# Patient Record
Sex: FEMALE | Race: WHITE | NOT HISPANIC OR LATINO | Employment: OTHER | URBAN - METROPOLITAN AREA
[De-identification: names, ages, dates, MRNs, and addresses within clinical notes are randomized per-mention and may not be internally consistent; named-entity substitution may affect disease eponyms.]

---

## 2018-03-08 ENCOUNTER — OFFICE VISIT CONVERTED (OUTPATIENT)
Dept: FAMILY MEDICINE CLINIC | Facility: CLINIC | Age: 53
End: 2018-03-08
Attending: FAMILY MEDICINE

## 2018-06-29 ENCOUNTER — OFFICE VISIT CONVERTED (OUTPATIENT)
Dept: FAMILY MEDICINE CLINIC | Facility: CLINIC | Age: 53
End: 2018-06-29
Attending: FAMILY MEDICINE

## 2018-08-16 ENCOUNTER — CONVERSION ENCOUNTER (OUTPATIENT)
Dept: GENERAL RADIOLOGY | Facility: HOSPITAL | Age: 53
End: 2018-08-16

## 2018-08-30 ENCOUNTER — OFFICE VISIT CONVERTED (OUTPATIENT)
Dept: FAMILY MEDICINE CLINIC | Facility: CLINIC | Age: 53
End: 2018-08-30
Attending: FAMILY MEDICINE

## 2019-01-15 ENCOUNTER — OFFICE VISIT CONVERTED (OUTPATIENT)
Dept: FAMILY MEDICINE CLINIC | Facility: CLINIC | Age: 54
End: 2019-01-15
Attending: FAMILY MEDICINE

## 2019-01-15 ENCOUNTER — HOSPITAL ENCOUNTER (OUTPATIENT)
Dept: FAMILY MEDICINE CLINIC | Facility: CLINIC | Age: 54
Discharge: HOME | End: 2019-01-15
Attending: FAMILY MEDICINE

## 2019-01-15 LAB
ALBUMIN SERPL-MCNC: 4.2 G/DL (ref 3.5–5)
ALBUMIN/GLOB SERPL: 1.2 {RATIO} (ref 1.4–2.6)
ALP SERPL-CCNC: 90 U/L (ref 53–141)
ALT SERPL-CCNC: 9 U/L (ref 10–40)
ANION GAP SERPL CALC-SCNC: 17 MMOL/L (ref 8–19)
AST SERPL-CCNC: 13 U/L (ref 15–50)
BASOPHILS # BLD AUTO: 0.05 10*3/UL (ref 0–0.2)
BASOPHILS NFR BLD AUTO: 0.43 % (ref 0–3)
BILIRUB SERPL-MCNC: 0.24 MG/DL (ref 0.2–1.3)
BUN SERPL-MCNC: 15 MG/DL (ref 5–25)
BUN/CREAT SERPL: 15 {RATIO} (ref 6–20)
CALCIUM SERPL-MCNC: 9.6 MG/DL (ref 8.7–10.4)
CHLORIDE SERPL-SCNC: 102 MMOL/L (ref 99–111)
CONV CO2: 27 MMOL/L (ref 22–32)
CONV TOTAL PROTEIN: 7.6 G/DL (ref 6.3–8.2)
CREAT UR-MCNC: 0.98 MG/DL (ref 0.5–0.9)
EOSINOPHIL # BLD AUTO: 0.21 10*3/UL (ref 0–0.7)
EOSINOPHIL # BLD AUTO: 1.72 % (ref 0–7)
ERYTHROCYTE [DISTWIDTH] IN BLOOD BY AUTOMATED COUNT: 13.7 % (ref 11.5–14.5)
GFR SERPLBLD BASED ON 1.73 SQ M-ARVRAT: >60 ML/MIN/{1.73_M2}
GLOBULIN UR ELPH-MCNC: 3.4 G/DL (ref 2–3.5)
GLUCOSE SERPL-MCNC: 94 MG/DL (ref 65–99)
HBA1C MFR BLD: 13.4 G/DL (ref 12–16)
HCT VFR BLD AUTO: 40.7 % (ref 37–47)
LYMPHOCYTES # BLD AUTO: 2.9 10*3/UL (ref 1–5)
MCH RBC QN AUTO: 29.3 PG (ref 27–31)
MCHC RBC AUTO-ENTMCNC: 32.9 G/DL (ref 33–37)
MCV RBC AUTO: 88.9 FL (ref 81–99)
MONOCYTES # BLD AUTO: 0.77 10*3/UL (ref 0.2–1.2)
MONOCYTES NFR BLD AUTO: 6.43 % (ref 3–10)
NEUTROPHILS # BLD AUTO: 8.08 10*3/UL (ref 2–8)
NEUTROPHILS NFR BLD AUTO: 67.3 % (ref 30–85)
NRBC BLD AUTO-RTO: 0 % (ref 0–0.01)
OSMOLALITY SERPL CALC.SUM OF ELEC: 295 MOSM/KG (ref 273–304)
PLATELET # BLD AUTO: 279 10*3/UL (ref 130–400)
PMV BLD AUTO: 8.1 FL (ref 7.4–10.4)
POTASSIUM SERPL-SCNC: 4.3 MMOL/L (ref 3.5–5.3)
RBC # BLD AUTO: 4.58 10*6/UL (ref 4.2–5.4)
SODIUM SERPL-SCNC: 142 MMOL/L (ref 135–147)
VARIANT LYMPHS NFR BLD MANUAL: 24.1 % (ref 20–45)
WBC # BLD AUTO: 12 10*3/UL (ref 4.8–10.8)

## 2019-02-28 ENCOUNTER — OFFICE VISIT CONVERTED (OUTPATIENT)
Dept: FAMILY MEDICINE CLINIC | Facility: CLINIC | Age: 54
End: 2019-02-28
Attending: FAMILY MEDICINE

## 2019-04-29 ENCOUNTER — CONVERSION ENCOUNTER (OUTPATIENT)
Dept: FAMILY MEDICINE CLINIC | Facility: CLINIC | Age: 54
End: 2019-04-29

## 2019-04-29 ENCOUNTER — OFFICE VISIT CONVERTED (OUTPATIENT)
Dept: FAMILY MEDICINE CLINIC | Facility: CLINIC | Age: 54
End: 2019-04-29
Attending: FAMILY MEDICINE

## 2019-05-01 ENCOUNTER — HOSPITAL ENCOUNTER (OUTPATIENT)
Dept: LAB | Facility: HOSPITAL | Age: 54
Discharge: HOME | End: 2019-05-01
Attending: FAMILY MEDICINE

## 2019-05-01 ENCOUNTER — HOSPITAL ENCOUNTER (OUTPATIENT)
Dept: GENERAL RADIOLOGY | Facility: HOSPITAL | Age: 54
Discharge: HOME | End: 2019-05-01
Attending: FAMILY MEDICINE

## 2019-05-01 LAB
ALBUMIN SERPL-MCNC: 4.2 G/DL (ref 3.5–5)
ALBUMIN/GLOB SERPL: 1.4 {RATIO} (ref 1.4–2.6)
ALP SERPL-CCNC: 96 U/L (ref 53–141)
ALT SERPL-CCNC: 11 U/L (ref 10–40)
ANION GAP SERPL CALC-SCNC: 18 MMOL/L (ref 8–19)
AST SERPL-CCNC: 14 U/L (ref 15–50)
BILIRUB SERPL-MCNC: 0.33 MG/DL (ref 0.2–1.3)
BUN SERPL-MCNC: 16 MG/DL (ref 5–25)
BUN/CREAT SERPL: 15 {RATIO} (ref 6–20)
CALCIUM SERPL-MCNC: 9.4 MG/DL (ref 8.7–10.4)
CHLORIDE SERPL-SCNC: 99 MMOL/L (ref 99–111)
CHOLEST SERPL-MCNC: 271 MG/DL (ref 107–200)
CHOLEST/HDLC SERPL: 7.1 {RATIO} (ref 3–6)
CONV CO2: 27 MMOL/L (ref 22–32)
CONV TOTAL PROTEIN: 7.3 G/DL (ref 6.3–8.2)
CREAT UR-MCNC: 1.08 MG/DL (ref 0.5–0.9)
EST. AVERAGE GLUCOSE BLD GHB EST-MCNC: 108 MG/DL
GFR SERPLBLD BASED ON 1.73 SQ M-ARVRAT: 59 ML/MIN/{1.73_M2}
GLOBULIN UR ELPH-MCNC: 3.1 G/DL (ref 2–3.5)
GLUCOSE SERPL-MCNC: 111 MG/DL (ref 65–99)
HBA1C MFR BLD: 5.4 % (ref 3.5–5.7)
HDLC SERPL-MCNC: 38 MG/DL (ref 40–60)
LDLC SERPL CALC-MCNC: 183 MG/DL (ref 70–100)
OSMOLALITY SERPL CALC.SUM OF ELEC: 292 MOSM/KG (ref 273–304)
POTASSIUM SERPL-SCNC: 4.3 MMOL/L (ref 3.5–5.3)
SODIUM SERPL-SCNC: 140 MMOL/L (ref 135–147)
TRIGL SERPL-MCNC: 439 MG/DL (ref 40–150)

## 2019-05-28 ENCOUNTER — OFFICE VISIT CONVERTED (OUTPATIENT)
Dept: FAMILY MEDICINE CLINIC | Facility: CLINIC | Age: 54
End: 2019-05-28
Attending: FAMILY MEDICINE

## 2019-06-14 ENCOUNTER — HOSPITAL ENCOUNTER (OUTPATIENT)
Dept: MRI IMAGING | Facility: HOSPITAL | Age: 54
Discharge: HOME | End: 2019-06-14
Attending: FAMILY MEDICINE

## 2019-10-22 ENCOUNTER — CONVERSION ENCOUNTER (OUTPATIENT)
Dept: FAMILY MEDICINE CLINIC | Facility: CLINIC | Age: 54
End: 2019-10-22

## 2019-10-22 ENCOUNTER — OFFICE VISIT CONVERTED (OUTPATIENT)
Dept: FAMILY MEDICINE CLINIC | Facility: CLINIC | Age: 54
End: 2019-10-22
Attending: FAMILY MEDICINE

## 2019-11-21 ENCOUNTER — OFFICE VISIT CONVERTED (OUTPATIENT)
Dept: FAMILY MEDICINE CLINIC | Facility: CLINIC | Age: 54
End: 2019-11-21
Attending: FAMILY MEDICINE

## 2019-12-16 ENCOUNTER — OFFICE VISIT CONVERTED (OUTPATIENT)
Dept: FAMILY MEDICINE CLINIC | Facility: CLINIC | Age: 54
End: 2019-12-16
Attending: FAMILY MEDICINE

## 2020-01-08 ENCOUNTER — HOSPITAL ENCOUNTER (OUTPATIENT)
Dept: GENERAL RADIOLOGY | Facility: HOSPITAL | Age: 55
Discharge: HOME | End: 2020-01-08
Attending: FAMILY MEDICINE

## 2020-01-15 ENCOUNTER — OFFICE VISIT CONVERTED (OUTPATIENT)
Dept: FAMILY MEDICINE CLINIC | Facility: CLINIC | Age: 55
End: 2020-01-15
Attending: FAMILY MEDICINE

## 2020-01-30 ENCOUNTER — OFFICE VISIT CONVERTED (OUTPATIENT)
Dept: PULMONOLOGY | Facility: CLINIC | Age: 55
End: 2020-01-30
Attending: INTERNAL MEDICINE

## 2021-02-02 ENCOUNTER — TELEMEDICINE CONVERTED (OUTPATIENT)
Dept: FAMILY MEDICINE CLINIC | Facility: CLINIC | Age: 56
End: 2021-02-02
Attending: FAMILY MEDICINE

## 2021-03-01 ENCOUNTER — HOSPITAL ENCOUNTER (OUTPATIENT)
Dept: FAMILY MEDICINE CLINIC | Facility: CLINIC | Age: 56
Discharge: HOME | End: 2021-03-01
Attending: FAMILY MEDICINE

## 2021-03-01 LAB
ALBUMIN SERPL-MCNC: 4.4 G/DL (ref 3.5–5)
ALBUMIN/GLOB SERPL: 1.5 {RATIO} (ref 1.4–2.6)
ALP SERPL-CCNC: 93 U/L (ref 53–141)
ALT SERPL-CCNC: 9 U/L (ref 10–40)
ANION GAP SERPL CALC-SCNC: 15 MMOL/L (ref 8–19)
AST SERPL-CCNC: 15 U/L (ref 15–50)
BASOPHILS # BLD AUTO: 0.04 10*3/UL (ref 0–0.2)
BASOPHILS NFR BLD AUTO: 0.4 % (ref 0–3)
BILIRUB SERPL-MCNC: 0.26 MG/DL (ref 0.2–1.3)
BUN SERPL-MCNC: 20 MG/DL (ref 5–25)
BUN/CREAT SERPL: 20 {RATIO} (ref 6–20)
CALCIUM SERPL-MCNC: 9.5 MG/DL (ref 8.7–10.4)
CHLORIDE SERPL-SCNC: 101 MMOL/L (ref 99–111)
CHOLEST SERPL-MCNC: 247 MG/DL (ref 107–200)
CHOLEST/HDLC SERPL: 6 {RATIO} (ref 3–6)
CONV ABS IMM GRAN: 0.05 10*3/UL (ref 0–0.2)
CONV CO2: 26 MMOL/L (ref 22–32)
CONV IMMATURE GRAN: 0.5 % (ref 0–1.8)
CONV TOTAL PROTEIN: 7.4 G/DL (ref 6.3–8.2)
CREAT UR-MCNC: 1 MG/DL (ref 0.5–0.9)
DEPRECATED RDW RBC AUTO: 49.3 FL (ref 36.4–46.3)
EOSINOPHIL # BLD AUTO: 0.26 10*3/UL (ref 0–0.7)
EOSINOPHIL # BLD AUTO: 2.4 % (ref 0–7)
ERYTHROCYTE [DISTWIDTH] IN BLOOD BY AUTOMATED COUNT: 14.3 % (ref 11.7–14.4)
EST. AVERAGE GLUCOSE BLD GHB EST-MCNC: 114 MG/DL
GFR SERPLBLD BASED ON 1.73 SQ M-ARVRAT: >60 ML/MIN/{1.73_M2}
GLOBULIN UR ELPH-MCNC: 3 G/DL (ref 2–3.5)
GLUCOSE SERPL-MCNC: 85 MG/DL (ref 65–99)
HBA1C MFR BLD: 5.6 % (ref 3.5–5.7)
HCT VFR BLD AUTO: 45.1 % (ref 37–47)
HDLC SERPL-MCNC: 41 MG/DL (ref 40–60)
HGB BLD-MCNC: 14.2 G/DL (ref 12–16)
LDLC SERPL CALC-MCNC: 162 MG/DL (ref 70–100)
LYMPHOCYTES # BLD AUTO: 2.54 10*3/UL (ref 1–5)
LYMPHOCYTES NFR BLD AUTO: 23.6 % (ref 20–45)
MCH RBC QN AUTO: 29.3 PG (ref 27–31)
MCHC RBC AUTO-ENTMCNC: 31.5 G/DL (ref 33–37)
MCV RBC AUTO: 93 FL (ref 81–99)
MONOCYTES # BLD AUTO: 0.92 10*3/UL (ref 0.2–1.2)
MONOCYTES NFR BLD AUTO: 8.5 % (ref 3–10)
NEUTROPHILS # BLD AUTO: 6.96 10*3/UL (ref 2–8)
NEUTROPHILS NFR BLD AUTO: 64.6 % (ref 30–85)
NRBC CBCN: 0 % (ref 0–0.7)
OSMOLALITY SERPL CALC.SUM OF ELEC: 286 MOSM/KG (ref 273–304)
PLATELET # BLD AUTO: 256 10*3/UL (ref 130–400)
PMV BLD AUTO: 10.8 FL (ref 9.4–12.3)
POTASSIUM SERPL-SCNC: 4.5 MMOL/L (ref 3.5–5.3)
RBC # BLD AUTO: 4.85 10*6/UL (ref 4.2–5.4)
SODIUM SERPL-SCNC: 137 MMOL/L (ref 135–147)
TRIGL SERPL-MCNC: 218 MG/DL (ref 40–150)
VLDLC SERPL-MCNC: 44 MG/DL (ref 5–37)
WBC # BLD AUTO: 10.77 10*3/UL (ref 4.8–10.8)

## 2021-04-13 ENCOUNTER — OFFICE VISIT CONVERTED (OUTPATIENT)
Dept: FAMILY MEDICINE CLINIC | Facility: CLINIC | Age: 56
End: 2021-04-13
Attending: FAMILY MEDICINE

## 2021-04-13 ENCOUNTER — CONVERSION ENCOUNTER (OUTPATIENT)
Dept: FAMILY MEDICINE CLINIC | Facility: CLINIC | Age: 56
End: 2021-04-13

## 2021-04-30 ENCOUNTER — HOSPITAL ENCOUNTER (OUTPATIENT)
Dept: MRI IMAGING | Facility: HOSPITAL | Age: 56
Discharge: HOME | End: 2021-04-30
Attending: FAMILY MEDICINE

## 2021-05-14 VITALS
SYSTOLIC BLOOD PRESSURE: 120 MMHG | WEIGHT: 227.37 LBS | TEMPERATURE: 97.3 F | HEIGHT: 64 IN | DIASTOLIC BLOOD PRESSURE: 68 MMHG | BODY MASS INDEX: 38.82 KG/M2 | HEART RATE: 97 BPM | OXYGEN SATURATION: 96 %

## 2021-05-14 NOTE — PROGRESS NOTES
Progress Note      Patient Name: Lluvia Archer   Patient ID: [id number]   Sex: Female   YOB: 1965    Primary Care Provider: Aleksandar Edge DO   Referring Provider: Aleksandar Edge DO    Visit Date: February 2, 2021    Provider: Aleksandar Edge DO   Location: [facility]   Location Address: [address] [place] [address]  [id number]   Location Phone: [phone]          Chief Complaint  · Follow up - COPD, HTN, Anxiety, Depression, HLD  · Medicaiton refills      History Of Present Illness  Video Conferencing Visit  Lluvia Archer is a 55 year old /White female who is presenting for evaluation via video conferencing via ZOOM. Verbal consent obtained before beginning visit.   The following staff were present during this visit: Breana Baez CMA   Lluvia Archer is a 55 year old /White female who presents for evaluation and treatment of: HTN, HLD. She states that she does NOT check her BP. She has been out of her meds for the past 4 months, except for the Lisinopril.      Depression/anxiety- She has rarely left her house in 10 months. She has been more anxious. She has been taking her Fluoxetine daily.      COPD- She states that her breathing has been good, except for a mild URI. She is using her Breo and Albuterol as needed. She has remained a non-smoker.      DM- She had lost weight and her her A1C was <6. Unfortunately with COVID she has gained 30 lbs.       Past Medical History  Disease Name Date Onset Notes   Abnormal mammogram --  --    Anxiety --  --    Arthritis --  R shoulder, R hip, and R leg   Chronic nausea 01/15/2019 --    Chronic obstructive pulmonary disease (COPD) 03/14/2017 --    Depression --  --    Diabetes Mellitus, Type II --  --    GERD --  --    Hernia, hiatal --  --    Hyperlipidemia --  --    Hypertension --  --    Knee pain, right 08/30/2018 --    Memory loss/Forgetfulness --  --    Migraine Headaches --   --    Moderate episode of recurrent major depressive disorder 05/22/2017 --    Night sweats --  --    Numbness 08/18/2017 Will chheck an x-miguel a of her c-spine   Sciatica of right side 08/18/2017 --    Severe episode of recurrent major depressive disorder, without psychotic features 10/22/2019 --    Shortness of breath --  --    Shoulder pain, left 08/30/2018 --    Sinus Trouble; unspecified --  --    Sleep apnea, unspecified --  --    Tobacco abuse 03/14/2017 --    Tobacco abuse counseling 03/14/2017 She desires to quit. She has tried Chantix in the past w/o success. She was successful in the past w/o medication.          Past Surgical History  Procedure Name Date Notes   Cesarian Section --  --    Cholecystectomy --  --    Colonoscopy --  --    Gallbladder --  --          Medication List  Name Date Started Instructions   Accu-Chek Marina Plus Meter miscellaneous misc 04/10/2014 use as directed   Accu-Chek Marina Plus test strp miscellaneous strip 03/14/2017 check blood sugar fasting daily and record   ALBUTEROL HFA INH(200 PUFFS)18GM 01/03/2021 INHALE 1 TO 2 PUFFS BY MOUTH EVERY 6 HOURS AS NEEDED FOR 30 DAYS   albuterol sulfate 2.5 mg /3 mL (0.083 %) inhalation solution for nebulization 06/29/2020 USE 1 VIAL VIA NEBULIZER EVERY 6 HOURS AS NEEDED FOR 30 DAYS   albuterol sulfate 90 mcg/actuation inhalation HFA aerosol inhaler 07/27/2020 INHALE 1 TO 2 PUFFS BY MOUTH EVERY 6 HOURS AS NEEDED FOR 30 DAYS   Breo Ellipta 100-25 mcg/dose inhalation blister with device 12/03/2020 INHALE 1 PUFF BY MOUTH EVERY DAY SAME TIME EACH DAY   bupropion HCl 150 mg oral tablet sustained-release 12 hr 01/15/2020 take 1 tablet (150 mg) by oral route 2 times per day for 30 days   clonazepam 0.5 mg oral tablet 06/05/2020 TAKE 1 TABLET(0.5 MG) BY MOUTH TWICE DAILY   famotidine 40 mg oral tablet 04/15/2020 take 1 tablet (40 mg) by oral route 2 times per day for 30 days   fluoxetine 40 mg oral capsule 08/27/2020 TAKE 1 CAPSULE(40 MG) BY MOUTH  EVERY DAY   InnoSpire Deluxe miscellaneous device 01/15/2019 use as directed every 6 hrs as needed   lisinopril 5 mg oral tablet 2020 TAKE 1 TABLET BY MOUTH EVERY DAY   Ultra Thin Lancets 28 gauge miscellaneous misc 2017 check blood daily and record   Ventolin HFA 90 mcg/actuation inhalation HFA aerosol inhaler 2019 INHALE 1 - 2 PUFFS (90 - 180 MCG) BY INHALATION ROUTE EVERY 6 HOURS AS NEEDED FOR 30 DAYS         Allergy List  Allergen Name Date Reaction Notes   NO KNOWN DRUG ALLERGIES --  --  --          Family Medical History  Disease Name Relative/Age Notes   Heart Disease Father/   Father   Hypertension  --    Cancer, Unspecified  --    Cardiovascular disease  --    Blood Diseases Mother/   Mother   Family history of certain chronic disabling diseases; arthritis Mother/   Mother         Reproductive History  Menstrual   Pregnancy Summary   Total Pregnancies: 0 Full Term: 0 Premature: 0   Ab Induced: 0 Ab Spontaneous: 0 Ectopics: 0   Multiples: 0 Livin         Social History  Finding Status Start/Stop Quantity Notes   Alcohol Use --  --/-- --  01/15/2019 - does not drink   Homemaker. --  --/-- --  --    lives with other --  --/-- --  --    Recreational Drug Use Never --/-- --  no   Tobacco Former --/53 1 ppd down to 10 per day         Immunizations  NameDate Admin Mfg Trade Name Lot Number Route Inj VIS Given VIS Publication   [id number]/ [facility] Fluzone Quadrivalent [id number] IM RD 10/22/2019    Comments: pt tolerated well with no complaints   Prevnar [id number] [facility] PREVNAR 13 [id number] IM  2017   Comments: Pt tollerated injetion well with no adverse reaction.         Review of Systems  · Constitutional  o Denies  o : fatigue  · Eyes  o Admits  o : blurred vision  o Denies  o : changes in vision  · HENT  o Denies  o : headaches  · Cardiovascular  o Denies  o : chest pain, irregular heart beats, rapid heart rate  · Respiratory  o Admits  o : wheezing, cough, dyspnea on  "exertion  o Denies  o : shortness of breath  · Gastrointestinal  o Admits  o : nausea  o Denies  o : vomiting, diarrhea, constipation  · Endocrine  o Admits  o : polyuria, central obesity  o Denies  o : polydipsia  · Psychiatric  o Admits  o : anxiety, depression      Vitals  Date Time BP Position Site L\R Cuff Size HR RR TEMP (F) WT  HT  BMI kg/m2 BSA m2 O2 Sat FR L/min FiO2 HC       11/21/2019 10:42 /59 Sitting    109 - R  98.5 223lbs 6oz 5'  4\" 38.34 2.14 92 %  21%    12/16/2019 10:27 AM 98/70 Sitting    93 - R   222lbs 4oz 5'  4\" 38.15 2.13 98 % 2 28%    01/15/2020 09:47 /58 Sitting    98 - R   221lbs 4oz 5'  4\" 37.98 2.13 96 % 2 28%          Physical Examination  · Constitutional  o Appearance  o : well-nourished, well developed, no obvious deformities present  · Head and Face  o Head  o :   § Inspection  § : atraumatic, normocephalic  o Face  o :   § Inspection  § : no facial lesions  · Respiratory  o Respiratory Effort  o : breathing unlabored, no accessory muscle use  · Neurologic  o Mental Status Examination  o :   § Orientation  § : grossly oriented to person, place and time  · Psychiatric  o Judgement and Insight  o : judgement and insight intact  o Thought Processes  o : rate of thoughts normal, thought content logical  o Mood and Affect  o : mood normal, affect appropriate          Assessment  · GERD (gastroesophageal reflux disease)     530.81/K21.9  will continue her Pepcid  · Hyperlipidemia     272.4/E78.5  will update   · Chronic nausea     787.02/R11.0  · Chronic obstructive pulmonary disease (COPD)     496/J44.9  Overall she is doing well.   · Moderate episode of recurrent major depressive disorder     296.32/F33.1  will add back the Buproprion  · Diabetes Mellitus, Type II     250.00/E11.9  She has recurrent symptom with her weight gain.   · Hypertension     401.9/I10  stable      Plan  · Orders  o CBC with Auto Diff [facility] ([id number]) - 296.32/F33.1, 401.9/I10, 530.81/K21.9, 787.02/R11.0 - " 02/02/2021  o CMP [facility] ([id number]) - 250.00/E11.9, 401.9/I10, 272.4/E78.5 - 02/02/2021  o Hgb A1c [facility] ([id number]) - 250.00/E11.9 - 02/02/2021  o Lipid Panel [facility] ([id number]) - 250.00/E11.9, 401.9/I10, 272.4/E78.5 - 02/02/2021  o ACO-39: Current medications updated and reviewed (1159F, ) - - 02/02/2021  · Medications  o Trelegy Ellipta 200-62.5-25 mcg inhalation blister with device   SIG: inhale 1 puff by inhalation route once daily at the same time each day for 30 days   DISP: (1) Inhaler with 11 refills  Prescribed on 02/02/2021     o albuterol sulfate 90 mcg/actuation inhalation HFA aerosol inhaler   SIG: inhale 1 - 2 puffs (90 - 180 mcg) by inhalation route every 6 hours as needed for 30 days   DISP: (1) Inhaler with 2 refills  Adjusted on 02/02/2021     o bupropion HCl 150 mg oral tablet extended release 24 hr   SIG: take 1 tablet (150 mg) by oral route once daily for 30 days   DISP: (30) Tablet with 5 refills  Adjusted on 02/02/2021     o famotidine 40 mg oral tablet   SIG: take 1 tablet (40 mg) by oral route 2 times per day for 30 days   DISP: (60) Tablet with 11 refills  Adjusted on 02/02/2021     o fluoxetine 40 mg oral capsule   SIG: take 1 capsule (40 mg) by oral route once daily for 30 days   DISP: (30) Capsule with 11 refills  Adjusted on 02/02/2021     o lisinopril 5 mg oral tablet   SIG: take 1 tablet (5 mg) by oral route once daily for 30 days   DISP: (30) Tablet with 11 refills  Adjusted on 02/02/2021     o Accu-Chek Marina Plus Meter miscellaneous misc   SIG: use as directed   DISP: (1) Box with 0 refills  Discontinued on 02/02/2021     o Accu-Chek Marina Plus test strp miscellaneous strip   SIG: check blood sugar fasting daily and record   DISP: (1) 50 ct box with 11 refills  Discontinued on 02/02/2021     o ALBUTEROL HFA INH(200 PUFFS)18GM   SIG: INHALE 1 TO 2 PUFFS BY MOUTH EVERY 6 HOURS AS NEEDED FOR 30 DAYS   DISP: (18) Gram with 0 refills  Discontinued on 02/02/2021     o Breo Ellipta 100-25 mcg/dose  inhalation blister with device   SIG: INHALE 1 PUFF BY MOUTH EVERY DAY SAME TIME EACH DAY   DISP: (1) Inhaler with 1 refills  Discontinued on 02/02/2021     o Ventolin HFA 90 mcg/actuation inhalation HFA aerosol inhaler   SIG: INHALE 1 - 2 PUFFS (90 - 180 MCG) BY INHALATION ROUTE EVERY 6 HOURS AS NEEDED FOR 30 DAYS   DISP: (18) Gram with 1 refills  Discontinued on 02/02/2021     o Medications have been Reconciled  o Transition of Care or Provider Policy  · Instructions  o Maintain a healthy weight. Avoid tight fitting clothes. Avoid fried, fatty foods, tomato sauce, chocolate, mint, garlic, onion, alcohol. caffeine. Eat smaller meals, dont lie down after a meal, dont smoke. Elevate the head of your bed 6-9 inches.  o Patient was educated and given low cholesterol diet information.  o Recommended exercise program to assist with cholesterol, weight loss and overall health improvement.  o Advised that cheeses and other sources of dairy fats, animal fats, fast food, and the extras (candy, pastries, pies, doughnuts and cookies) all contain LDL raising nutrients. Advised to increase fruits, vegetables, whole grains, and to monitor portion sizes.   o Patient is taking medications as prescribed and doing well.   o Take all medications as prescribed/directed.  o Patient instructed/educated on their diet and exercise program.  o Patient was educated/instructed on their diagnosis, treatment and medications prior to discharge from the clinic today.  o Call the office with any concerns or questions.  · Disposition  o Call or Return if symptoms worsen or persist.  o Return Visit Request in/on 2 months +/- 2 days ([id number]).            Electronically Signed by: Aleksandar Edge, DO -Author on February 2, 2021 02:38:00 PM

## 2021-05-14 NOTE — PROGRESS NOTES
Progress Note      Patient Name: Lluvia Archer   Patient ID: [id number]   Sex: Female   YOB: 1965    Primary Care Provider: Aleksandar Edge DO   Referring Provider: Aleksandar Edge DO    Visit Date: April 13, 2021    Provider: Aleksandar Edge DO   Location: [facility]   Location Address: [address] [place] [address]  [id number]   Location Phone: [phone]          Chief Complaint  · medication refills  · f/u ER      History Of Present Illness  Lluvia Archer is a 55 year old /White female who presents for evaluation and treatment of: [facility] ER f/u. She was to the ER recently for lower back pain. She denies any recent injury or falls. THe pain is in her right lower back and radiates down into her right leg. THe pain disrupts her sleep. It is worse with walking. In the ER she was given Prednisone and Norco. It did help her pain while she was taking them. She had a similar problem in the past with resolution with PT.       Past Medical History  Disease Name Date Onset Notes   Abnormal mammogram --  --    Anxiety --  --    Arthritis --  R shoulder, R hip, and R leg   Chronic nausea 01/15/2019 --    Chronic obstructive pulmonary disease (COPD) 03/14/2017 --    Depression --  --    Diabetes Mellitus, Type II --  --    GERD --  --    Hernia, hiatal --  --    Hyperlipidemia --  --    Hypertension --  --    Knee pain, right 08/30/2018 --    Memory loss/Forgetfulness --  --    Migraine Headaches --  --    Moderate episode of recurrent major depressive disorder 05/22/2017 --    Night sweats --  --    Numbness 08/18/2017 Will chheck an x-miguel a of her c-spine   Sciatica of right side 08/18/2017 --    Severe episode of recurrent major depressive disorder, without psychotic features 10/22/2019 --    Shortness of breath --  --    Shoulder pain, left 08/30/2018 --    Sinus Trouble; unspecified --  --    Sleep apnea, unspecified --  --    Tobacco abuse  03/14/2017 --    Tobacco abuse counseling 03/14/2017 She desires to quit. She has tried Chantix in the past w/o success. She was successful in the past w/o medication.          Past Surgical History  Procedure Name Date Notes   Cesarian Section --  --    Cholecystectomy --  --    Colonoscopy --  --    Gallbladder --  --          Medication List  Name Date Started Instructions   albuterol sulfate 2.5 mg /3 mL (0.083 %) inhalation solution for nebulization 02/12/2021 inhale 3 milliliters (2.5 mg) by nebulization route every 6 hours as needed for 30 days   albuterol sulfate 90 mcg/actuation inhalation HFA aerosol inhaler 02/02/2021 inhale 1 - 2 puffs (90 - 180 mcg) by inhalation route every 6 hours as needed for 30 days   atorvastatin 10 mg oral tablet 03/02/2021 take 1 tablet (10 mg) by oral route once daily at bedtime for 30 days   bupropion HCl 150 mg oral tablet extended release 24 hr 02/02/2021 take 1 tablet (150 mg) by oral route once daily for 30 days   clonazepam 0.5 mg oral tablet 06/05/2020 TAKE 1 TABLET(0.5 MG) BY MOUTH TWICE DAILY   famotidine 40 mg oral tablet 02/02/2021 take 1 tablet (40 mg) by oral route 2 times per day for 30 days   fluoxetine 40 mg oral capsule 02/02/2021 take 1 capsule (40 mg) by oral route once daily for 30 days   InnoSpire Deluxe miscellaneous device 01/15/2019 use as directed every 6 hrs as needed   lisinopril 5 mg oral tablet 02/02/2021 take 1 tablet (5 mg) by oral route once daily for 30 days   melatonin 10 mg oral tablet  take 1 tablet by oral route daily   Trelegy 100 mcg/62.5mcg/25mcg Inhalation  inhale one puff daily   Trelegy Ellipta 100-62.5-25 mcg inhalation blister with device 02/12/2021 inhale 1 puff by inhalation route once daily at the same time each day for 30 days   Ultra Thin Lancets 28 gauge miscellaneous misc 03/14/2017 check blood daily and record         Allergy List  Allergen Name Date Reaction Notes   NO KNOWN DRUG ALLERGIES --  --  --          Family Medical  "History  Disease Name Relative/Age Notes   Heart Disease Father/   Father   Hypertension  --    Cancer, Unspecified  --    Cardiovascular disease  --    Blood Diseases Mother/   Mother   Family history of certain chronic disabling diseases; arthritis Mother/   Mother         Reproductive History  Menstrual   Pregnancy Summary   Total Pregnancies: 0 Full Term: 0 Premature: 0   Ab Induced: 0 Ab Spontaneous: 0 Ectopics: 0   Multiples: 0 Livin         Social History  Finding Status Start/Stop Quantity Notes   Alcohol Use --  --/-- --  01/15/2019 - does not drink   Homemaker. --  --/-- --  --    lives with other --  --/-- --  --    Recreational Drug Use Never --/-- --  no   Tobacco Former --/53 1 ppd down to 10 per day         Immunizations  NameDate Admin Mfg Trade Name Lot Number Route Inj VIS Given VIS Publication   [id number]/ [facility] Fluzone Quadrivalent [id number] IM RD 10/22/2019    Comments: pt tolerated well with no complaints   Prevnar [id number] [facility] PREVNAR 13 [id number] IM  2017   Comments: Pt tollerated injetion well with no adverse reaction.         Review of Systems  · Constitutional  o Denies  o : fatigue  · Neurologic  o Admits  o : tingling or numbness, additional neurologic symptoms except as noted in the HPI  · Musculoskeletal  o Admits  o : back pain      Vitals  Date Time BP Position Site L\R Cuff Size HR RR TEMP (F) WT  HT  BMI kg/m2 BSA m2 O2 Sat FR L/min FiO2 HC       2019 10:42 /59 Sitting    109 - R  98.5 223lbs 6oz 5'  4\" 38.34 2.14 92 %  21%    2019 10:27 AM 98/70 Sitting    93 - R   222lbs 4oz 5'  4\" 38.15 2.13 98 % 2 28%    01/15/2020 09:47 /58 Sitting    98 - R   221lbs 4oz 5'  4\" 37.98 2.13 96 % 2 28%    2021 09:03 /68 Sitting    97 - R  97.3 227lbs 6oz 5'  4\" 39.03 2.16 96 %  21%          Physical Examination  · Constitutional  o Appearance  o : well-nourished, well developed, no obvious deformities present  · Head and " Face  o Head  o :   § Inspection  § : atraumatic, normocephalic  o Face  o :   § Inspection  § : no facial lesions  · Neurologic  o Motor Examination  o :   § RLE Strength  § : strength normal  § LLE Strength  § : strength normal  o Reflexes  o :   § RLE  § : patellar reflex 2, ankle reflex 2  § LLE  § : patellar reflex 2, ankle reflex 2  o Sensation  o :   § Light Touch  § : sensation intact to light touch in extremities  · Back  o Inspection  o : no deformities  o Palpation  o : tednerness present right lumbar region  o Special Tests  o : right straight leg raise positive, left straight leg raise negative          Assessment  · Screening for depression     V79.0/Z13.89  · Sciatica of right side     724.3/M54.31  She is having recurrent sciatica. Will attempt to get an MRI      Plan  · Orders  o ACO-18: Positive screen for clinical depression using a standardized tool and a follow-up plan documented () - V79.0/Z13.89 - 04/13/2021  o ACO-39: Current medications updated and reviewed (1159F, ) - - 04/13/2021  o MRI lumbar spine w/ contrast (25341) - 724.3/M54.31 - 04/13/2021  o PHYSICAL THERAPY CONSULTATION ([facility]) - 724.3/M54.31 - 04/13/2021  · Medications  o tramadol 50 mg oral tablet   SIG: take 1 tablet (50 mg) by oral route every 6 hours as needed for 30 days   DISP: (60) Tablet with 0 refills  Prescribed on 04/13/2021     o alprazolam 0.5 mg oral tablet   SIG: take 1 tablet (0.5 mg) by mouth 30 minutes prior to MRI   DISP: (1) Tablet with 0 refills  Prescribed on 04/13/2021     o clonazepam 0.5 mg oral tablet   SIG: TAKE 1 TABLET(0.5 MG) BY MOUTH TWICE DAILY   DISP: (60) Tablet with 0 refills  Discontinued on 04/13/2021     o Medications have been Reconciled  o Transition of Care or Provider Policy  · Instructions  o Depression Screen completed and scanned into the EMR under the designated folder within the patient's documents.  o Today's PHQ-9 result is 22  o Patient is taking medications as  prescribed and doing well.   o Take all medications as prescribed/directed.  o Patient instructed/educated on their diet and exercise program.  o Patient was educated/instructed on their diagnosis, treatment and medications prior to discharge from the clinic today.  o Patient instructed to seek medical attention urgently for new or worsening symptoms.  o Call the office with any concerns or questions.  o Bring all medicines with their bottles to each office visit.  · Disposition  o Call or Return if symptoms worsen or persist.  o Return Visit Request in/on 4 weeks +/- 2 days ([id number]).            Electronically Signed by: Aleksandar Edge DO -Author on April 13, 2021 09:44:48 AM

## 2021-05-15 VITALS
BODY MASS INDEX: 38.94 KG/M2 | HEART RATE: 102 BPM | TEMPERATURE: 98.1 F | SYSTOLIC BLOOD PRESSURE: 144 MMHG | HEIGHT: 64 IN | OXYGEN SATURATION: 97 % | DIASTOLIC BLOOD PRESSURE: 69 MMHG | WEIGHT: 228.12 LBS

## 2021-05-15 VITALS
WEIGHT: 234 LBS | OXYGEN SATURATION: 96 % | SYSTOLIC BLOOD PRESSURE: 150 MMHG | BODY MASS INDEX: 39.95 KG/M2 | DIASTOLIC BLOOD PRESSURE: 78 MMHG | HEIGHT: 64 IN | HEART RATE: 107 BPM

## 2021-05-15 VITALS
SYSTOLIC BLOOD PRESSURE: 98 MMHG | OXYGEN SATURATION: 98 % | DIASTOLIC BLOOD PRESSURE: 70 MMHG | HEART RATE: 93 BPM | BODY MASS INDEX: 37.94 KG/M2 | HEIGHT: 64 IN | WEIGHT: 222.25 LBS

## 2021-05-15 VITALS
OXYGEN SATURATION: 96 % | HEART RATE: 98 BPM | DIASTOLIC BLOOD PRESSURE: 58 MMHG | BODY MASS INDEX: 37.77 KG/M2 | WEIGHT: 221.25 LBS | SYSTOLIC BLOOD PRESSURE: 102 MMHG | HEIGHT: 64 IN

## 2021-05-15 VITALS
SYSTOLIC BLOOD PRESSURE: 125 MMHG | OXYGEN SATURATION: 92 % | BODY MASS INDEX: 38.13 KG/M2 | TEMPERATURE: 98.5 F | HEART RATE: 109 BPM | WEIGHT: 223.37 LBS | HEIGHT: 64 IN | DIASTOLIC BLOOD PRESSURE: 59 MMHG

## 2021-05-15 VITALS
HEART RATE: 91 BPM | HEIGHT: 64 IN | WEIGHT: 236 LBS | BODY MASS INDEX: 40.29 KG/M2 | SYSTOLIC BLOOD PRESSURE: 134 MMHG | DIASTOLIC BLOOD PRESSURE: 71 MMHG | OXYGEN SATURATION: 97 %

## 2021-05-16 VITALS
BODY MASS INDEX: 40.12 KG/M2 | WEIGHT: 235 LBS | HEIGHT: 64 IN | HEART RATE: 92 BPM | SYSTOLIC BLOOD PRESSURE: 121 MMHG | DIASTOLIC BLOOD PRESSURE: 69 MMHG | OXYGEN SATURATION: 96 %

## 2021-05-16 VITALS
DIASTOLIC BLOOD PRESSURE: 78 MMHG | BODY MASS INDEX: 42 KG/M2 | HEIGHT: 64 IN | HEART RATE: 103 BPM | SYSTOLIC BLOOD PRESSURE: 112 MMHG | OXYGEN SATURATION: 96 % | WEIGHT: 246 LBS

## 2021-05-16 VITALS
SYSTOLIC BLOOD PRESSURE: 149 MMHG | DIASTOLIC BLOOD PRESSURE: 82 MMHG | TEMPERATURE: 98.6 F | HEIGHT: 64 IN | OXYGEN SATURATION: 97 % | WEIGHT: 241.5 LBS | BODY MASS INDEX: 41.23 KG/M2 | HEART RATE: 110 BPM

## 2021-05-16 VITALS
WEIGHT: 249 LBS | DIASTOLIC BLOOD PRESSURE: 92 MMHG | OXYGEN SATURATION: 97 % | SYSTOLIC BLOOD PRESSURE: 107 MMHG | HEIGHT: 64 IN | BODY MASS INDEX: 42.51 KG/M2 | HEART RATE: 113 BPM

## 2021-05-16 VITALS
HEIGHT: 64 IN | SYSTOLIC BLOOD PRESSURE: 108 MMHG | DIASTOLIC BLOOD PRESSURE: 63 MMHG | WEIGHT: 246 LBS | BODY MASS INDEX: 42 KG/M2 | HEART RATE: 100 BPM | TEMPERATURE: 97.6 F

## 2021-05-18 ENCOUNTER — OFFICE VISIT CONVERTED (OUTPATIENT)
Dept: FAMILY MEDICINE CLINIC | Facility: CLINIC | Age: 56
End: 2021-05-18
Attending: FAMILY MEDICINE

## 2021-05-28 VITALS
SYSTOLIC BLOOD PRESSURE: 110 MMHG | TEMPERATURE: 98.4 F | WEIGHT: 225 LBS | DIASTOLIC BLOOD PRESSURE: 67 MMHG | HEIGHT: 64 IN | HEART RATE: 78 BPM | BODY MASS INDEX: 38.41 KG/M2 | OXYGEN SATURATION: 96 % | RESPIRATION RATE: 12 BRPM

## 2021-05-28 NOTE — PROGRESS NOTES
"Patient: AMBROSIO BUTTS     Acct: [id number]     Report: #[id number]  UNIT #: [id number]     : 1965    Encounter Date:2020  PRIMARY CARE: LIZETH EDGE  ***Signed***  --------------------------------------------------------------------------------------------------------------------  Chief Complaint      Encounter Date      2020            Primary Care Provider      LIZETH EDGE            Referring Provider      LIZETH EDGE            Patient Complaint      Patient is complaining of      New pt here for COPD            VITALS      Height 5 ft 4.00 in / 162.56 cm      Weight 225 lbs 0 oz / 102.06708 kg      BSA 2.06 m2      BMI 38.6 kg/m2      Temperature 98.4 F / 36.89 C - Oral      Pulse 78      Respirations 12      Blood Pressure 110/67 Sitting, Right Arm      Pulse Oximetry 96%, nasal cannula, 2 lpm      Initial Exhaled Nitrous Oxide      Date:  2020      Exhaled Nitrous Oxide Results:  8            HPI      The patient was referred by Dr. Edge for evaluation of COPD.  The patient     smoked since age 12. At her heaviest she was smoking two packs per day.  She     successfully quit about one month ago after going into the hospital for acute     hypoxic respiratory failure.  She was discharged from the hospital in December     on oxygen 2 liters per minute.  She has had pulmonary function tests many, many     years ago and does not recall where they were performed.  She has also undergone    a \"lung washing\" from a different pulmonologist, but she does not recall who     that was either. Currently, she complains of shortness of breath with exertion,     is unable to walk more than 500 feet on flat ground without having to stop,     catch her breath or use or albuterol rescue inhaler. Her maintenance inhaler     includes Breo 100.  She states that she has been sick with pneumonia six times     in the past one year. She has successfully lost quite a bit of weight and no  "    longer needs a CPAP. She states that her sleep apnea was cured with her weight l    oss.  She has had her immunizations as far as she is aware.              Review of systems:  A full 14 point review of systems was explored with this     patient as documented in the chart.            PAST MEDICAL HISTORY:      1. Recurrent pneumonia.      2. Former heavy tobacco abuse, now in remission x1 month, has a 90 pack year     smoking history.      3.  COPD.      4. Diabetes.      5. Anxiety with depression.      6. Hypertension.      7. Hyperlipidemia.            PAST SURGICAL HISTORY:      1.  Cholecystectomy.      2.  Teeth extractions.      3.  section.      4. Tubal ligation.            FAMILY HISTORY: Mother  of a massive heart attack, one grandmother with     breast cancer and one uncle with lung cancer.            SOCIAL HISTORY: Two packs per day for 45 years, quit smoking 2019, denies     alcohol use or recreational drug use. She is not employed.            ROS      Constitutional:  Denies: Fatigue, Fever, Weight gain, Weight loss, Chills,     Insomnia, Other      Respiratory/Breathing:  Complains of: Shortness of air, Cough; Denies: Wheezing,    Hemoptysis, Pleuritic pain, Other      Endocrine:  Denies: Polydipsia, Polyuria, Heat/cold intolerance, Abnorml     menstrual pattern, Diabetes, Other      Eyes:  Denies: Blurred vision, Vision Changes, Other      Ears, nose, mouth, throat:  Complains of: Other (ear infection); Denies:     Congestion, Dysphagia, Hearing Changes, Nose Bleeding, Nasal Discharge, Throat     pain, Tinnitus      Cardiovascular:  Complains of: Exertional dyspnea; Denies: Chest Pain,     Peripheral Edema, Palpitations, Syncope, Wake up Gasping for air, Orthopnea,     Tachycardia, Other      Gastrointestinal:  Complains of: Constipation; Denies: Abdominal pain/cramping,     Bloody stools, Diarrhea, Melena, Nausea, Vomiting, Other      Genitourinary:  Denies: Dysuria, Urinary  "frequency, Incontinence, Hematuria,     Urgency, Other      Musculoskeletal:  Complains of: Joint Pain (Arthritis ); Denies: Joint     Stiffness, Joint Swelling, Myalgias, Other      Hematologic/lymphatic:  DENIES: Lymphadenopathy, Bruising, Bleeding tendencies,     Other      Neurologic:  Denies: Headache, Numbness, Weakness, Seizures, Other      Psychiatric:  Complains of: Anxiety, Depression; Denies: Appropriate Effect,     Other      Sleep:  No: Excessive daytime sleep, Morning Headache?, Snoring, Insomnia?, Stop    breathing at sleep?, Other      Integumentary:  Denies: Rash, Dry skin, Skin Warm to Touch, Other            FAMILY/SOCIAL/MEDICAL HX      Surgical History:  Yes: Bowel Surgery (COLONOSCOPY), Cholecystectomy     (LAPAROSCOPIC), Oral Surgery (\"TEETH EXTRACTION\"); No: Abdominal Surgery,     Appendectomy, Bladder Surgery, CABG, Head Surgery, Orthopedic Surgery, Vascular     Surgery      Heart - Family Hx:  Mother (Massive heart attack)      Diabetes - Family Hx:  Cousin      Cancer/Type - Family Hx:  Grandparent (Grandmother breast cancer), Uncle (Lung     cancer)      Is Father Still Living?:  No      Is Mother Still Living?:  No      Social History:  Tobacco Use; No Alcohol Use, No Recreational Drug use      Smoking status:  Former smoker (2 ppd x 45 years/ quit smoking Dec 2, 2019)       Section:  Yes      Tubal Ligation:  Yes      Anticoagulation Therapy:  No      Medical History:  Yes: Arthritis (RIGHT HAND, RIGHT SIDE OF BACK), Chronic Bro    nchitis/COPD, Depression, Anxiety, Diabetes (ON ORAL DIABETIC MEDICATIONS), High    Blood Pressure, High Cholesterol, Shortness Of Breath, Miscellaneous Medical/oth    (30 LB WT LOSS SINCE 2014); No: Asthma, Blood Disease, Chemotherapy/Cancer,     Congestive Heart Failu, Deafness or Ringing Ears, Seizures, Heart Attack,     Hemorrhoids/Rectal Prob      Psychiatric History      Depression/ Anxiety            PREVENTION      Hx Influenza " Vaccination:  Yes      Date Influenza Vaccine Given:  Nov 6, 2019      Influenza Vaccine Declined:  No      2 or More Falls Past Year?:  No      Fall Past Year with Injury?:  No      Hx Pneumococcal Vaccination:  Yes      Encouraged to follow-up with:  PCP regarding preventative exams.      Chart initiated by      Deborah Angeles MA            ALLERGIES/MEDICATIONS      Allergies:        Coded Allergies:             NO KNOWN ALLERGIES (Unverified , 1/30/20)      Medications    Last Reconciled on 1/30/20 14:47 by MANUEL PERRY,       Cefdinir (CEFDINIR) 300 Mg Cap      300 MG PO BID, #12 CAP         Prov: George Pastor         12/6/19       Albuterol/Ipratropium (Duoneb) 3 Ml Ampul.neb      3 ML INH QID, #2 BOX 0 Refills         Prov: George Pastor         12/6/19       NEB-Albuterol Sulf (Albuterol) 2.5 Mg/3 Ml Vial.neb      1 VIAL INH Q6H PRN for SHORTNESS OF BREATH, #1 BOX 0 Refills         Prov: George Pastor         12/6/19       raNITIdine HCL (raNITIdine HCL) 150 Mg Tablet      150 MG PO BID, #60 TAB 0 Refills         Reported         12/3/19       buPROPion SR (buPROPion SR) 150 Mg Tab.sr.12h      150 MG PO BID for 30 Days, #60 TAB.ER.12H         Reported         12/3/19       Lisinopril* (Lisinopril*) 5 Mg Tablet      5 MG PO QDAY, #30 TAB 0 Refills         Reported         12/2/19       FLUoxetine HCl (FLUoxetine HCl) 40 Mg Capsule      40 MG PO QDAY         Reported         12/2/19       Fluticasone/Vilanterol 100-25 Mcg Inh (Breo Ellipta 100-25 Mcg Inh) 1 Each     Blst.w.dev      1 PUFF PO QDAY PRN for SHORTNESS OF BREATH         Reported         12/2/19       MDI-Albuterol (Ventolin HFA) 18 Gm Hfa.aer.ad      1 PUFF PO Q6H PRN for SHORTNESS OF BREATH         Reported         12/2/19       clonazePAM (clonazePAM) 0.5 Mg Tablet      0.5 MG PO BID PRN for ANXIETY         Reported         12/2/19      Current Medications      Current Medications Reviewed 1/30/20            EXAM       VITAL SIGNS:  Reviewed.        GENERAL: Appears older than stated age on supplemental oxygen, in no acute     distress.        NECK:  Supple without tracheal deviation or lymphadenopathy.  No thyromegaly     appreciated.      LYMPHATICS:  No cervical or supraclavicular lymphadenopathy.      HEENT: She has poor dentition with multiple missing teeth.  Pupils are equal,     round and reactive to light. There is no scleral icterus.  Nares patent without     hypertrophy of the turbinates. No erythema of the passages.  TMs are clear     bilaterally with good cone of light. The posterior pharynx is without  lesions     or erythema.      RESPIRATORY:  Rhonchi scattered at the bases, decreased aeration, no wheezes or     crackles, tympani to percussion.        CARDIOVASCULAR:  Regular rate and rhythm.  No murmurs, gallops or rubs.  No     lower extremity edema.  Equal radial pulses.        GI: Soft, nontender, nondistended, no organomegaly.  Bowel sounds present in all    four quadrants.      MUSCULOSKELETAL:  No joint effusions, erythema or warmth over the major joint     systems.      SKIN:  No rashes or lesions.      NEUROLOGIC: Cranial nerves II-XII are intact bilaterally.  Moves all     extremities. Ambulates with ease.      PSYCH:  Appropriate mood and affect.      Vitals      Vitals:             Height 5 ft 4.00 in / 162.56 cm           Weight 225 lbs 0 oz / 102.35585 kg           BSA 2.06 m2           BMI 38.6 kg/m2           Temperature 98.4 F / 36.89 C - Oral           Pulse 78           Respirations 12           Blood Pressure 110/67 Sitting, Right Arm           Pulse Oximetry 96%, nasal cannula, 2 lpm            REVIEW      Results Reviewed      PCCS Results Reviewed?:  Yes Prev Lab Results, Yes Prev Radiology Results, Yes     [facility]      Lab Results      I reviewed her most recent chest x-ray from 01/08/2020. There were prominent     interstitial markings at the bases bilaterally. She did not  have a recent chest     CT for my review. The most recent one I could find in the [facility] system    was 2014 and at that time there was some vague interstitial changes in the upper    lobes, some nodular areas of density in the lower lobes and atelectasis.  I was     also able to find a pulmonary function test from 2014, FEV1/FVC ratio was 54     with an FEV1 of 37% of predicted.            Assessment      Recurrent infections - B99.9            COPD with acute lower respiratory infection - J44.0            Lung nodule - R91.1            Acute respiratory failure with hypoxia - J96.01            Notes      New Medications      * Fluticasone/Umeclidin/Vilanter (Trelegy Ellipta 100-62.5-25) 1 EACH       BLST.W.DEV: 1 PUFF INH RTQDAY #1      * Fluticasone/Umeclidin/Vilanter (Trelegy Ellipta 100-62.5-25) 1 EACH BLST.W.DEV               Sample - Qty 2      Discontinued Medications      * predniSONE 20 MG TABLET: 20 MG PO ASDIR #10         Instructions: 2 tab daily x 2days, then 1 tab daily x4days, then 1/2 tab        daily x4days. take w/food      * (Nicotine 14 Mg/24 Hr) 14 MG TDSY: 14 MG TRANSDERM QDAY for nicotine cessation      #30      New Diagnostics      * Immunoglobulin  E (I, Routine         Dx: Recurrent infections - B99.9      * Immuno A (Iga), Routine         Dx: Recurrent infections - B99.9      * Immuno M (Igm), Routine         Dx: Recurrent infections - B99.9      * Immuno G (Igg), Routine         Dx: Recurrent infections - B99.9      * Inhaler Education, Routine      * PFT-Comp, PrePost,DLCO,BodyBox, Routine         Dx: COPD with acute lower respiratory infection - J44.0      * 6 Min Walk w O2 Titration Test, Routine         Dx: COPD with acute lower respiratory infection - J44.0      * Chest W/O Cont CT, SCHEDULED PROCEDURE         Dx: Lung nodule - R91.1      ASSESSMENT:       1. Severe COPD with acute lower respiratory infection requiring hospitalization.      2. Acute hypoxemic respiratory  failure, now on supplemental oxygen at 2 liters     per minute.      3.  Recurrent lung infections.      4.  Former heavy tobacco use, now in remission x1 month.      5.  History of sleep apnea, improved after weight loss.            PLAN:      1. I have ordered a chest CT without contrast to be done as soon as possible     given the question of increased interstitial thickening for better evaluation of    the underlying pulmonary parenchyma.       2. We will do a six minute walk test with oxygen titration to see how much     oxygen she requires at this time after her hospitalization one month ago.      3.  I have reordered baseline pulmonary function tests with lung volumes.      4. I have ordered immunoglobulins given her recurrent infections.      5. We have done an alpha 1 antitrypsin test today.      6. Her FENO was low, not indicative of increased eosinophilic airways     inflammation.      7. Stop Breo 100, start Trelegy once daily. Continue with albuterol as needed.      8. I will follow up with her in 1-2 months to go over the above workup.            Patient Education      ACO BMI High above 25:  Counseling Given      Education resources provided:  Yes      Patient Education Provided:  Acute Bronchitis, Acute Respiratory Syndrom, COPD,     How to use an Inhaler, Lung Cancer, Sleep Apnea, Smoking Cessation      Time Spent:  > 50% /Coord Care            Electronically signed by MANUEL PERRY  01/31/2020 10:50       Disclaimer: Converted document may not contain table formatting or lab diagrams. Please see [employer] for the authenticated document.

## 2021-06-03 ENCOUNTER — HOSPITAL ENCOUNTER (OUTPATIENT)
Dept: GENERAL RADIOLOGY | Facility: HOSPITAL | Age: 56
Discharge: HOME | End: 2021-06-03
Attending: FAMILY MEDICINE

## 2021-06-05 NOTE — PROGRESS NOTES
Progress Note      Patient Name: Lluvia Archer   Patient ID: [id number]   Sex: Female   YOB: 1965    Primary Care Provider: Aleksandar Edge DO   Referring Provider: Aleksandar Edge DO    Visit Date: May 18, 2021    Provider: Aleksandar Edge DO   Location: [facility]   Location Address: [address] [place] [address]  [id number]   Location Phone: [phone]          Chief Complaint  · 4 week follow up - Depression, right sided sciatica      History Of Present Illness  Lluvia Archer is a 55 year old /White female who presents for evaluation and treatment of: leg pain- Her recent MRI showed moderate to severe facet arthropathy, but no neuro-foraminal narrowing or spinal stenosis. The pain is worse with walking. Nothing seems to make it feel any better.       Past Medical History  Disease Name Date Onset Notes   Abnormal mammogram --  --    Anxiety --  --    Arthritis --  R shoulder, R hip, and R leg   Chronic nausea 01/15/2019 --    Chronic obstructive pulmonary disease (COPD) 03/14/2017 --    Depression --  --    Diabetes Mellitus, Type II --  --    GERD --  --    Hernia, hiatal --  --    Hyperlipidemia --  --    Hypertension --  --    Knee pain, right 08/30/2018 --    Memory loss/Forgetfulness --  --    Migraine Headaches --  --    Moderate episode of recurrent major depressive disorder 05/22/2017 --    Night sweats --  --    Numbness 08/18/2017 Will chheck an x-miguel a of her c-spine   Sciatica of right side 08/18/2017 --    Severe episode of recurrent major depressive disorder, without psychotic features 10/22/2019 --    Shortness of breath --  --    Shoulder pain, left 08/30/2018 --    Sinus Trouble; unspecified --  --    Sleep apnea, unspecified --  --    Tobacco abuse 03/14/2017 --    Tobacco abuse counseling 03/14/2017 She desires to quit. She has tried Chantix in the past w/o success. She was successful in the past w/o medication.           Past Surgical History  Procedure Name Date Notes   Cesarian Section --  --    Cholecystectomy --  --    Colonoscopy --  --    Gallbladder --  --          Medication List  Name Date Started Instructions   albuterol sulfate 2.5 mg /3 mL (0.083 %) inhalation solution for nebulization 02/12/2021 inhale 3 milliliters (2.5 mg) by nebulization route every 6 hours as needed for 30 days   albuterol sulfate 90 mcg/actuation inhalation HFA aerosol inhaler 02/02/2021 inhale 1 - 2 puffs (90 - 180 mcg) by inhalation route every 6 hours as needed for 30 days   alprazolam 0.5 mg oral tablet 04/13/2021 take 1 tablet (0.5 mg) by mouth 30 minutes prior to MRI   atorvastatin 10 mg oral tablet 03/02/2021 take 1 tablet (10 mg) by oral route once daily at bedtime for 30 days   bisacodyl 5 mg oral tablet,delayed release (DR/EC)  take 1 tablet by oral route daily as needed   bupropion HCl 150 mg oral tablet extended release 24 hr 02/02/2021 take 1 tablet (150 mg) by oral route once daily for 30 days   famotidine 40 mg oral tablet 02/02/2021 take 1 tablet (40 mg) by oral route 2 times per day for 30 days   fluoxetine 40 mg oral capsule 02/02/2021 take 1 capsule (40 mg) by oral route once daily for 30 days   InnoSpire Deluxe miscellaneous device 01/15/2019 use as directed every 6 hrs as needed   lisinopril 5 mg oral tablet 02/02/2021 take 1 tablet (5 mg) by oral route once daily for 30 days   melatonin 10 mg oral tablet  take 1 tablet by oral route daily   tramadol 50 mg oral tablet 04/13/2021 take 1 tablet (50 mg) by oral route every 6 hours as needed for 30 days   Trelegy Ellipta 100-62.5-25 mcg inhalation blister with device 02/12/2021 inhale 1 puff by inhalation route once daily at the same time each day for 30 days   Ultra Thin Lancets 28 gauge miscellaneous misc 03/14/2017 check blood daily and record         Allergy List  Allergen Name Date Reaction Notes   NO KNOWN DRUG ALLERGIES --  --  --        Allergies Reconciled  Family  "Medical History  Disease Name Relative/Age Notes   Heart Disease Father/   Father   Hypertension  --    Cancer, Unspecified  --    Cardiovascular disease  --    Blood Diseases Mother/   Mother   Family history of certain chronic disabling diseases; arthritis Mother/   Mother         Reproductive History  Menstrual   Pregnancy Summary   Total Pregnancies: 0 Full Term: 0 Premature: 0   Ab Induced: 0 Ab Spontaneous: 0 Ectopics: 0   Multiples: 0 Livin         Social History  Finding Status Start/Stop Quantity Notes   Alcohol Use --  --/-- --  01/15/2019 - does not drink   Homemaker. --  --/-- --  --    lives with other --  --/-- --  --    Recreational Drug Use Never --/-- --  no   Tobacco Former --/53 1 ppd down to 10 per day         Immunizations  NameDate Admin Mfg Trade Name Lot Number Route Inj VIS Given VIS Publication   [id number]/ [facility] Fluzone Quadrivalent [id number] IM RD 10/22/2019    Comments: pt tolerated well with no complaints   Prevnar [id number] [facility] PREVNAR 13 [id number] IM  2017   Comments: Pt tollerated injetion well with no adverse reaction.         Review of Systems  · Constitutional  o Denies  o : fatigue  · Neurologic  o Admits  o : additional neurologic symptoms except as noted in the HPI  o Denies  o : tingling or numbness, reviewed and non-contributory  · Musculoskeletal  o Admits  o : muscle pain      Vitals  Date Time BP Position Site L\R Cuff Size HR RR TEMP (F) WT  HT  BMI kg/m2 BSA m2 O2 Sat FR L/min FiO2 HC       01/15/2020 09:47 /58 Sitting    98 - R   221lbs 4oz 5'  4\" 37.98 2.13 96 % 2 28%    2021 09:03 /68 Sitting    97 - R  97.3 227lbs 6oz 5'  4\" 39.03 2.16 96 %  21%    2021 09:34 /36 Sitting    99 - R  97 224lbs 2oz 5'  4\" 38.47 2.14 97 %  21%          Physical Examination  · Constitutional  o Appearance  o : well-nourished, well developed, no obvious deformities present  · Head and Face  o Head  o :   § Inspection  § : atraumatic, " normocephalic  o Face  o :   § Inspection  § : no facial lesions  · Musculoskeletal  o Right Lower Extremity  o :   § Inspection/Palpation  § : tenderness to palpation present   § Range of Motion  § : range of motion restriction, hip pain with motion present   o Left Lower Extremity  o :   § Inspection/Palpation  § : tenderness to palpation present   § Range of Motion  § : range of motion restriction, hip pain with motion present   · Back  o Inspection  o : no deformities  o Palpation  o : no tenderness          Assessment  · Groin pain     789.09/R10.30  She is started PT with add this to her PT order  · Hip pain     719.45/M25.559  will x-ray      Plan  · Orders  o ACO-39: Current medications updated and reviewed (, 1159F) - - 05/18/2021  o X-ray of pelvis [facility] ([address]) - 789.09/R10.30, 719.45/M25.559 - 05/18/2021  o PHYSICAL THERAPY CONSULTATION ([facility]) - 789.09/R10.30, 719.45/M25.559 - 05/18/2021  · Medications  o cyclobenzaprine 10 mg oral tablet   SIG: take 1 tablet (10 mg) by oral route once daily @ bedtime   DISP: (30) Tablet with 0 refills  Prescribed on 05/18/2021     o tramadol 50 mg oral tablet   SIG: take 1 tablet (50 mg) by oral route every 6 hours as needed for 30 days   DISP: (60) Tablet with 0 refills  Discontinued on 05/18/2021     o Medications have been Reconciled  o Transition of Care or Provider Policy  · Instructions  o Patient is taking medications as prescribed and doing well.   o Take all medications as prescribed/directed.  o Patient instructed/educated on their diet and exercise program.  o Patient was educated/instructed on their diagnosis, treatment and medications prior to discharge from the clinic today.  o Patient instructed to seek medical attention urgently for new or worsening symptoms.  o Call the office with any concerns or questions.  o Bring all medicines with their bottles to each office visit.  · Disposition  o Call or Return if symptoms worsen or  persist.  o Return Visit Request in/on 6 weeks +/- 2 days ([id number]).            Electronically Signed by: Aleksandar Edge DO -Author on May 18, 2021 10:16:03 AM

## 2021-06-08 ENCOUNTER — TELEPHONE (OUTPATIENT)
Dept: FAMILY MEDICINE CLINIC | Facility: CLINIC | Age: 56
End: 2021-06-08

## 2021-06-08 NOTE — TELEPHONE ENCOUNTER
Caller: Ambrosio Archer    Relationship: Self    Best call back number: [phone]     What is the best time to reach you: ANYTIME     Who are you requesting to speak with (clinical staff, provider,  specific staff member): CLINICAL STAFF    Do you know the name of the person who called: AMBROSIO ARCHER     What was the call regarding: PATIENT IS NEEDING A NURSE TO CALL HER. PLEASE CALL AND ADVISE.     Do you require a callback: YES

## 2021-06-09 ENCOUNTER — TELEPHONE (OUTPATIENT)
Dept: FAMILY MEDICINE CLINIC | Facility: CLINIC | Age: 56
End: 2021-06-09

## 2021-06-09 DIAGNOSIS — M16.0 OSTEOARTHRITIS OF BOTH HIPS, UNSPECIFIED OSTEOARTHRITIS TYPE: Primary | ICD-10-CM

## 2021-06-09 NOTE — TELEPHONE ENCOUNTER
Patient called requesting pain medication for her hip arthritis until she can get into see ortho. Please advise
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
## 2021-06-16 ENCOUNTER — OFFICE VISIT (OUTPATIENT)
Dept: ORTHOPEDIC SURGERY | Facility: CLINIC | Age: 56
End: 2021-06-16

## 2021-06-16 VITALS — HEIGHT: 64 IN | WEIGHT: 223.8 LBS | BODY MASS INDEX: 38.21 KG/M2 | OXYGEN SATURATION: 95 % | HEART RATE: 102 BPM

## 2021-06-16 DIAGNOSIS — M47.812 FACET ARTHROPATHY, CERVICAL: Primary | ICD-10-CM

## 2021-06-16 DIAGNOSIS — M70.62 TROCHANTERIC BURSITIS OF LEFT HIP: ICD-10-CM

## 2021-06-16 DIAGNOSIS — M16.11 PRIMARY OSTEOARTHRITIS OF RIGHT HIP: ICD-10-CM

## 2021-06-16 PROCEDURE — 20610 DRAIN/INJ JOINT/BURSA W/O US: CPT | Performed by: ORTHOPAEDIC SURGERY

## 2021-06-16 PROCEDURE — 99213 OFFICE O/P EST LOW 20 MIN: CPT | Performed by: ORTHOPAEDIC SURGERY

## 2021-06-16 RX ORDER — ATORVASTATIN CALCIUM 10 MG/1
TABLET, FILM COATED ORAL
COMMUNITY
Start: 2021-06-03 | End: 2022-02-21 | Stop reason: SDUPTHER

## 2021-06-16 RX ORDER — LISINOPRIL 5 MG/1
TABLET ORAL
COMMUNITY
Start: 2021-06-01 | End: 2022-01-19

## 2021-06-16 RX ORDER — ALBUTEROL SULFATE 90 UG/1
AEROSOL, METERED RESPIRATORY (INHALATION)
COMMUNITY
Start: 2021-05-04 | End: 2021-07-09

## 2021-06-16 RX ORDER — PHENOL 1.4 %
10 AEROSOL, SPRAY (ML) MUCOUS MEMBRANE NIGHTLY PRN
COMMUNITY
End: 2022-12-09

## 2021-06-16 RX ORDER — FLUOXETINE HYDROCHLORIDE 40 MG/1
CAPSULE ORAL
COMMUNITY
Start: 2021-06-04 | End: 2021-11-01

## 2021-06-16 RX ORDER — FAMOTIDINE 40 MG/1
TABLET, FILM COATED ORAL
COMMUNITY
Start: 2021-05-10 | End: 2022-02-07

## 2021-06-16 RX ORDER — BUSPIRONE HYDROCHLORIDE 10 MG/1
10 TABLET ORAL 3 TIMES DAILY
COMMUNITY
End: 2022-05-27

## 2021-06-16 RX ADMIN — LIDOCAINE HYDROCHLORIDE 9 ML: 10 INJECTION, SOLUTION INFILTRATION; PERINEURAL at 14:26

## 2021-06-16 RX ADMIN — METHYLPREDNISOLONE ACETATE 80 MG: 80 INJECTION, SUSPENSION INTRA-ARTICULAR; INTRALESIONAL; INTRAMUSCULAR; SOFT TISSUE at 14:26

## 2021-06-16 NOTE — PROGRESS NOTES
"Chief Complaint  Initial Evaluation and Pain of the Right Hip and Initial Evaluation and Pain of the Left Hip     Subjective      Lluvia Archer presents to [facility] for an evaluation of bilateral hips. Dr. Edge has done got a couple of tests on her and told her she has arthritis in her hip. She does have some groin pain in her left hip. She use to have right hip and knee pain but the pain is tolerable at this time. She states she is unable to sleep on her left hip. She has trouble with ambulation due to hip pain. She localizes left hip pain around the posterior and lateral aspect of her hip. In her right hip she has pain in her groin. She is setup for physical therapy but hasn't started until she saw us. When she takes a deep breath or cough she feels a deep pain down her back and into her leg.     No Known Allergies     Social History     Socioeconomic History   • Marital status: Legally      Spouse name: Not on file   • Number of children: Not on file   • Years of education: Not on file   • Highest education level: Not on file   Tobacco Use   • Smoking status: Former Smoker     Packs/day: 1.00   • Smokeless tobacco: Never Used   Substance and Sexual Activity   • Alcohol use: Never   • Drug use: Never        Review of Systems     Objective   Vital Signs:   Pulse 102   Ht 162.6 cm (64\")   Wt 102 kg (223 lb 12.8 oz)   SpO2 95%   BMI 38.42 kg/m²       Physical Exam  Constitutional:       Appearance: Normal appearance. He is well-developed and normal weight.   HENT:      Head: Normocephalic.      Right Ear: Hearing and external ear normal.      Left Ear: Hearing and external ear normal.      Nose: Nose normal.   Eyes:      Conjunctiva/sclera: Conjunctivae normal.   Cardiovascular:      Rate and Rhythm: Normal rate.   Pulmonary:      Effort: Pulmonary effort is normal.      Breath sounds: No wheezing or rales.   Abdominal:      Palpations: Abdomen is soft.      " Tenderness: There is no abdominal tenderness.   Musculoskeletal:      Cervical back: Normal range of motion.   Skin:     Findings: No rash.   Neurological:      Mental Status: He is alert and oriented to person, place, and time.   Psychiatric:         Mood and Affect: Mood and affect normal.         Judgment: Judgment normal.       Ortho Exam        RIGHT HIP: Tender greater trochanter. Sensation grossly intact. Neurovascular intact. Antalgic gait. Dorsal Pedal Pulse 2+, posteriror tibialis pulse 2+. Painful hip range of motion on the posterior and lateral aspect of her hip. Full leg raise with pain. No groin pain with hip range of motion. Full passive hip range of motion. Calf supple, non-tender.     LEFT HIP: Sensation grossly intact. Neurovascular intact. Tender hip and pelvic muscles. Antalgic gait. Dorsal Pedal Pulse 2+, posteriror tibialis pulse 2+. Skin intact. No swelling, skin discoloration or atrophy. Full leg raise. Full passive hip range of motion. Tenderness about the lower back.     Large Joint Arthrocentesis: L greater trochanteric bursa  Date/Time: 6/16/2021 2:26 PM  Consent given by: patient  Site marked: site marked  Timeout: Immediately prior to procedure a time out was called to verify the correct patient, procedure, equipment, support staff and site/side marked as required   Supporting Documentation  Indications: pain   Procedure Details  Location: hip - L greater trochanteric bursa  Preparation: Patient was prepped and draped in the usual sterile fashion  Needle size: 22 G  Medications administered: 9 mL lidocaine 1 %; 80 mg methylPREDNISolone acetate 80 MG/ML  Patient tolerance: patient tolerated the procedure well with no immediate complications              Imaging Results (Most Recent)     None           Result Review :       XR Pelvis 1 or 2 View    Result Date: 6/3/2021  Narrative:           [facility]           [facility]           PACS RADIOLOGY REPORT  Patient: AMBROSIO BUTTS     Acct: [id number]     Report: #[id number] UNIT #: [id number]     DOS: 2021 1149     Order #: [id number] Location: [facility]     : 1965 ORDERING: LIZETH ISIDRO DICTATING: Kolton Eugene #: [id number]     EXAM: PEL - PELVIS 1 OR 2 VIEWS REASON FOR EXAM: REASON FOR VISIT: GROIN PAIN/HIP PAIN Signed -------------------------------------------------------------------------------------------------------------------- PROCEDURE: PELVIS 1 OR 2 VIEWS     COMPARISON: [facility], , RIGHT HIP/PELVIS, 2016, 15:15.     INDICATIONS: PAIN IN LOWER BACK THAT RADIATES AROUND THE BILATERAL GROINS INTO THE LEGS. NO KNOWN   INJURY.     FINDINGS:   BONES: Advanced osteoarthritic degenerative changes have developed in the right hip with joint   space narrowing and osteophytosis.  No fractures, dislocations or acute osseous abnormalities are   identified.  SOFT TISSUES: Negative.  No visible soft tissue swelling.    EFFUSION: None visible.    OTHER: There is a metallic foreign body projecting over the L5 vertebral body.  This has the   appearance of a paper staple.     CONCLUSION:      1. Advanced degenerative changes in the right hip.     2. Metallic foreign body projecting over the L5 vertebral body.  This may represent artifact on the   patient or ingested foreign body.        KOLTON EUGENE MD         Electronically Signed and Approved By: KOLTON EUGENE MD on 2021 at 12:23                         Assessment and Plan     DX: Right hip osteoarthritis  Left hip trochanteric bursitis   Severe facet arthropathy l4-l5    Discussed treatment plans and diagnosis with the patient. Patient was given a left hip injection and tolerated this procedure well. She will begin physical therapy. She was provided with some at home exercises today.     Call or return if worsening symptoms.    Follow Up     Patient will follow-up in 4-6 weeks.       Patient was given instructions  and counseling regarding her condition or for health maintenance advice. Please see specific information pulled into the AVS if appropriate.     Scribed for Cecil Gomes MD by Adilene Claudio.  06/16/21   13:58 EDT    I have personally performed the services described in this document as scribed by the above individual and it is both accurate and complete.  Cecil Gomes MD 06/16/21  13:58 EDT

## 2021-06-18 RX ORDER — METHYLPREDNISOLONE ACETATE 80 MG/ML
80 INJECTION, SUSPENSION INTRA-ARTICULAR; INTRALESIONAL; INTRAMUSCULAR; SOFT TISSUE
Status: COMPLETED | OUTPATIENT
Start: 2021-06-16 | End: 2021-06-16

## 2021-06-18 RX ORDER — LIDOCAINE HYDROCHLORIDE 10 MG/ML
9 INJECTION, SOLUTION INFILTRATION; PERINEURAL
Status: COMPLETED | OUTPATIENT
Start: 2021-06-16 | End: 2021-06-16

## 2021-07-09 DIAGNOSIS — J44.9 CHRONIC OBSTRUCTIVE PULMONARY DISEASE, UNSPECIFIED COPD TYPE (HCC): Primary | ICD-10-CM

## 2021-07-09 PROBLEM — K44.9 HERNIA, HIATAL: Status: ACTIVE | Noted: 2021-07-09

## 2021-07-09 PROBLEM — F33.1 MODERATE EPISODE OF RECURRENT MAJOR DEPRESSIVE DISORDER (HCC): Status: ACTIVE | Noted: 2017-05-22

## 2021-07-09 PROBLEM — R20.0 NUMBNESS: Status: ACTIVE | Noted: 2017-08-18

## 2021-07-09 PROBLEM — I10 HYPERTENSION: Status: ACTIVE | Noted: 2021-07-09

## 2021-07-09 PROBLEM — R11.0 CHRONIC NAUSEA: Status: ACTIVE | Noted: 2019-01-15

## 2021-07-09 PROBLEM — F33.2 SEVERE EPISODE OF RECURRENT MAJOR DEPRESSIVE DISORDER, WITHOUT PSYCHOTIC FEATURES: Status: ACTIVE | Noted: 2019-10-22

## 2021-07-09 PROBLEM — M25.561 KNEE PAIN, RIGHT: Status: ACTIVE | Noted: 2018-08-30

## 2021-07-09 PROBLEM — M19.90 ARTHRITIS: Status: ACTIVE | Noted: 2021-07-09

## 2021-07-09 PROBLEM — M25.512 SHOULDER PAIN, LEFT: Status: ACTIVE | Noted: 2018-08-30

## 2021-07-09 PROBLEM — R06.02 SHORTNESS OF BREATH: Status: ACTIVE | Noted: 2021-07-09

## 2021-07-09 PROBLEM — R92.8 ABNORMAL MAMMOGRAM: Status: ACTIVE | Noted: 2021-07-09

## 2021-07-09 PROBLEM — F32.A DEPRESSION: Status: ACTIVE | Noted: 2021-07-09

## 2021-07-09 PROBLEM — Z72.0 TOBACCO ABUSE: Status: ACTIVE | Noted: 2017-03-14

## 2021-07-09 PROBLEM — Z71.89 COUNSELING ON SUBSTANCE USE AND ABUSE: Status: ACTIVE | Noted: 2017-03-14

## 2021-07-09 PROBLEM — G43.909 MIGRAINE: Status: ACTIVE | Noted: 2021-07-09

## 2021-07-09 PROBLEM — E78.5 HYPERLIPIDEMIA: Status: ACTIVE | Noted: 2021-07-09

## 2021-07-09 PROBLEM — J34.89 OTHER DISEASES OF NASAL CAVITY AND SINUSES: Status: ACTIVE | Noted: 2021-07-09

## 2021-07-09 PROBLEM — M54.31 SCIATICA OF RIGHT SIDE: Status: ACTIVE | Noted: 2017-08-18

## 2021-07-09 PROBLEM — R41.3 MEMORY LOSS: Status: ACTIVE | Noted: 2021-07-09

## 2021-07-09 PROBLEM — F41.9 ANXIETY: Status: ACTIVE | Noted: 2021-07-09

## 2021-07-09 PROBLEM — G47.30 SLEEP APNEA, UNSPECIFIED: Status: ACTIVE | Noted: 2021-07-09

## 2021-07-09 PROBLEM — E11.9 DIABETES MELLITUS, TYPE II: Status: ACTIVE | Noted: 2021-07-09

## 2021-07-09 PROBLEM — K21.9 ESOPHAGEAL REFLUX: Status: ACTIVE | Noted: 2021-07-09

## 2021-07-09 PROBLEM — R61 NIGHT SWEATS: Status: ACTIVE | Noted: 2021-07-09

## 2021-07-09 RX ORDER — ALBUTEROL SULFATE 90 UG/1
AEROSOL, METERED RESPIRATORY (INHALATION)
Qty: 8.5 G | Refills: 2 | Status: SHIPPED | OUTPATIENT
Start: 2021-07-09 | End: 2021-11-01

## 2021-07-15 VITALS
HEART RATE: 99 BPM | TEMPERATURE: 97 F | HEIGHT: 64 IN | BODY MASS INDEX: 38.26 KG/M2 | SYSTOLIC BLOOD PRESSURE: 108 MMHG | DIASTOLIC BLOOD PRESSURE: 36 MMHG | WEIGHT: 224.12 LBS | OXYGEN SATURATION: 97 %

## 2021-07-19 ENCOUNTER — OFFICE VISIT (OUTPATIENT)
Dept: FAMILY MEDICINE CLINIC | Facility: CLINIC | Age: 56
End: 2021-07-19

## 2021-07-19 VITALS
HEART RATE: 99 BPM | BODY MASS INDEX: 37.39 KG/M2 | HEIGHT: 64 IN | OXYGEN SATURATION: 96 % | DIASTOLIC BLOOD PRESSURE: 70 MMHG | SYSTOLIC BLOOD PRESSURE: 104 MMHG | WEIGHT: 219 LBS | TEMPERATURE: 96.9 F

## 2021-07-19 DIAGNOSIS — S76.211D STRAIN OF GROIN, RIGHT, SUBSEQUENT ENCOUNTER: ICD-10-CM

## 2021-07-19 DIAGNOSIS — M47.26 OSTEOARTHRITIS OF SPINE WITH RADICULOPATHY, LUMBAR REGION: Primary | ICD-10-CM

## 2021-07-19 PROCEDURE — 99214 OFFICE O/P EST MOD 30 MIN: CPT | Performed by: FAMILY MEDICINE

## 2021-07-19 RX ORDER — BUPROPION HYDROCHLORIDE 150 MG/1
1 TABLET, EXTENDED RELEASE ORAL DAILY
COMMUNITY
Start: 2021-06-29 | End: 2022-01-12

## 2021-07-19 NOTE — ASSESSMENT & PLAN NOTE
She had an MRI and April of this year.  It she showed some mild degenerative changes without any significant neuroforaminal narrowing or spinal canal stenosis.  Dr. Koenig has set her up to see Dr. Saldana but I do not think there is anything from a neurosurgical  perspective to be done there.  Thus I think she needs to follow through on physical therapy.

## 2021-07-19 NOTE — ASSESSMENT & PLAN NOTE
Her x-rays just shows some osteoarthritis.  Her physical exam is more suggestive of a groin strain much greater on the right than the left.  Dr. Koenig did order physical therapy.  I think she would benefit from physical therapy and/or just home stretching and heat to this area.

## 2021-07-19 NOTE — PROGRESS NOTES
Chief Complaint   Patient presents with   • Follow-up     6 week - Groin, Hip pain         Subjective     Lluvia Archer  has a past medical history of Abnormal mammogram, Anxiety, Arthritis, Chronic nausea (01/15/2019), Chronic obstructive pulmonary disease (CMS/HCC) (03/14/2017), Depression, Diabetes mellitus, type 2 (CMS/[facility]), GERD (gastroesophageal reflux disease), Hernia, hiatal, Hyperlipidemia, Hypertension, Knee pain, right (08/30/2018), Memory loss, Migraine headache, Moderate episode of recurrent major depressive disorder (CMS/[facility]) (05/22/2017), Night sweats, Numbness (08/18/2017), Sciatica of right side (08/18/2017), Severe episode of recurrent major depressive disorder, without psychotic features (CMS/[facility]) (10/22/2019), Shortness of breath, Shoulder pain, left (08/30/2018), Sinus trouble, Sleep apnea, unspecified, Tobacco abuse (03/14/2017), and Tobacco abuse counseling (03/14/2017).    Hip pain-she continues complain of pain in both hips.  She was recently seen Dr. Gomes.  He gave her an injection into her left greater trochanteric bursa.  She states this helped for only a couple of days but then its effects wore off.  States her symptoms are worse when she goes to bed.  She states that both hips hurt if she lays on her back and if she lays on her side of the dependent hip will hurt.  She states she has pain that radiates from her hips down into her groin and inner thighs.  When asked where it hurts the most she points to her lower lumbar spine.  She is currently taken Tylenol with some benefits.  She also sleeps with a pillow between her knees with minimal benefit.  Dr. Gomes has actually referred her to the neurosurgeon, Dr. Saldana.      PHQ-2 Depression Screening  Little interest or pleasure in doing things? 3   Feeling down, depressed, or hopeless? 3   PHQ-2 Total Score 22   PHQ-9 Depression Screening  Little interest or pleasure in doing things? 3   Feeling down, depressed, or hopeless? 3   Trouble  falling or staying asleep, or sleeping too much? 3   Feeling tired or having little energy? 2   Poor appetite or overeating? 3   Feeling bad about yourself - or that you are a failure or have let yourself or your family down? 3   Trouble concentrating on things, such as reading the newspaper or watching television? 2   Moving or speaking so slowly that other people could have noticed? Or the opposite - being so fidgety or restless that you have been moving around a lot more than usual? 2   Thoughts that you would be better off dead, or of hurting yourself in some way? 1   PHQ-9 Total Score 22   If you checked off any problems, how difficult have these problems made it for you to do your work, take care of things at home, or get along with other people? Somewhat difficult     No Known Allergies    Prior to Admission medications    Medication Sig Start Date End Date Taking? Authorizing Provider   albuterol sulfate  (90 Base) MCG/ACT inhaler USE 1 TO 2 PUFFS BY MOUTH EVERY 6 HOURS AS NEEDED FOR 30 DAYS 7/9/21  Yes Aleksandar Edge, DO   atorvastatin (LIPITOR) 10 MG tablet  6/3/21  Yes Provider, MD Joe Melendrez 100-25 MCG/INH inhaler INHALE 1 PUFF BY MOUTH EVERY DAY AT THE SAME TIME EVERY DAY 5/4/21  Yes Provider, MD Parvin   buPROPion SR (WELLBUTRIN SR) 150 MG 12 hr tablet Take 1 tablet by mouth Daily. 6/29/21  Yes Provider, MD Parvin   busPIRone (BUSPAR) 10 MG tablet buspirone 10 mg oral tablet take 1 tablet (10 mg) by oral route 3 times per day for 30 days   Suspended   Yes Provider, MD Parvin   famotidine (PEPCID) 40 MG tablet  5/10/21  Yes Provider, MD Parvin   FLUoxetine (PROzac) 40 MG capsule  6/4/21  Yes ProviderParvin MD   lisinopril (PRINIVIL,ZESTRIL) 5 MG tablet  6/1/21  Yes Provider, MD Parvin   Melatonin 10 MG tablet melatonin 10 mg oral tablet take 1 tablet by oral route daily   Active   Yes Provider, MD Dallas Melendrez 100-62.5-25  MCG/INH inhaler INHALE 1 PUFF BY MOUTH AT THE SAME TIME EVERY DAY 21  Yes Provider, Parvin, MD        Patient Active Problem List   Diagnosis   • Abnormal mammogram   • Anxiety   • Arthritis   • Chronic nausea   • Chronic obstructive pulmonary disease (COPD) (CMS/[facility])   • Counseling on substance use and abuse   • Depression   • Diabetes mellitus, type II (CMS/[facility])   • Esophageal reflux   • Hernia, hiatal   • Hyperlipidemia   • Hypertension   • Knee pain, right   • Memory loss   • Migraine   • Moderate episode of recurrent major depressive disorder (CMS/[facility])   • Night sweats   • Numbness   • Sciatica of right side   • Severe episode of recurrent major depressive disorder, without psychotic features (CMS/[facility])   • Shortness of breath   • Shoulder pain, left   • Other diseases of nasal cavity and sinuses   • Sleep apnea, unspecified   • Tobacco abuse   • Strain of groin, right, subsequent encounter   • Osteoarthritis of spine with radiculopathy, lumbar region        Past Surgical History:   Procedure Laterality Date   •  SECTION     • CHOLECYSTECTOMY     • COLONOSCOPY     • GALLBLADDER SURGERY         Social History     Socioeconomic History   • Marital status: Legally      Spouse name: Not on file   • Number of children: Not on file   • Years of education: Not on file   • Highest education level: Not on file   Tobacco Use   • Smoking status: Former Smoker     Packs/day: 1.00   • Smokeless tobacco: Never Used   Vaping Use   • Vaping Use: Never used   Substance and Sexual Activity   • Alcohol use: Never   • Drug use: Never   • Sexual activity: Defer       Family History   Problem Relation Age of Onset   • Other Mother         BLOOD DISEASE   • Arthritis Mother    • Heart disease Father    • Hypertension Other    • Cancer Other    • Heart disease Other        Family history, surgical history, past medical history, Allergies and med's reviewed with patient today and updated in [facility] EMR.  "    ROS:  Review of Systems   Constitutional: Positive for fatigue.   Musculoskeletal: Positive for arthralgias, back pain and joint swelling.   Neurological: Positive for weakness and numbness.       OBJECTIVE:  Vitals:    07/19/21 1630   BP: 104/70   BP Location: Left arm   Patient Position: Sitting   Cuff Size: Large Adult   Pulse: 99   Temp: 96.9 °F (36.1 °C)   TempSrc: Temporal   SpO2: 96%   Weight: 99.3 kg (219 lb)   Height: 162.6 cm (64\")     No exam data present   Body mass index is 37.59 kg/m².  No LMP recorded (lmp unknown). Patient is postmenopausal.    Physical Exam  Vitals and nursing note reviewed.   Constitutional:       General: She is not in acute distress.     Appearance: Normal appearance. She is obese.   Musculoskeletal:      Lumbar back: Tenderness present.      Right lower leg: Tenderness present.      Comments: L5S1 spinous process  tender medial right thigh, increased with abduction right leg   Neurological:      Mental Status: She is alert.         Procedures    No visits with results within 30 Day(s) from this visit.   Latest known visit with results is:   No results found for any previous visit.       ASSESSMENT/ PLAN:    Diagnoses and all orders for this visit:    1. Osteoarthritis of spine with radiculopathy, lumbar region (Primary)  Assessment & Plan:  She had an MRI and April of this year.  It she showed some mild degenerative changes without any significant neuroforaminal narrowing or spinal canal stenosis.  Dr. Koenig has set her up to see Dr. Saldana but I do not think there is anything from a neurosurgical  perspective to be done there.  Thus I think she needs to follow through on physical therapy.    Orders:  -     Ambulatory Referral to Physical Therapy Evaluate and treat    2. Strain of groin, right, subsequent encounter  Assessment & Plan:  Her x-rays just shows some osteoarthritis.  Her physical exam is more suggestive of a groin strain much greater on the right than the left.  " Dr. Koenig did order physical therapy.  I think she would benefit from physical therapy and/or just home stretching and heat to this area.    Orders:  -     Ambulatory Referral to Physical Therapy Evaluate and treat      Orders Placed Today:     No orders of the defined types were placed in this encounter.       Management Plan:     An After Visit Summary was printed and given to the patient at discharge.    Follow-up: Return in about 6 weeks (around 8/30/2021).    Aleksandar Edge,  7/19/2021 17:10 EDT  This note was electronically signed.

## 2021-09-07 ENCOUNTER — OFFICE VISIT (OUTPATIENT)
Dept: FAMILY MEDICINE CLINIC | Facility: CLINIC | Age: 56
End: 2021-09-07

## 2021-09-07 VITALS
HEIGHT: 64 IN | BODY MASS INDEX: 36.41 KG/M2 | OXYGEN SATURATION: 94 % | TEMPERATURE: 97.8 F | DIASTOLIC BLOOD PRESSURE: 73 MMHG | WEIGHT: 213.3 LBS | SYSTOLIC BLOOD PRESSURE: 122 MMHG | HEART RATE: 121 BPM

## 2021-09-07 DIAGNOSIS — J44.1 CHRONIC OBSTRUCTIVE PULMONARY DISEASE WITH ACUTE EXACERBATION (HCC): Primary | ICD-10-CM

## 2021-09-07 DIAGNOSIS — S76.211D STRAIN OF GROIN, RIGHT, SUBSEQUENT ENCOUNTER: ICD-10-CM

## 2021-09-07 PROBLEM — J40 BRONCHITIS: Status: ACTIVE | Noted: 2021-09-07

## 2021-09-07 PROCEDURE — 99213 OFFICE O/P EST LOW 20 MIN: CPT | Performed by: FAMILY MEDICINE

## 2021-09-07 RX ORDER — PREDNISONE 10 MG/1
TABLET ORAL
Qty: 30 TABLET | Refills: 0 | Status: ON HOLD | OUTPATIENT
Start: 2021-09-07 | End: 2022-04-05

## 2021-09-07 NOTE — ASSESSMENT & PLAN NOTE
She seems to have an acute bronchitis on top of her COPD.  We will continue her maintenance inhaler her albuterol as needed and give her a burst and taper prednisone.

## 2021-09-07 NOTE — PROGRESS NOTES
Chief Complaint   Patient presents with   • Follow-up     6 week f/u        Subjective     Lluvia Archer  has a past medical history of Abnormal mammogram, Anxiety, Arthritis, Chronic nausea (01/15/2019), Hernia, hiatal, Hyperlipidemia, Hypertension, Knee pain, right (08/30/2018), Memory loss, Migraine headache, Moderate episode of recurrent major depressive disorder (CMS/[facility]) (05/22/2017), Night sweats, Numbness (08/18/2017), Sciatica of right side (08/18/2017), Severe episode of recurrent major depressive disorder, without psychotic features (CMS/[facility]) (10/22/2019), Shortness of breath, Shoulder pain, left (08/30/2018), Sinus trouble, Sleep apnea, unspecified, Tobacco abuse (03/14/2017), and Tobacco abuse counseling (03/14/2017).    COPD-she states she has had increased head congestion drainage cough and shortness of breath for the past week.  As a result she states she has been using her rescue inhaler more often.  She is also wheezing.  She has not had any fevers or chills.    Groin strain-she states it has begun more tolerable but it is persistent.  At this time it is primarily in her right groin and medial thigh.      PHQ-2 Depression Screening  Little interest or pleasure in doing things?     Feeling down, depressed, or hopeless?     PHQ-2 Total Score     PHQ-9 Depression Screening  Little interest or pleasure in doing things?     Feeling down, depressed, or hopeless?     Trouble falling or staying asleep, or sleeping too much?     Feeling tired or having little energy?     Poor appetite or overeating?     Feeling bad about yourself - or that you are a failure or have let yourself or your family down?     Trouble concentrating on things, such as reading the newspaper or watching television?     Moving or speaking so slowly that other people could have noticed? Or the opposite - being so fidgety or restless that you have been moving around a lot more than usual?     Thoughts that you would be better off  dead, or of hurting yourself in some way?     PHQ-9 Total Score     If you checked off any problems, how difficult have these problems made it for you to do your work, take care of things at home, or get along with other people?       No Known Allergies    Prior to Admission medications    Medication Sig Start Date End Date Taking? Authorizing Provider   albuterol sulfate  (90 Base) MCG/ACT inhaler USE 1 TO 2 PUFFS BY MOUTH EVERY 6 HOURS AS NEEDED FOR 30 DAYS 7/9/21  Yes Aleksandar Edge, DO   atorvastatin (LIPITOR) 10 MG tablet  6/3/21  Yes Provider, Historical, MD   buPROPion SR (WELLBUTRIN SR) 150 MG 12 hr tablet Take 1 tablet by mouth Daily. 6/29/21  Yes Provider, Historical, MD   busPIRone (BUSPAR) 10 MG tablet buspirone 10 mg oral tablet take 1 tablet (10 mg) by oral route 3 times per day for 30 days   Suspended   Yes Provider, Historical, MD   famotidine (PEPCID) 40 MG tablet  5/10/21  Yes Provider, Historical, MD   FLUoxetine (PROzac) 40 MG capsule  6/4/21  Yes Provider, Historical, MD   lisinopril (PRINIVIL,ZESTRIL) 5 MG tablet  6/1/21  Yes Provider, Historical, MD   Melatonin 10 MG tablet melatonin 10 mg oral tablet take 1 tablet by oral route daily   Active   Yes Provider, HistoricalMD Dallasta 100-62.5-25 MCG/INH inhaler INHALE 1 PUFF BY MOUTH AT THE SAME TIME EVERY DAY 6/4/21  Yes Provider, Historical, MD        Patient Active Problem List   Diagnosis   • Abnormal mammogram   • Anxiety   • Arthritis   • Chronic nausea   • Chronic obstructive pulmonary disease (COPD) (CMS/[facility])   • Counseling on substance use and abuse   • Diabetes mellitus, type II (CMS/[facility])   • Esophageal reflux   • Hernia, hiatal   • Hyperlipidemia   • Hypertension   • Knee pain, right   • Memory loss   • Migraine   • Moderate episode of recurrent major depressive disorder (CMS/[facility])   • Night sweats   • Numbness   • Sciatica of right side   • Severe episode of recurrent major depressive disorder, without  "psychotic features (CMS/HCC)   • Shoulder pain, left   • Other diseases of nasal cavity and sinuses   • Sleep apnea, unspecified   • Tobacco abuse   • Strain of groin, right, subsequent encounter   • Osteoarthritis of spine with radiculopathy, lumbar region   • Bronchitis        Past Surgical History:   Procedure Laterality Date   •  SECTION     • CHOLECYSTECTOMY     • COLONOSCOPY     • GALLBLADDER SURGERY         Social History     Socioeconomic History   • Marital status: Legally      Spouse name: Not on file   • Number of children: Not on file   • Years of education: Not on file   • Highest education level: Not on file   Tobacco Use   • Smoking status: Former Smoker     Packs/day: 1.00   • Smokeless tobacco: Never Used   Vaping Use   • Vaping Use: Never used   Substance and Sexual Activity   • Alcohol use: Never   • Drug use: Never   • Sexual activity: Defer       Family History   Problem Relation Age of Onset   • Other Mother         BLOOD DISEASE   • Arthritis Mother    • Heart disease Father    • Hypertension Other    • Cancer Other    • Heart disease Other        Family history, surgical history, past medical history, Allergies and med's reviewed with patient today and updated in [facility] EMR.     ROS:  Review of Systems   Constitutional: Negative for fatigue.   HENT: Positive for congestion, postnasal drip and rhinorrhea.    Respiratory: Positive for cough, chest tightness, shortness of breath and wheezing.    Musculoskeletal:        Groin pain       OBJECTIVE:  Vitals:    21 1354   BP: 122/73   BP Location: Right arm   Patient Position: Sitting   Cuff Size: Adult   Pulse: (!) 121   Temp: 97.8 °F (36.6 °C)   TempSrc: Temporal   SpO2: 94%   Weight: 96.8 kg (213 lb 4.8 oz)   Height: 162.6 cm (64\")     No exam data present   Body mass index is 36.61 kg/m².  No LMP recorded (lmp unknown). Patient is postmenopausal.    Physical Exam  Vitals and nursing note reviewed.   Constitutional:       " General: She is not in acute distress.     Appearance: Normal appearance. She is obese.   HENT:      Head: Normocephalic.      Right Ear: Tympanic membrane, ear canal and external ear normal.      Left Ear: Tympanic membrane, ear canal and external ear normal.      Nose: Nose normal.      Mouth/Throat:      Mouth: Mucous membranes are moist.      Pharynx: Oropharynx is clear.      Comments: Dentition absent  Eyes:      General: No scleral icterus.     Conjunctiva/sclera: Conjunctivae normal.      Pupils: Pupils are equal, round, and reactive to light.   Cardiovascular:      Rate and Rhythm: Normal rate and regular rhythm.      Pulses: Normal pulses.      Heart sounds: Normal heart sounds. No murmur heard.     Pulmonary:      Effort: Pulmonary effort is normal.      Breath sounds: Wheezing present. No rhonchi or rales.   Musculoskeletal:      Cervical back: No rigidity or tenderness.   Lymphadenopathy:      Cervical: No cervical adenopathy.   Skin:     General: Skin is warm and dry.      Coloration: Skin is not jaundiced.      Findings: No rash.   Neurological:      General: No focal deficit present.      Mental Status: She is alert and oriented to person, place, and time.      Gait: Gait normal.   Psychiatric:         Mood and Affect: Mood normal.         Thought Content: Thought content normal.         Judgment: Judgment normal.         Procedures    No visits with results within 30 Day(s) from this visit.   Latest known visit with results is:   No results found for any previous visit.       ASSESSMENT/ PLAN:    Diagnoses and all orders for this visit:    1. Chronic obstructive pulmonary disease with acute exacerbation (CMS/[facility]) (Primary)  Assessment & Plan:  She seems to have an acute bronchitis on top of her COPD.  We will continue her maintenance inhaler her albuterol as needed and give her a burst and taper prednisone.        2. Strain of groin, right, subsequent encounter  Assessment & Plan:  We will have her  continue to work on stretching on a regular basis.      Other orders  -     predniSONE (DELTASONE) 10 MG tablet; 4 tabs daily for 3 days, 3 tabs daily for 3 days, 2 tabs daily for 3 days, 1 tab daily for 3 days, then stop.  Dispense: 30 tablet; Refill: 0      Orders Placed Today:     New Medications Ordered This Visit   Medications   • predniSONE (DELTASONE) 10 MG tablet     Si tabs daily for 3 days, 3 tabs daily for 3 days, 2 tabs daily for 3 days, 1 tab daily for 3 days, then stop.     Dispense:  30 tablet     Refill:  0        Management Plan:     An After Visit Summary was printed and given to the patient at discharge.    Follow-up: Return in about 4 weeks (around 10/5/2021).    Aleksandar Edge DO 2021 14:18 EDT  This note was electronically signed.

## 2021-11-01 DIAGNOSIS — J44.9 CHRONIC OBSTRUCTIVE PULMONARY DISEASE, UNSPECIFIED COPD TYPE (HCC): ICD-10-CM

## 2021-11-01 RX ORDER — ALBUTEROL SULFATE 90 UG/1
AEROSOL, METERED RESPIRATORY (INHALATION)
Qty: 8.5 G | Refills: 2 | Status: SHIPPED | OUTPATIENT
Start: 2021-11-01 | End: 2022-02-21 | Stop reason: SDUPTHER

## 2021-11-01 RX ORDER — FLUOXETINE HYDROCHLORIDE 40 MG/1
CAPSULE ORAL
Qty: 30 CAPSULE | Refills: 5 | Status: SHIPPED | OUTPATIENT
Start: 2021-11-01 | End: 2022-12-09

## 2021-11-25 ENCOUNTER — HOSPITAL ENCOUNTER (EMERGENCY)
Facility: HOSPITAL | Age: 56
Discharge: HOME | End: 2021-11-26
Attending: EMERGENCY MEDICINE | Admitting: EMERGENCY MEDICINE

## 2021-11-25 DIAGNOSIS — M54.42 CHRONIC BILATERAL LOW BACK PAIN WITH BILATERAL SCIATICA: Primary | ICD-10-CM

## 2021-11-25 DIAGNOSIS — M54.41 CHRONIC BILATERAL LOW BACK PAIN WITH BILATERAL SCIATICA: Primary | ICD-10-CM

## 2021-11-25 DIAGNOSIS — G89.29 CHRONIC BILATERAL LOW BACK PAIN WITH BILATERAL SCIATICA: Primary | ICD-10-CM

## 2021-11-25 PROCEDURE — 99283 EMERGENCY DEPT VISIT LOW MDM: CPT

## 2021-11-26 VITALS
SYSTOLIC BLOOD PRESSURE: 98 MMHG | HEART RATE: 80 BPM | WEIGHT: 215.83 LBS | OXYGEN SATURATION: 95 % | HEIGHT: 64 IN | RESPIRATION RATE: 20 BRPM | TEMPERATURE: 98.9 F | DIASTOLIC BLOOD PRESSURE: 68 MMHG | BODY MASS INDEX: 36.85 KG/M2

## 2021-11-26 PROCEDURE — 96372 THER/PROPH/DIAG INJ SC/IM: CPT

## 2021-11-26 PROCEDURE — 25010000002 METHYLPREDNISOLONE PER 125 MG: Performed by: EMERGENCY MEDICINE

## 2021-11-26 RX ORDER — PREDNISONE 50 MG/1
50 TABLET ORAL DAILY
Qty: 5 TABLET | Refills: 0 | Status: ON HOLD | OUTPATIENT
Start: 2021-11-26 | End: 2022-04-05

## 2021-11-26 RX ORDER — METHYLPREDNISOLONE SODIUM SUCCINATE 125 MG/2ML
125 INJECTION, POWDER, LYOPHILIZED, FOR SOLUTION INTRAMUSCULAR; INTRAVENOUS ONCE
Status: DISCONTINUED | OUTPATIENT
Start: 2021-11-26 | End: 2021-11-26

## 2021-11-26 RX ORDER — OXYCODONE HYDROCHLORIDE AND ACETAMINOPHEN 5; 325 MG/1; MG/1
1 TABLET ORAL EVERY 6 HOURS PRN
Qty: 12 TABLET | Refills: 0 | Status: ON HOLD | OUTPATIENT
Start: 2021-11-26 | End: 2022-04-05

## 2021-11-26 RX ORDER — METHYLPREDNISOLONE SODIUM SUCCINATE 125 MG/2ML
125 INJECTION, POWDER, LYOPHILIZED, FOR SOLUTION INTRAMUSCULAR; INTRAVENOUS ONCE
Status: COMPLETED | OUTPATIENT
Start: 2021-11-26 | End: 2021-11-26

## 2021-11-26 RX ORDER — OXYCODONE HYDROCHLORIDE AND ACETAMINOPHEN 5; 325 MG/1; MG/1
1 TABLET ORAL ONCE
Status: COMPLETED | OUTPATIENT
Start: 2021-11-26 | End: 2021-11-26

## 2021-11-26 RX ADMIN — OXYCODONE HYDROCHLORIDE AND ACETAMINOPHEN 1 TABLET: 5; 325 TABLET ORAL at 01:03

## 2021-11-26 RX ADMIN — METHYLPREDNISOLONE SODIUM SUCCINATE 125 MG: 125 INJECTION, POWDER, FOR SOLUTION INTRAMUSCULAR; INTRAVENOUS at 01:03

## 2022-01-12 RX ORDER — BUPROPION HYDROCHLORIDE 150 MG/1
TABLET, EXTENDED RELEASE ORAL
Qty: 60 TABLET | Refills: 0 | Status: SHIPPED | OUTPATIENT
Start: 2022-01-12 | End: 2022-06-03 | Stop reason: SDUPTHER

## 2022-01-19 RX ORDER — LISINOPRIL 5 MG/1
TABLET ORAL
Status: CANCELLED | OUTPATIENT
Start: 2022-01-19

## 2022-01-19 RX ORDER — LISINOPRIL 5 MG/1
5 TABLET ORAL DAILY
Qty: 30 TABLET | Refills: 0 | Status: SHIPPED | OUTPATIENT
Start: 2022-01-19 | End: 2022-02-21 | Stop reason: SDUPTHER

## 2022-01-19 NOTE — TELEPHONE ENCOUNTER
Caller: Lluvia Archer    Relationship: Self    Best call back number: [phone]    Requested Prescriptions:   Requested Prescriptions     Pending Prescriptions Disp Refills   • lisinopril (PRINIVIL,ZESTRIL) 10 MG tablet [Pharmacy Med Name: LISINOPRIL 10MG TABLETS] 15 tablet      Sig: TAKE 1/2 TABLET BY MOUTH EVERY DAY   • lisinopril (PRINIVIL,ZESTRIL) 5 MG tablet          Pharm[facility] where request [facility] sent[id number] [place] [facility] [address] AT [facility] [phone] [facility] [phone]      Additional details provided by patient:    Does the patient have less than a 3 day supply:  [x] Yes  [] No

## 2022-02-07 RX ORDER — FAMOTIDINE 40 MG/1
TABLET, FILM COATED ORAL
Qty: 60 TABLET | Refills: 0 | Status: SHIPPED | OUTPATIENT
Start: 2022-02-07 | End: 2022-03-18

## 2022-02-08 RX ORDER — FAMOTIDINE 40 MG/1
40 TABLET, FILM COATED ORAL AS NEEDED
Qty: 60 TABLET | Refills: 0 | OUTPATIENT
Start: 2022-02-08

## 2022-02-21 ENCOUNTER — TELEPHONE (OUTPATIENT)
Dept: FAMILY MEDICINE CLINIC | Facility: CLINIC | Age: 57
End: 2022-02-21

## 2022-02-21 DIAGNOSIS — J44.9 CHRONIC OBSTRUCTIVE PULMONARY DISEASE, UNSPECIFIED COPD TYPE: ICD-10-CM

## 2022-02-21 RX ORDER — ALBUTEROL SULFATE 90 UG/1
2 AEROSOL, METERED RESPIRATORY (INHALATION) EVERY 6 HOURS PRN
Qty: 8.5 G | Refills: 2 | Status: SHIPPED | OUTPATIENT
Start: 2022-02-21 | End: 2022-06-03 | Stop reason: SDUPTHER

## 2022-02-21 RX ORDER — LISINOPRIL 5 MG/1
5 TABLET ORAL DAILY
Qty: 30 TABLET | Refills: 5 | Status: SHIPPED | OUTPATIENT
Start: 2022-02-21 | End: 2022-08-29

## 2022-02-21 RX ORDER — ATORVASTATIN CALCIUM 10 MG/1
10 TABLET, FILM COATED ORAL NIGHTLY
Qty: 30 TABLET | Refills: 5 | Status: SHIPPED | OUTPATIENT
Start: 2022-02-21 | End: 2022-03-23

## 2022-02-21 NOTE — TELEPHONE ENCOUNTER
Caller: Lluvia Archer    Relationship: Self    Best call back number: [phone]     Requested Prescriptions:   Requested Prescriptions     Pending Prescriptions Disp Refills   • albuterol sulfate  (90 Base) MCG/ACT inhaler 8.5 g 2   • atorvastatin (LIPITOR) 10 MG tablet     • lisinopril (PRINIVIL,ZESTRIL) 5 MG tablet 30 tablet 0     Sig: Take 1 tablet by mouth Daily.        Pharmacy where request should be sent: [id number] - [place], [place] - [address] AT [facility] [phone] [facility] [phone] FX     Additional details provided by patient: PATIENT IS NEEDING A REFILL. PLEASE CALL AND ADVISE.     Does the patient have less than a 3 day supply:  [x] Yes  [] No    Que Watson Rep   02/21/22 13:49 EST

## 2022-03-03 ENCOUNTER — OFFICE VISIT (OUTPATIENT)
Dept: FAMILY MEDICINE CLINIC | Facility: CLINIC | Age: 57
End: 2022-03-03

## 2022-03-03 VITALS
DIASTOLIC BLOOD PRESSURE: 70 MMHG | OXYGEN SATURATION: 97 % | TEMPERATURE: 97.6 F | BODY MASS INDEX: 35.17 KG/M2 | SYSTOLIC BLOOD PRESSURE: 129 MMHG | HEART RATE: 60 BPM | WEIGHT: 206 LBS | HEIGHT: 64 IN

## 2022-03-03 DIAGNOSIS — M47.26 OSTEOARTHRITIS OF SPINE WITH RADICULOPATHY, LUMBAR REGION: ICD-10-CM

## 2022-03-03 DIAGNOSIS — G89.29 CHRONIC RIGHT-SIDED LOW BACK PAIN WITH RIGHT-SIDED SCIATICA: Primary | ICD-10-CM

## 2022-03-03 DIAGNOSIS — M25.551 RIGHT HIP PAIN: ICD-10-CM

## 2022-03-03 DIAGNOSIS — M16.11 PRIMARY OSTEOARTHRITIS OF RIGHT HIP: ICD-10-CM

## 2022-03-03 DIAGNOSIS — M54.41 CHRONIC RIGHT-SIDED LOW BACK PAIN WITH RIGHT-SIDED SCIATICA: Primary | ICD-10-CM

## 2022-03-03 DIAGNOSIS — Z79.899 MEDICATION MANAGEMENT: ICD-10-CM

## 2022-03-03 LAB
AMPHET+METHAMPHET UR QL: NEGATIVE
BARBITURATES UR QL SCN: NEGATIVE
BENZODIAZ UR QL SCN: NEGATIVE
CANNABINOIDS SERPL QL: NEGATIVE
COCAINE UR QL: NEGATIVE
METHADONE UR QL SCN: NEGATIVE
OPIATES UR QL: NEGATIVE
OXYCODONE UR QL SCN: NEGATIVE

## 2022-03-03 PROCEDURE — 80307 DRUG TEST PRSMV CHEM ANLYZR: CPT | Performed by: NURSE PRACTITIONER

## 2022-03-03 PROCEDURE — 99213 OFFICE O/P EST LOW 20 MIN: CPT | Performed by: NURSE PRACTITIONER

## 2022-03-03 RX ORDER — GABAPENTIN 300 MG/1
300 CAPSULE ORAL 3 TIMES DAILY
Qty: 270 CAPSULE | Refills: 1 | Status: ON HOLD | OUTPATIENT
Start: 2022-03-03 | End: 2022-05-13

## 2022-03-03 NOTE — PROGRESS NOTES
Patient Name: Lluvia Archer  : 1965   MRN: [id number]     Chief Complaint:    Chief Complaint   Patient presents with   • Leg Pain   • Back Pain       History of Present Illness: Lluvia Archer is a 56 y.o. female who is here today for low back pain radiation to right leg   C/O-right leg pain for last year, from right side of back to foot   Also c/o right hip pain   Visit with dr mcneil 21  MRI [facility]  showed some mild degenerative changes without any significant neuroforaminal narrowing or spinal canal stenosis.  Dr. Gomes has set her up to see Dr. Saldana patient cancelled these appointments, she was also referred to physical therapy pt declines states she is not able to complete therapy due to pain, pt did not follow up with dr gomes as scheduled for her right hi pain     Subjective      Review of Systems:   Review of Systems   Constitutional: Negative for fever.   Respiratory: Negative for shortness of breath.    Cardiovascular: Negative for chest pain.   Gastrointestinal: Negative for abdominal pain, diarrhea, nausea and vomiting.   Genitourinary: Negative for dysuria.   Musculoskeletal: Positive for arthralgias, back pain and myalgias.   Neurological: Positive for numbness.        Past Medical History:   Past Medical History:   Diagnosis Date   • Abnormal mammogram    • Anxiety    • Arthritis     R SHOULDER, R HIP, AND R LEG   • Chronic nausea 01/15/2019   • Hernia, hiatal    • Hyperlipidemia    • Hypertension    • Knee pain, right 2018   • Memory loss     FORGETFULNESS   • Migraine headache    • Moderate episode of recurrent major depressive disorder ([facility]) 2017   • Night sweats    • Numbness 2017    WILL CHECK AN X- RAY OF HER C-SPINE    • Sciatica of right side 2017   • Severe episode of recurrent major depressive disorder, without psychotic features ([facility]) 10/22/2019   • Shortness of breath    • Shoulder pain, left 2018   • Sinus trouble    •  Sleep apnea, unspecified    • Tobacco abuse 2017   • Tobacco abuse counseling 2017    SHE DESIRES TO QUIT. SHE HAS TRIED TO CHANTIX IN THE PAST W/O SUCCESS. SHE WAS SUCCESSFUL IN THE PAST W/O MEDICATION.       Past Surgical History:   Past Surgical History:   Procedure Laterality Date   •  SECTION     • CHOLECYSTECTOMY     • COLONOSCOPY     • GALLBLADDER SURGERY         Family History:   Family History   Problem Relation Age of Onset   • Other Mother         BLOOD DISEASE   • Arthritis Mother    • Heart disease Father    • Hypertension Other    • Cancer Other    • Heart disease Other        Social History:   Social History     Socioeconomic History   • Marital status: Legally    Tobacco Use   • Smoking status: Former Smoker     Packs/day: 1.00   • Smokeless tobacco: Never Used   Vaping Use   • Vaping Use: Never used   Substance and Sexual Activity   • Alcohol use: Never   • Drug use: Never   • Sexual activity: Defer       Medications:     Current Outpatient Medications:   •  albuterol sulfate  (90 Base) MCG/ACT inhaler, Inhale 2 puffs Every 6 (Six) Hours As Needed for Wheezing., Disp: 8.5 g, Rfl: 2  •  atorvastatin (LIPITOR) 10 MG tablet, Take 1 tablet by mouth Every Night for 30 days., Disp: 30 tablet, Rfl: 5  •  buPROPion SR (WELLBUTRIN SR) 150 MG 12 hr tablet, TAKE 1 TABLET(150 MG) BY MOUTH TWICE DAILY, Disp: 60 tablet, Rfl: 0  •  busPIRone (BUSPAR) 10 MG tablet, buspirone 10 mg oral tablet take 1 tablet (10 mg) by oral route 3 times per day for 30 days   Suspended, Disp: , Rfl:   •  famotidine (PEPCID) 40 MG tablet, TAKE 1 TABLET(40 MG) BY MOUTH TWICE DAILY, Disp: 60 tablet, Rfl: 0  •  FLUoxetine (PROzac) 40 MG capsule, TAKE 1 CAPSULE(40 MG) BY MOUTH EVERY DAY, Disp: 30 capsule, Rfl: 5  •  gabapentin (NEURONTIN) 300 MG capsule, Take 1 capsule by mouth 3 (Three) Times a Day., Disp: 270 capsule, Rfl: 1  •  lisinopril (PRINIVIL,ZESTRIL) 5 MG tablet, Take 1 tablet by mouth Daily.,  "Disp: 30 tablet, Rfl: 5  •  Melatonin 10 MG tablet, melatonin 10 mg oral tablet take 1 tablet by oral route daily   Active, Disp: , Rfl:   •  oxyCODONE-acetaminophen (PERCOCET) 5-325 MG per tablet, Take 1 tablet by mouth Every 6 (Six) Hours As Needed for Severe Pain ., Disp: 12 tablet, Rfl: 0  •  predniSONE (DELTASONE) 10 MG tablet, 4 tabs daily for 3 days, 3 tabs daily for 3 days, 2 tabs daily for 3 days, 1 tab daily for 3 days, then stop., Disp: 30 tablet, Rfl: 0  •  predniSONE (DELTASONE) 50 MG tablet, Take 1 tablet by mouth Daily., Disp: 5 tablet, Rfl: 0  •  Trelegy Ellipta 200-62.5-25 MCG/INH inhaler, INHALE 1 PUFF BY MOUTH AT THE SAME TIME EVERY DAY, Disp: 60 each, Rfl: 0    Allergies:   No Known Allergies      Objective     Physical Exam:  Vital Signs:   Vitals:    03/03/22 0835   BP: 129/70   Pulse: 60   Temp: 97.6 °F (36.4 °C)   SpO2: 97%   Weight: 93.4 kg (206 lb)   Height: 162.6 cm (64\")     Body mass index is 35.36 kg/m².     Physical Exam  Cardiovascular:      Rate and Rhythm: Normal rate.      Pulses: Normal pulses.      Heart sounds: Normal heart sounds.   Pulmonary:      Effort: Pulmonary effort is normal.      Breath sounds: Normal breath sounds.   Abdominal:      General: Bowel sounds are normal.      Palpations: Abdomen is soft.   Musculoskeletal:      Lumbar back: Tenderness present. Negative right straight leg raise test and negative left straight leg raise test.        Back:       Comments: Pain with internal and external rotation hip right   Skin:     General: Skin is warm and dry.   Neurological:      Mental Status: She is alert.      Gait: Gait normal.      Deep Tendon Reflexes: Reflexes normal.             Assessment / Plan      Assessment/Plan:   Diagnoses and all orders for this visit:    1. Chronic right-sided low back pain with right-sided sciatica (Primary)  -     Ambulatory Referral to Pain Management  -     gabapentin (NEURONTIN) 300 MG capsule; Take 1 capsule by mouth 3 (Three) " "Times a Day.  Dispense: 270 capsule; Refill: 1  -     Ambulatory Referral to Neurosurgery    2. Osteoarthritis of spine with radiculopathy, lumbar region  -     Ambulatory Referral to Pain Management  -     gabapentin (NEURONTIN) 300 MG capsule; Take 1 capsule by mouth 3 (Three) Times a Day.  Dispense: 270 capsule; Refill: 1  -     Ambulatory Referral to Neurosurgery    3. Medication management  -     Urine Drug Screen - Urine, Clean Catch; Future    4. Right hip pain  -     Ambulatory Referral to Orthopedic Surgery    5. Primary osteoarthritis of right hip  -     Ambulatory Referral to Orthopedic Surgery       Chronic right-sided low back pain with right-sided sciatica and osteoarthritis of spine will refer to pain management neurosurgery as recommended last year and start gabapentin  Right hip pain osteoarthritis right hip will refer to orthopedic surgery Dr. Black per patient request patient not keep follow-up appointment as scheduled last year        Follow Up:   Return for dr mcneil .    Blayne Rey, APRN    \"Please note that portions of this note were completed with a voice recognition program.\"    "

## 2022-03-11 ENCOUNTER — PREP FOR SURGERY (OUTPATIENT)
Dept: OTHER | Facility: HOSPITAL | Age: 57
End: 2022-03-11

## 2022-03-11 ENCOUNTER — OFFICE VISIT (OUTPATIENT)
Dept: ORTHOPEDIC SURGERY | Facility: CLINIC | Age: 57
End: 2022-03-11

## 2022-03-11 VITALS — HEART RATE: 76 BPM | BODY MASS INDEX: 35.27 KG/M2 | OXYGEN SATURATION: 96 % | WEIGHT: 206.6 LBS | HEIGHT: 64 IN

## 2022-03-11 DIAGNOSIS — M25.551 RIGHT HIP PAIN: Primary | ICD-10-CM

## 2022-03-11 DIAGNOSIS — M16.11 PRIMARY OSTEOARTHRITIS OF RIGHT HIP: Primary | ICD-10-CM

## 2022-03-11 DIAGNOSIS — M16.11 PRIMARY OSTEOARTHRITIS OF RIGHT HIP: ICD-10-CM

## 2022-03-11 PROCEDURE — 99214 OFFICE O/P EST MOD 30 MIN: CPT | Performed by: ORTHOPAEDIC SURGERY

## 2022-03-11 RX ORDER — TRAMADOL HYDROCHLORIDE 50 MG/1
50 TABLET ORAL EVERY 8 HOURS PRN
COMMUNITY
Start: 2022-03-09 | End: 2022-05-13 | Stop reason: HOSPADM

## 2022-03-11 RX ORDER — CEFAZOLIN SODIUM 2 G/100ML
2 INJECTION, SOLUTION INTRAVENOUS ONCE
Status: CANCELLED | OUTPATIENT
Start: 2022-03-11 | End: 2022-03-11

## 2022-03-11 RX ORDER — CEFAZOLIN SODIUM IN 0.9 % NACL 3 G/100 ML
3 INTRAVENOUS SOLUTION, PIGGYBACK (ML) INTRAVENOUS ONCE
Status: CANCELLED | OUTPATIENT
Start: 2022-03-11 | End: 2022-03-11

## 2022-03-11 RX ORDER — TRANEXAMIC ACID 10 MG/ML
1000 INJECTION, SOLUTION INTRAVENOUS ONCE
Status: CANCELLED | OUTPATIENT
Start: 2022-03-11 | End: 2022-03-11

## 2022-03-11 NOTE — PROGRESS NOTES
"Chief Complaint  Follow-up of the Right Hip     Subjective      Lluvia Archer presents to [facility] for follow up evaluation of the right hip. She locates her pain to her low back, her groin and radiating down her leg. She had a bursa injection in the past with some relief but not a lot. She has no injury or trauma.     No Known Allergies     Social History     Socioeconomic History   • Marital status: Legally    Tobacco Use   • Smoking status: Former Smoker     Packs/day: 1.00   • Smokeless tobacco: Never Used   Vaping Use   • Vaping Use: Never used   Substance and Sexual Activity   • Alcohol use: Never   • Drug use: Never   • Sexual activity: Defer        Review of Systems     Objective   Vital Signs:   Pulse 76   Ht 162.6 cm (64\")   Wt 93.7 kg (206 lb 9.6 oz)   SpO2 96%   BMI 35.46 kg/m²       Physical Exam  Constitutional:       Appearance: Normal appearance. The patient is well-developed and normal weight.   HENT:      Head: Normocephalic.      Right Ear: Hearing and external ear normal.      Left Ear: Hearing and external ear normal.      Nose: Nose normal.   Eyes:      Conjunctiva/sclera: Conjunctivae normal.   Cardiovascular:      Rate and Rhythm: Normal rate.   Pulmonary:      Effort: Pulmonary effort is normal.      Breath sounds: No wheezing or rales.   Abdominal:      Palpations: Abdomen is soft.      Tenderness: There is no abdominal tenderness.   Musculoskeletal:      Cervical back: Normal range of motion.   Skin:     Findings: No rash.   Neurological:      Mental Status: The patient is alert and oriented to person, place, and time.   Psychiatric:         Mood and Affect: Mood and affect normal.         Judgment: Judgment normal.       Ortho Exam      Right hip- Flexion 120. External Rotation 40. Internal rotation 0 Positive EHL, FHL, GS and TA. Sensation intact to all 5 nerves of the foot. Positive pulses. Neurovascularly intact. Antalgic gait. Pain with " hip ROM    Procedures    X-Ray Report:  Right hip(s) X-Ray  Indication: Evaluation of right hip pain  AP and Lateral view(s)  Findings: advanced degenerative changes of the right hip.   Prior studies available for comparison: yes         Imaging Results (Most Recent)     Procedure Component Value Units Date/Time    XR Hip With or Without Pelvis 2 - 3 View Right [[id number]] Resulted: 03/11/22 1217     Updated: 03/11/22 1217           Result Review :       No results found.           Assessment and Plan     DX: Right hip osteoarthritis     Discussed the treatment options with the patient, operative vs non-operative. Discussed the risks and benefits of a right Total Hip Arthroplasty. The patient expressed understanding and wished to proceed.     Call or return if worsening symptoms.    Follow Up     2 weeks postoperatively.        Patient was given instructions and counseling regarding her condition or for health maintenance advice. Please see specific information pulled into the AVS if appropriate.     Scribed for Cecil Gomes MD by Eloise Valdez.  03/11/22   12:20 EST    I have personally performed the services described in this document as scribed by the above individual and it is both accurate and complete. Cecil Gomes MD 03/14/22

## 2022-03-17 DIAGNOSIS — M16.11 PRIMARY OSTEOARTHRITIS OF RIGHT HIP: Primary | ICD-10-CM

## 2022-03-18 RX ORDER — FAMOTIDINE 40 MG/1
TABLET, FILM COATED ORAL
Qty: 60 TABLET | Refills: 0 | Status: ON HOLD | OUTPATIENT
Start: 2022-03-18 | End: 2022-04-11

## 2022-04-04 ENCOUNTER — APPOINTMENT (OUTPATIENT)
Dept: GENERAL RADIOLOGY | Facility: HOSPITAL | Age: 57
End: 2022-04-04

## 2022-04-04 ENCOUNTER — HOSPITAL ENCOUNTER (INPATIENT)
Facility: HOSPITAL | Age: 57
LOS: 8 days | Discharge: HOME | End: 2022-04-12
Attending: EMERGENCY MEDICINE | Admitting: INTERNAL MEDICINE

## 2022-04-04 DIAGNOSIS — R77.8 ELEVATED TROPONIN: ICD-10-CM

## 2022-04-04 DIAGNOSIS — J18.9 PNEUMONIA OF RIGHT LOWER LOBE DUE TO INFECTIOUS ORGANISM: ICD-10-CM

## 2022-04-04 DIAGNOSIS — A41.9 SEPSIS, DUE TO UNSPECIFIED ORGANISM, UNSPECIFIED WHETHER ACUTE ORGAN DYSFUNCTION PRESENT: Primary | ICD-10-CM

## 2022-04-04 DIAGNOSIS — J96.01 ACUTE RESPIRATORY FAILURE WITH HYPOXIA: ICD-10-CM

## 2022-04-04 DIAGNOSIS — Z78.9 DECREASED ACTIVITIES OF DAILY LIVING (ADL): ICD-10-CM

## 2022-04-04 DIAGNOSIS — R26.2 DIFFICULTY WALKING: ICD-10-CM

## 2022-04-04 DIAGNOSIS — D72.829 LEUKOCYTOSIS, UNSPECIFIED TYPE: ICD-10-CM

## 2022-04-04 DIAGNOSIS — R13.12 DYSPHAGIA, OROPHARYNGEAL: ICD-10-CM

## 2022-04-04 LAB
ALBUMIN SERPL-MCNC: 2.9 G/DL (ref 3.5–5.2)
ALBUMIN/GLOB SERPL: 0.6 G/DL
ALP SERPL-CCNC: 212 U/L (ref 39–117)
ALT SERPL W P-5'-P-CCNC: 89 U/L (ref 1–33)
ANION GAP SERPL CALCULATED.3IONS-SCNC: 15.7 MMOL/L (ref 5–15)
ARTERIAL PATENCY WRIST A: POSITIVE
AST SERPL-CCNC: 274 U/L (ref 1–32)
BACTERIA UR QL AUTO: ABNORMAL /HPF
BASE EXCESS BLDA CALC-SCNC: 1.7 MMOL/L (ref -2–2)
BASOPHILS # BLD AUTO: 0.06 10*3/MM3 (ref 0–0.2)
BASOPHILS NFR BLD AUTO: 0.3 % (ref 0–1.5)
BDY SITE: ABNORMAL
BILIRUB SERPL-MCNC: 0.5 MG/DL (ref 0–1.2)
BILIRUB UR QL STRIP: NEGATIVE
BUN SERPL-MCNC: 17 MG/DL (ref 6–20)
BUN/CREAT SERPL: 17.3 (ref 7–25)
CA-I BLDA-SCNC: 1.09 MMOL/L (ref 1.13–1.32)
CALCIUM SPEC-SCNC: 9.2 MG/DL (ref 8.6–10.5)
CHLORIDE BLDA-SCNC: 99 MMOL/L (ref 98–106)
CHLORIDE SERPL-SCNC: 95 MMOL/L (ref 98–107)
CLARITY UR: CLEAR
CO2 SERPL-SCNC: 25.3 MMOL/L (ref 22–29)
COHGB MFR BLD: 0.6 % (ref 0–1.5)
COLOR UR: ABNORMAL
CREAT SERPL-MCNC: 0.98 MG/DL (ref 0.57–1)
D-LACTATE SERPL-SCNC: 1.5 MMOL/L (ref 0.5–2)
DEPRECATED RDW RBC AUTO: 50.4 FL (ref 37–54)
EGFRCR SERPLBLD CKD-EPI 2021: 68.3 ML/MIN/1.73
EOSINOPHIL # BLD AUTO: 0 10*3/MM3 (ref 0–0.4)
EOSINOPHIL NFR BLD AUTO: 0 % (ref 0.3–6.2)
ERYTHROCYTE [DISTWIDTH] IN BLOOD BY AUTOMATED COUNT: 15.7 % (ref 12.3–15.4)
FHHB: 5.2 % (ref 0–5)
GAS FLOW AIRWAY: 3 LPM
GLOBULIN UR ELPH-MCNC: 4.9 GM/DL
GLUCOSE BLDA-MCNC: 116 MMOL/L (ref 65–99)
GLUCOSE SERPL-MCNC: 122 MG/DL (ref 65–99)
GLUCOSE UR STRIP-MCNC: NEGATIVE MG/DL
GRAN CASTS URNS QL MICRO: ABNORMAL /LPF
HCO3 BLDA-SCNC: 24.8 MMOL/L (ref 22–26)
HCT VFR BLD AUTO: 36.5 % (ref 34–46.6)
HGB BLD-MCNC: 12.1 G/DL (ref 12–15.9)
HGB BLDA-MCNC: 11 G/DL (ref 11.7–14.6)
HGB UR QL STRIP.AUTO: ABNORMAL
HOLD SPECIMEN: NORMAL
HOLD SPECIMEN: NORMAL
HYALINE CASTS UR QL AUTO: ABNORMAL /LPF
IMM GRANULOCYTES # BLD AUTO: 0.53 10*3/MM3 (ref 0–0.05)
IMM GRANULOCYTES NFR BLD AUTO: 2.6 % (ref 0–0.5)
INHALED O2 CONCENTRATION: 32 %
KETONES UR QL STRIP: NEGATIVE
LACTATE BLDA-SCNC: 1.84 MMOL/L (ref 0.5–2)
LEUKOCYTE ESTERASE UR QL STRIP.AUTO: NEGATIVE
LYMPHOCYTES # BLD AUTO: 1.48 10*3/MM3 (ref 0.7–3.1)
LYMPHOCYTES NFR BLD AUTO: 7.2 % (ref 19.6–45.3)
MCH RBC QN AUTO: 29.2 PG (ref 26.6–33)
MCHC RBC AUTO-ENTMCNC: 33.2 G/DL (ref 31.5–35.7)
MCV RBC AUTO: 88 FL (ref 79–97)
METHGB BLD QL: 0.1 % (ref 0–1.5)
MODALITY: ABNORMAL
MONOCYTES # BLD AUTO: 1.45 10*3/MM3 (ref 0.1–0.9)
MONOCYTES NFR BLD AUTO: 7 % (ref 5–12)
NEUTROPHILS NFR BLD AUTO: 17.11 10*3/MM3 (ref 1.7–7)
NEUTROPHILS NFR BLD AUTO: 82.9 % (ref 42.7–76)
NITRITE UR QL STRIP: NEGATIVE
NRBC BLD AUTO-RTO: 0 /100 WBC (ref 0–0.2)
NT-PROBNP SERPL-MCNC: 2554 PG/ML (ref 0–900)
OXYHGB MFR BLDV: 94.1 % (ref 94–99)
PCO2 BLDA: 33.6 MM HG (ref 35–45)
PH BLDA: 7.49 PH UNITS (ref 7.35–7.45)
PH UR STRIP.AUTO: 6.5 [PH] (ref 5–8)
PLATELET # BLD AUTO: 197 10*3/MM3 (ref 140–450)
PMV BLD AUTO: 12.2 FL (ref 6–12)
PO2 BLD: 228 MM[HG] (ref 0–500)
PO2 BLDA: 72.9 MM HG (ref 80–100)
POTASSIUM BLDA-SCNC: 3.09 MMOL/L (ref 3.5–5)
POTASSIUM SERPL-SCNC: 3.7 MMOL/L (ref 3.5–5.2)
PROT SERPL-MCNC: 7.8 G/DL (ref 6–8.5)
PROT UR QL STRIP: ABNORMAL
RBC # BLD AUTO: 4.15 10*6/MM3 (ref 3.77–5.28)
RBC # UR STRIP: ABNORMAL /HPF
REF LAB TEST METHOD: ABNORMAL
SAO2 % BLDCOA: 94.8 % (ref 95–99)
SODIUM BLDA-SCNC: 133 MMOL/L (ref 136–146)
SODIUM SERPL-SCNC: 136 MMOL/L (ref 136–145)
SP GR UR STRIP: 1.02 (ref 1–1.03)
SQUAMOUS #/AREA URNS HPF: ABNORMAL /HPF
TROPONIN T SERPL-MCNC: 0.06 NG/ML (ref 0–0.03)
UROBILINOGEN UR QL STRIP: ABNORMAL
WBC # UR STRIP: ABNORMAL /HPF
WBC NRBC COR # BLD: 20.63 10*3/MM3 (ref 3.4–10.8)
WHOLE BLOOD HOLD SPECIMEN: NORMAL
WHOLE BLOOD HOLD SPECIMEN: NORMAL

## 2022-04-04 PROCEDURE — 87185 SC STD ENZYME DETCJ PER NZM: CPT | Performed by: EMERGENCY MEDICINE

## 2022-04-04 PROCEDURE — 94799 UNLISTED PULMONARY SVC/PX: CPT

## 2022-04-04 PROCEDURE — P9612 CATHETERIZE FOR URINE SPEC: HCPCS

## 2022-04-04 PROCEDURE — 80053 COMPREHEN METABOLIC PANEL: CPT | Performed by: EMERGENCY MEDICINE

## 2022-04-04 PROCEDURE — 82375 ASSAY CARBOXYHB QUANT: CPT | Performed by: EMERGENCY MEDICINE

## 2022-04-04 PROCEDURE — 25010000002 METHYLPREDNISOLONE PER 125 MG: Performed by: EMERGENCY MEDICINE

## 2022-04-04 PROCEDURE — 83880 ASSAY OF NATRIURETIC PEPTIDE: CPT

## 2022-04-04 PROCEDURE — 87040 BLOOD CULTURE FOR BACTERIA: CPT

## 2022-04-04 PROCEDURE — 25010000002 VANCOMYCIN 5 G RECONSTITUTED SOLUTION: Performed by: EMERGENCY MEDICINE

## 2022-04-04 PROCEDURE — 93005 ELECTROCARDIOGRAM TRACING: CPT

## 2022-04-04 PROCEDURE — 81001 URINALYSIS AUTO W/SCOPE: CPT | Performed by: EMERGENCY MEDICINE

## 2022-04-04 PROCEDURE — 36600 WITHDRAWAL OF ARTERIAL BLOOD: CPT | Performed by: EMERGENCY MEDICINE

## 2022-04-04 PROCEDURE — 94640 AIRWAY INHALATION TREATMENT: CPT

## 2022-04-04 PROCEDURE — 87076 CULTURE ANAEROBE IDENT EACH: CPT | Performed by: EMERGENCY MEDICINE

## 2022-04-04 PROCEDURE — 83605 ASSAY OF LACTIC ACID: CPT

## 2022-04-04 PROCEDURE — 71045 X-RAY EXAM CHEST 1 VIEW: CPT

## 2022-04-04 PROCEDURE — 25010000002 CEFEPIME PER 500 MG: Performed by: EMERGENCY MEDICINE

## 2022-04-04 PROCEDURE — 83050 HGB METHEMOGLOBIN QUAN: CPT | Performed by: EMERGENCY MEDICINE

## 2022-04-04 PROCEDURE — 99285 EMERGENCY DEPT VISIT HI MDM: CPT

## 2022-04-04 PROCEDURE — 93010 ELECTROCARDIOGRAM REPORT: CPT | Performed by: INTERNAL MEDICINE

## 2022-04-04 PROCEDURE — 82805 BLOOD GASES W/O2 SATURATION: CPT | Performed by: EMERGENCY MEDICINE

## 2022-04-04 PROCEDURE — 99223 1ST HOSP IP/OBS HIGH 75: CPT | Performed by: INTERNAL MEDICINE

## 2022-04-04 PROCEDURE — 85025 COMPLETE CBC W/AUTO DIFF WBC: CPT

## 2022-04-04 PROCEDURE — 93005 ELECTROCARDIOGRAM TRACING: CPT | Performed by: EMERGENCY MEDICINE

## 2022-04-04 PROCEDURE — 84484 ASSAY OF TROPONIN QUANT: CPT | Performed by: EMERGENCY MEDICINE

## 2022-04-04 RX ORDER — ONDANSETRON 4 MG/1
4 TABLET, FILM COATED ORAL EVERY 6 HOURS PRN
Status: DISCONTINUED | OUTPATIENT
Start: 2022-04-04 | End: 2022-04-12 | Stop reason: HOSPADM

## 2022-04-04 RX ORDER — ACETAMINOPHEN 325 MG/1
650 TABLET ORAL EVERY 4 HOURS PRN
Status: DISCONTINUED | OUTPATIENT
Start: 2022-04-04 | End: 2022-04-12 | Stop reason: HOSPADM

## 2022-04-04 RX ORDER — CEFEPIME 1 G/50ML
2 INJECTION, SOLUTION INTRAVENOUS ONCE
Status: COMPLETED | OUTPATIENT
Start: 2022-04-04 | End: 2022-04-05

## 2022-04-04 RX ORDER — ONDANSETRON 2 MG/ML
4 INJECTION INTRAMUSCULAR; INTRAVENOUS EVERY 6 HOURS PRN
Status: DISCONTINUED | OUTPATIENT
Start: 2022-04-04 | End: 2022-04-12 | Stop reason: HOSPADM

## 2022-04-04 RX ORDER — SODIUM CHLORIDE 0.9 % (FLUSH) 0.9 %
10 SYRINGE (ML) INJECTION EVERY 12 HOURS SCHEDULED
Status: DISCONTINUED | OUTPATIENT
Start: 2022-04-04 | End: 2022-04-12 | Stop reason: HOSPADM

## 2022-04-04 RX ORDER — SODIUM CHLORIDE AND POTASSIUM CHLORIDE 150; 900 MG/100ML; MG/100ML
125 INJECTION, SOLUTION INTRAVENOUS CONTINUOUS
Status: DISPENSED | OUTPATIENT
Start: 2022-04-04 | End: 2022-04-05

## 2022-04-04 RX ORDER — SODIUM CHLORIDE 0.9 % (FLUSH) 0.9 %
10 SYRINGE (ML) INJECTION AS NEEDED
Status: DISCONTINUED | OUTPATIENT
Start: 2022-04-04 | End: 2022-04-05

## 2022-04-04 RX ORDER — SODIUM CHLORIDE 0.9 % (FLUSH) 0.9 %
10 SYRINGE (ML) INJECTION AS NEEDED
Status: DISCONTINUED | OUTPATIENT
Start: 2022-04-04 | End: 2022-04-12 | Stop reason: HOSPADM

## 2022-04-04 RX ORDER — ACETAMINOPHEN 325 MG/1
975 TABLET ORAL ONCE
Status: COMPLETED | OUTPATIENT
Start: 2022-04-04 | End: 2022-04-04

## 2022-04-04 RX ORDER — IPRATROPIUM BROMIDE AND ALBUTEROL SULFATE 2.5; .5 MG/3ML; MG/3ML
3 SOLUTION RESPIRATORY (INHALATION)
Status: COMPLETED | OUTPATIENT
Start: 2022-04-04 | End: 2022-04-04

## 2022-04-04 RX ORDER — METHYLPREDNISOLONE SODIUM SUCCINATE 125 MG/2ML
125 INJECTION, POWDER, LYOPHILIZED, FOR SOLUTION INTRAMUSCULAR; INTRAVENOUS ONCE
Status: COMPLETED | OUTPATIENT
Start: 2022-04-04 | End: 2022-04-04

## 2022-04-04 RX ADMIN — IPRATROPIUM BROMIDE AND ALBUTEROL SULFATE 3 ML: 2.5; .5 SOLUTION RESPIRATORY (INHALATION) at 21:50

## 2022-04-04 RX ADMIN — IPRATROPIUM BROMIDE AND ALBUTEROL SULFATE 3 ML: 2.5; .5 SOLUTION RESPIRATORY (INHALATION) at 21:51

## 2022-04-04 RX ADMIN — METHYLPREDNISOLONE SODIUM SUCCINATE 125 MG: 125 INJECTION, POWDER, FOR SOLUTION INTRAMUSCULAR; INTRAVENOUS at 21:44

## 2022-04-04 RX ADMIN — CEFEPIME 2 G: 1 INJECTION, SOLUTION INTRAVENOUS at 22:11

## 2022-04-04 RX ADMIN — IPRATROPIUM BROMIDE AND ALBUTEROL SULFATE 3 ML: 2.5; .5 SOLUTION RESPIRATORY (INHALATION) at 21:49

## 2022-04-04 RX ADMIN — VANCOMYCIN HYDROCHLORIDE 1750 MG: 5 INJECTION, POWDER, LYOPHILIZED, FOR SOLUTION INTRAVENOUS at 22:11

## 2022-04-04 RX ADMIN — ACETAMINOPHEN 975 MG: 325 TABLET ORAL at 21:44

## 2022-04-04 RX ADMIN — SODIUM CHLORIDE, POTASSIUM CHLORIDE, SODIUM LACTATE AND CALCIUM CHLORIDE 1641 ML: 600; 310; 30; 20 INJECTION, SOLUTION INTRAVENOUS at 21:44

## 2022-04-05 ENCOUNTER — APPOINTMENT (OUTPATIENT)
Dept: CT IMAGING | Facility: HOSPITAL | Age: 57
End: 2022-04-05

## 2022-04-05 PROBLEM — E43 SEVERE MALNUTRITION: Status: ACTIVE | Noted: 2022-04-05

## 2022-04-05 LAB
ALBUMIN SERPL-MCNC: 2.2 G/DL (ref 3.5–5.2)
ALBUMIN/GLOB SERPL: 0.5 G/DL
ALP SERPL-CCNC: 178 U/L (ref 39–117)
ALT SERPL W P-5'-P-CCNC: 84 U/L (ref 1–33)
ANION GAP SERPL CALCULATED.3IONS-SCNC: 13.6 MMOL/L (ref 5–15)
AST SERPL-CCNC: 250 U/L (ref 1–32)
BASOPHILS # BLD AUTO: 0.04 10*3/MM3 (ref 0–0.2)
BASOPHILS NFR BLD AUTO: 0.2 % (ref 0–1.5)
BILIRUB SERPL-MCNC: 0.5 MG/DL (ref 0–1.2)
BUN SERPL-MCNC: 22 MG/DL (ref 6–20)
BUN/CREAT SERPL: 26.5 (ref 7–25)
CALCIUM SPEC-SCNC: 8.5 MG/DL (ref 8.6–10.5)
CHLORIDE SERPL-SCNC: 101 MMOL/L (ref 98–107)
CO2 SERPL-SCNC: 21.4 MMOL/L (ref 22–29)
CREAT SERPL-MCNC: 0.83 MG/DL (ref 0.57–1)
D-LACTATE SERPL-SCNC: 1 MMOL/L (ref 0.5–2)
DEPRECATED RDW RBC AUTO: 50.2 FL (ref 37–54)
EGFRCR SERPLBLD CKD-EPI 2021: 83.4 ML/MIN/1.73
EOSINOPHIL # BLD AUTO: 0 10*3/MM3 (ref 0–0.4)
EOSINOPHIL NFR BLD AUTO: 0 % (ref 0.3–6.2)
ERYTHROCYTE [DISTWIDTH] IN BLOOD BY AUTOMATED COUNT: 15.5 % (ref 12.3–15.4)
GLOBULIN UR ELPH-MCNC: 4.3 GM/DL
GLUCOSE SERPL-MCNC: 187 MG/DL (ref 65–99)
HCT VFR BLD AUTO: 30.3 % (ref 34–46.6)
HGB BLD-MCNC: 10.2 G/DL (ref 12–15.9)
IMM GRANULOCYTES # BLD AUTO: 0.29 10*3/MM3 (ref 0–0.05)
IMM GRANULOCYTES NFR BLD AUTO: 1.7 % (ref 0–0.5)
LYMPHOCYTES # BLD AUTO: 0.8 10*3/MM3 (ref 0.7–3.1)
LYMPHOCYTES NFR BLD AUTO: 4.7 % (ref 19.6–45.3)
MAGNESIUM SERPL-MCNC: 2.2 MG/DL (ref 1.6–2.6)
MCH RBC QN AUTO: 29.9 PG (ref 26.6–33)
MCHC RBC AUTO-ENTMCNC: 33.7 G/DL (ref 31.5–35.7)
MCV RBC AUTO: 88.9 FL (ref 79–97)
MONOCYTES # BLD AUTO: 0.4 10*3/MM3 (ref 0.1–0.9)
MONOCYTES NFR BLD AUTO: 2.3 % (ref 5–12)
MRSA DNA SPEC QL NAA+PROBE: NORMAL
NEUTROPHILS NFR BLD AUTO: 15.58 10*3/MM3 (ref 1.7–7)
NEUTROPHILS NFR BLD AUTO: 91.1 % (ref 42.7–76)
NRBC BLD AUTO-RTO: 0 /100 WBC (ref 0–0.2)
PLATELET # BLD AUTO: 148 10*3/MM3 (ref 140–450)
PMV BLD AUTO: 12.6 FL (ref 6–12)
POTASSIUM SERPL-SCNC: 3.8 MMOL/L (ref 3.5–5.2)
PROCALCITONIN SERPL-MCNC: 0.55 NG/ML (ref 0–0.25)
PROT SERPL-MCNC: 6.5 G/DL (ref 6–8.5)
QT INTERVAL: 324 MS
RBC # BLD AUTO: 3.41 10*6/MM3 (ref 3.77–5.28)
SARS-COV-2 RNA PNL SPEC NAA+PROBE: NOT DETECTED
SODIUM SERPL-SCNC: 136 MMOL/L (ref 136–145)
TROPONIN T SERPL-MCNC: 0.02 NG/ML (ref 0–0.03)
WBC NRBC COR # BLD: 17.11 10*3/MM3 (ref 3.4–10.8)

## 2022-04-05 PROCEDURE — 92610 EVALUATE SWALLOWING FUNCTION: CPT

## 2022-04-05 PROCEDURE — 25010000002 SODIUM CHLORIDE 0.9 % WITH KCL 20 MEQ 20-0.9 MEQ/L-% SOLUTION: Performed by: INTERNAL MEDICINE

## 2022-04-05 PROCEDURE — 85025 COMPLETE CBC W/AUTO DIFF WBC: CPT | Performed by: INTERNAL MEDICINE

## 2022-04-05 PROCEDURE — 94799 UNLISTED PULMONARY SVC/PX: CPT

## 2022-04-05 PROCEDURE — 25010000002 CEFEPIME PER 500 MG: Performed by: INTERNAL MEDICINE

## 2022-04-05 PROCEDURE — 83735 ASSAY OF MAGNESIUM: CPT | Performed by: INTERNAL MEDICINE

## 2022-04-05 PROCEDURE — 80053 COMPREHEN METABOLIC PANEL: CPT | Performed by: INTERNAL MEDICINE

## 2022-04-05 PROCEDURE — 94760 N-INVAS EAR/PLS OXIMETRY 1: CPT

## 2022-04-05 PROCEDURE — 83605 ASSAY OF LACTIC ACID: CPT | Performed by: INTERNAL MEDICINE

## 2022-04-05 PROCEDURE — 36415 COLL VENOUS BLD VENIPUNCTURE: CPT | Performed by: INTERNAL MEDICINE

## 2022-04-05 PROCEDURE — 25010000002 ENOXAPARIN PER 10 MG: Performed by: INTERNAL MEDICINE

## 2022-04-05 PROCEDURE — 97165 OT EVAL LOW COMPLEX 30 MIN: CPT

## 2022-04-05 PROCEDURE — 25010000002 VANCOMYCIN 5 G RECONSTITUTED SOLUTION: Performed by: INTERNAL MEDICINE

## 2022-04-05 PROCEDURE — 99233 SBSQ HOSP IP/OBS HIGH 50: CPT | Performed by: INTERNAL MEDICINE

## 2022-04-05 PROCEDURE — 87641 MR-STAPH DNA AMP PROBE: CPT | Performed by: INTERNAL MEDICINE

## 2022-04-05 PROCEDURE — 71250 CT THORAX DX C-: CPT

## 2022-04-05 PROCEDURE — 84145 PROCALCITONIN (PCT): CPT | Performed by: INTERNAL MEDICINE

## 2022-04-05 PROCEDURE — U0004 COV-19 TEST NON-CDC HGH THRU: HCPCS | Performed by: INTERNAL MEDICINE

## 2022-04-05 PROCEDURE — 84484 ASSAY OF TROPONIN QUANT: CPT | Performed by: INTERNAL MEDICINE

## 2022-04-05 RX ORDER — BUSPIRONE HYDROCHLORIDE 10 MG/1
10 TABLET ORAL EVERY 8 HOURS SCHEDULED
Status: DISCONTINUED | OUTPATIENT
Start: 2022-04-05 | End: 2022-04-12 | Stop reason: HOSPADM

## 2022-04-05 RX ORDER — ALBUTEROL SULFATE 2.5 MG/3ML
2.5 SOLUTION RESPIRATORY (INHALATION) EVERY 6 HOURS PRN
Refills: 2 | Status: DISCONTINUED | OUTPATIENT
Start: 2022-04-05 | End: 2022-04-12 | Stop reason: HOSPADM

## 2022-04-05 RX ORDER — DIAPER,BRIEF,INFANT-TODD,DISP
1 EACH MISCELLANEOUS
Status: DISCONTINUED | OUTPATIENT
Start: 2022-04-05 | End: 2022-04-12 | Stop reason: HOSPADM

## 2022-04-05 RX ORDER — GABAPENTIN 300 MG/1
300 CAPSULE ORAL 3 TIMES DAILY
Status: DISCONTINUED | OUTPATIENT
Start: 2022-04-05 | End: 2022-04-12 | Stop reason: HOSPADM

## 2022-04-05 RX ORDER — FAMOTIDINE 20 MG/1
40 TABLET, FILM COATED ORAL
Status: DISCONTINUED | OUTPATIENT
Start: 2022-04-05 | End: 2022-04-12 | Stop reason: HOSPADM

## 2022-04-05 RX ORDER — IPRATROPIUM BROMIDE AND ALBUTEROL SULFATE 2.5; .5 MG/3ML; MG/3ML
3 SOLUTION RESPIRATORY (INHALATION)
Status: DISCONTINUED | OUTPATIENT
Start: 2022-04-05 | End: 2022-04-12 | Stop reason: HOSPADM

## 2022-04-05 RX ORDER — CEFEPIME 1 G/50ML
2 INJECTION, SOLUTION INTRAVENOUS EVERY 8 HOURS
Status: DISCONTINUED | OUTPATIENT
Start: 2022-04-05 | End: 2022-04-07

## 2022-04-05 RX ORDER — LORAZEPAM 0.5 MG/1
0.5 TABLET ORAL ONCE
Status: COMPLETED | OUTPATIENT
Start: 2022-04-05 | End: 2022-04-05

## 2022-04-05 RX ORDER — FLUOXETINE HYDROCHLORIDE 20 MG/1
40 CAPSULE ORAL DAILY
Status: DISCONTINUED | OUTPATIENT
Start: 2022-04-05 | End: 2022-04-12 | Stop reason: HOSPADM

## 2022-04-05 RX ORDER — OXYCODONE HYDROCHLORIDE AND ACETAMINOPHEN 5; 325 MG/1; MG/1
1 TABLET ORAL EVERY 6 HOURS PRN
Status: DISCONTINUED | OUTPATIENT
Start: 2022-04-05 | End: 2022-04-12 | Stop reason: HOSPADM

## 2022-04-05 RX ORDER — LISINOPRIL 5 MG/1
5 TABLET ORAL DAILY
Status: DISCONTINUED | OUTPATIENT
Start: 2022-04-05 | End: 2022-04-12 | Stop reason: HOSPADM

## 2022-04-05 RX ORDER — ARFORMOTEROL TARTRATE 15 UG/2ML
15 SOLUTION RESPIRATORY (INHALATION)
Status: DISCONTINUED | OUTPATIENT
Start: 2022-04-05 | End: 2022-04-12 | Stop reason: HOSPADM

## 2022-04-05 RX ORDER — BUDESONIDE 0.5 MG/2ML
0.5 INHALANT ORAL
Status: DISCONTINUED | OUTPATIENT
Start: 2022-04-05 | End: 2022-04-09

## 2022-04-05 RX ORDER — NYSTATIN 100000 [USP'U]/G
POWDER TOPICAL EVERY 8 HOURS
Status: DISCONTINUED | OUTPATIENT
Start: 2022-04-05 | End: 2022-04-12 | Stop reason: HOSPADM

## 2022-04-05 RX ORDER — BUPROPION HYDROCHLORIDE 150 MG/1
150 TABLET, EXTENDED RELEASE ORAL EVERY 12 HOURS SCHEDULED
Status: DISCONTINUED | OUTPATIENT
Start: 2022-04-05 | End: 2022-04-12 | Stop reason: HOSPADM

## 2022-04-05 RX ADMIN — Medication 10 ML: at 20:54

## 2022-04-05 RX ADMIN — NYSTATIN: 100000 POWDER TOPICAL at 11:02

## 2022-04-05 RX ADMIN — GABAPENTIN 300 MG: 300 CAPSULE ORAL at 16:35

## 2022-04-05 RX ADMIN — BUDESONIDE 0.5 MG: 0.5 SUSPENSION RESPIRATORY (INHALATION) at 19:04

## 2022-04-05 RX ADMIN — IPRATROPIUM BROMIDE AND ALBUTEROL SULFATE 3 ML: 2.5; .5 SOLUTION RESPIRATORY (INHALATION) at 19:04

## 2022-04-05 RX ADMIN — POTASSIUM CHLORIDE AND SODIUM CHLORIDE 125 ML/HR: 900; 150 INJECTION, SOLUTION INTRAVENOUS at 00:17

## 2022-04-05 RX ADMIN — ARFORMOTEROL TARTRATE 15 MCG: 15 SOLUTION RESPIRATORY (INHALATION) at 19:04

## 2022-04-05 RX ADMIN — MICONAZOLE NITRATE: 2 POWDER TOPICAL at 20:53

## 2022-04-05 RX ADMIN — FAMOTIDINE 40 MG: 20 TABLET ORAL at 06:33

## 2022-04-05 RX ADMIN — BUSPIRONE HYDROCHLORIDE 10 MG: 10 TABLET ORAL at 06:09

## 2022-04-05 RX ADMIN — LISINOPRIL 5 MG: 5 TABLET ORAL at 09:01

## 2022-04-05 RX ADMIN — Medication 10 ML: at 00:17

## 2022-04-05 RX ADMIN — FLUOXETINE 40 MG: 20 CAPSULE ORAL at 09:01

## 2022-04-05 RX ADMIN — BUDESONIDE 0.5 MG: 0.5 SUSPENSION RESPIRATORY (INHALATION) at 06:47

## 2022-04-05 RX ADMIN — NYSTATIN: 100000 POWDER TOPICAL at 20:53

## 2022-04-05 RX ADMIN — BUSPIRONE HYDROCHLORIDE 10 MG: 10 TABLET ORAL at 22:57

## 2022-04-05 RX ADMIN — CEFEPIME 2 G: 1 INJECTION, SOLUTION INTRAVENOUS at 06:07

## 2022-04-05 RX ADMIN — FAMOTIDINE 40 MG: 20 TABLET ORAL at 16:35

## 2022-04-05 RX ADMIN — BUPROPION HYDROCHLORIDE 150 MG: 150 TABLET, EXTENDED RELEASE ORAL at 22:57

## 2022-04-05 RX ADMIN — ENOXAPARIN SODIUM 40 MG: 100 INJECTION SUBCUTANEOUS at 09:00

## 2022-04-05 RX ADMIN — VANCOMYCIN HYDROCHLORIDE 1000 MG: 5 INJECTION, POWDER, LYOPHILIZED, FOR SOLUTION INTRAVENOUS at 11:00

## 2022-04-05 RX ADMIN — GABAPENTIN 300 MG: 300 CAPSULE ORAL at 09:01

## 2022-04-05 RX ADMIN — MICONAZOLE NITRATE: 2 POWDER TOPICAL at 16:35

## 2022-04-05 RX ADMIN — LORAZEPAM 0.5 MG: 0.5 TABLET ORAL at 17:20

## 2022-04-05 RX ADMIN — BACITRACIN 1 APPLICATION: 500 OINTMENT TOPICAL at 16:35

## 2022-04-05 RX ADMIN — VANCOMYCIN HYDROCHLORIDE 1000 MG: 5 INJECTION, POWDER, LYOPHILIZED, FOR SOLUTION INTRAVENOUS at 20:51

## 2022-04-05 RX ADMIN — IPRATROPIUM BROMIDE AND ALBUTEROL SULFATE 3 ML: 2.5; .5 SOLUTION RESPIRATORY (INHALATION) at 06:47

## 2022-04-05 RX ADMIN — BUPROPION HYDROCHLORIDE 150 MG: 150 TABLET, EXTENDED RELEASE ORAL at 09:00

## 2022-04-05 RX ADMIN — Medication 10 ML: at 09:02

## 2022-04-05 RX ADMIN — ARFORMOTEROL TARTRATE 15 MCG: 15 SOLUTION RESPIRATORY (INHALATION) at 06:47

## 2022-04-05 RX ADMIN — CEFEPIME 2 G: 1 INJECTION, SOLUTION INTRAVENOUS at 22:56

## 2022-04-05 RX ADMIN — NYSTATIN 1 APPLICATION: 100000 POWDER TOPICAL at 03:34

## 2022-04-05 RX ADMIN — BUSPIRONE HYDROCHLORIDE 10 MG: 10 TABLET ORAL at 13:28

## 2022-04-05 RX ADMIN — GABAPENTIN 300 MG: 300 CAPSULE ORAL at 20:55

## 2022-04-05 RX ADMIN — CEFEPIME 2 G: 1 INJECTION, SOLUTION INTRAVENOUS at 13:30

## 2022-04-05 RX ADMIN — IPRATROPIUM BROMIDE AND ALBUTEROL SULFATE 3 ML: 2.5; .5 SOLUTION RESPIRATORY (INHALATION) at 13:07

## 2022-04-05 NOTE — PLAN OF CARE
Goal Outcome Evaluation:  5  FUNCTIONAL ASSESSMENT INSTRUMENT: Patient currently scored a level 4 of 7 on Functional Communication Measures for swallowing indicating a 20-39% limitation in function.    ASSESSMENT/ PLAN OF CARE:  Pt presents with limitations, noted below, that impede her ability to swallow safely. The skills of a therapist will be required to safely and effectively implement the following treatment plan to restore maximal level of function.    PROBLEMS:  1.  Dysphagia   TREATMENT: Dysphagia therapy to ensure diet tolerance, advance to least restrictive diet and analyze for aspiration risk.    FREQUENCY/DURATION: Twice daily 5 times a week    REHAB POTENTIAL:  Pt has good rehab potential.  The following limitations may influence improvement/ length of tx medical status.    RECOMMENDATIONS:   1.   DIET: Mechanical soft diet-thin liquids    2.  POSITION: 90 degrees upright for all intake    3.  COMPENSATORY STRATEGIES: Slow rate, small bites/drinks, oral meds in applesauce crushed if needed, caution with mixed consistencies.    Pt/responsible party agrees with plan of care and has been informed of all alternatives, risks and benefits.    EDUCATION  The patient has been educated in the following areas:   Dysphagia (Swallowing Impairment) Modified Diet Instruction.     Concha Warren, SLP

## 2022-04-05 NOTE — THERAPY EVALUATION
Acute Care - Speech Language Pathology   Swallow Initial Evaluation  [facility]     Patient Name: Lluvia Archer  : 1965  MRN: [id number]  Today's Date: 2022               Admit Date: 2022    Visit Dx:     ICD-10-CM ICD-9-CM   1. Sepsis, due to unspecified organism, unspecified whether acute organ dysfunction present ([facility])  A41.9 038.9     995.91   2. Pneumonia of right lower lobe due to infectious organism  J18.9 486   3. Acute respiratory failure with hypoxia ([facility])  J96.01 518.81   4. Elevated troponin  R77.8 790.6   5. Leukocytosis, unspecified type  D72.829 288.60   6. Dysphagia, oropharyngeal  R13.12 787.22     Patient Active Problem List   Diagnosis   • Abnormal mammogram   • Anxiety   • Arthritis   • Chronic nausea   • Chronic obstructive pulmonary disease (COPD) ([facility])   • Counseling on substance use and abuse   • Diabetes mellitus, type II ([facility])   • Esophageal reflux   • Hernia, hiatal   • Hyperlipidemia   • Hypertension   • Knee pain, right   • Memory loss   • Migraine   • Moderate episode of recurrent major depressive disorder ([facility])   • Night sweats   • Numbness   • Sciatica of right side   • Severe episode of recurrent major depressive disorder, without psychotic features ([facility])   • Shoulder pain, left   • Other diseases of nasal cavity and sinuses   • Sleep apnea, unspecified   • Tobacco abuse   • Strain of groin, right, subsequent encounter   • Osteoarthritis of spine with radiculopathy, lumbar region   • Bronchitis   • Chronic right-sided low back pain with right-sided sciatica   • Medication management   • Primary osteoarthritis of right hip   • Right hip pain   • Sepsis, due to unspecified organism, unspecified whether acute organ dysfunction present ([facility])     Past Medical History:   Diagnosis Date   • Abnormal mammogram    • Anxiety    • Arthritis     R SHOULDER, R HIP, AND R LEG   • Chronic nausea 01/15/2019   • COPD (chronic obstructive pulmonary disease) ([facility])    • Hernia, hiatal     • Hyperlipidemia    • Hypertension    • Knee pain, right 2018   • Memory loss     FORGETFULNESS   • Migraine headache    • Moderate episode of recurrent major depressive disorder ([facility]) 2017   • Night sweats    • Numbness 2017    WILL CHECK AN X- RAY OF HER C-SPINE    • Sciatica of right side 2017   • Severe episode of recurrent major depressive disorder, without psychotic features ([facility]) 10/22/2019   • Shortness of breath    • Shoulder pain, left 2018   • Sinus trouble    • Sleep apnea, unspecified    • Tobacco abuse 2017   • Tobacco abuse counseling 2017    SHE DESIRES TO QUIT. SHE HAS TRIED TO CHANTIX IN THE PAST W/O SUCCESS. SHE WAS SUCCESSFUL IN THE PAST W/O MEDICATION.     Past Surgical History:   Procedure Laterality Date   •  SECTION     • CHOLECYSTECTOMY     • COLONOSCOPY     • GALLBLADDER SURGERY       PAIN SCALE: None noted    PRECAUTIONS/CONTRAINDICATIONS: None noted    SUSPECTED ABUSE/NEGLECT/EXPLOITATION: None noted    SOCIAL/PSYCHOLOGICAL NEEDS/BARRIERS: History of depression and anxiety    PAST SOCIAL HISTORY: Lives at home    PRIOR FUNCTION: Independent    PATIENT GOALS/EXPECTATIONS: Did not report    HISTORY: This patient is a 56-year-old white female admitted with sepsis due to pneumonia.  Patient's chest x-ray was significant for right base airspace disease.  Patient denies any prior history of dysphagia.  Denies coughing/choking episodes when eating or drinking.    CURRENT DIET LEVEL: Regular diet-thin liquids    OBJECTIVE:    TEST ADMINISTERED: Clinical dysphagia evaluation    COGNITION/SAFETY AWARENESS: Alert    BEHAVIORAL OBSERVATIONS: Cooperative    ORAL MOTOR EXAM: Generalized oral motor weakness is noted.  Patient with limited dentition    VOICE QUALITY: Within functional limits    REFLEX EXAM: Deferred    POSTURE: When he degrees upright    FEEDING/SWALLOWING FUNCTION: Assessed with full range of consistencies with thin liquids from spoon  cup and straw, purée consistencies soft and hard solids.    CLINICAL OBSERVATIONS: Oral stage is characterized by good bolus preparation and control.  Prolonged mastication with soft and hard solids.  No significant oral residue noted after the swallow.  Swallow completed with mixed consistencies with clinical signs of aspiration noted with cough present.  Swallow completed with thin liquids and isolation without clinical sign or symptom of aspiration.  Swallow completed with soft solids without clinical sign or symptom of aspiration.  Appears to have adequate laryngeal elevation per palpation.  Denies globus sensation after completion of swallow.    DYSPHAGIA CRITERIA: Risk of aspiration    FUNCTIONAL ASSESSMENT INSTRUMENT: Patient currently scored a level 5 of 7 on Functional Communication Measures for swallowing indicating a 20-39% limitation in function.      FUNCTIONAL ASSESSMENT INSTRUMENT: Patient currently scored a level 4 of 7 on Functional Communication Measures for swallowing indicating a 40-59% limitation in function.    ASSESSMENT/ PLAN OF CARE:  Pt presents with limitations, noted below, that impede her ability to swallow safely. The skills of a therapist will be required to safely and effectively implement the following treatment plan to restore maximal level of function.    PROBLEMS:  1.  Dysphagia   LTG 1: (30 days) patient will increase ability to tolerate least restrictive diet and improve functional communication measure for swallowing to a 6 of 7   STG 1a: (14 days) patient will tolerate mechanical soft diet and thin liquids without clinical sign or symptom of aspiration with 8 of 10 trials.   STG 1b: (14 days) patient will utilize compensatory swallow strategies independently.   STG 1c: (14 days) patient/family teaching regarding diet recommendations, safe swallow strategies and signs and symptoms of aspiration.   STG 1d: (14 days) patient tolerate therapeutic trials of regular solids without  clinical sign or symptom of aspiration with 8 of 10 trials.   TREATMENT: Dysphagia therapy to ensure diet tolerance, advance to least restrictive diet and analyze for aspiration risk.    FREQUENCY/DURATION: Twice daily 5 times a week    REHAB POTENTIAL:  Pt has good rehab potential.  The following limitations may influence improvement/ length of tx medical status.    RECOMMENDATIONS:   1.   DIET: Mechanical soft/whole foods diet-thin liquids    2.  POSITION: 90 degrees upright for all intake    3.  COMPENSATORY STRATEGIES: Slow rate, small bites/drinks, oral meds in applesauce crushed if needed, caution with mixed consistencies.    Pt/responsible party agrees with plan of care and has been informed of all alternatives, risks and benefits.    EDUCATION  The patient has been educated in the following areas:   Dysphagia (Swallowing Impairment) Modified Diet Instruction.     Concha Warren, SLP

## 2022-04-05 NOTE — CONSULTS
Nutrition Services    Patient Name: Lluvia Archer  YOB: 1965  MRN: [id number]  Admission date: 4/4/2022      CLINICAL NUTRITION ASSESSMENT      Reason for Assessment  Identified at risk by screening criteria, MST score 2+, Difficulty chewing/swallowing, Reduced oral intake   H&P:    Past Medical History:   Diagnosis Date   • Abnormal mammogram    • Anxiety    • Arthritis     R SHOULDER, R HIP, AND R LEG   • Chronic nausea 01/15/2019   • COPD (chronic obstructive pulmonary disease) ([facility])    • Hernia, hiatal    • Hyperlipidemia    • Hypertension    • Knee pain, right 08/30/2018   • Memory loss     FORGETFULNESS   • Migraine headache    • Moderate episode of recurrent major depressive disorder ([facility]) 05/22/2017   • Night sweats    • Numbness 08/18/2017    WILL CHECK AN X- RAY OF HER C-SPINE    • Sciatica of right side 08/18/2017   • Severe episode of recurrent major depressive disorder, without psychotic features ([facility]) 10/22/2019   • Shortness of breath    • Shoulder pain, left 08/30/2018   • Sinus trouble    • Sleep apnea, unspecified    • Tobacco abuse 03/14/2017   • Tobacco abuse counseling 03/14/2017    SHE DESIRES TO QUIT. SHE HAS TRIED TO CHANTIX IN THE PAST W/O SUCCESS. SHE WAS SUCCESSFUL IN THE PAST W/O MEDICATION.        Current Problems:   Active Hospital Problems    Diagnosis    • Sepsis, due to unspecified organism, unspecified whether acute organ dysfunction present ([facility])         Nutrition/Diet History         Narrative     RD consult for MST score of 3, unsure of wt loss with decreased oral intake.  In talking with pt hasn't eaten anything over the past 7 days, just no appetite.  Denies any N/V or food security issues.  Per pt had been weighing around 206#, current wt  196#, 10# wt loss over just 1 week, a 4.9% clinically significant loss.  Per review of chart pt has lost 19# since Nov, a 8.8% change.  Pt meets ASPEN criteria for malnutrition based on intake & wt loss, but per [facility] still  "has good muscle & fast stores.  Pt has poor long-term but denies any problems chewing or swallowing, states can tolerate regular foods fine.  Hx of COPD with current dx of pneumonia.  Pt agreeable to oral nutrition supplement of Boost Plus BID to increase calories & protein until appetite improves.     Anthropometrics        Current Height, Weight Height: 162.6 cm (64\")  Weight: 89 kg (196 lb 3.4 oz)   Current BMI Body mass index is 33.68 kg/m².  Class I Obesity       Weight Hx  Wt Readings from Last 30 Encounters:   04/05/22 0000 89 kg (196 lb 3.4 oz)   04/04/22 2031 89 kg (196 lb 3.4 oz)   03/11/22 1214 93.7 kg (206 lb 9.6 oz)   03/03/22 0835 93.4 kg (206 lb)   11/25/21 2110 97.9 kg (215 lb 13.3 oz)   09/07/21 1354 96.8 kg (213 lb 4.8 oz)   07/19/21 1630 99.3 kg (219 lb)   06/16/21 1353 102 kg (223 lb 12.8 oz)   05/18/21 0000 102 kg (224 lb 2 oz)   04/13/21 0000 103 kg (227 lb 6 oz)   01/30/20 1412 102 kg (225 lb)   01/15/20 0000 100 kg (221 lb 4 oz)   12/16/19 0000 101 kg (222 lb 4 oz)   11/21/19 0000 101 kg (223 lb 6 oz)   10/22/19 0000 103 kg (228 lb 2 oz)   05/28/19 0000 107 kg (236 lb)   04/29/19 0000 106 kg (234 lb)   02/28/19 0000 107 kg (235 lb)   01/15/19 0000 110 kg (241 lb 8 oz)   08/30/18 0000 112 kg (246 lb)   06/29/18 0000 113 kg (249 lb)   03/08/18 0000 112 kg (246 lb)            Wt Change Observation 10# wt loss over past week, ~4.9% change     Estimated/Assessed Needs       Energy Requirements    EST Needs (kcal/day) 8000-0193 (25-35 kcal)       Protein Requirements    EST Daily Needs (g/day)  (1-1.5)       Fluid Requirements     Estimated Needs (mL/day) 2225     Labs/Medications         Pertinent Labs Reviewed.   Results from last 7 days   Lab Units 04/05/22  0528 04/04/22 2210 04/04/22 2036   SODIUM mmol/L 136  --  136   SODIUM, ARTERIAL mmol/L  --  133.0*  --    POTASSIUM mmol/L 3.8  --  3.7   CHLORIDE mmol/L 101  --  95*   CO2 mmol/L 21.4*  --  25.3   BUN mg/dL 22*  --  17   CREATININE " mg/dL 0.83  --  0.98   CALCIUM mg/dL 8.5*  --  9.2   BILIRUBIN mg/dL 0.5  --  0.5   ALK PHOS U/L 178*  --  212*   ALT (SGPT) U/L 84*  --  89*   AST (SGOT) U/L 250*  --  274*   GLUCOSE mg/dL 187*  --  122*   GLUCOSE, ARTERIAL mmol/L  --  116*  --      Results from last 7 days   Lab Units 04/05/22  0528   MAGNESIUM mg/dL 2.2   HEMOGLOBIN g/dL 10.2*   HEMATOCRIT % 30.3*       Pertinent Medications Reviewed.     Current Nutrition Orders & Evaluation of Intake       Oral Nutrition     Current PO Diet Diet Regular   Supplement No active supplement orders       Nutrition Diagnosis         Nutrition Dx Problem 1 Inadequate oral Intake related to pneumonia as evidenced by decreased appetite., unintended wt change. and patient report.     Nutrition Intervention         Continue regular diet  Add Boost Plus BID     Medical Nutrition Therapy/Nutrition Education          Learner     Readiness Patient  Acceptance     Method     Response Explanation  Verbalizes understanding     Monitor/Evaluation        Monitor PO intake, Supplement intake, Pertinent labs, Weight     Nutrition Discharge Plan         To be determined     Electronically signed by:  Garima Flores RD  04/05/22 09:00 EDT

## 2022-04-05 NOTE — THERAPY EVALUATION
Patient Name: Lluvia Archer  : 1965    MRN: [id number]                              Today's Date: 2022       Admit Date: 2022    Visit Dx:     ICD-10-CM ICD-9-CM   1. Sepsis, due to unspecified organism, unspecified whether acute organ dysfunction present (Formerly [facility])  A41.9 038.9     995.91   2. Pneumonia of right lower lobe due to infectious organism  J18.9 486   3. Acute respiratory failure with hypoxia (Formerly [facility])  J96.01 518.81   4. Elevated troponin  R77.8 790.6   5. Leukocytosis, unspecified type  D72.829 288.60   6. Dysphagia, oropharyngeal  R13.12 787.22   7. Decreased activities of daily living (ADL)  Z78.9 V49.89     Patient Active Problem List   Diagnosis   • Abnormal mammogram   • Anxiety   • Arthritis   • Chronic nausea   • Chronic obstructive pulmonary disease (COPD) (Formerly [facility])   • Counseling on substance use and abuse   • Diabetes mellitus, type II ([facility]ly [facility])   • Esophageal reflux   • Hernia, hiatal   • Hyperlipidemia   • Hypertension   • Knee pain, right   • Memory loss   • Migraine   • Moderate episode of recurrent major depressive disorder ([facility])   • Night sweats   • Numbness   • Sciatica of right side   • Severe episode of recurrent major depressive disorder, without psychotic features ([facility]ly [facility])   • Shoulder pain, left   • Other diseases of nasal cavity and sinuses   • Sleep apnea, unspecified   • Tobacco abuse   • Strain of groin, right, subsequent encounter   • Osteoarthritis of spine with radiculopathy, lumbar region   • Chronic right-sided low back pain with right-sided sciatica   • Medication management   • Primary osteoarthritis of right hip   • Right hip pain   • Sepsis, due to unspecified organism, unspecified whether acute organ dysfunction present ([facility])   • Severe malnutrition (CMS/[facility]ly [facility])     Past Medical History:   Diagnosis Date   • Abnormal mammogram    • Anxiety    • Arthritis     R SHOULDER, R HIP, AND R LEG   • Chronic nausea 01/15/2019   • COPD (chronic obstructive pulmonary disease) (Formerly [facility])     • Hernia, hiatal    • Hyperlipidemia    • Hypertension    • Knee pain, right 2018   • Memory loss     FORGETFULNESS   • Migraine headache    • Moderate episode of recurrent major depressive disorder (HCC) 2017   • Night sweats    • Numbness 2017    WILL CHECK AN X- RAY OF HER C-SPINE    • Sciatica of right side 2017   • Severe episode of recurrent major depressive disorder, without psychotic features (HCC) 10/22/2019   • Shortness of breath    • Shoulder pain, left 2018   • Sinus trouble    • Sleep apnea, unspecified    • Tobacco abuse 2017   • Tobacco abuse counseling 2017    SHE DESIRES TO QUIT. SHE HAS TRIED TO CHANTIX IN THE PAST W/O SUCCESS. SHE WAS SUCCESSFUL IN THE PAST W/O MEDICATION.     Past Surgical History:   Procedure Laterality Date   •  SECTION     • CHOLECYSTECTOMY     • COLONOSCOPY     • GALLBLADDER SURGERY        General Information     [facility] Name 22 1549          OT Time and Intention    Document Type evaluation  -     Mode of Treatment individual therapy;occupational therapy  -[facility] Name 22 1549          General Information    Patient Profile Reviewed yes  -     Prior Level of Function --  Independent with ADLs, ambulated without a device, has a step over tub where she stands to shower, standard commode, stands to groom, drives, and previously wore O2 but no longer does.  -     Existing Precautions/Restrictions fall  -     Barriers to Rehab none identified  -     Row Name 22 1549          Occupational Profile    Reason for Services/Referral (Occupational Profile) Occupational therapy consulted due to recent decline in ADLs/functional transfers. No previous occupational therapy services for current condition.  -     Row Name 22 1549          Living Environment    People in Home spouse  -     Row Name 22 1549          Home Main Entrance    Number of Stairs, Main Entrance none  -[facility] Name  04/05/22 1549          Stairs Within Home, Primary    Number of Stairs, Within Home, Primary none  -[facility] Name 04/05/22 1549          Cognition    Orientation Status (Cognition) oriented x 3  -[facility] Name 04/05/22 1549          Safety Issues, Functional Mobility    Impairments Affecting Function (Mobility) balance;endurance/activity tolerance  -     Comment, Safety Issues/Impairments (Mobility) Therapeutic exercises to address endurance.  -           User Key  (r) = Recorded By, (t) = Taken By, (c) = Cosigned By    Initials Name Provider Type     Anca Bergeron OT Occupational Therapist                 Mobility/ADL's     Row Name 04/05/22 1551          Bed Mobility    Bed Mobility supine-sit  -     Supine-Sit [place] (Bed Mobility) supervision  -[facility] Name 04/05/22 1551          Transfers    Transfers sit-stand transfer  -     Sit-Stand [place] (Transfers) standby assist;supervision  -[facility] Name 04/05/22 1551          Sit-Stand Transfer    Assistive Device (Sit-Stand Transfers) other (see comments)  handheld assist  -[facility] Name 04/05/22 1551          Activities of Daily Living    BADL Assessment/Intervention bathing;upper body dressing;lower body dressing;grooming;feeding;toileting  -[facility] Name 04/05/22 1551          Bathing Assessment/Intervention    [place] Level (Bathing) bathing skills;upper body;set up;lower body;standby assist;supervision  -[facility] Name 04/05/22 1551          Upper Body Dressing Assessment/Training    [place] Level (Upper Body Dressing) upper body dressing skills;set up  -[facility] Name 04/05/22 1551          Lower Body Dressing Assessment/Training    [place] Level (Lower Body Dressing) lower body dressing skills;standby assist;supervision  -[facility] Name 04/05/22 1551          Grooming Assessment/Training    [place] Level (Grooming) grooming skills;set up  -[facility] Name 04/05/22 1551          Self-Feeding  Assessment/Training    [place] Level (Feeding) feeding skills;set up  -LF     Row Name 04/05/22 1551          Toileting Assessment/Training    [place] Level (Toileting) toileting skills;standby assist;supervision  -LF           User Key  (r) = Recorded By, (t) = Taken By, (c) = Cosigned By    Initials Name Provider Type     Anca Bergeron OT Occupational Therapist               Obj/Interventions     Row Name 04/05/22 1553          Sensory Assessment (Somatosensory)    Sensory Assessment (Somatosensory) UE sensation intact  -LF     Row Name 04/05/22 1553          Vision Assessment/Intervention    Visual Impairment/Limitations WFL  -LF     Row Name 04/05/22 1553          Range of Motion Comprehensive    General Range of Motion bilateral upper extremity ROM WFL  -LF     Row Name 04/05/22 1553          Strength Comprehensive (MMT)    Comment, General Manual Muscle Testing (MMT) Assessment 4/5 bilateral upper extremity  -     Row Name 04/05/22 1553          Motor Skills    Motor Skills coordination;functional endurance  -LF     Coordination WFL  -LF     Functional Endurance Fair-  -LF     Row Name 04/05/22 1553          Balance    Balance Assessment sitting dynamic balance;standing dynamic balance  -LF     Dynamic Sitting Balance independent  -LF     Position, Sitting Balance unsupported;sitting edge of bed  -LF     Dynamic Standing Balance standby assist;supervision  -LF     Position/Device Used, Standing Balance supported;other (see comments)  handheld  -LF           User Key  (r) = Recorded By, (t) = Taken By, (c) = Cosigned By    Initials Name Provider Type    LF Anca Bergeron OT Occupational Therapist               Goals/Plan     Row Name 04/05/22 1554          Bed Mobility Goal 1 (OT)    Activity/Assistive Device (Bed Mobility Goal 1, OT) bed mobility activities, all  -LF     [place] Level/Cues Needed (Bed Mobility Goal 1, OT) modified independence  -LF     Time Frame (Bed Mobility Goal 1,  OT) long term goal (LTG);10 days  -LF     Row Name 04/05/22 1554          Transfer Goal 1 (OT)    Activity/Assistive Device (Transfer Goal 1, OT) transfers, all;walker, rolling  -LF     [place] Level/Cues Needed (Transfer Goal 1, OT) modified independence  -LF     Time Frame (Transfer Goal 1, OT) long term goal (LTG);10 days  -LF     Row Name 04/05/22 1554          Bathing Goal 1 (OT)    Activity/Device (Bathing Goal 1, OT) bathing skills, all  -LF     [place] Level/Cues Needed (Bathing Goal 1, OT) modified independence  -LF     Time Frame (Bathing Goal 1, OT) long term goal (LTG);10 days  -LF     Row Name 04/05/22 1554          Dressing Goal 1 (OT)    Activity/Device (Dressing Goal 1, OT) dressing skills, all  -LF     [place]/Cues Needed (Dressing Goal 1, OT) modified independence  -LF     Time Frame (Dressing Goal 1, OT) long term goal (LTG);10 days  -LF     Row Name 04/05/22 1554          Toileting Goal 1 (OT)    Activity/Device (Toileting Goal 1, OT) toileting skills, all  -LF     [place] Level/Cues Needed (Toileting Goal 1, OT) modified independence  -LF     Time Frame (Toileting Goal 1, OT) long term goal (LTG);10 days  -LF     [facility] Name 04/05/22 1554          Therapy Assessment/Plan (OT)    Planned Therapy Interventions (OT) activity tolerance training;patient/caregiver education/training;BADL retraining;functional balance retraining;occupation/activity based interventions;transfer/mobility retraining  -           User Key  (r) = Recorded By, (t) = Taken By, (c) = Cosigned By    Initials Name Provider Type    LF Anca Bergeron OT Occupational Therapist               Clinical Impression     Row Name 04/05/22 1553          Pain Assessment    Additional Documentation Pain Scale: FACES Pre/Post-Treatment (Group)  -     Row Name 04/05/22 1553          Pain Scale: FACES Pre/Post-Treatment    Pain: FACES Scale, Pretreatment 0-->no hurt  -LF     Posttreatment Pain Rating 0-->no hurt  -LF      Row Name 04/05/22 5841          Plan of Care Review    Plan of Care Reviewed With patient  -     Progress no change  -     Outcome Evaluation Patient presents with limitations in self-care, functional transfers, balance, and endurance. She would benefit from continued skilled occupational therapy services to maximize ADL performance and return home safely and independently.  -     Row Name 04/05/22 1648          Therapy Assessment/Plan (OT)    Patient/Family Therapy Goal Statement (OT) To maximize independence.  -     Rehab Potential (OT) good, to achieve stated therapy goals  -     Criteria for Skilled Therapeutic Interventions Met (OT) yes;meets criteria;skilled treatment is necessary  -     Therapy Frequency (OT) 5 times/wk  -     Row Name 04/05/22 1184          Therapy Plan Review/Discharge Plan (OT)    Anticipated Discharge Disposition (OT) home with home health;home with assist  -     Row Name 04/05/22 3708          Vital Signs    O2 Delivery Pre Treatment nasal cannula  -LF     O2 Delivery Intra Treatment nasal cannula  -LF     O2 Delivery Post Treatment nasal cannula  -LF           User Key  (r) = Recorded By, (t) = Taken By, (c) = Cosigned By    Initials Name Provider Type    LF Anca Bergeron, OT Occupational Therapist               Outcome Measures     Row Name 04/05/22 2111          How much help from another is currently needed...    Putting on and taking off regular lower body clothing? 3  -LF     Bathing (including washing, rinsing, and drying) 3  -LF     Toileting (which includes using toilet bed pan or urinal) 3  -LF     Putting on and taking off regular upper body clothing 4  -LF     Taking care of personal grooming (such as brushing teeth) 4  -LF     Eating meals 4  -LF     AM-PAC 6 Clicks Score (OT) 21  -LF     Row Name 04/05/22 1354          How much help from another person do you currently need...    Turning from your back to your side while in flat bed without using  bedrails? 4  -EB     Moving from lying on back to sitting on the side of a flat bed without bedrails? 4  -EB     Moving to and from a bed to a chair (including a wheelchair)? 3  -EB     Standing up from a chair using your arms (e.g., wheelchair, bedside chair)? 3  -EB     Climbing 3-5 steps with a railing? 2  -EB     To walk in hospital room? 3  -EB     AM-PAC 6 Clicks Score (PT) 19  -EB     Row Name 04/05/22 1555          Functional Assessment    Outcome Measure Options AM-PAC 6 Clicks Daily Activity (OT);Optimal Instrument  -LF     Row Name 04/05/22 1555          Optimal Instrument    Optimal Instrument Optimal - 3  -LF     Bending/Stooping 2  -LF     Standing 2  -LF     Reaching 1  -LF     From the list, choose the 3 activities you would most like to be able to do without any difficulty Bending/stooping;Standing;Reaching  -LF     Total Score Optimal - 3 5  -LF           User Key  (r) = Recorded By, (t) = Taken By, (c) = Cosigned By    Initials Name Provider Type     Anca Bergeron, OT Occupational Therapist    Audra Curry, RN Registered Nurse                Occupational Therapy Education                 Title: PT OT SLP Therapies (In Progress)     Topic: Occupational Therapy (In Progress)     Point: ADL training (Done)     Description:   Instruct learner(s) on proper safety adaptation and remediation techniques during self care or transfers.   Instruct in proper use of assistive devices.              Learning Progress Summary           Patient Acceptance, E,TB, VU by  at 4/5/2022 1555                   Point: Home exercise program (Not Started)     Description:   Instruct learner(s) on appropriate technique for monitoring, assisting and/or progressing therapeutic exercises/activities.              Learner Progress:  Not documented in this visit.          Point: Precautions (Done)     Description:   Instruct learner(s) on prescribed precautions during self-care and functional transfers.               Learning Progress Summary           Patient Acceptance, E,TB, VU by  at 4/5/2022 1555                   Point: Body mechanics (Done)     Description:   Instruct learner(s) on proper positioning and spine alignment during self-care, functional mobility activities and/or exercises.              Learning Progress Summary           Patient Acceptance, E,TB, VU by  at 4/5/2022 1555                               User Key     Initials Effective Dates Name Provider Type Discipline     06/16/21 -  Anca Bergeron OT Occupational Therapist OT              OT Recommendation and Plan  Planned Therapy Interventions (OT): activity tolerance training, patient/caregiver education/training, BADL retraining, functional balance retraining, occupation/activity based interventions, transfer/mobility retraining  Therapy Frequency (OT): 5 times/wk  Plan of Care Review  Plan of Care Reviewed With: patient  Progress: no change  Outcome Evaluation: Patient presents with limitations in self-care, functional transfers, balance, and endurance. She would benefit from continued skilled occupational therapy services to maximize ADL performance and return home safely and independently.     Time Calculation:    Time Calculation- OT     Row Name 04/05/22 1556             Time Calculation- OT    OT Received On 04/05/22  -LF      OT Goal Re-Cert Due Date 04/14/22  -              Untimed Charges    OT Eval/Re-eval Minutes 34  -LF              Total Minutes    Untimed Charges Total Minutes 34  -LF       Total Minutes 34  -LF            User Key  (r) = Recorded By, (t) = Taken By, (c) = Cosigned By    Initials Name Provider Type     Anca Bergeron OT Occupational Therapist              Therapy Charges for Today     Code Description Service Date Service Provider Modifiers Qty    98281294306 HC OT EVAL LOW COMPLEXITY 3 4/5/2022 Anca Bergeron OT GO 1               Anca Bergeron OT  4/5/2022

## 2022-04-05 NOTE — ED PROVIDER NOTES
"Time: 9:32 PM EDT  Arrived by: EMS  Chief Complaint: Altered mental status    History of Present Illness:    Lluvia Archer is a 56 y.o. female who presents to the emergency department today with complaints of moderate and constant altered mental status. The patient reports that she has been feeling ill for the past week. She states that she has been short of breath and febrile with this illness. She notes that she has also had a cough; however, this is not unusual for her secondary to her COPD.    Per nursing, the patient was initially brought to the ED today due to the daughter's concerns for altered mental status. They informed EMS today that the patient \"wasn't acting right\" in that she was not taking her medication and not wearing her home oxygen. EMS was also informed that the patient had developed new bowel/bladder incontinence. Nursing adds that the patient had recently traveled to [place] approximately a week and a half ago.    The patient denies any vomiting. She has a medical history of anxiety, hypertension, and depressive disorder. She is a former smoker but denies alcohol or drug use. There are no other acute complaints at this time.       History provided by:  Patient, EMS personnel and relative  History limited by:  Mental status change   used: No    Altered Mental Status  Presenting symptoms: confusion    Severity:  Moderate  Most recent episode:  Today  Episode history:  Continuous  Duration:  2 weeks  Timing:  Constant  Progression:  Unchanged  Chronicity:  New  Context: recent illness    Associated symptoms: fever    Associated symptoms: no rash, no seizures and no vomiting        Similar Symptoms Previously: No.  Recently seen: Patient was seen for surgery prep with Dr. Gomes, orthopedics, on 3/11/2022.      Patient Care Team  Primary Care Provider: Aleksandar Edge DO    Past Medical History:     No Known Allergies  Past Medical History:   Diagnosis Date   • " Abnormal mammogram    • Anxiety    • Arthritis     R SHOULDER, R HIP, AND R LEG   • Chronic nausea 01/15/2019   • COPD (chronic obstructive pulmonary disease) ([facility])    • Hernia, hiatal    • Hyperlipidemia    • Hypertension    • Knee pain, right 2018   • Memory loss     FORGETFULNESS   • Migraine headache    • Moderate episode of recurrent major depressive disorder ([facility]) 2017   • Night sweats    • Numbness 2017    WILL CHECK AN X- RAY OF HER C-SPINE    • Sciatica of right side 2017   • Severe episode of recurrent major depressive disorder, without psychotic features ([facility]) 10/22/2019   • Shortness of breath    • Shoulder pain, left 2018   • Sinus trouble    • Sleep apnea, unspecified    • Tobacco abuse 2017   • Tobacco abuse counseling 2017    SHE DESIRES TO QUIT. SHE HAS TRIED TO CHANTIX IN THE PAST W/O SUCCESS. SHE WAS SUCCESSFUL IN THE PAST W/O MEDICATION.     Past Surgical History:   Procedure Laterality Date   •  SECTION     • CHOLECYSTECTOMY     • COLONOSCOPY     • GALLBLADDER SURGERY       Family History   Problem Relation Age of Onset   • Other Mother         BLOOD DISEASE   • Arthritis Mother    • Heart disease Father    • Hypertension Other    • Cancer Other    • Heart disease Other        Home Medications:  Prior to Admission medications    Medication Sig Start Date End Date Taking? Authorizing Provider   albuterol sulfate  (90 Base) MCG/ACT inhaler Inhale 2 puffs Every 6 (Six) Hours As Needed for Wheezing. 22   Aleksandar Edge DO   buPROPion SR (WELLBUTRIN SR) 150 MG 12 hr tablet TAKE 1 TABLET(150 MG) BY MOUTH TWICE DAILY 22   Aleksandar Edge DO   busPIRone (BUSPAR) 10 MG tablet buspirone 10 mg oral tablet take 1 tablet (10 mg) by oral route 3 times per day for 30 days   Suspended    Provider, MD Parvin   famotidine (PEPCID) 40 MG tablet TAKE 1 TABLET(40 MG) BY MOUTH TWICE DAILY 3/18/22   Aleksandar Edge DO    FLUoxetine (PROzac) 40 MG capsule TAKE 1 CAPSULE(40 MG) BY MOUTH EVERY DAY 11/1/21   Aleksandar Edge DO   gabapentin (NEURONTIN) 300 MG capsule Take 1 capsule by mouth 3 (Three) Times a Day. 3/3/22   Alok Rey APRN   lisinopril (PRINIVIL,ZESTRIL) 5 MG tablet Take 1 tablet by mouth Daily. 2/21/22   Aleksandar Edge DO   Melatonin 10 MG tablet melatonin 10 mg oral tablet take 1 tablet by oral route daily   Active    Provider, MD Parvin   oxyCODONE-acetaminophen (PERCOCET) 5-325 MG per tablet Take 1 tablet by mouth Every 6 (Six) Hours As Needed for Severe Pain . 11/26/21   Ke Reed DO   predniSONE (DELTASONE) 10 MG tablet 4 tabs daily for 3 days, 3 tabs daily for 3 days, 2 tabs daily for 3 days, 1 tab daily for 3 days, then stop. 9/7/21   Aleksandar Edge DO   predniSONE (DELTASONE) 50 MG tablet Take 1 tablet by mouth Daily. 11/26/21   Ke Reed DO   traMADol (ULTRAM) 50 MG tablet  3/9/22   Provider, MD Ariel Melendrezlegy Ellipta 200-62.5-25 MCG/INH inhaler INHALE 1 PUFF BY MOUTH AT THE SAME TIME EVERY DAY 2/28/22   Aleksandar Edge DO        Social History:   Social History     Tobacco Use   • Smoking status: Former Smoker     Packs/day: 1.00   • Smokeless tobacco: Never Used   Vaping Use   • Vaping Use: Never used   Substance Use Topics   • Alcohol use: Never   • Drug use: Never       Record Review:  I have reviewed the patient's records in [facility].     Review of Systems:  Review of Systems   Constitutional: Positive for fever. Negative for chills.   HENT: Negative for nosebleeds.    Eyes: Negative for redness.   Respiratory: Positive for cough and shortness of breath.    Cardiovascular: Negative for chest pain.   Gastrointestinal: Negative for diarrhea and vomiting.        Bowel incontinence.   Genitourinary: Negative for frequency.        Urinary incontinence.   Musculoskeletal: Negative for back pain and neck pain.   Skin: Negative for rash.  "  Neurological: Negative for seizures and syncope.   Psychiatric/Behavioral: Positive for confusion.   All other systems reviewed and are negative.       Physical Exam:  /74 (BP Location: Left arm, Patient Position: Lying)   Pulse 86   Temp 97.3 °F (36.3 °C) (Oral)   Resp 18   Ht 162.6 cm (64\")   Wt 89 kg (196 lb 3.4 oz)   LMP  (LMP Unknown)   SpO2 97%   BMI 33.68 kg/m²     Physical Exam  Vitals and nursing note reviewed.   Constitutional:       General: She is not in acute distress.  HENT:      Head: Normocephalic and atraumatic.      Nose: Nose normal.      Mouth/Throat:      Mouth: Mucous membranes are moist.   Eyes:      General: No scleral icterus.  Cardiovascular:      Rate and Rhythm: Regular rhythm. Tachycardia present.      Heart sounds: Normal heart sounds. No murmur heard.  Pulmonary:      Effort: No respiratory distress.      Breath sounds: Wheezing (bilateral) present.      Comments: Decreased air entry on the right.  Abdominal:      Palpations: Abdomen is soft.      Tenderness: There is no abdominal tenderness.   Musculoskeletal:         General: No tenderness. Normal range of motion.      Cervical back: Normal range of motion and neck supple.      Right lower leg: No edema.      Left lower leg: No edema.   Skin:     General: Skin is warm and dry.   Neurological:      Mental Status: She is alert. She is confused.                Medications in the Emergency Department:  Medications   sodium chloride 0.9 % flush 10 mL (has no administration in time range)   sodium chloride 0.9 % flush 10 mL (has no administration in time range)   sodium chloride 0.9 % flush 10 mL (10 mL Intravenous Given 4/5/22 0017)   enoxaparin (LOVENOX) syringe 40 mg (has no administration in time range)   sodium chloride 0.9 % with KCl 20 mEq/L infusion (125 mL/hr Intravenous New Bag 4/5/22 0017)   acetaminophen (TYLENOL) tablet 650 mg (has no administration in time range)   ondansetron (ZOFRAN) tablet 4 mg (has no " administration in time range)     Or   ondansetron (ZOFRAN) injection 4 mg (has no administration in time range)   nystatin (MYCOSTATIN) powder (1 application Topical Given 4/5/22 0334)   Pharmacy to Dose Cefepime (has no administration in time range)   Pharmacy to dose vancomycin (has no administration in time range)   albuterol (PROVENTIL) nebulizer solution 0.083% 2.5 mg/3mL (has no administration in time range)   buPROPion SR (WELLBUTRIN SR) 12 hr tablet 150 mg (has no administration in time range)   busPIRone (BUSPAR) tablet 10 mg (10 mg Oral Given 4/5/22 0609)   famotidine (PEPCID) tablet 40 mg (has no administration in time range)   FLUoxetine (PROzac) capsule 40 mg (has no administration in time range)   gabapentin (NEURONTIN) capsule 300 mg (has no administration in time range)   lisinopril (PRINIVIL,ZESTRIL) tablet 5 mg (has no administration in time range)   oxyCODONE-acetaminophen (PERCOCET) 5-325 MG per tablet 1 tablet (has no administration in time range)   ipratropium-albuterol (DUO-NEB) nebulizer solution 3 mL (has no administration in time range)   cefepime (MAXIPIME) IVPB 2 g (premix) in D5 (2 g Intravenous New Bag 4/5/22 0607)   arformoterol (BROVANA) nebulizer solution 15 mcg (has no administration in time range)   budesonide (PULMICORT) nebulizer solution 0.5 mg (has no administration in time range)   vancomycin 1000 mg/250 mL 0.9% NS IVPB ([facility]) (has no administration in time range)   lactated ringers - IBW for BMI > 30 bolus 1,641 mL (1,641 mL Intravenous New Bag 4/4/22 2144)   cefepime (MAXIPIME) IVPB 2 g (premix) in D5 (0 g Intravenous Stopped 4/5/22 0015)   vancomycin 1750 mg/500 mL 0.9% NS IVPB ([facility]) (0 mg/kg × 89 kg Intravenous Stopped 4/5/22 0015)   acetaminophen (TYLENOL) tablet 975 mg (975 mg Oral Given 4/4/22 2144)   ipratropium-albuterol (DUO-NEB) nebulizer solution 3 mL (3 mL Nebulization Given 4/4/22 2151)   methylPREDNISolone sodium succinate (SOLU-Medrol) injection 125 mg (125 mg  Intravenous Given 4/4/22 2144)        Labs  Lab Results (last 24 hours)     Procedure Component Value Units Date/Time    CBC & Differential [[id number]]  (Abnormal) Collected: 04/04/22 2036    Specimen: Blood Updated: 04/04/22 2109    Narrative:      The following orders were created for panel order CBC & Differential.  Procedure                               Abnormality         Status                     ---------                               -----------         ------                     CBC Auto Differential[[id number]]        Abnormal            Final result               Scan Slide[[id number]]                                                                    Please view results for these tests on the individual orders.    Comprehensive Metabolic Panel [[id number]]  (Abnormal) Collected: 04/04/22 2036    Specimen: Blood Updated: 04/04/22 2113     Glucose 122 mg/dL      BUN 17 mg/dL      Creatinine 0.98 mg/dL      Sodium 136 mmol/L      Potassium 3.7 mmol/L      Comment: Slight hemolysis detected by analyzer. Results may be affected.        Chloride 95 mmol/L      CO2 25.3 mmol/L      Calcium 9.2 mg/dL      Total Protein 7.8 g/dL      Albumin 2.90 g/dL      ALT (SGPT) 89 U/L      AST (SGOT) 274 U/L      Comment: Slight hemolysis detected by analyzer. Results may be affected.        Alkaline Phosphatase 212 U/L      Total Bilirubin 0.5 mg/dL      Globulin 4.9 gm/dL      A/G Ratio 0.6 g/dL      BUN/Creatinine Ratio 17.3     Anion Gap 15.7 mmol/L      eGFR 68.3 mL/min/1.73      Comment: National Kidney Foundation and American Society of Nephrology (ASN) Task Force recommended calculation based on the Chronic Kidney Disease Epidemiology Collaboration (CKD-EPI) equation refit without adjustment for race.       Narrative:      GFR Normal >60  Chronic Kidney Disease <60  Kidney Failure <15      Lactic Acid, Plasma [[id number]]  (Normal) Collected: 04/04/22 2036    Specimen: Blood Updated: 04/04/22 2106     Lactate 1.5  mmol/L     Blood Culture - Blood, Arm, Left [[id number]] Collected: 04/04/22 2036    Specimen: Blood from Arm, Left Updated: 04/04/22 2042    CBC Auto Differential [[id number]]  (Abnormal) Collected: 04/04/22 2036    Specimen: Blood Updated: 04/04/22 2109     WBC 20.63 10*3/mm3      RBC 4.15 10*6/mm3      Hemoglobin 12.1 g/dL      Hematocrit 36.5 %      MCV 88.0 fL      MCH 29.2 pg      MCHC 33.2 g/dL      RDW 15.7 %      RDW-SD 50.4 fl      MPV 12.2 fL      Platelets 197 10*3/mm3      Neutrophil % 82.9 %      Lymphocyte % 7.2 %      Monocyte % 7.0 %      Eosinophil % 0.0 %      Basophil % 0.3 %      Immature Grans % 2.6 %      Neutrophils, Absolute 17.11 10*3/mm3      Lymphocytes, Absolute 1.48 10*3/mm3      Monocytes, Absolute 1.45 10*3/mm3      Eosinophils, Absolute 0.00 10*3/mm3      Basophils, Absolute 0.06 10*3/mm3      Immature Grans, Absolute 0.53 10*3/mm3      nRBC 0.0 /100 WBC     BNP [[id number]]  (Abnormal) Collected: 04/04/22 2036    Specimen: Blood Updated: 04/04/22 2107     proBNP 2,554.0 pg/mL     Narrative:      Among patients with dyspnea, NT-proBNP is highly sensitive for the detection of acute congestive heart failure. In addition NT-proBNP of <300 pg/ml effectively rules out acute congestive heart failure with 99% negative predictive value.    Results may be falsely decreased if patient taking Biotin.      Troponin [[id number]]  (Abnormal) Collected: 04/04/22 2036    Specimen: Blood Updated: 04/04/22 2125     Troponin T 0.057 ng/mL     Narrative:      Troponin T Reference Range:  <= 0.03 ng/mL-   Negative for AMI  >0.03 ng/mL-     Abnormal for myocardial necrosis.  Clinicians would have to utilize clinical acumen, EKG, Troponin and serial changes to determine if it is an Acute Myocardial Infarction or myocardial injury due to an underlying chronic condition.       Results may be falsely decreased if patient taking Biotin.      Blood Culture - Blood, Arm, Right [[id number]] Collected: 04/04/22 2042     Specimen: Blood from Arm, Right Updated: 04/04/22 2045    ABG with Co-Ox and Electrolytes [[id number]]  (Abnormal) Collected: 04/04/22 2210    Specimen: Arterial Blood from Arm, Right Updated: 04/04/22 2215     pH, Arterial 7.486 pH units      pCO2, Arterial 33.6 mm Hg      pO2, Arterial 72.9 mm Hg      HCO3, Arterial 24.8 mmol/L      Base Excess, Arterial 1.7 mmol/L      O2 Saturation, Arterial 94.8 %      Hemoglobin, Blood Gas 11.0 g/dL      Carboxyhemoglobin 0.6 %      Methemoglobin 0.10 %      Oxyhemoglobin 94.1 %      FHHB 5.2 %      Romaine's Test Positive     Site Arterial: right brachial     Modality Nasal Cannula     FIO2 32 %      Flow Rate 3 lpm      Sodium, Arterial 133.0 mmol/L      Potassium, Arterial 3.09 mmol/L      Ionized Calcium, Arterial 1.09 mmol/L      Chloride, Arterial 99 mmol/L      Glucose, Arterial 116 mmol/L      Lactate, Arterial 1.84 mmol/L      PO2/FIO2 228    Urinalysis With Culture If Indicated - Urine, Catheter In/Out [[id number]]  (Abnormal) Collected: 04/04/22 2217    Specimen: Urine, Catheter In/Out Updated: 04/04/22 2229     Color, UA Dark Yellow     Appearance, UA Clear     pH, UA 6.5     Specific Gravity, UA 1.024     Glucose, UA Negative     Ketones, UA Negative     Bilirubin, UA Negative     Blood, UA Moderate (2+)     Protein, UA >=300 mg/dL (3+)     Leuk Esterase, UA Negative     Nitrite, UA Negative     Urobilinogen, UA 1.0 E.U./dL    Urinalysis, Microscopic Only - Urine, Catheter In/Out [[id number]]  (Abnormal) Collected: 04/04/22 2217    Specimen: Urine, Catheter In/Out Updated: 04/04/22 2246     RBC, UA 0-2 /HPF      WBC, UA 3-5 /HPF      Bacteria, UA None Seen /HPF      Squamous Epithelial Cells, UA 0-2 /HPF      Hyaline Casts, UA 0-2 /LPF      Granular Casts, UA 0-2 /LPF      Methodology Manual Light Microscopy    CBC Auto Differential [[id number]]  (Abnormal) Collected: 04/05/22 0528    Specimen: Blood Updated: 04/05/22 0549     WBC 17.11 10*3/mm3      RBC 3.41 10*6/mm3       Hemoglobin 10.2 g/dL      Hematocrit 30.3 %      MCV 88.9 fL      MCH 29.9 pg      MCHC 33.7 g/dL      RDW 15.5 %      RDW-SD 50.2 fl      MPV 12.6 fL      Platelets 148 10*3/mm3      Neutrophil % 91.1 %      Lymphocyte % 4.7 %      Monocyte % 2.3 %      Eosinophil % 0.0 %      Basophil % 0.2 %      Immature Grans % 1.7 %      Neutrophils, Absolute 15.58 10*3/mm3      Lymphocytes, Absolute 0.80 10*3/mm3      Monocytes, Absolute 0.40 10*3/mm3      Eosinophils, Absolute 0.00 10*3/mm3      Basophils, Absolute 0.04 10*3/mm3      Immature Grans, Absolute 0.29 10*3/mm3      nRBC 0.0 /100 WBC     Comprehensive Metabolic Panel [[id number]] Collected: 04/05/22 0528    Specimen: Blood Updated: 04/05/22 0540    Lactic Acid, Plasma [[id number]]  (Normal) Collected: 04/05/22 0528    Specimen: Blood Updated: 04/05/22 0603     Lactate 1.0 mmol/L     Magnesium [[id number]] Collected: 04/05/22 0528    Specimen: Blood Updated: 04/05/22 0540    Procalcitonin [[id number]] Collected: 04/05/22 0528    Specimen: Blood Updated: 04/05/22 0540    Troponin [[id number]] Collected: 04/05/22 0528    Specimen: Blood Updated: 04/05/22 0540           Imaging:  XR Chest 1 View    Result Date: 4/4/2022  PROCEDURE: XR CHEST 1 VW  COMPARISON: [facility], CR, CHEST PA/AP & LAT 2V, 1/08/2020, 15:37.  INDICATIONS: SHORTNESS OF BREATH  FINDINGS:  Heart size within normal limits.  There is patchy airspace disease at the right lung base concerning for pneumonia.  There is scarring at the left lung base which appears similar to the prior study.  No pneumothorax.  No significant pleural effusion.  Osseous structures grossly intact.        Right basilar airspace disease most concerning for pneumonia.       MAGALIS VILLEGAS MD       Electronically Signed and Approved By: MAGALIS VILLEGAS MD on 4/04/2022 at 21:13               Procedures:  Procedures     EKG:    Rhythm: Sinus tachycardia.  Rate: 120.  Normal P waves.  Normal QRS.  Non-specific ST  changes.  Normal QT.    EKG Comparison: N/A.    Interpreted by me.    Progress                            Medical Decision Making:  MDM     Sepsis criteria was met in the emergency department and the Sepsis protocol (including antibiotic administration) was initiated.      SIRS criteria considered:   1.  Temperature > 100.4 or <98.6    2.  Heart Rate > 90    3.  Respiratory Rate > 22    4.  WBC > 12K or <4K.             Severe Sepsis:     Respiratory: Mechanical Ventilation or Bipap  Hypotension: SBP > 90 or MAP < 65  Renal: Creatinine > 2  Metabolic: Lactic Acid > 2  Hematologic: Platelets < 100K or INR > 1.5  Hepatic: BILI  >  2  CNS: Sudden AMS     Septic Shock:     Severe Sepsis + Persistent hypotension or Lactic Acid > 4     Normal saline bolus, antibiotics, and final disposition was based on these definitions.        Sepsis was recognized at 2133.    Antibiotics were ordered.     30 cc/kg bolus was positively indicated.     Total Critical Care time of 33 minutes. Total critical care time documented does not include time spent on separately billed procedures for services of nurses or physician assistants. I personally saw and examined the patient. I have reviewed all diagnostic interpretations and treatment plans as written. I was present for the key portions of any procedures performed and the inclusive time noted in any critical care statement. Critical care time includes patient management by me, time spent at the patients bedside,  time to review lab and imaging results, discussing patient care, documentation in the medical record, and time spent with family or caregiver.     Final diagnoses:   Sepsis, due to unspecified organism, unspecified whether acute organ dysfunction present (HCC)   Pneumonia of right lower lobe due to infectious organism   Acute respiratory failure with hypoxia (HCC)   Elevated troponin   Leukocytosis, unspecified type        Disposition:  ED Disposition     ED Disposition    Decision to Admit    Condition   --    Comment   Level of Care: Med/Surg [1]   Diagnosis: Sepsis, due to unspecified organism, unspecified whether acute organ dysfunction present ([facility]) [[id number]]   Admitting Physician: RONNIE PAREKH [[id number]]   Attending Physician: RONNIE PAREKH [[id number]]   Certification: I Certify That Inpatient Hospital Services Are Medically Necessary For Greater Than 2 Midnights               Dictated Utilizing Dragon Dictation    Documentation assistance provided by Mariola Reinoso acting as scribe for Abram Contreras MD. Information recorded by the scribe was done at my direction and has been verified and validated by me.      Mariola Reinoso  04/04/22 2137       Mariola Reinoso  04/04/22 2141       Mariola Reinoso  04/04/22 2144       Abram Contreras MD  04/05/22 0612

## 2022-04-05 NOTE — PLAN OF CARE
Goal Outcome Evaluation:  Plan of Care Reviewed With: patient        Progress: no change  Outcome Evaluation: Patient presents with limitations in self-care, functional transfers, balance, and endurance. She would benefit from continued skilled occupational therapy services to maximize ADL performance and return home safely and independently.

## 2022-04-05 NOTE — PROGRESS NOTES
" [facility]   Hospitalist Progress Note  Date: 2022  Patient Name: Lluvia Archer  : 1965  MRN: [id number]  Date of admission: 2022      Subjective   Subjective     Chief Complaint: short of breath     Summary:   56 y.o. female former smoker with COPD on home O2, HLD, HTN, depression who presents with shortness of breath and altered mental status.  The patient reports that she has been feeling ill for the past week. She states that she has been short of breath and febrile with this illness. She notes that she has also had a cough; however, this is not unusual for her secondary to her COPD.  Patient's daughter is at bedside and states that she has been sleeping for the past week.  Patient also states that she has extreme anxiety.  Patient states that she has history of tobacco abuse but has not smoked in quite some time. Per nursing, the patient was initially brought to the ED today due to the daughter's concerns for altered mental status. They informed EMS today that the patient \"wasn't acting right\" in that she was not taking her medication and not wearing her home oxygen. EMS was also informed that the patient had developed new bowel/bladder incontinence however patient states she is depressed and just does not want to get up to go.      In the ED:  Febrile to 101, tachycardic to 122, tachypneic to 26, blood pressure okay, desat to 86 on room air then up into the 90s with 3 L nasal cannula.     Troponin: 0.057  BNP 2554     CMP: Largely unremarkable except for:  -Elevated LFTs  -Albumin 2.90     CBC:  -WBC: 20.6     U tox: Unremarkable     CXR:  Right basilar airspace disease most concerning for pneumonia.       Interval Followup:   Patient's daughter is at bedside.  Patient continues to have confusion.  Patient denies any respiratory distress or cough.  Patient does endorse anxiety and states that she will need something to help her relax to go in the scanner     ROS:   Limited due to " confusion however patient denies any shortness of breath cough.  She does endorse anxiety and depression.  She does endorse feeling more lethargic and febrile and sleeping.      Review of SystemsObjective   Objective     Vitals:   Temp:  [97.3 °F (36.3 °C)-101 °F (38.3 °C)] 97.3 °F (36.3 °C)  Heart Rate:  [] 88  Resp:  [18-26] 18  BP: (103-147)/() 107/74  Flow (L/min):  [2-3] 2  Physical Exam    Constitutional: Awake, alert, no acute distress resting in bed    Eyes: Pupils equal, sclerae anicteric, no conjunctival injection   HENT: NCAT, mucous membranes moist   Neck: Supple,   Respiratory: Clear to auscultation bilaterally, nonlabored respirations  No w/r/r    Cardiovascular: RRR, no murmurs,    Gastrointestinal: Positive bowel sounds, soft, nontender, nondistended   Musculoskeletal: No bilateral ankle edema, no clubbing or cyanosis to extremities   Psychiatric: Appropriate affect, cooperative   Neurologic: Oriented to person and place  strength symmetric in all extremities, Cranial Nerves grossly intact to confrontation, speech clear   Skin: No rashes     Result Review    Result Review:  I have personally reviewed the results from the time of this admission to 4/5/2022 08:24 EDT and agree with these findings:  [x]  Laboratory   CBC    CBC 4/4/22 4/5/22   WBC 20.63 (A) 17.11 (A)   RBC 4.15 3.41 (A)   Hemoglobin 12.1 10.2 (A)   Hematocrit 36.5 30.3 (A)   MCV 88.0 88.9   MCH 29.2 29.9   MCHC 33.2 33.7   RDW 15.7 (A) 15.5 (A)   Platelets 197 148   (A) Abnormal value            BMP    BMP 4/4/22 4/4/22 4/5/22    2036 2210    BUN 17  22 (A)   Creatinine 0.98  0.83   Sodium 136 133.0 (A) 136   Potassium 3.7  3.8   Chloride 95 (A)  101   CO2 25.3  21.4 (A)   Calcium 9.2  8.5 (A)   (A) Abnormal value       Comments are available for some flowsheets but are not being displayed.             [x]  Microbiology   No results found for: ACANTHNAEG, AFBCX, BPERTUSSISCX, BLOODCX  No results found for: BCIDPCR, CXREFLEX,  CSFCX, CULTURETIS  No results found for: CULTURES, HSVCX, URCX  No results found for: EYECULTURE, GCCX, HSVCULTURE, LABHSV  No results found for: LEGIONELLA, MRSACX, MUMPSCX, MYCOPLASCX  No results found for: NOCARDIACX, STOOLCX  No results found for: THROATCX, UNSTIMCULT, URINECX, CULTURE, VZVCULTUR  No results found for: VIRALCULTU, WOUNDCX    [x]  Radiology   XR Chest 1 View    Result Date: 4/4/2022  PROCEDURE: XR CHEST 1 VW  COMPARISON: [facility], CR, CHEST PA/AP & LAT 2V, 1/08/2020, 15:37.  INDICATIONS: SHORTNESS OF BREATH  FINDINGS:  Heart size within normal limits.  There is patchy airspace disease at the right lung base concerning for pneumonia.  There is scarring at the left lung base which appears similar to the prior study.  No pneumothorax.  No significant pleural effusion.  Osseous structures grossly intact.      Impression:   Right basilar airspace disease most concerning for pneumonia.       MAGALIS VILLEGAS MD       Electronically Signed and Approved By: MAGALIS VILLEGAS MD on 4/04/2022 at 21:13               []  EKG/Telemetry   []  Cardiology/Vascular   []  Pathology  []  Old records  []  Other:    Assessment/Plan   Assessment / Plan     Assessment/Plan:  Sepsis secondary to community-acquired pneumonia  Chronic hypoxemic respiratory failure on home oxygen  Anxiety and depression  Hypertension    PLAN   continue patient on broad-spectrum antibiotics.  Cultures pending  Would like to obtain a CT of the chest to further evaluate her lung disease  Continue patient's home lisinopril  Continue patient's Wellbutrin, BuSpar Neurontin and fluoxetine for her anxiety and depression  Patient has a diagnosis of diabetes however her A1c is 5.5     Discussed plan with RN.    DVT prophylaxis:  Lovenox   Medical DVT prophylaxis orders are present.    CODE STATUS:   Code Status (Patient has no pulse and is not breathing): CPR (Attempt to Resuscitate)  Medical Interventions (Patient has pulse or is  breathing): Full Support        Electronically signed by Flako Santos DO, 04/05/22, 8:24 AM EDT.

## 2022-04-05 NOTE — PROGRESS NOTES
"PHARMACY TO DOSE VANCOMYCIN DAY: 2  DURATION OF THERAPY: 4-10-22    INDICATION: PNEUMONIA, SEPSIS  GOAL AUC: 400-600 MG/L.HR    56 y.o. female who presented to the emergency department with complaints of moderate and constant altered mental status. The patient reported that she had been feeling ill for the past week. She stated that she had been short of breath and febrile with this illness. She noted that she had also had a cough; however, this is not unusual for her secondary to her COPD. The patient was initially brought to the ED due to the daughter's concerns for altered mental status. They informed EMS today that the patient \"wasn't acting right\" in that she was not taking her medication and not wearing her home oxygen. EMS was also informed that the patient had developed new bowel/bladder incontinence. The patient had recently traveled to [place] approximately a week and a half PTA. The patient denied any vomiting. She has a medical history of anxiety, hypertension, and depressive disorder. She is a former smoker but denies alcohol or drug use.    HT: 162.6 cm (64\")      04/05/22  0000      Weight: 89 kg (196 lb 3.4 oz)        Estimated Creatinine Clearance: 82.7 mL/min (by C-G formula based on SCr of 0.83 mg/dL).  HD/CRRT/PD? NO  CONTRAST ADMINISTERED? NONE  I/O last 3 completed shifts:  In: -   Out: 300 [Urine:300]    WBC: 17.11  TMAX: 101    Microbiology Results (last 10 days)       Procedure Component Value - Date/Time    MRSA Screen, PCR (Inpatient) - Swab, Nares [[id number]]  (Normal) Collected: 04/05/22 0614    Lab Status: Final result Specimen: Swab from Nares Updated: 04/05/22 0800     MRSA PCR No MRSA Detected          04/04/22 2116  XR Chest    Right basilar airspace disease most concerning for pneumonia.     OTHER ANTIMICROBIAL THERAPY: Cefepime    ASSESSMENT / PLAN:  Vancomycin 1750 mg followed by 1000 mg q12hrs ordered. Will check a trough level at 0800 tomorrow.   Labs ordered: BMP ordered with " this level.    Recommend stopping vancomycin since the MRSA PCR is not detected.

## 2022-04-05 NOTE — PLAN OF CARE
Goal Outcome Evaluation:  Plan of Care Reviewed With: patient           Outcome Evaluation: vss no distress patient alert but states she does have problems with remembering nystatin powder obtained for excoriated area patient has been up to [facility]

## 2022-04-05 NOTE — SIGNIFICANT NOTE
Wound Eval / Progress Noted    KETTY Mcclellan     Patient Name: Lluvia Archer  : 1965  MRN: [id number]  Today's Date: 2022                 Admit Date: 2022    Visit Dx:    ICD-10-CM ICD-9-CM   1. Sepsis, due to unspecified organism, unspecified whether acute organ dysfunction present ([facility])  A41.9 038.9     995.91   2. Pneumonia of right lower lobe due to infectious organism  J18.9 486   3. Acute respiratory failure with hypoxia ([facility])  J96.01 518.81   4. Elevated troponin  R77.8 790.6   5. Leukocytosis, unspecified type  D72.829 288.60   6. Dysphagia, oropharyngeal  R13.12 787.22       Patient Active Problem List   Diagnosis    Abnormal mammogram    Anxiety    Arthritis    Chronic nausea    Chronic obstructive pulmonary disease (COPD) ([facility])    Counseling on substance use and abuse    Diabetes mellitus, type II ([facility])    Esophageal reflux    Hernia, hiatal    Hyperlipidemia    Hypertension    Knee pain, right    Memory loss    Migraine    Moderate episode of recurrent major depressive disorder ([facility])    Night sweats    Numbness    Sciatica of right side    Severe episode of recurrent major depressive disorder, without psychotic features ([facility])    Shoulder pain, left    Other diseases of nasal cavity and sinuses    Sleep apnea, unspecified    Tobacco abuse    Strain of groin, right, subsequent encounter    Osteoarthritis of spine with radiculopathy, lumbar region    Bronchitis    Chronic right-sided low back pain with right-sided sciatica    Medication management    Primary osteoarthritis of right hip    Right hip pain    Sepsis, due to unspecified organism, unspecified whether acute organ dysfunction present ([facility])    Severe malnutrition (CMS/[facility])        Past Medical History:   Diagnosis Date    Abnormal mammogram     Anxiety     Arthritis     R SHOULDER, R HIP, AND R LEG    Chronic nausea 01/15/2019    COPD (chronic obstructive pulmonary disease) ([facility])     Hernia, hiatal     Hyperlipidemia      Hypertension     Knee pain, right 2018    Memory loss     FORGETFULNESS    Migraine headache     Moderate episode of recurrent major depressive disorder (HCC) 2017    Night sweats     Numbness 2017    WILL CHECK AN X- RAY OF HER C-SPINE     Sciatica of right side 2017    Severe episode of recurrent major depressive disorder, without psychotic features (HCC) 10/22/2019    Shortness of breath     Shoulder pain, left 2018    Sinus trouble     Sleep apnea, unspecified     Tobacco abuse 2017    Tobacco abuse counseling 2017    SHE DESIRES TO QUIT. SHE HAS TRIED TO CHANTIX IN THE PAST W/O SUCCESS. SHE WAS SUCCESSFUL IN THE PAST W/O MEDICATION.        Past Surgical History:   Procedure Laterality Date     SECTION      CHOLECYSTECTOMY      COLONOSCOPY      GALLBLADDER SURGERY           Physical Assessment:  Wound 22 0140 Bilateral anterior groin MASD (Moisture associated skin damage) (Active)   Wound Image   22 0300   Dressing Appearance open to air 22 1354   Closure Open to air 22 0300   Base pink;red;moist 22 1354   Periwound pink;dry 22 1354   Periwound Temperature warm 22 1354   Periwound Skin Turgor soft 22 1354   Drainage Characteristics/Odor serosanguineous 22 1035   Drainage Amount none 22 1354   Care, Wound cleansed with;soap and water 22 1354   Dressing Care open to air 22 1354   Periwound Care topical treatment applied 22 1354       Wound 22 0142 Right lower breast MASD (Moisture associated skin damage) (Active)   Wound Image   22 0259   Dressing Appearance open to air 22 1354   Base red;pink;moist 22 1354   Periwound dry;pink 22 1354   Periwound Temperature warm 22 1354   Periwound Skin Turgor soft 22 1354   Drainage Characteristics/Odor serosanguineous 22 1035   Drainage Amount none 22 1354   Care, Wound cleansed with;soap and water  04/05/22 1354   Dressing Care open to air 04/05/22 1354   Periwound Care topical treatment applied 04/05/22 1354       Wound 04/05/22 0143 Left lower breast MASD (Moisture associated skin damage) (Active)   Wound Image   04/05/22 0300   Dressing Appearance open to air 04/05/22 1354   Base red;pink;moist 04/05/22 1354   Periwound pink;dry 04/05/22 1354   Periwound Temperature warm 04/05/22 1354   Periwound Skin Turgor soft 04/05/22 1354   Drainage Characteristics/Odor serosanguineous 04/05/22 1035   Drainage Amount none 04/05/22 1354   Care, Wound cleansed with;soap and water 04/05/22 1354   Dressing Care open to air 04/05/22 1354   Periwound Care topical treatment applied 04/05/22 1354        Wound Check / Follow-up:  Wound consult placed for patient with multiple areas of concern for MASD to bilateral groin folds and under breasts. Cleansed all areas with soap and water using a pillow case. Patted dry. Areas are excoriated and warm, yeast present in groin folds. Applied dry pillow case to folds to maintain a dry periwound.    Right 3rd finger with traumatic wound, patient unable to report how she obtained the wound. Cleansed the wound and applied gentle friction to remove hair that was dried to wound bed. Wound is red and moist. Small amount of serosanguineous drainage during cleansing. Secured with a band aid.    Discussed with patient how to avoid excoriation at home. Discussed skin care and once getting out of the shower to dry skin thoroughly. If sitting place pillow cases to folds or under breasts to prevent moisture and skin breakdown.      Recommend quality skin care. Cleanse groin and under breasts BID and PRN with soap and water. Dry thoroughly. Apply light dusting of miconazole powder to excoriated tissue and cover with pillow cases.     Right 3rd finger cleanse daily and apply bacitracin to wound bed and secure with a Band-Aid      Impression: Redness to groin and under breasts due to moisture    Short  term goals:  improve skin integrity and wound healing    Stacie Daniel RN    4/5/2022    14:30 EDT

## 2022-04-05 NOTE — H&P
"[facility]   HISTORY AND PHYSICAL    Patient Name: Lluvia Archer  : 1965  MRN: [id number]  Primary Care Physician:  Aleksandar Edge DO  Date of admission: 2022    Subjective   Subjective     Chief Complaint: AMS/SOB    History of Present Illness  Note: Patient has limited ability to offer history and daughter was gone at the time my exam.  As such, much of the HPI comes from emergency room reports.    56 y.o. female former smoker with COPD on home O2, HLD, HTN, depression who presents with shortness of breath and altered mental status.  The patient reports that she has been feeling ill for the past week. She states that she has been short of breath and febrile with this illness. She notes that she has also had a cough; however, this is not unusual for her secondary to her COPD.     Per nursing, the patient was initially brought to the ED today due to the daughter's concerns for altered mental status. They informed EMS today that the patient \"wasn't acting right\" in that she was not taking her medication and not wearing her home oxygen. EMS was also informed that the patient had developed new bowel/bladder incontinence.      For me, patient reports fever, chills, nausea, vomiting, shortness of breath, chest pain, diarrhea.  However, it did seem like she was just saying yes to things.    In the ED:  Febrile to 101, tachycardic to 122, tachypneic to 26, blood pressure okay, desat to 86 on room air then up into the 90s with 3 L nasal cannula.    Troponin: 0.057  BNP 2554    CMP: Largely unremarkable except for:  -Elevated LFTs  -Albumin 2.90    CBC:  -WBC: 20.6    U tox: Unremarkable    CXR:  Right basilar airspace disease most concerning for pneumonia.     Review of Systems   Limited due to mental status; per HPI  Personal History     Past Medical History:   Diagnosis Date   • Abnormal mammogram    • Anxiety    • Arthritis     R SHOULDER, R HIP, AND R LEG   • Chronic nausea 01/15/2019   • " COPD (chronic obstructive pulmonary disease) ([facility])    • Hernia, hiatal    • Hyperlipidemia    • Hypertension    • Knee pain, right 2018   • Memory loss     FORGETFULNESS   • Migraine headache    • Moderate episode of recurrent major depressive disorder ([facility]) 2017   • Night sweats    • Numbness 2017    WILL CHECK AN X- RAY OF HER C-SPINE    • Sciatica of right side 2017   • Severe episode of recurrent major depressive disorder, without psychotic features ([facility]) 10/22/2019   • Shortness of breath    • Shoulder pain, left 2018   • Sinus trouble    • Sleep apnea, unspecified    • Tobacco abuse 2017   • Tobacco abuse counseling 2017    SHE DESIRES TO QUIT. SHE HAS TRIED TO CHANTIX IN THE PAST W/O SUCCESS. SHE WAS SUCCESSFUL IN THE PAST W/O MEDICATION.       Past Surgical History:   Procedure Laterality Date   •  SECTION     • CHOLECYSTECTOMY     • COLONOSCOPY     • GALLBLADDER SURGERY         Family History: family history includes Arthritis in her mother; Cancer in an other family member; Heart disease in her father and another family member; Hypertension in an other family member; Other in her mother. Otherwise pertinent FHx was reviewed and not pertinent to current issue.    Social History:  reports that she has quit smoking. She smoked 1.00 pack per day. She has never used smokeless tobacco. She reports that she does not drink alcohol and does not use drugs.    Home Medications:  FLUoxetine, Fluticasone-Umeclidin-Vilant, Melatonin, albuterol sulfate HFA, buPROPion SR, busPIRone, famotidine, gabapentin, lisinopril, oxyCODONE-acetaminophen, predniSONE, and traMADol    Allergies:  No Known Allergies    Objective    Objective     Vitals:   Temp:  [101 °F (38.3 °C)] 101 °F (38.3 °C)  Heart Rate:  [107-122] 109  Resp:  [21-26] 22  BP: (108-147)/() 108/63  Flow (L/min):  [3] 3    Physical Exam  Constitutional:       General: She is not in acute distress.  Actually  appears quite comfortable.  HENT:      Head: Normocephalic and atraumatic.      Nose: Nose normal.      Mouth/Throat:      Mouth: Mucous membranes are moist.   Eyes:      General: No scleral icterus.  Cardiovascular:      Rate and Rhythm: Regular rhythm. Tachycardia present.      Heart sounds: Normal heart sounds. No murmur heard.  Pulmonary:      Effort: No respiratory distress.      Breath sounds: Very mild wheezing (bilateral)  at the time of my exam.      Comments: Decreased air entry on the right.  Abdominal:      Palpations: Abdomen is soft.      Tenderness: There is no abdominal tenderness.   Musculoskeletal:         General: No tenderness. Normal range of motion.      Cervical back: Normal range of motion and neck supple.      Right lower leg: No edema.      Left lower leg: No edema.   Skin:     General: Skin is warm and dry.   Neurological:      Mental Status: She is alert. She is  mild confused; she answers questions she is just inconsistent and an exact/inaccurate.         Result Review    Result Review:  I have personally reviewed the results from the time of this admission to 4/4/2022 22:35 EDT and agree with these findings:  [x]  Laboratory  []  Microbiology  [x]  Radiology  [x]  EKG/Telemetry   []  Cardiology/Vascular   []  Pathology  []  Old records  []  Other:  Most notable findings include: Sepsis secondary to pneumonia    Assessment/Plan   Assessment / Plan     Brief Patient Summary:  56 y.o. female former smoker with COPD on home O2, HLD, HTN, depression who presents with shortness of breath and altered mental status.  Found to be septic and hypoxic secondary to pneumonia.    Active Hospital Problems:  Active Hospital Problems    Diagnosis    • Sepsis, due to unspecified organism, unspecified whether acute organ dysfunction present (HCC)      Plan:   Acute on chronic hypoxic respiratory failure secondary to pneumonia  Sepsis secondary to pneumonia  COPD exacerbation secondary to  pneumonia  -Vancomycin and cefepime initiated in the ED   -Can de-escalate as appropriate  -DuoNebs, Pulmicort, Brovana  -Steroids    Chronic issues:  -Hypertension: Home lisinopril  -Mood/pain: Home Wellbutrin, BuSpar, fluoxetine, gabapentin, as needed Percocet      DVT prophylaxis: Lovenox 40 mg  Medical DVT prophylaxis orders are present.    CODE STATUS:    Code Status (Patient has no pulse and is not breathing): CPR (Attempt to Resuscitate)  Medical Interventions (Patient has pulse or is breathing): Full Support    Admission Status:  I believe this patient meets inpatient status.    Ashu Mcallister MD

## 2022-04-05 NOTE — PLAN OF CARE
Goal Outcome Evaluation:           Progress: no change  Outcome Evaluation: vital signs stable. alert and oriented x 4. pt verbalized anxiousness toward end of shift before going down to CT. ativan given once per order for increased anxiety. pt up in the chair toward the end of shift. pt wound and skin care performed today per order. pt remains on 2L nasal cannula. will continue to monitor.

## 2022-04-06 LAB
ANION GAP SERPL CALCULATED.3IONS-SCNC: 11.7 MMOL/L (ref 5–15)
BUN SERPL-MCNC: 27 MG/DL (ref 6–20)
BUN/CREAT SERPL: 26.2 (ref 7–25)
CALCIUM SPEC-SCNC: 9 MG/DL (ref 8.6–10.5)
CHLORIDE SERPL-SCNC: 106 MMOL/L (ref 98–107)
CO2 SERPL-SCNC: 24.3 MMOL/L (ref 22–29)
CREAT SERPL-MCNC: 1.03 MG/DL (ref 0.57–1)
DEPRECATED RDW RBC AUTO: 52.4 FL (ref 37–54)
EGFRCR SERPLBLD CKD-EPI 2021: 64.3 ML/MIN/1.73
ERYTHROCYTE [DISTWIDTH] IN BLOOD BY AUTOMATED COUNT: 15.9 % (ref 12.3–15.4)
GLUCOSE SERPL-MCNC: 173 MG/DL (ref 65–99)
HCT VFR BLD AUTO: 30.5 % (ref 34–46.6)
HGB BLD-MCNC: 10 G/DL (ref 12–15.9)
MAGNESIUM SERPL-MCNC: 2.6 MG/DL (ref 1.6–2.6)
MCH RBC QN AUTO: 29.5 PG (ref 26.6–33)
MCHC RBC AUTO-ENTMCNC: 32.8 G/DL (ref 31.5–35.7)
MCV RBC AUTO: 90 FL (ref 79–97)
PLATELET # BLD AUTO: 214 10*3/MM3 (ref 140–450)
PMV BLD AUTO: 12.1 FL (ref 6–12)
POTASSIUM SERPL-SCNC: 3.1 MMOL/L (ref 3.5–5.2)
RBC # BLD AUTO: 3.39 10*6/MM3 (ref 3.77–5.28)
SODIUM SERPL-SCNC: 142 MMOL/L (ref 136–145)
VANCOMYCIN TROUGH SERPL-MCNC: 22.51 MCG/ML (ref 5–20)
WBC NRBC COR # BLD: 21.78 10*3/MM3 (ref 3.4–10.8)

## 2022-04-06 PROCEDURE — 25010000002 CEFEPIME PER 500 MG: Performed by: INTERNAL MEDICINE

## 2022-04-06 PROCEDURE — 94799 UNLISTED PULMONARY SVC/PX: CPT

## 2022-04-06 PROCEDURE — 92526 ORAL FUNCTION THERAPY: CPT

## 2022-04-06 PROCEDURE — 80202 ASSAY OF VANCOMYCIN: CPT | Performed by: INTERNAL MEDICINE

## 2022-04-06 PROCEDURE — 97161 PT EVAL LOW COMPLEX 20 MIN: CPT

## 2022-04-06 PROCEDURE — 83735 ASSAY OF MAGNESIUM: CPT | Performed by: INTERNAL MEDICINE

## 2022-04-06 PROCEDURE — 25010000002 ENOXAPARIN PER 10 MG: Performed by: INTERNAL MEDICINE

## 2022-04-06 PROCEDURE — 85027 COMPLETE CBC AUTOMATED: CPT | Performed by: INTERNAL MEDICINE

## 2022-04-06 PROCEDURE — 99233 SBSQ HOSP IP/OBS HIGH 50: CPT | Performed by: INTERNAL MEDICINE

## 2022-04-06 PROCEDURE — 80048 BASIC METABOLIC PNL TOTAL CA: CPT | Performed by: INTERNAL MEDICINE

## 2022-04-06 RX ADMIN — BUDESONIDE 0.5 MG: 0.5 SUSPENSION RESPIRATORY (INHALATION) at 18:50

## 2022-04-06 RX ADMIN — BUPROPION HYDROCHLORIDE 150 MG: 150 TABLET, EXTENDED RELEASE ORAL at 20:53

## 2022-04-06 RX ADMIN — CEFEPIME 2 G: 1 INJECTION, SOLUTION INTRAVENOUS at 21:40

## 2022-04-06 RX ADMIN — BACITRACIN 1 APPLICATION: 500 OINTMENT TOPICAL at 08:59

## 2022-04-06 RX ADMIN — Medication 10 ML: at 20:54

## 2022-04-06 RX ADMIN — GABAPENTIN 300 MG: 300 CAPSULE ORAL at 17:39

## 2022-04-06 RX ADMIN — NYSTATIN: 100000 POWDER TOPICAL at 04:00

## 2022-04-06 RX ADMIN — BUPROPION HYDROCHLORIDE 150 MG: 150 TABLET, EXTENDED RELEASE ORAL at 08:58

## 2022-04-06 RX ADMIN — CEFEPIME 2 G: 1 INJECTION, SOLUTION INTRAVENOUS at 14:31

## 2022-04-06 RX ADMIN — NYSTATIN: 100000 POWDER TOPICAL at 13:11

## 2022-04-06 RX ADMIN — IPRATROPIUM BROMIDE AND ALBUTEROL SULFATE 3 ML: 2.5; .5 SOLUTION RESPIRATORY (INHALATION) at 12:36

## 2022-04-06 RX ADMIN — ENOXAPARIN SODIUM 40 MG: 100 INJECTION SUBCUTANEOUS at 08:58

## 2022-04-06 RX ADMIN — CEFEPIME 2 G: 1 INJECTION, SOLUTION INTRAVENOUS at 06:33

## 2022-04-06 RX ADMIN — BUDESONIDE 0.5 MG: 0.5 SUSPENSION RESPIRATORY (INHALATION) at 08:07

## 2022-04-06 RX ADMIN — MICONAZOLE NITRATE: 2 POWDER TOPICAL at 20:54

## 2022-04-06 RX ADMIN — ARFORMOTEROL TARTRATE 15 MCG: 15 SOLUTION RESPIRATORY (INHALATION) at 08:06

## 2022-04-06 RX ADMIN — BUSPIRONE HYDROCHLORIDE 10 MG: 10 TABLET ORAL at 13:11

## 2022-04-06 RX ADMIN — FLUOXETINE 40 MG: 20 CAPSULE ORAL at 08:59

## 2022-04-06 RX ADMIN — GABAPENTIN 300 MG: 300 CAPSULE ORAL at 20:53

## 2022-04-06 RX ADMIN — IPRATROPIUM BROMIDE AND ALBUTEROL SULFATE 3 ML: 2.5; .5 SOLUTION RESPIRATORY (INHALATION) at 08:06

## 2022-04-06 RX ADMIN — BUSPIRONE HYDROCHLORIDE 10 MG: 10 TABLET ORAL at 21:40

## 2022-04-06 RX ADMIN — FAMOTIDINE 40 MG: 20 TABLET ORAL at 17:39

## 2022-04-06 RX ADMIN — LISINOPRIL 5 MG: 5 TABLET ORAL at 08:58

## 2022-04-06 RX ADMIN — IPRATROPIUM BROMIDE AND ALBUTEROL SULFATE 3 ML: 2.5; .5 SOLUTION RESPIRATORY (INHALATION) at 18:50

## 2022-04-06 RX ADMIN — ARFORMOTEROL TARTRATE 15 MCG: 15 SOLUTION RESPIRATORY (INHALATION) at 18:50

## 2022-04-06 RX ADMIN — GABAPENTIN 300 MG: 300 CAPSULE ORAL at 08:58

## 2022-04-06 RX ADMIN — MICONAZOLE NITRATE: 2 POWDER TOPICAL at 08:59

## 2022-04-06 RX ADMIN — FAMOTIDINE 40 MG: 20 TABLET ORAL at 06:33

## 2022-04-06 RX ADMIN — NYSTATIN: 100000 POWDER TOPICAL at 20:54

## 2022-04-06 RX ADMIN — Medication 10 ML: at 08:59

## 2022-04-06 RX ADMIN — BUSPIRONE HYDROCHLORIDE 10 MG: 10 TABLET ORAL at 06:33

## 2022-04-06 NOTE — PAYOR COMM NOTE
"Ambrosio Butts (56 y.o. Female)             Date of Birth   1965    Social Security Number       Address   [address] [place] [address]    Home Phone   [phone]    MRN   [id number]       Temple   None    Marital Status                               Admission Date   4/4/22    Admission Type   Emergency    Admitting Provider   Flako Santos DO    Attending Provider   Flako Santos DO    Department, Room/Bed   [address], 4013/1       Discharge Date       Discharge Disposition       Discharge Destination                               Attending Provider: Flako Santos DO    Allergies: No Known Allergies    Isolation: None   Infection: None   Code Status: CPR   Advance Care Planning Activity    Ht: 162.6 cm (64\")   Wt: 89 kg (196 lb 3.4 oz)    Admission Cmt: None   Principal Problem: None                Active Insurance as of 4/4/2022     Primary Coverage     Payor Plan Insurance Group Employer/Plan Group    PASSPORT MEDICARE ADVANTAGE PASSPORT ADVANTAGE [id number]     Payor Plan Address Payor Plan Phone Number Payor Plan Fax Number Effective Dates    [address]   5/1/2017 - None Entered    [place] [address]       Subscriber Name Subscriber Birth Date Member ID       AMBROSIO BUTTS 1965 [id number]           Secondary Coverage     Payor Plan Insurance Group Employer/Plan Group    [facility] [place] [id number]     Payor Plan Address Payor Plan Phone Number Payor Plan Fax Number Effective Dates    [address]   1/1/2021 - None Entered    [facility] [id number]       Subscriber Name Subscriber Birth Date Member ID       AMBROSIO BUTTS REGINA 1965 [id number]                 Emergency Contacts      (Rel.) Home Phone Work Phone Mobile Phone    SAKINA BUTTS (Spouse) -- -- [phone]        # auth/days pending    CONTACT   EPIFANIO S UTILIZATION REVIEW    [facility]  [address] " [address]  TAX ID [id number]  [facility]  [id number]       [phone]   [phone]    Physician Progress Notes (last 24 hours)  Notes from 22 1115 through 22 111   No notes of this type exist for this encounter.         Consult Notes (last 24 hours)  Notes from 22 1115 through 22 111   No notes of this type exist for this encounter.         Stacie Daniel, RN    Registered Nurse   Wound Care   Significant Note      Signed   Date of Service:  22 1035   Creation Time:  22 143              Signed        Expand All Collapse All        Show:Clear all  [x]Manual[x]Template[]Copied    Added by:  [x]Stacie Daniel RN      []Daria for details     Wound Eval / Progress Noted     KETTY Mcclellan     Patient Name: Lluvia Archer             : 1965                    MRN: [id number]  Today's Date: 2022                                                Admit Date: 2022     Visit Dx:  Visit Diagnosis       ICD-10-CM ICD-9-CM   1. Sepsis, due to unspecified organism, unspecified whether acute organ dysfunction present ([facility])  A41.9 038.9       995.91   2. Pneumonia of right lower lobe due to infectious organism  J18.9 486   3. Acute respiratory failure with hypoxia ([facility])  J96.01 518.81   4. Elevated troponin  R77.8 790.6   5. Leukocytosis, unspecified type  D72.829 288.60   6. Dysphagia, oropharyngeal  R13.12 787.22                Patient Active Problem List   Diagnosis   • Abnormal mammogram   • Anxiety   • Arthritis   • Chronic nausea   • Chronic obstructive pulmonary disease (COPD) ([facility])   • Counseling on substance use and abuse   • Diabetes mellitus, type II ([facility])   • Esophageal reflux   • Hernia, hiatal   • Hyperlipidemia   • Hypertension   • Knee pain, right   • Memory loss   • Migraine   • Moderate episode of recurrent major depressive disorder ([facility])   • Night sweats   • Numbness   • Sciatica of right side   • Severe episode of recurrent major depressive disorder,  without psychotic features ([facility])   • Shoulder pain, left   • Other diseases of nasal cavity and sinuses   • Sleep apnea, unspecified   • Tobacco abuse   • Strain of groin, right, subsequent encounter   • Osteoarthritis of spine with radiculopathy, lumbar region   • Bronchitis   • Chronic right-sided low back pain with right-sided sciatica   • Medication management   • Primary osteoarthritis of right hip   • Right hip pain   • Sepsis, due to unspecified organism, unspecified whether acute organ dysfunction present ([facility])   • Severe malnutrition (CMS/[facility])         Medical History        Past Medical History:   Diagnosis Date   • Abnormal mammogram     • Anxiety     • Arthritis       R SHOULDER, R HIP, AND R LEG   • Chronic nausea 01/15/2019   • COPD (chronic obstructive pulmonary disease) ([facility])     • Hernia, hiatal     • Hyperlipidemia     • Hypertension     • Knee pain, right 2018   • Memory loss       FORGETFULNESS   • Migraine headache     • Moderate episode of recurrent major depressive disorder ([facility]) 2017   • Night sweats     • Numbness 2017     WILL CHECK AN X- RAY OF HER C-SPINE    • Sciatica of right side 2017   • Severe episode of recurrent major depressive disorder, without psychotic features ([facility]) 10/22/2019   • Shortness of breath     • Shoulder pain, left 2018   • Sinus trouble     • Sleep apnea, unspecified     • Tobacco abuse 2017   • Tobacco abuse counseling 2017     SHE DESIRES TO QUIT. SHE HAS TRIED TO CHANTIX IN THE PAST W/O SUCCESS. SHE WAS SUCCESSFUL IN THE PAST W/O MEDICATION.            Surgical History         Past Surgical History:   Procedure Laterality Date   •  SECTION       • CHOLECYSTECTOMY       • COLONOSCOPY       • GALLBLADDER SURGERY                   Physical Assessment:       Wound 22 0140 Bilateral anterior groin MASD (Moisture associated skin damage) (Active)   Wound Image   22 0300   Dressing Appearance open to air  04/05/22 1354   Closure Open to air 04/05/22 0300   Base pink;red;moist 04/05/22 1354   Periwound pink;dry 04/05/22 1354   Periwound Temperature warm 04/05/22 1354   Periwound Skin Turgor soft 04/05/22 1354   Drainage Characteristics/Odor serosanguineous 04/05/22 1035   Drainage Amount none 04/05/22 1354   Care, Wound cleansed with;soap and water 04/05/22 1354   Dressing Care open to air 04/05/22 1354   Periwound Care topical treatment applied 04/05/22 1354       Wound 04/05/22 0142 Right lower breast MASD (Moisture associated skin damage) (Active)   Wound Image   04/05/22 0259   Dressing Appearance open to air 04/05/22 1354   Base red;pink;moist 04/05/22 1354   Periwound dry;pink 04/05/22 1354   Periwound Temperature warm 04/05/22 1354   Periwound Skin Turgor soft 04/05/22 1354   Drainage Characteristics/Odor serosanguineous 04/05/22 1035   Drainage Amount none 04/05/22 1354   Care, Wound cleansed with;soap and water 04/05/22 1354   Dressing Care open to air 04/05/22 1354   Periwound Care topical treatment applied 04/05/22 1354       Wound 04/05/22 0143 Left lower breast MASD (Moisture associated skin damage) (Active)   Wound Image   04/05/22 0300   Dressing Appearance open to air 04/05/22 1354   Base red;pink;moist 04/05/22 1354   Periwound pink;dry 04/05/22 1354   Periwound Temperature warm 04/05/22 1354   Periwound Skin Turgor soft 04/05/22 1354   Drainage Characteristics/Odor serosanguineous 04/05/22 1035   Drainage Amount none 04/05/22 1354   Care, Wound cleansed with;soap and water 04/05/22 1354   Dressing Care open to air 04/05/22 1354   Periwound Care topical treatment applied 04/05/22 1354         Wound Check / Follow-up:  Wound consult placed for patient with multiple areas of concern for MASD to bilateral groin folds and under breasts. Cleansed all areas with soap and water using a pillow case. Patted dry. Areas are excoriated and warm, yeast present in groin folds. Applied dry pillow case to folds to  maintain a dry periwound.     Right 3rd finger with traumatic wound, patient unable to report how she obtained the wound. Cleansed the wound and applied gentle friction to remove hair that was dried to wound bed. Wound is red and moist. Small amount of serosanguineous drainage during cleansing. Secured with a band aid.     Discussed with patient how to avoid excoriation at home. Discussed skin care and once getting out of the shower to dry skin thoroughly. If sitting place pillow cases to folds or under breasts to prevent moisture and skin breakdown.       Recommend quality skin care. Cleanse groin and under breasts BID and PRN with soap and water. Dry thoroughly. Apply light dusting of miconazole powder to excoriated tissue and cover with pillow cases.      Right 3rd finger cleanse daily and apply bacitracin to wound bed and secure with a Band-Aid        Impression: Redness to groin and under breasts due to moisture     Short term goals:  improve skin integrity and wound healing     Stacie Daniel RN    4/5/2022    14:30 EDT                    Audra Nolan, RN    Registered Nurse      Plan of Care      Signed   Date of Service:  04/05/22 1824   Creation Time:  04/05/22 1824              Signed              Show:Clear all  []Manual[x]Template[]Copied    Added by:  [x]Audra Nolan, RN      []Daria for details    Goal Outcome Evaluation:  Progress: no change  Outcome Evaluation: vital signs stable. alert and oriented x 4. pt verbalized anxiousness toward end of shift before going down to CT. ativan given once per order for increased anxiety. pt up in the chair toward the end of shift. pt wound and skin care performed today per order. pt remains on 2L nasal cannula. will continue to monitor.                  Rohini Shirley    Pharmacist   Pharmacy   Progress Notes      Signed   Date of Service:  04/06/22 1024   Creation Time:  04/06/22 1024              Signed        Expand All Collapse All        Show:Clear  "all  []Manual[x]Template[]Copied    Added by:  [x]Rohini Shirley      []Daria for details    PHARMACY TO DOSE VANCOMYCIN DAY: 3  DURATION OF THERAPY: 4-10-22     INDICATION: PNEUMONIA, SEPSIS  GOAL AUC: 400-600 MG/L.HR     HT: 162.6 cm (64\")  Vitals             04/05/22  0000            Weight: 89 kg (196 lb 3.4 oz)                Estimated Creatinine Clearance: 66.6 mL/min (A) (by C-G formula based on SCr of 1.03 mg/dL (H)).  HD/CRRT/PD? NO  CONTRAST ADMINISTERED? NONE  I/O last 3 completed shifts:  In: 720 [P.O.:720]  Out: 300 [Urine:300]     WBC: 21.78  TMAX: AF     Microbiology Results (last 10 days)         Procedure Component Value - Date/Time     COVID PRE-OP / PRE-PROCEDURE SCREENING ORDER (NO ISOLATION) - Swab, Nasopharynx [[id number]]  (Normal) Collected: 04/05/22 1432     Lab Status: Final result Specimen: Swab from Nasopharynx Updated: 04/05/22 1849     Narrative:       The following orders were created for panel order COVID PRE-OP / PRE-PROCEDURE SCREENING ORDER (NO ISOLATION) - Swab, Nasopharynx.  Procedure                               Abnormality         Status                     ---------                               -----------         ------                     COVID-19,APTIMA PANTHER(...[[id number]]  Normal              Final result                  Please view results for these tests on the individual orders.     COVID-19,APTIMA PANTHER(ELVIN),[facility], NP/OP SWAB IN UTM/VTM/[facility]ALINE TRANSPORT MEDIA,24 HR TAT - Swab, Nasopharynx [[id number]]  (Normal) Collected: 04/05/22 1432     Lab Status: Final result Specimen: Swab from Nasopharynx Updated: 04/05/22 1849       COVID19 Not Detected     Narrative:       Fact sheet for providers: https://www.fda.gov/media/394418/download      Fact sheet for patients: https://www.fda.gov/media/015438/download     Test performed by RT PCR.     MRSA Screen, PCR (Inpatient) - Swab, Nares [[id number]]  (Normal) Collected: 04/05/22 0614     Lab Status: Final result " Specimen: Swab from Nares Updated: 04/05/22 0800       MRSA PCR No MRSA Detected     Blood Culture - Blood, Arm, Right [[id number]]  (Normal) Collected: 04/04/22 2042     Lab Status: Preliminary result Specimen: Blood from Arm, Right Updated: 04/05/22 2047       Blood Culture No growth at 24 hours     Blood Culture - Blood, Arm, Left [[id number]]  (Normal) Collected: 04/04/22 2036     Lab Status: Preliminary result Specimen: Blood from Arm, Left Updated: 04/05/22 2047       Blood Culture No growth at 24 hours             04/05/22 2046   CT Chest    Multifocal consolidation within the bilateral lower lobes, right worse than left likely representing multifocal pneumonia.  Small amount of interstitial opacity within the left upper lobe likely representing additional milder bronchopneumonia. Upper lobe predominant centrilobular emphysema.       04/04/22 2116   XR Chest    Right basilar airspace disease most concerning for pneumonia.     OTHER ANTIMICROBIAL THERAPY: Cefepime     ASSESSMENT / PLAN:        Lab Results   Component Value Date     VANCOTROUGH 22.51 (H) 04/06/2022      Vancomycin trough level at 0923 this a.m. is 22.51 mcg/ml. Current regimen is 1000 mg q12hrs. Per [facility], this regimen should provide:      AUC24,ss: 783 mg/L.hr  PAUC*: 100 %  Ctrough,ss: 26.9 mg/L  Pconc*: 93 %  Tox.: 33 %     This regimen is providing an elevated AUC & toxicity risk. Will hold vancomycin today and check a random level in a.m. for restarting at a lower dose.  Labs ordered: BMP ordered for a.m.     Recommend stopping vancomycin since the MRSA PCR was not detected                           Flako Santos DO    Physician   Hospitalist   Progress Notes      Signed   Date of Service:  04/05/22 0824   Creation Time:  04/05/22 0824              Signed        Expand All Collapse All        Show:Clear all  [x]Manual[x]Template[x]Copied    Added by:  [x]Flako Santos DO      []Daria for details     [facility] Hospitalist  "Progress Note  Date: 2022  Patient Name: Lluvia Archer  : 1965  MRN: [id number]  Date of admission: 2022           Subjective []Expand by Default     Subjective      Chief Complaint: short of breath      Summary:   56 y.o. female former smoker with COPD on home O2, HLD, HTN, depression who presents with shortness of breath and altered mental status.  The patient reports that she has been feeling ill for the past week. She states that she has been short of breath and febrile with this illness. She notes that she has also had a cough; however, this is not unusual for her secondary to her COPD.  Patient's daughter is at bedside and states that she has been sleeping for the past week.  Patient also states that she has extreme anxiety.  Patient states that she has history of tobacco abuse but has not smoked in quite some time. Per nursing, the patient was initially brought to the ED today due to the daughter's concerns for altered mental status. They informed EMS today that the patient \"wasn't acting right\" in that she was not taking her medication and not wearing her home oxygen. EMS was also informed that the patient had developed new bowel/bladder incontinence however patient states she is depressed and just does not want to get up to go.      In the ED:  Febrile to 101, tachycardic to 122, tachypneic to 26, blood pressure okay, desat to 86 on room air then up into the 90s with 3 L nasal cannula.     Troponin: 0.057  BNP 2554     CMP: Largely unremarkable except for:  -Elevated LFTs  -Albumin 2.90     CBC:  -WBC: 20.6     U tox: Unremarkable     CXR:  Right basilar airspace disease most concerning for pneumonia.        Interval Followup:   Patient's daughter is at bedside.  Patient continues to have confusion.  Patient denies any respiratory distress or cough.  Patient does endorse anxiety and states that she will need something to help her relax to go in the scanner      ROS:   Limited due to " confusion however patient denies any shortness of breath cough.  She does endorse anxiety and depression.  She does endorse feeling more lethargic and febrile and sleeping.        Review of Systems     Objective      Objective      Vitals:   Temp:  [97.3 °F (36.3 °C)-101 °F (38.3 °C)] 97.3 °F (36.3 °C)  Heart Rate:  [] 88  Resp:  [18-26] 18  BP: (103-147)/() 107/74  Flow (L/min):  [2-3] 2  Physical Exam                         Constitutional: Awake, alert, no acute distress resting in bed               Eyes: Pupils equal, sclerae anicteric, no conjunctival injection              HENT: NCAT, mucous membranes moist              Neck: Supple,              Respiratory: Clear to auscultation bilaterally, nonlabored respirations  No w/r/r               Cardiovascular: RRR, no murmurs,               Gastrointestinal: Positive bowel sounds, soft, nontender, nondistended              Musculoskeletal: No bilateral ankle edema, no clubbing or cyanosis to extremities              Psychiatric: Appropriate affect, cooperative              Neurologic: Oriented to person and place  strength symmetric in all extremities, Cranial Nerves grossly intact to confrontation, speech clear              Skin: No rashes            Result Review       Result Review:  I have personally reviewed the results from the time of this admission to 4/5/2022 08:24 EDT and agree with these findings:  [x]?  Laboratory   CBC Results        CBC    CBC 4/4/22 4/5/22   WBC 20.63 (A) 17.11 (A)   RBC 4.15 3.41 (A)   Hemoglobin 12.1 10.2 (A)   Hematocrit 36.5 30.3 (A)   MCV 88.0 88.9   MCH 29.2 29.9   MCHC 33.2 33.7   RDW 15.7 (A) 15.5 (A)   Platelets 197 148   (A) Abnormal value                  Basic Metabolic Profile Reults:         BMP    BMP 4/4/22 4/4/22 4/5/22 2036 2210     BUN 17   22 (A)   Creatinine 0.98   0.83   Sodium 136 133.0 (A) 136   Potassium 3.7   3.8   Chloride 95 (A)   101   CO2 25.3   21.4 (A)   Calcium 9.2   8.5 (A)   (A)  Abnormal value        Comments are available for some flowsheets but are not being displayed.                    [x]?  Microbiology   No results found for: ACANTHNAEG, AFBCX, BPERTUSSISCX, BLOODCX  No results found for: BCIDPCR, CXREFLEX, CSFCX, CULTURETIS  No results found for: CULTURES, HSVCX, URCX  No results found for: EYECULTURE, GCCX, HSVCULTURE, LABHSV  No results found for: LEGIONELLA, MRSACX, MUMPSCX, MYCOPLASCX  No results found for: NOCARDIACX, STOOLCX  No results found for: THROATCX, UNSTIMCULT, URINECX, CULTURE, VZVCULTUR  No results found for: VIRALCULTU, WOUNDCX     [x]?  Radiology     Chest x-ray results from the last 3 days:   XR Chest 1 View     Result Date: 4/4/2022  PROCEDURE:            XR CHEST 1 VW  COMPARISON:    [facility], CR, CHEST PA/AP & LAT 2V, 1/08/2020, 15:37.  INDICATIONS:           SHORTNESS OF BREATH  FINDINGS:       Heart size within normal limits.  There is patchy airspace disease at the right lung base concerning for pneumonia.  There is scarring at the left lung base which appears similar to the prior study.  No pneumothorax.  No significant pleural effusion.  Osseous structures grossly intact.       Impression:      Right basilar airspace disease most concerning for pneumonia.       MAGALIS VILLEGAS MD       Electronically Signed and Approved By: MAGALIS VILLEGAS MD on 4/04/2022 at 21:13                  []?  EKG/Telemetry   []?  Cardiology/Vascular   []?  Pathology  []?  Old records  []?  Other:           Assessment/Plan      Assessment / Plan      Assessment/Plan:  Sepsis secondary to community-acquired pneumonia  Chronic hypoxemic respiratory failure on home oxygen  Anxiety and depression  Hypertension     PLAN   continue patient on broad-spectrum antibiotics.  Cultures pending  Would like to obtain a CT of the chest to further evaluate her lung disease  Continue patient's home lisinopril  Continue patient's Wellbutrin, BuSpar Neurontin and fluoxetine for her  anxiety and depression  Patient has a diagnosis of diabetes however her A1c is 5.5     Discussed plan with RN.     DVT prophylaxis:  Lovenox   Medical DVT prophylaxis orders are present.     CODE STATUS:   Code Status (Patient has no pulse and is not breathing): CPR (Attempt to Resuscitate)  Medical Interventions (Patient has pulse or is breathing): Full Support           Electronically signed by Flako Santos DO, 04/05/22, 8:24 AM EDT.

## 2022-04-06 NOTE — PLAN OF CARE
Goal Outcome Evaluation:           Progress: no change  Outcome Evaluation: vital signs stable. alert and oriented x 4. pt got in the shower today. pt remains on 2L and tolerating well. diet advanced to regular with thin liquids per order. pt said appetite was improving. wound and skin care performed today per order.

## 2022-04-06 NOTE — PROGRESS NOTES
" [facility]   Hospitalist Progress Note  Date: 2022  Patient Name: Lluvia Archer  : 1965  MRN: [id number]  Date of admission: 2022      Subjective   Subjective     Chief Complaint: short of breath     Summary:   56 y.o. female former smoker with COPD on home O2, HLD, HTN, depression who presents with shortness of breath and altered mental status.  The patient reports that she has been feeling ill for the past week. She states that she has been short of breath and febrile with this illness. She notes that she has also had a cough; however, this is not unusual for her secondary to her COPD.  Patient's daughter is at bedside and states that she has been sleeping for the past week.  Patient also states that she has extreme anxiety.  Patient states that she has history of tobacco abuse but has not smoked in quite some time. Per nursing, the patient was initially brought to the ED today due to the daughter's concerns for altered mental status. They informed EMS today that the patient \"wasn't acting right\" in that she was not taking her medication and not wearing her home oxygen. EMS was also informed that the patient had developed new bowel/bladder incontinence however patient states she is depressed and just does not want to get up to go.      In the ED:  Febrile to 101, tachycardic to 122, tachypneic to 26, blood pressure okay, desat to 86 on room air then up into the 90s with 3 L nasal cannula.     Troponin: 0.057  BNP 2554     CMP: Largely unremarkable except for:  -Elevated LFTs  -Albumin 2.90     CBC:  -WBC: 20.6     U tox: Unremarkable     CXR:  Right basilar airspace disease most concerning for pneumonia.       Interval Followup:   Patient's daughter is at bedside.    Mental status is back to normal  Patient continues to have cough  CT shows pneumonia       ROS:   History obtained from the patient  General ROS: Positive for Fatigue, Negative for - chills, fever, malaise, or night " sweats  Psychological ROS: positive for anxiety and depression  Ophthalmic ROS: negative for - blurry vision, double vision, or itchy eyes  ENT ROS: negative for - headaches, nasal congestion, sneezing, or sore throat  Endocrine ROS: negative for - malaise/lethargy, polydipsia/polyuria, or temperature intolerance  Respiratory ROS: positive for cough and shortness of air   Cardiovascular ROS: negative for - chest pain, dyspnea on exertion, edema, palpitations, or paroxysmal nocturnal dyspnea  Gastrointestinal ROS: negative for - abdominal pain, constipation, diarrhea, heartburn, hematemesis, or nausea/vomiting  Genito-Urinary ROS: negative for - change in urinary stream, hematuria, incontinence, or urinary frequency/urgency  Musculoskeletal ROS: positive for generalized weakness  Neurological ROS: negative for - confusion, dizziness, gait disturbance, headaches, impaired coordination/balance, memory loss, numbness/tingling, seizures, or visual changes  Dermatological ROS: negative for dry skin and rash         Review of SystemsObjective   Objective     Vitals:   Temp:  [97.2 °F (36.2 °C)-98.1 °F (36.7 °C)] 97.3 °F (36.3 °C)  Heart Rate:  [71-82] 82  Resp:  [18-20] 20  BP: ()/(50-72) 124/68  Flow (L/min):  [2] 2  Physical Exam    Constitutional: Awake, alert, no acute distress resting in bed    Eyes: Pupils equal, sclerae anicteric, no conjunctival injection   HENT: NCAT, mucous membranes moist   Neck: Supple,   Respiratory: Clear to auscultation bilaterally, nonlabored respirations  No w/r/r    Cardiovascular: RRR, no murmurs,    Gastrointestinal: Positive bowel sounds, soft, nontender, nondistended   Musculoskeletal: No bilateral ankle edema, no clubbing or cyanosis to extremities   Psychiatric: Appropriate affect, cooperative   Neurologic: Oriented to person and place  strength symmetric in all extremities, Cranial Nerves grossly intact to confrontation, speech clear   Skin: No rashes     Result Review     Result Review:  I have personally reviewed the results from the time of this admission to 4/6/2022 08:25 EDT and agree with these findings:  [x]  Laboratory   CBC    CBC 4/4/22 4/5/22   WBC 20.63 (A) 17.11 (A)   RBC 4.15 3.41 (A)   Hemoglobin 12.1 10.2 (A)   Hematocrit 36.5 30.3 (A)   MCV 88.0 88.9   MCH 29.2 29.9   MCHC 33.2 33.7   RDW 15.7 (A) 15.5 (A)   Platelets 197 148   (A) Abnormal value            BMP    BMP 4/4/22 4/4/22 4/5/22 2036 2210    BUN 17  22 (A)   Creatinine 0.98  0.83   Sodium 136 133.0 (A) 136   Potassium 3.7  3.8   Chloride 95 (A)  101   CO2 25.3  21.4 (A)   Calcium 9.2  8.5 (A)   (A) Abnormal value       Comments are available for some flowsheets but are not being displayed.             [x]  Microbiology   No results found for: ACANTHNAEG, AFBCX, BPERTUSSISCX, BLOODCX  No results found for: BCIDPCR, CXREFLEX, CSFCX, CULTURETIS  No results found for: CULTURES, HSVCX, URCX  No results found for: EYECULTURE, GCCX, HSVCULTURE, LABHSV  No results found for: LEGIONELLA, MRSACX, MUMPSCX, MYCOPLASCX  No results found for: NOCARDIACX, STOOLCX  No results found for: THROATCX, UNSTIMCULT, URINECX, CULTURE, VZVCULTUR  No results found for: VIRALCULTU, WOUNDCX    [x]  Radiology   XR Chest 1 View    Result Date: 4/4/2022  PROCEDURE: XR CHEST 1 VW  COMPARISON: [facility], CR, CHEST PA/AP & LAT 2V, 1/08/2020, 15:37.  INDICATIONS: SHORTNESS OF BREATH  FINDINGS:  Heart size within normal limits.  There is patchy airspace disease at the right lung base concerning for pneumonia.  There is scarring at the left lung base which appears similar to the prior study.  No pneumothorax.  No significant pleural effusion.  Osseous structures grossly intact.      Impression:   Right basilar airspace disease most concerning for pneumonia.       MAGALIS VILLEGAS MD       Electronically Signed and Approved By: MAGALIS VILLEGAS MD on 4/04/2022 at 21:13               []  EKG/Telemetry   []  Cardiology/Vascular    []  Pathology  []  Old records  []  Other:    Assessment/Plan   Assessment / Plan     Assessment/Plan:  Sepsis secondary to community-acquired pneumonia   Chronic hypoxemic respiratory failure on home oxygen  Anxiety and depression  Hypertension    PLAN   continue patient on broad-spectrum antibiotics.  Cultures pending  CT of chest reviewed with patient and daughter   Continue patient's home lisinopril  Continue patient's Wellbutrin, BuSpar Neurontin and fluoxetine for her anxiety and depression  Patient has a diagnosis of diabetes however her A1c is 5.5  Patient is interested in rehab placement      Discussed plan with RN.    DVT prophylaxis:  Lovenox   Medical DVT prophylaxis orders are present.    CODE STATUS:   Code Status (Patient has no pulse and is not breathing): CPR (Attempt to Resuscitate)  Medical Interventions (Patient has pulse or is breathing): Full Support

## 2022-04-06 NOTE — PROGRESS NOTES
"PHARMACY TO DOSE VANCOMYCIN DAY: 3  DURATION OF THERAPY: 4-10-22    INDICATION: PNEUMONIA, SEPSIS  GOAL AUC: 400-600 MG/L.HR    HT: 162.6 cm (64\")      04/05/22  0000      Weight: 89 kg (196 lb 3.4 oz)        Estimated Creatinine Clearance: 66.6 mL/min (A) (by C-G formula based on SCr of 1.03 mg/dL (H)).  HD/CRRT/PD? NO  CONTRAST ADMINISTERED? NONE  I/O last 3 completed shifts:  In: 720 [P.O.:720]  Out: 300 [Urine:300]    WBC: 21.78  TMAX: AF    Microbiology Results (last 10 days)       Procedure Component Value - Date/Time    COVID PRE-OP / PRE-PROCEDURE SCREENING ORDER (NO ISOLATION) - Swab, Nasopharynx [[id number]]  (Normal) Collected: 04/05/22 1432    Lab Status: Final result Specimen: Swab from Nasopharynx Updated: 04/05/22 1849    Narrative:      The following orders were created for panel order COVID PRE-OP / PRE-PROCEDURE SCREENING ORDER (NO ISOLATION) - Swab, Nasopharynx.  Procedure                               Abnormality         Status                     ---------                               -----------         ------                     COVID-19,APTIMA PANTHER(...[[id number]]  Normal              Final result                 Please view results for these tests on the individual orders.    COVID-19,APTIMA PANTHER(ELVIN),[facility], NP/OP SWAB IN UTM/VTM/[facility]ALINE TRANSPORT MEDIA,24 HR TAT - Swab, Nasopharynx [[id number]]  (Normal) Collected: 04/05/22 1432    Lab Status: Final result Specimen: Swab from Nasopharynx Updated: 04/05/22 1849     COVID19 Not Detected    Narrative:      Fact sheet for providers: https://www.fda.gov/media/225454/download     Fact sheet for patients: https://www.fda.gov/media/261206/download    Test performed by RT PCR.    MRSA Screen, PCR (Inpatient) - Swab, Nares [[id number]]  (Normal) Collected: 04/05/22 0614    Lab Status: Final result Specimen: Swab from Nares Updated: 04/05/22 0800     MRSA PCR No MRSA Detected    Blood Culture - Blood, Arm, Right [[id number]]  (Normal) " Collected: 04/04/22 2042    Lab Status: Preliminary result Specimen: Blood from Arm, Right Updated: 04/05/22 2047     Blood Culture No growth at 24 hours    Blood Culture - Blood, Arm, Left [[id number]]  (Normal) Collected: 04/04/22 2036    Lab Status: Preliminary result Specimen: Blood from Arm, Left Updated: 04/05/22 2047     Blood Culture No growth at 24 hours          04/05/22 2046  CT Chest    Multifocal consolidation within the bilateral lower lobes, right worse than left likely representing multifocal pneumonia.  Small amount of interstitial opacity within the left upper lobe likely representing additional milder bronchopneumonia. Upper lobe predominant centrilobular emphysema.      04/04/22 2116  XR Chest    Right basilar airspace disease most concerning for pneumonia.     OTHER ANTIMICROBIAL THERAPY: Cefepime    ASSESSMENT / PLAN:  Lab Results   Component Value Date    VANCOTROUGH 22.51 (H) 04/06/2022     Vancomycin trough level at 0923 this a.m. is 22.51 mcg/ml. Current regimen is 1000 mg q12hrs. Per [facility], this regimen should provide:     AUC24,ss: 783 mg/L.hr  PAUC*: 100 %  Ctrough,ss: 26.9 mg/L  Pconc*: 93 %  Tox.: 33 %    This regimen is providing an elevated AUC & toxicity risk. Will hold vancomycin today and check a random level in a.m. for restarting at a lower dose.  Labs ordered: BMP ordered for a.m.    Recommend stopping vancomycin since the MRSA PCR was not detected

## 2022-04-06 NOTE — THERAPY TREATMENT NOTE
Acute Care - Speech Language Pathology   Swallow Treatment Note KETTY Mcclellan     Patient Name: Lluvia Archer  : 1965  MRN: [id number]  Today's Date: 2022               Admit Date: 2022    Visit Dx:     ICD-10-CM ICD-9-CM   1. Sepsis, due to unspecified organism, unspecified whether acute organ dysfunction present ([facility])  A41.9 038.9     995.91   2. Pneumonia of right lower lobe due to infectious organism  J18.9 486   3. Acute respiratory failure with hypoxia ([facility])  J96.01 518.81   4. Elevated troponin  R77.8 790.6   5. Leukocytosis, unspecified type  D72.829 288.60   6. Dysphagia, oropharyngeal  R13.12 787.22   7. Decreased activities of daily living (ADL)  Z78.9 V49.89     Patient Active Problem List   Diagnosis   • Abnormal mammogram   • Anxiety   • Arthritis   • Chronic nausea   • Chronic obstructive pulmonary disease (COPD) ([facility])   • Counseling on substance use and abuse   • Diabetes mellitus, type II ([facility])   • Esophageal reflux   • Hernia, hiatal   • Hyperlipidemia   • Hypertension   • Knee pain, right   • Memory loss   • Migraine   • Moderate episode of recurrent major depressive disorder ([facility])   • Night sweats   • Numbness   • Sciatica of right side   • Severe episode of recurrent major depressive disorder, without psychotic features ([facility])   • Shoulder pain, left   • Other diseases of nasal cavity and sinuses   • Sleep apnea, unspecified   • Tobacco abuse   • Strain of groin, right, subsequent encounter   • Osteoarthritis of spine with radiculopathy, lumbar region   • Chronic right-sided low back pain with right-sided sciatica   • Medication management   • Primary osteoarthritis of right hip   • Right hip pain   • Sepsis, due to unspecified organism, unspecified whether acute organ dysfunction present ([facility])   • Severe malnutrition ([facility])     Past Medical History:   Diagnosis Date   • Abnormal mammogram    • Anxiety    • Arthritis     R SHOULDER, R HIP, AND R LEG   • Chronic nausea  01/15/2019   • COPD (chronic obstructive pulmonary disease) ([facility])    • Hernia, hiatal    • Hyperlipidemia    • Hypertension    • Knee pain, right 2018   • Memory loss     FORGETFULNESS   • Migraine headache    • Moderate episode of recurrent major depressive disorder ([facility]) 2017   • Night sweats    • Numbness 2017    WILL CHECK AN X- RAY OF HER C-SPINE    • Sciatica of right side 2017   • Severe episode of recurrent major depressive disorder, without psychotic features ([facility]) 10/22/2019   • Shortness of breath    • Shoulder pain, left 2018   • Sinus trouble    • Sleep apnea, unspecified    • Tobacco abuse 2017   • Tobacco abuse counseling 2017    SHE DESIRES TO QUIT. SHE HAS TRIED TO CHANTIX IN THE PAST W/O SUCCESS. SHE WAS SUCCESSFUL IN THE PAST W/O MEDICATION.     Past Surgical History:   Procedure Laterality Date   •  SECTION     • CHOLECYSTECTOMY     • COLONOSCOPY     • GALLBLADDER SURGERY       Subjective/Behavioral Observations: Patient seen for dysphagia therapy    Current Diet: Mechanical soft/whole foods thin liquids    Treatment received: Patient seen at bedside.  Tolerating soft solid foods well without clinical sign or symptom of aspiration.  Patient requesting regular solids.  Therapeutic trials of regular solids, small bite-size pieces tolerated well without clinical sign or symptom of aspiration, mildly prolonged mastication noted.  Tolerating thin liquids well without clinical sign or symptom of aspiration.  Denies globus sensation after completion of swallow    Results of treatment: As stated    Progress toward goals: Progress noted    Barriers to Achieving goals: Medical status    Plan of care:/changes in plan: Regular diet-thin liquids  Slow rate, small bites/drinks          EDUCATION  The patient has been educated in the following areas:   Dysphagia (Swallowing Impairment).            Concha Warren, SLP  2022

## 2022-04-06 NOTE — PLAN OF CARE
Goal Outcome Evaluation:  Plan of Care Reviewed With: patient        Progress: no change  Outcome Evaluation: Patient presents with deficits in balance, endurance, transfers, and ambulation. Patient will benefit from skilled PT services to address these mobility deficits and decrease risk of falls.

## 2022-04-06 NOTE — THERAPY EVALUATION
Acute Care - Physical Therapy Initial Evaluation   [place]     Patient Name: Lluvia Archer  : 1965  MRN: [id number]  Today's Date: 2022      Visit Dx:     ICD-10-CM ICD-9-CM   1. Sepsis, due to unspecified organism, unspecified whether acute organ dysfunction present (Formerly [facility])  A41.9 038.9     995.91   2. Pneumonia of right lower lobe due to infectious organism  J18.9 486   3. Acute respiratory failure with hypoxia ([facility]ly [facility])  J96.01 518.81   4. Elevated troponin  R77.8 790.6   5. Leukocytosis, unspecified type  D72.829 288.60   6. Dysphagia, oropharyngeal  R13.12 787.22   7. Decreased activities of daily living (ADL)  Z78.9 V49.89   8. Difficulty walking  R26.2 719.7     Patient Active Problem List   Diagnosis   • Abnormal mammogram   • Anxiety   • Arthritis   • Chronic nausea   • Chronic obstructive pulmonary disease (COPD) (Formerly [facility])   • Counseling on substance use and abuse   • Diabetes mellitus, type II ([facility])   • Esophageal reflux   • Hernia, hiatal   • Hyperlipidemia   • Hypertension   • Knee pain, right   • Memory loss   • Migraine   • Moderate episode of recurrent major depressive disorder ([facility])   • Night sweats   • Numbness   • Sciatica of right side   • Severe episode of recurrent major depressive disorder, without psychotic features ([facility])   • Shoulder pain, left   • Other diseases of nasal cavity and sinuses   • Sleep apnea, unspecified   • Tobacco abuse   • Strain of groin, right, subsequent encounter   • Osteoarthritis of spine with radiculopathy, lumbar region   • Chronic right-sided low back pain with right-sided sciatica   • Medication management   • Primary osteoarthritis of right hip   • Right hip pain   • Sepsis, due to unspecified organism, unspecified whether acute organ dysfunction present ([facility])   • Severe malnutrition (CMS/[facility]ly [facility])     Past Medical History:   Diagnosis Date   • Abnormal mammogram    • Anxiety    • Arthritis     R SHOULDER, R HIP, AND R LEG   • Chronic nausea 01/15/2019   • COPD  (chronic obstructive pulmonary disease) ([facility])    • Hernia, hiatal    • Hyperlipidemia    • Hypertension    • Knee pain, right 2018   • Memory loss     FORGETFULNESS   • Migraine headache    • Moderate episode of recurrent major depressive disorder ([facility]) 2017   • Night sweats    • Numbness 2017    WILL CHECK AN X- RAY OF HER C-SPINE    • Sciatica of right side 2017   • Severe episode of recurrent major depressive disorder, without psychotic features ([facility]) 10/22/2019   • Shortness of breath    • Shoulder pain, left 2018   • Sinus trouble    • Sleep apnea, unspecified    • Tobacco abuse 2017   • Tobacco abuse counseling 2017    SHE DESIRES TO QUIT. SHE HAS TRIED TO CHANTIX IN THE PAST W/O SUCCESS. SHE WAS SUCCESSFUL IN THE PAST W/O MEDICATION.     Past Surgical History:   Procedure Laterality Date   •  SECTION     • CHOLECYSTECTOMY     • COLONOSCOPY     • GALLBLADDER SURGERY       PT Assessment (last 12 hours)     PT Evaluation and Treatment     Row Name 22 1500          Physical Therapy Time and Intention    Subjective Information complains of;fatigue;weakness  -AV     Document Type evaluation  -AV     Mode of Treatment individual therapy;physical therapy  -AV     Row Name 22 1500          General Information    Patient Profile Reviewed yes  -AV     Prior Level of Function independent:;all household mobility;gait;transfer;ADL's  Ambulated without an assistive device but reports furniture walking throughout the home. Has had home O2 in the past but her machine has broken.  -AV     Equipment Currently Used at Home none  -AV     Existing Precautions/Restrictions fall  -AV     Row Name 22 1500          Living Environment    Current Living Arrangements home  -AV     Home Accessibility stairs to enter home  -AV     People in Home spouse  -AV     Row Name 22 1500          Home Main Entrance    Number of Stairs, Main Entrance one  -AV     Row Name  04/06/22 1500          Range of Motion (ROM)    Range of Motion bilateral lower extremities;ROM is WFL  -AV     Row Name 04/06/22 1500          Strength (Manual Muscle Testing)    Strength (Manual Muscle Testing) bilateral lower extremities  4-/5  -AV     Row Name 04/06/22 1500          Bed Mobility    Bed Mobility supine-sit-supine  -AV     Supine-Sit-Supine [place] (Bed Mobility) supervision  -AV     Row Name 04/06/22 1500          Transfers    Transfers sit-stand transfer  -AV     Sit-Stand [place] (Transfers) standby assist  -AV     Row Name 04/06/22 1500          Sit-Stand Transfer    Assistive Device (Sit-Stand Transfers) --  HHA  -AV     Row Name 04/06/22 1500          Gait/Stairs (Locomotion)    Gait/Stairs Locomotion gait/ambulation independence;gait/ambulation assistive device;distance ambulated  -AV     [place] Level (Gait) contact guard  -AV     Assistive Device (Gait) --  No device but reaches out to steady self  -AV     Distance in Feet (Gait) 12  -AV     Pattern (Gait) step-to  -AV     Deviations/Abnormal Patterns (Gait) festinating/shuffling  -AV     Row Name 04/06/22 1500          Safety Issues, Functional Mobility    Safety Issues Affecting Function (Mobility) awareness of need for assistance;insight into deficits/self-awareness;safety precaution awareness  -AV     Impairments Affecting Function (Mobility) balance;endurance/activity tolerance;strength;shortness of breath  -AV     Row Name 04/06/22 1500          Balance    Balance Assessment standing dynamic balance  -AV     Dynamic Standing Balance contact guard  -AV     Position/Device Used, Standing Balance unsupported  -AV     Row Name             Wound 04/05/22 0140 Bilateral anterior groin MASD (Moisture associated skin damage)    Wound - Properties Group Placement Date: 04/05/22  -HR Placement Time: 0140  -HR Present on Hospital Admission: Y  -HR Side: Bilateral  -HR Orientation: anterior  -HR Location: groin  -HR Primary Wound  Type: MASD  -HR     Retired Wound - Properties Group Placement Date: 04/05/22  -HR Placement Time: 0140  -HR Present on Hospital Admission: Y  -HR Side: Bilateral  -HR Orientation: anterior  -HR Location: groin  -HR Primary Wound Type: MASD  -HR     Retired Wound - Properties Group Date first assessed: 04/05/22  -HR Time first assessed: 0140  -HR Present on Hospital Admission: Y  -HR Side: Bilateral  -HR Location: groin  -HR Primary Wound Type: MASD  -HR     Row Name             Wound 04/05/22 0142 Right lower breast MASD (Moisture associated skin damage)    Wound - Properties Group Placement Date: 04/05/22  -HR Placement Time: 0142  -HR Side: Right  -HR Orientation: lower  -HR Location: breast  -HR Primary Wound Type: MASD  -HR     Retired Wound - Properties Group Placement Date: 04/05/22  -HR Placement Time: 0142  -HR Side: Right  -HR Orientation: lower  -HR Location: breast  -HR Primary Wound Type: MASD  -HR     Retired Wound - Properties Group Date first assessed: 04/05/22  -HR Time first assessed: 0142  -HR Side: Right  -HR Location: breast  -HR Primary Wound Type: MASD  -HR     Row Name             Wound 04/05/22 0143 Left lower breast MASD (Moisture associated skin damage)    Wound - Properties Group Placement Date: 04/05/22  -HR Placement Time: 0143  -HR Side: Left  -HR Orientation: lower  -HR Location: breast  -HR Primary Wound Type: MASD  -HR     Retired Wound - Properties Group Placement Date: 04/05/22  -HR Placement Time: 0143  -HR Side: Left  -HR Orientation: lower  -HR Location: breast  -HR Primary Wound Type: MASD  -HR     Retired Wound - Properties Group Date first assessed: 04/05/22  -HR Time first assessed: 0143  -HR Side: Left  -HR Location: breast  -HR Primary Wound Type: MASD  -HR     Row Name             Wound 04/05/22 1442 Right posterior ring finger Traumatic    Wound - Properties Group Placement Date: 04/05/22  -GREG Placement Time: 1442  -GREG Present on Hospital Admission: Y  -GREG Side: Right   -GREG Orientation: posterior  -GREG Location: ring finger  -GREG Primary Wound Type: Traumatic  -GREG     Retired Wound - Properties Group Placement Date: 04/05/22  -GREG Placement Time: 1442 -GREG Present on Hospital Admission: Y  -GREG Side: Right  -GREG Orientation: posterior  -GREG Location: ring finger  -GREG Primary Wound Type: Traumatic  -GREG     Retired Wound - Properties Group Date first assessed: 04/05/22  -GREG Time first assessed: 1442  -GREG Present on Hospital Admission: Y  -GREG Side: Right  -GREG Location: ring finger  -GREG Primary Wound Type: Traumatic  -GREG     Row Name 04/06/22 1500          Plan of Care Review    Plan of Care Reviewed With patient  -AV     Progress no change  -AV     Outcome Evaluation Patient presents with deficits in balance, endurance, transfers, and ambulation. Patient will benefit from skilled PT services to address these mobility deficits and decrease risk of falls.  -AV     Row Name 04/06/22 1500          Therapy Assessment/Plan (PT)    Rehab Potential (PT) good, to achieve stated therapy goals  -AV     Criteria for Skilled Interventions Met (PT) yes;meets criteria  -AV     Problem List (PT) problems related to;balance;mobility;strength  -AV     Activity Limitations Related to Problem List (PT) unable to ambulate safely;unable to transfer safely  -AV     Row Name 04/06/22 1500          PT Evaluation Complexity    History, PT Evaluation Complexity 1-2 personal factors and/or comorbidities  -AV     Examination of Body Systems (PT Eval Complexity) total of 4 or more elements  -AV     Clinical Presentation (PT Evaluation Complexity) stable  -AV     Clinical Decision Making (PT Evaluation Complexity) low complexity  -AV     Overall Complexity (PT Evaluation Complexity) low complexity  -AV     Row Name 04/06/22 1500          Therapy Plan Review/Discharge Plan (PT)    Therapy Plan Review (PT) evaluation/treatment results reviewed;patient  -AV     Row Name 04/06/22 1500          Physical Therapy Goals     Transfer Goal Selection (PT) transfer, PT goal 1  -AV     Gait Training Goal Selection (PT) gait training, PT goal 1  -AV     Row Name 04/06/22 1500          Transfer Goal 1 (PT)    Activity/Assistive Device (Transfer Goal 1, PT) transfers, all;walker, rolling  -AV     [place] Level/Cues Needed (Transfer Goal 1, PT) modified independence  -AV     Time Frame (Transfer Goal 1, PT) 10 days  -AV     Row Name 04/06/22 1500          Gait Training Goal 1 (PT)    Activity/Assistive Device (Gait Training Goal 1, PT) gait (walking locomotion);assistive device use;walker, rolling  -AV     [place] Level (Gait Training Goal 1, PT) modified independence  -AV     Distance (Gait Training Goal 1, PT) 200  -AV     Time Frame (Gait Training Goal 1, PT) 10 days  -AV           User Key  (r) = Recorded By, (t) = Taken By, (c) = Cosigned By    Initials Name Provider Type    HR Amelia Wagner, RN Registered Nurse    Stacie Valderrama RN Registered Nurse    Geovanni Ortiz, LADONNA Physical Therapist                Physical Therapy Education                 Title: PT OT SLP Therapies (In Progress)     Topic: Physical Therapy (In Progress)     Point: Mobility training (Done)     Learning Progress Summary           Patient Acceptance, E,TB, VU by AV at 4/6/2022 1526                   Point: Home exercise program (Not Started)     Learner Progress:  Not documented in this visit.          Point: Body mechanics (Done)     Learning Progress Summary           Patient Acceptance, E,TB, VU by AV at 4/6/2022 1526                   Point: Precautions (Done)     Learning Progress Summary           Patient Acceptance, E,TB, VU by AV at 4/6/2022 1526                               User Key     Initials Effective Dates Name Provider Type Discipline    AV 06/11/21 -  Geovanni Mendez, LADONNA Physical Therapist PT              PT Recommendation and Plan  Anticipated Discharge Disposition (PT): home with home health, sub acute care  setting  Planned Therapy Interventions (PT): bed mobility training, balance training, gait training, neuromuscular re-education, strengthening, transfer training  Therapy Frequency (PT): daily  Plan of Care Reviewed With: patient  Progress: no change  Outcome Evaluation: Patient presents with deficits in balance, endurance, transfers, and ambulation. Patient will benefit from skilled PT services to address these mobility deficits and decrease risk of falls.   Outcome Measures     Row Name 04/06/22 1500             How much help from another person do you currently need...    Turning from your back to your side while in flat bed without using bedrails? 4  -AV      Moving from lying on back to sitting on the side of a flat bed without bedrails? 4  -AV      Moving to and from a bed to a chair (including a wheelchair)? 3  -AV      Standing up from a chair using your arms (e.g., wheelchair, bedside chair)? 3  -AV      Climbing 3-5 steps with a railing? 2  -AV      To walk in hospital room? 3  -AV      AM-PAC 6 Clicks Score (PT) 19  -AV              Functional Assessment    Outcome Measure Options AM-PAC 6 Clicks Basic Mobility (PT)  -AV            User Key  (r) = Recorded By, (t) = Taken By, (c) = Cosigned By    Initials Name Provider Type    AV Geovanni Mendez, LADONNA Physical Therapist                 Time Calculation:    PT Charges     Row Name 04/06/22 1525             Time Calculation    PT Received On 04/06/22  -AV      PT Goal Re-Cert Due Date 04/15/22  -AV              Untimed Charges    PT Eval/Re-eval Minutes 35  -AV              Total Minutes    Untimed Charges Total Minutes 35  -AV       Total Minutes 35  -AV            User Key  (r) = Recorded By, (t) = Taken By, (c) = Cosigned By    Initials Name Provider Type    Geovanni Ortiz, LADONNA Physical Therapist              Therapy Charges for Today     Code Description Service Date Service Provider Modifiers Qty    96481763629 HC PT EVAL LOW COMPLEXITY 3 4/6/2022  Angelito He, Geovanni, PT GP 1          PT G-Codes  Outcome Measure Options: AM-PAC 6 Clicks Basic Mobility (PT)  AM-PAC 6 Clicks Score (PT): 19  AM-PAC 6 Clicks Score (OT): 21    Geovanni Mendez, PT  4/6/2022

## 2022-04-07 LAB
ANION GAP SERPL CALCULATED.3IONS-SCNC: 11.2 MMOL/L (ref 5–15)
BUN SERPL-MCNC: 29 MG/DL (ref 6–20)
BUN/CREAT SERPL: 25.2 (ref 7–25)
CALCIUM SPEC-SCNC: 8.6 MG/DL (ref 8.6–10.5)
CHLORIDE SERPL-SCNC: 105 MMOL/L (ref 98–107)
CO2 SERPL-SCNC: 25.8 MMOL/L (ref 22–29)
CREAT SERPL-MCNC: 1.15 MG/DL (ref 0.57–1)
DEPRECATED RDW RBC AUTO: 53.5 FL (ref 37–54)
EGFRCR SERPLBLD CKD-EPI 2021: 56.4 ML/MIN/1.73
ERYTHROCYTE [DISTWIDTH] IN BLOOD BY AUTOMATED COUNT: 16 % (ref 12.3–15.4)
GLUCOSE SERPL-MCNC: 91 MG/DL (ref 65–99)
HCT VFR BLD AUTO: 29.2 % (ref 34–46.6)
HGB BLD-MCNC: 9.2 G/DL (ref 12–15.9)
MAGNESIUM SERPL-MCNC: 2.3 MG/DL (ref 1.6–2.6)
MCH RBC QN AUTO: 28.8 PG (ref 26.6–33)
MCHC RBC AUTO-ENTMCNC: 31.5 G/DL (ref 31.5–35.7)
MCV RBC AUTO: 91.3 FL (ref 79–97)
PLATELET # BLD AUTO: 228 10*3/MM3 (ref 140–450)
PMV BLD AUTO: 11.7 FL (ref 6–12)
POTASSIUM SERPL-SCNC: 3.4 MMOL/L (ref 3.5–5.2)
RBC # BLD AUTO: 3.2 10*6/MM3 (ref 3.77–5.28)
SODIUM SERPL-SCNC: 142 MMOL/L (ref 136–145)
VANCOMYCIN SERPL-MCNC: 11.95 MCG/ML (ref 5–40)
WBC NRBC COR # BLD: 17.35 10*3/MM3 (ref 3.4–10.8)

## 2022-04-07 PROCEDURE — 83735 ASSAY OF MAGNESIUM: CPT | Performed by: INTERNAL MEDICINE

## 2022-04-07 PROCEDURE — 85027 COMPLETE CBC AUTOMATED: CPT | Performed by: INTERNAL MEDICINE

## 2022-04-07 PROCEDURE — 94799 UNLISTED PULMONARY SVC/PX: CPT

## 2022-04-07 PROCEDURE — 86713 LEGIONELLA ANTIBODY: CPT | Performed by: INTERNAL MEDICINE

## 2022-04-07 PROCEDURE — 25010000002 ENOXAPARIN PER 10 MG: Performed by: INTERNAL MEDICINE

## 2022-04-07 PROCEDURE — 25010000002 FUROSEMIDE PER 20 MG: Performed by: INTERNAL MEDICINE

## 2022-04-07 PROCEDURE — 25010000002 CEFEPIME PER 500 MG: Performed by: INTERNAL MEDICINE

## 2022-04-07 PROCEDURE — 80048 BASIC METABOLIC PNL TOTAL CA: CPT | Performed by: INTERNAL MEDICINE

## 2022-04-07 PROCEDURE — 86631 CHLAMYDIA ANTIBODY: CPT | Performed by: INTERNAL MEDICINE

## 2022-04-07 PROCEDURE — 86603 ADENOVIRUS ANTIBODY: CPT | Performed by: INTERNAL MEDICINE

## 2022-04-07 PROCEDURE — 80202 ASSAY OF VANCOMYCIN: CPT | Performed by: INTERNAL MEDICINE

## 2022-04-07 PROCEDURE — 86738 MYCOPLASMA ANTIBODY: CPT | Performed by: INTERNAL MEDICINE

## 2022-04-07 PROCEDURE — 25010000002 CEFTRIAXONE PER 250 MG: Performed by: INTERNAL MEDICINE

## 2022-04-07 PROCEDURE — 25010000002 AZITHROMYCIN PER 500 MG: Performed by: INTERNAL MEDICINE

## 2022-04-07 PROCEDURE — 99233 SBSQ HOSP IP/OBS HIGH 50: CPT | Performed by: INTERNAL MEDICINE

## 2022-04-07 RX ORDER — FUROSEMIDE 10 MG/ML
20 INJECTION INTRAMUSCULAR; INTRAVENOUS ONCE
Status: COMPLETED | OUTPATIENT
Start: 2022-04-07 | End: 2022-04-07

## 2022-04-07 RX ORDER — CEFTRIAXONE SODIUM 1 G/50ML
1 INJECTION, SOLUTION INTRAVENOUS EVERY 24 HOURS
Status: DISCONTINUED | OUTPATIENT
Start: 2022-04-07 | End: 2022-04-12 | Stop reason: HOSPADM

## 2022-04-07 RX ORDER — POTASSIUM CHLORIDE 750 MG/1
40 CAPSULE, EXTENDED RELEASE ORAL ONCE
Status: COMPLETED | OUTPATIENT
Start: 2022-04-07 | End: 2022-04-07

## 2022-04-07 RX ADMIN — BUSPIRONE HYDROCHLORIDE 10 MG: 10 TABLET ORAL at 21:24

## 2022-04-07 RX ADMIN — AZITHROMYCIN 500 MG: 500 INJECTION, POWDER, LYOPHILIZED, FOR SOLUTION INTRAVENOUS at 12:35

## 2022-04-07 RX ADMIN — NYSTATIN: 100000 POWDER TOPICAL at 19:28

## 2022-04-07 RX ADMIN — GABAPENTIN 300 MG: 300 CAPSULE ORAL at 08:34

## 2022-04-07 RX ADMIN — GABAPENTIN 300 MG: 300 CAPSULE ORAL at 21:24

## 2022-04-07 RX ADMIN — OXYCODONE HYDROCHLORIDE AND ACETAMINOPHEN 1 TABLET: 5; 325 TABLET ORAL at 00:49

## 2022-04-07 RX ADMIN — FAMOTIDINE 40 MG: 20 TABLET ORAL at 16:56

## 2022-04-07 RX ADMIN — ENOXAPARIN SODIUM 40 MG: 100 INJECTION SUBCUTANEOUS at 08:34

## 2022-04-07 RX ADMIN — FAMOTIDINE 40 MG: 20 TABLET ORAL at 08:55

## 2022-04-07 RX ADMIN — IPRATROPIUM BROMIDE AND ALBUTEROL SULFATE 3 ML: 2.5; .5 SOLUTION RESPIRATORY (INHALATION) at 18:33

## 2022-04-07 RX ADMIN — CEFTRIAXONE SODIUM 1 G: 1 INJECTION, SOLUTION INTRAVENOUS at 11:05

## 2022-04-07 RX ADMIN — Medication 10 ML: at 21:23

## 2022-04-07 RX ADMIN — BUSPIRONE HYDROCHLORIDE 10 MG: 10 TABLET ORAL at 13:19

## 2022-04-07 RX ADMIN — BUDESONIDE 0.5 MG: 0.5 SUSPENSION RESPIRATORY (INHALATION) at 18:35

## 2022-04-07 RX ADMIN — CEFEPIME 2 G: 1 INJECTION, SOLUTION INTRAVENOUS at 06:51

## 2022-04-07 RX ADMIN — ACETAMINOPHEN 650 MG: 325 TABLET ORAL at 03:48

## 2022-04-07 RX ADMIN — Medication 10 ML: at 08:37

## 2022-04-07 RX ADMIN — GABAPENTIN 300 MG: 300 CAPSULE ORAL at 16:57

## 2022-04-07 RX ADMIN — MICONAZOLE NITRATE: 2 POWDER TOPICAL at 08:37

## 2022-04-07 RX ADMIN — BUDESONIDE 0.5 MG: 0.5 SUSPENSION RESPIRATORY (INHALATION) at 07:31

## 2022-04-07 RX ADMIN — BUPROPION HYDROCHLORIDE 150 MG: 150 TABLET, EXTENDED RELEASE ORAL at 21:24

## 2022-04-07 RX ADMIN — BUSPIRONE HYDROCHLORIDE 10 MG: 10 TABLET ORAL at 06:54

## 2022-04-07 RX ADMIN — FUROSEMIDE 20 MG: 10 INJECTION, SOLUTION INTRAMUSCULAR; INTRAVENOUS at 13:17

## 2022-04-07 RX ADMIN — IPRATROPIUM BROMIDE AND ALBUTEROL SULFATE 3 ML: 2.5; .5 SOLUTION RESPIRATORY (INHALATION) at 07:30

## 2022-04-07 RX ADMIN — ARFORMOTEROL TARTRATE 15 MCG: 15 SOLUTION RESPIRATORY (INHALATION) at 07:30

## 2022-04-07 RX ADMIN — LISINOPRIL 5 MG: 5 TABLET ORAL at 08:35

## 2022-04-07 RX ADMIN — POTASSIUM CHLORIDE 40 MEQ: 750 CAPSULE, EXTENDED RELEASE ORAL at 13:17

## 2022-04-07 RX ADMIN — BUPROPION HYDROCHLORIDE 150 MG: 150 TABLET, EXTENDED RELEASE ORAL at 08:39

## 2022-04-07 RX ADMIN — NYSTATIN: 100000 POWDER TOPICAL at 11:25

## 2022-04-07 RX ADMIN — NYSTATIN: 100000 POWDER TOPICAL at 03:47

## 2022-04-07 RX ADMIN — BACITRACIN 1 APPLICATION: 500 OINTMENT TOPICAL at 08:37

## 2022-04-07 RX ADMIN — IPRATROPIUM BROMIDE AND ALBUTEROL SULFATE 3 ML: 2.5; .5 SOLUTION RESPIRATORY (INHALATION) at 13:06

## 2022-04-07 RX ADMIN — HYDROCODONE POLISTIREX AND CHLORPHENIRAMINE POLISTIREX 2.5 ML: 10; 8 SUSPENSION, EXTENDED RELEASE ORAL at 13:18

## 2022-04-07 RX ADMIN — FLUOXETINE 40 MG: 20 CAPSULE ORAL at 08:34

## 2022-04-07 RX ADMIN — ARFORMOTEROL TARTRATE 15 MCG: 15 SOLUTION RESPIRATORY (INHALATION) at 18:33

## 2022-04-07 RX ADMIN — MICONAZOLE NITRATE: 2 POWDER TOPICAL at 21:25

## 2022-04-07 NOTE — PAYOR COMM NOTE
"Ambrosio Butts (56 y.o. Female)             Date of Birth   1965    Social Security Number       Address   [address] [place] [address]    Home Phone   [phone]    MRN   [id number]       Sabianism   None    Marital Status                               Admission Date   4/4/22    Admission Type   Emergency    Admitting Provider   Flako Santos DO    Attending Provider   Flako Santos DO    Department, Room/Bed   [address], 4013/1       Discharge Date       Discharge Disposition       Discharge Destination                               Attending Provider: Flako Santos DO    Allergies: No Known Allergies    Isolation: None   Infection: None   Code Status: CPR   Advance Care Planning Activity    Ht: 162.6 cm (64\")   Wt: 89 kg (196 lb 3.4 oz)    Admission Cmt: None   Principal Problem: None                Active Insurance as of 4/4/2022     Primary Coverage     Payor Plan Insurance Group Employer/Plan Group    PASSPORT MEDICARE ADVANTAGE PASSPORT ADVANTAGE [id number]     Payor Plan Address Payor Plan Phone Number Payor Plan Fax Number Effective Dates    [address]   5/1/2017 - None Entered    [place] [address]       Subscriber Name Subscriber Birth Date Member ID       AMBROSIO BUTTS 1965 [id number]           Secondary Coverage     Payor Plan Insurance Group Employer/Plan Group    [employer] [facility] BY AFRICA TAO BY AFRICA [id number]     Payor Plan Address Payor Plan Phone Number Payor Plan Fax Number Effective Dates    [address]   1/1/2021 - None Entered    [facility] [id number]       Subscriber Name Subscriber Birth Date Member ID       AMBROSIO BUTTS 1965 [id number]                 Emergency Contacts      (Rel.) Home Phone Work Phone Mobile Phone    SAKINA BUTTS (Spouse) -- -- [phone]        #days /auth pending+++++++++++++++++    CONTACT   EPIFANIO S UTILIZATION REVIEW    [facility]  [address] [place] [address]  TAX ID [id number]  [facility]  [id number]       [phone]   [phone]        Pneumonia RRG - Care Day 1 (4/6/2022) by Fatuma Montana RN         Not Met: Reviewed on 4/7/2022 by Fatuma Montana RN       Created Using Review Status Review Entered   Indicia® Completed 4/7/2022 08:18       Criteria Set Name - Subset   Pneumonia RRG - Care Day 1      Criteria Review      Care Day: 1 Care Date: 4/6/2022 Level of Care:    Guideline Day 1    Discharge Readiness    (X) * Hemodynamic stability    ( ) * Tachypnea absent    4/7/2022 08:18:59 EDST by Fatuma Montana      RR-20    (X) * Hypoxemia absent    (X) * Afebrile or temperature acceptable for next level of care    (X) * Oxygen absent or at baseline need    (X) * Mental status at baseline    ( ) * Antibiotic regimen acceptable for next level of care    4/7/2022 08:18:59 EDST by Fatuma Montana      cefepime iv q 8 hr,    ( ) * Ambulatory or acceptable for next level of care    4/7/2022 08:18:59 EDST by Fatuma Montana      requires assist with ambulation    (X) * Oral hydration    (X) * Oral medications or regimen acceptable for next level of care    ( ) * Oral diet or acceptable for next level of care    4/7/2022 08:18:59 EDST by Fatuma Montana      poor appetite    ( ) * Discharge plans and education understood    Variance(s):    (Primary)    Category: Complication, comorbidity, physiologic delay    Reason: Slower than guideline; poor physiology    Context: A one-day delay compared to the Optimal Recovery Course attributed to a patient with general poor conditioning or worse than expected physiologic response to illness or injury.    Resolution:    Reviewed By: Fatuma Montana on 4/7/2022 08:18:59 EDST       * Josefaone             Audra Nolan RN    Registered Nurse      Plan of Care      Signed   Date of Service:  04/06/22 1809   Creation Time:  04/06/22 1809              Signed              Show:Clear  "all  []Manual[x]Template[]Copied    Added by:  [x]Audra Nolan RN      []Daria for details    Goal Outcome Evaluation:  Progress: no change  Outcome Evaluation: vital signs stable. alert and oriented x 4. pt got in the shower today. pt remains on 2L and tolerating well. diet advanced to regular with thin liquids per order. pt said appetite was improving. wound and skin care performed today per order.                  Flako Santos DO    Physician   Hospitalist   Progress Notes      Signed   Date of Service:  22   Creation Time:  22              Signed        Expand All Collapse All        Show:Clear all  [x]Manual[x]Template[x]Copied    Added by:  [x]Flako Santos DO      []Daria for details     [facility]   Hospitalist Progress Note  Date: 2022  Patient Name: Lluvia Archer  : 1965  MRN: [id number]  Date of admission: 2022           Subjective []Expand by Default     Subjective      Chief Complaint: short of breath      Summary:   56 y.o. female former smoker with COPD on home O2, HLD, HTN, depression who presents with shortness of breath and altered mental status.  The patient reports that she has been feeling ill for the past week. She states that she has been short of breath and febrile with this illness. She notes that she has also had a cough; however, this is not unusual for her secondary to her COPD.  Patient's daughter is at bedside and states that she has been sleeping for the past week.  Patient also states that she has extreme anxiety.  Patient states that she has history of tobacco abuse but has not smoked in quite some time. Per nursing, the patient was initially brought to the ED today due to the daughter's concerns for altered mental status. They informed EMS today that the patient \"wasn't acting right\" in that she was not taking her medication and not wearing her home oxygen. EMS was also informed that the patient had developed new bowel/bladder " incontinence however patient states she is depressed and just does not want to get up to go.      In the ED:  Febrile to 101, tachycardic to 122, tachypneic to 26, blood pressure okay, desat to 86 on room air then up into the 90s with 3 L nasal cannula.     Troponin: 0.057  BNP 2554     CMP: Largely unremarkable except for:  -Elevated LFTs  -Albumin 2.90     CBC:  -WBC: 20.6     U tox: Unremarkable     CXR:  Right basilar airspace disease most concerning for pneumonia.        Interval Followup:   Patient's daughter is at bedside.    Mental status is back to normal  Patient continues to have cough  CT shows pneumonia         ROS:   History obtained from the patient  General ROS: Positive for Fatigue, Negative for - chills, fever, malaise, or night sweats  Psychological ROS: positive for anxiety and depression  Ophthalmic ROS: negative for - blurry vision, double vision, or itchy eyes  ENT ROS: negative for - headaches, nasal congestion, sneezing, or sore throat  Endocrine ROS: negative for - malaise/lethargy, polydipsia/polyuria, or temperature intolerance  Respiratory ROS: positive for cough and shortness of air   Cardiovascular ROS: negative for - chest pain, dyspnea on exertion, edema, palpitations, or paroxysmal nocturnal dyspnea  Gastrointestinal ROS: negative for - abdominal pain, constipation, diarrhea, heartburn, hematemesis, or nausea/vomiting  Genito-Urinary ROS: negative for - change in urinary stream, hematuria, incontinence, or urinary frequency/urgency  Musculoskeletal ROS: positive for generalized weakness  Neurological ROS: negative for - confusion, dizziness, gait disturbance, headaches, impaired coordination/balance, memory loss, numbness/tingling, seizures, or visual changes  Dermatological ROS: negative for dry skin and rash            Review of Systems     Objective      Objective      Vitals:   Temp:  [97.2 °F (36.2 °C)-98.1 °F (36.7 °C)] 97.3 °F (36.3 °C)  Heart Rate:  [71-82] 82  Resp:   [18-20] 20  BP: ()/(50-72) 124/68  Flow (L/min):  [2] 2  Physical Exam                         Constitutional: Awake, alert, no acute distress resting in bed               Eyes: Pupils equal, sclerae anicteric, no conjunctival injection              HENT: NCAT, mucous membranes moist              Neck: Supple,              Respiratory: Clear to auscultation bilaterally, nonlabored respirations  No w/r/r               Cardiovascular: RRR, no murmurs,               Gastrointestinal: Positive bowel sounds, soft, nontender, nondistended              Musculoskeletal: No bilateral ankle edema, no clubbing or cyanosis to extremities              Psychiatric: Appropriate affect, cooperative              Neurologic: Oriented to person and place  strength symmetric in all extremities, Cranial Nerves grossly intact to confrontation, speech clear              Skin: No rashes            Result Review       Result Review:  I have personally reviewed the results from the time of this admission to 4/6/2022 08:25 EDT and agree with these findings:  [x]?  Laboratory   CBC Results        CBC    CBC 4/4/22 4/5/22   WBC 20.63 (A) 17.11 (A)   RBC 4.15 3.41 (A)   Hemoglobin 12.1 10.2 (A)   Hematocrit 36.5 30.3 (A)   MCV 88.0 88.9   MCH 29.2 29.9   MCHC 33.2 33.7   RDW 15.7 (A) 15.5 (A)   Platelets 197 148   (A) Abnormal value                  Basic Metabolic Profile Reults:         BMP    BMP 4/4/22 4/4/22 4/5/22     2036 2210     BUN 17   22 (A)   Creatinine 0.98   0.83   Sodium 136 133.0 (A) 136   Potassium 3.7   3.8   Chloride 95 (A)   101   CO2 25.3   21.4 (A)   Calcium 9.2   8.5 (A)   (A) Abnormal value        Comments are available for some flowsheets but are not being displayed.                    [x]?  Microbiology   No results found for: ACANTHNAEG, AFBCX, BPERTUSSISCX, BLOODCX  No results found for: BCIDPCR, CXREFLEX, CSFCX, CULTURETIS  No results found for: CULTURES, HSVCX, URCX  No results found for: EYECULTURE, GCCX,  HSVCULTURE, LABHSV  No results found for: LEGIONELLA, MRSACX, MUMPSCX, MYCOPLASCX  No results found for: NOCARDIACX, STOOLCX  No results found for: THROATCX, UNSTIMCULT, URINECX, CULTURE, VZVCULTUR  No results found for: VIRALCULTU, WOUNDCX     [x]?  Radiology     Chest x-ray results from the last 3 days:   XR Chest 1 View     Result Date: 4/4/2022  PROCEDURE:            XR CHEST 1 VW  COMPARISON:    [facility], CR, CHEST PA/AP & LAT 2V, 1/08/2020, 15:37.  INDICATIONS:           SHORTNESS OF BREATH  FINDINGS:       Heart size within normal limits.  There is patchy airspace disease at the right lung base concerning for pneumonia.  There is scarring at the left lung base which appears similar to the prior study.  No pneumothorax.  No significant pleural effusion.  Osseous structures grossly intact.       Impression:      Right basilar airspace disease most concerning for pneumonia.       MAGALIS VILLEGAS MD       Electronically Signed and Approved By: MAGALIS VILLEGAS MD on 4/04/2022 at 21:13                  []?  EKG/Telemetry   []?  Cardiology/Vascular   []?  Pathology  []?  Old records  []?  Other:           Assessment/Plan      Assessment / Plan      Assessment/Plan:  Sepsis secondary to community-acquired pneumonia   Chronic hypoxemic respiratory failure on home oxygen  Anxiety and depression  Hypertension     PLAN   continue patient on broad-spectrum antibiotics.  Cultures pending  CT of chest reviewed with patient and daughter   Continue patient's home lisinopril  Continue patient's Wellbutrin, BuSpar Neurontin and fluoxetine for her anxiety and depression  Patient has a diagnosis of diabetes however her A1c is 5.5  Patient is interested in rehab placement      Discussed plan with RN.     DVT prophylaxis:  Lovenox   Medical DVT prophylaxis orders are present.     CODE STATUS:   Code Status (Patient has no pulse and is not breathing): CPR (Attempt to Resuscitate)  Medical Interventions (Patient has  pulse or is breathing): Full Support                                                  Audra Nolan, RN    Registered Nurse      Plan of Care      Signed   Date of Service:  22   Creation Time:  22              Signed              Show:Clear all  []Manual[x]Template[]Copied    Added by:  [x]Audra Nolan RN      []Daria for details    Goal Outcome Evaluation:  Progress: no change  Outcome Evaluation: vital signs stable. alert and oriented x 4. pt got in the shower today. pt remains on 2L and tolerating well. diet advanced to regular with thin liquids per order. pt said appetite was improving. wound and skin care performed today per order.              Garima Flores RD    Registered Dietitian   Nutrition   Plan of Care      Signed   Date of Service:  22   Creation Time:  22              Signed              Show:Clear all  [x]Manual[x]Template[]Copied    Added by:  [x]Garima Flores RD      []Daria for details    Goal Outcome Evaluation:   RD follow-up:  Pt doesn't like the Boost supplement therefore will discontinue.  RD offered snacks or other supplements but pt refuses, states feels she is beginning to eat better.  RD will continue to monitor prn.                                                               Physician Progress Notes (last 24 hours)      Flako Santos DO at 22 0829           [facility]  Date: 2022  Patient Name: Lluvia Archer  : 1965  MRN: [id number]  Date of admission: 2022      Subjective   Subjective     Chief Complaint: short of breath     Summary:   56 y.o. female former smoker with COPD on home O2, HLD, HTN, depression who presents with shortness of breath and altered mental status.  The patient reports that she has been feeling ill for the past week. She states that she has been short of breath and febrile with this illness. She notes that she has also had a cough; however, this is not unusual  "for her secondary to her COPD.  Patient's daughter is at bedside and states that she has been sleeping for the past week.  Patient also states that she has extreme anxiety.  Patient states that she has history of tobacco abuse but has not smoked in quite some time. Per nursing, the patient was initially brought to the ED today due to the daughter's concerns for altered mental status. They informed EMS today that the patient \"wasn't acting right\" in that she was not taking her medication and not wearing her home oxygen. EMS was also informed that the patient had developed new bowel/bladder incontinence however patient states she is depressed and just does not want to get up to go.      In the ED:  Febrile to 101, tachycardic to 122, tachypneic to 26, blood pressure okay, desat to 86 on room air then up into the 90s with 3 L nasal cannula.     Troponin: 0.057  BNP 2554     CMP: Largely unremarkable except for:  -Elevated LFTs  -Albumin 2.90     CBC:  -WBC: 20.6     U tox: Unremarkable     CXR:  Right basilar airspace disease most concerning for pneumonia.       Interval Followup:   Patient states coughing more today but not able to bring up anything   Mental status is back to normal  Patient continues to have cough  CT shows pneumonia        ROS:   History obtained from the patient  General ROS: Positive for Fatigue, Negative for - chills, fever, malaise, or night sweats  Psychological ROS: positive for anxiety and depression  Ophthalmic ROS: negative for - blurry vision, double vision, or itchy eyes  ENT ROS: negative for - headaches, nasal congestion, sneezing, or sore throat  Endocrine ROS: negative for - malaise/lethargy, polydipsia/polyuria, or temperature intolerance  Respiratory ROS: positive for cough and shortness of air   Cardiovascular ROS: negative for - chest pain, dyspnea on exertion, edema, palpitations, or paroxysmal nocturnal dyspnea  Gastrointestinal ROS: negative for - abdominal pain, constipation, " diarrhea, heartburn, hematemesis, or nausea/vomiting  Genito-Urinary ROS: negative for - change in urinary stream, hematuria, incontinence, or urinary frequency/urgency  Musculoskeletal ROS: positive for generalized weakness  Neurological ROS: negative for - confusion, dizziness, gait disturbance, headaches, impaired coordination/balance, memory loss, numbness/tingling, seizures, or visual changes  Dermatological ROS: negative for dry skin and rash         Review of SystemsObjective   Objective     Vitals:   Temp:  [97.2 °F (36.2 °C)-98.2 °F (36.8 °C)] 97.7 °F (36.5 °C)  Heart Rate:  [] 79  Resp:  [16-20] 20  BP: (103-140)/(56-86) 115/76  Flow (L/min):  [2] 2  Physical Exam    Constitutional: Awake, alert, no acute distress resting in bed    Eyes: Pupils equal, sclerae anicteric, no conjunctival injection   HENT: NCAT, mucous membranes moist   Neck: Supple,   Respiratory: Clear to auscultation bilaterally, nonlabored respirations  No w/r/r    Cardiovascular: RRR, no murmurs,    Gastrointestinal: Positive bowel sounds, soft, nontender, nondistended   Musculoskeletal: No bilateral ankle edema, no clubbing or cyanosis to extremities   Psychiatric: Appropriate affect, cooperative   Neurologic: Oriented to person and place  strength symmetric in all extremities, Cranial Nerves grossly intact to confrontation, speech clear   Skin: No rashes     Result Review    Result Review:  I have personally reviewed the results from the time of this admission to 4/7/2022 08:29 EDT and agree with these findings:  [x]  Laboratory   CBC    CBC 4/5/22 4/6/22 4/7/22   WBC 17.11 (A) 21.78 (A) 17.35 (A)   RBC 3.41 (A) 3.39 (A) 3.20 (A)   Hemoglobin 10.2 (A) 10.0 (A) 9.2 (A)   Hematocrit 30.3 (A) 30.5 (A) 29.2 (A)   MCV 88.9 90.0 91.3   MCH 29.9 29.5 28.8   MCHC 33.7 32.8 31.5   RDW 15.5 (A) 15.9 (A) 16.0 (A)   Platelets 148 214 228   (A) Abnormal value            BMP    BMP 4/5/22 4/6/22 4/7/22   BUN 22 (A) 27 (A) 29 (A)   Creatinine  0.83 1.03 (A) 1.15 (A)   Sodium 136 142 142   Potassium 3.8 3.1 (A) 3.4 (A)   Chloride 101 106 105   CO2 21.4 (A) 24.3 25.8   Calcium 8.5 (A) 9.0 8.6   (A) Abnormal value       Comments are available for some flowsheets but are not being displayed.             [x]  Microbiology   No results found for: ACANTHNAEG, AFBCX, BPERTUSSISCX, BLOODCX  No results found for: BCIDPCR, CXREFLEX, CSFCX, CULTURETIS  No results found for: CULTURES, HSVCX, URCX  No results found for: EYECULTURE, GCCX, HSVCULTURE, LABHSV  No results found for: LEGIONELLA, MRSACX, MUMPSCX, MYCOPLASCX  No results found for: NOCARDIACX, STOOLCX  No results found for: THROATCX, UNSTIMCULT, URINECX, CULTURE, VZVCULTUR  No results found for: VIRALCULTU, WOUNDCX    [x]  Radiology   XR Chest 1 View    Result Date: 4/4/2022  PROCEDURE: XR CHEST 1 VW  COMPARISON: [facility], CR, CHEST PA/AP & LAT 2V, 1/08/2020, 15:37.  INDICATIONS: SHORTNESS OF BREATH  FINDINGS:  Heart size within normal limits.  There is patchy airspace disease at the right lung base concerning for pneumonia.  There is scarring at the left lung base which appears similar to the prior study.  No pneumothorax.  No significant pleural effusion.  Osseous structures grossly intact.      Impression:   Right basilar airspace disease most concerning for pneumonia.       MAGALIS VILLEGAS MD       Electronically Signed and Approved By: MAGALIS VILLEGAS MD on 4/04/2022 at 21:13               []  EKG/Telemetry   []  Cardiology/Vascular   []  Pathology  []  Old records  []  Other:    Assessment/Plan   Assessment / Plan     Assessment/Plan:  Sepsis secondary to community-acquired pneumonia   Chronic hypoxemic respiratory failure on home oxygen  Anxiety and depression  Hypertension  Concern for CHF- no echo on file  Troponin elevated x1     PLAN   de-escalate antibiotics to azithromycin and Rocephin  Will order sputum culture if patient is able to cough up anything  We will also obtain  respiratory viral panel  Blood cultures are negative  MRSA screen is also negative   CT of chest reviewed with patient and daughter   Continue patient's home lisinopril  Continue patient's Wellbutrin, BuSpar Neurontin and fluoxetine for her anxiety and depression  Patient has a diagnosis of diabetes however her A1c is 5.5  Continue patient on bronchodilators   Will obtain echocardiogram  We will give patient a one-time dose of Lasix  Patient is interested in rehab placement      Discussed plan with RN.    DVT prophylaxis:  Lovenox   Medical DVT prophylaxis orders are present.    CODE STATUS:   Code Status (Patient has no pulse and is not breathing): CPR (Attempt to Resuscitate)  Medical Interventions (Patient has pulse or is breathing): Full Support                     Electronically signed by Flako Santos DO at 04/07/22 0606

## 2022-04-07 NOTE — PROGRESS NOTES
" [facility]   Hospitalist Progress Note  Date: 2022  Patient Name: Lluvia Archer  : 1965  MRN: [id number]  Date of admission: 2022      Subjective   Subjective     Chief Complaint: short of breath     Summary:   56 y.o. female former smoker with COPD on home O2, HLD, HTN, depression who presents with shortness of breath and altered mental status.  The patient reports that she has been feeling ill for the past week. She states that she has been short of breath and febrile with this illness. She notes that she has also had a cough; however, this is not unusual for her secondary to her COPD.  Patient's daughter is at bedside and states that she has been sleeping for the past week.  Patient also states that she has extreme anxiety.  Patient states that she has history of tobacco abuse but has not smoked in quite some time. Per nursing, the patient was initially brought to the ED today due to the daughter's concerns for altered mental status. They informed EMS today that the patient \"wasn't acting right\" in that she was not taking her medication and not wearing her home oxygen. EMS was also informed that the patient had developed new bowel/bladder incontinence however patient states she is depressed and just does not want to get up to go.      In the ED:  Febrile to 101, tachycardic to 122, tachypneic to 26, blood pressure okay, desat to 86 on room air then up into the 90s with 3 L nasal cannula.     Troponin: 0.057  BNP 2554     CMP: Largely unremarkable except for:  -Elevated LFTs  -Albumin 2.90     CBC:  -WBC: 20.6     U tox: Unremarkable     CXR:  Right basilar airspace disease most concerning for pneumonia.       Interval Followup:   Patient states coughing more today but not able to bring up anything   Mental status is back to normal  Patient continues to have cough  CT shows pneumonia        ROS:   History obtained from the patient  General ROS: Positive for Fatigue, Negative for - chills, " fever, malaise, or night sweats  Psychological ROS: positive for anxiety and depression  Ophthalmic ROS: negative for - blurry vision, double vision, or itchy eyes  ENT ROS: negative for - headaches, nasal congestion, sneezing, or sore throat  Endocrine ROS: negative for - malaise/lethargy, polydipsia/polyuria, or temperature intolerance  Respiratory ROS: positive for cough and shortness of air   Cardiovascular ROS: negative for - chest pain, dyspnea on exertion, edema, palpitations, or paroxysmal nocturnal dyspnea  Gastrointestinal ROS: negative for - abdominal pain, constipation, diarrhea, heartburn, hematemesis, or nausea/vomiting  Genito-Urinary ROS: negative for - change in urinary stream, hematuria, incontinence, or urinary frequency/urgency  Musculoskeletal ROS: positive for generalized weakness  Neurological ROS: negative for - confusion, dizziness, gait disturbance, headaches, impaired coordination/balance, memory loss, numbness/tingling, seizures, or visual changes  Dermatological ROS: negative for dry skin and rash         Review of SystemsObjective   Objective     Vitals:   Temp:  [97.2 °F (36.2 °C)-98.2 °F (36.8 °C)] 97.7 °F (36.5 °C)  Heart Rate:  [] 79  Resp:  [16-20] 20  BP: (103-140)/(56-86) 115/76  Flow (L/min):  [2] 2  Physical Exam    Constitutional: Awake, alert, no acute distress resting in bed    Eyes: Pupils equal, sclerae anicteric, no conjunctival injection   HENT: NCAT, mucous membranes moist   Neck: Supple,   Respiratory: Clear to auscultation bilaterally, nonlabored respirations  No w/r/r    Cardiovascular: RRR, no murmurs,    Gastrointestinal: Positive bowel sounds, soft, nontender, nondistended   Musculoskeletal: No bilateral ankle edema, no clubbing or cyanosis to extremities   Psychiatric: Appropriate affect, cooperative   Neurologic: Oriented to person and place  strength symmetric in all extremities, Cranial Nerves grossly intact to confrontation, speech clear   Skin: No  nishant     Result Review    Result Review:  I have personally reviewed the results from the time of this admission to 4/7/2022 08:29 EDT and agree with these findings:  [x]  Laboratory   CBC    CBC 4/5/22 4/6/22 4/7/22   WBC 17.11 (A) 21.78 (A) 17.35 (A)   RBC 3.41 (A) 3.39 (A) 3.20 (A)   Hemoglobin 10.2 (A) 10.0 (A) 9.2 (A)   Hematocrit 30.3 (A) 30.5 (A) 29.2 (A)   MCV 88.9 90.0 91.3   MCH 29.9 29.5 28.8   MCHC 33.7 32.8 31.5   RDW 15.5 (A) 15.9 (A) 16.0 (A)   Platelets 148 214 228   (A) Abnormal value            BMP    BMP 4/5/22 4/6/22 4/7/22   BUN 22 (A) 27 (A) 29 (A)   Creatinine 0.83 1.03 (A) 1.15 (A)   Sodium 136 142 142   Potassium 3.8 3.1 (A) 3.4 (A)   Chloride 101 106 105   CO2 21.4 (A) 24.3 25.8   Calcium 8.5 (A) 9.0 8.6   (A) Abnormal value       Comments are available for some flowsheets but are not being displayed.             [x]  Microbiology   No results found for: ACANTHNAEG, AFBCX, BPERTUSSISCX, BLOODCX  No results found for: BCIDPCR, CXREFLEX, CSFCX, CULTURETIS  No results found for: CULTURES, HSVCX, URCX  No results found for: EYECULTURE, GCCX, HSVCULTURE, LABHSV  No results found for: LEGIONELLA, MRSACX, MUMPSCX, MYCOPLASCX  No results found for: NOCARDIACX, STOOLCX  No results found for: THROATCX, UNSTIMCULT, URINECX, CULTURE, VZVCULTUR  No results found for: VIRALCULTU, WOUNDCX    [x]  Radiology   XR Chest 1 View    Result Date: 4/4/2022  PROCEDURE: XR CHEST 1 VW  COMPARISON: [facility], CR, CHEST PA/AP & LAT 2V, 1/08/2020, 15:37.  INDICATIONS: SHORTNESS OF BREATH  FINDINGS:  Heart size within normal limits.  There is patchy airspace disease at the right lung base concerning for pneumonia.  There is scarring at the left lung base which appears similar to the prior study.  No pneumothorax.  No significant pleural effusion.  Osseous structures grossly intact.      Impression:   Right basilar airspace disease most concerning for pneumonia.       MAGALIS VILLEGAS MD        Electronically Signed and Approved By: MAGALIS VILLEGAS MD on 4/04/2022 at 21:13               []  EKG/Telemetry   []  Cardiology/Vascular   []  Pathology  []  Old records  []  Other:    Assessment/Plan   Assessment / Plan     Assessment/Plan:  Sepsis secondary to community-acquired pneumonia   Chronic hypoxemic respiratory failure on home oxygen  Anxiety and depression  Hypertension  Concern for CHF- no echo on file  Troponin elevated x1     PLAN   de-escalate antibiotics to azithromycin and Rocephin  Will order sputum culture if patient is able to cough up anything  We will also obtain respiratory viral panel  Blood cultures are negative  MRSA screen is also negative   CT of chest reviewed with patient and daughter   Continue patient's home lisinopril  Continue patient's Wellbutrin, BuSpar Neurontin and fluoxetine for her anxiety and depression  Patient has a diagnosis of diabetes however her A1c is 5.5  Continue patient on bronchodilators   Will obtain echocardiogram  We will give patient a one-time dose of Lasix  Patient is interested in rehab placement      Discussed plan with RN.    DVT prophylaxis:  Lovenox   Medical DVT prophylaxis orders are present.    CODE STATUS:   Code Status (Patient has no pulse and is not breathing): CPR (Attempt to Resuscitate)  Medical Interventions (Patient has pulse or is breathing): Full Support

## 2022-04-07 NOTE — PLAN OF CARE
Goal Outcome Evaluation:      RD follow-up:  Pt doesn't like the Boost supplement therefore will discontinue.  RD offered snacks or other supplements but pt refuses, states feels she is beginning to eat better.  RD will continue to monitor prn.

## 2022-04-07 NOTE — PLAN OF CARE
Goal Outcome Evaluation:           Progress: no change  Outcome Evaluation: vital signs stable. alert and oriented x 4. pt up several times to the bedside commode today. pt remains on 2L and tolerating well. pt had complaints of congested, productive cough, prn tussionex given per order. will continue to monitor.

## 2022-04-08 ENCOUNTER — APPOINTMENT (OUTPATIENT)
Dept: CARDIOLOGY | Facility: HOSPITAL | Age: 57
End: 2022-04-08

## 2022-04-08 LAB
ANION GAP SERPL CALCULATED.3IONS-SCNC: 10.5 MMOL/L (ref 5–15)
BH CV ECHO MEAS - AO MAX PG: 22 MMHG
BH CV ECHO MEAS - AO MEAN PG: 11 MMHG
BH CV ECHO MEAS - AO ROOT DIAM: 3.6 CM
BH CV ECHO MEAS - AO V2 MAX: 233 CM/SEC
BH CV ECHO MEAS - AO V2 VTI: 43.8 CM
BH CV ECHO MEAS - AVA PLANIMETRY TRACED: 1.8 CM2
BH CV ECHO MEAS - EDV(MOD-SP2): 68 ML
BH CV ECHO MEAS - EDV(MOD-SP4): 68 ML
BH CV ECHO MEAS - EF(MOD-BP): 58 %
BH CV ECHO MEAS - ESV(MOD-SP2): 27 ML
BH CV ECHO MEAS - ESV(MOD-SP4): 28 ML
BH CV ECHO MEAS - IVSD: 0.7 CM
BH CV ECHO MEAS - LA DIMENSION(2D): 3.1 CM
BH CV ECHO MEAS - LAT PEAK E' VEL: 9.7 CM/SEC
BH CV ECHO MEAS - LV MAX PG: 8 MMHG
BH CV ECHO MEAS - LV MEAN PG: 5 MMHG
BH CV ECHO MEAS - LV V1 MAX: 144 CM/SEC
BH CV ECHO MEAS - LV V1 VTI: 25.4 CM
BH CV ECHO MEAS - LVIDD: 5.5 CM
BH CV ECHO MEAS - LVIDS: 3.3 CM
BH CV ECHO MEAS - LVOT DIAM: 2 CM
BH CV ECHO MEAS - LVPWD: 0.8 CM
BH CV ECHO MEAS - MED PEAK E' VEL: 6.53 CM/SEC
BH CV ECHO MEAS - MV A MAX VEL: 113 CM/SEC
BH CV ECHO MEAS - MV DEC TIME: 169 MSEC
BH CV ECHO MEAS - MV E MAX VEL: 91 CM/SEC
BH CV ECHO MEAS - MV E/A: 0.8
BH CV ECHO MEAS - RVDD: 2.5 CM
BH CV ECHO MEAS - TAPSE (>1.6): 2.17 CM
BH CV ECHO MEASUREMENTS AVERAGE E/E' RATIO: 11.21
BUN SERPL-MCNC: 20 MG/DL (ref 6–20)
BUN/CREAT SERPL: 19.6 (ref 7–25)
CALCIUM SPEC-SCNC: 8.5 MG/DL (ref 8.6–10.5)
CHLORIDE SERPL-SCNC: 103 MMOL/L (ref 98–107)
CO2 SERPL-SCNC: 24.5 MMOL/L (ref 22–29)
CREAT SERPL-MCNC: 1.02 MG/DL (ref 0.57–1)
DEPRECATED RDW RBC AUTO: 51.9 FL (ref 37–54)
EGFRCR SERPLBLD CKD-EPI 2021: 65.1 ML/MIN/1.73
ERYTHROCYTE [DISTWIDTH] IN BLOOD BY AUTOMATED COUNT: 15.9 % (ref 12.3–15.4)
GLUCOSE SERPL-MCNC: 92 MG/DL (ref 65–99)
HCT VFR BLD AUTO: 26.6 % (ref 34–46.6)
HGB BLD-MCNC: 8.8 G/DL (ref 12–15.9)
IVRT: 66 MSEC
LEFT ATRIUM VOLUME INDEX: 16.7 ML/M2
MAGNESIUM SERPL-MCNC: 2.1 MG/DL (ref 1.6–2.6)
MAXIMAL PREDICTED HEART RATE: 165 BPM
MCH RBC QN AUTO: 29.7 PG (ref 26.6–33)
MCHC RBC AUTO-ENTMCNC: 33.1 G/DL (ref 31.5–35.7)
MCV RBC AUTO: 89.9 FL (ref 79–97)
PLATELET # BLD AUTO: 244 10*3/MM3 (ref 140–450)
PMV BLD AUTO: 11.4 FL (ref 6–12)
POTASSIUM SERPL-SCNC: 3.3 MMOL/L (ref 3.5–5.2)
RBC # BLD AUTO: 2.96 10*6/MM3 (ref 3.77–5.28)
SODIUM SERPL-SCNC: 138 MMOL/L (ref 136–145)
STRESS TARGET HR: 140 BPM
WBC NRBC COR # BLD: 12.29 10*3/MM3 (ref 3.4–10.8)

## 2022-04-08 PROCEDURE — 99233 SBSQ HOSP IP/OBS HIGH 50: CPT | Performed by: INTERNAL MEDICINE

## 2022-04-08 PROCEDURE — 93306 TTE W/DOPPLER COMPLETE: CPT | Performed by: INTERNAL MEDICINE

## 2022-04-08 PROCEDURE — 93306 TTE W/DOPPLER COMPLETE: CPT

## 2022-04-08 PROCEDURE — 94799 UNLISTED PULMONARY SVC/PX: CPT

## 2022-04-08 PROCEDURE — 97530 THERAPEUTIC ACTIVITIES: CPT

## 2022-04-08 PROCEDURE — 25010000002 ENOXAPARIN PER 10 MG: Performed by: INTERNAL MEDICINE

## 2022-04-08 PROCEDURE — 83735 ASSAY OF MAGNESIUM: CPT | Performed by: INTERNAL MEDICINE

## 2022-04-08 PROCEDURE — 85027 COMPLETE CBC AUTOMATED: CPT | Performed by: INTERNAL MEDICINE

## 2022-04-08 PROCEDURE — 80048 BASIC METABOLIC PNL TOTAL CA: CPT | Performed by: INTERNAL MEDICINE

## 2022-04-08 PROCEDURE — 25010000002 AZITHROMYCIN PER 500 MG: Performed by: INTERNAL MEDICINE

## 2022-04-08 PROCEDURE — 25010000002 CEFTRIAXONE PER 250 MG: Performed by: INTERNAL MEDICINE

## 2022-04-08 RX ADMIN — GABAPENTIN 300 MG: 300 CAPSULE ORAL at 10:01

## 2022-04-08 RX ADMIN — BUPROPION HYDROCHLORIDE 150 MG: 150 TABLET, EXTENDED RELEASE ORAL at 20:36

## 2022-04-08 RX ADMIN — BUSPIRONE HYDROCHLORIDE 10 MG: 10 TABLET ORAL at 13:12

## 2022-04-08 RX ADMIN — ARFORMOTEROL TARTRATE 15 MCG: 15 SOLUTION RESPIRATORY (INHALATION) at 08:32

## 2022-04-08 RX ADMIN — IPRATROPIUM BROMIDE AND ALBUTEROL SULFATE 3 ML: 2.5; .5 SOLUTION RESPIRATORY (INHALATION) at 13:37

## 2022-04-08 RX ADMIN — BACITRACIN 1 APPLICATION: 500 OINTMENT TOPICAL at 10:02

## 2022-04-08 RX ADMIN — FAMOTIDINE 40 MG: 20 TABLET ORAL at 06:31

## 2022-04-08 RX ADMIN — FLUOXETINE 40 MG: 20 CAPSULE ORAL at 10:01

## 2022-04-08 RX ADMIN — ARFORMOTEROL TARTRATE 15 MCG: 15 SOLUTION RESPIRATORY (INHALATION) at 18:39

## 2022-04-08 RX ADMIN — AZITHROMYCIN 500 MG: 500 INJECTION, POWDER, LYOPHILIZED, FOR SOLUTION INTRAVENOUS at 11:11

## 2022-04-08 RX ADMIN — MICONAZOLE NITRATE: 2 POWDER TOPICAL at 10:02

## 2022-04-08 RX ADMIN — OXYCODONE HYDROCHLORIDE AND ACETAMINOPHEN 1 TABLET: 5; 325 TABLET ORAL at 20:33

## 2022-04-08 RX ADMIN — FAMOTIDINE 40 MG: 20 TABLET ORAL at 17:14

## 2022-04-08 RX ADMIN — BUSPIRONE HYDROCHLORIDE 10 MG: 10 TABLET ORAL at 21:41

## 2022-04-08 RX ADMIN — IPRATROPIUM BROMIDE AND ALBUTEROL SULFATE 3 ML: 2.5; .5 SOLUTION RESPIRATORY (INHALATION) at 08:32

## 2022-04-08 RX ADMIN — Medication 10 ML: at 20:37

## 2022-04-08 RX ADMIN — NYSTATIN: 100000 POWDER TOPICAL at 20:38

## 2022-04-08 RX ADMIN — LISINOPRIL 5 MG: 5 TABLET ORAL at 10:05

## 2022-04-08 RX ADMIN — BUPROPION HYDROCHLORIDE 150 MG: 150 TABLET, EXTENDED RELEASE ORAL at 10:01

## 2022-04-08 RX ADMIN — GABAPENTIN 300 MG: 300 CAPSULE ORAL at 20:36

## 2022-04-08 RX ADMIN — ENOXAPARIN SODIUM 40 MG: 100 INJECTION SUBCUTANEOUS at 10:00

## 2022-04-08 RX ADMIN — IPRATROPIUM BROMIDE AND ALBUTEROL SULFATE 3 ML: 2.5; .5 SOLUTION RESPIRATORY (INHALATION) at 18:39

## 2022-04-08 RX ADMIN — Medication 10 ML: at 10:01

## 2022-04-08 RX ADMIN — BUSPIRONE HYDROCHLORIDE 10 MG: 10 TABLET ORAL at 06:31

## 2022-04-08 RX ADMIN — GABAPENTIN 300 MG: 300 CAPSULE ORAL at 17:14

## 2022-04-08 RX ADMIN — CEFTRIAXONE SODIUM 1 G: 1 INJECTION, SOLUTION INTRAVENOUS at 10:00

## 2022-04-08 RX ADMIN — BUDESONIDE 0.5 MG: 0.5 SUSPENSION RESPIRATORY (INHALATION) at 08:32

## 2022-04-08 RX ADMIN — BUDESONIDE 0.5 MG: 0.5 SUSPENSION RESPIRATORY (INHALATION) at 18:39

## 2022-04-08 NOTE — PLAN OF CARE
Goal Outcome Evaluation:           Progress: improving  Outcome Evaluation: denies pain/discomfort this shift. has slept some. up in chair for lunch. frequently up to bsc with urinary frequency. continues with 2 liters o2 via nc. c/o of congested, productive cough at times but denies need for prn cough med. no new issues/needs noted at this time.

## 2022-04-08 NOTE — THERAPY TREATMENT NOTE
Acute Care - Physical Therapy Progress Note   Ele[place]     Patient Name: Lluvia Archer  : 1965  MRN: [id number]  Today's Date: 2022      Visit Dx:     ICD-10-CM ICD-9-CM   1. Sepsis, due to unspecified organism, unspecified whether acute organ dysfunction present ([facility])  A41.9 038.9     995.91   2. Pneumonia of right lower lobe due to infectious organism  J18.9 486   3. Acute respiratory failure with hypoxia ([facility])  J96.01 518.81   4. Elevated troponin  R77.8 790.6   5. Leukocytosis, unspecified type  D72.829 288.60   6. Dysphagia, oropharyngeal  R13.12 787.22   7. Decreased activities of daily living (ADL)  Z78.9 V49.89   8. Difficulty walking  R26.2 719.7     Patient Active Problem List   Diagnosis   • Abnormal mammogram   • Anxiety   • Arthritis   • Chronic nausea   • Chronic obstructive pulmonary disease (COPD) ([facility])   • Counseling on substance use and abuse   • Diabetes mellitus, type II ([facility])   • Esophageal reflux   • Hernia, hiatal   • Hyperlipidemia   • Hypertension   • Knee pain, right   • Memory loss   • Migraine   • Moderate episode of recurrent major depressive disorder ([facility])   • Night sweats   • Numbness   • Sciatica of right side   • Severe episode of recurrent major depressive disorder, without psychotic features ([facility])   • Shoulder pain, left   • Other diseases of nasal cavity and sinuses   • Sleep apnea, unspecified   • Tobacco abuse   • Strain of groin, right, subsequent encounter   • Osteoarthritis of spine with radiculopathy, lumbar region   • Chronic right-sided low back pain with right-sided sciatica   • Medication management   • Primary osteoarthritis of right hip   • Right hip pain   • Sepsis, due to unspecified organism, unspecified whether acute organ dysfunction present ([facility])   • Severe malnutrition (CMS/[facility])     Past Medical History:   Diagnosis Date   • Abnormal mammogram    • Anxiety    • Arthritis     R SHOULDER, R HIP, AND R LEG   • Chronic nausea 01/15/2019   • COPD  (chronic obstructive pulmonary disease) (Formerly [facility])    • Hernia, hiatal    • Hyperlipidemia    • Hypertension    • Knee pain, right 2018   • Memory loss     FORGETFULNESS   • Migraine headache    • Moderate episode of recurrent major depressive disorder (Formerly [facility]) 2017   • Night sweats    • Numbness 2017    WILL CHECK AN X- RAY OF HER C-SPINE    • Sciatica of right side 2017   • Severe episode of recurrent major depressive disorder, without psychotic features ([facility]ly [facility]) 10/22/2019   • Shortness of breath    • Shoulder pain, left 2018   • Sinus trouble    • Sleep apnea, unspecified    • Tobacco abuse 2017   • Tobacco abuse counseling 2017    SHE DESIRES TO QUIT. SHE HAS TRIED TO CHANTIX IN THE PAST W/O SUCCESS. SHE WAS SUCCESSFUL IN THE PAST W/O MEDICATION.     Past Surgical History:   Procedure Laterality Date   •  SECTION     • CHOLECYSTECTOMY     • COLONOSCOPY     • GALLBLADDER SURGERY       PT Assessment (last 12 hours)     PT Evaluation and Treatment     Row Name 22 1400          Physical Therapy Time and Intention    Subjective Information no complaints  -CS     Document Type therapy note (daily note)  -CS     Mode of Treatment individual therapy;physical therapy  -CS     Patient Effort good  -CS     Symptoms Noted During/After Treatment none  -CS     Row Name 22 1400          Bed Mobility    Supine-Sit Grimes (Bed Mobility) standby assist;1 person assist  -CS     Assistive Device (Bed Mobility) bed rails  -CS     Row Name 22 1400          Transfers    Transfers sit-stand transfer;stand-sit transfer  -CS     Sit-Stand Grimes (Transfers) verbal cues;contact guard;1 person assist  -CS     Stand-Sit Grimes (Transfers) verbal cues;contact guard;1 person assist  -CS     Row Name 22 1400          Sit-Stand Transfer    Assistive Device (Sit-Stand Transfers) walker, front-wheeled  -CS     Row Name 22 1400          Stand-Sit Transfer     Assistive Device (Stand-Sit Transfers) walker, front-wheeled  -CS     Row Name 04/08/22 1400          Gait/Stairs (Locomotion)    Gait/Stairs Locomotion gait/ambulation independence;gait/ambulation assistive device;distance ambulated;gait pattern  -CS     Barnes Level (Gait) contact guard;1 person to manage equipment  -CS     Assistive Device (Gait) walker, front-wheeled  -CS     Distance in Feet (Gait) 120  -CS     Pattern (Gait) 4-point;step-through  -CS     Deviations/Abnormal Patterns (Gait) ashwin decreased;gait speed decreased;stride length decreased  -CS     Bilateral Gait Deviations forward flexed posture  -CS     Row Name             Wound 04/05/22 0140 Bilateral anterior groin MASD (Moisture associated skin damage)    Wound - Properties Group Placement Date: 04/05/22  -HR Placement Time: 0140  -HR Present on Hospital Admission: Y  -HR Side: Bilateral  -HR Orientation: anterior  -HR Location: groin  -HR Primary Wound Type: MASD  -HR     Retired Wound - Properties Group Placement Date: 04/05/22  -HR Placement Time: 0140  -HR Present on Hospital Admission: Y  -HR Side: Bilateral  -HR Orientation: anterior  -HR Location: groin  -HR Primary Wound Type: MASD  -HR     Retired Wound - Properties Group Date first assessed: 04/05/22  -HR Time first assessed: 0140  -HR Present on Hospital Admission: Y  -HR Side: Bilateral  -HR Location: groin  -HR Primary Wound Type: MASD  -HR     Row Name             Wound 04/05/22 0142 Right lower breast MASD (Moisture associated skin damage)    Wound - Properties Group Placement Date: 04/05/22  -HR Placement Time: 0142  -HR Side: Right  -HR Orientation: lower  -HR Location: breast  -HR Primary Wound Type: MASD  -HR     Retired Wound - Properties Group Placement Date: 04/05/22  -HR Placement Time: 0142  -HR Side: Right  -HR Orientation: lower  -HR Location: breast  -HR Primary Wound Type: MASD  -HR     Retired Wound - Properties Group Date first assessed: 04/05/22  -HR Time  first assessed: 0142  -HR Side: Right  -HR Location: breast  -HR Primary Wound Type: MASD  -HR     Row Name             Wound 04/05/22 0143 Left lower breast MASD (Moisture associated skin damage)    Wound - Properties Group Placement Date: 04/05/22  -HR Placement Time: 0143  -HR Side: Left  -HR Orientation: lower  -HR Location: breast  -HR Primary Wound Type: MASD  -HR     Retired Wound - Properties Group Placement Date: 04/05/22  -HR Placement Time: 0143  -HR Side: Left  -HR Orientation: lower  -HR Location: breast  -HR Primary Wound Type: MASD  -HR     Retired Wound - Properties Group Date first assessed: 04/05/22  -HR Time first assessed: 0143  -HR Side: Left  -HR Location: breast  -HR Primary Wound Type: MASD  -HR     Row Name             Wound 04/05/22 1442 Right posterior ring finger Traumatic    Wound - Properties Group Placement Date: 04/05/22  -GREG Placement Time: 1442  -GREG Present on Hospital Admission: Y  -GREG Side: Right  -GREG Orientation: posterior  -GREG Location: ring finger  -GREG Primary Wound Type: Traumatic  -GREG     Retired Wound - Properties Group Placement Date: 04/05/22  -GREG Placement Time: 1442  -GREG Present on Hospital Admission: Y  -GREG Side: Right  -GREG Orientation: posterior  -GREG Location: ring finger  -RGEG Primary Wound Type: Traumatic  -GREG     Retired Wound - Properties Group Date first assessed: 04/05/22  -GREG Time first assessed: 1442  -GREG Present on Hospital Admission: Y  -GREG Side: Right  -GREG Location: ring finger  -GREG Primary Wound Type: Traumatic  -GREG     Row Name 04/08/22 1400          Progress Summary (PT)    Daily Progress Summary (PT) Pt. demonstrated improvement with mobility overall today with decreased assistance.  Verbal cues provided for hand placement during STS's and pt. educated on safety.  Pt. ambulated in hallway today for increased distance with PTA managing O2 tank/tubing.  No loss of balance observed and pt. was educated on proper use of Rw and sequencing of bilateral lower  extremeties while using Rw.  Pt. safely returned to room and T/F'd to the chair. Pt. was provided the call button within reach and table placed in front of her.  -EVETTE           User Key  (r) = Recorded By, (t) = Taken By, (c) = Cosigned By    Initials Name Provider Type    HR Amelia Wagner, RN Registered Nurse    Stacie Valderrama RN Registered Nurse    Ethan Brown PTA Physical Therapist Assistant                Physical Therapy Education                 Title: PT OT SLP Therapies (In Progress)     Topic: Physical Therapy (In Progress)     Point: Mobility training (Done)     Learning Progress Summary           Patient Acceptance, E,TB, VU by AV at 4/6/2022 1526                   Point: Home exercise program (Not Started)     Learner Progress:  Not documented in this visit.          Point: Body mechanics (Done)     Learning Progress Summary           Patient Acceptance, E,TB, VU by AV at 4/6/2022 1526                   Point: Precautions (Done)     Learning Progress Summary           Patient Acceptance, E,TB, VU by AV at 4/6/2022 1526                               User Key     Initials Effective Dates Name Provider Type Discipline    AV 06/11/21 -  Geovanni Mendez, PT Physical Therapist PT              PT Recommendation and Plan     Progress Summary (PT)  Daily Progress Summary (PT): Pt. demonstrated improvement with mobility overall today with decreased assistance.  Verbal cues provided for hand placement during STS's and pt. educated on safety.  Pt. ambulated in hallway today for increased distance with PTA managing O2 tank/tubing.  No loss of balance observed and pt. was educated on proper use of Rw and sequencing of bilateral lower extremeties while using Rw.  Pt. safely returned to room and T/F'd to the chair. Pt. was provided the call button within reach and table placed in front of her.   Outcome Measures     Row Name 04/08/22 1400 04/06/22 1500          How much help from another person do you  currently need...    Turning from your back to your side while in flat bed without using bedrails? 4  -CS 4  -AV     Moving from lying on back to sitting on the side of a flat bed without bedrails? 4  -CS 4  -AV     Moving to and from a bed to a chair (including a wheelchair)? 3  -CS 3  -AV     Standing up from a chair using your arms (e.g., wheelchair, bedside chair)? 4  -CS 3  -AV     Climbing 3-5 steps with a railing? 3  -CS 2  -AV     To walk in hospital room? 3  -CS 3  -AV     AM-PAC 6 Clicks Score (PT) 21  -CS 19  -AV            Functional Assessment    Outcome Measure Options AM-PAC 6 Clicks Basic Mobility (PT)  -CS AM-PAC 6 Clicks Basic Mobility (PT)  -AV           User Key  (r) = Recorded By, (t) = Taken By, (c) = Cosigned By    Initials Name Provider Type    AV Geovanni Mendez, PT Physical Therapist    Ethan Brown PTA Physical Therapist Assistant                 Time Calculation:    PT Charges     Row Name 04/08/22 1406             Time Calculation    Start Time 1149  -CS      PT Received On 04/08/22  -CS              Timed Charges    17850 - PT Therapeutic Activity Minutes 23  -CS              Total Minutes    Timed Charges Total Minutes 23  -CS       Total Minutes 23  -CS            User Key  (r) = Recorded By, (t) = Taken By, (c) = Cosigned By    Initials Name Provider Type    Ethan Brown PTA Physical Therapist Assistant              Therapy Charges for Today     Code Description Service Date Service Provider Modifiers Qty    [id number] HC PT THERAPEUTIC ACT EA 15 MIN 4/8/2022 Ethan Mcguire PTA GP 2          PT G-Codes  Outcome Measure Options: AM-PAC 6 Clicks Basic Mobility (PT)  AM-PAC 6 Clicks Score (PT): 21  AM-PAC 6 Clicks Score (OT): 21    Ethan Mcguire PTA  4/8/2022

## 2022-04-08 NOTE — PROGRESS NOTES
" [facility]   Hospitalist Progress Note  Date: 2022  Patient Name: Lluvia Archer  : 1965  MRN: [id number]  Date of admission: 2022      Subjective   Subjective     Chief Complaint: short of breath     Summary:   56 y.o. female former smoker with COPD on home O2, HLD, HTN, depression who presents with shortness of breath and altered mental status.  The patient reports that she has been feeling ill for the past week. She states that she has been short of breath and febrile with this illness. She notes that she has also had a cough; however, this is not unusual for her secondary to her COPD.  Patient's daughter is at bedside and states that she has been sleeping for the past week.  Patient also states that she has extreme anxiety.  Patient states that she has history of tobacco abuse but has not smoked in quite some time. Per nursing, the patient was initially brought to the ED today due to the daughter's concerns for altered mental status. They informed EMS today that the patient \"wasn't acting right\" in that she was not taking her medication and not wearing her home oxygen. EMS was also informed that the patient had developed new bowel/bladder incontinence however patient states she is depressed and just does not want to get up to go.      In the ED:  Febrile to 101, tachycardic to 122, tachypneic to 26, blood pressure okay, desat to 86 on room air then up into the 90s with 3 L nasal cannula.Troponin: 0.057 BNP 2554WBC: 20.6     U tox: Unremarkable    CXR:  Right basilar airspace disease most concerning for pneumonia.       Interval Followup:   Patient states that she is having cough and having some pain in her back more so on the right side when she coughs  Patient was able to work well with physical therapy  Mental status is back to normal  CT shows pneumonia        ROS:   History obtained from the patient  General ROS: Positive for Fatigue, Negative for - chills, fever, malaise, or night " sweats  Psychological ROS: positive for anxiety and depression  Ophthalmic ROS: negative for - blurry vision, double vision, or itchy eyes  ENT ROS: negative for - headaches, nasal congestion, sneezing, or sore throat  Endocrine ROS: negative for - malaise/lethargy, polydipsia/polyuria, or temperature intolerance  Respiratory ROS: positive for cough and shortness of air, positive for back pain with cough   Cardiovascular ROS: negative for - chest pain, dyspnea on exertion, edema, palpitations, or paroxysmal nocturnal dyspnea  Gastrointestinal ROS: negative for - abdominal pain, constipation, diarrhea, heartburn, hematemesis, or nausea/vomiting  Genito-Urinary ROS: negative for - change in urinary stream, hematuria, incontinence, or urinary frequency/urgency  Musculoskeletal ROS: positive for generalized weakness  Neurological ROS: negative for - confusion, dizziness, gait disturbance, headaches, impaired coordination/balance, memory loss, numbness/tingling, seizures, or visual changes  Dermatological ROS: negative for dry skin and rash         Review of SystemsObjective   Objective     Vitals:   Temp:  [97.3 °F (36.3 °C)-98.6 °F (37 °C)] 98.2 °F (36.8 °C)  Heart Rate:  [66-98] 68  Resp:  [18-22] 22  BP: ()/(37-70) 140/70  Flow (L/min):  [2] 2  Physical Exam    Constitutional: Awake, alert, no acute distress resting in bed    Eyes: Pupils equal, sclerae anicteric, no conjunctival injection   HENT: NCAT, mucous membranes moist   Neck: Supple,   Respiratory: Clear to auscultation bilaterally, nonlabored respirations  No w/r/r    Cardiovascular: RRR, no murmurs,    Gastrointestinal: Positive bowel sounds, soft, nontender, nondistended   Musculoskeletal: No bilateral ankle edema, no clubbing or cyanosis to extremities   Psychiatric: Appropriate affect, cooperative   Neurologic: Oriented to person and place  strength symmetric in all extremities, Cranial Nerves grossly intact to confrontation, speech clear   Skin:  Brittany dillard     Result Review    Result Review:  I have personally reviewed the results from the time of this admission to 4/8/2022 08:45 EDT and agree with these findings:  [x]  Laboratory   CBC    CBC 4/6/22 4/7/22 4/8/22   WBC 21.78 (A) 17.35 (A) 12.29 (A)   RBC 3.39 (A) 3.20 (A) 2.96 (A)   Hemoglobin 10.0 (A) 9.2 (A) 8.8 (A)   Hematocrit 30.5 (A) 29.2 (A) 26.6 (A)   MCV 90.0 91.3 89.9   MCH 29.5 28.8 29.7   MCHC 32.8 31.5 33.1   RDW 15.9 (A) 16.0 (A) 15.9 (A)   Platelets 214 228 244   (A) Abnormal value            BMP    BMP 4/6/22 4/7/22 4/8/22   BUN 27 (A) 29 (A) 20   Creatinine 1.03 (A) 1.15 (A) 1.02 (A)   Sodium 142 142 138   Potassium 3.1 (A) 3.4 (A) 3.3 (A)   Chloride 106 105 103   CO2 24.3 25.8 24.5   Calcium 9.0 8.6 8.5 (A)   (A) Abnormal value              [x]  Microbiology   No results found for: ACANTHNAEG, AFBCX, BPERTUSSISCX, BLOODCX  No results found for: BCIDPCR, CXREFLEX, CSFCX, CULTURETIS  No results found for: CULTURES, HSVCX, URCX  No results found for: EYECULTURE, GCCX, HSVCULTURE, LABHSV  No results found for: LEGIONELLA, MRSACX, MUMPSCX, MYCOPLASCX  No results found for: NOCARDIACX, STOOLCX  No results found for: THROATCX, UNSTIMCULT, URINECX, CULTURE, VZVCULTUR  No results found for: VIRALCULTU, WOUNDCX    [x]  Radiology        []  EKG/Telemetry   []  Cardiology/Vascular   []  Pathology  []  Old records  []  Other:    Assessment/Plan   Assessment / Plan     Assessment/Plan:  Sepsis secondary to community-acquired pneumonia   Chronic hypoxemic respiratory failure on home oxygen  Anxiety and depression  Hypertension  Concern for CHF- no echo on file  Troponin elevated x1     PLAN  Continue azithromycin and Rocephin  Sputum culture ordered if patient is able to cough anything up  Respiratory viral panel pending  Blood cultures are negative  MRSA screen is also negative   CT of chest reviewed with patient and daughter   Continue patient's home lisinopril  Continue patient's Wellbutrin, BuSpar  Neurontin and fluoxetine for her anxiety and depression  Patient has a diagnosis of diabetes however her A1c is 5.5  Continue patient on bronchodilators   Echocardiogram ordered but not yet done  Patient is status post Lasix dose x1 on April 7  Wbc improving        Discussed plan with RN.    DVT prophylaxis:  Lovenox   Medical DVT prophylaxis orders are present.    CODE STATUS:   Code Status (Patient has no pulse and is not breathing): CPR (Attempt to Resuscitate)  Medical Interventions (Patient has pulse or is breathing): Full Support

## 2022-04-09 ENCOUNTER — APPOINTMENT (OUTPATIENT)
Dept: GENERAL RADIOLOGY | Facility: HOSPITAL | Age: 57
End: 2022-04-09

## 2022-04-09 LAB
ANION GAP SERPL CALCULATED.3IONS-SCNC: 9.1 MMOL/L (ref 5–15)
BACTERIA SPEC AEROBE CULT: NORMAL
BUN SERPL-MCNC: 18 MG/DL (ref 6–20)
BUN/CREAT SERPL: 22 (ref 7–25)
CALCIUM SPEC-SCNC: 8.4 MG/DL (ref 8.6–10.5)
CHLORIDE SERPL-SCNC: 102 MMOL/L (ref 98–107)
CO2 SERPL-SCNC: 27.9 MMOL/L (ref 22–29)
CREAT SERPL-MCNC: 0.82 MG/DL (ref 0.57–1)
DEPRECATED RDW RBC AUTO: 53.4 FL (ref 37–54)
EGFRCR SERPLBLD CKD-EPI 2021: 84.6 ML/MIN/1.73
ERYTHROCYTE [DISTWIDTH] IN BLOOD BY AUTOMATED COUNT: 15.9 % (ref 12.3–15.4)
GLUCOSE SERPL-MCNC: 102 MG/DL (ref 65–99)
HCT VFR BLD AUTO: 27.9 % (ref 34–46.6)
HGB BLD-MCNC: 8.9 G/DL (ref 12–15.9)
MAGNESIUM SERPL-MCNC: 2 MG/DL (ref 1.6–2.6)
MCH RBC QN AUTO: 29.4 PG (ref 26.6–33)
MCHC RBC AUTO-ENTMCNC: 31.9 G/DL (ref 31.5–35.7)
MCV RBC AUTO: 92.1 FL (ref 79–97)
PLATELET # BLD AUTO: 264 10*3/MM3 (ref 140–450)
PMV BLD AUTO: 10.8 FL (ref 6–12)
POTASSIUM SERPL-SCNC: 3.4 MMOL/L (ref 3.5–5.2)
RBC # BLD AUTO: 3.03 10*6/MM3 (ref 3.77–5.28)
SODIUM SERPL-SCNC: 139 MMOL/L (ref 136–145)
WBC NRBC COR # BLD: 10.87 10*3/MM3 (ref 3.4–10.8)

## 2022-04-09 PROCEDURE — 25010000002 AZITHROMYCIN PER 500 MG: Performed by: INTERNAL MEDICINE

## 2022-04-09 PROCEDURE — 83735 ASSAY OF MAGNESIUM: CPT | Performed by: INTERNAL MEDICINE

## 2022-04-09 PROCEDURE — 80048 BASIC METABOLIC PNL TOTAL CA: CPT | Performed by: INTERNAL MEDICINE

## 2022-04-09 PROCEDURE — 25010000002 ENOXAPARIN PER 10 MG: Performed by: INTERNAL MEDICINE

## 2022-04-09 PROCEDURE — 87040 BLOOD CULTURE FOR BACTERIA: CPT | Performed by: INTERNAL MEDICINE

## 2022-04-09 PROCEDURE — 71046 X-RAY EXAM CHEST 2 VIEWS: CPT

## 2022-04-09 PROCEDURE — 94799 UNLISTED PULMONARY SVC/PX: CPT

## 2022-04-09 PROCEDURE — 25010000002 CEFTRIAXONE PER 250 MG: Performed by: INTERNAL MEDICINE

## 2022-04-09 PROCEDURE — 85027 COMPLETE CBC AUTOMATED: CPT | Performed by: INTERNAL MEDICINE

## 2022-04-09 PROCEDURE — 99233 SBSQ HOSP IP/OBS HIGH 50: CPT | Performed by: INTERNAL MEDICINE

## 2022-04-09 PROCEDURE — 99222 1ST HOSP IP/OBS MODERATE 55: CPT | Performed by: INTERNAL MEDICINE

## 2022-04-09 PROCEDURE — 25010000002 ONDANSETRON PER 1 MG: Performed by: INTERNAL MEDICINE

## 2022-04-09 RX ADMIN — Medication 10 ML: at 20:09

## 2022-04-09 RX ADMIN — FLUOXETINE 40 MG: 20 CAPSULE ORAL at 10:26

## 2022-04-09 RX ADMIN — BUDESONIDE 0.5 MG: 0.5 SUSPENSION RESPIRATORY (INHALATION) at 07:51

## 2022-04-09 RX ADMIN — GABAPENTIN 300 MG: 300 CAPSULE ORAL at 20:07

## 2022-04-09 RX ADMIN — ARFORMOTEROL TARTRATE 15 MCG: 15 SOLUTION RESPIRATORY (INHALATION) at 07:51

## 2022-04-09 RX ADMIN — MICONAZOLE NITRATE: 2 POWDER TOPICAL at 10:28

## 2022-04-09 RX ADMIN — FAMOTIDINE 40 MG: 20 TABLET ORAL at 06:38

## 2022-04-09 RX ADMIN — LISINOPRIL 5 MG: 5 TABLET ORAL at 10:26

## 2022-04-09 RX ADMIN — BUSPIRONE HYDROCHLORIDE 10 MG: 10 TABLET ORAL at 06:38

## 2022-04-09 RX ADMIN — MICONAZOLE NITRATE: 2 POWDER TOPICAL at 20:10

## 2022-04-09 RX ADMIN — BUPROPION HYDROCHLORIDE 150 MG: 150 TABLET, EXTENDED RELEASE ORAL at 13:05

## 2022-04-09 RX ADMIN — ENOXAPARIN SODIUM 40 MG: 100 INJECTION SUBCUTANEOUS at 10:26

## 2022-04-09 RX ADMIN — AZITHROMYCIN 500 MG: 500 INJECTION, POWDER, LYOPHILIZED, FOR SOLUTION INTRAVENOUS at 11:11

## 2022-04-09 RX ADMIN — NYSTATIN: 100000 POWDER TOPICAL at 11:15

## 2022-04-09 RX ADMIN — FAMOTIDINE 40 MG: 20 TABLET ORAL at 17:16

## 2022-04-09 RX ADMIN — BUSPIRONE HYDROCHLORIDE 10 MG: 10 TABLET ORAL at 21:23

## 2022-04-09 RX ADMIN — NYSTATIN: 100000 POWDER TOPICAL at 20:10

## 2022-04-09 RX ADMIN — ONDANSETRON 4 MG: 2 INJECTION INTRAMUSCULAR; INTRAVENOUS at 06:06

## 2022-04-09 RX ADMIN — ARFORMOTEROL TARTRATE 15 MCG: 15 SOLUTION RESPIRATORY (INHALATION) at 19:03

## 2022-04-09 RX ADMIN — CEFTRIAXONE SODIUM 1 G: 1 INJECTION, SOLUTION INTRAVENOUS at 10:26

## 2022-04-09 RX ADMIN — OXYCODONE HYDROCHLORIDE AND ACETAMINOPHEN 1 TABLET: 5; 325 TABLET ORAL at 10:26

## 2022-04-09 RX ADMIN — Medication 10 ML: at 10:26

## 2022-04-09 RX ADMIN — BACITRACIN 1 APPLICATION: 500 OINTMENT TOPICAL at 11:12

## 2022-04-09 RX ADMIN — OXYCODONE HYDROCHLORIDE AND ACETAMINOPHEN 1 TABLET: 5; 325 TABLET ORAL at 17:16

## 2022-04-09 RX ADMIN — GABAPENTIN 300 MG: 300 CAPSULE ORAL at 17:16

## 2022-04-09 RX ADMIN — BUPROPION HYDROCHLORIDE 150 MG: 150 TABLET, EXTENDED RELEASE ORAL at 20:08

## 2022-04-09 RX ADMIN — GABAPENTIN 300 MG: 300 CAPSULE ORAL at 10:26

## 2022-04-09 RX ADMIN — IPRATROPIUM BROMIDE AND ALBUTEROL SULFATE 3 ML: 2.5; .5 SOLUTION RESPIRATORY (INHALATION) at 19:03

## 2022-04-09 RX ADMIN — BUSPIRONE HYDROCHLORIDE 10 MG: 10 TABLET ORAL at 13:05

## 2022-04-09 RX ADMIN — IPRATROPIUM BROMIDE AND ALBUTEROL SULFATE 3 ML: 2.5; .5 SOLUTION RESPIRATORY (INHALATION) at 12:41

## 2022-04-09 RX ADMIN — IPRATROPIUM BROMIDE AND ALBUTEROL SULFATE 3 ML: 2.5; .5 SOLUTION RESPIRATORY (INHALATION) at 07:51

## 2022-04-09 NOTE — CONSULTS
"Pulmonary / Critical Care Consult Note      Patient Name: Lluvia Archer  : 1965  MRN: [id number]  Primary Care Physician:  Aleksandar Edge DO  Referring Physician: No ref. provider found  Date of admission: 2022    Subjective   Subjective     Reason for Consult/ Chief Complaint: multifocal pneumonia    HPI:  Lluvia Archer is a 56 y.o. female  with a PMHx significant for former smoker with a 90 pack year history, COPD, MEHDI not tolerating home CPAP, home O2 user but without functioning machinery, anxiety, depression, gallbladder removal, right hip pain (replacement soon) - presents to the ED with SOB and has had recurrent pneumonia episodes.   ED work up revealed fever, tachycardia, O2 sats in med-80's% on room air, BNP 2554, WBC 20, and CXR with right sided pneumonia. She was given Vanco and cefepime in the ED, nebulizer treatments, steroids, and admitted to the medical floor.   During this hospitalization, she had an ECHO that showed EF 58%, mitral and aortic valve regurg, Grad 1 LV diastolic dysfunction. CT chest shows multifocal pneumonia rt>lt and some emphysema changes. CXR this morning felt to be worse and pulmonary consulted for possible bronchoscopy.      2012: COPD initial dx with bronchoscopy with \"washings\" to exclude fungal infection per patient but no details    2019-2019: Admitted to hospital with bibasilar pneumonia.  Discharged on home oxygen 2 L/minute    2020: Pulmonary consultation Dr. Rust with recommendation to stop Breo and begin Trelegy; CT chest ordered but never done; IgG levels and alpha-1 antitrypsin ordered but never done; Amanuel done in office\" low\"    2022: [facility] ER with altered mental status increased dyspnea and cough 1 week prior to presentation.  Troponin 0 0.057.  Tachycardia 122/minute with fever 101.0 degrees treated with fluids.  Vancomycin and cefepime started BNP 2554  2022: Speech consult with recommendation regular " diet with thin liquids  4/7/2022: Antibiotics switched azithromycin and Rocephin  4/8/2022: Echo with EF 58%  4/9/2022: CXR reported worse and pulmonary consultation requested    Now, patient reports cough with productive white phlegm.  Baseline dyspnea with simple activities of daily living walking around the house, unable to shop.  Etc. no hemoptysis.  No pleuritic chest pain.  Presenting symptoms also included fever sweats chills and rigors with chronic and increased wheezing.  No sore throat.  Mild chronic headache.  Other symptoms include 3 pillow orthopnea  Sinuses clear  Aspiration with meals absent  No history of asthma    PPD approximately 2012 reported nonreactive; no known TB exposure    Outpatient regimen Trelegy and albuterol both provide subjective clinical improvement  Nebs helpful as outpatient    Granddaughter recently ill with acute otitis        Review of Systems  Constitutional symptoms:  60 pound weight loss over the last 1 year with generalized anorexia  Ear, nose, throat: Denied complaints  Cardiovascular:  Denied complaints  Respiratory: Exertional dyspnea, cough  Gastrointestinal: Denied complaints  Musculoskeletal: right hip pain  Genitourinary: Incontinent of urine  Allergy / Immunology: Denied complaints  Hematologic: Denied complaints  Neurologic: Headaches  Skin: Denied complaints  Endocrine: Denied complaints  Psychiatric: Anxiety, depression  Sleep: Snoring reported with daytime sleepiness but no witnessed apneas; MEHDI diagnosed 2015 approximately reported mild and treated with CPAP at home for 2 weeks but not tolerated and discontinued by patient      Personal History     Past Medical History:   Diagnosis Date   • Abnormal mammogram    • Anxiety    • Arthritis     R SHOULDER, R HIP, AND R LEG   • Chronic nausea 01/15/2019   • COPD (chronic obstructive pulmonary disease) (HCC)    • Hernia, hiatal    • Hyperlipidemia    • Hypertension    • Knee pain, right 08/30/2018   • Memory loss      FORGETFULNESS   • Migraine headache    • Moderate episode of recurrent major depressive disorder ([facility]) 2017   • Night sweats    • Numbness 2017    WILL CHECK AN X- RAY OF HER C-SPINE    • Sciatica of right side 2017   • Severe episode of recurrent major depressive disorder, without psychotic features ([facility]) 10/22/2019   • Shortness of breath    • Shoulder pain, left 2018   • Sinus trouble    • Sleep apnea, unspecified    • Tobacco abuse 2017   • Tobacco abuse counseling 2017    SHE DESIRES TO QUIT. SHE HAS TRIED TO CHANTIX IN THE PAST W/O SUCCESS. SHE WAS SUCCESSFUL IN THE PAST W/O MEDICATION.       Past Surgical History:   Procedure Laterality Date   •  SECTION     • CHOLECYSTECTOMY     • COLONOSCOPY     • GALLBLADDER SURGERY         Family History: family history includes Arthritis in her mother; Cancer in an other family member; Heart disease in her father and another family member; Hypertension in an other family member; Other in her mother.   Father  cause unknown; mother  62 years old MI; uncle  lung cancer    Social History:  reports that she has quit smoking. She smoked 1.00 pack per day. She has never used smokeless tobacco. She reports that she does not drink alcohol and does not use drugs.   2 ppd x 45 years from 12 years old until 54 years old= 90 pack years; no EtOH;  Pets include dog and 1 cat;   with  heavy smoker still    Home Medications:  FLUoxetine, Fluticasone-Umeclidin-Vilant, Melatonin, albuterol sulfate HFA, buPROPion SR, busPIRone, famotidine, gabapentin, lisinopril, and traMADol    Allergies:  No Known Allergies    Objective    Objective     Vitals:   Temp:  [98.1 °F (36.7 °C)-98.4 °F (36.9 °C)] 98.1 °F (36.7 °C)  Heart Rate:  [73-92] 81  Resp:  [16-22] 20  BP: ()/(61-72) 99/72  Flow (L/min):  [2-3] 2    Physical Exam:  Vital Signs Reviewed   General: Obese with BMI 33; alert, NAD.    HEENT:   Very small  "crowded oropharynx with Mallampati index 4/4; dentition very poor repair with only 2 remaining teeth; PERRL, EOMI.  OP, nares clear, no sinus tenderness  Neck:  Supple, no JVD, no thyromegaly  Lymph: no axillary, cervical, supraclavicular lymphadenopathy noted bilaterally  Chest:   Mild thoracic kyphosis; good aeration, bilateral wheezing with FVC maneuver and prolonged expiration; rales both bases right greater than left; no work of breathing noted  CV: RRR, no MGR, pulses 2+, equal.  Abd:   Obese with striae; soft, NT, ND, + BS, no HSM  EXT:  no clubbing, no cyanosis, no edema, no joint tenderness  Neuro:  A&Ox3, CN grossly intact, no focal deficits.  Skin: No rashes or lesions noted      Result Review    Result Review:  I have personally reviewed the results from the time of this admission to 4/9/2022 15:42 EDT and agree with these findings:  [x]  Laboratory  [x]  Microbiology  []  Radiology  []  EKG/Telemetry   [x]  Cardiology/Vascular   []  Pathology  [x]  Old records  []  Other:  Most notable findings include:     Lab 4/5/2022: Procalcitonin 0.55  Lactic acid 1.0  Troponin T 0.057  proBNP 2554  Covid negative    Lab 4/9/2022: Creatinine 0.82; bicarbonate 27; hematocrit 27%; WBC 21,180 decreasing to currently 10,870; eosinophils 260 on 3/1/2021    Sputum culture 12/2/2019: Normal sabina with yeast    ABG 4/4/2022: 72/7.48/33 on 32% oxygen    CXR 12/2/2019: Bibasilar opacities but independent review n/a  CXR 4/4/2022: RLL patchy airspace disease  CXR 4/9/2022: \"Increased\" multifocal airspace disease primarily right lower lobe    CT chest 4/5/2022: multi-focal infiltrates with consolidation RLL>LLL; left upper lobe interstitial lung changes; centrilobular emphysema;    Echo 4/8/2022: EF 50% with diastolic dysfunction grade 1; mild aortic insufficiency and mild mitral vegetation; no tricuspid regurgitation;    PSG 2015: Reported mild MEHDI      Assessment/Plan   Assessment / Plan     Active Hospital Problems:  Active " "Hospital Problems    Diagnosis    • Severe malnutrition (CMS/HCC)    • Sepsis, due to unspecified organism, unspecified whether acute organ dysfunction present ([facility])          Impression:    1.  COPD: 90-pack-year smoking and clinically severe with  Acute exacerbation (AECOPD) due to outpatient community-acquired pneumonia  recurrent pneumonia last episode 12/2/2019  probably due to severe underlying COPD with impaired ventilatory reserve  nothing to suggest atypical infection, mycobacteria, MAC, etc.  underlying malignancy such as bronchoalveolar carcinoma possible and in differential  nocturnal hypoxemia and congestive heart failure also risk factors  history and speech evaluation do not suggest aspiration per se  moderate centrilobular emphysema on chest CT    2.  MEHDI: dx 2015 PSG data n/a; CPAP not tolerated; \"overlap syndrome\"    3. HfpEF: EF 58% with diastolic dysfunction grade 1/mild aortic insufficiency and mild mitral vegetation    4.  Hypertension    5.  GERD        Plan:    Rocephin # 3  Azithromycin # 3  Abx # 6   Follow-up sputum culture    On brovana/Pulmicort/DuoNebs  Suggest stopping inhaled steroids which predisposed to pneumonia  Switch from Trelegy to Anoro as outpatient  No need for systemic steroids at this time    IgG level  IgG subclasses  IgA  IgM  HIV  Alpha-1 antitrypsin level and phenotype    Needs home O2  Needs a home nebulizer  Does not qualify (yet) for noninvasive ventilation  Review prior polysomnogram and resume CPAP/BiPAP as indicated    Bronchoscopy early this week to assess airway, rule out bronchoalveolar cell carcinoma, probably not endobronchial lesion given \"Mendes double lesion sign\"    Suggest HRCT to better assess for interstitial lung disease/CPFE and bronchiectasis when she returns to baseline    Flu vaccine done  Covid vaccine x2 done  PCV 13 done 2017    Consider pulmonary rehabilitation as outpatient        DVT prophylaxis: On Lovenox 40 mg/day  Medical DVT prophylaxis " orders are present.     Code Status and Medical Interventions:   Ordered at: 04/04/22 2233     Code Status (Patient has no pulse and is not breathing):    CPR (Attempt to Resuscitate)     Medical Interventions (Patient has pulse or is breathing):    Full Support          Labs, imaging, notes and medications personally reviewed.  Discussed with care team  Total time 70 minutes    Thank you for involving me in the care of this patient.    Electronically signed by Daniel Holloway MD, 04/09/22, 3:42 PM EDT.

## 2022-04-09 NOTE — PROGRESS NOTES
" [facility]   Hospitalist Progress Note  Date: 2022  Patient Name: Lluvia Archer  : 1965  MRN: [id number]  Date of admission: 2022      Subjective   Subjective     Chief Complaint: short of breath     Summary:   56 y.o. female former smoker with COPD on home O2, HLD, HTN, depression who presents with shortness of breath and altered mental status.  The patient reports that she has been feeling ill for the past week. She states that she has been short of breath and febrile with this illness. She notes that she has also had a cough; however, this is not unusual for her secondary to her COPD.  Patient's daughter is at bedside and states that she has been sleeping for the past week.  Patient also states that she has extreme anxiety.  Patient states that she has history of tobacco abuse but has not smoked in quite some time. Per nursing, the patient was initially brought to the ED today due to the daughter's concerns for altered mental status. They informed EMS today that the patient \"wasn't acting right\" in that she was not taking her medication and not wearing her home oxygen. EMS was also informed that the patient had developed new bowel/bladder incontinence however patient states she is depressed and just does not want to get up to go.      In the ED:  Febrile to 101, tachycardic to 122, tachypneic to 26, blood pressure okay, desat to 86 on room air then up into the 90s with 3 L nasal cannula.Troponin: 0.057 BNP 2554WBC: 20.6     U tox: Unremarkable    CXR:  Right basilar airspace disease most concerning for pneumonia.       Interval Followup:   Patient states that she is having cough and having some pain in her back more so on the right side when she coughs  Patient was able to work well with physical therapy  Mental status is back to normal  CT shows pneumonia    Repeat chest x-ray today actually shows worsening pneumonia.  At this time will consult pulmonary patient may need " bronchoscopy  Blood culture from admission is now having gram-positive cocci which is likely contaminant.  Will order repeat blood cultures today  Patient states that she is having a headache today but states that she usually drinks Mountain Dew at home        ROS:   History obtained from the patient  General ROS: Positive for Fatigue, Negative for - chills, fever, malaise, or night sweats  Psychological ROS: positive for anxiety and depression  Ophthalmic ROS: negative for - blurry vision, double vision, or itchy eyes  ENT ROS: negative for - headaches, nasal congestion, sneezing, or sore throat  Endocrine ROS: negative for - malaise/lethargy, polydipsia/polyuria, or temperature intolerance  Respiratory ROS: positive for cough and shortness of air, positive for back pain with cough   Cardiovascular ROS: negative for - chest pain, dyspnea on exertion, edema, palpitations, or paroxysmal nocturnal dyspnea  Gastrointestinal ROS: negative for - abdominal pain, constipation, diarrhea, heartburn, hematemesis, or nausea/vomiting  Genito-Urinary ROS: negative for - change in urinary stream, hematuria, incontinence, or urinary frequency/urgency  Musculoskeletal ROS: positive for generalized weakness  Neurological ROS: negative for - confusion, dizziness, gait disturbance, headaches, impaired coordination/balance, memory loss, numbness/tingling, seizures, or visual changes  Dermatological ROS: negative for dry skin and rash         Review of SystemsObjective   Objective     Vitals:   Temp:  [97.7 °F (36.5 °C)-98.4 °F (36.9 °C)] 98.2 °F (36.8 °C)  Heart Rate:  [73-92] 74  Resp:  [18-22] 22  BP: ()/(57-68) 120/64  Flow (L/min):  [2-3] 2  Physical Exam    Constitutional: Awake, alert, no acute distress resting in bed    Eyes: Pupils equal, sclerae anicteric, no conjunctival injection   HENT: NCAT, mucous membranes moist   Neck: Supple,   Respiratory: Clear to auscultation bilaterally, nonlabored respirations  No w/r/r     Cardiovascular: RRR, no murmurs,    Gastrointestinal: Positive bowel sounds, soft, nontender, nondistended   Musculoskeletal: No bilateral ankle edema, no clubbing or cyanosis to extremities   Psychiatric: Appropriate affect, cooperative   Neurologic: Oriented to person and place  strength symmetric in all extremities, Cranial Nerves grossly intact to confrontation, speech clear   Skin: No rashes     Result Review    Result Review:  I have personally reviewed the results from the time of this admission to 4/9/2022 08:54 EDT and agree with these findings:  [x]  Laboratory   CBC    CBC 4/7/22 4/8/22 4/9/22   WBC 17.35 (A) 12.29 (A) 10.87 (A)   RBC 3.20 (A) 2.96 (A) 3.03 (A)   Hemoglobin 9.2 (A) 8.8 (A) 8.9 (A)   Hematocrit 29.2 (A) 26.6 (A) 27.9 (A)   MCV 91.3 89.9 92.1   MCH 28.8 29.7 29.4   MCHC 31.5 33.1 31.9   RDW 16.0 (A) 15.9 (A) 15.9 (A)   Platelets 228 244 264   (A) Abnormal value            BMP    BMP 4/7/22 4/8/22 4/9/22   BUN 29 (A) 20 18   Creatinine 1.15 (A) 1.02 (A) 0.82   Sodium 142 138 139   Potassium 3.4 (A) 3.3 (A) 3.4 (A)   Chloride 105 103 102   CO2 25.8 24.5 27.9   Calcium 8.6 8.5 (A) 8.4 (A)   (A) Abnormal value              [x]  Microbiology   No results found for: ACANTHNAEG, AFBCX, BPERTUSSISCX, BLOODCX  No results found for: BCIDPCR, CXREFLEX, CSFCX, CULTURETIS  No results found for: CULTURES, HSVCX, URCX  No results found for: EYECULTURE, GCCX, HSVCULTURE, LABHSV  No results found for: LEGIONELLA, MRSACX, MUMPSCX, MYCOPLASCX  No results found for: NOCARDIACX, STOOLCX  No results found for: THROATCX, UNSTIMCULT, URINECX, CULTURE, VZVCULTUR  No results found for: VIRALCULTU, WOUNDCX    [x]  Radiology        []  EKG/Telemetry   []  Cardiology/Vascular   []  Pathology  []  Old records  []  Other:    Assessment/Plan   Assessment / Plan     Assessment/Plan:  Sepsis secondary to community-acquired pneumonia   Chronic hypoxemic respiratory failure on home oxygen  Anxiety and  depression  Hypertension  Concern for CHF- no echo on file  Troponin elevated x1   Abnormal blood culture 1 out of 2 bottles from admission   Diastolic congestive heart failure with mild exacerbation    PLAN  Continue azithromycin and Rocephin  Sputum culture ordered if patient is able to cough anything up  Respiratory viral panel pending  Blood cultures are negative  MRSA screen is also negative    Repeat chest x-ray showing worsening pneumonia therefore will consult pulmonary critical care patient may need a bronchoscopy  CT of chest reviewed with patient and daughter   Continue patient's home lisinopril  Continue patient's Wellbutrin, BuSpar Neurontin and fluoxetine for her anxiety and depression  Patient has a diagnosis of diabetes however her A1c is 5.5  Continue patient on bronchodilators   Echocardiogram showing diastolic dysfunction  Patient is status post Lasix dose x1 on April 7  Wbc improving    We will repeat blood cultures today likely contaminant       Discussed plan with RN.    DVT prophylaxis:  Lovenox   Medical DVT prophylaxis orders are present.    CODE STATUS:   Code Status (Patient has no pulse and is not breathing): CPR (Attempt to Resuscitate)  Medical Interventions (Patient has pulse or is breathing): Full Support

## 2022-04-09 NOTE — PLAN OF CARE
Goal Outcome Evaluation:           Progress: improving  Outcome Evaluation: patient very pleasant. complaints of headache pain. medicated as ordered. sitting up in bed throughout the day and transfers self to [facility]. on 2 liters nasal cannula today but unable to titrate because of sats. seen by pulmonology and hospitalist. pulmonary rehab inpatient ordered. wound care completed as ordered. bilateral wheezes heard expiratory in upper lobes. more blood cultures ordered. given antibiotics as ordered.

## 2022-04-10 LAB
ANION GAP SERPL CALCULATED.3IONS-SCNC: 6.1 MMOL/L (ref 5–15)
BUN SERPL-MCNC: 16 MG/DL (ref 6–20)
BUN/CREAT SERPL: 19.8 (ref 7–25)
CALCIUM SPEC-SCNC: 8.6 MG/DL (ref 8.6–10.5)
CHLORIDE SERPL-SCNC: 101 MMOL/L (ref 98–107)
CO2 SERPL-SCNC: 30.9 MMOL/L (ref 22–29)
CREAT SERPL-MCNC: 0.81 MG/DL (ref 0.57–1)
DEPRECATED RDW RBC AUTO: 54.9 FL (ref 37–54)
EGFRCR SERPLBLD CKD-EPI 2021: 85.9 ML/MIN/1.73
ERYTHROCYTE [DISTWIDTH] IN BLOOD BY AUTOMATED COUNT: 16 % (ref 12.3–15.4)
GLUCOSE SERPL-MCNC: 108 MG/DL (ref 65–99)
HCT VFR BLD AUTO: 27.7 % (ref 34–46.6)
HGB BLD-MCNC: 8.8 G/DL (ref 12–15.9)
HIV1+2 AB SER QL: NORMAL
IGA1 MFR SER: 424 MG/DL (ref 70–400)
IGG1 SER-MCNC: 451 MG/DL (ref 700–1600)
IGM SERPL-MCNC: 118 MG/DL (ref 40–230)
MAGNESIUM SERPL-MCNC: 1.8 MG/DL (ref 1.6–2.6)
MCH RBC QN AUTO: 30 PG (ref 26.6–33)
MCHC RBC AUTO-ENTMCNC: 31.8 G/DL (ref 31.5–35.7)
MCV RBC AUTO: 94.5 FL (ref 79–97)
PLATELET # BLD AUTO: 257 10*3/MM3 (ref 140–450)
PMV BLD AUTO: 10.4 FL (ref 6–12)
POTASSIUM SERPL-SCNC: 4 MMOL/L (ref 3.5–5.2)
RBC # BLD AUTO: 2.93 10*6/MM3 (ref 3.77–5.28)
SODIUM SERPL-SCNC: 138 MMOL/L (ref 136–145)
WBC NRBC COR # BLD: 10.08 10*3/MM3 (ref 3.4–10.8)

## 2022-04-10 PROCEDURE — 25010000002 CEFTRIAXONE PER 250 MG: Performed by: INTERNAL MEDICINE

## 2022-04-10 PROCEDURE — 85027 COMPLETE CBC AUTOMATED: CPT | Performed by: INTERNAL MEDICINE

## 2022-04-10 PROCEDURE — 94799 UNLISTED PULMONARY SVC/PX: CPT

## 2022-04-10 PROCEDURE — 82784 ASSAY IGA/IGD/IGG/IGM EACH: CPT | Performed by: INTERNAL MEDICINE

## 2022-04-10 PROCEDURE — 82103 ALPHA-1-ANTITRYPSIN TOTAL: CPT | Performed by: INTERNAL MEDICINE

## 2022-04-10 PROCEDURE — 97530 THERAPEUTIC ACTIVITIES: CPT

## 2022-04-10 PROCEDURE — 99233 SBSQ HOSP IP/OBS HIGH 50: CPT | Performed by: INTERNAL MEDICINE

## 2022-04-10 PROCEDURE — 25010000002 AZITHROMYCIN PER 500 MG: Performed by: INTERNAL MEDICINE

## 2022-04-10 PROCEDURE — 25010000002 ENOXAPARIN PER 10 MG: Performed by: INTERNAL MEDICINE

## 2022-04-10 PROCEDURE — 80048 BASIC METABOLIC PNL TOTAL CA: CPT | Performed by: INTERNAL MEDICINE

## 2022-04-10 PROCEDURE — 83735 ASSAY OF MAGNESIUM: CPT | Performed by: INTERNAL MEDICINE

## 2022-04-10 PROCEDURE — 99232 SBSQ HOSP IP/OBS MODERATE 35: CPT | Performed by: INTERNAL MEDICINE

## 2022-04-10 PROCEDURE — 82104 ALPHA-1-ANTITRYPSIN PHENO: CPT | Performed by: INTERNAL MEDICINE

## 2022-04-10 PROCEDURE — 97110 THERAPEUTIC EXERCISES: CPT

## 2022-04-10 PROCEDURE — G0432 EIA HIV-1/HIV-2 SCREEN: HCPCS | Performed by: INTERNAL MEDICINE

## 2022-04-10 PROCEDURE — 94760 N-INVAS EAR/PLS OXIMETRY 1: CPT

## 2022-04-10 PROCEDURE — 82787 IGG 1 2 3 OR 4 EACH: CPT | Performed by: INTERNAL MEDICINE

## 2022-04-10 RX ORDER — GUAIFENESIN 600 MG/1
600 TABLET, EXTENDED RELEASE ORAL EVERY 12 HOURS SCHEDULED
Status: DISCONTINUED | OUTPATIENT
Start: 2022-04-10 | End: 2022-04-12 | Stop reason: HOSPADM

## 2022-04-10 RX ADMIN — GABAPENTIN 300 MG: 300 CAPSULE ORAL at 08:17

## 2022-04-10 RX ADMIN — Medication 10 ML: at 08:18

## 2022-04-10 RX ADMIN — NYSTATIN: 100000 POWDER TOPICAL at 21:21

## 2022-04-10 RX ADMIN — AZITHROMYCIN 500 MG: 500 INJECTION, POWDER, LYOPHILIZED, FOR SOLUTION INTRAVENOUS at 10:46

## 2022-04-10 RX ADMIN — GABAPENTIN 300 MG: 300 CAPSULE ORAL at 21:20

## 2022-04-10 RX ADMIN — OXYCODONE HYDROCHLORIDE AND ACETAMINOPHEN 1 TABLET: 5; 325 TABLET ORAL at 22:26

## 2022-04-10 RX ADMIN — BUPROPION HYDROCHLORIDE 150 MG: 150 TABLET, EXTENDED RELEASE ORAL at 10:46

## 2022-04-10 RX ADMIN — ENOXAPARIN SODIUM 40 MG: 100 INJECTION SUBCUTANEOUS at 08:18

## 2022-04-10 RX ADMIN — GABAPENTIN 300 MG: 300 CAPSULE ORAL at 16:52

## 2022-04-10 RX ADMIN — GUAIFENESIN 600 MG: 600 TABLET ORAL at 13:16

## 2022-04-10 RX ADMIN — MICONAZOLE NITRATE: 2 POWDER TOPICAL at 08:19

## 2022-04-10 RX ADMIN — FLUOXETINE 40 MG: 20 CAPSULE ORAL at 08:17

## 2022-04-10 RX ADMIN — BUSPIRONE HYDROCHLORIDE 10 MG: 10 TABLET ORAL at 06:35

## 2022-04-10 RX ADMIN — OXYCODONE HYDROCHLORIDE AND ACETAMINOPHEN 1 TABLET: 5; 325 TABLET ORAL at 10:55

## 2022-04-10 RX ADMIN — CEFTRIAXONE SODIUM 1 G: 1 INJECTION, SOLUTION INTRAVENOUS at 08:19

## 2022-04-10 RX ADMIN — BUSPIRONE HYDROCHLORIDE 10 MG: 10 TABLET ORAL at 13:16

## 2022-04-10 RX ADMIN — LISINOPRIL 5 MG: 5 TABLET ORAL at 08:17

## 2022-04-10 RX ADMIN — IPRATROPIUM BROMIDE AND ALBUTEROL SULFATE 3 ML: 2.5; .5 SOLUTION RESPIRATORY (INHALATION) at 19:21

## 2022-04-10 RX ADMIN — Medication 10 ML: at 21:21

## 2022-04-10 RX ADMIN — FAMOTIDINE 40 MG: 20 TABLET ORAL at 06:35

## 2022-04-10 RX ADMIN — NYSTATIN: 100000 POWDER TOPICAL at 13:16

## 2022-04-10 RX ADMIN — BUSPIRONE HYDROCHLORIDE 10 MG: 10 TABLET ORAL at 21:20

## 2022-04-10 RX ADMIN — IPRATROPIUM BROMIDE AND ALBUTEROL SULFATE 3 ML: 2.5; .5 SOLUTION RESPIRATORY (INHALATION) at 07:16

## 2022-04-10 RX ADMIN — BACITRACIN 1 APPLICATION: 500 OINTMENT TOPICAL at 08:17

## 2022-04-10 RX ADMIN — FAMOTIDINE 40 MG: 20 TABLET ORAL at 16:52

## 2022-04-10 RX ADMIN — BUPROPION HYDROCHLORIDE 150 MG: 150 TABLET, EXTENDED RELEASE ORAL at 22:26

## 2022-04-10 RX ADMIN — ARFORMOTEROL TARTRATE 15 MCG: 15 SOLUTION RESPIRATORY (INHALATION) at 19:21

## 2022-04-10 RX ADMIN — MICONAZOLE NITRATE: 2 POWDER TOPICAL at 21:21

## 2022-04-10 RX ADMIN — ARFORMOTEROL TARTRATE 15 MCG: 15 SOLUTION RESPIRATORY (INHALATION) at 07:16

## 2022-04-10 RX ADMIN — GUAIFENESIN 600 MG: 600 TABLET ORAL at 21:21

## 2022-04-10 RX ADMIN — IPRATROPIUM BROMIDE AND ALBUTEROL SULFATE 3 ML: 2.5; .5 SOLUTION RESPIRATORY (INHALATION) at 13:18

## 2022-04-10 NOTE — PROGRESS NOTES
" [facility]   Hospitalist Progress Note  Date: 4/10/2022  Patient Name: Lluvia Archer  : 1965  MRN: [id number]  Date of admission: 2022      Subjective   Subjective     Chief Complaint: short of breath     Summary:   56 y.o. female former smoker with COPD on home O2, HLD, HTN, depression who presents with shortness of breath and altered mental status.  The patient reports that she has been feeling ill for the past week. She states that she has been short of breath and febrile with this illness. She notes that she has also had a cough; however, this is not unusual for her secondary to her COPD.  Patient's daughter is at bedside and states that she has been sleeping for the past week.  Patient also states that she has extreme anxiety.  Patient states that she has history of tobacco abuse but has not smoked in quite some time. Per nursing, the patient was initially brought to the ED today due to the daughter's concerns for altered mental status. They informed EMS today that the patient \"wasn't acting right\" in that she was not taking her medication and not wearing her home oxygen. EMS was also informed that the patient had developed new bowel/bladder incontinence however patient states she is depressed and just does not want to get up to go.      In the ED:  Febrile to 101, tachycardic to 122, tachypneic to 26, blood pressure okay, desat to 86 on room air then up into the 90s with 3 L nasal cannula.Troponin: 0.057 BNP 2554WBC: 20.6    CXR:  Right basilar airspace disease most concerning for pneumonia.       Interval Followup:   Having a better day today  On 2L of oxygen  Cough better. No nausea, vomiting or headache   Doing well with PT and wants to go home   CT shows pneumonia    Repeat chest x-ray today shows worsening pneumonia. Pulmonary following  Blood culture from admission is now having gram-positive cocci which is likely contaminant.  Repeat ones are pending           ROS:   History " obtained from the patient  General ROS: Positive for Fatigue, Negative for - chills, fever, malaise, or night sweats  Psychological ROS: positive for anxiety and depression  Ophthalmic ROS: negative for - blurry vision, double vision, or itchy eyes  ENT ROS: negative for - headaches, nasal congestion, sneezing, or sore throat  Endocrine ROS: negative for - malaise/lethargy, polydipsia/polyuria, or temperature intolerance  Respiratory ROS: positive for cough and shortness of air, positive for back pain with cough   Cardiovascular ROS: negative for - chest pain, dyspnea on exertion, edema, palpitations, or paroxysmal nocturnal dyspnea  Gastrointestinal ROS: negative for - abdominal pain, constipation, diarrhea, heartburn, hematemesis, or nausea/vomiting  Genito-Urinary ROS: negative for - change in urinary stream, hematuria, incontinence, or urinary frequency/urgency  Musculoskeletal ROS: positive for generalized weakness  Neurological ROS: negative for - confusion, dizziness, gait disturbance, headaches, impaired coordination/balance, memory loss, numbness/tingling, seizures, or visual changes  Dermatological ROS: negative for dry skin and rash         Review of SystemsObjective   Objective     Vitals:   Temp:  [97.3 °F (36.3 °C)-98.6 °F (37 °C)] 98.2 °F (36.8 °C)  Heart Rate:  [78-97] 87  Resp:  [16-20] 16  BP: ()/() 137/121  Flow (L/min):  [2] 2  Physical Exam    Constitutional: Awake, alert, no acute distress resting in bed    Eyes: Pupils equal, sclerae anicteric, no conjunctival injection   HENT: NCAT, mucous membranes moist   Neck: Supple,   Respiratory: Clear to auscultation bilaterally, nonlabored respirations  No w/r/r    Cardiovascular: RRR, no murmurs,    Gastrointestinal: Positive bowel sounds, soft, nontender, nondistended   Musculoskeletal: No bilateral ankle edema, no clubbing or cyanosis to extremities   Psychiatric: Appropriate affect, cooperative   Neurologic: Oriented to person and  place  strength symmetric in all extremities, Cranial Nerves grossly intact to confrontation, speech clear   Skin: No rashes     Result Review    Result Review:  I have personally reviewed the results from the time of this admission to 4/10/2022 11:42 EDT and agree with these findings:  [x]  Laboratory   CBC    CBC 4/8/22 4/9/22 4/10/22   WBC 12.29 (A) 10.87 (A) 10.08   RBC 2.96 (A) 3.03 (A) 2.93 (A)   Hemoglobin 8.8 (A) 8.9 (A) 8.8 (A)   Hematocrit 26.6 (A) 27.9 (A) 27.7 (A)   MCV 89.9 92.1 94.5   MCH 29.7 29.4 30.0   MCHC 33.1 31.9 31.8   RDW 15.9 (A) 15.9 (A) 16.0 (A)   Platelets 244 264 257   (A) Abnormal value            BMP    BMP 4/8/22 4/9/22 4/10/22   BUN 20 18 16   Creatinine 1.02 (A) 0.82 0.81   Sodium 138 139 138   Potassium 3.3 (A) 3.4 (A) 4.0   Chloride 103 102 101   CO2 24.5 27.9 30.9 (A)   Calcium 8.5 (A) 8.4 (A) 8.6   (A) Abnormal value              [x]  Microbiology   No results found for: ACANTHNAEG, AFBCX, BPERTUSSISCX, BLOODCX  No results found for: BCIDPCR, CXREFLEX, CSFCX, CULTURETIS  No results found for: CULTURES, HSVCX, URCX  No results found for: EYECULTURE, GCCX, HSVCULTURE, LABHSV  No results found for: LEGIONELLA, MRSACX, MUMPSCX, MYCOPLASCX  No results found for: NOCARDIACX, STOOLCX  No results found for: THROATCX, UNSTIMCULT, URINECX, CULTURE, VZVCULTUR  No results found for: VIRALCULTU, WOUNDCX    [x]  Radiology   XR Chest PA & Lateral    Result Date: 4/9/2022  PROCEDURE: XR CHEST PA AND LATERAL  COMPARISON: [facility], CT, CT CHEST WO CONTRAST DIAGNOSTIC, 4/05/2022, 18:37.  [facility], CR, XR CHEST 1 VW, 4/04/2022, 20:54.  INDICATIONS: PNEUMONIA  FINDINGS:   There is increasing multifocal airspace opacity in lung fields greatest in the right lower lobe.  There is blunting of the costophrenic angles.  The heart and pulmonary vasculature are within normal limits.  IMPRESSION: 1. Increasing multifocal airspace opacity in the lung fields greatest in the right  lower lobe likely worsening multifocal pneumonia.  2. Blunting of the costophrenic angles may be secondary to small pleural effusions.   ORLY DALLAS MD       Electronically Signed and Approved By: ORLY DALLAS MD on 4/09/2022 at 8:52               []  EKG/Telemetry   []  Cardiology/Vascular   []  Pathology  []  Old records  []  Other:    Assessment/Plan   Assessment / Plan     Assessment/Plan:  Sepsis secondary to community-acquired pneumonia   Chronic hypoxemic respiratory failure on home oxygen  Anxiety and depression  Hypertension  Concern for CHF- no echo on file  Troponin elevated x1   Abnormal blood culture 1 out of 2 bottles from admission   Diastolic congestive heart failure with mild exacerbation    PLAN  Continue azithromycin and Rocephin day 4  Sputum culture ordered if patient is able to cough anything up  Respiratory viral panel pending  Blood cultures are negative  MRSA screen is also negative    Repeat chest x-ray showing worsening pneumonia therefore will consulted pulmonary critical for bronch. WILL MAKE NPO SO THEY CAN PERFORM IN AM   CT of chest reviewed with patient and daughter   Continue patient's home lisinopril  Continue patient's Wellbutrin, BuSpar Neurontin and fluoxetine for her anxiety and depression  Patient has a diagnosis of diabetes however her A1c is 5.5  Continue patient on bronchodilators   Echocardiogram showing diastolic dysfunction  Patient is status post Lasix dose x1 on April 7  Wbc improving    Repeat bc are pending        Discussed plan with RN.    DVT prophylaxis:  Lovenox   Medical DVT prophylaxis orders are present.    CODE STATUS:   Code Status (Patient has no pulse and is not breathing): CPR (Attempt to Resuscitate)  Medical Interventions (Patient has pulse or is breathing): Full Support

## 2022-04-10 NOTE — PROGRESS NOTES
"Pulmonary / Critical Care Progress Note      Patient Name: Lluvia Archer  : 1965  MRN: [id number]  Attending:  Flako Santos DO  Date of admission: 2022    Subjective   Subjective   Follow-up for pneumonia, hypoxia    Over past 24 hours:  Evaluated by pulmonary team  Oxygen requirements have remained low at 2 L nasal cannula, satting 97%  Pneumonia work-up initiated, additional labs sent, including IgG, IgA, IgM, HIV, alpha 1 antitrypsin phenotype   Defer bronchoscopy at this time  Adjusted nebulizer medications  Pulmonary rehab referral sent      Review of Systems  General: Denied complaints  Cardiovascular:  Denied complaints  Respiratory: cough, secretions \"won't come up\"  Gastrointestinal: Denied complaints        Objective   Objective     Vitals:   Temp:  [97.8 °F (36.6 °C)-98.6 °F (37 °C)] 97.8 °F (36.6 °C)  Heart Rate:  [73-97] 97  Resp:  [16-22] 20  BP: ()/(54-77) 106/77  Flow (L/min):  [2-3] 2    Physical Exam   Vital Signs Reviewed   General: Obese with BMI 33; alert, NAD.    HEENT:   Very small crowded oropharynx with Mallampati index 4/4; dentition very poor repair with only 2 remaining teeth; PERRL, EOMI.  OP, nares clear, no sinus tenderness  Neck:  Supple, no JVD, no thyromegaly  Lymph: no axillary, cervical, supraclavicular lymphadenopathy noted bilaterally  Chest:   Mild thoracic kyphosis; good aeration, bilateral wheezing with FVC maneuver and prolonged expiration; fine rales right LL; no work of breathing noted  CV: RRR, no MGR, pulses 2+, equal.  Abd:   Obese with striae; soft, NT, ND, + BS, no HSM  EXT:  no clubbing, no cyanosis, no edema, no joint tenderness  Neuro:  A&Ox3, CN grossly intact, no focal deficits.  Skin: No rashes or lesions noted    Result Review    Result Review:  I have personally reviewed the results from the time of this admission to 4/10/2022 06:45 EDT and agree with these findings:  [x]  Laboratory  [x]  Microbiology  [x]  Radiology  []  EKG/Telemetry " "  []  Cardiology/Vascular   []  Pathology  []  Old records  []  Other:  Most notable findings include: WBC normalized 10.08, HIV negative    Lab 4/5/2022: Procalcitonin 0.55  Lactic acid 1.0  Troponin T 0.057  proBNP 2554  Covid negative     Lab 4/9/2022: Creatinine 0.82; bicarbonate 27; hematocrit 27%; WBC 21,180 decreasing to currently 10,870; eosinophils 260 on 3/1/2021     Sputum culture 12/2/2019: Normal sabina with yeast     ABG 4/4/2022: 72/7.48/33 on 32% oxygen     CXR 12/2/2019: Bibasilar opacities but independent review n/a  CXR 4/4/2022: RLL patchy airspace disease  CXR 4/9/2022: \"Increased\" multifocal airspace disease primarily right lower lobe     CT chest 4/5/2022: multi-focal infiltrates with consolidation RLL>LLL; left upper lobe interstitial lung changes; centrilobular emphysema;     Echo 4/8/2022: EF 50% with diastolic dysfunction grade 1; mild aortic insufficiency and mild mitral vegetation; no tricuspid regurgitation;     PSG 2015: Reported mild MEHDI       Assessment/Plan   Assessment / Plan     Active Hospital Problems:  Active Hospital Problems    Diagnosis    • Severe malnutrition (CMS/HCC)    • Sepsis, due to unspecified organism, unspecified whether acute organ dysfunction present ([facility])          Impression:  1.  COPD: 90-pack-year smoking and clinically severe with  Acute exacerbation (AECOPD) due to outpatient community-acquired pneumonia  recurrent pneumonia last episode 12/2/2019  probably due to severe underlying COPD with impaired ventilatory reserve  nothing to suggest atypical infection, mycobacteria, MAC, etc.  underlying malignancy such as bronchoalveolar carcinoma possible and in differential  nocturnal hypoxemia and congestive heart failure also risk factors  history and speech evaluation do not suggest aspiration per se  moderate centrilobular emphysema on chest CT     2.  MEHDI: dx 2015 PSG data n/a; CPAP not tolerated; \"overlap syndrome\"     3. HfpEF: EF 58% with diastolic " "dysfunction grade 1/mild aortic insufficiency and mild mitral vegetation     4.  Hypertension     5.  GERD    Plan:  Rocephin # 4  Azithromycin # 4  Abx # 7   Follow-up sputum culture     On brovana/Pulmicort/DuoNebs  Suggest stopping inhaled steroids which predisposed to pneumonia  Switch from Trelegy to Anoro as outpatient  No need for systemic steroids at this time  Mucinex scheduled     IgG level  IgG subclasses  IgA  IgM  HIV  Alpha-1 antitrypsin level and phenotype     Needs home O2  Needs a home nebulizer  Does not qualify (yet) for noninvasive ventilation  Review prior polysomnogram and resume CPAP/BiPAP as indicated     Recommend bronchoscopy early this week to assess airway, rule out bronchoalveolar cell carcinoma, probably not endobronchial lesion given \"Mendes double lesion sign\"     Suggest HRCT to better assess for interstitial lung disease/CPFE and bronchiectasis when she returns to baseline     Flu vaccine done  Covid vaccine x2 done  PCV 13 done 2017     Consider pulmonary rehabilitation as outpatient           DVT prophylaxis: On Lovenox 40 mg/day    DVT prophylaxis:  Medical DVT prophylaxis orders are present.    CODE STATUS:   Code Status (Patient has no pulse and is not breathing): CPR (Attempt to Resuscitate)  Medical Interventions (Patient has pulse or is breathing): Full Support           I, Valeria Doyle Arizona State HospitalGISSEL-NP, am scribing for & in the presence of Dr. Holloway on 04/10/2022, who was present during rounds with me.    Part of this note may be an electronic transcription/translation of spoken language to printed text using the Dragon Dictation System.               "

## 2022-04-11 PROBLEM — J18.9 PNEUMONIA OF RIGHT LOWER LOBE DUE TO INFECTIOUS ORGANISM: Status: ACTIVE | Noted: 2022-04-04

## 2022-04-11 LAB
ANION GAP SERPL CALCULATED.3IONS-SCNC: 7.5 MMOL/L (ref 5–15)
BACTERIA SPEC AEROBE CULT: ABNORMAL
BUN SERPL-MCNC: 12 MG/DL (ref 6–20)
BUN/CREAT SERPL: 16 (ref 7–25)
CALCIUM SPEC-SCNC: 8.6 MG/DL (ref 8.6–10.5)
CHLAMYDIA IGG SER-ACNC: <0.91 RATIO (ref 0–0.9)
CHLORIDE SERPL-SCNC: 101 MMOL/L (ref 98–107)
CO2 SERPL-SCNC: 30.5 MMOL/L (ref 22–29)
CREAT SERPL-MCNC: 0.75 MG/DL (ref 0.57–1)
DEPRECATED RDW RBC AUTO: 54.2 FL (ref 37–54)
EGFRCR SERPLBLD CKD-EPI 2021: 94.2 ML/MIN/1.73
ERYTHROCYTE [DISTWIDTH] IN BLOOD BY AUTOMATED COUNT: 15.7 % (ref 12.3–15.4)
GLUCOSE SERPL-MCNC: 93 MG/DL (ref 65–99)
GRAM STN SPEC: ABNORMAL
HADV AB TITR SER CF: NEGATIVE {TITER}
HCT VFR BLD AUTO: 26.1 % (ref 34–46.6)
HGB BLD-MCNC: 8.3 G/DL (ref 12–15.9)
ISOLATED FROM: ABNORMAL
L PNEUMO AB SER IA-ACNC: 2.56 OD RATIO (ref 0–0.9)
M PNEUMO IGG SER IA-ACNC: 170 U/ML (ref 0–99)
M PNEUMO IGM SER IA-ACNC: <770 U/ML (ref 0–769)
MAGNESIUM SERPL-MCNC: 1.9 MG/DL (ref 1.6–2.6)
MCH RBC QN AUTO: 30 PG (ref 26.6–33)
MCHC RBC AUTO-ENTMCNC: 31.8 G/DL (ref 31.5–35.7)
MCV RBC AUTO: 94.2 FL (ref 79–97)
PLATELET # BLD AUTO: 235 10*3/MM3 (ref 140–450)
PMV BLD AUTO: 10.2 FL (ref 6–12)
POTASSIUM SERPL-SCNC: 3.9 MMOL/L (ref 3.5–5.2)
RBC # BLD AUTO: 2.77 10*6/MM3 (ref 3.77–5.28)
SODIUM SERPL-SCNC: 139 MMOL/L (ref 136–145)
WBC NRBC COR # BLD: 8.42 10*3/MM3 (ref 3.4–10.8)

## 2022-04-11 PROCEDURE — 99233 SBSQ HOSP IP/OBS HIGH 50: CPT | Performed by: INTERNAL MEDICINE

## 2022-04-11 PROCEDURE — 25010000002 ENOXAPARIN PER 10 MG: Performed by: INTERNAL MEDICINE

## 2022-04-11 PROCEDURE — 99232 SBSQ HOSP IP/OBS MODERATE 35: CPT | Performed by: INTERNAL MEDICINE

## 2022-04-11 PROCEDURE — 85027 COMPLETE CBC AUTOMATED: CPT | Performed by: INTERNAL MEDICINE

## 2022-04-11 PROCEDURE — 94799 UNLISTED PULMONARY SVC/PX: CPT

## 2022-04-11 PROCEDURE — 25010000002 CEFTRIAXONE PER 250 MG: Performed by: INTERNAL MEDICINE

## 2022-04-11 PROCEDURE — 94760 N-INVAS EAR/PLS OXIMETRY 1: CPT

## 2022-04-11 PROCEDURE — 80048 BASIC METABOLIC PNL TOTAL CA: CPT | Performed by: INTERNAL MEDICINE

## 2022-04-11 PROCEDURE — 83735 ASSAY OF MAGNESIUM: CPT | Performed by: INTERNAL MEDICINE

## 2022-04-11 PROCEDURE — 25010000002 AZITHROMYCIN PER 500 MG: Performed by: INTERNAL MEDICINE

## 2022-04-11 RX ORDER — FAMOTIDINE 40 MG/1
TABLET, FILM COATED ORAL
Qty: 60 TABLET | Refills: 0 | Status: SHIPPED | OUTPATIENT
Start: 2022-04-11 | End: 2022-05-11

## 2022-04-11 RX ADMIN — BUPROPION HYDROCHLORIDE 150 MG: 150 TABLET, EXTENDED RELEASE ORAL at 11:04

## 2022-04-11 RX ADMIN — AZITHROMYCIN 500 MG: 500 INJECTION, POWDER, LYOPHILIZED, FOR SOLUTION INTRAVENOUS at 12:04

## 2022-04-11 RX ADMIN — BUSPIRONE HYDROCHLORIDE 10 MG: 10 TABLET ORAL at 22:03

## 2022-04-11 RX ADMIN — ENOXAPARIN SODIUM 40 MG: 100 INJECTION SUBCUTANEOUS at 11:03

## 2022-04-11 RX ADMIN — NYSTATIN: 100000 POWDER TOPICAL at 11:05

## 2022-04-11 RX ADMIN — MICONAZOLE NITRATE: 2 POWDER TOPICAL at 11:05

## 2022-04-11 RX ADMIN — FAMOTIDINE 40 MG: 20 TABLET ORAL at 06:15

## 2022-04-11 RX ADMIN — GUAIFENESIN 600 MG: 600 TABLET ORAL at 11:03

## 2022-04-11 RX ADMIN — IPRATROPIUM BROMIDE AND ALBUTEROL SULFATE 3 ML: 2.5; .5 SOLUTION RESPIRATORY (INHALATION) at 19:51

## 2022-04-11 RX ADMIN — FAMOTIDINE 40 MG: 20 TABLET ORAL at 16:56

## 2022-04-11 RX ADMIN — ARFORMOTEROL TARTRATE 15 MCG: 15 SOLUTION RESPIRATORY (INHALATION) at 06:50

## 2022-04-11 RX ADMIN — BUSPIRONE HYDROCHLORIDE 10 MG: 10 TABLET ORAL at 13:05

## 2022-04-11 RX ADMIN — IPRATROPIUM BROMIDE AND ALBUTEROL SULFATE 3 ML: 2.5; .5 SOLUTION RESPIRATORY (INHALATION) at 06:50

## 2022-04-11 RX ADMIN — BUSPIRONE HYDROCHLORIDE 10 MG: 10 TABLET ORAL at 06:15

## 2022-04-11 RX ADMIN — GABAPENTIN 300 MG: 300 CAPSULE ORAL at 16:56

## 2022-04-11 RX ADMIN — LISINOPRIL 5 MG: 5 TABLET ORAL at 11:04

## 2022-04-11 RX ADMIN — Medication 10 ML: at 22:02

## 2022-04-11 RX ADMIN — Medication 10 ML: at 11:05

## 2022-04-11 RX ADMIN — ARFORMOTEROL TARTRATE 15 MCG: 15 SOLUTION RESPIRATORY (INHALATION) at 19:51

## 2022-04-11 RX ADMIN — BUPROPION HYDROCHLORIDE 150 MG: 150 TABLET, EXTENDED RELEASE ORAL at 22:03

## 2022-04-11 RX ADMIN — BACITRACIN 1 APPLICATION: 500 OINTMENT TOPICAL at 11:06

## 2022-04-11 RX ADMIN — FLUOXETINE 40 MG: 20 CAPSULE ORAL at 11:04

## 2022-04-11 RX ADMIN — CEFTRIAXONE SODIUM 1 G: 1 INJECTION, SOLUTION INTRAVENOUS at 11:03

## 2022-04-11 RX ADMIN — GABAPENTIN 300 MG: 300 CAPSULE ORAL at 22:03

## 2022-04-11 RX ADMIN — ACETAMINOPHEN 650 MG: 325 TABLET ORAL at 22:02

## 2022-04-11 RX ADMIN — GUAIFENESIN 600 MG: 600 TABLET ORAL at 22:03

## 2022-04-11 RX ADMIN — ACETAMINOPHEN 650 MG: 325 TABLET ORAL at 11:03

## 2022-04-11 RX ADMIN — IPRATROPIUM BROMIDE AND ALBUTEROL SULFATE 3 ML: 2.5; .5 SOLUTION RESPIRATORY (INHALATION) at 13:50

## 2022-04-11 RX ADMIN — GABAPENTIN 300 MG: 300 CAPSULE ORAL at 11:03

## 2022-04-11 NOTE — PROGRESS NOTES
"Pulmonary / Critical Care Progress Note      Patient Name: Lluvia Archer  : 1965  MRN: [id number]  Attending:  Flako Santos DO  Date of admission: 2022    Subjective   Subjective   Follow-up for pneumonia, hypoxia    Over past 24 hours:  Evaluated by pulmonary team  Oxygen requirements have remained low at 2 L nasal cannula, satting 97%  Agreeable for bronchoscopy tomorrow      Review of Systems  General: Denied complaints  Cardiovascular:  Denied complaints  Respiratory: cough, secretions \"won't come up\"  Gastrointestinal: Denied complaints        Objective   Objective     Vitals:   Temp:  [97.7 °F (36.5 °C)-98.8 °F (37.1 °C)] 98.6 °F (37 °C)  Heart Rate:  [79-92] 91  Resp:  [16-20] 18  BP: ()/(50-79) 153/68  Flow (L/min):  [2] 2    Physical Exam   Vital Signs Reviewed   General: Obese with BMI 33; alert, NAD.    HEENT:   Very small crowded oropharynx with Mallampati index 4/4; dentition very poor repair with only 2 remaining teeth; PERRL, EOMI.  OP, nares clear, no sinus tenderness  Neck:  Supple, no JVD, no thyromegaly  Lymph: no axillary, cervical, supraclavicular lymphadenopathy noted bilaterally  Chest:   Mild thoracic kyphosis; good aeration, bilateral wheezing with FVC maneuver and prolonged expiration; fine rales right LL; no work of breathing noted  CV: RRR, no MGR, pulses 2+, equal.  Abd:   Obese with striae; soft, NT, ND, + BS, no HSM  EXT:  no clubbing, no cyanosis, no edema, no joint tenderness  Neuro:  A&Ox3, CN grossly intact, no focal deficits.  Skin: No rashes or lesions noted    Result Review    Result Review:  I have personally reviewed the results from the time of this admission to 2022 13:54 EDT and agree with these findings:  [x]  Laboratory  [x]  Microbiology  [x]  Radiology  []  EKG/Telemetry   []  Cardiology/Vascular   []  Pathology  []  Old records  []  Other:  Most notable findings include: WBC normalized 10.08, HIV negative    Lab 2022: Procalcitonin " "0.55  Lactic acid 1.0  Troponin T 0.057  proBNP 2554  Covid negative     Lab 4/9/2022: Creatinine 0.82; bicarbonate 27; hematocrit 27%; WBC 21,180 decreasing to currently 10,870; eosinophils 260 on 3/1/2021     Sputum culture 12/2/2019: Normal sabina with yeast     ABG 4/4/2022: 72/7.48/33 on 32% oxygen     CXR 12/2/2019: Bibasilar opacities but independent review n/a  CXR 4/4/2022: RLL patchy airspace disease  CXR 4/9/2022: \"Increased\" multifocal airspace disease primarily right lower lobe     CT chest 4/5/2022: multi-focal infiltrates with consolidation RLL>LLL; left upper lobe interstitial lung changes; centrilobular emphysema;     Echo 4/8/2022: EF 50% with diastolic dysfunction grade 1; mild aortic insufficiency and mild mitral vegetation; no tricuspid regurgitation;     PSG 2015: Reported mild MEHDI       Assessment/Plan   Assessment / Plan     Active Hospital Problems:  Active Hospital Problems    Diagnosis    • **Pneumonia of right lower lobe due to infectious organism      Added automatically from request for surgery [id number]     • Severe malnutrition (CMS/HCC)    • Sepsis, due to unspecified organism, unspecified whether acute organ dysfunction present ([facility])          Impression:  1.  COPD: 90-pack-year smoking and clinically severe with  Acute exacerbation (AECOPD) due to outpatient community-acquired pneumonia  recurrent pneumonia last episode 12/2/2019  probably due to severe underlying COPD with impaired ventilatory reserve  nothing to suggest atypical infection, mycobacteria, MAC, etc.  underlying malignancy such as bronchoalveolar carcinoma possible and in differential  nocturnal hypoxemia and congestive heart failure also risk factors  history and speech evaluation do not suggest aspiration per se  moderate centrilobular emphysema on chest CT     2.  MEHDI: dx 2015 PSG data n/a; CPAP not tolerated; \"overlap syndrome\"     3. HfpEF: EF 58% with diastolic dysfunction grade 1/mild aortic insufficiency and mild " mitral vegetation     4.  Hypertension     5.  GERD    Plan:  We will plan on doing bronchoscopy tomorrow 4/12/2022    Discussed risk versus benefits    Risk versus benefits of bronchoscopy explained to the patient including death, respiratory failure requiring intubation mechanical ventilation, pneumothorax requiring chest tube placement, bleeding, patient voices understanding of the risk of the procedure and wishes to proceed.    Flu vaccine done  Covid vaccine x2 done  PCV 13 done 2017     Consider pulmonary rehabilitation as outpatient           DVT prophylaxis: On Lovenox 40 mg/day    DVT prophylaxis:  Medical DVT prophylaxis orders are present.    CODE STATUS:   Code Status (Patient has no pulse and is not breathing): CPR (Attempt to Resuscitate)  Medical Interventions (Patient has pulse or is breathing): Full Support    Electronically signed by Shin Mcdonald DO, 04/11/22, 1:54 PM EDT.

## 2022-04-11 NOTE — PLAN OF CARE
Goal Outcome Evaluation:           Progress: improving  Outcome Evaluation: very pleasant. not many changes since note yesterday. plans to have bronchoscopy this coming up week and dc home after. no headache today. medicated as ordered. dressing changed. remains on 2L. showered today.

## 2022-04-11 NOTE — CONSULTS
SW spoke with pt regarding inpatient rehab versus home. Pt plans to return home at discharge and denies the need for inpatient rehab or HHC. Pt requesting a walker at discharge. Pt may also need a 6MWT at discharge to assess for home 02 needs. No other needs identified at this time. CC following.

## 2022-04-11 NOTE — PLAN OF CARE
Goal Outcome Evaluation:      RD follow-up for 7 day LOS:  Pt continues to eat better per intake records up until today.  Per notes to have bronchoscopy tomorrow.  As stated before pt doesn't like supplements & refused any snacks.  BM today.  Labs reviewed.  RD will continue to monitor prn.

## 2022-04-11 NOTE — PLAN OF CARE
Goal Outcome Evaluation:  Plan of Care Reviewed With: patient, daughter        Progress: improving  Outcome Evaluation: alert and oriented x 4. pt up to bedside commode today ad billy. vital signs stable. pt on 2L of oxygen and tolerating well. wound and skin care performed today per order. pt had complaints of headache this morning, prn tylenol given per order. pt has orders to be NPO after midnight for bronchoscopy tomorrow. will continue to monitor.

## 2022-04-11 NOTE — PROGRESS NOTES
" [facility]   Hospitalist Progress Note  Date: 2022  Patient Name: Lluvia Archer  : 1965  MRN: [id number]  Date of admission: 2022      Subjective   Subjective     Chief Complaint: short of breath     Summary:   56 y.o. female former smoker with COPD on home O2, HLD, HTN, depression who presents with shortness of breath and altered mental status.  The patient reports that she has been feeling ill for the past week. She states that she has been short of breath and febrile with this illness. She notes that she has also had a cough; however, this is not unusual for her secondary to her COPD.  Patient's daughter is at bedside and states that she has been sleeping for the past week.  Patient also states that she has extreme anxiety.  Patient states that she has history of tobacco abuse but has not smoked in quite some time. Per nursing, the patient was initially brought to the ED today due to the daughter's concerns for altered mental status. They informed EMS today that the patient \"wasn't acting right\" in that she was not taking her medication and not wearing her home oxygen. EMS was also informed that the patient had developed new bowel/bladder incontinence however patient states she is depressed and just does not want to get up to go.      In the ED:  Febrile to 101, tachycardic to 122, tachypneic to 26, blood pressure okay, desat to 86 on room air then up into the 90s with 3 L nasal cannula.Troponin: 0.057 BNP 2554WBC: 20.6    CXR:  Right basilar airspace disease most concerning for pneumonia.       Interval Followup:   Patient's blood pressure was slightly low today so she said that she felt a little lightheaded however it has improved.  She is not on any blood pressure medication  On 2L of oxygen.  She was on oxygen at home but had trouble with her machine therefore had not been on oxygen prior to coming in but will need to go home on  Cough better. No nausea, vomiting or headache   Doing " well with PT and wants to go home   CT shows pneumonia    Repeat chest x-ray today shows worsening pneumonia. Pulmonary following  Blood culture from admission is now having gram-positive cocci which is likely contaminant.  Repeat ones are pending but show no growth  Planning for bronchoscopy tomorrow then patient could potentially go home tomorrow afternoon if she feels like it or following day          ROS:   History obtained from the patient  General ROS: Positive for Fatigue, Negative for - chills, fever, malaise, or night sweats  Psychological ROS: positive for anxiety and depression  Ophthalmic ROS: negative for - blurry vision, double vision, or itchy eyes  ENT ROS: negative for - headaches, nasal congestion, sneezing, or sore throat  Endocrine ROS: negative for - malaise/lethargy, polydipsia/polyuria, or temperature intolerance  Respiratory ROS: positive for cough and shortness of air, positive for back pain with cough   Cardiovascular ROS: negative for - chest pain, dyspnea on exertion, edema, palpitations, or paroxysmal nocturnal dyspnea  Gastrointestinal ROS: negative for - abdominal pain, constipation, diarrhea, heartburn, hematemesis, or nausea/vomiting  Genito-Urinary ROS: negative for - change in urinary stream, hematuria, incontinence, or urinary frequency/urgency  Musculoskeletal ROS: positive for generalized weakness  Neurological ROS: negative for - confusion, dizziness, gait disturbance, headaches, impaired coordination/balance, memory loss, numbness/tingling, seizures, or visual changes  Dermatological ROS: negative for dry skin and rash         Review of SystemsObjective   Objective     Vitals:   Temp:  [97.7 °F (36.5 °C)-98.8 °F (37.1 °C)] 98.6 °F (37 °C)  Heart Rate:  [79-92] 91  Resp:  [16-20] 18  BP: ()/(50-79) 153/68  Flow (L/min):  [2] 2  Physical Exam    Constitutional: Awake, alert, no acute distress resting in bed  Her daughter is at the bedside    Eyes: Pupils equal, sclerae  anicteric, no conjunctival injection   HENT: NCAT, mucous membranes moist   Neck: Supple,   Respiratory: Clear to auscultation bilaterally, nonlabored respirations  No w/r/r    Cardiovascular: RRR, no murmurs,    Gastrointestinal: Positive bowel sounds, soft, nontender, nondistended   Musculoskeletal: No bilateral ankle edema, no clubbing or cyanosis to extremities   Psychiatric: Appropriate affect, cooperative   Neurologic: Oriented to person and place  strength symmetric in all extremities, Cranial Nerves grossly intact to confrontation, speech clear   Skin: No rashes     Result Review    Result Review:  I have personally reviewed the results from the time of this admission to 4/11/2022 15:03 EDT and agree with these findings:  [x]  Laboratory   CBC    CBC 4/9/22 4/10/22 4/11/22   WBC 10.87 (A) 10.08 8.42   RBC 3.03 (A) 2.93 (A) 2.77 (A)   Hemoglobin 8.9 (A) 8.8 (A) 8.3 (A)   Hematocrit 27.9 (A) 27.7 (A) 26.1 (A)   MCV 92.1 94.5 94.2   MCH 29.4 30.0 30.0   MCHC 31.9 31.8 31.8   RDW 15.9 (A) 16.0 (A) 15.7 (A)   Platelets 264 257 235   (A) Abnormal value            BMP    BMP 4/9/22 4/10/22 4/11/22   BUN 18 16 12   Creatinine 0.82 0.81 0.75   Sodium 139 138 139   Potassium 3.4 (A) 4.0 3.9   Chloride 102 101 101   CO2 27.9 30.9 (A) 30.5 (A)   Calcium 8.4 (A) 8.6 8.6   (A) Abnormal value              [x]  Microbiology   No results found for: ACANTHNAEG, AFBCX, BPERTUSSISCX, BLOODCX  No results found for: BCIDPCR, CXREFLEX, CSFCX, CULTURETIS  No results found for: CULTURES, HSVCX, URCX  No results found for: EYECULTURE, GCCX, HSVCULTURE, LABHSV  No results found for: LEGIONELLA, MRSACX, MUMPSCX, MYCOPLASCX  No results found for: NOCARDIACX, STOOLCX  No results found for: THROATCX, UNSTIMCULT, URINECX, CULTURE, VZVCULTUR  No results found for: VIRALCULTU, WOUNDCX    [x]  Radiology   XR Chest PA & Lateral    Result Date: 4/9/2022  PROCEDURE: XR CHEST PA AND LATERAL  COMPARISON: [facility], CT, CT CHEST WO  CONTRAST DIAGNOSTIC, 4/05/2022, 18:37.  [facility], CR, XR CHEST 1 VW, 4/04/2022, 20:54.  INDICATIONS: PNEUMONIA  FINDINGS:   There is increasing multifocal airspace opacity in lung fields greatest in the right lower lobe.  There is blunting of the costophrenic angles.  The heart and pulmonary vasculature are within normal limits.  IMPRESSION: 1. Increasing multifocal airspace opacity in the lung fields greatest in the right lower lobe likely worsening multifocal pneumonia.  2. Blunting of the costophrenic angles may be secondary to small pleural effusions.   ORLY DALLAS MD       Electronically Signed and Approved By: ORLY DALLAS MD on 4/09/2022 at 8:52               []  EKG/Telemetry   []  Cardiology/Vascular   []  Pathology  []  Old records  []  Other:    Assessment/Plan   Assessment / Plan     Assessment/Plan:  Sepsis secondary to community-acquired pneumonia   Chronic hypoxemic respiratory failure on home oxygen  Anxiety and depression  Hypertension  Concern for CHF- no echo on file  Troponin elevated x1   Abnormal blood culture 1 out of 2 bottles from admission   Diastolic congestive heart failure with mild exacerbation    PLAN  Continue azithromycin and Rocephin day 5  Sputum culture ordered if patient is able to cough anything up  Blood cultures from admission growing Anaerococcus prevoti? Repeat cultures are negative   MRSA screen is also negative    Repeat chest x-ray showing worsening pneumonia therefore  consulted pulmonary critical for bronch. Which will be done in the am   CT of chest reviewed with patient and daughter   Continue patient's Wellbutrin, BuSpar Neurontin and fluoxetine for her anxiety and depression  Patient has a diagnosis of diabetes however her A1c is 5.5  Continue patient on bronchodilators   Echocardiogram showing diastolic dysfunction  Patient is status post Lasix dose x1 on April 7  Wbc improving           Discussed plan with RN.    DVT prophylaxis:  Lovenox    Medical DVT prophylaxis orders are present.    CODE STATUS:   Code Status (Patient has no pulse and is not breathing): CPR (Attempt to Resuscitate)  Medical Interventions (Patient has pulse or is breathing): Full Support

## 2022-04-12 ENCOUNTER — READMISSION MANAGEMENT (OUTPATIENT)
Dept: CALL CENTER | Facility: HOSPITAL | Age: 57
End: 2022-04-12

## 2022-04-12 ENCOUNTER — ANESTHESIA EVENT (OUTPATIENT)
Dept: GASTROENTEROLOGY | Facility: HOSPITAL | Age: 57
End: 2022-04-12

## 2022-04-12 ENCOUNTER — ANESTHESIA (OUTPATIENT)
Dept: GASTROENTEROLOGY | Facility: HOSPITAL | Age: 57
End: 2022-04-12

## 2022-04-12 VITALS
SYSTOLIC BLOOD PRESSURE: 147 MMHG | BODY MASS INDEX: 33.5 KG/M2 | OXYGEN SATURATION: 91 % | WEIGHT: 196.21 LBS | RESPIRATION RATE: 20 BRPM | TEMPERATURE: 98.3 F | DIASTOLIC BLOOD PRESSURE: 74 MMHG | HEART RATE: 87 BPM | HEIGHT: 64 IN

## 2022-04-12 LAB
ACB CMPLX DNA BAL NAA+NON-PRB-NCNCRNG: NOT DETECTED
ANION GAP SERPL CALCULATED.3IONS-SCNC: 7.2 MMOL/L (ref 5–15)
BLACTX-M ISLT/SPM QL: NORMAL
BLAIMP ISLT/SPM QL: NORMAL
BLAKPC ISLT/SPM QL: NORMAL
BLAOXA-48-LIKE ISLT/SPM QL: NORMAL
BLAVIM ISLT/SPM QL: NORMAL
BUN SERPL-MCNC: 11 MG/DL (ref 6–20)
BUN/CREAT SERPL: 12.8 (ref 7–25)
C PNEUM DNA NPH QL NAA+NON-PROBE: NOT DETECTED
CALCIUM SPEC-SCNC: 8.8 MG/DL (ref 8.6–10.5)
CHLORIDE SERPL-SCNC: 102 MMOL/L (ref 98–107)
CO2 SERPL-SCNC: 31.8 MMOL/L (ref 22–29)
CREAT SERPL-MCNC: 0.86 MG/DL (ref 0.57–1)
DEPRECATED RDW RBC AUTO: 53.9 FL (ref 37–54)
E CLOAC COMP DNA BAL NAA+NON-PRB-NCNCRNG: NOT DETECTED
E COLI DNA BAL NAA+NON-PRB-NCNCRNG: NOT DETECTED
EGFRCR SERPLBLD CKD-EPI 2021: 79.9 ML/MIN/1.73
ERYTHROCYTE [DISTWIDTH] IN BLOOD BY AUTOMATED COUNT: 15.5 % (ref 12.3–15.4)
FLUAV SUBTYP SPEC NAA+PROBE: NOT DETECTED
FLUBV RNA ISLT QL NAA+PROBE: NOT DETECTED
GLUCOSE SERPL-MCNC: 84 MG/DL (ref 65–99)
GP B STREP DNA BAL NAA+NON-PRB-NCNCRNG: NOT DETECTED
HADV DNA SPEC NAA+PROBE: NOT DETECTED
HAEM INFLU DNA BAL NAA+NON-PRB-NCNCRNG: NOT DETECTED
HCOV RNA LOWER RESP QL NAA+NON-PROBE: NOT DETECTED
HCT VFR BLD AUTO: 27.2 % (ref 34–46.6)
HGB BLD-MCNC: 8.4 G/DL (ref 12–15.9)
HMPV RNA NPH QL NAA+NON-PROBE: NOT DETECTED
HPIV RNA LOWER RESP QL NAA+NON-PROBE: NOT DETECTED
K AEROGENES DNA BAL NAA+NON-PRB-NCNCRNG: NOT DETECTED
K OXYTOCA DNA BAL NAA+NON-PRB-NCNCRNG: NOT DETECTED
K PNEU GRP DNA BAL NAA+NON-PRB-NCNCRNG: NOT DETECTED
L PNEUMO DNA LOWER RESP QL NAA+NON-PROBE: NOT DETECTED
M CATARRHALIS DNA BAL NAA+NON-PRB-NCNCRNG: NOT DETECTED
M PNEUMO IGG SER IA-ACNC: NOT DETECTED
MAGNESIUM SERPL-MCNC: 2 MG/DL (ref 1.6–2.6)
MCH RBC QN AUTO: 29.2 PG (ref 26.6–33)
MCHC RBC AUTO-ENTMCNC: 30.9 G/DL (ref 31.5–35.7)
MCV RBC AUTO: 94.4 FL (ref 79–97)
MECA+MECC ISLT/SPM QL: NORMAL
NDM GENE: NORMAL
P AERUGINOSA DNA BAL NAA+NON-PRB-NCNCRNG: NOT DETECTED
PLATELET # BLD AUTO: 224 10*3/MM3 (ref 140–450)
PMV BLD AUTO: 10 FL (ref 6–12)
POTASSIUM SERPL-SCNC: 4.2 MMOL/L (ref 3.5–5.2)
PROTEUS SP DNA BAL NAA+NON-PRB-NCNCRNG: NOT DETECTED
RBC # BLD AUTO: 2.88 10*6/MM3 (ref 3.77–5.28)
RHINOVIRUS RNA SPEC NAA+PROBE: NOT DETECTED
RSV RNA NPH QL NAA+NON-PROBE: NOT DETECTED
S AUREUS DNA BAL NAA+NON-PRB-NCNCRNG: NOT DETECTED
S MARCESCENS DNA BAL NAA+NON-PRB-NCNCRNG: NOT DETECTED
S PNEUM DNA BAL NAA+NON-PRB-NCNCRNG: NOT DETECTED
S PYO DNA BAL NAA+NON-PRB-NCNCRNG: NOT DETECTED
SODIUM SERPL-SCNC: 141 MMOL/L (ref 136–145)
WBC NRBC COR # BLD: 7.05 10*3/MM3 (ref 3.4–10.8)

## 2022-04-12 PROCEDURE — 87206 SMEAR FLUORESCENT/ACID STAI: CPT | Performed by: INTERNAL MEDICINE

## 2022-04-12 PROCEDURE — 88305 TISSUE EXAM BY PATHOLOGIST: CPT | Performed by: INTERNAL MEDICINE

## 2022-04-12 PROCEDURE — 99232 SBSQ HOSP IP/OBS MODERATE 35: CPT | Performed by: INTERNAL MEDICINE

## 2022-04-12 PROCEDURE — 87205 SMEAR GRAM STAIN: CPT | Performed by: INTERNAL MEDICINE

## 2022-04-12 PROCEDURE — 87102 FUNGUS ISOLATION CULTURE: CPT | Performed by: INTERNAL MEDICINE

## 2022-04-12 PROCEDURE — 31628 BRONCHOSCOPY/LUNG BX EACH: CPT | Performed by: INTERNAL MEDICINE

## 2022-04-12 PROCEDURE — 31624 DX BRONCHOSCOPE/LAVAGE: CPT | Performed by: INTERNAL MEDICINE

## 2022-04-12 PROCEDURE — 94799 UNLISTED PULMONARY SVC/PX: CPT

## 2022-04-12 PROCEDURE — 94618 PULMONARY STRESS TESTING: CPT

## 2022-04-12 PROCEDURE — 83735 ASSAY OF MAGNESIUM: CPT | Performed by: INTERNAL MEDICINE

## 2022-04-12 PROCEDURE — 25010000002 CEFTRIAXONE PER 250 MG: Performed by: INTERNAL MEDICINE

## 2022-04-12 PROCEDURE — 87633 RESP VIRUS 12-25 TARGETS: CPT | Performed by: INTERNAL MEDICINE

## 2022-04-12 PROCEDURE — 87116 MYCOBACTERIA CULTURE: CPT | Performed by: INTERNAL MEDICINE

## 2022-04-12 PROCEDURE — 80048 BASIC METABOLIC PNL TOTAL CA: CPT | Performed by: INTERNAL MEDICINE

## 2022-04-12 PROCEDURE — 0B9F7ZX DRAINAGE OF RIGHT LOWER LUNG LOBE, VIA NATURAL OR ARTIFICIAL OPENING, DIAGNOSTIC: ICD-10-PCS | Performed by: INTERNAL MEDICINE

## 2022-04-12 PROCEDURE — 85027 COMPLETE CBC AUTOMATED: CPT | Performed by: INTERNAL MEDICINE

## 2022-04-12 PROCEDURE — 25010000002 PROPOFOL 10 MG/ML EMULSION: Performed by: NURSE ANESTHETIST, CERTIFIED REGISTERED

## 2022-04-12 PROCEDURE — 88108 CYTOPATH CONCENTRATE TECH: CPT | Performed by: INTERNAL MEDICINE

## 2022-04-12 PROCEDURE — 99239 HOSP IP/OBS DSCHRG MGMT >30: CPT | Performed by: INTERNAL MEDICINE

## 2022-04-12 PROCEDURE — 25010000002 ENOXAPARIN PER 10 MG: Performed by: INTERNAL MEDICINE

## 2022-04-12 PROCEDURE — 0BBF8ZX EXCISION OF RIGHT LOWER LUNG LOBE, VIA NATURAL OR ARTIFICIAL OPENING ENDOSCOPIC, DIAGNOSTIC: ICD-10-PCS | Performed by: INTERNAL MEDICINE

## 2022-04-12 PROCEDURE — 87071 CULTURE AEROBIC QUANT OTHER: CPT | Performed by: INTERNAL MEDICINE

## 2022-04-12 RX ORDER — LIDOCAINE HYDROCHLORIDE 20 MG/ML
INJECTION, SOLUTION EPIDURAL; INFILTRATION; INTRACAUDAL; PERINEURAL AS NEEDED
Status: DISCONTINUED | OUTPATIENT
Start: 2022-04-12 | End: 2022-04-12 | Stop reason: HOSPADM

## 2022-04-12 RX ORDER — LIDOCAINE HYDROCHLORIDE 20 MG/ML
INJECTION, SOLUTION INFILTRATION; PERINEURAL AS NEEDED
Status: DISCONTINUED | OUTPATIENT
Start: 2022-04-12 | End: 2022-04-12 | Stop reason: SURG

## 2022-04-12 RX ORDER — SODIUM CHLORIDE, SODIUM LACTATE, POTASSIUM CHLORIDE, CALCIUM CHLORIDE 600; 310; 30; 20 MG/100ML; MG/100ML; MG/100ML; MG/100ML
30 INJECTION, SOLUTION INTRAVENOUS CONTINUOUS
Status: DISCONTINUED | OUTPATIENT
Start: 2022-04-12 | End: 2022-04-12 | Stop reason: HOSPADM

## 2022-04-12 RX ORDER — PROPOFOL 10 MG/ML
VIAL (ML) INTRAVENOUS AS NEEDED
Status: DISCONTINUED | OUTPATIENT
Start: 2022-04-12 | End: 2022-04-12 | Stop reason: SURG

## 2022-04-12 RX ORDER — MAGNESIUM HYDROXIDE 1200 MG/15ML
LIQUID ORAL AS NEEDED
Status: DISCONTINUED | OUTPATIENT
Start: 2022-04-12 | End: 2022-04-12 | Stop reason: HOSPADM

## 2022-04-12 RX ADMIN — LIDOCAINE HYDROCHLORIDE 100 MG: 20 INJECTION, SOLUTION INFILTRATION; PERINEURAL at 10:53

## 2022-04-12 RX ADMIN — GABAPENTIN 300 MG: 300 CAPSULE ORAL at 17:21

## 2022-04-12 RX ADMIN — CEFTRIAXONE SODIUM 1 G: 1 INJECTION, SOLUTION INTRAVENOUS at 08:45

## 2022-04-12 RX ADMIN — Medication 10 ML: at 08:46

## 2022-04-12 RX ADMIN — NYSTATIN: 100000 POWDER TOPICAL at 12:47

## 2022-04-12 RX ADMIN — FAMOTIDINE 40 MG: 20 TABLET ORAL at 08:45

## 2022-04-12 RX ADMIN — BUSPIRONE HYDROCHLORIDE 10 MG: 10 TABLET ORAL at 08:46

## 2022-04-12 RX ADMIN — SODIUM CHLORIDE, POTASSIUM CHLORIDE, SODIUM LACTATE AND CALCIUM CHLORIDE: 600; 310; 30; 20 INJECTION, SOLUTION INTRAVENOUS at 10:49

## 2022-04-12 RX ADMIN — GABAPENTIN 300 MG: 300 CAPSULE ORAL at 08:46

## 2022-04-12 RX ADMIN — GUAIFENESIN 600 MG: 600 TABLET ORAL at 08:45

## 2022-04-12 RX ADMIN — FLUOXETINE 40 MG: 20 CAPSULE ORAL at 08:45

## 2022-04-12 RX ADMIN — PROPOFOL 250 MCG/KG/MIN: 10 INJECTION, EMULSION INTRAVENOUS at 10:53

## 2022-04-12 RX ADMIN — ENOXAPARIN SODIUM 40 MG: 100 INJECTION SUBCUTANEOUS at 12:47

## 2022-04-12 RX ADMIN — ARFORMOTEROL TARTRATE 15 MCG: 15 SOLUTION RESPIRATORY (INHALATION) at 07:32

## 2022-04-12 RX ADMIN — MICONAZOLE NITRATE: 2 POWDER TOPICAL at 08:46

## 2022-04-12 RX ADMIN — FAMOTIDINE 40 MG: 20 TABLET ORAL at 17:21

## 2022-04-12 RX ADMIN — PROPOFOL 50 MG: 10 INJECTION, EMULSION INTRAVENOUS at 10:53

## 2022-04-12 RX ADMIN — BUPROPION HYDROCHLORIDE 150 MG: 150 TABLET, EXTENDED RELEASE ORAL at 08:45

## 2022-04-12 RX ADMIN — IPRATROPIUM BROMIDE AND ALBUTEROL SULFATE 3 ML: 2.5; .5 SOLUTION RESPIRATORY (INHALATION) at 07:32

## 2022-04-12 RX ADMIN — IPRATROPIUM BROMIDE AND ALBUTEROL SULFATE 3 ML: 2.5; .5 SOLUTION RESPIRATORY (INHALATION) at 16:21

## 2022-04-12 RX ADMIN — LISINOPRIL 5 MG: 5 TABLET ORAL at 08:45

## 2022-04-12 RX ADMIN — BUSPIRONE HYDROCHLORIDE 10 MG: 10 TABLET ORAL at 13:06

## 2022-04-12 RX ADMIN — BACITRACIN 1 APPLICATION: 500 OINTMENT TOPICAL at 08:46

## 2022-04-12 NOTE — PROGRESS NOTES
"Pulmonary / Critical Care Progress Note      Patient Name: Lluvia Archer  : 1965  MRN: [id number]  Attending:  Alesha Rhodes MD  Date of admission: 2022    Subjective   Subjective   Follow-up for pneumonia, hypoxia    Over past 24 hours:  Has been n.p.o. after midnight  Agreeable for bronchoscopy      No Known Allergies      Review of Systems  General: Denied complaints  Cardiovascular:  Denied complaints  Respiratory: cough, secretions \"won't come up\"  Gastrointestinal: Denied complaints        Objective   Objective     Vitals:   Temp:  [98.1 °F (36.7 °C)-98.9 °F (37.2 °C)] 98.9 °F (37.2 °C)  Heart Rate:  [79-92] 87  Resp:  [18-20] 18  BP: (129-154)/(68-88) 142/88  Flow (L/min):  [2] 2    Physical Exam   Vital Signs Reviewed   General: Obese with BMI 33; alert, NAD.    HEENT:   Very small crowded oropharynx with Mallampati index 4/4; dentition very poor repair with only 2 remaining teeth; PERRL, EOMI.  OP, nares clear, no sinus tenderness  Neck:  Supple, no JVD, no thyromegaly  Lymph: no axillary, cervical, supraclavicular lymphadenopathy noted bilaterally  Chest:   Mild thoracic kyphosis; good aeration, bilateral wheezing with FVC maneuver and prolonged expiration; fine rales right LL; no work of breathing noted  CV: RRR, no MGR, pulses 2+, equal.  Abd:   Obese with striae; soft, NT, ND, + BS, no HSM  EXT:  no clubbing, no cyanosis, no edema, no joint tenderness  Neuro:  A&Ox3, CN grossly intact, no focal deficits.  Skin: No rashes or lesions noted    Result Review    Result Review:  I have personally reviewed the results from the time of this admission to 2022 10:32 EDT and agree with these findings:  [x]  Laboratory  [x]  Microbiology  [x]  Radiology  []  EKG/Telemetry   []  Cardiology/Vascular   []  Pathology  []  Old records  []  Other:  Most notable findings include: WBC normalized 10.08, HIV negative    Lab 2022: Procalcitonin 0.55  Lactic acid 1.0  Troponin T 0.057  proBNP " "2554  Covid negative     Lab 4/9/2022: Creatinine 0.82; bicarbonate 27; hematocrit 27%; WBC 21,180 decreasing to currently 10,870; eosinophils 260 on 3/1/2021     Sputum culture 12/2/2019: Normal sabina with yeast     ABG 4/4/2022: 72/7.48/33 on 32% oxygen     CXR 12/2/2019: Bibasilar opacities but independent review n/a  CXR 4/4/2022: RLL patchy airspace disease  CXR 4/9/2022: \"Increased\" multifocal airspace disease primarily right lower lobe     CT chest 4/5/2022: multi-focal infiltrates with consolidation RLL>LLL; left upper lobe interstitial lung changes; centrilobular emphysema;     Echo 4/8/2022: EF 50% with diastolic dysfunction grade 1; mild aortic insufficiency and mild mitral vegetation; no tricuspid regurgitation;     PSG 2015: Reported mild MEHDI       Assessment/Plan   Assessment / Plan     Active Hospital Problems:  Active Hospital Problems    Diagnosis    • **Pneumonia of right lower lobe due to infectious organism      Added automatically from request for surgery [id number]     • Severe malnutrition (CMS/HCC)    • Sepsis, due to unspecified organism, unspecified whether acute organ dysfunction present ([facility])          Impression:  1.  COPD: 90-pack-year smoking and clinically severe with  Acute exacerbation (AECOPD) due to outpatient community-acquired pneumonia  recurrent pneumonia last episode 12/2/2019  probably due to severe underlying COPD with impaired ventilatory reserve  nothing to suggest atypical infection, mycobacteria, MAC, etc.  underlying malignancy such as bronchoalveolar carcinoma possible and in differential  nocturnal hypoxemia and congestive heart failure also risk factors  history and speech evaluation do not suggest aspiration per se  moderate centrilobular emphysema on chest CT     2.  MEHDI: dx 2015 PSG data n/a; CPAP not tolerated; \"overlap syndrome\"     3. HfpEF: EF 58% with diastolic dysfunction grade 1/mild aortic insufficiency and mild mitral vegetation     4.  Hypertension     5.  " GERD    Plan:  We will proceed with bronchoscopy today 4/12/2020    Risk versus benefits of bronchoscopy explained to the patient including death, respiratory failure requiring intubation mechanical ventilation, pneumothorax requiring chest tube placement, bleeding, patient voices understanding of the risk of the procedure and wishes to proceed.        DVT prophylaxis: On Lovenox 40 mg/day    DVT prophylaxis:  Medical DVT prophylaxis orders are present.    CODE STATUS:   Code Status (Patient has no pulse and is not breathing): CPR (Attempt to Resuscitate)  Medical Interventions (Patient has pulse or is breathing): Full Support    Electronically signed by Shin Mcdonald DO, 04/12/22, 10:32 AM EDT.

## 2022-04-12 NOTE — OP NOTE
Procedure name bronchoscopy with bronchoalveolar lavage and right lower lobe transbronchial lung biopsies     Indication persistent right lower lobe infiltrate     Sedation-IV MAC per anesthesia service        Procedure details  Patient was brought back to the bronchoscopy suite a bite block was placed in the oral cavity, and a therapeutic bronchoscope was then used to intubate the trachea, the vocal cords inspected and appeared to have normal motion with inhalation and ventilation, inspection was performed of the left tracheobronchial tree and there were no endobronchial lesion seen to the segmental level, inspection of the right tracheobronchial tree showed no endobronchial lesions to the segmental level.  At this time the bronchoscope was wedged into the right lower lobe bronchus and numerous transbronchial lung biopsies were taken from the right lower lobe of the lung.  A bronchoalveolar lavage was obtained from right lower lobe bronchus.  The airway did appear to be diffusely erythematous consistent with bronchitis and irritation        estimated blood loss  Less than 5 mL    Procedures performed  Transbronchial lung biopsy from the right lower lobe  Bronchoalveolar lavage from the right lower lobe bronchus     postoperative diagnosis  Persistent right lower lobe infiltrate  Erythematous airways      Patient tolerated procedure well complications        Plan  Transfer to the floor follow-up on results of microbiology and pathology

## 2022-04-12 NOTE — PLAN OF CARE
Goal Outcome Evaluation:  Plan of Care Reviewed With: patient, daughter        Progress: improving  Outcome Evaluation: alert and oriented x 4. vital signs stable. pt got up ad billy in room today. pt still on 2L of oxygen. pt completed walk oximetry test today and will need home oxygen. pt went down for a bronchoscopy today. pt eager to discharge.

## 2022-04-12 NOTE — ANESTHESIA PREPROCEDURE EVALUATION
Anesthesia Evaluation     Patient summary reviewed and Nursing notes reviewed                Airway   Mallampati: I  TM distance: >3 FB  Neck ROM: full  No difficulty expected  Dental      Pulmonary - normal exam    breath sounds clear to auscultation  (+) pneumonia , COPD, shortness of breath, sleep apnea,   Cardiovascular - normal exam    Rhythm: regular  Rate: normal    (+) hypertension, hyperlipidemia,       Neuro/Psych  (+) headaches,    GI/Hepatic/Renal/Endo    (+) obesity,  hiatal hernia, GERD,  diabetes mellitus,     Musculoskeletal     Abdominal    Substance History - negative use     OB/GYN negative ob/gyn ROS         Other   arthritis,                      Anesthesia Plan    ASA 3     general     intravenous induction     Anesthetic plan, all risks, benefits, and alternatives have been provided, discussed and informed consent has been obtained with: patient.        CODE STATUS:    Code Status (Patient has no pulse and is not breathing): CPR (Attempt to Resuscitate)  Medical Interventions (Patient has pulse or is breathing): Full Support

## 2022-04-12 NOTE — NURSING NOTE
Exercise Oximetry    Patient Name:Lluvia Archer   MRN: [id number]   Date: 04/12/22             ROOM AIR BASELINE   SpO2% 93%   Heart Rate 87   Blood Pressure     EXERCISE ON ROOM AIR SpO2% EXERCISE ON O2 @ 2 LPM SpO2%   1 MINUTE 93% 1 MINUTE 94%   2 MINUTES 88% 2 MINUTES 92%   3 MINUTES 85% 3 MINUTES 91%   4 MINUTES  4 MINUTES 91%   5 MINUTES  5 MINUTES 90%   6 MINUTES  6 MINUTES 90%              Distance Walked  100 ft Distance Walked   Dyspnea (Eliz Scale)   Dyspnea (Eliz Scale)   Fatigue (Eliz Scale)  Fatigue (Eliz Scale)   SpO2% Post Exercise  95% SpO2% Post Exercise   HR Post Exercise  108 HR Post Exercise   Time to Recovery less than one minute Time to Recovery     Comments: Pt on room air before beginning walk test. Pt oxygen saturation at 93% on room air at beginning of test. After 3 minutes, pt dropped to 85% and 2L applied. Pt took less than a minute to recover back to >90%. Pt at 94% on 2L. After six minutes pt at 90% on 2L. After walk test, pt at 95% on 2L sitting in bed.

## 2022-04-12 NOTE — DISCHARGE SUMMARY
[facility]         HOSPITALIST  DISCHARGE SUMMARY    Patient Name: Lluvia Archer  : 1965  MRN: [id number]    Date of Admission: 2022  Date of Discharge:  22  Primary Care Physician: Aleksandar Edge DO    Consultants:  Pulmonology Dr. Mcdonald    Discharge Diagnosis:  Sepsis secondary to community-acquired pneumonia   Chronic hypoxemic respiratory failure on home oxygen  Anxiety and depression  Hypertension  Troponin elevated x1   Abnormal blood culture 1 out of 2 bottles from admission, contaminants  Diastolic congestive heart failure with mild exacerbation    Hospital Course     Hospital Course:  56-year-old female, former smoker, COPD on home oxygen presents to the emergency department with shortness of breath and altered mental status.  Diagnosed with pneumonia, had not been taking her medication around her home oxygen leading to her altered mentation.  Patient was septic on presentation combination broad-spectrum antibiotics, due to worsening x-ray after several days of treatment bronchoscopy and pulmonary consultation was pursued.  Patient completed a full course of antibiotics while in the hospital, blood cultures incidentally with GPC's however rule to contaminant due to negative repeats and the species of bacteria grown.  Bronchoscopy performed on , cultures pending at this time the patient stable for discharge home today.  She is not being discharged home with new antibiotics as she has completed these while in the hospital.   ensured the patient has oxygen available at home prior to discharge.  She will follow-up with pulmonology.    DISCHARGE Follow Up Recommendations:   Follow-up with pulmonology in regards to bronchoscopy results      Day of Discharge     Vital Signs:  Temp:  [96.8 °F (36 °C)-98.9 °F (37.2 °C)] 98.3 °F (36.8 °C)  Heart Rate:  [] 87  Resp:  [18-25] 20  BP: ()/(55-88) 147/74  Flow (L/min):  [2-6]  2.5  Physical Exam:   Gen: NAD, WDWN  Resp: no dyspnea  CV: no LE edema  GI: Abdomen soft +bs  Psych: AOx3, normal mood and affect    Discharge Details        Discharge Medications      New Medications      Instructions Start Date   Trelegy Ellipta 100-62.5-25 MCG/INH inhaler  Generic drug: Fluticasone-Umeclidin-Vilant   INHALE 1 PUFF BY MOUTH AT THE SAME TIME EVERY DAY         Changes to Medications      Instructions Start Date   buPROPion  MG 12 hr tablet  Commonly known as: WELLBUTRIN SR  What changed: See the new instructions.   TAKE 1 TABLET(150 MG) BY MOUTH TWICE DAILY      famotidine 40 MG tablet  Commonly known as: PEPCID  What changed: See the new instructions.   TAKE 1 TABLET(40 MG) BY MOUTH TWICE DAILY      FLUoxetine 40 MG capsule  Commonly known as: PROzac  What changed: See the new instructions.   TAKE 1 CAPSULE(40 MG) BY MOUTH EVERY DAY         Continue These Medications      Instructions Start Date   albuterol sulfate  (90 Base) MCG/ACT inhaler  Commonly known as: PROVENTIL HFA;VENTOLIN HFA;PROAIR HFA   2 puffs, Inhalation, Every 6 Hours PRN      busPIRone 10 MG tablet  Commonly known as: BUSPAR   10 mg, Oral, 3 Times Daily      gabapentin 300 MG capsule  Commonly known as: NEURONTIN   300 mg, Oral, 3 Times Daily      lisinopril 5 MG tablet  Commonly known as: PRINIVIL,ZESTRIL   5 mg, Oral, Daily      Melatonin 10 MG tablet   10 mg, Oral, Nightly PRN      traMADol 50 MG tablet  Commonly known as: ULTRAM   50 mg, Oral, Every 8 Hours PRN             [place] [facility] [place] Condition: Stable    Diet:  Hospital:  Diet Order   Procedures   • Diet Regular; Thin       Discharge Activity: As tolerated      Future Appointments   Date Time Provider Department Center   5/3/2022 10:45 AM PAT 3 [facility]   5/27/2022  2:45 PM Debbie [place]helan [place] [facility] [place] [facility]       Additional Instructions for the Follow-ups that You Need to Schedule     Discharge  Follow-up with PCP   As directed       Currently Documented PCP:    Aleksandar Edge DO    PCP Phone Number:    [phone]     Follow Up Details: 1 week         Discharge Follow-up with Specified Provider: Pulmonology Dr. Mcdonald; 2 Weeks   As directed      To: Pulmonology Dr. Mcdonald    Follow Up: 2 Weeks               Pertinent  and/or Most Recent Results     PROCEDURES:   Bronchoscopy April 12    LAB and IMAGING RESULTS:      Lab 04/12/22  0534 04/11/22  0512 04/10/22  0457 04/09/22  0533 04/08/22  0450   WBC 7.05 8.42 10.08 10.87* 12.29*   HEMOGLOBIN 8.4* 8.3* 8.8* 8.9* 8.8*   HEMATOCRIT 27.2* 26.1* 27.7* 27.9* 26.6*   PLATELETS 224 235 257 264 244   MCV 94.4 94.2 94.5 92.1 89.9         Lab 04/12/22  0534 04/11/22  0512 04/10/22  0457 04/09/22  0533 04/08/22  0450   SODIUM 141 139 138 139 138   POTASSIUM 4.2 3.9 4.0 3.4* 3.3*   CHLORIDE 102 101 101 102 103   CO2 31.8* 30.5* 30.9* 27.9 24.5   ANION GAP 7.2 7.5 6.1 9.1 10.5   BUN 11 12 16 18 20   CREATININE 0.86 0.75 0.81 0.82 1.02*   EGFR 79.9 94.2 85.9 84.6 65.1   GLUCOSE 84 93 108* 102* 92   CALCIUM 8.8 8.6 8.6 8.4* 8.5*   MAGNESIUM 2.0 1.9 1.8 2.0 2.1             Brief Urine Lab Results  (Last result in the past 365 days)      Color   Clarity   Blood   Leuk Est   Nitrite   Protein   CREAT   Urine HCG        04/04/22 2217 Dark Yellow   Clear   Moderate (2+)   Negative   Negative   >=300 mg/dL (3+)               Microbiology Results (last 10 days)     Procedure Component Value - Date/Time    AFB Culture - Lavage, Lung, Right Lower Lobe [[id number]] Collected: 04/12/22 1105    Lab Status: Preliminary result Specimen: Lavage from Lung, Right Lower Lobe Updated: 04/12/22 1314     AFB Stain No acid fast bacilli seen on direct smear    BAL Culture, Quantitative - Lavage, Lung, Right Lower Lobe [[id number]] Collected: 04/12/22 1105    Lab Status: Preliminary result Specimen: Lavage from Lung, Right Lower Lobe Updated: 04/12/22 5170     Gram Stain Few (2+) WBCs  seen      No organisms seen    Pneumonia Panel - Lavage, Lung, Right Lower Lobe [[id number]]  (Normal) Collected: 04/12/22 1105    Lab Status: Final result Specimen: Lavage from Lung, Right Lower Lobe Updated: 04/12/22 1324     Escherichia coli PCR Not Detected     Acinetobacter calcoaceticus-baumannii complex PCR Not Detected     Enterobacter cloacae PCR Not Detected     Klebsiella oxytoca PCR Not Detected     Klebsiella pneumoniae group PCR Not Detected     Klebsiella aerogenes PCR Not Detected     Moraxella catarrhalis PCR Not Detected     Proteus species PCR Not Detected     Pseudomonas aeroginosa PCR Not Detected     Serratia marcescens PCR Not Detected     Staphylococcus aureus PCR Not Detected     Streptococcus pyogenes PCR Not Detected     Haemophilus influenzae PCR Not Detected     Streptococcus agalactiae PCR Not Detected     Streptococcus pneumoniae PCR Not Detected     Chlamydophila pneumoniae PCR Not Detected     Legionella pneumophilia PCR Not Detected     Mycoplasma pneumo by PCR Not Detected     ADENOVIRUS, PCR Not Detected     CTX-M Gene N/A     IMP Gene N/A     KPC Gene N/A     mecA/C and MREJ Gene N/A     NDM Gene N/A     OXA-48-like Gene N/A     VIM Gene N/A     Coronavirus Not Detected     Human Metapneumovirus Not Detected     Human Rhinovirus/Enterovirus Not Detected     Influenza A PCR Not Detected     Influenza B PCR Not Detected     RSV, PCR Not Detected     Parainfluenza virus PCR Not Detected    Blood Culture - Blood, Arm, Left [[id number]]  (Normal) Collected: 04/09/22 1012    Lab Status: Preliminary result Specimen: Blood from Arm, Left Updated: 04/12/22 1032     Blood Culture No growth at 3 days    Blood Culture - Blood, Arm, Right [[id number]]  (Normal) Collected: 04/09/22 1011    Lab Status: Preliminary result Specimen: Blood from Arm, Right Updated: 04/12/22 1032     Blood Culture No growth at 3 days    COVID PRE-OP / PRE-PROCEDURE SCREENING ORDER (NO ISOLATION) - Swab, Nasopharynx  [[id number]]  (Normal) Collected: 04/05/22 1432    Lab Status: Final result Specimen: Swab from Nasopharynx Updated: 04/05/22 1849    Narrative:      The following orders were created for panel order COVID PRE-OP / PRE-PROCEDURE SCREENING ORDER (NO ISOLATION) - Swab, Nasopharynx.  Procedure                               Abnormality         Status                     ---------                               -----------         ------                     COVID-19,APTIMA PANTHER(...[[id number]]  Normal              Final result                 Please view results for these tests on the individual orders.    COVID-19,APTIMA PANTHER(ELV[facility]),[facility], NP/OP SWAB IN UTM/VTM/[facility] TRANSPORT MEDIA,24 HR TAT - Swab, Nasopharynx [[id number]]  (Normal) Collected: 04/05/22 1432    Lab Status: Final result Specimen: Swab from Nasopharynx Updated: 04/05/22 1849     COVID19 Not Detected    Narrative:      Fact sheet for providers: https://www.fda.gov/media/502065/download     Fact sheet for patients: https://www.fda.gov/media/543539/download    Test performed by RT PCR.    MRSA Screen, PCR (Inpatient) - Swab, Nares [[id number]]  (Normal) Collected: 04/05/22 0614    Lab Status: Final result Specimen: Swab from Nares Updated: 04/05/22 0800     MRSA PCR No MRSA Detected    Blood Culture - Blood, Arm, Right [[id number]]  (Normal) Collected: 04/04/22 2042    Lab Status: Final result Specimen: Blood from Arm, Right Updated: 04/09/22 2102     Blood Culture No growth at 5 days    Blood Culture - Blood, Arm, Left [[id number]]  (Abnormal) Collected: 04/04/22 2036    Lab Status: Final result Specimen: Blood from Arm, Left Updated: 04/11/22 1224     Blood Culture Anaerococcus prevotii     Comment: B-lactamase negative        Isolated from Anaerobic Bottle     Gram Stain Anaerobic Bottle Gram positive cocci in pairs, chains and clusters          CT Chest Without Contrast Diagnostic    Result Date: 4/5/2022  Impression:   1. Multifocal consolidation  within the bilateral lower lobes, right worse than left likely representing multifocal pneumonia.  Small amount of interstitial opacity within the left upper lobe likely representing additional milder bronchopneumonia. 2. Upper lobe predominant centrilobular emphysema.      JORDAN LAY MD       Electronically Signed and Approved By: JORDAN LAY MD on 4/05/2022 at 20:42             XR Chest 1 View    Result Date: 4/4/2022  Impression:   Right basilar airspace disease most concerning for pneumonia.       MAGALIS VILLEGAS MD       Electronically Signed and Approved By: MAGALIS VILLEGAS MD on 4/04/2022 at 21:13                 Results for orders placed during the hospital encounter of 04/04/22    Adult Transthoracic Echo Complete W/ Cont if Necessary Per Protocol    Interpretation Summary  · The left ventricular cavity is borderline dilated.  · Calculated left ventricular EF = 58% Estimated left ventricular EF was in agreement with the calculated left ventricular EF.  · Left ventricular diastolic function is consistent with (grade I) impaired relaxation.  · Aortic valve sclerosis without obstruction to flow.  · Mild aortic valve regurgitation.  · Mild mitral valve regurgitation.      Labs Pending at Discharge:   Pending Labs     Order Current Status    Non-gynecologic Cytology Collected (04/12/22 1105)    Alpha - 1 - Antitrypsin Phenotype In process    Fungus Culture - Lavage, Lung, Right Lower Lobe In process    IgG subclasses (1-4) In process    Tissue Pathology Exam In process    AFB Culture - Lavage, Lung, Right Lower Lobe Preliminary result    BAL Culture, Quantitative - Lavage, Lung, Right Lower Lobe Preliminary result    Blood Culture - Blood, Arm, Left Preliminary result    Blood Culture - Blood, Arm, Right Preliminary result          Time spent on Discharge including face to face service: Greater than 35 minutes    Electronically signed by Alesha Rhodes MD, 04/12/22, 5:31 PM EDT.

## 2022-04-12 NOTE — ANESTHESIA POSTPROCEDURE EVALUATION
Patient: Lluvia Archer    Procedure Summary     Date: 04/12/22 Room / Location: [facility] ENDOSCOPY 3 / [facility] ENDOSCOPY    Anesthesia Start: 1049 Anesthesia Stop: 1119    Procedure: BRONCHOSCOPY WITH BRONCHOALVEOLAR LAVAGE, BIOPSY (N/A Bronchus) Diagnosis:       Pneumonia of right lower lobe due to infectious organism      (Pneumonia of right lower lobe due to infectious organism [J18.9])    Surgeons: Shin Mcdonald DO Provider: Shin Moran MD    Anesthesia Type: general ASA Status: 3          Anesthesia Type: general    Vitals  Vitals Value Taken Time   /71 04/12/22 1135   Temp 36 °C (96.8 °F) 04/12/22 1135   Pulse 95 04/12/22 1138   Resp 20 04/12/22 1135   SpO2 94 % 04/12/22 1138   Vitals shown include unvalidated device data.        Post Anesthesia Care and Evaluation    Patient location during evaluation: bedside  Patient participation: complete - patient participated  Level of consciousness: awake  Pain management: adequate  Airway patency: patent  Anesthetic complications: No anesthetic complications  PONV Status: none  Cardiovascular status: acceptable  Respiratory status: acceptable  Hydration status: acceptable    Comments: An Anesthesiologist personally participated in the most demanding procedures (including induction and emergence if applicable) in the anesthesia plan, monitored the course of anesthesia administration at frequent intervals and remained physically present and available for immediate diagnosis and treatment of emergencies.

## 2022-04-13 ENCOUNTER — TRANSITIONAL CARE MANAGEMENT TELEPHONE ENCOUNTER (OUTPATIENT)
Dept: CALL CENTER | Facility: HOSPITAL | Age: 57
End: 2022-04-13

## 2022-04-13 LAB
CYTO UR: NORMAL
IGG SERPL-MCNC: 489 MG/DL (ref 586–1602)
IGG1 SER-MCNC: 206 MG/DL (ref 248–810)
IGG2 SER-MCNC: 206 MG/DL (ref 130–555)
IGG3 SER-MCNC: 59 MG/DL (ref 15–102)
IGG4 SER-MCNC: 1 MG/DL (ref 2–96)
LAB AP CASE REPORT: NORMAL
LAB AP CLINICAL INFORMATION: NORMAL
PATH REPORT.FINAL DX SPEC: NORMAL
PATH REPORT.GROSS SPEC: NORMAL

## 2022-04-13 NOTE — OUTREACH NOTE
Call Center TCM Note    Flowsheet Row Responses   [facility] patient discharged from? [facility]   Does the patient have one of the following disease processes/diagnoses(primary or secondary)? COPD/Pneumonia   TCM attempt successful? No   Unsuccessful attempts Attempt 1  [Mike is at work and would like for us to attempt the patient later ]          Marybel Angelo RN    4/13/2022, 10:34 EDT

## 2022-04-13 NOTE — OUTREACH NOTE
Call Center TCM Note    Flowsheet Row Responses   [facility] patient discharged from? [facility]   Does the patient have one of the following disease processes/diagnoses(primary or secondary)? COPD/Pneumonia   TCM attempt successful? Yes   Call start time 1243   Call end time 1248   Discharge diagnosis PNA   Meds reviewed with patient/caregiver? Yes   Is the patient having any side effects they believe may be caused by any medication additions or changes? No   Does the patient have all medications ordered at discharge? No   What is keeping the patient from filling the prescriptions? Transportation  [Spouse picking up prescriptions after work tonight ]   Nursing Interventions No intervention needed   Is the patient taking all medications as directed (includes completed medication regime)? Yes   Comments regarding appointments May 13, 2022 with surgeon    Does the patient have a primary care provider?  Yes   Does the patient have an appointment with their PCP or specialist within 7 days of discharge? Greater than 7 days   Comments regarding PCP [facility] on 4/22/22   What is preventing the patient from scheduling follow up appointments within 7 days of discharge? Earlier appointment not available   Nursing Interventions Verified appointment date/time/provider   Has the patient kept scheduled appointments due by today? N/A   What DME was ordered? [employer] for portable tank   Has all DME been delivered? Yes   DME comments 2L O2    Pulse Ox monitoring Intermittent   Pulse Ox device source Hospital   O2 Sat: education provided Sat levels, Monitoring frequency, When to seek care   Did the patient receive a copy of their discharge instructions? Yes   Nursing interventions Reviewed instructions with patient   What is the patient's perception of their health status since discharge? Improving   If the patient is a current smoker, are they able to teach back resources for cessation? Not a smoker   Is the  patient/caregiver able to teach back the hierarchy of who to call/visit for symptoms/problems? PCP, Specialist, Home health nurse, Urgent Care, ED, 911 Yes   Is the patient/caregiver able to teach back signs and symptoms of worsening condition: Fever/chills, Shortness of breath, Chest pain   Is the patient/caregiver able to teach back importance of completing antibiotic course of treatment? Yes   TCM call completed? Yes          Marybel Angelo RN    4/13/2022, 12:51 EDT

## 2022-04-13 NOTE — OUTREACH NOTE
Prep Survey    Flowsheet Row Responses   [facility] facility patient discharged from? [facility]   Is LACE score < 7 ? No   Emergency Room discharge w/ pulse ox? No   Eligibility [facility]   Hospital [facility]   Date of Admission 04/04/22   Date of Discharge 04/12/22   Discharge Disposition Home-Health Care S[facility]   Discharge diagnosis PNA   Does the patient have one of the following disease processes/diagnoses(primary or secondary)? COPD/Pneumonia   Does the patient have Home health ordered? No   Is there a DME ordered? Yes   What DME was ordered? [employer] for portable tank   Prep survey completed? Yes          ELLIE GOODRICH - Registered Nurse

## 2022-04-14 LAB
A1AT PHENOTYP SERPL IFE: ABNORMAL
A1AT SERPL-MCNC: 207 MG/DL (ref 101–187)
BACTERIA SPEC AEROBE CULT: NO GROWTH
BACTERIA SPEC AEROBE CULT: NORMAL
BACTERIA SPEC AEROBE CULT: NORMAL
CYTO UR: NORMAL
GRAM STN SPEC: NORMAL
GRAM STN SPEC: NORMAL
LAB AP CASE REPORT: NORMAL
LAB AP CLINICAL INFORMATION: NORMAL
PATH REPORT.FINAL DX SPEC: NORMAL
PATH REPORT.GROSS SPEC: NORMAL
STAT OF ADQ CVX/VAG CYTO-IMP: NORMAL

## 2022-04-21 ENCOUNTER — READMISSION MANAGEMENT (OUTPATIENT)
Dept: CALL CENTER | Facility: HOSPITAL | Age: 57
End: 2022-04-21

## 2022-04-21 NOTE — OUTREACH NOTE
COPD/PN Week 2 Survey    Flowsheet Row Responses   [facility] [place] patient discharged from? [facility]   Does the patient have one of the following disease processes/diagnoses(primary or secondary)? COPD/Pneumonia   Was the primary reason for admission: Pneumonia   Week 2 attempt successful? Yes   Call start time 1614   Call end time 1621   Discharge diagnosis PNA   Meds reviewed with patient/caregiver? Yes   Is the patient taking all medications as directed (includes completed medication regime)? Yes   Has the patient kept scheduled appointments due by today? N/A   Comments PCP 04/22/2022   DME comments O2 at 2l/min   Pulse Ox monitoring Intermittent   O2 Sat: education provided Sat levels, Monitoring frequency, When to seek care   Psychosocial issues? No   Comments C/O nausea, smells make her sick   What is the patient's perception of their health status since discharge? Same   Nursing Interventions Nurse provided patient education   Week 2 call completed? Yes          FELIPE BOWMAN - Registered Nurse

## 2022-04-22 ENCOUNTER — OFFICE VISIT (OUTPATIENT)
Dept: FAMILY MEDICINE CLINIC | Facility: CLINIC | Age: 57
End: 2022-04-22

## 2022-04-22 VITALS
RESPIRATION RATE: 19 BRPM | HEIGHT: 64 IN | OXYGEN SATURATION: 95 % | WEIGHT: 187 LBS | BODY MASS INDEX: 31.92 KG/M2 | DIASTOLIC BLOOD PRESSURE: 63 MMHG | SYSTOLIC BLOOD PRESSURE: 108 MMHG | TEMPERATURE: 98.4 F | HEART RATE: 115 BPM

## 2022-04-22 DIAGNOSIS — J44.1 CHRONIC OBSTRUCTIVE PULMONARY DISEASE WITH ACUTE EXACERBATION: ICD-10-CM

## 2022-04-22 DIAGNOSIS — J18.9 PNEUMONIA OF RIGHT LOWER LOBE DUE TO INFECTIOUS ORGANISM: ICD-10-CM

## 2022-04-22 DIAGNOSIS — Z09 HOSPITAL DISCHARGE FOLLOW-UP: Primary | ICD-10-CM

## 2022-04-22 DIAGNOSIS — F33.2 SEVERE EPISODE OF RECURRENT MAJOR DEPRESSIVE DISORDER, WITHOUT PSYCHOTIC FEATURES: ICD-10-CM

## 2022-04-22 PROCEDURE — 99495 TRANSJ CARE MGMT MOD F2F 14D: CPT | Performed by: FAMILY MEDICINE

## 2022-04-22 PROCEDURE — 1111F DSCHRG MED/CURRENT MED MERGE: CPT | Performed by: FAMILY MEDICINE

## 2022-04-22 RX ORDER — ONDANSETRON 4 MG/1
4 TABLET, ORALLY DISINTEGRATING ORAL EVERY 8 HOURS PRN
Qty: 20 TABLET | Refills: 0 | Status: SHIPPED | OUTPATIENT
Start: 2022-04-22 | End: 2022-12-09

## 2022-04-22 NOTE — ASSESSMENT & PLAN NOTE
We will get her back to see pulmonary after acute hospitalization.  She does have oxygen but does not have anything to leave the house.  She can talk to pulmonary about this at her upcoming appointment.

## 2022-04-22 NOTE — ASSESSMENT & PLAN NOTE
She has severe depression and anxiety that is affecting her overall quality of life.  I think this is actually the cause of her nausea and vomiting.  She is willing to see a mental health professional to discuss this further.

## 2022-04-22 NOTE — PROGRESS NOTES
Transitional Care Follow Up Visit  Subjective     Lluvia Archer is a 56 y.o. female who presents for a transitional care management visit.    Within 48 business hours after discharge our office contacted her via telephone to coordinate her care and needs.      I reviewed and discussed the details of that call along with the discharge summary, hospital problems, inpatient lab results, inpatient diagnostic studies, and consultation reports with Lluvia.     Current outpatient and discharge medications have been reconciled for the patient.  Reviewed by: Aleksandar Edge DO      Date of TCM Phone Call 4/12/2022   [facility]   Date of Admission 4/4/2022   Date of Discharge 4/12/2022   [place] [facility] [place] for Readmission (LACE) Score: 12 (4/12/2022  6:01 AM)      Hospital [id number] was admitted at [facility] April 4 through 12.  She had an acute exacerbation of COPD with pneumonia.  During her stay there she did complete all of her antibiotics.    Anxiety disorder-prior to her acute hospitalization she was kind of having a mental breakdown.  She states that she was talking out of her head doing weird inappropriate behavior and felt overwhelmed.  She does not relate this to anything particular going on in her life.  She states since getting out of the hospital and coming back home she feels about the same.  She states she continues to feel nervous and anxious all the time she makes her feel nauseous and vomits frequently.  She states she has not been taking any of her mental health medication as of late because she just vomits them back up.     Course During Hospital Stay:  8     The following portions of the patient's history were reviewed and updated as appropriate: allergies, current medications, past family history, past medical history, past social history, past surgical history and problem list.    Review of Systems   Constitutional: Positive for appetite  change and unexpected weight change. Negative for chills and fever.   Respiratory: Positive for cough, shortness of breath and wheezing. Negative for chest tightness.    Cardiovascular: Negative for leg swelling.   Gastrointestinal: Positive for nausea and vomiting. Negative for abdominal pain, blood in stool, constipation and diarrhea.   Genitourinary: Negative for dysuria, frequency and hematuria.   Psychiatric/Behavioral: Negative for suicidal ideas. The patient is nervous/anxious.         (+) depression       Objective   Physical Exam  Vitals and nursing note reviewed.   Constitutional:       General: She is not in acute distress.     Appearance: Normal appearance. She is obese.   HENT:      Head: Normocephalic.      Right Ear: Tympanic membrane, ear canal and external ear normal.      Left Ear: Tympanic membrane, ear canal and external ear normal.      Nose: Nose normal.      Mouth/Throat:      Mouth: Mucous membranes are moist.      Pharynx: Oropharynx is clear.      Comments: Dentition absent  Eyes:      General: No scleral icterus.     Conjunctiva/sclera: Conjunctivae normal.      Pupils: Pupils are equal, round, and reactive to light.   Cardiovascular:      Rate and Rhythm: Normal rate and regular rhythm.      Pulses: Normal pulses.      Heart sounds: Normal heart sounds. No murmur heard.  Pulmonary:      Effort: Pulmonary effort is normal.      Breath sounds: Normal breath sounds. No wheezing, rhonchi or rales.   Musculoskeletal:      Cervical back: No rigidity or tenderness.   Lymphadenopathy:      Cervical: No cervical adenopathy.   Skin:     General: Skin is warm and dry.      Coloration: Skin is not jaundiced.      Findings: No rash.   Neurological:      General: No focal deficit present.      Mental Status: She is alert and oriented to person, place, and time.   Psychiatric:         Mood and Affect: Mood normal.         Thought Content: Thought content normal.         Judgment: Judgment normal.          Assessment/Plan   Problems Addressed this Visit        Health Encounters    Hospital discharge follow-up - Primary     Since her discharge breathing wise she is doing much better.  Currently she does not have a follow-up appointment with pulmonary.              Mental Health    Severe episode of recurrent major depressive disorder, without psychotic features (HCC)     She has severe depression and anxiety that is affecting her overall quality of life.  I think this is actually the cause of her nausea and vomiting.  She is willing to see a mental health professional to discuss this further.           Relevant Orders    Ambulatory Referral to Psychiatry       Pulmonary and Pneumonias    Chronic obstructive pulmonary disease (COPD) (HCC)     We will get her back to see pulmonary after acute hospitalization.  She does have oxygen but does not have anything to leave the house.  She can talk to pulmonary about this at her upcoming appointment.             Relevant Orders    Ambulatory Referral to Pulmonology    Pneumonia of right lower lobe due to infectious organism     He completed all of her antibiotics during her acute hospital stay.  She was having either chest x-ray repeated in another 2 to 4 weeks.           Relevant Orders    Ambulatory Referral to Pulmonology      Diagnoses       Codes Comments    Hospital discharge follow-up    -  Primary ICD-10-CM: Z09  ICD-9-CM: V67.59     Chronic obstructive pulmonary disease with acute exacerbation (HCC)     ICD-10-CM: J44.1  ICD-9-CM: 491.21     Pneumonia of right lower lobe due to infectious organism     ICD-10-CM: J18.9  ICD-9-CM: 486     Severe episode of recurrent major depressive disorder, without psychotic features (HCC)     ICD-10-CM: F33.2  ICD-9-CM: 296.33

## 2022-04-22 NOTE — ASSESSMENT & PLAN NOTE
He completed all of her antibiotics during her acute hospital stay.  She was having either chest x-ray repeated in another 2 to 4 weeks.

## 2022-04-22 NOTE — ASSESSMENT & PLAN NOTE
Since her discharge breathing wise she is doing much better.  Currently she does not have a follow-up appointment with pulmonary.

## 2022-04-25 ENCOUNTER — TELEPHONE (OUTPATIENT)
Dept: PSYCHIATRY | Facility: CLINIC | Age: 57
End: 2022-04-25

## 2022-04-25 NOTE — TELEPHONE ENCOUNTER
C A R D I A C   E L E C T R O P H Y S I O L O G Y    Consult      Tati Ozuna Patient Status:  Observation    1928 MRN [id number]   Location [facility]  [address] SURG CDU Attending Randy Lopez MD   Hosp Day # 0 PCP Francesco Pino DO     Inpatient consult to Electrophysiology  Consult performed by: Tray Lewis MD  Consult ordered by: Ramon Slaughter MD          Reason for Consultation:  Concern for NSVT    History of Present Illness:  Tati Ozuna is a a(n) 89 year old female w/ pmh of HLD, hypothyroidism, remote colon cancer, DVT s/p IVC filter, CKD stage 3.    She came in to the ED for neck pain and diarrhea that has been on-going for the past two days. She has difficulty characterizing this further and mentions that t has been persistent ever since it started two days. Once she presented to the ED, she was incidentally found to have a 15 beat run of wide complex tachycardia. EP was consulted for this reason.    Of note, patient had an admission to the hospital in 2019 for a syncopal episode. At the time, she was seen by EP and found to have atrial tachycardia vs atrial flutter. She was started on metoprolol at the time, with which it seems she had improvement in her symptoms. She was last seen by electrophysiology in 2019. It seems that she came off of her metoprolol at some ill defined point in time after that.    Patient is a poor historian, and it is difficult to get a comprehensive history on her. She does, however, seem to remember the syncopal episode from 2019, and mentions that she was trying to walk across the street, felt lightheaded/dizzy and lost consciousness. She mentions that over the past six months, she has had frequent (daily) episodes of lightheadedness when she gets up and walks around. She has not had any episodes of LOC when this happens. She tries to find a place to sit down, after which it resolves. These episodes are usually  I believe this patient has to be in person due to her having a Medicare plan preceded by a feeling of \"queasiness\", which patient has difficulty further characterizing.    No fever, fatigue, night sweats, chills, chest pain, SOB, nausea, vomiting, abdominal pain, LE pain, LE swelling.    History:  Past Medical History:   Diagnosis Date   • Atypical chest pain    • Thyroid disease      Past Surgical History:   Procedure Laterality Date   • Inferior vena cava filter       History reviewed. No pertinent family history.  Social History     Socioeconomic History   • Marital status:      Spouse name: Not on file   • Number of children: Not on file   • Years of education: Not on file   • Highest education level: Not on file   Occupational History   • Not on file   Tobacco Use   • Smoking status: Never Smoker   • Smokeless tobacco: Never Used   Vaping Use   • Vaping Use: never used   Substance and Sexual Activity   • Alcohol use: Not Currently     Comment: occasionally wine   • Drug use: No   • Sexual activity: Not Currently     Partners: Male   Other Topics Concern   • Not on file   Social History Narrative   • Not on file     Social Determinants of Health     Financial Resource Strain:    • Social Determinants: Financial Resource Strain:    Food Insecurity:    • Social Determinants: Food Insecurity:    Transportation Needs:    • Lack of Transportation (Medical):    • Lack of Transportation (Non-Medical):    Physical Activity:    • Days of Exercise per Week:    • Minutes of Exercise per Session:    Stress:    • Social Determinants: Stress:    Social Connections:    • Social Determinants: Social Connections:    Intimate Partner Violence: Not At Risk   • Social Determinants: Intimate Partner Violence Past Fear: No   • Social Determinants: Intimate Partner Violence Current Fear: No       Allergies:  ALLERGIES:  Codeine, Depakote, Gemfibrozil, and Statins    Medications:  Current Facility-Administered Medications   Medication   • amLODIPine (NORVASC) tablet 5 mg   • [START ON 7/28/2021]  levothyroxine (SYNTHROID, LEVOTHROID) tablet 100 mcg   • [START ON 7/28/2021] calcium carbonate-vitamin D (CALTRATE+D) 600-400 MG-UNIT tablet 1 tablet   • baclofen (LIORESAL) tablet 10 mg   • sodium chloride 0.9 % flush bag 25 mL   • sodium chloride (PF) 0.9 % injection 2 mL   • sodium chloride (NORMAL SALINE) 0.9 % bolus 500 mL   • sodium chloride 0.9 % flush bag 25 mL   • heparin (porcine) injection 5,000 Units   • Magnesium Standard Replacement Protocol   • Potassium Standard Replacement Protocol   • acetaminophen (TYLENOL) tablet 650 mg   • docusate sodium-sennosides (SENOKOT S) 50-8.6 MG 2 tablet   • loperamide (IMODIUM) 1 MG/7.5ML liquid 2 mg     Prior to Admission medications    Medication Sig Start Date End Date Taking? Authorizing Provider   levothyroxine 100 MCG tablet TAKE 1 TABLET BY MOUTH EVERY DAY 3/19/21  Yes Francesco Pino, DO   amLODIPine (NORVASC) 5 MG tablet Take 1 tablet by mouth daily. 8/11/20  Yes Francesco Pino, DO   calcium carbonate-vitamin D (CALTRATE+D) 600-400 MG-UNIT per tablet Take 1 tablet by mouth daily.   Yes Outside Provider   mupirocin (BACTROBAN) 2 % ointment APPLY QD TO WOUND AND COVER WITH BANDAGE 11/24/20 7/27/21  Outside Provider   ciprofloxacin (CIPRO) 250 MG tablet Take 1 tablet by mouth 1 time. 8/19/20 7/27/21  Francesco Pino DO   sulfamethoxazole-trimethoprim (BACTRIM DS) 800-160 MG per tablet Take 1 tablet by mouth 2 times daily. 7/6/20 7/27/21  Francesco Pino,    sulfamethoxazole-trimethoprim (BACTRIM SS) 400-80 MG per tablet TK 2 TS PO BID FOR 3 DAYS 11/14/19 7/27/21  Outside Provider   baclofen (LIORESAL) 10 MG tablet Take 1 tablet by mouth 2 times daily. 11/5/19   Jina Pardo MD   predniSONE (DELTASONE) 20 MG tablet TAKE 2 TS PO WITH FOOD UPON AWAKENING 9/18/19 7/27/21  Outside Provider   metoPROLOL succinate (TOPROL-XL) 25 MG 24 hr tablet Take 1.5 tablets by mouth daily. 10/14/19 7/27/21  Francesco Pino DO   mirtazapine (REMERON) 15 MG tablet Take 15 mg by mouth  nightly.  7/27/21  Outside Provider   nitrofurantoin, macrocrystal-monohydrate, (MACROBID) 100 MG capsule Take 100 mg by mouth 2 times daily.  7/27/21  Outside Provider   senna (SENOKOT) 8.6 MG tablet Take 1 tablet by mouth daily.  7/27/21  Outside Provider   calcium acetate gelcap (PHOSLO) 667 MG Cap Take 1,334 mg by mouth 3 times daily (with meals).  7/27/21  Outside Provider   enoxaparin (LOVENOX) 30 MG/0.3ML injectable solution Inject 30 mg into the skin.  7/27/21  Outside Provider   Acetaminophen (TYLENOL) 325 MG Cap   7/27/21  Outside Provider   desoximetasone (TOPICORT) 0.05 % cream APPLY SPARINGLY TO AFFECTED AREA(S) TWICE DAILY  7/27/21  Outside Provider   loratadine (CLARITIN) 10 MG tablet TAKE 1 TABLET BY MOUTH AT BEDTIME  7/27/21  Outside Provider       Review of Systems:  A 12 point ROS was performed and all other ROS are negative except as above      Physical Exam:  Blood pressure 136/67, pulse 66, temperature 98.1 °F (36.7 °C), temperature source Oral, resp. rate 18, height 5' (1.524 m), weight 60.5 kg (133 lb 6.1 oz), SpO2 95 %.  Wt Readings from Last 3 Encounters:   07/27/21 60.5 kg (133 lb 6.1 oz)   12/01/20 61.7 kg (136 lb 2.1 oz)   08/11/20 62.4 kg (137 lb 7.3 oz)     Gen: Elderly female lying in bed no apparent distress Lying in bed, appears comfortable  Neck: supple, no JVD, no carotid bruit  CVS: Regular rate, normal HR, regular rhythm, grade 1 out of 6 systolic murmur no gallops no rubs  Pulmonary: Chest expansion equal bilaterally, no added sounds, no crackles wheezes or rubs  Abdomen soft, non-tender, nondistended  Extremities: B/L LE with no edema, normal DP pulses present bilaterally  Neuro: Alert, oriented to person only. Moves all fours spontaneously.  Skin warm and dry, no rashes or open wounds  Psych: Normal mood, blunted affect      Laboratories and Data:  Diagnostics:    Labs:   Recent Labs     07/27/21  0911   SODIUM 139   POTASSIUM 4.6   CO2 24   ANIONGAP 13   GLUCOSE 132*   BUN  28*   CREATININE 1.57*   MG 2.3   BCRAT 18   CALCIUM 8.4   BILIRUBIN 0.4   AST 18   GPT 19   ALKPT 89   GLOB 4.4*   AGR 0.8*      No results found for: CRCL7   No results found for: CREATININECL     Recent Labs     07/27/21  0911   WBC 10.7   RBC 4.57   HGB 13.5   HCT 41.7      MCV 91.2   MCH 29.5   MCHC 32.4   NRBCRE 0        Recent Labs     07/27/21  0911 07/27/21  1236   RAPDTR 0.07* 0.07*        No results for input(s): CHOLESTEROL, HDL, TRIGLYCERIDE, CALCLDL, LDLDIR, NONHDL in the last 8765 hours.          XR CHEST PA OR AP 1 VIEW    Result Date: 7/27/2021  Narrative: EXAM:  PORTABLE CHEST RADIOGRAPH, 1 View  CLINICAL INDICATION: Elevated troponin.  Diarrhea COMPARISON: 11/12/2019     Impression: Overlapping EKG leads and wires are present which limits evaluation.  Patient slightly rotated to the right.  Opacity within the right paratracheal region is similar to previous when corresponding to previous soft tissue neck from same day this likely represents tortuosity of the vasculature within this region.  Prominence the right hilar region is stable from previous.  Calcifications aortic arch.  Slightly coarsened interstitial markings are present most pronounced within the left lung base.  No appreciable pneumothorax or large airspace consolidation. Electronically Signed by: ALIX DICKINSON D.O. Signed on: 7/27/2021 11:19 AM     CT NECK SOFT TISSUE WO CONTRAST    Result Date: 7/27/2021  Narrative: EXAM: CT NECK SOFT TISSUE WO CONTRAST CLINICAL INDICATION: Carotid artery aneurysm COMPARISON: CTA head and neck 07/22/2017, CT head without contrast 11/12/2019, MRI brain 11/12/2019. TECHNIQUE: The study was acquired in a helical fashion with multiplanar thin section reconstructions. Automated exposure control utilized. FINDINGS: There is no evidence of mucosa associated or deep neck mass, fluid collection.  Thyroid gland is unremarkable.  Fatty replacement of the right parotid gland.  Remaining salivary glands are  unremarkable.  The neck muscles appear symmetric and unremarkable.  Two-vessel aortic arch with moderate to severe atherosclerotic calcifications.  Mild atherosclerotic calcification of bilateral carotid bifurcations. Normal size lymph nodes are seen in the normal lymph node bearing regions of the neck.  Stable mild atrophy of the visualized brain parenchyma.  Mild mucosal thickening of the relatively hypoplastic right maxillary sinus. Bilateral intraocular lens replacements, otherwise visualized orbital structures are unremarkable. Diffusely decreased mineralization of the bone.  Multilevel degenerative changes of the cervical spine.  Subtle grade 1 anterolisthesis of C7 on T1 The lung apices are clear.     Impression: No acute findings in the neck.  Specifically, no visible aneurysm with limits of a noncontrast exam. Dictated by: JACINTO ANDREWS DO Dictated on: 7/27/2021 10:49 AM INICOLE MD, have reviewed the images and report and concur with these findings interpreted by JACINTO ANDREWS DO. Electronically Signed by: NICOLE PACHECO MD Signed on: 7/27/2021 11:12 AM     No results found for this or any previous visit.    No results found for this or any previous visit.    Results for orders placed in visit on 07/24/17    STRESS TEST WITH MYOCARDIAL PERFUSION (MULTI)    Impression  Unremarkable study with no significant fixed or reversible myocardial perfusion defect.    LV chamber size and calculated ejection fraction are within normal limits.  Myocardial  contractility is preserved.      Transcribed By: CLARE  07/24/17 2:06 pm    Dictated By:            EULALIO MORENO MD    Electronically Reviewed and Approved By:           EULALIO MORENO MD  07/24/17 2:09 pm    No results found for this or any previous visit.          Assessment/Plan:  Ms. Ozuna is a 89 Y/O F who is admitted for two days of neck pain, diarrhea and \"queasiness\". She was found, in the ED, to have a 15 beat run of wide complex tachycardia, for which EP  is consulted.    Wide complex tachycardia (rhythms below not present on floor telemetry monitor or in chart; obtained from ED telemetry, bed #1)  Initiation of rhythm at 0903, 7/27/21      Termination of rhythm at 0904, 07/27/2021      Repeat episode at 0912, 7/27/21      Episode at 0940, 7/27/21      Termination of above episode at 0940, 7/27/21      Rhythm appears to be representative of supraventricular tachycardia with aberrancy, likely atrial tachycardia. However, difficult to rule out ventricular tachycardia conclusively. Patient seems to have had similar episodes of \"chest queasiness\" in the past, and seems to have done well with metoprolol. She was previously on succinate 37.5 mg daily.    - Recommend restarting metoprolol succinate 37.5 mg daily, which she had been on in the past.  - Maintain on telemetry. Please obtain ecg in the event that patient has another run of the above wide complex rhythm.  - Pending echocardiogram.  - May benefit from event monitor outpatient.  - Potentially may benefit from ablation of supraventricular ectopic focus, if episodes are resistant to pharmacotherapy. However, it does seem she has done well with beta blockade in the past, and it would be prudent to try this first.    Discussed with Dr. Ladi Morales and Dr. Tray Lewis.    Hermilo Healy MD   Cardiology, PGY-5  7/27/2021      Teaching Attestation:  I have personally interviewed and examined the patient via face-to-face. I confirmed the findings listed below:    Troponin level elevated  (primary encounter diagnosis)  Essential hypertension  Hypothyroidism, unspecified type  NSVT (nonsustained ventricular tachycardia) (CMS/HCC)  Diarrhea, unspecified type  Neck pain on left side     This was discussed with the fellow and I agree with the assessment and plan as documented. The plan of care was discussed with the patient.  89-year-old female known to me previously for atrial tach arrhythmias treated with metoprolol  succinate.  Since then she has been lost to follow-up.  Now presents with unrelated complaints neck pain when found on monitor to have episodes of wide-complex runs which were nonsustained.  Multiple such episodes here.  Denies any syncope.  Only some chest queasiness is reported above.  12 point review systems was performed all other systems are negative except as above.  Social history is accurate as above.  Family history no premature sudden cardiac death is relevant here.  Vital signs and physical exam are accurate or modified as above.  Personally reviewed the labs.  I personally reviewed telemetry unintended monitor and ECGs.  Findings are consistent with sinus rhythm with nonsustained runs of wide-complex tachycardia which are most likely due to supraventricular arrhythmia approximately 150 bpm with aberrancy though cannot conclusively exclude ventricular tachycardia.  Impression:   Wide-complex tachycardia  Frequent PACs and supraventricular tachycardia  Rule out ventricular tachycardia  Chest discomfort likely related to arrhythmia  Medical nonadherence -needs to maintain follow-up  Recommendation:  Resume metoprolol succinate 25 to 37.5 mg as tolerated  Consider 14-day extended Holter monitor as outpatient  Maintain potassium 4, magnesium in 2  If BP does not permit metoprolol succinate then decrease dose of amlodipine  Agree with 2D echo

## 2022-04-25 NOTE — TELEPHONE ENCOUNTER
Caller: Lluvia Archer    Relationship to patient: Self    Best call back number: [phone]    Chief complaint: DX: Severe episode of recurrent major depressive disorder, without psychotic features.    Type of visit: INITIAL EVALUATION     Requested date: 5/25/22 @ 10:40AM     Additional notes: PATIENT REQUESTS MYCHART VIDEO.

## 2022-04-27 LAB
FUNGUS WND CULT: ABNORMAL
FUNGUS WND CULT: ABNORMAL

## 2022-04-28 ENCOUNTER — READMISSION MANAGEMENT (OUTPATIENT)
Dept: CALL CENTER | Facility: HOSPITAL | Age: 57
End: 2022-04-28

## 2022-04-28 NOTE — OUTREACH NOTE
COPD/PN Week 3 Survey    Flowsheet Row Responses   [facility] patient discharged from? [facility]   Does the patient have one of the following disease processes/diagnoses(primary or secondary)? COPD/Pneumonia   Was the primary reason for admission: Pneumonia   Week 3 attempt successful? Yes   Call start time 1840   Call end time 1845   Discharge diagnosis PNA   Meds reviewed with patient/caregiver? Yes   Is the patient having any side effects they believe may be caused by any medication additions or changes? No   Does the patient have all medications ordered at discharge? Yes   Is the patient taking all medications as directed (includes completed medication regime)? Yes   Medication comments new prescription for nausea on Friday   Comments regarding appointments May 13, 2022 with surgeon    Does the patient have a primary care provider?  Yes   Has the patient kept scheduled appointments due by today? Yes   What DME was ordered? [employer] for portable tank   Has all DME been delivered? Yes   DME comments O2 at 2l/min   Pulse Ox monitoring Intermittent   Pulse Ox device source Hospital   O2 Sat comments 94% on RA   O2 Sat: education provided Sat levels, Monitoring frequency, When to seek care   Psychosocial issues? No   Comments C/O nausea, smells make her sick   Did the patient receive a copy of their discharge instructions? Yes   Nursing interventions Reviewed instructions with patient   What is the patient's perception of their health status since discharge? Same  [Somedays worse, some days just so so.]   Nursing Interventions Nurse provided patient education   If the patient is a current smoker, are they able to teach back resources for cessation? Not a smoker   Is the patient/caregiver able to teach back the hierarchy of who to call/visit for symptoms/problems? PCP, Specialist, Home health nurse, Urgent Care, ED, 911 Yes   Is the patient/caregiver able to teach back signs and symptoms of worsening condition:  Fever/chills, Shortness of breath, Chest pain   Is the patient/caregiver able to teach back importance of completing antibiotic course of treatment? Yes   Week 3 call completed? Yes   Wrap up additional comments States she has had pneumonia 6 times in the past year. Will see Psychiatrtist next month.           SUJATA CHAUHAN - Registered Nurse

## 2022-05-03 ENCOUNTER — PRE-ADMISSION TESTING (OUTPATIENT)
Dept: PREADMISSION TESTING | Facility: HOSPITAL | Age: 57
End: 2022-05-03

## 2022-05-03 VITALS
RESPIRATION RATE: 18 BRPM | DIASTOLIC BLOOD PRESSURE: 72 MMHG | TEMPERATURE: 97.2 F | HEIGHT: 64 IN | WEIGHT: 192.24 LBS | BODY MASS INDEX: 32.82 KG/M2 | HEART RATE: 100 BPM | OXYGEN SATURATION: 93 % | SYSTOLIC BLOOD PRESSURE: 118 MMHG

## 2022-05-03 DIAGNOSIS — M16.11 PRIMARY OSTEOARTHRITIS OF RIGHT HIP: ICD-10-CM

## 2022-05-03 LAB
ALBUMIN SERPL-MCNC: 4.1 G/DL (ref 3.5–5.2)
ALBUMIN/GLOB SERPL: 1.3 G/DL
ALP SERPL-CCNC: 99 U/L (ref 39–117)
ALT SERPL W P-5'-P-CCNC: 9 U/L (ref 1–33)
ANION GAP SERPL CALCULATED.3IONS-SCNC: 13.3 MMOL/L (ref 5–15)
AST SERPL-CCNC: 14 U/L (ref 1–32)
BASOPHILS # BLD AUTO: 0.06 10*3/MM3 (ref 0–0.2)
BASOPHILS NFR BLD AUTO: 0.7 % (ref 0–1.5)
BILIRUB SERPL-MCNC: 0.3 MG/DL (ref 0–1.2)
BUN SERPL-MCNC: 16 MG/DL (ref 6–20)
BUN/CREAT SERPL: 15.2 (ref 7–25)
CALCIUM SPEC-SCNC: 9.7 MG/DL (ref 8.6–10.5)
CHLORIDE SERPL-SCNC: 102 MMOL/L (ref 98–107)
CO2 SERPL-SCNC: 25.7 MMOL/L (ref 22–29)
CREAT SERPL-MCNC: 1.05 MG/DL (ref 0.57–1)
DEPRECATED RDW RBC AUTO: 51.6 FL (ref 37–54)
EGFRCR SERPLBLD CKD-EPI 2021: 62.9 ML/MIN/1.73
EOSINOPHIL # BLD AUTO: 0.32 10*3/MM3 (ref 0–0.4)
EOSINOPHIL NFR BLD AUTO: 3.6 % (ref 0.3–6.2)
ERYTHROCYTE [DISTWIDTH] IN BLOOD BY AUTOMATED COUNT: 14.8 % (ref 12.3–15.4)
GLOBULIN UR ELPH-MCNC: 3.2 GM/DL
GLUCOSE SERPL-MCNC: 90 MG/DL (ref 65–99)
HBA1C MFR BLD: 5 % (ref 4.8–5.6)
HCT VFR BLD AUTO: 38.9 % (ref 34–46.6)
HGB BLD-MCNC: 12.2 G/DL (ref 12–15.9)
IMM GRANULOCYTES # BLD AUTO: 0.03 10*3/MM3 (ref 0–0.05)
IMM GRANULOCYTES NFR BLD AUTO: 0.3 % (ref 0–0.5)
INR PPP: 0.96 (ref 0.86–1.15)
LYMPHOCYTES # BLD AUTO: 2.51 10*3/MM3 (ref 0.7–3.1)
LYMPHOCYTES NFR BLD AUTO: 28.6 % (ref 19.6–45.3)
MCH RBC QN AUTO: 29.6 PG (ref 26.6–33)
MCHC RBC AUTO-ENTMCNC: 31.4 G/DL (ref 31.5–35.7)
MCV RBC AUTO: 94.4 FL (ref 79–97)
MONOCYTES # BLD AUTO: 0.83 10*3/MM3 (ref 0.1–0.9)
MONOCYTES NFR BLD AUTO: 9.5 % (ref 5–12)
NEUTROPHILS NFR BLD AUTO: 5.02 10*3/MM3 (ref 1.7–7)
NEUTROPHILS NFR BLD AUTO: 57.3 % (ref 42.7–76)
NRBC BLD AUTO-RTO: 0 /100 WBC (ref 0–0.2)
PLATELET # BLD AUTO: 238 10*3/MM3 (ref 140–450)
PMV BLD AUTO: 10.2 FL (ref 6–12)
POTASSIUM SERPL-SCNC: 4.3 MMOL/L (ref 3.5–5.2)
PROT SERPL-MCNC: 7.3 G/DL (ref 6–8.5)
PROTHROMBIN TIME: 12.8 SECONDS (ref 11.8–14.9)
RBC # BLD AUTO: 4.12 10*6/MM3 (ref 3.77–5.28)
SODIUM SERPL-SCNC: 141 MMOL/L (ref 136–145)
WBC NRBC COR # BLD: 8.77 10*3/MM3 (ref 3.4–10.8)

## 2022-05-03 PROCEDURE — 80053 COMPREHEN METABOLIC PANEL: CPT

## 2022-05-03 PROCEDURE — 83036 HEMOGLOBIN GLYCOSYLATED A1C: CPT

## 2022-05-03 PROCEDURE — 85025 COMPLETE CBC W/AUTO DIFF WBC: CPT

## 2022-05-03 PROCEDURE — 85610 PROTHROMBIN TIME: CPT

## 2022-05-03 PROCEDURE — 36415 COLL VENOUS BLD VENIPUNCTURE: CPT

## 2022-05-03 RX ORDER — ATORVASTATIN CALCIUM 10 MG/1
10 TABLET, FILM COATED ORAL NIGHTLY
COMMUNITY
End: 2022-08-29

## 2022-05-03 ASSESSMENT — HOOS JR
HOOS JR SCORE: 39.902
HOOS JR SCORE: 16

## 2022-05-03 NOTE — DISCHARGE INSTRUCTIONS
IMPORTANT INSTRUCTIONS - PRE-ADMISSION TESTING  DO NOT EAT OR CHEW anything after midnight the night before your procedure.    You may have CLEAR liquids up to ___3___ hours prior to ARRIVAL time. INCLUDES SUGAR FREE GATORADE, NO RED, 20 OZ  Take the following medications the morning of your procedure with JUST A SIP OF WATER:  _BUPROPRION, FAMOTIDINE, AS NEEDED: INHALER, TRAMADOL, ONDASETRON (NAUSEA)______________________________________________________________________________________________________________________________________________________________________________________    DO NOT BRING your medications to the hospital with you, UNLESS something has changed since your PRE-Admission Testing appointment.  Hold all vitamins, supplements, and NSAIDS (Non- steroidal anti-inflammatory meds) for one week prior to surgery (you MAY take Tylenol or Acetaminophen).   If you are diabetic, check your blood sugar the morning of your procedure. If it is less than 70 or if you are feeling symptomatic, call the following number for further instructions: [phone] SAME DAY SURGERY WILL CALL ARRIVAL TIME 4/12/22 BY 4 P.M.______.  Use your inhalers/nebulizers as usual, the morning of your procedure. BRING YOUR INHALERS with you.   Bring your CPAP or BIPAP to hospital, ONLY IF YOU WILL BE SPENDING THE NIGHT. NA  Make sure you have a ride home and have someone who will stay with you the day of your procedure after you go home.  If you have any questions, please call your Pre-Admission Testing Nurse, ___________JAVYINA_____ at [phone]________.   Per anesthesia request, do not smoke for 24 hours before your procedure or as instructed by your surgeon.   NA  SHOWER AS DIRECTED AND SCHEDULED WITH SURGICAL SOAP, PAGE 9 TOTAL JOINT INST. BOOK  BRING TOTAL JOINT INSTRUCTION BOOKLET BACK WITH YOU THE DAY OF SURGERY

## 2022-05-03 NOTE — SIGNIFICANT NOTE
PT WANTS TO USE HOME HEALTH FOR PHYSICAL THERAPY, DOES NOT HAVE A PREFERENCE FOR ANY AGENCY. STATES SHE NEEDS 3-1  CHAIR FOR HOME USE AND NOTED THAT WALKER WILL BE DELIVERED TO [facility] DURING ADMISSION

## 2022-05-06 ENCOUNTER — ANESTHESIA EVENT (OUTPATIENT)
Dept: PERIOP | Facility: HOSPITAL | Age: 57
End: 2022-05-06

## 2022-05-08 ENCOUNTER — READMISSION MANAGEMENT (OUTPATIENT)
Dept: CALL CENTER | Facility: HOSPITAL | Age: 57
End: 2022-05-08

## 2022-05-08 NOTE — OUTREACH NOTE
COPD/PN Week 4 Survey    Flowsheet Row Responses   [facility] patient discharged from? [facility]   Does the patient have one of the following disease processes/diagnoses(primary or secondary)? COPD/Pneumonia   Was the primary reason for admission: Pneumonia   Week 4 attempt successful? Yes   Call start time 1210   Call end time 1218   Meds reviewed with patient/caregiver? Yes   Is the patient having any side effects they believe may be caused by any medication additions or changes? No   Is the patient taking all medications as directed (includes completed medication regime)? Yes   Has the patient kept scheduled appointments due by today? Yes   Comments States is scheduled for hip replacement on 05/13/22.   Is the patient still receiving Home Health Services? N/A   Pulse Ox monitoring Intermittent   Pulse Ox device source Hospital   O2 Sat comments 94% on 2L home O2-uses home O2 continuous at home.   Psychosocial issues? No   What is the patient's perception of their health status since discharge? New symptoms unrelated to diagnosis  [States started with sore throat yesterday.]   Nursing Interventions Advised patient to call provider, Nurse provided patient education   Are the patient's immunizations up to date?  No   Nursing interventions Educated on importance of maintaining up to date immunizations as advised by provider, Advised patient to discuss with provider at next visit   Is the patient/caregiver able to teach back the hierarchy of who to call/visit for symptoms/problems? PCP, Specialist, Home health nurse, Urgent Care, ED, 911 Yes   Is the patient able to teach back COPD zones? No   Nursing interventions Education provided on various zones   Patient reports what zone on this call? Yellow Zone   Yellow Zone Increased or thicker phlegm/mucus, Fever or feeling like have a chest cold   Yellow interventions Use oxygen as ordered, Continue to use daily medications, Call provider immediatly if symptoms do not  "improve, Use quick relief inhaler as ordered, Use other meds such as steroids or antibiotics as ordered, Get plenty of rest   Is the patient/caregiver able to teach back signs and symptoms of worsening condition: Fever/chills, Shortness of breath, Chest pain   Is the patient/caregiver able to teach back importance of completing antibiotic course of treatment? Yes   Week 4 call completed? Yes   Would the patient like one additional call? Yes   Wrap up additional comments States started with sore throat, \"feeling like I have a cold\" since yesterday. Denies any fever, worsening SOA or chest pain. States will call her PCP tomorrow for evaluation, and update her surgeon on her condition. Denies any needs today.          RENZO LEMOS - Registered Nurse  "

## 2022-05-11 RX ORDER — FAMOTIDINE 40 MG/1
TABLET, FILM COATED ORAL
Qty: 180 TABLET | Refills: 3 | Status: SHIPPED | OUTPATIENT
Start: 2022-05-11 | End: 2022-12-09 | Stop reason: SDUPTHER

## 2022-05-13 ENCOUNTER — ANESTHESIA (OUTPATIENT)
Dept: PERIOP | Facility: HOSPITAL | Age: 57
End: 2022-05-13

## 2022-05-13 ENCOUNTER — READMISSION MANAGEMENT (OUTPATIENT)
Dept: CALL CENTER | Facility: HOSPITAL | Age: 57
End: 2022-05-13

## 2022-05-13 ENCOUNTER — HOSPITAL ENCOUNTER (OUTPATIENT)
Facility: HOSPITAL | Age: 57
Discharge: HOME | End: 2022-05-13
Attending: ORTHOPAEDIC SURGERY | Admitting: ORTHOPAEDIC SURGERY

## 2022-05-13 ENCOUNTER — APPOINTMENT (OUTPATIENT)
Dept: GENERAL RADIOLOGY | Facility: HOSPITAL | Age: 57
End: 2022-05-13

## 2022-05-13 VITALS
WEIGHT: 194.45 LBS | SYSTOLIC BLOOD PRESSURE: 118 MMHG | TEMPERATURE: 97.4 F | BODY MASS INDEX: 33.2 KG/M2 | HEIGHT: 64 IN | RESPIRATION RATE: 20 BRPM | DIASTOLIC BLOOD PRESSURE: 66 MMHG | HEART RATE: 85 BPM | OXYGEN SATURATION: 96 %

## 2022-05-13 DIAGNOSIS — Z78.9 DECREASED ACTIVITIES OF DAILY LIVING (ADL): ICD-10-CM

## 2022-05-13 DIAGNOSIS — M16.11 PRIMARY OSTEOARTHRITIS OF RIGHT HIP: ICD-10-CM

## 2022-05-13 DIAGNOSIS — R26.2 DIFFICULTY IN WALKING: Primary | ICD-10-CM

## 2022-05-13 DIAGNOSIS — M25.551 RIGHT HIP PAIN: ICD-10-CM

## 2022-05-13 LAB
GLUCOSE BLDC GLUCOMTR-MCNC: 108 MG/DL (ref 70–99)
HCT VFR BLD AUTO: 34 % (ref 34–46.6)
HGB BLD-MCNC: 10.9 G/DL (ref 12–15.9)

## 2022-05-13 PROCEDURE — A9270 NON-COVERED ITEM OR SERVICE: HCPCS | Performed by: STUDENT IN AN ORGANIZED HEALTH CARE EDUCATION/TRAINING PROGRAM

## 2022-05-13 PROCEDURE — 25010000002 PROPOFOL 10 MG/ML EMULSION: Performed by: NURSE ANESTHETIST, CERTIFIED REGISTERED

## 2022-05-13 PROCEDURE — 73502 X-RAY EXAM HIP UNI 2-3 VIEWS: CPT

## 2022-05-13 PROCEDURE — 25010000002 EPINEPHRINE 1 MG/ML SOLUTION: Performed by: ORTHOPAEDIC SURGERY

## 2022-05-13 PROCEDURE — 85014 HEMATOCRIT: CPT | Performed by: ORTHOPAEDIC SURGERY

## 2022-05-13 PROCEDURE — 82962 GLUCOSE BLOOD TEST: CPT

## 2022-05-13 PROCEDURE — 97116 GAIT TRAINING THERAPY: CPT

## 2022-05-13 PROCEDURE — 25010000002 HYDROMORPHONE 1 MG/ML SOLUTION: Performed by: NURSE ANESTHETIST, CERTIFIED REGISTERED

## 2022-05-13 PROCEDURE — 97535 SELF CARE MNGMENT TRAINING: CPT

## 2022-05-13 PROCEDURE — 94761 N-INVAS EAR/PLS OXIMETRY MLT: CPT

## 2022-05-13 PROCEDURE — A9270 NON-COVERED ITEM OR SERVICE: HCPCS | Performed by: ORTHOPAEDIC SURGERY

## 2022-05-13 PROCEDURE — 97161 PT EVAL LOW COMPLEX 20 MIN: CPT

## 2022-05-13 PROCEDURE — 0 MEPERIDINE PER 100 MG: Performed by: NURSE ANESTHETIST, CERTIFIED REGISTERED

## 2022-05-13 PROCEDURE — 25010000002 MORPHINE SULFATE (PF) 10 MG/ML SOLUTION: Performed by: ORTHOPAEDIC SURGERY

## 2022-05-13 PROCEDURE — 63710000001 GABAPENTIN 300 MG CAPSULE: Performed by: STUDENT IN AN ORGANIZED HEALTH CARE EDUCATION/TRAINING PROGRAM

## 2022-05-13 PROCEDURE — 85018 HEMOGLOBIN: CPT | Performed by: ORTHOPAEDIC SURGERY

## 2022-05-13 PROCEDURE — 63710000001 OXYCODONE-ACETAMINOPHEN 5-325 MG TABLET: Performed by: ORTHOPAEDIC SURGERY

## 2022-05-13 PROCEDURE — 97165 OT EVAL LOW COMPLEX 30 MIN: CPT

## 2022-05-13 PROCEDURE — 25010000002 ROPIVACAINE PER 1 MG: Performed by: ORTHOPAEDIC SURGERY

## 2022-05-13 PROCEDURE — 25010000002 CEFAZOLIN IN DEXTROSE 2-4 GM/100ML-% SOLUTION: Performed by: ORTHOPAEDIC SURGERY

## 2022-05-13 PROCEDURE — 0 MEPIVACAINE HCL (PF) 1.5 % SOLUTION: Performed by: ANESTHESIOLOGY

## 2022-05-13 PROCEDURE — 25010000002 KETOROLAC TROMETHAMINE PER 15 MG: Performed by: ORTHOPAEDIC SURGERY

## 2022-05-13 PROCEDURE — 63710000001 ACETAMINOPHEN 500 MG TABLET: Performed by: STUDENT IN AN ORGANIZED HEALTH CARE EDUCATION/TRAINING PROGRAM

## 2022-05-13 PROCEDURE — 27130 TOTAL HIP ARTHROPLASTY: CPT | Performed by: ORTHOPAEDIC SURGERY

## 2022-05-13 PROCEDURE — 94799 UNLISTED PULMONARY SVC/PX: CPT

## 2022-05-13 PROCEDURE — C1776 JOINT DEVICE (IMPLANTABLE): HCPCS | Performed by: ORTHOPAEDIC SURGERY

## 2022-05-13 PROCEDURE — 76000 FLUOROSCOPY <1 HR PHYS/QHP: CPT

## 2022-05-13 PROCEDURE — 63710000001 CELECOXIB 100 MG CAPSULE: Performed by: STUDENT IN AN ORGANIZED HEALTH CARE EDUCATION/TRAINING PROGRAM

## 2022-05-13 PROCEDURE — 25010000002 MIDAZOLAM PER 1 MG: Performed by: STUDENT IN AN ORGANIZED HEALTH CARE EDUCATION/TRAINING PROGRAM

## 2022-05-13 PROCEDURE — 63710000001 FAMOTIDINE 20 MG TABLET: Performed by: ORTHOPAEDIC SURGERY

## 2022-05-13 PROCEDURE — 25010000002 ONDANSETRON PER 1 MG: Performed by: NURSE ANESTHETIST, CERTIFIED REGISTERED

## 2022-05-13 DEVICE — BIOLOX® DELTA, CERAMIC FEMORAL HEAD, S, Ø 32/-3.5, TAPER 12/14
Type: IMPLANTABLE DEVICE | Site: HIP | Status: FUNCTIONAL
Brand: BIOLOX® DELTA

## 2022-05-13 DEVICE — STEM FEM/HIP AVENIR/COMPLETE STD/OFFST HA SZ4: Type: IMPLANTABLE DEVICE | Site: HIP | Status: FUNCTIONAL

## 2022-05-13 DEVICE — SCRW ACET CORT TRILOGY S/TAP 6.5X30: Type: IMPLANTABLE DEVICE | Site: HIP | Status: FUNCTIONAL

## 2022-05-13 DEVICE — SHLL ACET G7 PPS LTD/HL TI SZB 46MM: Type: IMPLANTABLE DEVICE | Site: HIP | Status: FUNCTIONAL

## 2022-05-13 DEVICE — LINER ACET G7 VIVACIT/E NTRL HXPE SZB 32MM: Type: IMPLANTABLE DEVICE | Site: HIP | Status: FUNCTIONAL

## 2022-05-13 DEVICE — TOTAL HIP PRIMARY: Type: IMPLANTABLE DEVICE | Site: HIP | Status: FUNCTIONAL

## 2022-05-13 RX ORDER — CEFAZOLIN SODIUM IN 0.9 % NACL 3 G/100 ML
3 INTRAVENOUS SOLUTION, PIGGYBACK (ML) INTRAVENOUS ONCE
Status: DISCONTINUED | OUTPATIENT
Start: 2022-05-13 | End: 2022-05-13 | Stop reason: DRUGHIGH

## 2022-05-13 RX ORDER — OXYCODONE HYDROCHLORIDE AND ACETAMINOPHEN 5; 325 MG/1; MG/1
1 TABLET ORAL EVERY 4 HOURS PRN
Status: DISCONTINUED | OUTPATIENT
Start: 2022-05-13 | End: 2022-05-13 | Stop reason: HOSPADM

## 2022-05-13 RX ORDER — SODIUM CHLORIDE 0.9 % (FLUSH) 0.9 %
3 SYRINGE (ML) INJECTION EVERY 12 HOURS SCHEDULED
Status: DISCONTINUED | OUTPATIENT
Start: 2022-05-13 | End: 2022-05-13 | Stop reason: HOSPADM

## 2022-05-13 RX ORDER — GABAPENTIN 300 MG/1
300 CAPSULE ORAL ONCE
Status: COMPLETED | OUTPATIENT
Start: 2022-05-13 | End: 2022-05-13

## 2022-05-13 RX ORDER — ACETAMINOPHEN 325 MG/1
325 TABLET ORAL EVERY 4 HOURS PRN
Status: DISCONTINUED | OUTPATIENT
Start: 2022-05-13 | End: 2022-05-13 | Stop reason: HOSPADM

## 2022-05-13 RX ORDER — PROMETHAZINE HYDROCHLORIDE 25 MG/1
25 SUPPOSITORY RECTAL ONCE AS NEEDED
Status: DISCONTINUED | OUTPATIENT
Start: 2022-05-13 | End: 2022-05-13 | Stop reason: HOSPADM

## 2022-05-13 RX ORDER — MEPERIDINE HYDROCHLORIDE 25 MG/ML
12.5 INJECTION INTRAMUSCULAR; INTRAVENOUS; SUBCUTANEOUS
Status: DISCONTINUED | OUTPATIENT
Start: 2022-05-13 | End: 2022-05-13 | Stop reason: HOSPADM

## 2022-05-13 RX ORDER — TRANEXAMIC ACID 10 MG/ML
1000 INJECTION, SOLUTION INTRAVENOUS ONCE
Status: COMPLETED | OUTPATIENT
Start: 2022-05-13 | End: 2022-05-13

## 2022-05-13 RX ORDER — OXYCODONE HYDROCHLORIDE 5 MG/1
5 TABLET ORAL
Status: DISCONTINUED | OUTPATIENT
Start: 2022-05-13 | End: 2022-05-13 | Stop reason: HOSPADM

## 2022-05-13 RX ORDER — KETOROLAC TROMETHAMINE 15 MG/ML
15 INJECTION, SOLUTION INTRAMUSCULAR; INTRAVENOUS EVERY 6 HOURS
Status: DISCONTINUED | OUTPATIENT
Start: 2022-05-13 | End: 2022-05-13 | Stop reason: HOSPADM

## 2022-05-13 RX ORDER — SODIUM CHLORIDE 0.9 % (FLUSH) 0.9 %
10 SYRINGE (ML) INJECTION AS NEEDED
Status: DISCONTINUED | OUTPATIENT
Start: 2022-05-13 | End: 2022-05-13 | Stop reason: HOSPADM

## 2022-05-13 RX ORDER — ONDANSETRON 2 MG/ML
4 INJECTION INTRAMUSCULAR; INTRAVENOUS EVERY 6 HOURS PRN
Status: DISCONTINUED | OUTPATIENT
Start: 2022-05-13 | End: 2022-05-13 | Stop reason: HOSPADM

## 2022-05-13 RX ORDER — PHENYLEPHRINE HCL IN 0.9% NACL 1 MG/10 ML
SYRINGE (ML) INTRAVENOUS AS NEEDED
Status: DISCONTINUED | OUTPATIENT
Start: 2022-05-13 | End: 2022-05-13 | Stop reason: SURG

## 2022-05-13 RX ORDER — ONDANSETRON 2 MG/ML
4 INJECTION INTRAMUSCULAR; INTRAVENOUS ONCE AS NEEDED
Status: DISCONTINUED | OUTPATIENT
Start: 2022-05-13 | End: 2022-05-13 | Stop reason: HOSPADM

## 2022-05-13 RX ORDER — CELECOXIB 100 MG/1
200 CAPSULE ORAL ONCE
Status: COMPLETED | OUTPATIENT
Start: 2022-05-13 | End: 2022-05-13

## 2022-05-13 RX ORDER — ONDANSETRON 4 MG/1
4 TABLET, FILM COATED ORAL EVERY 6 HOURS PRN
Status: DISCONTINUED | OUTPATIENT
Start: 2022-05-13 | End: 2022-05-13 | Stop reason: HOSPADM

## 2022-05-13 RX ORDER — LIDOCAINE HYDROCHLORIDE 20 MG/ML
INJECTION, SOLUTION EPIDURAL; INFILTRATION; INTRACAUDAL; PERINEURAL AS NEEDED
Status: DISCONTINUED | OUTPATIENT
Start: 2022-05-13 | End: 2022-05-13 | Stop reason: SURG

## 2022-05-13 RX ORDER — OXYCODONE HYDROCHLORIDE AND ACETAMINOPHEN 5; 325 MG/1; MG/1
2 TABLET ORAL EVERY 4 HOURS PRN
Status: DISCONTINUED | OUTPATIENT
Start: 2022-05-13 | End: 2022-05-13 | Stop reason: HOSPADM

## 2022-05-13 RX ORDER — ASPIRIN 325 MG
325 TABLET, DELAYED RELEASE (ENTERIC COATED) ORAL 2 TIMES DAILY
Qty: 42 TABLET | Refills: 0 | Status: SHIPPED | OUTPATIENT
Start: 2022-05-13 | End: 2022-12-09

## 2022-05-13 RX ORDER — HYDROCODONE BITARTRATE AND ACETAMINOPHEN 7.5; 325 MG/1; MG/1
1 TABLET ORAL EVERY 4 HOURS PRN
Qty: 45 TABLET | Refills: 0 | Status: SHIPPED | OUTPATIENT
Start: 2022-05-13 | End: 2022-05-17 | Stop reason: SDUPTHER

## 2022-05-13 RX ORDER — SODIUM CHLORIDE, SODIUM LACTATE, POTASSIUM CHLORIDE, CALCIUM CHLORIDE 600; 310; 30; 20 MG/100ML; MG/100ML; MG/100ML; MG/100ML
9 INJECTION, SOLUTION INTRAVENOUS CONTINUOUS PRN
Status: DISCONTINUED | OUTPATIENT
Start: 2022-05-13 | End: 2022-05-13 | Stop reason: HOSPADM

## 2022-05-13 RX ORDER — GLYCOPYRROLATE 0.2 MG/ML
0.2 INJECTION INTRAMUSCULAR; INTRAVENOUS
Status: COMPLETED | OUTPATIENT
Start: 2022-05-13 | End: 2022-05-13

## 2022-05-13 RX ORDER — MIDAZOLAM HYDROCHLORIDE 1 MG/ML
2 INJECTION INTRAMUSCULAR; INTRAVENOUS ONCE
Status: COMPLETED | OUTPATIENT
Start: 2022-05-13 | End: 2022-05-13

## 2022-05-13 RX ORDER — SODIUM CHLORIDE, SODIUM LACTATE, POTASSIUM CHLORIDE, CALCIUM CHLORIDE 600; 310; 30; 20 MG/100ML; MG/100ML; MG/100ML; MG/100ML
100 INJECTION, SOLUTION INTRAVENOUS CONTINUOUS
Status: DISCONTINUED | OUTPATIENT
Start: 2022-05-13 | End: 2022-05-13 | Stop reason: HOSPADM

## 2022-05-13 RX ORDER — MAGNESIUM HYDROXIDE 1200 MG/15ML
LIQUID ORAL AS NEEDED
Status: DISCONTINUED | OUTPATIENT
Start: 2022-05-13 | End: 2022-05-13 | Stop reason: HOSPADM

## 2022-05-13 RX ORDER — ACETAMINOPHEN 500 MG
1000 TABLET ORAL EVERY 8 HOURS
Status: DISCONTINUED | OUTPATIENT
Start: 2022-05-13 | End: 2022-05-13 | Stop reason: HOSPADM

## 2022-05-13 RX ORDER — PROMETHAZINE HYDROCHLORIDE 12.5 MG/1
12.5 TABLET ORAL EVERY 6 HOURS PRN
Status: DISCONTINUED | OUTPATIENT
Start: 2022-05-13 | End: 2022-05-13 | Stop reason: HOSPADM

## 2022-05-13 RX ORDER — MEPERIDINE HYDROCHLORIDE 25 MG/ML
INJECTION INTRAMUSCULAR; INTRAVENOUS; SUBCUTANEOUS
Status: DISCONTINUED
Start: 2022-05-13 | End: 2022-05-13 | Stop reason: HOSPADM

## 2022-05-13 RX ORDER — ACETAMINOPHEN 500 MG
1000 TABLET ORAL ONCE
Status: COMPLETED | OUTPATIENT
Start: 2022-05-13 | End: 2022-05-13

## 2022-05-13 RX ORDER — CEFAZOLIN SODIUM 2 G/100ML
2 INJECTION, SOLUTION INTRAVENOUS EVERY 8 HOURS
Status: DISCONTINUED | OUTPATIENT
Start: 2022-05-13 | End: 2022-05-13 | Stop reason: HOSPADM

## 2022-05-13 RX ORDER — AMOXICILLIN 250 MG
2 CAPSULE ORAL 2 TIMES DAILY PRN
Status: DISCONTINUED | OUTPATIENT
Start: 2022-05-13 | End: 2022-05-13 | Stop reason: HOSPADM

## 2022-05-13 RX ORDER — PROMETHAZINE HYDROCHLORIDE 12.5 MG/1
12.5 SUPPOSITORY RECTAL EVERY 6 HOURS PRN
Status: DISCONTINUED | OUTPATIENT
Start: 2022-05-13 | End: 2022-05-13 | Stop reason: HOSPADM

## 2022-05-13 RX ORDER — FAMOTIDINE 20 MG/1
40 TABLET, FILM COATED ORAL DAILY
Status: DISCONTINUED | OUTPATIENT
Start: 2022-05-13 | End: 2022-05-13 | Stop reason: HOSPADM

## 2022-05-13 RX ORDER — CEFAZOLIN SODIUM 2 G/100ML
2 INJECTION, SOLUTION INTRAVENOUS ONCE
Status: COMPLETED | OUTPATIENT
Start: 2022-05-13 | End: 2022-05-13

## 2022-05-13 RX ORDER — PROMETHAZINE HYDROCHLORIDE 12.5 MG/1
25 TABLET ORAL ONCE AS NEEDED
Status: DISCONTINUED | OUTPATIENT
Start: 2022-05-13 | End: 2022-05-13 | Stop reason: HOSPADM

## 2022-05-13 RX ADMIN — Medication 100 MCG: at 07:40

## 2022-05-13 RX ADMIN — CELECOXIB 200 MG: 100 CAPSULE ORAL at 06:57

## 2022-05-13 RX ADMIN — MEPERIDINE HYDROCHLORIDE 12.5 MG: 25 INJECTION INTRAMUSCULAR; INTRAVENOUS; SUBCUTANEOUS at 08:37

## 2022-05-13 RX ADMIN — SODIUM CHLORIDE, POTASSIUM CHLORIDE, SODIUM LACTATE AND CALCIUM CHLORIDE 100 ML/HR: 600; 310; 30; 20 INJECTION, SOLUTION INTRAVENOUS at 11:55

## 2022-05-13 RX ADMIN — GABAPENTIN 300 MG: 300 CAPSULE ORAL at 06:58

## 2022-05-13 RX ADMIN — KETOROLAC TROMETHAMINE 15 MG: 15 INJECTION, SOLUTION INTRAMUSCULAR; INTRAVENOUS at 16:58

## 2022-05-13 RX ADMIN — OXYCODONE HYDROCHLORIDE AND ACETAMINOPHEN 2 TABLET: 5; 325 TABLET ORAL at 11:55

## 2022-05-13 RX ADMIN — LIDOCAINE HYDROCHLORIDE 80 MG: 20 INJECTION, SOLUTION EPIDURAL; INFILTRATION; INTRACAUDAL; PERINEURAL at 07:15

## 2022-05-13 RX ADMIN — PROPOFOL 175 MCG/KG/MIN: 10 INJECTION, EMULSION INTRAVENOUS at 07:15

## 2022-05-13 RX ADMIN — CEFAZOLIN SODIUM 2 G: 2 INJECTION, SOLUTION INTRAVENOUS at 15:12

## 2022-05-13 RX ADMIN — Medication 100 MCG: at 07:50

## 2022-05-13 RX ADMIN — FAMOTIDINE 40 MG: 20 TABLET ORAL at 11:54

## 2022-05-13 RX ADMIN — TRANEXAMIC ACID 1000 MG: 10 INJECTION, SOLUTION INTRAVENOUS at 08:02

## 2022-05-13 RX ADMIN — Medication 100 MCG: at 07:20

## 2022-05-13 RX ADMIN — Medication 100 MCG: at 07:30

## 2022-05-13 RX ADMIN — HYDROMORPHONE HYDROCHLORIDE 0.5 MG: 1 INJECTION, SOLUTION INTRAMUSCULAR; INTRAVENOUS; SUBCUTANEOUS at 08:49

## 2022-05-13 RX ADMIN — KETOROLAC TROMETHAMINE 15 MG: 15 INJECTION, SOLUTION INTRAMUSCULAR; INTRAVENOUS at 11:54

## 2022-05-13 RX ADMIN — MEPIVACAINE HYDROCHLORIDE 3.5 ML: 15 INJECTION, SOLUTION EPIDURAL; INFILTRATION at 07:40

## 2022-05-13 RX ADMIN — GLYCOPYRROLATE 0.2 MG: 0.2 INJECTION INTRAMUSCULAR; INTRAVENOUS at 06:58

## 2022-05-13 RX ADMIN — MIDAZOLAM HYDROCHLORIDE 2 MG: 1 INJECTION, SOLUTION INTRAMUSCULAR; INTRAVENOUS at 06:58

## 2022-05-13 RX ADMIN — ACETAMINOPHEN 1000 MG: 500 TABLET ORAL at 06:58

## 2022-05-13 RX ADMIN — Medication 100 MCG: at 07:58

## 2022-05-13 RX ADMIN — Medication 100 MCG: at 07:15

## 2022-05-13 RX ADMIN — ONDANSETRON 4 MG: 2 INJECTION INTRAMUSCULAR; INTRAVENOUS at 09:43

## 2022-05-13 RX ADMIN — TRANEXAMIC ACID 1000 MG: 10 INJECTION, SOLUTION INTRAVENOUS at 07:00

## 2022-05-13 RX ADMIN — SODIUM CHLORIDE, POTASSIUM CHLORIDE, SODIUM LACTATE AND CALCIUM CHLORIDE: 600; 310; 30; 20 INJECTION, SOLUTION INTRAVENOUS at 07:05

## 2022-05-13 RX ADMIN — CEFAZOLIN SODIUM 2 G: 2 INJECTION, SOLUTION INTRAVENOUS at 07:05

## 2022-05-13 NOTE — ANESTHESIA PREPROCEDURE EVALUATION
Anesthesia Evaluation     Patient summary reviewed and Nursing notes reviewed   no history of anesthetic complications:  NPO Solid Status: > 8 hours  NPO Liquid Status: > 2 hours           Airway   Mallampati: II  TM distance: >3 FB  No difficulty expected  Dental          Pulmonary - normal exam   (+) pneumonia resolved , COPD, sleep apnea,   Cardiovascular - normal exam  Exercise tolerance: poor (<4 METS)    ECG reviewed    (+) hypertension, dysrhythmias Tachycardia, GOFF, hyperlipidemia,     ROS comment: Echo:  Interpretation Summary    · The left ventricular cavity is borderline dilated.  · Calculated left ventricular EF = 58% Estimated left ventricular EF was in agreement with the calculated left ventricular EF.  · Left ventricular diastolic function is consistent with (grade I) impaired relaxation.  · Aortic valve sclerosis without obstruction to flow.  · Mild aortic valve regurgitation.  · Mild mitral valve regurgitation.    Neuro/Psych  (+) headaches, numbness, psychiatric history,    GI/Hepatic/Renal/Endo    (+) obesity,  hiatal hernia, GERD,  diabetes mellitus,     Musculoskeletal     (+) back pain, radiculopathy Right lower extremity  Abdominal  - normal exam   Substance History - negative use     OB/GYN negative ob/gyn ROS         Other   arthritis,      ROS/Med Hx Other:                     Anesthesia Plan    ASA 3     spinal and MAC       Anesthetic plan, all risks, benefits, and alternatives have been provided, discussed and informed consent has been obtained with: patient.    Plan discussed with CRNA.        CODE STATUS:

## 2022-05-13 NOTE — SIGNIFICANT NOTE
05/13/22 1426   Plan   Final Discharge Disposition Code 01 - home or self-care   Final Note Outpatient Physical THerapy at [facility] in [facility] on [address]. Appt: 5/17/22 at 3pm     
No

## 2022-05-13 NOTE — PLAN OF CARE
Goal Outcome Evaluation:  Plan of Care Reviewed With: patient           Outcome Evaluation: Patient presents with decreased ambulation and strength of arm right lower extremity postoperatively.  She is being discharged from the hospital today with orders for follow-up care at outpatient physical therapy services.  She has no further inpatient physical therapy needs and will be discharged from physical therapy services as she is being discharged from the hospital

## 2022-05-13 NOTE — ANESTHESIA PROCEDURE NOTES
Spinal Block      Patient reassessed immediately prior to procedure    Patient location during procedure: OR  Start Time: 5/13/2022 7:09 AM  Indication:at surgeon's request and post-op pain management  Performed By  CRNA/CAA: Meredith Sanchez CRNA  Preanesthetic Checklist  Completed: patient identified, IV checked, risks and benefits discussed, surgical consent, monitors and equipment checked, pre-op evaluation and timeout performed  Spinal Block Prep:  Sterile Tech:cap, gloves, mask and sterile barriers  Prep:Chloraprep  Patient Monitoring:blood pressure monitoring, continuous pulse oximetry and EKG  Spinal Block Procedure  Approach:midline  Guidance:landmark technique and palpation technique  Location:L3-L4  Needle Type:Pencan  Needle Gauge:24 G  Placement of Spinal needle event:cerebrospinal fluid aspirated  Paresthesia: no  Fluid Appearance:clear  Medications: Mepivacaine HCl (PF) (CARBOCAINE) 1.5 % injection, 3.5 mL   Post Assessment  Patient Tolerance:patient tolerated the procedure well with no apparent complications  Complications no  Additional Notes  Skin infiltration with Lidocaine 1%. Introducer inserted, followed by spinal needle. + CSF return. Intrathecal mepivacaine injected. Spinal needle/introducer removed. Site clean/dry/intact.

## 2022-05-13 NOTE — ANESTHESIA POSTPROCEDURE EVALUATION
Patient: Lluvia Archer    Procedure Summary     Date: 05/13/22 Room / Location: [facility] OR 03 / [facility] MAIN OR    Anesthesia Start: 0705 Anesthesia Stop: 0828    Procedure: RIGHT TOTAL HIP ARTHROPLASTY (Right Hip) Diagnosis:       Primary osteoarthritis of right hip      (Primary osteoarthritis of right hip [M16.11])    Surgeons: Cecil Gomes MD Provider: Shin Moran MD    Anesthesia Type: spinal, MAC ASA Status: 3          Anesthesia Type: spinal, MAC    Vitals  Vitals Value Taken Time   BP 98/47 05/13/22 0840   Temp     Pulse 77 05/13/22 0841   Resp     SpO2 98 % 05/13/22 0841   Vitals shown include unvalidated device data.        Post Anesthesia Care and Evaluation    Patient location during evaluation: bedside  Patient participation: complete - patient participated  Level of consciousness: awake  Pain management: adequate  Airway patency: patent  Anesthetic complications: No anesthetic complications  PONV Status: none  Cardiovascular status: acceptable  Respiratory status: acceptable  Hydration status: acceptable    Comments: An Anesthesiologist personally participated in the most demanding procedures (including induction and emergence if applicable) in the anesthesia plan, monitored the course of anesthesia administration at frequent intervals and remained physically present and available for immediate diagnosis and treatment of emergencies.

## 2022-05-13 NOTE — OP NOTE
TOTAL HIP ARTHROPLASTY ANTERIOR  Procedure Report    Patient Name:  Lluvia Archer  YOB: 1965    Date of Surgery:  5/13/2022       Pre-op Diagnosis:   Primary osteoarthritis of right hip [M16.11]       Post-Op Diagnosis Codes:     * Primary osteoarthritis of right hip [M16.11]    Procedure/CPT® Codes:      Procedure(s):  RIGHT TOTAL HIP ARTHROPLASTY anterior approach    Staff:  Surgeon(s):  Cecil Gomes MD    Assistant: Mariely Jimenez RN; Harvey Almonte    Anesthesia: Monitored Anesthesia Care with Regional    Estimated Blood Loss: 200 mL    Implants:    Implant Name Type Inv. Item Serial No.  Lot No. LRB No. Used Action   SHLL ACET G7 PPS LTD/HL SZB 46MM - [id number] Implant SHLL ACET G7 PPS LTD/HL SZB 46MM  [employer] [id number] Right 1 Implanted   SCRW ACET DENNISE TRILOGY S/TAP 6.5X30 - [id number] Implant SCRW ACET DENNISE TRILOGY S/TAP 6.5X30  [employer] [id number] Right 1 Implanted   LINER ACET G7 VIVACIT/E NTRL HXPE SZB 32MM - [id number] Implant LINER ACET G7 VIVACIT/E NTRL HXPE SZB 32MM  [employer] [id number] Right 1 Implanted   STEM FEM/HIP AVENIR/COMPLETE STD/OFFST HA SZ4 - [id number] Implant STEM FEM/HIP AVENIR/COMPLETE STD/OFFST HA SZ4  [employer] [id number] Right 1 Implanted   HD FEM/HIP BIOLOX/DELTA CERAM 12/76F34LH MIN3.5MM - [id number] Implant HD FEM/HIP BIOLOX/DELTA CERAM 12/[id number] MIN3.5MM  [employer] [id number] Right 1 Implanted       Specimen:          None      Complications: None    Description of Procedure: See H&P for risks and benefits. The patient was taken to the operating room and placed supine on the [place] table after general endotracheal anesthesia established. The patient was placed in appropriate position for an anterior approach to the hip. The right hip and lower extremity were prepped and draped in standard fashion using alcohol and ChloraPrep.  Standard 4 inch incision was made starting lateral to the anterior and inferior to the anterior superior  spine over the tensor musculature. Dissection was carried down through the skin and a Bovie was used to dissect down to the fascial layer which was then incised in line with the incision.  Blunt dissection was taken down to place the extracapsular retractor over the superior neck.  The cobra was used to elevate the rectus off the anterior hip capsule and another cobra was placed extracapsularly over the inferior femoral neck.   The inferior vessels were ligated with an Aquamantys and Bovie cautery, and the fascial layer over the vastus was released.  L-shaped capsulotomy was carried out in standard fashion placing FiberWire and suture in both limbs of the capsule and retractors placed anterior capsularly. The saddle region of the femur was dissected with Bovie as well as medial femoral neck.  The femoral neck cut was made using oscillating saw according to preoperative templating and the head was removed and then acetabular retractors were placed, labrum was removed along with ligamentum teres, and under C-arm view acetabulum was sequentially reamed  to accommodate the appropriate size acetabular shell which was inserted under C-arm guidance. Polyethylene liner was placed and pigtail retractor was used in proximal femur and the leg was brought in extension external rotation and adduction. Proximal femur was lateralized using a chili pepper and rat tail grasp and the canal was sequentially broached to accommodate the appropriate size femoral trial. The trial was undertaken and found to be satisfactory, stable posteriorly and anteriorly and appropriate leg lengths was assessed.  The trials were removed and after copious irrigation and drying the canal the real femoral stem was placed in the same version as the trial. The trials were again undertaken. C-arm shots were taken to both sides measuring offset as well as length and appropriate head was chosen and implanted. The hip was relocated and stable posterior anterior  appear to have equal leg lengths.  The wound was copiously irrigated and capsular layer was closed with previously placed suture.  More copious irrigation followed and the fascial layer was closed with 0 Vicryl. Deep fat was closed with 0 Vicryl. I injected the anterior hip capsule, the tensor fascia, and the subcutaneous fat with a combination of ropivacaine, morphine, and Toradol mixture.  The subcutaneous was closed with 2-0 Vicryl and skin was closed with staples.  The incision was washed and dried and sterile dressing applied. The patient tolerated the procedure well. The patient was extubated and taken to recovery room.      Cecil Gomes MD     Date: 5/13/2022  Time: 08:37 EDT

## 2022-05-13 NOTE — PLAN OF CARE
Goal Outcome Evaluation:VSS, pain management, anticoagulation    Patient presented from post surgical to [facility],  and daughter at bedside, patient alert and oriented voice needs, pain controlled at current time. Plans to discharge today, discharge packet will be provided with follow up appointments and medication regimen.

## 2022-05-13 NOTE — THERAPY EVALUATION
Acute Care - Physical Therapy Initial Evaluation   [place]     Patient Name: Lluvia Archer  : 1965  MRN: [id number]  Today's Date: 2022   Admit date: 2022     Referring Physician: Cecil Gomes MD     Surgery Date:2022   Procedure(s) (LRB):  RIGHT TOTAL HIP ARTHROPLASTY (Right)            Visit Dx:     ICD-10-CM ICD-9-CM   1. Difficulty in walking  R26.2 719.7   2. Primary osteoarthritis of right hip  M16.11 715.15     Patient Active Problem List   Diagnosis   • Abnormal mammogram   • Anxiety   • Arthritis   • Chronic nausea   • Chronic obstructive pulmonary disease (COPD) ([facility])   • Counseling on substance use and abuse   • Diabetes mellitus, type II ([facility])   • Esophageal reflux   • Hernia, hiatal   • Hyperlipidemia   • Hypertension   • Knee pain, right   • Memory loss   • Migraine   • Moderate episode of recurrent major depressive disorder ([facility])   • Night sweats   • Numbness   • Sciatica of right side   • Severe episode of recurrent major depressive disorder, without psychotic features ([facility])   • Shoulder pain, left   • Other diseases of nasal cavity and sinuses   • Sleep apnea, unspecified   • Tobacco abuse   • Strain of groin, right, subsequent encounter   • Osteoarthritis of spine with radiculopathy, lumbar region   • Chronic right-sided low back pain with right-sided sciatica   • Medication management   • Primary osteoarthritis of right hip   • Right hip pain   • Sepsis, due to unspecified organism, unspecified whether acute organ dysfunction present ([facility])   • Severe malnutrition (CMS/[facility])   • Pneumonia of right lower lobe due to infectious organism   • Hospital discharge follow-up     Past Medical History:   Diagnosis Date   • Abnormal mammogram     PT DENIES KNOWING OF THIS HX   • Anxiety    • Arthritis     R SHOULDER, R HIP, AND R LEG   • Chronic nausea 01/15/2019   • COPD (chronic obstructive pulmonary disease) ([facility])     STAGE 4, USES O2 2 LITERS AT HOME   • Hernia, hiatal    •  Hyperlipidemia    • Hypertension    • Knee pain, right 2018   • Memory loss     FORGETFULNESS ? ETIOLOGY   • Migraine headache    • Moderate episode of recurrent major depressive disorder (HCC) 2017   • Night sweats    • Sciatica of right side 2017   • Severe episode of recurrent major depressive disorder, without psychotic features (HCC) 10/22/2019   • Shoulder pain, left 2018   • Sinus trouble    • Sleep apnea, unspecified     DOESNT USE CPAP AFTER WEIGHT LOSS     Past Surgical History:   Procedure Laterality Date   • BRONCHOSCOPY N/A 2022    Procedure: BRONCHOSCOPY WITH BRONCHOALVEOLAR LAVAGE, BIOPSY;  Surgeon: Shin Mcdonald DO;  Location: [facility] ENDOSCOPY;  Service: Pulmonary;  Laterality: N/A;  RIGHT LOWER LOBE INFILTRATE   •  SECTION     • CHOLECYSTECTOMY      LAPAROSCOPIC   • COLONOSCOPY       PT Assessment (last 12 hours)     PT Evaluation and Treatment     Row Name 22 1400          Physical Therapy Time and Intention    Subjective Information no complaints  -BOBBY     Document Type evaluation  -BOBBY     Mode of Treatment individual therapy;physical therapy  -BOBBY     Patient Effort good  -BOBBY     Row Name 22 1400          General Information    Patient Observations alert;cooperative;agree to therapy  -BOBBY     Prior Level of Function independent:;all household mobility;community mobility  -BOBBY     Equipment Currently Used at Home none  -BOBBY     Barriers to Rehab none identified  -BOBBY     Row Name 22 1400          Living Environment    Current Living Arrangements home  -BOBBY     People in Home spouse  -BOBBY     Row Name 22 1400          Range of Motion (ROM)    Range of Motion ROM is WFL  -BOBBY     Row Name 22 1400          Strength (Manual Muscle Testing)    Strength (Manual Muscle Testing) right lower extremity  3+/5  -BOBBY     Row Name 22 1400          Mobility    Extremity Weight-bearing Status right lower extremity  -BOBBY     Right Lower  Extremity (Weight-bearing Status) weight-bearing as tolerated (WBAT)  -BOBBY     Row Name 05/13/22 1400          Bed Mobility    Bed Mobility bed mobility (all) activities;supine-sit  -BOBBY     All Activities, Murray (Bed Mobility) standby assist  -BOBBY     Supine-Sit Murray (Bed Mobility) standby assist  -BOBBY     Row Name 05/13/22 1400          Transfers    Transfers bed-chair transfer;sit-stand transfer  -BOBBY     Bed-Chair Murray (Transfers) standby assist  -BOBBY     Assistive Device (Bed-Chair Transfers) walker, front-wheeled  -BOBBY     Sit-Stand Murray (Transfers) standby assist  -BOBBY     Row Name 05/13/22 1400          Sit-Stand Transfer    Assistive Device (Sit-Stand Transfers) walker, front-wheeled  -BOBBY     Row Name 05/13/22 1400          Gait/Stairs (Locomotion)    Gait/Stairs Locomotion gait/ambulation assistive device  -BOBBY     Murray Level (Gait) contact guard  -BOBBY     Assistive Device (Gait) walker, front-wheeled  -BOBBY     Ambulated day of surgery or within 4 hours of PACU discharge yes  -BOBBY     Distance in Feet (Gait) 75  -BOBBY     Negotiation (Stairs) stairs independence  -BOBBY     Murray Level (Stairs) contact guard  -BOBBY     Handrail Location (Stairs) none  -BOBBY     Number of Steps (Stairs) 2  -BOBBY     Ascending Technique (Stairs) step-to-step  -BOBBY     Row Name 05/13/22 1400          Safety Issues, Functional Mobility    Impairments Affecting Function (Mobility) balance;strength  -BOBBY     Row Name 05/13/22 1400          Balance    Balance Assessment standing dynamic balance  -BOBBY     Dynamic Standing Balance contact guard  -BOBBY     Position/Device Used, Standing Balance walker, front-wheeled  -BOBBY     Row Name             [REMOVED] Wound 04/05/22 0140 Bilateral anterior groin MASD (Moisture associated skin damage)    Wound - Properties Group Placement Date: 04/05/22  -HR Placement Time: 0140  -HR Present on Hospital Admission: Y  -HR Side: Bilateral  -HR Orientation: anterior  -HR Location:  groin  -HR Primary Wound Type: MASD  -HR Removal Date: 05/13/22  -MG Removal Time: 0704  -MG, not present on this admit      Retired Wound - Properties Group Placement Date: 04/05/22  -HR Placement Time: 0140  -HR Present on Hospital Admission: Y  -HR Side: Bilateral  -HR Orientation: anterior  -HR Location: groin  -HR Primary Wound Type: MASD  -HR Removal Date: 05/13/22  -MG Removal Time: 0704  -MG, not present on this admit      Retired Wound - Properties Group Date first assessed: 04/05/22  -HR Time first assessed: 0140  -HR Present on Hospital Admission: Y  -HR Side: Bilateral  -HR Location: groin  -HR Primary Wound Type: MASD  -HR Resolution Date: 05/13/22  -MG Resolution Time: 0704  -MG, not present on this admit      Row Name             Wound 05/13/22 0759 Right anterior hip Incision    Wound - Properties Group Placement Date: 05/13/22  -SC Placement Time: 0759  -SC Present on Hospital Admission: N  -SC Side: Right  -SC Orientation: anterior  -SC Location: hip  -SC Primary Wound Type: Incision  -SC     Retired Wound - Properties Group Placement Date: 05/13/22  -SC Placement Time: 0759  -SC Present on Hospital Admission: N  -SC Side: Right  -SC Orientation: anterior  -SC Location: hip  -SC Primary Wound Type: Incision  -SC     Retired Wound - Properties Group Date first assessed: 05/13/22  -SC Time first assessed: 0759  -SC Present on Hospital Admission: N  -SC Side: Right  -SC Location: hip  -SC Primary Wound Type: Incision  -SC     Row Name 05/13/22 1400          Plan of Care Review    Plan of Care Reviewed With patient  -BOBBY     Outcome Evaluation Patient presents with decreased ambulation and strength of arm right lower extremity postoperatively.  She is being discharged from the hospital today with orders for follow-up care at outpatient physical therapy services.  She has no further inpatient physical therapy needs and will be discharged from physical therapy services as she is being discharged from the  hospital  -BOBBY     Row Name 05/13/22 1400          Therapy Assessment/Plan (PT)    Patient/Family Therapy Goals Statement (PT) Patient wants to walk without assist  -BOBBY     Criteria for Skilled Interventions Met (PT) skilled treatment is necessary  -BOBBY     Therapy Frequency (PT) evaluation only  -BOBBY     Row Name 05/13/22 1400          PT Evaluation Complexity    History, PT Evaluation Complexity no personal factors and/or comorbidities  -BOBBY     Examination of Body Systems (PT Eval Complexity) total of 4 or more elements  -BOBBY     Clinical Presentation (PT Evaluation Complexity) stable  -BOBBY     Clinical Decision Making (PT Evaluation Complexity) low complexity  -BOBBY     Overall Complexity (PT Evaluation Complexity) low complexity  -BOBBY     Row Name 05/13/22 1400          Therapy Plan Review/Discharge Plan (PT)    Therapy Plan Review (PT) evaluation/treatment results reviewed;participants voiced agreement with care plan;participants included;patient  -BOBBY           User Key  (r) = Recorded By, (t) = Taken By, (c) = Cosigned By    Initials Name Provider Type    Renea Pedroza, RN Registered Nurse    Mandy Olmstead RN Registered Nurse    Amelia Hughes RN Registered Nurse    Len Garza PT Physical Therapist                Physical Therapy Education                 Title: PT OT SLP Therapies (Done)     Topic: Physical Therapy (Done)     Point: Mobility training (Done)     Learning Progress Summary           Patient Acceptance, E,TB, VU by BOBBY at 5/13/2022 1416                   Point: Precautions (Done)     Learning Progress Summary           Patient Acceptance, E,TB, VU by BOBBY at 5/13/2022 1416                               User Key     Initials Effective Dates Name Provider Type Discipline    BOBBY 06/03/21 -  Len Gee PT Physical Therapist PT              PT Recommendation and Plan  Anticipated Discharge Disposition (PT): home with outpatient therapy services  Therapy Frequency (PT): evaluation  only  Plan of Care Reviewed With: patient  Outcome Evaluation: Patient presents with decreased ambulation and strength of arm right lower extremity postoperatively.  She is being discharged from the hospital today with orders for follow-up care at outpatient physical therapy services.  She has no further inpatient physical therapy needs and will be discharged from physical therapy services as she is being discharged from the hospital   Outcome Measures     Row Name 05/13/22 1400             How much help from another person do you currently need...    Turning from your back to your side while in flat bed without using bedrails? 4  -BOBBY      Moving from lying on back to sitting on the side of a flat bed without bedrails? 4  -BOBBY      Moving to and from a bed to a chair (including a wheelchair)? 4  -BOBBY      Standing up from a chair using your arms (e.g., wheelchair, bedside chair)? 4  -BOBBY      Climbing 3-5 steps with a railing? 3  -BOBBY      To walk in hospital room? 3  -BOBBY      AM-PAC 6 Clicks Score (PT) 22  -BOBBY              Functional Assessment    Outcome Measure Options AM-PAC 6 Clicks Basic Mobility (PT)  -BOBBY            User Key  (r) = Recorded By, (t) = Taken By, (c) = Cosigned By    Initials Name Provider Type    Len Garza PT Physical Therapist                 Time Calculation:    PT Charges     Row Name 05/13/22 1411             Time Calculation    PT Received On 05/13/22  -BOBBY              Timed Charges    67740 - Gait Training Minutes  10  -BOBBY              Untimed Charges    PT Eval/Re-eval Minutes 35  -BOBBY              Total Minutes    Timed Charges Total Minutes 10  -BOBBY      Untimed Charges Total Minutes 35  -BOBBY       Total Minutes 45  -BOBBY            User Key  (r) = Recorded By, (t) = Taken By, (c) = Cosigned By    Initials Name Provider Type    Len Garza PT Physical Therapist              Therapy Charges for Today     Code Description Service Date Service Provider Modifiers Qty     [id number]  GAIT TRAINING EA 15 MIN 5/13/2022 Len Gee, PT GP 1    [id number] HC PT EVAL LOW COMPLEXITY 3 5/13/2022 Len Gee, PT GP 1          PT G-Codes  Outcome Measure Options: AM-PAC 6 Clicks Basic Mobility (PT)  AM-PAC 6 Clicks Score (PT): 22    Len Gee, PT  5/13/2022

## 2022-05-13 NOTE — OUTREACH NOTE
COPD/PN Week 5 Survey    Flowsheet Row Responses   [facility] facility patient discharged from? [facility]   Does the patient have one of the following disease processes/diagnoses(primary or secondary)? COPD/Pneumonia   Week 5 attempt successful? No   Revoke Readmitted          BURAK FERRARA - Registered Nurse

## 2022-05-13 NOTE — PLAN OF CARE
Goal Outcome Evaluation:  Plan of Care Reviewed With: patient        Progress: no change (initial evaluation encounter)  Outcome Evaluation: Patient has experienced decline in function from baseline status, presenting w/ deficits related to balance, transfers, ADL managemet and functional mobility. Patient would benefit from skilled OT intervention to maxamize patient safety, prevent further debility and promote return to optimal level of independence.

## 2022-05-13 NOTE — THERAPY EVALUATION
Patient Name: Lluvia Archer  : 1965    MRN: [id number]                              Today's Date: 2022       Admit Date: 2022    Visit Dx:     ICD-10-CM ICD-9-CM   1. Difficulty in walking  R26.2 719.7   2. Primary osteoarthritis of right hip  M16.11 715.15   3. Right hip pain  M25.551 719.45   4. Decreased activities of daily living (ADL)  Z78.9 V49.89     Patient Active Problem List   Diagnosis   • Abnormal mammogram   • Anxiety   • Arthritis   • Chronic nausea   • Chronic obstructive pulmonary disease (COPD) ([facility])   • Counseling on substance use and abuse   • Diabetes mellitus, type II ([facility])   • Esophageal reflux   • Hernia, hiatal   • Hyperlipidemia   • Hypertension   • Knee pain, right   • Memory loss   • Migraine   • Moderate episode of recurrent major depressive disorder ([facility])   • Night sweats   • Numbness   • Sciatica of right side   • Severe episode of recurrent major depressive disorder, without psychotic features ([facility])   • Shoulder pain, left   • Other diseases of nasal cavity and sinuses   • Sleep apnea, unspecified   • Tobacco abuse   • Strain of groin, right, subsequent encounter   • Osteoarthritis of spine with radiculopathy, lumbar region   • Chronic right-sided low back pain with right-sided sciatica   • Medication management   • Primary osteoarthritis of right hip   • Right hip pain   • Sepsis, due to unspecified organism, unspecified whether acute organ dysfunction present ([facility])   • Severe malnutrition (CMS/[facility])   • Pneumonia of right lower lobe due to infectious organism   • Hospital discharge follow-up     Past Medical History:   Diagnosis Date   • Abnormal mammogram     PT DENIES KNOWING OF THIS HX   • Anxiety    • Arthritis     R SHOULDER, R HIP, AND R LEG   • Chronic nausea 01/15/2019   • COPD (chronic obstructive pulmonary disease) ([facility])     STAGE 4, USES O2 2 LITERS AT HOME   • Hernia, hiatal    • Hyperlipidemia    • Hypertension    • Knee pain, right 2018   •  Memory loss     FORGETFULNESS ? ETIOLOGY   • Migraine headache    • Moderate episode of recurrent major depressive disorder (HCC) 2017   • Night sweats    • Sciatica of right side 2017   • Severe episode of recurrent major depressive disorder, without psychotic features (HCC) 10/22/2019   • Shoulder pain, left 2018   • Sinus trouble    • Sleep apnea, unspecified     DOESNT USE CPAP AFTER WEIGHT LOSS     Past Surgical History:   Procedure Laterality Date   • BRONCHOSCOPY N/A 2022    Procedure: BRONCHOSCOPY WITH BRONCHOALVEOLAR LAVAGE, BIOPSY;  Surgeon: Shin Mcdonald DO;  Location: [facility] ENDOSCOPY;  Service: Pulmonary;  Laterality: N/A;  RIGHT LOWER LOBE INFILTRATE   •  SECTION     • CHOLECYSTECTOMY      LAPAROSCOPIC   • COLONOSCOPY        General Information     Row Name 22 1550 22 1540       OT Time and Intention    Document Type therapy note (daily note)  -ES evaluation  -ES    Mode of Treatment individual therapy;occupational therapy  -ES individual therapy;occupational therapy  -ES    Row Name 22 1541          General Information    Patient Profile Reviewed yes  -ES     Prior Level of Function independent:  Patient reports independence with B and IADLs at baseline.  No device for functional mobility.  Walk-in shower, standing shower completion.  Patient has standard height toilet.  No home O2 use.  -ES     Existing Precautions/Restrictions weight bearing  -ES     Barriers to Rehab none identified  -ES     Row Name 22 1540          Occupational Profile    Reason for Services/Referral (Occupational Profile) Patient is 56 yr old female admitted to [facility] on 2022 after failed conservative management of right hip osteoarthritis.  Patient is postop day 0 right hip total arthroplasty with anterior approach. OT evaluation and treatment ordered d/t recent decline in ADLs/transfer ability. No previous OT services for current  condition.  -ES     Row Name 05/13/22 1540          Living Environment    People in Home spouse  -ES     Row Name 05/13/22 1540          Home Main Entrance    Number of Stairs, Main Entrance two  -ES     Row Name 05/13/22 1540          Stairs Within Home, Primary    Number of Stairs, Within Home, Primary none  -ES     Row Name 05/13/22 1550 05/13/22 1540       Cognition    Orientation Status (Cognition) --  Patient receptive to all education and training provided with no further questions  -ES oriented x 4  -ES    Row Name 05/13/22 1540          Safety Issues, Functional Mobility    Impairments Affecting Function (Mobility) balance;strength  -ES           User Key  (r) = Recorded By, (t) = Taken By, (c) = Cosigned By    Initials Name Provider Type    ES Pamella Patel OT Occupational Therapist                 Mobility/ADL's     Row Name 05/13/22 1545          Bed Mobility    Comment, (Bed Mobility) Patient met seated in recliner at therapy arrival to room  -ES     Row Name 05/13/22 1545          Transfers    Transfers sit-stand transfer;toilet transfer  -ES     Sit-Stand [place] (Transfers) standby assist  -ES     [place] Level (Toilet Transfer) supervision  -ES     Assistive Device (Toilet Transfer) commode chair;walker, front-wheeled  -ES     Row Name 05/13/22 1545          Sit-Stand Transfer    Assistive Device (Sit-Stand Transfers) walker, front-wheeled  -ES     Row Name 05/13/22 1545          Toilet Transfer    Type (Toilet Transfer) sit-stand;stand-sit  -ES     Row Name 05/13/22 1545          Functional Mobility    Functional Mobility- Ind. Level contact guard assist  -ES     Functional Mobility- Device walker, front-wheeled  -ES     Row Name 05/13/22 1545          Activities of Daily Living    BADL Assessment/Intervention bathing;upper body dressing;lower body dressing;grooming;feeding;toileting  -ES     Row Name 05/13/22 1545          Mobility    Extremity Weight-bearing Status right lower  extremity  -ES     Right Lower Extremity (Weight-bearing Status) weight-bearing as tolerated (WBAT)  -ES     Row Name 05/13/22 1545          Bathing Assessment/Intervention    [place] Level (Bathing) bathing skills;minimum assist (75% patient effort)  -ES     Row Name 05/13/22 1550 05/13/22 1545       Upper Body Dressing Assessment/Training    [place] Level (Upper Body Dressing) upper body dressing skills;don;pull-over garment;set up  -ES upper body dressing skills;set up  -ES    Position (Upper Body Dressing) unsupported sitting  -ES --    Row Name 05/13/22 1550 05/13/22 1545       Lower Body Dressing Assessment/Training    [place] Level (Lower Body Dressing) lower body dressing skills;don;pants/bottoms;minimum assist (75% patient effort)  -ES lower body dressing skills;minimum assist (75% patient effort)  -ES    Position (Lower Body Dressing) unsupported sitting;supported standing  -ES --    Comment, (Lower Body Dressing) Patient provided education and training on adaptive lower body dressing strategies to increase patient independence with dressing at time of discharge.  Patient provides return demonstration for ensure compliance  -ES --    Row Name 05/13/22 1545          Grooming Assessment/Training    [place] Level (Grooming) grooming skills;set up  -ES     Row Name 05/13/22 1545          Self-Feeding Assessment/Training    [place] Level (Feeding) feeding skills;set up  -ES     Row Name 05/13/22 1545          Toileting Assessment/Training    [place] Level (Toileting) toileting skills;supervision  -ES           User Key  (r) = Recorded By, (t) = Taken By, (c) = Cosigned By    Initials Name Provider Type    Pamella Rutledge OT Occupational Therapist               Obj/Interventions     Row Name 05/13/22 1546          Sensory Assessment (Somatosensory)    Sensory Assessment (Somatosensory) bilateral UE  -ES     Bilateral UE Sensory Assessment general sensation;intact  -ES     Row Name  05/13/22 1546          Vision Assessment/Intervention    Visual Impairment/Limitations WFL  -ES     [facility] Name 05/13/22 1546          Range of Motion Comprehensive    General Range of Motion no range of motion deficits identified;bilateral upper extremity ROM WNL  -ES     [facility] Name 05/13/22 1546          Strength Comprehensive (MMT)    General Manual Muscle Testing (MMT) Assessment no strength deficits identified  -ES     Comment, General Manual Muscle Testing (MMT) Assessment Bilateral upper extremities assessed grossly 4+/5  -ES     [facility] Name 05/13/22 1546          Motor Skills    Motor Skills coordination;functional endurance  -ES     Coordination WFL;upper extremity  -ES     Functional Endurance good for ADL engagement  -ES     [facility] Name 05/13/22 1551 05/13/22 1546       Balance    Balance Assessment -- sitting dynamic balance;standing dynamic balance  -ES    Dynamic Sitting Balance -- supervision  -ES    Position, Sitting Balance -- unsupported;sitting in chair  -ES    Dynamic Standing Balance -- contact guard;1-person assist  -ES    Position/Device Used, Standing Balance -- supported;walker, front-wheeled  -ES    Balance Interventions sitting;standing;sit to stand;dynamic;occupation based/functional task  -ES --          User Key  (r) = Recorded By, (t) = Taken By, (c) = Cosigned By    Initials Name Provider Type    ES Pamella Patel OT Occupational Therapist               Goals/Plan     Row Name 05/13/22 1548          Transfer Goal 1 (OT)    Activity/Assistive Device (Transfer Goal 1, OT) transfers, all  -ES     [place] Level/Cues Needed (Transfer Goal 1, OT) modified independence  -ES     Time Frame (Transfer Goal 1, OT) long term goal (LTG);10 days  -ES     Row Name 05/13/22 1548          Bathing Goal 1 (OT)    Activity/Device (Bathing Goal 1, OT) bathing skills, all  -ES     [place] Level/Cues Needed (Bathing Goal 1, OT) modified independence  -ES     Time Frame (Bathing Goal 1, OT) long term goal  (LTG);10 days  -ES     Row Name 05/13/22 1548          Dressing Goal 1 (OT)    Activity/Device (Dressing Goal 1, OT) dressing skills, all  -ES     [place]/Cues Needed (Dressing Goal 1, OT) modified independence  -ES     Time Frame (Dressing Goal 1, OT) long term goal (LTG);10 days  -ES     Row Name 05/13/22 1548          Toileting Goal 1 (OT)    Activity/Device (Toileting Goal 1, OT) toileting skills, all  -ES     [place] Level/Cues Needed (Toileting Goal 1, OT) modified independence  -ES     Time Frame (Toileting Goal 1, OT) long term goal (LTG);10 days  -ES     Row Name 05/13/22 1548          Grooming Goal 1 (OT)    Activity/Device (Grooming Goal 1, OT) grooming skills, all  -ES     [place] (Grooming Goal 1, OT) modified independence  -ES     Time Frame (Grooming Goal 1, OT) long term goal (LTG);10 days  -ES     Row Name 05/13/22 1548          Therapy Assessment/Plan (OT)    Planned Therapy Interventions (OT) activity tolerance training;BADL retraining;functional balance retraining;occupation/activity based interventions;patient/caregiver education/training;strengthening exercise;transfer/mobility retraining  -ES           User Key  (r) = Recorded By, (t) = Taken By, (c) = Cosigned By    Initials Name Provider Type    Pamella Rutledge, OT Occupational Therapist               Clinical Impression     Row Name 05/13/22 1551 05/13/22 1547       Plan of Care Review    Plan of Care Reviewed With -- patient  -ES    Progress -- no change  initial evaluation encounter  -ES    Outcome Evaluation Patient provided education and training on use of adaptive equipment at time of discharge, adaptive dressing and bathing strategies to increase patient safety and independence, transfer training to maxamize patient safety with all ADL and functional transfers, and home modification for patient success and independence at time of discharge. Patient receptive to all education and training provided.  -ES Patient has  experienced decline in function from baseline status, presenting w/ deficits related to balance, transfers, ADL managemet and functional mobility. Patient would benefit from skilled OT intervention to maxamize patient safety, prevent further debility and promote return to optimal level of independence.  -ES    Row Name 05/13/22 1547          Therapy Assessment/Plan (OT)    Rehab Potential (OT) good, to achieve stated therapy goals  -ES     Criteria for Skilled Therapeutic Interventions Met (OT) yes;meets criteria;skilled treatment is necessary  -ES     Therapy Frequency (OT) 5 times/wk  -ES     Row Name 05/13/22 1547          Therapy Plan Review/Discharge Plan (OT)    Equipment Needs Upon Discharge (OT) commode chair;walker, rolling  -ES     Anticipated Discharge Disposition (OT) home with outpatient therapy services  -ES     Row Name 05/13/22 1547          Vital Signs    Pre SpO2 (%) 92  -ES     O2 Delivery Pre Treatment nasal cannula  -ES     Intra SpO2 (%) 91  -ES     O2 Delivery Intra Treatment nasal cannula  -ES     Post SpO2 (%) 95  -ES     O2 Delivery Post Treatment nasal cannula  -ES           User Key  (r) = Recorded By, (t) = Taken By, (c) = Cosigned By    Initials Name Provider Type    ES Pamella Patel, OT Occupational Therapist               Outcome Measures     Row Name 05/13/22 1548          How much help from another is currently needed...    Putting on and taking off regular lower body clothing? 3  -ES     Bathing (including washing, rinsing, and drying) 3  -ES     Toileting (which includes using toilet bed pan or urinal) 3  -ES     Putting on and taking off regular upper body clothing 4  -ES     Taking care of personal grooming (such as brushing teeth) 4  -ES     Eating meals 4  -ES     AM-PAC 6 Clicks Score (OT) 21  -ES     Row Name 05/13/22 1400 05/13/22 1000       How much help from another person do you currently need...    Turning from your back to your side while in flat bed without using  bedrails? 4  -BOBBY 4  -TC    Moving from lying on back to sitting on the side of a flat bed without bedrails? 4  -BOBBY 4  -TC    Moving to and from a bed to a chair (including a wheelchair)? 4  -BOBBY 4  -TC    Standing up from a chair using your arms (e.g., wheelchair, bedside chair)? 4  -BOBBY 3  -TC    Climbing 3-5 steps with a railing? 3  -BOBBY 3  -TC    To walk in hospital room? 3  -BOBBY 3  -TC    AM-PAC 6 Clicks Score (PT) 22  -BOBBY 21  -TC    Highest level of mobility 7 --> Walked 25 feet or more  -BOBBY 6 --> Walked 10 steps or more  -TC    Row Name 05/13/22 1548 05/13/22 1400       Functional Assessment    Outcome Measure Options AM-PAC 6 Clicks Daily Activity (OT);Optimal Instrument  -ES AM-PAC 6 Clicks Basic Mobility (PT)  -BOBBY    Row Name 05/13/22 1548          Optimal Instrument    Optimal Instrument Optimal - 3  -ES     Bending/Stooping 1  -ES     Standing 1  -ES     Reaching 1  -ES     From the list, choose the 3 activities you would most like to be able to do without any difficulty Bending/stooping;Standing;Reaching  -ES     Total Score Optimal - 3 3  -ES           User Key  (r) = Recorded By, (t) = Taken By, (c) = Cosigned By    Initials Name Provider Type    BOBBY Len Gee, PT Physical Therapist    Pamella Rutledge OT Occupational Therapist    Claudia Kendrick, RN Registered Nurse                Occupational Therapy Education                 Title: PT OT SLP Therapies (Done)     Topic: Occupational Therapy (Done)     Point: ADL training (Done)     Description:   Instruct learner(s) on proper safety adaptation and remediation techniques during self care or transfers.   Instruct in proper use of assistive devices.              Learning Progress Summary           Patient Acceptance, E,TB, VU by  at 5/13/2022 0049                   Point: Home exercise program (Done)     Description:   Instruct learner(s) on appropriate technique for monitoring, assisting and/or progressing therapeutic exercises/activities.               Learning Progress Summary           Patient Acceptance, E,TB, VU by  at 5/13/2022 1549                   Point: Precautions (Done)     Description:   Instruct learner(s) on prescribed precautions during self-care and functional transfers.              Learning Progress Summary           Patient Acceptance, E,TB, VU by  at 5/13/2022 1549                   Point: Body mechanics (Done)     Description:   Instruct learner(s) on proper positioning and spine alignment during self-care, functional mobility activities and/or exercises.              Learning Progress Summary           Patient Acceptance, E,TB, VU by  at 5/13/2022 1549                               User Key     Initials Effective Dates Name Provider Type Discipline     09/13/21 -  Pamella Patel, OT Occupational Therapist OT              OT Recommendation and Plan  Planned Therapy Interventions (OT): activity tolerance training, BADL retraining, functional balance retraining, occupation/activity based interventions, patient/caregiver education/training, strengthening exercise, transfer/mobility retraining  Therapy Frequency (OT): 5 times/wk  Plan of Care Review  Plan of Care Reviewed With: patient  Progress: no change (initial evaluation encounter)  Outcome Evaluation: Patient provided education and training on use of adaptive equipment at time of discharge, adaptive dressing and bathing strategies to increase patient safety and independence, transfer training to maxamize patient safety with all ADL and functional transfers, and home modification for patient success and independence at time of discharge. Patient receptive to all education and training provided.     Time Calculation:    Time Calculation- OT     Row Name 05/13/22 1550 05/13/22 1411          Time Calculation- OT    OT Received On 05/13/22  -ES --     OT Goal Re-Cert Due Date 05/22/22  -ES --            Timed Charges    14615 - Gait Training Minutes  -- 10  -BOBBY     36680 - OT Self  Care/Mgmt Minutes 15  -ES --            Untimed Charges    OT Eval/Re-eval Minutes 20  -ES --            Total Minutes    Timed Charges Total Minutes 15  -ES 10  -BOBBY     Untimed Charges Total Minutes 20  -ES --      Total Minutes 35  -ES 10  -BOBBY           User Key  (r) = Recorded By, (t) = Taken By, (c) = Cosigned By    Initials Name Provider Type    BOBBY Len Gee, PT Physical Therapist    ES Pamella Patel, OT Occupational Therapist              Therapy Charges for Today     Code Description Service Date Service Provider Modifiers Qty    88216090478  OT SELF CARE/MGMT/TRAIN EA 15 MIN 5/13/2022 Pamella Patel OT GO 1    [id number]  OT EVAL LOW COMPLEXITY 2 5/13/2022 Pamella Patel OT GO 1               Pamella Patel OT  5/13/2022

## 2022-05-13 NOTE — H&P
"h and p      Chief Complaint  Follow-up of the Right Hip        Subjective          Lluvia Archer presents to [facility] for follow up evaluation of the right hip. She locates her pain to her low back, her groin and radiating down her leg. She had a bursa injection in the past with some relief but not a lot. She has no injury or trauma.      No Known Allergies      Social History            Socioeconomic History   • Marital status: Legally    Tobacco Use   • Smoking status: Former Smoker       Packs/day: 1.00   • Smokeless tobacco: Never Used   Vaping Use   • Vaping Use: Never used   Substance and Sexual Activity   • Alcohol use: Never   • Drug use: Never   • Sexual activity: Defer         Review of Systems            Objective      Vital Signs:   Pulse 76   Ht 162.6 cm (64\")   Wt 93.7 kg (206 lb 9.6 oz)   SpO2 96%   BMI 35.46 kg/m²        Physical Exam  Constitutional:       Appearance: Normal appearance. The patient is well-developed and normal weight.   HENT:      Head: Normocephalic.      Right Ear: Hearing and external ear normal.      Left Ear: Hearing and external ear normal.      Nose: Nose normal.   Eyes:      Conjunctiva/sclera: Conjunctivae normal.   Cardiovascular:      Rate and Rhythm: Normal rate.   Pulmonary:      Effort: Pulmonary effort is normal.      Breath sounds: No wheezing or rales.   Abdominal:      Palpations: Abdomen is soft.      Tenderness: There is no abdominal tenderness.   Musculoskeletal:      Cervical back: Normal range of motion.   Skin:     Findings: No rash.   Neurological:      Mental Status: The patient is alert and oriented to person, place, and time.   Psychiatric:         Mood and Affect: Mood and affect normal.         Judgment: Judgment normal.         Ortho Exam       Right hip- Flexion 120. External Rotation 40. Internal rotation 0 Positive EHL, FHL, GS and TA. Sensation intact to all 5 nerves of the foot. Positive pulses. " Neurovascularly intact. Antalgic gait. Pain with hip ROM     Procedures     X-Ray Report:  Right hip(s) X-Ray  Indication: Evaluation of right hip pain  AP and Lateral view(s)  Findings: advanced degenerative changes of the right hip.   Prior studies available for comparison: yes                     Imaging Results (Most Recent)      Procedure Component Value Units Date/Time     XR Hip With or Without Pelvis 2 - 3 View Right [[id number]] Resulted: 03/11/22 1217       Updated: 03/11/22 1217                   Result Review    :         No results found.               Assessment      Assessment and Plan      DX: Right hip osteoarthritis      Discussed the treatment options with the patient, operative vs non-operative. Discussed the risks and benefits of a right Total Hip Arthroplasty. The patient expressed understanding and wished to proceed.      Call or return if worsening symptoms.     Follow Up      2 weeks postoperatively.       Cecil Gomes MD  05/13/22

## 2022-05-17 ENCOUNTER — TELEPHONE (OUTPATIENT)
Dept: ORTHOPEDIC SURGERY | Facility: CLINIC | Age: 57
End: 2022-05-17

## 2022-05-17 DIAGNOSIS — Z96.641 AFTERCARE FOLLOWING RIGHT HIP JOINT REPLACEMENT SURGERY: Primary | ICD-10-CM

## 2022-05-17 DIAGNOSIS — M16.11 PRIMARY OSTEOARTHRITIS OF RIGHT HIP: ICD-10-CM

## 2022-05-17 DIAGNOSIS — Z47.1 AFTERCARE FOLLOWING RIGHT HIP JOINT REPLACEMENT SURGERY: Primary | ICD-10-CM

## 2022-05-17 NOTE — TELEPHONE ENCOUNTER
POST OP PHONE CALL.  PATIENT REQUESTING A REFILL OF PAIN MEDS.  WILL BE OUT ON THURSDAY CALI JIMENEZ AT [place]

## 2022-05-18 RX ORDER — HYDROCODONE BITARTRATE AND ACETAMINOPHEN 7.5; 325 MG/1; MG/1
1 TABLET ORAL EVERY 4 HOURS PRN
Qty: 42 TABLET | Refills: 0 | Status: SHIPPED | OUTPATIENT
Start: 2022-05-18 | End: 2022-05-27 | Stop reason: SDUPTHER

## 2022-05-19 ENCOUNTER — TREATMENT (OUTPATIENT)
Dept: PHYSICAL THERAPY | Facility: CLINIC | Age: 57
End: 2022-05-19

## 2022-05-19 DIAGNOSIS — Z96.641 S/P TOTAL RIGHT HIP ARTHROPLASTY: Primary | ICD-10-CM

## 2022-05-19 DIAGNOSIS — M25.651 HIP STIFFNESS, RIGHT: ICD-10-CM

## 2022-05-19 DIAGNOSIS — R29.898 WEAKNESS OF RIGHT HIP: ICD-10-CM

## 2022-05-19 DIAGNOSIS — R26.9 GAIT DISTURBANCE: ICD-10-CM

## 2022-05-19 PROCEDURE — 97110 THERAPEUTIC EXERCISES: CPT | Performed by: PHYSICAL THERAPIST

## 2022-05-19 PROCEDURE — 97162 PT EVAL MOD COMPLEX 30 MIN: CPT | Performed by: PHYSICAL THERAPIST

## 2022-05-19 NOTE — PROGRESS NOTES
Physical Therapy Initial Evaluation and Plan of Care    Patient: Lluvia Archer   : 1965  Diagnosis/ICD-10 Code:  S/P total right hip arthroplasty [Z96.641]  Referring practitioner: Cecil Gomes MD  Date of Initial Visit: 2022  Today's Date: 2022  Patient seen for 1 sessions           Subjective Questionnaire: LEFS:  = 72% limitation      Subjective Evaluation    History of Present Illness  Mechanism of injury: Right total hip replacement 22, she lives with her  with two steps to enter. Currently using a FWW for ambulation, though didn't need it prior to surgery. She needs to be able to cook/clean her home, though has done some cooking already. She had a mix up originally thinking she was receiving HHPT, but is now here in outpatient. She is complaining of difficulty sleeping, pain, and numbness distal to the incision.    PMH: COPD, anxiety, depression, HTN    Pain  Current pain ratin  At best pain ratin  At worst pain ratin  Quality: burning and radiating  Relieving factors: rest and change in position  Aggravating factors: ambulation, repetitive movement and prolonged positioning    Treatments  Discharged from (in last 30 days): inpatient hospitalization  Patient Goals  Patient goals for therapy: decreased edema, decreased pain, improved balance, increased motion, increased strength and independence with ADLs/IADLs             Objective          Neurological Testing     Sensation     Hip     Right Hip   Diminished: light touch    Comments   Right light touch: anterior and lateral (distal to incision).     Active Range of Motion     Right Hip   Flexion: 60 degrees   Extension: Right hip active extension: -15.   Abduction: 25 degrees   External rotation (90/90): 15 degrees     Passive Range of Motion     Right Hip   Flexion: 80 degrees   Extension: 0 degrees   Abduction: 30 degrees   External rotation (90/90): 20 degrees     Strength/Myotome Testing     Left Hip    Planes of Motion   Flexion: 4+  Extension: 4+  Abduction: 4  Adduction: 4+    Right Hip   Planes of Motion   Flexion: 4-  Extension: 3+  Abduction: 3+  Adduction: 4-    Ambulation     Comments   Ambulates with decreased right hip extension, decreased step length, and shuffling her feet using FWW      See Exercise, Manual, and Modality Logs for complete treatment.     Assessment & Plan     Assessment  Impairments: abnormal gait, abnormal muscle firing, abnormal or restricted ROM, activity intolerance, impaired balance, impaired physical strength, pain with function and weight-bearing intolerance  Functional Limitations: walking, standing and stooping  Assessment details: The patient presents to physical therapy s/p Right total hip replacement 5/13/22. The patient presents with associated hip weakness, stiffness, and functional deficits (LEFS). The patient would benefit from skilled PT intervention to address the above mentioned functional limitations.     Prognosis: good    Goals  Plan Goals: HIP PROBLEMS    1. The patient complains of right hip pain.   LTG 1: 12 weeks:  The patient will report a pain rating of 2/10 or better in order to improve  tolerance to activities of daily living and improve sleep quality.    STATUS:  New   STG 1a: 6 weeks:  The patient will report a pain rating of 4/10 or better.    STATUS:  New   TREATMENT:  Therapeutic exercises, manual therapy, aquatic therapy, home exercise   instruction, and modalities as needed for pain to include:  electrical stimulation, moist heat, ice,   ultrasound, and diathermy.    2. The patient demonstrates weakness of the right hip.   LTG 2: 12 weeks:  The patient will demonstrate 4+/5 strength for right hip flexion, abduction,  and extension in order to improve hip stability.    STATUS:  New   STG 2a: 6 weeks:  The patient will demonstrate 4/5 strength for right hip flexion, abduction,  and extension.    STATUS:  New   TREATMENT: Therapeutic exercises, manual  therapy, aquatic therapy, home exercise instruction,  and modalities as needed for pain to include:  electrical stimulation, moist heat, ice, and ultrasound    3. The patient has gait dysfunction.  LTG 3: 12 weeks:  The patient will ambulate without assistive device, independently, for community distances with minimal limp to the right lower extremity in order to improve mobility and allow patient to perform activities such as grocery shopping with greater ease.  STATUS:  New  STG 3a: The patient will be independent in [facility].  STATUS:  New  TREATMENT: Gait training, aquatic therapy, therapeutic exercise, and home exercise instruction.      4. Mobility: Walking/Moving Around Functional Limitation     LTG 4: 12 weeks:  The patient will demonstrate 1-19% limitation by achieving a score of 65-79 on the Lower Extremity Functional Scale.    STATUS:  New   STG 4a: 6 weeks:  The patient will demonstrate 20-39% limitation by achieving a score of 50-64 on the Lower Extremity Functional Scale.      STATUS:  New   TREATMENT:  Manual therapy, therapeutic exercise, home exercise instruction, and modalities as needed to include: moist heat, electrical stimulation, and ultrasound.         Plan  Therapy options: will be seen for skilled therapy services  Planned modality interventions: TENS, cryotherapy and thermotherapy (hydrocollator packs)  Planned therapy interventions: functional ROM exercises, gait training, home exercise program, manual therapy, strengthening, stretching, therapeutic activities, soft tissue mobilization, joint mobilization, neuromuscular re-education and balance/weight-bearing training  Frequency: 3x week  Duration in weeks: 12  Treatment plan discussed with: patient        Visit Diagnoses:    ICD-10-CM ICD-9-CM   1. S/P total right hip arthroplasty  Z96.641 V43.64   2. Gait disturbance  R26.9 781.2   3. Hip stiffness, right  M25.651 719.55   4. Weakness of right hip  R29.898 729.89       History # of Personal  Factors and/or Comorbidities: HIGH (3+)  Examination of Body System(s): # of elements: MODERATE (3)  Clinical Presentation: STABLE   Clinical Decision Making: MODERATE      Timed:         Manual Therapy:    0     mins  59559;     Therapeutic Exercise:    15     mins  89729;     Neuromuscular Fay:    0    mins  49485;    Therapeutic Activity:     0     mins  62703;     Gait Trainin     mins  52292;     Ultrasound:     0     mins  94684;    Ionto                               0    mins   59877  Self Care                       0     mins   99716  Canalith Repos    0     mins 32391      Un-Timed:  Electrical Stimulation:    0     mins  50652 ( );  Dry Needling     0     mins self-pay  Traction     0     mins 01206  Low Eval     0     Mins  92424  Mod Eval     30     Mins  77708  High Eval                       0     Mins  22464  Re-Eval                           0    mins  89138    Timed Treatment:   15   mins   Total Treatment:     45   mins    PT SIGNATURE: Shane Babin PT     Electronically signed 2022    [place] License: PT - [id number]     Initial Certification  Certification Period: 2022 thru 2022  I certify that the therapy services are furnished while this patient is under my care.  The services outlined above are required by this patient, and will be reviewed every 90 days.     PHYSICIAN: Cecil Gomes MD   NPI: [id number]                                        DATE:     Please sign and return via fax to [phone]. Thank you, [facility].

## 2022-05-23 ENCOUNTER — TREATMENT (OUTPATIENT)
Dept: PHYSICAL THERAPY | Facility: CLINIC | Age: 57
End: 2022-05-23

## 2022-05-23 DIAGNOSIS — R29.898 WEAKNESS OF RIGHT HIP: ICD-10-CM

## 2022-05-23 DIAGNOSIS — M25.651 HIP STIFFNESS, RIGHT: ICD-10-CM

## 2022-05-23 DIAGNOSIS — R26.9 GAIT DISTURBANCE: ICD-10-CM

## 2022-05-23 DIAGNOSIS — Z96.641 S/P TOTAL RIGHT HIP ARTHROPLASTY: Primary | ICD-10-CM

## 2022-05-23 PROCEDURE — 97110 THERAPEUTIC EXERCISES: CPT | Performed by: PHYSICAL THERAPIST

## 2022-05-23 PROCEDURE — 97530 THERAPEUTIC ACTIVITIES: CPT | Performed by: PHYSICAL THERAPIST

## 2022-05-23 NOTE — PROGRESS NOTES
Physical Therapy Daily Progress Note    Patient: Lluvia Archer   : 1965  Diagnosis/ICD-10 Code:  S/P total right hip arthroplasty [Z96.641]  Referring practitioner: Cecil Gomes MD  Date of Initial Visit: Type: THERAPY  Noted: 2022  Today's Date: 2022  Patient seen for 2 sessions           Subjective Evaluation    History of Present Illness  Mechanism of injury: Pt reporting she had a good weekend, no trouble traveling or walking. She is wanting to get away from using the bedside commode soon.    Pain  Current pain ratin           Objective   See Exercise, Manual, and Modality Logs for complete treatment.       Assessment & Plan     Assessment    Assessment details: Pt tolerated today's session well with ongoing progression of strengthening and ROM. Pt would benefit from continued skilled therapy to address deficits. Progress per plan of care.                 Manual Therapy:    0     mins  02831;  Therapeutic Exercise:    20     mins  75233;     Neuromuscular Fay:    0    mins  28024;    Therapeutic Activity:     10     mins  42589;     Gait Trainin     mins  64164;     Ultrasound:     0     mins  25758;    Electrical Stimulation:    0     mins  46924 ( );  Dry Needling     0     mins self-pay;  Aquatic Therapy    0     mins  45856;  Mechanical Traction    0     mins  64966  Moist Heat     0     mins  No charge    Timed Treatment:   30   mins   Total Treatment:     30   mins    Shane Babin PT  Physical Therapist    Electronically signed 2022    [place] License: PT - [id number]

## 2022-05-24 LAB
MYCOBACTERIUM SPEC CULT: NORMAL
NIGHT BLUE STAIN TISS: NORMAL
NIGHT BLUE STAIN TISS: NORMAL

## 2022-05-27 ENCOUNTER — TREATMENT (OUTPATIENT)
Dept: PHYSICAL THERAPY | Facility: CLINIC | Age: 57
End: 2022-05-27

## 2022-05-27 ENCOUNTER — OFFICE VISIT (OUTPATIENT)
Dept: ORTHOPEDIC SURGERY | Facility: CLINIC | Age: 57
End: 2022-05-27

## 2022-05-27 VITALS — BODY MASS INDEX: 31.76 KG/M2 | WEIGHT: 186 LBS | HEIGHT: 64 IN

## 2022-05-27 DIAGNOSIS — Z96.641 S/P TOTAL RIGHT HIP ARTHROPLASTY: Primary | ICD-10-CM

## 2022-05-27 DIAGNOSIS — M25.651 HIP STIFFNESS, RIGHT: ICD-10-CM

## 2022-05-27 DIAGNOSIS — M16.11 PRIMARY OSTEOARTHRITIS OF RIGHT HIP: ICD-10-CM

## 2022-05-27 DIAGNOSIS — R26.9 GAIT DISTURBANCE: ICD-10-CM

## 2022-05-27 DIAGNOSIS — R29.898 WEAKNESS OF RIGHT HIP: ICD-10-CM

## 2022-05-27 DIAGNOSIS — Z96.641 AFTERCARE FOLLOWING RIGHT HIP JOINT REPLACEMENT SURGERY: Primary | ICD-10-CM

## 2022-05-27 DIAGNOSIS — Z47.1 AFTERCARE FOLLOWING RIGHT HIP JOINT REPLACEMENT SURGERY: ICD-10-CM

## 2022-05-27 DIAGNOSIS — Z96.641 AFTERCARE FOLLOWING RIGHT HIP JOINT REPLACEMENT SURGERY: ICD-10-CM

## 2022-05-27 DIAGNOSIS — Z47.1 AFTERCARE FOLLOWING RIGHT HIP JOINT REPLACEMENT SURGERY: Primary | ICD-10-CM

## 2022-05-27 DIAGNOSIS — M16.11 PRIMARY OSTEOARTHRITIS OF RIGHT HIP: Primary | ICD-10-CM

## 2022-05-27 PROCEDURE — 97110 THERAPEUTIC EXERCISES: CPT | Performed by: PHYSICAL THERAPIST

## 2022-05-27 PROCEDURE — 99024 POSTOP FOLLOW-UP VISIT: CPT | Performed by: PHYSICIAN ASSISTANT

## 2022-05-27 PROCEDURE — 97140 MANUAL THERAPY 1/> REGIONS: CPT | Performed by: PHYSICAL THERAPIST

## 2022-05-27 RX ORDER — HYDROCODONE BITARTRATE AND ACETAMINOPHEN 7.5; 325 MG/1; MG/1
1 TABLET ORAL EVERY 4 HOURS PRN
Qty: 42 TABLET | Refills: 0 | Status: SHIPPED | OUTPATIENT
Start: 2022-05-27 | End: 2022-06-24 | Stop reason: SDUPTHER

## 2022-05-27 NOTE — PATIENT INSTRUCTIONS
Staples removed today, advised on incision care. Keep the incision clean and dry. Leave open to air. Remove steri-strips after 7 to 10 days. Ice the incision for swelling for 15 min at a time. Continue physical therapy, progress weightbearing status with PT. Call with any changes or concerns.        Follow up in 4 weeks. No imaging.

## 2022-05-27 NOTE — PROGRESS NOTES
"Chief Complaint  Follow-up of the Right Hip    Subjective          Lluvia Archer presents to [facility] for s/p right total hip arthroplasty anterior approach performed on 05/13/2022 by Dr. Koenig.  Patient is currently attending physical therapy [facility] twice weekly and feels she is progressing well.  She states she feels she is overdone it working around her house over the last several days, as she has had increased swelling of the right lower extremity.  She states she has been cooking, cleaning and picking up things around the house.  Her therapist feels she is doing well but is pushing herself too hard.  She denies fever, chills, numbness or tingling.  Here today using walker for ambulation assistance.    Objective   No Known Allergies    Vital Signs:   Ht 162.6 cm (64\")   Wt 84.4 kg (186 lb)   BMI 31.93 kg/m²       Physical Exam  Constitutional:       Appearance: Normal appearance. Patient is well-developed and normal weight.   HENT:      Head: Normocephalic.      Right Ear: Hearing and external ear normal.      Left Ear: Hearing and external ear normal.      Nose: Nose normal.   Eyes:      Conjunctiva/sclera: Conjunctivae normal.   Cardiovascular:      Rate and Rhythm: Normal rate.   Pulmonary:      Effort: Pulmonary effort is normal.      Breath sounds: No wheezing or rales.   Abdominal:      Palpations: Abdomen is soft.      Tenderness: There is no abdominal tenderness.   Musculoskeletal:      Cervical back: Normal range of motion.   Skin:     Findings: No rash.   Neurological:      Mental Status: Patient is alert and oriented to person, place, and time.   Psychiatric:         Mood and Affect: Mood and affect normal.         Judgment: Judgment normal.     Ortho Exam  Right hip: Well-healing surgical incision, staples removed, no surrounding erythema or active drainage.  Mild fluctuance around the incision, no heat or redness.  No signs of infection.  No discoloration.  " Full passive IR/ER of the hip in the seated position without pain elicited.  Full plantar flexion dorsiflexion of the ankle.  Able to wiggle the toes.  Patient ambulates with a walker.  Sensation and pulses are intact.  Neurovascular intact distally.      Result Review :   The following data was reviewed by: JIMMY Nelson on 05/27/2022:         Imaging Results (Most Recent)     Procedure Component Value Units Date/Time    XR Hip With or Without Pelvis 2 - 3 View Right [[id number]] Resulted: 05/27/22 1507     Updated: 05/27/22 1508    Narrative:      X-Ray Report:  Study: X-rays ordered, taken in the office, and reviewed today  Site: right hip Xray  Indication: JENNIFER   View: AP and Lateral view(s)  Findings: Intact right total hip arthroplasty. No signs of hardware   failure.   Prior studies available for comparison: yes                   Assessment and Plan    Problem List Items Addressed This Visit        Musculoskeletal and Injuries    Primary osteoarthritis of right hip - Primary    Relevant Orders    XR Hip With or Without Pelvis 2 - 3 View Right (Completed)      Other Visit Diagnoses     Aftercare following right hip joint replacement surgery        Relevant Orders    XR Hip With or Without Pelvis 2 - 3 View Right (Completed)          Follow Up   Return in about 4 weeks (around 6/24/2022).  Patient Instructions   Staples removed today, advised on incision care. Keep the incision clean and dry. Leave open to air. Remove steri-strips after 7 to 10 days. Ice the incision for swelling for 15 min at a time. Continue physical therapy, progress weightbearing status with PT. Call with any changes or concerns.        Follow up in 4 weeks. No imaging.      Patient was given instructions and counseling regarding her condition or for health maintenance advice. Please see specific information pulled into the AVS if appropriate.

## 2022-06-03 ENCOUNTER — TREATMENT (OUTPATIENT)
Dept: PHYSICAL THERAPY | Facility: CLINIC | Age: 57
End: 2022-06-03

## 2022-06-03 ENCOUNTER — OFFICE VISIT (OUTPATIENT)
Dept: FAMILY MEDICINE CLINIC | Facility: CLINIC | Age: 57
End: 2022-06-03

## 2022-06-03 VITALS
TEMPERATURE: 98 F | HEART RATE: 103 BPM | DIASTOLIC BLOOD PRESSURE: 91 MMHG | BODY MASS INDEX: 32.78 KG/M2 | OXYGEN SATURATION: 90 % | WEIGHT: 192 LBS | HEIGHT: 64 IN | SYSTOLIC BLOOD PRESSURE: 121 MMHG

## 2022-06-03 DIAGNOSIS — D64.89 ANEMIA DUE TO OTHER CAUSE, NOT CLASSIFIED: ICD-10-CM

## 2022-06-03 DIAGNOSIS — Z96.641 S/P TOTAL RIGHT HIP ARTHROPLASTY: Primary | ICD-10-CM

## 2022-06-03 DIAGNOSIS — M25.651 HIP STIFFNESS, RIGHT: ICD-10-CM

## 2022-06-03 DIAGNOSIS — R29.898 WEAKNESS OF RIGHT HIP: ICD-10-CM

## 2022-06-03 DIAGNOSIS — E78.5 HYPERLIPIDEMIA, UNSPECIFIED HYPERLIPIDEMIA TYPE: Primary | ICD-10-CM

## 2022-06-03 DIAGNOSIS — E11.9 TYPE 2 DIABETES MELLITUS WITHOUT COMPLICATION, WITHOUT LONG-TERM CURRENT USE OF INSULIN: ICD-10-CM

## 2022-06-03 DIAGNOSIS — I10 PRIMARY HYPERTENSION: ICD-10-CM

## 2022-06-03 DIAGNOSIS — R26.9 GAIT DISTURBANCE: ICD-10-CM

## 2022-06-03 DIAGNOSIS — R71.8 OTHER ABNORMALITY OF RED BLOOD CELLS: ICD-10-CM

## 2022-06-03 DIAGNOSIS — J44.9 CHRONIC OBSTRUCTIVE PULMONARY DISEASE, UNSPECIFIED COPD TYPE: ICD-10-CM

## 2022-06-03 LAB
ALBUMIN SERPL-MCNC: 4 G/DL (ref 3.5–5.2)
ALBUMIN/GLOB SERPL: 1.4 G/DL
ALP SERPL-CCNC: 139 U/L (ref 39–117)
ALT SERPL W P-5'-P-CCNC: 9 U/L (ref 1–33)
ANION GAP SERPL CALCULATED.3IONS-SCNC: 13.9 MMOL/L (ref 5–15)
AST SERPL-CCNC: 14 U/L (ref 1–32)
BASOPHILS # BLD AUTO: 0.04 10*3/MM3 (ref 0–0.2)
BASOPHILS NFR BLD AUTO: 0.5 % (ref 0–1.5)
BILIRUB SERPL-MCNC: 0.2 MG/DL (ref 0–1.2)
BUN SERPL-MCNC: 11 MG/DL (ref 6–20)
BUN/CREAT SERPL: 12 (ref 7–25)
CALCIUM SPEC-SCNC: 9.4 MG/DL (ref 8.6–10.5)
CHLORIDE SERPL-SCNC: 100 MMOL/L (ref 98–107)
CHOLEST SERPL-MCNC: 136 MG/DL (ref 0–200)
CO2 SERPL-SCNC: 25.1 MMOL/L (ref 22–29)
CREAT SERPL-MCNC: 0.92 MG/DL (ref 0.57–1)
DEPRECATED RDW RBC AUTO: 45.5 FL (ref 37–54)
EGFRCR SERPLBLD CKD-EPI 2021: 73.7 ML/MIN/1.73
EOSINOPHIL # BLD AUTO: 0.29 10*3/MM3 (ref 0–0.4)
EOSINOPHIL NFR BLD AUTO: 3.3 % (ref 0.3–6.2)
ERYTHROCYTE [DISTWIDTH] IN BLOOD BY AUTOMATED COUNT: 14.1 % (ref 12.3–15.4)
FERRITIN SERPL-MCNC: 91.7 NG/ML (ref 13–150)
FOLATE SERPL-MCNC: 6.7 NG/ML (ref 4.78–24.2)
GLOBULIN UR ELPH-MCNC: 2.8 GM/DL
GLUCOSE SERPL-MCNC: 82 MG/DL (ref 65–99)
HCT VFR BLD AUTO: 35 % (ref 34–46.6)
HDLC SERPL-MCNC: 41 MG/DL (ref 40–60)
HGB BLD-MCNC: 11.2 G/DL (ref 12–15.9)
IMM GRANULOCYTES # BLD AUTO: 0.04 10*3/MM3 (ref 0–0.05)
IMM GRANULOCYTES NFR BLD AUTO: 0.5 % (ref 0–0.5)
IRON 24H UR-MRATE: 47 MCG/DL (ref 37–145)
IRON SATN MFR SERPL: 14 % (ref 20–50)
LDLC SERPL CALC-MCNC: 72 MG/DL (ref 0–100)
LDLC/HDLC SERPL: 1.7 {RATIO}
LYMPHOCYTES # BLD AUTO: 2.26 10*3/MM3 (ref 0.7–3.1)
LYMPHOCYTES NFR BLD AUTO: 25.6 % (ref 19.6–45.3)
MCH RBC QN AUTO: 28.4 PG (ref 26.6–33)
MCHC RBC AUTO-ENTMCNC: 32 G/DL (ref 31.5–35.7)
MCV RBC AUTO: 88.6 FL (ref 79–97)
MONOCYTES # BLD AUTO: 0.77 10*3/MM3 (ref 0.1–0.9)
MONOCYTES NFR BLD AUTO: 8.7 % (ref 5–12)
NEUTROPHILS NFR BLD AUTO: 5.44 10*3/MM3 (ref 1.7–7)
NEUTROPHILS NFR BLD AUTO: 61.4 % (ref 42.7–76)
NRBC BLD AUTO-RTO: 0.1 /100 WBC (ref 0–0.2)
PLATELET # BLD AUTO: 325 10*3/MM3 (ref 140–450)
PMV BLD AUTO: 10.3 FL (ref 6–12)
POTASSIUM SERPL-SCNC: 4 MMOL/L (ref 3.5–5.2)
PROT SERPL-MCNC: 6.8 G/DL (ref 6–8.5)
RBC # BLD AUTO: 3.95 10*6/MM3 (ref 3.77–5.28)
SODIUM SERPL-SCNC: 139 MMOL/L (ref 136–145)
TIBC SERPL-MCNC: 338 MCG/DL (ref 298–536)
TRANSFERRIN SERPL-MCNC: 227 MG/DL (ref 200–360)
TRIGL SERPL-MCNC: 126 MG/DL (ref 0–150)
VIT B12 BLD-MCNC: 402 PG/ML (ref 211–946)
VLDLC SERPL-MCNC: 23 MG/DL (ref 5–40)
WBC NRBC COR # BLD: 8.84 10*3/MM3 (ref 3.4–10.8)

## 2022-06-03 PROCEDURE — 97530 THERAPEUTIC ACTIVITIES: CPT | Performed by: PHYSICAL THERAPIST

## 2022-06-03 PROCEDURE — 82607 VITAMIN B-12: CPT | Performed by: FAMILY MEDICINE

## 2022-06-03 PROCEDURE — 99214 OFFICE O/P EST MOD 30 MIN: CPT | Performed by: FAMILY MEDICINE

## 2022-06-03 PROCEDURE — 82728 ASSAY OF FERRITIN: CPT | Performed by: FAMILY MEDICINE

## 2022-06-03 PROCEDURE — 82746 ASSAY OF FOLIC ACID SERUM: CPT | Performed by: FAMILY MEDICINE

## 2022-06-03 PROCEDURE — 97110 THERAPEUTIC EXERCISES: CPT | Performed by: PHYSICAL THERAPIST

## 2022-06-03 PROCEDURE — 80053 COMPREHEN METABOLIC PANEL: CPT | Performed by: FAMILY MEDICINE

## 2022-06-03 PROCEDURE — 80061 LIPID PANEL: CPT | Performed by: FAMILY MEDICINE

## 2022-06-03 PROCEDURE — 36415 COLL VENOUS BLD VENIPUNCTURE: CPT | Performed by: FAMILY MEDICINE

## 2022-06-03 PROCEDURE — 85025 COMPLETE CBC W/AUTO DIFF WBC: CPT | Performed by: FAMILY MEDICINE

## 2022-06-03 PROCEDURE — 84466 ASSAY OF TRANSFERRIN: CPT | Performed by: FAMILY MEDICINE

## 2022-06-03 PROCEDURE — 83540 ASSAY OF IRON: CPT | Performed by: FAMILY MEDICINE

## 2022-06-03 RX ORDER — ALBUTEROL SULFATE 90 UG/1
2 AEROSOL, METERED RESPIRATORY (INHALATION) EVERY 6 HOURS PRN
Qty: 8.5 G | Refills: 2 | Status: SHIPPED | OUTPATIENT
Start: 2022-06-03 | End: 2022-11-17

## 2022-06-03 RX ORDER — BUPROPION HYDROCHLORIDE 150 MG/1
150 TABLET, EXTENDED RELEASE ORAL 2 TIMES DAILY
Qty: 60 TABLET | Refills: 5 | Status: SHIPPED | OUTPATIENT
Start: 2022-06-03 | End: 2022-12-09 | Stop reason: SDUPTHER

## 2022-06-03 NOTE — PROGRESS NOTES
Chief Complaint   Patient presents with   • Follow-up     4 week     requesting refill on bupropion and albuterol inhaler   • Hypertension   • Diabetes   • Hyperlipidemia        Subjective     Lluvia Archer  has a past medical history of Abnormal mammogram, Anxiety, Arthritis, Chronic nausea (01/15/2019), Hernia, hiatal, Hyperlipidemia, Hypertension, Knee pain, right (08/30/2018), Memory loss, Migraine headache, Moderate episode of recurrent major depressive disorder (HCC) (05/22/2017), Night sweats, Sciatica of right side (08/18/2017), Severe episode of recurrent major depressive disorder, without psychotic features (HCC) (10/22/2019), Shoulder pain, left (08/30/2018), Sinus trouble, and Sleep apnea, unspecified.    Type 2 diabetes- she does not check her blood sugars at home.  Her A1c recently was excellent at 5.0.    Hypertension- she does not check her blood pressure outside the office.  Today is 121/91.    Hyperlipidemia- she takes her atorvastatin on a daily basis.    Anemia- her last hemoglobin was slightly down from the prior 1.  She denies any abdominal pain, melanotic stools, or blood per rectum.      PHQ-2 Depression Screening  Little interest or pleasure in doing things?     Feeling down, depressed, or hopeless?     PHQ-2 Total Score     PHQ-9 Depression Screening  Little interest or pleasure in doing things?     Feeling down, depressed, or hopeless?     Trouble falling or staying asleep, or sleeping too much?     Feeling tired or having little energy?     Poor appetite or overeating?     Feeling bad about yourself - or that you are a failure or have let yourself or your family down?     Trouble concentrating on things, such as reading the newspaper or watching television?     Moving or speaking so slowly that other people could have noticed? Or the opposite - being so fidgety or restless that you have been moving around a lot more than usual?     Thoughts that you would be better off dead, or of  hurting yourself in some way?     PHQ-9 Total Score     If you checked off any problems, how difficult have these problems made it for you to do your work, take care of things at home, or get along with other people?       No Known Allergies    Prior to Admission medications    Medication Sig Start Date End Date Taking? Authorizing Provider   albuterol sulfate  (90 Base) MCG/ACT inhaler Inhale 2 puffs Every 6 (Six) Hours As Needed for Wheezing. 2/21/22  Yes Aleksandar Edge DO   aspirin  MG tablet Take 1 tablet by mouth 2 (Two) Times a Day. Start after finishing Eliquis. 5/13/22  Yes Cecil Gomes MD   atorvastatin (LIPITOR) 10 MG tablet Take 10 mg by mouth Every Night.   Yes ProviderParvin MD   buPROPion SR (WELLBUTRIN SR) 150 MG 12 hr tablet TAKE 1 TABLET(150 MG) BY MOUTH TWICE DAILY  Patient taking differently: Take 150 mg by mouth. 1/12/22  Yes Aleksandar Edge DO   famotidine (PEPCID) 40 MG tablet TAKE 1 TABLET(40 MG) BY MOUTH TWICE DAILY  Patient taking differently: Take 40 mg by mouth 2 (Two) Times a Day. 5/11/22  Yes Aleksandar Edge DO   FLUoxetine (PROzac) 40 MG capsule TAKE 1 CAPSULE(40 MG) BY MOUTH EVERY DAY  Patient taking differently: Take 40 mg by mouth Every Night. 11/1/21  Yes Aleksandar Edge DO   HYDROcodone-acetaminophen (Norco) 7.5-325 MG per tablet Take 1 tablet by mouth Every 4 (Four) Hours As Needed for Moderate Pain . 5/27/22  Yes Cecil Gomes MD   lisinopril (PRINIVIL,ZESTRIL) 5 MG tablet Take 1 tablet by mouth Daily.  Patient taking differently: Take 5 mg by mouth Every Night. 2/21/22  Yes Aleksandar Edge DO   Melatonin 10 MG tablet Take 10 mg by mouth At Night As Needed.   Yes Parvin Gray MD   ondansetron ODT (Zofran ODT) 4 MG disintegrating tablet Place 1 tablet on the tongue Every 8 (Eight) Hours As Needed for Nausea or Vomiting. 4/22/22  Yes Aleksandar Edge DO   Trelegy Ellipta 100-62.5-25 MCG/INH  inhaler INHALE 1 PUFF BY MOUTH AT THE SAME TIME EVERY DAY 22  Yes Aleksandar Edge DO        Patient Active Problem List   Diagnosis   • Abnormal mammogram   • Anxiety   • Arthritis   • Chronic nausea   • Chronic obstructive pulmonary disease (COPD) ([facility])   • Counseling on substance use and abuse   • Diabetes mellitus, type II ([facility])   • Esophageal reflux   • Hernia, hiatal   • Hyperlipidemia   • Hypertension   • Knee pain, right   • Memory loss   • Migraine   • Moderate episode of recurrent major depressive disorder ([facility])   • Night sweats   • Numbness   • Sciatica of right side   • Severe episode of recurrent major depressive disorder, without psychotic features ([facility])   • Shoulder pain, left   • Other diseases of nasal cavity and sinuses   • Sleep apnea, unspecified   • Tobacco abuse   • Strain of groin, right, subsequent encounter   • Osteoarthritis of spine with radiculopathy, lumbar region   • Chronic right-sided low back pain with right-sided sciatica   • Medication management   • Primary osteoarthritis of right hip   • Right hip pain   • Sepsis, due to unspecified organism, unspecified whether acute organ dysfunction present ([facility])   • Severe malnutrition (CMS/[facility])   • Pneumonia of right lower lobe due to infectious organism   • Hospital discharge follow-up   • Other specified anemias        Past Surgical History:   Procedure Laterality Date   • BRONCHOSCOPY N/A 2022    Procedure: BRONCHOSCOPY WITH BRONCHOALVEOLAR LAVAGE, BIOPSY;  Surgeon: Shin Mcdonald DO;  Location: [facility] END[facility]COPY;  Service: Pulmonary;  Laterality: N/A;  RIGHT LOWER LOBE INFILTRATE   •  SECTION     • CHOLECYSTECTOMY      LAPAROSCOPIC   • COLONOSCOPY     • TOTAL HIP ARTHROPLASTY Right 2022    Procedure: RIGHT TOTAL HIP ARTHROPLASTY;  Surgeon: Cecil Gomes MD;  Location: [facility] MAIN OR;  Service: Orthopedics;  Laterality: Right;       Social History     Socioeconomic History   • Marital status:  "     Spouse name: DEVAN   • Number of children: 2   • Years of education: GED   • Highest education level: GED or equivalent   Tobacco Use   • Smoking status: Former Smoker     Packs/day: 1.00   • Smokeless tobacco: Never Used   Vaping Use   • Vaping Use: Never used   Substance and Sexual Activity   • Alcohol use: Never   • Drug use: Never   • Sexual activity: Defer       Family History   Problem Relation Age of Onset   • Other Mother         BLOOD DISEASE   • Arthritis Mother    • Heart disease Father    • Hypertension Other    • Cancer Other    • Heart disease Other    • Malig Hyperthermia Neg Hx        Family history, surgical history, past medical history, Allergies and med's reviewed with patient today and updated in [facility] EMR.     ROS:  Review of Systems   Constitutional: Positive for fatigue.   HENT: Positive for rhinorrhea. Negative for congestion and postnasal drip.    Eyes: Negative for blurred vision.   Respiratory: Positive for cough, shortness of breath and wheezing. Negative for chest tightness.    Cardiovascular: Negative for chest pain and palpitations.   Gastrointestinal: Negative for abdominal pain, blood in stool, constipation and diarrhea.   Endocrine: Negative for polydipsia and polyuria.   Psychiatric/Behavioral: Positive for depressed mood. The patient is nervous/anxious.        OBJECTIVE:  Vitals:    06/03/22 1117   BP: 121/91   BP Location: Right arm   Patient Position: Sitting   Pulse: 103   Temp: 98 °F (36.7 °C)   SpO2: 90%   Weight: 87.1 kg (192 lb)   Height: 162.6 cm (64\")     No exam data present   Body mass index is 32.96 kg/m².  No LMP recorded (lmp unknown). Patient is postmenopausal.    Physical Exam  Vitals and nursing note reviewed.   Constitutional:       General: She is not in acute distress.     Appearance: Normal appearance. She is obese.   HENT:      Head: Normocephalic.      Right Ear: Tympanic membrane, ear canal and external ear normal.      Left Ear: Tympanic " membrane, ear canal and external ear normal.      Nose: Nose normal.      Mouth/Throat:      Mouth: Mucous membranes are moist.      Pharynx: Oropharynx is clear.        Comments: Dentition absent  Eyes:      General: No scleral icterus.     Conjunctiva/sclera: Conjunctivae normal.      Pupils: Pupils are equal, round, and reactive to light.   Cardiovascular:      Rate and Rhythm: Normal rate and regular rhythm.      Pulses: Normal pulses.      Heart sounds: Normal heart sounds. No murmur heard.  Pulmonary:      Effort: Pulmonary effort is normal.      Breath sounds: Normal breath sounds. No wheezing, rhonchi or rales.   Musculoskeletal:      Cervical back: Neck supple. No rigidity or tenderness.   Lymphadenopathy:      Cervical: No cervical adenopathy.   Skin:     General: Skin is warm and dry.      Coloration: Skin is not jaundiced.      Findings: No rash.   Neurological:      General: No focal deficit present.      Mental Status: She is alert and oriented to person, place, and time.   Psychiatric:         Mood and Affect: Mood normal.         Thought Content: Thought content normal.         Judgment: Judgment normal.         Procedures    Admission on 05/13/2022, Discharged on 05/13/2022   Component Date Value Ref Range Status   • Glucose 05/13/2022 108 (A) 70 - 99 mg/dL Final    Serial Number: [id number]:  [id number]   • Hemoglobin 05/13/2022 10.9 (A) 12.0 - 15.9 g/dL Final   • Hematocrit 05/13/2022 34.0  34.0 - 46.6 % Final       ASSESSMENT/ PLAN:    Diagnoses and all orders for this visit:    1. Hyperlipidemia, unspecified hyperlipidemia type (Primary)  Assessment & Plan:  Update her lipid profile today.    Orders:  -     Comprehensive Metabolic Panel  -     Lipid Panel    2. Primary hypertension  Assessment & Plan:  Overall her blood pressure is not too bad.  We will not make any changes in her current meds.    Orders:  -     Comprehensive Metabolic Panel  -     Lipid Panel  -     CBC Auto  Differential    3. Type 2 diabetes mellitus without complication, without long-term current use of insulin (HCC)    4. Anemia due to other cause, not classified  Assessment & Plan:  Update her blood count with today's labs.    Orders:  -     CBC Auto Differential  -     Ferritin  -     Iron Profile  -     Vitamin B12 & Folate    5. Chronic obstructive pulmonary disease, unspecified COPD type (HCC)  -     albuterol sulfate  (90 Base) MCG/ACT inhaler; Inhale 2 puffs Every 6 (Six) Hours As Needed for Wheezing.  Dispense: 8.5 g; Refill: 2    6. Other abnormality of red blood cells   -     Ferritin  -     Iron Profile    Other orders  -     buPROPion SR (WELLBUTRIN SR) 150 MG 12 hr tablet; Take 1 tablet by mouth 2 (Two) Times a Day.  Dispense: 60 tablet; Refill: 5      Orders Placed Today:     New Medications Ordered This Visit   Medications   • albuterol sulfate  (90 Base) MCG/ACT inhaler     Sig: Inhale 2 puffs Every 6 (Six) Hours As Needed for Wheezing.     Dispense:  8.5 g     Refill:  2   • buPROPion SR (WELLBUTRIN SR) 150 MG 12 hr tablet     Sig: Take 1 tablet by mouth 2 (Two) Times a Day.     Dispense:  60 tablet     Refill:  5     NEEDS F/U APPOINTMENT        Management Plan:     An After Visit Summary was printed and given to the patient at discharge.    Follow-up: Return in about 6 months (around 12/3/2022) for Recheck.    Aleksandar Edge,  6/3/2022 11:51 EDT  This note was electronically signed.

## 2022-06-03 NOTE — PROGRESS NOTES
Physical Therapy Daily Progress Note    Patient: Lluvia Archer   : 1965  Diagnosis/ICD-10 Code:  S/P total right hip arthroplasty [Z96.641]  Referring practitioner: Cecil Gomes MD  Date of Initial Visit: Type: THERAPY  Noted: 2022  Today's Date: 6/3/2022  Patient seen for 4 sessions           Subjective Evaluation    History of Present Illness  Mechanism of injury: Pt reporting she is less sore today compared to last time. She has been doing less on her feet and this has helped her rest more. But she is still doing her exercises.    Pain  Current pain ratin           Objective   See Exercise, Manual, and Modality Logs for complete treatment.       Assessment & Plan     Assessment    Assessment details: Pt tolerated today's session well, improved and progressed from her last visit. She was fatigued at the end of the session, but no increase in pain. Pt would benefit from continued skilled therapy to address deficits. Progress per plan of care.                 Manual Therapy:    0     mins  60784;  Therapeutic Exercise:    20     mins  93045;     Neuromuscular Fay:    0    mins  97381;    Therapeutic Activity:     10     mins  19308;     Gait Trainin     mins  80899;     Ultrasound:     0     mins  27673;    Electrical Stimulation:    0     mins  01050 ( );  Dry Needling     0     mins self-pay;  Aquatic Therapy    0     mins  26774;  Mechanical Traction    0     mins  51748  Moist Heat     0     mins  No charge    Timed Treatment:   30   mins   Total Treatment:     30   mins    Shane Babin PT  Physical Therapist    Electronically signed 6/3/2022    [place] License: PT - [id number]

## 2022-06-06 ENCOUNTER — TREATMENT (OUTPATIENT)
Dept: PHYSICAL THERAPY | Facility: CLINIC | Age: 57
End: 2022-06-06

## 2022-06-06 DIAGNOSIS — R26.9 GAIT DISTURBANCE: ICD-10-CM

## 2022-06-06 DIAGNOSIS — R29.898 WEAKNESS OF RIGHT HIP: ICD-10-CM

## 2022-06-06 DIAGNOSIS — M25.651 HIP STIFFNESS, RIGHT: ICD-10-CM

## 2022-06-06 DIAGNOSIS — Z96.641 S/P TOTAL RIGHT HIP ARTHROPLASTY: Primary | ICD-10-CM

## 2022-06-06 PROCEDURE — 97140 MANUAL THERAPY 1/> REGIONS: CPT | Performed by: PHYSICAL THERAPIST

## 2022-06-06 PROCEDURE — 97110 THERAPEUTIC EXERCISES: CPT | Performed by: PHYSICAL THERAPIST

## 2022-06-10 ENCOUNTER — TELEPHONE (OUTPATIENT)
Dept: PHYSICAL THERAPY | Facility: CLINIC | Age: 57
End: 2022-06-10

## 2022-06-14 ENCOUNTER — TREATMENT (OUTPATIENT)
Dept: PHYSICAL THERAPY | Facility: CLINIC | Age: 57
End: 2022-06-14

## 2022-06-14 DIAGNOSIS — R26.9 GAIT DISTURBANCE: ICD-10-CM

## 2022-06-14 DIAGNOSIS — Z96.641 S/P TOTAL RIGHT HIP ARTHROPLASTY: Primary | ICD-10-CM

## 2022-06-14 DIAGNOSIS — M25.651 HIP STIFFNESS, RIGHT: ICD-10-CM

## 2022-06-14 DIAGNOSIS — R29.898 WEAKNESS OF RIGHT HIP: ICD-10-CM

## 2022-06-14 PROCEDURE — 97116 GAIT TRAINING THERAPY: CPT | Performed by: PHYSICAL THERAPIST

## 2022-06-14 PROCEDURE — 97110 THERAPEUTIC EXERCISES: CPT | Performed by: PHYSICAL THERAPIST

## 2022-06-14 PROCEDURE — 97140 MANUAL THERAPY 1/> REGIONS: CPT | Performed by: PHYSICAL THERAPIST

## 2022-06-14 NOTE — PROGRESS NOTES
Progress Assessment        Patient: Lluvia Archer   : 1965  Diagnosis/ICD-10 Code:  S/P total right hip arthroplasty [Z96.641]  Referring practitioner: Cecil Gomes MD  Date of Initial Visit: Type: THERAPY  Noted: 2022  Today's Date: 2022  Patient seen for 6 sessions      Subjective:     Subjective Questionnaire: LEFS: 36/80 = 55% limitation  Clinical Progress: improved  Home Program Compliance: Yes  Treatment has included: therapeutic exercise, manual therapy, therapeutic activity and gait training    Subjective Evaluation    History of Present Illness  Mechanism of injury: Pt reporting she is feeling good, she has been continuing her exercises at home and is ramping up her activity again. She had previously reduced how much she was on her feet due to increased pain, but is tolerating standing longer again.    Pain  Current pain ratin         Objective        Passive Range of Motion      Right Hip   Flexion: 90 degrees   Extension: 10 degrees   Abduction: 40 degrees   External rotation (90/90): 40 degrees   Internal rotation (90/90): 15 degrees      Strength/Myotome Testing      Left Hip   Planes of Motion   Flexion: 4+  Extension: 4+  Abduction: 4  Adduction: 4+     Right Hip   Planes of Motion   Flexion: 4  Extension: 4+  Abduction: 4-  Adduction: 4+     Ambulation      Comments   Ambulates with decreased right hip extension, decreased step length using FWW; Gait training using quad cane this visit: similar pattern, however, she is able to stay balanced and perform natural gait using the cane.       Assessment & Plan     Assessment  Impairments: abnormal gait, abnormal muscle firing, abnormal or restricted ROM, activity intolerance, impaired balance, impaired physical strength, pain with function and weight-bearing intolerance  Functional Limitations: walking, standing and stooping  Assessment details: The patient presents to physical therapy s/p Right total hip replacement 22.  The patient presents with associated hip weakness, stiffness, and functional deficits (LEFS). She has met her short term goal for pain and establishing an independent HEP. She demonstrates good progress with strength and is expected to improve to meet this goal. She was able to advance to using a cane inside the clinic today, and advised her to get one for ambulating inside her home, but will continue to use the walker for outdoors, public, and longer distances. The patient would benefit from ongoing skilled PT intervention to address the above mentioned functional limitations.     Prognosis: good    Goals  Plan Goals: HIP PROBLEMS    1. The patient complains of right hip pain.   LTG 1: 12 weeks:  The patient will report a pain rating of 2/10 or better in order to improve  tolerance to activities of daily living and improve sleep quality.    STATUS:  Not met, progressing   STG 1a: 6 weeks:  The patient will report a pain rating of 4/10 or better.    STATUS:  MET   TREATMENT:  Therapeutic exercises, manual therapy, aquatic therapy, home exercise   instruction, and modalities as needed for pain to include:  electrical stimulation, moist heat, ice,   ultrasound, and diathermy.    2. The patient demonstrates weakness of the right hip.   LTG 2: 12 weeks:  The patient will demonstrate 4+/5 strength for right hip flexion, abduction,  and extension in order to improve hip stability.    STATUS:  Not met, progressing   STG 2a: 6 weeks:  The patient will demonstrate 4/5 strength for right hip flexion, abduction,  and extension.    STATUS:  Not met, progressing   TREATMENT: Therapeutic exercises, manual therapy, aquatic therapy, home exercise instruction,  and modalities as needed for pain to include:  electrical stimulation, moist heat, ice, and ultrasound    3. The patient has gait dysfunction.  LTG 3: 12 weeks:  The patient will ambulate without assistive device, independently, for community distances with minimal limp to  the right lower extremity in order to improve mobility and allow patient to perform activities such as grocery shopping with greater ease.  STATUS:  Not met, progressing  STG 3a: The patient will be independent in HEP.  STATUS:  MET, ongoing  TREATMENT: Gait training, aquatic therapy, therapeutic exercise, and home exercise instruction.      4. Mobility: Walking/Moving Around Functional Limitation     LTG 4: 12 weeks:  The patient will demonstrate 1-19% limitation by achieving a score of 65-79 on the Lower Extremity Functional Scale.    STATUS:  Not met, progressing   STG 4a: 6 weeks:  The patient will demonstrate 20-39% limitation by achieving a score of 50-64 on the Lower Extremity Functional Scale.      STATUS:  Not met, progressing   TREATMENT:  Manual therapy, therapeutic exercise, home exercise instruction, and modalities as needed to include: moist heat, electrical stimulation, and ultrasound.         Plan  Therapy options: will be seen for skilled therapy services  Planned modality interventions: TENS, cryotherapy and thermotherapy (hydrocollator packs)  Planned therapy interventions: functional ROM exercises, gait training, home exercise program, manual therapy, strengthening, stretching, therapeutic activities, soft tissue mobilization, joint mobilization, neuromuscular re-education and balance/weight-bearing training  Frequency: 3x week  Duration in weeks: 12  Treatment plan discussed with: patient      Progress toward previous goals: Partially Met    See Exercise, Manual, and Modality Logs for complete treatment.         Recommendations: Continue as planned  Timeframe: 2 months  Prognosis to achieve goals: good    PT Signature: Shane Babin PT    Electronically signed 6/14/2022    [place] License: PT - [id number]     Based upon review of the patient's progress and continued therapy plan, it is my medical opinion that Lluvia Archer should continue physical therapy treatment at [facility]  [facility]  [facility]  [address] [place] [address] [place] [address]  [phone].      Timed:         Manual Therapy:    10     mins  31699;     Therapeutic Exercise:    16     mins  20985;     Neuromuscular Fay:    0    mins  52024;    Therapeutic Activity:     0     mins  33527;     Gait Trainin     mins  10843;     Ultrasound:     0     mins  92518;    Ionto                               0    mins   33614  Self Care                       0     mins   95310  Aquatic                          0     mins 54755        Timed Treatment:   40   mins   Total Treatment:     40   mins      I certify that the therapy services are furnished while this patient is under my care.  The services outlined above are required by this patient, and will be reviewed every 90 days.

## 2022-06-17 ENCOUNTER — TELEPHONE (OUTPATIENT)
Dept: PHYSICAL THERAPY | Facility: OTHER | Age: 57
End: 2022-06-17

## 2022-06-21 ENCOUNTER — TELEPHONE (OUTPATIENT)
Dept: PHYSICAL THERAPY | Facility: CLINIC | Age: 57
End: 2022-06-21

## 2022-06-24 ENCOUNTER — OFFICE VISIT (OUTPATIENT)
Dept: ORTHOPEDIC SURGERY | Facility: CLINIC | Age: 57
End: 2022-06-24

## 2022-06-24 ENCOUNTER — TREATMENT (OUTPATIENT)
Dept: PHYSICAL THERAPY | Facility: CLINIC | Age: 57
End: 2022-06-24

## 2022-06-24 VITALS — OXYGEN SATURATION: 95 % | WEIGHT: 192 LBS | HEIGHT: 64 IN | BODY MASS INDEX: 32.78 KG/M2 | HEART RATE: 106 BPM

## 2022-06-24 DIAGNOSIS — M25.651 HIP STIFFNESS, RIGHT: ICD-10-CM

## 2022-06-24 DIAGNOSIS — Z47.1 AFTERCARE FOLLOWING RIGHT HIP JOINT REPLACEMENT SURGERY: ICD-10-CM

## 2022-06-24 DIAGNOSIS — R29.898 WEAKNESS OF RIGHT HIP: ICD-10-CM

## 2022-06-24 DIAGNOSIS — M16.11 PRIMARY OSTEOARTHRITIS OF RIGHT HIP: ICD-10-CM

## 2022-06-24 DIAGNOSIS — Z96.641 AFTERCARE FOLLOWING RIGHT HIP JOINT REPLACEMENT SURGERY: ICD-10-CM

## 2022-06-24 DIAGNOSIS — Z96.641 S/P TOTAL RIGHT HIP ARTHROPLASTY: Primary | ICD-10-CM

## 2022-06-24 DIAGNOSIS — R26.9 GAIT DISTURBANCE: ICD-10-CM

## 2022-06-24 DIAGNOSIS — Z47.1 AFTERCARE FOLLOWING RIGHT HIP JOINT REPLACEMENT SURGERY: Primary | ICD-10-CM

## 2022-06-24 DIAGNOSIS — Z96.641 AFTERCARE FOLLOWING RIGHT HIP JOINT REPLACEMENT SURGERY: Primary | ICD-10-CM

## 2022-06-24 PROCEDURE — 97530 THERAPEUTIC ACTIVITIES: CPT | Performed by: PHYSICAL THERAPIST

## 2022-06-24 PROCEDURE — 97110 THERAPEUTIC EXERCISES: CPT | Performed by: PHYSICAL THERAPIST

## 2022-06-24 PROCEDURE — 99024 POSTOP FOLLOW-UP VISIT: CPT | Performed by: PHYSICIAN ASSISTANT

## 2022-06-24 RX ORDER — HYDROCODONE BITARTRATE AND ACETAMINOPHEN 7.5; 325 MG/1; MG/1
1 TABLET ORAL EVERY 4 HOURS PRN
Qty: 42 TABLET | Refills: 0 | Status: SHIPPED | OUTPATIENT
Start: 2022-06-24 | End: 2022-12-09

## 2022-06-24 NOTE — PROGRESS NOTES
Physical Therapy Daily Treatment Note    Patient: Lluvia Archer   : 1965  Diagnosis/ICD-10 Code:  S/P total right hip arthroplasty [Z96.641]  Referring practitioner: Cecil Gomes MD  Date of Initial Visit: Type: THERAPY  Noted: 2022  Today's Date: 2022  Patient seen for 7 sessions           Subjective Evaluation    History of Present Illness  Mechanism of injury: Pt reporting she has started using her quad cane as of yesterday, she is having more pain in her right lower back today.    Pain  Current pain ratin  Location: right lower back           Objective   Moderate to severe tenderness, palpable nodule right lumbar spine paraspinals (spasm vs herniated muscle tissue through fascia)    See Exercise, Manual, and Modality Logs for complete treatment.       Assessment & Plan     Assessment    Assessment details: Pt tolerated today's session fair, she demonstrates decreased activity tolerance today, stating she hasn't eaten anything and hasn't had an appetite. In addition, she is having more back pain today, possibly related to the change in her gait with using the quad cane starting yesterday. Pt would benefit from continued skilled therapy to address deficits. Progress per plan of care.                 Manual Therapy:    0     mins  07251;  Therapeutic Exercise:    12     mins  90030;     Neuromuscular Fay:    0    mins  25642;    Therapeutic Activity:     12     mins  75872;     Gait Trainin     mins  95182;     Ultrasound:     0     mins  70414;    Electrical Stimulation:    0     mins  28991 ( );  Dry Needling     0     mins self-pay;  Aquatic Therapy    0     mins  42495;  Mechanical Traction    0     mins  86152  Moist Heat     10     mins  No charge    Timed Treatment:   24   mins   Total Treatment:     34   mins    Shane Babin PT  Physical Therapist    Electronically signed 2022    [place] License: PT - [id number]

## 2022-06-24 NOTE — PROGRESS NOTES
"Chief Complaint  Pain and Follow-up of the Right Hip    Subjective          Lluvia Archer presents to [facility] for s/p right total hip arthroplasty anterior approach performed on 05/13/2022 by Dr. Gomes.  Patient is here today using cane for ambulation assistance.  She does report some low back pain that she has noticed with therapy.  She states her therapist is using moist heat for this.  She states the cane worsens her low back pain.  She states pain radiates from the right buttock down her leg.  She denies any pain of her groin.  Patient is currently attending physical therapy at [facility] in [facility] twice weekly.  She states she is making good progress with hip pain and pain is improved from preoperatively.    Objective   No Known Allergies    Vital Signs:   Pulse 106   Ht 162.6 cm (64\")   Wt 87.1 kg (192 lb)   SpO2 95%   BMI 32.96 kg/m²       Physical Exam  Constitutional:       Appearance: Normal appearance. Patient is well-developed and normal weight.   HENT:      Head: Normocephalic.      Right Ear: Hearing and external ear normal.      Left Ear: Hearing and external ear normal.      Nose: Nose normal.   Eyes:      Conjunctiva/sclera: Conjunctivae normal.   Cardiovascular:      Rate and Rhythm: Normal rate.   Pulmonary:      Effort: Pulmonary effort is normal.      Breath sounds: No wheezing or rales.   Abdominal:      Palpations: Abdomen is soft.      Tenderness: There is no abdominal tenderness.   Musculoskeletal:      Cervical back: Normal range of motion.   Skin:     Findings: No rash.   Neurological:      Mental Status: Patient is alert and oriented to person, place, and time.   Psychiatric:         Mood and Affect: Mood and affect normal.         Judgment: Judgment normal.     Ortho Exam  Right hip: Well-healed surgical incision, no tenderness to palpation, swelling or discoloration.  Good muscle tone of the hip flexors, extensors, abductors and " adductor's.  Ankle motion intact.  Patient ambulates with a cane.  Sensation and pulses are intact.  Neurovascular intact distally.      Result Review :   The following data was reviewed by: JIMMY Nelson on 06/24/2022:         Imaging Results (Most Recent)     None                Assessment and Plan    Problem List Items Addressed This Visit        Musculoskeletal and Injuries    Aftercare following right hip joint replacement surgery - Primary          Follow Up   Return in about 6 weeks (around 8/5/2022).  Patient Instructions   Refill of Norco given today. Continue with PT for strengthening exercises, progress weightbearing status with PT. Call with any changes or concerns.       Follow up in 6 weeks. Repeat x-ray.    Patient was given instructions and counseling regarding her condition or for health maintenance advice. Please see specific information pulled into the AVS if appropriate.

## 2022-06-24 NOTE — PATIENT INSTRUCTIONS
Refill of Norco given today. Continue with PT for strengthening exercises, progress weightbearing status with PT. Call with any changes or concerns.       Follow up in 6 weeks. Repeat x-ray.

## 2022-06-28 ENCOUNTER — TELEPHONE (OUTPATIENT)
Dept: PHYSICAL THERAPY | Facility: CLINIC | Age: 57
End: 2022-06-28

## 2022-08-03 DIAGNOSIS — Z47.1 AFTERCARE FOLLOWING RIGHT HIP JOINT REPLACEMENT SURGERY: Primary | ICD-10-CM

## 2022-08-03 DIAGNOSIS — Z96.641 AFTERCARE FOLLOWING RIGHT HIP JOINT REPLACEMENT SURGERY: Primary | ICD-10-CM

## 2022-08-29 RX ORDER — ATORVASTATIN CALCIUM 10 MG/1
TABLET, FILM COATED ORAL
Qty: 30 TABLET | Refills: 5 | Status: SHIPPED | OUTPATIENT
Start: 2022-08-29 | End: 2022-12-09

## 2022-08-29 RX ORDER — LISINOPRIL 5 MG/1
5 TABLET ORAL DAILY
Qty: 30 TABLET | Refills: 5 | OUTPATIENT
Start: 2022-08-29

## 2022-08-29 RX ORDER — LISINOPRIL 5 MG/1
5 TABLET ORAL DAILY
Qty: 30 TABLET | Refills: 5 | Status: SHIPPED | OUTPATIENT
Start: 2022-08-29 | End: 2022-12-09 | Stop reason: SDUPTHER

## 2022-08-29 RX ORDER — ATORVASTATIN CALCIUM 10 MG/1
TABLET, FILM COATED ORAL
Qty: 30 TABLET | Refills: 5 | Status: SHIPPED | OUTPATIENT
Start: 2022-08-29 | End: 2022-12-09 | Stop reason: SDUPTHER

## 2022-11-17 DIAGNOSIS — J44.9 CHRONIC OBSTRUCTIVE PULMONARY DISEASE, UNSPECIFIED COPD TYPE: ICD-10-CM

## 2022-11-17 RX ORDER — ALBUTEROL SULFATE 90 UG/1
AEROSOL, METERED RESPIRATORY (INHALATION)
Qty: 8.5 G | Refills: 2 | Status: SHIPPED | OUTPATIENT
Start: 2022-11-17 | End: 2022-12-09 | Stop reason: SDUPTHER

## 2022-11-29 ENCOUNTER — DOCUMENTATION (OUTPATIENT)
Dept: PHYSICAL THERAPY | Facility: CLINIC | Age: 57
End: 2022-11-29

## 2022-11-29 NOTE — PROGRESS NOTES
//Discharge Summary  Discharge Summary from Physical Therapy Report  [address] [place] [address]      Dates  PT visit: 5/19/22 - 6/24/22  Number of Visits: 7         Goals: Partially Met    Discharge Plan: Continue with current home exercise program as instructed    Comments: Pt doing well, canceled remaining visits.    1. The patient complains of right hip pain.              LTG 1: 12 weeks:  The patient will report a pain rating of 2/10 or better in order to improve  tolerance to activities of daily living and improve sleep quality.                          STATUS:  Not met, progressing              STG 1a: 6 weeks:  The patient will report a pain rating of 4/10 or better.                          STATUS:  MET              TREATMENT:  Therapeutic exercises, manual therapy, aquatic therapy, home exercise   instruction, and modalities as needed for pain to include:  electrical stimulation, moist heat, ice,   ultrasound, and diathermy.    2. The patient demonstrates weakness of the right hip.              LTG 2: 12 weeks:  The patient will demonstrate 4+/5 strength for right hip flexion, abduction,  and extension in order to improve hip stability.                          STATUS:  Not met, progressing              STG 2a: 6 weeks:  The patient will demonstrate 4/5 strength for right hip flexion, abduction,  and extension.                          STATUS:  Not met, progressing              TREATMENT: Therapeutic exercises, manual therapy, aquatic therapy, home exercise instruction,  and modalities as needed for pain to include:  electrical stimulation, moist heat, ice, and ultrasound    3. The patient has gait dysfunction.  LTG 3: 12 weeks:  The patient will ambulate without assistive device, independently, for community distances with minimal limp to the right lower extremity in order to improve mobility and allow patient to perform activities such as grocery shopping with greater ease.  STATUS:  Not  met, progressing  STG 3a: The patient will be independent in HEP.  STATUS:  MET, ongoing  TREATMENT: Gait training, aquatic therapy, therapeutic exercise, and home exercise instruction.      4. Mobility: Walking/Moving Around Functional Limitation                               LTG 4: 12 weeks:  The patient will demonstrate 1-19% limitation by achieving a score of 65-79 on the Lower Extremity Functional Scale.                          STATUS:  Not met, progressing              STG 4a: 6 weeks:  The patient will demonstrate 20-39% limitation by achieving a score of 50-64 on the Lower Extremity Functional Scale.                            STATUS:  Not met, progressing              TREATMENT:  Manual therapy, therapeutic exercise, home exercise instruction, and modalities as needed to include: moist heat, electrical stimulation, and ultrasound.    Date of Discharge 11/29/22        Shane Babin PT  Physical Therapist    Electronically signed 11/29/2022    [place] License: PT - [id number]

## 2022-12-09 ENCOUNTER — OFFICE VISIT (OUTPATIENT)
Dept: FAMILY MEDICINE CLINIC | Facility: CLINIC | Age: 57
End: 2022-12-09

## 2022-12-09 VITALS
TEMPERATURE: 98 F | SYSTOLIC BLOOD PRESSURE: 147 MMHG | OXYGEN SATURATION: 95 % | HEART RATE: 120 BPM | DIASTOLIC BLOOD PRESSURE: 68 MMHG | HEIGHT: 64 IN | WEIGHT: 199.4 LBS | BODY MASS INDEX: 34.04 KG/M2

## 2022-12-09 DIAGNOSIS — I10 PRIMARY HYPERTENSION: ICD-10-CM

## 2022-12-09 DIAGNOSIS — Z23 NEED FOR INFLUENZA VACCINATION: Primary | ICD-10-CM

## 2022-12-09 DIAGNOSIS — Z23 NEED FOR COVID-19 VACCINE: ICD-10-CM

## 2022-12-09 DIAGNOSIS — F33.2 SEVERE EPISODE OF RECURRENT MAJOR DEPRESSIVE DISORDER, WITHOUT PSYCHOTIC FEATURES: ICD-10-CM

## 2022-12-09 DIAGNOSIS — J44.9 CHRONIC OBSTRUCTIVE PULMONARY DISEASE, UNSPECIFIED COPD TYPE: ICD-10-CM

## 2022-12-09 DIAGNOSIS — E78.5 HYPERLIPIDEMIA, UNSPECIFIED HYPERLIPIDEMIA TYPE: ICD-10-CM

## 2022-12-09 PROBLEM — E11.9 DIABETES MELLITUS, TYPE II: Status: RESOLVED | Noted: 2021-07-09 | Resolved: 2022-12-09

## 2022-12-09 LAB
ALBUMIN SERPL-MCNC: 3.9 G/DL (ref 3.5–5.2)
ALBUMIN/GLOB SERPL: 1.3 G/DL
ALP SERPL-CCNC: 98 U/L (ref 39–117)
ALT SERPL W P-5'-P-CCNC: 8 U/L (ref 1–33)
ANION GAP SERPL CALCULATED.3IONS-SCNC: 11.4 MMOL/L (ref 5–15)
AST SERPL-CCNC: 11 U/L (ref 1–32)
BILIRUB SERPL-MCNC: 0.4 MG/DL (ref 0–1.2)
BUN SERPL-MCNC: 12 MG/DL (ref 6–20)
BUN/CREAT SERPL: 12.1 (ref 7–25)
CALCIUM SPEC-SCNC: 9.3 MG/DL (ref 8.6–10.5)
CHLORIDE SERPL-SCNC: 101 MMOL/L (ref 98–107)
CHOLEST SERPL-MCNC: 134 MG/DL (ref 0–200)
CO2 SERPL-SCNC: 27.6 MMOL/L (ref 22–29)
CREAT SERPL-MCNC: 0.99 MG/DL (ref 0.57–1)
EGFRCR SERPLBLD CKD-EPI 2021: 67.1 ML/MIN/1.73
GLOBULIN UR ELPH-MCNC: 3.1 GM/DL
GLUCOSE SERPL-MCNC: 116 MG/DL (ref 65–99)
HDLC SERPL-MCNC: 42 MG/DL (ref 40–60)
LDLC SERPL CALC-MCNC: 71 MG/DL (ref 0–100)
LDLC/HDLC SERPL: 1.65 {RATIO}
POTASSIUM SERPL-SCNC: 4 MMOL/L (ref 3.5–5.2)
PROT SERPL-MCNC: 7 G/DL (ref 6–8.5)
SODIUM SERPL-SCNC: 140 MMOL/L (ref 136–145)
TRIGL SERPL-MCNC: 114 MG/DL (ref 0–150)
VLDLC SERPL-MCNC: 21 MG/DL (ref 5–40)

## 2022-12-09 PROCEDURE — 0124A COVID-19 (PFIZER) BIVALENT BOOSTER 12+YRS: CPT | Performed by: FAMILY MEDICINE

## 2022-12-09 PROCEDURE — 80061 LIPID PANEL: CPT | Performed by: FAMILY MEDICINE

## 2022-12-09 PROCEDURE — 36415 COLL VENOUS BLD VENIPUNCTURE: CPT | Performed by: FAMILY MEDICINE

## 2022-12-09 PROCEDURE — 99214 OFFICE O/P EST MOD 30 MIN: CPT | Performed by: FAMILY MEDICINE

## 2022-12-09 PROCEDURE — 91312 COVID-19 (PFIZER) BIVALENT BOOSTER 12+YRS: CPT | Performed by: FAMILY MEDICINE

## 2022-12-09 PROCEDURE — G0008 ADMIN INFLUENZA VIRUS VAC: HCPCS | Performed by: FAMILY MEDICINE

## 2022-12-09 PROCEDURE — 90686 IIV4 VACC NO PRSV 0.5 ML IM: CPT | Performed by: FAMILY MEDICINE

## 2022-12-09 PROCEDURE — 80053 COMPREHEN METABOLIC PANEL: CPT | Performed by: FAMILY MEDICINE

## 2022-12-09 RX ORDER — ALBUTEROL SULFATE 90 UG/1
2 AEROSOL, METERED RESPIRATORY (INHALATION) EVERY 6 HOURS PRN
Qty: 8.5 G | Refills: 2 | Status: SHIPPED | OUTPATIENT
Start: 2022-12-09 | End: 2023-03-13

## 2022-12-09 RX ORDER — ATORVASTATIN CALCIUM 10 MG/1
10 TABLET, FILM COATED ORAL NIGHTLY
Qty: 30 TABLET | Refills: 5 | Status: SHIPPED | OUTPATIENT
Start: 2022-12-09

## 2022-12-09 RX ORDER — BUPROPION HYDROCHLORIDE 150 MG/1
150 TABLET, EXTENDED RELEASE ORAL 2 TIMES DAILY
Qty: 60 TABLET | Refills: 5 | Status: SHIPPED | OUTPATIENT
Start: 2022-12-09

## 2022-12-09 RX ORDER — ESCITALOPRAM OXALATE 10 MG/1
10 TABLET ORAL DAILY
Qty: 90 TABLET | Refills: 1 | Status: SHIPPED | OUTPATIENT
Start: 2022-12-09 | End: 2023-01-05

## 2022-12-09 RX ORDER — FAMOTIDINE 40 MG/1
40 TABLET, FILM COATED ORAL 2 TIMES DAILY
Qty: 60 TABLET | Refills: 12 | Status: SHIPPED | OUTPATIENT
Start: 2022-12-09

## 2022-12-09 RX ORDER — LISINOPRIL 5 MG/1
5 TABLET ORAL DAILY
Qty: 30 TABLET | Refills: 5 | Status: SHIPPED | OUTPATIENT
Start: 2022-12-09

## 2022-12-09 NOTE — ASSESSMENT & PLAN NOTE
Her blood pressure is mildly elevated here today but she is somewhat upset and anxious related to her daughter's health.  We will not make any changes in her meds at this time but update her labs

## 2022-12-09 NOTE — ASSESSMENT & PLAN NOTE
Her depression anxiety is worse.  For what ever reason she has not been able to get the fluoxetine from her pharmacy.  We will take this opportunity to switch her over to some escitalopram as been shown to be more effective for the anxiety component.

## 2022-12-09 NOTE — PROGRESS NOTES
Chief Complaint   Patient presents with   • Follow-up     6 month    • Hypertension   • Hyperlipidemia        Subjective     Lluvia Archer  has a past medical history of Abnormal mammogram, Anxiety, Arthritis, Chronic nausea (01/15/2019), Hernia, hiatal, Hyperlipidemia, Hypertension, Knee pain, right (08/30/2018), Memory loss, Migraine headache, Moderate episode of recurrent major depressive disorder (HCC) (05/22/2017), Night sweats, Sciatica of right side (08/18/2017), Severe episode of recurrent major depressive disorder, without psychotic features (HCC) (10/22/2019), Shoulder pain, left (08/30/2018), Sinus trouble, and Sleep apnea, unspecified.    Hypertension- she has now been checking her blood pressure at home.  It is mildly elevated here today at 147/68.  She states she has been rather upset and nervous and anxious related to her daughter's current health conditions.    Hyperlipidemia- she is taking her atorvastatin daily.    Depression anxiety- she has been worse as of late due to her daughter's health.  Currently she was diagnosed with some underlying severe liver disease and the only treatment tends to be liver transplant.  For what ever reason she has not been getting the fluoxetine from her retail pharmacy.  Although she had been taking it previously the pharmacy says they do not have any prescription for it.    COPD- she has been using her Trelegy on a daily basis as well as her albuterol as needed.  She is smoking a little bit at times.  She says recently she has had a little bit increased shortness of breath.      PHQ-2 Depression Screening  Little interest or pleasure in doing things?     Feeling down, depressed, or hopeless?     PHQ-2 Total Score     PHQ-9 Depression Screening  Little interest or pleasure in doing things?     Feeling down, depressed, or hopeless?     Trouble falling or staying asleep, or sleeping too much?     Feeling tired or having little energy?     Poor appetite or  overeating?     Feeling bad about yourself - or that you are a failure or have let yourself or your family down?     Trouble concentrating on things, such as reading the newspaper or watching television?     Moving or speaking so slowly that other people could have noticed? Or the opposite - being so fidgety or restless that you have been moving around a lot more than usual?     Thoughts that you would be better off dead, or of hurting yourself in some way?     PHQ-9 Total Score     If you checked off any problems, how difficult have these problems made it for you to do your work, take care of things at home, or get along with other people?       No Known Allergies    Prior to Admission medications    Medication Sig Start Date End Date Taking? Authorizing Provider   albuterol sulfate  (90 Base) MCG/ACT inhaler INHALE 2 PUFFS BY MOUTH EVERY 6 HOURS AS NEEDED FOR WHEEZING 11/17/22  Yes Aleksandar Edge, DO   atorvastatin (LIPITOR) 10 MG tablet TAKE 1 TABLET BY MOUTH EVERY NIGHT 8/29/22  Yes Aleksandar Edge, DO   buPROPion SR (WELLBUTRIN SR) 150 MG 12 hr tablet Take 1 tablet by mouth 2 (Two) Times a Day. 6/3/22  Yes Aleksandar Edge DO   famotidine (PEPCID) 40 MG tablet TAKE 1 TABLET(40 MG) BY MOUTH TWICE DAILY  Patient taking differently: Take 40 mg by mouth 2 (Two) Times a Day. 5/11/22  Yes Aleksandar Edge DO   lisinopril (PRINIVIL,ZESTRIL) 5 MG tablet TAKE 1 TABLET BY MOUTH DAILY 8/29/22  Yes Aleksandar Edge, DO   Trelegy Ellipta 100-62.5-25 MCG/INH inhaler INHALE 1 PUFF BY MOUTH AT THE SAME TIME EVERY DAY 5/11/22   Aleksandar Edge DO   aspirin  MG tablet Take 1 tablet by mouth 2 (Two) Times a Day. Start after finishing Eliquis. 5/13/22 12/9/22  Cecil Gomes MD   atorvastatin (LIPITOR) 10 MG tablet TAKE 1 TABLET BY MOUTH EVERY NIGHT 8/29/22 12/9/22  Aleksandar Edge, DO   FLUoxetine (PROzac) 40 MG capsule TAKE 1 CAPSULE(40 MG) BY MOUTH EVERY  DAY  Patient taking differently: Take 40 mg by mouth Every Night. 11/1/21 12/9/22  Aleksandar Edge DO   HYDROcodone-acetaminophen (Norco) 7.5-325 MG per tablet Take 1 tablet by mouth Every 4 (Four) Hours As Needed for Moderate Pain . 6/24/22 12/9/22  BeenCecil MD   Melatonin 10 MG tablet Take 10 mg by mouth At Night As Needed.  12/9/22  Provider, MD Parvin   ondansetron ODT (Zofran ODT) 4 MG disintegrating tablet Place 1 tablet on the tongue Every 8 (Eight) Hours As Needed for Nausea or Vomiting. 4/22/22 12/9/22  Aleksandar Edge DO        Patient Active Problem List   Diagnosis   • Abnormal mammogram   • Anxiety   • Arthritis   • Chronic nausea   • Chronic obstructive pulmonary disease (COPD) ([facility])   • Counseling on substance use and abuse   • Esophageal reflux   • Hernia, hiatal   • Hyperlipidemia   • Hypertension   • Knee pain, right   • Memory loss   • Migraine   • Moderate episode of recurrent major depressive disorder ([facility])   • Night sweats   • Numbness   • Sciatica of right side   • Severe episode of recurrent major depressive disorder, without psychotic features ([facility])   • Shoulder pain, left   • Other diseases of nasal cavity and sinuses   • Sleep apnea, unspecified   • Tobacco abuse   • Strain of groin, right, subsequent encounter   • Osteoarthritis of spine with radiculopathy, lumbar region   • Chronic right-sided low back pain with right-sided sciatica   • Aftercare following right hip joint replacement surgery   • Primary osteoarthritis of right hip   • Right hip pain   • Sepsis, due to unspecified organism, unspecified whether acute organ dysfunction present ([facility])   • Severe malnutrition (CMS/[facility])   • Pneumonia of right lower lobe due to infectious organism   • Hospital discharge follow-up   • Other specified anemias        Past Surgical History:   Procedure Laterality Date   • BRONCHOSCOPY N/A 04/12/2022    Procedure: BRONCHOSCOPY WITH BRONCHOALVEOLAR LAVAGE, BIOPSY;   Surgeon: Shin Mcdonald DO;  Location: [facility] ENDOSCOPY;  Service: Pulmonary;  Laterality: N/A;  RIGHT LOWER LOBE INFILTRATE   •  SECTION     • CHOLECYSTECTOMY      LAPAROSCOPIC   • COLONOSCOPY     • TOTAL HIP ARTHROPLASTY Right 2022    Procedure: RIGHT TOTAL HIP ARTHROPLASTY;  Surgeon: Cecil Gomes MD;  Location: [facility] MAIN OR;  Service: Orthopedics;  Laterality: Right;       Social History     Socioeconomic History   • Marital status:      Spouse name: DEVAN   • Number of children: 2   • Years of education: GED   • Highest education level: GED or equivalent   Tobacco Use   • Smoking status: Former     Packs/day: 1.00     Types: Cigarettes   • Smokeless tobacco: Never   Vaping Use   • Vaping Use: Never used   Substance and Sexual Activity   • Alcohol use: Never   • Drug use: Never   • Sexual activity: Defer       Family History   Problem Relation Age of Onset   • Other Mother         BLOOD DISEASE   • Arthritis Mother    • Heart disease Father    • Hypertension Other    • Cancer Other    • Heart disease Other    • Malig Hyperthermia Neg Hx        Family history, surgical history, past medical history, Allergies and meds reviewed with patient today and updated in [facility] EMR.     ROS:  Review of Systems   Constitutional: Positive for fatigue.   HENT: Positive for rhinorrhea. Negative for congestion and postnasal drip.    Eyes: Negative for blurred vision and visual disturbance.   Respiratory: Positive for cough and shortness of breath. Negative for chest tightness and wheezing.    Cardiovascular: Negative for chest pain and palpitations.   Allergic/Immunologic: Negative for environmental allergies.   Neurological: Negative for headache.   Psychiatric/Behavioral: Positive for depressed mood. The patient is nervous/anxious.        OBJECTIVE:  Vitals:    22 1127   BP: 147/68   BP Location: Left arm   Patient Position: Sitting   Pulse: 120   Temp: 98 °F (36.7 °C)   SpO2: 95%  "  Weight: 90.4 kg (199 lb 6.4 oz)   Height: 162.6 cm (64\")     No results found.   Body mass index is 34.23 kg/m².  No LMP recorded (lmp unknown). Patient is postmenopausal.    Physical Exam  Vitals and nursing note reviewed.   Constitutional:       General: She is not in acute distress.     Appearance: Normal appearance. She is obese.   HENT:      Head: Normocephalic.      Right Ear: Tympanic membrane, ear canal and external ear normal.      Left Ear: Tympanic membrane, ear canal and external ear normal.      Nose: Nose normal.      Mouth/Throat:      Mouth: Mucous membranes are moist.      Pharynx: Oropharynx is clear.        Comments: Dentition absent except for 2 lower incisors.  Eyes:      General: No scleral icterus.     Conjunctiva/sclera: Conjunctivae normal.      Pupils: Pupils are equal, round, and reactive to light.   Cardiovascular:      Rate and Rhythm: Normal rate and regular rhythm.      Pulses: Normal pulses.      Heart sounds: Normal heart sounds. No murmur heard.  Pulmonary:      Effort: Pulmonary effort is normal.      Breath sounds: Normal breath sounds. No wheezing, rhonchi or rales.   Musculoskeletal:      Cervical back: Neck supple. No rigidity or tenderness.   Lymphadenopathy:      Cervical: No cervical adenopathy.   Skin:     General: Skin is warm and dry.      Coloration: Skin is not jaundiced.      Findings: No rash.   Neurological:      General: No focal deficit present.      Mental Status: She is alert and oriented to person, place, and time.   Psychiatric:         Mood and Affect: Mood normal.         Thought Content: Thought content normal.         Judgment: Judgment normal.         Procedures    No visits with results within 30 Day(s) from this visit.   Latest known visit with results is:   Office Visit on 06/03/2022   Component Date Value Ref Range Status   • Glucose 06/03/2022 82  65 - 99 mg/dL Final   • BUN 06/03/2022 11  6 - 20 mg/dL Final   • Creatinine 06/03/2022 0.92  0.57 - " 1.00 mg/dL Final   • Sodium 06/03/2022 139  136 - 145 mmol/L Final   • Potassium 06/03/2022 4.0  3.5 - 5.2 mmol/L Final   • Chloride 06/03/2022 100  98 - 107 mmol/L Final   • CO2 06/03/2022 25.1  22.0 - 29.0 mmol/L Final   • Calcium 06/03/2022 9.4  8.6 - 10.5 mg/dL Final   • Total Protein 06/03/2022 6.8  6.0 - 8.5 g/dL Final   • Albumin 06/03/2022 4.00  3.50 - 5.20 g/dL Final   • ALT (SGPT) 06/03/2022 9  1 - 33 U/L Final   • AST (SGOT) 06/03/2022 14  1 - 32 U/L Final   • Alkaline Phosphatase 06/03/2022 139 (H)  39 - 117 U/L Final   • Total Bilirubin 06/03/2022 0.2  0.0 - 1.2 mg/dL Final   • Globulin 06/03/2022 2.8  gm/dL Final   • A/G Ratio 06/03/2022 1.4  g/dL Final   • BUN/Creatinine Ratio 06/03/2022 12.0  7.0 - 25.0 Final   • Anion Gap 06/03/2022 13.9  5.0 - 15.0 mmol/L Final   • eGFR 06/03/2022 73.7  >60.0 mL/min/1.73 Final    National Kidney Foundation and American Society of Nephrology (ASN) Task Force recommended calculation based on the Chronic Kidney Disease Epidemiology Collaboration (CKD-EPI) equation refit without adjustment for race.   • Total Cholesterol 06/03/2022 136  0 - 200 mg/dL Final   • Triglycerides 06/03/2022 126  0 - 150 mg/dL Final   • HDL Cholesterol 06/03/2022 41  40 - 60 mg/dL Final   • LDL Cholesterol  06/03/2022 72  0 - 100 mg/dL Final   • VLDL Cholesterol 06/03/2022 23  5 - 40 mg/dL Final   • LDL/HDL Ratio 06/03/2022 1.70   Final   • WBC 06/03/2022 8.84  3.40 - 10.80 10*3/mm3 Final   • RBC 06/03/2022 3.95  3.77 - 5.28 10*6/mm3 Final   • Hemoglobin 06/03/2022 11.2 (L)  12.0 - 15.9 g/dL Final   • Hematocrit 06/03/2022 35.0  34.0 - 46.6 % Final   • MCV 06/03/2022 88.6  79.0 - 97.0 fL Final   • MCH 06/03/2022 28.4  26.6 - 33.0 pg Final   • MCHC 06/03/2022 32.0  31.5 - 35.7 g/dL Final   • RDW 06/03/2022 14.1  12.3 - 15.4 % Final   • RDW-SD 06/03/2022 45.5  37.0 - 54.0 fl Final   • MPV 06/03/2022 10.3  6.0 - 12.0 fL Final   • Platelets 06/03/2022 325  140 - 450 10*3/mm3 Final   • Neutrophil  % 06/03/2022 61.4  42.7 - 76.0 % Final   • Lymphocyte % 06/03/2022 25.6  19.6 - 45.3 % Final   • Monocyte % 06/03/2022 8.7  5.0 - 12.0 % Final   • Eosinophil % 06/03/2022 3.3  0.3 - 6.2 % Final   • Basophil % 06/03/2022 0.5  0.0 - 1.5 % Final   • Immature Grans % 06/03/2022 0.5  0.0 - 0.5 % Final   • Neutrophils, Absolute 06/03/2022 5.44  1.70 - 7.00 10*3/mm3 Final   • Lymphocytes, Absolute 06/03/2022 2.26  0.70 - 3.10 10*3/mm3 Final   • Monocytes, Absolute 06/03/2022 0.77  0.10 - 0.90 10*3/mm3 Final   • Eosinophils, Absolute 06/03/2022 0.29  0.00 - 0.40 10*3/mm3 Final   • Basophils, Absolute 06/03/2022 0.04  0.00 - 0.20 10*3/mm3 Final   • Immature Grans, Absolute 06/03/2022 0.04  0.00 - 0.05 10*3/mm3 Final   • nRBC 06/03/2022 0.1  0.0 - 0.2 /100 WBC Final   • Ferritin 06/03/2022 91.70  13.00 - 150.00 ng/mL Final   • Iron 06/03/2022 47  37 - 145 mcg/dL Final   • Iron Saturation 06/03/2022 14 (L)  20 - 50 % Final   • Transferrin 06/03/2022 227  200 - 360 mg/dL Final   • TIBC 06/03/2022 338  298 - 536 mcg/dL Final   • Folate 06/03/2022 6.70  4.78 - 24.20 ng/mL Final   • Vitamin B-12 06/03/2022 402  211 - 946 pg/mL Final       ASSESSMENT/ PLAN:    Diagnoses and all orders for this visit:    1. Need for influenza vaccination (Primary)  -     FluLaval/Fluzone >6 mos ([id number])    2. Need for COVID-19 vaccine  -     COVID-19 Bivalent Booster (Pfizer) 12+yrs    3. Hyperlipidemia, unspecified hyperlipidemia type  Assessment & Plan:  We will update her lipid profile with her labs here today.    Orders:  -     Comprehensive Metabolic Panel  -     Lipid Panel    4. Primary hypertension  Assessment & Plan:  Her blood pressure is mildly elevated here today but she is somewhat upset and anxious related to her daughter's health.  We will not make any changes in her meds at this time but update her labs    Orders:  -     Comprehensive Metabolic Panel  -     Lipid Panel    5. Severe episode of recurrent major depressive disorder,  without psychotic features (HCC)  Assessment & Plan:  Her depression anxiety is worse.  For what ever reason she has not been able to get the fluoxetine from her pharmacy.  We will take this opportunity to switch her over to some escitalopram as been shown to be more effective for the anxiety component.      6. Chronic obstructive pulmonary disease, unspecified COPD type (HCC)  -     albuterol sulfate  (90 Base) MCG/ACT inhaler; Inhale 2 puffs Every 6 (Six) Hours As Needed for Wheezing.  Dispense: 8.5 g; Refill: 2    Other orders  -     escitalopram (Lexapro) 10 MG tablet; Take 1 tablet by mouth Daily.  Dispense: 90 tablet; Refill: 1  -     Fluticasone-Umeclidin-Vilant (Trelegy Ellipta) 100-62.5-25 MCG/ACT inhaler; Inhale 1 puff Daily.  Dispense: 60 each; Refill: 12  -     lisinopril (PRINIVIL,ZESTRIL) 5 MG tablet; Take 1 tablet by mouth Daily.  Dispense: 30 tablet; Refill: 5  -     famotidine (PEPCID) 40 MG tablet; Take 1 tablet by mouth 2 (Two) Times a Day.  Dispense: 60 tablet; Refill: 12  -     buPROPion SR (WELLBUTRIN SR) 150 MG 12 hr tablet; Take 1 tablet by mouth 2 (Two) Times a Day.  Dispense: 60 tablet; Refill: 5  -     atorvastatin (LIPITOR) 10 MG tablet; Take 1 tablet by mouth Every Night.  Dispense: 30 tablet; Refill: 5      Orders Placed Today:     New Medications Ordered This Visit   Medications   • escitalopram (Lexapro) 10 MG tablet     Sig: Take 1 tablet by mouth Daily.     Dispense:  90 tablet     Refill:  1   • Fluticasone-Umeclidin-Vilant (Trelegy Ellipta) 100-62.5-25 MCG/ACT inhaler     Sig: Inhale 1 puff Daily.     Dispense:  60 each     Refill:  12   • lisinopril (PRINIVIL,ZESTRIL) 5 MG tablet     Sig: Take 1 tablet by mouth Daily.     Dispense:  30 tablet     Refill:  5   • famotidine (PEPCID) 40 MG tablet     Sig: Take 1 tablet by mouth 2 (Two) Times a Day.     Dispense:  60 tablet     Refill:  12     **Patient requests 90 days supply**   • buPROPion SR (WELLBUTRIN SR) 150 MG 12 hr  tablet     Sig: Take 1 tablet by mouth 2 (Two) Times a Day.     Dispense:  60 tablet     Refill:  5   • atorvastatin (LIPITOR) 10 MG tablet     Sig: Take 1 tablet by mouth Every Night.     Dispense:  30 tablet     Refill:  5   • albuterol sulfate  (90 Base) MCG/ACT inhaler     Sig: Inhale 2 puffs Every 6 (Six) Hours As Needed for Wheezing.     Dispense:  8.5 g     Refill:  2        Management Plan:     An After Visit Summary was printed and given to the patient at discharge.    Follow-up: Return in about 3 months (around 3/9/2023) for Recheck.    Aleksandar Edge,  12/9/2022 12:50 EST  This note was electronically signed.

## 2023-01-04 ENCOUNTER — TELEPHONE (OUTPATIENT)
Dept: FAMILY MEDICINE CLINIC | Facility: CLINIC | Age: 58
End: 2023-01-04

## 2023-01-04 NOTE — TELEPHONE ENCOUNTER
Caller: Lluvia Archer    Relationship: Self    Best call back number: [phone]    What is the best time to reach you: ANY     Who are you requesting to speak with (clinical staff, provider,  specific staff member): CLINICAL       What was the call regarding: PATIENT IS WANTING TO LET DWAINE KNOW THE escitalopram (Lexapro) 10 MG tablet PRESCRIPTION HE SENT IN FOR HER IS NOT WORKING AND SHE WOULD LIKE TO TRY SOMETHING ELSE IF POSSIBLE. PLEASE CALL AND ADVISE.     Do you require a callback: YES

## 2023-01-05 RX ORDER — DESVENLAFAXINE SUCCINATE 50 MG/1
50 TABLET, EXTENDED RELEASE ORAL DAILY
Qty: 30 TABLET | Refills: 1 | Status: SHIPPED | OUTPATIENT
Start: 2023-01-05

## 2023-01-29 NOTE — PROGRESS NOTES
Physical Therapy Daily Progress Note    Patient: Lluvia Archer   : 1965  Diagnosis/ICD-10 Code:  S/P total right hip arthroplasty [Z96.641]  Referring practitioner: Cecil Gomes MD  Date of Initial Visit: Type: THERAPY  Noted: 2022  Today's Date: 2022  Patient seen for 5 sessions           Subjective Evaluation    History of Present Illness  Mechanism of injury: Pt reporting she woke up this morning lying on her right side, she has been sore all day. Nothing out of the ordinary yesterday and no increase in activity.     Pain  Current pain ratin           Objective   See Exercise, Manual, and Modality Logs for complete treatment.       Assessment & Plan     Assessment    Assessment details: Pt tolerated today's session well, increased tenderness throughout the hip and thigh today. Able to tolerate exercises with minimal increase in pain. Pt would benefit from continued skilled therapy to address deficits. Progress per plan of care.                 Manual Therapy:    8     mins  25999;  Therapeutic Exercise:    20     mins  32722;     Neuromuscular Fay:    0    mins  84812;    Therapeutic Activity:     0     mins  18456;     Gait Trainin     mins  37274;     Ultrasound:     0     mins  36732;    Electrical Stimulation:    0     mins  71209 ( );  Dry Needling     0     mins self-pay;  Aquatic Therapy    0     mins  43883;  Mechanical Traction    0     mins  24052  Moist Heat     0     mins  No charge    Timed Treatment:   28   mins   Total Treatment:     28   mins    Shane Babin PT  Physical Therapist    Electronically signed 2022    [place] License: PT - [id number]   
[id number]

## 2023-03-11 DIAGNOSIS — J44.9 CHRONIC OBSTRUCTIVE PULMONARY DISEASE, UNSPECIFIED COPD TYPE: ICD-10-CM

## 2023-03-13 RX ORDER — ALBUTEROL SULFATE 90 UG/1
AEROSOL, METERED RESPIRATORY (INHALATION)
Qty: 18 G | Refills: 1 | Status: SHIPPED | OUTPATIENT
Start: 2023-03-13

## 2023-05-01 ENCOUNTER — OFFICE VISIT (OUTPATIENT)
Dept: FAMILY MEDICINE CLINIC | Facility: CLINIC | Age: 58
End: 2023-05-01
Payer: MEDICARE

## 2023-05-01 VITALS
HEIGHT: 64 IN | HEART RATE: 120 BPM | DIASTOLIC BLOOD PRESSURE: 72 MMHG | OXYGEN SATURATION: 92 % | BODY MASS INDEX: 36.54 KG/M2 | TEMPERATURE: 98.5 F | WEIGHT: 214 LBS | SYSTOLIC BLOOD PRESSURE: 95 MMHG

## 2023-05-01 DIAGNOSIS — F33.1 MODERATE EPISODE OF RECURRENT MAJOR DEPRESSIVE DISORDER: ICD-10-CM

## 2023-05-01 DIAGNOSIS — I10 PRIMARY HYPERTENSION: ICD-10-CM

## 2023-05-01 DIAGNOSIS — E78.5 HYPERLIPIDEMIA, UNSPECIFIED HYPERLIPIDEMIA TYPE: Primary | ICD-10-CM

## 2023-05-01 DIAGNOSIS — Z11.59 NEED FOR HEPATITIS C SCREENING TEST: ICD-10-CM

## 2023-05-01 DIAGNOSIS — Z12.31 ENCOUNTER FOR SCREENING MAMMOGRAM FOR MALIGNANT NEOPLASM OF BREAST: ICD-10-CM

## 2023-05-01 DIAGNOSIS — E11.9 TYPE 2 DIABETES MELLITUS WITHOUT COMPLICATION, WITHOUT LONG-TERM CURRENT USE OF INSULIN: ICD-10-CM

## 2023-05-01 DIAGNOSIS — F41.9 ANXIETY: ICD-10-CM

## 2023-05-01 LAB
ALBUMIN SERPL-MCNC: 4.4 G/DL (ref 3.5–5.2)
ALBUMIN/GLOB SERPL: 1.4 G/DL
ALP SERPL-CCNC: 109 U/L (ref 39–117)
ALT SERPL W P-5'-P-CCNC: 8 U/L (ref 1–33)
ANION GAP SERPL CALCULATED.3IONS-SCNC: 10 MMOL/L (ref 5–15)
AST SERPL-CCNC: 13 U/L (ref 1–32)
BILIRUB SERPL-MCNC: 0.4 MG/DL (ref 0–1.2)
BUN SERPL-MCNC: 11 MG/DL (ref 6–20)
BUN/CREAT SERPL: 11.8 (ref 7–25)
CALCIUM SPEC-SCNC: 9.4 MG/DL (ref 8.6–10.5)
CHLORIDE SERPL-SCNC: 102 MMOL/L (ref 98–107)
CHOLEST SERPL-MCNC: 169 MG/DL (ref 0–200)
CO2 SERPL-SCNC: 28 MMOL/L (ref 22–29)
CREAT SERPL-MCNC: 0.93 MG/DL (ref 0.57–1)
EGFRCR SERPLBLD CKD-EPI 2021: 72.3 ML/MIN/1.73
GLOBULIN UR ELPH-MCNC: 3.2 GM/DL
GLUCOSE SERPL-MCNC: 98 MG/DL (ref 65–99)
HBA1C MFR BLD: 5.8 % (ref 4.8–5.6)
HCV AB SER DONR QL: NORMAL
HDLC SERPL-MCNC: 52 MG/DL (ref 40–60)
LDLC SERPL CALC-MCNC: 95 MG/DL (ref 0–100)
LDLC/HDLC SERPL: 1.78 {RATIO}
POTASSIUM SERPL-SCNC: 4.5 MMOL/L (ref 3.5–5.2)
PROT SERPL-MCNC: 7.6 G/DL (ref 6–8.5)
SODIUM SERPL-SCNC: 140 MMOL/L (ref 136–145)
TRIGL SERPL-MCNC: 121 MG/DL (ref 0–150)
VLDLC SERPL-MCNC: 22 MG/DL (ref 5–40)

## 2023-05-01 PROCEDURE — 80061 LIPID PANEL: CPT | Performed by: FAMILY MEDICINE

## 2023-05-01 PROCEDURE — 86803 HEPATITIS C AB TEST: CPT | Performed by: FAMILY MEDICINE

## 2023-05-01 PROCEDURE — 83036 HEMOGLOBIN GLYCOSYLATED A1C: CPT | Performed by: FAMILY MEDICINE

## 2023-05-01 PROCEDURE — 80053 COMPREHEN METABOLIC PANEL: CPT | Performed by: FAMILY MEDICINE

## 2023-05-01 RX ORDER — ESCITALOPRAM OXALATE 10 MG/1
TABLET ORAL
COMMUNITY
Start: 2023-03-13 | End: 2023-05-01 | Stop reason: SDUPTHER

## 2023-05-01 RX ORDER — ESCITALOPRAM OXALATE 20 MG/1
20 TABLET ORAL EVERY MORNING
Qty: 30 TABLET | Refills: 5 | Status: ON HOLD | OUTPATIENT
Start: 2023-05-01

## 2023-05-01 NOTE — ASSESSMENT & PLAN NOTE
She is struggling with more anxiety than depression.  We will stop the Pristiq and Wellbutrin and increase her Lexapro.  With her persistent and chronic depression and anxiety and being on multiple medications we will try adding some Vraylar instead.

## 2023-05-01 NOTE — PROGRESS NOTES
Chief Complaint   Patient presents with   • Altered Mental Status        Subjective     Lluvia Archer  has a past medical history of Abnormal mammogram, Anxiety, Arthritis, Chronic nausea (01/15/2019), Hernia, hiatal, Hyperlipidemia, Hypertension, Knee pain, right (08/30/2018), Memory loss, Migraine headache, Moderate episode of recurrent major depressive disorder (05/22/2017), Night sweats, Sciatica of right side (08/18/2017), Severe episode of recurrent major depressive disorder, without psychotic features (10/22/2019), Shoulder pain, left (08/30/2018), and Sleep apnea, unspecified.    Anxiety disorder- she states she has had increased anxiety as of late.  As a result of her increased anxiety she is developed some nervous tics and habits mostly picking.  She states her household has become rather chaotic.  Her daughter had a liver transplant recently due to autoimmune liver disease.  Now she has a household 90 with her daughter her  and they are 3 young children.  She has been taking her medication routinely as prescribed but states they have not been very effective.      PHQ-2 Depression Screening  Little interest or pleasure in doing things?     Feeling down, depressed, or hopeless?     PHQ-2 Total Score     PHQ-9 Depression Screening  Little interest or pleasure in doing things?     Feeling down, depressed, or hopeless?     Trouble falling or staying asleep, or sleeping too much?     Feeling tired or having little energy?     Poor appetite or overeating?     Feeling bad about yourself - or that you are a failure or have let yourself or your family down?     Trouble concentrating on things, such as reading the newspaper or watching television?     Moving or speaking so slowly that other people could have noticed? Or the opposite - being so fidgety or restless that you have been moving around a lot more than usual?     Thoughts that you would be better off dead, or of hurting yourself in some way?      PHQ-9 Total Score     If you checked off any problems, how difficult have these problems made it for you to do your work, take care of things at home, or get along with other people?       No Known Allergies    Prior to Admission medications    Medication Sig Start Date End Date Taking? Authorizing Provider   albuterol sulfate  (90 Base) MCG/ACT inhaler INHALE 2 PUFFS EVERY 6 HOURS AS NEEDED FOR WHEEZING 3/13/23  Yes Aleksandar Edge DO   atorvastatin (LIPITOR) 10 MG tablet Take 1 tablet by mouth Every Night. 12/9/22  Yes Aleksandar Edge DO   buPROPion SR (WELLBUTRIN SR) 150 MG 12 hr tablet Take 1 tablet by mouth 2 (Two) Times a Day. 12/9/22  Yes Aleksandar Edge DO   desvenlafaxine (Pristiq) 50 MG 24 hr tablet Take 1 tablet by mouth Daily. 1/5/23  Yes Aleksandar Edge DO   escitalopram (LEXAPRO) 10 MG tablet  3/13/23  Yes Provider, MD Parvin   famotidine (PEPCID) 40 MG tablet Take 1 tablet by mouth 2 (Two) Times a Day. 12/9/22  Yes Aleksandar Edge DO   Fluticasone-Umeclidin-Vilant (Trelegy Ellipta) 100-62.5-25 MCG/ACT inhaler Inhale 1 puff Daily. 12/9/22  Yes Aleksandar Edge DO   lisinopril (PRINIVIL,ZESTRIL) 5 MG tablet Take 1 tablet by mouth Daily. 12/9/22  Yes Aleksandar Edge DO        Patient Active Problem List   Diagnosis   • Abnormal mammogram   • Anxiety   • Arthritis   • Chronic nausea   • Chronic obstructive pulmonary disease (COPD)   • Counseling on substance use and abuse   • Esophageal reflux   • Hernia, hiatal   • Hyperlipidemia   • Hypertension   • Knee pain, right   • Memory loss   • Migraine   • Moderate episode of recurrent major depressive disorder   • Night sweats   • Numbness   • Sciatica of right side   • Severe episode of recurrent major depressive disorder, without psychotic features   • Shoulder pain, left   • Other diseases of nasal cavity and sinuses   • Sleep apnea, unspecified   • Tobacco abuse   • Strain of  groin, right, subsequent encounter   • Osteoarthritis of spine with radiculopathy, lumbar region   • Chronic right-sided low back pain with right-sided sciatica   • Aftercare following right hip joint replacement surgery   • Primary osteoarthritis of right hip   • Right hip pain   • Sepsis, due to unspecified organism, unspecified whether acute organ dysfunction present   • Severe malnutrition (CMS/HCC)   • Pneumonia of right lower lobe due to infectious organism   • Hospital discharge follow-up   • Other specified anemias   • Encounter for screening mammogram for malignant neoplasm of breast        Past Surgical History:   Procedure Laterality Date   • BRONCHOSCOPY N/A 2022    Procedure: BRONCHOSCOPY WITH BRONCHOALVEOLAR LAVAGE, BIOPSY;  Surgeon: Shin Mcdonald DO;  Location: [facility] ENDOSCOPY;  Service: Pulmonary;  Laterality: N/A;  RIGHT LOWER LOBE INFILTRATE   •  SECTION     • CHOLECYSTECTOMY      LAPAROSCOPIC   • COLONOSCOPY     • TOTAL HIP ARTHROPLASTY Right 2022    Procedure: RIGHT TOTAL HIP ARTHROPLASTY;  Surgeon: Cecil Gomes MD;  Location: [facility] MAIN OR;  Service: Orthopedics;  Laterality: Right;       Social History     Socioeconomic History   • Marital status:      Spouse name: DEVAN   • Number of children: 2   • Years of education: GED   • Highest education level: GED or equivalent   Tobacco Use   • Smoking status: Former     Packs/day: 1.00     Types: Cigarettes   • Smokeless tobacco: Never   Vaping Use   • Vaping Use: Never used   Substance and Sexual Activity   • Alcohol use: Never   • Drug use: Never   • Sexual activity: Defer       Family History   Problem Relation Age of Onset   • Other Mother         BLOOD DISEASE   • Arthritis Mother    • Heart disease Father    • Hypertension Other    • Cancer Other    • Heart disease Other    • Malig Hyperthermia Neg Hx        Family history, surgical history, past medical history, Allergies and meds reviewed with  "patient today and updated in [employer] EMR.     ROS:  Review of Systems   Constitutional: Positive for fatigue.   Respiratory: Positive for cough, shortness of breath and wheezing.    Psychiatric/Behavioral: Positive for depressed mood and stress. Negative for self-injury and suicidal ideas. The patient is nervous/anxious.        OBJECTIVE:  Vitals:    05/01/23 1058   BP: 95/72   BP Location: Left arm   Patient Position: Sitting   Pulse: 120   Temp: 98.5 °F (36.9 °C)   SpO2: 92%   Weight: 97.1 kg (214 lb)   Height: 162.6 cm (64\")     No results found.   Body mass index is 36.73 kg/m².  No LMP recorded (lmp unknown). Patient is postmenopausal.    Physical Exam  Vitals and nursing note reviewed.   Constitutional:       General: She is not in acute distress.     Appearance: Normal appearance. She is obese.   HENT:      Head: Normocephalic.   Cardiovascular:      Rate and Rhythm: Normal rate and regular rhythm.      Heart sounds: Normal heart sounds. No murmur heard.  Pulmonary:      Effort: Pulmonary effort is normal.      Breath sounds: Normal breath sounds. No wheezing.   Neurological:      Mental Status: She is alert and oriented to person, place, and time.   Psychiatric:         Mood and Affect: Mood normal.         Behavior: Behavior normal.         Thought Content: Thought content normal.         Judgment: Judgment normal.         Procedures    No visits with results within 30 Day(s) from this visit.   Latest known visit with results is:   Office Visit on 12/09/2022   Component Date Value Ref Range Status   • Glucose 12/09/2022 116 (H)  65 - 99 mg/dL Final   • BUN 12/09/2022 12  6 - 20 mg/dL Final   • Creatinine 12/09/2022 0.99  0.57 - 1.00 mg/dL Final   • Sodium 12/09/2022 140  136 - 145 mmol/L Final   • Potassium 12/09/2022 4.0  3.5 - 5.2 mmol/L Final   • Chloride 12/09/2022 101  98 - 107 mmol/L Final   • CO2 12/09/2022 27.6  22.0 - 29.0 mmol/L Final   • Calcium 12/09/2022 9.3  8.6 - 10.5 mg/dL Final   • Total " Protein 12/09/2022 7.0  6.0 - 8.5 g/dL Final   • Albumin 12/09/2022 3.90  3.50 - 5.20 g/dL Final   • ALT (SGPT) 12/09/2022 8  1 - 33 U/L Final   • AST (SGOT) 12/09/2022 11  1 - 32 U/L Final   • Alkaline Phosphatase 12/09/2022 98  39 - 117 U/L Final   • Total Bilirubin 12/09/2022 0.4  0.0 - 1.2 mg/dL Final   • Globulin 12/09/2022 3.1  gm/dL Final   • A/G Ratio 12/09/2022 1.3  g/dL Final   • BUN/Creatinine Ratio 12/09/2022 12.1  7.0 - 25.0 Final   • Anion Gap 12/09/2022 11.4  5.0 - 15.0 mmol/L Final   • eGFR 12/09/2022 67.1  >60.0 mL/min/1.73 Final    National Kidney Foundation and American Society of Nephrology (ASN) Task Force recommended calculation based on the Chronic Kidney Disease Epidemiology Collaboration (CKD-EPI) equation refit without adjustment for race.   • Total Cholesterol 12/09/2022 134  0 - 200 mg/dL Final   • Triglycerides 12/09/2022 114  0 - 150 mg/dL Final   • HDL Cholesterol 12/09/2022 42  40 - 60 mg/dL Final   • LDL Cholesterol  12/09/2022 71  0 - 100 mg/dL Final   • VLDL Cholesterol 12/09/2022 21  5 - 40 mg/dL Final   • LDL/HDL Ratio 12/09/2022 1.65   Final       ASSESSMENT/ PLAN:    Diagnoses and all orders for this visit:    1. Hyperlipidemia, unspecified hyperlipidemia type (Primary)  -     Comprehensive Metabolic Panel  -     Lipid Panel    2. Primary hypertension  -     Comprehensive Metabolic Panel  -     Lipid Panel    3. Type 2 diabetes mellitus without complication, without long-term current use of insulin  -     Comprehensive Metabolic Panel  -     Lipid Panel  -     Hemoglobin A1c    4. Anxiety  Assessment & Plan:  She is struggling with more anxiety than depression.  We will stop the Pristiq and Wellbutrin and increase her Lexapro.  With her persistent and chronic depression and anxiety and being on multiple medications we will try adding some Vraylar instead.      5. Moderate episode of recurrent major depressive disorder    6. Encounter for screening mammogram for malignant  neoplasm of breast  -     Mammo Screening Digital Tomosynthesis Bilateral With CAD; Future    Other orders  -     escitalopram (LEXAPRO) 20 MG tablet; Take 1 tablet by mouth Every Morning.  Dispense: 30 tablet; Refill: 5  -     Cariprazine HCl (Vraylar) 1.5 MG capsule capsule; Take 1 capsule by mouth Daily.  Dispense: 30 capsule; Refill: 1      Orders Placed Today:     New Medications Ordered This Visit   Medications   • escitalopram (LEXAPRO) 20 MG tablet     Sig: Take 1 tablet by mouth Every Morning.     Dispense:  30 tablet     Refill:  5   • Cariprazine HCl (Vraylar) 1.5 MG capsule capsule     Sig: Take 1 capsule by mouth Daily.     Dispense:  30 capsule     Refill:  1        Management Plan:     An After Visit Summary was printed and given to the patient at discharge.    Follow-up: Return in about 4 weeks (around 5/29/2023) for Recheck.    Aleksandar Edge DO 5/1/2023 11:31 EDT  This note was electronically signed.

## 2023-05-04 ENCOUNTER — HOSPITAL ENCOUNTER (INPATIENT)
Facility: HOSPITAL | Age: 58
LOS: 4 days | Discharge: HOME | End: 2023-05-09
Attending: EMERGENCY MEDICINE | Admitting: FAMILY MEDICINE
Payer: MEDICARE

## 2023-05-04 ENCOUNTER — APPOINTMENT (OUTPATIENT)
Dept: GENERAL RADIOLOGY | Facility: HOSPITAL | Age: 58
End: 2023-05-04
Payer: MEDICARE

## 2023-05-04 DIAGNOSIS — A41.9 SEPSIS, DUE TO UNSPECIFIED ORGANISM, UNSPECIFIED WHETHER ACUTE ORGAN DYSFUNCTION PRESENT: ICD-10-CM

## 2023-05-04 DIAGNOSIS — J96.21 ACUTE ON CHRONIC RESPIRATORY FAILURE WITH HYPOXIA: ICD-10-CM

## 2023-05-04 DIAGNOSIS — J18.9 MULTIFOCAL PNEUMONIA: ICD-10-CM

## 2023-05-04 DIAGNOSIS — J18.9 PNEUMONIA OF RIGHT LOWER LOBE DUE TO INFECTIOUS ORGANISM: Primary | ICD-10-CM

## 2023-05-04 DIAGNOSIS — J44.1 ACUTE EXACERBATION OF CHRONIC OBSTRUCTIVE PULMONARY DISEASE (COPD): ICD-10-CM

## 2023-05-04 DIAGNOSIS — R26.2 DIFFICULTY IN WALKING: ICD-10-CM

## 2023-05-04 LAB
ALBUMIN SERPL-MCNC: 3.1 G/DL (ref 3.5–5.2)
ALBUMIN/GLOB SERPL: 1 G/DL
ALP SERPL-CCNC: 99 U/L (ref 39–117)
ALT SERPL W P-5'-P-CCNC: 7 U/L (ref 1–33)
ANION GAP SERPL CALCULATED.3IONS-SCNC: 11.4 MMOL/L (ref 5–15)
AST SERPL-CCNC: 9 U/L (ref 1–32)
BASOPHILS # BLD AUTO: 0.05 10*3/MM3 (ref 0–0.2)
BASOPHILS NFR BLD AUTO: 0.4 % (ref 0–1.5)
BILIRUB SERPL-MCNC: 0.2 MG/DL (ref 0–1.2)
BUN SERPL-MCNC: 13 MG/DL (ref 6–20)
BUN/CREAT SERPL: 15.7 (ref 7–25)
CALCIUM SPEC-SCNC: 8.5 MG/DL (ref 8.6–10.5)
CHLORIDE SERPL-SCNC: 110 MMOL/L (ref 98–107)
CO2 SERPL-SCNC: 23.6 MMOL/L (ref 22–29)
CREAT SERPL-MCNC: 0.83 MG/DL (ref 0.57–1)
D-LACTATE SERPL-SCNC: 1 MMOL/L (ref 0.5–2)
DEPRECATED RDW RBC AUTO: 45.2 FL (ref 37–54)
EGFRCR SERPLBLD CKD-EPI 2021: 82.9 ML/MIN/1.73
EOSINOPHIL # BLD AUTO: 0.28 10*3/MM3 (ref 0–0.4)
EOSINOPHIL NFR BLD AUTO: 2.2 % (ref 0.3–6.2)
ERYTHROCYTE [DISTWIDTH] IN BLOOD BY AUTOMATED COUNT: 13.7 % (ref 12.3–15.4)
FLUAV AG NPH QL: NEGATIVE
FLUBV AG NPH QL IA: NEGATIVE
GLOBULIN UR ELPH-MCNC: 3.2 GM/DL
GLUCOSE SERPL-MCNC: 137 MG/DL (ref 65–99)
HCT VFR BLD AUTO: 34.6 % (ref 34–46.6)
HGB BLD-MCNC: 11.3 G/DL (ref 12–15.9)
HOLD SPECIMEN: NORMAL
HOLD SPECIMEN: NORMAL
IMM GRANULOCYTES # BLD AUTO: 0.08 10*3/MM3 (ref 0–0.05)
IMM GRANULOCYTES NFR BLD AUTO: 0.6 % (ref 0–0.5)
LYMPHOCYTES # BLD AUTO: 1.62 10*3/MM3 (ref 0.7–3.1)
LYMPHOCYTES NFR BLD AUTO: 12.8 % (ref 19.6–45.3)
MCH RBC QN AUTO: 29.7 PG (ref 26.6–33)
MCHC RBC AUTO-ENTMCNC: 32.7 G/DL (ref 31.5–35.7)
MCV RBC AUTO: 91.1 FL (ref 79–97)
MONOCYTES # BLD AUTO: 1.31 10*3/MM3 (ref 0.1–0.9)
MONOCYTES NFR BLD AUTO: 10.3 % (ref 5–12)
NEUTROPHILS NFR BLD AUTO: 73.7 % (ref 42.7–76)
NEUTROPHILS NFR BLD AUTO: 9.35 10*3/MM3 (ref 1.7–7)
NRBC BLD AUTO-RTO: 0 /100 WBC (ref 0–0.2)
NT-PROBNP SERPL-MCNC: 80.2 PG/ML (ref 0–900)
PLATELET # BLD AUTO: 241 10*3/MM3 (ref 140–450)
PMV BLD AUTO: 9.6 FL (ref 6–12)
POTASSIUM SERPL-SCNC: 3.1 MMOL/L (ref 3.5–5.2)
PROT SERPL-MCNC: 6.3 G/DL (ref 6–8.5)
QT INTERVAL: 327 MS
RBC # BLD AUTO: 3.8 10*6/MM3 (ref 3.77–5.28)
SODIUM SERPL-SCNC: 145 MMOL/L (ref 136–145)
TROPONIN T SERPL HS-MCNC: 10 NG/L
WBC NRBC COR # BLD: 12.69 10*3/MM3 (ref 3.4–10.8)
WHOLE BLOOD HOLD COAG: NORMAL
WHOLE BLOOD HOLD SPECIMEN: NORMAL

## 2023-05-04 PROCEDURE — U0004 COV-19 TEST NON-CDC HGH THRU: HCPCS | Performed by: EMERGENCY MEDICINE

## 2023-05-04 PROCEDURE — 94799 UNLISTED PULMONARY SVC/PX: CPT

## 2023-05-04 PROCEDURE — 71045 X-RAY EXAM CHEST 1 VIEW: CPT

## 2023-05-04 PROCEDURE — 87804 INFLUENZA ASSAY W/OPTIC: CPT | Performed by: EMERGENCY MEDICINE

## 2023-05-04 PROCEDURE — 83605 ASSAY OF LACTIC ACID: CPT

## 2023-05-04 PROCEDURE — 36415 COLL VENOUS BLD VENIPUNCTURE: CPT

## 2023-05-04 PROCEDURE — 85025 COMPLETE CBC W/AUTO DIFF WBC: CPT

## 2023-05-04 PROCEDURE — 84145 PROCALCITONIN (PCT): CPT | Performed by: FAMILY MEDICINE

## 2023-05-04 PROCEDURE — 80053 COMPREHEN METABOLIC PANEL: CPT

## 2023-05-04 PROCEDURE — 94640 AIRWAY INHALATION TREATMENT: CPT

## 2023-05-04 PROCEDURE — 83880 ASSAY OF NATRIURETIC PEPTIDE: CPT | Performed by: EMERGENCY MEDICINE

## 2023-05-04 PROCEDURE — 84484 ASSAY OF TROPONIN QUANT: CPT | Performed by: EMERGENCY MEDICINE

## 2023-05-04 PROCEDURE — 99285 EMERGENCY DEPT VISIT HI MDM: CPT

## 2023-05-04 PROCEDURE — 87040 BLOOD CULTURE FOR BACTERIA: CPT

## 2023-05-04 PROCEDURE — 93005 ELECTROCARDIOGRAM TRACING: CPT | Performed by: EMERGENCY MEDICINE

## 2023-05-04 PROCEDURE — 86738 MYCOPLASMA ANTIBODY: CPT | Performed by: INTERNAL MEDICINE

## 2023-05-04 PROCEDURE — 93005 ELECTROCARDIOGRAM TRACING: CPT

## 2023-05-04 RX ORDER — METHYLPREDNISOLONE SODIUM SUCCINATE 125 MG/2ML
125 INJECTION, POWDER, LYOPHILIZED, FOR SOLUTION INTRAMUSCULAR; INTRAVENOUS ONCE
Status: COMPLETED | OUTPATIENT
Start: 2023-05-04 | End: 2023-05-05

## 2023-05-04 RX ORDER — SODIUM CHLORIDE 0.9 % (FLUSH) 0.9 %
10 SYRINGE (ML) INJECTION AS NEEDED
Status: DISCONTINUED | OUTPATIENT
Start: 2023-05-04 | End: 2023-05-09 | Stop reason: HOSPADM

## 2023-05-04 RX ORDER — IPRATROPIUM BROMIDE AND ALBUTEROL SULFATE 2.5; .5 MG/3ML; MG/3ML
3 SOLUTION RESPIRATORY (INHALATION)
Status: COMPLETED | OUTPATIENT
Start: 2023-05-04 | End: 2023-05-04

## 2023-05-04 RX ADMIN — IPRATROPIUM BROMIDE AND ALBUTEROL SULFATE 3 ML: 2.5; .5 SOLUTION RESPIRATORY (INHALATION) at 23:52

## 2023-05-04 RX ADMIN — IPRATROPIUM BROMIDE AND ALBUTEROL SULFATE 3 ML: 2.5; .5 SOLUTION RESPIRATORY (INHALATION) at 23:51

## 2023-05-04 RX ADMIN — IPRATROPIUM BROMIDE AND ALBUTEROL SULFATE 3 ML: 2.5; .5 SOLUTION RESPIRATORY (INHALATION) at 23:53

## 2023-05-05 ENCOUNTER — APPOINTMENT (OUTPATIENT)
Dept: CT IMAGING | Facility: HOSPITAL | Age: 58
End: 2023-05-05
Payer: MEDICARE

## 2023-05-05 PROBLEM — J18.9 MULTIFOCAL PNEUMONIA: Status: ACTIVE | Noted: 2023-05-05

## 2023-05-05 PROBLEM — J96.21 ACUTE ON CHRONIC RESPIRATORY FAILURE WITH HYPOXIA: Status: ACTIVE | Noted: 2023-05-05

## 2023-05-05 LAB
ARTERIAL PATENCY WRIST A: POSITIVE
BASE EXCESS BLDA CALC-SCNC: -2.7 MMOL/L (ref -2–2)
BDY SITE: ABNORMAL
CA-I BLDA-SCNC: 1.18 MMOL/L (ref 1.13–1.32)
CHLORIDE BLDA-SCNC: 109 MMOL/L (ref 98–106)
COHGB MFR BLD: 0.1 % (ref 0–1.5)
FHHB: 4.8 % (ref 0–5)
GAS FLOW AIRWAY: 4 LPM
GLUCOSE BLDA-MCNC: 229 MG/DL (ref 65–99)
HCO3 BLDA-SCNC: 22.9 MMOL/L (ref 22–26)
HGB BLDA-MCNC: 11.1 G/DL (ref 11.7–14.6)
INHALED O2 CONCENTRATION: ABNORMAL %
L PNEUMO1 AG UR QL IA: NEGATIVE
LACTATE BLDA-SCNC: 0.81 MMOL/L (ref 0.5–2)
M PNEUMO IGM SER QL: NEGATIVE
METHGB BLD QL: 0 % (ref 0–1.5)
MODALITY: ABNORMAL
NOTE: ABNORMAL
OXYHGB MFR BLDV: 95.1 % (ref 94–99)
PCO2 BLDA: 42.8 MM HG (ref 35–45)
PH BLDA: 7.35 PH UNITS (ref 7.35–7.45)
PO2 BLD: ABNORMAL MM[HG]
PO2 BLDA: 87.1 MM HG (ref 80–100)
POTASSIUM BLDA-SCNC: 3.98 MMOL/L (ref 3.5–5)
PROCALCITONIN SERPL-MCNC: 0.05 NG/ML (ref 0–0.25)
S PNEUM AG SPEC QL LA: NEGATIVE
SAO2 % BLDCOA: 95.2 % (ref 95–99)
SARS-COV-2 RNA RESP QL NAA+PROBE: NOT DETECTED
SODIUM BLDA-SCNC: 141 MMOL/L (ref 136–146)

## 2023-05-05 PROCEDURE — 83050 HGB METHEMOGLOBIN QUAN: CPT | Performed by: INTERNAL MEDICINE

## 2023-05-05 PROCEDURE — 25010000002 HEPARIN (PORCINE) PER 1000 UNITS: Performed by: FAMILY MEDICINE

## 2023-05-05 PROCEDURE — 25010000002 AZITHROMYCIN PER 500 MG: Performed by: EMERGENCY MEDICINE

## 2023-05-05 PROCEDURE — 71250 CT THORAX DX C-: CPT

## 2023-05-05 PROCEDURE — 94799 UNLISTED PULMONARY SVC/PX: CPT

## 2023-05-05 PROCEDURE — 99223 1ST HOSP IP/OBS HIGH 75: CPT | Performed by: INTERNAL MEDICINE

## 2023-05-05 PROCEDURE — 82375 ASSAY CARBOXYHB QUANT: CPT | Performed by: INTERNAL MEDICINE

## 2023-05-05 PROCEDURE — 87633 RESP VIRUS 12-25 TARGETS: CPT

## 2023-05-05 PROCEDURE — 87070 CULTURE OTHR SPECIMN AEROBIC: CPT | Performed by: INTERNAL MEDICINE

## 2023-05-05 PROCEDURE — 36600 WITHDRAWAL OF ARTERIAL BLOOD: CPT | Performed by: INTERNAL MEDICINE

## 2023-05-05 PROCEDURE — 25010000002 METHYLPREDNISOLONE PER 125 MG: Performed by: FAMILY MEDICINE

## 2023-05-05 PROCEDURE — 25010000002 CEFTRIAXONE PER 250 MG: Performed by: EMERGENCY MEDICINE

## 2023-05-05 PROCEDURE — 99223 1ST HOSP IP/OBS HIGH 75: CPT | Performed by: FAMILY MEDICINE

## 2023-05-05 PROCEDURE — 87486 CHLMYD PNEUM DNA AMP PROBE: CPT | Performed by: INTERNAL MEDICINE

## 2023-05-05 PROCEDURE — 87798 DETECT AGENT NOS DNA AMP: CPT

## 2023-05-05 PROCEDURE — 87205 SMEAR GRAM STAIN: CPT | Performed by: INTERNAL MEDICINE

## 2023-05-05 PROCEDURE — 87305 ASPERGILLUS AG IA: CPT | Performed by: INTERNAL MEDICINE

## 2023-05-05 PROCEDURE — 87449 NOS EACH ORGANISM AG IA: CPT | Performed by: INTERNAL MEDICINE

## 2023-05-05 PROCEDURE — 25010000002 CEFTRIAXONE PER 250 MG: Performed by: FAMILY MEDICINE

## 2023-05-05 PROCEDURE — 87899 AGENT NOS ASSAY W/OPTIC: CPT | Performed by: INTERNAL MEDICINE

## 2023-05-05 PROCEDURE — 25010000002 METHYLPREDNISOLONE PER 125 MG: Performed by: EMERGENCY MEDICINE

## 2023-05-05 PROCEDURE — 82805 BLOOD GASES W/O2 SATURATION: CPT | Performed by: INTERNAL MEDICINE

## 2023-05-05 PROCEDURE — 87581 M.PNEUMON DNA AMP PROBE: CPT

## 2023-05-05 PROCEDURE — 94664 DEMO&/EVAL PT USE INHALER: CPT

## 2023-05-05 RX ORDER — ESCITALOPRAM OXALATE 10 MG/1
20 TABLET ORAL EVERY MORNING
Status: DISCONTINUED | OUTPATIENT
Start: 2023-05-05 | End: 2023-05-09 | Stop reason: HOSPADM

## 2023-05-05 RX ORDER — SODIUM CHLORIDE 9 MG/ML
40 INJECTION, SOLUTION INTRAVENOUS AS NEEDED
Status: DISCONTINUED | OUTPATIENT
Start: 2023-05-05 | End: 2023-05-09 | Stop reason: HOSPADM

## 2023-05-05 RX ORDER — METHYLPREDNISOLONE SODIUM SUCCINATE 125 MG/2ML
62.5 INJECTION, POWDER, LYOPHILIZED, FOR SOLUTION INTRAMUSCULAR; INTRAVENOUS DAILY
Status: DISCONTINUED | OUTPATIENT
Start: 2023-05-05 | End: 2023-05-05

## 2023-05-05 RX ORDER — BUDESONIDE 0.5 MG/2ML
0.5 INHALANT ORAL
Status: DISCONTINUED | OUTPATIENT
Start: 2023-05-05 | End: 2023-05-09 | Stop reason: HOSPADM

## 2023-05-05 RX ORDER — CEFTRIAXONE SODIUM 1 G/50ML
1 INJECTION, SOLUTION INTRAVENOUS ONCE
Status: COMPLETED | OUTPATIENT
Start: 2023-05-05 | End: 2023-05-05

## 2023-05-05 RX ORDER — ATORVASTATIN CALCIUM 10 MG/1
10 TABLET, FILM COATED ORAL NIGHTLY
Status: DISCONTINUED | OUTPATIENT
Start: 2023-05-05 | End: 2023-05-09 | Stop reason: HOSPADM

## 2023-05-05 RX ORDER — SODIUM CHLORIDE 0.9 % (FLUSH) 0.9 %
10 SYRINGE (ML) INJECTION AS NEEDED
Status: DISCONTINUED | OUTPATIENT
Start: 2023-05-05 | End: 2023-05-09 | Stop reason: HOSPADM

## 2023-05-05 RX ORDER — ACETAMINOPHEN 325 MG/1
650 TABLET ORAL EVERY 6 HOURS PRN
Status: DISCONTINUED | OUTPATIENT
Start: 2023-05-05 | End: 2023-05-09 | Stop reason: HOSPADM

## 2023-05-05 RX ORDER — SODIUM CHLORIDE 0.9 % (FLUSH) 0.9 %
10 SYRINGE (ML) INJECTION EVERY 12 HOURS SCHEDULED
Status: DISCONTINUED | OUTPATIENT
Start: 2023-05-05 | End: 2023-05-09 | Stop reason: HOSPADM

## 2023-05-05 RX ORDER — LORAZEPAM 2 MG/ML
0.5 INJECTION INTRAMUSCULAR EVERY 6 HOURS PRN
Status: DISCONTINUED | OUTPATIENT
Start: 2023-05-05 | End: 2023-05-05

## 2023-05-05 RX ORDER — NITROGLYCERIN 0.4 MG/1
0.4 TABLET SUBLINGUAL
Status: DISCONTINUED | OUTPATIENT
Start: 2023-05-05 | End: 2023-05-09 | Stop reason: HOSPADM

## 2023-05-05 RX ORDER — HEPARIN SODIUM 5000 [USP'U]/ML
5000 INJECTION, SOLUTION INTRAVENOUS; SUBCUTANEOUS EVERY 12 HOURS SCHEDULED
Status: DISCONTINUED | OUTPATIENT
Start: 2023-05-05 | End: 2023-05-09 | Stop reason: HOSPADM

## 2023-05-05 RX ORDER — HYDROCODONE POLISTIREX AND CHLORPHENIRAMINE POLISTIREX 10; 8 MG/5ML; MG/5ML
5 SUSPENSION, EXTENDED RELEASE ORAL EVERY 12 HOURS PRN
Status: DISCONTINUED | OUTPATIENT
Start: 2023-05-05 | End: 2023-05-09 | Stop reason: HOSPADM

## 2023-05-05 RX ORDER — ARFORMOTEROL TARTRATE 15 UG/2ML
15 SOLUTION RESPIRATORY (INHALATION)
Status: DISCONTINUED | OUTPATIENT
Start: 2023-05-05 | End: 2023-05-09 | Stop reason: HOSPADM

## 2023-05-05 RX ORDER — FAMOTIDINE 20 MG/1
40 TABLET, FILM COATED ORAL 2 TIMES DAILY
Status: DISCONTINUED | OUTPATIENT
Start: 2023-05-05 | End: 2023-05-09 | Stop reason: HOSPADM

## 2023-05-05 RX ORDER — CEFTRIAXONE SODIUM 1 G/50ML
1 INJECTION, SOLUTION INTRAVENOUS EVERY 24 HOURS
Status: DISCONTINUED | OUTPATIENT
Start: 2023-05-05 | End: 2023-05-07

## 2023-05-05 RX ORDER — IPRATROPIUM BROMIDE AND ALBUTEROL SULFATE 2.5; .5 MG/3ML; MG/3ML
3 SOLUTION RESPIRATORY (INHALATION)
Status: DISCONTINUED | OUTPATIENT
Start: 2023-05-05 | End: 2023-05-09 | Stop reason: HOSPADM

## 2023-05-05 RX ADMIN — FAMOTIDINE 40 MG: 20 TABLET ORAL at 20:16

## 2023-05-05 RX ADMIN — Medication 10 ML: at 09:12

## 2023-05-05 RX ADMIN — HEPARIN SODIUM 5000 UNITS: 5000 INJECTION INTRAVENOUS; SUBCUTANEOUS at 20:16

## 2023-05-05 RX ADMIN — ARFORMOTEROL TARTRATE 15 MCG: 15 SOLUTION RESPIRATORY (INHALATION) at 12:25

## 2023-05-05 RX ADMIN — AZITHROMYCIN DIHYDRATE 500 MG: 500 INJECTION, POWDER, LYOPHILIZED, FOR SOLUTION INTRAVENOUS at 01:13

## 2023-05-05 RX ADMIN — MICONAZOLE NITRATE: 2 POWDER TOPICAL at 20:17

## 2023-05-05 RX ADMIN — CEFTRIAXONE SODIUM 1 G: 1 INJECTION, SOLUTION INTRAVENOUS at 03:05

## 2023-05-05 RX ADMIN — DOXYCYCLINE 100 MG: 100 INJECTION, POWDER, LYOPHILIZED, FOR SOLUTION INTRAVENOUS at 03:57

## 2023-05-05 RX ADMIN — ATORVASTATIN CALCIUM 10 MG: 10 TABLET, FILM COATED ORAL at 20:16

## 2023-05-05 RX ADMIN — HEPARIN SODIUM 5000 UNITS: 5000 INJECTION INTRAVENOUS; SUBCUTANEOUS at 09:12

## 2023-05-05 RX ADMIN — CARIPRAZINE 1.5 MG: 1.5 CAPSULE, GELATIN COATED ORAL at 13:06

## 2023-05-05 RX ADMIN — IPRATROPIUM BROMIDE 0.5 MG: 0.5 SOLUTION RESPIRATORY (INHALATION) at 12:25

## 2023-05-05 RX ADMIN — FAMOTIDINE 40 MG: 20 TABLET ORAL at 13:05

## 2023-05-05 RX ADMIN — IPRATROPIUM BROMIDE 0.5 MG: 0.5 SOLUTION RESPIRATORY (INHALATION) at 07:00

## 2023-05-05 RX ADMIN — ACETAMINOPHEN 650 MG: 325 TABLET ORAL at 16:10

## 2023-05-05 RX ADMIN — DOXYCYCLINE 100 MG: 100 INJECTION, POWDER, LYOPHILIZED, FOR SOLUTION INTRAVENOUS at 16:10

## 2023-05-05 RX ADMIN — MICONAZOLE NITRATE: 2 POWDER TOPICAL at 03:06

## 2023-05-05 RX ADMIN — BUDESONIDE 0.5 MG: 0.5 SUSPENSION RESPIRATORY (INHALATION) at 18:11

## 2023-05-05 RX ADMIN — METHYLPREDNISOLONE SODIUM SUCCINATE 62.5 MG: 125 INJECTION, POWDER, FOR SOLUTION INTRAMUSCULAR; INTRAVENOUS at 09:10

## 2023-05-05 RX ADMIN — CEFTRIAXONE SODIUM 1 G: 1 INJECTION, SOLUTION INTRAVENOUS at 00:42

## 2023-05-05 RX ADMIN — ESCITALOPRAM 20 MG: 10 TABLET, FILM COATED ORAL at 09:11

## 2023-05-05 RX ADMIN — Medication 10 ML: at 20:17

## 2023-05-05 RX ADMIN — HYDROCODONE POLISTIREX AND CHLORPHENIRAMINE POLISTIREX 5 ML: 10; 8 SUSPENSION, EXTENDED RELEASE ORAL at 20:21

## 2023-05-05 RX ADMIN — METHYLPREDNISOLONE SODIUM SUCCINATE 125 MG: 125 INJECTION INTRAMUSCULAR; INTRAVENOUS at 00:00

## 2023-05-05 RX ADMIN — ARFORMOTEROL TARTRATE 15 MCG: 15 SOLUTION RESPIRATORY (INHALATION) at 18:11

## 2023-05-05 RX ADMIN — BUDESONIDE 0.5 MG: 0.5 SUSPENSION RESPIRATORY (INHALATION) at 12:25

## 2023-05-05 RX ADMIN — SODIUM CHLORIDE 1000 ML: 9 INJECTION, SOLUTION INTRAVENOUS at 01:13

## 2023-05-05 RX ADMIN — IPRATROPIUM BROMIDE AND ALBUTEROL SULFATE 3 ML: 2.5; .5 SOLUTION RESPIRATORY (INHALATION) at 18:11

## 2023-05-05 NOTE — PAYOR COMM NOTE
AUTHORIZATION REQUEST for EMERGENCY DEPARTMENT ADMISSION        PATIENT INFORMATION  Name:             Lluvia Archer  MRN#:            [id number]        ADMISSION INFORMATION  CLASS:          Inpatient   DOS:               05/05/2023        ADMISSION DIAGNOSIS AND HOSPITAL PROBLEMS    Problems Addressed this Visit        Other    Sepsis, due to unspecified organism, unspecified whether acute organ dysfunction present    Relevant Medications    methylPREDNISolone sodium succinate (SOLU-Medrol) injection 125 mg (Completed)    * (Principal) Multifocal pneumonia - Primary    Relevant Medications    ipratropium-albuterol (DUO-NEB) nebulizer solution 3 mL (Completed)    ipratropium (ATROVENT) nebulizer solution 0.5 mg    ipratropium-albuterol (DUO-NEB) nebulizer solution 3 mL (Start on 5/5/2023  1:00 PM)    arformoterol (BROVANA) nebulizer solution 15 mcg (Start on 5/5/2023 10:30 AM)    budesonide (PULMICORT) nebulizer solution 0.5 mg (Start on 5/5/2023 10:30 AM)    Acute on chronic respiratory failure with hypoxia   Other Visit Diagnoses     Acute exacerbation of chronic obstructive pulmonary disease (COPD)        Relevant Medications    ipratropium-albuterol (DUO-NEB) nebulizer solution 3 mL (Completed)    methylPREDNISolone sodium succinate (SOLU-Medrol) injection 125 mg (Completed)    ipratropium (ATROVENT) nebulizer solution 0.5 mg    ipratropium-albuterol (DUO-NEB) nebulizer solution 3 mL (Start on 5/5/2023  1:00 PM)    arformoterol (BROVANA) nebulizer solution 15 mcg (Start on 5/5/2023 10:30 AM)    budesonide (PULMICORT) nebulizer solution 0.5 mg (Start on 5/5/2023 10:30 AM)      Diagnoses       Codes Comments    Multifocal pneumonia    -  Primary ICD-10-CM: J18.9  ICD-9-CM: 486     Acute exacerbation of chronic obstructive pulmonary disease (COPD)     ICD-10-CM: J44.1  ICD-9-CM: 491.21     Acute on chronic respiratory failure with hypoxia     ICD-10-CM: J96.21  ICD-9-CM: 518.84, 799.02     Sepsis, due to  "unspecified organism, unspecified whether acute organ dysfunction present     ICD-10-CM: A41.9  ICD-9-CM: 038.9, 995.91                ADMITTING PROVIDER INFORMATION  Name/NPI       Flako Santos DO [[id number]]  Phone:            Phone: [phone]        RENDERING FACILITY  Name:             [facility]   NPI:                 [id number]  TID:                 [id number]  Address:        [address] [place] [address]        CASE MANAGEMENT CONTACT INFORMATION  Name:             CLARK Penny  Phone:            [phone]  Fax:                [phone]    Lluvia Butts (57 y.o. Female)     Date of Birth   1965    Social Security Number       Address   [address] [place] [address]    Home Phone   [phone]    MRN   [id number]       Anabaptism   None    Marital Status                               Admission Date   5/4/23    Admission Type   Emergency    Admitting Provider   Luca Theodore DO    Attending Provider   Flako Santos DO    Department, Room/Bed   [facility] PROGRESSIVE CARE UNIT, 214/1       Discharge Date       Discharge Disposition       Discharge Destination                               Attending Provider: Flako Santos DO    Allergies: No Known Allergies    Isolation: None   Infection: None   Code Status: CPR    Ht: 162.6 cm (64\")   Wt: 101 kg (221 lb 9 oz)    Admission Cmt: None   Principal Problem: Multifocal pneumonia [J18.9]                 Active Insurance as of 5/4/2023     Primary Coverage     Payor Plan Insurance Group Employer/Plan Group    PASSPORT MEDICARE ADVANTAGE PASSPORT ADVANTAGE [id number]     Payor Plan Address Payor Plan Phone Number Payor Plan Fax Number Effective Dates    [address]   5/1/2017 - None Entered    [place] [id number]       Subscriber Name Subscriber Birth Date Member ID       LLUVIA BUTTS 1965 [id number]           Secondary Coverage     Payor Plan Insurance Group Employer/Plan Group    " [facility] BY LEGER [id number]     Payor Plan Address Payor Plan Phone Number Payor Plan Fax Number Effective Dates    [address] [address]   2021 - None Entered    [facility] [id number]       Subscriber Name Subscriber Birth Date Member ID       AMBROSIO BUTTS 1965 [id number]                 Emergency Contacts      (Rel.) Home Phone Work Phone Mobile Phone    SAKINA BUTTS (Spouse) [phone] -- [phone]               History & Physical      Luca Theodore DO at 23 0150          [facility]   HISTORY AND PHYSICAL    Patient Name: Ambrosio Butts  : 1965  MRN: [id number]  Primary Care Physician:  Aleksandar Edge DO  Date of admission: 2023    Subjective   Subjective     Chief Complaint:   Shortness of breath  History of Present Illness  Patient is a very pleasant 57-year-old female with past medical history of COPD, hypertension, migraine headaches and sleep apnea.  Patient says that she has been feeling unwell since last Saturday.  She says she has had shortness of breath cough and chest congestion.  Patient denies chest pain however, she also denies fever and chills.  Patient says she saw her family provider on Monday however all she was given was Ativan for her nerves but no medications for her respiratory illness.  Here in the emergency department the patient was found to have bilateral pneumonia.  She has been hypoxic.  She is clinically septic at this point and will be admitted for further evaluation and treatment.  Review of Systems   Constitutional: Positive for fatigue.   Respiratory: Positive for cough and shortness of breath.         Personal History     Past Medical History:   Diagnosis Date   • Abnormal mammogram     PT DENIES KNOWING OF THIS HX   • Anxiety    • Arthritis     R SHOULDER, R HIP, AND R LEG   • Chronic nausea 01/15/2019   • Hernia, hiatal    • Hyperlipidemia    • Hypertension    • Knee pain, right  2018   • Memory loss     FORGETFULNESS ? ETIOLOGY   • Migraine headache    • Moderate episode of recurrent major depressive disorder 2017   • Night sweats    • Sciatica of right side 2017   • Severe episode of recurrent major depressive disorder, without psychotic features 10/22/2019   • Shoulder pain, left 2018   • Sleep apnea, unspecified     DOESNT USE CPAP AFTER WEIGHT LOSS       Past Surgical History:   Procedure Laterality Date   • BRONCHOSCOPY N/A 2022    Procedure: BRONCHOSCOPY WITH BRONCHOALVEOLAR LAVAGE, BIOPSY;  Surgeon: Shin Mcdonald DO;  Location: [facility] ENDOSCOPY;  Service: Pulmonary;  Laterality: N/A;  RIGHT LOWER LOBE INFILTRATE   •  SECTION     • CHOLECYSTECTOMY      LAPAROSCOPIC   • COLONOSCOPY     • TOTAL HIP ARTHROPLASTY Right 2022    Procedure: RIGHT TOTAL HIP ARTHROPLASTY;  Surgeon: Cecil Gomes MD;  Location: [facility] MAIN OR;  Service: Orthopedics;  Laterality: Right;       Family History: family history includes Arthritis in her mother; Cancer in an other family member; Heart disease in her father and another family member; Hypertension in an other family member; Other in her mother. Otherwise pertinent FHx was reviewed and not pertinent to current issue.    Social History:  reports that she has quit smoking. Her smoking use included cigarettes. She smoked an average of 1 pack per day. She has never used smokeless tobacco. She reports that she does not drink alcohol and does not use drugs.    Home Medications:  Cariprazine HCl, Fluticasone-Umeclidin-Vilant, albuterol sulfate HFA, atorvastatin, escitalopram, famotidine, and lisinopril    Allergies:  No Known Allergies    Objective    Objective     Vitals:   Temp:  [99.6 °F (37.6 °C)] 99.6 °F (37.6 °C)  Heart Rate:  [116-124] 116  Resp:  [22-29] 29  BP: (125-151)/(72-88) 125/79  Flow (L/min):  [3-4] 4    Physical Exam   Constitutional:  Well-developed and well-nourished.  No acute  distress.      HENT:  Head:  Normocephalic and atraumatic.  Mouth:  Moist mucous membranes.    Eyes:  Conjunctivae and EOM are normal. No scleral icterus.    Neck:  Neck supple.  No JVD present.    Cardiovascular:  Normal rate, regular rhythm and normal heart sounds with no murmur.  Pulmonary/Chest:  No respiratory distress, no wheezes, no crackles, with normal breath sounds and good air movement.  Abdominal:  Soft. No distension and no tenderness.   Musculoskeletal:  No tenderness, and no deformity.  No red or swollen joints anywhere.    Neurological:  Alert and oriented to person, place, and time.  No cranial nerve deficit.    Skin:  Skin is warm and dry. No rash noted. No pallor.   Peripheral vascular:  No clubbing, no cyanosis, no edema.  Psychiatric: Appropriate mood and affect  :   Result Review    Result Review:  I have personally reviewed the results from the time of this admission to 5/5/2023 01:50 EDT and agree with these findings:  [x]  Laboratory list / accordion  []  Microbiology  [x]  Radiology  []  EKG/Telemetry   []  Cardiology/Vascular   []  Pathology  []  Old records  []  Other:  Most notable findings include: Bilateral pneumonia on chest x-ray      Assessment & Plan   Assessment / Plan     Brief Patient Summary:  Lluvia Archer is a 57 y.o. female who presented to the emergency department with shortness of breath and was diagnosed with bilateral pneumonia, acute exacerbation of COPD and sepsis.  She will be admitted for further evaluation and treatment.    Active Hospital Problems:  1.  Clinical sepsis  2.  Bilateral pneumonia  3.  Acute hypoxic respiratory failure  4.  Acute exacerbation of COPD    Plan:   Admit the patient to the hospitalist service  Patient will be started on the pneumonia pathway and sepsis protocol.  We will start her on IV antibiotics  I will check for mycoplasma strep and Legionella  The patient will be placed on nebulizer treatments and IV corticosteroids  We will  support with O2 via nasal cannula  Patient will be kept on telemetry and continuous pulse oximetry for closer monitoring  With the patient's cough being only mildly productive I feel that Mucomyst may help her produce more output with each cough    DVT prophylaxis:  No DVT prophylaxis order currently exists.    CODE STATUS:       Admission Status:  I believe this patient meets inpatient status.    Luca Theodore DO    Electronically signed by Luca Theodore DO at 05/05/23 0158          Emergency Department Notes      Breonna Osei PCT at 05/05/23 0047        Assisted patient to Bedside commode / Pt is SOA     Electronically signed by Breonna Osei PCT at 05/05/23 0047       Physician Progress Notes (last 24 hours)  Notes from 05/04/23 0953 through 05/05/23 0953   No notes of this type exist for this encounter.        Pneumonia RRG Inpatient Care by Lakia Marsh RN       Indications Met: Reviewed on 5/5/2023 by Lakia Marsh RN       Created Using Review Status Review Entered   [facility] for Case Management Primary Completed 5/5/2023 0949       Created By   Lakia Marsh RN       Criteria Set Name - Subset   Pneumonia RRG Inpatient Care      Criteria Review   Pneumonia RRG Inpatient Care     Overall Determination: Indications Met     Criteria:  [×] Admission is indicated for  1 or more  of the following  [D] [E]:      [×] Hemodynamic instability          5/5/2023  9:49 AM              -- 5/5/2023  9:49 AM by Lakia Marsh RN --                                    (X) Hemodynamic instability, as indicated by  1 or more  of the following  (1) (2) (3) (4) (5):                  (X) Vital sign abnormality not readily corrected by appropriate treatment, as indicated by  1 or more  of the following  [A]:                  (X) Tachycardia that persists despite appropriate treatment (eg, volume repletion, treatment of pain, treatment of underlying cause)          5/5/2023  9:49 AM              -- 5/5/2023  9:49 AM  by Lakia Marsh, NITISH --                  99.6 120 29 144/88 SATS 94% ON 4L [WEARS THSI AT HOME]      [×] Respiratory finding (eg, Tachypnea) that persists despite observation care          5/5/2023  9:49 AM              -- 5/5/2023  9:49 AM by Lakia Marsh, RN --                  R STILL 24 DAY 2 POSITIVE FOR PETR PNEUMONIA ON CXR [MAY REPRESENT PETR PNEU VS PULM EDEMA]     Notes:  -- 5/5/2023  9:49 AM by Lakia Marsh, RN --      TO ED WITH SOB,CHEST CONGESTION,GREEN SPUTUM,SAW FAMILT PHYSICIAN ON MONDAY BUT ONLY GAVE ATIVAN FOR NERVES-POSITIVE FOR PETR PNEUMONIA-HAS BEEN HYPOXIC-CLINICALLY SEPTIC-ADMIT FOR FURTHER TX>>IV ABX,NEBS-IV STEROIDS-MUCOMYST-CONT HER 4L O2 AS USED AT HOME FOR NOW-TELE FOR CONT MONITORING               Consult Notes (last 24 hours)  Notes from 05/04/23 0953 through 05/05/23 0953   No notes of this type exist for this encounter.

## 2023-05-05 NOTE — CASE MANAGEMENT/SOCIAL WORK
Discharge Planning Assessment  KETTY Mcclellan     Patient Name: Lluvia Archer  MRN: [id number]  Today's Date: 5/5/2023    Admit Date: 5/4/2023    Plan: Patient admitted due to sepsis and bacterial pneumonia. SHAUN met with patient at the bedside, verified the PCP and pharmacy. Patient is usually independent at home with ADLs, her spouse helps as he can. Patient plans to return home at discharge, family will provide transportation home. Patient notes her daughter and grandchildren, ages, 14, 3, 2 live with ms. archer and her spouse, patients daughter is currently at [facility], daughter has undergone a liver transplant and has complications. Patient notes her stress level is severe due to her daughters health condition and being on one income, her  works full time to support everyone. Patient notes she is on 6LNC current with [facility], requesting a new nebulizer, [facility] notified   Discharge Needs Assessment     Row Name 05/05/23 1109       Living Environment    People in Home spouse;child(scotty), adult;grandchild(scotty)    Current Living Arrangements home    Primary Care Provided by self    Provides Primary Care For child(scotty);grandchild(scotty)    Family Caregiver if Needed spouse    Quality of Family Relationships helpful;involved;supportive    Able to Return to Prior Arrangements yes       Resource/Environmental Concerns    Resource/Environmental Concerns none    Transportation Concerns none       Transition Planning    Patient/Family Anticipates Transition to home with family    Patient/Family Anticipated Services at Transition durable medical equipment    Transportation Anticipated family or friend will provide       Discharge Needs Assessment    Readmission Within the Last 30 Days no previous admission in last 30 days    Equipment Currently Used at Home oxygen  6lnc [facility]    Concerns to be Addressed discharge planning    Anticipated Changes Related to Illness none    Equipment Needed After Discharge nebulizer                Discharge Plan     Row Name 05/05/23 1116       Plan    Plan Patient admitted due to sepsis and bacterial pneumonia. RNCM met with patient at the bedside, verified the PCP and pharmacy. Patient is usually independent at home with ADLs, her spouse helps as he can. Patient plans to return home at discharge, family will provide transportation home. Patient notes her daughter and grandchildren, ages, 14, 3, 2 live with ms. coley and her spouse, patients daughter is currently at [facility], daughter has undergone a liver transplant and has complications. Patient notes her stress level is severe due to her daughters health condition and being on one income, her  works full time to support everyone. Patient notes she is on 6LNC current with [facility], requesting a new nebulizer, [facility] notified    Final Discharge Disposition Code 01 - home or self-care              Continued Care and Services - Admitted Since 5/4/2023    Coordination has not been started for this encounter.          Demographic Summary     Row Name 05/05/23 1100       General Information    Admission Type inpatient    Arrived From home    Referral Source admission list    Reason for Consult discharge planning    Preferred Language English       Contact Information    Permission Granted to Share Info With family/designee    Contact Information Obtained for                Functional Status     Row Name 05/05/23 1100       Functional Status    Usual Activity Tolerance moderate    Current Activity Tolerance moderate       Assessment of Health Literacy    How often do you have someone help you read hospital materials? Occasionally    How often do you have problems learning about your medical condition because of difficulty understanding written information? Never    How often do you have a problem understanding what is told to you about your medical condition? Never    How confident are you filling out medical forms by yourself?  Quite a bit    Health Literacy Moderate       Functional Status, IADL    Medications independent    Meal Preparation independent;assistive person    Housekeeping independent;assistive person    Laundry independent;assistive person    Shopping independent;assistive person       Mental Status    General Appearance WDL WDL       Mental Status Summary    Recent Changes in Mental Status/Cognitive Functioning no changes               Psychosocial    No documentation.                Abuse/Neglect    No documentation.                Legal    No documentation.                Substance Abuse    No documentation.                Patient Forms    No documentation.                   Stephanie Whiteside RN

## 2023-05-05 NOTE — PLAN OF CARE
Goal Outcome Evaluation:  Plan of Care Reviewed With: patient        Progress: no change  Outcome Evaluation: patient new admit from ED; A&Ox4; 4LNC; up with standby to BSC; productive cough with green thick sputum; IV Antibotics given; no s/s of distress; will continue to monitor;

## 2023-05-05 NOTE — CONSULTS
Pulmonary / Critical Care Consult Note      Patient Name: Lluvia Archer  : 1965  MRN: [id number]  Primary Care Physician:  Aleksandar Edge DO  Referring Physician: No Known Provider  Date of admission: 2023    Subjective   Subjective     Reason for Consult/ Chief Complaint: Pneumonia, CT scan abnormality, right middle and lower lobe lung nodules    HPI:  Lluvia Archer is a 57 y.o. female with history of COPD, hypertension, migraine, obstructive sleep apnea.  She presented to the hospital with worsening shortness of breath, cough and congestion for a week.  She was seen by her primary care provider early in the week, but did not get any medications for her symptoms.  In the ER, patient had chest x-ray concerning for pneumonia.  Pulmonary critical care services consulted for assistance with management of pneumonia and CT scan abnormality.  Patient used to be a heavy smoker, smoking up to 2 packs a day for more than 30 years.  She quit smoking a year ago.  She has been having cough, some nausea, chills, had fever when she started having symptoms.  Has not had any changes in weight or appetite.  There is a family history of lung cancer in her maternal uncle.  She lives with her .  Patient is on Spiriva and Symbicort at home.  However, she has been using Symbicort every 4 hours.  She is not on any rescue inhalers.  She uses oxygen at 6 L/min with activities and sleep.  No exposure to mold or decaying materials. I reviewed the CT scan finding with her.     Review of Systems  General:  No Fatigue, No Fever, chills.   HEENT: No dysphagia, No Visual Changes, no rhinorrhea  Respiratory:  + Productive cough,+Dyspnea, mucoid phlegm, No Pleuritic Pain, + wheezing, no hemoptysis  Cardiovascular: Denies chest pain, denies palpitations,+GOFF, No Chest Pressure  Gastrointestinal:  No Abdominal Pain, +Nausea, No Vomiting, No Diarrhea  Genitourinary:  No Dysuria, No Frequency, No  Hesitancy  Musculoskeletal: No muscle pain or swelling  Endocrine:  No Heat Intolerance, No Cold Intolerance  Hematologic:  No Bleeding, No Bruising  Psychiatric:  No Anxiety, No Depression  Neurologic:  No Confusion, no Dysarthria, No Headaches  Skin:  No Rash, No Open Wounds        Personal History     Past Medical History:   Diagnosis Date   • Abnormal mammogram     PT DENIES KNOWING OF THIS HX   • Anxiety    • Arthritis     R SHOULDER, R HIP, AND R LEG   • Chronic nausea 01/15/2019   • Hernia, hiatal    • Hyperlipidemia    • Hypertension    • Knee pain, right 2018   • Memory loss     FORGETFULNESS ? ETIOLOGY   • Migraine headache    • Moderate episode of recurrent major depressive disorder 2017   • Night sweats    • Sciatica of right side 2017   • Severe episode of recurrent major depressive disorder, without psychotic features 10/22/2019   • Shoulder pain, left 2018   • Sleep apnea, unspecified     DOESNT USE CPAP AFTER WEIGHT LOSS       Past Surgical History:   Procedure Laterality Date   • BRONCHOSCOPY N/A 2022    Procedure: BRONCHOSCOPY WITH BRONCHOALVEOLAR LAVAGE, BIOPSY;  Surgeon: Shin Mcdonald DO;  Location: [facility] ENDOSCOPY;  Service: Pulmonary;  Laterality: N/A;  RIGHT LOWER LOBE INFILTRATE   •  SECTION     • CHOLECYSTECTOMY      LAPAROSCOPIC   • COLONOSCOPY     • TOTAL HIP ARTHROPLASTY Right 2022    Procedure: RIGHT TOTAL HIP ARTHROPLASTY;  Surgeon: Cecil Gomes MD;  Location: [facility] MAIN OR;  Service: Orthopedics;  Laterality: Right;       Family History: family history includes Arthritis in her mother; Cancer in an other family member; Heart disease in her father and another family member; Hypertension in an other family member; Other in her mother.     Social History:  reports that she quit smoking about 13 months ago. Her smoking use included cigarettes. She has a 80.00 pack-year smoking history. She has never used smokeless tobacco. She  reports that she does not drink alcohol and does not use drugs.    Home Medications:  Cariprazine HCl, Fluticasone-Umeclidin-Vilant, albuterol sulfate HFA, atorvastatin, escitalopram, famotidine, and lisinopril    Allergies:  No Known Allergies    Objective    Objective     Vitals:   Temp:  [98.1 °F (36.7 °C)-99.6 °F (37.6 °C)] 98.1 °F (36.7 °C)  Heart Rate:  [] 89  Resp:  [20-29] 20  BP: (119-151)/(69-88) 137/82  Flow (L/min):  [3-4] 4    Physical Exam:  Vital Signs Reviewed   General:  Frail looking female, in mild distress, has conversational dyspnea  HEENT:  PERRL, EOMI. OP, nares clear, no sinus tenderness, missing teeth+  Neck:  Supple, no JVD, no thyromegaly  Lymph: no axillary, cervical, supraclavicular lymphadenopathy noted bilaterally  Chest:  good aeration, bilateral scattered rhonchi, no wheezing or crackles, tympanic to percussion bilaterally, no work of breathing noted  CV: RRR, no MGR, pulses 2+, equal  Abd:  Soft, NT, ND, + BS, no HSM  EXT:  no clubbing, no cyanosis, no edema, no joint tenderness  Neuro:  A&Ox3, CN grossly intact, no focal deficits  Skin: No rashes or lesions noted      Result Review    Result Review:  I have personally reviewed the results from the time of this admission to 5/5/2023 15:24 EDT and agree with these findings:  [x]  Laboratory  [x]  Microbiology  [x]  Radiology  [x]  EKG/Telemetry   [x]  Cardiology/Vascular   []  Pathology  []  Old records  []  Other:  Most notable findings include:       Lab 05/04/23  2240 05/01/23  1143   WBC 12.69*  --    HEMOGLOBIN 11.3*  --    HEMATOCRIT 34.6  --    PLATELETS 241  --    SODIUM 145 140   POTASSIUM 3.1* 4.5   CHLORIDE 110* 102   CO2 23.6 28.0   BUN 13 11   CREATININE 0.83 0.93   GLUCOSE 137* 98   CALCIUM 8.5* 9.4   TOTAL PROTEIN 6.3 7.6   ALBUMIN 3.1* 4.4   GLOBULIN 3.2 3.2       CT Chest Without Contrast Diagnostic    Result Date: 5/5/2023  PROCEDURE: CT CHEST WO CONTRAST DIAGNOSTIC  COMPARISON: [facility], [place]  XR CHEST 1 VW, 5/04/2023, 22:56.  [facility], CT, CT CHEST WO CONTRAST DIAGNOSTIC, 4/05/2022, 18:37.  INDICATIONS: Pneumonia, productive cough, complication suspected, xray done  TECHNIQUE: CT images were created without the administration of contrast material.   PROTOCOL:   Standard imaging protocol performed    RADIATION:   DLP: 152mGy*cm   Automated exposure control was utilized to minimize radiation dose.  FINDINGS:  Lung window images reveal patchy alveolar airspace disease bilaterally, in a predominantly peripheral distribution suggesting indolent fungal infection or granulomatous disease.  1.2 cm nodular consolidation or mass is seen in the posterior right lower lobe.  1 cm nodules are seen in the right middle lobe.  No pleural or pericardial fluid is evident.  Mediastinal windows reveal no mediastinal or axillary adenopathy.  Hilar structures are not well evaluated without contrast.  Extensive coronary artery calcifications are evident.  A small hiatal hernia is evident.  Mild degenerative spurring is seen in the thoracic spine.  CONTINUED ON NEXT PAGE...        Impression:   CT scan of the chest without IV contrast demonstrating diffuse bilateral patchy alveolar airspace disease in a predominantly peripheral distribution suggesting indolent fungal infection or granulomatous disease.  1.2 cm nodular consolidation or mass is seen in the right lower lobe.  1 cm nodules are seen in the right middle lobe.     GUANAKO BARRIENTOS MD       Electronically Signed and Approved By: GUANAKO BARRIENTOS MD on 5/05/2023 at 12:17             Procalcitonin 0.05  proBNP 80    Assessment & Plan   Assessment / Plan     Active Hospital Problems:  Active Hospital Problems    Diagnosis    • **Multifocal pneumonia    • Acute on chronic respiratory failure with hypoxia          Impression:  Bilateral multifocal pneumonia  Difficulty with airway clearance  COPD  History of chronic smoking in past  Right middle and right lower  lobe lung nodules    Plan:  Concern for bacterial versus fungal pneumonia  Check sputum culture.  Check urine strep and Legionella antigen.  Check 1 3 beta D glucan and Aspergillus galactomannan.  Start MetaNeb 4 times daily  Will likely need bronchoscopy for airway clearance if not improving in next 2 to 3 days.  Currently on oxygen at 4 L/min.  Wean as tolerated.  Apparently she was using oxygen at up to 6 L/min at home.  Continue with IV ceftriaxone and doxycycline.  Adjust antibiotics based on cultures.  Repeat CT scan of the chest in 2 months.  Needs PFT as an outpatient.  Apparently patient was on Spiriva daily and was taking Symbicort 6 times a day.  Will need assessment of LABA, LAMA and ICS prior to discharge.    Reviewed labs, imaging and provider notes.  Discussed with bedside nurse and primary service.  Thank you for allowing me to participate in care of Ms. Archer.  I will follow along.        Electronically signed by Kelby Patel MD, 5/5/2023, 15:24 EDT.

## 2023-05-05 NOTE — H&P
[facility]   HISTORY AND PHYSICAL    Patient Name: Lluvia Archer  : 1965  MRN: [id number]  Primary Care Physician:  Aleksandar Edge DO  Date of admission: 2023    Subjective   Subjective     Chief Complaint:   Shortness of breath  History of Present Illness  Patient is a very pleasant 57-year-old female with past medical history of COPD, hypertension, migraine headaches and sleep apnea.  Patient says that she has been feeling unwell since last Saturday.  She says she has had shortness of breath cough and chest congestion.  Patient denies chest pain however, she also denies fever and chills.  Patient says she saw her family provider on Monday however all she was given was Ativan for her nerves but no medications for her respiratory illness.  Here in the emergency department the patient was found to have bilateral pneumonia.  She has been hypoxic.  She is clinically septic at this point and will be admitted for further evaluation and treatment.  Review of Systems   Constitutional: Positive for fatigue.   Respiratory: Positive for cough and shortness of breath.         Personal History     Past Medical History:   Diagnosis Date   • Abnormal mammogram     PT DENIES KNOWING OF THIS HX   • Anxiety    • Arthritis     R SHOULDER, R HIP, AND R LEG   • Chronic nausea 01/15/2019   • Hernia, hiatal    • Hyperlipidemia    • Hypertension    • Knee pain, right 2018   • Memory loss     FORGETFULNESS ? ETIOLOGY   • Migraine headache    • Moderate episode of recurrent major depressive disorder 2017   • Night sweats    • Sciatica of right side 2017   • Severe episode of recurrent major depressive disorder, without psychotic features 10/22/2019   • Shoulder pain, left 2018   • Sleep apnea, unspecified     DOESNT USE CPAP AFTER WEIGHT LOSS       Past Surgical History:   Procedure Laterality Date   • BRONCHOSCOPY N/A 2022    Procedure: BRONCHOSCOPY WITH BRONCHOALVEOLAR LAVAGE,  BIOPSY;  Surgeon: Shin Mcdonald DO;  Location: [facility] ENDOSCOPY;  Service: Pulmonary;  Laterality: N/A;  RIGHT LOWER LOBE INFILTRATE   •  SECTION     • CHOLECYSTECTOMY      LAPAROSCOPIC   • COLONOSCOPY     • TOTAL HIP ARTHROPLASTY Right 2022    Procedure: RIGHT TOTAL HIP ARTHROPLASTY;  Surgeon: Cecil Gomes MD;  Location: [facility] MAIN OR;  Service: Orthopedics;  Laterality: Right;       Family History: family history includes Arthritis in her mother; Cancer in an other family member; Heart disease in her father and another family member; Hypertension in an other family member; Other in her mother. Otherwise pertinent FHx was reviewed and not pertinent to current issue.    Social History:  reports that she has quit smoking. Her smoking use included cigarettes. She smoked an average of 1 pack per day. She has never used smokeless tobacco. She reports that she does not drink alcohol and does not use drugs.    Home Medications:  Cariprazine HCl, Fluticasone-Umeclidin-Vilant, albuterol sulfate HFA, atorvastatin, escitalopram, famotidine, and lisinopril    Allergies:  No Known Allergies    Objective    Objective     Vitals:   Temp:  [99.6 °F (37.6 °C)] 99.6 °F (37.6 °C)  Heart Rate:  [116-124] 116  Resp:  [22-29] 29  BP: (125-151)/(72-88) 125/79  Flow (L/min):  [3-4] 4    Physical Exam   Constitutional:  Well-developed and well-nourished.  No acute distress.      HENT:  Head:  Normocephalic and atraumatic.  Mouth:  Moist mucous membranes.    Eyes:  Conjunctivae and EOM are normal. No scleral icterus.    Neck:  Neck supple.  No JVD present.    Cardiovascular:  Normal rate, regular rhythm and normal heart sounds with no murmur.  Pulmonary/Chest:  No respiratory distress, no wheezes, no crackles, with normal breath sounds and good air movement.  Abdominal:  Soft. No distension and no tenderness.   Musculoskeletal:  No tenderness, and no deformity.  No red or swollen joints anywhere.     Neurological:  Alert and oriented to person, place, and time.  No cranial nerve deficit.    Skin:  Skin is warm and dry. No rash noted. No pallor.   Peripheral vascular:  No clubbing, no cyanosis, no edema.  Psychiatric: Appropriate mood and affect  :   Result Review    Result Review:  I have personally reviewed the results from the time of this admission to 5/5/2023 01:50 EDT and agree with these findings:  [x]  Laboratory list / accordion  []  Microbiology  [x]  Radiology  []  EKG/Telemetry   []  Cardiology/Vascular   []  Pathology  []  Old records  []  Other:  Most notable findings include: Bilateral pneumonia on chest x-ray      Assessment & Plan   Assessment / Plan     Brief Patient Summary:  Lluvia Archer is a 57 y.o. female who presented to the emergency department with shortness of breath and was diagnosed with bilateral pneumonia, acute exacerbation of COPD and sepsis.  She will be admitted for further evaluation and treatment.    Active Hospital Problems:  1.  Clinical sepsis  2.  Bilateral pneumonia  3.  Acute hypoxic respiratory failure  4.  Acute exacerbation of COPD    Plan:   Admit the patient to the hospitalist service  Patient will be started on the pneumonia pathway and sepsis protocol.  We will start her on IV antibiotics  I will check for mycoplasma strep and Legionella  The patient will be placed on nebulizer treatments and IV corticosteroids  We will support with O2 via nasal cannula  Patient will be kept on telemetry and continuous pulse oximetry for closer monitoring  With the patient's cough being only mildly productive I feel that Mucomyst may help her produce more output with each cough    DVT prophylaxis:  No DVT prophylaxis order currently exists.    CODE STATUS:       Admission Status:  I believe this patient meets inpatient status.    Luca Theodore,

## 2023-05-05 NOTE — ED PROVIDER NOTES
Time: 11:13 PM EDT  Date of encounter:  2023  Independent Historian/Clinical History and Information was obtained by:   Patient  Chief Complaint: SOB      History is limited by: N/A    History of Present Illness:      Patient is a 57 y.o. year old female who presents to the emergency department for evaluation of SOB that is chronic but worsened today. Pt states that she went to the doctor on Monday and was told that her symptoms weren't too bad at that time and didn't receive any prescriptions. Pt notes that she has stage 4 COPD and reports a productive cough with green phlegm currently. Pt admits to chills and fever. Pt states that she uses 6L of oxygen daily at home.       History provided by:  Patient   used: No        Patient Care Team  Primary Care Provider: Aleksandar Edge DO    Past Medical History:     No Known Allergies  Past Medical History:   Diagnosis Date   • Abnormal mammogram     PT DENIES KNOWING OF THIS HX   • Anxiety    • Arthritis     R SHOULDER, R HIP, AND R LEG   • Chronic nausea 01/15/2019   • Hernia, hiatal    • Hyperlipidemia    • Hypertension    • Knee pain, right 2018   • Memory loss     FORGETFULNESS ? ETIOLOGY   • Migraine headache    • Moderate episode of recurrent major depressive disorder 2017   • Night sweats    • Sciatica of right side 2017   • Severe episode of recurrent major depressive disorder, without psychotic features 10/22/2019   • Shoulder pain, left 2018   • Sleep apnea, unspecified     DOESNT USE CPAP AFTER WEIGHT LOSS     Past Surgical History:   Procedure Laterality Date   • BRONCHOSCOPY N/A 2022    Procedure: BRONCHOSCOPY WITH BRONCHOALVEOLAR LAVAGE, BIOPSY;  Surgeon: Shin Mcdonald DO;  Location: formerly [facility] ENDOSCOPY;  Service: Pulmonary;  Laterality: N/A;  RIGHT LOWER LOBE INFILTRATE   •  SECTION     • CHOLECYSTECTOMY      LAPAROSCOPIC   • COLONOSCOPY     • TOTAL HIP ARTHROPLASTY Right  2022    Procedure: RIGHT TOTAL HIP ARTHROPLASTY;  Surgeon: Cecil Gomes MD;  Location: [facility] MAIN OR;  Service: Orthopedics;  Laterality: Right;     Family History   Problem Relation Age of Onset   • Other Mother         BLOOD DISEASE   • Arthritis Mother    • Heart disease Father    • Hypertension Other    • Cancer Other    • Heart disease Other    • Malig Hyperthermia Neg Hx        Home Medications:  Prior to Admission medications    Medication Sig Start Date End Date Taking? Authorizing Provider   albuterol sulfate  (90 Base) MCG/ACT inhaler INHALE 2 PUFFS EVERY 6 HOURS AS NEEDED FOR WHEEZING 3/13/23   Aleksandar Edge DO   atorvastatin (LIPITOR) 10 MG tablet Take 1 tablet by mouth Every Night. 22   Aleksandar Edge DO   Cariprazine HCl (Vraylar) 1.5 MG capsule capsule Take 1 capsule by mouth Daily. 23   Aleksandar Edge DO   escitalopram (LEXAPRO) 20 MG tablet Take 1 tablet by mouth Every Morning. 23   Aleksandar Edge DO   famotidine (PEPCID) 40 MG tablet Take 1 tablet by mouth 2 (Two) Times a Day. 22   Aleksandar Edge DO   Fluticasone-Umeclidin-Vilant (Trelegy Ellipta) 100-62.5-25 MCG/ACT inhaler Inhale 1 puff Daily. 22   Aleksandar Edge DO   lisinopril (PRINIVIL,ZESTRIL) 5 MG tablet Take 1 tablet by mouth Daily. 22   Aleksandar Edge DO        Social History:   Social History     Tobacco Use   • Smoking status: Former     Packs/day: 2.00     Years: 40.00     Pack years: 80.00     Types: Cigarettes     Quit date: 2022     Years since quittin.0   • Smokeless tobacco: Never   Vaping Use   • Vaping Use: Never used   Substance Use Topics   • Alcohol use: Never   • Drug use: Never         Review of Systems:  Review of Systems   Constitutional: Positive for chills and fever.   HENT: Negative for congestion, rhinorrhea and sore throat.    Eyes: Negative for pain and visual disturbance.   Respiratory:  "Positive for cough and shortness of breath. Negative for apnea and chest tightness.    Cardiovascular: Negative for chest pain and palpitations.   Gastrointestinal: Negative for abdominal pain, diarrhea, nausea and vomiting.   Genitourinary: Negative for difficulty urinating and dysuria.   Musculoskeletal: Negative for joint swelling and myalgias.   Skin: Negative for color change.   Neurological: Negative for seizures and headaches.   Psychiatric/Behavioral: Negative.    All other systems reviewed and are negative.       Physical Exam:  /54 (BP Location: Right arm, Patient Position: Lying)   Pulse 79   Temp 98.1 °F (36.7 °C) (Oral)   Resp 18   Ht 162.6 cm (64\")   Wt 101 kg (221 lb 9 oz)   LMP  (LMP Unknown)   SpO2 96%   BMI 38.03 kg/m²     Physical Exam  Vitals and nursing note reviewed.   Constitutional:       General: She is not in acute distress.     Appearance: Normal appearance. She is not toxic-appearing.   HENT:      Head: Normocephalic and atraumatic.      Jaw: There is normal jaw occlusion.   Eyes:      General: Lids are normal.      Extraocular Movements: Extraocular movements intact.      Conjunctiva/sclera: Conjunctivae normal.      Pupils: Pupils are equal, round, and reactive to light.   Cardiovascular:      Rate and Rhythm: Regular rhythm. Tachycardia present.      Pulses: Normal pulses.      Heart sounds: Normal heart sounds.   Pulmonary:      Effort: Tachypnea and respiratory distress present.      Breath sounds: Examination of the right-upper field reveals wheezing. Examination of the left-upper field reveals wheezing. Examination of the right-middle field reveals wheezing. Examination of the left-middle field reveals wheezing. Examination of the right-lower field reveals wheezing. Examination of the left-lower field reveals wheezing. Wheezing (mild) present. No rhonchi.   Abdominal:      General: Abdomen is flat.      Palpations: Abdomen is soft.      Tenderness: There is no " abdominal tenderness. There is no guarding or rebound.   Musculoskeletal:         General: Normal range of motion.      Cervical back: Normal range of motion and neck supple.      Right lower leg: No edema.      Left lower leg: No edema.   Skin:     General: Skin is warm and dry.   Neurological:      Mental Status: She is alert and oriented to person, place, and time. Mental status is at baseline.   Psychiatric:         Mood and Affect: Mood normal.                  Procedures:  Procedures      Medical Decision Making:      Comorbidities that affect care:    Hyperlipidemia, Hypertension    External Notes reviewed:    None and Previous Clinic Note: Recent PCP visit on 5/1      The following orders were placed and all results were independently analyzed by me:  Orders Placed This Encounter   Procedures   • Home Nebulizer   • Blood Culture - Blood,   • Blood Culture - Blood,   • COVID-19,APTIMA PANTHER(ELVIN),[facility]/ SANNA, NP/OP SWAB IN UTM/VTM/SALINE TRANSPORT MEDIA,24 HR TAT - Swab, Nasal Cavity   • Influenza Antigen, Rapid - Swab, Nasopharynx   • Respiratory Panel, PCR (WITHOUT COVID) - Swab, Nasopharynx   • S. Pneumo Ag Urine or CSF - Urine, Urine, Clean Catch   • Mycoplasma Pneumoniae Antibody, IgM - Blood, Arm, Left   • Respiratory Culture - Sputum, Cough   • Legionella Antigen, Urine - Urine, Urine, Clean Catch   • Aspergillus Galactomannan Antigen - Blood, Arm, Left   • XR Chest 1 View   • CT Chest Without Contrast Diagnostic   • [place] Draw   • BNP   • Single High Sensitivity Troponin T   • Comprehensive Metabolic Panel   • Lactic Acid, Plasma   • CBC Auto Differential   • Procalcitonin   • Blood Gas, Arterial -With Co-Ox Panel: Yes   • Potassium   • Magnesium   • High Sensitivity Troponin T   • Blood Gas, Arterial -With Co-Ox Panel: Yes   • Fungitell B-D Glucan   • ABG with Co-Ox and Electrolytes   • Basic Metabolic Panel   • CBC (No Diff)   • Diet: Regular/House Diet; Texture: Regular Texture (IDDSI 7);  Fluid Consistency: Thin (IDDSI 0)   • Undress & Gown   • Vital Signs   • Undress & Gown   • Continuous Pulse Oximetry   • Vital Signs   • PO fluids  Misc Nursing Order (Specify)   • Reason for COPD Admission: Pneumonia   • Tobacco Cessation Education   • Respiratory Treatment Education (MDI / Spacer / Nebulizer)   • COPD Education   • Vital Signs   • Intake & Output   • Weigh Patient   • Nursing Dysphagia Screening (Complete Prior to Giving Anything By Mouth)   • RN to Place Order SLP Consult - Eval & Treat Choosing Reason of RN Dysphagia Screen Failed   • Nurse to Call MD or Nutrition Services for Diet if Patient Passes Dysphagia Screen   • Telemetry - Maintain IV Access   • Continuous Cardiac Monitoring   • Telemetry - Pulse Oximetry   • Code Status and Medical Interventions:   • Hospitalist (on-call MD unless specified)   • Inpatient Pulmonology Consult   • PT Consult: Eval & Treat Functional Mobility Below Baseline   • Oxygen Therapy- Nasal Cannula; 2 LPM; Titrate for SPO2: equal to or greater than, 92%   • Oxygen Therapy- Nasal Cannula; 2 LPM; Titrate for SPO2: 92%, Greater Than or Equal To   • Document Pulse Oximetry - On Room Air / Home O2 Level   • Oxygen Therapy- Nasal Cannula; Titrate for SPO2: 90% - 95%   • MetaNeb   • ECG 12 Lead ED Triage Standing Order; SOA   • ECG 12 Lead Chest Pain   • Insert Peripheral IV   • Insert Peripheral IV   • Insert Peripheral IV   • Inpatient Admission   • Green Top (Gel)   • Lavender Top   • Gold Top - SST   • Light Blue Top   • CBC & Differential       Medications Given in the Emergency Department:  Medications   sodium chloride 0.9 % flush 10 mL (has no administration in time range)   sodium chloride 0.9 % flush 10 mL (has no administration in time range)   sodium chloride 0.9 % flush 10 mL (has no administration in time range)   sodium chloride 0.9 % flush 10 mL (10 mL Intravenous Given 5/6/23 2022)   sodium chloride 0.9 % infusion 40 mL (has no administration in time  range)   heparin (porcine) 5000 UNIT/ML injection 5,000 Units (5,000 Units Subcutaneous Given 5/6/23 2020)   nitroglycerin (NITROSTAT) SL tablet 0.4 mg (has no administration in time range)   cefTRIAXone (ROCEPHIN) IVPB 1 g (1 g Intravenous New Bag 5/7/23 0252)   doxycycline (VIBRAMYCIN) 100 mg/100 mL 0.9% NS MBP (100 mg Intravenous New Bag 5/7/23 0333)   miconazole (MICOTIN) 2 % powder ( Topical Given 5/6/23 2020)   escitalopram (LEXAPRO) tablet 20 mg (20 mg Oral Given 5/7/23 0619)   atorvastatin (LIPITOR) tablet 10 mg (10 mg Oral Given 5/6/23 2020)   Cariprazine HCl (VRAYLAR) capsule capsule 1.5 mg (1.5 mg Oral Given 5/6/23 0913)   famotidine (PEPCID) tablet 40 mg (40 mg Oral Given 5/6/23 2020)   ipratropium-albuterol (DUO-NEB) nebulizer solution 3 mL (3 mL Nebulization Given 5/7/23 0628)   arformoterol (BROVANA) nebulizer solution 15 mcg (15 mcg Nebulization Given 5/7/23 0628)   budesonide (PULMICORT) nebulizer solution 0.5 mg (0.5 mg Nebulization Given 5/7/23 0628)   acetaminophen (TYLENOL) tablet 650 mg (650 mg Oral Given 5/7/23 0619)   Hydrocod Russel-Chlorphe Russel ER (TUSSIONEX PENNKINETIC) 10-8 MG/5ML ER suspension 5 mL (5 mL Oral Given 5/6/23 2026)   predniSONE (DELTASONE) tablet 40 mg (has no administration in time range)   ipratropium-albuterol (DUO-NEB) nebulizer solution 3 mL (3 mL Nebulization Given 5/4/23 2353)   methylPREDNISolone sodium succinate (SOLU-Medrol) injection 125 mg (125 mg Intravenous Given 5/5/23 0000)   cefTRIAXone (ROCEPHIN) IVPB 1 g (0 g Intravenous Stopped 5/5/23 0123)   AZITHROMYCIN 500 MG/250 ML 0.9% NS IVPB (vial-mate) (500 mg Intravenous New Bag 5/5/23 0113)   sodium chloride 0.9 % bolus 1,000 mL (1,000 mL Intravenous New Bag 5/5/23 0113)        ED Course:    ED Course as of 05/07/23 0812   Thu May 04, 2023   7930 My interpretation of EKG: Sinus tachycardia 120, normal P wave, normal QRS, nonspecific ST change, normal QT, artifact present, grossly unchanged from previous. [JS]    Fri May 05, 2023   0000 Patient with acute COPD exacerbation and increased work of breathing.  Will initiate treatment with Solu-Medrol and nebulizer treatments in the emergency department.  Chest x-ray pending at this time.  Reevaluation pending following initial round of treatment. [JS]   0047 Initiate antibiotics for possible multifocal pneumonia.  Patient discussed with hospitalist for admission.  The patient's airway is intact, vital signs, and respiratory status are safe for admission at this time. [JS]   0048 Sepsis criteria was met in the emergency department and the Sepsis protocol (including antibiotic administration) was initiated.      SIRS criteria considered:   1.  Temperature > 100.4 or <98.6    2.  Heart Rate > 90    3.  Respiratory Rate > 22    4.  WBC > 12K or <4K.             Severe Sepsis:     Respiratory: Mechanical Ventilation or BiPAP  Hypotension: SBP > 90 or MAP < 65  Renal: Creatinine > 2  Metabolic: Lactic Acid > 2  Hematologic: Platelets < 100K or INR > 1.5  Hepatic: BILI  >  2  CNS: Sudden AMS     Septic Shock:     Severe Sepsis + Persistent hypotension or Lactic Acid > 4     Normal saline bolus, Antibiotics, and final disposition was based on these definitions.        Sepsis was recognized at 0048    Antibiotics were ordered.       30 mL/kg bolus was NOT indicated.       Patient did not receive the recommended 30 mL/kg fluid bolus for sepsis because it would be harmful or detrimental to the patient.    The patient has concern for fluid overload.   The patient was ordered 1L of fluids.    Total Critical Care time of 35 minutes. Total critical care time documented does not include time spent on separately billed procedures for services of nurses or physician assistants. I personally saw and examined the patient. I have reviewed all diagnostic interpretations and treatment plans as written. I was present for the key portions of any procedures performed and the inclusive time noted in any  critical care statement. Critical care time includes patient management by me, time spent at the patient's bedside,  time to review lab and imaging results, discussing patient care, documentation in the medical record, and time spent with family or caregiver.   [JS]      ED Course User Index  [JS] Abram Contreras MD       Labs:    Lab Results (last 24 hours)     ** No results found for the last 24 hours. **           Imaging:    No Radiology Exams Resulted Within Past 24 Hours      Differential Diagnosis and Discussion:    Dyspnea: Differential diagnosis includes but is not limited to metabolic acidosis, neurological disorders, psychogenic, asthma, pneumothorax, upper airway obstruction, COPD, pneumonia, noncardiogenic pulmonary edema, interstitial lung disease, anemia, congestive heart failure, and pulmonary embolism    All labs were reviewed and interpreted by me.  All X-rays impressions were independently interpreted by me.  EKG was interpreted by me.    [facility]     Critical Care Note: Total Critical Care time of 35 minutes. Total critical care time documented does not include time spent on separately billed procedures for services of nurses or physician assistants. I personally saw and examined the patient. I have reviewed all diagnostic interpretations and treatment plans as written. I was present for the key portions of any procedures performed and the inclusive time noted in any critical care statement. Critical care time includes patient management by me, time spent at the patients bedside,  time to review lab and imaging results, discussing patient care, documentation in the medical record, and time spent with family or caregiver.    Patient Care Considerations:          Consultants/Shared Management Plan:    Hospitalist: I have discussed the case with The hospitalist who agrees to accept the patient for admission.    Social Determinants of Health:    Patient is independent, reliable, and has access to care.        Disposition and Care Coordination:    Admit:   Through independent evaluation of the patient's history, physical, and imperical data, the patient meets criteria for observation/admission to the hospital.        Final diagnoses:   Multifocal pneumonia   Acute exacerbation of chronic obstructive pulmonary disease (COPD)   Acute on chronic respiratory failure with hypoxia   Sepsis, due to unspecified organism, unspecified whether acute organ dysfunction present        ED Disposition     ED Disposition   Decision to Admit    Condition   --    Comment   Level of Care: Telemetry [5]   Diagnosis: Multifocal pneumonia [[id number]]   Admitting Physician: CLARITZA LEVI [[id number]]   Certification: I Certify That Inpatient Hospital Services Are Medically Necessary For Greater Than 2 Midnights               This medical record created using voice recognition software.        Documentation assistance provided by Gaetano De León acting as scribe for Abram Contreras MD. Information recorded by the scribe was done at my direction and has been verified and validated by me.        Gaetano De León  05/04/23 5315       Abram Contreras MD  05/07/23 5050

## 2023-05-05 NOTE — PLAN OF CARE
Goal Outcome Evaluation:   Patient alert and oriented. Bed alarm on, call light within reach. Plan of care ongoing.

## 2023-05-06 LAB
ANION GAP SERPL CALCULATED.3IONS-SCNC: 11 MMOL/L (ref 5–15)
BUN SERPL-MCNC: 23 MG/DL (ref 6–20)
BUN/CREAT SERPL: 26.4 (ref 7–25)
CALCIUM SPEC-SCNC: 9 MG/DL (ref 8.6–10.5)
CHLORIDE SERPL-SCNC: 108 MMOL/L (ref 98–107)
CO2 SERPL-SCNC: 24 MMOL/L (ref 22–29)
CREAT SERPL-MCNC: 0.87 MG/DL (ref 0.57–1)
DEPRECATED RDW RBC AUTO: 46.8 FL (ref 37–54)
EGFRCR SERPLBLD CKD-EPI 2021: 78.3 ML/MIN/1.73
ERYTHROCYTE [DISTWIDTH] IN BLOOD BY AUTOMATED COUNT: 13.8 % (ref 12.3–15.4)
GLUCOSE SERPL-MCNC: 149 MG/DL (ref 65–99)
HCT VFR BLD AUTO: 30.5 % (ref 34–46.6)
HGB BLD-MCNC: 9.9 G/DL (ref 12–15.9)
MCH RBC QN AUTO: 29.6 PG (ref 26.6–33)
MCHC RBC AUTO-ENTMCNC: 32.5 G/DL (ref 31.5–35.7)
MCV RBC AUTO: 91.3 FL (ref 79–97)
PLATELET # BLD AUTO: 255 10*3/MM3 (ref 140–450)
PMV BLD AUTO: 9.9 FL (ref 6–12)
POTASSIUM SERPL-SCNC: 4.2 MMOL/L (ref 3.5–5.2)
RBC # BLD AUTO: 3.34 10*6/MM3 (ref 3.77–5.28)
SODIUM SERPL-SCNC: 143 MMOL/L (ref 136–145)
WBC NRBC COR # BLD: 16.01 10*3/MM3 (ref 3.4–10.8)

## 2023-05-06 PROCEDURE — 25010000002 HEPARIN (PORCINE) PER 1000 UNITS: Performed by: FAMILY MEDICINE

## 2023-05-06 PROCEDURE — 99232 SBSQ HOSP IP/OBS MODERATE 35: CPT | Performed by: INTERNAL MEDICINE

## 2023-05-06 PROCEDURE — 94664 DEMO&/EVAL PT USE INHALER: CPT

## 2023-05-06 PROCEDURE — 94799 UNLISTED PULMONARY SVC/PX: CPT

## 2023-05-06 PROCEDURE — 85027 COMPLETE CBC AUTOMATED: CPT | Performed by: INTERNAL MEDICINE

## 2023-05-06 PROCEDURE — 99233 SBSQ HOSP IP/OBS HIGH 50: CPT | Performed by: STUDENT IN AN ORGANIZED HEALTH CARE EDUCATION/TRAINING PROGRAM

## 2023-05-06 PROCEDURE — 80048 BASIC METABOLIC PNL TOTAL CA: CPT | Performed by: INTERNAL MEDICINE

## 2023-05-06 PROCEDURE — 25010000002 CEFTRIAXONE PER 250 MG: Performed by: FAMILY MEDICINE

## 2023-05-06 RX ORDER — PREDNISONE 20 MG/1
40 TABLET ORAL
Status: DISCONTINUED | OUTPATIENT
Start: 2023-05-07 | End: 2023-05-09 | Stop reason: HOSPADM

## 2023-05-06 RX ADMIN — IPRATROPIUM BROMIDE AND ALBUTEROL SULFATE 3 ML: 2.5; .5 SOLUTION RESPIRATORY (INHALATION) at 19:09

## 2023-05-06 RX ADMIN — ARFORMOTEROL TARTRATE 15 MCG: 15 SOLUTION RESPIRATORY (INHALATION) at 19:09

## 2023-05-06 RX ADMIN — DOXYCYCLINE 100 MG: 100 INJECTION, POWDER, LYOPHILIZED, FOR SOLUTION INTRAVENOUS at 03:37

## 2023-05-06 RX ADMIN — Medication 10 ML: at 20:22

## 2023-05-06 RX ADMIN — MICONAZOLE NITRATE: 2 POWDER TOPICAL at 20:20

## 2023-05-06 RX ADMIN — CARIPRAZINE 1.5 MG: 1.5 CAPSULE, GELATIN COATED ORAL at 09:13

## 2023-05-06 RX ADMIN — IPRATROPIUM BROMIDE AND ALBUTEROL SULFATE 3 ML: 2.5; .5 SOLUTION RESPIRATORY (INHALATION) at 00:02

## 2023-05-06 RX ADMIN — BUDESONIDE 0.5 MG: 0.5 SUSPENSION RESPIRATORY (INHALATION) at 19:09

## 2023-05-06 RX ADMIN — CEFTRIAXONE SODIUM 1 G: 1 INJECTION, SOLUTION INTRAVENOUS at 03:37

## 2023-05-06 RX ADMIN — HEPARIN SODIUM 5000 UNITS: 5000 INJECTION INTRAVENOUS; SUBCUTANEOUS at 09:14

## 2023-05-06 RX ADMIN — ATORVASTATIN CALCIUM 10 MG: 10 TABLET, FILM COATED ORAL at 20:20

## 2023-05-06 RX ADMIN — FAMOTIDINE 40 MG: 20 TABLET ORAL at 09:13

## 2023-05-06 RX ADMIN — DOXYCYCLINE 100 MG: 100 INJECTION, POWDER, LYOPHILIZED, FOR SOLUTION INTRAVENOUS at 17:05

## 2023-05-06 RX ADMIN — Medication 10 ML: at 09:14

## 2023-05-06 RX ADMIN — MICONAZOLE NITRATE: 2 POWDER TOPICAL at 09:14

## 2023-05-06 RX ADMIN — HEPARIN SODIUM 5000 UNITS: 5000 INJECTION INTRAVENOUS; SUBCUTANEOUS at 20:20

## 2023-05-06 RX ADMIN — FAMOTIDINE 40 MG: 20 TABLET ORAL at 20:20

## 2023-05-06 RX ADMIN — BUDESONIDE 0.5 MG: 0.5 SUSPENSION RESPIRATORY (INHALATION) at 06:42

## 2023-05-06 RX ADMIN — HYDROCODONE POLISTIREX AND CHLORPHENIRAMINE POLISTIREX 5 ML: 10; 8 SUSPENSION, EXTENDED RELEASE ORAL at 20:26

## 2023-05-06 RX ADMIN — ARFORMOTEROL TARTRATE 15 MCG: 15 SOLUTION RESPIRATORY (INHALATION) at 06:42

## 2023-05-06 RX ADMIN — IPRATROPIUM BROMIDE AND ALBUTEROL SULFATE 3 ML: 2.5; .5 SOLUTION RESPIRATORY (INHALATION) at 06:42

## 2023-05-06 RX ADMIN — ESCITALOPRAM 20 MG: 10 TABLET, FILM COATED ORAL at 06:08

## 2023-05-06 RX ADMIN — IPRATROPIUM BROMIDE AND ALBUTEROL SULFATE 3 ML: 2.5; .5 SOLUTION RESPIRATORY (INHALATION) at 12:05

## 2023-05-06 NOTE — PROGRESS NOTES
Pulmonary / Critical Care Progress Note      Patient Name: Lluvia Archer  : 1965  MRN: [id number]  Attending:  Flako Santos DO  Date of admission: 2023    Subjective   Subjective   Follow-up for pneumonia    Over past 24 hours:  No acute events overnight  Currently on 4 L nasal cannula  Patient says that she sometimes uses up to 6 L nasal cannula  Still have some shortness on exertion  Lying in bed  Audibly wheezing on exam  Patient report that she quit smoking about a year ago    Review of Systems  General: Denied complaints  Cardiovascular:  Denied complaints  Respiratory: Denied other complaints  Gastrointestinal: Denied complaints        Objective   Objective     Vitals:   Temp:  [97.2 °F (36.2 °C)-98.2 °F (36.8 °C)] 97.2 °F (36.2 °C)  Heart Rate:  [] 75  Resp:  [18-20] 18  BP: ()/(55-85) 136/66  Flow (L/min):  [3-4] 3    Physical Exam   Vital Signs Reviewed   General:  WDWN, Alert, NAD. Lying in bed    HEENT:  PERRL, EOMI.  OP, nares clear  Chest:  wheezing noted b/l on auscultation, no work of breathing noted 4L NC  CV: RRR, no MGR, pulses 2+, equal.  Abd:  Soft, NT, ND, + BS  EXT:  no clubbing, no cyanosis, trace edema  Neuro:  A&Ox3, CN grossly intact, no focal deficits.  Skin: No rashes or lesions noted      Result Review    Result Review:  I have personally reviewed the results from the time of this admission to 2023 14:34 EDT and agree with these findings:  [x]  Laboratory  [x]  Microbiology  [x]  Radiology  []  EKG/Telemetry   []  Cardiology/Vascular   []  Pathology  []  Old records  []  Other:  Most notable findings include:   -ABG  7.34/     Assessment & Plan   Assessment / Plan     Active Hospital Problems:  Active Hospital Problems    Diagnosis    • **Multifocal pneumonia    • Acute on chronic respiratory failure with hypoxia          Impression:  Bilateral multifocal pneumonia, organism unknown  Difficulty with airway clearance  COPD  exacerbation  Leukocytosis  History of tobacco use  Right middle and right lower lobe lung nodules    Plan:  -Currently on 4 L nasal cannula, patient reports that she uses up to 6 L at home  -ABG reviewed  -Respiratory viral panel pending; Continue to follow cultures  -Continue nebs and bronchopulmonary hygiene  -Encourage I-S/flutter valve usage  -Continue medical management for now; will consider bronchoscopy Monday if patient fails to improve clinically  -Continue ceftriaxone doxycycline x5-7 days; adjust antibiotics based on cultures  -Patient is audibly wheezing, recommend prednisone 40 mg for 5 days total  -Will need outpatient follow-up with pulmonary 1 to 2 weeks on discharge.  Please discharge patient on Symbicort, Spiriva, albuterol as needed.    DVT prophylaxis:  Medical DVT prophylaxis orders are present.    CODE STATUS:   Code Status (Patient has no pulse and is not breathing): CPR (Attempt to Resuscitate)  Medical Interventions (Patient has pulse or is breathing): Full Support  Release to patient: Routine Release    Labs, images, and medications personally reviewed.    Discussed with patient    Electronically signed by Stacy Luu MD, 05/06/23, 2:34 PM EDT.

## 2023-05-06 NOTE — PLAN OF CARE
Goal Outcome Evaluation:   Patient alert and oriented. Call light within reach, bed alarm on. Plan of care ongoing.

## 2023-05-06 NOTE — PROGRESS NOTES
[facility]   Hospitalist Progress Note  Date: 2023  Patient Name: Lluvia Archer  : 1965  MRN: [id number]  Date of admission: 2023      Subjective   Subjective     Chief Complaint: short ob air     Summary: 57-year-old female with past medical history of COPD, hypertension, migraine headaches and sleep apnea.  Patient says that she has been feeling unwell since last Saturday.  She says she has had shortness of breath cough and chest congestion.  Patient denies chest pain however, she also denies fever and chills.  Patient says she saw her family provider on Monday however all she was given was Ativan for her nerves but no medications for her respiratory illness.  Here in the emergency department the patient was found to have bilateral pneumonia.  She has been hypoxic.  She is clinically septic at this point and will be admitted for further evaluation and treatment.    Interval Followup:   Patient continues to complain of nonproductive cough  Patient is on 4 L  Patient is short of breath with exertion  Patient continues to have wheezing    Review of Systems   All systems were reviewed and negative except for: Wheezing, shortness of breath, cough    Objective   Objective     Vitals:   Temp:  [97.2 °F (36.2 °C)-98.2 °F (36.8 °C)] 97.7 °F (36.5 °C)  Heart Rate:  [] 97  Resp:  [18-20] 18  BP: ()/(55-85) 119/85  Flow (L/min):  [3-4] 3  Physical Exam    Constitutional: Awake, alert, no acute distress.  Patient is sitting in her bed watching TV   Eyes: Pupils equal, sclerae anicteric, no conjunctival injection   HENT: NCAT, mucous membranes moist   Neck: Supple, no thyromegaly, no lymphadenopathy, trachea midline   Respiratory: Decreased, diffuse wheezing, no rhonchi   Cardiovascular: RRR, no murmurs, rubs, or gallops, palpable pedal pulses bilaterally   Gastrointestinal: Positive bowel sounds, soft, nontender, nondistended   Musculoskeletal: No bilateral ankle edema, no clubbing or  cyanosis to extremities   Psychiatric: Appropriate affect, cooperative   Neurologic: Oriented x 3, strength symmetric in all extremities, Cranial Nerves grossly intact to confrontation, speech clear   Skin: No rashes     Result Review    Result Review:  I have personally reviewed the results from the time of this admission to 5/6/2023 10:57 EDT and agree with these findings:  [x]  Laboratory   CBC        6/3/2022    11:58 5/4/2023    22:40 5/6/2023    04:43   CBC   WBC 8.84   12.69   16.01     RBC 3.95   3.80   3.34     Hemoglobin 11.2   11.3   9.9     Hematocrit 35.0   34.6   30.5     MCV 88.6   91.1   91.3     MCH 28.4   29.7   29.6     MCHC 32.0   32.7   32.5     RDW 14.1   13.7   13.8     Platelets 325   241   255       BMP        5/4/2023    22:40 5/5/2023    16:40 5/6/2023    04:43   BMP   BUN 13    23     Creatinine 0.83    0.87     Sodium 145   141.0   143     Potassium 3.1    4.2     Chloride 110    108     CO2 23.6    24.0     Calcium 8.5    9.0         [x]  Microbiology   Blood Culture   Date Value Ref Range Status   05/04/2023 No growth at 24 hours  Preliminary   05/04/2023 No growth at 24 hours  Preliminary     No results found for: BCIDPCR, CXREFLEX, CSFCX, CULTURETIS  No results found for: CULTURES, HSVCX, URCX  No results found for: EYECULTURE, GCCX, HSVCULTURE, LABHSV  No results found for: LEGIONELLA, MRSACX, MUMPSCX, MYCOPLASCX  No results found for: NOCARDIACX, STOOLCX  No results found for: THROATCX, UNSTIMCULT, URINECX, CULTURE, VZVCULTUR  No results found for: VIRALCULTU, WOUNDCX    [x]  Radiology  XR Chest 1 View    Result Date: 5/5/2023  PROCEDURE: XR CHEST 1 VW  COMPARISON: 4/9/2022.  INDICATIONS: Shortness of breath; COPD.  FINDINGS: A single AP upright portable view of the chest is provided for review.  Bilateral infiltrates are seen.  The findings may represent infectious multifocal pneumonia.  Pulmonary edema is possible.  No cardiomediastinal enlargement.  No pneumothorax.  No  pneumomediastinum.  No pleural effusion.  External artifacts obscure detail.  The thoracic aorta is atherosclerotic.  Degenerative changes involve the right shoulder.      Impression:  Bilateral infiltrates are seen.  The findings may represent infectious multifocal pneumonia.  Pulmonary edema would be in the differential diagnosis.      Please note that portions of this note were completed with a voice recognition program.  TOBI OLIVIA JR, MD       Electronically Signed and Approved By: TOBI OLIVIA JR, MD on 5/05/2023 at 0:21                []  EKG/Telemetry   []  Cardiology/Vascular   []  Pathology  []  Old records  []  Other:    Assessment & Plan   Assessment / Plan     Assessment/Plan:  Multifocal pneumonia with unknown organism  COPD exacerbation  Significant history of tobacco abuse  Right middle and lower lobe lung nodules    PLAN  --continue patient on bronchodilators  --Pulmonary following may require bronchoscopy if not improving  --Continue antibiotics  --Continue prednisone     Discussed plan with RN.    DVT prophylaxis:  Medical DVT prophylaxis orders are present.    CODE STATUS:   Code Status (Patient has no pulse and is not breathing): CPR (Attempt to Resuscitate)  Medical Interventions (Patient has pulse or is breathing): Full Support  Release to patient: Routine Release        Electronically signed by Flako Santos DO, 05/06/23, 10:57 AM EDT.

## 2023-05-07 LAB
BACTERIA SPEC RESP CULT: NORMAL
GRAM STN SPEC: NORMAL

## 2023-05-07 PROCEDURE — 94799 UNLISTED PULMONARY SVC/PX: CPT

## 2023-05-07 PROCEDURE — 25010000002 CEFTRIAXONE PER 250 MG: Performed by: FAMILY MEDICINE

## 2023-05-07 PROCEDURE — 99233 SBSQ HOSP IP/OBS HIGH 50: CPT | Performed by: STUDENT IN AN ORGANIZED HEALTH CARE EDUCATION/TRAINING PROGRAM

## 2023-05-07 PROCEDURE — 94664 DEMO&/EVAL PT USE INHALER: CPT

## 2023-05-07 PROCEDURE — 97161 PT EVAL LOW COMPLEX 20 MIN: CPT

## 2023-05-07 PROCEDURE — 97116 GAIT TRAINING THERAPY: CPT

## 2023-05-07 PROCEDURE — 63710000001 PREDNISONE PER 1 MG: Performed by: STUDENT IN AN ORGANIZED HEALTH CARE EDUCATION/TRAINING PROGRAM

## 2023-05-07 PROCEDURE — 99232 SBSQ HOSP IP/OBS MODERATE 35: CPT | Performed by: INTERNAL MEDICINE

## 2023-05-07 PROCEDURE — 25010000002 HEPARIN (PORCINE) PER 1000 UNITS: Performed by: FAMILY MEDICINE

## 2023-05-07 RX ORDER — CEFTRIAXONE SODIUM 2 G/50ML
2 INJECTION, SOLUTION INTRAVENOUS EVERY 24 HOURS
Status: DISCONTINUED | OUTPATIENT
Start: 2023-05-08 | End: 2023-05-09 | Stop reason: HOSPADM

## 2023-05-07 RX ADMIN — ARFORMOTEROL TARTRATE 15 MCG: 15 SOLUTION RESPIRATORY (INHALATION) at 19:44

## 2023-05-07 RX ADMIN — BUDESONIDE 0.5 MG: 0.5 SUSPENSION RESPIRATORY (INHALATION) at 06:28

## 2023-05-07 RX ADMIN — MICONAZOLE NITRATE: 2 POWDER TOPICAL at 09:25

## 2023-05-07 RX ADMIN — HEPARIN SODIUM 5000 UNITS: 5000 INJECTION INTRAVENOUS; SUBCUTANEOUS at 20:57

## 2023-05-07 RX ADMIN — ACETAMINOPHEN 650 MG: 325 TABLET ORAL at 15:36

## 2023-05-07 RX ADMIN — CEFTRIAXONE SODIUM 1 G: 1 INJECTION, SOLUTION INTRAVENOUS at 02:52

## 2023-05-07 RX ADMIN — IPRATROPIUM BROMIDE AND ALBUTEROL SULFATE 3 ML: 2.5; .5 SOLUTION RESPIRATORY (INHALATION) at 00:24

## 2023-05-07 RX ADMIN — IPRATROPIUM BROMIDE AND ALBUTEROL SULFATE 3 ML: 2.5; .5 SOLUTION RESPIRATORY (INHALATION) at 06:28

## 2023-05-07 RX ADMIN — ARFORMOTEROL TARTRATE 15 MCG: 15 SOLUTION RESPIRATORY (INHALATION) at 06:28

## 2023-05-07 RX ADMIN — HYDROCODONE POLISTIREX AND CHLORPHENIRAMINE POLISTIREX 5 ML: 10; 8 SUSPENSION, EXTENDED RELEASE ORAL at 20:57

## 2023-05-07 RX ADMIN — DOXYCYCLINE 100 MG: 100 INJECTION, POWDER, LYOPHILIZED, FOR SOLUTION INTRAVENOUS at 15:36

## 2023-05-07 RX ADMIN — PREDNISONE 40 MG: 20 TABLET ORAL at 09:24

## 2023-05-07 RX ADMIN — ATORVASTATIN CALCIUM 10 MG: 10 TABLET, FILM COATED ORAL at 20:57

## 2023-05-07 RX ADMIN — Medication 10 ML: at 20:57

## 2023-05-07 RX ADMIN — FAMOTIDINE 40 MG: 20 TABLET ORAL at 20:57

## 2023-05-07 RX ADMIN — Medication 10 ML: at 09:24

## 2023-05-07 RX ADMIN — IPRATROPIUM BROMIDE AND ALBUTEROL SULFATE 3 ML: 2.5; .5 SOLUTION RESPIRATORY (INHALATION) at 19:44

## 2023-05-07 RX ADMIN — FAMOTIDINE 40 MG: 20 TABLET ORAL at 09:24

## 2023-05-07 RX ADMIN — DOXYCYCLINE 100 MG: 100 INJECTION, POWDER, LYOPHILIZED, FOR SOLUTION INTRAVENOUS at 03:33

## 2023-05-07 RX ADMIN — BUDESONIDE 0.5 MG: 0.5 SUSPENSION RESPIRATORY (INHALATION) at 19:44

## 2023-05-07 RX ADMIN — IPRATROPIUM BROMIDE AND ALBUTEROL SULFATE 3 ML: 2.5; .5 SOLUTION RESPIRATORY (INHALATION) at 11:37

## 2023-05-07 RX ADMIN — ESCITALOPRAM 20 MG: 10 TABLET, FILM COATED ORAL at 06:19

## 2023-05-07 RX ADMIN — HEPARIN SODIUM 5000 UNITS: 5000 INJECTION INTRAVENOUS; SUBCUTANEOUS at 09:23

## 2023-05-07 RX ADMIN — CARIPRAZINE 1.5 MG: 1.5 CAPSULE, GELATIN COATED ORAL at 09:24

## 2023-05-07 RX ADMIN — IPRATROPIUM BROMIDE AND ALBUTEROL SULFATE 3 ML: 2.5; .5 SOLUTION RESPIRATORY (INHALATION) at 23:44

## 2023-05-07 RX ADMIN — MICONAZOLE NITRATE: 2 POWDER TOPICAL at 20:57

## 2023-05-07 RX ADMIN — ACETAMINOPHEN 650 MG: 325 TABLET ORAL at 06:19

## 2023-05-07 NOTE — THERAPY EVALUATION
Acute Care - Physical Therapy Initial Evaluation   [facility]     Patient Name: Lluvia Archer  : 1965  MRN: [id number]  Today's Date: 2023   Onset of Illness/Injury or Date of Surgery: 23  Visit Dx:     ICD-10-CM ICD-9-CM   1. Pneumonia of right lower lobe due to infectious organism  J18.9 486   2. Multifocal pneumonia  J18.9 486   3. Acute exacerbation of chronic obstructive pulmonary disease (COPD)  J44.1 491.21   4. Acute on chronic respiratory failure with hypoxia  J96.21 518.84     799.02   5. Sepsis, due to unspecified organism, unspecified whether acute organ dysfunction present  A41.9 038.9     995.91   6. Difficulty in walking  R26.2 719.7     Patient Active Problem List   Diagnosis   • Abnormal mammogram   • Anxiety   • Arthritis   • Chronic nausea   • Chronic obstructive pulmonary disease (COPD)   • Counseling on substance use and abuse   • Esophageal reflux   • Hernia, hiatal   • Hyperlipidemia   • Hypertension   • Knee pain, right   • Memory loss   • Migraine   • Moderate episode of recurrent major depressive disorder   • Night sweats   • Numbness   • Sciatica of right side   • Severe episode of recurrent major depressive disorder, without psychotic features   • Shoulder pain, left   • Other diseases of nasal cavity and sinuses   • Sleep apnea, unspecified   • Tobacco abuse   • Strain of groin, right, subsequent encounter   • Osteoarthritis of spine with radiculopathy, lumbar region   • Chronic right-sided low back pain with right-sided sciatica   • Aftercare following right hip joint replacement surgery   • Primary osteoarthritis of right hip   • Right hip pain   • Sepsis, due to unspecified organism, unspecified whether acute organ dysfunction present   • Severe malnutrition (CMS/HCC)   • Pneumonia of right lower lobe due to infectious organism   • Hospital discharge follow-up   • Other specified anemias   • Encounter for screening mammogram for malignant neoplasm of breast   •  Multifocal pneumonia   • Acute on chronic respiratory failure with hypoxia     Past Medical History:   Diagnosis Date   • Abnormal mammogram     PT DENIES KNOWING OF THIS HX   • Anxiety    • Arthritis     R SHOULDER, R HIP, AND R LEG   • Chronic nausea 01/15/2019   • Hernia, hiatal    • Hyperlipidemia    • Hypertension    • Knee pain, right 2018   • Memory loss     FORGETFULNESS ? ETIOLOGY   • Migraine headache    • Moderate episode of recurrent major depressive disorder 2017   • Night sweats    • Sciatica of right side 2017   • Severe episode of recurrent major depressive disorder, without psychotic features 10/22/2019   • Shoulder pain, left 2018   • Sleep apnea, unspecified     DOESNT USE CPAP AFTER WEIGHT LOSS     Past Surgical History:   Procedure Laterality Date   • BRONCHOSCOPY N/A 2022    Procedure: BRONCHOSCOPY WITH BRONCHOALVEOLAR LAVAGE, BIOPSY;  Surgeon: Shin Mcdonald DO;  Location: [facility] ENDOSCOPY;  Service: Pulmonary;  Laterality: N/A;  RIGHT LOWER LOBE INFILTRATE   •  SECTION     • CHOLECYSTECTOMY      LAPAROSCOPIC   • COLONOSCOPY     • TOTAL HIP ARTHROPLASTY Right 2022    Procedure: RIGHT TOTAL HIP ARTHROPLASTY;  Surgeon: Cecil Gomes MD;  Location: [facility] MAIN OR;  Service: Orthopedics;  Laterality: Right;     PT Assessment (last 12 hours)     PT Evaluation and Treatment     Row Name 23 1142          Physical Therapy Time and Intention    Subjective Information complains of;fatigue;dyspnea  -DW     Document Type evaluation  -DW     Mode of Treatment individual therapy;physical therapy  -DW     Patient Effort good  -DW     Symptoms Noted During/After Treatment fatigue  -DW     Row Name 23 1142          General Information    Patient Profile Reviewed yes  -DW     Onset of Illness/Injury or Date of Surgery 23  -DW     Referring Physician Flako An  -DW     Patient Observations alert;cooperative;agree to therapy  -DW      Prior Level of Function independent:;all household mobility;community mobility;ADL's  -DW     Equipment Currently Used at Home none  -DW     Risks Reviewed patient:  -DW     Benefits Reviewed patient:;improve function;increase independence;increase strength;increase balance  -DW     Barriers to Rehab none identified  -DW     Row Name 05/07/23 1142          Previous Level of Function/Home Environm    BADLs, Premorbid Functional Level independent  -DW     IADLs, Premorbid Functional Level independent  -DW     Bed Mobility, Premorbid Functional Level independent  -DW     Transfers, Premorbid Functional Level independent  -DW     Household Ambulation, Premorbid Functional Level independent  -DW     Stairs, Premorbid Functional Level independent  -DW     Community Ambulation, Premorbid Functional Level independent  -DW     Row Name 05/07/23 1142          Living Environment    Current Living Arrangements home  -DW     People in Home spouse;child(scotty), adult  -DW     Primary Care Provided by self  -DW     Row Name 05/07/23 1142          Home Use of Assistive/Adaptive Equipment    Equipment Currently Used at Home oxygen  -DW     Row Name 05/07/23 1142          Cognition    Affect/Mental Status (Cognition) WNL  -DW     Orientation Status (Cognition) oriented x 3  -DW     Follows Commands (Cognition) WNL  -DW     Cognitive Function WNL  -DW     Row Name 05/07/23 1142          Range of Motion (ROM)    Range of Motion ROM is WNL  -DW     Row Name 05/07/23 1142          Strength Comprehensive (MMT)    General Manual Muscle Testing (MMT) Assessment lower extremity strength deficits identified  -DW     Comment, General Manual Muscle Testing (MMT) Assessment 4-/5 MMT BLE  -DW     Row Name 05/07/23 1142          Bed Mobility    Bed Mobility bed mobility (all) activities  -DW     All Activities, [place] (Bed Mobility) standby assist  -DW     Bed Mobility, Safety Issues decreased use of legs for bridging/pushing  -DW      Assistive Device (Bed Mobility) head of bed elevated  -     Row Name 05/07/23 1142          Transfers    Transfers bed-chair transfer;sit-stand transfer;stand-sit transfer  -DW     Row Name 05/07/23 1142          Bed-Chair Transfer    Bed-Chair [place] (Transfers) contact guard  -DW     Row Name 05/07/23 1142          Sit-Stand Transfer    Sit-Stand [place] (Transfers) contact guard  -DW     Row Name 05/07/23 1142          Stand-Sit Transfer    Stand-Sit [place] (Transfers) contact guard  -DW     Row Name 05/07/23 1142          Gait/Stairs (Locomotion)    Gait/Stairs Locomotion gait/ambulation independence;gait/ambulation assistive device;distance ambulated  -     [place] Level (Gait) contact guard  -     Distance in Feet (Gait) 15  -DW     Row Name 05/07/23 1142          Safety Issues, Functional Mobility    Impairments Affecting Function (Mobility) balance;endurance/activity tolerance;shortness of breath;strength  -[facility] Name 05/07/23 1142          Balance    Balance Assessment standing dynamic balance  -     Dynamic Standing Balance contact guard  -[facility] Name 05/07/23 1142          Plan of Care Review    Plan of Care Reviewed With patient  -     Progress improving  -     Outcome Evaluation Pt presents with decreased functional mobility secondary to current hospitalization.  Skilled PT intervention is needed to address noted deficits in order to restore to PLOF.  -     Row Name 05/07/23 1142          Therapy Assessment/Plan (PT)    PT Diagnosis (PT) Difficulty in walking  -     Rehab Potential (PT) good, to achieve stated therapy goals  -     Criteria for Skilled Interventions Met (PT) yes;meets criteria;skilled treatment is necessary  -     Therapy Frequency (PT) daily  -     Problem List (PT) problems related to;balance;strength  -     Activity Limitations Related to Problem List (PT) unable to ambulate safely;unable to transfer safely;BADLs not performed  adequately or safely;IADLs not performed adequately or safely  -     Row Name 05/07/23 1142          PT Evaluation Complexity    History, PT Evaluation Complexity no personal factors and/or comorbidities  -DW     Examination of Body Systems (PT Eval Complexity) 1-2 elements  -DW     Clinical Presentation (PT Evaluation Complexity) stable  -DW     Clinical Decision Making (PT Evaluation Complexity) low complexity  -DW     Overall Complexity (PT Evaluation Complexity) low complexity  -     Row Name 05/07/23 1142          Therapy Plan Review/Discharge Plan (PT)    Therapy Plan Review (PT) evaluation/treatment results reviewed  -     Row Name 05/07/23 1142          Physical Therapy Goals    Bed Mobility Goal Selection (PT) bed mobility, PT goal 1  -DW     Transfer Goal Selection (PT) transfer, PT goal 1  -DW     Gait Training Goal Selection (PT) gait training, PT goal 1  -[facility] Name 05/07/23 1142          Bed Mobility Goal 1 (PT)    Activity/Assistive Device (Bed Mobility Goal 1, PT) bed mobility activities, all  -DW     [place] Level/Cues Needed (Bed Mobility Goal 1, PT) independent  -DW     Time Frame (Bed Mobility Goal 1, PT) long term goal (LTG);10 days  -[facility] Name 05/07/23 1142          Transfer Goal 1 (PT)    Activity/Assistive Device (Transfer Goal 1, PT) transfers, all  -DW     [place] Level/Cues Needed (Transfer Goal 1, PT) independent  -DW     Time Frame (Transfer Goal 1, PT) long term goal (LTG);10 days  -[facility] Name 05/07/23 1142          Gait Training Goal 1 (PT)    Activity/Assistive Device (Gait Training Goal 1, PT) gait (walking locomotion);assistive device use  -DW     [place] Level (Gait Training Goal 1, PT) independent  -DW     Distance (Gait Training Goal 1, PT) 300  -DW     Time Frame (Gait Training Goal 1, PT) long term goal (LTG);10 days  -           User Key  (r) = Recorded By, (t) = Taken By, (c) = Cosigned By    Initials Name Provider Type    CRISTIANO Mayen  Chapin, PT Physical Therapist                Physical Therapy Education     Title: PT OT SLP Therapies (Done)     Topic: Physical Therapy (Done)     Point: Mobility training (Done)     Learning Progress Summary           Patient Acceptance, E,TB, VU by  at 5/7/2023 1151                   Point: Home exercise program (Done)     Learning Progress Summary           Patient Acceptance, E,TB, VU by DW at 5/7/2023 1151                   Point: Body mechanics (Done)     Learning Progress Summary           Patient Acceptance, E,TB, VU by  at 5/7/2023 1151                   Point: Precautions (Done)     Learning Progress Summary           Patient Acceptance, E,TB, VU by  at 5/7/2023 1151                               User Key     Initials Effective Dates Name Provider Type Discipline     04/25/21 -  Chapin Mayen, PT Physical Therapist PT              PT Recommendation and Plan  Anticipated Discharge Disposition (PT): home with home health, home with assist  Planned Therapy Interventions (PT): balance training, gait training, strengthening, transfer training  Therapy Frequency (PT): daily  Plan of Care Reviewed With: patient  Progress: improving  Outcome Evaluation: Pt presents with decreased functional mobility secondary to current hospitalization.  Skilled PT intervention is needed to address noted deficits in order to restore to PLOF.   Outcome Measures     Row Name 05/07/23 1150             How much help from another person do you currently need...    Turning from your back to your side while in flat bed without using bedrails? 4  -DW      Moving from lying on back to sitting on the side of a flat bed without bedrails? 4  -DW      Moving to and from a bed to a chair (including a wheelchair)? 4  -DW      Standing up from a chair using your arms (e.g., wheelchair, bedside chair)? 3  -DW      Climbing 3-5 steps with a railing? 3  -DW      To walk in hospital room? 3  -DW      AM-PAC 6 Clicks Score (PT) 21  -DW          Functional Assessment    Outcome Measure Options AM-PAC 6 Clicks Basic Mobility (PT)  -DW            User Key  (r) = Recorded By, (t) = Taken By, (c) = Cosigned By    Initials Name Provider Type    Chapin Bell PT Physical Therapist                 Time Calculation:    PT Charges     Row Name 05/07/23 1151             Time Calculation    PT Received On 05/07/23  -DW      PT Goal Re-Cert Due Date 05/16/23  -DW         Timed Charges    50368 - Gait Training Minutes  15  -DW         Untimed Charges    PT Eval/Re-eval Minutes 8  -DW         Total Minutes    Timed Charges Total Minutes 15  -DW      Untimed Charges Total Minutes 8  -DW       Total Minutes 23  -DW            User Key  (r) = Recorded By, (t) = Taken By, (c) = Cosigned By    Initials Name Provider Type    Chapin Bell PT Physical Therapist              Therapy Charges for Today     Code Description Service Date Service Provider Modifiers Qty    82116779061 HC GAIT TRAINING EA 15 MIN 5/7/2023 Chapin Mayen, PT GP 1    31220750085 HC PT EVAL LOW COMPLEXITY 2 5/7/2023 Chapin Mayen, PT GP 1          PT G-Codes  Outcome Measure Options: AM-PAC 6 Clicks Basic Mobility (PT)  AM-PAC 6 Clicks Score (PT): 21    Chapin Mayen PT  5/7/2023

## 2023-05-07 NOTE — PLAN OF CARE
Goal Outcome Evaluation: Patient alert and oriented. Pt slept throughout the night. No complaints or concerns. VSS. Call light within reach of pt. Marbella FERRARA RN

## 2023-05-07 NOTE — PROGRESS NOTES
[facility]   Hospitalist Progress Note  Date: 2023  Patient Name: Lluvia Archer  : 1965  MRN: [id number]  Date of admission: 2023      Subjective   Subjective     Chief Complaint: short ob air     Summary: 57-year-old female with past medical history of COPD, hypertension, migraine headaches and sleep apnea.  Patient says that she has been feeling unwell since last Saturday.  She says she has had shortness of breath cough and chest congestion.  Patient denies chest pain however, she also denies fever and chills.  Patient says she saw her family provider on Monday however all she was given was Ativan for her nerves but no medications for her respiratory illness.  Here in the emergency department the patient was found to have bilateral pneumonia.  She has been hypoxic.  She is clinically septic at this point and will be admitted for further evaluation and treatment.    Interval Followup:   Patient continues to complain of nonproductive cough  Patient is on 2 L  Patient is short of breath with exertion  Patient continues to have wheezing    Review of Systems   All systems were reviewed and negative except for: Wheezing, shortness of breath, cough    Objective   Objective     Vitals:   Temp:  [97.2 °F (36.2 °C)-98.2 °F (36.8 °C)] 98.1 °F (36.7 °C)  Heart Rate:  [] 79  Resp:  [16-20] 18  BP: (113-136)/(54-92) 115/54  Flow (L/min):  [2-4] 2  Physical Exam    Constitutional: Awake, alert, no acute distress.  Patient is sitting in the bedside chair    eyes: Pupils equal, sclerae anicteric, no conjunctival injection   HENT: NCAT, mucous membranes moist   Neck: Supple, no thyromegaly, no lymphadenopathy, trachea midline   Respiratory: Decreased, diffuse wheezing, no rhonchi   Cardiovascular: RRR, no murmurs, rubs, or gallops, palpable pedal pulses bilaterally   Gastrointestinal: Positive bowel sounds, soft, nontender, nondistended   Musculoskeletal: Full range of motion   Psychiatric:  Appropriate affect, cooperative   Neurologic: Oriented x 3, strength symmetric in all extremities, Cranial Nerves grossly intact to confrontation, speech clear   Skin: No rashes     Result Review    Result Review:  I have personally reviewed the results from the time of this admission to 5/7/2023 08:40 EDT and agree with these findings:  [x]  Laboratory   CBC        6/3/2022    11:58 5/4/2023    22:40 5/6/2023    04:43   CBC   WBC 8.84   12.69   16.01     RBC 3.95   3.80   3.34     Hemoglobin 11.2   11.3   9.9     Hematocrit 35.0   34.6   30.5     MCV 88.6   91.1   91.3     MCH 28.4   29.7   29.6     MCHC 32.0   32.7   32.5     RDW 14.1   13.7   13.8     Platelets 325   241   255       BMP        5/4/2023    22:40 5/5/2023    16:40 5/6/2023    04:43   BMP   BUN 13    23     Creatinine 0.83    0.87     Sodium 145   141.0   143     Potassium 3.1    4.2     Chloride 110    108     CO2 23.6    24.0     Calcium 8.5    9.0         [x]  Microbiology   Blood Culture   Date Value Ref Range Status   05/04/2023 No growth at 24 hours  Preliminary   05/04/2023 No growth at 24 hours  Preliminary     No results found for: BCIDPCR, CXREFLEX, CSFCX, CULTURETIS  No results found for: CULTURES, HSVCX, URCX  No results found for: EYECULTURE, GCCX, HSVCULTURE, LABHSV  No results found for: LEGIONELLA, MRSACX, MUMPSCX, MYCOPLASCX  No results found for: NOCARDIACX, STOOLCX  No results found for: THROATCX, UNSTIMCULT, URINECX, CULTURE, VZVCULTUR  No results found for: VIRALCULTU, WOUNDCX    [x]  Radiology  XR Chest 1 View    Result Date: 5/5/2023  PROCEDURE: XR CHEST 1 VW  COMPARISON: 4/9/2022.  INDICATIONS: Shortness of breath; COPD.  FINDINGS: A single AP upright portable view of the chest is provided for review.  Bilateral infiltrates are seen.  The findings may represent infectious multifocal pneumonia.  Pulmonary edema is possible.  No cardiomediastinal enlargement.  No pneumothorax.  No pneumomediastinum.  No pleural effusion.   External artifacts obscure detail.  The thoracic aorta is atherosclerotic.  Degenerative changes involve the right shoulder.      Impression:  Bilateral infiltrates are seen.  The findings may represent infectious multifocal pneumonia.  Pulmonary edema would be in the differential diagnosis.      Please note that portions of this note were completed with a voice recognition program.  TOBI OLIVIA JR, MD       Electronically Signed and Approved By: TOBI OLIVIA JR, MD on 5/05/2023 at 0:21                []  EKG/Telemetry   []  Cardiology/Vascular   []  Pathology  []  Old records  []  Other:    Assessment & Plan   Assessment / Plan     Assessment/Plan:  Multifocal pneumonia with unknown organism  COPD exacerbation  Significant history of tobacco abuse  Right middle and lower lobe lung nodules    PLAN  --continue patient on bronchodilators  --Pulmonary following may require bronchoscopy if not improving  --Continue antibiotics  --Continue prednisone   -- Patient's urinary antigens are negative, patient's respiratory panel still in process, patient's COVID as well as influenza are negative     Discussed plan with RN.    DVT prophylaxis:  Medical DVT prophylaxis orders are present.    CODE STATUS:   Code Status (Patient has no pulse and is not breathing): CPR (Attempt to Resuscitate)  Medical Interventions (Patient has pulse or is breathing): Full Support  Release to patient: Routine Release

## 2023-05-07 NOTE — PROGRESS NOTES
Pulmonary / Critical Care Progress Note      Patient Name: Lluvia Archer  : 1965  MRN: [id number]  Attending:  Flako Santos DO  Date of admission: 2023    Subjective   Subjective   Follow-up for pneumonia    Over past 24 hours:  On 3 L nasal cannula  Still coughing but overall feeling better  Denies chest pain, fever, chills  Sitting up in chair this morning  Continues to wheeze    Review of Systems  General: Denied complaints  Cardiovascular:  Denied complaints  Respiratory: Denied other complaints  Gastrointestinal: Denied complaints    Objective   Objective     Vitals:   Temp:  [98 °F (36.7 °C)-98.2 °F (36.8 °C)] 98.2 °F (36.8 °C)  Heart Rate:  [] 73  Resp:  [16-26] 26  BP: (113-134)/(54-92) 134/68  Flow (L/min):  [2-4] 2    Physical Exam   Vital Signs Reviewed   General:  WDWN, Alert, NAD.  Sitting up in chair  HEENT:  PERRL, EOMI.  OP, nares clear  Chest:  wheezing noted b/l on auscultation, no work of breathing noted 2L NC  CV: RRR, no MGR, pulses 2+, equal.  Abd:  Soft, NT, ND, + BS  EXT:  no clubbing, no cyanosis, trace edema  Neuro:  A&Ox3, CN grossly intact, no focal deficits.  Skin: No rashes or lesions noted      Result Review    Result Review:  I have personally reviewed the results from the time of this admission to 2023 14:11 EDT and agree with these findings:  [x]  Laboratory  [x]  Microbiology  [x]  Radiology  []  EKG/Telemetry   []  Cardiology/Vascular   []  Pathology  []  Old records  []  Other:  Most notable findings include:   -ABG  7.34//     Assessment & Plan   Assessment / Plan     Active Hospital Problems:  Active Hospital Problems    Diagnosis    • **Multifocal pneumonia    • Acute on chronic respiratory failure with hypoxia      Impression:  Bilateral multifocal pneumonia, organism unknown  Difficulty with airway clearance  COPD exacerbation  Leukocytosis, possible 2/2 steroids  History of tobacco use  Right middle and right lower lobe lung  nodules    Plan:  -Currently on 3L nasal cannula, patient reports that she uses up to 6 L at home  -ABG reviewed  -Respiratory viral panel pending; Continue to follow cultures  -Continue nebs and bronchopulmonary hygiene  -Encourage I-S/flutter valve usage  -Continue ceftriaxone doxycycline x5-7 days; adjust antibiotics based on cultures  -Patient continues to wheeze on my exam, continue prednisone 40 mg for 5 days total  -Continue medical management for now; will consider bronchoscopy Monday if patient fails to improve clinically  -Will need outpatient follow-up with pulmonary 1 to 2 weeks on discharge.  Please discharge patient on Symbicort, Spiriva, albuterol as needed.    DVT prophylaxis:  Medical DVT prophylaxis orders are present.    CODE STATUS:   Code Status (Patient has no pulse and is not breathing): CPR (Attempt to Resuscitate)  Medical Interventions (Patient has pulse or is breathing): Full Support  Release to patient: Routine Release    Labs, images, and medications personally reviewed.    Discussed with patient    Electronically signed by Stacy Luu MD, 05/06/23, 2:34 PM EDT.

## 2023-05-07 NOTE — PLAN OF CARE
Goal Outcome Evaluation:  Plan of Care Reviewed With: patient        Progress: improving  Outcome Evaluation: Pt presents with decreased functional mobility secondary to current hospitalization.  Skilled PT intervention is needed to address noted deficits in order to restore to PLOF.

## 2023-05-08 LAB
ANION GAP SERPL CALCULATED.3IONS-SCNC: 8.8 MMOL/L (ref 5–15)
BUN SERPL-MCNC: 27 MG/DL (ref 6–20)
BUN/CREAT SERPL: 34.2 (ref 7–25)
CALCIUM SPEC-SCNC: 8.4 MG/DL (ref 8.6–10.5)
CHLORIDE SERPL-SCNC: 107 MMOL/L (ref 98–107)
CO2 SERPL-SCNC: 28.2 MMOL/L (ref 22–29)
CREAT SERPL-MCNC: 0.79 MG/DL (ref 0.57–1)
DEPRECATED RDW RBC AUTO: 45.1 FL (ref 37–54)
EGFRCR SERPLBLD CKD-EPI 2021: 87.9 ML/MIN/1.73
ERYTHROCYTE [DISTWIDTH] IN BLOOD BY AUTOMATED COUNT: 13.3 % (ref 12.3–15.4)
GLUCOSE SERPL-MCNC: 103 MG/DL (ref 65–99)
HCT VFR BLD AUTO: 32.5 % (ref 34–46.6)
HGB BLD-MCNC: 10.3 G/DL (ref 12–15.9)
MAGNESIUM SERPL-MCNC: 1.8 MG/DL (ref 1.6–2.6)
MCH RBC QN AUTO: 29.3 PG (ref 26.6–33)
MCHC RBC AUTO-ENTMCNC: 31.7 G/DL (ref 31.5–35.7)
MCV RBC AUTO: 92.3 FL (ref 79–97)
PLATELET # BLD AUTO: 247 10*3/MM3 (ref 140–450)
PMV BLD AUTO: 9.3 FL (ref 6–12)
POTASSIUM SERPL-SCNC: 3.8 MMOL/L (ref 3.5–5.2)
RBC # BLD AUTO: 3.52 10*6/MM3 (ref 3.77–5.28)
SODIUM SERPL-SCNC: 144 MMOL/L (ref 136–145)
WBC NRBC COR # BLD: 13.35 10*3/MM3 (ref 3.4–10.8)

## 2023-05-08 PROCEDURE — 97116 GAIT TRAINING THERAPY: CPT

## 2023-05-08 PROCEDURE — 25010000002 HEPARIN (PORCINE) PER 1000 UNITS: Performed by: FAMILY MEDICINE

## 2023-05-08 PROCEDURE — 63710000001 PREDNISONE PER 1 MG: Performed by: STUDENT IN AN ORGANIZED HEALTH CARE EDUCATION/TRAINING PROGRAM

## 2023-05-08 PROCEDURE — 83735 ASSAY OF MAGNESIUM: CPT | Performed by: INTERNAL MEDICINE

## 2023-05-08 PROCEDURE — 0 CEFTRIAXONE PER 250 MG: Performed by: FAMILY MEDICINE

## 2023-05-08 PROCEDURE — 99232 SBSQ HOSP IP/OBS MODERATE 35: CPT | Performed by: INTERNAL MEDICINE

## 2023-05-08 PROCEDURE — 94664 DEMO&/EVAL PT USE INHALER: CPT

## 2023-05-08 PROCEDURE — 80048 BASIC METABOLIC PNL TOTAL CA: CPT | Performed by: INTERNAL MEDICINE

## 2023-05-08 PROCEDURE — 94799 UNLISTED PULMONARY SVC/PX: CPT

## 2023-05-08 PROCEDURE — 85027 COMPLETE CBC AUTOMATED: CPT | Performed by: INTERNAL MEDICINE

## 2023-05-08 RX ADMIN — CARIPRAZINE 1.5 MG: 1.5 CAPSULE, GELATIN COATED ORAL at 08:21

## 2023-05-08 RX ADMIN — BUDESONIDE 0.5 MG: 0.5 SUSPENSION RESPIRATORY (INHALATION) at 18:29

## 2023-05-08 RX ADMIN — MICONAZOLE NITRATE: 2 POWDER TOPICAL at 21:31

## 2023-05-08 RX ADMIN — ATORVASTATIN CALCIUM 10 MG: 10 TABLET, FILM COATED ORAL at 21:24

## 2023-05-08 RX ADMIN — FAMOTIDINE 40 MG: 20 TABLET ORAL at 21:23

## 2023-05-08 RX ADMIN — HEPARIN SODIUM 5000 UNITS: 5000 INJECTION INTRAVENOUS; SUBCUTANEOUS at 08:21

## 2023-05-08 RX ADMIN — IPRATROPIUM BROMIDE AND ALBUTEROL SULFATE 3 ML: 2.5; .5 SOLUTION RESPIRATORY (INHALATION) at 18:29

## 2023-05-08 RX ADMIN — Medication 10 ML: at 08:21

## 2023-05-08 RX ADMIN — FAMOTIDINE 40 MG: 20 TABLET ORAL at 08:21

## 2023-05-08 RX ADMIN — IPRATROPIUM BROMIDE AND ALBUTEROL SULFATE 3 ML: 2.5; .5 SOLUTION RESPIRATORY (INHALATION) at 06:48

## 2023-05-08 RX ADMIN — ESCITALOPRAM 20 MG: 10 TABLET, FILM COATED ORAL at 06:14

## 2023-05-08 RX ADMIN — ARFORMOTEROL TARTRATE 15 MCG: 15 SOLUTION RESPIRATORY (INHALATION) at 18:29

## 2023-05-08 RX ADMIN — PREDNISONE 40 MG: 20 TABLET ORAL at 08:21

## 2023-05-08 RX ADMIN — CEFTRIAXONE SODIUM 2 G: 2 INJECTION, SOLUTION INTRAVENOUS at 02:10

## 2023-05-08 RX ADMIN — ARFORMOTEROL TARTRATE 15 MCG: 15 SOLUTION RESPIRATORY (INHALATION) at 06:48

## 2023-05-08 RX ADMIN — IPRATROPIUM BROMIDE AND ALBUTEROL SULFATE 3 ML: 2.5; .5 SOLUTION RESPIRATORY (INHALATION) at 11:36

## 2023-05-08 RX ADMIN — HEPARIN SODIUM 5000 UNITS: 5000 INJECTION INTRAVENOUS; SUBCUTANEOUS at 21:24

## 2023-05-08 RX ADMIN — DOXYCYCLINE 100 MG: 100 INJECTION, POWDER, LYOPHILIZED, FOR SOLUTION INTRAVENOUS at 03:09

## 2023-05-08 RX ADMIN — BUDESONIDE 0.5 MG: 0.5 SUSPENSION RESPIRATORY (INHALATION) at 06:48

## 2023-05-08 RX ADMIN — DOXYCYCLINE 100 MG: 100 INJECTION, POWDER, LYOPHILIZED, FOR SOLUTION INTRAVENOUS at 15:42

## 2023-05-08 RX ADMIN — MICONAZOLE NITRATE: 2 POWDER TOPICAL at 08:21

## 2023-05-08 RX ADMIN — HYDROCODONE POLISTIREX AND CHLORPHENIRAMINE POLISTIREX 5 ML: 10; 8 SUSPENSION, EXTENDED RELEASE ORAL at 21:25

## 2023-05-08 NOTE — PROGRESS NOTES
Pulmonary / Critical Care Progress Note      Patient Name: Lluvia Archer  : 1965  MRN: [id number]  Attending:  Flako Santos DO  Date of admission: 2023    Subjective   Subjective   Follow-up for pneumonia    Over past 24 hours:  Currently on 2 L  Overall feeling better    Review of Systems  General: Denied complaints  Cardiovascular:  Denied complaints  Respiratory: Denied other complaints  Gastrointestinal: Denied complaints    Objective   Objective     Vitals:   Temp:  [97.5 °F (36.4 °C)-98.6 °F (37 °C)] 98.6 °F (37 °C)  Heart Rate:  [68-94] 92  Resp:  [12-22] 22  BP: (112-146)/(54-73) 114/61  Flow (L/min):  [2] 2    Physical Exam   Vital Signs Reviewed   General:  WDWN, Alert, NAD.  Sitting up in chair  HEENT:  PERRL, EOMI.  OP, nares clear  Chest:  wheezing noted b/l on auscultation, no work of breathing noted 2L NC  CV: RRR, no MGR, pulses 2+, equal.  Abd:  Soft, NT, ND, + BS  EXT:  no clubbing, no cyanosis, trace edema  Neuro:  A&Ox3, CN grossly intact, no focal deficits.  Skin: No rashes or lesions noted      Result Review    Result Review:  I have personally reviewed the results from the time of this admission to 2023 14:50 EDT and agree with these findings:  [x]  Laboratory  [x]  Microbiology  [x]  Radiology  []  EKG/Telemetry   []  Cardiology/Vascular   []  Pathology  []  Old records  []  Other:  Most notable findings include:   -ABG  7.34//     Assessment & Plan   Assessment / Plan     Active Hospital Problems:  Active Hospital Problems    Diagnosis    • **Multifocal pneumonia    • Acute on chronic respiratory failure with hypoxia      Impression:  Bilateral multifocal pneumonia, organism unknown  Difficulty with airway clearance  COPD exacerbation  Leukocytosis, possible 2/2 steroids  History of tobacco use  Right middle and right lower lobe lung nodules    Plan:  -Currently on 3L nasal cannula, patient reports that she uses up to 6 L at home  Overall better  Anticipate  patient can be discharged home tomorrow 5/9/2023  -Will need outpatient follow-up with pulmonary 1 to 2 weeks on discharge.  Please discharge patient on Symbicort, Spiriva, albuterol as needed.    DVT prophylaxis:  Medical DVT prophylaxis orders are present.    CODE STATUS:   Code Status (Patient has no pulse and is not breathing): CPR (Attempt to Resuscitate)  Medical Interventions (Patient has pulse or is breathing): Full Support  Release to patient: Routine Release    Labs, images, and medications personally reviewed.    Discussed with patient    Electronically signed by Shin Mcdonald DO, 05/08/23, 2:50 PM EDT.

## 2023-05-08 NOTE — THERAPY TREATMENT NOTE
Acute Care - Physical Therapy Treatment Note  KETTY Mcclellan     Patient Name: Lluvia Archer  : 1965  MRN: [id number]  Today's Date: 2023   Onset of Illness/Injury or Date of Surgery: 23  Visit Dx:     ICD-10-CM ICD-9-CM   1. Pneumonia of right lower lobe due to infectious organism  J18.9 486   2. Multifocal pneumonia  J18.9 486   3. Acute exacerbation of chronic obstructive pulmonary disease (COPD)  J44.1 491.21   4. Acute on chronic respiratory failure with hypoxia  J96.21 518.84     799.02   5. Sepsis, due to unspecified organism, unspecified whether acute organ dysfunction present  A41.9 038.9     995.91   6. Difficulty in walking  R26.2 719.7     Patient Active Problem List   Diagnosis   • Abnormal mammogram   • Anxiety   • Arthritis   • Chronic nausea   • Chronic obstructive pulmonary disease (COPD)   • Counseling on substance use and abuse   • Esophageal reflux   • Hernia, hiatal   • Hyperlipidemia   • Hypertension   • Knee pain, right   • Memory loss   • Migraine   • Moderate episode of recurrent major depressive disorder   • Night sweats   • Numbness   • Sciatica of right side   • Severe episode of recurrent major depressive disorder, without psychotic features   • Shoulder pain, left   • Other diseases of nasal cavity and sinuses   • Sleep apnea, unspecified   • Tobacco abuse   • Strain of groin, right, subsequent encounter   • Osteoarthritis of spine with radiculopathy, lumbar region   • Chronic right-sided low back pain with right-sided sciatica   • Aftercare following right hip joint replacement surgery   • Primary osteoarthritis of right hip   • Right hip pain   • Sepsis, due to unspecified organism, unspecified whether acute organ dysfunction present   • Severe malnutrition (CMS/HCC)   • Pneumonia of right lower lobe due to infectious organism   • Hospital discharge follow-up   • Other specified anemias   • Encounter for screening mammogram for malignant neoplasm of breast   •  Multifocal pneumonia   • Acute on chronic respiratory failure with hypoxia     Past Medical History:   Diagnosis Date   • Abnormal mammogram     PT DENIES KNOWING OF THIS HX   • Anxiety    • Arthritis     R SHOULDER, R HIP, AND R LEG   • Chronic nausea 01/15/2019   • Hernia, hiatal    • Hyperlipidemia    • Hypertension    • Knee pain, right 2018   • Memory loss     FORGETFULNESS ? ETIOLOGY   • Migraine headache    • Moderate episode of recurrent major depressive disorder 2017   • Night sweats    • Sciatica of right side 2017   • Severe episode of recurrent major depressive disorder, without psychotic features 10/22/2019   • Shoulder pain, left 2018   • Sleep apnea, unspecified     DOESNT USE CPAP AFTER WEIGHT LOSS     Past Surgical History:   Procedure Laterality Date   • BRONCHOSCOPY N/A 2022    Procedure: BRONCHOSCOPY WITH BRONCHOALVEOLAR LAVAGE, BIOPSY;  Surgeon: Shin Mcdonald DO;  Location: [facility] ENDOSCOPY;  Service: Pulmonary;  Laterality: N/A;  RIGHT LOWER LOBE INFILTRATE   •  SECTION     • CHOLECYSTECTOMY      LAPAROSCOPIC   • COLONOSCOPY     • TOTAL HIP ARTHROPLASTY Right 2022    Procedure: RIGHT TOTAL HIP ARTHROPLASTY;  Surgeon: Cecil Gomes MD;  Location: [facility] MAIN OR;  Service: Orthopedics;  Laterality: Right;     PT Assessment (last 12 hours)     PT Evaluation and Treatment     Row Name 23 1332          Physical Therapy Time and Intention    Subjective Information complains of;dyspnea  -RH     Document Type therapy note (daily note)  -     Mode of Treatment physical therapy;individual therapy  -     Patient Effort good  -RH     Row Name 23 9205          Pain    Additional Documentation Pain Scale: FACES Pre/Post-Treatment (Group)  -     Row Name 23 2331          Pain Scale: FACES Pre/Post-Treatment    Pain: FACES Scale, Pretreatment 0-->no hurt  -RH     Posttreatment Pain Rating 0-->no hurt  -RH     Row Name 23  1334          Bed Mobility    Bed Mobility scooting/bridging;sit-supine;supine-sit  -RH     All Activities, [place] (Bed Mobility) standby assist  -RH     Scooting/Bridging [place] (Bed Mobility) standby assist  -RH     Supine-Sit [place] (Bed Mobility) standby assist  -RH     Sit-Supine [place] (Bed Mobility) standby assist  -RH     Row Name 05/08/23 1334          Transfers    Transfers sit-stand transfer;stand-sit transfer  -     Row Name 05/08/23 1334          Sit-Stand Transfer    Sit-Stand [place] (Transfers) standby assist  -     Assistive Device (Sit-Stand Transfers) lift device  -     Row Name 05/08/23 1334          Stand-Sit Transfer    Stand-Sit [place] (Transfers) standby assist  -     Assistive Device (Stand-Sit Transfers) --  Pt transfers without AD.  -     Row Name 05/08/23 1334          Gait/Stairs (Locomotion)    Gait/Stairs Locomotion gait/ambulation independence;gait/ambulation assistive device;distance ambulated;gait pattern;gait deviations  -     [place] Level (Gait) standby assist  -RH     Assistive Device (Gait) --  Pt amb without AD.  -     Distance in Feet (Gait) 60  -     Pattern (Gait) --  Pt amb with reciprocal gait.  -     Deviations/Abnormal Patterns (Gait) base of support, narrow;gait speed decreased;stride length decreased  -     Gait Assessment/Intervention Pt amb without AD with 2L of oxygen and decreased stride lenth  -     Row Name 05/08/23 1334          Balance    Dynamic Standing Balance standby assist  -[facility] Name 05/08/23 1334          Vital Signs    O2 Delivery Intra Treatment --  Pt amb on 2L with nasal cannula.  -     Post SpO2 (%) 92  -     Row Name 05/08/23 1334          Progress Summary (PT)    Progress Toward Functional Goals (PT) progress toward functional goals is good  -           User Key  (r) = Recorded By, (t) = Taken By, (c) = Cosigned By    Initials Name Provider Type     Carl Crow PTA  Physical Therapist Assistant                Physical Therapy Education     Title: PT OT SLP Therapies (Done)     Topic: Physical Therapy (Done)     Point: Mobility training (Done)     Learning Progress Summary           Patient Acceptance, E,TB, VU by  at 5/7/2023 1151                   Point: Home exercise program (Done)     Learning Progress Summary           Patient Acceptance, E,TB, VU by DW at 5/7/2023 1151                   Point: Body mechanics (Done)     Learning Progress Summary           Patient Acceptance, E,TB, VU by  at 5/7/2023 1151                   Point: Precautions (Done)     Learning Progress Summary           Patient Acceptance, E,TB, VU by  at 5/7/2023 1151                               User Key     Initials Effective Dates Name Provider Type Discipline     04/25/21 -  Chapin Mayen, PT Physical Therapist PT              PT Recommendation and Plan     Progress Summary (PT)  Progress Toward Functional Goals (PT): progress toward functional goals is good   Outcome Measures     Row Name 05/08/23 1300 05/07/23 1150          How much help from another person do you currently need...    Turning from your back to your side while in flat bed without using bedrails? 4  -RH 4  -DW     Moving from lying on back to sitting on the side of a flat bed without bedrails? 4  -RH 4  -DW     Moving to and from a bed to a chair (including a wheelchair)? 4  -RH 4  -DW     Standing up from a chair using your arms (e.g., wheelchair, bedside chair)? 4  -RH 3  -DW     Climbing 3-5 steps with a railing? 3  -RH 3  -DW     To walk in hospital room? 4  -RH 3  -DW     AM-PAC 6 Clicks Score (PT) 23  -RH 21  -DW        Functional Assessment    Outcome Measure Options -- AM-PAC 6 Clicks Basic Mobility (PT)  -DW           User Key  (r) = Recorded By, (t) = Taken By, (c) = Cosigned By    Initials Name Provider Type    RH Carl Crow, PTA Physical Therapist Assistant    Chapin Bell, PT Physical Therapist                  Time Calculation:    PT Charges     Row Name 05/08/23 1333             Time Calculation    PT Received On 05/08/23  -RH         Timed Charges    54924 - Gait Training Minutes  5  -RH      90626 - PT Therapeutic Activity Minutes 5  -RH         Total Minutes    Timed Charges Total Minutes 10  -RH       Total Minutes 10  -RH            User Key  (r) = Recorded By, (t) = Taken By, (c) = Cosigned By    Initials Name Provider Type     Carl Crow PTA Physical Therapist Assistant              Therapy Charges for Today     Code Description Service Date Service Provider Modifiers Qty    47101263332 HC GAIT TRAINING EA 15 MIN 5/8/2023 Carl Crow PTA GP 1          PT G-Codes  Outcome Measure Options: AM-PAC 6 Clicks Basic Mobility (PT)  AM-PAC 6 Clicks Score (PT): 23    Carl Crow PTA  5/8/2023

## 2023-05-08 NOTE — PLAN OF CARE
Goal Outcome Evaluation:      VSS. Rested through shift. Patient without IV access, MD aware.

## 2023-05-08 NOTE — PLAN OF CARE
Goal Outcome Evaluation:           Progress: improving  Outcome Evaluation: VSS. Patient up with standby assist. Tolerating 2LNC. No complaints or acute changes.

## 2023-05-08 NOTE — PROGRESS NOTES
[facility]   Hospitalist Progress Note  Date: 2023  Patient Name: Lluvia Archer  : 1965  MRN: [id number]  Date of admission: 2023      Subjective   Subjective     Chief Complaint: short ob air     Summary: 57-year-old female with past medical history of COPD, hypertension, migraine headaches and sleep apnea.  Patient says that she has been feeling unwell since last Saturday.  She says she has had shortness of breath cough and chest congestion.  Patient denies chest pain however, she also denies fever and chills.  Patient says she saw her family provider on Monday however all she was given was Ativan for her nerves but no medications for her respiratory illness.  Here in the emergency department the patient was found to have bilateral pneumonia.  She has been hypoxic.  She is clinically septic at this point and will be admitted for further evaluation and treatment.    Interval Followup:   Patient states cough is better.  Wheezing has resolved.  Patient is anxious to go home  Patient is on 2 L      Review of Systems   All systems were reviewed and negative except for: Short of air    Objective   Objective     Vitals:   Temp:  [97.5 °F (36.4 °C)-98.6 °F (37 °C)] 98.6 °F (37 °C)  Heart Rate:  [68-94] 92  Resp:  [12-22] 22  BP: (112-146)/(54-73) 114/61  Flow (L/min):  [2] 2  Physical Exam    Constitutional: Awake, alert, no acute distress.      eyes: Pupils equal, sclerae anicteric, no conjunctival injection   HENT: NCAT, mucous membranes moist   Neck: Supple, no thyromegaly, no lymphadenopathy, trachea midline   Respiratory: Decreased, no wheezing or rhonchi   cardiovascular: RRR, no murmurs, rubs, or gallops, palpable pedal pulses bilaterally   Gastrointestinal: Positive bowel sounds, soft, nontender, nondistended   Musculoskeletal: Full range of motion   Psychiatric: Appropriate affect, cooperative   Neurologic: Oriented x 3, strength symmetric in all extremities, Cranial Nerves grossly intact  to confrontation, speech clear   Skin: No rashes     Result Review    Result Review:  I have personally reviewed the results from the time of this admission to 5/8/2023 11:48 EDT and agree with these findings:  [x]  Laboratory   CBC        5/4/2023    22:40 5/6/2023    04:43 5/8/2023    04:05   CBC   WBC 12.69   16.01   13.35     RBC 3.80   3.34   3.52     Hemoglobin 11.3   9.9   10.3     Hematocrit 34.6   30.5   32.5     MCV 91.1   91.3   92.3     MCH 29.7   29.6   29.3     MCHC 32.7   32.5   31.7     RDW 13.7   13.8   13.3     Platelets 241   255   247       BMP        5/5/2023    16:40 5/6/2023    04:43 5/8/2023    04:05   BMP   BUN  23   27     Creatinine  0.87   0.79     Sodium 141.0   143   144     Potassium  4.2   3.8     Chloride  108   107     CO2  24.0   28.2     Calcium  9.0   8.4         [x]  Microbiology   Blood Culture   Date Value Ref Range Status   05/04/2023 No growth at 24 hours  Preliminary   05/04/2023 No growth at 24 hours  Preliminary     No results found for: BCIDPCR, CXREFLEX, CSFCX, CULTURETIS  No results found for: CULTURES, HSVCX, URCX  No results found for: EYECULTURE, GCCX, HSVCULTURE, LABHSV  No results found for: LEGIONELLA, MRSACX, MUMPSCX, MYCOPLASCX  No results found for: NOCARDIACX, STOOLCX  No results found for: THROATCX, UNSTIMCULT, URINECX, CULTURE, VZVCULTUR  No results found for: VIRALCULTU, WOUNDCX    [x]  Radiology       []  EKG/Telemetry   []  Cardiology/Vascular   []  Pathology  []  Old records  []  Other:    Assessment & Plan   Assessment / Plan     Assessment/Plan:  • Multifocal pneumonia with unknown organism  • COPD exacerbation  • Significant history of tobacco abuse  • Right middle and lower lobe lung nodules    PLAN  --continue patient on bronchodilators  --Pulmonary following.  Patient does require outpatient follow-up for pulmonary nodules  --Continue antibiotics  --Continue prednisone   -- Patient's urinary antigens are negative, patient's respiratory panel still  in process, patient's COVID as well as influenza are negative   -- Pulmonary anticipates that patient should be able to be discharged home tomorrow.  Patient is agreeable to this plan     Discussed plan with RN.    DVT prophylaxis:  Medical DVT prophylaxis orders are present.    CODE STATUS:   Code Status (Patient has no pulse and is not breathing): CPR (Attempt to Resuscitate)  Medical Interventions (Patient has pulse or is breathing): Full Support  Release to patient: Routine Release

## 2023-05-09 ENCOUNTER — READMISSION MANAGEMENT (OUTPATIENT)
Dept: CALL CENTER | Facility: HOSPITAL | Age: 58
End: 2023-05-09
Payer: MEDICARE

## 2023-05-09 VITALS
DIASTOLIC BLOOD PRESSURE: 70 MMHG | BODY MASS INDEX: 37.83 KG/M2 | HEART RATE: 75 BPM | TEMPERATURE: 98.7 F | WEIGHT: 221.56 LBS | OXYGEN SATURATION: 92 % | HEIGHT: 64 IN | SYSTOLIC BLOOD PRESSURE: 133 MMHG | RESPIRATION RATE: 18 BRPM

## 2023-05-09 LAB
1,3 BETA GLUCAN SER-MCNC: <31 PG/ML
B PERT.PT PRMT NPH QL NAA+NON-PROBE: NOT DETECTED
BACTERIA SPEC AEROBE CULT: NORMAL
BACTERIA SPEC AEROBE CULT: NORMAL
C PNEUM DNA NPH QL NAA+NON-PROBE: NOT DETECTED
FLUAV H1 2009 PAN RNA NPH NAA+NON-PROBE: NOT DETECTED
FLUAV H1 RNA NPH QL NAA+NON-PROBE: NOT DETECTED
FLUAV H3 RNA NPH QL NAA+NON-PROBE: NOT DETECTED
FLUAV RNA NPH QL NAA+NON-PROBE: NOT DETECTED
FLUBV RNA NPH QL NAA+NON-PROBE: NOT DETECTED
HADV DNA NPH QL NAA+NON-PROBE: NOT DETECTED
HCOV 229E RNA NPH QL NAA+NON-PROBE: NOT DETECTED
HCOV HKU1 RNA NPH QL NAA+NON-PROBE: NOT DETECTED
HCOV NL63 RNA NPH QL NAA+NON-PROBE: NOT DETECTED
HCOV OC43 RNA NPH QL NAA+NON-PROBE: NOT DETECTED
HMPV RNA NPH QL NAA+NON-PROBE: NOT DETECTED
HPIV1 RNA NPH QL NAA+NON-PROBE: NOT DETECTED
HPIV2 RNA NPH QL NAA+NON-PROBE: NOT DETECTED
HPIV3 RNA NPH QL NAA+NON-PROBE: NOT DETECTED
HPIV4 RNA NPH QL NAA+NON-PROBE: NOT DETECTED
M PNEUMO DNA NPH QL NAA+NON-PROBE: NOT DETECTED
RSV RNA NPH QL NAA+NON-PROBE: NOT DETECTED
RV+EV RNA NPH QL NAA+NON-PROBE: NOT DETECTED

## 2023-05-09 PROCEDURE — 94799 UNLISTED PULMONARY SVC/PX: CPT

## 2023-05-09 PROCEDURE — 63710000001 PREDNISONE PER 1 MG: Performed by: STUDENT IN AN ORGANIZED HEALTH CARE EDUCATION/TRAINING PROGRAM

## 2023-05-09 PROCEDURE — 99239 HOSP IP/OBS DSCHRG MGMT >30: CPT | Performed by: FAMILY MEDICINE

## 2023-05-09 PROCEDURE — 25010000002 HEPARIN (PORCINE) PER 1000 UNITS: Performed by: FAMILY MEDICINE

## 2023-05-09 RX ORDER — DOXYCYCLINE 100 MG/1
100 CAPSULE ORAL EVERY 12 HOURS SCHEDULED
Status: DISCONTINUED | OUTPATIENT
Start: 2023-05-09 | End: 2023-05-09 | Stop reason: HOSPADM

## 2023-05-09 RX ORDER — DOXYCYCLINE HYCLATE 100 MG/1
100 CAPSULE ORAL 2 TIMES DAILY
Qty: 4 CAPSULE | Refills: 0 | Status: SHIPPED | OUTPATIENT
Start: 2023-05-09 | End: 2023-05-11

## 2023-05-09 RX ORDER — AMOXICILLIN AND CLAVULANATE POTASSIUM 875; 125 MG/1; MG/1
1 TABLET, FILM COATED ORAL 2 TIMES DAILY
Qty: 4 TABLET | Refills: 0 | Status: SHIPPED | OUTPATIENT
Start: 2023-05-09 | End: 2023-05-11

## 2023-05-09 RX ORDER — IPRATROPIUM BROMIDE AND ALBUTEROL SULFATE 2.5; .5 MG/3ML; MG/3ML
3 SOLUTION RESPIRATORY (INHALATION)
Qty: 360 ML | Refills: 0 | Status: SHIPPED | OUTPATIENT
Start: 2023-05-09 | End: 2023-06-08

## 2023-05-09 RX ORDER — PREDNISONE 20 MG/1
40 TABLET ORAL
Qty: 4 TABLET | Refills: 0 | Status: SHIPPED | OUTPATIENT
Start: 2023-05-10 | End: 2023-05-12

## 2023-05-09 RX ADMIN — CARIPRAZINE 1.5 MG: 1.5 CAPSULE, GELATIN COATED ORAL at 08:19

## 2023-05-09 RX ADMIN — ESCITALOPRAM 20 MG: 10 TABLET, FILM COATED ORAL at 06:08

## 2023-05-09 RX ADMIN — PREDNISONE 40 MG: 20 TABLET ORAL at 08:19

## 2023-05-09 RX ADMIN — IPRATROPIUM BROMIDE AND ALBUTEROL SULFATE 3 ML: 2.5; .5 SOLUTION RESPIRATORY (INHALATION) at 00:23

## 2023-05-09 RX ADMIN — ARFORMOTEROL TARTRATE 15 MCG: 15 SOLUTION RESPIRATORY (INHALATION) at 06:16

## 2023-05-09 RX ADMIN — ACETAMINOPHEN 650 MG: 325 TABLET ORAL at 06:08

## 2023-05-09 RX ADMIN — Medication 10 ML: at 08:20

## 2023-05-09 RX ADMIN — IPRATROPIUM BROMIDE AND ALBUTEROL SULFATE 3 ML: 2.5; .5 SOLUTION RESPIRATORY (INHALATION) at 12:05

## 2023-05-09 RX ADMIN — FAMOTIDINE 40 MG: 20 TABLET ORAL at 08:19

## 2023-05-09 RX ADMIN — HYDROCODONE POLISTIREX AND CHLORPHENIRAMINE POLISTIREX 5 ML: 10; 8 SUSPENSION, EXTENDED RELEASE ORAL at 08:19

## 2023-05-09 RX ADMIN — IPRATROPIUM BROMIDE AND ALBUTEROL SULFATE 3 ML: 2.5; .5 SOLUTION RESPIRATORY (INHALATION) at 06:16

## 2023-05-09 RX ADMIN — BUDESONIDE 0.5 MG: 0.5 SUSPENSION RESPIRATORY (INHALATION) at 06:21

## 2023-05-09 RX ADMIN — HEPARIN SODIUM 5000 UNITS: 5000 INJECTION INTRAVENOUS; SUBCUTANEOUS at 08:19

## 2023-05-09 NOTE — PLAN OF CARE
Goal Outcome Evaluation:  Plan of Care Reviewed With: patient        Progress: improving  Outcome Evaluation: pt resting in bed, no c/o pain or discomfort, pt states she is ready to go home, no iv, pt is alert and orieted, call light within reach.

## 2023-05-09 NOTE — DISCHARGE SUMMARY
[facility]         HOSPITALIST  DISCHARGE SUMMARY    Patient Name: Lluvia Archer  : 1965  MRN: [id number]    Date of Admission: 2023  Date of Discharge: 2023  Primary Care Physician: Aleksandar Edge DO    Consults     Date and Time Order Name Status Description    2023  3:22 PM Inpatient Pulmonology Consult Completed     2023 12:36 AM Hospitalist (on-call MD unless specified)            Active and Resolved Hospital Problems:  Multifocal pneumonia with unknown organism  COPD exacerbation  Significant history of tobacco abuse  Right middle and lower lobe lung nodules  Active Hospital Problems    Diagnosis POA   • **Multifocal pneumonia [J18.9] Yes   • Acute on chronic respiratory failure with hypoxia [J96.21] Yes      Resolved Hospital Problems   No resolved problems to display.       Hospital Course     Hospital Course:  Lluvia Archer is a 57 y.o. female with past medical history of COPD, hypertension, migraine headaches and sleep apnea.  Patient feeling unwell since last Saturday.  She says she has had shortness of breath cough and chest congestion.  Patient denies chest pain however, she also denies fever and chills.  Patient says she saw her family provider on Monday.  Here in the emergency department the patient was found to have bilateral pneumonia.  She has been hypoxic.  She was clinically septic at this point and was admitted for further evaluation and treatment.  Started on empiric antibiotics inhaled bronchodilators systemic steroids.  Pulmonology consulted.  Shortness of breath improved.  Cultures remain negative.  Patient's respiratory status improved.  Patient seen evaluated on day of discharge and felt stable for discharge home to follow-up with her primary care provider and pulmonology in 1 to 2 weeks.        DISCHARGE Follow Up Recommendations for labs and diagnostics: As above      Day of Discharge     Vital Signs:  Temp:  [97.7 °F (36.5  °C)-98.7 °F (37.1 °C)] 98.7 °F (37.1 °C)  Heart Rate:  [67-97] 75  Resp:  [17-18] 18  BP: (107-133)/(62-95) 133/70  Flow (L/min):  [2] 2  Physical Exam:   Gen. well-developed appearing stated age in no acute distress  HEENT: Normocephalic atraumatic moist membranes pupils equal round reactive light, no scleral icterus no conjunctival injection  Cardiovascular: regular rate and rhythm no murmurs rubs or gallops S1-S2, no lower extremity edema appreciated  Pulmonary: Managed to auscultation bilaterally no wheezes rales or rhonchi symmetric chest expansion, unlabored, no conversational dyspnea appreciated  Gastrointestinal: Soft nontender nondistended positive bowel sounds all 4 quadrants no rebound or guarding  Musculoskeletal: No clubbing cyanosis, warm and well-perfused, calves soft symmetric nontender bilaterally  Skin: Clean dry without rashs  Neuro: Cranial nerves II through XII intact grossly no sensorimotor deficits appreciated bilateral upper and lower extremities  Psych: Patient is calm cooperative and appropriate with exam not responding to internal stimuli  : No Dow catheter no bladder distention no suprapubic tenderness    Discharge Details        Discharge Medications      New Medications      Instructions Start Date   amoxicillin-clavulanate 875-125 MG per tablet  Commonly known as: Augmentin   1 tablet, Oral, 2 Times Daily      doxycycline 100 MG capsule  Commonly known as: VIBRAMYCIN   100 mg, Oral, 2 Times Daily      ipratropium-albuterol 0.5-2.5 mg/3 ml nebulizer  Commonly known as: DUO-NEB   3 mL, Nebulization, 4 Times Daily - RT      predniSONE 20 MG tablet  Commonly known as: DELTASONE   40 mg, Oral, Daily With Breakfast   Start Date: May 10, 2023        Continue These Medications      Instructions Start Date   albuterol sulfate  (90 Base) MCG/ACT inhaler  Commonly known as: PROVENTIL HFA;VENTOLIN HFA;PROAIR HFA   INHALE 2 PUFFS EVERY 6 HOURS AS NEEDED FOR WHEEZING      atorvastatin 10  MG tablet  Commonly known as: LIPITOR   10 mg, Oral, Nightly      Cariprazine HCl 1.5 MG capsule capsule  Commonly known as: Vraylar   1.5 mg, Oral, Daily      escitalopram 20 MG tablet  Commonly known as: LEXAPRO   20 mg, Oral, Every Morning      famotidine 40 MG tablet  Commonly known as: PEPCID   40 mg, Oral, 2 Times Daily      Fluticasone-Umeclidin-Vilant 100-62.5-25 MCG/ACT inhaler  Commonly known as: Trelegy Ellipta   1 puff, Inhalation, Daily - RT      lisinopril 5 MG tablet  Commonly known as: PRINIVIL,ZESTRIL   5 mg, Oral, Daily             No Known Allergies    Discharge Disposition:  Home or Self Care    Diet:  Hospital:  Diet Order   Procedures   • Diet: Regular/House Diet; Texture: Regular Texture (IDDSI 7); Fluid Consistency: Thin (IDDSI 0)       Discharge Activity:   Activity Instructions     Gradually Increase Activity Until at Pre-Hospitalization Level            CODE STATUS:  Code Status and Medical Interventions:   Ordered at: 05/05/23 0929     Code Status (Patient has no pulse and is not breathing):    CPR (Attempt to Resuscitate)     Medical Interventions (Patient has pulse or is breathing):    Full Support     Release to patient:    Routine Release         Future Appointments   Date Time Provider Department Center   6/5/2023 11:45 AM Aleksandar Edge DO [facility]       Additional Instructions for the Follow-ups that You Need to Schedule     Discharge Follow-up with PCP   As directed       Currently Documented PCP:    Aleksandar Edge DO    PCP Phone Number:    [phone]     Follow Up Details: Hospital discharge follow-up COPD with acute exacerbation         Discharge Follow-up with Specialty: COPD clinic Dr. Mcdonald's office; 1 Week   As directed      Specialty: COPD clinic Dr. Mcdonald's office    Follow Up: 1 Week    Follow Up Details: Hospital discharge follow-up COPD exacerbation               Pertinent  and/or Most Recent Results     PROCEDURES:   None    LAB  RESULTS:      Lab 05/08/23 0405 05/06/23 0443 05/05/23  [facility] 05/04/23 2240   WBC 13.35* 16.01*  --  12.69*   HEMOGLOBIN 10.3* 9.9*  --  11.3*   HEMATOCRIT 32.5* 30.5*  --  34.6   PLATELETS 247 255  --  241   NEUTROS ABS  --   --   --  9.35*   IMMATURE GRANS (ABS)  --   --   --  0.08*   LYMPHS ABS  --   --   --  1.62   MONOS ABS  --   --   --  1.31*   EOS ABS  --   --   --  0.28   MCV 92.3 91.3  --  91.1   PROCALCITONIN  --   --   --  0.05   LACTATE  --   --   --  1.0   LACTATE, ARTERIAL  --   --  0.81  --          Lab 05/08/23 0405 05/06/23 0443 05/05/23  [facility] 05/04/23 2240   SODIUM 144 143  --  145   SODIUM, ARTERIAL  --   --  141.0  --    POTASSIUM 3.8 4.2  --  3.1*   CHLORIDE 107 108*  --  110*   CO2 28.2 24.0  --  23.6   ANION GAP 8.8 11.0  --  11.4   BUN 27* 23*  --  13   CREATININE 0.79 0.87  --  0.83   EGFR 87.9 78.3  --  82.9   GLUCOSE 103* 149*  --  137*   GLUCOSE, ARTERIAL  --   --  229*  --    CALCIUM 8.4* 9.0  --  8.5*   IONIZED CALCIUM  --   --  1.18  --    MAGNESIUM 1.8  --   --   --          Lab 05/04/23 2240   TOTAL PROTEIN 6.3   ALBUMIN 3.1*   GLOBULIN 3.2   ALT (SGPT) 7   AST (SGOT) 9   BILIRUBIN 0.2   ALK PHOS 99         Lab 05/04/23 2240   PROBNP 80.2   HSTROP T 10*                 Lab 05/05/23  1640   PH, ARTERIAL 7.346*   PCO2, ARTERIAL 42.8   PO2 ART 87.1   O2 SATURATION ART 95.2   HCO3 ART 22.9   BASE EXCESS ART -2.7*   CARBOXYHEMOGLOBIN 0.1     Brief Urine Lab Results     None        Microbiology Results (last 10 days)     Procedure Component Value - Date/Time    Legionella Antigen, Urine - Urine, Urine, Clean Catch [[id number]]  (Normal) Collected: 05/05/23 2046    Lab Status: Final result Specimen: Urine, Clean Catch Updated: 05/05/23 2108     LEGIONELLA ANTIGEN, URINE Negative    Respiratory Culture - Sputum, Cough [[id number]] Collected: 05/05/23 1614    Lab Status: Final result Specimen: Sputum from Cough Updated: 05/07/23 0849     Respiratory Culture Rare Normal respiratory  sabina. No S. aureus or Pseudomonas aeruginosa detected. Final report.     Gram Stain Less than 10 Epithelial cells per low power field      Greater than 20 WBCs per low power field      No organisms seen    S. Pneumo Ag Urine or CSF - Urine, Urine, Clean Catch [[id number]]  (Normal) Collected: 05/05/23 1315    Lab Status: Final result Specimen: Urine, Clean Catch Updated: 05/05/23 1357     Strep Pneumo Ag Negative    Respiratory Panel, PCR (WITHOUT COVID) - Swab, Nasopharynx [[id number]] Collected: 05/05/23 1308    Lab Status: Final result Specimen: Swab from Nasopharynx Updated: 05/09/23 0106     Adenovirus Detection by PCR Not Detected     Coronavirus HKU1 Not Detected     Coronavirus NL63 Not Detected     Coronavirus 229E Not Detected     Coronavirus OC43 Not Detected     Human Metapneumovirus Not Detected     Human Rhinovirus/Enterovirus Not Detected     Influenza A PCR Not Detected     Influenza A H1 Not Detected     Influenza 2009 H1N1 by PCR Not Detected     Influenza A H3 Not Detected     Influenza B PCR Not Detected     Parainfluenza Virus 1 Not Detected     Parainfluenza Virus 2 Not Detected     Parainfluenza Virus 3 Not Detected     Parainfluenza Virus 4 Not Detected     Respiratory Syncytial Virus Not Detected     Bordetella pertussis pcr Not Detected     Chlamydophila pneumoniae PCR Not Detected     Mycoplasma pneumo by PCR Not Detected     Comment: This panel does not detect the novel 2019 Coronavirus (2019-nCoV).  Any positive or negative coronavirus result should not be used to  diagnose patients for the 2019-nCoV. Please refer to the CDC website  for testing recommendations.       Narrative:      Performed at:  [address]  [id number]  : Myesha Armstrong MD, Phone:  [phone]    COVID-19,APTNANDA EWING(ELVIN),[facility], NP/OP SWAB IN UTM/VTM/SALINE TRANSPORT MEDIA,24 HR TAT - Swab, Nasal Cavity [[id number]]  (Normal) Collected: 05/04/23 3415     Lab Status: Final result Specimen: Swab from Nasal Cavity Updated: 05/05/23 0545     COVID19 Not Detected    Narrative:      Fact sheet for providers: https://www.fda.gov/media/056950/download     Fact sheet for patients: https://www.fda.gov/media/611661/download    Test performed by RT PCR.    Influenza Antigen, Rapid - Swab, Nasopharynx [[id number]]  (Normal) Collected: 05/04/23 2248    Lab Status: Final result Specimen: Swab from Nasopharynx Updated: 05/04/23 2336     Influenza A Ag, EIA Negative     Influenza B Ag, EIA Negative    Blood Culture - Blood, Arm, Right [[id number]]  (Normal) Collected: 05/04/23 2240    Lab Status: Preliminary result Specimen: Blood from Arm, Right Updated: 05/08/23 2300     Blood Culture No growth at 4 days    Blood Culture - Blood, Arm, Left [[id number]]  (Normal) Collected: 05/04/23 2240    Lab Status: Preliminary result Specimen: Blood from Arm, Left Updated: 05/08/23 2300     Blood Culture No growth at 4 days    Mycoplasma Pneumoniae Antibody, IgM - Blood, [[id number]]  (Normal) Collected: 05/04/23 2240    Lab Status: Final result Specimen: Blood Updated: 05/05/23 1027     Mycoplasma pneumo IgM Negative          CT Chest Without Contrast Diagnostic    Result Date: 5/5/2023  Impression:   CT scan of the chest without IV contrast demonstrating diffuse bilateral patchy alveolar airspace disease in a predominantly peripheral distribution suggesting indolent fungal infection or granulomatous disease.  1.2 cm nodular consolidation or mass is seen in the right lower lobe.  1 cm nodules are seen in the right middle lobe.     GUANAKO BARRIENTOS MD       Electronically Signed and Approved By: GUANAKO BARRIENTOS MD on 5/05/2023 at 12:17             XR Chest 1 View    Result Date: 5/5/2023  Impression:  Bilateral infiltrates are seen.  The findings may represent infectious multifocal pneumonia.  Pulmonary edema would be in the differential diagnosis.      Please note that portions of this note were  completed with a voice recognition program.  TOBI OLIVIA JR, MD       Electronically Signed and Approved By: TOBI OLIVIA JR, MD on 5/05/2023 at 0:21                        Results for orders placed during the hospital encounter of 04/04/22    Adult Transthoracic Echo Complete W/ Cont if Necessary Per Protocol    Interpretation Summary  · The left ventricular cavity is borderline dilated.  · Calculated left ventricular EF = 58% Estimated left ventricular EF was in agreement with the calculated left ventricular EF.  · Left ventricular diastolic function is consistent with (grade I) impaired relaxation.  · Aortic valve sclerosis without obstruction to flow.  · Mild aortic valve regurgitation.  · Mild mitral valve regurgitation.      Labs Pending at Discharge:  Pending Labs     Order Current Status    Aspergillus Galactomannan Antigen - Blood, Arm, Left In process    Fungitell B-D Glucan In process    Blood Culture - Blood, Arm, Left Preliminary result    Blood Culture - Blood, Arm, Right Preliminary result            Time spent on Discharge including face to face service: Greater than 35 minutes    Electronically signed by Saud Wise MD, 05/09/23, 12:48 PM EDT.

## 2023-05-09 NOTE — OUTREACH NOTE
Prep Survey    Flowsheet Row Responses   [facility] patient discharged from? [facility]   Is LACE score < 7 ? No   Eligibility [facility]   Date of Admission 05/04/23   Date of Discharge 05/09/23   Discharge Disposition Home or Self Care   Discharge diagnosis Multifocal pneumonia,   COPD exacerbation   Does the patient have one of the following disease processes/diagnoses(primary or secondary)? Pneumonia   Does the patient have Home health ordered? No   Is there a DME ordered? No   Prep survey completed? Yes          Shannan CHAUHAN - Registered Nurse

## 2023-05-10 ENCOUNTER — TRANSITIONAL CARE MANAGEMENT TELEPHONE ENCOUNTER (OUTPATIENT)
Dept: CALL CENTER | Facility: HOSPITAL | Age: 58
End: 2023-05-10
Payer: MEDICARE

## 2023-05-10 NOTE — OUTREACH NOTE
Call Center TCM Note    Flowsheet Row Responses   [facility] patient discharged from? [facility]   Does the patient have one of the following disease processes/diagnoses(primary or secondary)? Pneumonia   TCM attempt successful? Yes  [verbal release ]   Call start time 1014   Call end time 1017   Discharge diagnosis Multifocal pneumonia,   COPD exacerbation   Meds reviewed with patient/caregiver? Yes   Is the patient having any side effects they believe may be caused by any medication additions or changes? No   Does the patient have all medications ordered at discharge? Yes   Is the patient taking all medications as directed (includes completed medication regime)? Yes   Medication comments Encouraged to complete ATBS and steroids as ordered   Comments PCP FOLLOW UP APPOINTMENT IS 23@1045am--exceeds TCM guidelines, pt declines rescheduling at this time with alternate provider   Does the patient have an appointment with their PCP within 7 days of discharge? No   Nursing Interventions Patient declined scheduling/rescheduling appointment at this time   Has home health visited the patient within 72 hours of discharge? N/A   Has all DME been delivered? Yes   DME comments O2 at 2 lpm    Pulse Ox monitoring None   Psychosocial issues? No   Did the patient receive a copy of their discharge instructions? Yes   Nursing interventions Reviewed instructions with patient   What is the patient's perception of their health status since discharge? Same   Nursing Interventions Nurse provided patient education   If the patient is a current smoker, are they able to teach back resources for cessation? Not a smoker   Is the patient/caregiver able to teach back the hierarchy of who to call/visit for symptoms/problems? PCP, Specialist, Home health nurse, Urgent Care, ED, 911 Yes   Is the patient/caregiver able to teach back signs and symptoms of worsening condition: Fever/chills, Shortness of breath, Chest pain   Is the  patient/caregiver able to teach back importance of completing antibiotic course of treatment? Yes   TCM call completed? Yes   Call end time 1017          Kathleen Saldana RN    5/10/2023, 10:20 EDT

## 2023-05-11 LAB — GALACTOMANNAN AG SPEC IA-ACNC: 0.06 INDEX (ref 0–0.49)

## 2023-05-14 DIAGNOSIS — J44.9 CHRONIC OBSTRUCTIVE PULMONARY DISEASE, UNSPECIFIED COPD TYPE: ICD-10-CM

## 2023-05-14 NOTE — PROGRESS NOTES
Enter Query Response Below      Query Response: Acute on chronic respiratory failure due to Sepsis             If applicable, please update the problem list.     Patient: Lluvia Archer        : 1965  Account: [id number]           Admit Date: 2023        How to Respond to this query:       a. Click New Note     b. Answer query within the yellow box.                c. Update the Problem List, if applicable.      If you have any questions about this query contact me at: [email or web address]     Dr. Wise:    Patient with history of COPD on home 02 admitted with pneumonia, acute on chronic respiratory failure and Sepsis on .  Treatment included IV antibiotics and supplemental oxygen.      Can you clarify the condition monitored/treated as-    Acute on chronic respiratory failure due to Sepsis  Acute on chronic respiratory failure not due to Sepsis  Other__________  Unable to determine     By submitting this query, we are merely seeking further clarification of documentation to accurately reflect all conditions that you are monitoring, evaluating, treating or that extend the hospitalization or utilize additional resources of care. Please utilize your independent clinical judgment when addressing the question(s) above.     This query and your response, once completed, will be entered into the legal medical record.    Sincerely,  Shayna Castillo RN, CCDS  Clinical Documentation Integrity Program

## 2023-05-15 DIAGNOSIS — J44.9 CHRONIC OBSTRUCTIVE PULMONARY DISEASE, UNSPECIFIED COPD TYPE: ICD-10-CM

## 2023-05-15 RX ORDER — ALBUTEROL SULFATE 90 UG/1
2 AEROSOL, METERED RESPIRATORY (INHALATION) EVERY 6 HOURS PRN
Qty: 18 G | Refills: 1 | OUTPATIENT
Start: 2023-05-15

## 2023-05-15 RX ORDER — ALBUTEROL SULFATE 90 UG/1
AEROSOL, METERED RESPIRATORY (INHALATION)
Qty: 18 G | Refills: 1 | Status: SHIPPED | OUTPATIENT
Start: 2023-05-15

## 2023-05-15 RX ORDER — FLUTICASONE FUROATE, UMECLIDINIUM BROMIDE AND VILANTEROL TRIFENATATE 100; 62.5; 25 UG/1; UG/1; UG/1
1 POWDER RESPIRATORY (INHALATION)
Qty: 1 EACH | Refills: 11 | Status: SHIPPED | OUTPATIENT
Start: 2023-05-15

## 2023-05-15 NOTE — TELEPHONE ENCOUNTER
Caller: Lluvia Archer    Relationship: Self    Best call back number: [phone]    Requested Prescriptions:   Requested Prescriptions     Pending Prescriptions Disp Refills   • albuterol sulfate  (90 Base) MCG/ACT inhaler 18 g 1     Si puffs Every 6 (Six) Hours As Needed for Wheezing.   • Fluticasone-Umeclidin-Vilant (Trelegy Ellipta) 100-62.5-25 MCG/ACT inhaler 60 each 12     Sig: Inhale 1 puff Daily.        Pharmacy where request should be sent: [facility]/PHARMACY #[id number] - [place] - [address] - [phone] [facility] [phone] FX     Last office visit with prescribing clinician: 2023   Last telemedicine visit with prescribing clinician: 2023   Next office visit with prescribing clinician: 2023     Does the patient have less than a 3 day supply:  [x] Yes  [] No

## 2023-05-18 ENCOUNTER — READMISSION MANAGEMENT (OUTPATIENT)
Dept: CALL CENTER | Facility: HOSPITAL | Age: 58
End: 2023-05-18
Payer: MEDICARE

## 2023-05-18 LAB — QT INTERVAL: 327 MS

## 2023-05-18 NOTE — OUTREACH NOTE
COPD/PN Week 2 Survey    Flowsheet Row Responses   [facility] patient discharged from? [facility]   Does the patient have one of the following disease processes/diagnoses(primary or secondary)? Pneumonia   Week 2 attempt successful? No   Unsuccessful attempts Attempt 1   Discharge diagnosis Multifocal pneumonia,   COPD exacerbation          DELFINA GOMEZ - Registered Nurse

## 2023-05-23 ENCOUNTER — READMISSION MANAGEMENT (OUTPATIENT)
Dept: CALL CENTER | Facility: HOSPITAL | Age: 58
End: 2023-05-23
Payer: MEDICARE

## 2023-05-23 NOTE — OUTREACH NOTE
COPD/PN Week 2 Survey    Flowsheet Row Responses   [facility] patient discharged from? [facility]   Does the patient have one of the following disease processes/diagnoses(primary or secondary)? Pneumonia   Week 2 attempt successful? Yes   Call start time 1227   Call end time 1230   Discharge diagnosis Multifocal pneumonia,   COPD exacerbation   Meds reviewed with patient/caregiver? Yes   Is the patient taking all medications as directed (includes completed medication regime)? Yes   Does the patient have a primary care provider?  Yes   Does the patient have an appointment with their PCP or specialist within 7 days of discharge? No   Has the patient kept scheduled appointments due by today? Yes   Comments PCP canceled her appt r/t family emerg.   DME comments O2 at 3 LPM   Pulse Ox monitoring None   Psychosocial issues? No   Comments Pt reports her breathing has improved, she is back to baseline and normal activity.   What is the patient's perception of their health status since discharge? Returned to baseline/stable   Nursing Interventions Nurse provided patient education   Is the patient/caregiver able to teach back the hierarchy of who to call/visit for symptoms/problems? PCP, Specialist, Home health nurse, Urgent Care, ED, 911 Yes   Is the patient/caregiver able to teach back importance of completing antibiotic course of treatment? Yes   Week 2 call completed? Yes   Revoked No further contact(revokes)-requires comment   Is the patient interested in additional calls from an ambulatory ?  NOTE:  applies to high risk patients requiring additional follow-up. Brittany FERRARA - Registered Nurse

## 2023-05-24 RX ORDER — FAMOTIDINE 40 MG/1
TABLET, FILM COATED ORAL
Qty: 180 TABLET | Refills: 1 | Status: SHIPPED | OUTPATIENT
Start: 2023-05-24

## 2023-05-24 RX ORDER — FLUTICASONE FUROATE, UMECLIDINIUM BROMIDE AND VILANTEROL TRIFENATATE 100; 62.5; 25 UG/1; UG/1; UG/1
POWDER RESPIRATORY (INHALATION)
Qty: 60 EACH | OUTPATIENT
Start: 2023-05-24

## 2023-05-26 ENCOUNTER — TELEPHONE (OUTPATIENT)
Dept: FAMILY MEDICINE CLINIC | Facility: CLINIC | Age: 58
End: 2023-05-26

## 2023-05-26 NOTE — TELEPHONE ENCOUNTER
Caller: [facility]    Relationship:     Best call back number: [phone]    What is the best time to reach you: ANY     Who are you requesting to speak with (clinical staff, provider,  specific staff member): CLINICAL     What was the call regarding: [facility] CALLED WANTING TO KNOW IF THE OFFICE HAD RECEIVED A FAX THEY SENT OVER REGARDING AN UPDATED MEDICATION LIST FOR THE PATIENT. PLEASE ADVISE.     Do you require a callback: YES

## 2023-05-31 DIAGNOSIS — J44.9 CHRONIC OBSTRUCTIVE PULMONARY DISEASE, UNSPECIFIED COPD TYPE: ICD-10-CM

## 2023-06-01 RX ORDER — BUPROPION HYDROCHLORIDE 150 MG/1
TABLET, EXTENDED RELEASE ORAL
Qty: 60 TABLET | Refills: 5 | Status: SHIPPED | OUTPATIENT
Start: 2023-06-01

## 2023-06-01 RX ORDER — ESCITALOPRAM OXALATE 20 MG/1
TABLET ORAL
Qty: 30 TABLET | Refills: 5 | Status: SHIPPED | OUTPATIENT
Start: 2023-06-01

## 2023-06-01 RX ORDER — CARIPRAZINE 1.5 MG/1
CAPSULE, GELATIN COATED ORAL
Qty: 30 CAPSULE | Refills: 1 | Status: SHIPPED | OUTPATIENT
Start: 2023-06-01

## 2023-06-01 RX ORDER — ALBUTEROL SULFATE 90 UG/1
AEROSOL, METERED RESPIRATORY (INHALATION)
Qty: 18 G | Refills: 1 | Status: SHIPPED | OUTPATIENT
Start: 2023-06-01

## 2023-06-01 RX ORDER — FAMOTIDINE 40 MG/1
TABLET, FILM COATED ORAL
Qty: 180 TABLET | Refills: 4 | OUTPATIENT
Start: 2023-06-01

## 2023-08-03 DIAGNOSIS — R91.1 LUNG NODULE: Primary | ICD-10-CM

## 2023-08-03 RX ORDER — FAMOTIDINE 40 MG/1
TABLET, FILM COATED ORAL
Qty: 180 TABLET | Refills: 0 | Status: SHIPPED | OUTPATIENT
Start: 2023-08-03

## 2023-08-09 RX ORDER — FLUTICASONE FUROATE, UMECLIDINIUM BROMIDE AND VILANTEROL TRIFENATATE 100; 62.5; 25 UG/1; UG/1; UG/1
1 POWDER RESPIRATORY (INHALATION)
Qty: 1 EACH | Refills: 11 | Status: SHIPPED | OUTPATIENT
Start: 2023-08-09

## 2023-08-09 NOTE — TELEPHONE ENCOUNTER
Caller: Lluvia Archer    Relationship: Self    Best call back number: [phone]     Requested Prescriptions:   Requested Prescriptions     Pending Prescriptions Disp Refills    Fluticasone-Umeclidin-Vilant (Trelegy Ellipta) 100-62.5-25 MCG/ACT inhaler 1 each 11     Sig: Inhale 1 puff Daily.        Pharmacy where request should be sent: [facility]/PHARMACY #[id number] - [place], [place] - [address] [facility] [phone] [facility] [phone] FX     Last office visit with prescribing clinician: 5/1/2023   Last telemedicine visit with prescribing clinician: Visit date not found   Next office visit with prescribing clinician: Visit date not found     Additional details provided by patient: PATIENT DOES NOT WISH TO USE [facility] RIGHT NOW. PATIENT IS COMPLETELY OUT OF MEDICATION. PATIENT TRIED GETTING MEDICATION FILLED THROUGH [facility] BUT THEY SAY THEY DON'T HAVE AN ACCOUNT ON FILE FOR HER. PATIENT WOULD LIKE ALL FURTHER PRESCRIPTIONS SENT TO [facility] IN [place].     Does the patient have less than a 3 day supply:  [x] Yes  [] No    Would you like a call back once the refill request has been completed: [x] Yes [] No    If the office needs to give you a call back, can they leave a voicemail: [] Yes [] No    Que Acosta Rep   08/09/23 11:47 EDT

## 2023-08-29 ENCOUNTER — HOSPITAL ENCOUNTER (OUTPATIENT)
Dept: CT IMAGING | Facility: HOSPITAL | Age: 58
Discharge: HOME | End: 2023-08-29
Admitting: FAMILY MEDICINE
Payer: MEDICARE

## 2023-08-29 DIAGNOSIS — R91.1 LUNG NODULE: ICD-10-CM

## 2023-08-29 PROCEDURE — 71250 CT THORAX DX C-: CPT

## 2023-08-30 DIAGNOSIS — J44.9 CHRONIC OBSTRUCTIVE PULMONARY DISEASE, UNSPECIFIED COPD TYPE: ICD-10-CM

## 2023-08-30 RX ORDER — ALBUTEROL SULFATE 90 UG/1
AEROSOL, METERED RESPIRATORY (INHALATION)
Qty: 6.7 G | Refills: 11 | Status: SHIPPED | OUTPATIENT
Start: 2023-08-30

## 2023-08-31 DIAGNOSIS — R91.1 LUNG NODULE: Primary | ICD-10-CM

## 2023-09-11 DIAGNOSIS — J44.9 CHRONIC OBSTRUCTIVE PULMONARY DISEASE, UNSPECIFIED COPD TYPE: ICD-10-CM

## 2023-09-11 RX ORDER — ALBUTEROL SULFATE 90 UG/1
AEROSOL, METERED RESPIRATORY (INHALATION)
Qty: 18 G | Refills: 1 | Status: SHIPPED | OUTPATIENT
Start: 2023-09-11

## 2023-09-12 ENCOUNTER — HOSPITAL ENCOUNTER (OUTPATIENT)
Dept: PET IMAGING | Facility: HOSPITAL | Age: 58
Discharge: HOME | End: 2023-09-12
Payer: MEDICARE

## 2023-09-12 DIAGNOSIS — R91.1 LUNG NODULE: ICD-10-CM

## 2023-09-12 PROCEDURE — A9552 F18 FDG: HCPCS | Performed by: FAMILY MEDICINE

## 2023-09-12 PROCEDURE — 78815 PET IMAGE W/CT SKULL-THIGH: CPT

## 2023-09-12 PROCEDURE — 0 FLUDEOXYGLUCOSE F18 SOLUTION: Performed by: FAMILY MEDICINE

## 2023-09-12 RX ADMIN — FLUDEOXYGLUCOSE F18 1 DOSE: 300 INJECTION INTRAVENOUS at 13:01

## 2023-09-13 DIAGNOSIS — K22.9 ESOPHAGUS DISORDER: Primary | ICD-10-CM

## 2023-09-18 ENCOUNTER — TELEPHONE (OUTPATIENT)
Dept: FAMILY MEDICINE CLINIC | Facility: CLINIC | Age: 58
End: 2023-09-18

## 2023-09-18 NOTE — TELEPHONE ENCOUNTER
Caller: Lluvia Archer    Relationship: Self    Best call back number: [phone]    What was the call regarding: PATIENT HASN'T RECEIVED A CALL ABOUT GETTING HER APPOINTMENT SCHEDULED FOR SCOPE TO GO DOWN NECK FOR FURTHER TESTING REGARDING A NODULE FOUND ON LUNG. SHE WOULD LIKE TO KNOW IF THIS IS SOMETHING SHE NEEDS TO CALL TO SCHEDULE, OR WILL THEY CALL HER, WILL DWAINE CALL HER TO LET HER KNOW, ETC.

## 2023-09-20 ENCOUNTER — TELEPHONE (OUTPATIENT)
Dept: FAMILY MEDICINE CLINIC | Facility: CLINIC | Age: 58
End: 2023-09-20
Payer: MEDICARE

## 2023-09-20 NOTE — TELEPHONE ENCOUNTER
Minnie is faxing info and changing referral to [facility]. Informed patient they would be contacting her to get her scheduled. Patient verbalized understanding via phone

## 2023-09-20 NOTE — TELEPHONE ENCOUNTER
Patient called concerned because she was advised to have a upper scope due to PET scan results. Patient has to be seen by gastro for a consultation appt first. Gastro can not see her until January 15th. Patient is concerned this is too long. Requesting your opinion to see if she needs to try to be seen elsewhere sooner.

## 2023-10-04 NOTE — THERAPY TREATMENT NOTE
Acute Care - Physical Therapy Treatment Note   Eleuterio     Patient Name: Lluvia Archer  : 1965  MRN: [id number]  Today's Date: 4/10/2022      Visit Dx:     ICD-10-CM ICD-9-CM   1. Sepsis, due to unspecified organism, unspecified whether acute organ dysfunction present ([facility])  A41.9 038.9     995.91   2. Pneumonia of right lower lobe due to infectious organism  J18.9 486   3. Acute respiratory failure with hypoxia ([facility])  J96.01 518.81   4. Elevated troponin  R77.8 790.6   5. Leukocytosis, unspecified type  D72.829 288.60   6. Dysphagia, oropharyngeal  R13.12 787.22   7. Decreased activities of daily living (ADL)  Z78.9 V49.89   8. Difficulty walking  R26.2 719.7     Patient Active Problem List   Diagnosis   • Abnormal mammogram   • Anxiety   • Arthritis   • Chronic nausea   • Chronic obstructive pulmonary disease (COPD) ([facility])   • Counseling on substance use and abuse   • Diabetes mellitus, type II ([facility])   • Esophageal reflux   • Hernia, hiatal   • Hyperlipidemia   • Hypertension   • Knee pain, right   • Memory loss   • Migraine   • Moderate episode of recurrent major depressive disorder ([facility])   • Night sweats   • Numbness   • Sciatica of right side   • Severe episode of recurrent major depressive disorder, without psychotic features ([facility])   • Shoulder pain, left   • Other diseases of nasal cavity and sinuses   • Sleep apnea, unspecified   • Tobacco abuse   • Strain of groin, right, subsequent encounter   • Osteoarthritis of spine with radiculopathy, lumbar region   • Chronic right-sided low back pain with right-sided sciatica   • Medication management   • Primary osteoarthritis of right hip   • Right hip pain   • Sepsis, due to unspecified organism, unspecified whether acute organ dysfunction present ([facility])   • Severe malnutrition (CMS/[facility])     Past Medical History:   Diagnosis Date   • Abnormal mammogram    • Anxiety    • Arthritis     R SHOULDER, R HIP, AND R LEG   • Chronic nausea 01/15/2019   • COPD  (chronic obstructive pulmonary disease) ([facility])    • Hernia, hiatal    • Hyperlipidemia    • Hypertension    • Knee pain, right 2018   • Memory loss     FORGETFULNESS   • Migraine headache    • Moderate episode of recurrent major depressive disorder ([facility]) 2017   • Night sweats    • Numbness 2017    WILL CHECK AN X- RAY OF HER C-SPINE    • Sciatica of right side 2017   • Severe episode of recurrent major depressive disorder, without psychotic features ([facility]) 10/22/2019   • Shortness of breath    • Shoulder pain, left 2018   • Sinus trouble    • Sleep apnea, unspecified    • Tobacco abuse 2017   • Tobacco abuse counseling 2017    SHE DESIRES TO QUIT. SHE HAS TRIED TO CHANTIX IN THE PAST W/O SUCCESS. SHE WAS SUCCESSFUL IN THE PAST W/O MEDICATION.     Past Surgical History:   Procedure Laterality Date   •  SECTION     • CHOLECYSTECTOMY     • COLONOSCOPY     • GALLBLADDER SURGERY       PT Assessment (last 12 hours)     PT Evaluation and Treatment     Row Name 04/10/22 0937          Physical Therapy Time and Intention    Subjective Information complains of;weakness;fatigue;pain  -DK     Document Type therapy note (daily note)  -DK     Mode of Treatment individual therapy;physical therapy  -DK     Patient Effort good  -DK     Symptoms Noted During/After Treatment fatigue;increased pain;shortness of breath  -DK     Row Name 04/10/22 0937          Pain    Pretreatment Pain Rating 0/10 - no pain  -DK     Posttreatment Pain Rating 4/10  -DK     Pain Location - Side/Orientation Left  -DK     Pain Location generalized  -DK     Pain Location - hip  -DK     Pain Intervention(s) Repositioned;Ambulation/increased activity;Distraction;Nursing Notified;Therapeutic presence  -DK     Row Name 04/10/22 0937          Cognition    Affect/Mental Status (Cognition) WFL  -DK     Orientation Status (Cognition) oriented x 4  -DK     Follows Commands (Cognition) WFL  -DK     Cognitive Function WFL   -DK     Personal Safety Interventions gait belt;nonskid shoes/slippers when out of bed;supervised activity  -DK     Row Name 04/10/22 0937          Bed Mobility    Bed Mobility supine-sit  -DK     Supine-Sit [place] (Bed Mobility) standby assist;contact guard;1 person assist  -DK     Supine-Sit-Supine [place] (Bed Mobility) standby assist;contact guard;1 person assist  -DK     Bed Mobility, Safety Issues decreased use of legs for bridging/pushing;decreased use of arms for pushing/pulling  -DK     Assistive Device (Bed Mobility) bed rails  -DK     Row Name 04/10/22 0937          Transfers    Transfers sit-stand transfer;stand-sit transfer;toilet transfer  -DK     Sit-Stand [place] (Transfers) standby assist;contact guard;1 person assist  -DK     Stand-Sit [place] (Transfers) standby assist;contact guard;1 person assist  -DK     [place] Level (Toilet Transfer) standby assist;contact guard;1 person assist  -DK     Assistive Device (Toilet Transfer) commode, bedside without drop arms;walker, front-wheeled  -DK     Row Name 04/10/22 0937          Sit-Stand Transfer    Assistive Device (Sit-Stand Transfers) walker, front-wheeled  -DK     Row Name 04/10/22 0937          Stand-Sit Transfer    Assistive Device (Stand-Sit Transfers) walker, front-wheeled  -DK     Row Name 04/10/22 0937          Toilet Transfer    Type (Toilet Transfer) sit-stand;stand-sit  -DK     Row Name 04/10/22 0937          Gait/Stairs (Locomotion)    Gait/Stairs Locomotion gait/ambulation independence;gait/ambulation assistive device;distance ambulated;gait pattern  -DK     [place] Level (Gait) standby assist;contact guard;1 person assist  -DK     Assistive Device (Gait) walker, front-wheeled  -DK     Distance in Feet (Gait) 90  -DK     Pattern (Gait) step-through  -DK     Deviations/Abnormal Patterns (Gait) festinating/shuffling  -DK     Bilateral Gait Deviations forward flexed posture  -DK     Comment, (Gait/Stairs) --   Pt ambulated with O2 via the wall and a rolling walker. She did requires a few minutes on the BSC pre gait, and was left in the recliner post treatment, with RN in the room.  Mild hip pain post gait.  -     Row Name 04/10/22 0937          Safety Issues, Functional Mobility    Safety Issues Affecting Function (Mobility) awareness of need for assistance;safety precaution awareness  -     Impairments Affecting Function (Mobility) balance;endurance/activity tolerance;pain;strength;shortness of breath  -     Row Name 04/10/22 0937          Balance    Balance Assessment standing dynamic balance  -     Dynamic Sitting Balance standby assist  -DK     Position, Sitting Balance unsupported;sitting edge of bed  -     Dynamic Standing Balance standby assist;contact guard;1-person assist  -     Position/Device Used, Standing Balance walker, front-wheeled  -     Balance Interventions standing;dynamic;tandem gait  -     Row Name 04/10/22 0937          Motor Skills    Motor Skills --  therapeutic exercises  -     Coordination WFL  -     Therapeutic Exercise hip;knee;ankle  -     Additional Documentation --  No pain with exercises.  -     Row Name 04/10/22 0937          Hip (Therapeutic Exercise)    Hip (Therapeutic Exercise) AAROM (active assistive range of motion)  -     Hip AAROM (Therapeutic Exercise) bilateral;flexion;extension;aBduction;aDduction;supine;10 repetitions;2 sets  -     Row Name 04/10/22 0937          Knee (Therapeutic Exercise)    Knee (Therapeutic Exercise) AAROM (active assistive range of motion)  -     Knee AAROM (Therapeutic Exercise) bilateral;flexion;extension;supine;10 repetitions;2 sets  -     Row Name 04/10/22 0937          Ankle (Therapeutic Exercise)    Ankle (Therapeutic Exercise) AAROM (active assistive range of motion)  -     Ankle AAROM (Therapeutic Exercise) bilateral;dorsiflexion;plantarflexion;supine;10 repetitions;2 sets  -     Row Name             Wound  04/05/22 0140 Bilateral anterior groin MASD (Moisture associated skin damage)    Wound - Properties Group Placement Date: 04/05/22  -HR Placement Time: 0140  -HR Present on Hospital Admission: Y  -HR Side: Bilateral  -HR Orientation: anterior  -HR Location: groin  -HR Primary Wound Type: MASD  -HR     Retired Wound - Properties Group Placement Date: 04/05/22  -HR Placement Time: 0140  -HR Present on Hospital Admission: Y  -HR Side: Bilateral  -HR Orientation: anterior  -HR Location: groin  -HR Primary Wound Type: MASD  -HR     Retired Wound - Properties Group Date first assessed: 04/05/22  -HR Time first assessed: 0140  -HR Present on Hospital Admission: Y  -HR Side: Bilateral  -HR Location: groin  -HR Primary Wound Type: MASD  -HR     Row Name             Wound 04/05/22 0142 Right lower breast MASD (Moisture associated skin damage)    Wound - Properties Group Placement Date: 04/05/22  -HR Placement Time: 0142  -HR Side: Right  -HR Orientation: lower  -HR Location: breast  -HR Primary Wound Type: MASD  -HR     Retired Wound - Properties Group Placement Date: 04/05/22  -HR Placement Time: 0142  -HR Side: Right  -HR Orientation: lower  -HR Location: breast  -HR Primary Wound Type: MASD  -HR     Retired Wound - Properties Group Date first assessed: 04/05/22  -HR Time first assessed: 0142  -HR Side: Right  -HR Location: breast  -HR Primary Wound Type: MASD  -HR     Row Name             Wound 04/05/22 0143 Left lower breast MASD (Moisture associated skin damage)    Wound - Properties Group Placement Date: 04/05/22  -HR Placement Time: 0143  -HR Side: Left  -HR Orientation: lower  -HR Location: breast  -HR Primary Wound Type: MASD  -HR     Retired Wound - Properties Group Placement Date: 04/05/22  -HR Placement Time: 0143  -HR Side: Left  -HR Orientation: lower  -HR Location: breast  -HR Primary Wound Type: MASD  -HR     Retired Wound - Properties Group Date first assessed: 04/05/22  -HR Time first assessed: 0143  -HR  Side: Left  -HR Location: breast  -HR Primary Wound Type: MASD  -HR     Row Name             Wound 04/05/22 1442 Right posterior ring finger Traumatic    Wound - Properties Group Placement Date: 04/05/22  -GREG Placement Time: 1442  -GREG Present on Hospital Admission: Y  -GREG Side: Right  -GREG Orientation: posterior  -GREG Location: ring finger  -GREG Primary Wound Type: Traumatic  -GREG     Retired Wound - Properties Group Placement Date: 04/05/22  -GREG Placement Time: 1442  -GREG Present on Hospital Admission: Y  -GREG Side: Right  -GREG Orientation: posterior  -GREG Location: ring finger  -GREG Primary Wound Type: Traumatic  -GREG     Retired Wound - Properties Group Date first assessed: 04/05/22  -GREG Time first assessed: 1442  -GREG Present on Hospital Admission: Y  -GREG Side: Right  -GREG Location: ring finger  -GREG Primary Wound Type: Traumatic  -GREG     Row Name 04/10/22 0937          Plan of Care Review    Plan of Care Reviewed With patient  -DK     Progress improving  -DK     Row Name 04/10/22 0937          Positioning and Restraints    Pre-Treatment Position in bed  -DK     Post Treatment Position chair  -DK     In Chair sitting;call light within reach;encouraged to call for assist;with nsg  -DK     Row Name 04/10/22 0937          Therapy Assessment/Plan (PT)    Rehab Potential (PT) good, to achieve stated therapy goals  -DK     Criteria for Skilled Interventions Met (PT) skilled treatment is necessary  -DK     Problem List (PT) problems related to;balance;mobility;strength;range of motion (ROM);pain  breathing issues  -DK     Activity Limitations Related to Problem List (PT) unable to ambulate safely;unable to transfer safely  -DK     Row Name 04/10/22 0937          Progress Summary (PT)    Progress Toward Functional Goals (PT) progress toward functional goals is good  -DK           User Key  (r) = Recorded By, (t) = Taken By, (c) = Cosigned By    Initials Name Provider Type    HR Amelia Wagner, RN Registered Nurse    GREG Daniel  Stacie, RN Registered Nurse    Sakshi Lewis PTA Physical Therapist Assistant                Physical Therapy Education                 Title: PT OT SLP Therapies (In Progress)     Topic: Physical Therapy (In Progress)     Point: Mobility training (Done)     Learning Progress Summary           Patient Acceptance, E,TB, VU by AV at 4/6/2022 1526                   Point: Home exercise program (Not Started)     Learner Progress:  Not documented in this visit.          Point: Body mechanics (Done)     Learning Progress Summary           Patient Acceptance, E,TB, VU by AV at 4/6/2022 1526                   Point: Precautions (Done)     Learning Progress Summary           Patient Acceptance, E,TB, VU by AV at 4/6/2022 1526                               User Key     Initials Effective Dates Name Provider Type Discipline    AV 06/11/21 -  Geovanni Mendez, PT Physical Therapist PT              PT Recommendation and Plan  Planned Therapy Interventions (PT): balance training, bed mobility training, gait training, strengthening, transfer training  Therapy Frequency (PT): daily  Progress Summary (PT)  Progress Toward Functional Goals (PT): progress toward functional goals is good  Plan of Care Reviewed With: patient  Progress: improving   Outcome Measures     Row Name 04/10/22 0900 04/08/22 1400          How much help from another person do you currently need...    Turning from your back to your side while in flat bed without using bedrails? 4  -DK 4  -CS     Moving from lying on back to sitting on the side of a flat bed without bedrails? 4  -DK 4  -CS     Moving to and from a bed to a chair (including a wheelchair)? 3  -DK 3  -CS     Standing up from a chair using your arms (e.g., wheelchair, bedside chair)? 4  -DK 4  -CS     Climbing 3-5 steps with a railing? 3  -DK 3  -CS     To walk in hospital room? 3  -DK 3  -CS     AM-PAC 6 Clicks Score (PT) 21  -DK 21  -CS            Functional Assessment    Outcome Measure  Options AM-PAC 6 Clicks Basic Mobility (PT)  -DK AM-PAC 6 Clicks Basic Mobility (PT)  -CS           User Key  (r) = Recorded By, (t) = Taken By, (c) = Cosigned By    Initials Name Provider Type    Sakshi Lewis PTA Physical Therapist Assistant    Ethan Brown PTA Physical Therapist Assistant                 Time Calculation:    PT Charges     Row Name 04/10/22 0943             Time Calculation    PT Received On 04/10/22  -DK      PT Goal Re-Cert Due Date 04/15/22  -DK              Timed Charges    83360 - PT Therapeutic Exercise Minutes 14  -DK      35820 - Gait Training Minutes  6  -DK      34784 - PT Therapeutic Activity Minutes 8  -DK              Total Minutes    Timed Charges Total Minutes 28  -DK       Total Minutes 28  -DK            User Key  (r) = Recorded By, (t) = Taken By, (c) = Cosigned By    Initials Name Provider Type    Sakshi eLwis PTA Physical Therapist Assistant              Therapy Charges for Today     Code Description Service Date Service Provider Modifiers Qty    53512421631 HC PT THER PROC EA 15 MIN 4/10/2022 Sakshi Odom PTA GP 1    67000825789 HC PT THERAPEUTIC ACT EA 15 MIN 4/10/2022 Sakshi Odom, HAN GP 1          PT G-Codes  Outcome Measure Options: AM-PAC 6 Clicks Basic Mobility (PT)  AM-PAC 6 Clicks Score (PT): 21  AM-PAC 6 Clicks Score (OT): 21    Sakshi Odom PTA  4/10/2022     English

## 2023-10-10 ENCOUNTER — LAB (OUTPATIENT)
Dept: LAB | Facility: HOSPITAL | Age: 58
End: 2023-10-10
Payer: MEDICARE

## 2023-10-10 ENCOUNTER — TRANSCRIBE ORDERS (OUTPATIENT)
Dept: ADMINISTRATIVE | Facility: HOSPITAL | Age: 58
End: 2023-10-10
Payer: MEDICARE

## 2023-10-10 DIAGNOSIS — R10.13 EPIGASTRIC PAIN: ICD-10-CM

## 2023-10-10 DIAGNOSIS — R10.13 DYSPEPSIA: Primary | ICD-10-CM

## 2023-10-10 DIAGNOSIS — K21.9 GASTROESOPHAGEAL REFLUX DISEASE WITHOUT ESOPHAGITIS: ICD-10-CM

## 2023-10-10 DIAGNOSIS — D64.9 ANEMIA, UNSPECIFIED TYPE: ICD-10-CM

## 2023-10-10 LAB
ALBUMIN SERPL-MCNC: 4.1 G/DL (ref 3.5–5.2)
ALBUMIN/GLOB SERPL: 1.6 G/DL
ALP SERPL-CCNC: 94 U/L (ref 39–117)
ALT SERPL W P-5'-P-CCNC: 11 U/L (ref 1–33)
ANION GAP SERPL CALCULATED.3IONS-SCNC: 9.8 MMOL/L (ref 5–15)
AST SERPL-CCNC: 14 U/L (ref 1–32)
BASOPHILS # BLD AUTO: 0.05 10*3/MM3 (ref 0–0.2)
BASOPHILS NFR BLD AUTO: 0.5 % (ref 0–1.5)
BILIRUB SERPL-MCNC: 0.2 MG/DL (ref 0–1.2)
BUN SERPL-MCNC: 16 MG/DL (ref 6–20)
BUN/CREAT SERPL: 16.7 (ref 7–25)
CALCIUM SPEC-SCNC: 9.3 MG/DL (ref 8.6–10.5)
CHLORIDE SERPL-SCNC: 106 MMOL/L (ref 98–107)
CO2 SERPL-SCNC: 27.2 MMOL/L (ref 22–29)
CREAT SERPL-MCNC: 0.96 MG/DL (ref 0.57–1)
DEPRECATED RDW RBC AUTO: 43.2 FL (ref 37–54)
EGFRCR SERPLBLD CKD-EPI 2021: 69.6 ML/MIN/1.73
EOSINOPHIL # BLD AUTO: 0.3 10*3/MM3 (ref 0–0.4)
EOSINOPHIL NFR BLD AUTO: 3 % (ref 0.3–6.2)
ERYTHROCYTE [DISTWIDTH] IN BLOOD BY AUTOMATED COUNT: 13.7 % (ref 12.3–15.4)
FERRITIN SERPL-MCNC: 35 NG/ML (ref 13–150)
GLOBULIN UR ELPH-MCNC: 2.5 GM/DL
GLUCOSE SERPL-MCNC: 89 MG/DL (ref 65–99)
HCT VFR BLD AUTO: 38.9 % (ref 34–46.6)
HGB BLD-MCNC: 12.9 G/DL (ref 12–15.9)
IMM GRANULOCYTES # BLD AUTO: 0.05 10*3/MM3 (ref 0–0.05)
IMM GRANULOCYTES NFR BLD AUTO: 0.5 % (ref 0–0.5)
IRON 24H UR-MRATE: 95 MCG/DL (ref 37–145)
IRON SATN MFR SERPL: 26 % (ref 20–50)
LYMPHOCYTES # BLD AUTO: 2.39 10*3/MM3 (ref 0.7–3.1)
LYMPHOCYTES NFR BLD AUTO: 24.2 % (ref 19.6–45.3)
MCH RBC QN AUTO: 29.1 PG (ref 26.6–33)
MCHC RBC AUTO-ENTMCNC: 33.2 G/DL (ref 31.5–35.7)
MCV RBC AUTO: 87.6 FL (ref 79–97)
MONOCYTES # BLD AUTO: 0.9 10*3/MM3 (ref 0.1–0.9)
MONOCYTES NFR BLD AUTO: 9.1 % (ref 5–12)
NEUTROPHILS NFR BLD AUTO: 6.17 10*3/MM3 (ref 1.7–7)
NEUTROPHILS NFR BLD AUTO: 62.7 % (ref 42.7–76)
NRBC BLD AUTO-RTO: 0 /100 WBC (ref 0–0.2)
PLATELET # BLD AUTO: 217 10*3/MM3 (ref 140–450)
PMV BLD AUTO: 11.3 FL (ref 6–12)
POTASSIUM SERPL-SCNC: 4.5 MMOL/L (ref 3.5–5.2)
PROT SERPL-MCNC: 6.6 G/DL (ref 6–8.5)
RBC # BLD AUTO: 4.44 10*6/MM3 (ref 3.77–5.28)
SODIUM SERPL-SCNC: 143 MMOL/L (ref 136–145)
TIBC SERPL-MCNC: 373 MCG/DL (ref 298–536)
TRANSFERRIN SERPL-MCNC: 250 MG/DL (ref 200–360)
WBC NRBC COR # BLD: 9.86 10*3/MM3 (ref 3.4–10.8)

## 2023-10-10 PROCEDURE — 82728 ASSAY OF FERRITIN: CPT

## 2023-10-10 PROCEDURE — 84466 ASSAY OF TRANSFERRIN: CPT

## 2023-10-10 PROCEDURE — 83540 ASSAY OF IRON: CPT

## 2023-10-10 PROCEDURE — 85025 COMPLETE CBC W/AUTO DIFF WBC: CPT

## 2023-10-10 PROCEDURE — 80053 COMPREHEN METABOLIC PANEL: CPT

## 2023-10-10 PROCEDURE — 36415 COLL VENOUS BLD VENIPUNCTURE: CPT

## 2023-10-25 DIAGNOSIS — J44.9 CHRONIC OBSTRUCTIVE PULMONARY DISEASE, UNSPECIFIED COPD TYPE: ICD-10-CM

## 2023-10-26 RX ORDER — ALBUTEROL SULFATE 90 UG/1
2 AEROSOL, METERED RESPIRATORY (INHALATION) EVERY 6 HOURS PRN
Qty: 18 G | Refills: 1 | Status: SHIPPED | OUTPATIENT
Start: 2023-10-26

## 2023-10-26 RX ORDER — FLUTICASONE FUROATE, UMECLIDINIUM BROMIDE AND VILANTEROL TRIFENATATE 100; 62.5; 25 UG/1; UG/1; UG/1
1 POWDER RESPIRATORY (INHALATION)
Qty: 60 EACH | Refills: 11 | Status: SHIPPED | OUTPATIENT
Start: 2023-10-26

## 2023-11-07 RX ORDER — LISINOPRIL 5 MG/1
TABLET ORAL
Qty: 30 TABLET | Refills: 11 | Status: SHIPPED | OUTPATIENT
Start: 2023-11-07

## 2023-11-07 RX ORDER — FAMOTIDINE 40 MG/1
TABLET, FILM COATED ORAL
Qty: 180 TABLET | Refills: 3 | Status: SHIPPED | OUTPATIENT
Start: 2023-11-07

## 2023-11-07 RX ORDER — ATORVASTATIN CALCIUM 10 MG/1
TABLET, FILM COATED ORAL
Qty: 30 TABLET | Refills: 11 | Status: SHIPPED | OUTPATIENT
Start: 2023-11-07

## 2023-11-07 RX ORDER — ESCITALOPRAM OXALATE 20 MG/1
TABLET ORAL
Qty: 30 TABLET | Refills: 11 | Status: SHIPPED | OUTPATIENT
Start: 2023-11-07

## 2023-11-28 ENCOUNTER — TELEPHONE (OUTPATIENT)
Dept: FAMILY MEDICINE CLINIC | Facility: CLINIC | Age: 58
End: 2023-11-28

## 2023-11-28 DIAGNOSIS — R91.1 PULMONARY NODULE: Primary | ICD-10-CM

## 2023-11-28 DIAGNOSIS — Z87.891 PERSONAL HISTORY OF NICOTINE DEPENDENCE: ICD-10-CM

## 2023-11-28 NOTE — TELEPHONE ENCOUNTER
Caller: MAVIS WITH [facility]    Relationship: Other    Best call back number: [phone]     What orders are you requesting (i.e. lab or imaging): CHEST CT FOR LUNG SCREENING    In what timeframe would the patient need to come in: DECEMBER OF 2023    Where will you receive your lab/imaging services: [facility]    Additional notes: CALLER STATES PATIENT RECEIVED PET SCAN ON 09.12.2023. BASED ON FINDINGS, THEY RECOMMEND PATIENT HAVE CT OF CHEST/LUNGS COMPLETED IN DECEMBER OF 2023. PLEASE ADVISE.

## 2023-12-08 DIAGNOSIS — R91.1 LUNG NODULE: Primary | ICD-10-CM

## 2023-12-21 ENCOUNTER — OFFICE VISIT (OUTPATIENT)
Dept: FAMILY MEDICINE CLINIC | Facility: CLINIC | Age: 58
End: 2023-12-21
Payer: MEDICARE

## 2023-12-21 ENCOUNTER — REFERRAL TRIAGE (OUTPATIENT)
Dept: CASE MANAGEMENT | Facility: OTHER | Age: 58
End: 2023-12-21
Payer: MEDICARE

## 2023-12-21 VITALS
HEIGHT: 64 IN | BODY MASS INDEX: 38.93 KG/M2 | WEIGHT: 228 LBS | HEART RATE: 104 BPM | DIASTOLIC BLOOD PRESSURE: 64 MMHG | SYSTOLIC BLOOD PRESSURE: 106 MMHG | OXYGEN SATURATION: 91 % | TEMPERATURE: 97.9 F

## 2023-12-21 DIAGNOSIS — F33.2 SEVERE EPISODE OF RECURRENT MAJOR DEPRESSIVE DISORDER, WITHOUT PSYCHOTIC FEATURES: ICD-10-CM

## 2023-12-21 DIAGNOSIS — Z00.00 MEDICARE ANNUAL WELLNESS VISIT, SUBSEQUENT: ICD-10-CM

## 2023-12-21 DIAGNOSIS — J44.9 CHRONIC OBSTRUCTIVE PULMONARY DISEASE, UNSPECIFIED COPD TYPE: ICD-10-CM

## 2023-12-21 DIAGNOSIS — Z23 NEED FOR COVID-19 VACCINE: ICD-10-CM

## 2023-12-21 DIAGNOSIS — E78.00 PURE HYPERCHOLESTEROLEMIA: ICD-10-CM

## 2023-12-21 DIAGNOSIS — Z12.31 ENCOUNTER FOR SCREENING MAMMOGRAM FOR MALIGNANT NEOPLASM OF BREAST: ICD-10-CM

## 2023-12-21 DIAGNOSIS — I10 PRIMARY HYPERTENSION: ICD-10-CM

## 2023-12-21 DIAGNOSIS — Z23 NEED FOR INFLUENZA VACCINATION: Primary | ICD-10-CM

## 2023-12-21 DIAGNOSIS — Z72.0 TOBACCO ABUSE: ICD-10-CM

## 2023-12-21 LAB
ALBUMIN SERPL-MCNC: 4.4 G/DL (ref 3.5–5.2)
ALBUMIN/GLOB SERPL: 1.7 G/DL
ALP SERPL-CCNC: 126 U/L (ref 39–117)
ALT SERPL W P-5'-P-CCNC: 11 U/L (ref 1–33)
ANION GAP SERPL CALCULATED.3IONS-SCNC: 13 MMOL/L (ref 5–15)
AST SERPL-CCNC: 16 U/L (ref 1–32)
BASOPHILS # BLD AUTO: 0.04 10*3/MM3 (ref 0–0.2)
BASOPHILS NFR BLD AUTO: 0.4 % (ref 0–1.5)
BILIRUB SERPL-MCNC: 0.2 MG/DL (ref 0–1.2)
BUN SERPL-MCNC: 16 MG/DL (ref 6–20)
BUN/CREAT SERPL: 14.7 (ref 7–25)
CALCIUM SPEC-SCNC: 9.4 MG/DL (ref 8.6–10.5)
CHLORIDE SERPL-SCNC: 101 MMOL/L (ref 98–107)
CHOLEST SERPL-MCNC: 186 MG/DL (ref 0–200)
CO2 SERPL-SCNC: 26 MMOL/L (ref 22–29)
CREAT SERPL-MCNC: 1.09 MG/DL (ref 0.57–1)
DEPRECATED RDW RBC AUTO: 45.4 FL (ref 37–54)
EGFRCR SERPLBLD CKD-EPI 2021: 59.4 ML/MIN/1.73
EOSINOPHIL # BLD AUTO: 0.36 10*3/MM3 (ref 0–0.4)
EOSINOPHIL NFR BLD AUTO: 3.5 % (ref 0.3–6.2)
ERYTHROCYTE [DISTWIDTH] IN BLOOD BY AUTOMATED COUNT: 14.4 % (ref 12.3–15.4)
GLOBULIN UR ELPH-MCNC: 2.6 GM/DL
GLUCOSE SERPL-MCNC: 122 MG/DL (ref 65–99)
HCT VFR BLD AUTO: 39.4 % (ref 34–46.6)
HDLC SERPL-MCNC: 39 MG/DL (ref 40–60)
HGB BLD-MCNC: 13.3 G/DL (ref 12–15.9)
IMM GRANULOCYTES # BLD AUTO: 0.05 10*3/MM3 (ref 0–0.05)
IMM GRANULOCYTES NFR BLD AUTO: 0.5 % (ref 0–0.5)
LDLC SERPL CALC-MCNC: 113 MG/DL (ref 0–100)
LDLC/HDLC SERPL: 2.76 {RATIO}
LYMPHOCYTES # BLD AUTO: 1.66 10*3/MM3 (ref 0.7–3.1)
LYMPHOCYTES NFR BLD AUTO: 16.1 % (ref 19.6–45.3)
MCH RBC QN AUTO: 29.8 PG (ref 26.6–33)
MCHC RBC AUTO-ENTMCNC: 33.8 G/DL (ref 31.5–35.7)
MCV RBC AUTO: 88.3 FL (ref 79–97)
MONOCYTES # BLD AUTO: 1.14 10*3/MM3 (ref 0.1–0.9)
MONOCYTES NFR BLD AUTO: 11.1 % (ref 5–12)
NEUTROPHILS NFR BLD AUTO: 68.4 % (ref 42.7–76)
NEUTROPHILS NFR BLD AUTO: 7.05 10*3/MM3 (ref 1.7–7)
NRBC BLD AUTO-RTO: 0 /100 WBC (ref 0–0.2)
PLATELET # BLD AUTO: 206 10*3/MM3 (ref 140–450)
PMV BLD AUTO: 10.8 FL (ref 6–12)
POTASSIUM SERPL-SCNC: 4.4 MMOL/L (ref 3.5–5.2)
PROT SERPL-MCNC: 7 G/DL (ref 6–8.5)
RBC # BLD AUTO: 4.46 10*6/MM3 (ref 3.77–5.28)
SODIUM SERPL-SCNC: 140 MMOL/L (ref 136–145)
TRIGL SERPL-MCNC: 196 MG/DL (ref 0–150)
VLDLC SERPL-MCNC: 34 MG/DL (ref 5–40)
WBC NRBC COR # BLD AUTO: 10.3 10*3/MM3 (ref 3.4–10.8)

## 2023-12-21 PROCEDURE — 80053 COMPREHEN METABOLIC PANEL: CPT | Performed by: FAMILY MEDICINE

## 2023-12-21 PROCEDURE — 85025 COMPLETE CBC W/AUTO DIFF WBC: CPT | Performed by: FAMILY MEDICINE

## 2023-12-21 PROCEDURE — 80061 LIPID PANEL: CPT | Performed by: FAMILY MEDICINE

## 2023-12-21 RX ORDER — AZITHROMYCIN 250 MG/1
TABLET, FILM COATED ORAL
Qty: 6 TABLET | Refills: 0 | Status: SHIPPED | OUTPATIENT
Start: 2023-12-21

## 2023-12-21 RX ORDER — PREDNISONE 10 MG/1
TABLET ORAL
Qty: 30 TABLET | Refills: 0 | Status: SHIPPED | OUTPATIENT
Start: 2023-12-21

## 2023-12-21 NOTE — ASSESSMENT & PLAN NOTE
Will continue her Trelegy every day.  She needs to quit smoking.  She does seem to have an acute exacerbation of the underlying bronchitis today thus we will treat that as well.

## 2023-12-21 NOTE — ASSESSMENT & PLAN NOTE
She continues to struggle with her depression.  She is already on 20 mg of Lexapro with 300 mg of bupropion and Vraylar.  Will increase her Vraylar up to 3 mg daily and switch her Wellbutrin to Trintellix.  In doing so she will  with Wellbutrin down to a half a tab daily for a week and then discontinue it while she overlaps with the medicine.  I did offer her to see a mental health professional and she declined.

## 2023-12-21 NOTE — PROGRESS NOTES
Her distance visual acuity without correction was 20/40   in the right eye and 20/40   in the left eye.

## 2023-12-21 NOTE — PROGRESS NOTES
AWV Documentation:   Patient Info:     I reviewed all aspects of the patient's history      Compared to 1 year ago, patient's physical health feels:  Worse    Compared to 1 year ago, patient's mental health feels:  Worse  Advanced Care Planning:     Was a discussion had with the patient regarding their ACP?: No      Details included:  Does not have an advance directive, declines further assistance  Vital Signs:     Blood Pressure Evaluation:  In the acceptable range  Health Risk Assessment:     Does the patient have evidence of cognitive impairment: No      Is aspirin on the med list: No      Aspirin use:  Aspirin use is indicated based on review of current medical condition(s). Pros and cons of this therapy have been discussed today. Benefits of this medication outweigh potential harm. Patient has been encouraged to continue taking this medication.      Subsequent Medicare Wellness Visit    Subjective    Lluvia Archer is a 58 y.o. female who presents for a Subsequent Medicare Wellness Visit.    The following portions of the patient's history were reviewed and   updated as appropriate: allergies, current medications, past family history, past medical history, past social history, past surgical history, and problem list.    Compared to one year ago, the patient feels her physical   health is worse.    Compared to one year ago, the patient feels her mental   health is worse.    Recent Hospitalizations:  This patient has had a [facility] admission record on file within the last 365 days.    Current Medical Providers:  Patient Care Team:  Aleksandar Edge DO as PCP - General (Family Medicine)    Outpatient Medications Prior to Visit   Medication Sig Dispense Refill    albuterol sulfate  (90 Base) MCG/ACT inhaler TAKE 2 PUFFS BY MOUTH EVERY 6 HOURS AS NEEDED FOR WHEEZE 18 g 1    atorvastatin (LIPITOR) 10 MG tablet TAKE ONE TABLET BY MOUTH DAILY AT 9 PM EVERY NIGHT 30 tablet 11    buPROPion SR  (WELLBUTRIN SR) 150 MG 12 hr tablet TAKE 1 TABLET BY MOUTH TWICE A DAY 60 tablet 5    famotidine (PEPCID) 40 MG tablet TAKE ONE TABLET BY MOUTH TWICE DAILY @ 9AM & 5PM 180 tablet 3    lisinopril (PRINIVIL,ZESTRIL) 5 MG tablet TAKE ONE TABLET BY MOUTH DAILY AT 9 AM 30 tablet 11    Trelegy Ellipta 100-62.5-25 MCG/ACT inhaler INHALE 1 PUFF DAILY 60 each 11    escitalopram (LEXAPRO) 20 MG tablet TAKE ONE TABLET BY MOUTH DAILY AT 9 AM EVERY MORNING 30 tablet 11    Vraylar 1.5 MG capsule capsule TAKE ONE CAPSULE BY MOUTH DAILY AT 9 AM 30 capsule 11    ipratropium-albuterol (DUO-NEB) 0.5-2.5 mg/3 ml nebulizer Take 3 mL by nebulization 4 (Four) Times a Day for 30 days. 360 mL 0     No facility-administered medications prior to visit.       No opioid medication identified on active medication list. I have reviewed chart for other potential  high risk medication/s and harmful drug interactions in the elderly.        Aspirin is not on active medication list.  Aspirin use is not indicated based on review of current medical condition/s. Risk of harm outweighs potential benefits.  .    Patient Active Problem List   Diagnosis    Abnormal mammogram    Anxiety    Arthritis    Chronic nausea    Chronic obstructive pulmonary disease (COPD)    Counseling on substance use and abuse    Esophageal reflux    Hernia, hiatal    Hyperlipidemia    Hypertension    Knee pain, right    Memory loss    Migraine    Moderate episode of recurrent major depressive disorder    Night sweats    Numbness    Sciatica of right side    Severe episode of recurrent major depressive disorder, without psychotic features    Shoulder pain, left    Other diseases of nasal cavity and sinuses    Sleep apnea, unspecified    Tobacco abuse    Strain of groin, right, subsequent encounter    Osteoarthritis of spine with radiculopathy, lumbar region    Chronic right-sided low back pain with right-sided sciatica    Aftercare following right hip joint replacement surgery     "Primary osteoarthritis of right hip    Right hip pain    Sepsis, due to unspecified organism, unspecified whether acute organ dysfunction present    Severe malnutrition    Pneumonia of right lower lobe due to infectious organism    Hospital discharge follow-up    Other specified anemias    Encounter for screening mammogram for malignant neoplasm of breast    Multifocal pneumonia    Acute on chronic respiratory failure with hypoxia    Medicare annual wellness visit, subsequent     Advance Care Planning   Advance Care Planning     Advance Directive is not on file.  ACP discussion was declined by the patient. Patient does not have an advance directive, declines further assistance.     Objective    Vitals:    23 0855   BP: 106/64   BP Location: Left arm   Patient Position: Sitting   Pulse: 104   Temp: 97.9 °F (36.6 °C)   SpO2: 91%   Weight: 103 kg (228 lb)   Height: 162.6 cm (64\")     Estimated body mass index is 39.14 kg/m² as calculated from the following:    Height as of this encounter: 162.6 cm (64\").    Weight as of this encounter: 103 kg (228 lb).    Class 2 Severe Obesity (BMI >=35 and <=39.9). Obesity-related health conditions include the following: hypertension and dyslipidemias. Obesity is unchanged. BMI is is above average; BMI management plan is completed. We discussed low calorie, low carb based diet program, portion control, and increasing exercise.      Does the patient have evidence of cognitive impairment? No          HEALTH RISK ASSESSMENT    Smoking Status:  Social History     Tobacco Use   Smoking Status Former    Packs/day: 2.00    Years: 40.00    Additional pack years: 0.00    Total pack years: 80.00    Types: Cigarettes    Quit date: 2022    Years since quittin.7   Smokeless Tobacco Never     Alcohol Consumption:  Social History     Substance and Sexual Activity   Alcohol Use Never     Fall Risk Screen:    STEADI Fall Risk Assessment was completed, and patient is at LOW risk for " falls.Assessment completed on:2023    Depression Screenin/21/2023     8:59 AM   PHQ-2/PHQ-9 Depression Screening   Little Interest or Pleasure in Doing Things 3-->nearly every day   Feeling Down, Depressed or Hopeless 3-->nearly every day   Trouble Falling or Staying Asleep, or Sleeping Too Much 3-->nearly every day   Feeling Tired or Having Little Energy 3-->nearly every day   Poor Appetite or Overeating 3-->nearly every day   Feeling Bad about Yourself - or that You are a Failure or Have Let Yourself or Your Family Down 3-->nearly every day   Trouble Concentrating on Things, Such as Reading the Newspaper or Watching Television 3-->nearly every day   Moving or Speaking So Slowly that Other People Could Have Noticed? Or the Opposite - Being So Fidgety 0-->not at all   Thoughts that You Would be Better Off Dead or of Hurting Yourself in Some Way 1-->several days   PHQ-9: Brief Depression Severity Measure Score 22   If You Checked Off Any Problems, How Difficult Have These Problems Made It For You to Do Your Work, Take Care of Things at Home, or Get Along with Other People? very difficult       Health Habits and Functional and Cognitive Screenin/21/2023     8:57 AM   Functional & Cognitive Status   Do you have difficulty preparing food and eating? No   Do you have difficulty bathing yourself, getting dressed or grooming yourself? No   Do you have difficulty using the toilet? No   Do you have difficulty moving around from place to place? No   Do you have trouble with steps or getting out of a bed or a chair? Yes   Current Diet Limited Junk Food   Dental Exam Not up to date   Eye Exam Not up to date   Exercise (times per week) 0 times per week   Current Exercises Include No Regular Exercise   Do you need help using the phone?  No   Are you deaf or do you have serious difficulty hearing?  Yes   Do you need help to go to places out of walking distance? No   Do you need help shopping? No   Do you  need help preparing meals?  No   Do you need help with housework?  No   Do you need help with laundry? No   Do you need help taking your medications? No   Do you need help managing money? No   Do you ever drive or ride in a car without wearing a seat belt? No   Have you felt unusual stress, anger or loneliness in the last month? Yes   Who do you live with? Spouse   If you need help, do you have trouble finding someone available to you? No   Have you been bothered in the last four weeks by sexual problems? No   Do you have difficulty concentrating, remembering or making decisions? Yes       Age-appropriate Screening Schedule:  Refer to the list below for future screening recommendations based on patient's age, sex and/or medical conditions. Orders for these recommended tests are listed in the plan section. The patient has been provided with a written plan.    Health Maintenance   Topic Date Due    Hepatitis B (1 of 3 - 3-dose series) Never done    TDAP/TD VACCINES (1 - Tdap) Never done    ZOSTER VACCINE (1 of 2) Never done    Pneumococcal Vaccine 0-64 (2 - PPSV23 or PCV20) 07/17/2017    MAMMOGRAM  08/17/2020    DIABETIC FOOT EXAM  Never done    PAP SMEAR  Never done    DIABETIC EYE EXAM  06/08/2021    INFLUENZA VACCINE  08/01/2023    COVID-19 Vaccine (4 - 2023-24 season) 09/01/2023    HEMOGLOBIN A1C  11/01/2023    URINE MICROALBUMIN  01/08/2024 (Originally 1965)    LIPID PANEL  05/01/2024    LUNG CANCER SCREENING  08/29/2024    ANNUAL WELLNESS VISIT  12/21/2024    BMI FOLLOWUP  12/21/2024    COLORECTAL CANCER SCREENING  10/15/2025    HEPATITIS C SCREENING  Completed                  CMS Preventative Services Quick Reference  Risk Factors Identified During Encounter  Depression/Dysphoria:  refused  Immunizations Discussed/Encouraged: Influenza and COVID19  Inactivity/Sedentary: Patient was advised to exercise at least 150 minutes a week per CDC recommendations.  Tobacco Use/Dependance Risk (use dotphrase  ".tobaccocessation for documentation)  The above risks/problems have been discussed with the patient.  Pertinent information has been shared with the patient in the After Visit Summary.  An After Visit Summary and PPPS were made available to the patient.    Follow Up:   Next Medicare Wellness visit to be scheduled in 1 year.     Additional E&M Note during same encounter follows:  Patient has multiple medical problems which are significant and separately identifiable that require additional work above and beyond the Medicare Wellness Visit.      Chief Complaint  Medicare Wellness-subsequent    Subjective    Hypertension-she does not check her blood pressure outside the office.    Hyperlipidemia-she does take her atorvastatin on a daily basis.    COPD-her breathing remains unchanged.  She does have a chronic cough and shortness of breath.  She does use her Trelegy on a daily basis but she continues to smoke 1-1/2 to 2 packs daily.    Depression-she remains severely depressed.  She states she rarely ever leaves her house.  She states she is taking her Lexapro, bupropion and her Vraylar daily.      Lluvia Archer is also being seen today for HTN, HLD    Review of Systems   Constitutional:  Positive for fatigue.   HENT:  Positive for congestion, postnasal drip and rhinorrhea.    Eyes:  Positive for visual disturbance.   Respiratory:  Positive for cough, shortness of breath and wheezing.    Cardiovascular:  Negative for chest pain and palpitations.   Gastrointestinal:  Negative for constipation and diarrhea.   Psychiatric/Behavioral:          (+) Depression       Objective   Vital Signs:  /64 (BP Location: Left arm, Patient Position: Sitting)   Pulse 104   Temp 97.9 °F (36.6 °C)   Ht 162.6 cm (64\")   Wt 103 kg (228 lb)   SpO2 91%   BMI 39.14 kg/m²     Physical Exam  Vitals and nursing note reviewed.   Constitutional:       General: She is not in acute distress.     Appearance: Normal appearance. She is obese. "   HENT:      Head: Normocephalic.      Right Ear: Tympanic membrane, ear canal and external ear normal.      Left Ear: Tympanic membrane, ear canal and external ear normal.      Nose: Nose normal.      Mouth/Throat:      Mouth: Mucous membranes are moist.      Pharynx: Oropharynx is clear.      Comments: Multiple teeth missing  Eyes:      General: No scleral icterus.     Conjunctiva/sclera: Conjunctivae normal.      Pupils: Pupils are equal, round, and reactive to light.   Cardiovascular:      Rate and Rhythm: Normal rate and regular rhythm.      Pulses: Normal pulses.      Heart sounds: Normal heart sounds. No murmur heard.  Pulmonary:      Effort: Pulmonary effort is normal.      Breath sounds: Wheezing present. No rhonchi or rales.   Musculoskeletal:      Cervical back: Neck supple. No rigidity or tenderness.   Lymphadenopathy:      Cervical: No cervical adenopathy.   Skin:     General: Skin is warm and dry.      Coloration: Skin is not jaundiced.      Findings: No rash.   Neurological:      General: No focal deficit present.      Mental Status: She is alert and oriented to person, place, and time.   Psychiatric:         Mood and Affect: Mood normal. Mood is depressed.         Thought Content: Thought content normal.         Judgment: Judgment normal.                         Assessment and Plan Diagnoses and all orders for this visit:    1. Need for influenza vaccination (Primary)  -     Fluzone >6 Months ([id number])    2. Need for COVID-19 vaccine  -     COVID-19 F23 (Pfizer) 12yrs+ (COMIRNATY)    3. Primary hypertension  Assessment & Plan:  Will update her labs today.    Orders:  -     Comprehensive Metabolic Panel  -     Lipid Panel  -     CBC Auto Differential    4. Pure hypercholesterolemia  Assessment & Plan:  Will get an update on her lipid profile with her labs.    Orders:  -     Comprehensive Metabolic Panel  -     Lipid Panel    5. Severe episode of recurrent major depressive disorder, without psychotic  features  Assessment & Plan:  She continues to struggle with her depression.  She is already on 20 mg of Lexapro with 300 mg of bupropion and Vraylar.  Will increase her Vraylar up to 3 mg daily and switch her Wellbutrin to Trintellix.  In doing so she will  with Wellbutrin down to a half a tab daily for a week and then discontinue it while she overlaps with the medicine.  I did offer her to see a mental health professional and she declined.    Orders:  -     Ambulatory Referral to Chronic Care Management    6. Chronic obstructive pulmonary disease, unspecified COPD type  Assessment & Plan:  Will continue her Trelegy every day.  She needs to quit smoking.  She does seem to have an acute exacerbation of the underlying bronchitis today thus we will treat that as well.      7. Medicare annual wellness visit, subsequent  Assessment & Plan:  She did have a home visit from her insurance company that recommended a routine gynecological exam mammogram and an update on her vaccines to include her shingles vaccine.  We encouraged her to get that had her pharmacy.  We encouraged her to come in for a routine gynecological exam but we will schedule her for a mammogram.      8. Encounter for screening mammogram for malignant neoplasm of breast  Assessment & Plan:  Will set her up for routine mammogram but she also needs to schedule a routine gynecological exam.    Orders:  -     Mammo Screening Digital Tomosynthesis Bilateral With CAD; Future    9. Tobacco abuse  Assessment & Plan:  She is already on the Wellbutrin on a daily basis.  I think more of her problem with smoking is related to her struggles with her mental health.  I think we can get that better she will be better prepared to quit smoking.      Other orders  -     Cariprazine HCl (Vraylar) 3 MG capsule capsule; Take 1 capsule by mouth Daily.  Dispense: 30 capsule; Refill: 5  -     Vortioxetine HBr (Trintellix) 10 MG tablet tablet; Take 1 tablet by mouth Daily  With Breakfast.  Dispense: 30 tablet; Refill: 2  -     predniSONE (DELTASONE) 10 MG tablet; 4 tabs daily for 3 days, then 3 tabs daily for 3 days, then 2 tabs daily for 3 days, then 1 tab daily for 3 days, then STOP.  Dispense: 30 tablet; Refill: 0  -     azithromycin (Zithromax Z-Homer) 250 MG tablet; Take 2 tablets by mouth on day 1, then 1 tablet daily on days 2-5  Dispense: 6 tablet; Refill: 0             Follow Up   No follow-ups on file.  Patient was given instructions and counseling regarding her condition or for health maintenance advice. Please see specific information pulled into the AVS if appropriate.

## 2023-12-21 NOTE — ASSESSMENT & PLAN NOTE
She is already on the Wellbutrin on a daily basis.  I think more of her problem with smoking is related to her struggles with her mental health.  I think we can get that better she will be better prepared to quit smoking.

## 2023-12-21 NOTE — ASSESSMENT & PLAN NOTE
She did have a home visit from her insurance company that recommended a routine gynecological exam mammogram and an update on her vaccines to include her shingles vaccine.  We encouraged her to get that had her pharmacy.  We encouraged her to come in for a routine gynecological exam but we will schedule her for a mammogram.

## 2023-12-21 NOTE — ASSESSMENT & PLAN NOTE
Will set her up for routine mammogram but she also needs to schedule a routine gynecological exam.

## 2023-12-28 ENCOUNTER — PATIENT OUTREACH (OUTPATIENT)
Dept: CASE MANAGEMENT | Facility: OTHER | Age: 58
End: 2023-12-28
Payer: MEDICARE

## 2023-12-28 NOTE — OUTREACH NOTE
Patient Outreach    Lluvia reports she was able to  Vraylar and Trintellix from pharmacy. Aware to take Wellbutrin 1/2 tab for one week then start Trintellix.  PCP had discussed behavioral health referral during her office visit. She declines. Aware to let office know if she changes her mind. S/S to report reviewed.     She reports respiratory status is better. Completed Zpak and has few days of prednisone left to go. S/S to report reviewed. Reports taking inhaler/neb as ordered.     Is a daily smoker. I have sent pt education on smoking cessation to her home address.     Plan to follow up with medication compliance, smoking cessation, discuss behaviorial health referral for depression, care gaps     Name and Relationship of Patient/Support Person: Lluvia Archer R - Self    Adult Patient Profile  Questions/Answers      Flowsheet Row Most Recent Value   Symptoms/Conditions Managed at Home behavioral health   Barriers to Managing Health none   Behavioral Health Symptoms/Conditions depression, anxiety   Behavioral Management Strategies medication therapy   Behavioral Health Self-Management Outcome 3 (uncertain)   Behavioral Health Comment offered behavioral health referral but declines            Education Documentation  VTE Symptoms, taught by Aspen Christian RN at 12/28/2023 11:03 AM.  Learner: Patient  Readiness: Acceptance  Method: Explanation  Response: Verbalizes Understanding    Unresolved/Worsening Symptoms, taught by Aspen Christian RN at 12/28/2023 11:03 AM.  Learner: Patient  Readiness: Acceptance  Method: Explanation  Response: Verbalizes Understanding    Unresolved/Worsening Symptoms, taught by Aspen Christian RN at 12/28/2023 10:47 AM.  Learner: Patient  Readiness: Acceptance  Method: Explanation  Response: Verbalizes Understanding    Coping Strategies, taught by Aspen Christian RN at 12/28/2023 10:47 AM.  Learner: Patient  Readiness: Acceptance  Method: Explanation  Response: Verbalizes  Understanding    Signs/Symptoms, taught by Aspen Christian, NITISH at 12/28/2023 10:47 AM.  Learner: Patient  Readiness: Acceptance  Method: Explanation  Response: Verbalizes Understanding          Aspen DE  Ambulatory Case Management    12/28/2023, 11:03 EST

## 2024-01-31 ENCOUNTER — PATIENT OUTREACH (OUTPATIENT)
Dept: CASE MANAGEMENT | Facility: OTHER | Age: 59
End: 2024-01-31
Payer: MEDICARE

## 2024-01-31 DIAGNOSIS — F33.2 SEVERE EPISODE OF RECURRENT MAJOR DEPRESSIVE DISORDER, WITHOUT PSYCHOTIC FEATURES: ICD-10-CM

## 2024-01-31 DIAGNOSIS — F41.9 ANXIETY: Primary | ICD-10-CM

## 2024-01-31 NOTE — OUTREACH NOTE
Patient Outreach    Spoke with Lluvia. Reports depression is better. Taking all medications as ordered. Declines Behavioral Health referral at this time. Has PCP appt on 3/1/24 and will discuss this more at that visit. She reports no needs or concerns.     Has open care gaps. I have placed a note to address these needs at her next appointment.     Will discharge from [facility].    Name and Relationship of Patient/Support Person: Lluvia Archer R - Self        Education Documentation  No documentation found.        Aspen DE  Ambulatory Case Management    1/31/2024, 14:38 EST

## 2024-02-20 ENCOUNTER — APPOINTMENT (OUTPATIENT)
Dept: GENERAL RADIOLOGY | Facility: HOSPITAL | Age: 59
End: 2024-02-20
Payer: MEDICARE

## 2024-02-20 ENCOUNTER — HOSPITAL ENCOUNTER (EMERGENCY)
Facility: HOSPITAL | Age: 59
Discharge: HOME | End: 2024-02-21
Attending: EMERGENCY MEDICINE | Admitting: EMERGENCY MEDICINE
Payer: MEDICARE

## 2024-02-20 ENCOUNTER — APPOINTMENT (OUTPATIENT)
Dept: CT IMAGING | Facility: HOSPITAL | Age: 59
End: 2024-02-20
Payer: MEDICARE

## 2024-02-20 VITALS
HEIGHT: 64 IN | TEMPERATURE: 99.3 F | BODY MASS INDEX: 39.14 KG/M2 | OXYGEN SATURATION: 97 % | DIASTOLIC BLOOD PRESSURE: 81 MMHG | RESPIRATION RATE: 18 BRPM | SYSTOLIC BLOOD PRESSURE: 142 MMHG | HEART RATE: 109 BPM

## 2024-02-20 DIAGNOSIS — J44.9 CHRONIC OBSTRUCTIVE PULMONARY DISEASE, UNSPECIFIED COPD TYPE: Primary | ICD-10-CM

## 2024-02-20 DIAGNOSIS — J06.9 UPPER RESPIRATORY TRACT INFECTION, UNSPECIFIED TYPE: ICD-10-CM

## 2024-02-20 LAB
ALBUMIN SERPL-MCNC: 4 G/DL (ref 3.5–5.2)
ALBUMIN/GLOB SERPL: 1.3 G/DL
ALP SERPL-CCNC: 94 U/L (ref 39–117)
ALT SERPL W P-5'-P-CCNC: 14 U/L (ref 1–33)
ANION GAP SERPL CALCULATED.3IONS-SCNC: 11 MMOL/L (ref 5–15)
AST SERPL-CCNC: 15 U/L (ref 1–32)
BASE EXCESS BLDV CALC-SCNC: -1.9 MMOL/L (ref -2–2)
BASOPHILS # BLD AUTO: 0.02 10*3/MM3 (ref 0–0.2)
BASOPHILS NFR BLD AUTO: 0.2 % (ref 0–1.5)
BDY SITE: ABNORMAL
BILIRUB SERPL-MCNC: 0.3 MG/DL (ref 0–1.2)
BUN SERPL-MCNC: 12 MG/DL (ref 6–20)
BUN/CREAT SERPL: 15 (ref 7–25)
CA-I BLDA-SCNC: 1.07 MMOL/L (ref 1.13–1.32)
CALCIUM SPEC-SCNC: 8.8 MG/DL (ref 8.6–10.5)
CHLORIDE BLDV-SCNC: 107 MMOL/L (ref 98–106)
CHLORIDE SERPL-SCNC: 103 MMOL/L (ref 98–107)
CO2 SERPL-SCNC: 26 MMOL/L (ref 22–29)
COHGB MFR BLD: 4.7 % (ref 0–1.5)
CREAT SERPL-MCNC: 0.8 MG/DL (ref 0.57–1)
D-LACTATE SERPL-SCNC: 1.5 MMOL/L (ref 0.5–2)
DEPRECATED RDW RBC AUTO: 48.5 FL (ref 37–54)
EGFRCR SERPLBLD CKD-EPI 2021: 86.1 ML/MIN/1.73
EOSINOPHIL # BLD AUTO: 0.08 10*3/MM3 (ref 0–0.4)
EOSINOPHIL NFR BLD AUTO: 0.9 % (ref 0.3–6.2)
ERYTHROCYTE [DISTWIDTH] IN BLOOD BY AUTOMATED COUNT: 14.4 % (ref 12.3–15.4)
FHHB: 14.7 % (ref 0–5)
GAS FLOW AIRWAY: ABNORMAL L/MIN
GLOBULIN UR ELPH-MCNC: 3 GM/DL
GLUCOSE BLDA-MCNC: 168 MG/DL (ref 65–99)
GLUCOSE SERPL-MCNC: 144 MG/DL (ref 65–99)
HCO3 BLDV-SCNC: 23.5 MMOL/L (ref 22–26)
HCT VFR BLD AUTO: 40.9 % (ref 34–46.6)
HGB BLD-MCNC: 13 G/DL (ref 12–15.9)
HGB BLDA-MCNC: 13.1 G/DL (ref 11.7–14.6)
HOLD SPECIMEN: NORMAL
HOLD SPECIMEN: NORMAL
IMM GRANULOCYTES # BLD AUTO: 0.02 10*3/MM3 (ref 0–0.05)
IMM GRANULOCYTES NFR BLD AUTO: 0.2 % (ref 0–0.5)
INHALED O2 CONCENTRATION: ABNORMAL %
LACTATE BLDA-SCNC: 1.37 MMOL/L (ref 0.5–2)
LYMPHOCYTES # BLD AUTO: 1.27 10*3/MM3 (ref 0.7–3.1)
LYMPHOCYTES NFR BLD AUTO: 13.8 % (ref 19.6–45.3)
MCH RBC QN AUTO: 29.2 PG (ref 26.6–33)
MCHC RBC AUTO-ENTMCNC: 31.8 G/DL (ref 31.5–35.7)
MCV RBC AUTO: 91.9 FL (ref 79–97)
METHGB BLD QL: 0.3 % (ref 0–1.5)
MODALITY: ABNORMAL
MONOCYTES # BLD AUTO: 0.66 10*3/MM3 (ref 0.1–0.9)
MONOCYTES NFR BLD AUTO: 7.2 % (ref 5–12)
NEUTROPHILS NFR BLD AUTO: 7.15 10*3/MM3 (ref 1.7–7)
NEUTROPHILS NFR BLD AUTO: 77.7 % (ref 42.7–76)
NOTE: ABNORMAL
NRBC BLD AUTO-RTO: 0 /100 WBC (ref 0–0.2)
NT-PROBNP SERPL-MCNC: 229 PG/ML (ref 0–900)
OXYHGB MFR BLDV: 80.3 % (ref 94–99)
PCO2 BLDV: 42.1 MM HG (ref 41–51)
PH BLDV: 7.36 PH UNITS (ref 7.31–7.41)
PLATELET # BLD AUTO: 187 10*3/MM3 (ref 140–450)
PMV BLD AUTO: 9.9 FL (ref 6–12)
PO2 BLDV: 48.1 MM HG (ref 35–42)
POTASSIUM BLDV-SCNC: 3.9 MMOL/L (ref 3.5–5)
POTASSIUM SERPL-SCNC: 3.8 MMOL/L (ref 3.5–5.2)
PROT SERPL-MCNC: 7 G/DL (ref 6–8.5)
RBC # BLD AUTO: 4.45 10*6/MM3 (ref 3.77–5.28)
SAO2 % BLDCOV: 84.5 % (ref 45–75)
SODIUM BLDV-SCNC: 137.2 MMOL/L (ref 136–146)
SODIUM SERPL-SCNC: 140 MMOL/L (ref 136–145)
TROPONIN T SERPL HS-MCNC: 10 NG/L
WBC NRBC COR # BLD AUTO: 9.2 10*3/MM3 (ref 3.4–10.8)
WHOLE BLOOD HOLD COAG: NORMAL
WHOLE BLOOD HOLD SPECIMEN: NORMAL

## 2024-02-20 PROCEDURE — 82805 BLOOD GASES W/O2 SATURATION: CPT

## 2024-02-20 PROCEDURE — 25010000002 ONDANSETRON PER 1 MG: Performed by: EMERGENCY MEDICINE

## 2024-02-20 PROCEDURE — 83605 ASSAY OF LACTIC ACID: CPT

## 2024-02-20 PROCEDURE — 93005 ELECTROCARDIOGRAM TRACING: CPT | Performed by: EMERGENCY MEDICINE

## 2024-02-20 PROCEDURE — 25510000001 IOPAMIDOL PER 1 ML: Performed by: EMERGENCY MEDICINE

## 2024-02-20 PROCEDURE — 94799 UNLISTED PULMONARY SVC/PX: CPT

## 2024-02-20 PROCEDURE — 99285 EMERGENCY DEPT VISIT HI MDM: CPT

## 2024-02-20 PROCEDURE — 80053 COMPREHEN METABOLIC PANEL: CPT

## 2024-02-20 PROCEDURE — 83880 ASSAY OF NATRIURETIC PEPTIDE: CPT

## 2024-02-20 PROCEDURE — 87040 BLOOD CULTURE FOR BACTERIA: CPT | Performed by: EMERGENCY MEDICINE

## 2024-02-20 PROCEDURE — 93005 ELECTROCARDIOGRAM TRACING: CPT

## 2024-02-20 PROCEDURE — 93010 ELECTROCARDIOGRAM REPORT: CPT | Performed by: SPECIALIST

## 2024-02-20 PROCEDURE — 85025 COMPLETE CBC W/AUTO DIFF WBC: CPT

## 2024-02-20 PROCEDURE — 96374 THER/PROPH/DIAG INJ IV PUSH: CPT

## 2024-02-20 PROCEDURE — 71260 CT THORAX DX C+: CPT

## 2024-02-20 PROCEDURE — 71045 X-RAY EXAM CHEST 1 VIEW: CPT

## 2024-02-20 PROCEDURE — 36415 COLL VENOUS BLD VENIPUNCTURE: CPT

## 2024-02-20 PROCEDURE — 94640 AIRWAY INHALATION TREATMENT: CPT

## 2024-02-20 PROCEDURE — 82820 HEMOGLOBIN-OXYGEN AFFINITY: CPT

## 2024-02-20 PROCEDURE — 25810000003 SODIUM CHLORIDE 0.9 % SOLUTION: Performed by: EMERGENCY MEDICINE

## 2024-02-20 PROCEDURE — 87637 SARSCOV2&INF A&B&RSV AMP PRB: CPT | Performed by: EMERGENCY MEDICINE

## 2024-02-20 PROCEDURE — 84484 ASSAY OF TROPONIN QUANT: CPT

## 2024-02-20 RX ORDER — IPRATROPIUM BROMIDE AND ALBUTEROL SULFATE 2.5; .5 MG/3ML; MG/3ML
3 SOLUTION RESPIRATORY (INHALATION) ONCE
Status: COMPLETED | OUTPATIENT
Start: 2024-02-20 | End: 2024-02-20

## 2024-02-20 RX ORDER — SODIUM CHLORIDE 0.9 % (FLUSH) 0.9 %
10 SYRINGE (ML) INJECTION AS NEEDED
Status: DISCONTINUED | OUTPATIENT
Start: 2024-02-20 | End: 2024-02-21 | Stop reason: HOSPADM

## 2024-02-20 RX ORDER — ALBUTEROL SULFATE 0.63 MG/3ML
1 SOLUTION RESPIRATORY (INHALATION) EVERY 6 HOURS PRN
Qty: 30 EACH | Refills: 0 | Status: SHIPPED | OUTPATIENT
Start: 2024-02-20

## 2024-02-20 RX ORDER — AZITHROMYCIN 250 MG/1
TABLET, FILM COATED ORAL
Qty: 6 TABLET | Refills: 0 | Status: SHIPPED | OUTPATIENT
Start: 2024-02-20 | End: 2024-02-24

## 2024-02-20 RX ORDER — ONDANSETRON 2 MG/ML
4 INJECTION INTRAMUSCULAR; INTRAVENOUS ONCE
Status: COMPLETED | OUTPATIENT
Start: 2024-02-20 | End: 2024-02-20

## 2024-02-20 RX ADMIN — SODIUM CHLORIDE 1000 ML: 9 INJECTION, SOLUTION INTRAVENOUS at 22:21

## 2024-02-20 RX ADMIN — IOPAMIDOL 100 ML: 755 INJECTION, SOLUTION INTRAVENOUS at 22:58

## 2024-02-20 RX ADMIN — IPRATROPIUM BROMIDE AND ALBUTEROL SULFATE 3 ML: .5; 3 SOLUTION RESPIRATORY (INHALATION) at 23:32

## 2024-02-20 RX ADMIN — ONDANSETRON 4 MG: 2 INJECTION INTRAMUSCULAR; INTRAVENOUS at 22:22

## 2024-02-21 DIAGNOSIS — J96.21 ACUTE ON CHRONIC RESPIRATORY FAILURE WITH HYPOXIA: ICD-10-CM

## 2024-02-21 DIAGNOSIS — J44.9 CHRONIC OBSTRUCTIVE PULMONARY DISEASE, UNSPECIFIED COPD TYPE: Primary | ICD-10-CM

## 2024-02-21 LAB
FLUAV SUBTYP SPEC NAA+PROBE: NOT DETECTED
FLUBV RNA ISLT QL NAA+PROBE: NOT DETECTED
QT INTERVAL: 338 MS
QTC INTERVAL: 455 MS
RSV RNA NPH QL NAA+NON-PROBE: NOT DETECTED
SARS-COV-2 RNA RESP QL NAA+PROBE: NOT DETECTED

## 2024-02-21 NOTE — ED PROVIDER NOTES
Time: 9:06 PM EST  Date of encounter:  2024  Independent Historian/Clinical History and Information was obtained by:   Patient    History is limited by: N/A    Chief Complaint   Patient presents with    Shortness of Breath    Nausea    Vomiting         History of Present Illness:  Patient is a 58 y.o. year old female who presents to the emergency department for evaluation of nausea and vomiting that started at approximately 4 AM this morning.  Patient also reports shortness of breath due to her nausea and vomiting.  Patient reports she is on 6 L of oxygen via nasal cannula at home due to COPD.  Patient received 125 mg of Solu-Medrol and a DuoNeb by EMS.  Patient reports she had cold symptoms a couple of weeks ago, still having productive cough with clear sputum, denies fevers but admits to chills.  Patient denies chest pain.  Patient denies abdominal pain.    Patient Care Team  Primary Care Provider: Aleksandar Edge DO    Past Medical History:     No Known Allergies  Past Medical History:   Diagnosis Date    Abnormal mammogram     PT DENIES KNOWING OF THIS HX    Anxiety     Arthritis     R SHOULDER, R HIP, AND R LEG    Chronic nausea 01/15/2019    Hernia, hiatal     Hyperlipidemia     Hypertension     Knee pain, right 2018    Memory loss     FORGETFULNESS ? ETIOLOGY    Migraine headache     Moderate episode of recurrent major depressive disorder 2017    Night sweats     Sciatica of right side 2017    Severe episode of recurrent major depressive disorder, without psychotic features 10/22/2019    Shoulder pain, left 2018    Sleep apnea, unspecified     DOESNT USE CPAP AFTER WEIGHT LOSS     Past Surgical History:   Procedure Laterality Date    BRONCHOSCOPY N/A 2022    Procedure: BRONCHOSCOPY WITH BRONCHOALVEOLAR LAVAGE, BIOPSY;  Surgeon: Shin Mcdonald DO;  Location: [facility] ENDOSCOPY;  Service: Pulmonary;  Laterality: N/A;  RIGHT LOWER LOBE INFILTRATE      SECTION      CHOLECYSTECTOMY      LAPAROSCOPIC    COLONOSCOPY      TOTAL HIP ARTHROPLASTY Right 5/13/2022    Procedure: RIGHT TOTAL HIP ARTHROPLASTY;  Surgeon: Cecil Gomes MD;  Location: [facility] MAIN OR;  Service: Orthopedics;  Laterality: Right;     Family History   Problem Relation Age of Onset    Other Mother         BLOOD DISEASE    Arthritis Mother     Heart disease Father     Hypertension Other     Cancer Other     Heart disease Other     Malig Hyperthermia Neg Hx        Home Medications:  Prior to Admission medications    Medication Sig Start Date End Date Taking? Authorizing Provider   albuterol sulfate  (90 Base) MCG/ACT inhaler TAKE 2 PUFFS BY MOUTH EVERY 6 HOURS AS NEEDED FOR WHEEZE 10/26/23   Aleksandar Edge DO   atorvastatin (LIPITOR) 10 MG tablet TAKE ONE TABLET BY MOUTH DAILY AT 9 PM EVERY NIGHT 11/7/23   Aleksandar Edge DO   azithromycin (Zithromax Z-Homer) 250 MG tablet Take 2 tablets by mouth on day 1, then 1 tablet daily on days 2-5 12/21/23   Aleksandar Edge DO   buPROPion SR (WELLBUTRIN SR) 150 MG 12 hr tablet TAKE 1 TABLET BY MOUTH TWICE A DAY 6/1/23   Aleksandar Edge DO   Cariprazine HCl (Vraylar) 3 MG capsule capsule Take 1 capsule by mouth Daily. 12/21/23   Aleksandar Edge DO   famotidine (PEPCID) 40 MG tablet TAKE ONE TABLET BY MOUTH TWICE DAILY @ 9AM & 5PM 11/7/23   Aleksandar Edge DO   ipratropium-albuterol (DUO-NEB) 0.5-2.5 mg/3 ml nebulizer Take 3 mL by nebulization 4 (Four) Times a Day for 30 days. 5/9/23 6/8/23  Saud Wise MD   lisinopril (PRINIVIL,ZESTRIL) 5 MG tablet TAKE ONE TABLET BY MOUTH DAILY AT 9 AM 11/7/23   Aleksandar Edge DO   predniSONE (DELTASONE) 10 MG tablet 4 tabs daily for 3 days, then 3 tabs daily for 3 days, then 2 tabs daily for 3 days, then 1 tab daily for 3 days, then STOP. 12/21/23   Aleksandar Edge DO   Trelegy Ellipta 100-62.5-25 MCG/ACT inhaler INHALE 1 PUFF DAILY 10/26/23    "Aleksandar Edge DO   Vortioxetine HBr (Trintellix) 10 MG tablet tablet Take 1 tablet by mouth Daily With Breakfast. 23   Aleksandar Edge DO        Social History:   Social History     Tobacco Use    Smoking status: Former     Packs/day: 2.00     Years: 40.00     Additional pack years: 0.00     Total pack years: 80.00     Types: Cigarettes     Quit date: 2022     Years since quittin.8    Smokeless tobacco: Never   Vaping Use    Vaping Use: Never used   Substance Use Topics    Alcohol use: Never    Drug use: Never         Review of Systems:  Review of Systems   Constitutional:  Negative for chills and fever.   HENT:  Negative for congestion, rhinorrhea and sore throat.    Eyes:  Negative for pain and visual disturbance.   Respiratory:  Positive for shortness of breath and wheezing. Negative for apnea, cough and chest tightness.    Cardiovascular:  Negative for chest pain and palpitations.   Gastrointestinal:  Positive for nausea and vomiting. Negative for abdominal pain and diarrhea.   Genitourinary:  Negative for difficulty urinating and dysuria.   Musculoskeletal:  Negative for joint swelling and myalgias.   Skin:  Negative for color change.   Neurological:  Negative for seizures and headaches.   Psychiatric/Behavioral: Negative.     All other systems reviewed and are negative.       Physical Exam:  /81   Pulse 109   Temp 99.3 °F (37.4 °C) (Oral)   Resp 18   Ht 162.6 cm (64\")   LMP  (LMP Unknown)   SpO2 97%   BMI 39.14 kg/m²         Physical Exam  Vitals and nursing note reviewed.   Constitutional:       Appearance: Normal appearance. She is obese. She is not toxic-appearing.   HENT:      Head: Normocephalic and atraumatic.      Jaw: There is normal jaw occlusion.      Mouth/Throat:      Mouth: Mucous membranes are moist.   Eyes:      General: Lids are normal.      Extraocular Movements: Extraocular movements intact.      Conjunctiva/sclera: Conjunctivae normal.      " Pupils: Pupils are equal, round, and reactive to light.   Cardiovascular:      Rate and Rhythm: Normal rate and regular rhythm.      Pulses: Normal pulses.      Heart sounds: Normal heart sounds.   Pulmonary:      Effort: Pulmonary effort is normal. No respiratory distress.      Breath sounds: Wheezing present. No rhonchi.   Abdominal:      General: Abdomen is flat. There is no distension.      Palpations: Abdomen is soft.      Tenderness: There is no abdominal tenderness. There is no guarding or rebound.   Musculoskeletal:         General: Normal range of motion.      Cervical back: Normal range of motion and neck supple.      Right lower leg: No edema.      Left lower leg: No edema.   Skin:     General: Skin is warm and dry.   Neurological:      General: No focal deficit present.      Mental Status: She is alert and oriented to person, place, and time. Mental status is at baseline.   Psychiatric:         Mood and Affect: Mood normal.         Behavior: Behavior normal.                      Procedures:  Procedures      Medical Decision Making:      Comorbidities that affect care:    Hypertension, COPD    External Notes reviewed:          The following orders were placed and all results were independently analyzed by me:  Orders Placed This Encounter   Procedures    Home Nebulizer    Blood Culture - Blood,    Blood Culture - Blood,    COVID PRE-OP / PRE-PROCEDURE SCREENING ORDER (NO ISOLATION) - Swab, Nasopharynx    COVID-19, FLU A/B, RSV PCR 1 HR TAT - Swab, Nasopharynx    XR Chest 1 View    CT Chest With Contrast Diagnostic    Comprehensive Metabolic Panel    Lactic Acid, Plasma    [place] Draw    BNP    Single High Sensitivity Troponin T    CBC Auto Differential    VBG with Co-Ox and Electrolytes    NPO Diet NPO Type: Strict NPO    Undress & Gown    Vital Signs    Undress & Gown    Continuous Pulse Oximetry    Vital Signs    Oxygen Therapy- Nasal Cannula; Titrate 1-6 LPM Per SpO2; 90 - 95%    Oxygen Therapy- Nasal  Cannula; Titrate 1-6 LPM Per SpO2; 90 - 95%    ECG 12 Lead ED Triage Standing Order; SOA    Insert Peripheral IV    Insert Peripheral IV    CBC & Differential    Green Top (Gel)    Lavender Top    Gold Top - SST    Light Blue Top       Medications Given in the Emergency Department:  Medications   sodium chloride 0.9 % flush 10 mL (has no administration in time range)   sodium chloride 0.9 % flush 10 mL (has no administration in time range)   sodium chloride 0.9 % bolus 1,000 mL (1,000 mL Intravenous New Bag 2/20/24 2221)   ondansetron (ZOFRAN) injection 4 mg (4 mg Intravenous Given 2/20/24 2222)   iopamidol (ISOVUE-370) 76 % injection 100 mL (100 mL Intravenous Given 2/20/24 2258)   ipratropium-albuterol (DUO-NEB) nebulizer solution 3 mL (3 mL Nebulization Given 2/20/24 2332)        ED Course:    The patient was initially evaluated in the triage area where orders were placed. The patient was later dispositioned by Bill Martinez PA-C.      The patient was advised to stay for completion of workup which includes but is not limited to communication of labs and radiological results, reassessment and plan. The patient was advised that leaving prior to disposition by a provider could result in critical findings that are not communicated to the patient.          Labs:    Lab Results (last 24 hours)       Procedure Component Value Units Date/Time    CBC & Differential [[id number]]  (Abnormal) Collected: 02/20/24 2006    Specimen: Blood Updated: 02/20/24 2018    Narrative:      The following orders were created for panel order CBC & Differential.  Procedure                               Abnormality         Status                     ---------                               -----------         ------                     CBC Auto Differential[[id number]]        Abnormal            Final result                 Please view results for these tests on the individual orders.    Comprehensive Metabolic Panel [[id number]]  (Abnormal)  Collected: 02/20/24 2006    Specimen: Blood Updated: 02/20/24 2041     Glucose 144 mg/dL      BUN 12 mg/dL      Creatinine 0.80 mg/dL      Sodium 140 mmol/L      Potassium 3.8 mmol/L      Chloride 103 mmol/L      CO2 26.0 mmol/L      Calcium 8.8 mg/dL      Total Protein 7.0 g/dL      Albumin 4.0 g/dL      ALT (SGPT) 14 U/L      AST (SGOT) 15 U/L      Alkaline Phosphatase 94 U/L      Total Bilirubin 0.3 mg/dL      Globulin 3.0 gm/dL      A/G Ratio 1.3 g/dL      BUN/Creatinine Ratio 15.0     Anion Gap 11.0 mmol/L      eGFR 86.1 mL/min/1.73     Narrative:      GFR Normal >60  Chronic Kidney Disease <60  Kidney Failure <15      Lactic Acid, Plasma [[id number]]  (Normal) Collected: 02/20/24 2006    Specimen: Blood Updated: 02/20/24 2039     Lactate 1.5 mmol/L     Blood Culture - Blood, Arm, Left [[id number]] Collected: 02/20/24 2006    Specimen: Blood from Arm, Left Updated: 02/20/24 2013    Blood Culture - Blood, Arm, Left [[id number]] Collected: 02/20/24 2006    Specimen: Blood from Arm, Left Updated: 02/20/24 2013    BNP [[id number]]  (Normal) Collected: 02/20/24 2006    Specimen: Blood Updated: 02/20/24 2039     proBNP 229.0 pg/mL     Narrative:      This assay is used as an aid in the diagnosis of individuals suspected of having heart failure. It can be used as an aid in the diagnosis of acute decompensated heart failure (ADHF) in patients presenting with signs and symptoms of ADHF to the emergency department (ED). In addition, NT-proBNP of <300 pg/mL indicates ADHF is not likely.    Age Range Result Interpretation  NT-proBNP Concentration (pg/mL:      <50             Positive            >450                   Gray                 300-450                    Negative             <300    50-75           Positive            >900                  Gray                300-900                  Negative            <300      >75             Positive            >1800                  Gray                300-1800                   Negative            <300    Single High Sensitivity Troponin T [[id number]]  (Normal) Collected: 02/20/24 2006    Specimen: Blood Updated: 02/20/24 2041     HS Troponin T 10 ng/L     Narrative:      High Sensitive Troponin T Reference Range:  <14.0 ng/L- Negative Female for AMI  <22.0 ng/L- Negative Male for AMI  >=14 - Abnormal Female indicating possible myocardial injury.  >=22 - Abnormal Male indicating possible myocardial injury.   Clinicians would have to utilize clinical acumen, EKG, Troponin, and serial changes to determine if it is an Acute Myocardial Infarction or myocardial injury due to an underlying chronic condition.         CBC Auto Differential [[id number]]  (Abnormal) Collected: 02/20/24 2006    Specimen: Blood Updated: 02/20/24 2018     WBC 9.20 10*3/mm3      RBC 4.45 10*6/mm3      Hemoglobin 13.0 g/dL      Hematocrit 40.9 %      MCV 91.9 fL      MCH 29.2 pg      MCHC 31.8 g/dL      RDW 14.4 %      RDW-SD 48.5 fl      MPV 9.9 fL      Platelets 187 10*3/mm3      Neutrophil % 77.7 %      Lymphocyte % 13.8 %      Monocyte % 7.2 %      Eosinophil % 0.9 %      Basophil % 0.2 %      Immature Grans % 0.2 %      Neutrophils, Absolute 7.15 10*3/mm3      Lymphocytes, Absolute 1.27 10*3/mm3      Monocytes, Absolute 0.66 10*3/mm3      Eosinophils, Absolute 0.08 10*3/mm3      Basophils, Absolute 0.02 10*3/mm3      Immature Grans, Absolute 0.02 10*3/mm3      nRBC 0.0 /100 WBC     COVID PRE-OP / PRE-PROCEDURE SCREENING ORDER (NO ISOLATION) - Swab, Nasopharynx [[id number]]  (Normal) Collected: 02/20/24 2319    Specimen: Swab from Nasopharynx Updated: 02/21/24 0011    Narrative:      The following orders were created for panel order COVID PRE-OP / PRE-PROCEDURE SCREENING ORDER (NO ISOLATION) - Swab, Nasopharynx.  Procedure                               Abnormality         Status                     ---------                               -----------         ------                     COVID-19, FLU A/B, RSV  P...[[id number]]  Normal              Final result                 Please view results for these tests on the individual orders.    COVID-19, FLU A/B, RSV PCR 1 HR TAT - Swab, Nasopharynx [[id number]]  (Normal) Collected: 02/20/24 2319    Specimen: Swab from Nasopharynx Updated: 02/21/24 0011     COVID19 Not Detected     Influenza A PCR Not Detected     Influenza B PCR Not Detected     RSV, PCR Not Detected    Narrative:      Fact sheet for providers: https://www.fda.gov/media/618879/download    Fact sheet for patients: https://www.fda.gov/media/917140/download    Test performed by PCR.    VBG with Co-Ox and Electrolytes [[id number]]  (Abnormal) Collected: 02/20/24 2320    Specimen: Venous Blood Updated: 02/20/24 2328     pH, Venous 7.364 pH Units      pCO2, Venous 42.1 mm Hg      pO2, Venous 48.1 mm Hg      HCO3, Venous 23.5 mmol/L      Base Excess, Venous -1.9 mmol/L      O2 Saturation, Venous 84.5 %      Hemoglobin, Blood Gas 13.1 g/dL      Carboxyhemoglobin 4.7 %      Methemoglobin 0.30 %      Oxyhemoglobin 80.3 %      FHHB 14.7 %      Note --     Site Venous     Modality N/A     FIO2 --     Flow Rate --     Sodium, Venous 137.2 mmol/L      Potassium, Venous 3.9 mmol/L      Ionized Calcium, Arterial 1.07 mmol/L      Chloride, Venous  107 mmol/L      Glucose, Arterial 168 mg/dL      Lactate, Arterial 1.37 mmol/L              Imaging:    CT Chest With Contrast Diagnostic    Result Date: 2/20/2024  PROCEDURE: CT CHEST W CONTRAST DIAGNOSTIC  COMPARISON:  [facility], CT, CT CHEST WO CONTRAST DIAGNOSTIC, 8/29/2023, 16:43. INDICATIONS: r/o pe, soa  TECHNIQUE: After obtaining the patient's consent, CT images were obtained with non-ionic intravenous contrast material.   PROTOCOL:   Pulmonary embolism imaging protocol performed    RADIATION:      Automated exposure control was utilized to minimize radiation dose. CONTRAST: 75 cc Isovue 370 I.V.  FINDINGS:  No pulmonary embolism or aortic dissection is  identified.  There is atherosclerotic disease and coronary artery disease.  No pleural or pericardial effusion is identified.  There has been interval development of adenopathy.  For example, a precarinal node measures 1.5 cm short axis.  A right hilar node measures 1.2 cm.  No left hilar nodes are seen.  There is no axillary adenopathy.  Thyroid is grossly homogeneous.  No acute findings in the upper abdomen.  Patient is status post cholecystectomy.  Lung windows demonstrate emphysema with chronic interstitial changes bilaterally.  There are multiple nodules noted in the right middle lobe that are new or larger than on the prior study.  The largest of these measures at least 1.9 x 1.2 cm on image 115. An adjacent nodule measures 1.5 x 1.3 cm on image 110. There is some peripheral poorly defined density in the right middle lobe as well.  There is an additional right middle lobe nodule measuring about 0.8 cm on image 115.  These are suspicious for malignancy.  Scattered other areas of micro nodularity in both lungs are grossly stable.  There is some chronic scarring in the lingula.  No clearly suspicious osseous lesions are identified.         1. No PE or aortic dissection. 2. Multiple right middle lobe nodules are highly suspicious for malignancy.  Additionally, there is mediastinal and right hilar adenopathy now noted.  Follow-up with a PET-CT is recommended. 3. Emphysema with chronic changes in both lungs that otherwise appears stable. 4. Atherosclerotic disease and coronary artery disease.      BROOKE MOHAN MD       Electronically Signed and Approved By: BROOKE MOHAN MD on 2/20/2024 at 23:34             XR Chest 1 View    Result Date: 2/20/2024  PROCEDURE: XR CHEST 1 VW  COMPARISON: [facility], CR, XR CHEST 1 VW, 5/04/2023, 22:56.  [facility], CT, CT CHEST WO CONTRAST DIAGNOSTIC, 8/29/2023, 16:43.  INDICATIONS: SHORTNESS OF BREATH  FINDINGS:  Cardiac and mediastinal  contours are within normal limits.  There is some chronic scarring at the left base with some mild atelectasis or scarring at the right base.  The lungs are otherwise clear.  Pulmonary vascularity is normal.  No pneumothorax.       Chronic left basilar scarring with mild scarring/atelectasis at the right base.  Otherwise, no active disease.       BROOKE MOHAN MD       Electronically Signed and Approved By: BROOKE MOHAN MD on 2/20/2024 at 21:02                Differential Diagnosis and Discussion:      Dyspnea: Differential diagnosis includes but is not limited to metabolic acidosis, neurological disorders, psychogenic, asthma, pneumothorax, upper airway obstruction, COPD, pneumonia, noncardiogenic pulmonary edema, interstitial lung disease, anemia, congestive heart failure, and pulmonary embolism  Vomiting: Differential diagnosis includes but is not limited to migraine, labyrinthine disorders, psychogenic, metabolic and endocrine causes, peptic ulcer, gastric outlet obstruction, gastritis, gastroenteritis, appendicitis, intestinal obstruction, paralytic ileus, food poisoning, cholecystitis, acute hepatitis, acute pancreatitis, acute febrile illness, and myocardial infarction.    All labs were reviewed and interpreted by me.  All X-rays impressions were independently interpreted by me.  EKG was interpreted by supervising attending.  CT scan radiology impression was interpreted by me.    MDM     Amount and/or Complexity of Data Reviewed  Clinical lab tests: reviewed  Tests in the radiology section of CPT®: reviewed  Tests in the medicine section of CPT®: reviewed       Troponin within normal limits.  Patient denies chest pain.  Patient states she is normally on 6 L of oxygen at home.  Patient's oxygen saturation is 95% on 2 L at this time.  CT chest with contrast shows no evidence of PE or pneumonia but does show several lung nodules as well as mediastinal and right hilar adenopathy.  Patient states she recently  had a PET scan and is following with her PCP on these nodules.  Patient was given 2 DuoNebs in the ED.  I will send the patient home with nebulizer treatment.  Patient states she patient states she wishes to go home at this time.          Patient Care Considerations:          Consultants/Shared Management Plan:    None    Social Determinants of Health:    Patient is independent, reliable, and has access to care.       Disposition and Care Coordination:    Discharged: The patient is suitable and stable for discharge with no need for consideration of admission.    I have explained the patient´s condition, diagnoses and treatment plan based on the information available to me at this time. I have answered questions and addressed any concerns. The patient has a good  understanding of the patient´s diagnosis, condition, and treatment plan as can be expected at this point. The vital signs have been stable. The patient´s condition is stable and appropriate for discharge from the emergency department.      The patient will pursue further outpatient evaluation with the primary care physician or other designated or consulting physician as outlined in the discharge instructions. They are agreeable to this plan of care and follow-up instructions have been explained in detail. The patient has received these instructions in written format and has expressed an understanding of the discharge instructions. The patient is aware that any significant change in condition or worsening of symptoms should prompt an immediate return to this or the closest emergency department or call to 911.  I have explained discharge medications and the need for follow up with the patient/caretakers. This was also printed in the discharge instructions. Patient was discharged with the following medications and follow up:      Medication List        Changed      * albuterol sulfate  (90 Base) MCG/ACT inhaler  Commonly known as: PROVENTIL HFA;VENTOLIN  HFA;PROAIR HFA  TAKE 2 PUFFS BY MOUTH EVERY 6 HOURS AS NEEDED FOR WHEEZE  What changed: Another medication with the same name was added. Make sure you understand how and when to take each.     * albuterol 0.63 MG/3ML nebulizer solution  Commonly known as: ACCUNEB  Take 3 mL by nebulization Every 6 (Six) Hours As Needed for Wheezing.  What changed: You were already taking a medication with the same name, and this prescription was added. Make sure you understand how and when to take each.     azithromycin 250 MG tablet  Commonly known as: ZITHROMAX  Take 2 tablets by mouth Daily for 1 day, THEN 1 tablet Daily for 4 days.  Start taking on: February 20, 2024  What changed: See the new instructions.           * This list has 2 medication(s) that are the same as other medications prescribed for you. Read the directions carefully, and ask your doctor or other care provider to review them with you.                   Where to Get Your Medications        These medications were sent to [facility]/pharmacy #[id number] - [place], [place] - [address] - [phone] [facility] [phone]   [address] [place] [address]      Hours: 24-hours Phone: [phone]   albuterol 0.63 MG/3ML nebulizer solution  azithromycin 250 MG tablet      Aleksandar Edge, DO  [address] [place] [address]  [phone]    Call in 2 days  As needed       Final diagnoses:   Chronic obstructive pulmonary disease, unspecified COPD type   Upper respiratory tract infection, unspecified type        ED Disposition       ED Disposition   Discharge    Condition   Stable    Comment   --               This medical record created using voice recognition software.             Bill Martinez PA-C  02/21/24 0014

## 2024-02-25 LAB
BACTERIA SPEC AEROBE CULT: NORMAL
BACTERIA SPEC AEROBE CULT: NORMAL

## 2024-03-04 RX ORDER — FLUTICASONE FUROATE, UMECLIDINIUM BROMIDE AND VILANTEROL TRIFENATATE 100; 62.5; 25 UG/1; UG/1; UG/1
1 POWDER RESPIRATORY (INHALATION)
Qty: 60 EACH | Refills: 12 | Status: SHIPPED | OUTPATIENT
Start: 2024-03-04

## 2024-04-02 RX ORDER — BUPROPION HYDROCHLORIDE 150 MG/1
TABLET, EXTENDED RELEASE ORAL
Qty: 60 TABLET | Refills: 11 | Status: SHIPPED | OUTPATIENT
Start: 2024-04-02

## 2024-05-03 RX ORDER — FLUTICASONE FUROATE, UMECLIDINIUM BROMIDE AND VILANTEROL TRIFENATATE 100; 62.5; 25 UG/1; UG/1; UG/1
POWDER RESPIRATORY (INHALATION)
Qty: 60 EACH | Refills: 11 | Status: SHIPPED | OUTPATIENT
Start: 2024-05-03

## 2024-06-02 ENCOUNTER — HOSPITAL ENCOUNTER (INPATIENT)
Facility: HOSPITAL | Age: 59
LOS: 1 days | Discharge: HOME | End: 2024-06-04
Attending: EMERGENCY MEDICINE | Admitting: INTERNAL MEDICINE
Payer: MEDICARE

## 2024-06-02 ENCOUNTER — APPOINTMENT (OUTPATIENT)
Dept: GENERAL RADIOLOGY | Facility: HOSPITAL | Age: 59
End: 2024-06-02
Payer: MEDICARE

## 2024-06-02 DIAGNOSIS — R05.2 SUBACUTE COUGH: ICD-10-CM

## 2024-06-02 DIAGNOSIS — J18.9 PNEUMONIA DUE TO INFECTIOUS ORGANISM, UNSPECIFIED LATERALITY, UNSPECIFIED PART OF LUNG: Primary | ICD-10-CM

## 2024-06-02 DIAGNOSIS — J44.1 COPD EXACERBATION: ICD-10-CM

## 2024-06-02 DIAGNOSIS — E87.1 HYPONATREMIA: ICD-10-CM

## 2024-06-02 DIAGNOSIS — R59.0 MEDIASTINAL ADENOPATHY: ICD-10-CM

## 2024-06-02 LAB
ALBUMIN SERPL-MCNC: 4 G/DL (ref 3.5–5.2)
ALBUMIN/GLOB SERPL: 1.8 G/DL
ALP SERPL-CCNC: 109 U/L (ref 39–117)
ALT SERPL W P-5'-P-CCNC: 8 U/L (ref 1–33)
ANION GAP SERPL CALCULATED.3IONS-SCNC: 13.6 MMOL/L (ref 5–15)
AST SERPL-CCNC: 12 U/L (ref 1–32)
BASOPHILS # BLD AUTO: 0.03 10*3/MM3 (ref 0–0.2)
BASOPHILS NFR BLD AUTO: 0.2 % (ref 0–1.5)
BILIRUB SERPL-MCNC: 0.4 MG/DL (ref 0–1.2)
BUN SERPL-MCNC: 7 MG/DL (ref 6–20)
BUN/CREAT SERPL: 11.9 (ref 7–25)
CALCIUM SPEC-SCNC: 8.8 MG/DL (ref 8.6–10.5)
CHLORIDE SERPL-SCNC: 88 MMOL/L (ref 98–107)
CO2 SERPL-SCNC: 24.4 MMOL/L (ref 22–29)
CREAT SERPL-MCNC: 0.59 MG/DL (ref 0.57–1)
D-LACTATE SERPL-SCNC: 0.9 MMOL/L (ref 0.5–2)
DEPRECATED RDW RBC AUTO: 43.3 FL (ref 37–54)
EGFRCR SERPLBLD CKD-EPI 2021: 105.3 ML/MIN/1.73
EOSINOPHIL # BLD AUTO: 0.09 10*3/MM3 (ref 0–0.4)
EOSINOPHIL NFR BLD AUTO: 0.7 % (ref 0.3–6.2)
ERYTHROCYTE [DISTWIDTH] IN BLOOD BY AUTOMATED COUNT: 13.2 % (ref 12.3–15.4)
FLUAV SUBTYP SPEC NAA+PROBE: NOT DETECTED
FLUBV RNA ISLT QL NAA+PROBE: NOT DETECTED
GLOBULIN UR ELPH-MCNC: 2.2 GM/DL
GLUCOSE SERPL-MCNC: 106 MG/DL (ref 65–99)
HCT VFR BLD AUTO: 36.4 % (ref 34–46.6)
HGB BLD-MCNC: 12 G/DL (ref 12–15.9)
HOLD SPECIMEN: NORMAL
HOLD SPECIMEN: NORMAL
IMM GRANULOCYTES # BLD AUTO: 0.06 10*3/MM3 (ref 0–0.05)
IMM GRANULOCYTES NFR BLD AUTO: 0.5 % (ref 0–0.5)
LYMPHOCYTES # BLD AUTO: 1.4 10*3/MM3 (ref 0.7–3.1)
LYMPHOCYTES NFR BLD AUTO: 10.8 % (ref 19.6–45.3)
MCH RBC QN AUTO: 29.9 PG (ref 26.6–33)
MCHC RBC AUTO-ENTMCNC: 33 G/DL (ref 31.5–35.7)
MCV RBC AUTO: 90.5 FL (ref 79–97)
MONOCYTES # BLD AUTO: 1.14 10*3/MM3 (ref 0.1–0.9)
MONOCYTES NFR BLD AUTO: 8.8 % (ref 5–12)
NEUTROPHILS NFR BLD AUTO: 10.2 10*3/MM3 (ref 1.7–7)
NEUTROPHILS NFR BLD AUTO: 79 % (ref 42.7–76)
NRBC BLD AUTO-RTO: 0 /100 WBC (ref 0–0.2)
NT-PROBNP SERPL-MCNC: 496.9 PG/ML (ref 0–900)
PLATELET # BLD AUTO: 175 10*3/MM3 (ref 140–450)
PMV BLD AUTO: 9.7 FL (ref 6–12)
POTASSIUM SERPL-SCNC: 4.3 MMOL/L (ref 3.5–5.2)
PROT SERPL-MCNC: 6.2 G/DL (ref 6–8.5)
RBC # BLD AUTO: 4.02 10*6/MM3 (ref 3.77–5.28)
RBC MORPH BLD: NORMAL
RSV RNA NPH QL NAA+NON-PROBE: NOT DETECTED
SARS-COV-2 RNA RESP QL NAA+PROBE: NOT DETECTED
SMALL PLATELETS BLD QL SMEAR: ADEQUATE
SODIUM SERPL-SCNC: 126 MMOL/L (ref 136–145)
TROPONIN T SERPL HS-MCNC: 11 NG/L
WBC MORPH BLD: NORMAL
WBC NRBC COR # BLD AUTO: 12.92 10*3/MM3 (ref 3.4–10.8)
WHOLE BLOOD HOLD COAG: NORMAL
WHOLE BLOOD HOLD SPECIMEN: NORMAL

## 2024-06-02 PROCEDURE — 71045 X-RAY EXAM CHEST 1 VIEW: CPT

## 2024-06-02 PROCEDURE — 25010000002 CEFTRIAXONE PER 250 MG

## 2024-06-02 PROCEDURE — 85025 COMPLETE CBC W/AUTO DIFF WBC: CPT

## 2024-06-02 PROCEDURE — 25010000002 HEPARIN (PORCINE) PER 1000 UNITS: Performed by: INTERNAL MEDICINE

## 2024-06-02 PROCEDURE — 84484 ASSAY OF TROPONIN QUANT: CPT

## 2024-06-02 PROCEDURE — 83880 ASSAY OF NATRIURETIC PEPTIDE: CPT

## 2024-06-02 PROCEDURE — 80053 COMPREHEN METABOLIC PANEL: CPT

## 2024-06-02 PROCEDURE — 25810000003 SODIUM CHLORIDE 0.9 % SOLUTION: Performed by: INTERNAL MEDICINE

## 2024-06-02 PROCEDURE — 83605 ASSAY OF LACTIC ACID: CPT

## 2024-06-02 PROCEDURE — 85007 BL SMEAR W/DIFF WBC COUNT: CPT

## 2024-06-02 PROCEDURE — 36415 COLL VENOUS BLD VENIPUNCTURE: CPT

## 2024-06-02 PROCEDURE — 93005 ELECTROCARDIOGRAM TRACING: CPT | Performed by: EMERGENCY MEDICINE

## 2024-06-02 PROCEDURE — 93005 ELECTROCARDIOGRAM TRACING: CPT

## 2024-06-02 PROCEDURE — 87040 BLOOD CULTURE FOR BACTERIA: CPT

## 2024-06-02 PROCEDURE — 94640 AIRWAY INHALATION TREATMENT: CPT

## 2024-06-02 PROCEDURE — G0378 HOSPITAL OBSERVATION PER HR: HCPCS

## 2024-06-02 PROCEDURE — 87637 SARSCOV2&INF A&B&RSV AMP PRB: CPT

## 2024-06-02 PROCEDURE — 99285 EMERGENCY DEPT VISIT HI MDM: CPT

## 2024-06-02 PROCEDURE — 94799 UNLISTED PULMONARY SVC/PX: CPT

## 2024-06-02 PROCEDURE — 94761 N-INVAS EAR/PLS OXIMETRY MLT: CPT

## 2024-06-02 PROCEDURE — 99223 1ST HOSP IP/OBS HIGH 75: CPT | Performed by: INTERNAL MEDICINE

## 2024-06-02 RX ORDER — SODIUM CHLORIDE 0.9 % (FLUSH) 0.9 %
10 SYRINGE (ML) INJECTION EVERY 12 HOURS SCHEDULED
Status: DISCONTINUED | OUTPATIENT
Start: 2024-06-02 | End: 2024-06-04 | Stop reason: HOSPADM

## 2024-06-02 RX ORDER — ACETAMINOPHEN 650 MG/1
650 SUPPOSITORY RECTAL EVERY 4 HOURS PRN
Status: DISCONTINUED | OUTPATIENT
Start: 2024-06-02 | End: 2024-06-04 | Stop reason: HOSPADM

## 2024-06-02 RX ORDER — NITROGLYCERIN 0.4 MG/1
0.4 TABLET SUBLINGUAL
Status: DISCONTINUED | OUTPATIENT
Start: 2024-06-02 | End: 2024-06-04 | Stop reason: HOSPADM

## 2024-06-02 RX ORDER — ACETAMINOPHEN 160 MG/5ML
650 SOLUTION ORAL EVERY 4 HOURS PRN
Status: DISCONTINUED | OUTPATIENT
Start: 2024-06-02 | End: 2024-06-04 | Stop reason: HOSPADM

## 2024-06-02 RX ORDER — ACETAMINOPHEN 325 MG/1
650 TABLET ORAL EVERY 4 HOURS PRN
Status: DISCONTINUED | OUTPATIENT
Start: 2024-06-02 | End: 2024-06-04 | Stop reason: HOSPADM

## 2024-06-02 RX ORDER — SODIUM CHLORIDE 9 MG/ML
75 INJECTION, SOLUTION INTRAVENOUS CONTINUOUS
Status: DISCONTINUED | OUTPATIENT
Start: 2024-06-02 | End: 2024-06-03

## 2024-06-02 RX ORDER — SODIUM CHLORIDE 0.9 % (FLUSH) 0.9 %
10 SYRINGE (ML) INJECTION AS NEEDED
Status: DISCONTINUED | OUTPATIENT
Start: 2024-06-02 | End: 2024-06-03

## 2024-06-02 RX ORDER — ONDANSETRON 2 MG/ML
4 INJECTION INTRAMUSCULAR; INTRAVENOUS EVERY 6 HOURS PRN
Status: DISCONTINUED | OUTPATIENT
Start: 2024-06-02 | End: 2024-06-04 | Stop reason: HOSPADM

## 2024-06-02 RX ORDER — POLYETHYLENE GLYCOL 3350 17 G/17G
17 POWDER, FOR SOLUTION ORAL DAILY PRN
Status: DISCONTINUED | OUTPATIENT
Start: 2024-06-02 | End: 2024-06-04 | Stop reason: HOSPADM

## 2024-06-02 RX ORDER — AMOXICILLIN 250 MG
2 CAPSULE ORAL 2 TIMES DAILY PRN
Status: DISCONTINUED | OUTPATIENT
Start: 2024-06-02 | End: 2024-06-04 | Stop reason: HOSPADM

## 2024-06-02 RX ORDER — OMEPRAZOLE 40 MG/1
40 CAPSULE, DELAYED RELEASE ORAL DAILY
COMMUNITY
Start: 2024-05-24

## 2024-06-02 RX ORDER — BISACODYL 10 MG
10 SUPPOSITORY, RECTAL RECTAL DAILY PRN
Status: DISCONTINUED | OUTPATIENT
Start: 2024-06-02 | End: 2024-06-04 | Stop reason: HOSPADM

## 2024-06-02 RX ORDER — BISACODYL 5 MG/1
5 TABLET, DELAYED RELEASE ORAL DAILY PRN
Status: DISCONTINUED | OUTPATIENT
Start: 2024-06-02 | End: 2024-06-04 | Stop reason: HOSPADM

## 2024-06-02 RX ORDER — HEPARIN SODIUM 5000 [USP'U]/ML
5000 INJECTION, SOLUTION INTRAVENOUS; SUBCUTANEOUS EVERY 8 HOURS SCHEDULED
Status: DISCONTINUED | OUTPATIENT
Start: 2024-06-02 | End: 2024-06-04 | Stop reason: HOSPADM

## 2024-06-02 RX ORDER — SODIUM CHLORIDE 0.9 % (FLUSH) 0.9 %
10 SYRINGE (ML) INJECTION AS NEEDED
Status: DISCONTINUED | OUTPATIENT
Start: 2024-06-02 | End: 2024-06-04 | Stop reason: HOSPADM

## 2024-06-02 RX ORDER — UREA 10 %
10 LOTION (ML) TOPICAL NIGHTLY
Status: DISCONTINUED | OUTPATIENT
Start: 2024-06-02 | End: 2024-06-04 | Stop reason: HOSPADM

## 2024-06-02 RX ORDER — ESCITALOPRAM OXALATE 20 MG/1
20 TABLET ORAL DAILY
COMMUNITY
Start: 2024-05-24 | End: 2024-06-04 | Stop reason: HOSPADM

## 2024-06-02 RX ORDER — IPRATROPIUM BROMIDE AND ALBUTEROL SULFATE 2.5; .5 MG/3ML; MG/3ML
3 SOLUTION RESPIRATORY (INHALATION) ONCE
Status: COMPLETED | OUTPATIENT
Start: 2024-06-02 | End: 2024-06-02

## 2024-06-02 RX ORDER — SODIUM CHLORIDE 9 MG/ML
40 INJECTION, SOLUTION INTRAVENOUS AS NEEDED
Status: DISCONTINUED | OUTPATIENT
Start: 2024-06-02 | End: 2024-06-04 | Stop reason: HOSPADM

## 2024-06-02 RX ORDER — ACETAMINOPHEN 325 MG/1
650 TABLET ORAL EVERY 6 HOURS PRN
Status: DISCONTINUED | OUTPATIENT
Start: 2024-06-02 | End: 2024-06-02

## 2024-06-02 RX ADMIN — Medication 10 MG: at 22:24

## 2024-06-02 RX ADMIN — SODIUM CHLORIDE 75 ML/HR: 9 INJECTION, SOLUTION INTRAVENOUS at 21:33

## 2024-06-02 RX ADMIN — CEFTRIAXONE SODIUM 2000 MG: 2 INJECTION, POWDER, FOR SOLUTION INTRAMUSCULAR; INTRAVENOUS at 18:35

## 2024-06-02 RX ADMIN — DOXYCYCLINE 100 MG: 100 INJECTION, POWDER, LYOPHILIZED, FOR SOLUTION INTRAVENOUS at 22:21

## 2024-06-02 RX ADMIN — HEPARIN SODIUM 5000 UNITS: 5000 INJECTION INTRAVENOUS; SUBCUTANEOUS at 22:24

## 2024-06-02 RX ADMIN — ACETAMINOPHEN 650 MG: 325 TABLET ORAL at 16:06

## 2024-06-02 RX ADMIN — IPRATROPIUM BROMIDE AND ALBUTEROL SULFATE 3 ML: .5; 3 SOLUTION RESPIRATORY (INHALATION) at 16:49

## 2024-06-02 NOTE — H&P
[facility] ERIST HISTORY AND PHYSICAL  Date: 2024   Patient Name: Lluvia Archer  : 1965  MRN: [id number]  Primary Care Physician:  Aleksandar Edge DO  Date of admission: 2024    Subjective   Subjective     Chief Complaint: Shortness of breath    HPI:    Lluvia Archer is a 58 y.o. female past medical history of COPD with chronic hypoxic respiratory failure on 6 L at baseline who presents to the emergency department for evaluation of increased shortness of breath since yesterday along with fevers and cough.  She denies any chills, sweats, nausea, vomiting, chest pain, palpitations, abdominal pain, diarrhea constipation, dysuria, weakness, rash.  In the emergency department x-ray revealed suspected pneumonia and patient had temperature of 101 degrees.  She is also noted to have a sodium of 126.  Patient was started on Rocephin and cultures have been obtained.  She will be admitted for ongoing monitoring and management.      Personal History     Past Medical History:  Past Medical History:   Diagnosis Date    Abnormal mammogram     PT DENIES KNOWING OF THIS HX    Anxiety     Arthritis     R SHOULDER, R HIP, AND R LEG    Chronic nausea 01/15/2019    Hernia, hiatal     Hyperlipidemia     Hypertension     Knee pain, right 2018    Memory loss     FORGETFULNESS ? ETIOLOGY    Migraine headache     Moderate episode of recurrent major depressive disorder 2017    Night sweats     Sciatica of right side 2017    Severe episode of recurrent major depressive disorder, without psychotic features 10/22/2019    Shoulder pain, left 2018    Sleep apnea, unspecified     DOESNT USE CPAP AFTER WEIGHT LOSS         Past Surgical History:  Past Surgical History:   Procedure Laterality Date    BRONCHOSCOPY N/A 2022    Procedure: BRONCHOSCOPY WITH BRONCHOALVEOLAR LAVAGE, BIOPSY;  Surgeon: Shin Mcdonald DO;  Location: [facility] ENDOSCOPY;  Service: Pulmonary;   Laterality: N/A;  RIGHT LOWER LOBE INFILTRATE     SECTION      CHOLECYSTECTOMY      LAPAROSCOPIC    COLONOSCOPY      TOTAL HIP ARTHROPLASTY Right 2022    Procedure: RIGHT TOTAL HIP ARTHROPLASTY;  Surgeon: Cecil Gomes MD;  Location: [facility] MAIN OR;  Service: Orthopedics;  Laterality: Right;         Family History:   Family History   Problem Relation Age of Onset    Other Mother         BLOOD DISEASE    Arthritis Mother     Heart disease Father     Hypertension Other     Cancer Other     Heart disease Other     Malig Hyperthermia Neg Hx          Social History:   Social History     Tobacco Use    Smoking status: Former     Current packs/day: 0.00     Average packs/day: 2.0 packs/day for 40.0 years (80.0 ttl pk-yrs)     Types: Cigarettes     Start date: 1982     Quit date: 2022     Years since quittin.1    Smokeless tobacco: Never   Vaping Use    Vaping status: Never Used   Substance Use Topics    Alcohol use: Never    Drug use: Never         Home Medications:  Cariprazine HCl, Fluticasone-Umeclidin-Vilant, Vortioxetine HBr, albuterol, albuterol sulfate HFA, atorvastatin, buPROPion SR, famotidine, ipratropium-albuterol, lisinopril, and predniSONE    Allergies:  No Known Allergies    Review of Systems   All systems were reviewed and negative except for: SOB, fever    Objective   Objective     Vitals:   Temp:  [100.1 °F (37.8 °C)] 100.1 °F (37.8 °C)  Heart Rate:  [95-98] 95  Resp:  [18] 18  BP: (137)/(59) 137/59  Flow (L/min):  [6] 6    Physical Exam    Constitutional: Awake, alert, no acute distress   Eyes: Pupils equal, sclerae anicteric, no conjunctival injection   HENT: NCAT, mucous membranes moist   Neck: Supple, no thyromegaly, no lymphadenopathy, trachea midline   Respiratory: diminished BL with BL exp wheeze   Cardiovascular: RRR, no murmurs, rubs, or gallops, palpable pedal pulses bilaterally   Gastrointestinal: Positive bowel sounds, soft, nontender, nondistended   Musculoskeletal:  No bilateral ankle edema, no clubbing or cyanosis to extremities   Psychiatric: Appropriate affect, cooperative   Neurologic: Oriented x 3, strength symmetric in all extremities, Cranial Nerves grossly intact to confrontation, speech clear   Skin: No rashes     Result Review    Result Review:  I have personally reviewed the results from the time of this admission to 6/2/2024 18:48 EDT and agree with these findings:  [x]  Laboratory  []  Microbiology  [x]  Radiology  []  EKG/Telemetry   []  Cardiology/Vascular   []  Pathology  []  Old records  []  Other:      Assessment & Plan   Assessment / Plan     Assessment/Plan:   Community-acquired pneumonia: Will continue with Rocephin and add doxycycline.  Supportive care.  Respiratory treatments and supplemental oxygen as needed.  Follow cultures.  Serial labs.  Acute COPD exacerbation: Will continue with IV steroids and respiratory treatments.  Supplemental oxygen as needed.  Currently tolerating home oxygen requirement of 6 L.  Chronic hypoxic respiratory failure: On 6 L at baseline.  Maintain and titrate as needed.  Hyponatremia: Mild.  Will give gentle hydration overnight.  Serial labs.  Hold any diuretics.  Hypertension: Apical medications once verified      DVT prophylaxis:  Medical DVT prophylaxis orders are signed and held.          CODE STATUS:    Code Status (Patient has no pulse and is not breathing): CPR (Attempt to Resuscitate)  Medical Interventions (Patient has pulse or is breathing): Full Support      Admission Status:  I believe this patient meets patient status.    Electronically signed by Abram Weinberg Jr, MD, 06/02/24, 6:48 PM EDT.

## 2024-06-02 NOTE — ED PROVIDER NOTES
Time: 3:19 PM EDT  Date of encounter:  2024  Independent Historian/Clinical History and Information was obtained by:   Patient    History is limited by: N/A    Chief Complaint   Patient presents with    Shortness of Breath         History of Present Illness:  Patient is a 58 y.o. year old female who presents to the emergency department for evaluation of dyspnea.  Patient states she called ambulance today because she was having shortness of breath that was not improving with her treatments at home.  She states the symptoms began last night and she felt like it was all related to a COPD exacerbation.  Patient does state that she has had slight fever at home.  Patient is also having a nonproductive cough.  Patient wears 6 L nasal cannula at home.  (Provider in triage, Oniel Gee PA-C)    Patient Care Team  Primary Care Provider: Aleksandar Edge DO    Past Medical History:     No Known Allergies  Past Medical History:   Diagnosis Date    Abnormal mammogram     PT DENIES KNOWING OF THIS HX    Anxiety     Arthritis     R SHOULDER, R HIP, AND R LEG    Chronic nausea 01/15/2019    Hernia, hiatal     Hyperlipidemia     Hypertension     Knee pain, right 2018    Memory loss     FORGETFULNESS ? ETIOLOGY    Migraine headache     Moderate episode of recurrent major depressive disorder 2017    Night sweats     Sciatica of right side 2017    Severe episode of recurrent major depressive disorder, without psychotic features 10/22/2019    Shoulder pain, left 2018    Sleep apnea, unspecified     DOESNT USE CPAP AFTER WEIGHT LOSS     Past Surgical History:   Procedure Laterality Date    BRONCHOSCOPY N/A 2022    Procedure: BRONCHOSCOPY WITH BRONCHOALVEOLAR LAVAGE, BIOPSY;  Surgeon: Shin Mcdonald DO;  Location: [facility] ENDOSCOPY;  Service: Pulmonary;  Laterality: N/A;  RIGHT LOWER LOBE INFILTRATE     SECTION      CHOLECYSTECTOMY      LAPAROSCOPIC    COLONOSCOPY      TOTAL  HIP ARTHROPLASTY Right 5/13/2022    Procedure: RIGHT TOTAL HIP ARTHROPLASTY;  Surgeon: Cecil Gomes MD;  Location: [facility] MAIN OR;  Service: Orthopedics;  Laterality: Right;     Family History   Problem Relation Age of Onset    Other Mother         BLOOD DISEASE    Arthritis Mother     Heart disease Father     Hypertension Other     Cancer Other     Heart disease Other     Malig Hyperthermia Neg Hx        Home Medications:  Prior to Admission medications    Medication Sig Start Date End Date Taking? Authorizing Provider   albuterol (ACCUNEB) 0.63 MG/3ML nebulizer solution Take 3 mL by nebulization Every 6 (Six) Hours As Needed for Wheezing. 2/20/24   Bill Martinez PA-C   albuterol sulfate  (90 Base) MCG/ACT inhaler TAKE 2 PUFFS BY MOUTH EVERY 6 HOURS AS NEEDED FOR WHEEZE 10/26/23   Aleksandar Edge DO   atorvastatin (LIPITOR) 10 MG tablet TAKE ONE TABLET BY MOUTH DAILY AT 9 PM EVERY NIGHT 11/7/23   Aleksandar Edeg DO   buPROPion SR (WELLBUTRIN SR) 150 MG 12 hr tablet TAKE ONE TABLET BY MOUTH TWICE DAILY @ 9AM & 5PM 4/2/24   Aleksandar Edge DO   Cariprazine HCl (Vraylar) 3 MG capsule capsule Take 1 capsule by mouth Daily. 12/21/23   Aleksandar Edge DO   famotidine (PEPCID) 40 MG tablet TAKE ONE TABLET BY MOUTH TWICE DAILY @ 9AM & 5PM 11/7/23   Aleksandar Edge DO   ipratropium-albuterol (DUO-NEB) 0.5-2.5 mg/3 ml nebulizer Take 3 mL by nebulization 4 (Four) Times a Day for 30 days. 5/9/23 6/8/23  Saud Wise MD   lisinopril (PRINIVIL,ZESTRIL) 5 MG tablet TAKE ONE TABLET BY MOUTH DAILY AT 9 AM 11/7/23   Aleksandar Edge DO   predniSONE (DELTASONE) 10 MG tablet 4 tabs daily for 3 days, then 3 tabs daily for 3 days, then 2 tabs daily for 3 days, then 1 tab daily for 3 days, then STOP. 12/21/23   Aleksandar Edge DO   Trelegy Ellipta 100-62.5-25 MCG/ACT inhaler INHALE 1 PUFF BY MOUTH EVERY DAY (BULK) 5/3/24   Aleksandar Edge, DO  "  Vortioxetine HBr (Trintellix) 10 MG tablet tablet Take 1 tablet by mouth Daily With Breakfast. 23   Aleksandar Edge DO        Social History:   Social History     Tobacco Use    Smoking status: Former     Current packs/day: 0.00     Average packs/day: 2.0 packs/day for 40.0 years (80.0 ttl pk-yrs)     Types: Cigarettes     Start date: 1982     Quit date: 2022     Years since quittin.1    Smokeless tobacco: Never   Vaping Use    Vaping status: Never Used   Substance Use Topics    Alcohol use: Never    Drug use: Never         Review of Systems:  Review of Systems   Constitutional:  Positive for fever. Negative for chills.   HENT:  Negative for congestion, ear pain and sore throat.    Eyes:  Negative for pain.   Respiratory:  Positive for cough and shortness of breath. Negative for chest tightness.    Cardiovascular:  Negative for chest pain.   Gastrointestinal:  Negative for abdominal pain, diarrhea, nausea and vomiting.   Genitourinary:  Negative for flank pain and hematuria.   Musculoskeletal:  Negative for joint swelling.   Skin:  Negative for pallor.   Neurological:  Negative for seizures and headaches.   All other systems reviewed and are negative.       Physical Exam:  /59 (BP Location: Right arm, Patient Position: Sitting)   Pulse 95   Temp 100.1 °F (37.8 °C) (Oral)   Resp 18   Ht 162.6 cm (64.02\")   Wt 99.8 kg (220 lb 0.3 oz)   LMP  (LMP Unknown)   SpO2 97%   BMI 37.75 kg/m²         Physical Exam  Vitals and nursing note reviewed.   Constitutional:       General: She is not in acute distress.     Appearance: Normal appearance. She is not ill-appearing (Patient does not appear toxic or septic however she does appear unwell but), toxic-appearing or diaphoretic.   HENT:      Head: Normocephalic and atraumatic.      Mouth/Throat:      Mouth: Mucous membranes are moist.   Eyes:      General: No scleral icterus.     Pupils: Pupils are equal, round, and reactive to light. "   Cardiovascular:      Rate and Rhythm: Normal rate and regular rhythm.      Pulses: Normal pulses.      Heart sounds: Normal heart sounds.   Pulmonary:      Effort: Pulmonary effort is normal. Accessory muscle usage present. No tachypnea or respiratory distress.      Breath sounds: Wheezing and rhonchi present. No decreased breath sounds or rales.   Abdominal:      General: Abdomen is flat. There is no distension.      Palpations: Abdomen is soft.      Tenderness: There is no abdominal tenderness.   Musculoskeletal:         General: Normal range of motion.      Cervical back: Normal range of motion and neck supple.   Skin:     General: Skin is warm and dry.      Coloration: Skin is not cyanotic or pale.      Findings: No ecchymosis, erythema or rash.      Nails: There is no clubbing.   Neurological:      General: No focal deficit present.      Mental Status: She is alert and oriented to person, place, and time. Mental status is at baseline.   Psychiatric:         Mood and Affect: Mood normal.         Behavior: Behavior normal.                      Procedures:  Procedures      Medical Decision Making:      Comorbidities that affect care:    COPD, oxygen dependent, history of migraine headaches, hypertension    External Notes reviewed:    Previous Labs: I reviewed patient's previous lab values for comparison and trending purposes.Reviewed labs from 3 months ago as well as 5 months ago      The following orders were placed and all results were independently analyzed by me:  Orders Placed This Encounter   Procedures    COVID-19, FLU A/B, RSV PCR 1 HR TAT - Swab, Nasopharynx    Blood Culture - Blood,    Blood Culture - Blood,    XR Chest 1 View    [place] Draw    Comprehensive Metabolic Panel    BNP    Single High Sensitivity Troponin T    CBC Auto Differential    Scan Slide    Lactic Acid, Plasma    NPO Diet NPO Type: Strict NPO    Undress & Gown    Continuous Pulse Oximetry    Vital Signs    Code Status and Medical  Interventions:    IP General Consult (Use specialty-specific consult if known)    Oxygen Therapy- Nasal Cannula; Titrate 1-6 LPM Per SpO2; 90 - 95%    ECG 12 Lead ED Triage Standing Order; SOA    Insert Peripheral IV    Initiate Observation Status    CBC & Differential    Green Top (Gel)    Lavender Top    Gold Top - SST    Light Blue Top       Medications Given in the Emergency Department:  Medications   sodium chloride 0.9 % flush 10 mL (has no administration in time range)   acetaminophen (TYLENOL) tablet 650 mg (650 mg Oral Given 6/2/24 1606)   cefTRIAXone (ROCEPHIN) 2,000 mg in sodium chloride 0.9 % 100 mL IVPB-VTB (has no administration in time range)   ipratropium-albuterol (DUO-NEB) nebulizer solution 3 mL (3 mL Nebulization Given 6/2/24 1649)        ED Course:    The patient was initially evaluated in the triage area where orders were placed. The patient was later dispositioned by SANDRO Ricardo.      The patient was advised to stay for completion of workup which includes but is not limited to communication of labs and radiological results, reassessment and plan. The patient was advised that leaving prior to disposition by a provider could result in critical findings that are not communicated to the patient.     ED Course as of 06/02/24 1832   Sun Jun 02, 2024   1520 PROVIDER IN TRIAGE  Patient was evaluated by me in triage, Oniel Gee PA-C.  Orders were placed and patient is currently awaiting final results and disposition.  [MD]   1608 Patient was given DuoNeb and Solu-Medrol n route by EMS [MD]   1724 Pt states she has had one episode of chest pain and it occurred when she coughed.  [MS]   1743  I have discussed this pt with ER attending Dr. Clark [MS]   1815 Pt is agreeable to stay if needing to be admitted to the hospital. Order placed for consult at this time.  [MS]   1822 I have spoke with Dr. Weinberg who has agreed to admit the pt to the hospital [MS]      ED Course User  Index  [MD] Oniel Gee PA-C  [MS] Hafsa Marshall APRN       Labs:    Lab Results (last 24 hours)       Procedure Component Value Units Date/Time    CBC & Differential [[id number]]  (Abnormal) Collected: 06/02/24 1516    Specimen: Blood from Arm, Right Updated: 06/02/24 9732    Narrative:      The following orders were created for panel order CBC & Differential.  Procedure                               Abnormality         Status                     ---------                               -----------         ------                     CBC Auto Differential[[id number]]        Abnormal            Final result               Scan Slide[[id number]]                                       Final result                 Please view results for these tests on the individual orders.    Comprehensive Metabolic Panel [[id number]]  (Abnormal) Collected: 06/02/24 1516    Specimen: Blood from Arm, Right Updated: 06/02/24 1608     Glucose 106 mg/dL      BUN 7 mg/dL      Creatinine 0.59 mg/dL      Sodium 126 mmol/L      Potassium 4.3 mmol/L      Comment: Slight hemolysis detected by analyzer. Result may be falsely elevated.        Chloride 88 mmol/L      CO2 24.4 mmol/L      Calcium 8.8 mg/dL      Total Protein 6.2 g/dL      Albumin 4.0 g/dL      ALT (SGPT) 8 U/L      AST (SGOT) 12 U/L      Alkaline Phosphatase 109 U/L      Total Bilirubin 0.4 mg/dL      Globulin 2.2 gm/dL      A/G Ratio 1.8 g/dL      BUN/Creatinine Ratio 11.9     Anion Gap 13.6 mmol/L      eGFR 105.3 mL/min/1.73     Narrative:      GFR Normal >60  Chronic Kidney Disease <60  Kidney Failure <15      BNP [[id number]]  (Normal) Collected: 06/02/24 1516    Specimen: Blood from Arm, Right Updated: 06/02/24 1605     proBNP 496.9 pg/mL     Narrative:      This assay is used as an aid in the diagnosis of individuals suspected of having heart failure. It can be used as an aid in the diagnosis of acute decompensated heart failure (ADHF) in patients presenting with  signs and symptoms of ADHF to the emergency department (ED). In addition, NT-proBNP of <300 pg/mL indicates ADHF is not likely.    Age Range Result Interpretation  NT-proBNP Concentration (pg/mL:      <50             Positive            >450                   Gray                 300-450                    Negative             <300    50-75           Positive            >900                  Gray                300-900                  Negative            <300      >75             Positive            >1800                  Gray                300-1800                  Negative            <300    Single High Sensitivity Troponin T [[id number]]  (Normal) Collected: 06/02/24 1516    Specimen: Blood from Arm, Right Updated: 06/02/24 1608     HS Troponin T 11 ng/L     Narrative:      High Sensitive Troponin T Reference Range:  <14.0 ng/L- Negative Female for AMI  <22.0 ng/L- Negative Male for AMI  >=14 - Abnormal Female indicating possible myocardial injury.  >=22 - Abnormal Male indicating possible myocardial injury.   Clinicians would have to utilize clinical acumen, EKG, Troponin, and serial changes to determine if it is an Acute Myocardial Infarction or myocardial injury due to an underlying chronic condition.         CBC Auto Differential [[id number]]  (Abnormal) Collected: 06/02/24 1516    Specimen: Blood from Arm, Right Updated: 06/02/24 1549     WBC 12.92 10*3/mm3      RBC 4.02 10*6/mm3      Hemoglobin 12.0 g/dL      Hematocrit 36.4 %      MCV 90.5 fL      MCH 29.9 pg      MCHC 33.0 g/dL      RDW 13.2 %      RDW-SD 43.3 fl      MPV 9.7 fL      Platelets 175 10*3/mm3      Neutrophil % 79.0 %      Lymphocyte % 10.8 %      Monocyte % 8.8 %      Eosinophil % 0.7 %      Basophil % 0.2 %      Immature Grans % 0.5 %      Neutrophils, Absolute 10.20 10*3/mm3      Lymphocytes, Absolute 1.40 10*3/mm3      Monocytes, Absolute 1.14 10*3/mm3      Eosinophils, Absolute 0.09 10*3/mm3      Basophils, Absolute 0.03 10*3/mm3       Immature Grans, Absolute 0.06 10*3/mm3      nRBC 0.0 /100 WBC     Scan Slide [[id number]] Collected: 06/02/24 1516    Specimen: Blood from Arm, Right Updated: 06/02/24 1549     RBC Morphology Normal     WBC Morphology Normal     Platelet Estimate Adequate    COVID-19, FLU A/B, RSV PCR 1 HR TAT - Swab, Nasopharynx [[id number]]  (Normal) Collected: 06/02/24 1632    Specimen: Swab from Nasopharynx Updated: 06/02/24 1731     COVID19 Not Detected     Influenza A PCR Not Detected     Influenza B PCR Not Detected     RSV, PCR Not Detected    Narrative:      Fact sheet for providers: https://www.fda.gov/media/265288/download    Fact sheet for patients: https://www.fda.gov/media/100757/download    Test performed by PCR.             Imaging:    XR Chest 1 View    Result Date: 6/2/2024  XR CHEST 1 VW-  Date of Exam: 6/2/2024 3:27 PM  Indication: SOA Triage Protocol  Comparison: Chest AP dated 2/20/2024  Findings: There is mild to moderate bibasilar airspace opacity. This appears more prominent in the interval. The cardiac mediastinal silhouettes appear normal. A small right pleural effusion is not excluded.      Impression: 1.  Increasing bibasilar airspace opacity. Finding is suspected to represent superimposed pneumonia and scarring/atelectasis.   Electronically Signed By-Quinton Bennett MD On:6/2/2024 4:23 PM         Differential Diagnosis and Discussion:      Dyspnea: Differential diagnosis includes but is not limited to metabolic acidosis, neurological disorders, psychogenic, asthma, pneumothorax, upper airway obstruction, COPD, pneumonia, noncardiogenic pulmonary edema, interstitial lung disease, anemia, congestive heart failure, and pulmonary embolism    All labs were reviewed and interpreted by me.  All X-rays impressions were independently interpreted by me.    MDM     Amount and/or Complexity of Data Reviewed  Clinical lab tests: reviewed  Tests in the medicine section of CPT®: reviewed  Decide to obtain previous medical  records or to obtain history from someone other than the patient: yes                 Patient Care Considerations:    SEPSIS was considered but is NOT present in the emergency department as SIRS criteria is not present.      Consultants/Shared Management Plan:    Hospitalist: I have discussed the case with Dr. Weinberg who agrees to accept the patient for admission.  SHARED VISIT: I have discussed the case with my supervising physician, Dr. Clark who states if patient has no history of hyponatremia she possibly could be admitted to the hospital pending her capability of following up with outside medical provider. The substantive portion of the medical decision was made by the attesting physician who made or approve the management plan and will take responsibility for the patient.  Clinical findings were discussed and ultimate disposition was made in consult with supervising physician.    Social Determinants of Health:    Patient is independent, reliable, and has access to care.       Disposition and Care Coordination:    Admit:   Through independent evaluation of the patient's history, physical, and imperical data, the patient meets criteria for inpatient admission to the hospital.        Final diagnoses:   Pneumonia due to infectious organism, unspecified laterality, unspecified part of lung   Hyponatremia   Subacute cough   COPD exacerbation        ED Disposition       ED Disposition   Decision to Admit    Condition   --    Comment   Level of Care: Remote Telemetry [26]   Diagnosis: Pneumonia [[id number]]                 This medical record created using voice recognition software.             Hafsa Marshall, APRN  06/02/24 [id number]

## 2024-06-03 ENCOUNTER — APPOINTMENT (OUTPATIENT)
Dept: CT IMAGING | Facility: HOSPITAL | Age: 59
End: 2024-06-03
Payer: MEDICARE

## 2024-06-03 PROBLEM — J18.9 PNEUMONIA DUE TO INFECTIOUS ORGANISM, UNSPECIFIED LATERALITY, UNSPECIFIED PART OF LUNG: Status: ACTIVE | Noted: 2024-06-03

## 2024-06-03 LAB
ALBUMIN SERPL-MCNC: 3.7 G/DL (ref 3.5–5.2)
ALBUMIN/GLOB SERPL: 1.4 G/DL
ALP SERPL-CCNC: 98 U/L (ref 39–117)
ALT SERPL W P-5'-P-CCNC: 7 U/L (ref 1–33)
ANION GAP SERPL CALCULATED.3IONS-SCNC: 9.9 MMOL/L (ref 5–15)
AST SERPL-CCNC: 8 U/L (ref 1–32)
B PARAPERT DNA SPEC QL NAA+PROBE: NOT DETECTED
B PERT DNA SPEC QL NAA+PROBE: NOT DETECTED
BASOPHILS # BLD AUTO: 0.02 10*3/MM3 (ref 0–0.2)
BASOPHILS NFR BLD AUTO: 0.2 % (ref 0–1.5)
BILIRUB SERPL-MCNC: 0.2 MG/DL (ref 0–1.2)
BUN SERPL-MCNC: 9 MG/DL (ref 6–20)
BUN/CREAT SERPL: 18.4 (ref 7–25)
C PNEUM DNA NPH QL NAA+NON-PROBE: NOT DETECTED
CALCIUM SPEC-SCNC: 8.6 MG/DL (ref 8.6–10.5)
CHLORIDE SERPL-SCNC: 90 MMOL/L (ref 98–107)
CO2 SERPL-SCNC: 24.1 MMOL/L (ref 22–29)
CREAT SERPL-MCNC: 0.49 MG/DL (ref 0.57–1)
CREAT UR-MCNC: 150.6 MG/DL
DEPRECATED RDW RBC AUTO: 41 FL (ref 37–54)
EGFRCR SERPLBLD CKD-EPI 2021: 110.1 ML/MIN/1.73
EOSINOPHIL # BLD AUTO: 0 10*3/MM3 (ref 0–0.4)
EOSINOPHIL NFR BLD AUTO: 0 % (ref 0.3–6.2)
ERYTHROCYTE [DISTWIDTH] IN BLOOD BY AUTOMATED COUNT: 12.9 % (ref 12.3–15.4)
FLUAV SUBTYP SPEC NAA+PROBE: NOT DETECTED
FLUBV RNA ISLT QL NAA+PROBE: NOT DETECTED
GLOBULIN UR ELPH-MCNC: 2.7 GM/DL
GLUCOSE SERPL-MCNC: 143 MG/DL (ref 65–99)
HADV DNA SPEC NAA+PROBE: NOT DETECTED
HCOV 229E RNA SPEC QL NAA+PROBE: NOT DETECTED
HCOV HKU1 RNA SPEC QL NAA+PROBE: NOT DETECTED
HCOV NL63 RNA SPEC QL NAA+PROBE: NOT DETECTED
HCOV OC43 RNA SPEC QL NAA+PROBE: NOT DETECTED
HCT VFR BLD AUTO: 33.8 % (ref 34–46.6)
HGB BLD-MCNC: 11.5 G/DL (ref 12–15.9)
HMPV RNA NPH QL NAA+NON-PROBE: NOT DETECTED
HPIV1 RNA ISLT QL NAA+PROBE: NOT DETECTED
HPIV2 RNA SPEC QL NAA+PROBE: NOT DETECTED
HPIV3 RNA NPH QL NAA+PROBE: NOT DETECTED
HPIV4 P GENE NPH QL NAA+PROBE: NOT DETECTED
IMM GRANULOCYTES # BLD AUTO: 0.06 10*3/MM3 (ref 0–0.05)
IMM GRANULOCYTES NFR BLD AUTO: 0.5 % (ref 0–0.5)
L PNEUMO1 AG UR QL IA: NEGATIVE
LYMPHOCYTES # BLD AUTO: 0.82 10*3/MM3 (ref 0.7–3.1)
LYMPHOCYTES NFR BLD AUTO: 6.2 % (ref 19.6–45.3)
M PNEUMO IGG SER IA-ACNC: NOT DETECTED
MCH RBC QN AUTO: 29.6 PG (ref 26.6–33)
MCHC RBC AUTO-ENTMCNC: 34 G/DL (ref 31.5–35.7)
MCV RBC AUTO: 87.1 FL (ref 79–97)
MONOCYTES # BLD AUTO: 0.34 10*3/MM3 (ref 0.1–0.9)
MONOCYTES NFR BLD AUTO: 2.6 % (ref 5–12)
NEUTROPHILS NFR BLD AUTO: 12.03 10*3/MM3 (ref 1.7–7)
NEUTROPHILS NFR BLD AUTO: 90.5 % (ref 42.7–76)
NRBC BLD AUTO-RTO: 0 /100 WBC (ref 0–0.2)
OSMOLALITY UR: 785 MOSM/KG (ref 50–1400)
PLATELET # BLD AUTO: 191 10*3/MM3 (ref 140–450)
PMV BLD AUTO: 9.6 FL (ref 6–12)
POTASSIUM SERPL-SCNC: 4.8 MMOL/L (ref 3.5–5.2)
PROT ?TM UR-MCNC: 122.8 MG/DL
PROT SERPL-MCNC: 6.4 G/DL (ref 6–8.5)
PROT/CREAT UR: 0.82 MG/G{CREAT}
RBC # BLD AUTO: 3.88 10*6/MM3 (ref 3.77–5.28)
RHINOVIRUS RNA SPEC NAA+PROBE: NOT DETECTED
RSV RNA NPH QL NAA+NON-PROBE: NOT DETECTED
S PNEUM AG SPEC QL LA: NEGATIVE
SARS-COV-2 RNA RESP QL NAA+PROBE: NOT DETECTED
SODIUM SERPL-SCNC: 124 MMOL/L (ref 136–145)
WBC NRBC COR # BLD AUTO: 13.27 10*3/MM3 (ref 3.4–10.8)

## 2024-06-03 PROCEDURE — 87070 CULTURE OTHR SPECIMN AEROBIC: CPT | Performed by: NURSE PRACTITIONER

## 2024-06-03 PROCEDURE — 25810000003 SODIUM CHLORIDE 0.9 % SOLUTION: Performed by: INTERNAL MEDICINE

## 2024-06-03 PROCEDURE — 87205 SMEAR GRAM STAIN: CPT | Performed by: NURSE PRACTITIONER

## 2024-06-03 PROCEDURE — 83935 ASSAY OF URINE OSMOLALITY: CPT | Performed by: INTERNAL MEDICINE

## 2024-06-03 PROCEDURE — 94799 UNLISTED PULMONARY SVC/PX: CPT

## 2024-06-03 PROCEDURE — 71250 CT THORAX DX C-: CPT

## 2024-06-03 PROCEDURE — 82570 ASSAY OF URINE CREATININE: CPT | Performed by: INTERNAL MEDICINE

## 2024-06-03 PROCEDURE — 25010000002 CEFTRIAXONE PER 250 MG: Performed by: INTERNAL MEDICINE

## 2024-06-03 PROCEDURE — 99223 1ST HOSP IP/OBS HIGH 75: CPT | Performed by: INTERNAL MEDICINE

## 2024-06-03 PROCEDURE — 87449 NOS EACH ORGANISM AG IA: CPT | Performed by: NURSE PRACTITIONER

## 2024-06-03 PROCEDURE — 0202U NFCT DS 22 TRGT SARS-COV-2: CPT | Performed by: NURSE PRACTITIONER

## 2024-06-03 PROCEDURE — 25010000002 HEPARIN (PORCINE) PER 1000 UNITS: Performed by: INTERNAL MEDICINE

## 2024-06-03 PROCEDURE — 85025 COMPLETE CBC W/AUTO DIFF WBC: CPT | Performed by: INTERNAL MEDICINE

## 2024-06-03 PROCEDURE — 25010000002 METHYLPREDNISOLONE PER 40 MG: Performed by: INTERNAL MEDICINE

## 2024-06-03 PROCEDURE — 84156 ASSAY OF PROTEIN URINE: CPT | Performed by: INTERNAL MEDICINE

## 2024-06-03 PROCEDURE — 99232 SBSQ HOSP IP/OBS MODERATE 35: CPT | Performed by: INTERNAL MEDICINE

## 2024-06-03 PROCEDURE — 80053 COMPREHEN METABOLIC PANEL: CPT | Performed by: INTERNAL MEDICINE

## 2024-06-03 RX ORDER — ATORVASTATIN CALCIUM 10 MG/1
10 TABLET, FILM COATED ORAL NIGHTLY
Status: DISCONTINUED | OUTPATIENT
Start: 2024-06-03 | End: 2024-06-04 | Stop reason: HOSPADM

## 2024-06-03 RX ORDER — HYDROXYZINE HYDROCHLORIDE 25 MG/1
25 TABLET, FILM COATED ORAL 3 TIMES DAILY PRN
Status: DISCONTINUED | OUTPATIENT
Start: 2024-06-03 | End: 2024-06-04 | Stop reason: HOSPADM

## 2024-06-03 RX ORDER — BUDESONIDE 0.5 MG/2ML
0.5 INHALANT ORAL
Status: DISCONTINUED | OUTPATIENT
Start: 2024-06-03 | End: 2024-06-04 | Stop reason: HOSPADM

## 2024-06-03 RX ORDER — IPRATROPIUM BROMIDE AND ALBUTEROL SULFATE 2.5; .5 MG/3ML; MG/3ML
3 SOLUTION RESPIRATORY (INHALATION)
Status: DISCONTINUED | OUTPATIENT
Start: 2024-06-03 | End: 2024-06-04 | Stop reason: HOSPADM

## 2024-06-03 RX ORDER — ARFORMOTEROL TARTRATE 15 UG/2ML
15 SOLUTION RESPIRATORY (INHALATION)
Status: DISCONTINUED | OUTPATIENT
Start: 2024-06-03 | End: 2024-06-04 | Stop reason: HOSPADM

## 2024-06-03 RX ORDER — BUDESONIDE AND FORMOTEROL FUMARATE DIHYDRATE 160; 4.5 UG/1; UG/1
2 AEROSOL RESPIRATORY (INHALATION)
Status: DISCONTINUED | OUTPATIENT
Start: 2024-06-03 | End: 2024-06-03

## 2024-06-03 RX ORDER — LISINOPRIL 10 MG/1
5 TABLET ORAL DAILY
Status: DISCONTINUED | OUTPATIENT
Start: 2024-06-03 | End: 2024-06-04 | Stop reason: HOSPADM

## 2024-06-03 RX ORDER — PANTOPRAZOLE SODIUM 40 MG/1
40 TABLET, DELAYED RELEASE ORAL
Status: DISCONTINUED | OUTPATIENT
Start: 2024-06-03 | End: 2024-06-04 | Stop reason: HOSPADM

## 2024-06-03 RX ADMIN — ATORVASTATIN CALCIUM 10 MG: 10 TABLET, FILM COATED ORAL at 21:27

## 2024-06-03 RX ADMIN — CEFTRIAXONE SODIUM 1000 MG: 1 INJECTION, POWDER, FOR SOLUTION INTRAMUSCULAR; INTRAVENOUS at 18:24

## 2024-06-03 RX ADMIN — ACETAMINOPHEN 650 MG: 325 TABLET ORAL at 12:18

## 2024-06-03 RX ADMIN — HEPARIN SODIUM 5000 UNITS: 5000 INJECTION INTRAVENOUS; SUBCUTANEOUS at 13:09

## 2024-06-03 RX ADMIN — BUDESONIDE AND FORMOTEROL FUMARATE DIHYDRATE 2 PUFF: 160; 4.5 AEROSOL RESPIRATORY (INHALATION) at 08:40

## 2024-06-03 RX ADMIN — ARFORMOTEROL TARTRATE 15 MCG: 15 SOLUTION RESPIRATORY (INHALATION) at 18:54

## 2024-06-03 RX ADMIN — METHYLPREDNISOLONE SODIUM SUCCINATE 40 MG: 40 INJECTION INTRAMUSCULAR; INTRAVENOUS at 13:09

## 2024-06-03 RX ADMIN — BUDESONIDE 0.5 MG: 0.5 SUSPENSION RESPIRATORY (INHALATION) at 18:54

## 2024-06-03 RX ADMIN — IPRATROPIUM BROMIDE AND ALBUTEROL SULFATE 3 ML: .5; 3 SOLUTION RESPIRATORY (INHALATION) at 18:54

## 2024-06-03 RX ADMIN — HYDROXYZINE HYDROCHLORIDE 25 MG: 25 TABLET, FILM COATED ORAL at 19:14

## 2024-06-03 RX ADMIN — METHYLPREDNISOLONE SODIUM SUCCINATE 40 MG: 40 INJECTION INTRAMUSCULAR; INTRAVENOUS at 21:26

## 2024-06-03 RX ADMIN — TIOTROPIUM BROMIDE INHALATION SPRAY 2 PUFF: 3.12 SPRAY, METERED RESPIRATORY (INHALATION) at 08:40

## 2024-06-03 RX ADMIN — DOXYCYCLINE 100 MG: 100 INJECTION, POWDER, LYOPHILIZED, FOR SOLUTION INTRAVENOUS at 10:08

## 2024-06-03 RX ADMIN — HEPARIN SODIUM 5000 UNITS: 5000 INJECTION INTRAVENOUS; SUBCUTANEOUS at 05:47

## 2024-06-03 RX ADMIN — HEPARIN SODIUM 5000 UNITS: 5000 INJECTION INTRAVENOUS; SUBCUTANEOUS at 21:26

## 2024-06-03 RX ADMIN — IPRATROPIUM BROMIDE AND ALBUTEROL SULFATE 3 ML: .5; 3 SOLUTION RESPIRATORY (INHALATION) at 11:33

## 2024-06-03 RX ADMIN — Medication 10 MG: at 21:27

## 2024-06-03 RX ADMIN — DOXYCYCLINE 100 MG: 100 INJECTION, POWDER, LYOPHILIZED, FOR SOLUTION INTRAVENOUS at 21:26

## 2024-06-03 RX ADMIN — LISINOPRIL 5 MG: 10 TABLET ORAL at 10:07

## 2024-06-03 RX ADMIN — SODIUM CHLORIDE 75 ML/HR: 9 INJECTION, SOLUTION INTRAVENOUS at 05:47

## 2024-06-03 RX ADMIN — Medication 10 ML: at 21:27

## 2024-06-03 RX ADMIN — PANTOPRAZOLE SODIUM 40 MG: 40 TABLET, DELAYED RELEASE ORAL at 10:07

## 2024-06-03 RX ADMIN — Medication 10 ML: at 10:07

## 2024-06-03 RX ADMIN — IPRATROPIUM BROMIDE AND ALBUTEROL SULFATE 3 ML: .5; 3 SOLUTION RESPIRATORY (INHALATION) at 23:50

## 2024-06-03 RX ADMIN — METHYLPREDNISOLONE SODIUM SUCCINATE 40 MG: 40 INJECTION INTRAMUSCULAR; INTRAVENOUS at 05:47

## 2024-06-03 NOTE — PLAN OF CARE
Goal Outcome Evaluation:  Plan of Care Reviewed With: patient        Progress: no change  Outcome Evaluation: New admit from ed this shift. plan of care ongoing.

## 2024-06-03 NOTE — CONSULTS
Pulmonary / Critical Care Consult Note      Patient Name: Lluvia Archer  : 1965  MRN: [id number]  Primary Care Physician:  Aleksandar Edge DO  Referring Physician: No ref. provider found  Date of admission: 2024    Subjective   Subjective     Reason for Consult/ Chief Complaint: Reason for consultation abnormal imaging    HPI:  Lluvia Archer is a 58 y.o. female with chronic hypoxic respiratory failure on baseline 6 L of oxygen admitted to the hospital for shortness of breath  Came to the hospital for shortness of breath  Found to have sodium of 126  Dropped down to 124  Underwent a CT scan  CT scan showed some nodularity in right middle lobe concerning for malignancy along with a very enlarged right paratracheal lymph node  Review of Systems:  General:  No Fatigue, No Fever  HEENT:  No Dysphagia, No Visual Changes  Respiratory:  No Cough, No Dyspnea, No Pleuritic Pain  Cardiovascular:  No Chest Pain, No Palpitations, No GOFF, No Chest Pressure  Gastrointestinal:  No Abdominal Pain, No Nausea, No Vomiting, No Diarrhea  Genitourinary:  No Dysuria, No Frequency, No Hesitancy  Musculoskeletal:  No Joint Tenderness, No Joint Stiffness  Endocrine:  No Heat Intolerance, No Cold Intolerance, No Fatigue  Hematologic:  No Bleeding, No Bruising  Psychiatric:  No Anxiety, No Depression  Neurologic:  No Confusion, No Headaches, No Numbness, No Weakness  Skin:  No Rash, No Open Wounds      Personal History     Past Medical History:   Diagnosis Date    Abnormal mammogram     PT DENIES KNOWING OF THIS HX    Anxiety     Arthritis     R SHOULDER, R HIP, AND R LEG    Chronic nausea 01/15/2019    Hernia, hiatal     Hyperlipidemia     Hypertension     Knee pain, right 2018    Memory loss     FORGETFULNESS ? ETIOLOGY    Migraine headache     Moderate episode of recurrent major depressive disorder 2017    Night sweats     Sciatica of right side 2017    Severe episode of recurrent major  depressive disorder, without psychotic features 10/22/2019    Shoulder pain, left 2018    Sleep apnea, unspecified     DOESNT USE CPAP AFTER WEIGHT LOSS       Past Surgical History:   Procedure Laterality Date    BRONCHOSCOPY N/A 2022    Procedure: BRONCHOSCOPY WITH BRONCHOALVEOLAR LAVAGE, BIOPSY;  Surgeon: Shin Mcdonald DO;  Location: [facility] ENDOSCOPY;  Service: Pulmonary;  Laterality: N/A;  RIGHT LOWER LOBE INFILTRATE     SECTION      CHOLECYSTECTOMY      LAPAROSCOPIC    COLONOSCOPY      TOTAL HIP ARTHROPLASTY Right 2022    Procedure: RIGHT TOTAL HIP ARTHROPLASTY;  Surgeon: Cecil Gomes MD;  Location: [facility] MAIN OR;  Service: Orthopedics;  Laterality: Right;       Family History: family history includes Arthritis in her mother; Cancer in an other family member; Heart disease in her father and another family member; Hypertension in an other family member; Other in her mother. Otherwise pertinent FHx was reviewed and not pertinent to current issue.    Social History:  reports that she quit smoking about 2 years ago. Her smoking use included cigarettes. She started smoking about 42 years ago. She has a 80 pack-year smoking history. She has never used smokeless tobacco. She reports that she does not drink alcohol and does not use drugs.    Home Medications:  Cariprazine HCl, Fluticasone-Umeclidin-Vilant, Vortioxetine HBr, albuterol, albuterol sulfate HFA, atorvastatin, buPROPion SR, escitalopram, famotidine, ipratropium-albuterol, lisinopril, and omeprazole    Allergies:  No Known Allergies    Objective    Objective     Vitals:   Temp:  [97.3 °F (36.3 °C)-100.1 °F (37.8 °C)] 97.3 °F (36.3 °C)  Heart Rate:  [82-98] 82  Resp:  [18-24] 18  BP: (130-164)/(59-97) 141/81  Flow (L/min):  [6] 6    Physical Exam:  Vital Signs Reviewed   WDWN, Alert, NAD.    HEENT:  PERRL, EOMI.  OP, nares clear, no sinus tenderness  Neck:  Supple, no JVD, no thyromegaly  Lymph: no axillary, cervical,  supraclavicular lymphadenopathy noted bilaterally  Chest: Significant bilateral wheezing heard throughout all lung fields  CV: RRR, no MGR, pulses 2+, equal.  Abd:  Soft, NT, ND, + BS, no HSM  EXT:  no clubbing, no cyanosis, no edema, no joint tenderness  Neuro:  A&Ox3, CN grossly intact, no focal deficits.  Skin: No rashes or lesions noted      Result Review    Result Review:  I have personally reviewed the results from the time of this admission to 6/3/2024 10:39 EDT and agree with these findings:  [x]  Laboratory  [x]  Microbiology  [x]  Radiology  [x]  EKG/Telemetry   [x]  Cardiology/Vascular   [x]  Pathology  []  Old records  []  Other:  Most notable findings include: CT scan showing concerning right paratracheal mass    Assessment & Plan   Assessment / Plan     Active Hospital Problems:  Active Hospital Problems    Diagnosis     **Pneumonia    Right paratracheal adenopathy  Hyponatremia      Plan  After reviewing the CT scan of the chest patient does have very concerning right paratracheal lymphadenopathy  Will plan on taking patient for bronchoscopy with EBUS  Risks versus benefits of bronchoscopy explained to the patient including death, respiratory failure requiring intubation mechanical ventilation, pneumothorax requiring chest tube placement, bleeding.  Patient voices understanding of the risks of the procedure and wishes to proceed.  Will need to have EBUS when sodium is improved  Will consult nephrology to help with management of the patient's sodium  Start patient on LABA, , inhaled corticosteroid  Agree with IV Solu-Medrol    I personally reviewed all imaging, laboratory data, and I spoke with respiratory therapy, and nursing regarding the patient's care, have also spoken with the patient's primary admitting physician regarding her plan of care.    Electronically signed by Shin Mcdonald DO, 06/03/24, 10:39 AM EDT.

## 2024-06-03 NOTE — CONSULTS
[facility]   Nephrology Consult Note      Patient Name: Lluvia Archer  : 1965  MRN: [id number]  Primary Care Physician:  Aleksandar Edge DO  Referring Physician: No ref. provider found  Date of admission: 2024    Subjective   Subjective     Reason for Consult/ Chief Complaint: Hyponatremia    HPI:  Lluvia Archer is a 58 y.o. female with history of COPD who was admitted with shortness of breath.  She was noted to have sodium of 126 and dropped to 124.  This is a new problem for her.  She denied pain, nausea or vomiting.  No weight loss.  She has been having good appetite.  She is not on new medications including Wellbutrin, Trintellix and Lexapro.  CT of the chest showed right middle lobe nodularity concerning for malignancy.  Normally no lower urinary symptoms, no edema, denied any loss of balance or consciousness.    Review of Systems   All systems were reviewed and negative except for: What is mentioned above.    Personal History     Past Medical History:   Diagnosis Date    Abnormal mammogram     PT DENIES KNOWING OF THIS HX    Anxiety     Arthritis     R SHOULDER, R HIP, AND R LEG    Chronic nausea 01/15/2019    Hernia, hiatal     Hyperlipidemia     Hypertension     Knee pain, right 2018    Memory loss     FORGETFULNESS ? ETIOLOGY    Migraine headache     Moderate episode of recurrent major depressive disorder 2017    Night sweats     Sciatica of right side 2017    Severe episode of recurrent major depressive disorder, without psychotic features 10/22/2019    Shoulder pain, left 2018    Sleep apnea, unspecified     DOESNT USE CPAP AFTER WEIGHT LOSS       Past Surgical History:   Procedure Laterality Date    BRONCHOSCOPY N/A 2022    Procedure: BRONCHOSCOPY WITH BRONCHOALVEOLAR LAVAGE, BIOPSY;  Surgeon: Shin Mcdonald DO;  Location: [facility] ENDOSCOPY;  Service: Pulmonary;  Laterality: N/A;  RIGHT LOWER LOBE INFILTRATE     SECTION       CHOLECYSTECTOMY      LAPAROSCOPIC    COLONOSCOPY      TOTAL HIP ARTHROPLASTY Right 5/13/2022    Procedure: RIGHT TOTAL HIP ARTHROPLASTY;  Surgeon: Cecil Gomes MD;  Location: [facility] MAIN OR;  Service: Orthopedics;  Laterality: Right;       Family History: family history includes Arthritis in her mother; Cancer in an other family member; Heart disease in her father and another family member; Hypertension in an other family member; Other in her mother. Otherwise pertinent FHx was reviewed and not pertinent to current issue.    Social History:  reports that she quit smoking about 2 years ago. Her smoking use included cigarettes. She started smoking about 42 years ago. She has a 80 pack-year smoking history. She has never used smokeless tobacco. She reports that she does not drink alcohol and does not use drugs.    Home Medications:  Cariprazine HCl, Fluticasone-Umeclidin-Vilant, Vortioxetine HBr, albuterol, albuterol sulfate HFA, atorvastatin, buPROPion SR, escitalopram, famotidine, ipratropium-albuterol, lisinopril, and omeprazole    Allergies:  No Known Allergies    Objective    Objective     Vitals:   Temp:  [97.3 °F (36.3 °C)-99 °F (37.2 °C)] 97.3 °F (36.3 °C)  Heart Rate:  [82-97] 90  Resp:  [18-24] 20  BP: (130-164)/(70-97) 134/70  Flow (L/min):  [6] 6     Physical Exam    Constitutional: Awake, alert, no distress, in bed.  Conversant and pleasant   Eyes: sclerae anicteric, no conjunctival injection   HENT: mucous membranes moist   Neck: Supple, no thyromegaly, no lymphadenopathy, trachea midline, No JVD   Respiratory: Bilateral extensive wheezing , nonlabored respirations    Cardiovascular: RRR, no murmurs, rubs, or gallops.   Gastrointestinal: Positive bowel sounds, soft, nontender, nondistended with obesity   Musculoskeletal: No edema, no clubbing or cyanosis   Psychiatric: Appropriate affect, cooperative   Neurologic: Oriented x 3, moving all extremities, Cranial Nerves grossly intact, speech  clear   Skin: warm and dry, no rashes     Result Review    Result Reviewed:  I have personally reviewed the results from the time of this admission to 6/3/2024 16:58 EDT and agree with these findings:  [x]  Laboratory  []  Microbiology  [x]  Radiology  []  EKG/Telemetry   []  Cardiology/Vascular   []  Pathology  [x]  Old records  []  Other:  LAB RESULTS:    LAB RESULTS:        Lab 06/03/24  0458 06/02/24  1516   SODIUM 124* 126*   POTASSIUM 4.8 4.3   CHLORIDE 90* 88*   CO2 24.1 24.4   BUN 9 7   CREATININE 0.49* 0.59   GLUCOSE 143* 106*   EGFR 110.1 105.3   ANION GAP 9.9 13.6           Most notable findings include: Sodium 124 today 126 yesterday  Sodium was 140 on 2/20/2024.    Assessment & Plan   Assessment / Plan     Brief Patient Summary:  Lluvia Archer is a 58 y.o. female who is admitted with COPD exacerbation, has hyponatremia and concerning CT for possible lung malignancy on the right side.    Active Hospital Problems:  Active Hospital Problems    Diagnosis     **Pneumonia     Pneumonia due to infectious organism, unspecified laterality, unspecified part of lung        Assessment and Plan:   - Euvolemic hyponatremia most likely secondary to SIADH.  Of concern is whether she has a lung malignancy on the right side.  It is a diagnosis of exclusion.  Will check urinalysis, quantify proteinuria, check urine sodium and urine osmolality.  There is no hypotension to suspect adrenal insufficiency.  Will check TSH and free T4 and repeat sodium in AM.  Discussed with her potential etiologies for her hyponatremia.  Will add 1200 cc p.o. fluid restriction per day.  Currently she is off SSRI and Trintellix.  Further workup and management may be needed depending on her level tomorrow.  - COPD with possible pneumonia and concern for malignancy on the right side, on steroids and antibiotics per pulmonary.  Bronchoscopy and biopsy planned soon.  - Mild anemia.  Monitor.  The above discussed with her.  Will follow.    Thank  you very much for this consult!    Electronically signed by Angel Schilling MD, 6/3/2024, 16:58 EDT.

## 2024-06-03 NOTE — PROGRESS NOTES
Respiratory Therapist Broncho-Pulmonary Hygiene Progress Note      Patient Name:  Lluvia Archer  YOB: 1965    Lluvia Archer meets the qualification for Level 2 of the Bronco-Pulmonary Hygiene Protocol. This was based on my daily patient assessment and includes review of chest x-ray results, cough ability and quality, oxygenation, secretions or risk for secretion development and patient mobility.     Broncho-Pulmonary Hygiene Assessment:    Level of Movement: Actively changing positions without assistance  Alert/ oriented/ cooperative    Breath Sounds: Diminished and/or coarse rhonchi    Cough: Strong, effective    Chest X-Ray: Mild consolidation and/or atelectasis.    Sputum Productions: None or small amount of thin or watery secretions with effective cough    History and Physical: Home use of oxygen  and Chronic condition    SpO2 to Oxygen Need: greater than 92% on 4-6L nasal canula    Current SpO2 is: 97 on 6L nasal cannula    Based on this information, I have completed the following interventions: Aerobika with bronchodialtor medication or TID      Electronically signed by Esther Brandt RRT, 06/03/24, 9:20 AM EDT.

## 2024-06-03 NOTE — PROGRESS NOTES
[facility]   Hospitalist Progress Note  Date: 6/3/2024  Patient Name: Lluvia Archer  : 1965  MRN: [id number]  Date of admission: 2024  Consultants:   -Pulmonology: Dr. Shin Mcdonald    Subjective   Subjective     Chief Complaint: Shortness of    Summary:   Lluvia Archer is a 58 y.o. female with COPD, chronic hypoxic respiratory failure on 6 L at baseline, anxiety depression, hyperlipidemia, essential hypertension that presented to the ED with complaints of shortness of breath, fever and cough.  CXR showed bibasilar airspace opacity.  Labs with leukocytosis.  Treatment for community-acquired pneumonia and COPD exacerbation initiated.  Pulmonology consulted.    Interval Followup:   No acute events overnight.  Patient stated that she felt like her breathing is a little bit better today.  Still with cough.  Denies any chest pain.  Nursing with no additional acute issues to report.    Antibiotics:   -Ceftriaxone  -Doxycycline    Objective   Objective     Vitals:   Temp:  [97.3 °F (36.3 °C)-100.1 °F (37.8 °C)] 97.3 °F (36.3 °C)  Heart Rate:  [82-98] 82  Resp:  [18-24] 18  BP: (130-164)/(59-97) 141/81  Flow (L/min):  [6] 6  Physical Exam   Gen: No acute distress, Conversant, Pleasant, lying in bed  Resp: Poor aeration, bilateral expiratory wheezing, conversational dyspnea noted  Card: RRR, No m/r/g  Abd: Soft, Nontender, Nondistended, + bowel sounds    Result Review    Result Review:  I have personally reviewed the results as below and agree with these findings:  []  Laboratory:   CMP          2024    20:06 2024    23:20 2024    15:16 6/3/2024    04:58   CMP   Glucose 144  168  106  143    BUN 12   7  9    Creatinine 0.80   0.59  0.49    EGFR 86.1   105.3  110.1    Sodium 140  137.2  126  124    Potassium 3.8  3.9  4.3  4.8    Chloride 103  107  88  90    Calcium 8.8   8.8  8.6    Total Protein 7.0   6.2  6.4    Albumin 4.0   4.0  3.7    Globulin 3.0   2.2  2.7    Total Bilirubin 0.3    0.4  0.2    Alkaline Phosphatase 94   109  98    AST (SGOT) 15   12  8    ALT (SGPT) 14   8  7    Albumin/Globulin Ratio 1.3   1.8  1.4    BUN/Creatinine Ratio 15.0   11.9  18.4    Anion Gap 11.0   13.6  9.9      CBC          2/20/2024    20:06 6/2/2024    15:16 6/3/2024    04:58   CBC   WBC 9.20  12.92  13.27    RBC 4.45  4.02  3.88    Hemoglobin 13.0  12.0  11.5    Hematocrit 40.9  36.4  33.8    MCV 91.9  90.5  87.1    MCH 29.2  29.9  29.6    MCHC 31.8  33.0  34.0    RDW 14.4  13.2  12.9    Platelets 187  175  191    Lactate: 0.9  [x]  Microbiology: Blood culture (06/02/2024): Pending.  []  Radiology:   [x]  EKG/Telemetry:    []  Cardiology/Vascular:    []  Pathology:  []  Old records:  []  Other:    Assessment & Plan   Assessment / Plan     Assessment:  Community-acquired pneumonia due to unknown infectious organism  Acute COPD exacerbation  Chronic hypoxic respiratory failure  Hyponatremia  Essential hypertension  Anxiety/depression  Hyperlipidemia    Plan:  -Pulmonology consulted and following, appreciate assistance and recommendations in the care of this patient.  -Continue supplemental O2 to maintain sats greater than 90%, wean as tolerated  -Continue ceftriaxone and doxycycline.  Tailor based on results of infectious workup.  -Respiratory viral panel ordered  -CT chest ordered  -Stop IV fluids  -Start Brovana, Pulmicort and DuoNebs  -Continue IV Solu-Medrol  -Bronchopulmonary hygiene protocol and bronchodilator protocol ordered  -Start appropriate home medications  -Will monitor electrolytes and renal function with BMP and magnesium level in the AM  -Will monitor WBC and Hgb with CBC in the AM  -Clinical course will dictate further management     DVT Prophylaxis: Heparin  GI Prophylaxis: Pantoprazole  Diet:   Diet Order   Procedures    Diet: Regular/House; Fluid Consistency: Thin (IDDSI 0)     Dispo: Home when medically appropriate for discharge     Personally reviewed patients labs and imaging, discussed  with patient and nurse at bedside. Discussed management with the following consultants: Pulmonology.     Part of this note may be an electronic transcription/translation of spoken language to printed text using the Dragon dictation system.    DVT prophylaxis:  Medical DVT prophylaxis orders are present.        CODE STATUS:   Code Status (Patient has no pulse and is not breathing): CPR (Attempt to Resuscitate)  Medical Interventions (Patient has pulse or is breathing): Full Support        Electronically signed by Yandel Palacios MD, 6/3/2024, 08:30 EDT.

## 2024-06-04 ENCOUNTER — READMISSION MANAGEMENT (OUTPATIENT)
Dept: CALL CENTER | Facility: HOSPITAL | Age: 59
End: 2024-06-04
Payer: MEDICARE

## 2024-06-04 VITALS
HEART RATE: 80 BPM | TEMPERATURE: 98 F | WEIGHT: 237.22 LBS | SYSTOLIC BLOOD PRESSURE: 149 MMHG | HEIGHT: 64 IN | OXYGEN SATURATION: 100 % | RESPIRATION RATE: 20 BRPM | BODY MASS INDEX: 40.5 KG/M2 | DIASTOLIC BLOOD PRESSURE: 74 MMHG

## 2024-06-04 LAB
ANION GAP SERPL CALCULATED.3IONS-SCNC: 11.2 MMOL/L (ref 5–15)
BUN SERPL-MCNC: 18 MG/DL (ref 6–20)
BUN/CREAT SERPL: 25 (ref 7–25)
CALCIUM SPEC-SCNC: 9.2 MG/DL (ref 8.6–10.5)
CHLORIDE SERPL-SCNC: 96 MMOL/L (ref 98–107)
CO2 SERPL-SCNC: 25.8 MMOL/L (ref 22–29)
CREAT SERPL-MCNC: 0.72 MG/DL (ref 0.57–1)
DEPRECATED RDW RBC AUTO: 42.5 FL (ref 37–54)
EGFRCR SERPLBLD CKD-EPI 2021: 97.7 ML/MIN/1.73
ERYTHROCYTE [DISTWIDTH] IN BLOOD BY AUTOMATED COUNT: 13 % (ref 12.3–15.4)
GLUCOSE SERPL-MCNC: 159 MG/DL (ref 65–99)
HCT VFR BLD AUTO: 32.5 % (ref 34–46.6)
HGB BLD-MCNC: 10.8 G/DL (ref 12–15.9)
MAGNESIUM SERPL-MCNC: 2 MG/DL (ref 1.6–2.6)
MCH RBC QN AUTO: 29.5 PG (ref 26.6–33)
MCHC RBC AUTO-ENTMCNC: 33.2 G/DL (ref 31.5–35.7)
MCV RBC AUTO: 88.8 FL (ref 79–97)
PHOSPHATE SERPL-MCNC: 2.9 MG/DL (ref 2.5–4.5)
PLATELET # BLD AUTO: 227 10*3/MM3 (ref 140–450)
PMV BLD AUTO: 9.9 FL (ref 6–12)
POTASSIUM SERPL-SCNC: 4.8 MMOL/L (ref 3.5–5.2)
RBC # BLD AUTO: 3.66 10*6/MM3 (ref 3.77–5.28)
SODIUM SERPL-SCNC: 133 MMOL/L (ref 136–145)
T4 FREE SERPL-MCNC: 1.08 NG/DL (ref 0.92–1.68)
TSH SERPL DL<=0.05 MIU/L-ACNC: 0.19 UIU/ML (ref 0.27–4.2)
WBC NRBC COR # BLD AUTO: 13.74 10*3/MM3 (ref 3.4–10.8)

## 2024-06-04 PROCEDURE — 80048 BASIC METABOLIC PNL TOTAL CA: CPT | Performed by: INTERNAL MEDICINE

## 2024-06-04 PROCEDURE — 94799 UNLISTED PULMONARY SVC/PX: CPT

## 2024-06-04 PROCEDURE — 25010000002 HEPARIN (PORCINE) PER 1000 UNITS: Performed by: INTERNAL MEDICINE

## 2024-06-04 PROCEDURE — 84100 ASSAY OF PHOSPHORUS: CPT | Performed by: INTERNAL MEDICINE

## 2024-06-04 PROCEDURE — 99232 SBSQ HOSP IP/OBS MODERATE 35: CPT | Performed by: INTERNAL MEDICINE

## 2024-06-04 PROCEDURE — 94664 DEMO&/EVAL PT USE INHALER: CPT

## 2024-06-04 PROCEDURE — 99239 HOSP IP/OBS DSCHRG MGMT >30: CPT | Performed by: INTERNAL MEDICINE

## 2024-06-04 PROCEDURE — 84439 ASSAY OF FREE THYROXINE: CPT | Performed by: INTERNAL MEDICINE

## 2024-06-04 PROCEDURE — 84443 ASSAY THYROID STIM HORMONE: CPT | Performed by: INTERNAL MEDICINE

## 2024-06-04 PROCEDURE — 85027 COMPLETE CBC AUTOMATED: CPT | Performed by: INTERNAL MEDICINE

## 2024-06-04 PROCEDURE — 83735 ASSAY OF MAGNESIUM: CPT | Performed by: INTERNAL MEDICINE

## 2024-06-04 PROCEDURE — 25010000002 METHYLPREDNISOLONE PER 40 MG: Performed by: INTERNAL MEDICINE

## 2024-06-04 RX ORDER — PREDNISONE 20 MG/1
40 TABLET ORAL DAILY
Qty: 8 TABLET | Refills: 0 | Status: SHIPPED | OUTPATIENT
Start: 2024-06-04 | End: 2024-06-08

## 2024-06-04 RX ORDER — CEFDINIR 300 MG/1
300 CAPSULE ORAL 2 TIMES DAILY
Qty: 10 CAPSULE | Refills: 0 | Status: SHIPPED | OUTPATIENT
Start: 2024-06-04 | End: 2024-06-09

## 2024-06-04 RX ADMIN — ACETAMINOPHEN 650 MG: 325 TABLET ORAL at 11:05

## 2024-06-04 RX ADMIN — DOXYCYCLINE 100 MG: 100 INJECTION, POWDER, LYOPHILIZED, FOR SOLUTION INTRAVENOUS at 11:04

## 2024-06-04 RX ADMIN — HEPARIN SODIUM 5000 UNITS: 5000 INJECTION INTRAVENOUS; SUBCUTANEOUS at 05:52

## 2024-06-04 RX ADMIN — PANTOPRAZOLE SODIUM 40 MG: 40 TABLET, DELAYED RELEASE ORAL at 05:53

## 2024-06-04 RX ADMIN — Medication 10 ML: at 08:29

## 2024-06-04 RX ADMIN — BUDESONIDE 0.5 MG: 0.5 SUSPENSION RESPIRATORY (INHALATION) at 09:34

## 2024-06-04 RX ADMIN — IPRATROPIUM BROMIDE AND ALBUTEROL SULFATE 3 ML: .5; 3 SOLUTION RESPIRATORY (INHALATION) at 06:43

## 2024-06-04 RX ADMIN — ARFORMOTEROL TARTRATE 15 MCG: 15 SOLUTION RESPIRATORY (INHALATION) at 09:34

## 2024-06-04 RX ADMIN — IPRATROPIUM BROMIDE AND ALBUTEROL SULFATE 3 ML: .5; 3 SOLUTION RESPIRATORY (INHALATION) at 11:42

## 2024-06-04 RX ADMIN — METHYLPREDNISOLONE SODIUM SUCCINATE 40 MG: 40 INJECTION INTRAMUSCULAR; INTRAVENOUS at 05:52

## 2024-06-04 NOTE — PROGRESS NOTES
Respiratory Therapist Broncho-Pulmonary Hygiene Progress Note      Patient Name:  Lluvia Archer  YOB: 1965    Lluvai Archer meets the qualification for Level 2 of the Bronco-Pulmonary Hygiene Protocol. This was based on my daily patient assessment and includes review of chest x-ray results, cough ability and quality, oxygenation, secretions or risk for secretion development and patient mobility.     Broncho-Pulmonary Hygiene Assessment:    Level of Movement: Actively changing positions without assistance  Alert/ oriented/ cooperative    Breath Sounds: Diminished and/or coarse rhonchi    Cough: Strong, effective    Chest X-Ray: Mild consolidation and/or atelectasis.    Sputum Productions: None or small amount of thin or watery secretions with effective cough    History and Physical: Home use of oxygen  and Chronic condition    SpO2 to Oxygen Need: greater than 92% on 4-6L nasal canula    Current SpO2 is: 99 on 6L home o2    Based on this information, I have completed the following interventions: Teach/Instruct patient on cough and deep breathe and Aerobika with bronchodialtor medication or TID      Electronically signed by Mery Noble RRT, 06/04/24, 6:47 AM EDT.

## 2024-06-04 NOTE — OUTREACH NOTE
Prep Survey      Flowsheet Row Responses   [facility] patient discharged from? [facility]   Is LACE score < 7 ? No   Eligibility [facility]   Date of Admission 06/02/24   Date of Discharge 06/04/24   Discharge Disposition Home or Self Care   Discharge diagnosis Pneumonia  [Acute COPD exacerbation]   Does the patient have one of the following disease processes/diagnoses(primary or secondary)? Pneumonia   Does the patient have Home health ordered? No   Is there a DME ordered? No   Comments regarding appointments Ambulatory Referral to Nephrology   Medication alerts for this patient see AVS   Prep survey completed? Yes            Ирина TAPIA - Registered Nurse

## 2024-06-04 NOTE — PLAN OF CARE
Goal Outcome Evaluation:              Outcome Evaluation: pt discharging home.

## 2024-06-04 NOTE — DISCHARGE SUMMARY
[facility]         HOSPITALIST  DISCHARGE SUMMARY    Patient Name: Lluvia Archer  : 1965  MRN: [id number]    Date of Admission: 2024  Date of Discharge:  24  Primary Care Physician: Aleksandar Edge DO    Consultants:  -Pulmonology: Dr. Shin Mcdonald   -Nephrology:     Hospital Problems:  Community-acquired pneumonia due to unknown infectious organism  Acute COPD exacerbation  Chronic hypoxic respiratory failure  Hyponatremia  Essential hypertension  Anxiety/depression  Hyperlipidemia    Hospital Course     Hospital Course:  Lluvia Archer is a 58 y.o. female with COPD, chronic hypoxic respiratory failure on 6 L at baseline, anxiety depression, hyperlipidemia, essential hypertension that presented to the ED with complaints of shortness of breath, fever and cough.  CXR showed bibasilar airspace opacity.  Labs with leukocytosis.  Treatment for community-acquired pneumonia and COPD exacerbation initiated.  Pulmonology consulted.  Due to hyponatremia nephrology consulted.  Patient's home antidepressant medications held secondary to hyponatremia.  Sodium level did improve during hospitalization.  CT scan did show nodularity in right middle lobe concerning for malignancy along with a very enlarged right paratracheal lymph node.  Patient's respiratory status stabilized and patient requesting discharge home.  Patient was discharged home and to complete steroid therapy and antibiotic therapy with plan for patient to return on 2024 for outpatient bronchoscopy with pulmonology.  At discharge patient was not continued on home escitalopram or Trintellix given hyponatremia.  Hyponatremia can be monitored as an outpatient and most medications resumed accordingly.  On day of discharge patient hemodynamically stable and no additional inpatient evaluation or admission at this time, patient discharged home with outpatient follow-up.    DISCHARGE Follow Up Recommendations for  labs and diagnostics:   -Follow-up with PCP in 3 to 5 days  -Follow-up with nephrology in 2 weeks  -Returned on 06/07/2024 for outpatient bronchoscopy    Day of Discharge     Vital Signs:  Temp:  [97.3 °F (36.3 °C)-98.4 °F (36.9 °C)] 98 °F (36.7 °C)  Heart Rate:  [76-97] 80  Resp:  [18-20] 20  BP: (124-149)/(55-76) 149/74  Flow (L/min):  [5-6] 6  Physical Exam:   Gen: No acute distress, lying bed, pleasant  Resp: Slightly improved aeration, equal chest rise bilaterally  Card: RRR, No m/r/g  Abd: Soft, Nontender, Nondistended, + bowel sounds     Discharge Details        Discharge Medications        New Medications        Instructions Start Date   cefdinir 300 MG capsule  Commonly known as: OMNICEF   300 mg, Oral, 2 Times Daily      predniSONE 20 MG tablet  Commonly known as: DELTASONE   40 mg, Oral, Daily             Changes to Medications        Instructions Start Date   atorvastatin 10 MG tablet  Commonly known as: LIPITOR  What changed: See the new instructions.   TAKE ONE TABLET BY MOUTH DAILY AT 9 PM EVERY NIGHT      buPROPion  MG 12 hr tablet  Commonly known as: WELLBUTRIN SR  What changed: See the new instructions.   TAKE ONE TABLET BY MOUTH TWICE DAILY @ 9AM & 5PM      famotidine 40 MG tablet  Commonly known as: PEPCID  What changed: See the new instructions.   TAKE ONE TABLET BY MOUTH TWICE DAILY @ 9AM & 5PM      lisinopril 5 MG tablet  Commonly known as: PRINIVIL,ZESTRIL  What changed: See the new instructions.   TAKE ONE TABLET BY MOUTH DAILY AT 9 AM      Trelegy Ellipta 100-62.5-25 MCG/ACT inhaler  Generic drug: Fluticasone-Umeclidin-Vilant  What changed: See the new instructions.   INHALE 1 PUFF BY MOUTH EVERY DAY (BULK)             Continue These Medications        Instructions Start Date   albuterol sulfate  (90 Base) MCG/ACT inhaler  Commonly known as: PROVENTIL HFA;VENTOLIN HFA;PROAIR HFA   2 puffs, Every 6 Hours PRN      albuterol 0.63 MG/3ML nebulizer solution  Commonly known as:  ACCUNEB   0.63 mg, Nebulization, Every 6 Hours PRN      Cariprazine HCl 3 MG capsule capsule  Commonly known as: Vraylar   3 mg, Oral, Daily      Vraylar 1.5 MG capsule capsule  Generic drug: Cariprazine HCl   1.5 mg, Oral, Daily      ipratropium-albuterol 0.5-2.5 mg/3 ml nebulizer  Commonly known as: DUO-NEB   3 mL, Nebulization, 4 Times Daily - RT      omeprazole 40 MG capsule  Commonly known as: priLOSEC   40 mg, Oral, Daily             Stop These Medications      escitalopram 20 MG tablet  Commonly known as: LEXAPRO     Vortioxetine HBr 10 MG tablet tablet  Commonly known as: Trintellix              No Known Allergies    Discharge Disposition:  Home or Self Care    Diet:  Hospital:  Diet Order   Procedures   • Diet: Regular/House, Fluid Restriction (240 mL/tray); Other (Specify mL/day) (1200); Fluid Consistency: Thin (IDDSI 0)       Discharge Activity:   Activity Instructions       Activity as Tolerated              CODE STATUS:  Code Status and Medical Interventions:   Ordered at: 06/02/24 1823     Code Status (Patient has no pulse and is not breathing):    CPR (Attempt to Resuscitate)     Medical Interventions (Patient has pulse or is breathing):    Full Support       Future Appointments   Date Time Provider Department Center   12/23/2024  9:00 AM Aleksandar Edge DO [facility]       Additional Instructions for the Follow-ups that You Need to Schedule       Discharge Follow-up with PCP   As directed       Currently Documented PCP:    Aleksandar Edge DO    PCP Phone Number:    [phone]     Follow Up Details: Follow-up in 3-5 days        Discharge Follow-up with Specified Provider: Dr. Shin Mcdonald   As directed      To: Dr. Shin Mcdonald   Follow Up Details: Patient to be set up for outpatient bronchoscopy on 06/07/2024.                Pertinent  and/or Most Recent Results     RADIOLOGY:  CT Chest Without Contrast Diagnostic [[id number]] Cristino as Reviewed   Order Status: Completed  Collected: 06/03/24 1137    Updated: 06/03/24 1158   Narrative:     CT CHEST WO CONTRAST DIAGNOSTIC-     Date of Exam: 6/3/2024 11:26 AM     Indication: Shortness of breath; PNA; J18.9-Pneumonia, unspecified  organism; E87.4-Qued-clbcodgllk and hyponatremia; R05.2-Subacute cough;  J44.1-Chronic obstructive pulmonary disease with (acute) exacerbation.     Comparison Exams: Chest radiograph from June 2, 2024 and CT chest from  February 20, 2024     Technique: CT scan of the chest without IV contrast. Automated exposure  control and iterative reconstruction methods were used.        Findings:  The central tracheobronchial tree is clear. There is moderate emphysema.  Several pulmonary nodules within the right middle lobe have progressed  from prior CT with multiple new nodules, with a 1.5 x 1.4 cm right  middle lobe nodule on image 83 that appears similar to prior exam, but  with an index new 9 x 9 mm right middle lobe nodule on image 92, with  adjacent right middle lobe atelectasis. There is no pleural effusion.     The heart size appears normal, with evidence of calcified coronary  artery disease. The great vessels are normal in caliber. Mediastinal and  right hilar lymphadenopathy has progressed, with an index precarinal  lymph node measuring 1.8 cm in short axis on image 54, previously 1.5  cm. An index right hilar lymph node on image 80 measures 1.7 cm,  previously 1.2 cm.     Partial evaluation of the upper abdomen demonstrates changes of  cholecystectomy.     No aggressive osseous lesions are identified.      Impression:     Impression:  1.  Right middle lobe nodules and mediastinal and right hilar  lymphadenopathy has progressed from prior CT, and remains concerning for  malignancy.  2.  Partial right middle lobe atelectasis.  3.  Moderate emphysema.              Electronically Signed By-Isma Roche MD On:6/3/2024 11:56 AM       XR Chest 1 View [[id number]] Cristino as Reviewed   Order Status: Completed  Collected: 06/02/24 1621    Updated: 06/02/24 1625   Narrative:     XR CHEST 1 VW-     Date of Exam: 6/2/2024 3:27 PM     Indication: SOA Triage Protocol     Comparison: Chest AP dated 2/20/2024     Findings:  There is mild to moderate bibasilar airspace opacity. This appears more  prominent in the interval. The cardiac mediastinal silhouettes appear  normal. A small right pleural effusion is not excluded.      Impression:     Impression:  1.  Increasing bibasilar airspace opacity. Finding is suspected to  represent superimposed pneumonia and scarring/atelectasis.        Electronically Signed By-Quinton Bennett MD On:6/2/2024 4:23 PM     LAB RESULTS:      Lab 06/04/24  0511 06/03/24  0458 06/02/24  1829 06/02/24  1516   WBC 13.74* 13.27*  --  12.92*   HEMOGLOBIN 10.8* 11.5*  --  12.0   HEMATOCRIT 32.5* 33.8*  --  36.4   PLATELETS 227 191  --  175   NEUTROS ABS  --  12.03*  --  10.20*   IMMATURE GRANS (ABS)  --  0.06*  --  0.06*   LYMPHS ABS  --  0.82  --  1.40   MONOS ABS  --  0.34  --  1.14*   EOS ABS  --  0.00  --  0.09   MCV 88.8 87.1  --  90.5   LACTATE  --   --  0.9  --          Lab 06/04/24  0511 06/03/24  0458 06/02/24  1516   SODIUM 133* 124* 126*   POTASSIUM 4.8 4.8 4.3   CHLORIDE 96* 90* 88*   CO2 25.8 24.1 24.4   ANION GAP 11.2 9.9 13.6   BUN 18 9 7   CREATININE 0.72 0.49* 0.59   EGFR 97.7 110.1 105.3   GLUCOSE 159* 143* 106*   CALCIUM 9.2 8.6 8.8   MAGNESIUM 2.0  --   --    PHOSPHORUS 2.9  --   --    TSH 0.191*  --   --          Lab 06/03/24  0458 06/02/24  1516   TOTAL PROTEIN 6.4 6.2   ALBUMIN 3.7 4.0   GLOBULIN 2.7 2.2   ALT (SGPT) 7 8   AST (SGOT) 8 12   BILIRUBIN 0.2 0.4   ALK PHOS 98 109         Lab 06/02/24  1516   PROBNP 496.9   HSTROP T 11                 Brief Urine Lab Results  (Last result in the past 365 days)        Color   Clarity   Blood   Leuk Est   Nitrite   Protein   CREAT   Urine HCG        06/03/24 1808             150.6               Microbiology Results (last 10 days)       Procedure  Component Value - Date/Time    S. Pneumo Ag Urine or CSF - Urine, Urine, Clean Catch [[id number]]  (Normal) Collected: 06/03/24 1306    Lab Status: Final result Specimen: Urine, Clean Catch Updated: 06/03/24 1407     Strep Pneumo Ag Negative    Legionella Antigen, Urine - Urine, Urine, Clean Catch [[id number]]  (Normal) Collected: 06/03/24 1306    Lab Status: Final result Specimen: Urine, Clean Catch Updated: 06/03/24 1406     LEGIONELLA ANTIGEN, URINE Negative    Respiratory Panel PCR w/COVID-19(SARS-CoV-2) ESTEE/HESHAM/INGRID/PAD/COR/TITO In-House, NP Swab in UTM/VTM, 2 HR TAT - Swab, Nasopharynx [[id number]]  (Normal) Collected: 06/03/24 1255    Lab Status: Final result Specimen: Swab from Nasopharynx Updated: 06/03/24 1440     ADENOVIRUS, PCR Not Detected     Coronavirus 229E Not Detected     Coronavirus HKU1 Not Detected     Coronavirus NL63 Not Detected     Coronavirus OC43 Not Detected     COVID19 Not Detected     Human Metapneumovirus Not Detected     Human Rhinovirus/Enterovirus Not Detected     Influenza A PCR Not Detected     Influenza B PCR Not Detected     Parainfluenza Virus 1 Not Detected     Parainfluenza Virus 2 Not Detected     Parainfluenza Virus 3 Not Detected     Parainfluenza Virus 4 Not Detected     RSV, PCR Not Detected     Bordetella pertussis pcr Not Detected     Bordetella parapertussis PCR Not Detected     Chlamydophila pneumoniae PCR Not Detected     Mycoplasma pneumo by PCR Not Detected    Narrative:      In the setting of a positive respiratory panel with a viral infection PLUS a negative procalcitonin without other underlying concern for bacterial infection, consider observing off antibiotics or discontinuation of antibiotics and continue supportive care. If the respiratory panel is positive for atypical bacterial infection (Bordetella pertussis, Chlamydophila pneumoniae, or Mycoplasma pneumoniae), consider antibiotic de-escalation to target atypical bacterial infection.    Respiratory Culture -  Sputum, Cough [[id number]] Collected: 06/03/24 1139    Lab Status: Preliminary result Specimen: Sputum from Cough Updated: 06/04/24 0916     Respiratory Culture Scant growth (1+) The culture consists of normal respiratory sabina. This is a preliminary report; final report to follow.     Gram Stain Moderate (3+) WBCs seen      Moderate (3+) Mucous strands      Rare (1+) Epithelial cells per low power field      Occasional Gram positive cocci in pairs      Rare (1+) Gram negative bacilli    Blood Culture - Blood, Arm, Right [[id number]]  (Normal) Collected: 06/02/24 1830    Lab Status: Preliminary result Specimen: Blood from Arm, Right Updated: 06/03/24 1845     Blood Culture No growth at 24 hours    Blood Culture - Blood, Arm, Left [[id number]]  (Normal) Collected: 06/02/24 1829    Lab Status: Preliminary result Specimen: Blood from Arm, Left Updated: 06/03/24 1845     Blood Culture No growth at 24 hours    COVID-19, FLU A/B, RSV PCR 1 HR TAT - Swab, Nasopharynx [[id number]]  (Normal) Collected: 06/02/24 1632    Lab Status: Final result Specimen: Swab from Nasopharynx Updated: 06/02/24 1731     COVID19 Not Detected     Influenza A PCR Not Detected     Influenza B PCR Not Detected     RSV, PCR Not Detected    Narrative:      Fact sheet for providers: https://www.fda.gov/media/843045/download    Fact sheet for patients: https://www.fda.gov/media/030650/download    Test performed by PCR.            Results for orders placed during the hospital encounter of 04/04/22    Adult Transthoracic Echo Complete W/ Cont if Necessary Per Protocol    Interpretation Summary  · The left ventricular cavity is borderline dilated.  · Calculated left ventricular EF = 58% Estimated left ventricular EF was in agreement with the calculated left ventricular EF.  · Left ventricular diastolic function is consistent with (grade I) impaired relaxation.  · Aortic valve sclerosis without obstruction to flow.  · Mild aortic valve regurgitation.  · Mild  mitral valve regurgitation.      Labs Pending at Discharge:  Pending Labs       Order Current Status    Blood Culture - Blood, Arm, Left Preliminary result    Blood Culture - Blood, Arm, Right Preliminary result    Respiratory Culture - Sputum, Cough Preliminary result            Time spent on Discharge including face to face service:  32 minutes    Electronically signed by Yandel Palacios MD, 06/04/24, 11:49 AM EDT.

## 2024-06-05 ENCOUNTER — TRANSITIONAL CARE MANAGEMENT TELEPHONE ENCOUNTER (OUTPATIENT)
Dept: CALL CENTER | Facility: HOSPITAL | Age: 59
End: 2024-06-05
Payer: MEDICARE

## 2024-06-05 ENCOUNTER — TELEPHONE (OUTPATIENT)
Dept: PULMONOLOGY | Facility: CLINIC | Age: 59
End: 2024-06-05
Payer: MEDICARE

## 2024-06-05 PROBLEM — R59.0 MEDIASTINAL ADENOPATHY: Status: ACTIVE | Noted: 2024-06-02

## 2024-06-05 LAB
BACTERIA SPEC RESP CULT: NORMAL
GRAM STN SPEC: NORMAL

## 2024-06-05 NOTE — TELEPHONE ENCOUNTER
Spoke with Beatriz in endo to get patient scheduled for 6/7 arrive at 10:30am. Notified patient of date/time and instructions.

## 2024-06-05 NOTE — PROGRESS NOTES
Reason for consultation abnormal CT scan    Pleasant female  Doing well  Sodium has improved            Vitals:    06/04/24 0934 06/04/24 0938 06/04/24 1130 06/04/24 1142   BP:   149/74    BP Location:   Right arm    Patient Position:   Lying    Pulse: 79 78 78 80   Resp: 18  18 20   Temp:   98 °F (36.7 °C)    TempSrc:   Oral    SpO2: 98%  100%    Weight:       Height:            Physical Exam:  Vital Signs Reviewed   WDWN, Alert, NAD.    HEENT:  PERRL, EOMI.  OP, nares clear, no sinus tenderness  Neck:  Supple, no JVD, no thyromegaly  Lymph: no axillary, cervical, supraclavicular lymphadenopathy noted bilaterally  Chest: Significant bilateral wheezing heard throughout all lung fields  CV: RRR, no MGR, pulses 2+, equal.  Abd:  Soft, NT, ND, + BS, no HSM  EXT:  no clubbing, no cyanosis, no edema, no joint tenderness  Neuro:  A&Ox3, CN grossly intact, no focal deficits.  Skin: No rashes or lesions noted    Assessment & Plan  Assessment / Plan      Active Hospital Problems:       Active Hospital Problems     Diagnosis      **Pneumonia     Right paratracheal adenopathy  Hyponatremia    Plan  Ok to DC home today  Will proceed to discharge today  We will plan on doing bronchoscopy with EBUS  Risks versus benefits of bronchoscopy explained to the patient including death, respiratory failure requiring intubation mechanical ventilation, pneumothorax requiring chest tube placement, bleeding.  Patient voices understanding of the risks of the procedure and wishes to proceed.    Electronically signed by Shin Mcdonald DO, 06/05/24, 8:58 AM EDT.'

## 2024-06-05 NOTE — OUTREACH NOTE
Call Center TCM Note      Flowsheet Row Responses   [facility] patient discharged from? [facility]   Does the patient have one of the following disease processes/diagnoses(primary or secondary)? Pneumonia   TCM attempt successful? Yes  [VR for Joselin and Ahmet Archer]   Call start time 1200   Unsuccessful attempts Attempt 1   Call end time 1206   Discharge diagnosis Pneumonia   Meds reviewed with patient/caregiver? Yes   Is the patient having any side effects they believe may be caused by any medication additions or changes? No   Does the patient have all medications ordered at discharge? Yes   Prescription comments Reminded pt to discontinue lexapro and trintellix   Is the patient taking all medications as directed (includes completed medication regime)? Yes   Medication comments Encouraged to complete ATBs and steroids until completed,  pt reports that she needs refills on neb solution   Comments Hospital f/u on 6/10/24@1115am, Reports she will have a bronch with bx on 6/7/24   Does the patient have an appointment with their PCP within 7-14 days of discharge? Yes   Has home health visited the patient within 72 hours of discharge? N/A   DME comments Pt uses O2 at 6 lpm per nc at baseline--pt reports that she needs f/u regarding orders for POC---reports PCP office sent in incorrect paperwork   Pulse Ox monitoring None   Psychosocial issues? No   Did the patient receive a copy of their discharge instructions? Yes   Nursing interventions Reviewed instructions with patient   What is the patient's perception of their health status since discharge? Same  [Pt using O2 as ordered and still some SOA, has flutter valve for use.  Needs a refill on her neb soln.  Pt aware to seek care for worsening resp s/s.  Pt to have bronch/bx on Friday.]   Nursing Interventions Nurse provided patient education   Is the patient/caregiver able to teach back the hierarchy of who to call/visit for symptoms/problems? PCP, Specialist, Home  health nurse, Urgent Care, ED, 911 Yes   Is the patient/caregiver able to teach back signs and symptoms of worsening condition: Fever/chills, Shortness of breath, Chest pain   Is the patient/caregiver able to teach back importance of completing antibiotic course of treatment? Yes   [facility] call completed? Yes   Call end time 1206            Kathleen Saldana RN    6/5/2024, 12:10 EDT

## 2024-06-06 ENCOUNTER — ANESTHESIA EVENT (OUTPATIENT)
Dept: GASTROENTEROLOGY | Facility: HOSPITAL | Age: 59
End: 2024-06-06
Payer: MEDICARE

## 2024-06-06 RX ORDER — ALBUTEROL SULFATE 0.63 MG/3ML
1 SOLUTION RESPIRATORY (INHALATION) EVERY 6 HOURS PRN
Qty: 75 EACH | Refills: 5 | Status: SHIPPED | OUTPATIENT
Start: 2024-06-06

## 2024-06-06 NOTE — ANESTHESIA PREPROCEDURE EVALUATION
Anesthesia Evaluation     Patient summary reviewed and Nursing notes reviewed   no history of anesthetic complications:   NPO Solid Status: > 8 hours  NPO Liquid Status: > 2 hours           Airway   Mallampati: II  TM distance: >3 FB  No difficulty expected  Dental          Pulmonary    (+) pneumonia resolved , a smoker vape, COPD,home oxygen (6lpm when active), sleep apnea, decreased breath sounds  Cardiovascular - normal exam  Exercise tolerance: poor (<4 METS)    ECG reviewed  Rhythm: regular  Rate: normal    (+) hypertension, dysrhythmias Tachycardia, GOFF, hyperlipidemia    ROS comment: Echo:  Interpretation Summary    · The left ventricular cavity is borderline dilated.  · Calculated left ventricular EF = 58% Estimated left ventricular EF was in agreement with the calculated left ventricular EF.  · Left ventricular diastolic function is consistent with (grade I) impaired relaxation.  · Aortic valve sclerosis without obstruction to flow.  · Mild aortic valve regurgitation.  · Mild mitral valve regurgitation.    Neuro/Psych  (+) headaches, numbness, psychiatric history  GI/Hepatic/Renal/Endo    (+) obesity, hiatal hernia, GERD, diabetes mellitus    Musculoskeletal     (+) back pain, radiculopathy Right lower extremity  Abdominal  - normal exam   Substance History - negative use     OB/GYN negative ob/gyn ROS         Other   arthritis,     ROS/Med Hx Other:                     Anesthesia Plan    ASA 3     general   total IV anesthesia    Anesthetic plan, risks, benefits, and alternatives have been provided, discussed and informed consent has been obtained with: patient.    Plan discussed with CRNA.      CODE STATUS:

## 2024-06-07 ENCOUNTER — HOSPITAL ENCOUNTER (OUTPATIENT)
Facility: HOSPITAL | Age: 59
Setting detail: HOSPITAL OUTPATIENT SURGERY
Discharge: HOME | End: 2024-06-07
Attending: INTERNAL MEDICINE | Admitting: INTERNAL MEDICINE
Payer: MEDICARE

## 2024-06-07 ENCOUNTER — ANESTHESIA (OUTPATIENT)
Dept: GASTROENTEROLOGY | Facility: HOSPITAL | Age: 59
End: 2024-06-07
Payer: MEDICARE

## 2024-06-07 VITALS
SYSTOLIC BLOOD PRESSURE: 109 MMHG | OXYGEN SATURATION: 97 % | RESPIRATION RATE: 23 BRPM | TEMPERATURE: 97.8 F | WEIGHT: 232.37 LBS | DIASTOLIC BLOOD PRESSURE: 83 MMHG | BODY MASS INDEX: 39.87 KG/M2 | HEART RATE: 100 BPM

## 2024-06-07 DIAGNOSIS — R59.0 MEDIASTINAL ADENOPATHY: ICD-10-CM

## 2024-06-07 LAB
BACTERIA SPEC AEROBE CULT: NORMAL
BACTERIA SPEC AEROBE CULT: NORMAL
QT INTERVAL: 346 MS
QTC INTERVAL: 442 MS

## 2024-06-07 PROCEDURE — 88173 CYTOPATH EVAL FNA REPORT: CPT | Performed by: INTERNAL MEDICINE

## 2024-06-07 PROCEDURE — 25810000003 LACTATED RINGERS PER 1000 ML: Performed by: NURSE ANESTHETIST, CERTIFIED REGISTERED

## 2024-06-07 PROCEDURE — 25010000002 FENTANYL CITRATE (PF) 50 MCG/ML SOLUTION: Performed by: NURSE ANESTHETIST, CERTIFIED REGISTERED

## 2024-06-07 PROCEDURE — 88342 IMHCHEM/IMCYTCHM 1ST ANTB: CPT | Performed by: INTERNAL MEDICINE

## 2024-06-07 PROCEDURE — 25010000002 DEXAMETHASONE PER 1 MG: Performed by: NURSE ANESTHETIST, CERTIFIED REGISTERED

## 2024-06-07 PROCEDURE — 25010000002 PROPOFOL 10 MG/ML EMULSION: Performed by: NURSE ANESTHETIST, CERTIFIED REGISTERED

## 2024-06-07 PROCEDURE — 88305 TISSUE EXAM BY PATHOLOGIST: CPT | Performed by: INTERNAL MEDICINE

## 2024-06-07 PROCEDURE — C1726 CATH, BAL DIL, NON-VASCULAR: HCPCS | Performed by: INTERNAL MEDICINE

## 2024-06-07 PROCEDURE — 31653 BRONCH EBUS SAMPLNG 3/> NODE: CPT | Performed by: INTERNAL MEDICINE

## 2024-06-07 PROCEDURE — 88341 IMHCHEM/IMCYTCHM EA ADD ANTB: CPT | Performed by: INTERNAL MEDICINE

## 2024-06-07 PROCEDURE — 25010000002 ONDANSETRON PER 1 MG: Performed by: NURSE ANESTHETIST, CERTIFIED REGISTERED

## 2024-06-07 RX ORDER — ONDANSETRON 2 MG/ML
INJECTION INTRAMUSCULAR; INTRAVENOUS AS NEEDED
Status: DISCONTINUED | OUTPATIENT
Start: 2024-06-07 | End: 2024-06-07 | Stop reason: SURG

## 2024-06-07 RX ORDER — LIDOCAINE HYDROCHLORIDE 40 MG/ML
INJECTION, SOLUTION RETROBULBAR AS NEEDED
Status: DISCONTINUED | OUTPATIENT
Start: 2024-06-07 | End: 2024-06-07 | Stop reason: HOSPADM

## 2024-06-07 RX ORDER — EPHEDRINE SULFATE 50 MG/ML
INJECTION INTRAVENOUS AS NEEDED
Status: DISCONTINUED | OUTPATIENT
Start: 2024-06-07 | End: 2024-06-07 | Stop reason: SURG

## 2024-06-07 RX ORDER — PROPOFOL 10 MG/ML
VIAL (ML) INTRAVENOUS AS NEEDED
Status: DISCONTINUED | OUTPATIENT
Start: 2024-06-07 | End: 2024-06-07 | Stop reason: SURG

## 2024-06-07 RX ORDER — DEXAMETHASONE SODIUM PHOSPHATE 4 MG/ML
INJECTION, SOLUTION INTRA-ARTICULAR; INTRALESIONAL; INTRAMUSCULAR; INTRAVENOUS; SOFT TISSUE AS NEEDED
Status: DISCONTINUED | OUTPATIENT
Start: 2024-06-07 | End: 2024-06-07 | Stop reason: SURG

## 2024-06-07 RX ORDER — PHENYLEPHRINE HCL IN 0.9% NACL 1 MG/10 ML
SYRINGE (ML) INTRAVENOUS AS NEEDED
Status: DISCONTINUED | OUTPATIENT
Start: 2024-06-07 | End: 2024-06-07 | Stop reason: SURG

## 2024-06-07 RX ORDER — SODIUM CHLORIDE, SODIUM LACTATE, POTASSIUM CHLORIDE, CALCIUM CHLORIDE 600; 310; 30; 20 MG/100ML; MG/100ML; MG/100ML; MG/100ML
30 INJECTION, SOLUTION INTRAVENOUS CONTINUOUS
Status: DISCONTINUED | OUTPATIENT
Start: 2024-06-07 | End: 2024-06-07 | Stop reason: HOSPADM

## 2024-06-07 RX ORDER — FENTANYL CITRATE 50 UG/ML
INJECTION, SOLUTION INTRAMUSCULAR; INTRAVENOUS AS NEEDED
Status: DISCONTINUED | OUTPATIENT
Start: 2024-06-07 | End: 2024-06-07 | Stop reason: SURG

## 2024-06-07 RX ORDER — LIDOCAINE HYDROCHLORIDE 20 MG/ML
INJECTION, SOLUTION EPIDURAL; INFILTRATION; INTRACAUDAL; PERINEURAL AS NEEDED
Status: DISCONTINUED | OUTPATIENT
Start: 2024-06-07 | End: 2024-06-07 | Stop reason: SURG

## 2024-06-07 RX ADMIN — Medication 100 MCG: at 11:42

## 2024-06-07 RX ADMIN — Medication 100 MCG: at 11:44

## 2024-06-07 RX ADMIN — PROPOFOL 120 MG: 10 INJECTION, EMULSION INTRAVENOUS at 11:20

## 2024-06-07 RX ADMIN — EPHEDRINE SULFATE 10 MG: 50 INJECTION INTRAVENOUS at 11:42

## 2024-06-07 RX ADMIN — LIDOCAINE HYDROCHLORIDE 60 MG: 20 INJECTION, SOLUTION INTRAVENOUS at 11:20

## 2024-06-07 RX ADMIN — EPHEDRINE SULFATE 10 MG: 50 INJECTION INTRAVENOUS at 11:44

## 2024-06-07 RX ADMIN — EPHEDRINE SULFATE 10 MG: 50 INJECTION INTRAVENOUS at 11:47

## 2024-06-07 RX ADMIN — SODIUM CHLORIDE, POTASSIUM CHLORIDE, SODIUM LACTATE AND CALCIUM CHLORIDE: 600; 310; 30; 20 INJECTION, SOLUTION INTRAVENOUS at 11:17

## 2024-06-07 RX ADMIN — EPHEDRINE SULFATE 10 MG: 50 INJECTION INTRAVENOUS at 11:58

## 2024-06-07 RX ADMIN — EPHEDRINE SULFATE 10 MG: 50 INJECTION INTRAVENOUS at 11:57

## 2024-06-07 RX ADMIN — ONDANSETRON HYDROCHLORIDE 4 MG: 2 SOLUTION INTRAMUSCULAR; INTRAVENOUS at 11:35

## 2024-06-07 RX ADMIN — Medication 100 MCG: at 11:58

## 2024-06-07 RX ADMIN — PROPOFOL 150 MCG/KG/MIN: 10 INJECTION, EMULSION INTRAVENOUS at 11:23

## 2024-06-07 RX ADMIN — FENTANYL CITRATE 50 MCG: 50 INJECTION, SOLUTION INTRAMUSCULAR; INTRAVENOUS at 11:20

## 2024-06-07 RX ADMIN — Medication 100 MCG: at 11:46

## 2024-06-07 RX ADMIN — Medication 100 MCG: at 11:40

## 2024-06-07 RX ADMIN — Medication 100 MCG: at 11:57

## 2024-06-07 RX ADMIN — Medication 100 MCG: at 11:48

## 2024-06-07 RX ADMIN — EPHEDRINE SULFATE 10 MG: 50 INJECTION INTRAVENOUS at 11:40

## 2024-06-07 RX ADMIN — DEXAMETHASONE SODIUM PHOSPHATE 4 MG: 4 INJECTION, SOLUTION INTRAMUSCULAR; INTRAVENOUS at 11:25

## 2024-06-07 NOTE — ANESTHESIA POSTPROCEDURE EVALUATION
Patient: Lluvia Archer    Procedure Summary       Date: 06/07/24 Room / Location: [facility] ENDOSCOPY 3 / [facility] ENDOSCOPY    Anesthesia Start: 1117 Anesthesia Stop: 1212    Procedure: BRONCHOSCOPY WITH ENDOBRONCHIAL ULTRASOUND AND FINE NEEDLE ASPIRATE (Bronchus) Diagnosis:       Mediastinal adenopathy      (Mediastinal adenopathy [R59.0])    Surgeons: Shin Mcdonald DO Provider: Mary Armstrong CRNA    Anesthesia Type: general ASA Status: 3            Anesthesia Type: general    Vitals  Vitals Value Taken Time   /83 06/07/24 1229   Temp 36.6 °C (97.8 °F) 06/07/24 1229   Pulse 100 06/07/24 1231   Resp 23 06/07/24 1229   SpO2 93 % 06/07/24 1231   Vitals shown include unfiled device data.        Post Anesthesia Care and Evaluation    Patient location during evaluation: bedside  Patient participation: complete - patient participated  Level of consciousness: awake  Pain management: adequate    Airway patency: patent  Anesthetic complications: No anesthetic complications  PONV Status: controlled  Cardiovascular status: acceptable and stable  Respiratory status: acceptable

## 2024-06-07 NOTE — INTERVAL H&P NOTE
No Known Allergies   H&P reviewed. The patient was examined and there are no changes to the H&P.

## 2024-06-07 NOTE — OP NOTE
Procedure name: Bronchoscopy with endobronchial ultrasound guided transbronchial fine needle aspiration of the stations station 4R, station 7, station 10 R     Indication: Enlarging mediastinal lymph nodes     Risks versus benefits of bronchoscopy explained to the patient including death, respiratory failure requiring intubation mechanical ventilation, pneumothorax requiring chest tube placement, bleeding.  Patient voices understanding of the risk of the procedure and wishes to proceed.     Sedation-IV MAC anesthesia per the anesthesia service     Procedure details:  Patient was brought back to the endoscopy suite, was sedated with IV MAC anesthesia, a bite block was placed on oral cavity and endobronchial ultrasound scope was then used to intubate the trachea.  A survey was done of the mediastinal hilar lymph node stations, and then the endobronchial ultrasound scope was used to take in a bronchial ultrasound guided transbronchial needle aspirations of the following lymph node sites,4R, stations 7,10R.  Next the endobronchial ultrasound scope was removed.  The therapeutic scope was then inserted into the oral cavity and the biopsy sites were examined.  There was no evidence of any active bleeding from any biopsy sites.  There was no endobronchial lesion seen to the segmental level of the right tracheobronchial tree and on the left tracheobronchial tree.       Postoperative diagnosis: Enlarging mediastinal lymph nodes     Estimated blood loss: Less than 5 mL     Patient tolerated procedure well     No immediate complications        Plan:  Patient is a followup results of Biopsy

## 2024-06-11 LAB
CYTO UR: NORMAL
LAB AP CASE REPORT: NORMAL
LAB AP CLINICAL INFORMATION: NORMAL
LAB AP SPECIAL STAINS: NORMAL
PATH REPORT.FINAL DX SPEC: NORMAL
PATH REPORT.GROSS SPEC: NORMAL

## 2024-06-11 NOTE — PROGRESS NOTES
Primary Care Provider  Aleksandar Edge DO     Referring Provider  No ref. provider found     Chief Complaint  COPD, Shortness of Breath, Cough, Wheezing, and Follow-up (Pt here for follow up/Bronch results)    Subjective          History of Presenting Illness  Patient is a 58-year-old female, patient of Dr. Pham who was recently hospitalized at [facility] from 6/2/2024 to 6/4/2024 for community acquired pneumonia, COPD exacerbation, chronic hypoxic respiratory failure who presents for a follow-up visit today.  Patient's daughter is present with the patient in the office today.  Per discharge summary report, patient does have COPD, chronic hypoxic respiratory failure on 6 L of oxygen at baseline, anxiety, depression, hyperlipidemia, and essential hypertension who presented to the hospital with complaints of shortness of breath, fever, and cough.  Patient had a chest x-ray completed which showed bibasilar airspace opacity.  Labs showed leukocytosis.  Treatment for community acquired pneumonia and a COPD exacerbation were initiated.  Pulmonologist consulted.  Due to the hyponatremia nephrology was consulted.  Patient's home antidepressant medications held secondary to hyponatremia.  Sodium level did improve during hospitalization.  Patient had chest CT scan completed which showed nodularity in the right middle lobe concerning for malignancy along with a very enlarged right paratracheal lymph node.  Patient's respiratory status stabilizing patient requested discharge home.  Patient was discharged home to complete steroid therapy and antibiotic therapy with plan for patient to return on 6/7/2024 for an outpatient bronchoscopy with pulmonology per discharge summary report.  Patient did have an EBUS/bronchoscopy procedure performed on 6/7/2024.  Cytology came back showing metastatic small cell carcinoma.  Patient states that since hospital stay she is feeling better.  Patient states she is taking  Trelegy Ellipta inhaler every day as prescribed and uses albuterol inhaler and DuoNeb nebulizer treatments as needed.  Patient is on 6 L of oxygen per minute via nasal cannula.  Patient is a current cigarette smoker.  Patient would like to have some nicotine patches to help her to quit.  Patient reports that her maternal grandmother had breast cancer maternal uncle had lung cancer.  Patient denies fever, chills, night sweats, swollen glands in the head and neck, unintentional weight loss, hemoptysis, purulent sputum production, dysphagia, chest pain, palpitations, chest tightness, abdominal pain, nausea, vomiting, and diarrhea.  Patient also denies any myalgias, changes in sense of taste and/or smell, sore throat, any other coronavirus or flu-like symptoms.  Patient denies any leg swelling, orthopnea, paroxysmal nocturnal dyspnea.  Patient is able to perform activities of daily living.        Review of Systems     Family History   Problem Relation Age of Onset    Other Mother         BLOOD DISEASE    Arthritis Mother     Heart disease Father     Hypertension Other     Cancer Other     Heart disease Other     Malig Hyperthermia Neg Hx         Social History     Socioeconomic History    Marital status:      Spouse name: DEVAN    Number of children: 2    Years of education: GED    Highest education level: GED or equivalent   Tobacco Use    Smoking status: Former     Current packs/day: 2.00     Average packs/day: 2.0 packs/day for 42.4 years (84.9 ttl pk-yrs)     Types: Cigarettes     Start date: 1982     Passive exposure: Past    Smokeless tobacco: Never    Tobacco comments:     Pt stopped smoking on 04/01/2022. Pt stated at her appt today 06/12/2024. Pt states she smokes less than a 1/2 ppd and pt states she vapes.   Vaping Use    Vaping status: Every Day    Substances: Nicotine, Flavoring    Devices: Disposable   Substance and Sexual Activity    Alcohol use: Never    Drug use: Never    Sexual activity: Defer         Past Medical History:   Diagnosis Date    Abnormal mammogram     PT DENIES KNOWING OF THIS HX    Anxiety     Arthritis     R SHOULDER, R HIP, AND R LEG    Chronic nausea 01/15/2019    Hernia, hiatal     Hyperlipidemia     Hypertension     Knee pain, right 08/30/2018    Memory loss     FORGETFULNESS ? ETIOLOGY    Migraine headache     Moderate episode of recurrent major depressive disorder 05/22/2017    Night sweats     Sciatica of right side 08/18/2017    Severe episode of recurrent major depressive disorder, without psychotic features 10/22/2019    Shoulder pain, left 08/30/2018    Sleep apnea, unspecified     DOESNT USE CPAP AFTER WEIGHT LOSS        Immunization History   Administered Date(s) Administered    31-influenza Vac Quardvalent Preservativ 11/27/2016, 12/09/2022    COVID-19 (PFIZER) BIVALENT 12+YRS 12/09/2022    COVID-19 (PFIZER) Purple Cap Monovalent 08/21/2021, 09/18/2021    COVID-19 F23 (PFIZER) 12YRS+ (COMIRNATY) 12/21/2023    Fluzone (or Fluarix & Flulaval for VFC) >6mos 11/27/2016, 10/22/2019, 12/09/2022, 12/21/2023    Fluzone Quad >6mos (Multi-dose) 10/22/2019    Influenza, Unspecified 11/27/2016, 01/15/2019, 10/22/2019    Pneumococcal Conjugate 13-Valent (PCV13) 05/22/2017       No Known Allergies       Current Outpatient Medications:     albuterol sulfate  (90 Base) MCG/ACT inhaler, Inhale 2 puffs Every 4 (Four) Hours As Needed for Wheezing or Shortness of Air., Disp: 18 g, Rfl: 5    atorvastatin (LIPITOR) 10 MG tablet, TAKE ONE TABLET BY MOUTH DAILY AT 9 PM EVERY NIGHT (Patient taking differently: Take 1 tablet by mouth Every Night.), Disp: 30 tablet, Rfl: 11    buPROPion SR (WELLBUTRIN SR) 150 MG 12 hr tablet, TAKE ONE TABLET BY MOUTH TWICE DAILY @ 9AM & 5PM (Patient taking differently: Take 1 tablet by mouth 2 (Two) Times a Day.), Disp: 60 tablet, Rfl: 11    Cariprazine HCl (Vraylar) 1.5 MG capsule capsule, Take 1 capsule by mouth Daily., Disp: , Rfl:     Cariprazine HCl  (Vraylar) 3 MG capsule capsule, Take 1 capsule by mouth Daily., Disp: 30 capsule, Rfl: 5    famotidine (PEPCID) 40 MG tablet, TAKE ONE TABLET BY MOUTH TWICE DAILY @ 9AM & 5PM (Patient taking differently: Take 1 tablet by mouth 2 (Two) Times a Day.), Disp: 180 tablet, Rfl: 3    Fluticasone-Umeclidin-Vilant (Trelegy Ellipta) 100-62.5-25 MCG/ACT inhaler, Inhale 1 puff Daily for 30 days. Rinse mouth out after each use, Disp: 1 each, Rfl: 5    ipratropium-albuterol (DUO-NEB) 0.5-2.5 mg/3 ml nebulizer, Take 3 mL by nebulization 4 (Four) Times a Day for 30 days., Disp: 360 mL, Rfl: 5    lisinopril (PRINIVIL,ZESTRIL) 5 MG tablet, TAKE ONE TABLET BY MOUTH DAILY AT 9 AM (Patient taking differently: Take 1 tablet by mouth Daily.), Disp: 30 tablet, Rfl: 11    nystatin (MYCOSTATIN) 100,000 unit/mL suspension, Swish and swallow 5 mL 4 (Four) Times a Day., Disp: 60 mL, Rfl: 1    omeprazole (priLOSEC) 40 MG capsule, Take 1 capsule by mouth Daily., Disp: , Rfl:     Fluticasone-Umeclidin-Vilant (Trelegy Ellipta) 100-62.5-25 MCG/ACT inhaler, Inhale 1 puff Daily for 1 day., Disp: 2 each, Rfl: 0    nicotine (NICODERM CQ) 21 MG/24HR patch, Place 1 patch on the skin as directed by provider Daily., Disp: 30 patch, Rfl: 0     Objective     Physical Exam  Vital Signs:   WDWN, Alert, NAD.    HEENT:  PERRL, EOMI.  OP, nares clear, no sinus tenderness  Neck:  Supple, no JVD, no thyromegaly.  Lymph: no axillary, cervical, supraclavicular lymphadenopathy noted bilaterally  Chest: Mildly decreased breath sounds throughout. No wheezes, rales, or rhonchi appreciated.  Normal work of breathing noted.  Patient is able speak full sentences without difficulty.  CV: RRR, no MGR, pulses 2+, equal.  Abd:  Soft, NT, ND, + BS, no HSM  EXT:  no clubbing, no cyanosis, no edema, no joint tenderness  Neuro:  A&Ox3, CN grossly intact, no focal deficits.  Skin: No rashes or lesions noted.    /67 (BP Location: Left arm, Patient Position: Sitting, Cuff Size:  "Adult)   Pulse 94   Temp 98.4 °F (36.9 °C) (Temporal)   Resp 18   Ht 162.6 cm (64.02\")   Wt 106 kg (234 lb 11.2 oz)   SpO2 96% Comment: room air/Pt has oxygen 6 liters continuous  BMI 40.26 kg/m²         Result Review :   I have reviewed discharge summary and imaging study reports from recent hospital stay.  See scanned reports.  I also reviewed EBUS/bronchoscopy results dated from 6/7/2024.  See scanned reports.    Procedures:      CT Chest Without Contrast Diagnostic    Result Date: 6/3/2024  Impression: 1.  Right middle lobe nodules and mediastinal and right hilar lymphadenopathy has progressed from prior CT, and remains concerning for malignancy. 2.  Partial right middle lobe atelectasis. 3.  Moderate emphysema.     Electronically Signed By-Isma Roche MD On:6/3/2024 11:56 AM      XR Chest 1 View    Result Date: 6/2/2024  Impression: 1.  Increasing bibasilar airspace opacity. Finding is suspected to represent superimposed pneumonia and scarring/atelectasis.   Electronically Signed By-Quinton Bennett MD On:6/2/2024 4:23 PM      CT Chest With Contrast Diagnostic    Result Date: 2/20/2024    1. No PE or aortic dissection. 2. Multiple right middle lobe nodules are highly suspicious for malignancy.  Additionally, there is mediastinal and right hilar adenopathy now noted.  Follow-up with a PET-CT is recommended. 3. Emphysema with chronic changes in both lungs that otherwise appears stable. 4. Atherosclerotic disease and coronary artery disease.      BROOKE MOHAN MD       Electronically Signed and Approved By: BROOKE MOHAN MD on 2/20/2024 at 23:34             XR Chest 1 View    Result Date: 2/20/2024   Chronic left basilar scarring with mild scarring/atelectasis at the right base.  Otherwise, no active disease.       BROOKE MOHAN MD       Electronically Signed and Approved By: BROOKE MOHAN MD on 2/20/2024 at 21:02                 Assessment and Plan      Assessment:  1.  Metastatic small cell " carcinoma.  2.  Community acquired pneumonia with recent hospitalization.  3.  COPD with acute exacerbation with recent hospitalization.  Patient is on triple inhaler therapy.  4.  Chronic hypoxic respiratory failure.  5.  Right paratracheal adenopathy.  6.  Right middle lobe lung nodules.  7.  Emphysema.  8.  Tobacco abuse of cigarettes ongoing.          Plan:  1.  Patient had an EBUS/bronchoscopy completed on 6/7/2024.  Cytology came back showing metastatic small cell carcinoma.  2.  Will order a PET scan.  3.  Discussed with patient that we need to order an MRI of the brain with and without contrast, however patient states that she has severe claustrophobia and anxiety.  Did offer to prescribe patient Ativan for procedure, however patient states that she has tried medication with MRIs in the past and ended up having panic attacks and vomiting.  Will order a CT of the brain with and without contrast.  4.  Will refer patient to oncology and radiation oncology.  5.  Alpha-1 antitrypsin level and genotype drawn in the office today.  6.  Will order a pulmonary function test.  7.  Continue Trelegy Ellipta inhaler as prescribed and rinse mouth out after each use.  Samples of Trelegy Ellipta inhaler given to the patient in the office today.  8.  Continue albuterol inhaler and DuoNeb nebulizer treatments as needed.  9.  Continue oxygen to keep SPO2 at 89% and above.  10.  Vaccination status: patient reports they are up-to-date with flu, pneumonia, and Covid vaccines.  Patient is advised to continue to follow CDC recommendations such as social distancing wearing a mask and washing hands for at least 20 seconds.  11.  Smoking status: patient is a current cigarette smoker.   Patient reports that they have been smoking cigarettes.  Patient started smoking about 42 years ago.  Patient has a 84.00 pack-year smoking history.  Patient has never used smokeless tobacco.  I have educated patient on the risk of diseases from using  tobacco products such as cancer, COPD and heart disease. I advised patient to quit and patient is willing to quit. We have discussed the following method/s for tobacco cessation:  Education Material Counseling [place].  Nicotine patches sent to the pharmacy today.  How to take medication and possible side effects of medication discussed with the patient.  Patient verbalized understanding and compliance.  Together we have set a quit date for 2 weeks from today.  Patient will follow up in 2 months or sooner to check on their progress. I spent 4 minutes counseling the patient.  12.  Patient to call the office, 911, or go to the ER with new or worsening symptoms.  13.  Follow-up in 4 weeks, sooner if needed.          I spent 44 minutes caring for Lluvia on this date of service. This time includes time spent by me in the following activities:preparing for the visit, reviewing tests, obtaining and/or reviewing a separately obtained history, performing a medically appropriate examination and/or evaluation , counseling and educating the patient/family/caregiver, ordering medications, tests, or procedures, referring and communicating with other health care professionals , documenting information in the medical record, independently interpreting results and communicating that information with the patient/family/caregiver, and care coordination    Follow Up   Return in about 4 weeks (around 7/10/2024).  Patient was given instructions and counseling regarding her condition or for health maintenance advice. Please see specific information pulled into the AVS if appropriate.

## 2024-06-11 NOTE — PATIENT INSTRUCTIONS
Caller: TRAMAINE BIRMINGHAM    Relationship: WIFE    Best call back number: [phone]    Requested Prescriptions:   MELOXICAM 15MG     Pharmacy where request should be sent:  [facility] [id number] - [place], [place] - [address] [place] [address] AT  [address] - [phone]  - [phone] FX       Additional details provided by patient: PT IS COMPLETELY OUT OF MEDICATION    Does the patient have less than a 3 day supply:  [x] Yes  [] No    Would you like a call back once the refill request has been completed: [x] Yes [] No    If the office needs to give you a call back, can they leave a voicemail: [x] Yes [] No    Marlene Muhammad Rep   12/07/22 13:58 EST         Chronic Obstructive Pulmonary Disease Exacerbation    Chronic obstructive pulmonary disease (COPD) is a long-term (chronic) condition that affects the lungs. COPD is a general term that can be used to describe many different lung problems that cause lung inflammation and limit airflow, including chronic bronchitis and emphysema. COPD exacerbations are episodes when breathing symptoms flare up, become much worse, and require extra treatment.  COPD exacerbations are usually caused by infections. Without treatment, COPD exacerbations can be severe and even life threatening. Frequent COPD exacerbations can cause further damage to the lungs.  What are the causes?  This condition may be caused by:  Respiratory infections, including viral and bacterial infections.  Exposure to smoke.  Exposure to air pollution, chemical fumes, or dust.  Things that can cause an allergic reaction (allergens).  Not taking your usual COPD medicines as directed.  Underlying medical problems, such as congestive heart failure or infections not involving the lungs.  In many cases, the cause of this condition is not known.  What increases the risk?  The following factors may make you more likely to develop this condition:  Smoking cigarettes.  Being an older adult.  Having frequent prior COPD exacerbations.  What are the signs or symptoms?  Symptoms of this condition include:  Increased coughing.  Increased production of mucus from your lungs.  Increased wheezing and shortness of breath.  Rapid or labored breathing.  Chest tightness.  Less energy than usual.  Sleep disruption from symptoms.  Confusion  Increased sleepiness.  Often, these symptoms happen or get worse even with the use of medicines.  How is this diagnosed?  This condition is diagnosed based on:  Your medical history.  A physical exam.  You may also have tests, including:  A chest X-ray.  Blood tests.  Lung (pulmonary) function tests.  How is this treated?  Treatment for this  Wife notified refill sent in.    Feliz BONNER(R)   rx sent   condition depends on the severity and cause of the symptoms. You may need to be admitted to a hospital for treatment. Some of the treatments commonly used to treat COPD exacerbations are:  Antibiotic medicines. These may be used for severe exacerbations caused by a lung infection, such as pneumonia.  Bronchodilators. These are inhaled medicines that expand the air passages and allow increased airflow. They may make your breathing more comfortable.  Steroid medicines. These act to reduce inflammation in the airways. They may be given with an inhaler, taken by mouth, or given through an IV tube inserted into one of your veins.  Supplemental oxygen therapy.  Airway clearing techniques, such as noninvasive ventilation (NIV) and positive expiratory pressure (PEP). These provide respiratory support through a mask or other noninvasive device. An example of this would be using a continuous positive airway pressure (CPAP) machine to improve delivery of oxygen into your lungs.  Follow these instructions at home:  Medicines  Take over-the-counter and prescription medicines only as told by your health care provider.  It is important to use correct technique with inhaled medicines.  If you were prescribed an antibiotic medicine or oral steroid, take it as told by your health care provider. Do not stop taking the medicine even if you start to feel better.  Lifestyle  Do not use any products that contain nicotine or tobacco. These products include cigarettes, chewing tobacco, and vaping devices, such as e-cigarettes. If you need help quitting, ask your health care provider.  Eat a healthy diet.  Exercise regularly.  Get enough sleep. Most adults need 7 or more hours per night.  Avoid exposure to all substances that irritate the airway, especially tobacco smoke.  Regularly wash your hands with soap and water for at least 20 seconds. If soap and water are not available, use hand . This may help prevent you from getting  infections.  During flu season, avoid enclosed spaces that are crowded with people.  General instructions  Drink enough fluid to keep your urine pale yellow, unless you have a medical condition that requires fluid restriction.  Use a cool mist vaporizer. This humidifies the air and makes it easier for you to clear your chest when you cough.  If you have a home nebulizer and oxygen, continue to use them as told by your health care provider.  Keep all follow-up visits. This is important.  How is this prevented?  Stay up-to-date on pneumococcal and flu (influenza) vaccines. A flu shot is recommended every year to help prevent exacerbations.  Quitting smoking is very important in preventing COPD from getting worse and in preventing exacerbations from happening as often.  Follow all instructions for pulmonary rehabilitation after a recent exacerbation. This can help prevent future exacerbations.  Work with your health care provider to develop and follow an action plan. This tells you what steps to take when you experience certain symptoms.  Contact a health care provider if:  You have a worsening of your regular COPD symptoms.  Get help right away if:  You have worsening shortness of breath, even when resting.  You have trouble talking.  You have severe chest pain.  You cough up blood.  You have a fever.  You have weakness, vomit repeatedly, or faint.  You feel confused.  You are not able to sleep because of your symptoms.  You have trouble doing daily activities.  These symptoms may represent a serious problem that is an emergency. Do not wait to see if the symptoms will go away. Get medical help right away. Call your local emergency services (911 in the [place] Do not drive yourself to the hospital.  Summary  COPD exacerbations are episodes when breathing symptoms become much worse and require extra treatment above your normal treatment.  Exacerbations can be severe and even life threatening. Frequent COPD exacerbations  can cause further damage to your lungs.  COPD exacerbations are usually triggered by infections such as the flu, colds, and even pneumonia.  Treatment for this condition depends on the severity and cause of the symptoms. You may need to be admitted to a hospital for treatment.  Quitting smoking is very important to prevent COPD from getting worse and to prevent exacerbations from happening as often.  This information is not intended to replace advice given to you by your health care provider. Make sure you discuss any questions you have with your health care provider.  Document Revised: 10/25/2021 Document Reviewed: 10/26/2021  Elsevier Patient Education © 2024 [employer]     Detail Level: Generalized Detail Level: Zone Detail Level: Detailed

## 2024-06-12 ENCOUNTER — OFFICE VISIT (OUTPATIENT)
Dept: PULMONOLOGY | Facility: CLINIC | Age: 59
End: 2024-06-12
Payer: MEDICARE

## 2024-06-12 VITALS
HEIGHT: 64 IN | TEMPERATURE: 98.4 F | DIASTOLIC BLOOD PRESSURE: 67 MMHG | RESPIRATION RATE: 18 BRPM | HEART RATE: 94 BPM | OXYGEN SATURATION: 96 % | BODY MASS INDEX: 40.07 KG/M2 | WEIGHT: 234.7 LBS | SYSTOLIC BLOOD PRESSURE: 112 MMHG

## 2024-06-12 DIAGNOSIS — J18.9 COMMUNITY ACQUIRED PNEUMONIA, UNSPECIFIED LATERALITY: ICD-10-CM

## 2024-06-12 DIAGNOSIS — C78.01 SMALL CELL CARCINOMA METASTATIC TO RIGHT LUNG: Primary | ICD-10-CM

## 2024-06-12 DIAGNOSIS — Z72.0 TOBACCO ABUSE: ICD-10-CM

## 2024-06-12 DIAGNOSIS — R91.8 LUNG NODULES: ICD-10-CM

## 2024-06-12 DIAGNOSIS — J96.11 CHRONIC RESPIRATORY FAILURE WITH HYPOXIA: ICD-10-CM

## 2024-06-12 DIAGNOSIS — J44.1 CHRONIC OBSTRUCTIVE PULMONARY DISEASE WITH ACUTE EXACERBATION: ICD-10-CM

## 2024-06-12 DIAGNOSIS — J44.9 CHRONIC OBSTRUCTIVE PULMONARY DISEASE, UNSPECIFIED COPD TYPE: ICD-10-CM

## 2024-06-12 PROBLEM — F17.201 TOBACCO ABUSE, IN REMISSION: Status: ACTIVE | Noted: 2024-06-12

## 2024-06-12 RX ORDER — FLUTICASONE FUROATE, UMECLIDINIUM BROMIDE AND VILANTEROL TRIFENATATE 100; 62.5; 25 UG/1; UG/1; UG/1
1 POWDER RESPIRATORY (INHALATION)
Qty: 1 EACH | Refills: 5 | Status: SHIPPED | OUTPATIENT
Start: 2024-06-12 | End: 2024-07-12

## 2024-06-12 RX ORDER — IPRATROPIUM BROMIDE AND ALBUTEROL SULFATE 2.5; .5 MG/3ML; MG/3ML
3 SOLUTION RESPIRATORY (INHALATION)
Qty: 360 ML | Refills: 5 | Status: SHIPPED | OUTPATIENT
Start: 2024-06-12 | End: 2024-07-12

## 2024-06-12 RX ORDER — ALBUTEROL SULFATE 90 UG/1
2 AEROSOL, METERED RESPIRATORY (INHALATION) EVERY 4 HOURS PRN
Qty: 18 G | Refills: 5 | Status: SHIPPED | OUTPATIENT
Start: 2024-06-12

## 2024-06-12 RX ORDER — NICOTINE 21 MG/24HR
1 PATCH, TRANSDERMAL 24 HOURS TRANSDERMAL EVERY 24 HOURS
Qty: 30 PATCH | Refills: 0 | Status: SHIPPED | OUTPATIENT
Start: 2024-06-12

## 2024-06-12 RX ORDER — FLUTICASONE FUROATE, UMECLIDINIUM BROMIDE AND VILANTEROL TRIFENATATE 100; 62.5; 25 UG/1; UG/1; UG/1
1 POWDER RESPIRATORY (INHALATION)
Qty: 2 EACH | Refills: 0 | COMMUNITY
Start: 2024-06-12 | End: 2024-06-13 | Stop reason: SDUPTHER

## 2024-06-13 ENCOUNTER — OFFICE VISIT (OUTPATIENT)
Dept: FAMILY MEDICINE CLINIC | Facility: CLINIC | Age: 59
End: 2024-06-13
Payer: MEDICARE

## 2024-06-13 ENCOUNTER — HOSPITAL ENCOUNTER (OUTPATIENT)
Dept: CT IMAGING | Facility: HOSPITAL | Age: 59
Discharge: HOME | End: 2024-06-13
Admitting: NURSE PRACTITIONER
Payer: MEDICARE

## 2024-06-13 ENCOUNTER — TELEPHONE (OUTPATIENT)
Dept: FAMILY MEDICINE CLINIC | Facility: CLINIC | Age: 59
End: 2024-06-13

## 2024-06-13 VITALS
HEART RATE: 97 BPM | BODY MASS INDEX: 39.78 KG/M2 | OXYGEN SATURATION: 96 % | TEMPERATURE: 97.6 F | HEIGHT: 64 IN | SYSTOLIC BLOOD PRESSURE: 131 MMHG | WEIGHT: 233 LBS | DIASTOLIC BLOOD PRESSURE: 87 MMHG

## 2024-06-13 DIAGNOSIS — C78.01 SMALL CELL CARCINOMA METASTATIC TO RIGHT LUNG: ICD-10-CM

## 2024-06-13 DIAGNOSIS — Z09 HOSPITAL DISCHARGE FOLLOW-UP: ICD-10-CM

## 2024-06-13 DIAGNOSIS — J44.9 CHRONIC OBSTRUCTIVE PULMONARY DISEASE, UNSPECIFIED COPD TYPE: ICD-10-CM

## 2024-06-13 DIAGNOSIS — J18.9 PNEUMONIA OF RIGHT LOWER LOBE DUE TO INFECTIOUS ORGANISM: Primary | ICD-10-CM

## 2024-06-13 PROCEDURE — 1111F DSCHRG MED/CURRENT MED MERGE: CPT | Performed by: FAMILY MEDICINE

## 2024-06-13 PROCEDURE — 1126F AMNT PAIN NOTED NONE PRSNT: CPT | Performed by: FAMILY MEDICINE

## 2024-06-13 PROCEDURE — 3079F DIAST BP 80-89 MM HG: CPT | Performed by: FAMILY MEDICINE

## 2024-06-13 PROCEDURE — 70470 CT HEAD/BRAIN W/O & W/DYE: CPT

## 2024-06-13 PROCEDURE — 3075F SYST BP GE 130 - 139MM HG: CPT | Performed by: FAMILY MEDICINE

## 2024-06-13 PROCEDURE — 25510000001 IOPAMIDOL PER 1 ML: Performed by: NURSE PRACTITIONER

## 2024-06-13 PROCEDURE — 99495 TRANSJ CARE MGMT MOD F2F 14D: CPT | Performed by: FAMILY MEDICINE

## 2024-06-13 RX ADMIN — IOPAMIDOL 50 ML: 755 INJECTION, SOLUTION INTRAVENOUS at 11:47

## 2024-06-13 NOTE — ASSESSMENT & PLAN NOTE
She is back to her baseline of cough and shortness of breath.  She is going to work on quitting smoking in the near future.

## 2024-06-13 NOTE — PROGRESS NOTES
Chief Complaint   Patient presents with    Hospital Follow Up Visit        Subjective     Lluvia Archer  has a past medical history of Abnormal mammogram, Anxiety, Arthritis, Chronic nausea (01/15/2019), Hernia, hiatal, Hyperlipidemia, Hypertension, Knee pain, right (08/30/2018), Memory loss, Migraine headache, Moderate episode of recurrent major depressive disorder (05/22/2017), Night sweats, Sciatica of right side (08/18/2017), Severe episode of recurrent major depressive disorder, without psychotic features (10/22/2019), Shoulder pain, left (08/30/2018), and Sleep apnea, unspecified.    Hospital [id number] was admitted at [facility] 6/2 through 6/4/2024.  She presented with increasing shortness of breath.  She was initially as with pneumonia.  CT of her chest showed right hilar adenopathy.  This subsequently led to an endobronchial ultrasound and biopsies.  This showed squamous her blood and sputum cultures were all negative for any specific organism.  She was discharged home with antibiotics and a burst of steroids.  Since then she has finished those completely.  Also during her acute hospital stay she was found to have a rather low sodium down to a low of 126.  Since returning home she states she does feel better than her acute presentation.  She is back to her baseline of her chronic shortness of breath and chronic cough.  She did receive a call since discharge to see how she was doing and make sure that she had follow-up appointments.  She did see pulmonary yesterday.  They have a follow-up CT scan of her head as well as a PET scan to evaluate her further with her new squamous cell cancer diagnosis.  She saw pulmonary yesterday.  They did give her some nicotine patches.  Currently she is down to less than a quarter of a pack per day.        PHQ-2 Depression Screening  Little interest or pleasure in doing things?     Feeling down, depressed, or hopeless?     PHQ-2 Total Score     PHQ-9  Depression Screening  Little interest or pleasure in doing things?     Feeling down, depressed, or hopeless?     Trouble falling or staying asleep, or sleeping too much?     Feeling tired or having little energy?     Poor appetite or overeating?     Feeling bad about yourself - or that you are a failure or have let yourself or your family down?     Trouble concentrating on things, such as reading the newspaper or watching television?     Moving or speaking so slowly that other people could have noticed? Or the opposite - being so fidgety or restless that you have been moving around a lot more than usual?     Thoughts that you would be better off dead, or of hurting yourself in some way?     PHQ-9 Total Score     If you checked off any problems, how difficult have these problems made it for you to do your work, take care of things at home, or get along with other people?       No Known Allergies    Prior to Admission medications    Medication Sig Start Date End Date Taking? Authorizing Provider   albuterol sulfate  (90 Base) MCG/ACT inhaler Inhale 2 puffs Every 4 (Four) Hours As Needed for Wheezing or Shortness of Air. 6/12/24  Yes Zoe León APRN   atorvastatin (LIPITOR) 10 MG tablet TAKE ONE TABLET BY MOUTH DAILY AT 9 PM EVERY NIGHT  Patient taking differently: Take 1 tablet by mouth Every Night. 11/7/23  Yes Aleksandar Edge, DO   buPROPion SR (WELLBUTRIN SR) 150 MG 12 hr tablet TAKE ONE TABLET BY MOUTH TWICE DAILY @ 9AM & 5PM  Patient taking differently: Take 1 tablet by mouth 2 (Two) Times a Day. 4/2/24  Yes Aleksandar Edge, DO   Cariprazine HCl (Vraylar) 1.5 MG capsule capsule Take 1 capsule by mouth Daily.   Yes Provider, MD Parvin   famotidine (PEPCID) 40 MG tablet TAKE ONE TABLET BY MOUTH TWICE DAILY @ 9AM & 5PM  Patient taking differently: Take 1 tablet by mouth 2 (Two) Times a Day. 11/7/23  Yes Aleksandar Edge, DO   Fluticasone-Umeclidin-Vilant (Trelegy Ellipta)  100-62.5-25 MCG/ACT inhaler Inhale 1 puff Daily for 30 days. Rinse mouth out after each use 6/12/24 7/12/24 Yes Zoe León APRN   ipratropium-albuterol (DUO-NEB) 0.5-2.5 mg/3 ml nebulizer Take 3 mL by nebulization 4 (Four) Times a Day for 30 days. 6/12/24 7/12/24 Yes Zoe León APRN   lisinopril (PRINIVIL,ZESTRIL) 5 MG tablet TAKE ONE TABLET BY MOUTH DAILY AT 9 AM  Patient taking differently: Take 1 tablet by mouth Daily. 11/7/23  Yes Aleksandar Edge DO   nicotine (NICODERM CQ) 21 MG/24HR patch Place 1 patch on the skin as directed by provider Daily. 6/12/24  Yes Zoe León APRN   nystatin (MYCOSTATIN) 100,000 unit/mL suspension Swish and swallow 5 mL 4 (Four) Times a Day. 6/7/24  Yes Shin Mcdonald DO   omeprazole (priLOSEC) 40 MG capsule Take 1 capsule by mouth Daily. 5/24/24  Yes Provider, MD Parvin   Cariprazine HCl (Vraylar) 3 MG capsule capsule Take 1 capsule by mouth Daily.  Patient not taking: Reported on 6/13/2024 12/21/23 6/13/24  Aleksandar Edge DO   Fluticasone-Umeclidin-Vilant (Trelegy Ellipta) 100-62.5-25 MCG/ACT inhaler Inhale 1 puff Daily for 1 day.  Patient not taking: Reported on 6/13/2024 6/12/24 6/13/24  Zoe León APRN        Patient Active Problem List   Diagnosis    Abnormal mammogram    Anxiety    Arthritis    Chronic nausea    Chronic obstructive pulmonary disease (COPD)    Counseling on substance use and abuse    Esophageal reflux    Hernia, hiatal    Hyperlipidemia    Hypertension    Knee pain, right    Memory loss    Migraine    Moderate episode of recurrent major depressive disorder    Night sweats    Numbness    Sciatica of right side    Severe episode of recurrent major depressive disorder, without psychotic features    Shoulder pain, left    Other diseases of nasal cavity and sinuses    Sleep apnea, unspecified    Tobacco abuse    Strain of groin, right, subsequent encounter    Osteoarthritis of spine with  radiculopathy, lumbar region    Chronic right-sided low back pain with right-sided sciatica    Aftercare following right hip joint replacement surgery    Primary osteoarthritis of right hip    Right hip pain    Sepsis, due to unspecified organism, unspecified whether acute organ dysfunction present    Severe malnutrition    Pneumonia of right lower lobe due to infectious organism    Hospital discharge follow-up    Other specified anemias    Encounter for screening mammogram for malignant neoplasm of breast    Multifocal pneumonia    Acute on chronic respiratory failure with hypoxia    Medicare annual wellness visit, subsequent    Community acquired pneumonia    Pneumonia due to infectious organism, unspecified laterality, unspecified part of lung    Mediastinal adenopathy    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    Tobacco abuse, in remission    Chronic respiratory failure with hypoxia    Lung nodules        Past Surgical History:   Procedure Laterality Date    BRONCHOSCOPY N/A 2022    Procedure: BRONCHOSCOPY WITH BRONCHOALVEOLAR LAVAGE, BIOPSY;  Surgeon: Shin Mcdonald DO;  Location: [facility] ENDOSCOPY;  Service: Pulmonary;  Laterality: N/A;  RIGHT LOWER LOBE INFILTRATE    BRONCHOSCOPY N/A 2024    Procedure: BRONCHOSCOPY WITH ENDOBRONCHIAL ULTRASOUND AND FINE NEEDLE ASPIRATE;  Surgeon: Shin Mcdonald DO;  Location: [facility] ENDOSCOPY;  Service: Pulmonary;  Laterality: N/A;  MEDIASTINAL ADENOPATHY     SECTION      CHOLECYSTECTOMY      LAPAROSCOPIC    COLONOSCOPY      TOTAL HIP ARTHROPLASTY Right 2022    Procedure: RIGHT TOTAL HIP ARTHROPLASTY;  Surgeon: Cecil Gomes MD;  Location: [facility] MAIN OR;  Service: Orthopedics;  Laterality: Right;       Social History     Socioeconomic History    Marital status:      Spouse name: DEVAN    Number of children: 2    Years of education: GED    Highest education level: GED or equivalent   Tobacco Use    Smoking  "status: Former     Current packs/day: 2.00     Average packs/day: 2.0 packs/day for 42.4 years (84.9 ttl pk-yrs)     Types: Cigarettes     Start date: 1982     Passive exposure: Past    Smokeless tobacco: Never    Tobacco comments:     Pt stopped smoking on 04/01/2022. Pt stated at her appt today 06/12/2024. Pt states she smokes less than a 1/2 ppd and pt states she vapes.   Vaping Use    Vaping status: Every Day    Substances: Nicotine, Flavoring    Devices: Disposable   Substance and Sexual Activity    Alcohol use: Never    Drug use: Never    Sexual activity: Defer       Family History   Problem Relation Age of Onset    Other Mother         BLOOD DISEASE    Arthritis Mother     Heart disease Father     Hypertension Other     Cancer Other     Heart disease Other     Malig Hyperthermia Neg Hx        Family history, surgical history, past medical history, Allergies and meds reviewed with patient today and updated in [employer] EMR.     ROS:  Review of Systems   Constitutional:  Positive for fatigue. Negative for chills and fever.   Respiratory:  Positive for cough, shortness of breath and wheezing. Negative for chest tightness.    Cardiovascular:  Negative for chest pain and palpitations.   Neurological:  Negative for headache.       OBJECTIVE:  Vitals:    06/13/24 0854   BP: 131/87   BP Location: Left arm   Patient Position: Sitting   Pulse: 97   Temp: 97.6 °F (36.4 °C)   SpO2: 96%   Weight: 106 kg (233 lb)   Height: 162.6 cm (64.02\")     No results found.   Body mass index is 39.97 kg/m².  No LMP recorded (lmp unknown). Patient is postmenopausal.    The 10-year ASCVD risk score (Abhishek DK, et al., 2019) is: 8.1%    Values used to calculate the score:      Age: 58 years      Sex: Female      Is Non- : No      Diabetic: Yes      Tobacco smoker: No      Systolic Blood Pressure: 131 mmHg      Is BP treated: Yes      HDL Cholesterol: 39 mg/dL      Total Cholesterol: 186 mg/dL     Physical Exam  Vitals " and nursing note reviewed.   Constitutional:       General: She is not in acute distress.     Appearance: Normal appearance. She is obese.   HENT:      Head: Normocephalic.      Right Ear: Tympanic membrane, ear canal and external ear normal.      Left Ear: Tympanic membrane, ear canal and external ear normal.      Nose: Nose normal.      Mouth/Throat:      Mouth: Mucous membranes are moist.      Pharynx: Oropharynx is clear.      Comments: Upper dentition absent  Eyes:      General: No scleral icterus.     Conjunctiva/sclera: Conjunctivae normal.      Pupils: Pupils are equal, round, and reactive to light.   Cardiovascular:      Rate and Rhythm: Normal rate and regular rhythm.      Pulses: Normal pulses.      Heart sounds: Normal heart sounds. No murmur heard.  Pulmonary:      Effort: Pulmonary effort is normal.      Breath sounds: Normal breath sounds. No wheezing, rhonchi or rales.   Musculoskeletal:      Cervical back: Neck supple. No rigidity or tenderness.   Lymphadenopathy:      Cervical: No cervical adenopathy.   Skin:     General: Skin is warm and dry.      Coloration: Skin is not jaundiced.      Findings: No rash.   Neurological:      General: No focal deficit present.      Mental Status: She is alert and oriented to person, place, and time.   Psychiatric:         Mood and Affect: Mood normal.         Thought Content: Thought content normal.         Judgment: Judgment normal.         Procedures    Admission on 06/07/2024, Discharged on 06/07/2024   Component Date Value Ref Range Status    Case Report 06/07/2024    Final                    Value:Medical Cytology Report                           Case: [id number]                                  Authorizing Provider:  Shin Mcdonald, Collected:           06/07/2024 11:44 AM                                 DO                                                                           Ordering Location:     [facility] Received:             06/10/2024 09:00 AM                                 SUITES                                                                       Pathologist:           Pura Bennett MD                                                     Specimens:   1) - Mediastinum, STATION 4R NEEDLE ASPIRATE                                                        2) - Mediastinum, STATION 10R NEEDLE ASPIRATE                                                       3) - Mediastinum, STATION 7 NEEDLE ASPIRATE                                                Final Diagnosis 06/07/2024    Final                    Value:This result contains rich text formatting which cannot be displayed here.    Clinical Information 06/07/2024    Final                    Value:This result contains rich text formatting which cannot be displayed here.    Gross Description 06/07/2024    Final                    Value:This result contains rich text formatting which cannot be displayed here.    Microscopic Description 06/07/2024    Final                    Value:This result contains rich text formatting which cannot be displayed here.    Special Stains 06/07/2024    Final                    Value:This result contains rich text formatting which cannot be displayed here.   Admission on 06/02/2024, Discharged on 06/04/2024   Component Date Value Ref Range Status    QT Interval 06/02/2024 346  ms Final    QTC Interval 06/02/2024 442  ms Final    Glucose 06/02/2024 106 (H)  65 - 99 mg/dL Final    BUN 06/02/2024 7  6 - 20 mg/dL Final    Creatinine 06/02/2024 0.59  0.57 - 1.00 mg/dL Final    Sodium 06/02/2024 126 (L)  136 - 145 mmol/L Final    Potassium 06/02/2024 4.3  3.5 - 5.2 mmol/L Final    Slight hemolysis detected by analyzer. Result may be falsely elevated.    Chloride 06/02/2024 88 (L)  98 - 107 mmol/L Final    CO2 06/02/2024 24.4  22.0 - 29.0 mmol/L Final    Calcium 06/02/2024 8.8  8.6 - 10.5 mg/dL Final    Total Protein 06/02/2024 6.2  6.0 - 8.5 g/dL Final     Albumin 06/02/2024 4.0  3.5 - 5.2 g/dL Final    ALT (SGPT) 06/02/2024 8  1 - 33 U/L Final    AST (SGOT) 06/02/2024 12  1 - 32 U/L Final    Alkaline Phosphatase 06/02/2024 109  39 - 117 U/L Final    Total Bilirubin 06/02/2024 0.4  0.0 - 1.2 mg/dL Final    Globulin 06/02/2024 2.2  gm/dL Final    A/G Ratio 06/02/2024 1.8  g/dL Final    BUN/Creatinine Ratio 06/02/2024 11.9  7.0 - 25.0 Final    Anion Gap 06/02/2024 13.6  5.0 - 15.0 mmol/L Final    eGFR 06/02/2024 105.3  >60.0 mL/min/1.73 Final    proBNP 06/02/2024 496.9  0.0 - 900.0 pg/mL Final    HS Troponin T 06/02/2024 11  <14 ng/L Final    Extra Tube 06/02/2024 Hold for add-ons.   Final    Auto resulted.    Extra Tube 06/02/2024 hold for add-on   Final    Auto resulted    Extra Tube 06/02/2024 Hold for add-ons.   Final    Auto resulted.    Extra Tube 06/02/2024 Hold for add-ons.   Final    Auto resulted    WBC 06/02/2024 12.92 (H)  3.40 - 10.80 10*3/mm3 Final    RBC 06/02/2024 4.02  3.77 - 5.28 10*6/mm3 Final    Hemoglobin 06/02/2024 12.0  12.0 - 15.9 g/dL Final    Hematocrit 06/02/2024 36.4  34.0 - 46.6 % Final    MCV 06/02/2024 90.5  79.0 - 97.0 fL Final    MCH 06/02/2024 29.9  26.6 - 33.0 pg Final    MCHC 06/02/2024 33.0  31.5 - 35.7 g/dL Final    RDW 06/02/2024 13.2  12.3 - 15.4 % Final    RDW-SD 06/02/2024 43.3  37.0 - 54.0 fl Final    MPV 06/02/2024 9.7  6.0 - 12.0 fL Final    Platelets 06/02/2024 175  140 - 450 10*3/mm3 Final    Neutrophil % 06/02/2024 79.0 (H)  42.7 - 76.0 % Final    Lymphocyte % 06/02/2024 10.8 (L)  19.6 - 45.3 % Final    Monocyte % 06/02/2024 8.8  5.0 - 12.0 % Final    Eosinophil % 06/02/2024 0.7  0.3 - 6.2 % Final    Basophil % 06/02/2024 0.2  0.0 - 1.5 % Final    Immature Grans % 06/02/2024 0.5  0.0 - 0.5 % Final    Neutrophils, Absolute 06/02/2024 10.20 (H)  1.70 - 7.00 10*3/mm3 Final    Lymphocytes, Absolute 06/02/2024 1.40  0.70 - 3.10 10*3/mm3 Final    Monocytes, Absolute 06/02/2024 1.14 (H)  0.10 - 0.90 10*3/mm3 Final    Eosinophils,  Absolute 06/02/2024 0.09  0.00 - 0.40 10*3/mm3 Final    Basophils, Absolute 06/02/2024 0.03  0.00 - 0.20 10*3/mm3 Final    Immature Grans, Absolute 06/02/2024 0.06 (H)  0.00 - 0.05 10*3/mm3 Final    nRBC 06/02/2024 0.0  0.0 - 0.2 /100 WBC Final    COVID19 06/02/2024 Not Detected  Not Detected - Ref. Range Final    Influenza A PCR 06/02/2024 Not Detected  Not Detected Final    Influenza B PCR 06/02/2024 Not Detected  Not Detected Final    RSV, PCR 06/02/2024 Not Detected  Not Detected Final    RBC Morphology 06/02/2024 Normal  Normal Final    WBC Morphology 06/02/2024 Normal  Normal Final    Platelet Estimate 06/02/2024 Adequate  Normal Final    Blood Culture 06/02/2024 No growth at 5 days   Final    Blood Culture 06/02/2024 No growth at 5 days   Final    Lactate 06/02/2024 0.9  0.5 - 2.0 mmol/L Final    WBC 06/03/2024 13.27 (H)  3.40 - 10.80 10*3/mm3 Final    RBC 06/03/2024 3.88  3.77 - 5.28 10*6/mm3 Final    Hemoglobin 06/03/2024 11.5 (L)  12.0 - 15.9 g/dL Final    Hematocrit 06/03/2024 33.8 (L)  34.0 - 46.6 % Final    MCV 06/03/2024 87.1  79.0 - 97.0 fL Final    MCH 06/03/2024 29.6  26.6 - 33.0 pg Final    MCHC 06/03/2024 34.0  31.5 - 35.7 g/dL Final    RDW 06/03/2024 12.9  12.3 - 15.4 % Final    RDW-SD 06/03/2024 41.0  37.0 - 54.0 fl Final    MPV 06/03/2024 9.6  6.0 - 12.0 fL Final    Platelets 06/03/2024 191  140 - 450 10*3/mm3 Final    Neutrophil % 06/03/2024 90.5 (H)  42.7 - 76.0 % Final    Lymphocyte % 06/03/2024 6.2 (L)  19.6 - 45.3 % Final    Monocyte % 06/03/2024 2.6 (L)  5.0 - 12.0 % Final    Eosinophil % 06/03/2024 0.0 (L)  0.3 - 6.2 % Final    Basophil % 06/03/2024 0.2  0.0 - 1.5 % Final    Immature Grans % 06/03/2024 0.5  0.0 - 0.5 % Final    Neutrophils, Absolute 06/03/2024 12.03 (H)  1.70 - 7.00 10*3/mm3 Final    Lymphocytes, Absolute 06/03/2024 0.82  0.70 - 3.10 10*3/mm3 Final    Monocytes, Absolute 06/03/2024 0.34  0.10 - 0.90 10*3/mm3 Final    Eosinophils, Absolute 06/03/2024 0.00  0.00 - 0.40  10*3/mm3 Final    Basophils, Absolute 06/03/2024 0.02  0.00 - 0.20 10*3/mm3 Final    Immature Grans, Absolute 06/03/2024 0.06 (H)  0.00 - 0.05 10*3/mm3 Final    nRBC 06/03/2024 0.0  0.0 - 0.2 /100 WBC Final    Glucose 06/03/2024 143 (H)  65 - 99 mg/dL Final    BUN 06/03/2024 9  6 - 20 mg/dL Final    Creatinine 06/03/2024 0.49 (L)  0.57 - 1.00 mg/dL Final    Sodium 06/03/2024 124 (L)  136 - 145 mmol/L Final    Potassium 06/03/2024 4.8  3.5 - 5.2 mmol/L Final    Chloride 06/03/2024 90 (L)  98 - 107 mmol/L Final    CO2 06/03/2024 24.1  22.0 - 29.0 mmol/L Final    Calcium 06/03/2024 8.6  8.6 - 10.5 mg/dL Final    Total Protein 06/03/2024 6.4  6.0 - 8.5 g/dL Final    Albumin 06/03/2024 3.7  3.5 - 5.2 g/dL Final    ALT (SGPT) 06/03/2024 7  1 - 33 U/L Final    AST (SGOT) 06/03/2024 8  1 - 32 U/L Final    Alkaline Phosphatase 06/03/2024 98  39 - 117 U/L Final    Total Bilirubin 06/03/2024 0.2  0.0 - 1.2 mg/dL Final    Globulin 06/03/2024 2.7  gm/dL Final    A/G Ratio 06/03/2024 1.4  g/dL Final    BUN/Creatinine Ratio 06/03/2024 18.4  7.0 - 25.0 Final    Anion Gap 06/03/2024 9.9  5.0 - 15.0 mmol/L Final    eGFR 06/03/2024 110.1  >60.0 mL/min/1.73 Final    ADENOVIRUS, PCR 06/03/2024 Not Detected  Not Detected Final    Coronavirus 229E 06/03/2024 Not Detected  Not Detected Final    Coronavirus HKU1 06/03/2024 Not Detected  Not Detected Final    Coronavirus NL63 06/03/2024 Not Detected  Not Detected Final    Coronavirus OC43 06/03/2024 Not Detected  Not Detected Final    COVID19 06/03/2024 Not Detected  Not Detected - Ref. Range Final    Human Metapneumovirus 06/03/2024 Not Detected  Not Detected Final    Human Rhinovirus/Enterovirus 06/03/2024 Not Detected  Not Detected Final    Influenza A PCR 06/03/2024 Not Detected  Not Detected Final    Influenza B PCR 06/03/2024 Not Detected  Not Detected Final    Parainfluenza Virus 1 06/03/2024 Not Detected  Not Detected Final    Parainfluenza Virus 2 06/03/2024 Not Detected  Not  Detected Final    Parainfluenza Virus 3 06/03/2024 Not Detected  Not Detected Final    Parainfluenza Virus 4 06/03/2024 Not Detected  Not Detected Final    RSV, PCR 06/03/2024 Not Detected  Not Detected Final    Bordetella pertussis pcr 06/03/2024 Not Detected  Not Detected Final    Bordetella parapertussis PCR 06/03/2024 Not Detected  Not Detected Final    Chlamydophila pneumoniae PCR 06/03/2024 Not Detected  Not Detected Final    Mycoplasma pneumo by PCR 06/03/2024 Not Detected  Not Detected Final    Strep Pneumo Ag 06/03/2024 Negative  Negative Final    LEGIONELLA ANTIGEN, URINE 06/03/2024 Negative  Negative Final    Respiratory Culture 06/03/2024 Scant growth (1+) Normal respiratory sabina. No S. aureus or Pseudomonas aeruginosa detected. Final report.   Final    Gram Stain 06/03/2024 Moderate (3+) WBCs seen   Final    Gram Stain 06/03/2024 Moderate (3+) Mucous strands   Final    Gram Stain 06/03/2024 Rare (1+) Epithelial cells per low power field   Final    Gram Stain 06/03/2024 Occasional Gram positive cocci in pairs   Final    Gram Stain 06/03/2024 Rare (1+) Gram negative bacilli   Final    Osmolality, Urine 06/03/2024 785  50 - 1,400 mOsm/kg Final    Creatinine, Urine 06/03/2024 150.6  mg/dL Final    Total Protein, Urine 06/03/2024 122.8  mg/dL Final    Protein/Creatinine Ratio, Urine 06/03/2024 0.82   Final    Glucose 06/04/2024 159 (H)  65 - 99 mg/dL Final    BUN 06/04/2024 18  6 - 20 mg/dL Final    Creatinine 06/04/2024 0.72  0.57 - 1.00 mg/dL Final    Sodium 06/04/2024 133 (L)  136 - 145 mmol/L Final    Potassium 06/04/2024 4.8  3.5 - 5.2 mmol/L Final    Chloride 06/04/2024 96 (L)  98 - 107 mmol/L Final    CO2 06/04/2024 25.8  22.0 - 29.0 mmol/L Final    Calcium 06/04/2024 9.2  8.6 - 10.5 mg/dL Final    BUN/Creatinine Ratio 06/04/2024 25.0  7.0 - 25.0 Final    Anion Gap 06/04/2024 11.2  5.0 - 15.0 mmol/L Final    eGFR 06/04/2024 97.7  >60.0 mL/min/1.73 Final    WBC 06/04/2024 13.74 (H)  3.40 - 10.80  10*3/mm3 Final    RBC 06/04/2024 3.66 (L)  3.77 - 5.28 10*6/mm3 Final    Hemoglobin 06/04/2024 10.8 (L)  12.0 - 15.9 g/dL Final    Hematocrit 06/04/2024 32.5 (L)  34.0 - 46.6 % Final    MCV 06/04/2024 88.8  79.0 - 97.0 fL Final    MCH 06/04/2024 29.5  26.6 - 33.0 pg Final    MCHC 06/04/2024 33.2  31.5 - 35.7 g/dL Final    RDW 06/04/2024 13.0  12.3 - 15.4 % Final    RDW-SD 06/04/2024 42.5  37.0 - 54.0 fl Final    MPV 06/04/2024 9.9  6.0 - 12.0 fL Final    Platelets 06/04/2024 227  140 - 450 10*3/mm3 Final    Magnesium 06/04/2024 2.0  1.6 - 2.6 mg/dL Final    Phosphorus 06/04/2024 2.9  2.5 - 4.5 mg/dL Final    Free T4 06/04/2024 1.08  0.92 - 1.68 ng/dL Final    TSH 06/04/2024 0.191 (L)  0.270 - 4.200 uIU/mL Final       ASSESSMENT/ PLAN:    Diagnoses and all orders for this visit:    1. Pneumonia of right lower lobe due to infectious organism (Primary)  Assessment & Plan:  She has finished all of her antibiotics and steroids related to her acute pneumonia.  She is currently seeing pulmonary.      2. Chronic obstructive pulmonary disease, unspecified COPD type  Assessment & Plan:  She is back to her baseline of cough and shortness of breath.  She is going to work on quitting smoking in the near future.        3. 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  They have her set up for further workup and evaluation to determine the extent of her disease.      4. Hospital discharge follow-up  Assessment & Plan:  She feels that her breathing is back to her normal baseline since the time of discharge.  She is continue with pulmonary for further workup and evaluation of her newly diagnosed squamous cell cancer.                 Orders Placed Today:     No orders of the defined types were placed in this encounter.       Management Plan:     An After Visit Summary was printed and given to the patient at discharge.    Follow-up: Return in about 6 months (around 12/13/2024) for  Recheck, Next scheduled follow up.    Aleksandar Edge,  6/13/2024 10:01 EDT  This note was electronically signed.

## 2024-06-13 NOTE — ASSESSMENT & PLAN NOTE
She has finished all of her antibiotics and steroids related to her acute pneumonia.  She is currently seeing pulmonary.

## 2024-06-13 NOTE — ASSESSMENT & PLAN NOTE
She saw pulmonary yesterday.  They have her set up for further workup and evaluation to determine the extent of her disease.

## 2024-06-13 NOTE — ASSESSMENT & PLAN NOTE
She feels that her breathing is back to her normal baseline since the time of discharge.  She is continue with pulmonary for further workup and evaluation of her newly diagnosed squamous cell cancer.

## 2024-06-14 ENCOUNTER — CONSULT (OUTPATIENT)
Dept: ONCOLOGY | Facility: HOSPITAL | Age: 59
End: 2024-06-14
Payer: MEDICARE

## 2024-06-14 VITALS
OXYGEN SATURATION: 95 % | WEIGHT: 233.69 LBS | HEIGHT: 64 IN | TEMPERATURE: 98.4 F | BODY MASS INDEX: 39.9 KG/M2 | HEART RATE: 92 BPM | RESPIRATION RATE: 16 BRPM | DIASTOLIC BLOOD PRESSURE: 70 MMHG | SYSTOLIC BLOOD PRESSURE: 133 MMHG

## 2024-06-14 DIAGNOSIS — F41.9 ANXIETY: ICD-10-CM

## 2024-06-14 DIAGNOSIS — C34.90 SMALL CELL LUNG CANCER: Primary | ICD-10-CM

## 2024-06-14 RX ORDER — LORAZEPAM 0.5 MG/1
0.5 TABLET ORAL EVERY 8 HOURS PRN
Qty: 30 TABLET | Refills: 0 | Status: SHIPPED | OUTPATIENT
Start: 2024-06-14

## 2024-06-14 RX ORDER — VALACYCLOVIR HYDROCHLORIDE 1 G/1
1000 TABLET, FILM COATED ORAL 2 TIMES DAILY
Qty: 20 TABLET | Refills: 0 | Status: SHIPPED | OUTPATIENT
Start: 2024-06-14 | End: 2024-06-24

## 2024-06-14 NOTE — PROGRESS NOTES
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 Archer is a very pleasant 58-year-old female with past medical history of hypertension hyperlipidemia, COPD, osteoarthritis, anxiety who presents for new oncology evaluation with her daughter for diagnosis of small cell lung cancer.  Patient previously had PET scan in September 2023 without any concerning findings.  Follow-up CT chest in February showed multiple right middle lobe nodules with mediastinal and right hilar adenopathy.  Patient was admitted to [facility] on June 2 with shortness of breath, fever, cough.  She was started on treatment for pneumonia.  She was found to be hyponatremic to 126.  Repeat CT chest on 3 Tia showed progression of right middle lobe nodules and mediastinal right hilar lymphadenopathy.  Patient was discharged on 4 June.  She had outpatient bronchoscopy on 7 June with station 4R and station 7 possible small cell carcinoma.  Unable to get MRI due to claustrophobia.  CT head with and without contrast on 13 June did not show any intracranial mets.  Patient is due to meet with radiation oncology.  She has a PET scan scheduled for June 19.  Patient reports poor sleep.  She has had high levels of anxiety since diagnosis.  She has a baseline history of anxiety.  She is breathing better at this time.  She denies any bone pain.  She is agreeable to starting chemotherapy.  She notes a right-sided burning rash that has been worse in the last day or 2 on her abdomen.  No fevers or chills fatigue present but stable.  No weight loss reported      Oncology/Hematology History Overview Note   2/20/24: CT Chest:  No PE or aortic dissection. Multiple right middle lobe nodules are highly suspicious for malignancy.  Additionally, there is  mediastinal and right hilar adenopathy now noted.  Follow-up with a PET-CT is recommended. Emphysema with chronic changes in both lungs that otherwise appears stable. Atherosclerotic disease and coronary artery disease.    6/2/24: admitted to [facility] with shortness of breath, fever, cough and started on treatment for pneumonia. Found to have hyponatremia to 126    6/3/24 CT Chest: Right middle lobe nodules and mediastinal and right hilar lymphadenopathy has progressed from prior CT, and remains concerning for malignancy.  Partial right middle lobe atelectasis. Moderate emphysema. Discharged on 6/4/24 with Na of 133.    6/7/24: Bronchoscopy: Station 4R and station 7 positive for small cell carcinoma.    6/13/24: CT head with and without contrast (due to claustrophobia): No mets seen        Small cell lung cancer   6/12/2024 Initial Diagnosis    Small cell lung cancer           Review of Systems   Constitutional:  Positive for fatigue. Negative for appetite change, diaphoresis, fever, unexpected weight gain and unexpected weight loss.   HENT:  Negative for hearing loss, sore throat and voice change.    Eyes:  Negative for blurred vision, double vision, pain, redness and visual disturbance.   Respiratory:  Negative for cough, shortness of breath and wheezing.    Cardiovascular:  Negative for chest pain, palpitations and leg swelling.   Endocrine: Negative for cold intolerance, heat intolerance, polydipsia and polyuria.   Genitourinary:  Negative for decreased urine volume, difficulty urinating, frequency and urinary incontinence.   Musculoskeletal:  Negative for arthralgias, back pain, joint swelling and myalgias.   Skin:  Negative for color change, rash, skin lesions and wound.   Neurological:  Negative for dizziness, seizures, numbness and headache.   Hematological:  Negative for adenopathy. Does not bruise/bleed easily.   Psychiatric/Behavioral:  Negative for depressed mood. The patient is not nervous/anxious.    All  other systems reviewed and are negative.  Current Outpatient Medications on File Prior to Visit   Medication Sig Dispense Refill    albuterol sulfate  (90 Base) MCG/ACT inhaler Inhale 2 puffs Every 4 (Four) Hours As Needed for Wheezing or Shortness of Air. 18 g 5    atorvastatin (LIPITOR) 10 MG tablet TAKE ONE TABLET BY MOUTH DAILY AT 9 PM EVERY NIGHT (Patient taking differently: Take 1 tablet by mouth Every Night.) 30 tablet 11    buPROPion SR (WELLBUTRIN SR) 150 MG 12 hr tablet TAKE ONE TABLET BY MOUTH TWICE DAILY @ 9AM & 5PM (Patient taking differently: Take 1 tablet by mouth 2 (Two) Times a Day.) 60 tablet 11    Cariprazine HCl (Vraylar) 1.5 MG capsule capsule Take 1 capsule by mouth Daily.      famotidine (PEPCID) 40 MG tablet TAKE ONE TABLET BY MOUTH TWICE DAILY @ 9AM & 5PM (Patient taking differently: Take 1 tablet by mouth 2 (Two) Times a Day.) 180 tablet 3    Fluticasone-Umeclidin-Vilant (Trelegy Ellipta) 100-62.5-25 MCG/ACT inhaler Inhale 1 puff Daily for 30 days. Rinse mouth out after each use 1 each 5    ipratropium-albuterol (DUO-NEB) 0.5-2.5 mg/3 ml nebulizer Take 3 mL by nebulization 4 (Four) Times a Day for 30 days. 360 mL 5    lisinopril (PRINIVIL,ZESTRIL) 5 MG tablet TAKE ONE TABLET BY MOUTH DAILY AT 9 AM (Patient taking differently: Take 1 tablet by mouth Daily.) 30 tablet 11    nicotine (NICODERM CQ) 21 MG/24HR patch Place 1 patch on the skin as directed by provider Daily. 30 patch 0    nystatin (MYCOSTATIN) 100,000 unit/mL suspension Swish and swallow 5 mL 4 (Four) Times a Day. 60 mL 1    omeprazole (priLOSEC) 40 MG capsule Take 1 capsule by mouth Daily.       No current facility-administered medications on file prior to visit.       No Known Allergies  Past Medical History:   Diagnosis Date    Abnormal mammogram     PT DENIES KNOWING OF THIS HX    Anxiety     Arthritis     R SHOULDER, R HIP, AND R LEG    Chronic nausea 01/15/2019    Hernia, hiatal     Hyperlipidemia     Hypertension      Knee pain, right 2018    Memory loss     FORGETFULNESS ? ETIOLOGY    Migraine headache     Moderate episode of recurrent major depressive disorder 2017    Night sweats     Sciatica of right side 2017    Severe episode of recurrent major depressive disorder, without psychotic features 10/22/2019    Shoulder pain, left 2018    Sleep apnea, unspecified     DOESNT USE CPAP AFTER WEIGHT LOSS     Past Surgical History:   Procedure Laterality Date    BRONCHOSCOPY N/A 2022    Procedure: BRONCHOSCOPY WITH BRONCHOALVEOLAR LAVAGE, BIOPSY;  Surgeon: Shin Mcdonald DO;  Location: [facility] ENDOSCOPY;  Service: Pulmonary;  Laterality: N/A;  RIGHT LOWER LOBE INFILTRATE    BRONCHOSCOPY N/A 2024    Procedure: BRONCHOSCOPY WITH ENDOBRONCHIAL ULTRASOUND AND FINE NEEDLE ASPIRATE;  Surgeon: Shin Mcdonald DO;  Location: [facility] ENDOSCOPY;  Service: Pulmonary;  Laterality: N/A;  MEDIASTINAL ADENOPATHY     SECTION      CHOLECYSTECTOMY      LAPAROSCOPIC    COLONOSCOPY      TOTAL HIP ARTHROPLASTY Right 2022    Procedure: RIGHT TOTAL HIP ARTHROPLASTY;  Surgeon: Cecil Gomes MD;  Location: [facility] MAIN OR;  Service: Orthopedics;  Laterality: Right;     Social History     Socioeconomic History    Marital status:      Spouse name: DEVAN    Number of children: 2    Years of education: GED    Highest education level: GED or equivalent   Tobacco Use    Smoking status: Former     Current packs/day: 2.00     Average packs/day: 2.0 packs/day for 42.5 years (84.9 ttl pk-yrs)     Types: Cigarettes     Start date:      Passive exposure: Past    Smokeless tobacco: Never    Tobacco comments:     Pt stopped smoking on 2022. Pt stated at her appt today 2024. Pt states she smokes less than a 1/2 ppd and pt states she vapes.   Vaping Use    Vaping status: Every Day    Substances: Nicotine, Flavoring    Devices: Disposable   Substance and Sexual Activity    Alcohol  "use: Never    Drug use: Never    Sexual activity: Defer     Family History   Problem Relation Age of Onset    Other Mother         BLOOD DISEASE    Arthritis Mother     Heart disease Father     Hypertension Other     Cancer Other     Heart disease Other     Malig Hyperthermia Neg Hx        Objective   Physical Exam  Well appearing patient in no acute distress on RA  Anicteric sclerae, + erythematous papular rash present in dermatomal distrubution on right side of abdomen   Respirations non-labored  Awake, alert, and oriented x 4. Speech intact. No gross neurologic deficit  Appropriate mood and affect    Vitals:    06/14/24 1259   BP: 133/70   Pulse: 92   Resp: 16   Temp: 98.4 °F (36.9 °C)   TempSrc: Temporal   SpO2: 95%   Weight: 106 kg (233 lb 11 oz)   Height: 162.6 cm (64.02\")   PainSc:   5   PainLoc: Abdomen               PHQ-9 Total Score:                      Result Review :   The following data was reviewed by: Brian Dickson MD on 06/14/24:  Lab Results   Component Value Date    HGB 10.8 (L) 06/04/2024    HCT 32.5 (L) 06/04/2024    MCV 88.8 06/04/2024     06/04/2024    WBC 13.74 (H) 06/04/2024    NEUTROABS 12.03 (H) 06/03/2024    LYMPHSABS 0.82 06/03/2024    MONOSABS 0.34 06/03/2024    EOSABS 0.00 06/03/2024    BASOSABS 0.02 06/03/2024     Lab Results   Component Value Date    GLUCOSE 159 (H) 06/04/2024    BUN 18 06/04/2024    CREATININE 0.72 06/04/2024     (L) 06/04/2024    K 4.8 06/04/2024    CL 96 (L) 06/04/2024    CO2 25.8 06/04/2024    CALCIUM 9.2 06/04/2024    PROTEINTOT 6.4 06/03/2024    ALBUMIN 3.7 06/03/2024    BILITOT 0.2 06/03/2024    ALKPHOS 98 06/03/2024    AST 8 06/03/2024    ALT 7 06/03/2024     Lab Results   Component Value Date    MG 2.0 06/04/2024    PHOS 2.9 06/04/2024    FREET4 1.08 06/04/2024    TSH 0.191 (L) 06/04/2024     Labs personally reviewed. Sodium low but improved    Discharge summary personally reviewed         CT Head With & Without Contrast    Result Date: " 6/13/2024  Impression: 1.Hypodensity anterior limb internal capsule on the left which while nonspecific might reflect more chronic small vessel ischemic change. 2.No definite for metastatic disease on this exam. Depending on concern MR of the brain might be of benefit to reassess this patient. Electronically Signed: Parker Mcbride MD  6/13/2024 11:56 AM EDT  Workstation ID: [id number]    CT Chest Without Contrast Diagnostic    Result Date: 6/3/2024  Impression: 1.  Right middle lobe nodules and mediastinal and right hilar lymphadenopathy has progressed from prior CT, and remains concerning for malignancy. 2.  Partial right middle lobe atelectasis. 3.  Moderate emphysema.     Electronically Signed By-Isma Roche MD On:6/3/2024 11:56 AM      XR Chest 1 View    Result Date: 6/2/2024  Impression: 1.  Increasing bibasilar airspace opacity. Finding is suspected to represent superimposed pneumonia and scarring/atelectasis.   Electronically Signed By-Quinton Bennett MD On:6/2/2024 4:23 PM           Assessment and Plan    Diagnoses and all orders for this visit:    1. Small cell lung cancer (Primary)  -     Ambulatory Referral to General Surgery  -     LORazepam (ATIVAN) 0.5 MG tablet; Take 1 tablet by mouth Every 8 (Eight) Hours As Needed for Anxiety.  Dispense: 30 tablet; Refill: 0  -     Ambulatory Referral to ONC Social Work    2. Anxiety    Other orders  -     valACYclovir (VALTREX) 1000 MG tablet; Take 1 tablet by mouth 2 (Two) Times a Day for 10 days.  Dispense: 20 tablet; Refill: 0        Small cell lung cancer  RML with right hilar and mediastinal adenopathy on CT chest.  Bronchoscopy with positive small cell pathology at station 4R and station 7.  Patient has PET scan pending for definitive staging. Scheduled for 6/19/24.  CT head with and without without any intracranial mets.  Unable to do MRI due to claustrophobia and anxiety.  Discussed that small cell lung cancer is treated based off stage.  Limited stage is  treated with combination chemotherapy and radiation.  Extensive stage is treated with chemoimmunotherapy. Due to meet with radiation oncology soon.     If limited stage confirmed, plan for combination Cisplatin and Etoposide at 60 mg/m2 and 120 mg/m2 respectively. Given IV every 3 weeks for 3 to 4 cycles.  Benefit, risk, side effect profile of these medications discussed in detail.  Consent obtained.  As needed and scheduled antiemetics provided.  If extensive stage is confirmed, plan will be for carboplatin, etoposide and durvalumab every 3 weeks.    Port referral has been made.  Plan to see patient back after PET scan for finalization of plan.  Will attempt to start chemotherapy soon as possible.    Anxiety  Present at baseline. Ordered PRN ativan 0.5 mg.     Shingles  Valacyclovir 1 g TID x 10 days     I spent 60 minutes caring for Llvuia on this date of service. This time includes time spent by me in the following activities:preparing for the visit, reviewing tests, obtaining and/or reviewing a separately obtained history, performing a medically appropriate examination and/or evaluation , counseling and educating the patient/family/caregiver, ordering medications, tests, or procedures, referring and communicating with other health care professionals , documenting information in the medical record, independently interpreting results and communicating that information with the patient/family/caregiver, and care coordination    Patient Follow Up: After PET  Patient was given instructions and counseling regarding her condition or for health maintenance advice. Please see specific information pulled into the AVS if appropriate.

## 2024-06-17 ENCOUNTER — READMISSION MANAGEMENT (OUTPATIENT)
Dept: CALL CENTER | Facility: HOSPITAL | Age: 59
End: 2024-06-17
Payer: MEDICARE

## 2024-06-17 ENCOUNTER — OFFICE VISIT (OUTPATIENT)
Dept: SURGERY | Facility: CLINIC | Age: 59
End: 2024-06-17
Payer: MEDICARE

## 2024-06-17 VITALS
SYSTOLIC BLOOD PRESSURE: 89 MMHG | WEIGHT: 224 LBS | HEIGHT: 64 IN | DIASTOLIC BLOOD PRESSURE: 72 MMHG | BODY MASS INDEX: 38.24 KG/M2

## 2024-06-17 DIAGNOSIS — C34.90 SMALL CELL LUNG CANCER: Primary | ICD-10-CM

## 2024-06-17 PROCEDURE — 3078F DIAST BP <80 MM HG: CPT | Performed by: SURGERY

## 2024-06-17 PROCEDURE — 99203 OFFICE O/P NEW LOW 30 MIN: CPT | Performed by: SURGERY

## 2024-06-17 PROCEDURE — 1159F MED LIST DOCD IN RCRD: CPT | Performed by: SURGERY

## 2024-06-17 PROCEDURE — 3074F SYST BP LT 130 MM HG: CPT | Performed by: SURGERY

## 2024-06-17 PROCEDURE — 1160F RVW MEDS BY RX/DR IN RCRD: CPT | Performed by: SURGERY

## 2024-06-17 RX ORDER — SODIUM CHLORIDE 9 MG/ML
40 INJECTION, SOLUTION INTRAVENOUS AS NEEDED
Status: CANCELLED | OUTPATIENT
Start: 2024-06-17

## 2024-06-17 RX ORDER — SODIUM CHLORIDE 0.9 % (FLUSH) 0.9 %
10 SYRINGE (ML) INJECTION EVERY 12 HOURS SCHEDULED
Status: CANCELLED | OUTPATIENT
Start: 2024-06-17

## 2024-06-17 RX ORDER — ONDANSETRON 2 MG/ML
4 INJECTION INTRAMUSCULAR; INTRAVENOUS EVERY 6 HOURS PRN
Status: CANCELLED | OUTPATIENT
Start: 2024-06-17

## 2024-06-17 RX ORDER — SODIUM CHLORIDE 0.9 % (FLUSH) 0.9 %
10 SYRINGE (ML) INJECTION AS NEEDED
Status: CANCELLED | OUTPATIENT
Start: 2024-06-17

## 2024-06-17 RX ORDER — SODIUM CHLORIDE, SODIUM LACTATE, POTASSIUM CHLORIDE, CALCIUM CHLORIDE 600; 310; 30; 20 MG/100ML; MG/100ML; MG/100ML; MG/100ML
70 INJECTION, SOLUTION INTRAVENOUS CONTINUOUS
Status: CANCELLED | OUTPATIENT
Start: 2024-06-17

## 2024-06-17 NOTE — H&P (VIEW-ONLY)
Inpatient History and Physical Surgical Orders    Preadmission Location:   Preadmission Time:  Facility:  Surgery Date:  Surgery Time:  Preadmission Test date:     Chief Complaint  Outpatient History and Physical / Surgical Orders    Primary Care Provider: Aleksandar Edge DO    Referring Provider: Brian Dickson,*    Subjective      Patient Name: Lluvia Archer : 1965    HPI  The patient is a 58-year-old female who unfortunately was recently diagnosed with a small cell lung cancer of the right lung.  She was referred over for port placement for chemotherapy access.    Past History:  Medical History: has a past medical history of Abnormal mammogram, Anxiety, Arthritis, Chronic nausea (01/15/2019), Hernia, hiatal, Hyperlipidemia, Hypertension, Knee pain, right (2018), Memory loss, Migraine headache, Moderate episode of recurrent major depressive disorder (2017), Night sweats, Sciatica of right side (2017), Severe episode of recurrent major depressive disorder, without psychotic features (10/22/2019), Shoulder pain, left (2018), and Sleep apnea, unspecified.   Surgical History: has a past surgical history that includes  section; Cholecystectomy; Colonoscopy; Bronchoscopy (N/A, 2022); Total hip arthroplasty (Right, 2022); and Bronchoscopy (N/A, 2024).   Family History: family history includes Arthritis in her mother; Cancer in an other family member; Heart disease in her father and another family member; Hypertension in an other family member; Other in her mother.   Social History: reports that she has been smoking cigarettes. She started smoking about 42 years ago. She has a 84.9 pack-year smoking history. She has been exposed to tobacco smoke. She has never used smokeless tobacco. She reports that she does not drink alcohol and does not use drugs.  Allergies: Patient has no known allergies.       Current Outpatient Medications:     albuterol  sulfate  (90 Base) MCG/ACT inhaler, Inhale 2 puffs Every 4 (Four) Hours As Needed for Wheezing or Shortness of Air., Disp: 18 g, Rfl: 5    atorvastatin (LIPITOR) 10 MG tablet, TAKE ONE TABLET BY MOUTH DAILY AT 9 PM EVERY NIGHT (Patient taking differently: Take 1 tablet by mouth Every Night.), Disp: 30 tablet, Rfl: 11    buPROPion SR (WELLBUTRIN SR) 150 MG 12 hr tablet, TAKE ONE TABLET BY MOUTH TWICE DAILY @ 9AM & 5PM (Patient taking differently: Take 1 tablet by mouth 2 (Two) Times a Day.), Disp: 60 tablet, Rfl: 11    Cariprazine HCl (Vraylar) 1.5 MG capsule capsule, Take 1 capsule by mouth Daily., Disp: , Rfl:     famotidine (PEPCID) 40 MG tablet, TAKE ONE TABLET BY MOUTH TWICE DAILY @ 9AM & 5PM (Patient taking differently: Take 1 tablet by mouth 2 (Two) Times a Day.), Disp: 180 tablet, Rfl: 3    Fluticasone-Umeclidin-Vilant (Trelegy Ellipta) 100-62.5-25 MCG/ACT inhaler, Inhale 1 puff Daily for 30 days. Rinse mouth out after each use, Disp: 1 each, Rfl: 5    ipratropium-albuterol (DUO-NEB) 0.5-2.5 mg/3 ml nebulizer, Take 3 mL by nebulization 4 (Four) Times a Day for 30 days., Disp: 360 mL, Rfl: 5    lisinopril (PRINIVIL,ZESTRIL) 5 MG tablet, TAKE ONE TABLET BY MOUTH DAILY AT 9 AM (Patient taking differently: Take 1 tablet by mouth Daily.), Disp: 30 tablet, Rfl: 11    LORazepam (ATIVAN) 0.5 MG tablet, Take 1 tablet by mouth Every 8 (Eight) Hours As Needed for Anxiety., Disp: 30 tablet, Rfl: 0    nicotine (NICODERM CQ) 21 MG/24HR patch, Place 1 patch on the skin as directed by provider Daily., Disp: 30 patch, Rfl: 0    nystatin (MYCOSTATIN) 100,000 unit/mL suspension, Swish and swallow 5 mL 4 (Four) Times a Day., Disp: 60 mL, Rfl: 1    omeprazole (priLOSEC) 40 MG capsule, Take 1 capsule by mouth Daily., Disp: , Rfl:     valACYclovir (VALTREX) 1000 MG tablet, Take 1 tablet by mouth 2 (Two) Times a Day for 10 days., Disp: 20 tablet, Rfl: 0       Objective   Vital Signs:   BP (!) 89/72 Comment: Patient stated she  "is feeling tired. I advised to call her PCP since her blood pressure is low.  Ht 162.6 cm (64\")   Wt 102 kg (224 lb)   BMI 38.45 kg/m²       Physical Exam  Vitals and nursing note reviewed.   Constitutional:       Appearance: Normal appearance. The patient is well-developed.   Cardiovascular:      Rate and Rhythm: Normal rate and regular rhythm.   Pulmonary:      Effort: Pulmonary effort is normal.      Breath sounds: Normal air entry.   Abdominal:      General: Bowel sounds are normal.      Palpations: Abdomen is soft.      Skin:     General: Skin is warm and dry.   Neurological:      Mental Status: The patient is alert and oriented to person, place, and time.      Motor: Motor function is intact.   Psychiatric:         Mood and Affect: Mood normal.       Result Review :               Assessment and Plan   Diagnoses and all orders for this visit:    1. Small cell lung cancer (Primary)  -     Case Request; Standing  -     Follow Anesthesia Guidelines / Protocol; Standing  -     Verify NPO Status; Standing  -     Obtain Informed Consent; Standing  -     Verify / Perform Chlorhexidine Skin Prep; Standing  -     Insert Peripheral IV; Standing  -     Saline Lock & Maintain IV Access; Standing  -     sodium chloride 0.9 % flush 10 mL  -     sodium chloride 0.9 % flush 10 mL  -     sodium chloride 0.9 % infusion 40 mL  -     lactated ringers infusion  -     Place Sequential Compression Device; Standing  -     Maintain Sequential Compression Device; Standing  -     ondansetron (ZOFRAN) injection 4 mg  -     ceFAZolin (ANCEF) 2,000 mg in sodium chloride 0.9 % 100 mL IVPB  -     Case Request    We will schedule her for a placement of a central venous access port.  I have described the procedure to her as well as the risk and benefits and she is agreeable to proceeding.    I  Josh Patton MD  06/17/2024    "

## 2024-06-17 NOTE — OUTREACH NOTE
COPD/PN Week 2 Survey      Flowsheet Row Responses   [facility] facility patient discharged from? [facility]   Does the patient have one of the following disease processes/diagnoses(primary or secondary)? Pneumonia   Week 2 attempt successful? No   Unsuccessful attempts Attempt 1            Arpit GEORGE - Registered Nurse

## 2024-06-18 ENCOUNTER — TELEPHONE (OUTPATIENT)
Dept: SURGERY | Facility: CLINIC | Age: 59
End: 2024-06-18
Payer: MEDICARE

## 2024-06-18 NOTE — NURSING NOTE
PT REPORTS SHE IS EXPERIENCING AN OUTBREAK OF SHINGLES, STARTED 6/11/24 SAW PCP AND PUT ON ANTIVIRAL, RASH PERSIST, NO SCABS/DRAINAGE. PT REPORTS SOME FATIGUE AND LOW BLOODPRESSURE AT DR. GAMBOA OFFICE YESTERDAY. WILL BE CALLING HER PCP TODAY. 6/4/234 WBC ELEVATED 13.0 STATES SHE WAS IN HOSP AND RECOVERING FROM PNEUMONIA. S/S IMPROVING, NO FEVER. DR. OCONNOR'S OFFICE CALLED, LM WITH WILLIAN REGARDING ABOVE. AWAITING ANY FURTHER ORDERS.

## 2024-06-18 NOTE — TELEPHONE ENCOUNTER
CAM FROM [facility] CALLED STATING PATIENT WANTS TO MAKE SURE OK TO GO TO ONCOLOGY APPOINTMENT THE SAME DAY AS PORT PLACEMENT. SHE IS SCHEDULED FOR CT SCAN THE DAY BEFORE. SHE WAS ALSO DIAGNOSED WITH SHINGLES ON THE 11 TH AND IS CURRENTLY TAKING ANTI VIRAL MEDICATION FOR THAT. SHE DOES NOT HAVE ANY OPEN LESIONS. CAN YOU CALL PATIENT TO VERIFY INFORMATION ABOUT APPOINTMENTS WITH HER?

## 2024-06-18 NOTE — PRE-PROCEDURE INSTRUCTIONS
PATIENT INSTRUCTED TO BE:    - NOTHING TO EAT AFTER MIDNIGHT OR CHEW, EXCEPT CAN HAVE CLEAR LIQUIDS 2 HOURS PRIOR TO SURGERY ARRIVAL TIME , NO MORE THAN 8 OZ. (NOTHING RED)     - TO HOLD ALL VITAMINS, SUPPLEMENTS, NSAIDS FOR ONE WEEK PRIOR TO THEIR SURGICAL PROCEDURE    - DO NOT TAKE ______NA________________ 7 DAYS PRIOR TO PROCEDURE PER ANESTHESIA RECOMMENDATIONS/INSTRUCTIONS     - INSTRUCTED PT TO USE SURGICAL SOAP 1 TIME THE NIGHT PRIOR TO SURGERY  USE THE SOAP FROM NECK TO TOES, AVOID THEIR FACE, HAIR, AND PRIVATE PARTS. IF USE THE SOAP THE NIGHT PRIOR TO SURGERY, CHANGE BED LINENS AND NO PETS IN THE BED.     INSTRUCTED NO LOTIONS, JEWELRY, PIERCINGS,  NAIL POLISH, OR DEODORANT DAY OF SURGERY    - IF DIABETIC, CHECK BLOOD GLUCOSE IF LESS THAN 70 OR HAVING SYMPTOMS CALL THE PREOP AREA FOR INSTRUCTIONS ON AM OF SURGERY ([phone]) NA    -INSTRUCTED TO TAKE THE FOLLOWING MEDICATIONS THE DAY OF SURGERY WITH SIPS OF WATER: ALBUTEROL INH/NEB AS NEEDED, BUPROPRION, PEPCID, VALACYCLOVIR          - DO NOT BRING ANY MEDICATIONS WITH YOU TO THE HOSPITAL THE DAY OF SURGERY, EXCEPT IF USE INHALERS. BRING INHALERS DAY OF SURGERY       - BRING CPAP OR BIPAP TO THE HOSPITAL ONLY IF YOU ARE SPENDING THE NIGHT NA    - DO NOT SMOKE OR VAPE 24 HOURS PRIOR TO PROCEDURE PER ANESTHESIA REQUEST !!!!!!!!!    -MAKE SURE YOU HAVE A RIDE HOME OR SOMEONE TO STAY WITH YOU THE DAY OF THE PROCEDURE AFTER YOU GO HOME     - FOLLOW ANY OTHER INSTRUCTIONS GIVEN TO YOU BY YOUR SURGEON'S OFFICE.     - DAY OF SURGERY COME TO [facility]/ [facility], FIRST FLOOR. CHECK IN AT THE DESK FOR REGISTRATION/SURGERY    - YOU WILL RECEIVE A PHONE CALL THE DAY PRIOR TO SURGERY BETWEEN 1PM AND 4 PM WITH ARRIVAL TIME, IF YOUR SURGERY IS ON A MONDAY YOU WILL RECEIVE A CALL THE FRIDAY PRIOR TO SURGERY DATE    - BRING CASH OR CREDIT CARD FOR COPAYMENT OF MEDICATIONS AFTER SURGERY IF YOU USE THE HOSPITAL PHARMACY (MEDS TO BED)    - PREADMISSION  TESTING NURSE TACO -[phone] IF HAVE ANY QUESTIONS     -PATIENT PROVIDED THE NUMBER FOR PRE[facility] SURGICAL DEPT IF HAD QUESTIONS AFTER HOURS PRIOR TO SURGERY ([phone])  INFORMED PT IF NO ANSWER, LEAVE A MESSAGE AND SOMEONE WILL RETURN THEIR CALL       PATIENT VERBALIZED UNDERSTANDING

## 2024-06-19 ENCOUNTER — HOSPITAL ENCOUNTER (OUTPATIENT)
Dept: PET IMAGING | Facility: HOSPITAL | Age: 59
Discharge: HOME | End: 2024-06-19
Payer: MEDICARE

## 2024-06-19 ENCOUNTER — ANESTHESIA EVENT (OUTPATIENT)
Dept: PERIOP | Facility: HOSPITAL | Age: 59
End: 2024-06-19
Payer: MEDICARE

## 2024-06-19 DIAGNOSIS — R91.8 LUNG NODULES: ICD-10-CM

## 2024-06-19 PROCEDURE — 78815 PET IMAGE W/CT SKULL-THIGH: CPT

## 2024-06-19 PROCEDURE — A9552 F18 FDG: HCPCS | Performed by: NURSE PRACTITIONER

## 2024-06-19 PROCEDURE — 0 FLUDEOXYGLUCOSE F18 SOLUTION: Performed by: NURSE PRACTITIONER

## 2024-06-19 RX ADMIN — FLUDEOXYGLUCOSE F 18 1 DOSE: 200 INJECTION, SOLUTION INTRAVENOUS at 15:00

## 2024-06-20 ENCOUNTER — HOSPITAL ENCOUNTER (OUTPATIENT)
Facility: HOSPITAL | Age: 59
Setting detail: HOSPITAL OUTPATIENT SURGERY
Discharge: HOME | End: 2024-06-20
Attending: SURGERY | Admitting: SURGERY
Payer: MEDICARE

## 2024-06-20 ENCOUNTER — APPOINTMENT (OUTPATIENT)
Dept: GENERAL RADIOLOGY | Facility: HOSPITAL | Age: 59
End: 2024-06-20
Payer: MEDICARE

## 2024-06-20 ENCOUNTER — ANESTHESIA (OUTPATIENT)
Dept: PERIOP | Facility: HOSPITAL | Age: 59
End: 2024-06-20
Payer: MEDICARE

## 2024-06-20 ENCOUNTER — OFFICE VISIT (OUTPATIENT)
Dept: ONCOLOGY | Facility: HOSPITAL | Age: 59
End: 2024-06-20
Payer: MEDICARE

## 2024-06-20 ENCOUNTER — HOSPITAL ENCOUNTER (OUTPATIENT)
Dept: GENERAL RADIOLOGY | Facility: HOSPITAL | Age: 59
Setting detail: HOSPITAL OUTPATIENT SURGERY
Discharge: HOME | End: 2024-06-20
Payer: MEDICARE

## 2024-06-20 VITALS
RESPIRATION RATE: 16 BRPM | BODY MASS INDEX: 38.39 KG/M2 | WEIGHT: 224.87 LBS | OXYGEN SATURATION: 94 % | TEMPERATURE: 97.2 F | DIASTOLIC BLOOD PRESSURE: 90 MMHG | SYSTOLIC BLOOD PRESSURE: 109 MMHG | HEART RATE: 104 BPM | HEIGHT: 64 IN

## 2024-06-20 VITALS
RESPIRATION RATE: 20 BRPM | DIASTOLIC BLOOD PRESSURE: 65 MMHG | BODY MASS INDEX: 38.32 KG/M2 | WEIGHT: 224.43 LBS | SYSTOLIC BLOOD PRESSURE: 91 MMHG | TEMPERATURE: 98.1 F | OXYGEN SATURATION: 93 % | HEIGHT: 64 IN | HEART RATE: 86 BPM

## 2024-06-20 DIAGNOSIS — C34.90 SMALL CELL LUNG CANCER: Primary | ICD-10-CM

## 2024-06-20 DIAGNOSIS — M79.2 NEUROPATHIC PAIN: ICD-10-CM

## 2024-06-20 DIAGNOSIS — C34.90 SMALL CELL LUNG CANCER: ICD-10-CM

## 2024-06-20 DIAGNOSIS — F41.9 ANXIETY: ICD-10-CM

## 2024-06-20 PROCEDURE — 71045 X-RAY EXAM CHEST 1 VIEW: CPT

## 2024-06-20 PROCEDURE — 25010000002 PROPOFOL 500 MG/50ML EMULSION

## 2024-06-20 PROCEDURE — 25810000003 LACTATED RINGERS PER 1000 ML: Performed by: SURGERY

## 2024-06-20 PROCEDURE — 25010000002 CEFAZOLIN PER 500 MG: Performed by: SURGERY

## 2024-06-20 PROCEDURE — 77001 FLUOROGUIDE FOR VEIN DEVICE: CPT

## 2024-06-20 PROCEDURE — 25010000002 HEPARIN (PORCINE) PER 1000 UNITS: Performed by: SURGERY

## 2024-06-20 PROCEDURE — C1788 PORT, INDWELLING, IMP: HCPCS | Performed by: SURGERY

## 2024-06-20 PROCEDURE — 25010000002 ONDANSETRON PER 1 MG: Performed by: SURGERY

## 2024-06-20 PROCEDURE — 36561 INSERT TUNNELED CV CATH: CPT | Performed by: SURGERY

## 2024-06-20 PROCEDURE — 76937 US GUIDE VASCULAR ACCESS: CPT | Performed by: SURGERY

## 2024-06-20 PROCEDURE — 25010000002 MIDAZOLAM PER 1MG: Performed by: ANESTHESIOLOGY

## 2024-06-20 PROCEDURE — 25010000002 BUPIVACAINE (PF) 0.25 % SOLUTION: Performed by: SURGERY

## 2024-06-20 PROCEDURE — 25010000002 PROPOFOL 10 MG/ML EMULSION

## 2024-06-20 PROCEDURE — 77001 FLUOROGUIDE FOR VEIN DEVICE: CPT | Performed by: SURGERY

## 2024-06-20 PROCEDURE — 25010000002 FENTANYL CITRATE (PF) 50 MCG/ML SOLUTION

## 2024-06-20 PROCEDURE — 76000 FLUOROSCOPY <1 HR PHYS/QHP: CPT

## 2024-06-20 DEVICE — POWERPORT CLEARVUE ISP IMPLANTABLE PORT WITH ATTACHABLE 8F POLYURETHANE OPEN-ENDED SINGLE-LUMEN VENOUS CATHETER PROCEDURAL KIT
Type: IMPLANTABLE DEVICE | Site: INTERNAL JUGULAR | Status: FUNCTIONAL
Brand: POWERPORT CLEARVUE

## 2024-06-20 RX ORDER — IBUPROFEN 600 MG/1
600 TABLET ORAL EVERY 6 HOURS PRN
Status: DISCONTINUED | OUTPATIENT
Start: 2024-06-20 | End: 2024-06-20 | Stop reason: HOSPADM

## 2024-06-20 RX ORDER — OXYCODONE HYDROCHLORIDE 5 MG/1
5 TABLET ORAL
Status: DISCONTINUED | OUTPATIENT
Start: 2024-06-20 | End: 2024-06-20 | Stop reason: HOSPADM

## 2024-06-20 RX ORDER — PROMETHAZINE HYDROCHLORIDE 25 MG/1
25 SUPPOSITORY RECTAL ONCE AS NEEDED
Status: DISCONTINUED | OUTPATIENT
Start: 2024-06-20 | End: 2024-06-20 | Stop reason: HOSPADM

## 2024-06-20 RX ORDER — SODIUM CHLORIDE 9 MG/ML
40 INJECTION, SOLUTION INTRAVENOUS AS NEEDED
Status: DISCONTINUED | OUTPATIENT
Start: 2024-06-20 | End: 2024-06-20 | Stop reason: HOSPADM

## 2024-06-20 RX ORDER — FENTANYL CITRATE 50 UG/ML
INJECTION, SOLUTION INTRAMUSCULAR; INTRAVENOUS AS NEEDED
Status: DISCONTINUED | OUTPATIENT
Start: 2024-06-20 | End: 2024-06-20 | Stop reason: SURG

## 2024-06-20 RX ORDER — EPHEDRINE SULFATE 50 MG/ML
INJECTION INTRAVENOUS AS NEEDED
Status: DISCONTINUED | OUTPATIENT
Start: 2024-06-20 | End: 2024-06-20 | Stop reason: SURG

## 2024-06-20 RX ORDER — PHENYLEPHRINE HCL IN 0.9% NACL 1 MG/10 ML
SYRINGE (ML) INTRAVENOUS AS NEEDED
Status: DISCONTINUED | OUTPATIENT
Start: 2024-06-20 | End: 2024-06-20 | Stop reason: SURG

## 2024-06-20 RX ORDER — PROMETHAZINE HYDROCHLORIDE 12.5 MG/1
25 TABLET ORAL ONCE AS NEEDED
Status: DISCONTINUED | OUTPATIENT
Start: 2024-06-20 | End: 2024-06-20 | Stop reason: HOSPADM

## 2024-06-20 RX ORDER — SODIUM CHLORIDE, SODIUM LACTATE, POTASSIUM CHLORIDE, CALCIUM CHLORIDE 600; 310; 30; 20 MG/100ML; MG/100ML; MG/100ML; MG/100ML
70 INJECTION, SOLUTION INTRAVENOUS CONTINUOUS
Status: DISCONTINUED | OUTPATIENT
Start: 2024-06-20 | End: 2024-06-20 | Stop reason: HOSPADM

## 2024-06-20 RX ORDER — BUPIVACAINE HYDROCHLORIDE 2.5 MG/ML
INJECTION, SOLUTION EPIDURAL; INFILTRATION; INTRACAUDAL AS NEEDED
Status: DISCONTINUED | OUTPATIENT
Start: 2024-06-20 | End: 2024-06-20 | Stop reason: HOSPADM

## 2024-06-20 RX ORDER — ONDANSETRON 2 MG/ML
4 INJECTION INTRAMUSCULAR; INTRAVENOUS ONCE AS NEEDED
Status: COMPLETED | OUTPATIENT
Start: 2024-06-20 | End: 2024-06-20

## 2024-06-20 RX ORDER — ACETAMINOPHEN 500 MG
1000 TABLET ORAL ONCE
Status: COMPLETED | OUTPATIENT
Start: 2024-06-20 | End: 2024-06-20

## 2024-06-20 RX ORDER — ONDANSETRON 2 MG/ML
4 INJECTION INTRAMUSCULAR; INTRAVENOUS EVERY 6 HOURS PRN
Status: DISCONTINUED | OUTPATIENT
Start: 2024-06-20 | End: 2024-06-20 | Stop reason: HOSPADM

## 2024-06-20 RX ORDER — MIDAZOLAM HYDROCHLORIDE 2 MG/2ML
1 INJECTION, SOLUTION INTRAMUSCULAR; INTRAVENOUS ONCE
Status: COMPLETED | OUTPATIENT
Start: 2024-06-20 | End: 2024-06-20

## 2024-06-20 RX ORDER — SODIUM CHLORIDE 0.9 % (FLUSH) 0.9 %
10 SYRINGE (ML) INJECTION EVERY 12 HOURS SCHEDULED
Status: DISCONTINUED | OUTPATIENT
Start: 2024-06-20 | End: 2024-06-20 | Stop reason: HOSPADM

## 2024-06-20 RX ORDER — VALACYCLOVIR HYDROCHLORIDE 1 G/1
1000 TABLET, FILM COATED ORAL 2 TIMES DAILY
Qty: 20 TABLET | Refills: 0 | Status: SHIPPED | OUTPATIENT
Start: 2024-06-20 | End: 2024-06-30

## 2024-06-20 RX ORDER — ONDANSETRON 4 MG/1
4 TABLET, ORALLY DISINTEGRATING ORAL ONCE AS NEEDED
Status: DISCONTINUED | OUTPATIENT
Start: 2024-06-20 | End: 2024-06-20 | Stop reason: HOSPADM

## 2024-06-20 RX ORDER — SODIUM CHLORIDE 0.9 % (FLUSH) 0.9 %
10 SYRINGE (ML) INJECTION AS NEEDED
Status: DISCONTINUED | OUTPATIENT
Start: 2024-06-20 | End: 2024-06-20 | Stop reason: HOSPADM

## 2024-06-20 RX ORDER — SODIUM CHLORIDE, SODIUM LACTATE, POTASSIUM CHLORIDE, CALCIUM CHLORIDE 600; 310; 30; 20 MG/100ML; MG/100ML; MG/100ML; MG/100ML
9 INJECTION, SOLUTION INTRAVENOUS CONTINUOUS PRN
Status: DISCONTINUED | OUTPATIENT
Start: 2024-06-20 | End: 2024-06-20 | Stop reason: HOSPADM

## 2024-06-20 RX ORDER — ONDANSETRON 2 MG/ML
4 INJECTION INTRAMUSCULAR; INTRAVENOUS ONCE AS NEEDED
Status: DISCONTINUED | OUTPATIENT
Start: 2024-06-20 | End: 2024-06-20 | Stop reason: HOSPADM

## 2024-06-20 RX ORDER — LIDOCAINE HYDROCHLORIDE 20 MG/ML
INJECTION, SOLUTION EPIDURAL; INFILTRATION; INTRACAUDAL; PERINEURAL AS NEEDED
Status: DISCONTINUED | OUTPATIENT
Start: 2024-06-20 | End: 2024-06-20 | Stop reason: SURG

## 2024-06-20 RX ORDER — GABAPENTIN 300 MG/1
300 CAPSULE ORAL 3 TIMES DAILY
Qty: 90 CAPSULE | Refills: 0 | Status: SHIPPED | OUTPATIENT
Start: 2024-06-20

## 2024-06-20 RX ORDER — HYDROCODONE BITARTRATE AND ACETAMINOPHEN 5; 325 MG/1; MG/1
1 TABLET ORAL ONCE AS NEEDED
Status: DISCONTINUED | OUTPATIENT
Start: 2024-06-20 | End: 2024-06-20 | Stop reason: HOSPADM

## 2024-06-20 RX ORDER — PROPOFOL 10 MG/ML
INJECTION, EMULSION INTRAVENOUS AS NEEDED
Status: DISCONTINUED | OUTPATIENT
Start: 2024-06-20 | End: 2024-06-20 | Stop reason: SURG

## 2024-06-20 RX ORDER — TRAMADOL HYDROCHLORIDE 50 MG/1
50 TABLET ORAL EVERY 6 HOURS PRN
Qty: 10 TABLET | Refills: 0 | Status: SHIPPED | OUTPATIENT
Start: 2024-06-20 | End: 2024-07-01

## 2024-06-20 RX ADMIN — Medication 200 MCG: at 08:08

## 2024-06-20 RX ADMIN — FENTANYL CITRATE 25 MCG: 50 INJECTION, SOLUTION INTRAMUSCULAR; INTRAVENOUS at 08:13

## 2024-06-20 RX ADMIN — EPHEDRINE SULFATE 10 MG: 50 INJECTION INTRAVENOUS at 07:55

## 2024-06-20 RX ADMIN — EPHEDRINE SULFATE 5 MG: 50 INJECTION INTRAVENOUS at 08:18

## 2024-06-20 RX ADMIN — LIDOCAINE HYDROCHLORIDE 60 MG: 20 INJECTION, SOLUTION EPIDURAL; INFILTRATION; INTRACAUDAL; PERINEURAL at 07:25

## 2024-06-20 RX ADMIN — FENTANYL CITRATE 25 MCG: 50 INJECTION, SOLUTION INTRAMUSCULAR; INTRAVENOUS at 07:34

## 2024-06-20 RX ADMIN — Medication 200 MCG: at 08:04

## 2024-06-20 RX ADMIN — FENTANYL CITRATE 25 MCG: 50 INJECTION, SOLUTION INTRAMUSCULAR; INTRAVENOUS at 07:28

## 2024-06-20 RX ADMIN — EPHEDRINE SULFATE 10 MG: 50 INJECTION INTRAVENOUS at 07:44

## 2024-06-20 RX ADMIN — ONDANSETRON 4 MG: 2 INJECTION INTRAMUSCULAR; INTRAVENOUS at 09:10

## 2024-06-20 RX ADMIN — PROPOFOL 60 MG: 10 INJECTION, EMULSION INTRAVENOUS at 07:25

## 2024-06-20 RX ADMIN — FENTANYL CITRATE 25 MCG: 50 INJECTION, SOLUTION INTRAMUSCULAR; INTRAVENOUS at 07:51

## 2024-06-20 RX ADMIN — PROPOFOL 125 MCG/KG/MIN: 10 INJECTION, EMULSION INTRAVENOUS at 07:25

## 2024-06-20 RX ADMIN — MIDAZOLAM HYDROCHLORIDE 1 MG: 1 INJECTION, SOLUTION INTRAMUSCULAR; INTRAVENOUS at 07:08

## 2024-06-20 RX ADMIN — Medication 200 MCG: at 08:20

## 2024-06-20 RX ADMIN — SODIUM CHLORIDE, POTASSIUM CHLORIDE, SODIUM LACTATE AND CALCIUM CHLORIDE 70 ML/HR: 600; 310; 30; 20 INJECTION, SOLUTION INTRAVENOUS at 07:00

## 2024-06-20 RX ADMIN — SODIUM CHLORIDE 2000 MG: 9 INJECTION, SOLUTION INTRAVENOUS at 07:32

## 2024-06-20 RX ADMIN — ACETAMINOPHEN 1000 MG: 500 TABLET ORAL at 07:00

## 2024-06-20 RX ADMIN — LIDOCAINE HYDROCHLORIDE 40 MG: 20 INJECTION, SOLUTION EPIDURAL; INFILTRATION; INTRACAUDAL; PERINEURAL at 07:51

## 2024-06-20 NOTE — OP NOTE
INSERTION OF PORTACATH  Procedure Report    Patient Name:  Lluvia Archer  YOB: 1965    Date of Surgery:  6/20/2024     Indications: The patient is a 58-year-old female who presented with a small cell lung cancer.  The decision was made to proceed with a port placement for chemotherapy access.    Pre-op Diagnosis: Small cell lung cancer    Post-Op Diagnosis: Same    Procedure/CPT® Codes:    Placement of central venous access port    Staff:  Surgeon(s):  Josh Patton MD    Assistant: Helen Thorpe CRNFA    Anesthesia: Monitored Anesthesia Care    Estimated Blood Loss: 1 mL    Implants:    Implant Name Type Inv. Item Serial No.  Lot No. LRB No. Used Action   KT PRT POWERPORT CLEARVUE MOD/BUMP INTERMD 8F 45CM - [id number] Implant KT PRT POWERPORT CLEARVUE MOD/BUMP INTERMD 8F 45CM  [employer] [id number] Left 1 Implanted       Specimen:          None        Findings: None    Complications: None    Description of Procedure: The patient was taken to the operating room and placed on the table in supine position.  After administration of MAC anesthesia both sides of her neck and chest were prepped and draped sterilely.  We examined the right side of the neck initially with an ultrasound.  We identified the internal jugular vein but it was relatively small and seem to collapse easily.  I anesthetized the skin overlying the vein with quarter percent Marcaine and made attempts at trying to access that vein with an 18-gauge needle.  I was never able to successfully access the vein and given its size at this point I did not think I would be able to do do a placement in that location.  We anesthetized the skin in the right upper chest and attempted a right subclavian approach.  However after passing the needle a few times below the collarbone I was not able to access the right subclavian vein.  We examined the left side of the neck with the ultrasound and identified a larger  internal jugular vein on that side.  We anesthetized the skin in the left lower neck with quarter percent Marcaine and then I was able to access that left internal jugular vein with an 18-gauge needle.  A guidewire was advanced through the needle into the superior vena cava under fluoroscopy.  We then anesthetized the skin of the left upper chest with quarter percent Marcaine and made a transverse incision there with a 15 blade scalpel and created an inferior subcutaneous pocket with cautery.  I made a small stab incision at the base of the guidewire and then we tunneled and 8 [place] PowerPort catheter from the chest incision up to the neck incision.  The dilator introducer sheath was advanced over the guidewire and the dilator and guidewire removed.  The catheter was then inserted through the introducer sheath and guided into the superior vena cava under fluoroscopy and then the introducer sheath was broken down and removed.  The line was cut off to the appropriate length and connected to the PowerPort reservoir.  Dark venous blood was aspirated through the port and the port was flushed with concentrated heparin solution.  We placed the reservoir in the pocket and sutured it to the chest wall with interrupted 2-0 Prolene sutures.  Her skin incisions were closed with 3-0 and 4-0 Vicryl subcuticular sutures.  Sterile dressings were applied and she was taken postanesthesia recovery room in stable condition.    Assistant: Helen Thorpe CRNFA  was responsible for performing the following activities: Retraction, Suturing, and Placing Dressing and their skilled assistance was necessary for the success of this case.    Josh Patton MD     Date: 6/20/2024  Time: 08:19 EDT

## 2024-06-20 NOTE — ANESTHESIA PREPROCEDURE EVALUATION
Anesthesia Evaluation     Patient summary reviewed and Nursing notes reviewed   no history of anesthetic complications:   NPO Solid Status: > 8 hours  NPO Liquid Status: > 2 hours           Airway   Mallampati: II  TM distance: >3 FB  No difficulty expected  Dental          Pulmonary    (+) pneumonia resolved , a smoker vape, lung cancer, COPD (on 4L NC as needed, feels breathing is good today),home oxygen (6lpm when active), sleep apnea, decreased breath sounds  Cardiovascular - normal exam  Exercise tolerance: poor (<4 METS)    ECG reviewed  Rhythm: regular  Rate: normal    (+) hypertension, dysrhythmias Tachycardia, GOFF, hyperlipidemia    ROS comment: Echo:  Interpretation Summary    · The left ventricular cavity is borderline dilated.  · Calculated left ventricular EF = 58% Estimated left ventricular EF was in agreement with the calculated left ventricular EF.  · Left ventricular diastolic function is consistent with (grade I) impaired relaxation.  · Aortic valve sclerosis without obstruction to flow.  · Mild aortic valve regurgitation.  · Mild mitral valve regurgitation.    Neuro/Psych  (+) headaches, numbness, psychiatric history  GI/Hepatic/Renal/Endo    (+) obesity, hiatal hernia, GERD well controlled, diabetes mellitus    Musculoskeletal     (+) back pain, radiculopathy Right lower extremity  Abdominal  - normal exam   Substance History - negative use     OB/GYN negative ob/gyn ROS         Other   arthritis,     ROS/Med Hx Other:                         Anesthesia Plan    ASA 4     general   total IV anesthesia    Anesthetic plan, risks, benefits, and alternatives have been provided, discussed and informed consent has been obtained with: patient.    Plan discussed with CRNA.        CODE STATUS:

## 2024-06-20 NOTE — DISCHARGE INSTRUCTIONS
DISCHARGE INSTRUCTIONS  INSERTION OF   PORT-A-CATH      For your surgery you had:  General anesthesia (you may have a sore throat for the first 24 hours)  IV sedation  Local anesthesia  Monitored Anesthesia Care  You received a medicated patch for nausea prevention today (behind your ear). It is recommended that you remove it 24-48 hours post-operatively. It must be removed within 72 hours.   You received an anesthesia medication today that can cause hormonal forms of birth control to be ineffective. You should use a different form of birth control (to prevent pregnancy) for 7 days.   You may experience dizziness, drowsiness, or light-headedness for several hours following surgery/procedure.  Do not stay alone today or tonight.  Limit your activity for 24 hours.  You should not drive, operate machinery, drink alcohol, or sign legally binding documents for 24 hours or while you are taking pain medication.  Resume your diet slowly.  Follow whatever special dietary instructions you may have been given by your doctor.  Last dose of pain medication was given at:   .  NOTIFY YOUR DOCTOR IF YOU EXPERIENCE ANY OF THE FOLLOWING:  Temperature greater than 101 degrees Fahrenheit  Shaking Chills  Redness or excessive drainage from incision  Nausea, vomiting and/opr pain that is not controlled by prescribed medications  Increase in bleeding or bleeding that is excessive  Unable to urinate in 6 hours after surgery  If unable to reach your doctor, please go to the closest Emergency Room [facility]  You may remove your dressing on/in 48 hours.  Wash your hands and cleanse the incision daily with   Alcohol  Soap and Water  You may shower in 48 hours.  Apply an ice pack intermittently for 20 minutes for a total of 24-48 hours.  Do not apply directly to the skin.       Port should be flushed/heparinized once a month (even when not being used).  Keep Port-A-Cath ID Card in your purse of wallet.  Medications per physician  instructions as indicated on Discharge Medication Information Sheet.  You should see    for follow-up care  on   .                                                Phone number:       SPECIAL INSTRUCTIONS:

## 2024-06-20 NOTE — PROGRESS NOTES
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 Archer is a very pleasant 58-year-old female with past medical history of hypertension hyperlipidemia, COPD, osteoarthritis, anxiety who presents for new oncology evaluation with her daughter for diagnosis of small cell lung cancer.  Patient previously had PET scan in September 2023 without any concerning findings.  Follow-up CT chest in February showed multiple right middle lobe nodules with mediastinal and right hilar adenopathy.  Patient was admitted to [facility] on June 2 with shortness of breath, fever, cough.  She was started on treatment for pneumonia.  She was found to be hyponatremic to 126.  Repeat CT chest on 3 Tia showed progression of right middle lobe nodules and mediastinal right hilar lymphadenopathy.  Patient was discharged on 4 June.  She had outpatient bronchoscopy on 7 June with station 4R and station 7 possible small cell carcinoma.  Unable to get MRI due to claustrophobia.  CT head with and without contrast on 13 June did not show any intracranial mets.  PET scan confirmed metastatic disease to bone and right axilla    Interval History  Here for follow up after PET. PET with metastatic cancer to right axilla and bone in multiple areas. Mediastinal adenopathy also progressed. Results discussed. Patient tearful. Dicussed that this is incurable. Radiation to be held. Will proceed with systemic treatment alone. Reports shingles is worsening. On valacyclovir. Appears to be shedding on exam. Reports it does not hurt very much. No fevers, chills. Got port placed. Has received anti-emetics. Wants to start treatment as soon as possible. Plan for Carbo/etop/durvalumab.       Oncology/Hematology History Overview Note   2/20/24: CT Chest:  No PE  or aortic dissection. Multiple right middle lobe nodules are highly suspicious for malignancy.  Additionally, there is mediastinal and right hilar adenopathy now noted.  Follow-up with a PET-CT is recommended. Emphysema with chronic changes in both lungs that otherwise appears stable. Atherosclerotic disease and coronary artery disease.    6/2/24: admitted to [facility] with shortness of breath, fever, cough and started on treatment for pneumonia. Found to have hyponatremia to 126    6/3/24 CT Chest: Right middle lobe nodules and mediastinal and right hilar lymphadenopathy has progressed from prior CT, and remains concerning for malignancy.  Partial right middle lobe atelectasis. Moderate emphysema. Discharged on 6/4/24 with Na of 133.    6/7/24: Bronchoscopy: Station 4R and station 7 positive for small cell carcinoma.    6/13/24: CT head with and without contrast (due to claustrophobia): No mets seen    6/19/24: NM PET: New hypermetabolic nodules within the right middle lobe. There are also multiple new enlarged hypermetabolic mediastinal lymph nodes consistent with metastatic disease. Right axillary mets as well. There are scattered foci of hypermetabolism throughout the bony structures consistent with bone metastases.    Plan for Carbo/Etop/Durva        Small cell lung cancer   6/12/2024 Initial Diagnosis    Small cell lung cancer     6/14/2024 - 6/14/2024 Chemotherapy    OP LUNG CISplatin 60mg/m2 / Etoposide 120mg/m2 + XRT     6/14/2024 Cancer Staged    Staging form: Lung, AJCC 8th Edition  - Clinical: Stage IVB (cT1b, cN2, cM1c) - Signed by Brian Dickson MD on 6/20/2024 6/20/2024 -  Chemotherapy    OP LUNG CARBOplatin AUC=5 / Etoposide / Durvalumab           Review of Systems   Constitutional:  Positive for fatigue. Negative for appetite change, diaphoresis, fever, unexpected weight gain and unexpected weight loss.   HENT:  Negative for hearing loss, sore throat and voice change.    Eyes:  Negative for  blurred vision, double vision, pain, redness and visual disturbance.   Respiratory:  Negative for cough, shortness of breath and wheezing.    Cardiovascular:  Positive for chest pain (pt had her port put in today). Negative for palpitations and leg swelling.   Gastrointestinal:  Positive for nausea.   Endocrine: Negative for cold intolerance, heat intolerance, polydipsia and polyuria.   Genitourinary:  Negative for decreased urine volume, difficulty urinating, frequency and urinary incontinence.   Musculoskeletal:  Negative for arthralgias, back pain, joint swelling and myalgias.   Skin:  Negative for color change, rash, skin lesions and wound.   Neurological:  Negative for dizziness, seizures, numbness and headache.   Hematological:  Negative for adenopathy. Does not bruise/bleed easily.   Psychiatric/Behavioral:  Negative for depressed mood. The patient is not nervous/anxious.    All other systems reviewed and are negative.    Current Outpatient Medications on File Prior to Visit   Medication Sig Dispense Refill    albuterol sulfate  (90 Base) MCG/ACT inhaler Inhale 2 puffs Every 4 (Four) Hours As Needed for Wheezing or Shortness of Air. 18 g 5    atorvastatin (LIPITOR) 10 MG tablet TAKE ONE TABLET BY MOUTH DAILY AT 9 PM EVERY NIGHT (Patient taking differently: Take 1 tablet by mouth Every Night.) 30 tablet 11    buPROPion SR (WELLBUTRIN SR) 150 MG 12 hr tablet TAKE ONE TABLET BY MOUTH TWICE DAILY @ 9AM & 5PM (Patient taking differently: Take 1 tablet by mouth 2 (Two) Times a Day.) 60 tablet 11    Cariprazine HCl (Vraylar) 1.5 MG capsule capsule Take 1 capsule by mouth Every Night.      famotidine (PEPCID) 40 MG tablet TAKE ONE TABLET BY MOUTH TWICE DAILY @ 9AM & 5PM (Patient taking differently: Take 1 tablet by mouth 2 (Two) Times a Day.) 180 tablet 3    Fluticasone-Umeclidin-Vilant (Trelegy Ellipta) 100-62.5-25 MCG/ACT inhaler Inhale 1 puff Daily for 30 days. Rinse mouth out after each use (Patient taking  differently: Inhale 1 puff Daily With Lunch. Rinse mouth out after each use) 1 each 5    ipratropium-albuterol (DUO-NEB) 0.5-2.5 mg/3 ml nebulizer Take 3 mL by nebulization 4 (Four) Times a Day for 30 days. 360 mL 5    lisinopril (PRINIVIL,ZESTRIL) 5 MG tablet TAKE ONE TABLET BY MOUTH DAILY AT 9 AM (Patient taking differently: Take 1 tablet by mouth Daily.) 30 tablet 11    LORazepam (ATIVAN) 0.5 MG tablet Take 1 tablet by mouth Every 8 (Eight) Hours As Needed for Anxiety. 30 tablet 0    nicotine (NICODERM CQ) 21 MG/24HR patch Place 1 patch on the skin as directed by provider Daily. 30 patch 0    nystatin (MYCOSTATIN) 100,000 unit/mL suspension Swish and swallow 5 mL 4 (Four) Times a Day. (Patient taking differently: Swish and swallow 5 mL Daily As Needed.) 60 mL 1    omeprazole (priLOSEC) 40 MG capsule Take 1 capsule by mouth Every Night.      traMADol (ULTRAM) 50 MG tablet Take 1 tablet by mouth Every 6 (Six) Hours As Needed for Moderate Pain. 10 tablet 0    valACYclovir (VALTREX) 1000 MG tablet Take 1 tablet by mouth 2 (Two) Times a Day for 10 days. 20 tablet 0     Current Facility-Administered Medications on File Prior to Visit   Medication Dose Route Frequency Provider Last Rate Last Admin    [COMPLETED] acetaminophen (TYLENOL) tablet 1,000 mg  1,000 mg Oral Once Isma Gomes MD   1,000 mg at 06/20/24 0700    [COMPLETED] ceFAZolin 2000 mg IVPB in 100 mL NS (VTB)  2,000 mg Intravenous Once Josh Patton MD   2,000 mg at 06/20/24 0732    [COMPLETED] Midazolam HCl (PF) (VERSED) injection 1 mg  1 mg Intravenous Once Isma Gomes MD   1 mg at 06/20/24 0708    [COMPLETED] ondansetron (ZOFRAN) injection 4 mg  4 mg Intravenous Once PRN Josh Patton MD   4 mg at 06/20/24 0910    [DISCONTINUED] bupivacaine (PF) (MARCAINE) 0.25 % injection    PRN Josh Patton MD   30 mL at 06/20/24 0819    [DISCONTINUED] HYDROcodone-acetaminophen (NORCO) 5-325 MG per tablet 1 tablet  1 tablet Oral Once PRN Abdi  Josh ADEN MD        [DISCONTINUED] HYDROmorphone (DILAUDID) injection 0.25 mg  0.25 mg Intravenous Q5 Min PRN Liz Archer CRNA        [DISCONTINUED] HYDROmorphone (DILAUDID) injection 0.5 mg  0.5 mg Intravenous Q5 Min PRN Liz Archer CRNA        [DISCONTINUED] HYDROmorphone (DILAUDID) injection 0.5 mg  0.5 mg Intravenous Q30 Min PRN Josh Patton MD        [DISCONTINUED] ibuprofen (ADVIL,MOTRIN) tablet 600 mg  600 mg Oral Q6H PRN Josh Patton MD        [DISCONTINUED] lactated ringers infusion  70 mL/hr Intravenous Continuous Josh Patton MD   Stopped at 06/20/24 0831    [DISCONTINUED] lactated ringers infusion  9 mL/hr Intravenous Continuous PRN Isma Gomes MD        [DISCONTINUED] ondansetron (ZOFRAN) injection 4 mg  4 mg Intravenous Once PRN Liz Archer CRNA        [DISCONTINUED] ondansetron (ZOFRAN) injection 4 mg  4 mg Intravenous Q6H PRN Josh Patton MD        [DISCONTINUED] ondansetron ODT (ZOFRAN-ODT) disintegrating tablet 4 mg  4 mg Oral Once PRN Josh Patton MD        [DISCONTINUED] oxyCODONE (ROXICODONE) immediate release tablet 5 mg  5 mg Oral Q15 Min PRN Liz Archer CRNA        [DISCONTINUED] promethazine (PHENERGAN) suppository 25 mg  25 mg Rectal Once PRN Liz Archer CRNA        [DISCONTINUED] promethazine (PHENERGAN) tablet 25 mg  25 mg Oral Once PRN Liz Archer CRNA        [DISCONTINUED] sodium chloride 0.9 % flush 10 mL  10 mL Intravenous Q12H Josh Patton MD        [DISCONTINUED] sodium chloride 0.9 % flush 10 mL  10 mL Intravenous PRN Josh Patton MD        [DISCONTINUED] sodium chloride 0.9 % infusion 40 mL  40 mL Intravenous PRN Josh Patton MD        [DISCONTINUED] sodium chloride 500 mL with heparin (porcine) 5000 UNIT/ML 10,000 Units mixture    PRN Josh Patton MD   Given at 06/20/24 0819       No Known Allergies  Past Medical History:   Diagnosis Date    Abnormal mammogram     PT DENIES  KNOWING OF THIS HX    Anxiety     Arthritis     R SHOULDER, R HIP, AND R LEG    Cancer     LUNG    Chronic nausea 01/15/2019    COPD (chronic obstructive pulmonary disease)     O2 3 LITERS NC CONT    Hernia, hiatal     Hyperlipidemia     Hypertension     Knee pain, right 2018    Memory loss     FORGETFULNESS ? ETIOLOGY    Migraine headache     Moderate episode of recurrent major depressive disorder 2017    Night sweats     Sciatica of right side 2017    Severe episode of recurrent major depressive disorder, without psychotic features 10/22/2019    Shingles     OUTBREAK 24 ON ANTIVIRAL , WHELPS NOTED NO SORES.    Shoulder pain, left 2018     Past Surgical History:   Procedure Laterality Date    BRONCHOSCOPY N/A 2022    Procedure: BRONCHOSCOPY WITH BRONCHOALVEOLAR LAVAGE, BIOPSY;  Surgeon: Shin Mcdonald DO;  Location: [facility] ENDOSCOPY;  Service: Pulmonary;  Laterality: N/A;  RIGHT LOWER LOBE INFILTRATE    BRONCHOSCOPY N/A 2024    Procedure: BRONCHOSCOPY WITH ENDOBRONCHIAL ULTRASOUND AND FINE NEEDLE ASPIRATE;  Surgeon: Shin Mcdonald DO;  Location: [facility] ENDOSCOPY;  Service: Pulmonary;  Laterality: N/A;  MEDIASTINAL ADENOPATHY     SECTION      CHOLECYSTECTOMY      LAPAROSCOPIC    COLONOSCOPY      TOTAL HIP ARTHROPLASTY Right 2022    Procedure: RIGHT TOTAL HIP ARTHROPLASTY;  Surgeon: Cecil Gomes MD;  Location: [facility] MAIN OR;  Service: Orthopedics;  Laterality: Right;     Social History     Socioeconomic History    Marital status:      Spouse name: DEVAN    Number of children: 2    Years of education: GED    Highest education level: GED or equivalent   Tobacco Use    Smoking status: Every Day     Current packs/day: 2.00     Average packs/day: 2.0 packs/day for 42.5 years (84.9 ttl pk-yrs)     Types: Cigarettes     Start date:      Passive exposure: Past    Smokeless tobacco: Never    Tobacco comments:     Pt stopped smoking on  "04/01/2022. Pt stated at her appt today 06/12/2024. Pt states she smokes less than a 1/2 ppd and pt states she vapes.     INST. PER ANESTHESIA PROTOCOL   Vaping Use    Vaping status: Former    Substances: Nicotine, Flavoring    Devices: Disposable   Substance and Sexual Activity    Alcohol use: Never    Drug use: Never    Sexual activity: Defer     Family History   Problem Relation Age of Onset    Other Mother         BLOOD DISEASE    Arthritis Mother     Heart disease Father     Hypertension Other     Cancer Other     Heart disease Other     Malig Hyperthermia Neg Hx        Objective   Physical Exam  Well appearing patient in no acute distress on RA  Anicteric sclerae, + erythematous papular rash present in dermatomal distrubution on right side of abdomen with shedding present  Respirations non-labored  Awake, alert, and oriented x 4. Speech intact. No gross neurologic deficit  Appropriate mood and affect    Vitals:    06/20/24 1521   BP: 109/90   Pulse: 104   Resp: 16   Temp: 97.2 °F (36.2 °C)   TempSrc: Temporal   SpO2: 94%   Weight: 102 kg (224 lb 13.9 oz)   Height: 162.6 cm (64.02\")   PainSc:   6   PainLoc: Chest                 PHQ-9 Total Score:                Result Review :   The following data was reviewed by: Brian Dickson MD on 06/20/24:  Lab Results   Component Value Date    HGB 10.8 (L) 06/04/2024    HCT 32.5 (L) 06/04/2024    MCV 88.8 06/04/2024     06/04/2024    WBC 13.74 (H) 06/04/2024    NEUTROABS 12.03 (H) 06/03/2024    LYMPHSABS 0.82 06/03/2024    MONOSABS 0.34 06/03/2024    EOSABS 0.00 06/03/2024    BASOSABS 0.02 06/03/2024     Lab Results   Component Value Date    GLUCOSE 159 (H) 06/04/2024    BUN 18 06/04/2024    CREATININE 0.72 06/04/2024     (L) 06/04/2024    K 4.8 06/04/2024    CL 96 (L) 06/04/2024    CO2 25.8 06/04/2024    CALCIUM 9.2 06/04/2024    PROTEINTOT 6.4 06/03/2024    ALBUMIN 3.7 06/03/2024    BILITOT 0.2 06/03/2024    ALKPHOS 98 06/03/2024    AST 8 06/03/2024 "    ALT 7 06/03/2024     Lab Results   Component Value Date    MG 2.0 06/04/2024    PHOS 2.9 06/04/2024    FREET4 1.08 06/04/2024    TSH 0.191 (L) 06/04/2024     Labs personally reviewed. Sodium low but improved    PET personally reviewed and with multiple bony mets seen.       XR Chest 1 View    Result Date: 6/20/2024  Impression: 1. New left chest wall lin catheter tip terminates at the level of the right atrium. No visible pneumothorax. 2. Patchy bibasilar airspace disease and mild right upper lobe interstitial thickening appear similar to the prior chest x-ray, may reflect changes of chronic atelectasis, pneumonia or mucous plugging. No new airspace disease is identified 3. Pulmonary nodules are seen to better advantage on prior CT and PET/CT imaging. Electronically Signed: Cecy De Jesus MD  6/20/2024 8:59 AM EDT  Workstation ID: [id number]    NM PET/CT Skull Base to Mid Thigh    Result Date: 6/19/2024  Impression: 1. New hypermetabolic nodules within the right middle lobe. There are also multiple new enlarged hypermetabolic mediastinal lymph nodes consistent with metastatic disease. There are scattered foci of hypermetabolism throughout the bony structures consistent with bone metastases. Electronically Signed: Bill Butcher MD  6/19/2024 4:20 PM EDT  Workstation ID: [id number]    CT Head With & Without Contrast    Result Date: 6/13/2024  Impression: 1.Hypodensity anterior limb internal capsule on the left which while nonspecific might reflect more chronic small vessel ischemic change. 2.No definite for metastatic disease on this exam. Depending on concern MR of the brain might be of benefit to reassess this patient. Electronically Signed: Parker Mcbride MD  6/13/2024 11:56 AM EDT  Workstation ID: [id number]    CT Chest Without Contrast Diagnostic    Result Date: 6/3/2024  Impression: 1.  Right middle lobe nodules and mediastinal and right hilar lymphadenopathy has progressed from prior CT, and remains concerning  for malignancy. 2.  Partial right middle lobe atelectasis. 3.  Moderate emphysema.     Electronically Signed By-Isma Roche MD On:6/3/2024 11:56 AM      XR Chest 1 View    Result Date: 6/2/2024  Impression: 1.  Increasing bibasilar airspace opacity. Finding is suspected to represent superimposed pneumonia and scarring/atelectasis.   Electronically Signed By-Quinton Bennett MD On:6/2/2024 4:23 PM           Assessment and Plan    Diagnoses and all orders for this visit:    1. Small cell lung cancer (Primary)    2. Neuropathic pain  -     gabapentin (NEURONTIN) 300 MG capsule; Take 1 capsule by mouth 3 (Three) Times a Day.  Dispense: 90 capsule; Refill: 0    3. Anxiety    Other orders  -     valACYclovir (VALTREX) 1000 MG tablet; Take 1 tablet by mouth 2 (Two) Times a Day for 10 days.  Dispense: 20 tablet; Refill: 0          Small cell lung cancer, extensive stage  RML with right hilar and mediastinal adenopathy on CT chest.  Bronchoscopy with positive small cell pathology at station 4R and station 7.  PET with multiple new enlarged hypermetabolic mediastinal lymph nodes consistent with metastatic disease, also with new right axillary FDG avid mets. There are scattered foci of hypermetabolism throughout the bony structures consistent with bone metastases. Discussed results. Discussed that this represents metastatic or stage IV cancer.  CT head with and without without any intracranial mets.  Unable to do MRI due to claustrophobia and anxiety. Recommended treatment is systemic alone with for IV carboplatin, etoposide and durvalumab every 3 weeks. Risk, benefit, side effect profile discussed. Consent obtained. Port has been placed. PRN and scheduled anti-emetics provided. Will attempt to start next week.     Met cancer to bone  Will need bone modifying agent. Will discuss at next visit.  Likely will need dental clearance first. Need to initiate systemic therapy first    Anxiety  Present at baseline now worse. PRN  ativan 0.5 mg.     Shingles  Valacyclovir 1 g TID x 10 days, will treat for additional 10 days. Seems to be shedding on exam. Gabapentin PRN for neuropathic pain.     I spent 45 minutes caring for Lluvia on this date of service. This time includes time spent by me in the following activities:preparing for the visit, reviewing tests, obtaining and/or reviewing a separately obtained history, performing a medically appropriate examination and/or evaluation , counseling and educating the patient/family/caregiver, ordering medications, tests, or procedures, referring and communicating with other health care professionals , documenting information in the medical record, independently interpreting results and communicating that information with the patient/family/caregiver, and care coordination    Patient Follow Up: Cycle 2  Patient was given instructions and counseling regarding her condition or for health maintenance advice. Please see specific information pulled into the AVS if appropriate.

## 2024-06-20 NOTE — ANESTHESIA POSTPROCEDURE EVALUATION
Patient: Lluvia Archer    Procedure Summary       Date: 06/20/24 Room / Location: [facility]  /  [facility]    Anesthesia Start: 0718 Anesthesia Stop: 0839    Procedure: INSERTION OF PORTACATH (Left) Diagnosis:       Small cell lung cancer      (Small cell lung cancer [C34.90])    Surgeons: Josh Patton MD Provider: Isma Gomes MD    Anesthesia Type: general ASA Status: 4            Anesthesia Type: general    Vitals  Vitals Value Taken Time   /63 06/20/24 0911   Temp 36.7 °C (98.1 °F) 06/20/24 0835   Pulse 85 06/20/24 0914   Resp 22 06/20/24 0910   SpO2 93 % 06/20/24 0914   Vitals shown include unfiled device data.        Post Anesthesia Care and Evaluation    Patient location during evaluation: bedside  Patient participation: complete - patient participated  Level of consciousness: awake  Pain management: adequate    Airway patency: patent  PONV Status: none  Cardiovascular status: acceptable and stable  Respiratory status: acceptable  Hydration status: acceptable

## 2024-06-21 ENCOUNTER — TELEPHONE (OUTPATIENT)
Dept: ONCOLOGY | Facility: HOSPITAL | Age: 59
End: 2024-06-21
Payer: MEDICARE

## 2024-06-21 ENCOUNTER — DOCUMENTATION (OUTPATIENT)
Dept: PHARMACY | Facility: HOSPITAL | Age: 59
End: 2024-06-21
Payer: MEDICARE

## 2024-06-21 RX ORDER — ONDANSETRON HYDROCHLORIDE 8 MG/1
8 TABLET, FILM COATED ORAL 3 TIMES DAILY PRN
Qty: 30 TABLET | Refills: 5 | Status: SHIPPED | OUTPATIENT
Start: 2024-06-21

## 2024-06-21 RX ORDER — OLANZAPINE 5 MG/1
5 TABLET ORAL NIGHTLY
Qty: 4 TABLET | Refills: 3 | Status: SHIPPED | OUTPATIENT
Start: 2024-06-21

## 2024-06-21 NOTE — TELEPHONE ENCOUNTER
LEFT MESSAGE FOR PATIENTS DAUGHTER, WE HAVE MOVED HER EDUCATION TO SAME DAY AS TREATMENT, I HAVE ASKED THAT PATIENT/DAUGHTER CALL THE OFFICE BACK TO CONFIRM THEY RECEIVED MY MESSAGE.

## 2024-06-21 NOTE — PROGRESS NOTES
"PHARMACY C1D1 PRE VISIT ASSESSMENT    Patient will present for C1D1 of carboplatin AUC 5 D1 + etoposide 100 mg/m2 D1-3 + durvalumab 1500 mg Q21D x 4 cycles     58 y.o. female  Estimated body surface area is 2.06 meters squared as calculated from the following:    Height as of 6/20/24: 162.6 cm (64.02\").    Weight as of 6/20/24: 102 kg (224 lb 13.9 oz).    Past Medical History:   Diagnosis Date    Abnormal mammogram     PT DENIES KNOWING OF THIS HX    Anxiety     Arthritis     R SHOULDER, R HIP, AND R LEG    Cancer     LUNG    Chronic nausea 01/15/2019    COPD (chronic obstructive pulmonary disease)     O2 3 LITERS NC CONT    Hernia, hiatal     Hyperlipidemia     Hypertension     Knee pain, right 08/30/2018    Memory loss     FORGETFULNESS ? ETIOLOGY    Migraine headache     Moderate episode of recurrent major depressive disorder 05/22/2017    Night sweats     Sciatica of right side 08/18/2017    Severe episode of recurrent major depressive disorder, without psychotic features 10/22/2019    Shingles     OUTBREAK 6/11/24 ON ANTIVIRAL , WHELPS NOTED NO SORES.    Shoulder pain, left 08/30/2018       Oncology/Hematology History Overview Note   2/20/24: CT Chest:  No PE or aortic dissection. Multiple right middle lobe nodules are highly suspicious for malignancy.  Additionally, there is mediastinal and right hilar adenopathy now noted.  Follow-up with a PET-CT is recommended. Emphysema with chronic changes in both lungs that otherwise appears stable. Atherosclerotic disease and coronary artery disease.    6/2/24: admitted to [facility] with shortness of breath, fever, cough and started on treatment for pneumonia. Found to have hyponatremia to 126    6/3/24 CT Chest: Right middle lobe nodules and mediastinal and right hilar lymphadenopathy has progressed from prior CT, and remains concerning for malignancy.  Partial right middle lobe atelectasis. Moderate emphysema. Discharged on 6/4/24 with Na of 133.    6/7/24: Bronchoscopy: " Station 4R and station 7 positive for small cell carcinoma.    6/13/24: CT head with and without contrast (due to claustrophobia): No mets seen    6/19/24: NM PET: New hypermetabolic nodules within the right middle lobe. There are also multiple new enlarged hypermetabolic mediastinal lymph nodes consistent with metastatic disease. Right axillary mets as well. There are scattered foci of hypermetabolism throughout the bony structures consistent with bone metastases.    Plan for Carbo/Etop/Durva        Small cell lung cancer   6/12/2024 Initial Diagnosis    Small cell lung cancer     6/14/2024 - 6/14/2024 Chemotherapy    OP LUNG CISplatin 60mg/m2 / Etoposide 120mg/m2 + XRT     6/14/2024 Cancer Staged    Staging form: Lung, AJCC 8th Edition  - Clinical: Stage IVB (cT1b, cN2, cM1c) - Signed by Brian Dickson MD on 6/20/2024 6/24/2024 -  Chemotherapy    OP LUNG CARBOplatin AUC=5 / Etoposide / Durvalumab       Relevant Pathology:   Final Diagnosis   1. Lymph node, station 4R, FNA:               - Positive for malignant cells               - Metastatic small cell carcinoma        2. Lymph node, station 10 R, FNA:               - Negative for malignant cells               - Lymphoid tissue present        3. Lymph node, station 7, FNA:               - Positive for malignant cells               - Metastatic small cell carcinoma     The above positive (malignant) diagnosis was called to Minnie Johnson RN in Dr. ARIEL Mcdonald's office at 13:59 EDT on 6/11/2024 by Flako BUSTILLOS             Electronically signed by Pura Bennett MD on 6/11/2024 at 1402     Potentially Actionable Mutation Present: NO     Relevant Imaging:    XR Chest 1 View    Result Date: 6/20/2024  Impression: 1. New left chest wall lin catheter tip terminates at the level of the right atrium. No visible pneumothorax. 2. Patchy bibasilar airspace disease and mild right upper lobe interstitial thickening appear similar to the prior chest  x-ray, may reflect changes of chronic atelectasis, pneumonia or mucous plugging. No new airspace disease is identified 3. Pulmonary nodules are seen to better advantage on prior CT and PET/CT imaging. Electronically Signed: Cecy De Jesus MD  6/20/2024 8:59 AM EDT  Workstation ID: [id number]    NM PET/CT Skull Base to Mid Thigh    Result Date: 6/19/2024  Impression: 1. New hypermetabolic nodules within the right middle lobe. There are also multiple new enlarged hypermetabolic mediastinal lymph nodes consistent with metastatic disease. There are scattered foci of hypermetabolism throughout the bony structures consistent with bone metastases. Electronically Signed: Bill Butcher MD  6/19/2024 4:20 PM EDT  Workstation ID: [id number]    CT Head With & Without Contrast    Result Date: 6/13/2024  Impression: 1.Hypodensity anterior limb internal capsule on the left which while nonspecific might reflect more chronic small vessel ischemic change. 2.No definite for metastatic disease on this exam. Depending on concern MR of the brain might be of benefit to reassess this patient. Electronically Signed: Parker Mcbride MD  6/13/2024 11:56 AM EDT  Workstation ID: [id number]    CT Chest Without Contrast Diagnostic    Result Date: 6/3/2024  Impression: 1.  Right middle lobe nodules and mediastinal and right hilar lymphadenopathy has progressed from prior CT, and remains concerning for malignancy. 2.  Partial right middle lobe atelectasis. 3.  Moderate emphysema.     Electronically Signed By-Isma Roche MD On:6/3/2024 11:56 AM      XR Chest 1 View    Result Date: 6/2/2024  Impression: 1.  Increasing bibasilar airspace opacity. Finding is suspected to represent superimposed pneumonia and scarring/atelectasis.   Electronically Signed By-Quinton Bennett MD On:6/2/2024 4:23 PM        Current treatment regimen has insurance authorization approval? YES    Medication treatment plan entered is appropriate per NCCN Guidelines    Pharmacy  Follow-up Considerations:   Patient with metastatic SCLC. Plan to initiate carboplatin + etoposide + durvalumab. Pharmacy will continue to monitor labs while on treatment and will  patient prior to first infusion on cycle 1 day 1.     Adonis SantosD, BCPS  6/21/2024  13:34 EDT

## 2024-06-24 ENCOUNTER — HOSPITAL ENCOUNTER (OUTPATIENT)
Dept: ONCOLOGY | Facility: HOSPITAL | Age: 59
Discharge: HOME | End: 2024-06-24
Payer: MEDICARE

## 2024-06-24 ENCOUNTER — DOCUMENTATION (OUTPATIENT)
Dept: NUTRITION | Facility: HOSPITAL | Age: 59
End: 2024-06-24
Payer: MEDICARE

## 2024-06-24 ENCOUNTER — DOCUMENTATION (OUTPATIENT)
Dept: ONCOLOGY | Facility: HOSPITAL | Age: 59
End: 2024-06-24
Payer: MEDICARE

## 2024-06-24 VITALS
RESPIRATION RATE: 20 BRPM | TEMPERATURE: 97.8 F | DIASTOLIC BLOOD PRESSURE: 65 MMHG | WEIGHT: 226.85 LBS | HEIGHT: 63 IN | OXYGEN SATURATION: 98 % | BODY MASS INDEX: 40.2 KG/M2 | SYSTOLIC BLOOD PRESSURE: 136 MMHG | HEART RATE: 98 BPM

## 2024-06-24 DIAGNOSIS — Z45.2 ENCOUNTER FOR ADJUSTMENT OR MANAGEMENT OF VASCULAR ACCESS DEVICE: ICD-10-CM

## 2024-06-24 DIAGNOSIS — C34.90 SMALL CELL LUNG CANCER: Primary | ICD-10-CM

## 2024-06-24 DIAGNOSIS — Z45.2 ENCOUNTER FOR ADJUSTMENT OR MANAGEMENT OF VASCULAR ACCESS DEVICE: Primary | ICD-10-CM

## 2024-06-24 LAB
ALBUMIN SERPL-MCNC: 3.7 G/DL (ref 3.5–5.2)
ALBUMIN/GLOB SERPL: 1.6 G/DL
ALP SERPL-CCNC: 104 U/L (ref 39–117)
ALT SERPL W P-5'-P-CCNC: 9 U/L (ref 1–33)
ANION GAP SERPL CALCULATED.3IONS-SCNC: 10.8 MMOL/L (ref 5–15)
AST SERPL-CCNC: 12 U/L (ref 1–32)
BASOPHILS # BLD AUTO: 0.01 10*3/MM3 (ref 0–0.2)
BASOPHILS NFR BLD AUTO: 0.1 % (ref 0–1.5)
BILIRUB SERPL-MCNC: 0.4 MG/DL (ref 0–1.2)
BUN SERPL-MCNC: 6 MG/DL (ref 6–20)
BUN/CREAT SERPL: 9 (ref 7–25)
CALCIUM SPEC-SCNC: 8.8 MG/DL (ref 8.6–10.5)
CHLORIDE SERPL-SCNC: 81 MMOL/L (ref 98–107)
CO2 SERPL-SCNC: 25.2 MMOL/L (ref 22–29)
CREAT SERPL-MCNC: 0.67 MG/DL (ref 0.57–1)
DEPRECATED RDW RBC AUTO: 40.7 FL (ref 37–54)
EGFRCR SERPLBLD CKD-EPI 2021: 102.1 ML/MIN/1.73
EOSINOPHIL # BLD AUTO: 0.18 10*3/MM3 (ref 0–0.4)
EOSINOPHIL NFR BLD AUTO: 2 % (ref 0.3–6.2)
ERYTHROCYTE [DISTWIDTH] IN BLOOD BY AUTOMATED COUNT: 13.2 % (ref 12.3–15.4)
GLOBULIN UR ELPH-MCNC: 2.3 GM/DL
GLUCOSE SERPL-MCNC: 121 MG/DL (ref 65–99)
HCT VFR BLD AUTO: 29.9 % (ref 34–46.6)
HGB BLD-MCNC: 10.5 G/DL (ref 12–15.9)
IMM GRANULOCYTES # BLD AUTO: 0.05 10*3/MM3 (ref 0–0.05)
IMM GRANULOCYTES NFR BLD AUTO: 0.6 % (ref 0–0.5)
LYMPHOCYTES # BLD AUTO: 1.91 10*3/MM3 (ref 0.7–3.1)
LYMPHOCYTES NFR BLD AUTO: 21.6 % (ref 19.6–45.3)
MCH RBC QN AUTO: 29.2 PG (ref 26.6–33)
MCHC RBC AUTO-ENTMCNC: 35.1 G/DL (ref 31.5–35.7)
MCV RBC AUTO: 83.1 FL (ref 79–97)
MONOCYTES # BLD AUTO: 1.17 10*3/MM3 (ref 0.1–0.9)
MONOCYTES NFR BLD AUTO: 13.2 % (ref 5–12)
NEUTROPHILS NFR BLD AUTO: 5.54 10*3/MM3 (ref 1.7–7)
NEUTROPHILS NFR BLD AUTO: 62.5 % (ref 42.7–76)
PLATELET # BLD AUTO: 226 10*3/MM3 (ref 140–450)
PMV BLD AUTO: 9 FL (ref 6–12)
POTASSIUM SERPL-SCNC: 3.7 MMOL/L (ref 3.5–5.2)
PROT SERPL-MCNC: 6 G/DL (ref 6–8.5)
RBC # BLD AUTO: 3.6 10*6/MM3 (ref 3.77–5.28)
SODIUM SERPL-SCNC: 117 MMOL/L (ref 136–145)
T4 FREE SERPL-MCNC: 1.57 NG/DL (ref 0.92–1.68)
TSH SERPL DL<=0.05 MIU/L-ACNC: 1.33 UIU/ML (ref 0.27–4.2)
WBC NRBC COR # BLD AUTO: 8.86 10*3/MM3 (ref 3.4–10.8)

## 2024-06-24 PROCEDURE — 25010000002 ETOPOSIDE 500 MG/25ML SOLUTION 25 ML VIAL: Performed by: INTERNAL MEDICINE

## 2024-06-24 PROCEDURE — 80053 COMPREHEN METABOLIC PANEL: CPT | Performed by: INTERNAL MEDICINE

## 2024-06-24 PROCEDURE — 25010000002 DEXAMETHASONE SODIUM PHOSPHATE 120 MG/30ML SOLUTION: Performed by: INTERNAL MEDICINE

## 2024-06-24 PROCEDURE — 25010000002 PALONOSETRON PER 25 MCG: Performed by: INTERNAL MEDICINE

## 2024-06-24 PROCEDURE — 25010000002 CARBOPLATIN PER 50 MG: Performed by: INTERNAL MEDICINE

## 2024-06-24 PROCEDURE — 96413 CHEMO IV INFUSION 1 HR: CPT

## 2024-06-24 PROCEDURE — 84443 ASSAY THYROID STIM HORMONE: CPT | Performed by: INTERNAL MEDICINE

## 2024-06-24 PROCEDURE — 25810000003 SODIUM CHLORIDE 0.9 % SOLUTION: Performed by: INTERNAL MEDICINE

## 2024-06-24 PROCEDURE — 84439 ASSAY OF FREE THYROXINE: CPT | Performed by: INTERNAL MEDICINE

## 2024-06-24 PROCEDURE — 85025 COMPLETE CBC W/AUTO DIFF WBC: CPT | Performed by: INTERNAL MEDICINE

## 2024-06-24 PROCEDURE — 96375 TX/PRO/DX INJ NEW DRUG ADDON: CPT

## 2024-06-24 PROCEDURE — 25810000003 SODIUM CHLORIDE 0.9 % SOLUTION 500 ML FLEX CONT: Performed by: INTERNAL MEDICINE

## 2024-06-24 PROCEDURE — 25010000002 HEPARIN LOCK FLUSH PER 10 UNITS: Performed by: INTERNAL MEDICINE

## 2024-06-24 PROCEDURE — 96367 TX/PROPH/DG ADDL SEQ IV INF: CPT

## 2024-06-24 PROCEDURE — 96417 CHEMO IV INFUS EACH ADDL SEQ: CPT

## 2024-06-24 PROCEDURE — 25810000003 SODIUM CHLORIDE 0.9 % SOLUTION 250 ML FLEX CONT: Performed by: INTERNAL MEDICINE

## 2024-06-24 PROCEDURE — 25010000002 FOSAPREPITANT PER 1 MG: Performed by: INTERNAL MEDICINE

## 2024-06-24 PROCEDURE — 25010000002 DURVALUMAB 50 MG/ML SOLUTION 10 ML VIAL: Performed by: INTERNAL MEDICINE

## 2024-06-24 RX ORDER — SODIUM CHLORIDE 0.9 % (FLUSH) 0.9 %
20 SYRINGE (ML) INJECTION AS NEEDED
Status: CANCELLED | OUTPATIENT
Start: 2024-06-24

## 2024-06-24 RX ORDER — HEPARIN SODIUM (PORCINE) LOCK FLUSH IV SOLN 100 UNIT/ML 100 UNIT/ML
500 SOLUTION INTRAVENOUS AS NEEDED
Status: CANCELLED | OUTPATIENT
Start: 2024-06-24

## 2024-06-24 RX ORDER — SODIUM CHLORIDE 0.9 % (FLUSH) 0.9 %
20 SYRINGE (ML) INJECTION AS NEEDED
Status: DISCONTINUED | OUTPATIENT
Start: 2024-06-24 | End: 2024-06-25 | Stop reason: HOSPADM

## 2024-06-24 RX ORDER — PALONOSETRON 0.05 MG/ML
0.25 INJECTION, SOLUTION INTRAVENOUS ONCE
Status: COMPLETED | OUTPATIENT
Start: 2024-06-24 | End: 2024-06-24

## 2024-06-24 RX ORDER — SODIUM CHLORIDE 9 MG/ML
20 INJECTION, SOLUTION INTRAVENOUS ONCE
Status: COMPLETED | OUTPATIENT
Start: 2024-06-24 | End: 2024-06-24

## 2024-06-24 RX ORDER — HEPARIN SODIUM (PORCINE) LOCK FLUSH IV SOLN 100 UNIT/ML 100 UNIT/ML
500 SOLUTION INTRAVENOUS AS NEEDED
Status: DISCONTINUED | OUTPATIENT
Start: 2024-06-24 | End: 2024-06-25 | Stop reason: HOSPADM

## 2024-06-24 RX ADMIN — PALONOSETRON HYDROCHLORIDE 0.25 MG: 0.25 INJECTION INTRAVENOUS at 11:43

## 2024-06-24 RX ADMIN — SODIUM CHLORIDE 20 ML/HR: 9 INJECTION, SOLUTION INTRAVENOUS at 10:39

## 2024-06-24 RX ADMIN — CARBOPLATIN 750 MG: 10 INJECTION, SOLUTION INTRAVENOUS at 12:37

## 2024-06-24 RX ADMIN — SODIUM CHLORIDE 210 MG: 0.9 INJECTION, SOLUTION INTRAVENOUS at 13:24

## 2024-06-24 RX ADMIN — Medication 20 ML: at 14:39

## 2024-06-24 RX ADMIN — DEXAMETHASONE SODIUM PHOSPHATE 12 MG: 4 INJECTION, SOLUTION INTRA-ARTICULAR; INTRALESIONAL; INTRAMUSCULAR; INTRAVENOUS; SOFT TISSUE at 11:44

## 2024-06-24 RX ADMIN — FOSAPREPITANT 100 ML: 150 INJECTION, POWDER, LYOPHILIZED, FOR SOLUTION INTRAVENOUS at 11:59

## 2024-06-24 RX ADMIN — SODIUM CHLORIDE 1500 MG: 9 INJECTION, SOLUTION INTRAVENOUS at 10:39

## 2024-06-24 RX ADMIN — HEPARIN 500 UNITS: 100 SYRINGE at 14:39

## 2024-06-24 NOTE — PROGRESS NOTES
"Presents for C1D1 of durvalumab, carboplatin, etoposide    Patient was educated on information about each medication including dose, route, frequency, and common adverse effects. Patient was provided both verbal and written counseling. UpToDate Lexidrug adult patient education printed and provided to patient and key information verbally highlighted including: Overview of regimen including but not limited to infusion times; recognition and management of allergic/infusion reactions; \"call your doctor right away\" parameters.     All of the patient's questions were answered and patient expressed verbal understanding    "

## 2024-06-24 NOTE — NURSING NOTE
Chemotherapy therapy regimen discussed: Carboplatin, etoposide, Imfinzi    Consent signed: Yes    Oriented to facility: Orientated to lobby, registration, nourishment area, restrooms, treatment area.    Teaching: Reviewed typical treatment day process and visitor policy. Discussed importance of continuing previously prescribed medications unless otherwise directed by Dr Dickson. Pt informed of clinic resources and contact information. Chemotherapy regimen and side effects discussed.    Materials Given: Written materials provided from pharmacy.     Prescriptions and Pharmacy Verified:  All symptom management prescriptions have been reviewed received by the pt. Pt v/u.    Upcoming Appointments Reviewed: AVS provided and reviewed.      Pt v/u of all education and information provided and deny further questions or concerns. RN advised pt to call as needed for any questions or concerns that may arise in the future. Pt v/u.

## 2024-06-24 NOTE — PROGRESS NOTES
Outpatient Nutrition Oncology Assessment    Patient Name: Lluvia Archer  YOB: 1965  MRN: [id number]  Assessment Date: 6/24/2024    CLINICAL NUTRITION ASSESSMENT    Dx:  lung Ca      Type of Cancer Treatment Carboplatin, Imfinzi, Toposar (pt mentioned possible XRT)         Reason for Assessment  Assessment         Current Problems:   Patient Active Problem List   Diagnosis Code    Abnormal mammogram R92.8    Anxiety F41.9    Arthritis M19.90    Chronic nausea R11.0    Chronic obstructive pulmonary disease (COPD) J44.9    Counseling on substance use and abuse Z71.89    Esophageal reflux K21.9    Hernia, hiatal K44.9    Hyperlipidemia E78.5    Hypertension I10    Knee pain, right M25.561    Memory loss R41.3    Migraine G43.909    Moderate episode of recurrent major depressive disorder F33.1    Night sweats R61    Numbness R20.0    Sciatica of right side M54.31    Severe episode of recurrent major depressive disorder, without psychotic features F33.2    Shoulder pain, left M25.512    Other diseases of nasal cavity and sinuses J34.89    Sleep apnea, unspecified G47.30    Tobacco abuse Z72.0    Strain of groin, right, subsequent encounter S76.211D    Osteoarthritis of spine with radiculopathy, lumbar region M47.26    Chronic right-sided low back pain with right-sided sciatica M54.41, G89.29    Aftercare following right hip joint replacement surgery Z47.1, Z96.641    Primary osteoarthritis of right hip M16.11    Right hip pain M25.551    Sepsis, due to unspecified organism, unspecified whether acute organ dysfunction present A41.9    Severe malnutrition E43    Pneumonia of right lower lobe due to infectious organism J18.9    Hospital discharge follow-up Z09    Other specified anemias D64.89    Encounter for screening mammogram for malignant neoplasm of breast Z12.31    Multifocal pneumonia J18.9    Acute on chronic respiratory failure with hypoxia J96.21    Medicare annual wellness visit, subsequent  Z00.00    Community acquired pneumonia J18.9    Pneumonia due to infectious organism, unspecified laterality, unspecified part of lung J18.9    Mediastinal adenopathy R59.0    Small cell lung cancer C34.90    Tobacco abuse, in remission F17.201    Chronic respiratory failure with hypoxia J96.11    Lung nodules R91.8    Encounter for adjustment or management of vascular access device Z45.2         Anthropometrics       Row Name 06/24/24 1135          Anthropometrics    Weight for Calculation 103 kg (227 lb 1.2 oz)                         Weight Hx  Wt Readings from Last 30 Encounters:   06/24/24 0851 103 kg (226 lb 13.7 oz)   06/20/24 1521 102 kg (224 lb 13.9 oz)   06/20/24 0612 102 kg (224 lb 6.9 oz)   06/17/24 1355 102 kg (224 lb)   06/14/24 1259 106 kg (233 lb 11 oz)   06/13/24 0854 106 kg (233 lb)   06/12/24 1450 106 kg (234 lb 11.2 oz)   06/07/24 1022 105 kg (232 lb 5.8 oz)   06/04/24 0310 108 kg (237 lb 3.4 oz)   06/02/24 2126 102 kg (223 lb 15.8 oz)   06/02/24 1602 99.8 kg (220 lb 0.3 oz)   12/21/23 0855 103 kg (228 lb)   05/05/23 0215 101 kg (221 lb 9 oz)   05/04/23 2227 100 kg (221 lb 5.5 oz)   05/01/23 1058 97.1 kg (214 lb)   12/09/22 1127 90.4 kg (199 lb 6.4 oz)   06/24/22 1557 87.1 kg (192 lb)   06/03/22 1117 87.1 kg (192 lb)   05/27/22 1447 84.4 kg (186 lb)   05/13/22 0607 88.2 kg (194 lb 7.1 oz)   05/03/22 1102 87.2 kg (192 lb 3.9 oz)   04/22/22 1048 84.8 kg (187 lb)   04/05/22 0000 89 kg (196 lb 3.4 oz)   04/04/22 2031 89 kg (196 lb 3.4 oz)   03/11/22 1214 93.7 kg (206 lb 9.6 oz)   03/03/22 0835 93.4 kg (206 lb)   11/25/21 2110 97.9 kg (215 lb 13.3 oz)   09/07/21 1354 96.8 kg (213 lb 4.8 oz)   07/19/21 1630 99.3 kg (219 lb)   06/16/21 1353 102 kg (223 lb 12.8 oz)   05/18/21 0000 102 kg (224 lb 2 oz)   04/13/21 0000 103 kg (227 lb 6 oz)   01/30/20 1412 102 kg (225 lb)   01/15/20 0000 100 kg (221 lb 4 oz)         Estimated/Assessed Needs - Anthropometrics       Row Name 06/24/24 5094           Anthropometrics    Weight for Calculation 103 kg (227 lb 1.2 oz)        Estimated/Assessed Needs    Additional Documentation Fluid Requirements (Group);KCAL/KG (Group);Protein Requirements (Group)        KCAL/KG    KCAL/KG 25 Kcal/Kg (kcal)     25 Kcal/Kg (kcal) 2575        Protein Requirements    Weight Used For Protein Calculations 54.5 kg (120 lb 2.4 oz)     Est Protein Requirement Amount (gms/kg) 1.2 gm protein     Estimated Protein Requirements (gms/day) 65.4        Fluid Requirements    Fluid Requirements (mL/day) 2575     RDA Method (mL) 2575                      Labs/Medications        Pertinent Labs Reviewed.   Results from last 7 days   Lab Units 06/24/24  0837   SODIUM mmol/L 117*   POTASSIUM mmol/L 3.7   CHLORIDE mmol/L 81*   CO2 mmol/L 25.2   BUN mg/dL 6   CREATININE mg/dL 0.67   CALCIUM mg/dL 8.8   BILIRUBIN mg/dL 0.4   ALK PHOS U/L 104   ALT (SGPT) U/L 9   AST (SGOT) U/L 12   GLUCOSE mg/dL 121*     Results from last 7 days   Lab Units 06/24/24  0837   HEMOGLOBIN g/dL 10.5*   HEMATOCRIT % 29.9*       Pertinent Medications Cariprazine HCl, Fluticasone-Umeclidin-Vilant, LORazepam, OLANZapine, albuterol sulfate HFA, atorvastatin, buPROPion SR, famotidine, gabapentin, ipratropium-albuterol, lisinopril, nicotine, nystatin, omeprazole, ondansetron, traMADol, and valACYclovir     Current Nutrition Orders & Evaluation of Intake       Oral Nutrition     Current PO Diet Meats and regular foods (vegetables, fruits)   Supplement      Nutrition Diagnosis        Nutrition Dx Problem 1 Increased nutrient needs related to increased nutrient needs due to catabolic disease as evidenced by physiological causes increasing nutrient needs.       Nutrition Intervention       RD Action Nutrition assessment by review of pt's medical record + pt interview (including discussion on):  Recent nutrition-related concerns and hx of wt changes  Current diet / schedule     Reviewed the following:  Importance of wt maintenance during tx  by consuming adequate nutrition (kcals & protein)  Importance of adequate hydration during tx  Ideas for high protein foods and fluids  Potential nutrition-related side effects of tx:  n/v, diarrhea, constipation, mucositis, altered taste  MNT for each side effect listed above     Written materials provided:  High Calorie, High Protein Diet  Nausea & Vomiting  Diarrhea  Constipation  Taste & Smell Changes (includes homemade mouth rinse recipe)  Oncology RD business card (for pt's DA)     Monitor/Evaluation       Monitor Per oncology nutrition protocol.     Comments:    Initial week of tx.  Pt reports a decline in appetite and early satiety over the past week, but mainly due to emotional toll of cancer dx.  Pt lost 8# in the past couple of weeks.  She reports her DA manages her appointments, however DA not available (due to working today) until later this afternoon.  Discussed all of the above information with pt in length and encouraged pt to have DA call with any questions or concerns.  Encouraged small, frequent meals or snacks Q 2 hours, adequate kcals and adequate proteins.  She v/u.  Written information provided.      Electronically signed by:  Mildred Gee RD  06/24/24 11:37 EDT

## 2024-06-24 NOTE — PROGRESS NOTES
Reason for Referral: Rounding with new patients in infusion clinic with high distress screening scores    Diagnosis: Lung cancer    Distress Score: 10 with concerns indicated for pain, sleep, fatigue, tobacco abuse, changes in eating, worry or anxiety, sadness or depression, loss of interest or enjoyment, and feelings of worthlessness or being a burden    Location of Distress Screening:  [facility]    PHQ Score: 22 Patient stated she is under the care of a primary care physician who is prescribing her 3 medications to address her depression and anxiety.  She denied any thoughts of suicide or homicide.    Current Treatment Plan: Patient stated she is on chemotherapy    Previous Cancer TX: Patient stated this is her first cancer diagnosis.    Mental Status: Patient is very pleasant and easy to engage.  Patient appeared to be in a good mood. Patient's affect was flat.  Patient appeared to be having anxiety regarding her medical diagnosis. Patient was oriented x 4. Patient seemed to have her cognitive and memory intact.  Patient reviewed signs and symptoms of depression and stated she was treated for mental health concerns for several years now.  Patient stated she sees a primary care doctor in her community who prescribes 3 different types of medications to address her anxiety and depression.  Patient denied any thoughts of suicide or homicide.  Patient expressed concerns about her children and they are having to have liver transplants.  Patient stated her daughter recently had a liver transplant and her son has been told recently he will have to have a liver transplant.  OSW provided emotional support through active listening, empathizing, normalizing, and validating patient's thoughts and feelings.    Mental Health Treatment: Patient stated she sees a primary care doctor in her community who prescribes 3 different types of medications to address her anxiety and depression.  Patient denied any thoughts of suicide or  homicide.    Substance Use/Tobacco Use: Patient stated she has no alcohol use or illegal substance use.  Patient stated she smokes 2 to 3 packs of cigarettes a day.  Patient stated she is now utilizing the Chantix patch to attempt to stop smoking.    Spirituality: Patient stated she has no current spirituality practices.    Hobbies: Patient stated that she only enjoys watching TV.  Patient stated she does not have the strength or ability to do other activities of daily living.    Support systems: Patient stated her primary support system is her , daughter, son, and a best friend.    Residential status/Who lives in the home: States she lives in the home with her  and 1 cat and 1 dog.    Transportation: Patient stated her daughter brings her to treatment as she is on her way to work.  Patient stated she will have to stay at the cancer center until her daughter gets off work each time she has treatment.  Patient was made aware of the $15 [facility] gas vouchers if transportation is an issue.    Financial Concerns: Patient stated she is disabled with COPD.  Patient stated her  drives a tow truck and is not able to be present for her treatment as he is the main source of income.  Patient states she does draw a Social Security disability check that she has had for a few years.  Patient stated she does not have any concerns regarding her healthcare coverage as she has Medicare and Medicaid benefits.    Home Care Needs: Patient stated her only in-home care needs currently is 24/7 oxygen.  Patient stated she would like to have a portable oxygen tank that she could bring with her when she leaves her home.  OSW referred her to her pulmonologist to request an order to be placed for her to have a portable oxygen pack.    Advanced Directive/Living Will: None on file    Insurance: [facility] Medicare and passport health by [employer]    Resources/Referrals: OSW provided patient with her business card and an  overview of oncology social work services.  Patient was made aware of $15 gas vouchers through [facility].  Patient stated she appreciated meeting with OSW at today's treatment.

## 2024-06-25 ENCOUNTER — HOSPITAL ENCOUNTER (OUTPATIENT)
Dept: ONCOLOGY | Facility: HOSPITAL | Age: 59
Discharge: HOME | End: 2024-06-25
Admitting: INTERNAL MEDICINE
Payer: MEDICARE

## 2024-06-25 ENCOUNTER — DOCUMENTATION (OUTPATIENT)
Dept: ONCOLOGY | Facility: HOSPITAL | Age: 59
End: 2024-06-25
Payer: MEDICARE

## 2024-06-25 VITALS
TEMPERATURE: 98.5 F | OXYGEN SATURATION: 96 % | HEIGHT: 63 IN | DIASTOLIC BLOOD PRESSURE: 84 MMHG | WEIGHT: 227.96 LBS | SYSTOLIC BLOOD PRESSURE: 122 MMHG | BODY MASS INDEX: 40.39 KG/M2 | RESPIRATION RATE: 20 BRPM | HEART RATE: 110 BPM

## 2024-06-25 DIAGNOSIS — Z45.2 ENCOUNTER FOR ADJUSTMENT OR MANAGEMENT OF VASCULAR ACCESS DEVICE: ICD-10-CM

## 2024-06-25 DIAGNOSIS — C34.90 SMALL CELL LUNG CANCER: Primary | ICD-10-CM

## 2024-06-25 PROCEDURE — 25810000003 SODIUM CHLORIDE 0.9 % SOLUTION 500 ML FLEX CONT: Performed by: INTERNAL MEDICINE

## 2024-06-25 PROCEDURE — 25010000002 HEPARIN LOCK FLUSH PER 10 UNITS: Performed by: INTERNAL MEDICINE

## 2024-06-25 PROCEDURE — 25010000002 DEXAMETHASONE SODIUM PHOSPHATE 120 MG/30ML SOLUTION: Performed by: INTERNAL MEDICINE

## 2024-06-25 PROCEDURE — 25010000002 ETOPOSIDE 500 MG/25ML SOLUTION 25 ML VIAL: Performed by: INTERNAL MEDICINE

## 2024-06-25 PROCEDURE — 96375 TX/PRO/DX INJ NEW DRUG ADDON: CPT

## 2024-06-25 PROCEDURE — 96413 CHEMO IV INFUSION 1 HR: CPT

## 2024-06-25 PROCEDURE — 25810000003 SODIUM CHLORIDE 0.9 % SOLUTION: Performed by: INTERNAL MEDICINE

## 2024-06-25 RX ORDER — HEPARIN SODIUM (PORCINE) LOCK FLUSH IV SOLN 100 UNIT/ML 100 UNIT/ML
500 SOLUTION INTRAVENOUS AS NEEDED
Status: CANCELLED | OUTPATIENT
Start: 2024-06-25

## 2024-06-25 RX ORDER — SODIUM CHLORIDE 0.9 % (FLUSH) 0.9 %
20 SYRINGE (ML) INJECTION AS NEEDED
Status: DISCONTINUED | OUTPATIENT
Start: 2024-06-25 | End: 2024-06-26 | Stop reason: HOSPADM

## 2024-06-25 RX ORDER — SODIUM CHLORIDE 9 MG/ML
20 INJECTION, SOLUTION INTRAVENOUS ONCE
Status: COMPLETED | OUTPATIENT
Start: 2024-06-25 | End: 2024-06-25

## 2024-06-25 RX ORDER — SODIUM CHLORIDE 0.9 % (FLUSH) 0.9 %
20 SYRINGE (ML) INJECTION AS NEEDED
Status: CANCELLED | OUTPATIENT
Start: 2024-06-25

## 2024-06-25 RX ORDER — HEPARIN SODIUM (PORCINE) LOCK FLUSH IV SOLN 100 UNIT/ML 100 UNIT/ML
500 SOLUTION INTRAVENOUS AS NEEDED
Status: DISCONTINUED | OUTPATIENT
Start: 2024-06-25 | End: 2024-06-26 | Stop reason: HOSPADM

## 2024-06-25 RX ADMIN — Medication 20 ML: at 15:31

## 2024-06-25 RX ADMIN — HEPARIN 500 UNITS: 100 SYRINGE at 15:31

## 2024-06-25 RX ADMIN — SODIUM CHLORIDE 20 ML/HR: 9 INJECTION, SOLUTION INTRAVENOUS at 13:54

## 2024-06-25 RX ADMIN — SODIUM CHLORIDE 210 MG: 0.9 INJECTION, SOLUTION INTRAVENOUS at 14:28

## 2024-06-25 RX ADMIN — DEXAMETHASONE SODIUM PHOSPHATE 12 MG: 4 INJECTION, SOLUTION INTRA-ARTICULAR; INTRALESIONAL; INTRAMUSCULAR; INTRAVENOUS; SOFT TISSUE at 13:55

## 2024-06-25 NOTE — PROGRESS NOTES
Patient requested a gas voucher to assist with the cost of transportation. OSW provided patient with a $15 gas voucher.  Patient expressed appreciation for transportation assistance today.

## 2024-06-26 ENCOUNTER — HOSPITAL ENCOUNTER (OUTPATIENT)
Dept: ONCOLOGY | Facility: HOSPITAL | Age: 59
Discharge: HOME | End: 2024-06-26
Admitting: INTERNAL MEDICINE
Payer: MEDICARE

## 2024-06-26 VITALS
WEIGHT: 230.16 LBS | TEMPERATURE: 97.3 F | RESPIRATION RATE: 18 BRPM | OXYGEN SATURATION: 97 % | BODY MASS INDEX: 40.28 KG/M2 | SYSTOLIC BLOOD PRESSURE: 133 MMHG | DIASTOLIC BLOOD PRESSURE: 67 MMHG | HEART RATE: 109 BPM

## 2024-06-26 DIAGNOSIS — Z45.2 ENCOUNTER FOR ADJUSTMENT OR MANAGEMENT OF VASCULAR ACCESS DEVICE: ICD-10-CM

## 2024-06-26 DIAGNOSIS — C34.90 SMALL CELL LUNG CANCER: Primary | ICD-10-CM

## 2024-06-26 PROCEDURE — 96375 TX/PRO/DX INJ NEW DRUG ADDON: CPT

## 2024-06-26 PROCEDURE — 96413 CHEMO IV INFUSION 1 HR: CPT

## 2024-06-26 PROCEDURE — 25010000002 DEXAMETHASONE SODIUM PHOSPHATE 120 MG/30ML SOLUTION: Performed by: INTERNAL MEDICINE

## 2024-06-26 PROCEDURE — 25010000002 HEPARIN LOCK FLUSH PER 10 UNITS: Performed by: INTERNAL MEDICINE

## 2024-06-26 PROCEDURE — 25810000003 SODIUM CHLORIDE 0.9 % SOLUTION 500 ML FLEX CONT: Performed by: INTERNAL MEDICINE

## 2024-06-26 PROCEDURE — 25810000003 SODIUM CHLORIDE 0.9 % SOLUTION: Performed by: INTERNAL MEDICINE

## 2024-06-26 PROCEDURE — 25010000002 ETOPOSIDE 500 MG/25ML SOLUTION 25 ML VIAL: Performed by: INTERNAL MEDICINE

## 2024-06-26 RX ORDER — HEPARIN SODIUM (PORCINE) LOCK FLUSH IV SOLN 100 UNIT/ML 100 UNIT/ML
500 SOLUTION INTRAVENOUS AS NEEDED
Status: DISCONTINUED | OUTPATIENT
Start: 2024-06-26 | End: 2024-06-27 | Stop reason: HOSPADM

## 2024-06-26 RX ORDER — SODIUM CHLORIDE 9 MG/ML
20 INJECTION, SOLUTION INTRAVENOUS ONCE
Status: COMPLETED | OUTPATIENT
Start: 2024-06-26 | End: 2024-06-26

## 2024-06-26 RX ORDER — SODIUM CHLORIDE 0.9 % (FLUSH) 0.9 %
20 SYRINGE (ML) INJECTION AS NEEDED
Status: DISCONTINUED | OUTPATIENT
Start: 2024-06-26 | End: 2024-06-27 | Stop reason: HOSPADM

## 2024-06-26 RX ORDER — HEPARIN SODIUM (PORCINE) LOCK FLUSH IV SOLN 100 UNIT/ML 100 UNIT/ML
500 SOLUTION INTRAVENOUS AS NEEDED
OUTPATIENT
Start: 2024-06-26

## 2024-06-26 RX ORDER — SODIUM CHLORIDE 0.9 % (FLUSH) 0.9 %
20 SYRINGE (ML) INJECTION AS NEEDED
OUTPATIENT
Start: 2024-06-26

## 2024-06-26 RX ADMIN — HEPARIN 500 UNITS: 100 SYRINGE at 15:43

## 2024-06-26 RX ADMIN — SODIUM CHLORIDE 20 ML/HR: 9 INJECTION, SOLUTION INTRAVENOUS at 14:13

## 2024-06-26 RX ADMIN — Medication 20 ML: at 15:43

## 2024-06-26 RX ADMIN — DEXAMETHASONE SODIUM PHOSPHATE 12 MG: 4 INJECTION, SOLUTION INTRA-ARTICULAR; INTRALESIONAL; INTRAMUSCULAR; INTRAVENOUS; SOFT TISSUE at 14:13

## 2024-06-26 RX ADMIN — SODIUM CHLORIDE 210 MG: 0.9 INJECTION, SOLUTION INTRAVENOUS at 14:42

## 2024-06-28 DIAGNOSIS — C34.90 SMALL CELL LUNG CANCER: ICD-10-CM

## 2024-06-28 RX ORDER — LORAZEPAM 0.5 MG/1
0.5 TABLET ORAL EVERY 8 HOURS PRN
Qty: 30 TABLET | Refills: 0 | Status: SHIPPED | OUTPATIENT
Start: 2024-06-28

## 2024-06-28 NOTE — TELEPHONE ENCOUNTER
Lluvia Archer 1965       Symptoms    Does patient have nausea and vomiting? N    Does patient have medications prescribed for N/V? Y    Does patient have diarrhea? N    Does the patient have diarrhea medications? N    Does the patient have pain? Y    Is pain medications effective? Y    Discharge  Do you have any questions about your discharge instructions? N    Patient Satisfaction with Cancer [facility]    Where you satisfied with the care you received? Y    Pt suggestions for the Cancer [facility]    Do you have any suggestions to improve your care? N    Comments    Follow up phone call comments  The pt states that Daria TALBERT took good care of her during her first tx.

## 2024-06-30 DIAGNOSIS — C34.90 SMALL CELL LUNG CANCER: ICD-10-CM

## 2024-07-01 ENCOUNTER — CONSULT (OUTPATIENT)
Dept: RADIATION ONCOLOGY | Facility: HOSPITAL | Age: 59
End: 2024-07-01
Payer: MEDICARE

## 2024-07-01 VITALS
WEIGHT: 225.97 LBS | DIASTOLIC BLOOD PRESSURE: 71 MMHG | BODY MASS INDEX: 39.54 KG/M2 | TEMPERATURE: 97.6 F | RESPIRATION RATE: 18 BRPM | SYSTOLIC BLOOD PRESSURE: 130 MMHG | HEART RATE: 104 BPM | OXYGEN SATURATION: 95 %

## 2024-07-01 DIAGNOSIS — C34.90 SMALL CELL LUNG CANCER: Primary | ICD-10-CM

## 2024-07-01 PROCEDURE — G0463 HOSPITAL OUTPT CLINIC VISIT: HCPCS | Performed by: RADIOLOGY

## 2024-07-01 RX ORDER — TRAMADOL HYDROCHLORIDE 50 MG/1
50 TABLET ORAL EVERY 6 HOURS PRN
Qty: 10 TABLET | Refills: 0 | Status: SHIPPED | OUTPATIENT
Start: 2024-07-01

## 2024-07-01 RX ORDER — VORTIOXETINE 10 MG/1
10 TABLET, FILM COATED ORAL
Qty: 30 TABLET | Refills: 2 | OUTPATIENT
Start: 2024-07-01

## 2024-07-01 RX ORDER — LORAZEPAM 0.5 MG/1
0.5 TABLET ORAL EVERY 8 HOURS PRN
Qty: 30 TABLET | Refills: 0 | OUTPATIENT
Start: 2024-07-01

## 2024-07-01 NOTE — LETTER
July 1, 2024     Brian Dickson MD  [address] [facility]  [address]  [place] [address]    Patient: Lluvia Archer   YOB: 1965   Date of Visit: 7/1/2024     Dear Brian Dickson MD:       Thank you for referring Lluvia Archer to me for evaluation. Below are the relevant portions of my assessment and plan of care.    If you have questions, please do not hesitate to call me. I look forward to following Lluvia along with you.         Sincerely,        Shin Gordon MD        CC: Aleksandar Edge, Shin Yang MD  07/01/24 0929  Sign when Signing Visit       New Patient Office Visit      Encounter Date: 07/01/2024   Patient Name: Lluvia Archer  YOB: 1965   Medical Record Number: [id number]   Primary Diagnosis: Small cell lung cancer [C34.90]   Cancer Staging: Cancer Staging   Small cell lung cancer  Staging form: Lung, AJCC 8th Edition  - Clinical: Stage IVB (cT1b, cN2, cM1c) - Signed by Brian Dickson MD on 6/20/2024      Chief Complaint:    Chief Complaint   Patient presents with   • Consult   • Lung Cancer       History of Present Illness: Lluvia Archer is a 58-year-old female with extensive stage small cell lung cancer who is seen in consultation regarding radiotherapy as a component of management.  Ms. Archer has a history of lung nodules.  She underwent CT scan of the chest in February revealing multiple right middle lobe nodules with mediastinal and right hilar adenopathy.  She was subsequently admitted to [facility] on 6/2/2024 and treated for pneumonia.  CT scan of the chest at that time revealed progression of right middle lobe nodules and mediastinal and right hilar adenopathy.  As an outpatient, she underwent bronchoscopy and biopsy with pathology from station 4R and station 7 revealing small cell carcinoma.  As she was unable to undergo MRI of the brain due to claustrophobia CT scan of the head with and  without contrast performed on 6/13/2024 revealed no evidence of intracranial metastatic disease.  PET/CT revealed diffuse metastatic disease including bony metastatic disease and right axillary adenopathy.  She has commenced carbo/etoposide/Durvalumab per Dr. Dickson.  She denies pain of any kind.    Subjective      Review of Systems: Review of Systems   Constitutional:  Positive for appetite change (decreased), fatigue (7/10) and unexpected weight change (7lb wt loss in last month).        1st chemo treatment Monday   HENT:  Positive for sore throat and trouble swallowing (esophageal dilation). Negative for tinnitus.    Eyes:  Negative for visual disturbance.   Respiratory:  Positive for cough (productive with thick green secretion) and shortness of breath (with activity).    Cardiovascular:  Positive for palpitations (occasional with anxiety) and leg swelling. Negative for chest pain.   Gastrointestinal:  Positive for nausea (occaisonal) and vomiting (r/t secretions). Negative for anal bleeding, blood in stool, constipation, diarrhea and rectal pain.        Colonoscopy 2023     Genitourinary:  Negative for difficulty urinating, dyspareunia, dysuria, frequency and urgency.   Musculoskeletal:  Positive for gait problem (r/t weakness and sob). Negative for arthralgias and back pain.   Skin:  Positive for color change (reddness noted to under breasts) and rash (bilateral breast).        Shingles to right abdomen    Neurological:  Positive for weakness. Negative for dizziness and headaches.   Psychiatric/Behavioral:  Positive for sleep disturbance (difficulty falling and staying asleep). Negative for agitation, self-injury and suicidal ideas. The patient is nervous/anxious.        Past Medical History:   Past Medical History:   Diagnosis Date   • Abnormal mammogram     PT DENIES KNOWING OF THIS HX   • Anxiety    • Arthritis     R SHOULDER, R HIP, AND R LEG   • Cancer     LUNG   • Chronic nausea 01/15/2019   • COPD  (chronic obstructive pulmonary disease)     O2 3 LITERS NC CONT   • Hernia, hiatal    • Hyperlipidemia    • Hypertension    • Knee pain, right 2018   • Memory loss     FORGETFULNESS ? ETIOLOGY   • Migraine headache    • Moderate episode of recurrent major depressive disorder 2017   • Night sweats    • Sciatica of right side 2017   • Severe episode of recurrent major depressive disorder, without psychotic features 10/22/2019   • Shingles     OUTBREAK 24 ON ANTIVIRAL , WHELPS NOTED NO SORES.   • Shoulder pain, left 2018       Past Surgical History:   Past Surgical History:   Procedure Laterality Date   • BRONCHOSCOPY N/A 2022    Procedure: BRONCHOSCOPY WITH BRONCHOALVEOLAR LAVAGE, BIOPSY;  Surgeon: Shin Mcdonald DO;  Location: [facility] ENDOSCOPY;  Service: Pulmonary;  Laterality: N/A;  RIGHT LOWER LOBE INFILTRATE   • BRONCHOSCOPY N/A 2024    Procedure: BRONCHOSCOPY WITH ENDOBRONCHIAL ULTRASOUND AND FINE NEEDLE ASPIRATE;  Surgeon: Shin Mcdonald DO;  Location: [facility] ENDOSCOPY;  Service: Pulmonary;  Laterality: N/A;  MEDIASTINAL ADENOPATHY   •  SECTION     • CHOLECYSTECTOMY      LAPAROSCOPIC   • COLONOSCOPY     • PORTACATH PLACEMENT Left 2024    Procedure: INSERTION OF PORTACATH;  Surgeon: Josh Patton MD;  Location: [facility] OR OSC;  Service: General;  Laterality: Left;   • TOTAL HIP ARTHROPLASTY Right 2022    Procedure: RIGHT TOTAL HIP ARTHROPLASTY;  Surgeon: Cecil Gomes MD;  Location: [facility] MAIN OR;  Service: Orthopedics;  Laterality: Right;       Family History:   Family History   Problem Relation Age of Onset   • Other Mother         BLOOD DISEASE   • Arthritis Mother    • Heart disease Father    • Hypertension Other    • Cancer Other    • Heart disease Other    • Malig Hyperthermia Neg Hx        Social History:   Social History     Socioeconomic History   • Marital status:      Spouse name: DEVAN   • Number of  children: 2   • Years of education: GED   • Highest education level: GED or equivalent   Tobacco Use   • Smoking status: Every Day     Current packs/day: 2.00     Average packs/day: 2.0 packs/day for 42.5 years (85.0 ttl pk-yrs)     Types: Cigarettes     Start date: 1982     Passive exposure: Past   • Smokeless tobacco: Never   • Tobacco comments:     Pt stopped smoking on 04/01/2022. Pt stated at her appt today 06/12/2024. Pt states she smokes less than a 1/2 ppd      INST. PER ANESTHESIA PROTOCOL   Vaping Use   • Vaping status: Former   • Substances: Nicotine, Flavoring   • Devices: Disposable   Substance and Sexual Activity   • Alcohol use: Never   • Drug use: Never   • Sexual activity: Defer       Medications:     Current Outpatient Medications:   •  albuterol sulfate  (90 Base) MCG/ACT inhaler, Inhale 2 puffs Every 4 (Four) Hours As Needed for Wheezing or Shortness of Air., Disp: 18 g, Rfl: 5  •  atorvastatin (LIPITOR) 10 MG tablet, TAKE ONE TABLET BY MOUTH DAILY AT 9 PM EVERY NIGHT (Patient taking differently: Take 1 tablet by mouth Every Night.), Disp: 30 tablet, Rfl: 11  •  buPROPion SR (WELLBUTRIN SR) 150 MG 12 hr tablet, TAKE ONE TABLET BY MOUTH TWICE DAILY @ 9AM & 5PM (Patient taking differently: Take 1 tablet by mouth 2 (Two) Times a Day.), Disp: 60 tablet, Rfl: 11  •  Cariprazine HCl (Vraylar) 1.5 MG capsule capsule, Take 1 capsule by mouth Every Night., Disp: , Rfl:   •  famotidine (PEPCID) 40 MG tablet, TAKE ONE TABLET BY MOUTH TWICE DAILY @ 9AM & 5PM (Patient taking differently: Take 1 tablet by mouth 2 (Two) Times a Day.), Disp: 180 tablet, Rfl: 3  •  Fluticasone-Umeclidin-Vilant (Trelegy Ellipta) 100-62.5-25 MCG/ACT inhaler, Inhale 1 puff Daily for 30 days. Rinse mouth out after each use (Patient taking differently: Inhale 1 puff Daily With Lunch. Rinse mouth out after each use), Disp: 1 each, Rfl: 5  •  gabapentin (NEURONTIN) 300 MG capsule, Take 1 capsule by mouth 3 (Three) Times a Day.,  Disp: 90 capsule, Rfl: 0  •  ipratropium-albuterol (DUO-NEB) 0.5-2.5 mg/3 ml nebulizer, Take 3 mL by nebulization 4 (Four) Times a Day for 30 days., Disp: 360 mL, Rfl: 5  •  lisinopril (PRINIVIL,ZESTRIL) 5 MG tablet, TAKE ONE TABLET BY MOUTH DAILY AT 9 AM (Patient taking differently: Take 1 tablet by mouth Daily.), Disp: 30 tablet, Rfl: 11  •  LORazepam (ATIVAN) 0.5 MG tablet, Take 1 tablet by mouth Every 8 (Eight) Hours As Needed for Anxiety., Disp: 30 tablet, Rfl: 0  •  nicotine (NICODERM CQ) 21 MG/24HR patch, Place 1 patch on the skin as directed by provider Daily., Disp: 30 patch, Rfl: 0  •  OLANZapine (zyPREXA) 5 MG tablet, Take 1 tablet by mouth Every Night. Take nightly x 4 starting night of chemotherapy., Disp: 4 tablet, Rfl: 3  •  ondansetron (ZOFRAN) 8 MG tablet, Take 1 tablet by mouth 3 (Three) Times a Day As Needed for Nausea or Vomiting., Disp: 30 tablet, Rfl: 5  •  traMADol (ULTRAM) 50 MG tablet, Take 1 tablet by mouth Every 6 (Six) Hours As Needed for Moderate Pain., Disp: 10 tablet, Rfl: 0  •  nystatin (MYCOSTATIN) 100,000 unit/mL suspension, Swish and swallow 5 mL 4 (Four) Times a Day. (Patient not taking: Reported on 6/24/2024), Disp: 60 mL, Rfl: 1  •  omeprazole (priLOSEC) 40 MG capsule, Take 1 capsule by mouth Every Night. (Patient not taking: Reported on 6/24/2024), Disp: , Rfl:     Allergies:   No Known Allergies    Pain: (on a scale of 0-10)   Pain Score    07/01/24 0823   PainSc: 0-No pain       Lluvia Archer reports a pain score of 0.  Given her pain assessment as noted, treatment options were discussed and the following options were decided upon as a follow-up plan to address the patient's pain: continuation of current treatment plan for pain.     Advanced Care Plan: N   Quality of Life: 40 - Must Use Wheelchair Most of Time    Objective     Physical Exam:   Vital Signs:   Vitals:    07/01/24 0823   BP: 130/71   Pulse: 104   Resp: 18   Temp: 97.6 °F (36.4 °C)   TempSrc: Temporal   SpO2:  95%   Weight: 102 kg (225 lb 15.5 oz)   PainSc: 0-No pain     Body mass index is 39.54 kg/m².     Physical Exam  Constitutional:       General: She is not in acute distress.     Appearance: She is not toxic-appearing.      Comments: Sitting upright in wheelchair   HENT:      Head: Normocephalic and atraumatic.   Pulmonary:      Effort: Pulmonary effort is normal. No respiratory distress.   Neurological:      General: No focal deficit present.      Mental Status: She is alert and oriented to person, place, and time.      Cranial Nerves: No cranial nerve deficit.   Psychiatric:         Mood and Affect: Mood normal.         Behavior: Behavior normal.         Judgment: Judgment normal.         Results:   Radiographs: XR Chest 1 View    Result Date: 6/20/2024  Impression: 1. New left chest wall lin catheter tip terminates at the level of the right atrium. No visible pneumothorax. 2. Patchy bibasilar airspace disease and mild right upper lobe interstitial thickening appear similar to the prior chest x-ray, may reflect changes of chronic atelectasis, pneumonia or mucous plugging. No new airspace disease is identified 3. Pulmonary nodules are seen to better advantage on prior CT and PET/CT imaging. Electronically Signed: Cecy De Jesus MD  6/20/2024 8:59 AM EDT  Workstation ID: [id number]    NM PET/CT Skull Base to Mid Thigh    Result Date: 6/19/2024  Impression: 1. New hypermetabolic nodules within the right middle lobe. There are also multiple new enlarged hypermetabolic mediastinal lymph nodes consistent with metastatic disease. There are scattered foci of hypermetabolism throughout the bony structures consistent with bone metastases. Electronically Signed: Bill Butcher MD  6/19/2024 4:20 PM EDT  Workstation ID: [id number]    CT Head With & Without Contrast    Result Date: 6/13/2024  Impression: 1.Hypodensity anterior limb internal capsule on the left which while nonspecific might reflect more chronic small vessel  ischemic change. 2.No definite for metastatic disease on this exam. Depending on concern MR of the brain might be of benefit to reassess this patient. Electronically Signed: Parker Mcbride MD  6/13/2024 11:56 AM EDT  Workstation ID: [id number]    CT Chest Without Contrast Diagnostic    Result Date: 6/3/2024  Impression: 1.  Right middle lobe nodules and mediastinal and right hilar lymphadenopathy has progressed from prior CT, and remains concerning for malignancy. 2.  Partial right middle lobe atelectasis. 3.  Moderate emphysema.     Electronically Signed By-Isma Roche MD On:6/3/2024 11:56 AM      XR Chest 1 View    Result Date: 6/2/2024  Impression: 1.  Increasing bibasilar airspace opacity. Finding is suspected to represent superimposed pneumonia and scarring/atelectasis.   Electronically Signed By-Quinton Bennett MD On:6/2/2024 4:23 PM     I personally reviewed the PET/CT performed on 6/19/2024.  The pertinent findings are as above in HPI.      Pathology: I personally reviewed the pathology report from the procedure performed on 6/7/2024.  The pertinent findings are as above in HPI.      Labs:   WBC   Date Value Ref Range Status   06/24/2024 8.86 3.40 - 10.80 10*3/mm3 Final     Hemoglobin   Date Value Ref Range Status   06/24/2024 10.5 (L) 12.0 - 15.9 g/dL Final     Hematocrit   Date Value Ref Range Status   06/24/2024 29.9 (L) 34.0 - 46.6 % Final     Platelets   Date Value Ref Range Status   06/24/2024 226 140 - 450 10*3/mm3 Final       Assessment / Plan      Assessment/Plan:   Lluvia Archer is a 58-year-old female with extensive stage small cell lung cancer.  She has osseous metastatic disease as well as right axillary adenopathy.  ECOG 2    I discussed the clinical, radiographic and pathologic findings to date with Ms. Archer.  I explained the role of radiotherapy in the management of extensive stage small cell lung cancer.  I explained that radiotherapy is reserved for consolidation of intrathoracic disease  or progression of disease resulting in symptoms from lung cancer.  Given the extent of her thoracic disease is primarily mediastinal adenopathy without a dominant/large parenchymal lung lesion, I do not anticipate radiotherapy being of significant benefit in the consolidative setting.  Nonetheless, she will return for repeat evaluation in 3 months.  Additionally, she may benefit from surveillance neuroimaging.  She was encouraged to contact me in the interim with any questions or concerns regarding her care.        Shin Gordon MD  Radiation Oncology  [facility]    This document has been signed by Shin Gordon MD on July 1, 2024 09:29 EDT

## 2024-07-01 NOTE — PROGRESS NOTES
New Patient Office Visit      Encounter Date: 07/01/2024   Patient Name: Lluvia Archer  YOB: 1965   Medical Record Number: [id number]   Primary Diagnosis: Small cell lung cancer [C34.90]   Cancer Staging: Cancer Staging   Small cell lung cancer  Staging form: Lung, AJCC 8th Edition  - Clinical: Stage IVB (cT1b, cN2, cM1c) - Signed by Brian Dickson MD on 6/20/2024      Chief Complaint:    Chief Complaint   Patient presents with    Consult    Lung Cancer       History of Present Illness: Lluvia Archer is a 58-year-old female with extensive stage small cell lung cancer who is seen in consultation regarding radiotherapy as a component of management.  Ms. Archer has a history of lung nodules.  She underwent CT scan of the chest in February revealing multiple right middle lobe nodules with mediastinal and right hilar adenopathy.  She was subsequently admitted to [facility] on 6/2/2024 and treated for pneumonia.  CT scan of the chest at that time revealed progression of right middle lobe nodules and mediastinal and right hilar adenopathy.  As an outpatient, she underwent bronchoscopy and biopsy with pathology from station 4R and station 7 revealing small cell carcinoma.  As she was unable to undergo MRI of the brain due to claustrophobia CT scan of the head with and without contrast performed on 6/13/2024 revealed no evidence of intracranial metastatic disease.  PET/CT revealed diffuse metastatic disease including bony metastatic disease and right axillary adenopathy.  She has commenced carbo/etoposide/Durvalumab per Dr. Dickson.  She denies pain of any kind.    Subjective      Review of Systems: Review of Systems   Constitutional:  Positive for appetite change (decreased), fatigue (7/10) and unexpected weight change (7lb wt loss in last month).        1st chemo treatment Monday   HENT:  Positive for sore throat and trouble swallowing (esophageal dilation). Negative for  tinnitus.    Eyes:  Negative for visual disturbance.   Respiratory:  Positive for cough (productive with thick green secretion) and shortness of breath (with activity).    Cardiovascular:  Positive for palpitations (occasional with anxiety) and leg swelling. Negative for chest pain.   Gastrointestinal:  Positive for nausea (occaisonal) and vomiting (r/t secretions). Negative for anal bleeding, blood in stool, constipation, diarrhea and rectal pain.        Colonoscopy 2023     Genitourinary:  Negative for difficulty urinating, dyspareunia, dysuria, frequency and urgency.   Musculoskeletal:  Positive for gait problem (r/t weakness and sob). Negative for arthralgias and back pain.   Skin:  Positive for color change (reddness noted to under breasts) and rash (bilateral breast).        Shingles to right abdomen    Neurological:  Positive for weakness. Negative for dizziness and headaches.   Psychiatric/Behavioral:  Positive for sleep disturbance (difficulty falling and staying asleep). Negative for agitation, self-injury and suicidal ideas. The patient is nervous/anxious.        Past Medical History:   Past Medical History:   Diagnosis Date    Abnormal mammogram     PT DENIES KNOWING OF THIS HX    Anxiety     Arthritis     R SHOULDER, R HIP, AND R LEG    Cancer     LUNG    Chronic nausea 01/15/2019    COPD (chronic obstructive pulmonary disease)     O2 3 LITERS NC CONT    Hernia, hiatal     Hyperlipidemia     Hypertension     Knee pain, right 08/30/2018    Memory loss     FORGETFULNESS ? ETIOLOGY    Migraine headache     Moderate episode of recurrent major depressive disorder 05/22/2017    Night sweats     Sciatica of right side 08/18/2017    Severe episode of recurrent major depressive disorder, without psychotic features 10/22/2019    Shingles     OUTBREAK 6/11/24 ON ANTIVIRAL , WHELPS NOTED NO SORES.    Shoulder pain, left 08/30/2018       Past Surgical History:   Past Surgical History:   Procedure Laterality Date     BRONCHOSCOPY N/A 2022    Procedure: BRONCHOSCOPY WITH BRONCHOALVEOLAR LAVAGE, BIOPSY;  Surgeon: Shin Mcdonald DO;  Location: [facility] ENDOSCOPY;  Service: Pulmonary;  Laterality: N/A;  RIGHT LOWER LOBE INFILTRATE    BRONCHOSCOPY N/A 2024    Procedure: BRONCHOSCOPY WITH ENDOBRONCHIAL ULTRASOUND AND FINE NEEDLE ASPIRATE;  Surgeon: Shin Mcdonald DO;  Location: [facility] ENDOSCOPY;  Service: Pulmonary;  Laterality: N/A;  MEDIASTINAL ADENOPATHY     SECTION      CHOLECYSTECTOMY      LAPAROSCOPIC    COLONOSCOPY      PORTACATH PLACEMENT Left 2024    Procedure: INSERTION OF PORTACATH;  Surgeon: Josh Patton MD;  Location: [facility] OR OSC;  Service: General;  Laterality: Left;    TOTAL HIP ARTHROPLASTY Right 2022    Procedure: RIGHT TOTAL HIP ARTHROPLASTY;  Surgeon: Cecil Gomes MD;  Location: [facility] MAIN OR;  Service: Orthopedics;  Laterality: Right;       Family History:   Family History   Problem Relation Age of Onset    Other Mother         BLOOD DISEASE    Arthritis Mother     Heart disease Father     Hypertension Other     Cancer Other     Heart disease Other     Malig Hyperthermia Neg Hx        Social History:   Social History     Socioeconomic History    Marital status:      Spouse name: DEVAN    Number of children: 2    Years of education: GED    Highest education level: GED or equivalent   Tobacco Use    Smoking status: Every Day     Current packs/day: 2.00     Average packs/day: 2.0 packs/day for 42.5 years (85.0 ttl pk-yrs)     Types: Cigarettes     Start date:      Passive exposure: Past    Smokeless tobacco: Never    Tobacco comments:     Pt stopped smoking on 2022. Pt stated at her appt today 2024. Pt states she smokes less than a 1/2 ppd      INST. PER ANESTHESIA PROTOCOL   Vaping Use    Vaping status: Former    Substances: Nicotine, Flavoring    Devices: Disposable   Substance and Sexual Activity    Alcohol use: Never    Drug use:  Never    Sexual activity: Defer       Medications:     Current Outpatient Medications:     albuterol sulfate  (90 Base) MCG/ACT inhaler, Inhale 2 puffs Every 4 (Four) Hours As Needed for Wheezing or Shortness of Air., Disp: 18 g, Rfl: 5    atorvastatin (LIPITOR) 10 MG tablet, TAKE ONE TABLET BY MOUTH DAILY AT 9 PM EVERY NIGHT (Patient taking differently: Take 1 tablet by mouth Every Night.), Disp: 30 tablet, Rfl: 11    buPROPion SR (WELLBUTRIN SR) 150 MG 12 hr tablet, TAKE ONE TABLET BY MOUTH TWICE DAILY @ 9AM & 5PM (Patient taking differently: Take 1 tablet by mouth 2 (Two) Times a Day.), Disp: 60 tablet, Rfl: 11    Cariprazine HCl (Vraylar) 1.5 MG capsule capsule, Take 1 capsule by mouth Every Night., Disp: , Rfl:     famotidine (PEPCID) 40 MG tablet, TAKE ONE TABLET BY MOUTH TWICE DAILY @ 9AM & 5PM (Patient taking differently: Take 1 tablet by mouth 2 (Two) Times a Day.), Disp: 180 tablet, Rfl: 3    Fluticasone-Umeclidin-Vilant (Trelegy Ellipta) 100-62.5-25 MCG/ACT inhaler, Inhale 1 puff Daily for 30 days. Rinse mouth out after each use (Patient taking differently: Inhale 1 puff Daily With Lunch. Rinse mouth out after each use), Disp: 1 each, Rfl: 5    gabapentin (NEURONTIN) 300 MG capsule, Take 1 capsule by mouth 3 (Three) Times a Day., Disp: 90 capsule, Rfl: 0    ipratropium-albuterol (DUO-NEB) 0.5-2.5 mg/3 ml nebulizer, Take 3 mL by nebulization 4 (Four) Times a Day for 30 days., Disp: 360 mL, Rfl: 5    lisinopril (PRINIVIL,ZESTRIL) 5 MG tablet, TAKE ONE TABLET BY MOUTH DAILY AT 9 AM (Patient taking differently: Take 1 tablet by mouth Daily.), Disp: 30 tablet, Rfl: 11    LORazepam (ATIVAN) 0.5 MG tablet, Take 1 tablet by mouth Every 8 (Eight) Hours As Needed for Anxiety., Disp: 30 tablet, Rfl: 0    nicotine (NICODERM CQ) 21 MG/24HR patch, Place 1 patch on the skin as directed by provider Daily., Disp: 30 patch, Rfl: 0    OLANZapine (zyPREXA) 5 MG tablet, Take 1 tablet by mouth Every Night. Take nightly  x 4 starting night of chemotherapy., Disp: 4 tablet, Rfl: 3    ondansetron (ZOFRAN) 8 MG tablet, Take 1 tablet by mouth 3 (Three) Times a Day As Needed for Nausea or Vomiting., Disp: 30 tablet, Rfl: 5    traMADol (ULTRAM) 50 MG tablet, Take 1 tablet by mouth Every 6 (Six) Hours As Needed for Moderate Pain., Disp: 10 tablet, Rfl: 0    nystatin (MYCOSTATIN) 100,000 unit/mL suspension, Swish and swallow 5 mL 4 (Four) Times a Day. (Patient not taking: Reported on 6/24/2024), Disp: 60 mL, Rfl: 1    omeprazole (priLOSEC) 40 MG capsule, Take 1 capsule by mouth Every Night. (Patient not taking: Reported on 6/24/2024), Disp: , Rfl:     Allergies:   No Known Allergies    Pain: (on a scale of 0-10)   Pain Score    07/01/24 0823   PainSc: 0-No pain       Lluvia Archer reports a pain score of 0.  Given her pain assessment as noted, treatment options were discussed and the following options were decided upon as a follow-up plan to address the patient's pain: continuation of current treatment plan for pain.     Advanced Care Plan: N   Quality of Life: 40 - Must Use Wheelchair Most of Time    Objective     Physical Exam:   Vital Signs:   Vitals:    07/01/24 0823   BP: 130/71   Pulse: 104   Resp: 18   Temp: 97.6 °F (36.4 °C)   TempSrc: Temporal   SpO2: 95%   Weight: 102 kg (225 lb 15.5 oz)   PainSc: 0-No pain     Body mass index is 39.54 kg/m².     Physical Exam  Constitutional:       General: She is not in acute distress.     Appearance: She is not toxic-appearing.      Comments: Sitting upright in wheelchair   HENT:      Head: Normocephalic and atraumatic.   Pulmonary:      Effort: Pulmonary effort is normal. No respiratory distress.   Neurological:      General: No focal deficit present.      Mental Status: She is alert and oriented to person, place, and time.      Cranial Nerves: No cranial nerve deficit.   Psychiatric:         Mood and Affect: Mood normal.         Behavior: Behavior normal.         Judgment: Judgment normal.          Results:   Radiographs: XR Chest 1 View    Result Date: 6/20/2024  Impression: 1. New left chest wall lin catheter tip terminates at the level of the right atrium. No visible pneumothorax. 2. Patchy bibasilar airspace disease and mild right upper lobe interstitial thickening appear similar to the prior chest x-ray, may reflect changes of chronic atelectasis, pneumonia or mucous plugging. No new airspace disease is identified 3. Pulmonary nodules are seen to better advantage on prior CT and PET/CT imaging. Electronically Signed: Cecy De Jesus MD  6/20/2024 8:59 AM EDT  Workstation ID: [id number]    NM PET/CT Skull Base to Mid Thigh    Result Date: 6/19/2024  Impression: 1. New hypermetabolic nodules within the right middle lobe. There are also multiple new enlarged hypermetabolic mediastinal lymph nodes consistent with metastatic disease. There are scattered foci of hypermetabolism throughout the bony structures consistent with bone metastases. Electronically Signed: Bill Butcher MD  6/19/2024 4:20 PM EDT  Workstation ID: [id number]    CT Head With & Without Contrast    Result Date: 6/13/2024  Impression: 1.Hypodensity anterior limb internal capsule on the left which while nonspecific might reflect more chronic small vessel ischemic change. 2.No definite for metastatic disease on this exam. Depending on concern MR of the brain might be of benefit to reassess this patient. Electronically Signed: Parker Mcbride MD  6/13/2024 11:56 AM EDT  Workstation ID: [id number]    CT Chest Without Contrast Diagnostic    Result Date: 6/3/2024  Impression: 1.  Right middle lobe nodules and mediastinal and right hilar lymphadenopathy has progressed from prior CT, and remains concerning for malignancy. 2.  Partial right middle lobe atelectasis. 3.  Moderate emphysema.     Electronically Signed By-Isma Roche MD On:6/3/2024 11:56 AM      XR Chest 1 View    Result Date: 6/2/2024  Impression: 1.  Increasing bibasilar airspace  opacity. Finding is suspected to represent superimposed pneumonia and scarring/atelectasis.   Electronically Signed By-Quinton Bennett MD On:6/2/2024 4:23 PM     I personally reviewed the PET/CT performed on 6/19/2024.  The pertinent findings are as above in HPI.      Pathology: I personally reviewed the pathology report from the procedure performed on 6/7/2024.  The pertinent findings are as above in HPI.      Labs:   WBC   Date Value Ref Range Status   06/24/2024 8.86 3.40 - 10.80 10*3/mm3 Final     Hemoglobin   Date Value Ref Range Status   06/24/2024 10.5 (L) 12.0 - 15.9 g/dL Final     Hematocrit   Date Value Ref Range Status   06/24/2024 29.9 (L) 34.0 - 46.6 % Final     Platelets   Date Value Ref Range Status   06/24/2024 226 140 - 450 10*3/mm3 Final       Assessment / Plan      Assessment/Plan:   Lluvia Archer is a 58-year-old female with extensive stage small cell lung cancer.  She has osseous metastatic disease as well as right axillary adenopathy.  ECOG 2    I discussed the clinical, radiographic and pathologic findings to date with Ms. Archer.  I explained the role of radiotherapy in the management of extensive stage small cell lung cancer.  I explained that radiotherapy is reserved for consolidation of intrathoracic disease or progression of disease resulting in symptoms from lung cancer.  Given the extent of her thoracic disease is primarily mediastinal adenopathy without a dominant/large parenchymal lung lesion, I do not anticipate radiotherapy being of significant benefit in the consolidative setting.  Nonetheless, she will return for repeat evaluation in 3 months.  Additionally, she may benefit from surveillance neuroimaging.  She was encouraged to contact me in the interim with any questions or concerns regarding her care.        Shin Gordon MD  Radiation Oncology  [facility]    This document has been signed by Shin Gordon MD on July 1, 2024 09:29 EDT

## 2024-07-08 ENCOUNTER — TELEPHONE (OUTPATIENT)
Dept: ONCOLOGY | Facility: HOSPITAL | Age: 59
End: 2024-07-08
Payer: MEDICARE

## 2024-07-08 NOTE — TELEPHONE ENCOUNTER
Caller: Daria Archer (ON  [facility])    Relationship to patient: Emergency Contact    Best call back number: [phone]    Chief complaint:     Type of visit: F/U 1 & INFUSION    Requested date: CALL TO R/S     If rescheduling, when is the original appointment: 7/15/2024    Additional notes:

## 2024-07-15 ENCOUNTER — TELEPHONE (OUTPATIENT)
Dept: ONCOLOGY | Facility: HOSPITAL | Age: 59
End: 2024-07-15
Payer: MEDICARE

## 2024-07-15 DIAGNOSIS — C34.90 SMALL CELL LUNG CANCER: ICD-10-CM

## 2024-07-15 NOTE — TELEPHONE ENCOUNTER
Caller: Lluvia Archer    Relationship: Self    Best call back number: [phone]    What is the best time to reach you: ANYTIME    Who are you requesting to speak with (clinical staff, provider, specific staff member): SCHEDULING     What was the call regarding: PLEASE CALL PATIENT TO R/S HER INFUSION AND F/U THAT WAS SCHEDULED FOR TODAY 7/15. SHE CANC THEM LAST WEEK.

## 2024-07-15 NOTE — TELEPHONE ENCOUNTER
Caller: Lluvia Archer    Relationship: Self    Best call back number: [phone]    Requested Prescriptions:   Requested Prescriptions     Pending Prescriptions Disp Refills    traMADol (ULTRAM) 50 MG tablet 10 tablet 0     Sig: Take 1 tablet by mouth Every 6 (Six) Hours As Needed for Moderate Pain.        Pharmacy where request should be sent: [facility]Y #[id number] - [place], [place] - [address] [facility] [phone] [facility] [phone] FX     Last office visit with prescribing clinician: 6/20/2024   Last telemedicine visit with prescribing clinician: Visit date not found   Next office visit with prescribing clinician: 8/5/2024     Additional details provided by patient: PATIENT ALSO STATED SHE NEEDS THE MEDICATIONS THAT SHE IS SUPPOSED TO TAKE WHILE SHE IS GETTING HER CHEMO INFUSIONS.     Does the patient have less than a 3 day supply:  [x] Yes  [] No    Would you like a call back once the refill request has been completed: [] Yes [x] No    If the office needs to give you a call back, can they leave a voicemail: [] Yes [x] No

## 2024-07-16 ENCOUNTER — HOSPITAL ENCOUNTER (OUTPATIENT)
Dept: ONCOLOGY | Facility: HOSPITAL | Age: 59
Discharge: HOME | End: 2024-07-16
Payer: MEDICARE

## 2024-07-16 ENCOUNTER — OFFICE VISIT (OUTPATIENT)
Dept: ONCOLOGY | Facility: HOSPITAL | Age: 59
End: 2024-07-16
Payer: MEDICARE

## 2024-07-16 VITALS
RESPIRATION RATE: 18 BRPM | OXYGEN SATURATION: 95 % | HEART RATE: 103 BPM | TEMPERATURE: 97.3 F | DIASTOLIC BLOOD PRESSURE: 50 MMHG | SYSTOLIC BLOOD PRESSURE: 113 MMHG | BODY MASS INDEX: 38.18 KG/M2 | WEIGHT: 215.5 LBS | HEIGHT: 63 IN

## 2024-07-16 VITALS
TEMPERATURE: 97.3 F | HEART RATE: 103 BPM | BODY MASS INDEX: 38.2 KG/M2 | OXYGEN SATURATION: 95 % | SYSTOLIC BLOOD PRESSURE: 113 MMHG | RESPIRATION RATE: 18 BRPM | HEIGHT: 63 IN | DIASTOLIC BLOOD PRESSURE: 50 MMHG | WEIGHT: 215.61 LBS

## 2024-07-16 DIAGNOSIS — C34.90 SMALL CELL LUNG CANCER: ICD-10-CM

## 2024-07-16 DIAGNOSIS — Z45.2 ENCOUNTER FOR ADJUSTMENT OR MANAGEMENT OF VASCULAR ACCESS DEVICE: ICD-10-CM

## 2024-07-16 DIAGNOSIS — C34.90 SMALL CELL LUNG CANCER: Primary | ICD-10-CM

## 2024-07-16 DIAGNOSIS — F41.9 ANXIETY: ICD-10-CM

## 2024-07-16 DIAGNOSIS — D64.81 ANEMIA ASSOCIATED WITH CHEMOTHERAPY: ICD-10-CM

## 2024-07-16 DIAGNOSIS — F51.01 PRIMARY INSOMNIA: ICD-10-CM

## 2024-07-16 DIAGNOSIS — T45.1X5A ANEMIA ASSOCIATED WITH CHEMOTHERAPY: ICD-10-CM

## 2024-07-16 DIAGNOSIS — K12.31 MUCOSITIS DUE TO CHEMOTHERAPY: Primary | ICD-10-CM

## 2024-07-16 LAB
ALBUMIN SERPL-MCNC: 3.8 G/DL (ref 3.5–5.2)
ALBUMIN/GLOB SERPL: 1.5 G/DL
ALP SERPL-CCNC: 118 U/L (ref 39–117)
ALT SERPL W P-5'-P-CCNC: 10 U/L (ref 1–33)
ANION GAP SERPL CALCULATED.3IONS-SCNC: 7.5 MMOL/L (ref 5–15)
AST SERPL-CCNC: 13 U/L (ref 1–32)
BASOPHILS # BLD AUTO: 0.01 10*3/MM3 (ref 0–0.2)
BASOPHILS NFR BLD AUTO: 0.1 % (ref 0–1.5)
BILIRUB SERPL-MCNC: 0.2 MG/DL (ref 0–1.2)
BUN SERPL-MCNC: 5 MG/DL (ref 6–20)
BUN/CREAT SERPL: 6.2 (ref 7–25)
CALCIUM SPEC-SCNC: 9.2 MG/DL (ref 8.6–10.5)
CHLORIDE SERPL-SCNC: 102 MMOL/L (ref 98–107)
CO2 SERPL-SCNC: 27.5 MMOL/L (ref 22–29)
CREAT SERPL-MCNC: 0.81 MG/DL (ref 0.57–1)
DEPRECATED RDW RBC AUTO: 51.8 FL (ref 37–54)
EGFRCR SERPLBLD CKD-EPI 2021: 84.8 ML/MIN/1.73
EOSINOPHIL # BLD AUTO: 0.02 10*3/MM3 (ref 0–0.4)
EOSINOPHIL NFR BLD AUTO: 0.2 % (ref 0.3–6.2)
ERYTHROCYTE [DISTWIDTH] IN BLOOD BY AUTOMATED COUNT: 16.9 % (ref 12.3–15.4)
GLOBULIN UR ELPH-MCNC: 2.6 GM/DL
GLUCOSE SERPL-MCNC: 129 MG/DL (ref 65–99)
HCT VFR BLD AUTO: 28.7 % (ref 34–46.6)
HGB BLD-MCNC: 9.1 G/DL (ref 12–15.9)
IMM GRANULOCYTES # BLD AUTO: 0.43 10*3/MM3 (ref 0–0.05)
IMM GRANULOCYTES NFR BLD AUTO: 3.7 % (ref 0–0.5)
LYMPHOCYTES # BLD AUTO: 1.98 10*3/MM3 (ref 0.7–3.1)
LYMPHOCYTES NFR BLD AUTO: 17 % (ref 19.6–45.3)
MCH RBC QN AUTO: 29.8 PG (ref 26.6–33)
MCHC RBC AUTO-ENTMCNC: 31.7 G/DL (ref 31.5–35.7)
MCV RBC AUTO: 94.1 FL (ref 79–97)
MONOCYTES # BLD AUTO: 1.37 10*3/MM3 (ref 0.1–0.9)
MONOCYTES NFR BLD AUTO: 11.8 % (ref 5–12)
NEUTROPHILS NFR BLD AUTO: 67.2 % (ref 42.7–76)
NEUTROPHILS NFR BLD AUTO: 7.81 10*3/MM3 (ref 1.7–7)
PLATELET # BLD AUTO: 325 10*3/MM3 (ref 140–450)
PMV BLD AUTO: 9 FL (ref 6–12)
POTASSIUM SERPL-SCNC: 3.6 MMOL/L (ref 3.5–5.2)
PROT SERPL-MCNC: 6.4 G/DL (ref 6–8.5)
RBC # BLD AUTO: 3.05 10*6/MM3 (ref 3.77–5.28)
SODIUM SERPL-SCNC: 137 MMOL/L (ref 136–145)
WBC NRBC COR # BLD AUTO: 11.62 10*3/MM3 (ref 3.4–10.8)

## 2024-07-16 PROCEDURE — 96375 TX/PRO/DX INJ NEW DRUG ADDON: CPT

## 2024-07-16 PROCEDURE — 25810000003 SODIUM CHLORIDE 0.9 % SOLUTION 250 ML FLEX CONT: Performed by: INTERNAL MEDICINE

## 2024-07-16 PROCEDURE — 99214 OFFICE O/P EST MOD 30 MIN: CPT | Performed by: INTERNAL MEDICINE

## 2024-07-16 PROCEDURE — 25010000002 FOSAPREPITANT PER 1 MG: Performed by: INTERNAL MEDICINE

## 2024-07-16 PROCEDURE — 96413 CHEMO IV INFUSION 1 HR: CPT

## 2024-07-16 PROCEDURE — 25010000002 PALONOSETRON PER 25 MCG: Performed by: INTERNAL MEDICINE

## 2024-07-16 PROCEDURE — 3074F SYST BP LT 130 MM HG: CPT | Performed by: INTERNAL MEDICINE

## 2024-07-16 PROCEDURE — 25010000002 CARBOPLATIN PER 50 MG: Performed by: INTERNAL MEDICINE

## 2024-07-16 PROCEDURE — 25010000002 DURVALUMAB 50 MG/ML SOLUTION 10 ML VIAL: Performed by: INTERNAL MEDICINE

## 2024-07-16 PROCEDURE — 96367 TX/PROPH/DG ADDL SEQ IV INF: CPT

## 2024-07-16 PROCEDURE — 80053 COMPREHEN METABOLIC PANEL: CPT | Performed by: INTERNAL MEDICINE

## 2024-07-16 PROCEDURE — 25810000003 SODIUM CHLORIDE 0.9 % SOLUTION: Performed by: INTERNAL MEDICINE

## 2024-07-16 PROCEDURE — 25010000002 HEPARIN LOCK FLUSH PER 10 UNITS: Performed by: INTERNAL MEDICINE

## 2024-07-16 PROCEDURE — 96417 CHEMO IV INFUS EACH ADDL SEQ: CPT

## 2024-07-16 PROCEDURE — 25010000002 DEXAMETHASONE SODIUM PHOSPHATE 120 MG/30ML SOLUTION: Performed by: INTERNAL MEDICINE

## 2024-07-16 PROCEDURE — 25010000002 ETOPOSIDE 500 MG/25ML SOLUTION 25 ML VIAL: Performed by: INTERNAL MEDICINE

## 2024-07-16 PROCEDURE — 1126F AMNT PAIN NOTED NONE PRSNT: CPT | Performed by: INTERNAL MEDICINE

## 2024-07-16 PROCEDURE — 85025 COMPLETE CBC W/AUTO DIFF WBC: CPT | Performed by: INTERNAL MEDICINE

## 2024-07-16 PROCEDURE — 25810000003 SODIUM CHLORIDE 0.9 % SOLUTION 500 ML FLEX CONT: Performed by: INTERNAL MEDICINE

## 2024-07-16 PROCEDURE — 3078F DIAST BP <80 MM HG: CPT | Performed by: INTERNAL MEDICINE

## 2024-07-16 RX ORDER — SODIUM CHLORIDE 9 MG/ML
20 INJECTION, SOLUTION INTRAVENOUS ONCE
Status: CANCELLED | OUTPATIENT
Start: 2024-07-18

## 2024-07-16 RX ORDER — HEPARIN SODIUM (PORCINE) LOCK FLUSH IV SOLN 100 UNIT/ML 100 UNIT/ML
500 SOLUTION INTRAVENOUS AS NEEDED
Status: DISCONTINUED | OUTPATIENT
Start: 2024-07-16 | End: 2024-07-17 | Stop reason: HOSPADM

## 2024-07-16 RX ORDER — SODIUM CHLORIDE 9 MG/ML
20 INJECTION, SOLUTION INTRAVENOUS ONCE
Status: CANCELLED | OUTPATIENT
Start: 2024-07-17

## 2024-07-16 RX ORDER — PALONOSETRON 0.05 MG/ML
0.25 INJECTION, SOLUTION INTRAVENOUS ONCE
Status: COMPLETED | OUTPATIENT
Start: 2024-07-16 | End: 2024-07-16

## 2024-07-16 RX ORDER — HEPARIN SODIUM (PORCINE) LOCK FLUSH IV SOLN 100 UNIT/ML 100 UNIT/ML
500 SOLUTION INTRAVENOUS AS NEEDED
Status: CANCELLED | OUTPATIENT
Start: 2024-07-16

## 2024-07-16 RX ORDER — SODIUM CHLORIDE 0.9 % (FLUSH) 0.9 %
20 SYRINGE (ML) INJECTION AS NEEDED
Status: DISCONTINUED | OUTPATIENT
Start: 2024-07-16 | End: 2024-07-17 | Stop reason: HOSPADM

## 2024-07-16 RX ORDER — PALONOSETRON 0.05 MG/ML
0.25 INJECTION, SOLUTION INTRAVENOUS ONCE
Status: CANCELLED | OUTPATIENT
Start: 2024-07-16

## 2024-07-16 RX ORDER — SODIUM CHLORIDE 9 MG/ML
20 INJECTION, SOLUTION INTRAVENOUS ONCE
Status: CANCELLED | OUTPATIENT
Start: 2024-07-16

## 2024-07-16 RX ORDER — SODIUM CHLORIDE 9 MG/ML
20 INJECTION, SOLUTION INTRAVENOUS ONCE
Status: COMPLETED | OUTPATIENT
Start: 2024-07-16 | End: 2024-07-16

## 2024-07-16 RX ORDER — SODIUM CHLORIDE 0.9 % (FLUSH) 0.9 %
20 SYRINGE (ML) INJECTION AS NEEDED
Status: CANCELLED | OUTPATIENT
Start: 2024-07-16

## 2024-07-16 RX ADMIN — SODIUM CHLORIDE 210 MG: 0.9 INJECTION, SOLUTION INTRAVENOUS at 13:59

## 2024-07-16 RX ADMIN — HEPARIN 500 UNITS: 100 SYRINGE at 15:17

## 2024-07-16 RX ADMIN — FOSAPREPITANT 100 ML: 150 INJECTION, POWDER, LYOPHILIZED, FOR SOLUTION INTRAVENOUS at 12:35

## 2024-07-16 RX ADMIN — DEXAMETHASONE SODIUM PHOSPHATE 12 MG: 4 INJECTION, SOLUTION INTRA-ARTICULAR; INTRALESIONAL; INTRAMUSCULAR; INTRAVENOUS; SOFT TISSUE at 12:07

## 2024-07-16 RX ADMIN — PALONOSETRON HYDROCHLORIDE 0.25 MG: 0.25 INJECTION INTRAVENOUS at 12:33

## 2024-07-16 RX ADMIN — SODIUM CHLORIDE 20 ML/HR: 9 INJECTION, SOLUTION INTRAVENOUS at 11:05

## 2024-07-16 RX ADMIN — CARBOPLATIN 750 MG: 10 INJECTION, SOLUTION INTRAVENOUS at 13:14

## 2024-07-16 RX ADMIN — SODIUM CHLORIDE 1500 MG: 9 INJECTION, SOLUTION INTRAVENOUS at 11:07

## 2024-07-16 RX ADMIN — Medication 20 ML: at 15:17

## 2024-07-16 NOTE — PROGRESS NOTES
Chief Complaint  Follow-up    Provider, No Known  Aleksandar Edge DO Subjective Rhonda REGINA Archer presents to [facility] for small cell lung cancer    Ms. Archer is a very pleasant 58-year-old female with past medical history of hypertension hyperlipidemia, COPD, osteoarthritis, anxiety who presents for new oncology evaluation with her daughter for diagnosis of small cell lung cancer.  Patient previously had PET scan in September 2023 without any concerning findings.  Follow-up CT chest in February showed multiple right middle lobe nodules with mediastinal and right hilar adenopathy.  Patient was admitted to [facility] on June 2 with shortness of breath, fever, cough.  She was started on treatment for pneumonia.  She was found to be hyponatremic to 126.  Repeat CT chest on 3 Tia showed progression of right middle lobe nodules and mediastinal right hilar lymphadenopathy.  Patient was discharged on 4 June.  She had outpatient bronchoscopy on 7 June with station 4R and station 7 possible small cell carcinoma.  Unable to get MRI due to claustrophobia.  CT head with and without contrast on 13 June did not show any intracranial mets.  PET scan confirmed metastatic disease to bone and right axilla    Interval History  Here for follow up.  Patient has been started on chemotherapy with carboplatin, etoposide, and durvalumab.  She was started on the 24th.  She reports her shingles is healing.  She does have some pain in her right mid back stable.  She reports she tolerated chemotherapy well.  Denies any nausea or vomiting.  Denies any diarrhea.  Denies any neuropathy.  She has lost her hair.  She reports poor appetite started since the chemo.  She also reports increased anxiety.  She is not sleeping well at night.  She also reports some thrush and some sensation or film in her mouth. Thrush has resolved. She is ready for next cycle of  chemotherapy.    Oncology/Hematology History Overview Note   2/20/24: CT Chest:  No PE or aortic dissection. Multiple right middle lobe nodules are highly suspicious for malignancy.  Additionally, there is mediastinal and right hilar adenopathy now noted.  Follow-up with a PET-CT is recommended. Emphysema with chronic changes in both lungs that otherwise appears stable. Atherosclerotic disease and coronary artery disease.    6/2/24: admitted to [facility] with shortness of breath, fever, cough and started on treatment for pneumonia. Found to have hyponatremia to 126    6/3/24 CT Chest: Right middle lobe nodules and mediastinal and right hilar lymphadenopathy has progressed from prior CT, and remains concerning for malignancy.  Partial right middle lobe atelectasis. Moderate emphysema. Discharged on 6/4/24 with Na of 133.    6/7/24: Bronchoscopy: Station 4R and station 7 positive for small cell carcinoma.    6/13/24: CT head with and without contrast (due to claustrophobia): No mets seen    6/19/24: NM PET: New hypermetabolic nodules within the right middle lobe. There are also multiple new enlarged hypermetabolic mediastinal lymph nodes consistent with metastatic disease. Right axillary mets as well. There are scattered foci of hypermetabolism throughout the bony structures consistent with bone metastases.    6/24/24: C1D1 Carbo/Etop/Durva. Tolerated well    7/16/24: C1D1 Carbo/Etop/Durva.         Small cell lung cancer   6/12/2024 Initial Diagnosis    Small cell lung cancer     6/14/2024 - 6/14/2024 Chemotherapy    OP LUNG CISplatin 60mg/m2 / Etoposide 120mg/m2 + XRT     6/14/2024 Cancer Staged    Staging form: Lung, AJCC 8th Edition  - Clinical: Stage IVB (cT1b, cN2, cM1c) - Signed by Brian Dickson MD on 6/20/2024 6/24/2024 -  Chemotherapy    OP LUNG CARBOplatin AUC=5 / Etoposide / Durvalumab           Review of Systems   Constitutional:  Positive for fatigue. Negative for appetite change, diaphoresis,  fever, unexpected weight gain and unexpected weight loss.   HENT:  Negative for hearing loss, sore throat and voice change.    Eyes:  Negative for blurred vision, double vision, pain, redness and visual disturbance.   Respiratory:  Negative for cough, shortness of breath and wheezing.    Cardiovascular:  Positive for chest pain (pt had her port put in today). Negative for palpitations and leg swelling.   Gastrointestinal:  Positive for nausea.   Endocrine: Negative for cold intolerance, heat intolerance, polydipsia and polyuria.   Genitourinary:  Negative for decreased urine volume, difficulty urinating, frequency and urinary incontinence.   Musculoskeletal:  Negative for arthralgias, back pain, joint swelling and myalgias.   Skin:  Negative for color change, rash, skin lesions and wound.   Neurological:  Negative for dizziness, seizures, numbness and headache.   Hematological:  Negative for adenopathy. Does not bruise/bleed easily.   Psychiatric/Behavioral:  Negative for depressed mood. The patient is not nervous/anxious.    All other systems reviewed and are negative.    Current Outpatient Medications on File Prior to Visit   Medication Sig Dispense Refill    albuterol sulfate  (90 Base) MCG/ACT inhaler Inhale 2 puffs Every 4 (Four) Hours As Needed for Wheezing or Shortness of Air. 18 g 5    atorvastatin (LIPITOR) 10 MG tablet TAKE ONE TABLET BY MOUTH DAILY AT 9 PM EVERY NIGHT (Patient taking differently: Take 1 tablet by mouth Every Night.) 30 tablet 11    buPROPion SR (WELLBUTRIN SR) 150 MG 12 hr tablet TAKE ONE TABLET BY MOUTH TWICE DAILY @ 9AM & 5PM (Patient taking differently: Take 1 tablet by mouth 2 (Two) Times a Day.) 60 tablet 11    Cariprazine HCl (Vraylar) 1.5 MG capsule capsule Take 1 capsule by mouth Every Night.      famotidine (PEPCID) 40 MG tablet TAKE ONE TABLET BY MOUTH TWICE DAILY @ 9AM & 5PM (Patient taking differently: Take 1 tablet by mouth 2 (Two) Times a Day.) 180 tablet 3     gabapentin (NEURONTIN) 300 MG capsule Take 1 capsule by mouth 3 (Three) Times a Day. 90 capsule 0    ipratropium-albuterol (DUO-NEB) 0.5-2.5 mg/3 ml nebulizer Take 3 mL by nebulization 4 (Four) Times a Day for 30 days. 360 mL 5    lisinopril (PRINIVIL,ZESTRIL) 5 MG tablet TAKE ONE TABLET BY MOUTH DAILY AT 9 AM (Patient taking differently: Take 1 tablet by mouth Daily.) 30 tablet 11    LORazepam (ATIVAN) 0.5 MG tablet Take 1 tablet by mouth Every 8 (Eight) Hours As Needed for Anxiety. 30 tablet 0    nicotine (NICODERM CQ) 21 MG/24HR patch Place 1 patch on the skin as directed by provider Daily. 30 patch 0    nystatin (MYCOSTATIN) 100,000 unit/mL suspension Swish and swallow 5 mL 4 (Four) Times a Day. (Patient not taking: Reported on 6/24/2024) 60 mL 1    OLANZapine (zyPREXA) 5 MG tablet Take 1 tablet by mouth Every Night. Take nightly x 4 starting night of chemotherapy. 4 tablet 3    omeprazole (priLOSEC) 40 MG capsule Take 1 capsule by mouth Every Night. (Patient not taking: Reported on 6/24/2024)      ondansetron (ZOFRAN) 8 MG tablet Take 1 tablet by mouth 3 (Three) Times a Day As Needed for Nausea or Vomiting. 30 tablet 5    traMADol (ULTRAM) 50 MG tablet TAKE 1 TABLET BY MOUTH EVERY 6 HOURS AS NEEDED FOR MODERATE PAIN 10 tablet 0     No current facility-administered medications on file prior to visit.       No Known Allergies  Past Medical History:   Diagnosis Date    Abnormal mammogram     PT DENIES KNOWING OF THIS HX    Anxiety     Arthritis     R SHOULDER, R HIP, AND R LEG    Cancer     LUNG    Chronic nausea 01/15/2019    COPD (chronic obstructive pulmonary disease)     O2 3 LITERS NC CONT    Hernia, hiatal     Hyperlipidemia     Hypertension     Knee pain, right 08/30/2018    Memory loss     FORGETFULNESS ? ETIOLOGY    Migraine headache     Moderate episode of recurrent major depressive disorder 05/22/2017    Night sweats     Sciatica of right side 08/18/2017    Severe episode of recurrent major depressive  disorder, without psychotic features 10/22/2019    Shingles     OUTBREAK 24 ON ANTIVIRAL , WHELPS NOTED NO SORES.    Shoulder pain, left 2018     Past Surgical History:   Procedure Laterality Date    BRONCHOSCOPY N/A 2022    Procedure: BRONCHOSCOPY WITH BRONCHOALVEOLAR LAVAGE, BIOPSY;  Surgeon: Shin Mcdonald DO;  Location: [facility] ENDOSCOPY;  Service: Pulmonary;  Laterality: N/A;  RIGHT LOWER LOBE INFILTRATE    BRONCHOSCOPY N/A 2024    Procedure: BRONCHOSCOPY WITH ENDOBRONCHIAL ULTRASOUND AND FINE NEEDLE ASPIRATE;  Surgeon: Shin Mcdonald DO;  Location: [facility] ENDOSCOPY;  Service: Pulmonary;  Laterality: N/A;  MEDIASTINAL ADENOPATHY     SECTION      CHOLECYSTECTOMY      LAPAROSCOPIC    COLONOSCOPY      PORTACATH PLACEMENT Left 2024    Procedure: INSERTION OF PORTACATH;  Surgeon: Josh Patton MD;  Location: [facility] OR OSC;  Service: General;  Laterality: Left;    TOTAL HIP ARTHROPLASTY Right 2022    Procedure: RIGHT TOTAL HIP ARTHROPLASTY;  Surgeon: Cecil Gomes MD;  Location: [facility] MAIN OR;  Service: Orthopedics;  Laterality: Right;     Social History     Socioeconomic History    Marital status:      Spouse name: DEVAN    Number of children: 2    Years of education: GED    Highest education level: GED or equivalent   Tobacco Use    Smoking status: Every Day     Current packs/day: 2.00     Average packs/day: 2.0 packs/day for 42.5 years (85.1 ttl pk-yrs)     Types: Cigarettes     Start date:      Passive exposure: Past    Smokeless tobacco: Never    Tobacco comments:     Pt stopped smoking on 2022. Pt stated at her appt today 2024. Pt states she smokes less than a 1/2 ppd      INST. PER ANESTHESIA PROTOCOL   Vaping Use    Vaping status: Former    Substances: Nicotine, Flavoring    Devices: Disposable   Substance and Sexual Activity    Alcohol use: Never    Drug use: Never    Sexual activity: Defer     Family History  "  Problem Relation Age of Onset    Other Mother         BLOOD DISEASE    Arthritis Mother     Heart disease Father     Hypertension Other     Cancer Other     Heart disease Other     Malig Hyperthermia Neg Hx        Objective   Physical Exam  Well appearing patient in no acute distress on RA  Anicteric sclerae, + healing shingles rash on abdomen with radiation to back on right.   + poor dentition.   Respirations non-labored  Awake, alert, and oriented x 4. Speech intact. No gross neurologic deficit  Appropriate mood and affect    Vitals:    07/16/24 1002   BP: 113/50   Pulse: 103   Resp: 18   Temp: 97.3 °F (36.3 °C)   TempSrc: Temporal   SpO2: 95%   Weight: 97.8 kg (215 lb 9.8 oz)   Height: 161 cm (63.39\")                 PHQ-9 Total Score:                Result Review :   The following data was reviewed by: Brian Dickson MD on 07/16/24:  Lab Results   Component Value Date    HGB 9.1 (L) 07/16/2024    HCT 28.7 (L) 07/16/2024    MCV 94.1 07/16/2024     07/16/2024    WBC 11.62 (H) 07/16/2024    NEUTROABS 7.81 (H) 07/16/2024    LYMPHSABS 1.98 07/16/2024    MONOSABS 1.37 (H) 07/16/2024    EOSABS 0.02 07/16/2024    BASOSABS 0.01 07/16/2024     Lab Results   Component Value Date    GLUCOSE 129 (H) 07/16/2024    BUN 5 (L) 07/16/2024    CREATININE 0.81 07/16/2024     07/16/2024    K 3.6 07/16/2024     07/16/2024    CO2 27.5 07/16/2024    CALCIUM 9.2 07/16/2024    PROTEINTOT 6.4 07/16/2024    ALBUMIN 3.8 07/16/2024    BILITOT 0.2 07/16/2024    ALKPHOS 118 (H) 07/16/2024    AST 13 07/16/2024    ALT 10 07/16/2024     Lab Results   Component Value Date    MG 2.0 06/04/2024    PHOS 2.9 06/04/2024    FREET4 1.57 06/24/2024    TSH 1.330 06/24/2024     Labs personally reviewed. Sodium low but improved. Hgb low. WBC high.     Rad onc note personally reviewed        XR Chest 1 View    Result Date: 6/20/2024  Impression: 1. New left chest wall lin catheter tip terminates at the level of the right atrium. No " visible pneumothorax. 2. Patchy bibasilar airspace disease and mild right upper lobe interstitial thickening appear similar to the prior chest x-ray, may reflect changes of chronic atelectasis, pneumonia or mucous plugging. No new airspace disease is identified 3. Pulmonary nodules are seen to better advantage on prior CT and PET/CT imaging. Electronically Signed: Cecy De Jesus MD  6/20/2024 8:59 AM EDT  Workstation ID: [id number]    NM PET/CT Skull Base to Mid Thigh    Result Date: 6/19/2024  Impression: 1. New hypermetabolic nodules within the right middle lobe. There are also multiple new enlarged hypermetabolic mediastinal lymph nodes consistent with metastatic disease. There are scattered foci of hypermetabolism throughout the bony structures consistent with bone metastases. Electronically Signed: Bill Butcher MD  6/19/2024 4:20 PM EDT  Workstation ID: [id number]         Assessment and Plan    Diagnoses and all orders for this visit:    1. Mucositis due to chemotherapy (Primary)    2. Primary insomnia    3. Anxiety    4. Small cell lung cancer  -     ONCBCN INFUSION APPOINTMENT REQUEST 01; Future  -     ONCBCN INFUSION APPOINTMENT REQUEST 01; Future    5. Anemia associated with chemotherapy    Other orders  -     Cancel: sodium chloride 0.9 % infusion  -     Cancel: durvalumab (IMFINZI) 1,500 mg in sodium chloride 0.9 % 280 mL chemo IVPB  -     Cancel: palonosetron (ALOXI) injection 0.25 mg  -     Cancel: fosaprepitant (EMEND) 150 mg in sodium chloride 0.9 % 100 mL IVPB  -     Cancel: dexAMETHasone (DECADRON) 12 mg in sodium chloride 0.9 % IVPB  -     Cancel: CARBOplatin (PARAPLATIN) 720 mg in sodium chloride 0.9 % 322 mL chemo IVPB  -     Cancel: etoposide (TOPOSAR) 210 mg in sodium chloride 0.9 % 510.5 mL chemo IVPB  -     sodium chloride 0.9 % infusion  -     dexAMETHasone (DECADRON) 12 mg in sodium chloride 0.9 % IVPB  -     etoposide (TOPOSAR) 210 mg in sodium chloride 0.9 % 510.5 mL chemo IVPB  -      sodium chloride 0.9 % infusion  -     dexAMETHasone (DECADRON) 12 mg in sodium chloride 0.9 % IVPB  -     etoposide (TOPOSAR) 210 mg in sodium chloride 0.9 % 510.5 mL chemo IVPB  -     OK To Treat        Small cell lung cancer, extensive stage  RML with right hilar and mediastinal adenopathy on CT chest.  Bronchoscopy with positive small cell pathology at station 4R and station 7.  PET with multiple new enlarged hypermetabolic mediastinal lymph nodes consistent with metastatic disease, also with new right axillary FDG avid mets. There are scattered foci of hypermetabolism throughout the bony structures consistent with bone metastases. Discussed results. Discussed that this represents metastatic or stage IV cancer.  CT head with and without without any intracranial mets.  Unable to do MRI due to claustrophobia and anxiety. Recommended treatment is systemic alone with for IV carboplatin, etoposide and durvalumab every 3 weeks. First cycle 6/24/24. Tolerated well    7/16/24: C2D1 Carbo/Etop/Durva. Labs appropriate to proceed. Ok to treat for elevated alk phos.      Likely to hold on consolidative radiation as no dominant lung lesion.     Mucositis  Recommend salt/soda rinse    Met cancer to bone  Will need bone modifying agent. Will discuss at next visit. Likely will need dental clearance first. Need to initiate systemic therapy first.     Anxiety  Present at baseline now worse. PRN ativan 0.5 mg.     Shingles  Treated with valacyclovir. Currently healing. Still has some pain. Gabapentin previously added.    Insomnia  Recommend low dose melatonin vs benadryl. Also has Ativan if anxeity is contributing to poor sleep.      I spent 35 minutes caring for Lluvia on this date of service. This time includes time spent by me in the following activities:preparing for the visit, reviewing tests, obtaining and/or reviewing a separately obtained history, performing a medically appropriate examination and/or evaluation , counseling  and educating the patient/family/caregiver, ordering medications, tests, or procedures, referring and communicating with other health care professionals , documenting information in the medical record, independently interpreting results and communicating that information with the patient/family/caregiver, and care coordination    Patient Follow Up: Cycle 3  Patient was given instructions and counseling regarding her condition or for health maintenance advice. Please see specific information pulled into the AVS if appropriate.

## 2024-07-17 ENCOUNTER — HOSPITAL ENCOUNTER (OUTPATIENT)
Dept: ONCOLOGY | Facility: HOSPITAL | Age: 59
Discharge: HOME | End: 2024-07-17
Payer: MEDICARE

## 2024-07-17 ENCOUNTER — DOCUMENTATION (OUTPATIENT)
Dept: ONCOLOGY | Facility: HOSPITAL | Age: 59
End: 2024-07-17
Payer: MEDICARE

## 2024-07-17 VITALS
SYSTOLIC BLOOD PRESSURE: 101 MMHG | BODY MASS INDEX: 38.59 KG/M2 | HEIGHT: 63 IN | TEMPERATURE: 97.7 F | WEIGHT: 217.81 LBS | RESPIRATION RATE: 18 BRPM | OXYGEN SATURATION: 96 % | HEART RATE: 116 BPM | DIASTOLIC BLOOD PRESSURE: 73 MMHG

## 2024-07-17 DIAGNOSIS — C34.90 SMALL CELL LUNG CANCER: Primary | ICD-10-CM

## 2024-07-17 DIAGNOSIS — Z45.2 ENCOUNTER FOR ADJUSTMENT OR MANAGEMENT OF VASCULAR ACCESS DEVICE: ICD-10-CM

## 2024-07-17 PROCEDURE — 96413 CHEMO IV INFUSION 1 HR: CPT

## 2024-07-17 PROCEDURE — 25810000003 SODIUM CHLORIDE 0.9 % SOLUTION 500 ML FLEX CONT: Performed by: INTERNAL MEDICINE

## 2024-07-17 PROCEDURE — 25010000002 HEPARIN LOCK FLUSH PER 10 UNITS: Performed by: INTERNAL MEDICINE

## 2024-07-17 PROCEDURE — 25810000003 SODIUM CHLORIDE 0.9 % SOLUTION: Performed by: INTERNAL MEDICINE

## 2024-07-17 PROCEDURE — 25010000002 DEXAMETHASONE SODIUM PHOSPHATE 120 MG/30ML SOLUTION: Performed by: INTERNAL MEDICINE

## 2024-07-17 PROCEDURE — 96375 TX/PRO/DX INJ NEW DRUG ADDON: CPT

## 2024-07-17 PROCEDURE — 25010000002 ETOPOSIDE 500 MG/25ML SOLUTION 25 ML VIAL: Performed by: INTERNAL MEDICINE

## 2024-07-17 RX ORDER — SODIUM CHLORIDE 0.9 % (FLUSH) 0.9 %
20 SYRINGE (ML) INJECTION AS NEEDED
Status: CANCELLED | OUTPATIENT
Start: 2024-07-17

## 2024-07-17 RX ORDER — SODIUM CHLORIDE 0.9 % (FLUSH) 0.9 %
20 SYRINGE (ML) INJECTION AS NEEDED
Status: DISCONTINUED | OUTPATIENT
Start: 2024-07-17 | End: 2024-07-18 | Stop reason: HOSPADM

## 2024-07-17 RX ORDER — HEPARIN SODIUM (PORCINE) LOCK FLUSH IV SOLN 100 UNIT/ML 100 UNIT/ML
500 SOLUTION INTRAVENOUS AS NEEDED
Status: CANCELLED | OUTPATIENT
Start: 2024-07-18

## 2024-07-17 RX ORDER — TRAMADOL HYDROCHLORIDE 50 MG/1
50 TABLET ORAL EVERY 6 HOURS PRN
Qty: 90 TABLET | Refills: 0 | Status: SHIPPED | OUTPATIENT
Start: 2024-07-17

## 2024-07-17 RX ORDER — SODIUM CHLORIDE 9 MG/ML
20 INJECTION, SOLUTION INTRAVENOUS ONCE
Status: COMPLETED | OUTPATIENT
Start: 2024-07-17 | End: 2024-07-17

## 2024-07-17 RX ORDER — HEPARIN SODIUM (PORCINE) LOCK FLUSH IV SOLN 100 UNIT/ML 100 UNIT/ML
500 SOLUTION INTRAVENOUS AS NEEDED
Status: DISCONTINUED | OUTPATIENT
Start: 2024-07-17 | End: 2024-07-18 | Stop reason: HOSPADM

## 2024-07-17 RX ADMIN — HEPARIN 500 UNITS: 100 SYRINGE at 15:47

## 2024-07-17 RX ADMIN — SODIUM CHLORIDE 210 MG: 0.9 INJECTION, SOLUTION INTRAVENOUS at 14:44

## 2024-07-17 RX ADMIN — DEXAMETHASONE SODIUM PHOSPHATE 12 MG: 4 INJECTION, SOLUTION INTRA-ARTICULAR; INTRALESIONAL; INTRAMUSCULAR; INTRAVENOUS; SOFT TISSUE at 14:05

## 2024-07-17 RX ADMIN — Medication 20 ML: at 15:47

## 2024-07-17 RX ADMIN — SODIUM CHLORIDE 20 ML/HR: 9 INJECTION, SOLUTION INTRAVENOUS at 13:59

## 2024-07-17 NOTE — PROGRESS NOTES
Patient requested a $15 gas voucher to assist with transportation cost. OSW provided patient a gas voucher to assist with transportation barriers to care.  This voucher was left with her nurse in her pod today.

## 2024-07-18 ENCOUNTER — HOSPITAL ENCOUNTER (OUTPATIENT)
Dept: ONCOLOGY | Facility: HOSPITAL | Age: 59
Discharge: HOME | End: 2024-07-18
Payer: MEDICARE

## 2024-07-18 VITALS
DIASTOLIC BLOOD PRESSURE: 59 MMHG | WEIGHT: 217.81 LBS | SYSTOLIC BLOOD PRESSURE: 99 MMHG | TEMPERATURE: 99.1 F | BODY MASS INDEX: 38.12 KG/M2 | HEART RATE: 98 BPM | RESPIRATION RATE: 20 BRPM | OXYGEN SATURATION: 93 %

## 2024-07-18 DIAGNOSIS — E86.0 DEHYDRATION: ICD-10-CM

## 2024-07-18 DIAGNOSIS — C34.90 SMALL CELL LUNG CANCER: Primary | ICD-10-CM

## 2024-07-18 DIAGNOSIS — Z45.2 ENCOUNTER FOR ADJUSTMENT OR MANAGEMENT OF VASCULAR ACCESS DEVICE: ICD-10-CM

## 2024-07-18 PROCEDURE — 25010000002 ETOPOSIDE 500 MG/25ML SOLUTION 25 ML VIAL: Performed by: INTERNAL MEDICINE

## 2024-07-18 PROCEDURE — 25810000003 SODIUM CHLORIDE 0.9 % SOLUTION 500 ML FLEX CONT: Performed by: INTERNAL MEDICINE

## 2024-07-18 PROCEDURE — 96375 TX/PRO/DX INJ NEW DRUG ADDON: CPT

## 2024-07-18 PROCEDURE — 25810000003 SODIUM CHLORIDE 0.9 % SOLUTION: Performed by: INTERNAL MEDICINE

## 2024-07-18 PROCEDURE — 25010000002 DEXAMETHASONE SODIUM PHOSPHATE 120 MG/30ML SOLUTION: Performed by: INTERNAL MEDICINE

## 2024-07-18 PROCEDURE — 25010000002 ONDANSETRON PER 1 MG: Performed by: INTERNAL MEDICINE

## 2024-07-18 PROCEDURE — 25010000002 HEPARIN LOCK FLUSH PER 10 UNITS: Performed by: INTERNAL MEDICINE

## 2024-07-18 PROCEDURE — 96413 CHEMO IV INFUSION 1 HR: CPT

## 2024-07-18 RX ORDER — HEPARIN SODIUM (PORCINE) LOCK FLUSH IV SOLN 100 UNIT/ML 100 UNIT/ML
500 SOLUTION INTRAVENOUS AS NEEDED
Status: CANCELLED | OUTPATIENT
Start: 2024-07-18

## 2024-07-18 RX ORDER — PROCHLORPERAZINE MALEATE 10 MG
10 TABLET ORAL EVERY 6 HOURS PRN
Qty: 60 TABLET | Refills: 3 | Status: SHIPPED | OUTPATIENT
Start: 2024-07-18

## 2024-07-18 RX ORDER — SODIUM CHLORIDE 9 MG/ML
20 INJECTION, SOLUTION INTRAVENOUS ONCE
Status: COMPLETED | OUTPATIENT
Start: 2024-07-18 | End: 2024-07-18

## 2024-07-18 RX ORDER — SODIUM CHLORIDE 0.9 % (FLUSH) 0.9 %
20 SYRINGE (ML) INJECTION AS NEEDED
Status: CANCELLED | OUTPATIENT
Start: 2024-07-18

## 2024-07-18 RX ORDER — SODIUM CHLORIDE 0.9 % (FLUSH) 0.9 %
20 SYRINGE (ML) INJECTION AS NEEDED
Status: DISCONTINUED | OUTPATIENT
Start: 2024-07-18 | End: 2024-07-19 | Stop reason: HOSPADM

## 2024-07-18 RX ORDER — HEPARIN SODIUM (PORCINE) LOCK FLUSH IV SOLN 100 UNIT/ML 100 UNIT/ML
500 SOLUTION INTRAVENOUS AS NEEDED
Status: DISCONTINUED | OUTPATIENT
Start: 2024-07-18 | End: 2024-07-19 | Stop reason: HOSPADM

## 2024-07-18 RX ADMIN — DEXAMETHASONE SODIUM PHOSPHATE 12 MG: 4 INJECTION, SOLUTION INTRA-ARTICULAR; INTRALESIONAL; INTRAMUSCULAR; INTRAVENOUS; SOFT TISSUE at 14:41

## 2024-07-18 RX ADMIN — SODIUM CHLORIDE 20 ML/HR: 9 INJECTION, SOLUTION INTRAVENOUS at 14:19

## 2024-07-18 RX ADMIN — SODIUM CHLORIDE 210 MG: 0.9 INJECTION, SOLUTION INTRAVENOUS at 15:08

## 2024-07-18 RX ADMIN — HEPARIN 500 UNITS: 100 SYRINGE at 16:13

## 2024-07-18 RX ADMIN — ONDANSETRON 8 MG: 2 INJECTION INTRAMUSCULAR; INTRAVENOUS at 14:18

## 2024-07-18 RX ADMIN — Medication 20 ML: at 16:13

## 2024-07-22 ENCOUNTER — HOSPITAL ENCOUNTER (OUTPATIENT)
Dept: ONCOLOGY | Facility: HOSPITAL | Age: 59
Discharge: HOME | End: 2024-07-22
Admitting: INTERNAL MEDICINE
Payer: MEDICARE

## 2024-07-22 VITALS
SYSTOLIC BLOOD PRESSURE: 115 MMHG | HEART RATE: 107 BPM | OXYGEN SATURATION: 98 % | RESPIRATION RATE: 18 BRPM | TEMPERATURE: 98.5 F | DIASTOLIC BLOOD PRESSURE: 72 MMHG

## 2024-07-22 DIAGNOSIS — E86.0 DEHYDRATION: Primary | ICD-10-CM

## 2024-07-22 DIAGNOSIS — C34.90 SMALL CELL LUNG CANCER: ICD-10-CM

## 2024-07-22 DIAGNOSIS — Z45.2 ENCOUNTER FOR ADJUSTMENT OR MANAGEMENT OF VASCULAR ACCESS DEVICE: ICD-10-CM

## 2024-07-22 LAB
ALBUMIN SERPL-MCNC: 3.9 G/DL (ref 3.5–5.2)
ALBUMIN/GLOB SERPL: 1.9 G/DL
ALP SERPL-CCNC: 106 U/L (ref 39–117)
ALT SERPL W P-5'-P-CCNC: 33 U/L (ref 1–33)
ANION GAP SERPL CALCULATED.3IONS-SCNC: 7.7 MMOL/L (ref 5–15)
AST SERPL-CCNC: 29 U/L (ref 1–32)
BASOPHILS # BLD AUTO: 0.02 10*3/MM3 (ref 0–0.2)
BASOPHILS NFR BLD AUTO: 0.3 % (ref 0–1.5)
BILIRUB SERPL-MCNC: 0.4 MG/DL (ref 0–1.2)
BUN SERPL-MCNC: 12 MG/DL (ref 6–20)
BUN/CREAT SERPL: 17.6 (ref 7–25)
CALCIUM SPEC-SCNC: 9.1 MG/DL (ref 8.6–10.5)
CHLORIDE SERPL-SCNC: 99 MMOL/L (ref 98–107)
CO2 SERPL-SCNC: 26.3 MMOL/L (ref 22–29)
CREAT SERPL-MCNC: 0.68 MG/DL (ref 0.57–1)
DEPRECATED RDW RBC AUTO: 51.5 FL (ref 37–54)
EGFRCR SERPLBLD CKD-EPI 2021: 101.7 ML/MIN/1.73
EOSINOPHIL # BLD AUTO: 0 10*3/MM3 (ref 0–0.4)
EOSINOPHIL NFR BLD AUTO: 0 % (ref 0.3–6.2)
ERYTHROCYTE [DISTWIDTH] IN BLOOD BY AUTOMATED COUNT: 16 % (ref 12.3–15.4)
GLOBULIN UR ELPH-MCNC: 2.1 GM/DL
GLUCOSE SERPL-MCNC: 121 MG/DL (ref 65–99)
HCT VFR BLD AUTO: 27.5 % (ref 34–46.6)
HGB BLD-MCNC: 9.1 G/DL (ref 12–15.9)
IMM GRANULOCYTES # BLD AUTO: 0.05 10*3/MM3 (ref 0–0.05)
IMM GRANULOCYTES NFR BLD AUTO: 0.9 % (ref 0–0.5)
LYMPHOCYTES # BLD AUTO: 1.39 10*3/MM3 (ref 0.7–3.1)
LYMPHOCYTES NFR BLD AUTO: 24.2 % (ref 19.6–45.3)
MCH RBC QN AUTO: 29.7 PG (ref 26.6–33)
MCHC RBC AUTO-ENTMCNC: 33.1 G/DL (ref 31.5–35.7)
MCV RBC AUTO: 89.9 FL (ref 79–97)
MONOCYTES # BLD AUTO: 0.05 10*3/MM3 (ref 0.1–0.9)
MONOCYTES NFR BLD AUTO: 0.9 % (ref 5–12)
NEUTROPHILS NFR BLD AUTO: 4.24 10*3/MM3 (ref 1.7–7)
NEUTROPHILS NFR BLD AUTO: 73.7 % (ref 42.7–76)
PLATELET # BLD AUTO: 151 10*3/MM3 (ref 140–450)
PMV BLD AUTO: 9.4 FL (ref 6–12)
POTASSIUM SERPL-SCNC: 4.1 MMOL/L (ref 3.5–5.2)
PROT SERPL-MCNC: 6 G/DL (ref 6–8.5)
RBC # BLD AUTO: 3.06 10*6/MM3 (ref 3.77–5.28)
SODIUM SERPL-SCNC: 133 MMOL/L (ref 136–145)
WBC NRBC COR # BLD AUTO: 5.75 10*3/MM3 (ref 3.4–10.8)

## 2024-07-22 PROCEDURE — 25010000002 HEPARIN LOCK FLUSH PER 10 UNITS: Performed by: INTERNAL MEDICINE

## 2024-07-22 PROCEDURE — 80053 COMPREHEN METABOLIC PANEL: CPT | Performed by: INTERNAL MEDICINE

## 2024-07-22 PROCEDURE — 85025 COMPLETE CBC W/AUTO DIFF WBC: CPT | Performed by: INTERNAL MEDICINE

## 2024-07-22 PROCEDURE — 96360 HYDRATION IV INFUSION INIT: CPT

## 2024-07-22 PROCEDURE — 25810000003 SODIUM CHLORIDE 0.9 % SOLUTION: Performed by: INTERNAL MEDICINE

## 2024-07-22 RX ORDER — SODIUM CHLORIDE 0.9 % (FLUSH) 0.9 %
20 SYRINGE (ML) INJECTION AS NEEDED
OUTPATIENT
Start: 2024-07-22

## 2024-07-22 RX ORDER — SODIUM CHLORIDE 0.9 % (FLUSH) 0.9 %
20 SYRINGE (ML) INJECTION AS NEEDED
Status: DISCONTINUED | OUTPATIENT
Start: 2024-07-22 | End: 2024-07-23 | Stop reason: HOSPADM

## 2024-07-22 RX ORDER — LORAZEPAM 0.5 MG/1
0.5 TABLET ORAL EVERY 8 HOURS PRN
Qty: 30 TABLET | Refills: 0 | Status: SHIPPED | OUTPATIENT
Start: 2024-07-22

## 2024-07-22 RX ORDER — HEPARIN SODIUM (PORCINE) LOCK FLUSH IV SOLN 100 UNIT/ML 100 UNIT/ML
500 SOLUTION INTRAVENOUS AS NEEDED
Status: DISCONTINUED | OUTPATIENT
Start: 2024-07-22 | End: 2024-07-23 | Stop reason: HOSPADM

## 2024-07-22 RX ORDER — HEPARIN SODIUM (PORCINE) LOCK FLUSH IV SOLN 100 UNIT/ML 100 UNIT/ML
500 SOLUTION INTRAVENOUS AS NEEDED
OUTPATIENT
Start: 2024-07-22

## 2024-07-22 RX ADMIN — HEPARIN 500 UNITS: 100 SYRINGE at 11:49

## 2024-07-22 RX ADMIN — Medication 20 ML: at 11:49

## 2024-07-22 RX ADMIN — SODIUM CHLORIDE 1000 ML: 9 INJECTION, SOLUTION INTRAVENOUS at 10:41

## 2024-07-23 RX ORDER — CARIPRAZINE 1.5 MG/1
CAPSULE, GELATIN COATED ORAL
Qty: 30 CAPSULE | Refills: 11 | Status: SHIPPED | OUTPATIENT
Start: 2024-07-23

## 2024-07-29 ENCOUNTER — TELEPHONE (OUTPATIENT)
Dept: FAMILY MEDICINE CLINIC | Facility: CLINIC | Age: 59
End: 2024-07-29
Payer: MEDICARE

## 2024-07-29 NOTE — TELEPHONE ENCOUNTER
Caller: [place] ([place]) - [facility] [place] - [address] [facility] - [phone] [facility] [phone] FX    Relationship to patient: Pharmacy    Best call back number: [phone]    Patient is needing: NEEDING TO KNOW IF PATIENT IS TAKING BOTH THESE MEDICATIONS.   Vraylar 1.5 MG capsule capsule       OLANZapine (zyPREXA) 5 MG tablet

## 2024-07-29 NOTE — LETTER
August 5, 2024     Lluvia RACHELLE Archer  [address] [place] [address]    Patient: Lluvia Archer   YOB: 1965   Date of Visit: 7/29/2024     We received [facility] from [facility] , they are trying to clarify if you are taking both the vraylar and olanzapine .  states that you are only suppose to be taking the Vraylar only . If you have any questions please contact the office .        Sincerely,        Aleksandar Edge, Ny Eastman, PCT  07/29/24 1015  Signed    Caller: [facility] ([place]) - [place], [place] - [address] [place] [address] - [phone] [facility] [phone] FX    Relationship to patient: Pharmacy    Best call back number: [phone]    Patient is needing: NEEDING TO KNOW IF PATIENT IS TAKING BOTH THESE MEDICATIONS.   Vraylar 1.5 MG capsule capsule       OLANZapine (zyPREXA) 5 MG tablet

## 2024-07-31 ENCOUNTER — APPOINTMENT (OUTPATIENT)
Dept: GENERAL RADIOLOGY | Facility: HOSPITAL | Age: 59
End: 2024-07-31
Payer: MEDICARE

## 2024-07-31 ENCOUNTER — TELEPHONE (OUTPATIENT)
Dept: ONCOLOGY | Facility: HOSPITAL | Age: 59
End: 2024-07-31
Payer: MEDICARE

## 2024-07-31 ENCOUNTER — HOSPITAL ENCOUNTER (INPATIENT)
Facility: HOSPITAL | Age: 59
LOS: 1 days | Discharge: HOME HEALTH | End: 2024-08-02
Attending: EMERGENCY MEDICINE | Admitting: FAMILY MEDICINE
Payer: MEDICARE

## 2024-07-31 DIAGNOSIS — D64.9 ACUTE ANEMIA: Primary | ICD-10-CM

## 2024-07-31 DIAGNOSIS — R11.2 NAUSEA AND VOMITING, UNSPECIFIED VOMITING TYPE: ICD-10-CM

## 2024-07-31 DIAGNOSIS — C34.90 MALIGNANT NEOPLASM OF LUNG, UNSPECIFIED LATERALITY, UNSPECIFIED PART OF LUNG: ICD-10-CM

## 2024-07-31 DIAGNOSIS — D69.6 THROMBOCYTOPENIA: ICD-10-CM

## 2024-07-31 DIAGNOSIS — R26.2 DIFFICULTY WALKING: ICD-10-CM

## 2024-07-31 DIAGNOSIS — D70.1 CHEMOTHERAPY-INDUCED NEUTROPENIA: ICD-10-CM

## 2024-07-31 DIAGNOSIS — T45.1X5A CHEMOTHERAPY-INDUCED NEUTROPENIA: ICD-10-CM

## 2024-07-31 LAB
ABO GROUP BLD: NORMAL
ABO GROUP BLD: NORMAL
ALBUMIN SERPL-MCNC: 3.3 G/DL (ref 3.5–5.2)
ALBUMIN/GLOB SERPL: 1.1 G/DL
ALP SERPL-CCNC: 184 U/L (ref 39–117)
ALT SERPL W P-5'-P-CCNC: 50 U/L (ref 1–33)
ANION GAP SERPL CALCULATED.3IONS-SCNC: 9.6 MMOL/L (ref 5–15)
ANISOCYTOSIS BLD QL: ABNORMAL
AST SERPL-CCNC: 20 U/L (ref 1–32)
BILIRUB SERPL-MCNC: 0.4 MG/DL (ref 0–1.2)
BLD GP AB SCN SERPL QL: NEGATIVE
BUN SERPL-MCNC: 8 MG/DL (ref 6–20)
BUN/CREAT SERPL: 10.4 (ref 7–25)
CALCIUM SPEC-SCNC: 8.7 MG/DL (ref 8.6–10.5)
CHLORIDE SERPL-SCNC: 97 MMOL/L (ref 98–107)
CO2 SERPL-SCNC: 27.4 MMOL/L (ref 22–29)
CREAT SERPL-MCNC: 0.77 MG/DL (ref 0.57–1)
D-LACTATE SERPL-SCNC: 0.9 MMOL/L (ref 0.5–2)
DEPRECATED RDW RBC AUTO: 51.8 FL (ref 37–54)
EGFRCR SERPLBLD CKD-EPI 2021: 90.1 ML/MIN/1.73
ERYTHROCYTE [DISTWIDTH] IN BLOOD BY AUTOMATED COUNT: 16.2 % (ref 12.3–15.4)
FLUAV SUBTYP SPEC NAA+PROBE: NOT DETECTED
FLUBV RNA ISLT QL NAA+PROBE: NOT DETECTED
GLOBULIN UR ELPH-MCNC: 2.9 GM/DL
GLUCOSE SERPL-MCNC: 125 MG/DL (ref 65–99)
HCT VFR BLD AUTO: 21.7 % (ref 34–46.6)
HGB BLD-MCNC: 7.2 G/DL (ref 12–15.9)
HOLD SPECIMEN: NORMAL
HOLD SPECIMEN: NORMAL
HYPOCHROMIA BLD QL: ABNORMAL
LYMPHOCYTES # BLD MANUAL: 2 10*3/MM3 (ref 0.7–3.1)
LYMPHOCYTES NFR BLD MANUAL: 10 % (ref 5–12)
MACROCYTES BLD QL SMEAR: ABNORMAL
MCH RBC QN AUTO: 30 PG (ref 26.6–33)
MCHC RBC AUTO-ENTMCNC: 33.2 G/DL (ref 31.5–35.7)
MCV RBC AUTO: 90.4 FL (ref 79–97)
METAMYELOCYTES NFR BLD MANUAL: 3 % (ref 0–0)
MICROCYTES BLD QL: ABNORMAL
MONOCYTES # BLD: 0.28 10*3/MM3 (ref 0.1–0.9)
MYELOCYTES NFR BLD MANUAL: 11 % (ref 0–0)
NEUTROPHILS # BLD AUTO: 0.11 10*3/MM3 (ref 1.7–7)
NEUTROPHILS NFR BLD MANUAL: 1 % (ref 42.7–76)
NEUTS BAND NFR BLD MANUAL: 3 % (ref 0–5)
NRBC SPEC MANUAL: 4 /100 WBC (ref 0–0.2)
NT-PROBNP SERPL-MCNC: 96.3 PG/ML (ref 0–900)
PATHOLOGY REVIEW: YES
PLATELET # BLD AUTO: 43 10*3/MM3 (ref 140–450)
PMV BLD AUTO: 12.2 FL (ref 6–12)
POIKILOCYTOSIS BLD QL SMEAR: ABNORMAL
POLYCHROMASIA BLD QL SMEAR: ABNORMAL
POTASSIUM SERPL-SCNC: 3.8 MMOL/L (ref 3.5–5.2)
PROMYELOCYTES NFR BLD MANUAL: 1 % (ref 0–0)
PROT SERPL-MCNC: 6.2 G/DL (ref 6–8.5)
RBC # BLD AUTO: 2.4 10*6/MM3 (ref 3.77–5.28)
RH BLD: POSITIVE
RH BLD: POSITIVE
RSV RNA NPH QL NAA+NON-PROBE: NOT DETECTED
SARS-COV-2 RNA RESP QL NAA+PROBE: NOT DETECTED
SCAN SLIDE: NORMAL
SMALL PLATELETS BLD QL SMEAR: ABNORMAL
SODIUM SERPL-SCNC: 134 MMOL/L (ref 136–145)
T&S EXPIRATION DATE: NORMAL
TROPONIN T SERPL HS-MCNC: 21 NG/L
VARIANT LYMPHS NFR BLD MANUAL: 71 % (ref 19.6–45.3)
WBC MORPH BLD: NORMAL
WBC NRBC COR # BLD AUTO: 2.82 10*3/MM3 (ref 3.4–10.8)
WHOLE BLOOD HOLD COAG: NORMAL
WHOLE BLOOD HOLD SPECIMEN: NORMAL

## 2024-07-31 PROCEDURE — 93005 ELECTROCARDIOGRAM TRACING: CPT | Performed by: EMERGENCY MEDICINE

## 2024-07-31 PROCEDURE — 86900 BLOOD TYPING SEROLOGIC ABO: CPT | Performed by: EMERGENCY MEDICINE

## 2024-07-31 PROCEDURE — 25010000002 METOCLOPRAMIDE PER 10 MG: Performed by: FAMILY MEDICINE

## 2024-07-31 PROCEDURE — 99223 1ST HOSP IP/OBS HIGH 75: CPT | Performed by: FAMILY MEDICINE

## 2024-07-31 PROCEDURE — 71045 X-RAY EXAM CHEST 1 VIEW: CPT

## 2024-07-31 PROCEDURE — 25010000002 ONDANSETRON PER 1 MG: Performed by: EMERGENCY MEDICINE

## 2024-07-31 PROCEDURE — 86901 BLOOD TYPING SEROLOGIC RH(D): CPT | Performed by: EMERGENCY MEDICINE

## 2024-07-31 PROCEDURE — 86850 RBC ANTIBODY SCREEN: CPT | Performed by: EMERGENCY MEDICINE

## 2024-07-31 PROCEDURE — G0378 HOSPITAL OBSERVATION PER HR: HCPCS

## 2024-07-31 PROCEDURE — 80053 COMPREHEN METABOLIC PANEL: CPT

## 2024-07-31 PROCEDURE — 93005 ELECTROCARDIOGRAM TRACING: CPT

## 2024-07-31 PROCEDURE — 25810000003 SODIUM CHLORIDE 0.9 % SOLUTION: Performed by: FAMILY MEDICINE

## 2024-07-31 PROCEDURE — 25010000002 METOCLOPRAMIDE PER 10 MG: Performed by: EMERGENCY MEDICINE

## 2024-07-31 PROCEDURE — 86900 BLOOD TYPING SEROLOGIC ABO: CPT

## 2024-07-31 PROCEDURE — 83605 ASSAY OF LACTIC ACID: CPT | Performed by: EMERGENCY MEDICINE

## 2024-07-31 PROCEDURE — 36415 COLL VENOUS BLD VENIPUNCTURE: CPT | Performed by: FAMILY MEDICINE

## 2024-07-31 PROCEDURE — 86901 BLOOD TYPING SEROLOGIC RH(D): CPT

## 2024-07-31 PROCEDURE — 85025 COMPLETE CBC W/AUTO DIFF WBC: CPT

## 2024-07-31 PROCEDURE — 99285 EMERGENCY DEPT VISIT HI MDM: CPT

## 2024-07-31 PROCEDURE — 93005 ELECTROCARDIOGRAM TRACING: CPT | Performed by: FAMILY MEDICINE

## 2024-07-31 PROCEDURE — 25010000002 DIPHENHYDRAMINE PER 50 MG: Performed by: EMERGENCY MEDICINE

## 2024-07-31 PROCEDURE — 85007 BL SMEAR W/DIFF WBC COUNT: CPT

## 2024-07-31 PROCEDURE — 87040 BLOOD CULTURE FOR BACTERIA: CPT | Performed by: EMERGENCY MEDICINE

## 2024-07-31 PROCEDURE — 83880 ASSAY OF NATRIURETIC PEPTIDE: CPT

## 2024-07-31 PROCEDURE — 85045 AUTOMATED RETICULOCYTE COUNT: CPT | Performed by: FAMILY MEDICINE

## 2024-07-31 PROCEDURE — 85027 COMPLETE CBC AUTOMATED: CPT | Performed by: FAMILY MEDICINE

## 2024-07-31 PROCEDURE — 36415 COLL VENOUS BLD VENIPUNCTURE: CPT

## 2024-07-31 PROCEDURE — 87637 SARSCOV2&INF A&B&RSV AMP PRB: CPT

## 2024-07-31 PROCEDURE — 84484 ASSAY OF TROPONIN QUANT: CPT

## 2024-07-31 RX ORDER — METOCLOPRAMIDE HYDROCHLORIDE 5 MG/ML
10 INJECTION INTRAMUSCULAR; INTRAVENOUS ONCE
Status: COMPLETED | OUTPATIENT
Start: 2024-07-31 | End: 2024-07-31

## 2024-07-31 RX ORDER — ESCITALOPRAM OXALATE 10 MG/1
20 TABLET ORAL DAILY
Status: DISCONTINUED | OUTPATIENT
Start: 2024-08-01 | End: 2024-08-02 | Stop reason: HOSPADM

## 2024-07-31 RX ORDER — GABAPENTIN 300 MG/1
300 CAPSULE ORAL 3 TIMES DAILY
Status: DISCONTINUED | OUTPATIENT
Start: 2024-08-01 | End: 2024-08-02 | Stop reason: HOSPADM

## 2024-07-31 RX ORDER — SODIUM CHLORIDE 9 MG/ML
40 INJECTION, SOLUTION INTRAVENOUS AS NEEDED
Status: DISCONTINUED | OUTPATIENT
Start: 2024-07-31 | End: 2024-08-02 | Stop reason: HOSPADM

## 2024-07-31 RX ORDER — BISACODYL 10 MG
10 SUPPOSITORY, RECTAL RECTAL DAILY PRN
Status: DISCONTINUED | OUTPATIENT
Start: 2024-07-31 | End: 2024-08-02 | Stop reason: HOSPADM

## 2024-07-31 RX ORDER — OLANZAPINE 5 MG/1
5 TABLET ORAL NIGHTLY
Status: DISCONTINUED | OUTPATIENT
Start: 2024-08-01 | End: 2024-08-01

## 2024-07-31 RX ORDER — FLUTICASONE FUROATE, UMECLIDINIUM BROMIDE AND VILANTEROL TRIFENATATE 100; 62.5; 25 UG/1; UG/1; UG/1
1 POWDER RESPIRATORY (INHALATION) DAILY
COMMUNITY
Start: 2024-07-23 | End: 2024-08-05 | Stop reason: SDUPTHER

## 2024-07-31 RX ORDER — CARIPRAZINE 3 MG/1
1 CAPSULE, GELATIN COATED ORAL DAILY
Status: ON HOLD | COMMUNITY
Start: 2024-07-01 | End: 2024-08-02

## 2024-07-31 RX ORDER — SODIUM CHLORIDE 0.9 % (FLUSH) 0.9 %
10 SYRINGE (ML) INJECTION AS NEEDED
Status: DISCONTINUED | OUTPATIENT
Start: 2024-07-31 | End: 2024-08-02 | Stop reason: HOSPADM

## 2024-07-31 RX ORDER — PANTOPRAZOLE SODIUM 40 MG/10ML
40 INJECTION, POWDER, LYOPHILIZED, FOR SOLUTION INTRAVENOUS EVERY 24 HOURS
Status: DISCONTINUED | OUTPATIENT
Start: 2024-07-31 | End: 2024-08-02 | Stop reason: HOSPADM

## 2024-07-31 RX ORDER — AMOXICILLIN 250 MG
2 CAPSULE ORAL 2 TIMES DAILY PRN
Status: DISCONTINUED | OUTPATIENT
Start: 2024-07-31 | End: 2024-08-02 | Stop reason: HOSPADM

## 2024-07-31 RX ORDER — ONDANSETRON 2 MG/ML
4 INJECTION INTRAMUSCULAR; INTRAVENOUS EVERY 6 HOURS PRN
Status: DISCONTINUED | OUTPATIENT
Start: 2024-07-31 | End: 2024-08-02 | Stop reason: HOSPADM

## 2024-07-31 RX ORDER — LORAZEPAM 0.5 MG/1
0.5 TABLET ORAL EVERY 8 HOURS PRN
Status: DISCONTINUED | OUTPATIENT
Start: 2024-07-31 | End: 2024-08-02 | Stop reason: HOSPADM

## 2024-07-31 RX ORDER — IPRATROPIUM BROMIDE AND ALBUTEROL SULFATE 2.5; .5 MG/3ML; MG/3ML
3 SOLUTION RESPIRATORY (INHALATION) EVERY 4 HOURS PRN
Status: DISCONTINUED | OUTPATIENT
Start: 2024-07-31 | End: 2024-08-02 | Stop reason: HOSPADM

## 2024-07-31 RX ORDER — BUPROPION HYDROCHLORIDE 150 MG/1
150 TABLET, EXTENDED RELEASE ORAL 2 TIMES DAILY
Status: DISCONTINUED | OUTPATIENT
Start: 2024-08-01 | End: 2024-08-02 | Stop reason: HOSPADM

## 2024-07-31 RX ORDER — SODIUM CHLORIDE 9 MG/ML
100 INJECTION, SOLUTION INTRAVENOUS CONTINUOUS
Status: DISCONTINUED | OUTPATIENT
Start: 2024-07-31 | End: 2024-08-01

## 2024-07-31 RX ORDER — IPRATROPIUM BROMIDE AND ALBUTEROL SULFATE 2.5; .5 MG/3ML; MG/3ML
3 SOLUTION RESPIRATORY (INHALATION) 2 TIMES DAILY
Status: DISCONTINUED | OUTPATIENT
Start: 2024-08-01 | End: 2024-08-02 | Stop reason: HOSPADM

## 2024-07-31 RX ORDER — ATORVASTATIN CALCIUM 10 MG/1
10 TABLET, FILM COATED ORAL NIGHTLY
Status: DISCONTINUED | OUTPATIENT
Start: 2024-08-01 | End: 2024-08-02 | Stop reason: HOSPADM

## 2024-07-31 RX ORDER — FAMOTIDINE 10 MG/ML
20 INJECTION, SOLUTION INTRAVENOUS ONCE
Status: COMPLETED | OUTPATIENT
Start: 2024-07-31 | End: 2024-07-31

## 2024-07-31 RX ORDER — OMEPRAZOLE 40 MG/1
40 CAPSULE, DELAYED RELEASE ORAL DAILY
COMMUNITY
Start: 2024-07-23

## 2024-07-31 RX ORDER — BISACODYL 5 MG/1
5 TABLET, DELAYED RELEASE ORAL DAILY PRN
Status: DISCONTINUED | OUTPATIENT
Start: 2024-07-31 | End: 2024-08-02 | Stop reason: HOSPADM

## 2024-07-31 RX ORDER — NICOTINE 21 MG/24HR
1 PATCH, TRANSDERMAL 24 HOURS TRANSDERMAL EVERY 24 HOURS
Status: DISCONTINUED | OUTPATIENT
Start: 2024-08-01 | End: 2024-08-02 | Stop reason: HOSPADM

## 2024-07-31 RX ORDER — ESCITALOPRAM OXALATE 20 MG/1
20 TABLET ORAL DAILY
COMMUNITY
Start: 2024-07-25 | End: 2024-08-05 | Stop reason: SDUPTHER

## 2024-07-31 RX ORDER — ONDANSETRON 2 MG/ML
4 INJECTION INTRAMUSCULAR; INTRAVENOUS ONCE
Status: COMPLETED | OUTPATIENT
Start: 2024-07-31 | End: 2024-07-31

## 2024-07-31 RX ORDER — POLYETHYLENE GLYCOL 3350 17 G/17G
17 POWDER, FOR SOLUTION ORAL DAILY PRN
Status: DISCONTINUED | OUTPATIENT
Start: 2024-07-31 | End: 2024-08-02 | Stop reason: HOSPADM

## 2024-07-31 RX ORDER — LISINOPRIL 10 MG/1
5 TABLET ORAL DAILY
Status: DISCONTINUED | OUTPATIENT
Start: 2024-08-01 | End: 2024-08-02 | Stop reason: HOSPADM

## 2024-07-31 RX ORDER — MORPHINE SULFATE 2 MG/ML
2 INJECTION, SOLUTION INTRAMUSCULAR; INTRAVENOUS EVERY 4 HOURS PRN
Status: DISCONTINUED | OUTPATIENT
Start: 2024-07-31 | End: 2024-08-02 | Stop reason: HOSPADM

## 2024-07-31 RX ORDER — DIPHENHYDRAMINE HYDROCHLORIDE 50 MG/ML
25 INJECTION INTRAMUSCULAR; INTRAVENOUS ONCE
Status: COMPLETED | OUTPATIENT
Start: 2024-07-31 | End: 2024-07-31

## 2024-07-31 RX ORDER — SODIUM CHLORIDE 0.9 % (FLUSH) 0.9 %
10 SYRINGE (ML) INJECTION EVERY 12 HOURS SCHEDULED
Status: DISCONTINUED | OUTPATIENT
Start: 2024-07-31 | End: 2024-08-02 | Stop reason: HOSPADM

## 2024-07-31 RX ORDER — METOCLOPRAMIDE HYDROCHLORIDE 5 MG/ML
10 INJECTION INTRAMUSCULAR; INTRAVENOUS EVERY 8 HOURS
Status: DISCONTINUED | OUTPATIENT
Start: 2024-07-31 | End: 2024-08-02 | Stop reason: HOSPADM

## 2024-07-31 RX ADMIN — Medication 10 ML: at 23:16

## 2024-07-31 RX ADMIN — SODIUM CHLORIDE 100 ML/HR: 9 INJECTION, SOLUTION INTRAVENOUS at 23:44

## 2024-07-31 RX ADMIN — PANTOPRAZOLE SODIUM 40 MG: 40 INJECTION, POWDER, FOR SOLUTION INTRAVENOUS at 23:16

## 2024-07-31 RX ADMIN — DIPHENHYDRAMINE HYDROCHLORIDE 25 MG: 50 INJECTION, SOLUTION INTRAMUSCULAR; INTRAVENOUS at 22:13

## 2024-07-31 RX ADMIN — METOCLOPRAMIDE HYDROCHLORIDE 10 MG: 5 INJECTION INTRAMUSCULAR; INTRAVENOUS at 23:16

## 2024-07-31 RX ADMIN — METOCLOPRAMIDE HYDROCHLORIDE 10 MG: 5 INJECTION INTRAMUSCULAR; INTRAVENOUS at 22:12

## 2024-07-31 RX ADMIN — FAMOTIDINE 20 MG: 10 INJECTION INTRAVENOUS at 19:08

## 2024-07-31 RX ADMIN — ONDANSETRON 4 MG: 2 INJECTION INTRAMUSCULAR; INTRAVENOUS at 19:07

## 2024-07-31 NOTE — TELEPHONE ENCOUNTER
Daria called and states that the pt is weak/fatigued. Daria states that the pt has not eat or drank anything x3 days. When questioned Daria denies any fever or other s/s. Daria is asking if the pt could come in for IV Fluids.     Note: The pt is a Dr Dickson pt. The pt last had infusion tx on 7/18/24. The pt had a IV Fluid infusion on 7/22/24.

## 2024-07-31 NOTE — ED PROVIDER NOTES
Time: 4:35 PM EDT  Date of encounter:  7/31/2024  Independent Historian/Clinical History and Information was obtained by:   Patient    History is limited by: N/A    Chief Complaint: SOA      History of Present Illness:  Patient is a 58 y.o. year old female who presents to the emergency department for evaluation of weakness, loss of appetite nausea and vomiting.  Patient notes that she has lung cancer.  The patient also has chronic respiratory failure that requires oxygen at all times.  Patient states that she had her second round of chemotherapy July 18.  Her first chemo was 1 July which she tolerated well.  The patient states that immediately after chemo she began having nausea and vomiting.  Patient was actually brought back for IV fluids the following day.  The patient states that she is continue to have no appetite and she has had intermittent nausea vomiting.  She feels like this is caused her progressive weakness.  The patient states that she feels very weak.  Patient notes that she has shortness of breath but this is chronic and unchanged.  Patient also notes that she has pain in the posterior right ribs where her cancer is.  The patient denies any fever.  The patient denies any cough.  The patient denies any abdominal pain.  The patient does not have a headache or sore throat.  The patient does note that she has developed swelling in both feet over the last several days.  The patient denies a rash.  The patient denies any urinary frequency urgency or dysuria.      HPI    Patient Care Team  Primary Care Provider: Aleksandar Edge DO    Past Medical History:     No Known Allergies  Past Medical History:   Diagnosis Date    Abnormal mammogram     PT DENIES KNOWING OF THIS HX    Anxiety     Arthritis     R SHOULDER, R HIP, AND R LEG    Cancer     LUNG    Chronic nausea 01/15/2019    COPD (chronic obstructive pulmonary disease)     O2 3 LITERS NC CONT    Hernia, hiatal     Hyperlipidemia     Hypertension      Knee pain, right 2018    Memory loss     FORGETFULNESS ? ETIOLOGY    Migraine headache     Moderate episode of recurrent major depressive disorder 2017    Night sweats     Sciatica of right side 2017    Severe episode of recurrent major depressive disorder, without psychotic features 10/22/2019    Shingles     OUTBREAK 24 ON ANTIVIRAL , WHELPS NOTED NO SORES.    Shoulder pain, left 2018     Past Surgical History:   Procedure Laterality Date    BRONCHOSCOPY N/A 2022    Procedure: BRONCHOSCOPY WITH BRONCHOALVEOLAR LAVAGE, BIOPSY;  Surgeon: Shin Mcdonald DO;  Location: [facility] ENDOSCOPY;  Service: Pulmonary;  Laterality: N/A;  RIGHT LOWER LOBE INFILTRATE    BRONCHOSCOPY N/A 2024    Procedure: BRONCHOSCOPY WITH ENDOBRONCHIAL ULTRASOUND AND FINE NEEDLE ASPIRATE;  Surgeon: Shin Mcdonald DO;  Location: [facility] ENDOSCOPY;  Service: Pulmonary;  Laterality: N/A;  MEDIASTINAL ADENOPATHY     SECTION      CHOLECYSTECTOMY      LAPAROSCOPIC    COLONOSCOPY      PORTACATH PLACEMENT Left 2024    Procedure: INSERTION OF PORTACATH;  Surgeon: Josh Patton MD;  Location: [facility] OR OSC;  Service: General;  Laterality: Left;    TOTAL HIP ARTHROPLASTY Right 2022    Procedure: RIGHT TOTAL HIP ARTHROPLASTY;  Surgeon: Cecil Gomes MD;  Location: [facility] MAIN OR;  Service: Orthopedics;  Laterality: Right;     Family History   Problem Relation Age of Onset    Other Mother         BLOOD DISEASE    Arthritis Mother     Heart disease Father     Hypertension Other     Cancer Other     Heart disease Other     Malig Hyperthermia Neg Hx        Home Medications:  Prior to Admission medications    Medication Sig Start Date End Date Taking? Authorizing Provider   albuterol sulfate  (90 Base) MCG/ACT inhaler Inhale 2 puffs Every 4 (Four) Hours As Needed for Wheezing or Shortness of Air. 24   Zoe León APRN   atorvastatin (LIPITOR) 10 MG tablet  TAKE ONE TABLET BY MOUTH DAILY AT 9 PM EVERY NIGHT  Patient taking differently: Take 1 tablet by mouth Every Night. 11/7/23   Aleksandar Edge DO   buPROPion SR (WELLBUTRIN SR) 150 MG 12 hr tablet TAKE ONE TABLET BY MOUTH TWICE DAILY @ 9AM & 5PM  Patient taking differently: Take 1 tablet by mouth 2 (Two) Times a Day. 4/2/24   Aleksandar Edge DO   famotidine (PEPCID) 40 MG tablet TAKE ONE TABLET BY MOUTH TWICE DAILY @ 9AM & 5PM  Patient taking differently: Take 1 tablet by mouth 2 (Two) Times a Day. 11/7/23   Aleksandar Edge DO   gabapentin (NEURONTIN) 300 MG capsule Take 1 capsule by mouth 3 (Three) Times a Day. 6/20/24   Brian Dickson MD   ipratropium-albuterol (DUO-NEB) 0.5-2.5 mg/3 ml nebulizer Take 3 mL by nebulization 4 (Four) Times a Day for 30 days. 6/12/24 7/12/24  Zoe León APRN   lisinopril (PRINIVIL,ZESTRIL) 5 MG tablet TAKE ONE TABLET BY MOUTH DAILY AT 9 AM  Patient taking differently: Take 1 tablet by mouth Daily. 11/7/23   Aleksandar Edge DO   LORazepam (ATIVAN) 0.5 MG tablet Take 1 tablet by mouth Every 8 (Eight) Hours As Needed for Anxiety. 7/22/24   Brian Dickson MD   nicotine (NICODERM CQ) 21 MG/24HR patch Place 1 patch on the skin as directed by provider Daily. 6/12/24   Zoe León APRN   OLANZapine (zyPREXA) 5 MG tablet Take 1 tablet by mouth Every Night. Take nightly x 4 starting night of chemotherapy. 6/21/24   Brian Dickson MD   ondansetron (ZOFRAN) 8 MG tablet Take 1 tablet by mouth 3 (Three) Times a Day As Needed for Nausea or Vomiting. 6/21/24   Brian Dickson MD   prochlorperazine (COMPAZINE) 10 MG tablet Take 1 tablet by mouth Every 6 (Six) Hours As Needed for Nausea. 7/18/24   Brian Dickson MD   traMADol (ULTRAM) 50 MG tablet Take 1 tablet by mouth Every 6 (Six) Hours As Needed for Moderate Pain. 7/17/24   Brian Dickson MD   Vraylar 1.5 MG capsule capsule TAKE ONE CAPSULE BY  "MOUTH DAILY AT 9 AM 7/23/24   Aleksandar Edge,         Social History:   Social History     Tobacco Use    Smoking status: Every Day     Current packs/day: 2.00     Average packs/day: 2.0 packs/day for 42.6 years (85.2 ttl pk-yrs)     Types: Cigarettes     Start date: 1982     Passive exposure: Past    Smokeless tobacco: Never    Tobacco comments:     Pt stopped smoking on 04/01/2022. Pt stated at her appt today 06/12/2024. Pt states she smokes less than a 1/2 ppd      INST. PER ANESTHESIA PROTOCOL   Vaping Use    Vaping status: Former    Substances: Nicotine, Flavoring    Devices: Disposable   Substance Use Topics    Alcohol use: Never    Drug use: Never         Review of Systems:  Review of Systems   Constitutional:  Positive for chills and fatigue. Negative for diaphoresis and fever.   HENT:  Negative for congestion, postnasal drip, rhinorrhea and sore throat.    Eyes:  Negative for photophobia.   Respiratory:  Positive for cough and shortness of breath. Negative for chest tightness.    Cardiovascular:  Positive for leg swelling. Negative for chest pain and palpitations.   Gastrointestinal:  Positive for nausea and vomiting. Negative for abdominal pain and diarrhea.   Genitourinary:  Negative for difficulty urinating, dysuria, flank pain, frequency, hematuria and urgency.   Musculoskeletal:  Negative for neck pain and neck stiffness.   Skin:  Negative for pallor and rash.   Neurological:  Positive for tremors and weakness. Negative for dizziness, syncope, numbness and headaches.   Hematological:  Negative for adenopathy. Does not bruise/bleed easily.   Psychiatric/Behavioral: Negative.          Physical Exam:  /62   Pulse 105   Temp 99 °F (37.2 °C) (Oral)   Resp 20   Ht 162.6 cm (64\")   Wt 93.7 kg (206 lb 9.1 oz)   LMP  (LMP Unknown)   SpO2 96%   BMI 35.46 kg/m²     Physical Exam  Vitals and nursing note reviewed.   Constitutional:       General: She is not in acute distress.     " Appearance: Normal appearance. She is not ill-appearing, toxic-appearing or diaphoretic.   HENT:      Head: Normocephalic and atraumatic.      Mouth/Throat:      Mouth: Mucous membranes are moist.   Eyes:      Pupils: Pupils are equal, round, and reactive to light.   Cardiovascular:      Rate and Rhythm: Normal rate and regular rhythm.      Pulses: Normal pulses.           Carotid pulses are 2+ on the right side and 2+ on the left side.       Radial pulses are 2+ on the right side and 2+ on the left side.        Femoral pulses are 2+ on the right side and 2+ on the left side.       Popliteal pulses are 2+ on the right side and 2+ on the left side.        Dorsalis pedis pulses are 2+ on the right side and 2+ on the left side.        Posterior tibial pulses are 2+ on the right side and 2+ on the left side.      Heart sounds: Normal heart sounds. No murmur heard.  Pulmonary:      Effort: Pulmonary effort is normal. Tachypnea present. No accessory muscle usage, respiratory distress or retractions.      Breath sounds: Normal breath sounds. Examination of the right-upper field reveals decreased breath sounds. Examination of the left-upper field reveals decreased breath sounds. Examination of the right-middle field reveals decreased breath sounds and rhonchi. Examination of the left-middle field reveals decreased breath sounds. Examination of the right-lower field reveals decreased breath sounds and rhonchi. Examination of the left-lower field reveals decreased breath sounds. Decreased breath sounds and rhonchi present. No wheezing or rales.   Chest:      Chest wall: No mass or tenderness.      Comments: The patient has a Chemo-Port located in left anterior chest wall.  It is nontender.  There is no erythema.  Abdominal:      General: Abdomen is flat. There is no distension.      Palpations: Abdomen is soft. There is no mass or pulsatile mass.      Tenderness: There is no abdominal tenderness. There is no right CVA  tenderness, left CVA tenderness, guarding or rebound.      Comments: No rigidity   Musculoskeletal:         General: No swelling, tenderness or deformity.      Cervical back: Neck supple. No tenderness.      Right lower leg: No tenderness. Edema present.      Left lower leg: No tenderness. Edema present.      Comments: Patient does have some swelling of both feet.  Does appear to be symmetric.   Skin:     General: Skin is warm and dry.      Capillary Refill: Capillary refill takes less than 2 seconds.      Coloration: Skin is pale. Skin is not jaundiced.      Findings: No erythema.   Neurological:      General: No focal deficit present.      Mental Status: She is alert and oriented to person, place, and time. Mental status is at baseline.      Cranial Nerves: Cranial nerves 2-12 are intact. No cranial nerve deficit.      Sensory: Sensation is intact. No sensory deficit.      Motor: Motor function is intact. No weakness or pronator drift.      Coordination: Coordination is intact. Coordination normal.   Psychiatric:         Mood and Affect: Mood normal.         Behavior: Behavior normal.               Procedures:  Procedures      Medical Decision Making:      Comorbidities that affect care:    Anxiety, hyperlipidemia, hypertension, migraine, COPD, lung cancer, chronic respiratory failure    External Notes reviewed:    None      The following orders were placed and all results were independently analyzed by me:  Orders Placed This Encounter   Procedures    COVID-19, FLU A/B, RSV PCR 1 HR TAT - Swab, Nasopharynx    Blood Culture - Blood,    Blood Culture - Blood,    XR Chest 1 View    [place] Draw    Comprehensive Metabolic Panel    BNP    Single High Sensitivity Troponin T    CBC Auto Differential    Scan Slide    Manual Differential    Path Consult Reflex    Lactic Acid, Plasma    Basic Metabolic Panel    CBC (No Diff)    Reticulocytes    Diet: Liquid; Clear Liquid; Fluid Consistency: Thin (IDDSI 0)    Undress & Gown     Continuous Pulse Oximetry    Vital Signs    Vital Signs    Intake & Output    Weigh Patient    Oral Care    Saline Lock & Maintain IV Access    Activity - Ad Giulia    Place Sequential Compression Device    Maintain Sequential Compression Device    Verify Informed Consent for Blood Product Administration    Code Status and Medical Interventions: CPR (Attempt to Resuscitate); Full Support    General MD Inpatient Consult    Inpatient Hospitalist Consult    PT Consult: Eval & Treat Functional Mobility Below Baseline    Oxygen Therapy- Nasal Cannula; Titrate 1-6 LPM Per SpO2; 90 - 95%    ECG 12 Lead ED Triage Standing Order; SOA    ECG 12 Lead QT Measurement    Type & Screen    ABO RH Specimen Verification    Prepare RBC, 1 Units    Type & Screen    Insert Peripheral IV    Insert Peripheral IV    Initiate Observation Status    CBC & Differential    Green Top (Gel)    Lavender Top    Gold Top - SST    Light Blue Top       Medications Given in the Emergency Department:  Medications   sodium chloride 0.9 % flush 10 mL (has no administration in time range)   metoclopramide (REGLAN) injection 10 mg (10 mg Intravenous Given 7/31/24 2316)   sodium chloride 0.9 % flush 10 mL (10 mL Intravenous Given 7/31/24 2316)   sodium chloride 0.9 % flush 10 mL (has no administration in time range)   sodium chloride 0.9 % infusion 40 mL (has no administration in time range)   sodium chloride 0.9 % infusion (100 mL/hr Intravenous New Bag 7/31/24 5492)   sennosides-docusate (PERICOLACE) 8.6-50 MG per tablet 2 tablet (has no administration in time range)     And   polyethylene glycol (MIRALAX) packet 17 g (has no administration in time range)     And   bisacodyl (DULCOLAX) EC tablet 5 mg (has no administration in time range)     And   bisacodyl (DULCOLAX) suppository 10 mg (has no administration in time range)   ondansetron (ZOFRAN) injection 4 mg (has no administration in time range)   pantoprazole (PROTONIX) injection 40 mg (40 mg  Intravenous Given 7/31/24 2316)   morphine injection 2 mg (has no administration in time range)   ipratropium-albuterol (DUO-NEB) nebulizer solution 3 mL (has no administration in time range)   ipratropium-albuterol (DUO-NEB) nebulizer solution 3 mL (3 mL Nebulization Given 8/1/24 0210)   atorvastatin (LIPITOR) tablet 10 mg (10 mg Oral Given 8/1/24 0108)   buPROPion SR (WELLBUTRIN SR) 12 hr tablet 150 mg (150 mg Oral Given 8/1/24 0108)   escitalopram (LEXAPRO) tablet 20 mg (has no administration in time range)   gabapentin (NEURONTIN) capsule 300 mg (has no administration in time range)   lisinopril (PRINIVIL,ZESTRIL) tablet 5 mg (has no administration in time range)   LORazepam (ATIVAN) tablet 0.5 mg (has no administration in time range)   nicotine (NICODERM CQ) 21 MG/24HR patch 1 patch (1 patch Transdermal Medication Applied 8/1/24 0108)   famotidine (PEPCID) injection 20 mg (20 mg Intravenous Given 7/31/24 1908)   ondansetron (ZOFRAN) injection 4 mg (4 mg Intravenous Given 7/31/24 1907)   diphenhydrAMINE (BENADRYL) injection 25 mg (25 mg Intravenous Given 7/31/24 2213)   metoclopramide (REGLAN) injection 10 mg (10 mg Intravenous Given 7/31/24 2212)        ED Course:    ED Course as of 08/01/24 0310   Wed Jul 31, 2024   1608 EKG:    Rhythm: Sinus tachycardia  Rate: 104  Intervals: Normal MI and QT interval  T-wave: T wave flattening  ST Segment: No pathological ST elevation or reciprocal ST depression to suggest STEMI    EKG Comparison: No EKG available for comparison    Interpreted by me   [SD]      ED Course User Index  [SD] Bill Baker DO       Labs:    Lab Results (last 24 hours)       Procedure Component Value Units Date/Time    Comprehensive Metabolic Panel [[id number]]  (Abnormal) Collected: 07/31/24 1605    Specimen: Blood Updated: 07/31/24 1635     Glucose 125 mg/dL      BUN 8 mg/dL      Creatinine 0.77 mg/dL      Sodium 134 mmol/L      Potassium 3.8 mmol/L      Chloride 97 mmol/L      CO2 27.4 mmol/L       Calcium 8.7 mg/dL      Total Protein 6.2 g/dL      Albumin 3.3 g/dL      ALT (SGPT) 50 U/L      AST (SGOT) 20 U/L      Alkaline Phosphatase 184 U/L      Total Bilirubin 0.4 mg/dL      Globulin 2.9 gm/dL      A/G Ratio 1.1 g/dL      BUN/Creatinine Ratio 10.4     Anion Gap 9.6 mmol/L      eGFR 90.1 mL/min/1.73     Narrative:      GFR Normal >60  Chronic Kidney Disease <60  Kidney Failure <15      BNP [[id number]]  (Normal) Collected: 07/31/24 1605    Specimen: Blood Updated: 07/31/24 1633     proBNP 96.3 pg/mL     Narrative:      This assay is used as an aid in the diagnosis of individuals suspected of having heart failure. It can be used as an aid in the diagnosis of acute decompensated heart failure (ADHF) in patients presenting with signs and symptoms of ADHF to the emergency department (ED). In addition, NT-proBNP of <300 pg/mL indicates ADHF is not likely.    Age Range Result Interpretation  NT-proBNP Concentration (pg/mL:      <50             Positive            >450                   Gray                 300-450                    Negative             <300    50-75           Positive            >900                  Gray                300-900                  Negative            <300      >75             Positive            >1800                  Gray                300-1800                  Negative            <300    Single High Sensitivity Troponin T [[id number]]  (Abnormal) Collected: 07/31/24 1605    Specimen: Blood Updated: 07/31/24 1635     HS Troponin T 21 ng/L     Narrative:      High Sensitive Troponin T Reference Range:  <14.0 ng/L- Negative Female for AMI  <22.0 ng/L- Negative Male for AMI  >=14 - Abnormal Female indicating possible myocardial injury.  >=22 - Abnormal Male indicating possible myocardial injury.   Clinicians would have to utilize clinical acumen, EKG, Troponin, and serial changes to determine if it is an Acute Myocardial Infarction or myocardial injury due to an underlying chronic  condition.         CBC & Differential [[id number]]  (Abnormal) Collected: 07/31/24 1637    Specimen: Blood Updated: 07/31/24 1727    Narrative:      The following orders were created for panel order CBC & Differential.  Procedure                               Abnormality         Status                     ---------                               -----------         ------                     CBC Auto Differential[[id number]]        Abnormal            Final result               Scan Slide[[id number]]                                       Final result                 Please view results for these tests on the individual orders.    CBC Auto Differential [[id number]]  (Abnormal) Collected: 07/31/24 1637    Specimen: Blood Updated: 07/31/24 1727     WBC 2.82 10*3/mm3      RBC 2.40 10*6/mm3      Hemoglobin 7.2 g/dL      Hematocrit 21.7 %      MCV 90.4 fL      MCH 30.0 pg      MCHC 33.2 g/dL      RDW 16.2 %      RDW-SD 51.8 fl      MPV 12.2 fL      Platelets 43 10*3/mm3     Narrative:      The previously reported component NRBC is no longer being reported. Previous result was 3.5 /100 WBC (Reference Range: 0.0-0.2 /100 WBC) on 7/31/2024 at 1701 EDT.    Scan Slide [[id number]] Collected: 07/31/24 1637    Specimen: Blood Updated: 07/31/24 1727     Scan Slide --     Comment: See Manual Differential Results       Manual Differential [[id number]]  (Abnormal) Collected: 07/31/24 1637    Specimen: Blood Updated: 07/31/24 1727     Neutrophil % 1.0 %      Lymphocyte % 71.0 %      Monocyte % 10.0 %      Bands %  3.0 %      Metamyelocyte % 3.0 %      Myelocyte % 11.0 %      Promyelocyte % 1.0 %      Neutrophils Absolute 0.11 10*3/mm3      Lymphocytes Absolute 2.00 10*3/mm3      Monocytes Absolute 0.28 10*3/mm3      nRBC 4.0 /100 WBC      Anisocytosis Mod/2+     Hypochromia Slight/1+     Macrocytes Slight/1+     Microcytes Slight/1+     Poikilocytes Slight/1+     Polychromasia Slight/1+     WBC Morphology Normal     Platelet Estimate  Decreased    Path Consult Reflex [[id number]] Collected: 07/31/24 1637    Specimen: Blood Updated: 07/31/24 1727     Pathology Review Yes    COVID-19, FLU A/B, RSV PCR 1 HR TAT - Swab, Nasopharynx [[id number]]  (Normal) Collected: 07/31/24 1640    Specimen: Swab from Nasopharynx Updated: 07/31/24 1723     COVID19 Not Detected     Influenza A PCR Not Detected     Influenza B PCR Not Detected     RSV, PCR Not Detected    Narrative:      Fact sheet for providers: https://www.fda.gov/media/677516/download    Fact sheet for patients: https://www.fda.gov/media/098811/download    Test performed by PCR.    Lactic Acid, Plasma [[id number]]  (Normal) Collected: 07/31/24 1910    Specimen: Blood Updated: 07/31/24 1938     Lactate 0.9 mmol/L     Blood Culture - Blood, Arm, Left [[id number]] Collected: 07/31/24 1910    Specimen: Blood from Arm, Left Updated: 07/31/24 1918    Blood Culture - Blood, Arm, Right [[id number]] Collected: 07/31/24 1910    Specimen: Blood from Arm, Right Updated: 07/31/24 1918    CBC (No Diff) [[id number]]  (Abnormal) Collected: 07/31/24 2357    Specimen: Blood Updated: 08/01/24 0027     WBC 3.67 10*3/mm3      RBC 2.29 10*6/mm3      Hemoglobin 6.8 g/dL      Hematocrit 20.7 %      MCV 90.4 fL      MCH 29.7 pg      MCHC 32.9 g/dL      RDW 16.0 %      RDW-SD 50.3 fl      MPV 12.2 fL      Platelets 47 10*3/mm3     Reticulocytes [[id number]]  (Abnormal) Collected: 07/31/24 2357    Specimen: Blood Updated: 08/01/24 0042     Reticulocyte % 2.57 %      Reticulocyte Absolute 0.0576 10*6/mm3     Basic Metabolic Panel [[id number]]  (Abnormal) Collected: 08/01/24 0150    Specimen: Blood from Arm, Right Updated: 08/01/24 0220     Glucose 86 mg/dL      BUN 7 mg/dL      Creatinine 0.84 mg/dL      Sodium 137 mmol/L      Potassium 3.6 mmol/L      Chloride 99 mmol/L      CO2 27.0 mmol/L      Calcium 8.3 mg/dL      BUN/Creatinine Ratio 8.3     Anion Gap 11.0 mmol/L      eGFR 81.2 mL/min/1.73     Narrative:      GFR Normal  >60  Chronic Kidney Disease <60  Kidney Failure <15               Imaging:    XR Chest 1 View    Result Date: 7/31/2024  XR CHEST 1 VW Date of Exam: 7/31/2024 4:14 PM EDT Indication: SOA Triage Protocol Comparison: AP portable chest 6/20/2024. PET/CT 6/19/2024. Findings: Patchy nodular densities are suggested within the lung bases similar in appearance to the prior examination. No new airspace disease is identified. Heart size is normal. Left chest wall lin catheter extends to the right atrial level. There is no pleural effusion or pneumothorax.     Impression: 1. Patchy nodular densities in the lung bases appear unchanged. 2. No new airspace disease. Electronically Signed: Cecy De Jesus MD  7/31/2024 4:32 PM EDT  Workstation ID: [id number]       Differential Diagnosis and Discussion:    Vomiting: Differential diagnosis includes but is not limited to migraine, labyrinthine disorders, psychogenic, metabolic and endocrine causes, peptic ulcer, gastric outlet obstruction, gastritis, gastroenteritis, appendicitis, intestinal obstruction, paralytic ileus, food poisoning, cholecystitis, acute hepatitis, acute pancreatitis, acute febrile illness, and myocardial infarction.    All labs were reviewed and interpreted by me.  All X-rays impressions were independently interpreted by me.  EKG was interpreted by me.    MDM  Number of Diagnoses or Management Options  Acute anemia  Chemotherapy-induced neutropenia  Malignant neoplasm of lung, unspecified laterality, unspecified part of lung  Nausea and vomiting, unspecified vomiting type  Thrombocytopenia  Diagnosis management comments: The patient's vital signs are stable.  The patient did have mild tachycardia with a rate in the 110s.  The patient was afebrile with a normal temperature of 99.4.    The CBC is abnormal.  The patient's white blood cell count is 2.2.  The patient's neutrophil count is 0.11.  Both are low.  The patient's hemoglobin is 7.2 and the patient's hematocrit  is 21.7.  This is a significant change from 9 days ago when the patient's hemoglobin was 9.1 and the patient's hematocrit was 27.5.  The patient also has new leukopenia and neutropenia.  The patient also has a significant drop in the platelets.  The patient's platelets 9 days ago 151,000 and now they are 43,000.    2 blood cultures were ordered.  The results are pending at the time of disposition    The patient's CMP was reviewed and shows no abnormalities of critical concern.  Of note, the patient's sodium and potassium are acceptable.  The patient's liver enzymes are unremarkable.  The patient's renal function including creatinine is preserved.  The patient has a normal anion gap.    The patient had a normal proBNP.  This makes acute decompensated heart failure unlikely    Chest x-ray demonstrates patchy nodular densities in the bases.  There is no new airspace disease.       Amount and/or Complexity of Data Reviewed  Clinical lab tests: reviewed  Tests in the radiology section of CPT®: reviewed  Tests in the medicine section of CPT®: reviewed  Decide to obtain previous medical records or to obtain history from someone other than the patient: yes  Discuss the patient with other providers: yes (19:03 EDT  I discussed the case with Dr. Serrano, on-call for oncology and Dr. Dickson.  He feels that she is at the peak of her chemo treatment and the change in the CBC may have peaked.  At this point he does not want transfusion.  He does request that the patient be typed and screened with blood cultures.  He request the hospitalist for admission and oncology will follow patient in the hospital.    20:46 EDT  I discussed the case with the hospitalist, Dr. Ortega.  We have discussed the patient's presenting symptoms, laboratory values, imaging and condition at the time of admission.  They will evaluate the patient in the emergency room and admit the patient to the hospital)         Social Determinants of Health:    Patient  is independent, reliable, and has access to care.       Disposition and Care Coordination:    Admit:   Through independent evaluation of the patient's history, physical, and imperical data, the patient meets criteria for inpatient admission to the hospital.        Final diagnoses:   Acute anemia   Chemotherapy-induced neutropenia   Thrombocytopenia   Nausea and vomiting, unspecified vomiting type   Malignant neoplasm of lung, unspecified laterality, unspecified part of lung        ED Disposition       ED Disposition   Decision to Admit    Condition   --    Comment   Level of Care: Remote Telemetry [26]   Diagnosis: Intractable nausea and vomiting [681467]   Admitting Physician: TITO BANG [[id number]]                 This medical record created using voice recognition software.             Bill Baker DO  08/01/24 0310

## 2024-08-01 PROBLEM — D61.810 PANCYTOPENIA DUE TO CHEMOTHERAPY: Status: ACTIVE | Noted: 2024-08-01

## 2024-08-01 PROBLEM — D69.6 THROMBOCYTOPENIA: Status: ACTIVE | Noted: 2024-08-01

## 2024-08-01 LAB
ABO GROUP BLD: NORMAL
ANION GAP SERPL CALCULATED.3IONS-SCNC: 11 MMOL/L (ref 5–15)
BLD GP AB SCN SERPL QL: NEGATIVE
BUN SERPL-MCNC: 7 MG/DL (ref 6–20)
BUN/CREAT SERPL: 8.3 (ref 7–25)
CALCIUM SPEC-SCNC: 8.3 MG/DL (ref 8.6–10.5)
CHLORIDE SERPL-SCNC: 99 MMOL/L (ref 98–107)
CO2 SERPL-SCNC: 27 MMOL/L (ref 22–29)
CREAT SERPL-MCNC: 0.84 MG/DL (ref 0.57–1)
CYTO UR: NORMAL
DEPRECATED RDW RBC AUTO: 50.3 FL (ref 37–54)
EGFRCR SERPLBLD CKD-EPI 2021: 81.2 ML/MIN/1.73
ERYTHROCYTE [DISTWIDTH] IN BLOOD BY AUTOMATED COUNT: 16 % (ref 12.3–15.4)
GLUCOSE SERPL-MCNC: 86 MG/DL (ref 65–99)
HCT VFR BLD AUTO: 20.7 % (ref 34–46.6)
HGB BLD-MCNC: 6.8 G/DL (ref 12–15.9)
LAB AP CASE REPORT: NORMAL
LAB AP CLINICAL INFORMATION: NORMAL
MCH RBC QN AUTO: 29.7 PG (ref 26.6–33)
MCHC RBC AUTO-ENTMCNC: 32.9 G/DL (ref 31.5–35.7)
MCV RBC AUTO: 90.4 FL (ref 79–97)
PATH REPORT.FINAL DX SPEC: NORMAL
PATH REPORT.GROSS SPEC: NORMAL
PLATELET # BLD AUTO: 47 10*3/MM3 (ref 140–450)
PMV BLD AUTO: 12.2 FL (ref 6–12)
POTASSIUM SERPL-SCNC: 3.6 MMOL/L (ref 3.5–5.2)
QT INTERVAL: 337 MS
QT INTERVAL: 352 MS
QTC INTERVAL: 444 MS
QTC INTERVAL: 448 MS
RBC # BLD AUTO: 2.29 10*6/MM3 (ref 3.77–5.28)
RETICS # AUTO: 0.06 10*6/MM3 (ref 0.02–0.13)
RETICS/RBC NFR AUTO: 2.57 % (ref 0.7–1.9)
RH BLD: POSITIVE
SODIUM SERPL-SCNC: 137 MMOL/L (ref 136–145)
T&S EXPIRATION DATE: NORMAL
WBC NRBC COR # BLD AUTO: 3.67 10*3/MM3 (ref 3.4–10.8)

## 2024-08-01 PROCEDURE — 25010000002 MORPHINE PER 10 MG: Performed by: FAMILY MEDICINE

## 2024-08-01 PROCEDURE — P9016 RBC LEUKOCYTES REDUCED: HCPCS

## 2024-08-01 PROCEDURE — 25810000003 SODIUM CHLORIDE 0.9 % SOLUTION: Performed by: INTERNAL MEDICINE

## 2024-08-01 PROCEDURE — 86901 BLOOD TYPING SEROLOGIC RH(D): CPT | Performed by: FAMILY MEDICINE

## 2024-08-01 PROCEDURE — 94799 UNLISTED PULMONARY SVC/PX: CPT

## 2024-08-01 PROCEDURE — 97161 PT EVAL LOW COMPLEX 20 MIN: CPT

## 2024-08-01 PROCEDURE — 86850 RBC ANTIBODY SCREEN: CPT | Performed by: FAMILY MEDICINE

## 2024-08-01 PROCEDURE — 94664 DEMO&/EVAL PT USE INHALER: CPT

## 2024-08-01 PROCEDURE — 80048 BASIC METABOLIC PNL TOTAL CA: CPT | Performed by: FAMILY MEDICINE

## 2024-08-01 PROCEDURE — 99233 SBSQ HOSP IP/OBS HIGH 50: CPT | Performed by: INTERNAL MEDICINE

## 2024-08-01 PROCEDURE — 94760 N-INVAS EAR/PLS OXIMETRY 1: CPT

## 2024-08-01 PROCEDURE — 25010000002 FILGRASTIM PER 480 MCG: Performed by: INTERNAL MEDICINE

## 2024-08-01 PROCEDURE — 86900 BLOOD TYPING SEROLOGIC ABO: CPT

## 2024-08-01 PROCEDURE — 25810000003 LACTATED RINGERS PER 1000 ML: Performed by: INTERNAL MEDICINE

## 2024-08-01 PROCEDURE — 36430 TRANSFUSION BLD/BLD COMPNT: CPT

## 2024-08-01 PROCEDURE — 94640 AIRWAY INHALATION TREATMENT: CPT

## 2024-08-01 PROCEDURE — 25010000002 METOCLOPRAMIDE PER 10 MG: Performed by: FAMILY MEDICINE

## 2024-08-01 PROCEDURE — 99223 1ST HOSP IP/OBS HIGH 75: CPT | Performed by: INTERNAL MEDICINE

## 2024-08-01 PROCEDURE — 86900 BLOOD TYPING SEROLOGIC ABO: CPT | Performed by: FAMILY MEDICINE

## 2024-08-01 PROCEDURE — 86923 COMPATIBILITY TEST ELECTRIC: CPT

## 2024-08-01 RX ORDER — TRAMADOL HYDROCHLORIDE 50 MG/1
50 TABLET ORAL EVERY 6 HOURS PRN
Status: DISCONTINUED | OUTPATIENT
Start: 2024-08-01 | End: 2024-08-02 | Stop reason: HOSPADM

## 2024-08-01 RX ORDER — SODIUM CHLORIDE, SODIUM LACTATE, POTASSIUM CHLORIDE, CALCIUM CHLORIDE 600; 310; 30; 20 MG/100ML; MG/100ML; MG/100ML; MG/100ML
75 INJECTION, SOLUTION INTRAVENOUS CONTINUOUS
Status: DISCONTINUED | OUTPATIENT
Start: 2024-08-01 | End: 2024-08-02

## 2024-08-01 RX ORDER — BUDESONIDE AND FORMOTEROL FUMARATE DIHYDRATE 160; 4.5 UG/1; UG/1
2 AEROSOL RESPIRATORY (INHALATION)
Status: DISCONTINUED | OUTPATIENT
Start: 2024-08-01 | End: 2024-08-02 | Stop reason: HOSPADM

## 2024-08-01 RX ORDER — ALBUTEROL SULFATE 2.5 MG/3ML
2.5 SOLUTION RESPIRATORY (INHALATION) EVERY 6 HOURS PRN
Status: DISCONTINUED | OUTPATIENT
Start: 2024-08-01 | End: 2024-08-02 | Stop reason: HOSPADM

## 2024-08-01 RX ADMIN — FILGRASTIM 480 MCG: 480 INJECTION, SOLUTION INTRAVENOUS; SUBCUTANEOUS at 17:56

## 2024-08-01 RX ADMIN — BUDESONIDE AND FORMOTEROL FUMARATE DIHYDRATE 2 PUFF: 160; 4.5 AEROSOL RESPIRATORY (INHALATION) at 10:45

## 2024-08-01 RX ADMIN — NICOTINE 1 PATCH: 21 PATCH, EXTENDED RELEASE TRANSDERMAL at 23:53

## 2024-08-01 RX ADMIN — BUPROPION HYDROCHLORIDE 150 MG: 150 TABLET, EXTENDED RELEASE ORAL at 08:32

## 2024-08-01 RX ADMIN — IPRATROPIUM BROMIDE AND ALBUTEROL SULFATE 3 ML: .5; 3 SOLUTION RESPIRATORY (INHALATION) at 02:10

## 2024-08-01 RX ADMIN — NICOTINE 1 PATCH: 21 PATCH, EXTENDED RELEASE TRANSDERMAL at 01:08

## 2024-08-01 RX ADMIN — GABAPENTIN 300 MG: 300 CAPSULE ORAL at 17:24

## 2024-08-01 RX ADMIN — BUDESONIDE AND FORMOTEROL FUMARATE DIHYDRATE 2 PUFF: 160; 4.5 AEROSOL RESPIRATORY (INHALATION) at 20:48

## 2024-08-01 RX ADMIN — PANTOPRAZOLE SODIUM 40 MG: 40 INJECTION, POWDER, FOR SOLUTION INTRAVENOUS at 22:11

## 2024-08-01 RX ADMIN — IPRATROPIUM BROMIDE AND ALBUTEROL SULFATE 3 ML: .5; 3 SOLUTION RESPIRATORY (INHALATION) at 08:01

## 2024-08-01 RX ADMIN — Medication 10 ML: at 08:28

## 2024-08-01 RX ADMIN — Medication 10 ML: at 22:16

## 2024-08-01 RX ADMIN — MORPHINE SULFATE 2 MG: 2 INJECTION, SOLUTION INTRAMUSCULAR; INTRAVENOUS at 15:04

## 2024-08-01 RX ADMIN — SODIUM CHLORIDE 500 ML: 9 INJECTION, SOLUTION INTRAVENOUS at 15:54

## 2024-08-01 RX ADMIN — GABAPENTIN 300 MG: 300 CAPSULE ORAL at 22:10

## 2024-08-01 RX ADMIN — ATORVASTATIN CALCIUM 10 MG: 10 TABLET, FILM COATED ORAL at 01:08

## 2024-08-01 RX ADMIN — MORPHINE SULFATE 2 MG: 2 INJECTION, SOLUTION INTRAMUSCULAR; INTRAVENOUS at 22:11

## 2024-08-01 RX ADMIN — ESCITALOPRAM OXALATE 20 MG: 10 TABLET ORAL at 08:28

## 2024-08-01 RX ADMIN — METOCLOPRAMIDE HYDROCHLORIDE 10 MG: 5 INJECTION INTRAMUSCULAR; INTRAVENOUS at 15:46

## 2024-08-01 RX ADMIN — TIOTROPIUM BROMIDE INHALATION SPRAY 2 PUFF: 3.12 SPRAY, METERED RESPIRATORY (INHALATION) at 10:45

## 2024-08-01 RX ADMIN — BUPROPION HYDROCHLORIDE 150 MG: 150 TABLET, EXTENDED RELEASE ORAL at 22:10

## 2024-08-01 RX ADMIN — GABAPENTIN 300 MG: 300 CAPSULE ORAL at 08:28

## 2024-08-01 RX ADMIN — METOCLOPRAMIDE HYDROCHLORIDE 10 MG: 5 INJECTION INTRAMUSCULAR; INTRAVENOUS at 06:44

## 2024-08-01 RX ADMIN — MORPHINE SULFATE 2 MG: 2 INJECTION, SOLUTION INTRAMUSCULAR; INTRAVENOUS at 03:25

## 2024-08-01 RX ADMIN — SODIUM CHLORIDE, POTASSIUM CHLORIDE, SODIUM LACTATE AND CALCIUM CHLORIDE 75 ML/HR: 600; 310; 30; 20 INJECTION, SOLUTION INTRAVENOUS at 22:26

## 2024-08-01 RX ADMIN — ATORVASTATIN CALCIUM 10 MG: 10 TABLET, FILM COATED ORAL at 22:26

## 2024-08-01 RX ADMIN — METOCLOPRAMIDE HYDROCHLORIDE 10 MG: 5 INJECTION INTRAMUSCULAR; INTRAVENOUS at 22:10

## 2024-08-01 RX ADMIN — BUPROPION HYDROCHLORIDE 150 MG: 150 TABLET, EXTENDED RELEASE ORAL at 01:08

## 2024-08-01 RX ADMIN — IPRATROPIUM BROMIDE AND ALBUTEROL SULFATE 3 ML: .5; 3 SOLUTION RESPIRATORY (INHALATION) at 20:47

## 2024-08-01 NOTE — PLAN OF CARE
Goal Outcome Evaluation:  Plan of Care Reviewed With: patient         Patient new admit to 4nt from ED this shift. A&ox4. Medicated for c/o pain and nausea prn per MAR. Received one unit blood this shift, no apparent s/s of transfusion reaction.

## 2024-08-01 NOTE — H&P
[facility]   HISTORY AND PHYSICAL    Patient Name: Lluvia Archer  : 1965  MRN: [id number]  Primary Care Physician:  Aleksandar Edge DO  Date of admission: 2024    Subjective   Subjective     Chief Complaint: Intractable nausea and vomiting    HPI:    Lluvia Archer is a 58 y.o. female with past medical history metastatic lung cancer on chemotherapy, COPD, hypertension, arthritis, chronic nausea, anxiety, and GERD presents to the ED for evaluation of intractable nausea and vomiting.  Patient states that for the last 3 days she has been having persistent nausea and vomiting to where she cannot keep anything down.  She gets chemotherapy infusions every 3 weeks becomes symptomatic about a week or 2 after.  Home antiemetics were no longer effective so she came to the ED for further evaluation.  In the ED patient's vitals were all within normal limits on nasal cannula.  Labs showed pancytopenia with platelet of 43, WBC of 2.8 hemoglobin 7.2 along with electrolyte imbalances and transaminitis.  Chest x-ray was stable relative to prior.  When asked he denied any recent fevers, chills, headaches, focal weakness, chest pain, palpitation,  abdominal pain, diarrhea, constipation, dysuria, hematuria, hematochezia, melena, or anxiety.  Patient admitted for further evaluation and treatment.        Review of Systems   All systems were reviewed and negative except for: As per HPI    Personal History     Past Medical History:   Diagnosis Date    Abnormal mammogram     PT DENIES KNOWING OF THIS HX    Anxiety     Arthritis     R SHOULDER, R HIP, AND R LEG    Cancer     LUNG    Chronic nausea 01/15/2019    COPD (chronic obstructive pulmonary disease)     O2 3 LITERS NC CONT    Hernia, hiatal     Hyperlipidemia     Hypertension     Knee pain, right 2018    Memory loss     FORGETFULNESS ? ETIOLOGY    Migraine headache     Moderate episode of recurrent major depressive disorder 2017    Night  sweats     Sciatica of right side 2017    Severe episode of recurrent major depressive disorder, without psychotic features 10/22/2019    Shingles     OUTBREAK 24 ON ANTIVIRAL , WHELPS NOTED NO SORES.    Shoulder pain, left 2018       Past Surgical History:   Procedure Laterality Date    BRONCHOSCOPY N/A 2022    Procedure: BRONCHOSCOPY WITH BRONCHOALVEOLAR LAVAGE, BIOPSY;  Surgeon: Shin Mcdonald DO;  Location: [facility] ENDOSCOPY;  Service: Pulmonary;  Laterality: N/A;  RIGHT LOWER LOBE INFILTRATE    BRONCHOSCOPY N/A 2024    Procedure: BRONCHOSCOPY WITH ENDOBRONCHIAL ULTRASOUND AND FINE NEEDLE ASPIRATE;  Surgeon: Shin Mcdonald DO;  Location: [facility] ENDOSCOPY;  Service: Pulmonary;  Laterality: N/A;  MEDIASTINAL ADENOPATHY     SECTION      CHOLECYSTECTOMY      LAPAROSCOPIC    COLONOSCOPY      PORTACATH PLACEMENT Left 2024    Procedure: INSERTION OF PORTACATH;  Surgeon: Josh Patton MD;  Location: [facility] OR OSC;  Service: General;  Laterality: Left;    TOTAL HIP ARTHROPLASTY Right 2022    Procedure: RIGHT TOTAL HIP ARTHROPLASTY;  Surgeon: Cecil Gomes MD;  Location: [facility] MAIN OR;  Service: Orthopedics;  Laterality: Right;       Family History: family history includes Arthritis in her mother; Cancer in an other family member; Heart disease in her father and another family member; Hypertension in an other family member; Other in her mother. Otherwise pertinent FHx was reviewed and not pertinent to current issue.    Social History:  reports that she has been smoking cigarettes. She started smoking about 42 years ago. She has a 85.2 pack-year smoking history. She has been exposed to tobacco smoke. She has never used smokeless tobacco. She reports that she does not drink alcohol and does not use drugs.    Home Medications:  Cariprazine HCl, Fluticasone-Umeclidin-Vilant, LORazepam, OLANZapine, albuterol sulfate HFA, atorvastatin, buPROPion SR,  escitalopram, famotidine, gabapentin, ipratropium-albuterol, lisinopril, nicotine, omeprazole, ondansetron, prochlorperazine, and traMADol      Allergies:  No Known Allergies    Objective   Objective     Vitals:   Temp:  [98.4 °F (36.9 °C)-99.4 °F (37.4 °C)] 98.4 °F (36.9 °C)  Heart Rate:  [105-111] 105  Resp:  [20] 20  BP: (104-107)/(54-68) 104/54  Flow (L/min):  [2] 2  Physical Exam    Constitutional: Awake, alert   Eyes: PERRLA, sclerae anicteric, no conjunctival injection   HENT: NCAT, mucous membranes moist   Neck: Supple, no thyromegaly, no lymphadenopathy, trachea midline   Respiratory: Decreased breath sounds bilaterally, rhonchi, nonlabored respirations    Cardiovascular: RRR, no murmurs, rubs, or gallops, palpable pedal pulses bilaterally   Gastrointestinal: Positive bowel sounds, soft, nontender, nondistended   Musculoskeletal: Bilateral lower extremity edema,, no clubbing or cyanosis to extremities   Psychiatric: Appropriate affect, cooperative   Neurologic: Oriented x 3, strength symmetric in all extremities, Cranial Nerves grossly intact to confrontation, speech clear   Skin: Pale    Result Review    Result Review:  I have personally reviewed the results from the time of this admission to 7/31/2024 23:43 EDT and agree with these findings:  [x]  Laboratory list / accordion  []  Microbiology  [x]  Radiology  [x]  EKG/Telemetry   []  Cardiology/Vascular   []  Pathology  []  Old records  []  Other:  Most notable findings include: Anemia, thrombocytopenia, leukopenia, negative COVID flu RSV, x-ray unremarkable      Assessment & Plan   Assessment / Plan     Brief Patient Summary:  Lluvia Archer is a 58 y.o. female with past medical history metastatic lung cancer on chemotherapy, COPD, hypertension, arthritis, chronic nausea, anxiety, and GERD presents to the ED for evaluation of intractable nausea and vomiting.    Active Hospital Problems:  Active Hospital Problems    Diagnosis     **Intractable nausea  and vomiting     Pancytopenia due to chemotherapy     Thrombocytopenia     Dehydration     Tobacco abuse, in remission     Small cell lung cancer     Sleep apnea, unspecified     Hypertension     Anxiety     Moderate episode of recurrent major depressive disorder      Plan:     Intractable nausea and vomiting  -Admit to telemetry  -Symptomatic for the last 2 days  -She is on chemotherapy for metastatic lung cancer  -Reglan IV 3 times daily.  -Zofran as needed  -IVF  -Clear liquid diet.  Advance as tolerated  -Follow labs  -Supportive care    Pancytopenia  -Patient denied any bleeding or fevers  -Will hold off on transfusion for now  -Heme-onc consulted and following  -Follow labs    HTN  -Currently well controlled  -PRN BP meds  -Resume home meds when available  -Titrate if needed    COPD  -DuoNebs PRN    Metastatic lung cancer  -Heme-onc as above  -Pain meds as needed    Tobacco use in remission    GI ppx  DVT ppx        VTE Prophylaxis:  Mechanical VTE prophylaxis orders are present.        CODE STATUS:    Level Of Support Discussed With: Patient  Code Status (Patient has no pulse and is not breathing): CPR (Attempt to Resuscitate)  Medical Interventions (Patient has pulse or is breathing): Full Support    Admission Status:  I believe this patient meets observation status.      Electronically signed by Gordo Ortega MD, 07/31/24, 11:43 PM EDT.

## 2024-08-01 NOTE — PLAN OF CARE
Goal Outcome Evaluation:  Plan of Care Reviewed With: patient        Progress: no change  Outcome Evaluation: Pt presents at or near baseline and is in agreement that no further skilled PT services are needed while here at the hospital. She is open to have home health once discharged from the hospital. She will be discharged from PT caseload.      Anticipated Discharge Disposition (PT): home with home health, home with assist

## 2024-08-01 NOTE — PROGRESS NOTES
[facility]   Hospitalist Progress Note  Date: 2024  Patient Name: Lluvia Archer  : 1965  MRN: [id number]  Date of admission: 2024      Subjective   Subjective     Chief Complaint: Intractable nausea and vomiting due to chemotherapy    Summary:   Lluvia Archer is a 58 y.o. female with past medical history metastatic lung cancer on chemotherapy, COPD, hypertension, arthritis, chronic nausea, anxiety, and GERD presents to the ED for evaluation of intractable nausea and vomiting.  Patient states that for the last 3 days she has been having persistent nausea and vomiting to where she cannot keep anything down.  She gets chemotherapy infusions every 3 weeks becomes symptomatic about a week or 2 after.  Home antiemetics were no longer effective so she came to the ED for further evaluation.  In the ED patient's vitals were all within normal limits on nasal cannula.  Labs showed pancytopenia with platelet of 43, WBC of 2.8 hemoglobin 7.2 along with electrolyte imbalances and transaminitis.  Chest x-ray was stable relative to prior.  When asked he denied any recent fevers, chills, headaches, focal weakness, chest pain, palpitation,  abdominal pain, diarrhea, constipation, dysuria, hematuria, hematochezia, melena, or anxiety.  Patient admitted for further evaluation and treatment.   Patient seen by her oncologist started on Neupogen.  Recommended blood transfusion for hemoglobin less than 7 and platelet transfusion for platelet count less than 10K.    Interval Followup:   Blood pressure soft.  On 3 L nasal cannula.  Still have some nausea but vomiting improved.  Able to tolerate some solids.  Minimal abdominal discomfort.  No diarrhea.  Does have mild dizziness.  No chest pain or shortness of air.    Review of Systems   All systems were reviewed and negative except for: Summary and interval follow-up    Objective   Objective     Vitals:   Temp:  [97.9 °F (36.6 °C)-99.7 °F (37.6 °C)] 98.2 °F (36.8  °C)  Heart Rate:  [] 83  Resp:  [16-20] 18  BP: ()/(45-70) 106/52  Flow (L/min):  [3] 3  Physical Exam    Constitutional: Awake, alert, no acute distress.  Has loss of hair   Eyes: Pupils equal, sclerae anicteric, no conjunctival injection   HENT: NCAT, mucous membranes moist   Neck: Supple, no thyromegaly, no lymphadenopathy, trachea midline   Respiratory: Decreased to auscultation bilaterally, rhonchi, nonlabored respirations    Cardiovascular: RRR, no murmurs, rubs, or gallops, palpable pedal pulses bilaterally.  S/p right-sided port   Gastrointestinal: Positive bowel sounds, soft, nontender, nondistended   Musculoskeletal; positive bilateral ankle edema, no clubbing or cyanosis to extremities   Psychiatric: Appropriate affect, cooperative   Neurologic: Oriented x 3, strength symmetric in all extremities, Cranial Nerves grossly intact to confrontation, speech clear   Skin: No rashes     Result Review    Result Review:  I have personally reviewed the results for the past 24 hours and agree with these findings:  [x]  Laboratory  []  Microbiology  [x]  Radiology  [x]  EKG/Telemetry sinus rhythm 97.  QT 0.35  []  Cardiology/Vascular   []  Pathology  [x]  Old records  [x]  Other: Medications    Assessment & Plan   Assessment / Plan     Assessment:  Intractable nausea and vomiting.  Recent chemotherapy causing above.  Pancytopenia due to above.  S/p 1 unit packed RBC transfusion  Small cell metastatic lung cancer follows with Dr. Blair.  COPD stable.  Anxiety disorder.  Hypertension.  Hypoxia on 3 L nasal cannula  Alopecia due to chemotherapy.    Plan:  Continue supplemental oxygen keep sats more than 90%.  Resume IV fluids.  IV fluid bolus for low blood pressure.  IV Reglan.  IV Protonix.  Monitor hemoglobin and platelet count.  Appreciate heme-onc input on Neupogen.  Change in chemotherapy regimen for future use noted.  As needed home Ativan.  Nebulizer treatment.  Nicotine patch.  Challenge with regular  diet.  DC telemetry  Patient feels comfortable staying another night in the hospital  Discussed plan with RN and .  Discharge home in a.m. if remains stable    VTE Prophylaxis:  Mechanical VTE prophylaxis orders are present.        CODE STATUS:   Level Of Support Discussed With: Patient  Code Status (Patient has no pulse and is not breathing): CPR (Attempt to Resuscitate)  Medical Interventions (Patient has pulse or is breathing): Full Support      Part of this note may be an electronic transcription/translation of spoken language to printed text using the Dragon Dictation System.     Electronically signed by Sage Rosario MD, 08/01/24, 7:38 PM EDT.

## 2024-08-01 NOTE — CONSULTS
[facility]   Consult Note    Patient Name: Lluvia Archer  : 1965  MRN: [id number]  Primary Care Physician:  Aleksandar Edge DO  Referring Physician: No Known Provider  Date of admission: 2024    Subjective   Subjective     Reason for Consult/ Chief Complaint: Feeling bad    HPI:  Lluvia Archer is a 58 y.o. female recently found to have metastatic small cell lung cancer.  She received her second cycle of carboplatin, etopside, durvalumab last week.  She states that she is not feeling well since that time.  Her energy level has markedly decreased.  She has had trouble with appetite and not eating well.  She had some mucositis but was using salt and soda gargles and that has resolved.  She denies obvious fever or chills.  Because of her symptoms, she was seen in the emergency room yesterday and found to have decreased blood counts and was subsequently admitted.  Given her history of lung cancer and recent treatment, I am asked to see the patient.      Personal History     Past Medical History:   Diagnosis Date    Abnormal mammogram     PT DENIES KNOWING OF THIS HX    Anxiety     Arthritis     R SHOULDER, R HIP, AND R LEG    Cancer     LUNG    Chronic nausea 01/15/2019    COPD (chronic obstructive pulmonary disease)     O2 3 LITERS NC CONT    Hernia, hiatal     Hyperlipidemia     Hypertension     Knee pain, right 2018    Memory loss     FORGETFULNESS ? ETIOLOGY    Migraine headache     Moderate episode of recurrent major depressive disorder 2017    Night sweats     Sciatica of right side 2017    Severe episode of recurrent major depressive disorder, without psychotic features 10/22/2019    Shingles     OUTBREAK 24 ON ANTIVIRAL , WHELPS NOTED NO SORES.    Shoulder pain, left 2018       Past Surgical History:   Procedure Laterality Date    BRONCHOSCOPY N/A 2022    Procedure: BRONCHOSCOPY WITH BRONCHOALVEOLAR LAVAGE, BIOPSY;  Surgeon: Shin Mcdonald  DO Shane;  Location: [facility] ENDOSCOPY;  Service: Pulmonary;  Laterality: N/A;  RIGHT LOWER LOBE INFILTRATE    BRONCHOSCOPY N/A 2024    Procedure: BRONCHOSCOPY WITH ENDOBRONCHIAL ULTRASOUND AND FINE NEEDLE ASPIRATE;  Surgeon: Shin Mcdonald DO;  Location: [facility] ENDOSCOPY;  Service: Pulmonary;  Laterality: N/A;  MEDIASTINAL ADENOPATHY     SECTION      CHOLECYSTECTOMY      LAPAROSCOPIC    COLONOSCOPY      PORTACATH PLACEMENT Left 2024    Procedure: INSERTION OF PORTACATH;  Surgeon: Josh Patton MD;  Location: [facility] OR OSC;  Service: General;  Laterality: Left;    TOTAL HIP ARTHROPLASTY Right 2022    Procedure: RIGHT TOTAL HIP ARTHROPLASTY;  Surgeon: Cecil Gomes MD;  Location: [facility] MAIN OR;  Service: Orthopedics;  Laterality: Right;       Family History: family history includes Arthritis in her mother; Cancer in an other family member; Heart disease in her father and another family member; Hypertension in an other family member; Other in her mother. Otherwise pertinent FHx was reviewed and not pertinent to current issue.    Social History:  reports that she has been smoking cigarettes. She started smoking about 42 years ago. She has a 85.2 pack-year smoking history. She has been exposed to tobacco smoke. She has never used smokeless tobacco. She reports that she does not drink alcohol and does not use drugs.    Home Medications:  Cariprazine HCl, Fluticasone-Umeclidin-Vilant, LORazepam, OLANZapine, albuterol sulfate HFA, atorvastatin, buPROPion SR, escitalopram, famotidine, gabapentin, ipratropium-albuterol, lisinopril, nicotine, omeprazole, ondansetron, prochlorperazine, and traMADol    Allergies:  No Known Allergies    Objective    Objective     Vitals:   Temp:  [97.9 °F (36.6 °C)-99.7 °F (37.6 °C)] 98.2 °F (36.8 °C)  Heart Rate:  [] 93  Resp:  [16-20] 20  BP: (104-125)/(54-70) 106/60  Flow (L/min):  [2-3] 3    Physical Exam:   Constitutional: Awake,  alert   Eyes: PERRLA, sclerae anicteric, no conjunctival injection   HENT: NCAT, mucous membranes moist   Neck: Supple, no thyromegaly, no lymphadenopathy, trachea midline   Respiratory: Clear to auscultation bilaterally, nonlabored respirations.  Port intact.   Cardiovascular: RRR, no murmurs, rubs, or gallops   Gastrointestinal: Positive bowel sounds, soft, nontender, nondistended   Musculoskeletal: No bilateral ankle edema, no clubbing or cyanosis to extremities   Psychiatric: Appropriate affect, cooperative   Neurologic: Oriented x 3, grossly nonfocal, speech clear   Skin: No rashes  Lymph Node: No cervical, supraclavicular, infraclavicular axillary adenopathy bilaterally    Result Review    Result Review:  I have personally reviewed the results from the time of this admission to 8/1/2024 07:55 EDT and agree with these findings:  [x]  Laboratory  [x]  Microbiology cultures negative to date.  COVID-negative.  Flu negative.  [x]  Radiology chest x-ray shows patchy airspace densities at the bases unchanged  []  EKG/Telemetry   []  Cardiology/Vascular   []  Pathology  []  Old records  []  Other:  Most notable findings include: WBC 3.6, hemoglobin 6.8, hematocrit 20.7, platelet 47    Assessment & Plan   Assessment / Plan     Brief Patient Summary:  Lluvia Archer is a 58 y.o. female who has small cell lung cancer.  She is on treatment with carboplatin, etoposide, durvalumab.  She completed cycle 2 last week.  She is now at her robert timeframe and demonstrates pancytopenia.  Admitted for poor appetite and oral intake.    Active Hospital Problems:  Active Hospital Problems    Diagnosis     **Intractable nausea and vomiting     Pancytopenia due to chemotherapy     Thrombocytopenia     Dehydration     Tobacco abuse, in remission     Small cell lung cancer     Sleep apnea, unspecified     Hypertension     Anxiety     Moderate episode of recurrent major depressive disorder      Plan:   Lung cancer, small cell.  Patient  is on chemo as above with her last cycle completed last week.  She is not due for any treatment at this time and I would hold therapy until all acute issues have resolved.  Moving forward she will need dose reduction and consideration of white blood cell growth factor support.  Would recommend CBC with differential daily while in the hospital and consideration of transfusion for hemoglobin <7, platelet <10 or any evidence of active bleeding.    Neutropenia secondary to chemotherapy.  Patient will be started on Neupogen 480 mcg subcu daily until ANC >1500.    Nausea/vomiting.  Secondary to chemotherapy.  Patient reports better control of her symptoms this morning and she is anxious to try breakfast.          Electronically signed by Teto Serrano MD, 08/01/24, 7:55 AM EDT.

## 2024-08-01 NOTE — THERAPY EVALUATION
Acute Care - Physical Therapy Initial Evaluation   [facility]     Patient Name: Lluvia Archer  : 1965  MRN: [id number]  Today's Date: 2024      Visit Dx:     ICD-10-CM ICD-9-CM   1. Acute anemia  D64.9 285.9   2. Chemotherapy-induced neutropenia  D70.1 288.03    T45.1X5A E933.1   3. Thrombocytopenia  D69.6 287.5   4. Nausea and vomiting, unspecified vomiting type  R11.2 787.01   5. Malignant neoplasm of lung, unspecified laterality, unspecified part of lung  C34.90 162.9   6. Difficulty walking  R26.2 719.7     Patient Active Problem List   Diagnosis    Abnormal mammogram    Anxiety    Arthritis    Chronic nausea    Chronic obstructive pulmonary disease (COPD)    Counseling on substance use and abuse    Esophageal reflux    Hernia, hiatal    Hyperlipidemia    Hypertension    Knee pain, right    Memory loss    Migraine    Moderate episode of recurrent major depressive disorder    Night sweats    Numbness    Sciatica of right side    Severe episode of recurrent major depressive disorder, without psychotic features    Shoulder pain, left    Other diseases of nasal cavity and sinuses    Sleep apnea, unspecified    Tobacco abuse    Strain of groin, right, subsequent encounter    Osteoarthritis of spine with radiculopathy, lumbar region    Chronic right-sided low back pain with right-sided sciatica    Aftercare following right hip joint replacement surgery    Primary osteoarthritis of right hip    Right hip pain    Sepsis, due to unspecified organism, unspecified whether acute organ dysfunction present    Severe malnutrition    Pneumonia of right lower lobe due to infectious organism    Hospital discharge follow-up    Other specified anemias    Encounter for screening mammogram for malignant neoplasm of breast    Multifocal pneumonia    Acute on chronic respiratory failure with hypoxia    Medicare annual wellness visit, subsequent    Community acquired pneumonia    Pneumonia due to infectious organism,  unspecified laterality, unspecified part of lung    Mediastinal adenopathy    Small cell lung cancer    Tobacco abuse, in remission    Chronic respiratory failure with hypoxia    Lung nodules    Encounter for adjustment or management of vascular access device    Dehydration    Intractable nausea and vomiting    Pancytopenia due to chemotherapy    Thrombocytopenia     Past Medical History:   Diagnosis Date    Abnormal mammogram     PT DENIES KNOWING OF THIS HX    Anxiety     Arthritis     R SHOULDER, R HIP, AND R LEG    Cancer     LUNG    Chronic nausea 01/15/2019    COPD (chronic obstructive pulmonary disease)     O2 3 LITERS NC CONT    Hernia, hiatal     Hyperlipidemia     Hypertension     Knee pain, right 2018    Memory loss     FORGETFULNESS ? ETIOLOGY    Migraine headache     Moderate episode of recurrent major depressive disorder 2017    Night sweats     Sciatica of right side 2017    Severe episode of recurrent major depressive disorder, without psychotic features 10/22/2019    Shingles     OUTBREAK 24 ON ANTIVIRAL , WHELPS NOTED NO SORES.    Shoulder pain, left 2018     Past Surgical History:   Procedure Laterality Date    BRONCHOSCOPY N/A 2022    Procedure: BRONCHOSCOPY WITH BRONCHOALVEOLAR LAVAGE, BIOPSY;  Surgeon: Shin Mcdonald DO;  Location: [facility] ENDOSCOPY;  Service: Pulmonary;  Laterality: N/A;  RIGHT LOWER LOBE INFILTRATE    BRONCHOSCOPY N/A 2024    Procedure: BRONCHOSCOPY WITH ENDOBRONCHIAL ULTRASOUND AND FINE NEEDLE ASPIRATE;  Surgeon: Shin Mcdonald DO;  Location: [facility] ENDOSCOPY;  Service: Pulmonary;  Laterality: N/A;  MEDIASTINAL ADENOPATHY     SECTION      CHOLECYSTECTOMY      LAPAROSCOPIC    COLONOSCOPY      PORTACATH PLACEMENT Left 2024    Procedure: INSERTION OF PORTACATH;  Surgeon: Josh Patton MD;  Location: [facility];  Service: General;  Laterality: Left;    TOTAL HIP ARTHROPLASTY Right 2022     Procedure: RIGHT TOTAL HIP ARTHROPLASTY;  Surgeon: Cecil Gomes MD;  Location: [facility] MAIN OR;  Service: Orthopedics;  Laterality: Right;     PT Assessment (Last 12 Hours)       PT Evaluation and Treatment       Row Name 08/01/24 1200          Physical Therapy Time and Intention    Subjective Information no complaints  -CS     Document Type evaluation  -CS     Mode of Treatment individual therapy;physical therapy  -CS     Patient Effort good  -CS     Symptoms Noted During/After Treatment shortness of breath  -CS       Row Name 08/01/24 1200          General Information    Patient Profile Reviewed yes  -CS     Patient Observations alert;cooperative;agree to therapy  -CS     Prior Level of Function independent:;all household mobility;gait;transfer;bed mobility;min assist:;ADL's  Pt reports she is independent with ambulating around the home without AD, transfers, and in/out of bed. She has assist from her  to perform tub transfers in order to take a bath. She does have a shower seat if needed.  -CS     Equipment Currently Used at Home oxygen  3L O2  -CS     Existing Precautions/Restrictions fall  -CS     Barriers to Rehab none identified  -CS       Row Name 08/01/24 1200          Living Environment    Current Living Arrangements home  -CS     Home Accessibility stairs to enter home;stairs within home  -CS     People in Home spouse  -CS       Row Name 08/01/24 1200          Home Main Entrance    Number of Stairs, Main Entrance none  -CS       Row Name 08/01/24 1200          Stairs Within Home, Primary    Number of Stairs, Within Home, Primary none  -CS       Row Name 08/01/24 1200          Pain    Pretreatment Pain Rating 0/10 - no pain  -CS     Posttreatment Pain Rating 0/10 - no pain  -CS       Row Name 08/01/24 1200          Cognition    Orientation Status (Cognition) oriented x 3  -CS       Row Name 08/01/24 1200          Range of Motion Comprehensive    General Range of Motion no range of motion deficits  identified  -CS       Row Name 08/01/24 1200          Strength Comprehensive (MMT)    General Manual Muscle Testing (MMT) Assessment no strength deficits identified  -CS       Row Name 08/01/24 1200          Bed Mobility    Bed Mobility bed mobility (all) activities  -CS     All Activities, [place] (Bed Mobility) independent  -CS       Row Name 08/01/24 1200          Transfers    Transfers sit-stand transfer;toilet transfer  -CS       Row Name 08/01/24 1200          Sit-Stand Transfer    Sit-Stand [place] (Transfers) independent  -CS     Assistive Device (Sit-Stand Transfers) other (see comments)  no AD  -CS       Row Name 08/01/24 1200          Toilet Transfer    Type (Toilet Transfer) sit-stand;stand-sit  -CS     [place] Level (Toilet Transfer) independent  -CS     Assistive Device (Toilet Transfer) commode  -CS       Row Name 08/01/24 1200          Gait/Stairs (Locomotion)    Gait/Stairs Locomotion gait/ambulation independence  -CS     [place] Level (Gait) independent  -CS     Assistive Device (Gait) other (see comments)  no AD to the bathroom and able to push her own IV pole out of the bathroom back to the bed  -CS     Patient was able to Ambulate yes  -CS     Distance in Feet (Gait) 15  x2  -CS     Pattern (Gait) step-through  -CS     Bilateral Gait Deviations forward flexed posture  -CS       Row Name 08/01/24 1200          Safety Issues, Functional Mobility    Impairments Affecting Function (Mobility) shortness of breath;endurance/activity tolerance  present prior to hospitalization  -CS       Row Name 08/01/24 1200          Balance    Balance Assessment standing dynamic balance  -CS     Dynamic Standing Balance independent  -CS       Row Name 08/01/24 1200          Plan of Care Review    Plan of Care Reviewed With patient  -CS     Progress no change  -CS     Outcome Evaluation Pt presents at or near baseline and is in agreement that no further skilled PT services are needed while here at  the hospital. She is open to have home health once discharged from the hospital. She will be discharged from PT caseload.  -CS       Row Name 08/01/24 1200          Positioning and Restraints    Pre-Treatment Position in bed  -CS     Post Treatment Position bed  -CS     In Bed fowlers;call light within reach;encouraged to call for assist  -CS       Row Name 08/01/24 1200          Therapy Assessment/Plan (PT)    Criteria for Skilled Interventions Met (PT) no problems identified which require skilled intervention  -CS     Therapy Frequency (PT) evaluation only  -CS       Row Name 08/01/24 1200          PT Evaluation Complexity    History, PT Evaluation Complexity 1-2 personal factors and/or comorbidities  -CS     Examination of Body Systems (PT Eval Complexity) total of 4 or more elements  -CS     Clinical Presentation (PT Evaluation Complexity) stable  -CS     Clinical Decision Making (PT Evaluation Complexity) low complexity  -CS     Overall Complexity (PT Evaluation Complexity) low complexity  -CS       Row Name 08/01/24 1200          Therapy Plan Review/Discharge Plan (PT)    Therapy Plan Review (PT) evaluation/treatment results reviewed;patient  -       Row Name 08/01/24 1200          Physical Therapy Goals    Problem Specific Goal Selection (PT) problem specific goal 1, PT  -       Row Name 08/01/24 1200          Problem Specific Goal 1 (PT)    Problem Specific Goal 1 (PT) Complete physical therapy evaluation.  -CS     Time Frame (Problem Specific Goal 1, PT) by discharge  -CS     Progress/Outcome (Problem Specific Goal 1, PT) goal met  -CS               User Key  (r) = Recorded By, (t) = Taken By, (c) = Cosigned By      Initials Name Provider Type    CS June Jimenez, PT Physical Therapist                      PT Recommendation and Plan  Anticipated Discharge Disposition (PT): home with home health, home with assist  Therapy Frequency (PT): evaluation only  Plan of Care Reviewed With: patient  Progress:  no change  Outcome Evaluation: Pt presents at or near baseline and is in agreement that no further skilled PT services are needed while here at the hospital. She is open to have home health once discharged from the hospital. She will be discharged from PT caseload.   Outcome Measures       Row Name 08/01/24 1200             How much help from another person do you currently need...    Turning from your back to your side while in flat bed without using bedrails? 4  -CS      Moving from lying on back to sitting on the side of a flat bed without bedrails? 4  -CS      Moving to and from a bed to a chair (including a wheelchair)? 4  -CS      Standing up from a chair using your arms (e.g., wheelchair, bedside chair)? 4  -CS      Climbing 3-5 steps with a railing? 3  -CS      To walk in hospital room? 4  -CS      AM-PAC 6 Clicks Score (PT) 23  -CS      Highest Level of Mobility Goal 7 --> Walk 25 feet or more  -CS         Functional Assessment    Outcome Measure Options AM-PAC 6 Clicks Basic Mobility (PT)  -CS                User Key  (r) = Recorded By, (t) = Taken By, (c) = Cosigned By      Initials Name Provider Type    June Raygoza, LADONNA Physical Therapist                     Time Calculation:    PT Charges       Row Name 08/01/24 1238             Time Calculation    PT Received On 08/01/24  -CS         Untimed Charges    PT Eval/Re-eval Minutes 46  -CS         Total Minutes    Untimed Charges Total Minutes 46  -CS       Total Minutes 46  -CS                User Key  (r) = Recorded By, (t) = Taken By, (c) = Cosigned By      Initials Name Provider Type    June Raygoza PT Physical Therapist                  Therapy Charges for Today       Code Description Service Date Service Provider Modifiers Qty    [id number]  PT EVAL LOW COMPLEXITY 4 8/1/2024 June Jimenez, PT GP 1            PT G-Codes  Outcome Measure Options: AM-PAC 6 Clicks Basic Mobility (PT)  AM-PAC 6 Clicks Score (PT): 23    June Jimenez  PT  8/1/2024

## 2024-08-01 NOTE — PLAN OF CARE
Goal Outcome Evaluation:  Plan of Care Reviewed With: patient           Outcome Evaluation: Pt is alert and orient x4.  Pt BP low during afternnon check.  Dr. Rosario notified.  Started bolus fluids.  Pt denies any SOA, dizziness or chest pain.  Pain managed with prn pain medications.

## 2024-08-01 NOTE — TELEPHONE ENCOUNTER
Never mind the IV Fluid apt. The pt went to the ER and has been admitted to [facility].     The pt has an apt for Tuesday next week. This nurse called Daria and instructed her that if the pt is dc before the apt that the pt needs to come to the apt even is she feels weak so that Dr Dickson can assess her and adjust her POC. Daria voiced understanding.    Patient's daughter contacted and informed refills will be sent to preferred pharmacy.  Patient scheduled for thurs. For TCM. Daughter verbalizes understanding.

## 2024-08-01 NOTE — CONSULTS
Nutrition Services    Patient Name: Lluvia Archer  YOB: 1965  MRN: [id number]  Admission date: 7/31/2024      CLINICAL NUTRITION ASSESSMENT      Reason for Assessment  MST Score 2+     H&P:  Past Medical History:   Diagnosis Date    Abnormal mammogram     PT DENIES KNOWING OF THIS HX    Anxiety     Arthritis     R SHOULDER, R HIP, AND R LEG    Cancer     LUNG    Chronic nausea 01/15/2019    COPD (chronic obstructive pulmonary disease)     O2 3 LITERS NC CONT    Hernia, hiatal     Hyperlipidemia     Hypertension     Knee pain, right 08/30/2018    Memory loss     FORGETFULNESS ? ETIOLOGY    Migraine headache     Moderate episode of recurrent major depressive disorder 05/22/2017    Night sweats     Sciatica of right side 08/18/2017    Severe episode of recurrent major depressive disorder, without psychotic features 10/22/2019    Shingles     OUTBREAK 6/11/24 ON ANTIVIRAL , WHELPS NOTED NO SORES.    Shoulder pain, left 08/30/2018        Current Problems:   Active Hospital Problems    Diagnosis     **Intractable nausea and vomiting     Pancytopenia due to chemotherapy     Thrombocytopenia     Dehydration     Tobacco abuse, in remission     Small cell lung cancer     Sleep apnea, unspecified     Hypertension     Anxiety     Moderate episode of recurrent major depressive disorder         Nutrition/Diet History         Narrative   Pt reports inability to keep anything down x3 days before admit. Was able to eat today. She is s/p 2nd chemo treatment. 1st she had few ill effects, this one has caused significant n/v and decreased appetite. Also reports that she has had thrush 2x past 2 mo. Noted pt partially edentulous, denies chewing/swallowing problems.    Spoke extensively with pt and daughter on ways to increase intake and calorie provision while on chemo. Discussed oral supplements, both here and at grocery stores. Brought back handouts for pt to take home, as well. Introduced to pt the idea that  "supplemental enteral nutrition may be an option if intake continues to be low, pt did not appear surprised or upset. If wt loss continues, placement of PEG may be appropriate. Pts with lung cancer have high rates of cachexia which affects morbidity/mortality rate.     Anthropometrics        Current Height, Weight Height: 162.6 cm (64\")  Weight: 93.7 kg (206 lb 9.1 oz)   Current BMI Body mass index is 35.46 kg/m².   BMI Classification Obese Class II   % %   Adjusted Body Weight (ABW)    Weight Hx  Wt Readings from Last 30 Encounters:   07/31/24 2300 93.7 kg (206 lb 9.1 oz)   07/31/24 1556 97.1 kg (214 lb 1.1 oz)   07/18/24 1300 98.8 kg (217 lb 13 oz)   07/17/24 1357 98.8 kg (217 lb 13 oz)   07/16/24 0926 97.8 kg (215 lb 8 oz)   07/16/24 1002 97.8 kg (215 lb 9.8 oz)   07/01/24 0823 102 kg (225 lb 15.5 oz)   06/26/24 1300 104 kg (230 lb 2.6 oz)   06/25/24 1345 103 kg (227 lb 15.3 oz)   06/24/24 0851 103 kg (226 lb 13.7 oz)   06/20/24 1521 102 kg (224 lb 13.9 oz)   06/20/24 0612 102 kg (224 lb 6.9 oz)   06/17/24 1355 102 kg (224 lb)   06/14/24 1259 106 kg (233 lb 11 oz)   06/13/24 0854 106 kg (233 lb)   06/12/24 1450 106 kg (234 lb 11.2 oz)   06/07/24 1022 105 kg (232 lb 5.8 oz)   06/04/24 0310 108 kg (237 lb 3.4 oz)   06/02/24 2126 102 kg (223 lb 15.8 oz)   06/02/24 1602 99.8 kg (220 lb 0.3 oz)   12/21/23 0855 103 kg (228 lb)   05/05/23 0215 101 kg (221 lb 9 oz)   05/04/23 2227 100 kg (221 lb 5.5 oz)   05/01/23 1058 97.1 kg (214 lb)   12/09/22 1127 90.4 kg (199 lb 6.4 oz)   06/24/22 1557 87.1 kg (192 lb)   06/03/22 1117 87.1 kg (192 lb)   05/27/22 1447 84.4 kg (186 lb)   05/13/22 0607 88.2 kg (194 lb 7.1 oz)   05/03/22 1102 87.2 kg (192 lb 3.9 oz)   04/22/22 1048 84.8 kg (187 lb)   04/05/22 0000 89 kg (196 lb 3.4 oz)   04/04/22 2031 89 kg (196 lb 3.4 oz)   03/11/22 1214 93.7 kg (206 lb 9.6 oz)   03/03/22 0835 93.4 kg (206 lb)          Wt Change Observation -19#/8% x1 mo     Estimated/Assessed Needs  Estimated " Needs based on: Ideal Body Weight       Energy Requirements 30-35 kcal/kg    EST Needs (kcal/day) 3845-7223       Protein Requirements 1.2-1.3 g/kg   EST Daily Needs (g/day) 66-72       Fluid Requirements 1 ml/kcal    Estimated Needs (mL/day) 9121-6929     Labs/Medications         Pertinent Labs Reviewed.   Results from last 7 days   Lab Units 08/01/24  0150 07/31/24  1605   SODIUM mmol/L 137 134*   POTASSIUM mmol/L 3.6 3.8   CHLORIDE mmol/L 99 97*   CO2 mmol/L 27.0 27.4   BUN mg/dL 7 8   CREATININE mg/dL 0.84 0.77   CALCIUM mg/dL 8.3* 8.7   BILIRUBIN mg/dL  --  0.4   ALK PHOS U/L  --  184*   ALT (SGPT) U/L  --  50*   AST (SGOT) U/L  --  20   GLUCOSE mg/dL 86 125*     Results from last 7 days   Lab Units 07/31/24  2357   HEMOGLOBIN g/dL 6.8*   HEMATOCRIT % 20.7*     COVID19   Date Value Ref Range Status   07/31/2024 Not Detected Not Detected - Ref. Range Final     Lab Results   Component Value Date    HGBA1C 5.80 (H) 05/01/2023         Pertinent Medications Reviewed.     Malnutrition Severity Assessment      Patient meets criteria for : Severe Malnutrition  Malnutrition Type (Last 8 Hours)       Malnutrition Severity Assessment       Row Name 08/01/24 1613       Malnutrition Severity Assessment    Malnutrition Type Acute Disease or Injury - Related Malnutrition      Row Name 08/01/24 1613       Insufficient Energy Intake     Insufficient Energy Intake Findings Severe    Insufficient Energy Intake  < or equal to 50% of est. energy requirement for > or equal to 5d)      Row Name 08/01/24 1613       Unintentional Weight Loss     Unintentional Weight Loss Findings Severe    Unintentional Weight Loss  Weight loss greater than 5% in one month      Row Name 08/01/24 1613       Muscle Loss    Loss of Muscle Mass Findings Moderate    [place] Region Moderate - slight depression    Clavicle Bone Region Moderate - some protrusion in females, visible in males      Row Name 08/01/24 1613       Fat Loss    Orbital Region  Moderate  -  somewhat hollowness, slightly dark circles      Row Name 08/01/24 4820       Criteria Met (Must meet criteria for severity in at least 2 of these categories: M Wasting, Fat Loss, Fluid, Secondary Signs, Wt. Status, Intake)    Patient meets criteria for  Severe Malnutrition                     Nutrition Diagnosis         Nutrition Dx Problem 1 Severe malnutrition related to decreased ability to consume sufficient energy as evidenced by unintended weight change., body composition changes., PO diet not tolerated., and report of minimal PO intake.     Nutrition Intervention           Current Nutrition Orders & Evaluation of Intake       Current PO Diet Diet: Regular/House; Fluid Consistency: Thin (IDDSI 0)   Supplement No active supplement orders           Nutrition Intervention/Prescription        Continue current diet as ordered, avoid dietary restrictions.  Provide snacks as requested.  Add 2 Boost TID, one vanilla & one chocolate to mix together, at meals (provides 350kcal, 14g pro each         Medical Nutrition Therapy/Nutrition Education          Learner     Readiness Patient and Family  Eager     Method     Response Explanation and Written Material  Verbalizes understanding     Monitor/Evaluation        Monitor PO intake, Supplement intake, Weight, Symptoms     Nutrition Discharge Plan         Recommend to continue oral nutrition supplements on discharge.      Electronically signed by:  Daria Armstrong RD  08/01/24 16:15 EDT

## 2024-08-02 ENCOUNTER — READMISSION MANAGEMENT (OUTPATIENT)
Dept: CALL CENTER | Facility: HOSPITAL | Age: 59
End: 2024-08-02
Payer: MEDICARE

## 2024-08-02 VITALS
BODY MASS INDEX: 35.27 KG/M2 | WEIGHT: 206.57 LBS | DIASTOLIC BLOOD PRESSURE: 64 MMHG | HEART RATE: 105 BPM | SYSTOLIC BLOOD PRESSURE: 105 MMHG | TEMPERATURE: 99 F | RESPIRATION RATE: 18 BRPM | HEIGHT: 64 IN | OXYGEN SATURATION: 96 %

## 2024-08-02 LAB
ANION GAP SERPL CALCULATED.3IONS-SCNC: 10.5 MMOL/L (ref 5–15)
ANISOCYTOSIS BLD QL: ABNORMAL
BH BB BLOOD EXPIRATION DATE: NORMAL
BH BB BLOOD TYPE BARCODE: 5100
BH BB DISPENSE STATUS: NORMAL
BH BB PRODUCT CODE: NORMAL
BH BB UNIT NUMBER: NORMAL
BUN SERPL-MCNC: 6 MG/DL (ref 6–20)
BUN/CREAT SERPL: 7.1 (ref 7–25)
CALCIUM SPEC-SCNC: 8.8 MG/DL (ref 8.6–10.5)
CHLORIDE SERPL-SCNC: 102 MMOL/L (ref 98–107)
CO2 SERPL-SCNC: 26.5 MMOL/L (ref 22–29)
CREAT SERPL-MCNC: 0.84 MG/DL (ref 0.57–1)
CROSSMATCH INTERPRETATION: NORMAL
DEPRECATED RDW RBC AUTO: 51.3 FL (ref 37–54)
EGFRCR SERPLBLD CKD-EPI 2021: 81.2 ML/MIN/1.73
ERYTHROCYTE [DISTWIDTH] IN BLOOD BY AUTOMATED COUNT: 16.7 % (ref 12.3–15.4)
GLUCOSE SERPL-MCNC: 90 MG/DL (ref 65–99)
HCT VFR BLD AUTO: 25.2 % (ref 34–46.6)
HGB BLD-MCNC: 8.1 G/DL (ref 12–15.9)
LYMPHOCYTES # BLD MANUAL: 2.13 10*3/MM3 (ref 0.7–3.1)
LYMPHOCYTES NFR BLD MANUAL: 21 % (ref 5–12)
MCH RBC QN AUTO: 29.3 PG (ref 26.6–33)
MCHC RBC AUTO-ENTMCNC: 32.1 G/DL (ref 31.5–35.7)
MCV RBC AUTO: 91.3 FL (ref 79–97)
METAMYELOCYTES NFR BLD MANUAL: 3 % (ref 0–0)
MONOCYTES # BLD: 2.63 10*3/MM3 (ref 0.1–0.9)
MYELOCYTES NFR BLD MANUAL: 6 % (ref 0–0)
NEUTROPHILS # BLD AUTO: 6.63 10*3/MM3 (ref 1.7–7)
NEUTROPHILS NFR BLD MANUAL: 38 % (ref 42.7–76)
NEUTS BAND NFR BLD MANUAL: 15 % (ref 0–5)
NRBC SPEC MANUAL: 2 /100 WBC (ref 0–0.2)
PLATELET # BLD AUTO: 68 10*3/MM3 (ref 140–450)
PMV BLD AUTO: 11 FL (ref 6–12)
POTASSIUM SERPL-SCNC: 3.7 MMOL/L (ref 3.5–5.2)
RBC # BLD AUTO: 2.76 10*6/MM3 (ref 3.77–5.28)
SCAN SLIDE: NORMAL
SMALL PLATELETS BLD QL SMEAR: ABNORMAL
SODIUM SERPL-SCNC: 139 MMOL/L (ref 136–145)
UNIT  ABO: NORMAL
UNIT  RH: NORMAL
VARIANT LYMPHS NFR BLD MANUAL: 17 % (ref 19.6–45.3)
WBC MORPH BLD: NORMAL
WBC NRBC COR # BLD AUTO: 12.51 10*3/MM3 (ref 3.4–10.8)

## 2024-08-02 PROCEDURE — 99232 SBSQ HOSP IP/OBS MODERATE 35: CPT | Performed by: INTERNAL MEDICINE

## 2024-08-02 PROCEDURE — 85007 BL SMEAR W/DIFF WBC COUNT: CPT | Performed by: INTERNAL MEDICINE

## 2024-08-02 PROCEDURE — 99239 HOSP IP/OBS DSCHRG MGMT >30: CPT | Performed by: INTERNAL MEDICINE

## 2024-08-02 PROCEDURE — 94799 UNLISTED PULMONARY SVC/PX: CPT

## 2024-08-02 PROCEDURE — 94664 DEMO&/EVAL PT USE INHALER: CPT

## 2024-08-02 PROCEDURE — 25010000002 MORPHINE PER 10 MG: Performed by: FAMILY MEDICINE

## 2024-08-02 PROCEDURE — 25010000002 METOCLOPRAMIDE PER 10 MG: Performed by: FAMILY MEDICINE

## 2024-08-02 PROCEDURE — 80048 BASIC METABOLIC PNL TOTAL CA: CPT | Performed by: FAMILY MEDICINE

## 2024-08-02 PROCEDURE — 36415 COLL VENOUS BLD VENIPUNCTURE: CPT | Performed by: FAMILY MEDICINE

## 2024-08-02 PROCEDURE — 85025 COMPLETE CBC W/AUTO DIFF WBC: CPT | Performed by: INTERNAL MEDICINE

## 2024-08-02 RX ADMIN — ESCITALOPRAM OXALATE 20 MG: 10 TABLET ORAL at 08:46

## 2024-08-02 RX ADMIN — IPRATROPIUM BROMIDE AND ALBUTEROL SULFATE 3 ML: .5; 3 SOLUTION RESPIRATORY (INHALATION) at 09:53

## 2024-08-02 RX ADMIN — BUDESONIDE AND FORMOTEROL FUMARATE DIHYDRATE 2 PUFF: 160; 4.5 AEROSOL RESPIRATORY (INHALATION) at 09:53

## 2024-08-02 RX ADMIN — BUPROPION HYDROCHLORIDE 150 MG: 150 TABLET, EXTENDED RELEASE ORAL at 08:46

## 2024-08-02 RX ADMIN — MORPHINE SULFATE 2 MG: 2 INJECTION, SOLUTION INTRAMUSCULAR; INTRAVENOUS at 05:51

## 2024-08-02 RX ADMIN — TIOTROPIUM BROMIDE INHALATION SPRAY 2 PUFF: 3.12 SPRAY, METERED RESPIRATORY (INHALATION) at 09:53

## 2024-08-02 RX ADMIN — METOCLOPRAMIDE HYDROCHLORIDE 10 MG: 5 INJECTION INTRAMUSCULAR; INTRAVENOUS at 05:45

## 2024-08-02 RX ADMIN — GABAPENTIN 300 MG: 300 CAPSULE ORAL at 08:46

## 2024-08-02 NOTE — OUTREACH NOTE
Prep Survey      Flowsheet Row Responses   [facility] patient discharged from? [facility]   Is LACE score < 7 ? No   Eligibility [facility]   Date of Admission 07/31/24   Date of Discharge 08/02/24   Discharge Disposition Home or Self Care   Discharge diagnosis Intractable nausea and vomiting.   Does the patient have one of the following disease processes/diagnoses(primary or secondary)? Other   Does the patient have Home health ordered? No   Is there a DME ordered? No   Prep survey completed? Yes            LENNOX RIVERA - Registered Nurse

## 2024-08-02 NOTE — PLAN OF CARE
Goal Outcome Evaluation:  Plan of Care Reviewed With: patient        Progress: improving  Outcome Evaluation: pt aox4 on 3 L NC, baseline for this patient. Pt has no complaints of pain or discomfort. No nausea or vomiting. No complaints of SOA, pt states feeling much better today. Pt to discharge home with , discharge education completed. Awaiting  for discharge

## 2024-08-02 NOTE — PROGRESS NOTES
[facility]     Progress Note    Patient Name: Lluvia Archer  : 1965  MRN: [id number]  Primary Care Physician:  Aleksandar Edge DO  Date of admission: 2024    Subjective   Subjective     Chief Complaint: Lung cancer    HPI:  Patient Reports feeling much better this morning.  She is eating better.  Her energy level has improved.  She denies pain.      Objective   Objective     Vitals:   Temp:  [98.2 °F (36.8 °C)-99.5 °F (37.5 °C)] 98.6 °F (37 °C)  Heart Rate:  [] 110  Resp:  [16-20] 16  BP: ()/(45-72) 117/64  Flow (L/min):  [3] 3  Physical Exam    Constitutional: Awake, alert   Eyes: PERRLA, sclerae anicteric, no conjunctival injection   HENT: NCAT, mucous membranes moist   Neck: Supple, no thyromegaly, no lymphadenopathy, trachea midline   Respiratory: Clear to auscultation bilaterally, nonlabored respirations    Cardiovascular: RRR, no murmurs, rubs, or gallops   Gastrointestinal: Positive bowel sounds, soft, nontender, nondistended   Musculoskeletal: No bilateral ankle edema, no clubbing or cyanosis to extremities   Psychiatric: Appropriate affect, cooperative     Result Review    Result Review:  I have personally reviewed the results from the time of this admission to 2024 08:17 EDT and agree with these findings:  [x]  Laboratory  []  Microbiology  []  Radiology  []  EKG/Telemetry   []  Cardiology/Vascular   []  Pathology  []  Old records  []  Other:  Most notable findings include:     Assessment & Plan   Assessment / Plan     Brief Patient Summary:  Lluvia Archer is a 58 y.o. female who has metastatic small cell lung cancer.  Admitted for low blood counts, nausea, vomiting, deconditioning.  Feeling better now.    Active Hospital Problems:  Active Hospital Problems    Diagnosis    • **Intractable nausea and vomiting    • Pancytopenia due to chemotherapy    • Thrombocytopenia    • Dehydration    • Tobacco abuse, in remission    • Small cell lung cancer    • Sleep  apnea, unspecified    • Hypertension    • Anxiety    • Moderate episode of recurrent major depressive disorder      Plan:   Lung cancer, small cell, metastatic.  Patient is status post carboplatin, topside, durvalumab.  She is feeling much better this morning.  White blood cell count responded well to growth factor support.  The other blood counts are adequate.  She is anxious to go home which is okay from the oncology standpoint.  She should follow-up with Dr. Dickson, her treating oncologist as scheduled.       VTE Prophylaxis:  Mechanical VTE prophylaxis orders are present.        CODE STATUS:   Level Of Support Discussed With: Patient  Code Status (Patient has no pulse and is not breathing): CPR (Attempt to Resuscitate)  Medical Interventions (Patient has pulse or is breathing): Full Support        Electronically signed by Teto Serrano MD, 08/02/24, 8:17 AM EDT.

## 2024-08-02 NOTE — DISCHARGE SUMMARY
[facility]         HOSPITALIST  DISCHARGE SUMMARY    Patient Name: Lluvia Archer    : 1965    MRN: [id number]    Date of Admission: 2024  Date of Discharge:  24  Primary Care Physician: Aleksandar Edge DO    Consults       Date and Time Order Name Status Description    2024  8:32 PM Inpatient Hospitalist Consult      2024  6:34 PM General MD Inpatient Consult Completed             Final Diagnosis:  Intractable nausea and vomiting.  Recent chemotherapy causing above.  Pancytopenia due to above.  S/p 1 unit packed RBC transfusion  Anemia of chronic disease  Small cell metastatic lung cancer follows with Dr. Dickson outpatient, status post recent treatment with carboplatin etoposide and durvalumab  COPD stable. On chronic 3L NC   Anxiety disorder.  Hypertension.  Alopecia due to chemotherapy.    Hospital Course     Lluvia Archer is a 58 y.o. female with past medical history metastatic lung cancer on chemotherapy, COPD, hypertension, arthritis, chronic nausea, anxiety, and GERD presents to the ED for evaluation of intractable nausea and vomiting.  Patient states that for the last 3 days she has been having persistent nausea and vomiting to where she cannot keep anything down.  She gets chemotherapy infusions every 3 weeks becomes symptomatic about a week or 2 after.  Home antiemetics were no longer effective so she came to the ED for further evaluation.  In the ED patient's vitals were all within normal limits on nasal cannula.  Labs showed pancytopenia with platelet of 43, WBC of 2.8 hemoglobin 7.2 along with electrolyte imbalances and transaminitis.  Chest x-ray was stable relative to prior.  When asked he denied any recent fevers, chills, headaches, focal weakness, chest pain, palpitation,  abdominal pain, diarrhea, constipation, dysuria, hematuria, hematochezia, melena, or anxiety.  Patient admitted for further evaluation and treatment.   Patient seen  by her oncologist started on Neupogen.  Recommended blood transfusion for hemoglobin less than 7 and platelet transfusion for platelet count less than 10K.    Hospital Course: Patient responded well to Neupogen, received 1 unit of blood transfusion for anemia, had no bleeding, Neupogen discontinued and patient discharged with plans for close outpatient follow-up with oncology and PCP for continued monitoring.  Patient follows with Dr. Dickson as an outpatient.    Return precautions and follow up discussed and patient voiced agreement and understanding of treatment plan.     DISCHARGE Follow Up Recommendations for labs and diagnostics:   As above    CODE STATUS:  Code Status and Medical Interventions: CPR (Attempt to Resuscitate); Full Support   Ordered at: 07/31/24 5464     Level Of Support Discussed With:    Patient     Code Status (Patient has no pulse and is not breathing):    CPR (Attempt to Resuscitate)     Medical Interventions (Patient has pulse or is breathing):    Full Support           Day of Discharge     Vital Signs:  Temp:  [98.2 °F (36.8 °C)-99.5 °F (37.5 °C)] 99 °F (37.2 °C)  Heart Rate:  [] 105  Resp:  [16-20] 18  BP: ()/(48-72) 105/64  Flow (L/min):  [3] 3    Physical Exam    Gen: awake, resting in bed, conversant  Resp: breathing comfortably on baseline oxygen  CV: RRR, no LE pitting edema      Discharge Details        Discharge Medications        New Medications        Instructions Start Date   nystatin 100,000 unit/mL suspension  Commonly known as: MYCOSTATIN   500,000 Units, Swish & Swallow, 4 Times Daily PRN             Changes to Medications        Instructions Start Date   atorvastatin 10 MG tablet  Commonly known as: LIPITOR  What changed: See the new instructions.   TAKE ONE TABLET BY MOUTH DAILY AT 9 PM EVERY NIGHT      buPROPion  MG 12 hr tablet  Commonly known as: WELLBUTRIN SR  What changed: See the new instructions.   TAKE ONE TABLET BY MOUTH TWICE DAILY @ 9AM & 5PM              Continue These Medications        Instructions Start Date   albuterol sulfate  (90 Base) MCG/ACT inhaler  Commonly known as: PROVENTIL HFA;VENTOLIN HFA;PROAIR HFA   2 puffs, Inhalation, Every 4 Hours PRN      Cariprazine HCl 1.5 MG capsule capsule  Commonly known as: Vraylar   1.5 mg, Oral, Daily      escitalopram 20 MG tablet  Commonly known as: LEXAPRO   20 mg, Oral, Daily      gabapentin 300 MG capsule  Commonly known as: NEURONTIN   300 mg, Oral, 3 Times Daily      ipratropium-albuterol 0.5-2.5 mg/3 ml nebulizer  Commonly known as: DUO-NEB   3 mL, Nebulization, 4 Times Daily - RT      LORazepam 0.5 MG tablet  Commonly known as: ATIVAN   0.5 mg, Oral, Every 8 Hours PRN      nicotine 21 MG/24HR patch  Commonly known as: NICODERM CQ   1 patch, Transdermal, Every 24 Hours      omeprazole 40 MG capsule  Commonly known as: priLOSEC   40 mg, Oral, Daily      ondansetron 8 MG tablet  Commonly known as: ZOFRAN   8 mg, Oral, 3 Times Daily PRN      prochlorperazine 10 MG tablet  Commonly known as: COMPAZINE   10 mg, Oral, Every 6 Hours PRN      traMADol 50 MG tablet  Commonly known as: ULTRAM   50 mg, Oral, Every 6 Hours PRN      Trelegy Ellipta 100-62.5-25 MCG/ACT inhaler  Generic drug: Fluticasone-Umeclidin-Vilant   1 puff, Inhalation, Daily             Stop These Medications      famotidine 40 MG tablet  Commonly known as: PEPCID     lisinopril 5 MG tablet  Commonly known as: PRINIVIL,ZESTRIL                [place] [facility] [place]    Diet: continue on diet / dietary restrictions from hospitalization     Discharge Activity: advance as tolerated      Additional Instructions for the Follow-ups that You Need to Schedule       Discharge Follow-up with PCP   As directed       Currently Documented PCP:    Aleksandar Edge DO    PCP Phone Number:    [phone]     Follow Up Details: 3-5 days hospital f/u                Pertinent  and/or Most Recent Results       LAB RESULTS:       Lab 08/02/24  0555 07/31/24  2357 07/31/24 1910 07/31/24  1637   WBC 12.51* 3.67  --  2.82*   HEMOGLOBIN 8.1* 6.8*  --  7.2*   HEMATOCRIT 25.2* 20.7*  --  21.7*   PLATELETS 68* 47*  --  43*   NEUTROS ABS 6.63  --   --  0.11*   MCV 91.3 90.4  --  90.4   LACTATE  --   --  0.9  --          Lab 08/02/24  0555 08/01/24  0150 07/31/24  1605   SODIUM 139 137 134*   POTASSIUM 3.7 3.6 3.8   CHLORIDE 102 99 97*   CO2 26.5 27.0 27.4   ANION GAP 10.5 11.0 9.6   BUN 6 7 8   CREATININE 0.84 0.84 0.77   EGFR 81.2 81.2 90.1   GLUCOSE 90 86 125*   CALCIUM 8.8 8.3* 8.7         Lab 07/31/24  1605   TOTAL PROTEIN 6.2   ALBUMIN 3.3*   GLOBULIN 2.9   ALT (SGPT) 50*   AST (SGOT) 20   BILIRUBIN 0.4   ALK PHOS 184*         Lab 07/31/24  1605   PROBNP 96.3   HSTROP T 21*             Lab 08/01/24  0150 07/31/24 1910   ABO TYPING O O   RH TYPING Positive Positive   ANTIBODY SCREEN Negative Negative         Brief Urine Lab Results  (Last result in the past 365 days)        Color   Clarity   Blood   Leuk Est   Nitrite   Protein   CREAT   Urine HCG        06/03/24 1808             150.6               Microbiology Results (last 10 days)       Procedure Component Value - Date/Time    Blood Culture - Blood, Arm, Left [[id number]]  (Normal) Collected: 07/31/24 1910    Lab Status: Preliminary result Specimen: Blood from Arm, Left Updated: 08/01/24 1931     Blood Culture No growth at 24 hours    Blood Culture - Blood, Arm, Right [[id number]]  (Normal) Collected: 07/31/24 1910    Lab Status: Preliminary result Specimen: Blood from Arm, Right Updated: 08/01/24 1931     Blood Culture No growth at 24 hours    COVID-19, FLU A/B, RSV PCR 1 HR TAT - Swab, Nasopharynx [[id number]]  (Normal) Collected: 07/31/24 1640    Lab Status: Final result Specimen: Swab from Nasopharynx Updated: 07/31/24 1723     COVID19 Not Detected     Influenza A PCR Not Detected     Influenza B PCR Not Detected     RSV, PCR Not Detected    Narrative:      Fact sheet for providers:  https://www.fda.gov/media/085825/download    Fact sheet for patients: https://www.fda.gov/media/751343/download    Test performed by PCR.            RADIOLOGY:    XR Chest 1 View    Result Date: 7/31/2024  Impression: Impression: 1. Patchy nodular densities in the lung bases appear unchanged. 2. No new airspace disease. Electronically Signed: Cecy De Jesus MD  7/31/2024 4:32 PM EDT  Workstation ID: [id number]             Results for orders placed during the hospital encounter of 04/04/22    Adult Transthoracic Echo Complete W/ Cont if Necessary Per Protocol    Interpretation Summary  · The left ventricular cavity is borderline dilated.  · Calculated left ventricular EF = 58% Estimated left ventricular EF was in agreement with the calculated left ventricular EF.  · Left ventricular diastolic function is consistent with (grade I) impaired relaxation.  · Aortic valve sclerosis without obstruction to flow.  · Mild aortic valve regurgitation.  · Mild mitral valve regurgitation.      Labs Pending at Discharge:  Pending Labs       Order Current Status    Blood Culture - Blood, Arm, Left Preliminary result    Blood Culture - Blood, Arm, Right Preliminary result              Time spent on Discharge including face to face service:  35 minutes

## 2024-08-05 ENCOUNTER — TRANSITIONAL CARE MANAGEMENT TELEPHONE ENCOUNTER (OUTPATIENT)
Dept: CALL CENTER | Facility: HOSPITAL | Age: 59
End: 2024-08-05
Payer: MEDICARE

## 2024-08-05 ENCOUNTER — HOSPITAL ENCOUNTER (EMERGENCY)
Facility: HOSPITAL | Age: 59
Discharge: HOME | End: 2024-08-05
Attending: EMERGENCY MEDICINE | Admitting: EMERGENCY MEDICINE
Payer: MEDICARE

## 2024-08-05 ENCOUNTER — APPOINTMENT (OUTPATIENT)
Dept: GENERAL RADIOLOGY | Facility: HOSPITAL | Age: 59
End: 2024-08-05
Payer: MEDICARE

## 2024-08-05 VITALS
DIASTOLIC BLOOD PRESSURE: 82 MMHG | TEMPERATURE: 98.4 F | HEIGHT: 64 IN | BODY MASS INDEX: 38.24 KG/M2 | OXYGEN SATURATION: 100 % | RESPIRATION RATE: 16 BRPM | WEIGHT: 223.99 LBS | HEART RATE: 100 BPM | SYSTOLIC BLOOD PRESSURE: 144 MMHG

## 2024-08-05 DIAGNOSIS — J44.9 CHRONIC OBSTRUCTIVE PULMONARY DISEASE, UNSPECIFIED COPD TYPE: ICD-10-CM

## 2024-08-05 DIAGNOSIS — G89.3 CANCER RELATED PAIN: ICD-10-CM

## 2024-08-05 DIAGNOSIS — R06.02 SHORTNESS OF BREATH: Primary | ICD-10-CM

## 2024-08-05 LAB
ALBUMIN SERPL-MCNC: 3.3 G/DL (ref 3.5–5.2)
ALBUMIN/GLOB SERPL: 1.1 G/DL
ALP SERPL-CCNC: 170 U/L (ref 39–117)
ALT SERPL W P-5'-P-CCNC: 21 U/L (ref 1–33)
ANION GAP SERPL CALCULATED.3IONS-SCNC: 9.5 MMOL/L (ref 5–15)
AST SERPL-CCNC: 22 U/L (ref 1–32)
BACTERIA SPEC AEROBE CULT: NORMAL
BACTERIA SPEC AEROBE CULT: NORMAL
BASOPHILS # BLD AUTO: 0.01 10*3/MM3 (ref 0–0.2)
BASOPHILS NFR BLD AUTO: 0.1 % (ref 0–1.5)
BILIRUB SERPL-MCNC: 0.3 MG/DL (ref 0–1.2)
BUN SERPL-MCNC: 4 MG/DL (ref 6–20)
BUN/CREAT SERPL: 4.9 (ref 7–25)
CALCIUM SPEC-SCNC: 8.7 MG/DL (ref 8.6–10.5)
CHLORIDE SERPL-SCNC: 99 MMOL/L (ref 98–107)
CO2 SERPL-SCNC: 29.5 MMOL/L (ref 22–29)
CREAT SERPL-MCNC: 0.81 MG/DL (ref 0.57–1)
DEPRECATED RDW RBC AUTO: 55.7 FL (ref 37–54)
EGFRCR SERPLBLD CKD-EPI 2021: 84.8 ML/MIN/1.73
EOSINOPHIL # BLD AUTO: 0.01 10*3/MM3 (ref 0–0.4)
EOSINOPHIL NFR BLD AUTO: 0.1 % (ref 0.3–6.2)
ERYTHROCYTE [DISTWIDTH] IN BLOOD BY AUTOMATED COUNT: 17.6 % (ref 12.3–15.4)
GLOBULIN UR ELPH-MCNC: 2.9 GM/DL
GLUCOSE SERPL-MCNC: 105 MG/DL (ref 65–99)
HCT VFR BLD AUTO: 27.9 % (ref 34–46.6)
HGB BLD-MCNC: 9.2 G/DL (ref 12–15.9)
HOLD SPECIMEN: NORMAL
HOLD SPECIMEN: NORMAL
IMM GRANULOCYTES # BLD AUTO: 2.23 10*3/MM3 (ref 0–0.05)
IMM GRANULOCYTES NFR BLD AUTO: 14 % (ref 0–0.5)
LYMPHOCYTES # BLD AUTO: 2.27 10*3/MM3 (ref 0.7–3.1)
LYMPHOCYTES NFR BLD AUTO: 14.3 % (ref 19.6–45.3)
MCH RBC QN AUTO: 30.1 PG (ref 26.6–33)
MCHC RBC AUTO-ENTMCNC: 33 G/DL (ref 31.5–35.7)
MCV RBC AUTO: 91.2 FL (ref 79–97)
MONOCYTES # BLD AUTO: 2.24 10*3/MM3 (ref 0.1–0.9)
MONOCYTES NFR BLD AUTO: 14.1 % (ref 5–12)
NEUTROPHILS NFR BLD AUTO: 57.4 % (ref 42.7–76)
NEUTROPHILS NFR BLD AUTO: 9.16 10*3/MM3 (ref 1.7–7)
NRBC BLD AUTO-RTO: 0.8 /100 WBC (ref 0–0.2)
NT-PROBNP SERPL-MCNC: 677.3 PG/ML (ref 0–900)
PLATELET # BLD AUTO: 125 10*3/MM3 (ref 140–450)
PMV BLD AUTO: 9.7 FL (ref 6–12)
POTASSIUM SERPL-SCNC: 3.6 MMOL/L (ref 3.5–5.2)
PROT SERPL-MCNC: 6.2 G/DL (ref 6–8.5)
RBC # BLD AUTO: 3.06 10*6/MM3 (ref 3.77–5.28)
SODIUM SERPL-SCNC: 138 MMOL/L (ref 136–145)
TROPONIN T SERPL HS-MCNC: 16 NG/L
WBC NRBC COR # BLD AUTO: 15.92 10*3/MM3 (ref 3.4–10.8)
WHOLE BLOOD HOLD COAG: NORMAL
WHOLE BLOOD HOLD SPECIMEN: NORMAL

## 2024-08-05 PROCEDURE — 71045 X-RAY EXAM CHEST 1 VIEW: CPT

## 2024-08-05 PROCEDURE — 96374 THER/PROPH/DIAG INJ IV PUSH: CPT

## 2024-08-05 PROCEDURE — 80053 COMPREHEN METABOLIC PANEL: CPT | Performed by: EMERGENCY MEDICINE

## 2024-08-05 PROCEDURE — 85025 COMPLETE CBC W/AUTO DIFF WBC: CPT | Performed by: EMERGENCY MEDICINE

## 2024-08-05 PROCEDURE — 94799 UNLISTED PULMONARY SVC/PX: CPT

## 2024-08-05 PROCEDURE — 25010000002 ONDANSETRON PER 1 MG: Performed by: EMERGENCY MEDICINE

## 2024-08-05 PROCEDURE — 94640 AIRWAY INHALATION TREATMENT: CPT

## 2024-08-05 PROCEDURE — 93005 ELECTROCARDIOGRAM TRACING: CPT | Performed by: EMERGENCY MEDICINE

## 2024-08-05 PROCEDURE — 83880 ASSAY OF NATRIURETIC PEPTIDE: CPT | Performed by: EMERGENCY MEDICINE

## 2024-08-05 PROCEDURE — 84484 ASSAY OF TROPONIN QUANT: CPT | Performed by: EMERGENCY MEDICINE

## 2024-08-05 PROCEDURE — 99284 EMERGENCY DEPT VISIT MOD MDM: CPT

## 2024-08-05 PROCEDURE — 96375 TX/PRO/DX INJ NEW DRUG ADDON: CPT

## 2024-08-05 PROCEDURE — 25010000002 HYDROMORPHONE 1 MG/ML SOLUTION: Performed by: EMERGENCY MEDICINE

## 2024-08-05 RX ORDER — ESCITALOPRAM OXALATE 20 MG/1
20 TABLET ORAL DAILY
Qty: 90 TABLET | Refills: 3 | Status: ON HOLD | OUTPATIENT
Start: 2024-08-05

## 2024-08-05 RX ORDER — ATORVASTATIN CALCIUM 10 MG/1
10 TABLET, FILM COATED ORAL NIGHTLY
Qty: 90 TABLET | Refills: 3 | Status: ON HOLD | OUTPATIENT
Start: 2024-08-05

## 2024-08-05 RX ORDER — SODIUM CHLORIDE 0.9 % (FLUSH) 0.9 %
10 SYRINGE (ML) INJECTION AS NEEDED
Status: DISCONTINUED | OUTPATIENT
Start: 2024-08-05 | End: 2024-08-05 | Stop reason: HOSPADM

## 2024-08-05 RX ORDER — HYDROCODONE BITARTRATE AND ACETAMINOPHEN 5; 325 MG/1; MG/1
1 TABLET ORAL EVERY 6 HOURS PRN
Qty: 12 TABLET | Refills: 0 | Status: ON HOLD | OUTPATIENT
Start: 2024-08-05

## 2024-08-05 RX ORDER — ONDANSETRON 4 MG/1
8 TABLET, ORALLY DISINTEGRATING ORAL EVERY 8 HOURS PRN
Qty: 30 TABLET | Refills: 0 | Status: ON HOLD | OUTPATIENT
Start: 2024-08-05

## 2024-08-05 RX ORDER — IPRATROPIUM BROMIDE AND ALBUTEROL SULFATE 2.5; .5 MG/3ML; MG/3ML
3 SOLUTION RESPIRATORY (INHALATION) ONCE
Status: COMPLETED | OUTPATIENT
Start: 2024-08-05 | End: 2024-08-05

## 2024-08-05 RX ORDER — ONDANSETRON 2 MG/ML
4 INJECTION INTRAMUSCULAR; INTRAVENOUS ONCE
Status: COMPLETED | OUTPATIENT
Start: 2024-08-05 | End: 2024-08-05

## 2024-08-05 RX ORDER — ALBUTEROL SULFATE 90 UG/1
2 AEROSOL, METERED RESPIRATORY (INHALATION) EVERY 4 HOURS PRN
Qty: 18 G | Refills: 5 | Status: ON HOLD | OUTPATIENT
Start: 2024-08-05

## 2024-08-05 RX ORDER — FLUTICASONE FUROATE, UMECLIDINIUM BROMIDE AND VILANTEROL TRIFENATATE 100; 62.5; 25 UG/1; UG/1; UG/1
1 POWDER RESPIRATORY (INHALATION) DAILY
Qty: 3 EACH | Refills: 3 | Status: ON HOLD | OUTPATIENT
Start: 2024-08-05

## 2024-08-05 RX ORDER — BUPROPION HYDROCHLORIDE 150 MG/1
150 TABLET, EXTENDED RELEASE ORAL 2 TIMES DAILY
Qty: 180 TABLET | Refills: 3 | Status: ON HOLD | OUTPATIENT
Start: 2024-08-05

## 2024-08-05 RX ADMIN — IPRATROPIUM BROMIDE AND ALBUTEROL SULFATE 3 ML: .5; 3 SOLUTION RESPIRATORY (INHALATION) at 15:26

## 2024-08-05 RX ADMIN — ONDANSETRON 4 MG: 2 INJECTION INTRAMUSCULAR; INTRAVENOUS at 15:19

## 2024-08-05 RX ADMIN — HYDROMORPHONE HYDROCHLORIDE 0.5 MG: 1 INJECTION, SOLUTION INTRAMUSCULAR; INTRAVENOUS; SUBCUTANEOUS at 15:20

## 2024-08-05 NOTE — OUTREACH NOTE
Call Center TCM Note      Flowsheet Row Responses   [facility] patient discharged from? [facility]   Does the patient have one of the following disease processes/diagnoses(primary or secondary)? Other   TCM attempt successful? Yes  [VR_ Contacts]   Call start time 1241   Call end time 1242   Discharge diagnosis Intractable nausea and vomiting.   Meds reviewed with patient/caregiver? Yes   Is the patient having any side effects they believe may be caused by any medication additions or changes? No   Does the patient have all medications ordered at discharge? Yes   Is the patient taking all medications as directed (includes completed medication regime)? No   What is preventing the patient from taking all medications as directed? Other   Nursing Interventions Nurse provided patient education   Medication comments Pt has not picked up nystatin yet but will.   Comments No available HOSP DC FU appts that Meet TCM guidelines   Does the patient have an appointment with their PCP within 7-14 days of discharge? No appointments available   Nursing Interventions Routed TCM call to PCP office   Has home health visited the patient within 72 hours of discharge? N/A   Psychosocial issues? No   Did the patient receive a copy of their discharge instructions? Yes   Nursing interventions Reviewed instructions with patient   What is the patient's perception of their health status since discharge? Improving   TCM call completed? Yes   Wrap up additional comments Short call with Pt she states she is fine.   Call end time 1242            Breonna Mario RN    8/5/2024, 12:43 EDT

## 2024-08-05 NOTE — ED PROVIDER NOTES
Time: 5:05 PM EDT  Date of encounter:  2024  Independent Historian/Clinical History and Information was obtained by:   {Blank multiple:95497}    History is limited by: {Limited History:33275}    Chief Complaint: ***      History of Present Illness:  Patient is a 58 y.o. year old female who presents to the emergency department for evaluation of ***      Patient Care Team  Primary Care Provider: Aleksandar Edge DO    Past Medical History:     No Known Allergies  Past Medical History:   Diagnosis Date    Abnormal mammogram     PT DENIES KNOWING OF THIS HX    Anxiety     Arthritis     R SHOULDER, R HIP, AND R LEG    Cancer     LUNG    Chronic nausea 01/15/2019    COPD (chronic obstructive pulmonary disease)     O2 3 LITERS NC CONT    Hernia, hiatal     Hyperlipidemia     Hypertension     Knee pain, right 2018    Memory loss     FORGETFULNESS ? ETIOLOGY    Migraine headache     Moderate episode of recurrent major depressive disorder 2017    Night sweats     Sciatica of right side 2017    Severe episode of recurrent major depressive disorder, without psychotic features 10/22/2019    Shingles     OUTBREAK 24 ON ANTIVIRAL , WHELPS NOTED NO SORES.    Shoulder pain, left 2018     Past Surgical History:   Procedure Laterality Date    BRONCHOSCOPY N/A 2022    Procedure: BRONCHOSCOPY WITH BRONCHOALVEOLAR LAVAGE, BIOPSY;  Surgeon: Shin Mcdonald DO;  Location: [facility] ENDOSCOPY;  Service: Pulmonary;  Laterality: N/A;  RIGHT LOWER LOBE INFILTRATE    BRONCHOSCOPY N/A 2024    Procedure: BRONCHOSCOPY WITH ENDOBRONCHIAL ULTRASOUND AND FINE NEEDLE ASPIRATE;  Surgeon: Shin Mcdonald DO;  Location: [facility] ENDOSCOPY;  Service: Pulmonary;  Laterality: N/A;  MEDIASTINAL ADENOPATHY     SECTION      CHOLECYSTECTOMY      LAPAROSCOPIC    COLONOSCOPY      PORTACATH PLACEMENT Left 2024    Procedure: INSERTION OF PORTACATH;  Surgeon: Josh Patton MD;   Location: [facility] [place];  Service: General;  Laterality: Left;    TOTAL HIP ARTHROPLASTY Right 5/13/2022    Procedure: RIGHT TOTAL HIP ARTHROPLASTY;  Surgeon: Cecil Gomes MD;  Location: [facility] MAIN OR;  Service: Orthopedics;  Laterality: Right;     Family History   Problem Relation Age of Onset    Other Mother         BLOOD DISEASE    Arthritis Mother     Heart disease Father     Hypertension Other     Cancer Other     Heart disease Other     Malig Hyperthermia Neg Hx        Home Medications:  Prior to Admission medications    Medication Sig Start Date End Date Taking? Authorizing Provider   albuterol sulfate  (90 Base) MCG/ACT inhaler Inhale 2 puffs Every 4 (Four) Hours As Needed for Wheezing or Shortness of Air. 8/5/24   Aleksandar Edge DO   atorvastatin (LIPITOR) 10 MG tablet Take 1 tablet by mouth Every Night. 8/5/24   Aleksandar Edge DO   buPROPion SR (WELLBUTRIN SR) 150 MG 12 hr tablet Take 1 tablet by mouth 2 (Two) Times a Day. 8/5/24   Aleksandar Edge DO   Cariprazine HCl (Vraylar) 1.5 MG capsule capsule Take 1 capsule by mouth Daily. 8/5/24   Aleksandar Edge DO   escitalopram (LEXAPRO) 20 MG tablet Take 1 tablet by mouth Daily. 8/5/24   Aleksandar Edge DO   gabapentin (NEURONTIN) 300 MG capsule Take 1 capsule by mouth 3 (Three) Times a Day. 6/20/24   Brian Dickson MD   HYDROcodone-acetaminophen (NORCO) 5-325 MG per tablet Take 1 tablet by mouth Every 6 (Six) Hours As Needed (Pain). 8/5/24   Kamari Tsai MD   ipratropium-albuterol (DUO-NEB) 0.5-2.5 mg/3 ml nebulizer Take 3 mL by nebulization 4 (Four) Times a Day for 30 days. 6/12/24 7/12/24  Zoe León APRN   LORazepam (ATIVAN) 0.5 MG tablet Take 1 tablet by mouth Every 8 (Eight) Hours As Needed for Anxiety. 7/22/24   Brian Dickson MD   nicotine (NICODERM CQ) 21 MG/24HR patch Place 1 patch on the skin as directed by provider Daily. 6/12/24   Zoe León APRN    nystatin (MYCOSTATIN) 100,000 unit/mL suspension Swish and swallow 5 mL 4 (Four) Times a Day As Needed (for thrush, if needing to take use for 7-10days until symptoms resolve). 8/2/24   Saud Apodaca MD   omeprazole (priLOSEC) 40 MG capsule Take 1 capsule by mouth Daily. 7/23/24   Parvin Gray MD   ondansetron (ZOFRAN) 8 MG tablet Take 1 tablet by mouth 3 (Three) Times a Day As Needed for Nausea or Vomiting. 6/21/24   Brian Dickson MD   ondansetron ODT (ZOFRAN-ODT) 4 MG disintegrating tablet Place 2 tablets on the tongue Every 8 (Eight) Hours As Needed for Nausea or Vomiting. 8/5/24   Kamari Tsai MD   prochlorperazine (COMPAZINE) 10 MG tablet Take 1 tablet by mouth Every 6 (Six) Hours As Needed for Nausea. 7/18/24   Brian Dickson MD   traMADol (ULTRAM) 50 MG tablet Take 1 tablet by mouth Every 6 (Six) Hours As Needed for Moderate Pain. 7/17/24   Brian Dickson MD   Trelegy Ellipta 100-62.5-25 MCG/ACT inhaler Inhale 1 puff Daily. 8/5/24   Aleksandar Edge DO   albuterol sulfate  (90 Base) MCG/ACT inhaler Inhale 2 puffs Every 4 (Four) Hours As Needed for Wheezing or Shortness of Air. 6/12/24 8/5/24  Zoe León APRN   atorvastatin (LIPITOR) 10 MG tablet TAKE ONE TABLET BY MOUTH DAILY AT 9 PM EVERY NIGHT  Patient taking differently: Take 1 tablet by mouth Every Night. 11/7/23 8/5/24  Aleksandar Edge DO   buPROPion SR (WELLBUTRIN SR) 150 MG 12 hr tablet TAKE ONE TABLET BY MOUTH TWICE DAILY @ 9AM & 5PM  Patient taking differently: Take 1 tablet by mouth 2 (Two) Times a Day. 4/2/24 8/5/24  Aleksandar Edge DO   Cariprazine HCl (Vraylar) 1.5 MG capsule capsule Take 1 capsule by mouth Daily. 8/2/24 8/5/24  Aleksandar Edge,    escitalopram (LEXAPRO) 20 MG tablet Take 1 tablet by mouth Daily. 7/25/24 8/5/24  Provider, MD Dallas Melendrez 100-62.5-25 MCG/ACT inhaler Inhale 1 puff Daily. 7/23/24 8/5/24  Provider,  "Historical, MD        Social History:   Social History     Tobacco Use    Smoking status: Every Day     Current packs/day: 2.00     Average packs/day: 2.0 packs/day for 42.6 years (85.2 ttl pk-yrs)     Types: Cigarettes     Start date: 1982     Passive exposure: Past    Smokeless tobacco: Never    Tobacco comments:     Pt stopped smoking on 04/01/2022. Pt stated at her appt today 06/12/2024. Pt states she smokes less than a 1/2 ppd      INST. PER ANESTHESIA PROTOCOL   Vaping Use    Vaping status: Former    Substances: Nicotine, Flavoring    Devices: Disposable   Substance Use Topics    Alcohol use: Never    Drug use: Never         Review of Systems:  Review of Systems     Physical Exam:  /74   Pulse 109   Temp 98.4 °F (36.9 °C) (Oral)   Resp 16   Ht 162.6 cm (64\")   Wt 102 kg (223 lb 15.8 oz)   LMP  (LMP Unknown)   SpO2 92%   BMI 38.45 kg/m²     Physical Exam   ***          Procedures:  Procedures      Medical Decision Making:      Comorbidities that affect care:    {Comorbidities that affect care:38690}    External Notes reviewed:    {External Note review (Optional):58263}      The following orders were placed and all results were independently analyzed by me:  Orders Placed This Encounter   Procedures    XR Chest 1 View    [place] Draw    Comprehensive Metabolic Panel    BNP    Single High Sensitivity Troponin T    CBC Auto Differential    NPO Diet NPO Type: Strict NPO    Undress & Gown    Continuous Pulse Oximetry    Vital Signs    Oxygen Therapy- Nasal Cannula; Titrate 1-6 LPM Per SpO2; 90 - 95%    ECG 12 Lead ED Triage Standing Order; SOA    Insert Peripheral IV    CBC & Differential    Green Top (Gel)    Lavender Top    Gold Top - SST    Light Blue Top       Medications Given in the Emergency Department:  Medications   sodium chloride 0.9 % flush 10 mL (has no administration in time range)   ondansetron (ZOFRAN) injection 4 mg (4 mg Intravenous Given 8/5/24 8069)   HYDROmorphone (DILAUDID) " injection 1 mg (0.5 mg Intravenous Given 8/5/24 1520)   ipratropium-albuterol (DUO-NEB) nebulizer solution 3 mL (3 mL Nebulization Given 8/5/24 1526)        ED Course:         Labs:    Lab Results (last 24 hours)       Procedure Component Value Units Date/Time    CBC & Differential [[id number]]  (Abnormal) Collected: 08/05/24 1518    Specimen: Blood Updated: 08/05/24 1530    Narrative:      The following orders were created for panel order CBC & Differential.  Procedure                               Abnormality         Status                     ---------                               -----------         ------                     CBC Auto Differential[[id number]]        Abnormal            Final result               Scan Slide[[id number]]                                                                    Please view results for these tests on the individual orders.    Comprehensive Metabolic Panel [[id number]]  (Abnormal) Collected: 08/05/24 1518    Specimen: Blood Updated: 08/05/24 1554     Glucose 105 mg/dL      BUN 4 mg/dL      Creatinine 0.81 mg/dL      Sodium 138 mmol/L      Potassium 3.6 mmol/L      Chloride 99 mmol/L      CO2 29.5 mmol/L      Calcium 8.7 mg/dL      Total Protein 6.2 g/dL      Albumin 3.3 g/dL      ALT (SGPT) 21 U/L      AST (SGOT) 22 U/L      Alkaline Phosphatase 170 U/L      Total Bilirubin 0.3 mg/dL      Globulin 2.9 gm/dL      A/G Ratio 1.1 g/dL      BUN/Creatinine Ratio 4.9     Anion Gap 9.5 mmol/L      eGFR 84.8 mL/min/1.73     Narrative:      GFR Normal >60  Chronic Kidney Disease <60  Kidney Failure <15      BNP [[id number]]  (Normal) Collected: 08/05/24 1518    Specimen: Blood Updated: 08/05/24 1546     proBNP 677.3 pg/mL     Narrative:      This assay is used as an aid in the diagnosis of individuals suspected of having heart failure. It can be used as an aid in the diagnosis of acute decompensated heart failure (ADHF) in patients presenting with signs and symptoms of ADHF to the  emergency department (ED). In addition, NT-proBNP of <300 pg/mL indicates ADHF is not likely.    Age Range Result Interpretation  NT-proBNP Concentration (pg/mL:      <50             Positive            >450                   Gray                 300-450                    Negative             <300    50-75           Positive            >900                  Gray                300-900                  Negative            <300      >75             Positive            >1800                  Gray                300-1800                  Negative            <300    Single High Sensitivity Troponin T [[id number]]  (Abnormal) Collected: 08/05/24 1518    Specimen: Blood Updated: 08/05/24 1549     HS Troponin T 16 ng/L     Narrative:      High Sensitive Troponin T Reference Range:  <14.0 ng/L- Negative Female for AMI  <22.0 ng/L- Negative Male for AMI  >=14 - Abnormal Female indicating possible myocardial injury.  >=22 - Abnormal Male indicating possible myocardial injury.   Clinicians would have to utilize clinical acumen, EKG, Troponin, and serial changes to determine if it is an Acute Myocardial Infarction or myocardial injury due to an underlying chronic condition.         CBC Auto Differential [[id number]]  (Abnormal) Collected: 08/05/24 1518    Specimen: Blood Updated: 08/05/24 1530     WBC 15.92 10*3/mm3      RBC 3.06 10*6/mm3      Hemoglobin 9.2 g/dL      Hematocrit 27.9 %      MCV 91.2 fL      MCH 30.1 pg      MCHC 33.0 g/dL      RDW 17.6 %      RDW-SD 55.7 fl      MPV 9.7 fL      Platelets 125 10*3/mm3      Neutrophil % 57.4 %      Lymphocyte % 14.3 %      Monocyte % 14.1 %      Eosinophil % 0.1 %      Basophil % 0.1 %      Immature Grans % 14.0 %      Neutrophils, Absolute 9.16 10*3/mm3      Lymphocytes, Absolute 2.27 10*3/mm3      Monocytes, Absolute 2.24 10*3/mm3      Eosinophils, Absolute 0.01 10*3/mm3      Basophils, Absolute 0.01 10*3/mm3      Immature Grans, Absolute 2.23 10*3/mm3      nRBC 0.8 /100 WBC               Imaging:    XR Chest 1 View    Result Date: 8/5/2024  XR CHEST 1 VW Date of Exam: 8/5/2024 3:17 PM EDT Indication: SOA Triage Protocol Comparison: July 31, 2024 Findings: A left internal jugular port has its tip in the right atrium. There is a mild left basilar opacity. The heart and mediastinal contours appear normal. The pulmonary vascular is normal. The osseous structures appear intact.     Impression: Mild left basilar opacity, which could reflect atelectasis or pneumonia. Electronically Signed: Isma Roche MD  8/5/2024 3:32 PM EDT  Workstation ID: [id number]       Differential Diagnosis and Discussion:    {Differentials:81162}    {Independent Review of (Optional):75304}    MDM       {Critical Care:91948}    {SEPSIS RECOGNITION:34167}    Patient Care Considerations:    {Considerations (Optional):97814}      Consultants/Shared Management Plan:    {Shared Management Plan (Optional):22876}    Social Determinants of Health:    {Social Determinants of Health (Optional):71481}      Disposition and Care Coordination:    {Admission consideration:37976}    {Discharge (Optional):17068}    Final diagnoses:   Shortness of breath   Cancer related pain        ED Disposition       ED Disposition   Discharge    Condition   Stable    Comment   --               This medical record created using voice recognition software.

## 2024-08-05 NOTE — ED PROVIDER NOTES
Time: 5:12 PM EDT  Date of encounter:  2024  Independent Historian/Clinical History and Information was obtained by:   Patient and Nursing Staff    History is limited by: N/A    Chief Complaint: Shortness of breath      History of Present Illness:  Patient is a 58 y.o. year old female who presents to the emergency department for evaluation of shortness of breath.  Patient has a history of lung cancer reports shortness of breath today but did not give her self breathing treatment.    HPI    Patient Care Team  Primary Care Provider: Aleksandar Edge DO    Past Medical History:     No Known Allergies  Past Medical History:   Diagnosis Date    Abnormal mammogram     PT DENIES KNOWING OF THIS HX    Anxiety     Arthritis     R SHOULDER, R HIP, AND R LEG    Cancer     LUNG    Chronic nausea 01/15/2019    COPD (chronic obstructive pulmonary disease)     O2 3 LITERS NC CONT    Hernia, hiatal     Hyperlipidemia     Hypertension     Knee pain, right 2018    Memory loss     FORGETFULNESS ? ETIOLOGY    Migraine headache     Moderate episode of recurrent major depressive disorder 2017    Night sweats     Sciatica of right side 2017    Severe episode of recurrent major depressive disorder, without psychotic features 10/22/2019    Shingles     OUTBREAK 24 ON ANTIVIRAL , WHELPS NOTED NO SORES.    Shoulder pain, left 2018     Past Surgical History:   Procedure Laterality Date    BRONCHOSCOPY N/A 2022    Procedure: BRONCHOSCOPY WITH BRONCHOALVEOLAR LAVAGE, BIOPSY;  Surgeon: Shin Mcdonald DO;  Location: [facility] ENDOSCOPY;  Service: Pulmonary;  Laterality: N/A;  RIGHT LOWER LOBE INFILTRATE    BRONCHOSCOPY N/A 2024    Procedure: BRONCHOSCOPY WITH ENDOBRONCHIAL ULTRASOUND AND FINE NEEDLE ASPIRATE;  Surgeon: Shin Mcdonald DO;  Location: [facility] ENDOSCOPY;  Service: Pulmonary;  Laterality: N/A;  MEDIASTINAL ADENOPATHY     SECTION      CHOLECYSTECTOMY       LAPAROSCOPIC    COLONOSCOPY      PORTACATH PLACEMENT Left 6/20/2024    Procedure: INSERTION OF PORTACATH;  Surgeon: Josh Patton MD;  Location: [facility] OR [facility];  Service: General;  Laterality: Left;    TOTAL HIP ARTHROPLASTY Right 5/13/2022    Procedure: RIGHT TOTAL HIP ARTHROPLASTY;  Surgeon: Cecil Gomes MD;  Location: [facility] MAIN OR;  Service: Orthopedics;  Laterality: Right;     Family History   Problem Relation Age of Onset    Other Mother         BLOOD DISEASE    Arthritis Mother     Heart disease Father     Hypertension Other     Cancer Other     Heart disease Other     Malig Hyperthermia Neg Hx        Home Medications:  Prior to Admission medications    Medication Sig Start Date End Date Taking? Authorizing Provider   albuterol sulfate  (90 Base) MCG/ACT inhaler Inhale 2 puffs Every 4 (Four) Hours As Needed for Wheezing or Shortness of Air. 8/5/24   Aleksandar Edge DO   atorvastatin (LIPITOR) 10 MG tablet Take 1 tablet by mouth Every Night. 8/5/24   Aleksandar Edge, DO   buPROPion SR (WELLBUTRIN SR) 150 MG 12 hr tablet Take 1 tablet by mouth 2 (Two) Times a Day. 8/5/24   Aleksandar Edge DO   Cariprazine HCl (Vraylar) 1.5 MG capsule capsule Take 1 capsule by mouth Daily. 8/5/24   Aleksandar Edge DO   escitalopram (LEXAPRO) 20 MG tablet Take 1 tablet by mouth Daily. 8/5/24   Aleksandar Edge DO   gabapentin (NEURONTIN) 300 MG capsule Take 1 capsule by mouth 3 (Three) Times a Day. 6/20/24   Brian Dickson MD   HYDROcodone-acetaminophen (NORCO) 5-325 MG per tablet Take 1 tablet by mouth Every 6 (Six) Hours As Needed (Pain). 8/5/24   Kamari Tsai MD   ipratropium-albuterol (DUO-NEB) 0.5-2.5 mg/3 ml nebulizer Take 3 mL by nebulization 4 (Four) Times a Day for 30 days. 6/12/24 7/12/24  Zoe León APRN   LORazepam (ATIVAN) 0.5 MG tablet Take 1 tablet by mouth Every 8 (Eight) Hours As Needed for Anxiety. 7/22/24   Brian Dickson MD    nicotine (NICODERM CQ) 21 MG/24HR patch Place 1 patch on the skin as directed by provider Daily. 6/12/24   Zoe León APRN   nystatin (MYCOSTATIN) 100,000 unit/mL suspension Swish and swallow 5 mL 4 (Four) Times a Day As Needed (for thrush, if needing to take use for 7-10days until symptoms resolve). 8/2/24   Saud Apodaca MD   omeprazole (priLOSEC) 40 MG capsule Take 1 capsule by mouth Daily. 7/23/24   ProviderParvin MD   ondansetron (ZOFRAN) 8 MG tablet Take 1 tablet by mouth 3 (Three) Times a Day As Needed for Nausea or Vomiting. 6/21/24   Brian Dickson MD   ondansetron ODT (ZOFRAN-ODT) 4 MG disintegrating tablet Place 2 tablets on the tongue Every 8 (Eight) Hours As Needed for Nausea or Vomiting. 8/5/24   Kamari Tsai MD   prochlorperazine (COMPAZINE) 10 MG tablet Take 1 tablet by mouth Every 6 (Six) Hours As Needed for Nausea. 7/18/24   Brian Dickson MD   traMADol (ULTRAM) 50 MG tablet Take 1 tablet by mouth Every 6 (Six) Hours As Needed for Moderate Pain. 7/17/24   Brian Dickson MD   Trelemary Ellipta 100-62.5-25 MCG/ACT inhaler Inhale 1 puff Daily. 8/5/24   Aleksandar Edge DO   albuterol sulfate  (90 Base) MCG/ACT inhaler Inhale 2 puffs Every 4 (Four) Hours As Needed for Wheezing or Shortness of Air. 6/12/24 8/5/24  Zoe León APRN   atorvastatin (LIPITOR) 10 MG tablet TAKE ONE TABLET BY MOUTH DAILY AT 9 PM EVERY NIGHT  Patient taking differently: Take 1 tablet by mouth Every Night. 11/7/23 8/5/24  Aleksandar Edge DO   buPROPion SR (WELLBUTRIN SR) 150 MG 12 hr tablet TAKE ONE TABLET BY MOUTH TWICE DAILY @ 9AM & 5PM  Patient taking differently: Take 1 tablet by mouth 2 (Two) Times a Day. 4/2/24 8/5/24  Aleksandar Edge, DO   Cariprazine HCl (Vraylar) 1.5 MG capsule capsule Take 1 capsule by mouth Daily. 8/2/24 8/5/24  Alekasndar Edge, DO   escitalopram (LEXAPRO) 20 MG tablet Take 1 tablet by mouth Daily. 7/25/24  "8/5/24  ProviderParvin MD Trelegy Ellipta 100-62.5-25 MCG/ACT inhaler Inhale 1 puff Daily. 7/23/24 8/5/24  ProviderParvin MD        Social History:   Social History     Tobacco Use    Smoking status: Every Day     Current packs/day: 2.00     Average packs/day: 2.0 packs/day for 42.6 years (85.2 ttl pk-yrs)     Types: Cigarettes     Start date: 1982     Passive exposure: Past    Smokeless tobacco: Never    Tobacco comments:     Pt stopped smoking on 04/01/2022. Pt stated at her appt today 06/12/2024. Pt states she smokes less than a 1/2 ppd      INST. PER ANESTHESIA PROTOCOL   Vaping Use    Vaping status: Former    Substances: Nicotine, Flavoring    Devices: Disposable   Substance Use Topics    Alcohol use: Never    Drug use: Never         Review of Systems:  Review of Systems   Constitutional:  Negative for chills and fever.   HENT:  Negative for congestion, rhinorrhea and sore throat.    Eyes:  Negative for pain and visual disturbance.   Respiratory:  Positive for cough and shortness of breath. Negative for apnea and chest tightness.    Cardiovascular:  Negative for chest pain and palpitations.   Gastrointestinal:  Negative for abdominal pain, diarrhea, nausea and vomiting.   Genitourinary:  Negative for difficulty urinating and dysuria.   Musculoskeletal:  Negative for joint swelling and myalgias.   Skin:  Negative for color change.   Neurological:  Negative for seizures and headaches.   Psychiatric/Behavioral: Negative.     All other systems reviewed and are negative.       Physical Exam:  /74   Pulse 109   Temp 98.4 °F (36.9 °C) (Oral)   Resp 16   Ht 162.6 cm (64\")   Wt 102 kg (223 lb 15.8 oz)   LMP  (LMP Unknown)   SpO2 92%   BMI 38.45 kg/m²     Physical Exam  Vitals and nursing note reviewed.   Constitutional:       General: She is not in acute distress.     Appearance: Normal appearance. She is not toxic-appearing.   HENT:      Head: Normocephalic and atraumatic.      Jaw: There " is normal jaw occlusion.   Eyes:      General: Lids are normal.      Extraocular Movements: Extraocular movements intact.      Conjunctiva/sclera: Conjunctivae normal.      Pupils: Pupils are equal, round, and reactive to light.   Cardiovascular:      Rate and Rhythm: Normal rate and regular rhythm.      Pulses: Normal pulses.      Heart sounds: Normal heart sounds.   Pulmonary:      Effort: Pulmonary effort is normal. No respiratory distress.      Breath sounds: Wheezing present. No rhonchi.   Abdominal:      General: Abdomen is flat.      Palpations: Abdomen is soft.      Tenderness: There is no abdominal tenderness. There is no guarding or rebound.   Musculoskeletal:         General: Normal range of motion.      Cervical back: Normal range of motion and neck supple.      Right lower leg: No edema.      Left lower leg: No edema.   Skin:     General: Skin is warm and dry.   Neurological:      Mental Status: She is alert and oriented to person, place, and time. Mental status is at baseline.   Psychiatric:         Mood and Affect: Mood normal.                  Procedures:  Procedures      Medical Decision Making:      Comorbidities that affect care:    Cancer, COPD    External Notes reviewed:    Hospital Discharge Summary: After admission for pancytopenia      The following orders were placed and all results were independently analyzed by me:  Orders Placed This Encounter   Procedures    XR Chest 1 View    [place] Draw    Comprehensive Metabolic Panel    BNP    Single High Sensitivity Troponin T    CBC Auto Differential    NPO Diet NPO Type: Strict NPO    Undress & Gown    Continuous Pulse Oximetry    Vital Signs    Oxygen Therapy- Nasal Cannula; Titrate 1-6 LPM Per SpO2; 90 - 95%    ECG 12 Lead ED Triage Standing Order; SOA    Insert Peripheral IV    CBC & Differential    Green Top (Gel)    Lavender Top    Gold Top - SST    Light Blue Top       Medications Given in the Emergency Department:  Medications   sodium  chloride 0.9 % flush 10 mL (has no administration in time range)   ondansetron (ZOFRAN) injection 4 mg (4 mg Intravenous Given 8/5/24 1519)   HYDROmorphone (DILAUDID) injection 1 mg (0.5 mg Intravenous Given 8/5/24 1520)   ipratropium-albuterol (DUO-NEB) nebulizer solution 3 mL (3 mL Nebulization Given 8/5/24 1526)        ED Course:         Labs:    Lab Results (last 24 hours)       Procedure Component Value Units Date/Time    CBC & Differential [[id number]]  (Abnormal) Collected: 08/05/24 1518    Specimen: Blood Updated: 08/05/24 1530    Narrative:      The following orders were created for panel order CBC & Differential.  Procedure                               Abnormality         Status                     ---------                               -----------         ------                     CBC Auto Differential[[id number]]        Abnormal            Final result               Scan Slide[[id number]]                                                                    Please view results for these tests on the individual orders.    Comprehensive Metabolic Panel [[id number]]  (Abnormal) Collected: 08/05/24 1518    Specimen: Blood Updated: 08/05/24 1554     Glucose 105 mg/dL      BUN 4 mg/dL      Creatinine 0.81 mg/dL      Sodium 138 mmol/L      Potassium 3.6 mmol/L      Chloride 99 mmol/L      CO2 29.5 mmol/L      Calcium 8.7 mg/dL      Total Protein 6.2 g/dL      Albumin 3.3 g/dL      ALT (SGPT) 21 U/L      AST (SGOT) 22 U/L      Alkaline Phosphatase 170 U/L      Total Bilirubin 0.3 mg/dL      Globulin 2.9 gm/dL      A/G Ratio 1.1 g/dL      BUN/Creatinine Ratio 4.9     Anion Gap 9.5 mmol/L      eGFR 84.8 mL/min/1.73     Narrative:      GFR Normal >60  Chronic Kidney Disease <60  Kidney Failure <15      BNP [[id number]]  (Normal) Collected: 08/05/24 1518    Specimen: Blood Updated: 08/05/24 1546     proBNP 677.3 pg/mL     Narrative:      This assay is used as an aid in the diagnosis of individuals suspected of having  heart failure. It can be used as an aid in the diagnosis of acute decompensated heart failure (ADHF) in patients presenting with signs and symptoms of ADHF to the emergency department (ED). In addition, NT-proBNP of <300 pg/mL indicates ADHF is not likely.    Age Range Result Interpretation  NT-proBNP Concentration (pg/mL:      <50             Positive            >450                   Gray                 300-450                    Negative             <300    50-75           Positive            >900                  Gray                300-900                  Negative            <300      >75             Positive            >1800                  Gray                300-1800                  Negative            <300    Single High Sensitivity Troponin T [[id number]]  (Abnormal) Collected: 08/05/24 1518    Specimen: Blood Updated: 08/05/24 1549     HS Troponin T 16 ng/L     Narrative:      High Sensitive Troponin T Reference Range:  <14.0 ng/L- Negative Female for AMI  <22.0 ng/L- Negative Male for AMI  >=14 - Abnormal Female indicating possible myocardial injury.  >=22 - Abnormal Male indicating possible myocardial injury.   Clinicians would have to utilize clinical acumen, EKG, Troponin, and serial changes to determine if it is an Acute Myocardial Infarction or myocardial injury due to an underlying chronic condition.         CBC Auto Differential [[id number]]  (Abnormal) Collected: 08/05/24 1518    Specimen: Blood Updated: 08/05/24 1530     WBC 15.92 10*3/mm3      RBC 3.06 10*6/mm3      Hemoglobin 9.2 g/dL      Hematocrit 27.9 %      MCV 91.2 fL      MCH 30.1 pg      MCHC 33.0 g/dL      RDW 17.6 %      RDW-SD 55.7 fl      MPV 9.7 fL      Platelets 125 10*3/mm3      Neutrophil % 57.4 %      Lymphocyte % 14.3 %      Monocyte % 14.1 %      Eosinophil % 0.1 %      Basophil % 0.1 %      Immature Grans % 14.0 %      Neutrophils, Absolute 9.16 10*3/mm3      Lymphocytes, Absolute 2.27 10*3/mm3      Monocytes, Absolute  2.24 10*3/mm3      Eosinophils, Absolute 0.01 10*3/mm3      Basophils, Absolute 0.01 10*3/mm3      Immature Grans, Absolute 2.23 10*3/mm3      nRBC 0.8 /100 WBC              Imaging:    XR Chest 1 View    Result Date: 8/5/2024  XR CHEST 1 VW Date of Exam: 8/5/2024 3:17 PM EDT Indication: SOA Triage Protocol Comparison: July 31, 2024 Findings: A left internal jugular port has its tip in the right atrium. There is a mild left basilar opacity. The heart and mediastinal contours appear normal. The pulmonary vascular is normal. The osseous structures appear intact.     Impression: Mild left basilar opacity, which could reflect atelectasis or pneumonia. Electronically Signed: Isma Roche MD  8/5/2024 3:32 PM EDT  Workstation ID: [id number]       Differential Diagnosis and Discussion:    Dyspnea: Differential diagnosis includes but is not limited to metabolic acidosis, neurological disorders, psychogenic, asthma, pneumothorax, upper airway obstruction, COPD, pneumonia, noncardiogenic pulmonary edema, interstitial lung disease, anemia, congestive heart failure, and pulmonary embolism    All labs were reviewed and interpreted by me.  All X-rays impressions were independently interpreted by me.  EKG was interpreted by me.    MDM  Number of Diagnoses or Management Options  Cancer related pain  Shortness of breath  Diagnosis management comments: In summary this is a 58-year-old female with a history of COPD and cancer who presents to the emergency department for evaluation of shortness of breath.  She does have some wheezing and did not give her self breathing treatment prior to arrival.  CBC independently reviewed and interpreted by me and shows no critical abnormalities except baseline changes of mild anemia and mild thrombocytopenia.  CMP independently reviewed and interpreted by me and shows no critical abnormalities.  Chest x-ray reviewed by me is unremarkable for acute pathology, left basilar atelectasis favored over  pneumonia.  Patient was given breathing treatment in the emerged part with significant improvement.  I see no indication for repeat admission at this time.  She will be discharged home with change in prescription for her pain control and antiemetics.  Very strict return to ER and follow-up instructions have been provided to the patient.         Amount and/or Complexity of Data Reviewed  Clinical lab tests: reviewed  Tests in the radiology section of CPT®: reviewed  Tests in the medicine section of CPT®: reviewed         The patient presents with dyspnea.  Patient was placed on the cardiac monitor after given DuoNeb.  They were monitored for ventricular ectopy, arrhythmia, tachycardia, hypoxia, and changes in blood pressure.  Continuous pulse oximetry was placed in waveform with corresponding oxygen saturation was assessed throughout their stay in the emergency department.  Patient was rechecked several times in the ED for decline in mental status and worsening distress.    Total Critical Care time of 35 minutes. Total critical care time documented does not include time spent on separately billed procedures for services of nurses or physician assistants. I personally saw and examined the patient. I have reviewed all diagnostic interpretations and treatment plans as written. I was present for the key portions of any procedures performed and the inclusive time noted in any critical care statement. Critical care time includes patient management by me, time spent at the patients bedside,  time to review lab and imaging results, discussing patient care, documentation in the medical record, and time spent with family or caregiver.        Patient Care Considerations:    CONSULT: I considered consulting pulmonology, however no respiratory failure      Consultants/Shared Management Plan:    None    Social Determinants of Health:    Patient is independent, reliable, and has access to care.       Disposition and Care  Coordination:    Discharged: I considered escalation of care by admitting this patient to the hospital, however no respiratory failure, numbers have improved    I have explained the patient´s condition, diagnoses and treatment plan based on the information available to me at this time. I have answered questions and addressed any concerns. The patient has a good  understanding of the patient´s diagnosis, condition, and treatment plan as can be expected at this point. The vital signs have been stable. The patient´s condition is stable and appropriate for discharge from the emergency department.      The patient will pursue further outpatient evaluation with the primary care physician or other designated or consulting physician as outlined in the discharge instructions. They are agreeable to this plan of care and follow-up instructions have been explained in detail. The patient has received these instructions in written format and has expressed an understanding of the discharge instructions. The patient is aware that any significant change in condition or worsening of symptoms should prompt an immediate return to this or the closest emergency department or call to 911.  I have explained discharge medications and the need for follow up with the patient/caretakers. This was also printed in the discharge instructions. Patient was discharged with the following medications and follow up:      Medication List        New Prescriptions      HYDROcodone-acetaminophen 5-325 MG per tablet  Commonly known as: NORCO  Take 1 tablet by mouth Every 6 (Six) Hours As Needed (Pain).     ondansetron ODT 4 MG disintegrating tablet  Commonly known as: ZOFRAN-ODT  Place 2 tablets on the tongue Every 8 (Eight) Hours As Needed for Nausea or Vomiting.               Where to Get Your Medications        These medications were sent to [facility]/pharmacy #[id number] - [place], [place] - [address] - [phone]  - [phone] FX  [address]  [place] [address]      Hours: 24-hours Phone: [phone]   HYDROcodone-acetaminophen 5-325 MG per tablet  ondansetron ODT 4 MG disintegrating tablet       These medications were sent to [facility] ([place] - [place], [place] - [address] - [phone] [facility] [phone]   [address] [facility] [place] [address]      Phone: [phone]   albuterol sulfate  (90 Base) MCG/ACT inhaler  atorvastatin 10 MG tablet  buPROPion  MG 12 hr tablet  Cariprazine HCl 1.5 MG capsule capsule  escitalopram 20 MG tablet  Trelegy Ellipta 100-62.5-25 MCG/ACT inhaler      Aleksandar Edge,   [address] [place] [address]  [phone]    In 2 days         Final diagnoses:   Shortness of breath   Cancer related pain        ED Disposition       ED Disposition   Discharge    Condition   Stable    Comment   --               This medical record created using voice recognition software.             Kamari Tsai MD  08/05/24 1813

## 2024-08-05 NOTE — TELEPHONE ENCOUNTER
Caller: [facility] ([place]) - [place], [place] - [address] [phone] [facility] [phone] FX    Relationship: Pharmacy      Requested Prescriptions:   Requested Prescriptions     Pending Prescriptions Disp Refills    Cariprazine HCl (Vraylar) 1.5 MG capsule capsule 90 capsule 1     Sig: Take 1 capsule by mouth Daily.    atorvastatin (LIPITOR) 10 MG tablet 30 tablet 11    buPROPion SR (WELLBUTRIN SR) 150 MG 12 hr tablet 60 tablet 11    Trelegy Ellipta 100-62.5-25 MCG/ACT inhaler       Sig: Inhale 1 puff Daily.    escitalopram (LEXAPRO) 20 MG tablet       Sig: Take 1 tablet by mouth Daily.    albuterol sulfate  (90 Base) MCG/ACT inhaler 18 g 5     Sig: Inhale 2 puffs Every 4 (Four) Hours As Needed for Wheezing or Shortness of Air.        Pharmacy where request should be sent: [facility] ([place]) - [place] [id number] [facility] - [phone] [facility] [phone] FX     Last office visit with prescribing clinician: 6/13/2024   Last telemedicine visit with prescribing clinician: Visit date not found   Next office visit with prescribing clinician: 12/23/2024       Does the patient have less than a 3 day supply:  [] Yes  [x] No    Would you like a call back once the refill request has been completed: [] Yes [x] No    If the office needs to give you a call back, can they leave a voicemail: [] Yes [x] No    Ny Angel, PCT   08/05/24 12:05 EDT

## 2024-08-06 ENCOUNTER — DOCUMENTATION (OUTPATIENT)
Dept: NUTRITION | Facility: HOSPITAL | Age: 59
End: 2024-08-06
Payer: MEDICARE

## 2024-08-06 ENCOUNTER — OFFICE VISIT (OUTPATIENT)
Dept: ONCOLOGY | Facility: HOSPITAL | Age: 59
End: 2024-08-06
Payer: MEDICARE

## 2024-08-06 ENCOUNTER — HOSPITAL ENCOUNTER (OUTPATIENT)
Dept: ONCOLOGY | Facility: HOSPITAL | Age: 59
Discharge: HOME | End: 2024-08-06
Payer: MEDICARE

## 2024-08-06 VITALS
TEMPERATURE: 98.2 F | WEIGHT: 207.45 LBS | HEART RATE: 114 BPM | BODY MASS INDEX: 35.61 KG/M2 | DIASTOLIC BLOOD PRESSURE: 61 MMHG | SYSTOLIC BLOOD PRESSURE: 106 MMHG | RESPIRATION RATE: 22 BRPM

## 2024-08-06 VITALS
WEIGHT: 207.45 LBS | HEART RATE: 114 BPM | SYSTOLIC BLOOD PRESSURE: 106 MMHG | BODY MASS INDEX: 35.42 KG/M2 | HEIGHT: 64 IN | RESPIRATION RATE: 22 BRPM | OXYGEN SATURATION: 98 % | DIASTOLIC BLOOD PRESSURE: 61 MMHG | TEMPERATURE: 98.2 F

## 2024-08-06 DIAGNOSIS — T45.1X5A CHEMOTHERAPY-INDUCED THROMBOCYTOPENIA: ICD-10-CM

## 2024-08-06 DIAGNOSIS — E86.0 DEHYDRATION: Primary | ICD-10-CM

## 2024-08-06 DIAGNOSIS — C34.90 SMALL CELL LUNG CANCER: Primary | ICD-10-CM

## 2024-08-06 DIAGNOSIS — F41.9 ANXIETY: ICD-10-CM

## 2024-08-06 DIAGNOSIS — T45.1X5A CHEMOTHERAPY INDUCED NEUTROPENIA: ICD-10-CM

## 2024-08-06 DIAGNOSIS — R11.0 CHEMOTHERAPY-INDUCED NAUSEA: ICD-10-CM

## 2024-08-06 DIAGNOSIS — D69.59 CHEMOTHERAPY-INDUCED THROMBOCYTOPENIA: ICD-10-CM

## 2024-08-06 DIAGNOSIS — D70.1 CHEMOTHERAPY INDUCED NEUTROPENIA: ICD-10-CM

## 2024-08-06 DIAGNOSIS — C79.51 CANCER, METASTATIC TO BONE: ICD-10-CM

## 2024-08-06 DIAGNOSIS — T45.1X5A CHEMOTHERAPY-INDUCED NAUSEA: ICD-10-CM

## 2024-08-06 DIAGNOSIS — Z45.2 ENCOUNTER FOR ADJUSTMENT OR MANAGEMENT OF VASCULAR ACCESS DEVICE: ICD-10-CM

## 2024-08-06 LAB
ALBUMIN SERPL-MCNC: 3.5 G/DL (ref 3.5–5.2)
ALBUMIN/GLOB SERPL: 1.5 G/DL
ALP SERPL-CCNC: 154 U/L (ref 39–117)
ALT SERPL W P-5'-P-CCNC: 21 U/L (ref 1–33)
ANION GAP SERPL CALCULATED.3IONS-SCNC: 7 MMOL/L (ref 5–15)
AST SERPL-CCNC: 20 U/L (ref 1–32)
BASOPHILS # BLD AUTO: 0.01 10*3/MM3 (ref 0–0.2)
BASOPHILS NFR BLD AUTO: 0.1 % (ref 0–1.5)
BILIRUB SERPL-MCNC: 0.4 MG/DL (ref 0–1.2)
BUN SERPL-MCNC: 5 MG/DL (ref 6–20)
BUN/CREAT SERPL: 6 (ref 7–25)
CALCIUM SPEC-SCNC: 8.4 MG/DL (ref 8.6–10.5)
CHLORIDE SERPL-SCNC: 101 MMOL/L (ref 98–107)
CO2 SERPL-SCNC: 30 MMOL/L (ref 22–29)
CREAT SERPL-MCNC: 0.84 MG/DL (ref 0.57–1)
DEPRECATED RDW RBC AUTO: 56.2 FL (ref 37–54)
EGFRCR SERPLBLD CKD-EPI 2021: 81.2 ML/MIN/1.73
EOSINOPHIL # BLD AUTO: 0.01 10*3/MM3 (ref 0–0.4)
EOSINOPHIL NFR BLD AUTO: 0.1 % (ref 0.3–6.2)
ERYTHROCYTE [DISTWIDTH] IN BLOOD BY AUTOMATED COUNT: 17.5 % (ref 12.3–15.4)
GLOBULIN UR ELPH-MCNC: 2.4 GM/DL
GLUCOSE SERPL-MCNC: 113 MG/DL (ref 65–99)
HCT VFR BLD AUTO: 27.2 % (ref 34–46.6)
HGB BLD-MCNC: 8.6 G/DL (ref 12–15.9)
IMM GRANULOCYTES # BLD AUTO: 1.27 10*3/MM3 (ref 0–0.05)
IMM GRANULOCYTES NFR BLD AUTO: 8.8 % (ref 0–0.5)
LYMPHOCYTES # BLD AUTO: 2.26 10*3/MM3 (ref 0.7–3.1)
LYMPHOCYTES NFR BLD AUTO: 15.7 % (ref 19.6–45.3)
MCH RBC QN AUTO: 29.4 PG (ref 26.6–33)
MCHC RBC AUTO-ENTMCNC: 31.6 G/DL (ref 31.5–35.7)
MCV RBC AUTO: 92.8 FL (ref 79–97)
MONOCYTES # BLD AUTO: 2.09 10*3/MM3 (ref 0.1–0.9)
MONOCYTES NFR BLD AUTO: 14.5 % (ref 5–12)
NEUTROPHILS NFR BLD AUTO: 60.8 % (ref 42.7–76)
NEUTROPHILS NFR BLD AUTO: 8.75 10*3/MM3 (ref 1.7–7)
PLATELET # BLD AUTO: 113 10*3/MM3 (ref 140–450)
PMV BLD AUTO: 9.3 FL (ref 6–12)
POTASSIUM SERPL-SCNC: 3.3 MMOL/L (ref 3.5–5.2)
PROT SERPL-MCNC: 5.9 G/DL (ref 6–8.5)
QT INTERVAL: 373 MS
QTC INTERVAL: 465 MS
RBC # BLD AUTO: 2.93 10*6/MM3 (ref 3.77–5.28)
SODIUM SERPL-SCNC: 138 MMOL/L (ref 136–145)
T4 FREE SERPL-MCNC: 1.36 NG/DL (ref 0.92–1.68)
TSH SERPL DL<=0.05 MIU/L-ACNC: 1.22 UIU/ML (ref 0.27–4.2)
WBC NRBC COR # BLD AUTO: 14.39 10*3/MM3 (ref 3.4–10.8)

## 2024-08-06 PROCEDURE — 25010000002 PALONOSETRON PER 25 MCG: Performed by: INTERNAL MEDICINE

## 2024-08-06 PROCEDURE — 25010000002 HEPARIN LOCK FLUSH PER 10 UNITS: Performed by: INTERNAL MEDICINE

## 2024-08-06 PROCEDURE — 25810000003 SODIUM CHLORIDE 0.9 % SOLUTION 250 ML FLEX CONT: Performed by: INTERNAL MEDICINE

## 2024-08-06 PROCEDURE — 25010000002 DURVALUMAB 50 MG/ML SOLUTION 10 ML VIAL: Performed by: INTERNAL MEDICINE

## 2024-08-06 PROCEDURE — 99214 OFFICE O/P EST MOD 30 MIN: CPT | Performed by: INTERNAL MEDICINE

## 2024-08-06 PROCEDURE — 85025 COMPLETE CBC W/AUTO DIFF WBC: CPT | Performed by: INTERNAL MEDICINE

## 2024-08-06 PROCEDURE — 84443 ASSAY THYROID STIM HORMONE: CPT | Performed by: INTERNAL MEDICINE

## 2024-08-06 PROCEDURE — 25810000003 SODIUM CHLORIDE 0.9 % SOLUTION 500 ML FLEX CONT: Performed by: INTERNAL MEDICINE

## 2024-08-06 PROCEDURE — 96417 CHEMO IV INFUS EACH ADDL SEQ: CPT

## 2024-08-06 PROCEDURE — 80053 COMPREHEN METABOLIC PANEL: CPT | Performed by: INTERNAL MEDICINE

## 2024-08-06 PROCEDURE — 3074F SYST BP LT 130 MM HG: CPT | Performed by: INTERNAL MEDICINE

## 2024-08-06 PROCEDURE — 25810000003 SODIUM CHLORIDE 0.9 % SOLUTION: Performed by: INTERNAL MEDICINE

## 2024-08-06 PROCEDURE — 25010000002 DEXAMETHASONE SODIUM PHOSPHATE 120 MG/30ML SOLUTION: Performed by: INTERNAL MEDICINE

## 2024-08-06 PROCEDURE — 96375 TX/PRO/DX INJ NEW DRUG ADDON: CPT

## 2024-08-06 PROCEDURE — 1126F AMNT PAIN NOTED NONE PRSNT: CPT | Performed by: INTERNAL MEDICINE

## 2024-08-06 PROCEDURE — 25010000002 CARBOPLATIN PER 50 MG: Performed by: INTERNAL MEDICINE

## 2024-08-06 PROCEDURE — 96413 CHEMO IV INFUSION 1 HR: CPT

## 2024-08-06 PROCEDURE — 84439 ASSAY OF FREE THYROXINE: CPT | Performed by: INTERNAL MEDICINE

## 2024-08-06 PROCEDURE — 3078F DIAST BP <80 MM HG: CPT | Performed by: INTERNAL MEDICINE

## 2024-08-06 PROCEDURE — 96367 TX/PROPH/DG ADDL SEQ IV INF: CPT

## 2024-08-06 PROCEDURE — 25010000002 ETOPOSIDE 500 MG/25ML SOLUTION 25 ML VIAL: Performed by: INTERNAL MEDICINE

## 2024-08-06 PROCEDURE — 25010000002 FOSAPREPITANT PER 1 MG: Performed by: INTERNAL MEDICINE

## 2024-08-06 RX ORDER — PROCHLORPERAZINE MALEATE 10 MG
10 TABLET ORAL EVERY 6 HOURS PRN
Qty: 60 TABLET | Refills: 3 | Status: ON HOLD | OUTPATIENT
Start: 2024-08-06

## 2024-08-06 RX ORDER — HEPARIN SODIUM (PORCINE) LOCK FLUSH IV SOLN 100 UNIT/ML 100 UNIT/ML
500 SOLUTION INTRAVENOUS AS NEEDED
Status: DISCONTINUED | OUTPATIENT
Start: 2024-08-06 | End: 2024-08-07 | Stop reason: HOSPADM

## 2024-08-06 RX ORDER — SODIUM CHLORIDE 0.9 % (FLUSH) 0.9 %
20 SYRINGE (ML) INJECTION AS NEEDED
Status: CANCELLED | OUTPATIENT
Start: 2024-08-06

## 2024-08-06 RX ORDER — SODIUM CHLORIDE 9 MG/ML
20 INJECTION, SOLUTION INTRAVENOUS ONCE
Status: CANCELLED | OUTPATIENT
Start: 2024-08-08

## 2024-08-06 RX ORDER — SODIUM CHLORIDE 9 MG/ML
20 INJECTION, SOLUTION INTRAVENOUS ONCE
Status: CANCELLED | OUTPATIENT
Start: 2024-08-07

## 2024-08-06 RX ORDER — PALONOSETRON 0.05 MG/ML
0.25 INJECTION, SOLUTION INTRAVENOUS ONCE
Status: COMPLETED | OUTPATIENT
Start: 2024-08-06 | End: 2024-08-06

## 2024-08-06 RX ORDER — PALONOSETRON 0.05 MG/ML
0.25 INJECTION, SOLUTION INTRAVENOUS ONCE
Status: CANCELLED | OUTPATIENT
Start: 2024-08-06

## 2024-08-06 RX ORDER — OLANZAPINE 5 MG/1
5 TABLET ORAL TAKE AS DIRECTED
Qty: 14 TABLET | Refills: 0 | Status: ON HOLD | OUTPATIENT
Start: 2024-08-06

## 2024-08-06 RX ORDER — SODIUM CHLORIDE 9 MG/ML
20 INJECTION, SOLUTION INTRAVENOUS ONCE
Status: CANCELLED | OUTPATIENT
Start: 2024-08-06

## 2024-08-06 RX ORDER — HEPARIN SODIUM (PORCINE) LOCK FLUSH IV SOLN 100 UNIT/ML 100 UNIT/ML
500 SOLUTION INTRAVENOUS AS NEEDED
Status: CANCELLED | OUTPATIENT
Start: 2024-08-06

## 2024-08-06 RX ORDER — SODIUM CHLORIDE 0.9 % (FLUSH) 0.9 %
20 SYRINGE (ML) INJECTION AS NEEDED
Status: DISCONTINUED | OUTPATIENT
Start: 2024-08-06 | End: 2024-08-07 | Stop reason: HOSPADM

## 2024-08-06 RX ORDER — SODIUM CHLORIDE 9 MG/ML
20 INJECTION, SOLUTION INTRAVENOUS ONCE
Status: COMPLETED | OUTPATIENT
Start: 2024-08-06 | End: 2024-08-06

## 2024-08-06 RX ADMIN — SODIUM CHLORIDE 1500 MG: 9 INJECTION, SOLUTION INTRAVENOUS at 10:32

## 2024-08-06 RX ADMIN — HEPARIN 500 UNITS: 100 SYRINGE at 14:13

## 2024-08-06 RX ADMIN — FOSAPREPITANT 100 ML: 150 INJECTION, POWDER, LYOPHILIZED, FOR SOLUTION INTRAVENOUS at 11:57

## 2024-08-06 RX ADMIN — PALONOSETRON HYDROCHLORIDE 0.25 MG: 0.25 INJECTION INTRAVENOUS at 11:38

## 2024-08-06 RX ADMIN — SODIUM CHLORIDE 160 MG: 0.9 INJECTION, SOLUTION INTRAVENOUS at 13:07

## 2024-08-06 RX ADMIN — CARBOPLATIN 600 MG: 10 INJECTION, SOLUTION INTRAVENOUS at 12:33

## 2024-08-06 RX ADMIN — SODIUM CHLORIDE 20 ML/HR: 9 INJECTION, SOLUTION INTRAVENOUS at 10:32

## 2024-08-06 RX ADMIN — Medication 20 ML: at 14:13

## 2024-08-06 RX ADMIN — DEXAMETHASONE SODIUM PHOSPHATE 12 MG: 4 INJECTION, SOLUTION INTRA-ARTICULAR; INTRALESIONAL; INTRAMUSCULAR; INTRAVENOUS; SOFT TISSUE at 11:40

## 2024-08-06 NOTE — PROGRESS NOTES
Outpatient Nutrition Oncology Assessment    Patient Name: Lluvia Archer  YOB: 1965  MRN: [id number]  Assessment Date: 8/6/2024    CLINICAL NUTRITION ASSESSMENT    DX:  lung Ca      Type of Cancer Treatment Carboplatin, Etoposide, Durvalumab          Reason for Assessment  MST score 2+, Malnutrition Severity Assessment, Reduced oral intake                Anthropometrics       Row Name 08/06/24 1306          Anthropometrics    Weight for Calculation 94.1 kg (207 lb 7.3 oz)                         Weight Hx  Wt Readings from Last 30 Encounters:   08/06/24 0903 94.1 kg (207 lb 7.3 oz)   08/06/24 0924 94.1 kg (207 lb 7.3 oz)   08/05/24 1508 102 kg (223 lb 15.8 oz)   08/05/24 1418 103 kg (227 lb 8.2 oz)   07/31/24 2300 93.7 kg (206 lb 9.1 oz)   07/31/24 1556 97.1 kg (214 lb 1.1 oz)   07/18/24 1300 98.8 kg (217 lb 13 oz)   07/17/24 1357 98.8 kg (217 lb 13 oz)   07/16/24 0926 97.8 kg (215 lb 8 oz)   07/16/24 1002 97.8 kg (215 lb 9.8 oz)   07/01/24 0823 102 kg (225 lb 15.5 oz)   06/26/24 1300 104 kg (230 lb 2.6 oz)   06/25/24 1345 103 kg (227 lb 15.3 oz)   06/24/24 0851 103 kg (226 lb 13.7 oz)   06/20/24 1521 102 kg (224 lb 13.9 oz)   06/20/24 0612 102 kg (224 lb 6.9 oz)   06/17/24 1355 102 kg (224 lb)   06/14/24 1259 106 kg (233 lb 11 oz)   06/13/24 0854 106 kg (233 lb)   06/12/24 1450 106 kg (234 lb 11.2 oz)   06/07/24 1022 105 kg (232 lb 5.8 oz)   06/04/24 0310 108 kg (237 lb 3.4 oz)   06/02/24 2126 102 kg (223 lb 15.8 oz)   06/02/24 1602 99.8 kg (220 lb 0.3 oz)   12/21/23 0855 103 kg (228 lb)   05/05/23 0215 101 kg (221 lb 9 oz)   05/04/23 2227 100 kg (221 lb 5.5 oz)   05/01/23 1058 97.1 kg (214 lb)   12/09/22 1127 90.4 kg (199 lb 6.4 oz)   06/24/22 1557 87.1 kg (192 lb)   06/03/22 1117 87.1 kg (192 lb)   05/27/22 1447 84.4 kg (186 lb)   05/13/22 0607 88.2 kg (194 lb 7.1 oz)   05/03/22 1102 87.2 kg (192 lb 3.9 oz)   04/22/22 1048 84.8 kg (187 lb)         Estimated/Assessed Needs - Anthropometrics        Row Name 08/06/24 1306          Anthropometrics    Weight for Calculation 94.1 kg (207 lb 7.3 oz)        Estimated/Assessed Needs    Additional Documentation Fluid Requirements (Group);Protein Requirements (Group);KCAL/KG (Group)        KCAL/KG    KCAL/KG 25 Kcal/Kg (kcal);30 Kcal/Kg (kcal)     25 Kcal/Kg (kcal) 2352.5     30 Kcal/Kg (kcal) 2823        Protein Requirements    Weight Used For Protein Calculations 54.5 kg (120 lb 2.4 oz)     Est Protein Requirement Amount (gms/kg) 1.3 gm protein     Estimated Protein Requirements (gms/day) 70.85        Fluid Requirements    Fluid Requirements (mL/day) 2353     RDA Method (mL) 2353                      Labs/Medications        Pertinent Labs Reviewed.   Results from last 7 days   Lab Units 08/06/24  0846 08/05/24  1518 08/02/24  0555 08/01/24  0150 07/31/24  1605   SODIUM mmol/L 138 138 139   < > 134*   POTASSIUM mmol/L 3.3* 3.6 3.7   < > 3.8   CHLORIDE mmol/L 101 99 102   < > 97*   CO2 mmol/L 30.0* 29.5* 26.5   < > 27.4   BUN mg/dL 5* 4* 6   < > 8   CREATININE mg/dL 0.84 0.81 0.84   < > 0.77   CALCIUM mg/dL 8.4* 8.7 8.8   < > 8.7   BILIRUBIN mg/dL 0.4 0.3  --   --  0.4   ALK PHOS U/L 154* 170*  --   --  184*   ALT (SGPT) U/L 21 21  --   --  50*   AST (SGOT) U/L 20 22  --   --  20   GLUCOSE mg/dL 113* 105* 90   < > 125*    < > = values in this interval not displayed.     Results from last 7 days   Lab Units 08/06/24  0846   HEMOGLOBIN g/dL 8.6*   HEMATOCRIT % 27.2*         Pertinent Medications Cariprazine HCl, Fluticasone-Umeclidin-Vilant, HYDROcodone-acetaminophen, LORazepam, OLANZapine, albuterol sulfate HFA, atorvastatin, buPROPion SR, escitalopram, gabapentin, ipratropium-albuterol, nicotine, nystatin, omeprazole, ondansetron, ondansetron ODT, prochlorperazine, and traMADol     Physical Findings        Malnutrition Severity Assessment       Row Name 08/06/24 1907          Malnutrition Severity Assessment    Malnutrition Type Chronic Disease - Related Malnutrition         Insufficient Energy Intake     Insufficient Energy Intake Findings Severe     Insufficient Energy Intake  <50% of est. energy requirement for >or equal to 1 month        Unintentional Weight Loss     Unintentional Weight Loss Findings Severe  11% loss in 2 months     Unintentional Weight Loss  Weight loss greater than 7.5% in three months        Criteria Met (Must meet criteria for severity in at least 2 of these categories: M Wasting, Fat Loss, Fluid, Secondary Signs, Wt. Status, Intake)    Patient meets criteria for  Severe Malnutrition                      Current Nutrition Orders & Evaluation of Intake       Oral Nutrition     Current PO Diet Minimal PO intake; cannot eat the same quantity of food she used to; today she has consumed a bag of baked chips   Supplement Occasional milkshakes     Nutrition Diagnosis        Nutrition Dx Problem 1 Severe malnutrition related to Inability to consume sufficient energy as evidenced by physiological causes increasing nutrient needs., hypermetabolic state., inadequate energy intake., decreased appetite., and patient report.11% wt decline in 2 months; <50% EEE X 1 month.       Nutrition Intervention       RD Action Nutrition assessment by review of pt's medical record + pt interview (including discussion on):  Recent nutrition-related concerns and hx of wt changes  Current diet:  PO intake / schedule / food tolerances     Reviewed the following:  Importance of wt maintenance during tx by consuming adequate nutrition (kcals & protein)  Examples of ways to maximize her nutritional intake (kcals and protein)  MNT for poor appetite and mucositis    Written materials provided:  How To Make A High Calorie Shake  Oral Care     Monitor/Evaluation       Monitor Per oncology nutrition protocol.     Comments:    Nutrition referral due to MST score of 3 and reduced oral intake.  Pt has lost significant wt over the past 2 months.  She went to the ED yesterday for dyspnea and was  "discharged.  Pt was also hospitalized 8/1-8/2/24 due to intractable n/v & pancytopenia.    Pt reports when she starts eating food seems to \"get bigger\" as she is eating. She reports early satiety and nausea if she tries to force herself to eat.  At times, she will become nauseated at the sight or smell of food and will vomit after only a couple of bites.  She previously had thrush, which caused wt decline and also lost weight while hospitalized.  She only wanted to consume salads while in the hospital.  Thrush has resolved.  Discussed proper oral care- pt had not been rinsing with baking soda / salt / water.  Discussed ideas for high kcal / high protein foods and milkshake ideas.  Pt likes homemade shakes.  She has been on Olanzapine since June (to help with n/v).      Recommendation:  May consider additional appetite stimulant, if medically feasible.    Electronically signed by:  Mildred Gee RD  08/06/24 13:09 EDT   "

## 2024-08-07 ENCOUNTER — HOSPITAL ENCOUNTER (OUTPATIENT)
Dept: ONCOLOGY | Facility: HOSPITAL | Age: 59
Discharge: HOME | End: 2024-08-07
Admitting: INTERNAL MEDICINE
Payer: MEDICARE

## 2024-08-07 VITALS
DIASTOLIC BLOOD PRESSURE: 61 MMHG | RESPIRATION RATE: 20 BRPM | TEMPERATURE: 97.7 F | WEIGHT: 209.66 LBS | HEART RATE: 77 BPM | BODY MASS INDEX: 35.79 KG/M2 | HEIGHT: 64 IN | SYSTOLIC BLOOD PRESSURE: 105 MMHG | OXYGEN SATURATION: 98 %

## 2024-08-07 DIAGNOSIS — Z45.2 ENCOUNTER FOR ADJUSTMENT OR MANAGEMENT OF VASCULAR ACCESS DEVICE: ICD-10-CM

## 2024-08-07 DIAGNOSIS — C34.90 SMALL CELL LUNG CANCER: Primary | ICD-10-CM

## 2024-08-07 PROCEDURE — 96413 CHEMO IV INFUSION 1 HR: CPT

## 2024-08-07 PROCEDURE — 25810000003 SODIUM CHLORIDE 0.9 % SOLUTION 500 ML FLEX CONT: Performed by: INTERNAL MEDICINE

## 2024-08-07 PROCEDURE — 96375 TX/PRO/DX INJ NEW DRUG ADDON: CPT

## 2024-08-07 PROCEDURE — 25010000002 ETOPOSIDE 500 MG/25ML SOLUTION 25 ML VIAL: Performed by: INTERNAL MEDICINE

## 2024-08-07 PROCEDURE — 25010000002 DEXAMETHASONE SODIUM PHOSPHATE 120 MG/30ML SOLUTION: Performed by: INTERNAL MEDICINE

## 2024-08-07 PROCEDURE — 25810000003 SODIUM CHLORIDE 0.9 % SOLUTION: Performed by: INTERNAL MEDICINE

## 2024-08-07 PROCEDURE — 25010000002 HEPARIN LOCK FLUSH PER 10 UNITS: Performed by: INTERNAL MEDICINE

## 2024-08-07 RX ORDER — SODIUM CHLORIDE 0.9 % (FLUSH) 0.9 %
20 SYRINGE (ML) INJECTION AS NEEDED
Status: DISCONTINUED | OUTPATIENT
Start: 2024-08-07 | End: 2024-08-08 | Stop reason: HOSPADM

## 2024-08-07 RX ORDER — HEPARIN SODIUM (PORCINE) LOCK FLUSH IV SOLN 100 UNIT/ML 100 UNIT/ML
500 SOLUTION INTRAVENOUS AS NEEDED
Status: CANCELLED | OUTPATIENT
Start: 2024-08-07

## 2024-08-07 RX ORDER — SODIUM CHLORIDE 0.9 % (FLUSH) 0.9 %
20 SYRINGE (ML) INJECTION AS NEEDED
Status: CANCELLED | OUTPATIENT
Start: 2024-08-07

## 2024-08-07 RX ORDER — HEPARIN SODIUM (PORCINE) LOCK FLUSH IV SOLN 100 UNIT/ML 100 UNIT/ML
500 SOLUTION INTRAVENOUS AS NEEDED
Status: DISCONTINUED | OUTPATIENT
Start: 2024-08-07 | End: 2024-08-08 | Stop reason: HOSPADM

## 2024-08-07 RX ORDER — SODIUM CHLORIDE 9 MG/ML
20 INJECTION, SOLUTION INTRAVENOUS ONCE
Status: COMPLETED | OUTPATIENT
Start: 2024-08-07 | End: 2024-08-07

## 2024-08-07 RX ADMIN — SODIUM CHLORIDE 20 ML/HR: 9 INJECTION, SOLUTION INTRAVENOUS at 09:31

## 2024-08-07 RX ADMIN — Medication 20 ML: at 11:10

## 2024-08-07 RX ADMIN — SODIUM CHLORIDE 160 MG: 0.9 INJECTION, SOLUTION INTRAVENOUS at 10:00

## 2024-08-07 RX ADMIN — DEXAMETHASONE SODIUM PHOSPHATE 12 MG: 4 INJECTION, SOLUTION INTRA-ARTICULAR; INTRALESIONAL; INTRAMUSCULAR; INTRAVENOUS; SOFT TISSUE at 09:32

## 2024-08-07 RX ADMIN — HEPARIN 500 UNITS: 100 SYRINGE at 11:10

## 2024-08-08 ENCOUNTER — DOCUMENTATION (OUTPATIENT)
Dept: ONCOLOGY | Facility: HOSPITAL | Age: 59
End: 2024-08-08
Payer: MEDICARE

## 2024-08-08 ENCOUNTER — HOSPITAL ENCOUNTER (OUTPATIENT)
Dept: ONCOLOGY | Facility: HOSPITAL | Age: 59
Discharge: HOME | End: 2024-08-08
Payer: MEDICARE

## 2024-08-08 VITALS
HEIGHT: 64 IN | OXYGEN SATURATION: 99 % | DIASTOLIC BLOOD PRESSURE: 60 MMHG | TEMPERATURE: 98.1 F | RESPIRATION RATE: 20 BRPM | BODY MASS INDEX: 35.83 KG/M2 | WEIGHT: 209.88 LBS | HEART RATE: 72 BPM | SYSTOLIC BLOOD PRESSURE: 106 MMHG

## 2024-08-08 DIAGNOSIS — Z45.2 ENCOUNTER FOR ADJUSTMENT OR MANAGEMENT OF VASCULAR ACCESS DEVICE: ICD-10-CM

## 2024-08-08 DIAGNOSIS — C34.90 SMALL CELL LUNG CANCER: Primary | ICD-10-CM

## 2024-08-08 PROCEDURE — 25010000002 DEXAMETHASONE SODIUM PHOSPHATE 120 MG/30ML SOLUTION: Performed by: INTERNAL MEDICINE

## 2024-08-08 PROCEDURE — 25810000003 SODIUM CHLORIDE 0.9 % SOLUTION: Performed by: INTERNAL MEDICINE

## 2024-08-08 PROCEDURE — 96377 APPLICATON ON-BODY INJECTOR: CPT

## 2024-08-08 PROCEDURE — 96413 CHEMO IV INFUSION 1 HR: CPT

## 2024-08-08 PROCEDURE — 96375 TX/PRO/DX INJ NEW DRUG ADDON: CPT

## 2024-08-08 PROCEDURE — 25010000002 ETOPOSIDE 500 MG/25ML SOLUTION 25 ML VIAL: Performed by: INTERNAL MEDICINE

## 2024-08-08 PROCEDURE — 25810000003 SODIUM CHLORIDE 0.9 % SOLUTION 500 ML FLEX CONT: Performed by: INTERNAL MEDICINE

## 2024-08-08 PROCEDURE — 25010000002 HEPARIN LOCK FLUSH PER 10 UNITS: Performed by: INTERNAL MEDICINE

## 2024-08-08 PROCEDURE — 25010000002 PEGFILGRASTIM 6 MG/0.6ML PREFILLED SYRINGE KIT: Performed by: INTERNAL MEDICINE

## 2024-08-08 RX ORDER — HEPARIN SODIUM (PORCINE) LOCK FLUSH IV SOLN 100 UNIT/ML 100 UNIT/ML
500 SOLUTION INTRAVENOUS AS NEEDED
OUTPATIENT
Start: 2024-08-08

## 2024-08-08 RX ORDER — SODIUM CHLORIDE 0.9 % (FLUSH) 0.9 %
20 SYRINGE (ML) INJECTION AS NEEDED
Status: DISCONTINUED | OUTPATIENT
Start: 2024-08-08 | End: 2024-08-09 | Stop reason: HOSPADM

## 2024-08-08 RX ORDER — SODIUM CHLORIDE 0.9 % (FLUSH) 0.9 %
20 SYRINGE (ML) INJECTION AS NEEDED
OUTPATIENT
Start: 2024-08-08

## 2024-08-08 RX ORDER — SODIUM CHLORIDE 9 MG/ML
20 INJECTION, SOLUTION INTRAVENOUS ONCE
Status: COMPLETED | OUTPATIENT
Start: 2024-08-08 | End: 2024-08-08

## 2024-08-08 RX ORDER — HEPARIN SODIUM (PORCINE) LOCK FLUSH IV SOLN 100 UNIT/ML 100 UNIT/ML
500 SOLUTION INTRAVENOUS AS NEEDED
Status: DISCONTINUED | OUTPATIENT
Start: 2024-08-08 | End: 2024-08-09 | Stop reason: HOSPADM

## 2024-08-08 RX ADMIN — SODIUM CHLORIDE 20 ML/HR: 9 INJECTION, SOLUTION INTRAVENOUS at 08:56

## 2024-08-08 RX ADMIN — SODIUM CHLORIDE 160 MG: 0.9 INJECTION, SOLUTION INTRAVENOUS at 09:27

## 2024-08-08 RX ADMIN — Medication 20 ML: at 10:50

## 2024-08-08 RX ADMIN — DEXAMETHASONE SODIUM PHOSPHATE 12 MG: 4 INJECTION, SOLUTION INTRA-ARTICULAR; INTRALESIONAL; INTRAMUSCULAR; INTRAVENOUS; SOFT TISSUE at 08:56

## 2024-08-08 RX ADMIN — HEPARIN 500 UNITS: 100 SYRINGE at 10:50

## 2024-08-08 RX ADMIN — PEGFILGRASTIM 6 MG: KIT SUBCUTANEOUS at 10:30

## 2024-08-08 NOTE — PROGRESS NOTES
Patient requested a gas voucher. OSW provided patient with a $15 gas voucher through [facility]. Patient expressed appreciation for assistance.

## 2024-08-09 ENCOUNTER — TELEPHONE (OUTPATIENT)
Dept: FAMILY MEDICINE CLINIC | Facility: CLINIC | Age: 59
End: 2024-08-09
Payer: MEDICARE

## 2024-08-09 ENCOUNTER — TELEPHONE (OUTPATIENT)
Dept: ONCOLOGY | Facility: HOSPITAL | Age: 59
End: 2024-08-09
Payer: MEDICARE

## 2024-08-09 NOTE — TELEPHONE ENCOUNTER
Spoke to Dr. Alberts office, deborah stated she is going to reach out to patient, there is no documentation of patient being told this.

## 2024-08-09 NOTE — TELEPHONE ENCOUNTER
Daughter of ikmberli called stating she got chemo yesterday and now she is shaking, almost twitching. Daughter reached out to Dr. Dickson and he stated to f/u with PCP for this. Daria is requesting a video visit due to kimberli not being able to come in for a appt right now. Please advise if ok to do so.     Daughter asking we call her back, not Kimberli.

## 2024-08-09 NOTE — TELEPHONE ENCOUNTER
Daria called and states that the pt has mouth twitching x1 month. The pt was instructed by the infusion nurse to see her PCP for her s/s. The pt saw her PCP and was referred back to the [facility]. This nurse informed Daria that there is not a listed side effect of mouth twitching for Toposar, Paraplatin, or Imfinzi. This nurse informed Daria that this nurse will double check with Dr Dickson.

## 2024-08-10 ENCOUNTER — APPOINTMENT (OUTPATIENT)
Dept: GENERAL RADIOLOGY | Facility: HOSPITAL | Age: 59
DRG: 871 | End: 2024-08-10
Payer: MEDICARE

## 2024-08-10 ENCOUNTER — HOSPITAL ENCOUNTER (INPATIENT)
Facility: HOSPITAL | Age: 59
LOS: 4 days | Discharge: HOME | DRG: 871 | End: 2024-08-14
Attending: EMERGENCY MEDICINE | Admitting: STUDENT IN AN ORGANIZED HEALTH CARE EDUCATION/TRAINING PROGRAM
Payer: MEDICARE

## 2024-08-10 ENCOUNTER — READMISSION MANAGEMENT (OUTPATIENT)
Dept: CALL CENTER | Facility: HOSPITAL | Age: 59
End: 2024-08-10
Payer: MEDICARE

## 2024-08-10 DIAGNOSIS — R53.1 WEAKNESS: ICD-10-CM

## 2024-08-10 DIAGNOSIS — R26.2 DIFFICULTY WALKING: ICD-10-CM

## 2024-08-10 DIAGNOSIS — C79.51 CANCER, METASTATIC TO BONE: ICD-10-CM

## 2024-08-10 DIAGNOSIS — A41.9 SEPSIS, DUE TO UNSPECIFIED ORGANISM, UNSPECIFIED WHETHER ACUTE ORGAN DYSFUNCTION PRESENT: Primary | ICD-10-CM

## 2024-08-10 DIAGNOSIS — I95.9 HYPOTENSION, UNSPECIFIED HYPOTENSION TYPE: ICD-10-CM

## 2024-08-10 DIAGNOSIS — J44.9 CHRONIC OBSTRUCTIVE PULMONARY DISEASE, UNSPECIFIED COPD TYPE: ICD-10-CM

## 2024-08-10 DIAGNOSIS — D72.829 LEUKOCYTOSIS, UNSPECIFIED TYPE: ICD-10-CM

## 2024-08-10 PROBLEM — J18.9 CAP (COMMUNITY ACQUIRED PNEUMONIA): Status: ACTIVE | Noted: 2024-08-10

## 2024-08-10 LAB
ALBUMIN SERPL-MCNC: 3.4 G/DL (ref 3.5–5.2)
ALBUMIN/GLOB SERPL: 1.1 G/DL
ALP SERPL-CCNC: 156 U/L (ref 39–117)
ALT SERPL W P-5'-P-CCNC: 37 U/L (ref 1–33)
ANION GAP SERPL CALCULATED.3IONS-SCNC: 13.3 MMOL/L (ref 5–15)
ANISOCYTOSIS BLD QL: NORMAL
AST SERPL-CCNC: 41 U/L (ref 1–32)
BACTERIA UR QL AUTO: ABNORMAL /HPF
BASOPHILS # BLD AUTO: 0.03 10*3/MM3 (ref 0–0.2)
BASOPHILS NFR BLD AUTO: 0 % (ref 0–1.5)
BILIRUB SERPL-MCNC: 0.4 MG/DL (ref 0–1.2)
BILIRUB UR QL STRIP: NEGATIVE
BUN SERPL-MCNC: 12 MG/DL (ref 6–20)
BUN/CREAT SERPL: 15 (ref 7–25)
CALCIUM SPEC-SCNC: 8.6 MG/DL (ref 8.6–10.5)
CHLORIDE SERPL-SCNC: 98 MMOL/L (ref 98–107)
CLARITY UR: ABNORMAL
CO2 SERPL-SCNC: 25.7 MMOL/L (ref 22–29)
COLOR UR: YELLOW
CREAT SERPL-MCNC: 0.8 MG/DL (ref 0.57–1)
D-LACTATE SERPL-SCNC: 1.3 MMOL/L (ref 0.5–2)
DEPRECATED RDW RBC AUTO: 57.1 FL (ref 37–54)
EGFRCR SERPLBLD CKD-EPI 2021: 86.1 ML/MIN/1.73
EOSINOPHIL # BLD AUTO: 0 10*3/MM3 (ref 0–0.4)
EOSINOPHIL NFR BLD AUTO: 0 % (ref 0.3–6.2)
ERYTHROCYTE [DISTWIDTH] IN BLOOD BY AUTOMATED COUNT: 17.9 % (ref 12.3–15.4)
GLOBULIN UR ELPH-MCNC: 3.1 GM/DL
GLUCOSE SERPL-MCNC: 147 MG/DL (ref 65–99)
GLUCOSE UR STRIP-MCNC: NEGATIVE MG/DL
HCT VFR BLD AUTO: 28.4 % (ref 34–46.6)
HGB BLD-MCNC: 9.4 G/DL (ref 12–15.9)
HGB UR QL STRIP.AUTO: NEGATIVE
HOLD SPECIMEN: NORMAL
HOLD SPECIMEN: NORMAL
HYALINE CASTS UR QL AUTO: ABNORMAL /LPF
IMM GRANULOCYTES # BLD AUTO: 8.72 10*3/MM3 (ref 0–0.05)
IMM GRANULOCYTES NFR BLD AUTO: 11.2 % (ref 0–0.5)
KETONES UR QL STRIP: NEGATIVE
LEUKOCYTE ESTERASE UR QL STRIP.AUTO: ABNORMAL
LYMPHOCYTES # BLD AUTO: 1.88 10*3/MM3 (ref 0.7–3.1)
LYMPHOCYTES NFR BLD AUTO: 2.4 % (ref 19.6–45.3)
MCH RBC QN AUTO: 30.2 PG (ref 26.6–33)
MCHC RBC AUTO-ENTMCNC: 33.1 G/DL (ref 31.5–35.7)
MCV RBC AUTO: 91.3 FL (ref 79–97)
MONOCYTES # BLD AUTO: 0.23 10*3/MM3 (ref 0.1–0.9)
MONOCYTES NFR BLD AUTO: 0.3 % (ref 5–12)
NEUTROPHILS NFR BLD AUTO: 67.25 10*3/MM3 (ref 1.7–7)
NEUTROPHILS NFR BLD AUTO: 86.1 % (ref 42.7–76)
NITRITE UR QL STRIP: NEGATIVE
NRBC BLD AUTO-RTO: 0 /100 WBC (ref 0–0.2)
NT-PROBNP SERPL-MCNC: 928.5 PG/ML (ref 0–900)
PH UR STRIP.AUTO: 6 [PH] (ref 5–8)
PLATELET # BLD AUTO: 171 10*3/MM3 (ref 140–450)
PMV BLD AUTO: 9.3 FL (ref 6–12)
POTASSIUM SERPL-SCNC: 4 MMOL/L (ref 3.5–5.2)
PROCALCITONIN SERPL-MCNC: 0.08 NG/ML (ref 0–0.25)
PROT SERPL-MCNC: 6.5 G/DL (ref 6–8.5)
PROT UR QL STRIP: ABNORMAL
RBC # BLD AUTO: 3.11 10*6/MM3 (ref 3.77–5.28)
RBC # UR STRIP: ABNORMAL /HPF
REF LAB TEST METHOD: ABNORMAL
SMALL PLATELETS BLD QL SMEAR: NORMAL
SODIUM SERPL-SCNC: 137 MMOL/L (ref 136–145)
SP GR UR STRIP: 1.02 (ref 1–1.03)
SQUAMOUS #/AREA URNS HPF: ABNORMAL /HPF
TROPONIN T SERPL HS-MCNC: 20 NG/L
UROBILINOGEN UR QL STRIP: ABNORMAL
WBC # UR STRIP: ABNORMAL /HPF
WBC MORPH BLD: NORMAL
WBC NRBC COR # BLD AUTO: 78.11 10*3/MM3 (ref 3.4–10.8)
WHOLE BLOOD HOLD COAG: NORMAL
WHOLE BLOOD HOLD SPECIMEN: NORMAL

## 2024-08-10 PROCEDURE — 83880 ASSAY OF NATRIURETIC PEPTIDE: CPT | Performed by: EMERGENCY MEDICINE

## 2024-08-10 PROCEDURE — 25010000002 ENOXAPARIN PER 10 MG: Performed by: STUDENT IN AN ORGANIZED HEALTH CARE EDUCATION/TRAINING PROGRAM

## 2024-08-10 PROCEDURE — 80053 COMPREHEN METABOLIC PANEL: CPT | Performed by: EMERGENCY MEDICINE

## 2024-08-10 PROCEDURE — 25810000003 SODIUM CHLORIDE 0.9 % SOLUTION: Performed by: EMERGENCY MEDICINE

## 2024-08-10 PROCEDURE — 94640 AIRWAY INHALATION TREATMENT: CPT

## 2024-08-10 PROCEDURE — 93005 ELECTROCARDIOGRAM TRACING: CPT

## 2024-08-10 PROCEDURE — 87147 CULTURE TYPE IMMUNOLOGIC: CPT | Performed by: EMERGENCY MEDICINE

## 2024-08-10 PROCEDURE — 85025 COMPLETE CBC W/AUTO DIFF WBC: CPT | Performed by: EMERGENCY MEDICINE

## 2024-08-10 PROCEDURE — 25810000003 SEPSIS FLUID NS 0.9 % SOLUTION: Performed by: EMERGENCY MEDICINE

## 2024-08-10 PROCEDURE — 84145 PROCALCITONIN (PCT): CPT | Performed by: STUDENT IN AN ORGANIZED HEALTH CARE EDUCATION/TRAINING PROGRAM

## 2024-08-10 PROCEDURE — 87040 BLOOD CULTURE FOR BACTERIA: CPT | Performed by: EMERGENCY MEDICINE

## 2024-08-10 PROCEDURE — 25010000002 PIPERACILLIN SOD-TAZOBACTAM PER 1 G: Performed by: STUDENT IN AN ORGANIZED HEALTH CARE EDUCATION/TRAINING PROGRAM

## 2024-08-10 PROCEDURE — 87449 NOS EACH ORGANISM AG IA: CPT | Performed by: INTERNAL MEDICINE

## 2024-08-10 PROCEDURE — 99291 CRITICAL CARE FIRST HOUR: CPT

## 2024-08-10 PROCEDURE — 25010000002 VANCOMYCIN 5 G RECONSTITUTED SOLUTION: Performed by: EMERGENCY MEDICINE

## 2024-08-10 PROCEDURE — 81001 URINALYSIS AUTO W/SCOPE: CPT | Performed by: EMERGENCY MEDICINE

## 2024-08-10 PROCEDURE — 99222 1ST HOSP IP/OBS MODERATE 55: CPT | Performed by: STUDENT IN AN ORGANIZED HEALTH CARE EDUCATION/TRAINING PROGRAM

## 2024-08-10 PROCEDURE — 87086 URINE CULTURE/COLONY COUNT: CPT | Performed by: EMERGENCY MEDICINE

## 2024-08-10 PROCEDURE — 93005 ELECTROCARDIOGRAM TRACING: CPT | Performed by: EMERGENCY MEDICINE

## 2024-08-10 PROCEDURE — 25010000002 CEFEPIME PER 500 MG: Performed by: EMERGENCY MEDICINE

## 2024-08-10 PROCEDURE — 71045 X-RAY EXAM CHEST 1 VIEW: CPT

## 2024-08-10 PROCEDURE — 87899 AGENT NOS ASSAY W/OPTIC: CPT | Performed by: INTERNAL MEDICINE

## 2024-08-10 PROCEDURE — 25010000002 ONDANSETRON PER 1 MG: Performed by: EMERGENCY MEDICINE

## 2024-08-10 PROCEDURE — 93010 ELECTROCARDIOGRAM REPORT: CPT | Performed by: INTERNAL MEDICINE

## 2024-08-10 PROCEDURE — 84484 ASSAY OF TROPONIN QUANT: CPT | Performed by: EMERGENCY MEDICINE

## 2024-08-10 PROCEDURE — 94799 UNLISTED PULMONARY SVC/PX: CPT

## 2024-08-10 PROCEDURE — 36415 COLL VENOUS BLD VENIPUNCTURE: CPT

## 2024-08-10 PROCEDURE — 85007 BL SMEAR W/DIFF WBC COUNT: CPT | Performed by: EMERGENCY MEDICINE

## 2024-08-10 PROCEDURE — 83605 ASSAY OF LACTIC ACID: CPT | Performed by: EMERGENCY MEDICINE

## 2024-08-10 RX ORDER — ENOXAPARIN SODIUM 100 MG/ML
40 INJECTION SUBCUTANEOUS EVERY 24 HOURS
Status: DISCONTINUED | OUTPATIENT
Start: 2024-08-10 | End: 2024-08-14 | Stop reason: HOSPADM

## 2024-08-10 RX ORDER — ONDANSETRON 2 MG/ML
4 INJECTION INTRAMUSCULAR; INTRAVENOUS ONCE
Status: COMPLETED | OUTPATIENT
Start: 2024-08-10 | End: 2024-08-10

## 2024-08-10 RX ORDER — NICOTINE 21 MG/24HR
1 PATCH, TRANSDERMAL 24 HOURS TRANSDERMAL EVERY 24 HOURS
Status: DISCONTINUED | OUTPATIENT
Start: 2024-08-10 | End: 2024-08-14 | Stop reason: HOSPADM

## 2024-08-10 RX ORDER — BUDESONIDE AND FORMOTEROL FUMARATE DIHYDRATE 160; 4.5 UG/1; UG/1
2 AEROSOL RESPIRATORY (INHALATION)
Status: DISCONTINUED | OUTPATIENT
Start: 2024-08-10 | End: 2024-08-14 | Stop reason: HOSPADM

## 2024-08-10 RX ORDER — AMOXICILLIN 250 MG
2 CAPSULE ORAL 2 TIMES DAILY PRN
Status: DISCONTINUED | OUTPATIENT
Start: 2024-08-10 | End: 2024-08-12

## 2024-08-10 RX ORDER — IPRATROPIUM BROMIDE AND ALBUTEROL SULFATE 2.5; .5 MG/3ML; MG/3ML
3 SOLUTION RESPIRATORY (INHALATION)
Status: DISCONTINUED | OUTPATIENT
Start: 2024-08-10 | End: 2024-08-12

## 2024-08-10 RX ORDER — SODIUM CHLORIDE 0.9 % (FLUSH) 0.9 %
10 SYRINGE (ML) INJECTION AS NEEDED
Status: DISCONTINUED | OUTPATIENT
Start: 2024-08-10 | End: 2024-08-14 | Stop reason: HOSPADM

## 2024-08-10 RX ORDER — SODIUM CHLORIDE 9 MG/ML
40 INJECTION, SOLUTION INTRAVENOUS AS NEEDED
Status: DISCONTINUED | OUTPATIENT
Start: 2024-08-10 | End: 2024-08-14 | Stop reason: HOSPADM

## 2024-08-10 RX ORDER — LORAZEPAM 0.5 MG/1
0.5 TABLET ORAL EVERY 8 HOURS PRN
Status: DISCONTINUED | OUTPATIENT
Start: 2024-08-10 | End: 2024-08-11

## 2024-08-10 RX ORDER — SODIUM CHLORIDE 0.9 % (FLUSH) 0.9 %
10 SYRINGE (ML) INJECTION EVERY 12 HOURS SCHEDULED
Status: DISCONTINUED | OUTPATIENT
Start: 2024-08-10 | End: 2024-08-14 | Stop reason: HOSPADM

## 2024-08-10 RX ORDER — ALBUTEROL SULFATE 0.83 MG/ML
2.5 SOLUTION RESPIRATORY (INHALATION) EVERY 6 HOURS PRN
Status: DISCONTINUED | OUTPATIENT
Start: 2024-08-10 | End: 2024-08-14 | Stop reason: HOSPADM

## 2024-08-10 RX ORDER — BUPROPION HYDROCHLORIDE 150 MG/1
150 TABLET, EXTENDED RELEASE ORAL 2 TIMES DAILY
Status: DISCONTINUED | OUTPATIENT
Start: 2024-08-10 | End: 2024-08-14 | Stop reason: HOSPADM

## 2024-08-10 RX ORDER — BISACODYL 5 MG/1
5 TABLET, DELAYED RELEASE ORAL DAILY PRN
Status: DISCONTINUED | OUTPATIENT
Start: 2024-08-10 | End: 2024-08-12

## 2024-08-10 RX ORDER — VANCOMYCIN 2 GRAM/500 ML IN 0.9 % SODIUM CHLORIDE INTRAVENOUS
20 ONCE
Status: COMPLETED | OUTPATIENT
Start: 2024-08-10 | End: 2024-08-10

## 2024-08-10 RX ORDER — HYDROCODONE BITARTRATE AND ACETAMINOPHEN 5; 325 MG/1; MG/1
1 TABLET ORAL EVERY 6 HOURS PRN
Status: DISCONTINUED | OUTPATIENT
Start: 2024-08-10 | End: 2024-08-11

## 2024-08-10 RX ORDER — FENTANYL CITRATE 50 UG/ML
25 INJECTION, SOLUTION INTRAMUSCULAR; INTRAVENOUS ONCE
Status: DISCONTINUED | OUTPATIENT
Start: 2024-08-10 | End: 2024-08-10

## 2024-08-10 RX ORDER — GABAPENTIN 300 MG/1
300 CAPSULE ORAL 3 TIMES DAILY
Status: DISCONTINUED | OUTPATIENT
Start: 2024-08-10 | End: 2024-08-14 | Stop reason: HOSPADM

## 2024-08-10 RX ORDER — TRAMADOL HYDROCHLORIDE 50 MG/1
50 TABLET ORAL EVERY 6 HOURS PRN
Status: DISCONTINUED | OUTPATIENT
Start: 2024-08-10 | End: 2024-08-12

## 2024-08-10 RX ORDER — BISACODYL 10 MG
10 SUPPOSITORY, RECTAL RECTAL DAILY PRN
Status: DISCONTINUED | OUTPATIENT
Start: 2024-08-10 | End: 2024-08-12

## 2024-08-10 RX ORDER — NITROGLYCERIN 0.4 MG/1
0.4 TABLET SUBLINGUAL
Status: DISCONTINUED | OUTPATIENT
Start: 2024-08-10 | End: 2024-08-12

## 2024-08-10 RX ORDER — POLYETHYLENE GLYCOL 3350 17 G/17G
17 POWDER, FOR SOLUTION ORAL DAILY PRN
Status: DISCONTINUED | OUTPATIENT
Start: 2024-08-10 | End: 2024-08-12

## 2024-08-10 RX ORDER — ATORVASTATIN CALCIUM 10 MG/1
10 TABLET, FILM COATED ORAL NIGHTLY
Status: DISCONTINUED | OUTPATIENT
Start: 2024-08-10 | End: 2024-08-14 | Stop reason: HOSPADM

## 2024-08-10 RX ORDER — ESCITALOPRAM OXALATE 10 MG/1
20 TABLET ORAL DAILY
Status: DISCONTINUED | OUTPATIENT
Start: 2024-08-10 | End: 2024-08-14 | Stop reason: HOSPADM

## 2024-08-10 RX ADMIN — GABAPENTIN 300 MG: 300 CAPSULE ORAL at 20:41

## 2024-08-10 RX ADMIN — LORAZEPAM 0.5 MG: 0.5 TABLET ORAL at 22:36

## 2024-08-10 RX ADMIN — Medication 10 ML: at 20:42

## 2024-08-10 RX ADMIN — SODIUM CHLORIDE 2127 ML: 9 INJECTION, SOLUTION INTRAVENOUS at 15:29

## 2024-08-10 RX ADMIN — HYDROCODONE BITARTRATE AND ACETAMINOPHEN 1 TABLET: 5; 325 TABLET ORAL at 20:41

## 2024-08-10 RX ADMIN — BUPROPION HYDROCHLORIDE 150 MG: 150 TABLET, EXTENDED RELEASE ORAL at 20:41

## 2024-08-10 RX ADMIN — ATORVASTATIN CALCIUM 10 MG: 10 TABLET, FILM COATED ORAL at 20:41

## 2024-08-10 RX ADMIN — Medication 5 MG: at 22:36

## 2024-08-10 RX ADMIN — CEFEPIME 2000 MG: 2 INJECTION, POWDER, FOR SOLUTION INTRAVENOUS at 15:30

## 2024-08-10 RX ADMIN — PIPERACILLIN AND TAZOBACTAM 3.38 G: 3; .375 INJECTION, POWDER, FOR SOLUTION INTRAVENOUS at 22:31

## 2024-08-10 RX ADMIN — CARIPRAZINE 1.5 MG: 1.5 CAPSULE, GELATIN COATED ORAL at 18:16

## 2024-08-10 RX ADMIN — BUDESONIDE AND FORMOTEROL FUMARATE DIHYDRATE 2 PUFF: 160; 4.5 AEROSOL RESPIRATORY (INHALATION) at 19:41

## 2024-08-10 RX ADMIN — IPRATROPIUM BROMIDE AND ALBUTEROL SULFATE 3 ML: .5; 3 SOLUTION RESPIRATORY (INHALATION) at 19:38

## 2024-08-10 RX ADMIN — ENOXAPARIN SODIUM 40 MG: 100 INJECTION SUBCUTANEOUS at 18:15

## 2024-08-10 RX ADMIN — ONDANSETRON 4 MG: 2 INJECTION INTRAMUSCULAR; INTRAVENOUS at 15:29

## 2024-08-10 RX ADMIN — TRAMADOL HYDROCHLORIDE 50 MG: 50 TABLET, FILM COATED ORAL at 18:04

## 2024-08-10 RX ADMIN — VANCOMYCIN HYDROCHLORIDE 2000 MG: 5 INJECTION, POWDER, LYOPHILIZED, FOR SOLUTION INTRAVENOUS at 16:17

## 2024-08-10 RX ADMIN — NICOTINE 1 PATCH: 21 PATCH, EXTENDED RELEASE TRANSDERMAL at 18:19

## 2024-08-10 RX ADMIN — PIPERACILLIN AND TAZOBACTAM 3.38 G: 3; .375 INJECTION, POWDER, FOR SOLUTION INTRAVENOUS at 18:19

## 2024-08-10 RX ADMIN — ESCITALOPRAM OXALATE 20 MG: 10 TABLET ORAL at 18:18

## 2024-08-10 NOTE — H&P
[facility]   HISTORY AND PHYSICAL    Patient Name: Lluvia Archer  : 1965  MRN: [id number]  Primary Care Physician:  Aleksandar Edge DO  Date of admission: 8/10/2024    Subjective   Subjective     Chief Complaint: Shortness of breath/generalized weakness    History of Present Illness  Patient is a 58-year-old female with history of metastatic lung cancer on chemotherapy, chemotherapy induced neutropenia, recently received Neulasta administered by her oncologist yesterday, COPD, hypertension, anxiety, GERD, who presents to the emergency room for evaluation of shortness of breath, generalized weakness, subjective fever and pain.  Patient was recently seen by hematology/oncologist.  She was recently admitted at this facility for pneumonia and treated.  It was during that admission that she was found to have lung cancer with metastasis to the bone.  She has since been on chemotherapy.    In the emergency room today, her labs were significant for WBC of 78.11, hemoglobin of 9.4, glucose of 147, lactic acid of 1.7, and urinalysis that was grossly unremarkable except for small amount of leukocyte esterase.  She was initially hypotensive with systolic blood pressures in the upper 80s.  She also met criteria for sepsis and was treated with sepsis protocol.  She responded well to fluids, blood cultures were sent, and antibiotics were started.  She is being admitted for further management  Hypotension  Pertinent negatives include no abdominal pain, chest pain, congestion, coughing, headaches, numbness or rash.   Shortness of Breath  Pertinent negatives include no abdominal pain, chest pain, headaches, leg swelling, rash or wheezing.       Review of Systems   Constitutional:  Negative for activity change and appetite change.   HENT:  Negative for congestion.    Respiratory:  Positive for shortness of breath. Negative for cough and wheezing.    Cardiovascular:  Negative for chest pain, palpitations and  leg swelling.   Gastrointestinal:  Negative for abdominal distention, abdominal pain and blood in stool.   Endocrine: Negative for cold intolerance and heat intolerance.   Genitourinary:  Negative for difficulty urinating and dysuria.   Skin:  Negative for pallor and rash.   Neurological:  Negative for light-headedness, numbness and headaches.   Psychiatric/Behavioral:  Negative for agitation.         Personal History     Past Medical History:   Diagnosis Date    Abnormal mammogram     PT DENIES KNOWING OF THIS HX    Anxiety     Arthritis     R SHOULDER, R HIP, AND R LEG    Cancer     LUNG    Chronic nausea 01/15/2019    COPD (chronic obstructive pulmonary disease)     O2 3 LITERS NC CONT    Hernia, hiatal     Hyperlipidemia     Hypertension     Knee pain, right 2018    Memory loss     FORGETFULNESS ? ETIOLOGY    Migraine headache     Moderate episode of recurrent major depressive disorder 2017    Night sweats     Sciatica of right side 2017    Severe episode of recurrent major depressive disorder, without psychotic features 10/22/2019    Shingles     OUTBREAK 24 ON ANTIVIRAL , WHELPS NOTED NO SORES.    Shoulder pain, left 2018       Past Surgical History:   Procedure Laterality Date    BRONCHOSCOPY N/A 2022    Procedure: BRONCHOSCOPY WITH BRONCHOALVEOLAR LAVAGE, BIOPSY;  Surgeon: Shin Mcdonald DO;  Location: [facility] ENDOSCOPY;  Service: Pulmonary;  Laterality: N/A;  RIGHT LOWER LOBE INFILTRATE    BRONCHOSCOPY N/A 2024    Procedure: BRONCHOSCOPY WITH ENDOBRONCHIAL ULTRASOUND AND FINE NEEDLE ASPIRATE;  Surgeon: Shin Mcdonald DO;  Location: [facility] ENDOSCOPY;  Service: Pulmonary;  Laterality: N/A;  MEDIASTINAL ADENOPATHY     SECTION      CHOLECYSTECTOMY      LAPAROSCOPIC    COLONOSCOPY      PORTACATH PLACEMENT Left 2024    Procedure: INSERTION OF PORTACATH;  Surgeon: Josh Patton MD;  Location: [facility] OR [facility];  Service: General;   Laterality: Left;    TOTAL HIP ARTHROPLASTY Right 5/13/2022    Procedure: RIGHT TOTAL HIP ARTHROPLASTY;  Surgeon: Cecil Gomes MD;  Location: [facility] MAIN OR;  Service: Orthopedics;  Laterality: Right;       Family History: family history includes Arthritis in her mother; Cancer in an other family member; Heart disease in her father and another family member; Hypertension in an other family member; Other in her mother. Otherwise pertinent FHx was reviewed and not pertinent to current issue.    Social History:  reports that she has been smoking cigarettes. She started smoking about 42 years ago. She has a 85.2 pack-year smoking history. She has been exposed to tobacco smoke. She has never used smokeless tobacco. She reports that she does not drink alcohol and does not use drugs.    Home Medications:  Cariprazine HCl, Fluticasone-Umeclidin-Vilant, HYDROcodone-acetaminophen, LORazepam, OLANZapine, albuterol sulfate HFA, atorvastatin, buPROPion SR, escitalopram, gabapentin, ipratropium-albuterol, melatonin, nicotine, nystatin, omeprazole, ondansetron ODT, prochlorperazine, and traMADol    Allergies:  No Known Allergies    Objective    Objective     Vitals:   Temp:  [98 °F (36.7 °C)-98.9 °F (37.2 °C)] 98.9 °F (37.2 °C)  Heart Rate:  [100-117] 100  Resp:  [18] 18  BP: ()/(36-75) 103/75  Flow (L/min):  [4] 4    Physical Exam  Constitutional:       Appearance: Normal appearance.   HENT:      Right Ear: Tympanic membrane normal.      Left Ear: Tympanic membrane normal.      Mouth/Throat:      Mouth: Mucous membranes are moist.   Cardiovascular:      Rate and Rhythm: Normal rate.   Pulmonary:      Effort: Pulmonary effort is normal.      Breath sounds: Normal breath sounds.   Abdominal:      General: Abdomen is flat.      Palpations: Abdomen is soft.   Skin:     General: Skin is warm.      Capillary Refill: Capillary refill takes less than 2 seconds.   Neurological:      General: No focal deficit present.      Mental  Status: She is alert and oriented to person, place, and time.   Psychiatric:         Mood and Affect: Mood normal.         Behavior: Behavior normal.         Result Review    Result Review:  I have personally reviewed the results from the time of this admission to 8/10/2024 16:20 EDT and agree with these findings:  []  Laboratory list / accordion  [x]  Microbiology  []  Radiology  [x]  EKG/Telemetry   []  Cardiology/Vascular   []  Pathology  [x]  Old records  []  Other:  Most notable findings include:       Assessment & Plan   Assessment / Plan     Brief Patient Summary:  Lluvia Archer is a 58 y.o. female who has history of metastatic lung cancer on chemotherapy, presents with generalized weakness, shortness of breath.  She met criteria for sepsis with source of infection likely pneumonia and is being admitted for further management    Active Hospital Problems:  Active Hospital Problems    Diagnosis     **CAP (community acquired pneumonia)    Sepsis secondary to pneumonia  Metastatic small cell lung carcinoma    Plan:   Admit patient to PCU  Patient is status post sepsis bundle in the ED.  She was initially hypotensive but responded well to fluids  Follow-up blood cultures  Continue antibiotic treatment with Zosyn  Leukocytosis of 77.1 likely secondary to Neulasta patient recently received for chemotherapy induced neutropenia  Reconcile and restart home medication    VTE Prophylaxis:  Pharmacologic VTE prophylaxis orders are present.        CODE STATUS:    Code Status (Patient has no pulse and is not breathing): CPR (Attempt to Resuscitate)  Medical Interventions (Patient has pulse or is breathing): Full Support    Admission Status:  I believe this patient meets inpatient status.    Len Dixon MD

## 2024-08-10 NOTE — ED PROVIDER NOTES
Time: 1:17 PM EDT  Date of encounter:  8/10/2024  Independent Historian/Clinical History and Information was obtained by:   Patient and Family    History is limited by: N/A    Chief Complaint: Shortness of breath, weakness, subjective fever, and generalized pain      History of Present Illness:  Patient is a 58 y.o. year old female who presents to the emergency department for evaluation of shortness of breath, weakness, subjective fever and generalized pain.  This patient has a history of small cell carcinoma of the lung and COPD.  The patient had a PET scan in September 2023 without any concerning findings and then follow-up CT chest in February showed multiple right middle lobe nodules and mediastinal right hilar adenopathy.  The patient was admitted to [facility] in June 2024 with shortness of breath fever and cough and treated for pneumonia.  Since that time the patient has been treated for PET scan confirmed metastatic disease to bone and right axilla.  The patient is currently receiving chemotherapy and today she presents with worsening shortness of breath, subjective fever, general weakness.      Patient Care Team  Primary Care Provider: Aleksandar Edge DO    Past Medical History:     No Known Allergies  Past Medical History:   Diagnosis Date    Abnormal mammogram     PT DENIES KNOWING OF THIS HX    Anxiety     Arthritis     R SHOULDER, R HIP, AND R LEG    Cancer     LUNG    Chronic nausea 01/15/2019    COPD (chronic obstructive pulmonary disease)     O2 3 LITERS NC CONT    Hernia, hiatal     Hyperlipidemia     Hypertension     Knee pain, right 08/30/2018    Memory loss     FORGETFULNESS ? ETIOLOGY    Migraine headache     Moderate episode of recurrent major depressive disorder 05/22/2017    Night sweats     Sciatica of right side 08/18/2017    Severe episode of recurrent major depressive disorder, without psychotic features 10/22/2019    Shingles     OUTBREAK 6/11/24 ON ANTIVIRAL , WHELPS  NOTED NO SORES.    Shoulder pain, left 2018     Past Surgical History:   Procedure Laterality Date    BRONCHOSCOPY N/A 2022    Procedure: BRONCHOSCOPY WITH BRONCHOALVEOLAR LAVAGE, BIOPSY;  Surgeon: Shin Mcdonald DO;  Location: [facility] ENDOSCOPY;  Service: Pulmonary;  Laterality: N/A;  RIGHT LOWER LOBE INFILTRATE    BRONCHOSCOPY N/A 2024    Procedure: BRONCHOSCOPY WITH ENDOBRONCHIAL ULTRASOUND AND FINE NEEDLE ASPIRATE;  Surgeon: Shin Mcdonald DO;  Location: [facility] ENDOSCOPY;  Service: Pulmonary;  Laterality: N/A;  MEDIASTINAL ADENOPATHY     SECTION      CHOLECYSTECTOMY      LAPAROSCOPIC    COLONOSCOPY      PORTACATH PLACEMENT Left 2024    Procedure: INSERTION OF PORTACATH;  Surgeon: Josh Patton MD;  Location: [facility] OR OSC;  Service: General;  Laterality: Left;    TOTAL HIP ARTHROPLASTY Right 2022    Procedure: RIGHT TOTAL HIP ARTHROPLASTY;  Surgeon: Cecil Gomes MD;  Location: [facility] MAIN OR;  Service: Orthopedics;  Laterality: Right;     Family History   Problem Relation Age of Onset    Other Mother         BLOOD DISEASE    Arthritis Mother     Heart disease Father     Hypertension Other     Cancer Other     Heart disease Other     Malig Hyperthermia Neg Hx        Home Medications:  Prior to Admission medications    Medication Sig Start Date End Date Taking? Authorizing Provider   albuterol sulfate  (90 Base) MCG/ACT inhaler Inhale 2 puffs Every 4 (Four) Hours As Needed for Wheezing or Shortness of Air. 24   Aleksandar Edge DO   atorvastatin (LIPITOR) 10 MG tablet Take 1 tablet by mouth Every Night. 24   Aleksandar Edge DO   buPROPion SR (WELLBUTRIN SR) 150 MG 12 hr tablet Take 1 tablet by mouth 2 (Two) Times a Day. 24   Aleksandar Edge DO   Cariprazine HCl (Vraylar) 1.5 MG capsule capsule Take 1 capsule by mouth Daily. 24   Aleksandar Edge DO   escitalopram (LEXAPRO) 20 MG tablet Take 1  tablet by mouth Daily. 8/5/24   Aleksandar Edge DO   gabapentin (NEURONTIN) 300 MG capsule Take 1 capsule by mouth 3 (Three) Times a Day. 6/20/24   Brian Dickson MD   HYDROcodone-acetaminophen (NORCO) 5-325 MG per tablet Take 1 tablet by mouth Every 6 (Six) Hours As Needed (Pain). 8/5/24   Kamari Tsai MD   ipratropium-albuterol (DUO-NEB) 0.5-2.5 mg/3 ml nebulizer Take 3 mL by nebulization 4 (Four) Times a Day for 30 days. 6/12/24 7/12/24  Zoe León APRN   LORazepam (ATIVAN) 0.5 MG tablet Take 1 tablet by mouth Every 8 (Eight) Hours As Needed for Anxiety. 7/22/24   Brian Dickson MD   nicotine (NICODERM CQ) 21 MG/24HR patch Place 1 patch on the skin as directed by provider Daily. 6/12/24   Zoe León APRN   nystatin (MYCOSTATIN) 100,000 unit/mL suspension Swish and swallow 5 mL 4 (Four) Times a Day As Needed (for thrush, if needing to take use for 7-10days until symptoms resolve). 8/2/24   Saud Apodaca MD   OLANZapine (zyPREXA) 5 MG tablet Take 1 tablet by mouth Take As Directed. Take 1 tablet nightly x 7 nights starting first day of chemotherapy. Repeat every cycle 8/6/24   Brian Dickson MD   omeprazole (priLOSEC) 40 MG capsule Take 1 capsule by mouth Daily. 7/23/24   ProviderParvin MD   ondansetron (ZOFRAN) 8 MG tablet Take 1 tablet by mouth 3 (Three) Times a Day As Needed for Nausea or Vomiting. 6/21/24   Brian Dickson MD   ondansetron ODT (ZOFRAN-ODT) 4 MG disintegrating tablet Place 2 tablets on the tongue Every 8 (Eight) Hours As Needed for Nausea or Vomiting. 8/5/24   Kamari Tsai MD   prochlorperazine (COMPAZINE) 10 MG tablet Take 1 tablet by mouth Every 6 (Six) Hours As Needed for Nausea. 8/6/24   Brian Dickson MD   traMADol (ULTRAM) 50 MG tablet Take 1 tablet by mouth Every 6 (Six) Hours As Needed for Moderate Pain. 7/17/24   Brian Dickson MD Trelegy Ellipta 100-62.5-25 MCG/ACT inhaler Inhale 1 puff  "Daily. 8/5/24   Aleksandar Edge DO        Social History:   Social History     Tobacco Use    Smoking status: Every Day     Current packs/day: 2.00     Average packs/day: 2.0 packs/day for 42.6 years (85.2 ttl pk-yrs)     Types: Cigarettes     Start date: 1982     Passive exposure: Past    Smokeless tobacco: Never    Tobacco comments:     Pt stopped smoking on 04/01/2022. Pt stated at her appt today 06/12/2024. Pt states she smokes less than a 1/2 ppd      INST. PER ANESTHESIA PROTOCOL   Vaping Use    Vaping status: Former    Substances: Nicotine, Flavoring    Devices: Disposable   Substance Use Topics    Alcohol use: Never    Drug use: Never         Review of Systems:  Review of Systems   Constitutional:  Positive for activity change, appetite change, chills, fatigue and fever.   HENT:  Negative for congestion, ear pain and sore throat.    Eyes:  Negative for pain.   Respiratory:  Positive for cough, shortness of breath and wheezing. Negative for chest tightness.    Cardiovascular:  Negative for chest pain.   Gastrointestinal:  Negative for abdominal pain, diarrhea, nausea and vomiting.   Genitourinary:  Negative for flank pain and hematuria.   Musculoskeletal:  Negative for joint swelling.   Skin:  Negative for pallor.   Neurological:  Positive for weakness. Negative for seizures and headaches.   All other systems reviewed and are negative.       Physical Exam:  /72 (BP Location: Right arm, Patient Position: Lying)   Pulse 102   Temp 98.6 °F (37 °C) (Oral)   Resp 22   Ht 162.6 cm (64\")   Wt 93.2 kg (205 lb 7.5 oz)   LMP  (LMP Unknown)   SpO2 98%   BMI 35.27 kg/m²     Physical Exam  Vitals and nursing note reviewed.   Constitutional:       General: She is in acute distress.      Appearance: Normal appearance. She is ill-appearing. She is not toxic-appearing.   HENT:      Head: Normocephalic and atraumatic.      Mouth/Throat:      Mouth: Mucous membranes are moist.   Eyes:      General: No " scleral icterus.  Cardiovascular:      Rate and Rhythm: Normal rate and regular rhythm. Tachycardia present.      Pulses: Normal pulses.      Heart sounds: Normal heart sounds.   Pulmonary:      Effort: Pulmonary effort is normal. No respiratory distress.      Breath sounds: Examination of the right-middle field reveals decreased breath sounds. Examination of the left-middle field reveals decreased breath sounds. Decreased breath sounds present.   Abdominal:      General: Abdomen is flat.      Palpations: Abdomen is soft.      Tenderness: There is no abdominal tenderness.   Musculoskeletal:         General: Normal range of motion.      Cervical back: Normal range of motion and neck supple.   Skin:     General: Skin is warm and dry.      Capillary Refill: Capillary refill takes less than 2 seconds.   Neurological:      General: No focal deficit present.      Mental Status: She is alert and oriented to person, place, and time. Mental status is at baseline.   Psychiatric:         Mood and Affect: Mood normal.         Behavior: Behavior normal.                  Procedures:  Procedures      Medical Decision Making:      Comorbidities that affect care:    Cancer    External Notes reviewed:    Previous Clinic Note: Oncology clinic note from earlier this week.      The following orders were placed and all results were independently analyzed by me:  Orders Placed This Encounter   Procedures    Blood Culture - Blood,    Blood Culture - Blood,    Urine Culture - Urine,    XR Chest 1 View    [place] Draw    Comprehensive Metabolic Panel    BNP    Single High Sensitivity Troponin T    CBC Auto Differential    Scan Slide    Lactic Acid, Plasma    Urinalysis With Culture If Indicated - Urine, Clean Catch    Urinalysis, Microscopic Only - Urine, Clean Catch    Basic Metabolic Panel    CBC Auto Differential    Procalcitonin    Diet: Regular/House; Fluid Consistency: Thin (IDDSI 0)    Undress & Gown    Continuous Pulse Oximetry     Vital Signs    Vital Signs    Up in Chair    Intake & Output    Weigh Patient    Oral Care    Maintain IV Access    Telemetry - Place Orders & Notify Provider of Results When Patient Experiences Acute Chest Pain, Dysrhythmia or Respiratory Distress    Code Status and Medical Interventions: CPR (Attempt to Resuscitate); Full Support    Hospitalist (on-call MD unless specified)    IP General Consult (Use specialty-specific consult if known)    PT Consult: Eval & Treat Functional Mobility Below Baseline    Oxygen Therapy- Nasal Cannula; Titrate 1-6 LPM Per SpO2; 90 - 95%    ECG 12 Lead ED Triage Standing Order; SOA    Insert Peripheral IV    Insert Peripheral IV    Inpatient Admission    CBC & Differential    Green Top (Gel)    Lavender Top    Gold Top - SST    Light Blue Top       Medications Given in the Emergency Department:  Medications   sodium chloride 0.9 % flush 10 mL (has no administration in time range)   sodium chloride 0.9 % flush 10 mL (10 mL Intravenous Given 8/10/24 2042)   sodium chloride 0.9 % flush 10 mL (has no administration in time range)   sodium chloride 0.9 % infusion 40 mL (has no administration in time range)   Enoxaparin Sodium (LOVENOX) syringe 40 mg (40 mg Subcutaneous Given 8/10/24 1815)   nitroglycerin (NITROSTAT) SL tablet 0.4 mg (has no administration in time range)   sennosides-docusate (PERICOLACE) 8.6-50 MG per tablet 2 tablet (has no administration in time range)     And   polyethylene glycol (MIRALAX) packet 17 g (has no administration in time range)     And   bisacodyl (DULCOLAX) EC tablet 5 mg (has no administration in time range)     And   bisacodyl (DULCOLAX) suppository 10 mg (has no administration in time range)   Pharmacy to Dose Zosyn (has no administration in time range)   piperacillin-tazobactam (ZOSYN) IVPB 3.375 g IVPB in 100 mL NS (VTB) (has no administration in time range)   albuterol (PROVENTIL) nebulizer solution 0.083% 2.5 mg/3mL (has no administration in time  range)   atorvastatin (LIPITOR) tablet 10 mg (10 mg Oral Given 8/10/24 2041)   buPROPion SR (WELLBUTRIN SR) 12 hr tablet 150 mg (150 mg Oral Given 8/10/24 2041)   Cariprazine HCl (VRAYLAR) capsule capsule 1.5 mg (1.5 mg Oral Given 8/10/24 1816)   escitalopram (LEXAPRO) tablet 20 mg (20 mg Oral Given 8/10/24 1818)   gabapentin (NEURONTIN) capsule 300 mg (300 mg Oral Given 8/10/24 2041)   HYDROcodone-acetaminophen (NORCO) 5-325 MG per tablet 1 tablet (1 tablet Oral Given 8/10/24 2041)   ipratropium-albuterol (DUO-NEB) nebulizer solution 3 mL (3 mL Nebulization Given 8/10/24 1938)   LORazepam (ATIVAN) tablet 0.5 mg (has no administration in time range)   melatonin tablet 5 mg (has no administration in time range)   nicotine (NICODERM CQ) 21 MG/24HR patch 1 patch (1 patch Transdermal Medication Applied 8/10/24 1819)   traMADol (ULTRAM) tablet 50 mg (50 mg Oral Given 8/10/24 1804)   budesonide-formoterol (SYMBICORT) 160-4.5 MCG/ACT inhaler 2 puff (2 puffs Inhalation Given 8/10/24 1941)     And   tiotropium (SPIRIVA RESPIMAT) 2.5 mcg/act aerosol solution inhaler (2 puffs Inhalation Not Given 8/10/24 1940)   sepsis fluid NS 0.9 % bolus 2,127 mL (2,127 mL Intravenous New Bag 8/10/24 1529)   vancomycin IVPB 2000 mg in 0.9% Sodium Chloride 500 mL (2,000 mg Intravenous New Bag 8/10/24 1617)   cefepime 2000 mg IVPB in 100 mL NS (VTB) (0 mg Intravenous Stopped 8/10/24 1617)   ondansetron (ZOFRAN) injection 4 mg (4 mg Intravenous Given 8/10/24 1529)   piperacillin-tazobactam (ZOSYN) IVPB 3.375 g IVPB in 100 mL NS (VTB) (3.375 g Intravenous New Bag 8/10/24 1819)        ED Course:       EKG: Sinus tachycardia 3 to 112 bpm  Left atrial enlargement  No acute ischemia.    Labs:    Lab Results (last 24 hours)       Procedure Component Value Units Date/Time    CBC & Differential [[id number]]  (Abnormal) Collected: 08/10/24 1243    Specimen: Blood Updated: 08/10/24 1314    Narrative:      The following orders were created for panel order  CBC & Differential.  Procedure                               Abnormality         Status                     ---------                               -----------         ------                     CBC Auto Differential[[id number]]        Abnormal            Final result               Scan Slide[[id number]]                                       Final result                 Please view results for these tests on the individual orders.    Comprehensive Metabolic Panel [[id number]]  (Abnormal) Collected: 08/10/24 1243    Specimen: Blood Updated: 08/10/24 1310     Glucose 147 mg/dL      BUN 12 mg/dL      Creatinine 0.80 mg/dL      Sodium 137 mmol/L      Potassium 4.0 mmol/L      Comment: Slight hemolysis detected by analyzer. Result may be falsely elevated.        Chloride 98 mmol/L      CO2 25.7 mmol/L      Calcium 8.6 mg/dL      Total Protein 6.5 g/dL      Albumin 3.4 g/dL      ALT (SGPT) 37 U/L      AST (SGOT) 41 U/L      Comment: Slight hemolysis detected by analyzer. Result may be falsely elevated.        Alkaline Phosphatase 156 U/L      Total Bilirubin 0.4 mg/dL      Globulin 3.1 gm/dL      A/G Ratio 1.1 g/dL      BUN/Creatinine Ratio 15.0     Anion Gap 13.3 mmol/L      eGFR 86.1 mL/min/1.73     Narrative:      GFR Normal >60  Chronic Kidney Disease <60  Kidney Failure <15      BNP [[id number]]  (Abnormal) Collected: 08/10/24 1243    Specimen: Blood Updated: 08/10/24 1306     proBNP 928.5 pg/mL     Narrative:      This assay is used as an aid in the diagnosis of individuals suspected of having heart failure. It can be used as an aid in the diagnosis of acute decompensated heart failure (ADHF) in patients presenting with signs and symptoms of ADHF to the emergency department (ED). In addition, NT-proBNP of <300 pg/mL indicates ADHF is not likely.    Age Range Result Interpretation  NT-proBNP Concentration (pg/mL:      <50             Positive            >450                   Gray                 300-450                     Negative             <300    50-75           Positive            >900                  Gray                300-900                  Negative            <300      >75             Positive            >1800                  Gray                300-1800                  Negative            <300    Single High Sensitivity Troponin T [[id number]]  (Abnormal) Collected: 08/10/24 1243    Specimen: Blood Updated: 08/10/24 1308     HS Troponin T 20 ng/L     Narrative:      High Sensitive Troponin T Reference Range:  <14.0 ng/L- Negative Female for AMI  <22.0 ng/L- Negative Male for AMI  >=14 - Abnormal Female indicating possible myocardial injury.  >=22 - Abnormal Male indicating possible myocardial injury.   Clinicians would have to utilize clinical acumen, EKG, Troponin, and serial changes to determine if it is an Acute Myocardial Infarction or myocardial injury due to an underlying chronic condition.         CBC Auto Differential [[id number]]  (Abnormal) Collected: 08/10/24 1243    Specimen: Blood Updated: 08/10/24 1314     WBC 78.11 10*3/mm3      RBC 3.11 10*6/mm3      Hemoglobin 9.4 g/dL      Hematocrit 28.4 %      MCV 91.3 fL      MCH 30.2 pg      MCHC 33.1 g/dL      RDW 17.9 %      RDW-SD 57.1 fl      MPV 9.3 fL      Platelets 171 10*3/mm3      Neutrophil % 86.1 %      Lymphocyte % 2.4 %      Monocyte % 0.3 %      Eosinophil % 0.0 %      Basophil % 0.0 %      Immature Grans % 11.2 %      Neutrophils, Absolute 67.25 10*3/mm3      Lymphocytes, Absolute 1.88 10*3/mm3      Monocytes, Absolute 0.23 10*3/mm3      Eosinophils, Absolute 0.00 10*3/mm3      Basophils, Absolute 0.03 10*3/mm3      Immature Grans, Absolute 8.72 10*3/mm3      nRBC 0.0 /100 WBC     Narrative:      Appended report. These results have been appended to a previously verified report.    Scan Slide [[id number]] Collected: 08/10/24 1243    Specimen: Blood Updated: 08/10/24 1314     Anisocytosis Slight/1+     WBC Morphology Normal     Platelet Estimate  "Decreased    Procalcitonin [[id number]]  (Normal) Collected: 08/10/24 1243    Specimen: Blood Updated: 08/10/24 1755     Procalcitonin 0.08 ng/mL     Narrative:      As a Marker for Sepsis (Non-Neonates):    1. <0.5 ng/mL represents a low risk of severe sepsis and/or septic shock.  2. >2 ng/mL represents a high risk of severe sepsis and/or septic shock.    As a Marker for Lower Respiratory Tract Infections that require antibiotic therapy:    PCT on Admission    Antibiotic Therapy       6-12 Hrs later    >0.5                Strongly Recommended  >0.25 - <0.5        Recommended  0.1 - 0.25          Discouraged              Remeasure/reassess PCT  <0.1                Strongly Discouraged     Remeasure/reassess PCT    As 28 day mortality risk marker: \"Change in Procalcitonin Result\" (>80% or <=80%) if Day 0 (or Day 1) and Day 4 values are available. Refer to [email or web address]-[email or web address]-[email or web address]    Change in PCT <=80%  A decrease of PCT levels below or equal to 80% defines a positive change in PCT test result representing a higher risk for 28-day all-cause mortality of patients diagnosed with severe sepsis for septic shock.    Change in PCT >80%  A decrease of PCT levels of more than 80% defines a negative change in PCT result representing a lower risk for 28-day all-cause mortality of patients diagnosed with severe sepsis or septic shock.    This test is Prognostic not Diagnostic, if elevated correlate with clinical findings before administering antibiotic treatment.        Urinalysis With Culture If Indicated - Urine, Clean Catch [[id number]]  (Abnormal) Collected: 08/10/24 1422    Specimen: Urine, Clean Catch Updated: 08/10/24 1432     Color, UA Yellow     Appearance, UA Cloudy     pH, UA 6.0     Specific Gravity, UA 1.017     Glucose, UA Negative     Ketones, UA Negative     Bilirubin, UA Negative     Blood, UA Negative     Protein, UA 30 mg/dL (1+)     Leuk Esterase, UA Small (1+)     Nitrite, UA Negative     " Urobilinogen, UA 0.2 E.U./dL    Narrative:      In absence of clinical symptoms, the presence of pyuria, bacteria, and/or nitrites on the urinalysis result does not correlate with infection.    Urinalysis, Microscopic Only - Urine, Clean Catch [[id number]]  (Abnormal) Collected: 08/10/24 1422    Specimen: Urine, Clean Catch Updated: 08/10/24 1432     RBC, UA 0-2 /HPF      WBC, UA 11-20 /HPF      Bacteria, UA None Seen /HPF      Squamous Epithelial Cells, UA 13-20 /HPF      Hyaline Casts, UA 3-6 /LPF      Methodology Automated Microscopy    Urine Culture - Urine, Urine, Clean Catch [[id number]] Collected: 08/10/24 1422    Specimen: Urine, Clean Catch Updated: 08/10/24 1431    Blood Culture - Blood, Arm, Right [[id number]] Collected: 08/10/24 1433    Specimen: Blood from Arm, Right Updated: 08/10/24 1439    Blood Culture - Blood, Arm, Left [[id number]] Collected: 08/10/24 1433    Specimen: Blood from Arm, Left Updated: 08/10/24 1439    Lactic Acid, Plasma [[id number]]  (Normal) Collected: 08/10/24 1433    Specimen: Blood Updated: 08/10/24 1454     Lactate 1.3 mmol/L              Imaging:    XR Chest 1 View    Result Date: 8/10/2024  XR CHEST 1 VW Date of Exam: 8/10/2024 1:08 PM EDT Indication: SOA Triage Protocol Comparison: Chest x-ray 8/5/2024 Findings: The heart is stable in size. There is a left chest wall port catheter with the distal tip overlying the right atrium. Some reticulonodular interstitial prominence is noted in the right lower lobe without well-defined consolidation. No pneumothorax or large pleural effusion.     Impression: 1. Mild inflammatory changes suspected at the right lung base. Electronically Signed: Mabel Pabon MD  8/10/2024 1:16 PM EDT  Workstation ID: [id number]       Differential Diagnosis and Discussion:    Weakness: Based on the patient's history, signs, and symptoms, the diffential diagnosis includes but is not limited to meningitis, stroke, sepsis, subarachnoid hemorrhage, intracranial  bleeding, encephalitis, acute uti, dehydration, MS, myasthenia gravis, Guillan Rodney, migraine variant, neuromuscular disorders vertigo, electrolyte imbalance, and metabolic disorders.    All labs were reviewed and interpreted by me.  All X-rays impressions were independently interpreted by me.  EKG was interpreted by me.    MDM     Amount and/or Complexity of Data Reviewed  Clinical lab tests: reviewed  Tests in the radiology section of CPT®: reviewed  Tests in the medicine section of CPT®: reviewed  Decide to obtain previous medical records or to obtain history from someone other than the patient: yes         Critical Care Note: Total Critical Care time of 50 minutes. Total critical care time documented does not include time spent on separately billed procedures for services of nurses or physician assistants. I personally saw and examined the patient. I have reviewed all diagnostic interpretations and treatment plans as written. I was present for the key portions of any procedures performed and the inclusive time noted in any critical care statement. Critical care time includes patient management by me, time spent at the patients bedside,  time to review lab and imaging results, discussing patient care, documentation in the medical record, and time spent with family or caregiver.    Sepsis criteria was met in the emergency department and the Sepsis protocol (including antibiotic administration) was initiated.      SIRS criteria considered:   1.  Temperature > 100.4 or <96.8    2.  Heart Rate > 90    3.  Respiratory Rate > 22    4.  WBC > 12K or <4K.             Severe Sepsis:     Respiratory: Mechanical Ventilation or Bipap  Hypotension: SBP > 90 or MAP < 65  Renal: Creatinine > 2  Metabolic: Lactic Acid > 2  Hematologic: Platelets < 100K or INR > 1.5  Hepatic: BILI  >  2  CNS: Sudden AMS     Septic Shock:     Severe Sepsis + Persistent hypotension or Lactic Acid > 4     Normal saline bolus, Antibiotics, and final  disposition was based on these definitions.        Sepsis was recognized at 1450    Antibiotics were ordered.     30 cc/kg bolus was  indicated.       Patient did not receive the recommended 30ml/kg fluid bolus for sepsis because it would be harmful or detrimental to the patient.      The patient was ordered 2127 mL of fluids.    The patient presents with 2 out of the 4 SIRS criteria and a suspected source for sepsis.  Patient was evaluated and placed on a cardiac monitor for fear of worsening tachycardia and life-threatening hypotension.  Patient was monitored for shock and signs of end-organ damage.  Mental status was repeatedly checked throughout the ED stay.  Medications were ordered by me which includes cefepime and vancomycin.  The case was discussed at length with admitting physician.    Total Critical Care time of 50 minutes. Total critical care time documented does not include time spent on separately billed procedures for services of nurses or physician assistants. I personally saw and examined the patient. I have reviewed all diagnostic interpretations and treatment plans as written. I was present for the key portions of any procedures performed and the inclusive time noted in any critical care statement. Critical care time includes patient management by me, time spent at the patients bedside,  time to review lab and imaging results, discussing patient care, documentation in the medical record, and time spent with family or caregiver.    Patient Care Considerations:    CT CHEST: I considered ordering a CT scan of the chest, however this can be ordered as an inpatient.      Consultants/Shared Management Plan:    Hospitalist: I have discussed the case with hospitalist who agrees to accept the patient for admission.    Social Determinants of Health:    Patient is unable to carry out activities of daily life. Escalation of care is necessary.       Disposition and Care Coordination:    Admit:   Through  independent evaluation of the patient's history, physical, and imperical data, the patient meets criteria for inpatient admission to the hospital.        Final diagnoses:   Sepsis, due to unspecified organism, unspecified whether acute organ dysfunction present   Hypotension, unspecified hypotension type   Weakness   Leukocytosis, unspecified type        ED Disposition       ED Disposition   Decision to Admit    Condition   --    Comment   Level of Care: Progressive Care [20]   Diagnosis: CAP (community acquired pneumonia) [328247]   Admitting Physician: BUDDY PICHARDO [[id number]]   Certification: I Certify That Inpatient Hospital Services Are Medically Necessary For Greater Than 2 Midnights                 This medical record created using voice recognition software.             Zion Hernandez, DO  08/10/24 2752

## 2024-08-10 NOTE — CASE MANAGEMENT/SOCIAL WORK
Discharge Planning Assessment  [facility]     Patient Name: Lluvia Archer  MRN: [id number]  Today's Date: 8/10/2024    Admit Date: 8/10/2024        Discharge Needs Assessment       Row Name 08/10/24 1628       Living Environment    People in Home spouse    Name(s) of People in Home lives with , Mike Archer    Current Living Arrangements home    Potentially Unsafe Housing Conditions none    In the past 12 months has the electric, gas, oil, or water company threatened to shut off services in your home? No    Primary Care Provided by self;spouse/significant other    Provides Primary Care For no one, unable/limited ability to care for self    Family Caregiver if Needed spouse;child(scotty), adult    Family Caregiver Names , Mike and daughter Daria Archer    Quality of Family Relationships helpful;involved;supportive    Able to Return to Prior Arrangements yes       Resource/Environmental Concerns    Resource/Environmental Concerns none    Transportation Concerns none       Transportation Needs    In the past 12 months, has lack of transportation kept you from medical appointments or from getting medications? no    In the past 12 months, has lack of transportation kept you from meetings, work, or from getting things needed for daily living? No       Food Insecurity    Within the past 12 months, you worried that your food would run out before you got the money to buy more. Never true    Within the past 12 months, the food you bought just didn't last and you didn't have money to get more. Never true       Transition Planning    Patient/Family Anticipates Transition to home with family    Patient/Family Anticipated Services at Transition durable medical equipment    Transportation Anticipated family or friend will provide       Discharge Needs Assessment    Readmission Within the Last 30 Days no previous admission in last 30 days    Current Outpatient/Agency/Support Group clinic(s)    Equipment Currently  Used at Home oxygen    Concerns to be Addressed care coordination/care conferences;discharge planning    Anticipated Changes Related to Illness none    Equipment Needed After Discharge other (see comments)                   Discharge Plan    No documentation.                 Continued Care and Services - Admitted Since 8/10/2024    No active coordination exists for this encounter.          Demographic Summary       Row Name 08/10/24 1616       General Information    Admission Type inpatient    Arrived From home    Referral Source emergency department    Reason for Consult discharge planning    Preferred Language English       Contact Information    Permission Granted to Share Info With ;family/designee;, insurance    Contact Information Obtained for ;, insurance                   Functional Status       Row Name 08/10/24 1617       Functional Status    Usual Activity Tolerance moderate    Current Activity Tolerance moderate       Physical Activity    On average, how many days per week do you engage in moderate to strenuous exercise (like a brisk walk)? 0 days    On average, how many minutes do you engage in exercise at this level? 0 min    Number of minutes of exercise per week 0       Assessment of Health Literacy    How often do you have someone help you read hospital materials? Never    How often do you have problems learning about your medical condition because of difficulty understanding written information? Never    How often do you have a problem understanding what is told to you about your medical condition? Never    How confident are you filling out medical forms by yourself? Extremely    Health Literacy Excellent       Functional Status, IADL    Medications assistive person    Meal Preparation assistive person    Housekeeping assistive person    Laundry assistive person    Shopping assistive person       Mental Status    General Appearance WDL WDL        Mental Status Summary    Recent Changes in Mental Status/Cognitive Functioning no changes       Employment/    Employment Status disabled    Current or Previous Occupation not applicable                   Psychosocial    No documentation.                  Abuse/Neglect       Row Name 08/10/24 1626       Personal Safety    Feels Unsafe at Home or Work/School no    Feels Threatened by Someone no    Does Anyone Try to Keep You From Having Contact with Others or Doing Things Outside Your Home? no    Physical Signs of Abuse Present no                   Legal       Row Name 08/10/24 1626       Financial Resource Strain    How hard is it for you to pay for the very basics like food, housing, medical care, and heating? Not hard       Financial/Legal    Source of Income disability    Application for Public Assistance not applied       Legal    Criminal Activity/Legal Involvement none                   Substance Abuse       Row Name 08/10/24 1626       Substance Use    Substance Use Status never used       Family Member Substance Use (#4)    Family History of Substance Use none    Previous Substance Use Treatment none                   Patient Forms    No documentation.                 SW met with patient and , Mike at bedside. Pt lives with her  and normally able to complete most ADL's with assistance from  when needed. Pt's daughter, Daria Archer is also a good support for patient. Pt does have a Cancer diagnosis and sees oncologist Dr. Dickson. Pt also sees Dr. Edge for her PCP and uses [facility] here in [place]. PT is on disability and does not express any financial concerns at this time. Pt is on 3L of O2 at home through [employer] but would like a portable O2 Concentrator that is easy to carry if able. Pt is on medication for Depression.    ROBERT Adams

## 2024-08-10 NOTE — PLAN OF CARE
Problem: Adult Inpatient Plan of Care  Goal: Plan of Care Review  Outcome: Ongoing, Progressing  Flowsheets (Taken 8/10/2024 7790)  Progress: no change  Plan of Care Reviewed With: patient  Goal: Patient-Specific Goal (Individualized)  Outcome: Ongoing, Progressing  Goal: Absence of Hospital-Acquired Illness or Injury  Outcome: Ongoing, Progressing  Goal: Optimal Comfort and Wellbeing  Outcome: Ongoing, Progressing  Goal: Readiness for Transition of Care  Outcome: Ongoing, Progressing   Goal Outcome Evaluation:  Plan of Care Reviewed With: patient        Progress: no change

## 2024-08-11 LAB
ANION GAP SERPL CALCULATED.3IONS-SCNC: 9.4 MMOL/L (ref 5–15)
ANISOCYTOSIS BLD QL: ABNORMAL
BACTERIA SPEC AEROBE CULT: ABNORMAL
BUN SERPL-MCNC: 10 MG/DL (ref 6–20)
BUN/CREAT SERPL: 15.6 (ref 7–25)
CALCIUM SPEC-SCNC: 7.8 MG/DL (ref 8.6–10.5)
CHLORIDE SERPL-SCNC: 103 MMOL/L (ref 98–107)
CO2 SERPL-SCNC: 24.6 MMOL/L (ref 22–29)
CREAT SERPL-MCNC: 0.64 MG/DL (ref 0.57–1)
DACRYOCYTES BLD QL SMEAR: ABNORMAL
DEPRECATED RDW RBC AUTO: 58.7 FL (ref 37–54)
EGFRCR SERPLBLD CKD-EPI 2021: 103.2 ML/MIN/1.73
ERYTHROCYTE [DISTWIDTH] IN BLOOD BY AUTOMATED COUNT: 18.1 % (ref 12.3–15.4)
GLUCOSE SERPL-MCNC: 79 MG/DL (ref 65–99)
HCT VFR BLD AUTO: 21.8 % (ref 34–46.6)
HGB BLD-MCNC: 7.2 G/DL (ref 12–15.9)
IRON 24H UR-MRATE: 150 MCG/DL (ref 37–145)
IRON SATN MFR SERPL: 79 % (ref 20–50)
L PNEUMO1 AG UR QL IA: NEGATIVE
LYMPHOCYTES # BLD MANUAL: 1.45 10*3/MM3 (ref 0.7–3.1)
MCH RBC QN AUTO: 30.8 PG (ref 26.6–33)
MCHC RBC AUTO-ENTMCNC: 33 G/DL (ref 31.5–35.7)
MCV RBC AUTO: 93.2 FL (ref 79–97)
MRSA DNA SPEC QL NAA+PROBE: ABNORMAL
NEUTROPHILS # BLD AUTO: 46.97 10*3/MM3 (ref 1.7–7)
NEUTROPHILS NFR BLD MANUAL: 97 % (ref 42.7–76)
NEUTS HYPERSEG # BLD: ABNORMAL 10*3/UL
OVALOCYTES BLD QL SMEAR: ABNORMAL
PLATELET # BLD AUTO: 107 10*3/MM3 (ref 140–450)
PMV BLD AUTO: 9.7 FL (ref 6–12)
POTASSIUM SERPL-SCNC: 3.7 MMOL/L (ref 3.5–5.2)
RBC # BLD AUTO: 2.34 10*6/MM3 (ref 3.77–5.28)
S PNEUM AG SPEC QL LA: POSITIVE
SMALL PLATELETS BLD QL SMEAR: ABNORMAL
SODIUM SERPL-SCNC: 137 MMOL/L (ref 136–145)
STOMATOCYTES BLD QL SMEAR: ABNORMAL
TIBC SERPL-MCNC: 191 MCG/DL (ref 298–536)
TRANSFERRIN SERPL-MCNC: 128 MG/DL (ref 200–360)
VARIANT LYMPHS NFR BLD MANUAL: 3 % (ref 19.6–45.3)
WBC NRBC COR # BLD AUTO: 48.42 10*3/MM3 (ref 3.4–10.8)

## 2024-08-11 PROCEDURE — 25010000002 PIPERACILLIN SOD-TAZOBACTAM PER 1 G: Performed by: STUDENT IN AN ORGANIZED HEALTH CARE EDUCATION/TRAINING PROGRAM

## 2024-08-11 PROCEDURE — 80048 BASIC METABOLIC PNL TOTAL CA: CPT | Performed by: STUDENT IN AN ORGANIZED HEALTH CARE EDUCATION/TRAINING PROGRAM

## 2024-08-11 PROCEDURE — 94799 UNLISTED PULMONARY SVC/PX: CPT

## 2024-08-11 PROCEDURE — 83540 ASSAY OF IRON: CPT | Performed by: INTERNAL MEDICINE

## 2024-08-11 PROCEDURE — 87070 CULTURE OTHR SPECIMN AEROBIC: CPT | Performed by: INTERNAL MEDICINE

## 2024-08-11 PROCEDURE — 87205 SMEAR GRAM STAIN: CPT | Performed by: INTERNAL MEDICINE

## 2024-08-11 PROCEDURE — 99233 SBSQ HOSP IP/OBS HIGH 50: CPT | Performed by: INTERNAL MEDICINE

## 2024-08-11 PROCEDURE — 94664 DEMO&/EVAL PT USE INHALER: CPT

## 2024-08-11 PROCEDURE — 25810000003 SODIUM CHLORIDE 0.9 % SOLUTION: Performed by: INTERNAL MEDICINE

## 2024-08-11 PROCEDURE — 85025 COMPLETE CBC W/AUTO DIFF WBC: CPT | Performed by: STUDENT IN AN ORGANIZED HEALTH CARE EDUCATION/TRAINING PROGRAM

## 2024-08-11 PROCEDURE — 87641 MR-STAPH DNA AMP PROBE: CPT | Performed by: INTERNAL MEDICINE

## 2024-08-11 PROCEDURE — 84466 ASSAY OF TRANSFERRIN: CPT | Performed by: INTERNAL MEDICINE

## 2024-08-11 PROCEDURE — 25010000002 ENOXAPARIN PER 10 MG: Performed by: STUDENT IN AN ORGANIZED HEALTH CARE EDUCATION/TRAINING PROGRAM

## 2024-08-11 PROCEDURE — 36415 COLL VENOUS BLD VENIPUNCTURE: CPT | Performed by: STUDENT IN AN ORGANIZED HEALTH CARE EDUCATION/TRAINING PROGRAM

## 2024-08-11 PROCEDURE — 85007 BL SMEAR W/DIFF WBC COUNT: CPT | Performed by: STUDENT IN AN ORGANIZED HEALTH CARE EDUCATION/TRAINING PROGRAM

## 2024-08-11 RX ORDER — SODIUM CHLORIDE 9 MG/ML
100 INJECTION, SOLUTION INTRAVENOUS CONTINUOUS
Status: DISCONTINUED | OUTPATIENT
Start: 2024-08-11 | End: 2024-08-12

## 2024-08-11 RX ORDER — LIDOCAINE 4 G/G
1 PATCH TOPICAL
Status: DISCONTINUED | OUTPATIENT
Start: 2024-08-11 | End: 2024-08-12

## 2024-08-11 RX ORDER — LORAZEPAM 0.5 MG/1
0.5 TABLET ORAL EVERY 8 HOURS PRN
Status: DISCONTINUED | OUTPATIENT
Start: 2024-08-11 | End: 2024-08-12

## 2024-08-11 RX ADMIN — CARIPRAZINE 1.5 MG: 1.5 CAPSULE, GELATIN COATED ORAL at 08:52

## 2024-08-11 RX ADMIN — ESCITALOPRAM OXALATE 20 MG: 10 TABLET ORAL at 08:50

## 2024-08-11 RX ADMIN — PIPERACILLIN AND TAZOBACTAM 3.38 G: 3; .375 INJECTION, POWDER, FOR SOLUTION INTRAVENOUS at 23:56

## 2024-08-11 RX ADMIN — IPRATROPIUM BROMIDE AND ALBUTEROL SULFATE 3 ML: .5; 3 SOLUTION RESPIRATORY (INHALATION) at 07:19

## 2024-08-11 RX ADMIN — TRAMADOL HYDROCHLORIDE 50 MG: 50 TABLET, FILM COATED ORAL at 08:57

## 2024-08-11 RX ADMIN — PIPERACILLIN AND TAZOBACTAM 3.38 G: 3; .375 INJECTION, POWDER, FOR SOLUTION INTRAVENOUS at 06:01

## 2024-08-11 RX ADMIN — TRAMADOL HYDROCHLORIDE 50 MG: 50 TABLET, FILM COATED ORAL at 01:45

## 2024-08-11 RX ADMIN — BUPROPION HYDROCHLORIDE 150 MG: 150 TABLET, EXTENDED RELEASE ORAL at 21:11

## 2024-08-11 RX ADMIN — SODIUM CHLORIDE 100 ML/HR: 9 INJECTION, SOLUTION INTRAVENOUS at 17:50

## 2024-08-11 RX ADMIN — IPRATROPIUM BROMIDE AND ALBUTEROL SULFATE 3 ML: .5; 3 SOLUTION RESPIRATORY (INHALATION) at 01:34

## 2024-08-11 RX ADMIN — SODIUM CHLORIDE 100 ML/HR: 9 INJECTION, SOLUTION INTRAVENOUS at 10:28

## 2024-08-11 RX ADMIN — LIDOCAINE 1 PATCH: 4 PATCH TOPICAL at 09:49

## 2024-08-11 RX ADMIN — Medication 10 ML: at 21:11

## 2024-08-11 RX ADMIN — NICOTINE 1 PATCH: 21 PATCH, EXTENDED RELEASE TRANSDERMAL at 17:52

## 2024-08-11 RX ADMIN — GABAPENTIN 300 MG: 300 CAPSULE ORAL at 08:50

## 2024-08-11 RX ADMIN — Medication 10 ML: at 08:53

## 2024-08-11 RX ADMIN — TIOTROPIUM BROMIDE INHALATION SPRAY 2 PUFF: 3.12 SPRAY, METERED RESPIRATORY (INHALATION) at 07:19

## 2024-08-11 RX ADMIN — BUDESONIDE AND FORMOTEROL FUMARATE DIHYDRATE 2 PUFF: 160; 4.5 AEROSOL RESPIRATORY (INHALATION) at 07:19

## 2024-08-11 RX ADMIN — ATORVASTATIN CALCIUM 10 MG: 10 TABLET, FILM COATED ORAL at 21:11

## 2024-08-11 RX ADMIN — IPRATROPIUM BROMIDE AND ALBUTEROL SULFATE 3 ML: .5; 3 SOLUTION RESPIRATORY (INHALATION) at 12:04

## 2024-08-11 RX ADMIN — PIPERACILLIN AND TAZOBACTAM 3.38 G: 3; .375 INJECTION, POWDER, FOR SOLUTION INTRAVENOUS at 15:30

## 2024-08-11 RX ADMIN — LORAZEPAM 0.5 MG: 0.5 TABLET ORAL at 12:53

## 2024-08-11 RX ADMIN — HYDROCODONE BITARTRATE AND ACETAMINOPHEN 1 TABLET: 5; 325 TABLET ORAL at 04:56

## 2024-08-11 RX ADMIN — BUPROPION HYDROCHLORIDE 150 MG: 150 TABLET, EXTENDED RELEASE ORAL at 08:50

## 2024-08-11 RX ADMIN — ENOXAPARIN SODIUM 40 MG: 100 INJECTION SUBCUTANEOUS at 17:52

## 2024-08-11 RX ADMIN — Medication 5 MG: at 21:11

## 2024-08-11 RX ADMIN — LORAZEPAM 0.5 MG: 0.5 TABLET ORAL at 21:11

## 2024-08-11 RX ADMIN — GABAPENTIN 300 MG: 300 CAPSULE ORAL at 21:11

## 2024-08-11 RX ADMIN — GABAPENTIN 300 MG: 300 CAPSULE ORAL at 15:30

## 2024-08-11 RX ADMIN — IPRATROPIUM BROMIDE AND ALBUTEROL SULFATE 3 ML: .5; 3 SOLUTION RESPIRATORY (INHALATION) at 18:32

## 2024-08-11 RX ADMIN — BUDESONIDE AND FORMOTEROL FUMARATE DIHYDRATE 2 PUFF: 160; 4.5 AEROSOL RESPIRATORY (INHALATION) at 18:33

## 2024-08-11 NOTE — PLAN OF CARE
Goal Outcome Evaluation:           Progress: no change  Outcome Evaluation: Pt AOx4, VSS, 3L NC on baseline O2, Pain and anxiety x3 prn doses per MAR, no acute changes overnight

## 2024-08-11 NOTE — PROGRESS NOTES
[facility]   Hospitalist Progress Note  Date: 2024  Patient Name: Lluvia Archer  : 1965  MRN: [id number]  Date of admission: 8/10/2024      Subjective   Subjective     Chief Complaint: Follow up for shortness of breath/generalized weakness     Summary: 59 y/o F with COPD, chronic oxygen use 3 L, metastatic lung cancer on chemotherapy, chemotherapy induced neutropenia, recently received Neulasta on 24 who presented with sob. Hypotensive and tachycardic. SpO2 99% on 4L. Lactate WNL. WBC 78.1.  CXR with Mild inflammatory changes suspected at the right lung base. BP responded to IV fluids.proBNP 928. Pro-Erik 0.08. on IV broad spectrum abx.     Interval Followup:   Afebrile.  Blood pressure soft.  On baseline 3 L oxygen  A little bit better.  Not short of breath.  Coughing up yellow sputum which is thicker than usual.  Denies fever or chills.  No wheezing.  No chest pain or palpitations.  No edema or orthopnea.  Complaining of trouble sleeping and achiness in her mid back      Objective   Objective     Vitals:   Temp:  [98 °F (36.7 °C)-99 °F (37.2 °C)] 99 °F (37.2 °C)  Heart Rate:  [] 79  Resp:  [18-24] 20  BP: ()/(36-75) 99/61  Flow (L/min):  [3-4] 3  Physical Exam    Constitutional: conversant, NAD   Respiratory: Fair aeration, bilateral rhonchi, nonlabored respirations    Cardiovascular:  RRR, no edema   Gastrointestinal: soft, nondistended, nontender   Neurologic: Alert, CN grossly intact, speech clear   Skin: Extremities warm    Result Review    Result Review:  I have personally reviewed the following over the last 24 hours (07:00 to 07:00) and agree with the following findings  [x]  Laboratory  CBC          2024    08:46 8/10/2024    12:43 2024    04:45   CBC   WBC 14.39  78.11  48.42    RBC 2.93  3.11  2.34    Hemoglobin 8.6  9.4  7.2    Hematocrit 27.2  28.4  21.8    MCV 92.8  91.3  93.2    MCH 29.4  30.2  30.8    MCHC 31.6  33.1  33.0    RDW 17.5  17.9  18.1     Platelets 113  171  107      CMP          8/6/2024    08:46 8/10/2024    12:43 8/11/2024    04:45   CMP   Glucose 113  147  79    BUN 5  12  10    Creatinine 0.84  0.80  0.64    EGFR 81.2  86.1  103.2    Sodium 138  137  137    Potassium 3.3  4.0  3.7    Chloride 101  98  103    Calcium 8.4  8.6  7.8    Total Protein 5.9  6.5     Albumin 3.5  3.4     Globulin 2.4  3.1     Total Bilirubin 0.4  0.4     Alkaline Phosphatase 154  156     AST (SGOT) 20  41     ALT (SGPT) 21  37     Albumin/Globulin Ratio 1.5  1.1     BUN/Creatinine Ratio 6.0  15.0  15.6    Anion Gap 7.0  13.3  9.4          [x]  Microbiology    [x]  Radiology images personally reviewed and independently interpreted: Chest x-ray without focal consolidation, no pleural effusions, do not appreciate increased vascular congestion  [x]  EKG/Telemetry monitor personally reviewed and independently interpreted: NSR  []  Cardiology/Vascular   []  Pathology  []  Old records  []  Other:    Assessment & Plan   Assessment / Plan     Assessment/Plan:  Sepsis secondary to pneumonia  Pneumonia from unspecified bacterial organism  Hypotension  Chronic normocytic anemia  Chronic hypoxic respiratory failure  Metastatic lung cancer on chemotherapy  COPD without exacerbation  Musculoskeletal back pain  Referral neuropathy, unspecified  Trouble sleeping  History of anxiety depression  Immunocompromised          Reports change in quality and quantity of sputum  No wheezing on exam; hold off on steroids  Remains afebrile.  White count down to 48.4.  Did receive Neulasta a few days ago  Check strep and Legionella urinary antigen, MRSA nares PCR, sputum culture if able  Blood cultures pending  Continue IV vancomycin and Zosyn  Continue normal saline 100 cc/h  Back on baseline 3 L oxygen, continue to maintain SpO2 above 90%  Continue Symbicort and Spiriva  Continue DuoNebs 4 times a day  Hemoglobin 7.2.  Down from 9.4.  No bleeding events low suspicion.  Likely from chemotherapy.   Check iron profile.  Will trend and transfuse for hemoglobin less than 7  Platelets 107.  Also likely chemotherapy-induced.  No indication for transfusion. Monitor.    Add lidocaine patch.  Continue tramadol 50 mg every 6 hours as needed  Continue home gabapentin 300 mg 3 times daily  Restart home Ativan p.o. 0.5 mg every 8 hours as needed  Continue home Wellbutrin,Vraylar, Lexapro  Add Ensure 3 times daily with meals  Up to chair every shift  VTE ppx: Lovenox 40 mg daily    Dispo: Plans to go back home    Discussed plan with RN.    VTE Prophylaxis:  Pharmacologic VTE prophylaxis orders are present.        CODE STATUS:   Code Status (Patient has no pulse and is not breathing): CPR (Attempt to Resuscitate)  Medical Interventions (Patient has pulse or is breathing): Full Support    Electronically signed by Pradeep Venegas DO, 08/11/24, 12:33 PM EDT.

## 2024-08-11 NOTE — OUTREACH NOTE
Medical Week 2 Survey      Flowsheet Row Responses   [facility] facility patient discharged from? [facility]   Does the patient have one of the following disease processes/diagnoses(primary or secondary)? Other   Week 2 attempt successful? No   Unsuccessful attempts Attempt 1   Revoke Readmitted            ELLIE GOODRICH - Registered Nurse

## 2024-08-11 NOTE — PLAN OF CARE
Problem: Adult Inpatient Plan of Care  Goal: Plan of Care Review  Outcome: Ongoing, Progressing  Flowsheets (Taken 8/11/2024 1728)  Progress: no change  Plan of Care Reviewed With: patient  Goal: Patient-Specific Goal (Individualized)  Outcome: Ongoing, Progressing  Goal: Absence of Hospital-Acquired Illness or Injury  Outcome: Ongoing, Progressing  Intervention: Identify and Manage Fall Risk  Recent Flowsheet Documentation  Taken 8/11/2024 1500 by Lien Alfonso, RN  Safety Promotion/Fall Prevention:   safety round/check completed   room organization consistent   nonskid shoes/slippers when out of bed   fall prevention program maintained   clutter free environment maintained   assistive device/personal items within reach  Taken 8/11/2024 1300 by Lien Alfonso, RN  Safety Promotion/Fall Prevention:   toileting scheduled   safety round/check completed   room organization consistent   nonskid shoes/slippers when out of bed   fall prevention program maintained   clutter free environment maintained   assistive device/personal items within reach  Taken 8/11/2024 1144 by Lien Alfonso, RN  Safety Promotion/Fall Prevention:   toileting scheduled   safety round/check completed   room organization consistent   nonskid shoes/slippers when out of bed   fall prevention program maintained   clutter free environment maintained   assistive device/personal items within reach  Taken 8/11/2024 0900 by Lien Alfonso, RN  Safety Promotion/Fall Prevention:   toileting scheduled   safety round/check completed   room organization consistent   nonskid shoes/slippers when out of bed   fall prevention program maintained   clutter free environment maintained   assistive device/personal items within reach  Taken 8/11/2024 0725 by Lien Alfonso, RN  Safety Promotion/Fall Prevention:   toileting scheduled   room organization consistent   safety round/check completed   nonskid shoes/slippers when out of bed   fall prevention  program maintained   assistive device/personal items within reach   clutter free environment maintained  Intervention: Prevent Skin Injury  Recent Flowsheet Documentation  Taken 8/11/2024 1500 by Lien Alfonso RN  Body Position:   position changed independently   weight shifting  Taken 8/11/2024 1300 by Lien Alfonso RN  Body Position:   position changed independently   weight shifting  Taken 8/11/2024 1144 by Lien Alfonso RN  Body Position:   position changed independently   weight shifting  Taken 8/11/2024 0900 by Lien Alfonso RN  Body Position:   position changed independently   weight shifting  Taken 8/11/2024 0725 by Lien Alfonso RN  Body Position:   position changed independently   weight shifting  Intervention: Prevent and Manage VTE (Venous Thromboembolism) Risk  Recent Flowsheet Documentation  Taken 8/11/2024 0725 by Lien Alfonso RN  Activity Management: activity encouraged  Intervention: Prevent Infection  Recent Flowsheet Documentation  Taken 8/11/2024 1500 by Lien Alfonso RN  Infection Prevention:   rest/sleep promoted   hand hygiene promoted   environmental surveillance performed  Taken 8/11/2024 1300 by Lien Alfonso RN  Infection Prevention:   rest/sleep promoted   hand hygiene promoted   environmental surveillance performed  Taken 8/11/2024 1144 by Lien Alfonso RN  Infection Prevention:   rest/sleep promoted   hand hygiene promoted   environmental surveillance performed  Taken 8/11/2024 0900 by Lien Alfonso RN  Infection Prevention:   rest/sleep promoted   hand hygiene promoted   environmental surveillance performed  Taken 8/11/2024 0725 by Lien Alfonso RN  Infection Prevention:   rest/sleep promoted   hand hygiene promoted   environmental surveillance performed  Goal: Optimal Comfort and Wellbeing  Outcome: Ongoing, Progressing  Intervention: Monitor Pain and Promote Comfort  Recent Flowsheet Documentation  Taken 8/11/2024 1649 by  Kaden, Lien, RN  Pain Management Interventions:   pillow support provided   heat applied  Taken 8/11/2024 1144 by Lien Alfonso RN  Pain Management Interventions: (Lidocaine patch present in affected area) heat applied  Taken 8/11/2024 0927 by Lien Alfonso RN  Pain Management Interventions:   see MAR   heat applied   pillow support provided   position adjusted  Taken 8/11/2024 0857 by Lien Alfonso RN  Pain Management Interventions:   see MAR   heat applied   breathing exercises   position adjusted   pillow support provided  Intervention: Provide Person-Centered Care  Recent Flowsheet Documentation  Taken 8/11/2024 0725 by Lien Alfonso RN  Trust Relationship/Rapport:   care explained   questions encouraged   thoughts/feelings acknowledged   questions answered   empathic listening provided   emotional support provided  Goal: Readiness for Transition of Care  Outcome: Ongoing, Progressing     Problem: Pain Acute  Goal: Acceptable Pain Control and Functional Ability  Outcome: Ongoing, Progressing  Intervention: Develop Pain Management Plan  Recent Flowsheet Documentation  Taken 8/11/2024 1649 by Lien Alfonso RN  Pain Management Interventions:   pillow support provided   heat applied  Taken 8/11/2024 1144 by Lien Alfonso RN  Pain Management Interventions: (Lidocaine patch present in affected area) heat applied  Taken 8/11/2024 0927 by Lien Alfonso RN  Pain Management Interventions:   see MAR   heat applied   pillow support provided   position adjusted  Taken 8/11/2024 0857 by Lien Alfonso RN  Pain Management Interventions:   see MAR   heat applied   breathing exercises   position adjusted   pillow support provided     Problem: Gas Exchange Impaired  Goal: Optimal Gas Exchange  Outcome: Ongoing, Progressing  Intervention: Optimize Oxygenation and Ventilation  Recent Flowsheet Documentation  Taken 8/11/2024 1500 by Lien Alfonso RN  Head of Bed (HOB) Positioning:  HOB elevated  Taken 8/11/2024 1300 by Lien Alfonso RN  Head of Bed (HOB) Positioning: HOB elevated  Taken 8/11/2024 1144 by Lien Alfonso RN  Head of Bed (HOB) Positioning: HOB elevated  Taken 8/11/2024 0900 by Lien Alfonso RN  Head of Bed (HOB) Positioning: HOB elevated  Taken 8/11/2024 0725 by Lien Alfonso RN  Head of Bed (HOB) Positioning: HOB elevated     Problem: Fall Injury Risk  Goal: Absence of Fall and Fall-Related Injury  Outcome: Ongoing, Progressing  Intervention: Promote Injury-Free Environment  Recent Flowsheet Documentation  Taken 8/11/2024 1500 by Lien Alfonso RN  Safety Promotion/Fall Prevention:   safety round/check completed   room organization consistent   nonskid shoes/slippers when out of bed   fall prevention program maintained   clutter free environment maintained   assistive device/personal items within reach  Taken 8/11/2024 1300 by Lien Alfonso RN  Safety Promotion/Fall Prevention:   toileting scheduled   safety round/check completed   room organization consistent   nonskid shoes/slippers when out of bed   fall prevention program maintained   clutter free environment maintained   assistive device/personal items within reach  Taken 8/11/2024 1144 by Lien Alfonso RN  Safety Promotion/Fall Prevention:   toileting scheduled   safety round/check completed   room organization consistent   nonskid shoes/slippers when out of bed   fall prevention program maintained   clutter free environment maintained   assistive device/personal items within reach  Taken 8/11/2024 0900 by Lien Alfonso RN  Safety Promotion/Fall Prevention:   toileting scheduled   safety round/check completed   room organization consistent   nonskid shoes/slippers when out of bed   fall prevention program maintained   clutter free environment maintained   assistive device/personal items within reach  Taken 8/11/2024 0725 by Lien Alfonso RN  Safety Promotion/Fall Prevention:    toileting scheduled   room organization consistent   safety round/check completed   nonskid shoes/slippers when out of bed   fall prevention program maintained   assistive device/personal items within reach   clutter free environment maintained     Problem: Fluid Imbalance (Pneumonia)  Goal: Fluid Balance  Outcome: Ongoing, Progressing     Problem: Infection (Pneumonia)  Goal: Resolution of Infection Signs and Symptoms  Outcome: Ongoing, Progressing     Problem: Respiratory Compromise (Pneumonia)  Goal: Effective Oxygenation and Ventilation  Outcome: Ongoing, Progressing  Intervention: Promote Airway Secretion Clearance  Recent Flowsheet Documentation  Taken 8/11/2024 0725 by Lien Alfonso RN  Cough And Deep Breathing: done independently per patient  Intervention: Optimize Oxygenation and Ventilation  Recent Flowsheet Documentation  Taken 8/11/2024 1500 by Lien Alfonso RN  Head of Bed (HOB) Positioning: HOB elevated  Taken 8/11/2024 1300 by Lien Alfonso RN  Head of Bed (HOB) Positioning: HOB elevated  Taken 8/11/2024 1144 by Lien Alfonso RN  Head of Bed (HOB) Positioning: HOB elevated  Taken 8/11/2024 0900 by Lien Alfonso RN  Head of Bed ([facility] [place]) Positioning: HOB elevated  Taken 8/11/2024 0725 by Lien Alfonso RN  Head of Bed ([facility] [place]) Positioning: HOB elevated     Problem: Skin Injury Risk Increased  Goal: Skin Health and Integrity  Outcome: Ongoing, Progressing  Intervention: Optimize Skin Protection  Recent Flowsheet Documentation  Taken 8/11/2024 1500 by Lien Alfonso RN  Head of Bed (HOB) Positioning: HOB elevated  Taken 8/11/2024 1300 by Lien Alfonso RN  Head of Bed (HOB) Positioning: HOB elevated  Taken 8/11/2024 1144 by iLen Alfonso RN  Head of Bed (HOB) Positioning: HOB elevated  Taken 8/11/2024 0900 by Lien Alfonso RN  Head of Bed (HOB) Positioning: HOB elevated  Taken 8/11/2024 0725 by Lien Alfonso RN  Head of Bed ([facility] [place]) Positioning: HOB elevated    Goal Outcome Evaluation:  Plan of Care Reviewed With: patient        Progress: no change    Pt's vss remained stable throughout shift. AOx4.  Pain and anxiety was controlled per MAR. New ordered inputted for sleep, pain and anxiety. PT on contact precaution for positive nasal swab. Denied discomfort.  and provider was at bedside. Appetite shows signs of improvement. Pt encouraged to continue to work on food intake. Tolerated all meds well. See MAR for administration. Pt was pleasant and cooperative during shift.     Continue plan of care.

## 2024-08-12 LAB
ABO GROUP BLD: NORMAL
ANION GAP SERPL CALCULATED.3IONS-SCNC: 8.7 MMOL/L (ref 5–15)
BLD GP AB SCN SERPL QL: NEGATIVE
BUN SERPL-MCNC: 11 MG/DL (ref 6–20)
BUN/CREAT SERPL: 16.4 (ref 7–25)
CALCIUM SPEC-SCNC: 7.9 MG/DL (ref 8.6–10.5)
CHLORIDE SERPL-SCNC: 104 MMOL/L (ref 98–107)
CO2 SERPL-SCNC: 24.3 MMOL/L (ref 22–29)
CREAT SERPL-MCNC: 0.67 MG/DL (ref 0.57–1)
DEPRECATED RDW RBC AUTO: 60.7 FL (ref 37–54)
EGFRCR SERPLBLD CKD-EPI 2021: 102.1 ML/MIN/1.73
ERYTHROCYTE [DISTWIDTH] IN BLOOD BY AUTOMATED COUNT: 18.1 % (ref 12.3–15.4)
GLUCOSE SERPL-MCNC: 93 MG/DL (ref 65–99)
HCT VFR BLD AUTO: 22.5 % (ref 34–46.6)
HGB BLD-MCNC: 7.1 G/DL (ref 12–15.9)
MCH RBC QN AUTO: 29.7 PG (ref 26.6–33)
MCHC RBC AUTO-ENTMCNC: 31.6 G/DL (ref 31.5–35.7)
MCV RBC AUTO: 94.1 FL (ref 79–97)
PLATELET # BLD AUTO: 72 10*3/MM3 (ref 140–450)
PMV BLD AUTO: 10 FL (ref 6–12)
POTASSIUM SERPL-SCNC: 3.6 MMOL/L (ref 3.5–5.2)
QT INTERVAL: 346 MS
QTC INTERVAL: 473 MS
RBC # BLD AUTO: 2.39 10*6/MM3 (ref 3.77–5.28)
RH BLD: POSITIVE
SODIUM SERPL-SCNC: 137 MMOL/L (ref 136–145)
T&S EXPIRATION DATE: NORMAL
WBC NRBC COR # BLD AUTO: 22.83 10*3/MM3 (ref 3.4–10.8)

## 2024-08-12 PROCEDURE — P9016 RBC LEUKOCYTES REDUCED: HCPCS

## 2024-08-12 PROCEDURE — 86901 BLOOD TYPING SEROLOGIC RH(D): CPT | Performed by: INTERNAL MEDICINE

## 2024-08-12 PROCEDURE — 25010000002 PIPERACILLIN SOD-TAZOBACTAM PER 1 G: Performed by: STUDENT IN AN ORGANIZED HEALTH CARE EDUCATION/TRAINING PROGRAM

## 2024-08-12 PROCEDURE — 25010000002 CEFTRIAXONE PER 250 MG: Performed by: INTERNAL MEDICINE

## 2024-08-12 PROCEDURE — 97161 PT EVAL LOW COMPLEX 20 MIN: CPT

## 2024-08-12 PROCEDURE — 86900 BLOOD TYPING SEROLOGIC ABO: CPT

## 2024-08-12 PROCEDURE — 86850 RBC ANTIBODY SCREEN: CPT | Performed by: INTERNAL MEDICINE

## 2024-08-12 PROCEDURE — 80048 BASIC METABOLIC PNL TOTAL CA: CPT | Performed by: INTERNAL MEDICINE

## 2024-08-12 PROCEDURE — 86901 BLOOD TYPING SEROLOGIC RH(D): CPT

## 2024-08-12 PROCEDURE — 25010000002 ENOXAPARIN PER 10 MG: Performed by: STUDENT IN AN ORGANIZED HEALTH CARE EDUCATION/TRAINING PROGRAM

## 2024-08-12 PROCEDURE — 86923 COMPATIBILITY TEST ELECTRIC: CPT

## 2024-08-12 PROCEDURE — 85027 COMPLETE CBC AUTOMATED: CPT | Performed by: INTERNAL MEDICINE

## 2024-08-12 PROCEDURE — 86900 BLOOD TYPING SEROLOGIC ABO: CPT | Performed by: INTERNAL MEDICINE

## 2024-08-12 PROCEDURE — 36430 TRANSFUSION BLD/BLD COMPNT: CPT

## 2024-08-12 PROCEDURE — 86850 RBC ANTIBODY SCREEN: CPT

## 2024-08-12 PROCEDURE — 94799 UNLISTED PULMONARY SVC/PX: CPT

## 2024-08-12 PROCEDURE — 99233 SBSQ HOSP IP/OBS HIGH 50: CPT | Performed by: INTERNAL MEDICINE

## 2024-08-12 PROCEDURE — 25810000003 SODIUM CHLORIDE 0.9 % SOLUTION: Performed by: INTERNAL MEDICINE

## 2024-08-12 PROCEDURE — 94664 DEMO&/EVAL PT USE INHALER: CPT

## 2024-08-12 RX ORDER — BISACODYL 5 MG/1
5 TABLET, DELAYED RELEASE ORAL DAILY PRN
Status: DISCONTINUED | OUTPATIENT
Start: 2024-08-12 | End: 2024-08-14 | Stop reason: HOSPADM

## 2024-08-12 RX ORDER — AMOXICILLIN 250 MG
2 CAPSULE ORAL 2 TIMES DAILY
Status: DISCONTINUED | OUTPATIENT
Start: 2024-08-12 | End: 2024-08-14 | Stop reason: HOSPADM

## 2024-08-12 RX ORDER — BISACODYL 10 MG
10 SUPPOSITORY, RECTAL RECTAL DAILY PRN
Status: DISCONTINUED | OUTPATIENT
Start: 2024-08-12 | End: 2024-08-14 | Stop reason: HOSPADM

## 2024-08-12 RX ORDER — OXYCODONE AND ACETAMINOPHEN 5; 325 MG/1; MG/1
1 TABLET ORAL EVERY 6 HOURS PRN
Status: DISCONTINUED | OUTPATIENT
Start: 2024-08-12 | End: 2024-08-14 | Stop reason: HOSPADM

## 2024-08-12 RX ORDER — IPRATROPIUM BROMIDE AND ALBUTEROL SULFATE 2.5; .5 MG/3ML; MG/3ML
3 SOLUTION RESPIRATORY (INHALATION) EVERY 6 HOURS PRN
Status: DISCONTINUED | OUTPATIENT
Start: 2024-08-12 | End: 2024-08-14 | Stop reason: HOSPADM

## 2024-08-12 RX ORDER — MENTHOL AND METHYL SALICYLATE 7.6; 29 G/100G; G/100G
1 OINTMENT TOPICAL AS NEEDED
Status: DISCONTINUED | OUTPATIENT
Start: 2024-08-12 | End: 2024-08-14 | Stop reason: HOSPADM

## 2024-08-12 RX ORDER — OXYCODONE AND ACETAMINOPHEN 7.5; 325 MG/1; MG/1
1 TABLET ORAL EVERY 6 HOURS PRN
Status: DISCONTINUED | OUTPATIENT
Start: 2024-08-12 | End: 2024-08-12

## 2024-08-12 RX ORDER — POLYETHYLENE GLYCOL 3350 17 G/17G
17 POWDER, FOR SOLUTION ORAL DAILY PRN
Status: DISCONTINUED | OUTPATIENT
Start: 2024-08-12 | End: 2024-08-14 | Stop reason: HOSPADM

## 2024-08-12 RX ORDER — OXYCODONE AND ACETAMINOPHEN 7.5; 325 MG/1; MG/1
1 TABLET ORAL EVERY 6 HOURS PRN
Status: DISCONTINUED | OUTPATIENT
Start: 2024-08-12 | End: 2024-08-14 | Stop reason: HOSPADM

## 2024-08-12 RX ADMIN — Medication 10 ML: at 08:50

## 2024-08-12 RX ADMIN — OXYCODONE HYDROCHLORIDE AND ACETAMINOPHEN 1 TABLET: 7.5; 325 TABLET ORAL at 14:41

## 2024-08-12 RX ADMIN — SENNOSIDES AND DOCUSATE SODIUM 2 TABLET: 50; 8.6 TABLET ORAL at 20:34

## 2024-08-12 RX ADMIN — GABAPENTIN 300 MG: 300 CAPSULE ORAL at 20:34

## 2024-08-12 RX ADMIN — LIDOCAINE 1 PATCH: 4 PATCH TOPICAL at 08:49

## 2024-08-12 RX ADMIN — ENOXAPARIN SODIUM 40 MG: 100 INJECTION SUBCUTANEOUS at 17:02

## 2024-08-12 RX ADMIN — GABAPENTIN 300 MG: 300 CAPSULE ORAL at 08:49

## 2024-08-12 RX ADMIN — IPRATROPIUM BROMIDE AND ALBUTEROL SULFATE 3 ML: .5; 3 SOLUTION RESPIRATORY (INHALATION) at 06:50

## 2024-08-12 RX ADMIN — OXYCODONE HYDROCHLORIDE AND ACETAMINOPHEN 1 TABLET: 7.5; 325 TABLET ORAL at 20:34

## 2024-08-12 RX ADMIN — BUDESONIDE AND FORMOTEROL FUMARATE DIHYDRATE 2 PUFF: 160; 4.5 AEROSOL RESPIRATORY (INHALATION) at 06:51

## 2024-08-12 RX ADMIN — CEFTRIAXONE SODIUM 2000 MG: 2 INJECTION, POWDER, FOR SOLUTION INTRAMUSCULAR; INTRAVENOUS at 09:33

## 2024-08-12 RX ADMIN — BUPROPION HYDROCHLORIDE 150 MG: 150 TABLET, EXTENDED RELEASE ORAL at 20:34

## 2024-08-12 RX ADMIN — Medication 10 ML: at 20:35

## 2024-08-12 RX ADMIN — NICOTINE 1 PATCH: 21 PATCH, EXTENDED RELEASE TRANSDERMAL at 17:46

## 2024-08-12 RX ADMIN — BUPROPION HYDROCHLORIDE 150 MG: 150 TABLET, EXTENDED RELEASE ORAL at 08:50

## 2024-08-12 RX ADMIN — GABAPENTIN 300 MG: 300 CAPSULE ORAL at 15:31

## 2024-08-12 RX ADMIN — IPRATROPIUM BROMIDE AND ALBUTEROL SULFATE 3 ML: .5; 3 SOLUTION RESPIRATORY (INHALATION) at 00:19

## 2024-08-12 RX ADMIN — ESCITALOPRAM OXALATE 20 MG: 10 TABLET ORAL at 08:49

## 2024-08-12 RX ADMIN — OXYCODONE HYDROCHLORIDE AND ACETAMINOPHEN 1 TABLET: 5; 325 TABLET ORAL at 09:37

## 2024-08-12 RX ADMIN — Medication 5 MG: at 20:34

## 2024-08-12 RX ADMIN — TRAMADOL HYDROCHLORIDE 50 MG: 50 TABLET, FILM COATED ORAL at 00:02

## 2024-08-12 RX ADMIN — POLYETHYLENE GLYCOL 3350 17 G: 17 POWDER, FOR SOLUTION ORAL at 08:50

## 2024-08-12 RX ADMIN — SENNOSIDES AND DOCUSATE SODIUM 2 TABLET: 50; 8.6 TABLET ORAL at 08:49

## 2024-08-12 RX ADMIN — ATORVASTATIN CALCIUM 10 MG: 10 TABLET, FILM COATED ORAL at 20:34

## 2024-08-12 RX ADMIN — SODIUM CHLORIDE 100 ML/HR: 9 INJECTION, SOLUTION INTRAVENOUS at 03:56

## 2024-08-12 RX ADMIN — PIPERACILLIN AND TAZOBACTAM 3.38 G: 3; .375 INJECTION, POWDER, FOR SOLUTION INTRAVENOUS at 06:18

## 2024-08-12 RX ADMIN — LORAZEPAM 0.5 MG: 0.5 TABLET ORAL at 05:06

## 2024-08-12 RX ADMIN — TIOTROPIUM BROMIDE INHALATION SPRAY 2 PUFF: 3.12 SPRAY, METERED RESPIRATORY (INHALATION) at 06:51

## 2024-08-12 RX ADMIN — CARIPRAZINE 1.5 MG: 1.5 CAPSULE, GELATIN COATED ORAL at 08:49

## 2024-08-12 RX ADMIN — BUDESONIDE AND FORMOTEROL FUMARATE DIHYDRATE 2 PUFF: 160; 4.5 AEROSOL RESPIRATORY (INHALATION) at 19:43

## 2024-08-12 NOTE — PLAN OF CARE
Problem: Adult Inpatient Plan of Care  Goal: Plan of Care Review  Outcome: Ongoing, Progressing  Goal: Patient-Specific Goal (Individualized)  Outcome: Ongoing, Progressing  Goal: Absence of Hospital-Acquired Illness or Injury  Outcome: Ongoing, Progressing  Intervention: Identify and Manage Fall Risk  Recent Flowsheet Documentation  Taken 8/12/2024 1501 by Lien Alfonso, RN  Safety Promotion/Fall Prevention:   toileting scheduled   safety round/check completed   room organization consistent   nonskid shoes/slippers when out of bed   fall prevention program maintained   clutter free environment maintained   assistive device/personal items within reach  Taken 8/12/2024 1300 by Lien Alfonso, RN  Safety Promotion/Fall Prevention:   toileting scheduled   safety round/check completed   room organization consistent   nonskid shoes/slippers when out of bed   fall prevention program maintained   clutter free environment maintained   assistive device/personal items within reach  Taken 8/12/2024 1200 by Lien Alfonso, RN  Safety Promotion/Fall Prevention:   toileting scheduled   safety round/check completed   room organization consistent   nonskid shoes/slippers when out of bed   fall prevention program maintained   clutter free environment maintained   assistive device/personal items within reach  Taken 8/12/2024 0900 by Lien Alfonso, RN  Safety Promotion/Fall Prevention:   toileting scheduled   safety round/check completed   room organization consistent   nonskid shoes/slippers when out of bed   fall prevention program maintained   clutter free environment maintained   assistive device/personal items within reach  Taken 8/12/2024 0715 by Lien Alfonso, RN  Safety Promotion/Fall Prevention:   toileting scheduled   safety round/check completed   room organization consistent   nonskid shoes/slippers when out of bed   fall prevention program maintained   clutter free environment maintained   assistive  device/personal items within reach  Intervention: Prevent Skin Injury  Recent Flowsheet Documentation  Taken 8/12/2024 1501 by Lien Alfonso RN  Body Position:   position changed independently   weight shifting  Taken 8/12/2024 1300 by Lien Alfonso RN  Body Position:   position changed independently   weight shifting  Taken 8/12/2024 1200 by Lien Alfonso RN  Body Position:   position changed independently   weight shifting  Taken 8/12/2024 0900 by Lien Alfonso RN  Body Position:   position changed independently   weight shifting  Taken 8/12/2024 0715 by Lien Alfonso RN  Body Position:   position changed independently   weight shifting  Intervention: Prevent and Manage VTE (Venous Thromboembolism) Risk  Recent Flowsheet Documentation  Taken 8/12/2024 0715 by Lien Alfonso RN  Activity Management: activity encouraged  Intervention: Prevent Infection  Recent Flowsheet Documentation  Taken 8/12/2024 1501 by Lien Alfonso RN  Infection Prevention:   rest/sleep promoted   hand hygiene promoted   environmental surveillance performed  Taken 8/12/2024 1300 by Lien Alfonso RN  Infection Prevention:   rest/sleep promoted   hand hygiene promoted  Taken 8/12/2024 1200 by Lien Alfonso RN  Infection Prevention:   visitors restricted/screened   rest/sleep promoted   hand hygiene promoted   environmental surveillance performed  Taken 8/12/2024 0900 by Lien Alfonso RN  Infection Prevention:   rest/sleep promoted   hand hygiene promoted   environmental surveillance performed  Taken 8/12/2024 0715 by Lien Alfonso RN  Infection Prevention:   rest/sleep promoted   hand hygiene promoted   environmental surveillance performed  Goal: Optimal Comfort and Wellbeing  Outcome: Ongoing, Progressing  Intervention: Monitor Pain and Promote Comfort  Recent Flowsheet Documentation  Taken 8/12/2024 1441 by Lien Alfonso RN  Pain Management Interventions: see MAR  Taken 8/12/2024 5286  by Kaden, Lien, RN  Pain Management Interventions:   heat applied   pillow support provided  Taken 8/12/2024 1007 by Lien Alfonso RN  Pain Management Interventions:   heat applied   position adjusted   pillow support provided  Goal: Readiness for Transition of Care  Outcome: Ongoing, Progressing     Problem: Pain Acute  Goal: Acceptable Pain Control and Functional Ability  Outcome: Ongoing, Progressing  Intervention: Develop Pain Management Plan  Recent Flowsheet Documentation  Taken 8/12/2024 1441 by Lien Alfonso RN  Pain Management Interventions: see MAR  Taken 8/12/2024 1356 by Lien Alfonso RN  Pain Management Interventions:   heat applied   pillow support provided  Taken 8/12/2024 1007 by Lien Alfonso RN  Pain Management Interventions:   heat applied   position adjusted   pillow support provided     Problem: Gas Exchange Impaired  Goal: Optimal Gas Exchange  Outcome: Ongoing, Progressing  Intervention: Optimize Oxygenation and Ventilation  Recent Flowsheet Documentation  Taken 8/12/2024 1501 by Lien Alfonso RN  Head of Bed (HOB) Positioning: HOB elevated  Taken 8/12/2024 1300 by Lien Alfonso RN  Head of Bed (HOB) Positioning: HOB elevated  Taken 8/12/2024 1200 by Lien Alfonso RN  Head of Bed (HOB) Positioning: HOB elevated  Taken 8/12/2024 0900 by Lien Alfonso RN  Head of Bed (HOB) Positioning: HOB elevated  Taken 8/12/2024 0715 by Lien Alfonso RN  Head of Bed (HOB) Positioning: HOB elevated     Problem: Fall Injury Risk  Goal: Absence of Fall and Fall-Related Injury  Outcome: Ongoing, Progressing  Intervention: Promote Injury-Free Environment  Recent Flowsheet Documentation  Taken 8/12/2024 1501 by Lien Alfonso RN  Safety Promotion/Fall Prevention:   toileting scheduled   safety round/check completed   room organization consistent   nonskid shoes/slippers when out of bed   fall prevention program maintained   clutter free environment maintained    assistive device/personal items within reach  Taken 8/12/2024 1300 by Lien Alfonso RN  Safety Promotion/Fall Prevention:   toileting scheduled   safety round/check completed   room organization consistent   nonskid shoes/slippers when out of bed   fall prevention program maintained   clutter free environment maintained   assistive device/personal items within reach  Taken 8/12/2024 1200 by Lien Alfonso RN  Safety Promotion/Fall Prevention:   toileting scheduled   safety round/check completed   room organization consistent   nonskid shoes/slippers when out of bed   fall prevention program maintained   clutter free environment maintained   assistive device/personal items within reach  Taken 8/12/2024 0900 by Lien Alfonso RN  Safety Promotion/Fall Prevention:   toileting scheduled   safety round/check completed   room organization consistent   nonskid shoes/slippers when out of bed   fall prevention program maintained   clutter free environment maintained   assistive device/personal items within reach  Taken 8/12/2024 0715 by Lien Alfonso RN  Safety Promotion/Fall Prevention:   toileting scheduled   safety round/check completed   room organization consistent   nonskid shoes/slippers when out of bed   fall prevention program maintained   clutter free environment maintained   assistive device/personal items within reach     Problem: Fluid Imbalance (Pneumonia)  Goal: Fluid Balance  Outcome: Ongoing, Progressing     Problem: Infection (Pneumonia)  Goal: Resolution of Infection Signs and Symptoms  Outcome: Ongoing, Progressing     Problem: Respiratory Compromise (Pneumonia)  Goal: Effective Oxygenation and Ventilation  Outcome: Ongoing, Progressing  Intervention: Optimize Oxygenation and Ventilation  Recent Flowsheet Documentation  Taken 8/12/2024 1501 by Lien Alfonso, RN  Head of Bed (HOB) Positioning: HOB elevated  Taken 8/12/2024 1300 by Lien Alfonso RN  Head of Bed (HOB) Positioning:  HOB elevated  Taken 8/12/2024 1200 by Lien Alfonso RN  Head of Bed (HOB) Positioning: HOB elevated  Taken 8/12/2024 0900 by Lien Alfonso RN  Head of Bed (HOB) Positioning: HOB elevated  Taken 8/12/2024 0715 by Lien Alfonso RN  Head of Bed (HOB) Positioning: HOB elevated     Problem: Skin Injury Risk Increased  Goal: Skin Health and Integrity  Outcome: Ongoing, Progressing  Intervention: Optimize Skin Protection  Recent Flowsheet Documentation  Taken 8/12/2024 1501 by Lien Alfonso RN  Head of Bed (HOB) Positioning: HOB elevated  Taken 8/12/2024 1300 by Lien Alfonso RN  Head of Bed (HOB) Positioning: HOB elevated  Taken 8/12/2024 1200 by Lien Alfonso RN  Head of Bed (HOB) Positioning: HOB elevated  Taken 8/12/2024 0900 by Lien Alfonso RN  Head of Bed (HOB) Positioning: HOB elevated  Taken 8/12/2024 0715 by Lien Alfonso RN  Head of Bed (HOB) Positioning: HOB elevated   Goal Outcome Evaluation:  Plan of Care Reviewed With: patient        Progress: no change       Pt's vss remained stable throughout shift. AOx4.  Pain and anxiety was controlled per MAR. New ordered inputted for sleep, pain and anxiety. PT on contact precaution.  Denied discomfort.  and provider was at bedside. Appetite shows signs of improvement. New MAR orders available. Had one BM today.  Pt encouraged to continue to work on food intake. Much improved food intake. Ate 75% of all meals. Tolerated all meds well. See MAR for administration. Pt was pleasant and cooperative during shift.     Continue plan of care.

## 2024-08-12 NOTE — PROGRESS NOTES
[facility]   Hospitalist Progress Note  Date: 2024  Patient Name: Lluvia Archer  : 1965  MRN: [id number]  Date of admission: 8/10/2024      Subjective   Subjective     Chief Complaint: Follow up for shortness of breath/generalized weakness     Summary: 59 y/o F with COPD, chronic oxygen use 3 L, metastatic lung cancer on chemotherapy, chemotherapy induced neutropenia, recently received Neulasta on 24 who presented with sob. Hypotensive and tachycardic. SpO2 99% on 4L. Lactate WNL. WBC 78.1.  CXR with Mild inflammatory changes suspected at the right lung base. BP responded to IV fluids.proBNP 928. Pro-Erik 0.08. on IV broad spectrum abx.  Strep pneumo positive, antibiotics de-escalated.  Improving clinically, back on baseline oxygen.  Transfused 1 unit of blood on  for hemoglobin 7.1, likely from chemotherapy.  Wants to go home on discharge    Interval Followup:   Afebrile.  Blood pressure controlled.  On baseline 3 L oxygen  Denies worsening dyspnea or cough.  No fever or chills.  No chest pain  Complaining of muscle aches mid back right side  Not sleeping well.  Requesting sleeping  Feels fatigued and worn out    Objective   Objective     Vitals:   Temp:  [97.7 °F (36.5 °C)-98.2 °F (36.8 °C)] 97.7 °F (36.5 °C)  Heart Rate:  [] 81  Resp:  [18-20] 18  BP: ()/(46-72) 127/58  Flow (L/min):  [3] 3  Physical Exam    Constitutional: conversant, NAD   Respiratory: Fair aeration, bilateral rhonchi, nonlabored respirations    Cardiovascular:  RRR, no edema   Gastrointestinal: soft, nondistended, nontender   Neurologic: Alert, CN grossly intact, speech clear, jaw twitching   Skin: Extremities warm    Result Review    Result Review:  I have personally reviewed the following over the last 24 hours (07:00 to 07:00) and agree with the following findings  [x]  Laboratory  CBC          8/10/2024    12:43 2024    04:45 2024    04:43   CBC   WBC 78.11  48.42  22.83    RBC 3.11  2.34   2.39    Hemoglobin 9.4  7.2  7.1    Hematocrit 28.4  21.8  22.5    MCV 91.3  93.2  94.1    MCH 30.2  30.8  29.7    MCHC 33.1  33.0  31.6    RDW 17.9  18.1  18.1    Platelets 171  107  72      CMP          8/10/2024    12:43 8/11/2024    04:45 8/12/2024    04:43   CMP   Glucose 147  79  93    BUN 12  10  11    Creatinine 0.80  0.64  0.67    EGFR 86.1  103.2  102.1    Sodium 137  137  137    Potassium 4.0  3.7  3.6    Chloride 98  103  104    Calcium 8.6  7.8  7.9    Total Protein 6.5      Albumin 3.4      Globulin 3.1      Total Bilirubin 0.4      Alkaline Phosphatase 156      AST (SGOT) 41      ALT (SGPT) 37      Albumin/Globulin Ratio 1.1      BUN/Creatinine Ratio 15.0  15.6  16.4    Anion Gap 13.3  9.4  8.7      [x]  Microbiology  []  Radiology   [x]  EKG/Telemetry monitor personally reviewed and independently interpreted: NSR  []  Cardiology/Vascular   []  Pathology  []  Old records  []  Other:    Assessment & Plan   Assessment / Plan     Assessment/Plan:  Sepsis secondary to Streptococcus pneumonia  Chronic hypoxic respiratory failure  Metastatic lung cancer on chemotherapy  COPD without exacerbation  Hypotension, resolved  Asymptomatic bacteriuria  Symptomatic anemia  Chronic normocytic anemia  Musculoskeletal back pain  Peripheral neuropathy, unspecified  Tardive dyskinesia  Trouble sleeping  History of anxiety depression  Immunocompromised          Afebrile. White count down to 22.8.  Strep pneumo antigen positive.  Respiratory culture pending.  Blood cultures no growth x 24 hours.  Urine culture 25,000 CFU group B streptococcus.  Has no UTI symptoms  MRSA nares PCR detected.  Will continue vancomycin for now pending finalized cultures although suspicion for MRSA pneumonia is low.  Switch Zosyn to Rocephin 2 g daily.  Day 3  Stop IV fluids  On baseline 3 L nasal cannula.Continue oxygen to maintain SpO2 above 90%  Continue Symbicort and Spiriva, switch DuoNebs to every 6 hours prn  Hgb 7.1.  Low suspicion for  bleeding. Likely from chemotherapy.  Iron profile not consistent with deficiency.  Given her symptoms and clinical picture, I think she would benefit from transfusion will give 1 unit of blood today  Platelets down to 72,000.  Also likely chemotherapy-induced.  No indication for transfusion. Monitor.    Lidocaine patch discontinued as not helping pain. Add IcyHot prn.    Has had jaw twitching for about a month.  Had been taking olanzapine while receiving chemotherapy but it was stopped.  Will hold Vraylar, dose is only 1.5 mg daily so low risk for discontinuation syndrome.  Will switch tramadol to Percocet.  Continue Wellbutrin and Lexapro on current regimen.  Continue home gabapentin 300 mg 3 times daily  Stop as needed Ativan.  States she has not been taking this  Continue home Wellbutrin and Lexapro  Continue Ensure 3 times daily with meals  Up to chair every shift  VTE ppx: Lovenox 40 mg daily    Dispo: Plans to go back home    Discussed plan with RN.    VTE Prophylaxis:  Pharmacologic VTE prophylaxis orders are present.    I spent greater than 50 minutes minutes of time for evaluation and management of this patient including review of chart, labs pertinent imaging available as well as medical decision making and discussion with physicians, staff and patient.       CODE STATUS:   Code Status (Patient has no pulse and is not breathing): CPR (Attempt to Resuscitate)  Medical Interventions (Patient has pulse or is breathing): Full Support    Electronically signed by Pradeep Venegas DO, 08/12/24, 12:09 PM EDT.

## 2024-08-12 NOTE — THERAPY EVALUATION
Acute Care - Physical Therapy Initial Evaluation   [facility]     Patient Name: Lluvia Archer  : 1965  MRN: [id number]  Today's Date: 2024      Visit Dx:     ICD-10-CM ICD-9-CM   1. Sepsis, due to unspecified organism, unspecified whether acute organ dysfunction present  A41.9 038.9     995.91   2. Hypotension, unspecified hypotension type  I95.9 458.9   3. Weakness  R53.1 780.79   4. Leukocytosis, unspecified type  D72.829 288.60   5. Difficulty walking  R26.2 719.7     Patient Active Problem List   Diagnosis    Abnormal mammogram    Anxiety    Arthritis    Chronic nausea    Chronic obstructive pulmonary disease (COPD)    Counseling on substance use and abuse    Esophageal reflux    Hernia, hiatal    Hyperlipidemia    Hypertension    Knee pain, right    Memory loss    Migraine    Moderate episode of recurrent major depressive disorder    Night sweats    Numbness    Sciatica of right side    Severe episode of recurrent major depressive disorder, without psychotic features    Shoulder pain, left    Other diseases of nasal cavity and sinuses    Sleep apnea, unspecified    Tobacco abuse    Strain of groin, right, subsequent encounter    Osteoarthritis of spine with radiculopathy, lumbar region    Chronic right-sided low back pain with right-sided sciatica    Aftercare following right hip joint replacement surgery    Primary osteoarthritis of right hip    Right hip pain    Sepsis, due to unspecified organism, unspecified whether acute organ dysfunction present    Severe malnutrition    Pneumonia of right lower lobe due to infectious organism    Hospital discharge follow-up    Other specified anemias    Encounter for screening mammogram for malignant neoplasm of breast    Multifocal pneumonia    Acute on chronic respiratory failure with hypoxia    Medicare annual wellness visit, subsequent    Community acquired pneumonia    Pneumonia due to infectious organism, unspecified laterality, unspecified part of  lung    Mediastinal adenopathy    Small cell lung cancer    Tobacco abuse, in remission    Chronic respiratory failure with hypoxia    Lung nodules    Encounter for adjustment or management of vascular access device    Dehydration    Intractable nausea and vomiting    Pancytopenia due to chemotherapy    Thrombocytopenia    Cancer, metastatic to bone    CAP (community acquired pneumonia)     Past Medical History:   Diagnosis Date    Abnormal mammogram     PT DENIES KNOWING OF THIS HX    Anxiety     Arthritis     R SHOULDER, R HIP, AND R LEG    Cancer     LUNG    Chronic nausea 01/15/2019    COPD (chronic obstructive pulmonary disease)     O2 3 LITERS NC CONT    Hernia, hiatal     Hyperlipidemia     Hypertension     Knee pain, right 2018    Memory loss     FORGETFULNESS ? ETIOLOGY    Migraine headache     Moderate episode of recurrent major depressive disorder 2017    Night sweats     Sciatica of right side 2017    Severe episode of recurrent major depressive disorder, without psychotic features 10/22/2019    Shingles     OUTBREAK 24 ON ANTIVIRAL , WHELPS NOTED NO SORES.    Shoulder pain, left 2018     Past Surgical History:   Procedure Laterality Date    BRONCHOSCOPY N/A 2022    Procedure: BRONCHOSCOPY WITH BRONCHOALVEOLAR LAVAGE, BIOPSY;  Surgeon: Shin Mcdonald DO;  Location: [facility] ENDOSCOPY;  Service: Pulmonary;  Laterality: N/A;  RIGHT LOWER LOBE INFILTRATE    BRONCHOSCOPY N/A 2024    Procedure: BRONCHOSCOPY WITH ENDOBRONCHIAL ULTRASOUND AND FINE NEEDLE ASPIRATE;  Surgeon: Shin Mcdonald DO;  Location: [facility] ENDOSCOPY;  Service: Pulmonary;  Laterality: N/A;  MEDIASTINAL ADENOPATHY     SECTION      CHOLECYSTECTOMY      LAPAROSCOPIC    COLONOSCOPY      PORTACATH PLACEMENT Left 2024    Procedure: INSERTION OF PORTACATH;  Surgeon: Josh Patton MD;  Location: [facility] OR [facility];  Service: General;  Laterality: Left;    TOTAL HIP ARTHROPLASTY  Right 5/13/2022    Procedure: RIGHT TOTAL HIP ARTHROPLASTY;  Surgeon: Cecil Gomes MD;  Location: [facility] MAIN OR;  Service: Orthopedics;  Laterality: Right;     PT Assessment (Last 12 Hours)       PT Evaluation and Treatment       Row Name 08/12/24 1000          Physical Therapy Time and Intention    Subjective Information no complaints  -DP     Document Type evaluation  -DP     Mode of Treatment individual therapy;physical therapy  -DP     Patient Effort good  -DP       Row Name 08/12/24 1000          General Information    Patient Profile Reviewed yes  -DP     Patient Observations alert;cooperative;agree to therapy  -DP     Prior Level of Function independent:;gait;transfer;bed mobility;ADL's  -DP     Equipment Currently Used at Home oxygen  -DP     Existing Precautions/Restrictions no known precautions/restrictions  -DP     Barriers to Rehab none identified  -DP       Row Name 08/12/24 1000          Living Environment    Current Living Arrangements home  -DP     People in Home spouse  -DP       Row Name 08/12/24 1000          Cognition    Orientation Status (Cognition) oriented x 3  -DP       Row Name 08/12/24 1000          Range of Motion Comprehensive    General Range of Motion bilateral lower extremity ROM WFL  -DP       Row Name 08/12/24 1000          Strength (Manual Muscle Testing)    Strength (Manual Muscle Testing) bilateral lower extremities;strength is WFL  -DP       Row Name 08/12/24 1000          Bed Mobility    Bed Mobility supine-sit-supine  -DP     Supine-Sit-Supine Greene (Bed Mobility) supervision  -DP       Row Name 08/12/24 1000          Transfers    Transfers sit-stand transfer  -DP       Row Name 08/12/24 1000          Sit-Stand Transfer    Sit-Stand Greene (Transfers) standby assist  -DP       Row Name 08/12/24 1000          Gait/Stairs (Locomotion)    Gait/Stairs Locomotion gait/ambulation independence  -DP     Greene Level (Gait) standby assist  -DP     Assistive  Device (Gait) --  none  -DP     Patient was able to Ambulate yes  -DP     Distance in Feet (Gait) 30  -DP       Row Name 08/12/24 1000          Balance    Balance Assessment standing dynamic balance  -DP     Dynamic Standing Balance supervision  -DP       Row Name 08/12/24 1000          Plan of Care Review    Plan of Care Reviewed With patient  -DP     Outcome Evaluation Patient is able to complete all transfers and bed mobilities indepedently in the room. Pt is able to ambulate ad billy in the room. Pt does not need inpatient PT services. Recommend DC home.  -DP       Row Name 08/12/24 1000          Therapy Assessment/Plan (PT)    Criteria for Skilled Interventions Met (PT) no problems identified which require skilled intervention  -DP     Therapy Frequency (PT) evaluation only  -DP       Row Name 08/12/24 1000          PT Evaluation Complexity    History, PT Evaluation Complexity no personal factors and/or comorbidities  -DP     Examination of Body Systems (PT Eval Complexity) total of 4 or more elements  -DP     Clinical Presentation (PT Evaluation Complexity) stable  -DP     Clinical Decision Making (PT Evaluation Complexity) low complexity  -DP     Overall Complexity (PT Evaluation Complexity) low complexity  -DP       Row Name 08/12/24 1000          Physical Therapy Goals    Problem Specific Goal Selection (PT) problem specific goal 1, PT  -DP       Row Name 08/12/24 1000          Problem Specific Goal 1 (PT)    Problem Specific Goal 1 (PT) Complete PT evaluation.  -DP     Time Frame (Problem Specific Goal 1, PT) 1 day  -DP     Progress/Outcome (Problem Specific Goal 1, PT) goal met  -DP               User Key  (r) = Recorded By, (t) = Taken By, (c) = Cosigned By      Initials Name Provider Type    Ryanne George, PT Physical Therapist                      PT Recommendation and Plan  Anticipated Discharge Disposition (PT): home  Therapy Frequency (PT): evaluation only  Plan of Care Reviewed With:  patient  Outcome Evaluation: Patient is able to complete all transfers and bed mobilities indepedently in the room. Pt is able to ambulate ad billy in the room. Pt does not need inpatient PT services. Recommend DC home.   Outcome Measures       Row Name 08/12/24 1000             How much help from another person do you currently need...    Turning from your back to your side while in flat bed without using bedrails? 4  -DP      Moving from lying on back to sitting on the side of a flat bed without bedrails? 4  -DP      Moving to and from a bed to a chair (including a wheelchair)? 4  -DP      Standing up from a chair using your arms (e.g., wheelchair, bedside chair)? 4  -DP      Climbing 3-5 steps with a railing? 3  -DP      To walk in hospital room? 3  -DP      AM-PAC 6 Clicks Score (PT) 22  -DP      Highest Level of Mobility Goal 7 --> Walk 25 feet or more  -DP         Functional Assessment    Outcome Measure Options AM-PAC 6 Clicks Basic Mobility (PT)  -DP                User Key  (r) = Recorded By, (t) = Taken By, (c) = Cosigned By      Initials Name Provider Type    Ryanne George, PT Physical Therapist                     Time Calculation:    PT Charges       Row Name 08/12/24 1005             Time Calculation    PT Received On 08/12/24  -DP         Untimed Charges    PT Eval/Re-eval Minutes 40  -DP         Total Minutes    Untimed Charges Total Minutes 40  -DP       Total Minutes 40  -DP                User Key  (r) = Recorded By, (t) = Taken By, (c) = Cosigned By      Initials Name Provider Type    Ryanne George PT Physical Therapist                      PT G-Codes  Outcome Measure Options: AM-PAC 6 Clicks Basic Mobility (PT)  AM-PAC 6 Clicks Score (PT): 22    Ryanne Osborne, PT  8/12/2024

## 2024-08-12 NOTE — PLAN OF CARE
Goal Outcome Evaluation:  Plan of Care Reviewed With: patient           Outcome Evaluation: Patient is able to complete all transfers and bed mobilities indepedently in the room. Pt is able to ambulate ad billy in the room. Pt does not need inpatient PT services. Recommend DC home.      Anticipated Discharge Disposition (PT): home

## 2024-08-12 NOTE — SIGNIFICANT NOTE
08/12/24 1020   Spiritual Care   Use of Spiritual Resources spirituality for coping, indicated strong use of   Spiritual Care Source  initiative   Spiritual Care Follow-Up follow-up planned regularly for general support   Response to Spiritual Care emotion expressed;engaged in conversation;receptive of support;thanks expressed;visit helpful   Spiritual Care Interventions prayer support provided;supportive conversation provided  (pt has significant family support from  and children)   Spiritual Care Visit Type initial   Spiritual Care Request coping/stress of illness support;hospitality support;prayer support;spiritual/moral support   Receptivity to Spiritual Care visit welcomed

## 2024-08-12 NOTE — PLAN OF CARE
Problem: Adult Inpatient Plan of Care  Goal: Plan of Care Review  Outcome: Ongoing, Progressing     Problem: Pain Acute  Goal: Acceptable Pain Control and Functional Ability  Outcome: Ongoing, Progressing     Problem: Infection (Pneumonia)  Goal: Resolution of Infection Signs and Symptoms  Outcome: Ongoing, Progressing   Goal Outcome Evaluation:            Patient calm and cooperative overnight. No complaints other than tired of being awake during night for breathing treatments, vitals, etc. Ativan given q8h.

## 2024-08-12 NOTE — CONSULTS
"Nutrition Services    Patient Name: Lluvia Archer  YOB: 1965  MRN: [id number]  Admission date: 8/10/2024      CLINICAL NUTRITION ASSESSMENT      Reason for Assessment  MST Score 2+     H&P:  Past Medical History:   Diagnosis Date    Abnormal mammogram     PT DENIES KNOWING OF THIS HX    Anxiety     Arthritis     R SHOULDER, R HIP, AND R LEG    Cancer     LUNG    Chronic nausea 01/15/2019    COPD (chronic obstructive pulmonary disease)     O2 3 LITERS NC CONT    Hernia, hiatal     Hyperlipidemia     Hypertension     Knee pain, right 08/30/2018    Memory loss     FORGETFULNESS ? ETIOLOGY    Migraine headache     Moderate episode of recurrent major depressive disorder 05/22/2017    Night sweats     Sciatica of right side 08/18/2017    Severe episode of recurrent major depressive disorder, without psychotic features 10/22/2019    Shingles     OUTBREAK 6/11/24 ON ANTIVIRAL , WHELPS NOTED NO SORES.    Shoulder pain, left 08/30/2018        Current Problems:   Active Hospital Problems    Diagnosis     **CAP (community acquired pneumonia)         Nutrition/Diet History         Narrative   RD attempted x2, 1st with RN and 2nd with PCA.     Of noted, pt has a history of small cell carcinoma of the lung and COPD. Pt is currently receiving chemotherapy. MST of 4 with lost of 24-33lb and decreased appetite. MD ordered ONS at the admission. RD to continue to follow up per protocol.      Anthropometrics        Current Height, Weight Height: 162.6 cm (64\")  Weight: 93.2 kg (205 lb 7.5 oz)   Current BMI Body mass index is 35.27 kg/m².   BMI Classification Obese Class II   % %   Adjusted Body Weight (ABW)    Weight Hx  Wt Readings from Last 30 Encounters:   08/10/24 1706 93.2 kg (205 lb 7.5 oz)   08/10/24 1237 95.2 kg (209 lb 14.1 oz)   08/08/24 0845 95.2 kg (209 lb 14.1 oz)   08/07/24 0925 95.1 kg (209 lb 10.5 oz)   08/06/24 0924 94.1 kg (207 lb 7.3 oz)   08/06/24 0903 94.1 kg (207 lb 7.3 oz)   08/05/24 1508 " 102 kg (223 lb 15.8 oz)   08/05/24 1418 103 kg (227 lb 8.2 oz)   07/31/24 2300 93.7 kg (206 lb 9.1 oz)   07/31/24 1556 97.1 kg (214 lb 1.1 oz)   07/18/24 1300 98.8 kg (217 lb 13 oz)   07/17/24 1357 98.8 kg (217 lb 13 oz)   07/16/24 0926 97.8 kg (215 lb 8 oz)   07/16/24 1002 97.8 kg (215 lb 9.8 oz)   07/01/24 0823 102 kg (225 lb 15.5 oz)   06/26/24 1300 104 kg (230 lb 2.6 oz)   06/25/24 1345 103 kg (227 lb 15.3 oz)   06/24/24 0851 103 kg (226 lb 13.7 oz)   06/20/24 1521 102 kg (224 lb 13.9 oz)   06/20/24 0612 102 kg (224 lb 6.9 oz)   06/17/24 1355 102 kg (224 lb)   06/14/24 1259 106 kg (233 lb 11 oz)   06/13/24 0854 106 kg (233 lb)   06/12/24 1450 106 kg (234 lb 11.2 oz)   06/07/24 1022 105 kg (232 lb 5.8 oz)   06/04/24 0310 108 kg (237 lb 3.4 oz)   06/02/24 2126 102 kg (223 lb 15.8 oz)   06/02/24 1602 99.8 kg (220 lb 0.3 oz)   12/21/23 0855 103 kg (228 lb)   05/05/23 0215 101 kg (221 lb 9 oz)   05/04/23 2227 100 kg (221 lb 5.5 oz)   05/01/23 1058 97.1 kg (214 lb)   12/09/22 1127 90.4 kg (199 lb 6.4 oz)   06/24/22 1557 87.1 kg (192 lb)   06/03/22 1117 87.1 kg (192 lb)   05/27/22 1447 84.4 kg (186 lb)          Wt Change Observation Wt loss 18.4% x 1 month which is clinically significant.      Estimated/Assessed Needs  Estimated Needs based on: Current Body Weight       Energy Requirements 22-25 kcal/kg    EST Needs (kcal/day) 2053-2330       Protein Requirements 1.2-1.3 g/kg   EST Daily Needs (g/day) 111-120       Fluid Requirements 1 ml/kcal    Estimated Needs (mL/day) 2053-2330     Labs/Medications         Pertinent Labs Reviewed.   Results from last 7 days   Lab Units 08/12/24  0443 08/11/24  0445 08/10/24  1243 08/06/24  0846 08/05/24  1518   SODIUM mmol/L 137 137 137 138 138   POTASSIUM mmol/L 3.6 3.7 4.0 3.3* 3.6   CHLORIDE mmol/L 104 103 98 101 99   CO2 mmol/L 24.3 24.6 25.7 30.0* 29.5*   BUN mg/dL 11 10 12 5* 4*   CREATININE mg/dL 0.67 0.64 0.80 0.84 0.81   CALCIUM mg/dL 7.9* 7.8* 8.6 8.4* 8.7   BILIRUBIN  mg/dL  --   --  0.4 0.4 0.3   ALK PHOS U/L  --   --  156* 154* 170*   ALT (SGPT) U/L  --   --  37* 21 21   AST (SGOT) U/L  --   --  41* 20 22   GLUCOSE mg/dL 93 79 147* 113* 105*     Results from last 7 days   Lab Units 08/12/24  0443   HEMOGLOBIN g/dL 7.1*   HEMATOCRIT % 22.5*     COVID19   Date Value Ref Range Status   07/31/2024 Not Detected Not Detected - Ref. Range Final     Lab Results   Component Value Date    HGBA1C 5.80 (H) 05/01/2023         Pertinent Medications Reviewed.     Malnutrition Severity Assessment              Nutrition Diagnosis         Nutrition Dx Problem 1 Unintentional weight loss related to Inability to consume sufficient energy as evidenced by decreased appetite. and unintended weight change. (Wt loss of 18.4% x 1 month)     Nutrition Intervention           Current Nutrition Orders & Evaluation of Intake       Current PO Diet Diet: Regular/House; Fluid Consistency: Thin (IDDSI 0)   Supplement Orders Placed This Encounter      Dietary Nutrition Supplements Boost Plus (Ensure Plus); chocolate           Nutrition Intervention/Prescription        Continue current diet order and ONS.   Continue encourage po intake.         Medical Nutrition Therapy/Nutrition Education          Learner     Readiness N/A  N/A     Method     Response N/A  N/A     Monitor/Evaluation        Monitor Per protocol, PO intake, Supplement intake, Weight, POC/GOC     Nutrition Discharge Plan         Recommend to continue oral nutrition supplements on discharge.      Electronically signed by:  Thea Tobias RD  08/12/24 14:15 EDT

## 2024-08-13 ENCOUNTER — HOSPITAL ENCOUNTER (OUTPATIENT)
Dept: ONCOLOGY | Facility: HOSPITAL | Age: 59
Discharge: HOME | End: 2024-08-13
Payer: MEDICARE

## 2024-08-13 DIAGNOSIS — E86.0 DEHYDRATION: Primary | ICD-10-CM

## 2024-08-13 LAB
ANION GAP SERPL CALCULATED.3IONS-SCNC: 10.3 MMOL/L (ref 5–15)
BACTERIA SPEC RESP CULT: NORMAL
BH BB BLOOD EXPIRATION DATE: NORMAL
BH BB BLOOD TYPE BARCODE: 5100
BH BB DISPENSE STATUS: NORMAL
BH BB PRODUCT CODE: NORMAL
BH BB UNIT NUMBER: NORMAL
BUN SERPL-MCNC: 8 MG/DL (ref 6–20)
BUN/CREAT SERPL: 14.3 (ref 7–25)
CALCIUM SPEC-SCNC: 8.1 MG/DL (ref 8.6–10.5)
CHLORIDE SERPL-SCNC: 102 MMOL/L (ref 98–107)
CO2 SERPL-SCNC: 28.7 MMOL/L (ref 22–29)
CREAT SERPL-MCNC: 0.56 MG/DL (ref 0.57–1)
CROSSMATCH INTERPRETATION: NORMAL
DEPRECATED RDW RBC AUTO: 56.8 FL (ref 37–54)
EGFRCR SERPLBLD CKD-EPI 2021: 106.6 ML/MIN/1.73
ERYTHROCYTE [DISTWIDTH] IN BLOOD BY AUTOMATED COUNT: 17 % (ref 12.3–15.4)
GLUCOSE SERPL-MCNC: 101 MG/DL (ref 65–99)
GRAM STN SPEC: NORMAL
HCT VFR BLD AUTO: 23.5 % (ref 34–46.6)
HGB BLD-MCNC: 7.6 G/DL (ref 12–15.9)
MCH RBC QN AUTO: 29.9 PG (ref 26.6–33)
MCHC RBC AUTO-ENTMCNC: 32.3 G/DL (ref 31.5–35.7)
MCV RBC AUTO: 92.5 FL (ref 79–97)
PLATELET # BLD AUTO: 41 10*3/MM3 (ref 140–450)
PMV BLD AUTO: 10.4 FL (ref 6–12)
POTASSIUM SERPL-SCNC: 3.9 MMOL/L (ref 3.5–5.2)
RBC # BLD AUTO: 2.54 10*6/MM3 (ref 3.77–5.28)
SODIUM SERPL-SCNC: 141 MMOL/L (ref 136–145)
UNIT  ABO: NORMAL
UNIT  RH: NORMAL
WBC NRBC COR # BLD AUTO: 5.7 10*3/MM3 (ref 3.4–10.8)

## 2024-08-13 PROCEDURE — 99233 SBSQ HOSP IP/OBS HIGH 50: CPT | Performed by: INTERNAL MEDICINE

## 2024-08-13 PROCEDURE — 94799 UNLISTED PULMONARY SVC/PX: CPT

## 2024-08-13 PROCEDURE — 85027 COMPLETE CBC AUTOMATED: CPT | Performed by: INTERNAL MEDICINE

## 2024-08-13 PROCEDURE — 25010000002 CEFTRIAXONE PER 250 MG: Performed by: INTERNAL MEDICINE

## 2024-08-13 PROCEDURE — 94664 DEMO&/EVAL PT USE INHALER: CPT

## 2024-08-13 PROCEDURE — 99222 1ST HOSP IP/OBS MODERATE 55: CPT | Performed by: INTERNAL MEDICINE

## 2024-08-13 PROCEDURE — 80048 BASIC METABOLIC PNL TOTAL CA: CPT | Performed by: INTERNAL MEDICINE

## 2024-08-13 RX ORDER — TRAZODONE HYDROCHLORIDE 100 MG/1
100 TABLET ORAL NIGHTLY
Status: DISCONTINUED | OUTPATIENT
Start: 2024-08-13 | End: 2024-08-14 | Stop reason: HOSPADM

## 2024-08-13 RX ADMIN — OXYCODONE HYDROCHLORIDE AND ACETAMINOPHEN 1 TABLET: 7.5; 325 TABLET ORAL at 02:35

## 2024-08-13 RX ADMIN — OXYCODONE HYDROCHLORIDE AND ACETAMINOPHEN 1 TABLET: 7.5; 325 TABLET ORAL at 08:48

## 2024-08-13 RX ADMIN — GABAPENTIN 300 MG: 300 CAPSULE ORAL at 08:47

## 2024-08-13 RX ADMIN — MENTHOL AND METHYL SALICYLATE 1 APPLICATION: 7.6; 29 OINTMENT TOPICAL at 12:53

## 2024-08-13 RX ADMIN — GABAPENTIN 300 MG: 300 CAPSULE ORAL at 21:32

## 2024-08-13 RX ADMIN — BUPROPION HYDROCHLORIDE 150 MG: 150 TABLET, EXTENDED RELEASE ORAL at 21:32

## 2024-08-13 RX ADMIN — OXYCODONE HYDROCHLORIDE AND ACETAMINOPHEN 1 TABLET: 5; 325 TABLET ORAL at 12:56

## 2024-08-13 RX ADMIN — TRAZODONE HYDROCHLORIDE 100 MG: 100 TABLET ORAL at 21:32

## 2024-08-13 RX ADMIN — NICOTINE 1 PATCH: 21 PATCH, EXTENDED RELEASE TRANSDERMAL at 17:28

## 2024-08-13 RX ADMIN — SENNOSIDES AND DOCUSATE SODIUM 2 TABLET: 50; 8.6 TABLET ORAL at 21:32

## 2024-08-13 RX ADMIN — CEFTRIAXONE SODIUM 2000 MG: 2 INJECTION, POWDER, FOR SOLUTION INTRAMUSCULAR; INTRAVENOUS at 08:47

## 2024-08-13 RX ADMIN — ATORVASTATIN CALCIUM 10 MG: 10 TABLET, FILM COATED ORAL at 21:32

## 2024-08-13 RX ADMIN — ESCITALOPRAM OXALATE 20 MG: 10 TABLET ORAL at 08:48

## 2024-08-13 RX ADMIN — Medication 10 ML: at 08:48

## 2024-08-13 RX ADMIN — BUPROPION HYDROCHLORIDE 150 MG: 150 TABLET, EXTENDED RELEASE ORAL at 08:48

## 2024-08-13 RX ADMIN — Medication 10 ML: at 21:34

## 2024-08-13 RX ADMIN — OXYCODONE HYDROCHLORIDE AND ACETAMINOPHEN 1 TABLET: 7.5; 325 TABLET ORAL at 21:32

## 2024-08-13 RX ADMIN — TIOTROPIUM BROMIDE INHALATION SPRAY 2 PUFF: 3.12 SPRAY, METERED RESPIRATORY (INHALATION) at 07:00

## 2024-08-13 RX ADMIN — SENNOSIDES AND DOCUSATE SODIUM 2 TABLET: 50; 8.6 TABLET ORAL at 08:47

## 2024-08-13 RX ADMIN — GABAPENTIN 300 MG: 300 CAPSULE ORAL at 15:54

## 2024-08-13 RX ADMIN — BUDESONIDE AND FORMOTEROL FUMARATE DIHYDRATE 2 PUFF: 160; 4.5 AEROSOL RESPIRATORY (INHALATION) at 07:00

## 2024-08-13 RX ADMIN — BUDESONIDE AND FORMOTEROL FUMARATE DIHYDRATE 2 PUFF: 160; 4.5 AEROSOL RESPIRATORY (INHALATION) at 18:48

## 2024-08-13 RX ADMIN — Medication 5 MG: at 21:32

## 2024-08-13 NOTE — PROGRESS NOTES
[facility]   Hospitalist Progress Note  Date: 2024  Patient Name: Lluvia Archer  : 1965  MRN: [id number]  Date of admission: 8/10/2024      Subjective   Subjective     Chief Complaint: Follow up for shortness of breath/generalized weakness     Summary: 57 y/o F with COPD, chronic oxygen use 3 L, metastatic lung cancer on chemotherapy, chemotherapy induced neutropenia, recently received Neulasta on 24 who presented with sob. Hypotensive and tachycardic. SpO2 99% on 4L. Lactate WNL. WBC 78.1.  CXR with Mild inflammatory changes suspected at the right lung base. BP responded to IV fluids.proBNP 928. Pro-Erik 0.08. on IV broad spectrum abx.  Strep pneumo positive, antibiotics de-escalated.  Improving clinically, back on baseline oxygen.  Transfused 1 unit of blood on  for hemoglobin 7.1, likely from chemotherapy.  Wants to go home on discharge    Interval Followup:   No acute events overnight, continues on 3L of nasal cannula.  Still having trouble sleeping and feeling fatigued.    Objective   Objective     Vitals:   Temp:  [98.1 °F (36.7 °C)-98.2 °F (36.8 °C)] 98.1 °F (36.7 °C)  Heart Rate:  [74-87] 77  Resp:  [18] 18  BP: (109-140)/(65-74) 140/72  Flow (L/min):  [3] 3  Physical Exam    Constitutional: Awake and alert, NAD   Respiratory: Improved aeration, bilateral rhonchi, nonlabored respirations    Cardiovascular:  RRR, no MRG   Gastrointestinal: soft, NTND   Neurologic: Alert, CN grossly intact, speech clear, jaw twitching   Skin: Extremities warm    Result Review    I have personally reviewed the following over the last 24 hours (07:00 to 07:00) and agree with the following findings  [x]  Laboratory personally reviewed BMP, CBC, iron panel  CBC          2024    04:45 2024    04:43 2024    05:16   CBC   WBC 48.42  22.83  5.70    RBC 2.34  2.39  2.54    Hemoglobin 7.2  7.1  7.6    Hematocrit 21.8  22.5  23.5    MCV 93.2  94.1  92.5    MCH 30.8  29.7  29.9    MCHC 33.0   31.6  32.3    RDW 18.1  18.1  17.0    Platelets 107  72  41      CMP          8/11/2024    04:45 8/12/2024    04:43 8/13/2024    05:16   CMP   Glucose 79  93  101    BUN 10  11  8    Creatinine 0.64  0.67  0.56    EGFR 103.2  102.1  106.6    Sodium 137  137  141    Potassium 3.7  3.6  3.9    Chloride 103  104  102    Calcium 7.8  7.9  8.1    BUN/Creatinine Ratio 15.6  16.4  14.3    Anion Gap 9.4  8.7  10.3      [x]  Microbiology  []  Radiology   [x]  EKG/Telemetry   []  Cardiology/Vascular   []  Pathology  []  Old records  []  Other:    Assessment & Plan   Assessment / Plan   Sepsis secondary to Streptococcus pneumonia  Chronic hypoxic respiratory failure  Metastatic lung cancer on chemotherapy  COPD without exacerbation  Hypotension, resolved  Asymptomatic bacteriuria  Symptomatic anemia  Chronic normocytic anemia  Musculoskeletal back pain  Peripheral neuropathy, unspecified  Tardive dyskinesia  Trouble sleeping  History of anxiety depression  Immunocompromised      Continue to monitor in the hospital for workup and management of the above  Now off of Zosyn and vancomycin, continue with Rocephin alone day 4/7  Now off of IV fluids  On baseline 3 L nasal cannula.Continue oxygen to maintain SpO2 above 90%  Continue Symbicort and Spiriva, switch DuoNebs to every 6 hours prn  Status post 1 unit PRBCs on 8/13, hemoglobin 7.6  Notable drop in platelet count to 41, will hold Lovenox and monitor closely  No indication for blood or platelet transfusion, likely chemotherapy-induced  Continue with Percocet as needed for pain  Has had jaw twitching for about a month.  Had been taking olanzapine while receiving chemotherapy but it was stopped.  Will hold Vraylar, dose is only 1.5 mg daily so low risk for discontinuation syndrome.  Now off of tramadol.  Continue Wellbutrin and Lexapro on current regimen.  Continue home gabapentin 300 mg 3 times daily  Start trazodone nightly to help with sleep  Continue home Wellbutrin and  Lexapro  Continue Ensure 3 times daily with meals  Up to chair every shift  VTE ppx: Lovenox 40 mg daily    Discussed plan with RN, clinical     VTE Prophylaxis:  Pharmacologic VTE prophylaxis orders are present.    Total of 53 minutes spent reviewing labs and imaging, counseling and educating the patient and family, ordering medications, discussing with specialty services, documenting clinical information in the electronic medical record, and care coordination.      CODE STATUS:   Code Status (Patient has no pulse and is not breathing): CPR (Attempt to Resuscitate)  Medical Interventions (Patient has pulse or is breathing): Full Support

## 2024-08-13 NOTE — PLAN OF CARE
Problem: Adult Inpatient Plan of Care  Goal: Plan of Care Review  Outcome: Ongoing, Progressing  Goal: Optimal Comfort and Wellbeing  Outcome: Ongoing, Progressing     Problem: Pain Acute  Goal: Acceptable Pain Control and Functional Ability  Outcome: Ongoing, Progressing     Problem: Fall Injury Risk  Goal: Absence of Fall and Fall-Related Injury  Outcome: Ongoing, Progressing  Intervention: Promote Injury-Free Environment  Recent Flowsheet Documentation  Taken 8/13/2024 0100 by Johnathan Fonseca, RN  Safety Promotion/Fall Prevention:   nonskid shoes/slippers when out of bed   safety round/check completed  Taken 8/13/2024 0000 by Johnathan Fonseca, RN  Safety Promotion/Fall Prevention:   nonskid shoes/slippers when out of bed   safety round/check completed  Taken 8/12/2024 2300 by Johnathan Fonseca, RN  Safety Promotion/Fall Prevention:   nonskid shoes/slippers when out of bed   safety round/check completed  Taken 8/12/2024 2100 by Johnathan Fonseca, RN  Safety Promotion/Fall Prevention:   nonskid shoes/slippers when out of bed   safety round/check completed   Goal Outcome Evaluation:               Patient with better night tonight than previous. The new pain medication added to the MAR allowed patient to get relief from back pain and get better sleep during the night x1 assist. Patient up to bedside commode to urinate. Patient A&Ox4.

## 2024-08-13 NOTE — PLAN OF CARE
Problem: Adult Inpatient Plan of Care  Goal: Plan of Care Review  Outcome: Ongoing, Progressing  Goal: Patient-Specific Goal (Individualized)  Outcome: Ongoing, Progressing  Goal: Absence of Hospital-Acquired Illness or Injury  Outcome: Ongoing, Progressing  Intervention: Identify and Manage Fall Risk  Recent Flowsheet Documentation  Taken 8/13/2024 1700 by Lien Alfonso RN  Safety Promotion/Fall Prevention:   toileting scheduled   safety round/check completed   room organization consistent   nonskid shoes/slippers when out of bed   fall prevention program maintained   clutter free environment maintained   assistive device/personal items within reach  Taken 8/13/2024 1500 by Lien Alfonso, RN  Safety Promotion/Fall Prevention:   toileting scheduled   safety round/check completed   room organization consistent   nonskid shoes/slippers when out of bed   fall prevention program maintained   clutter free environment maintained   assistive device/personal items within reach  Taken 8/13/2024 1326 by Lien Alfonso, RN  Safety Promotion/Fall Prevention:   toileting scheduled   safety round/check completed   room organization consistent   nonskid shoes/slippers when out of bed   fall prevention program maintained   clutter free environment maintained   assistive device/personal items within reach  Taken 8/13/2024 1100 by Lien Alfonso, RN  Safety Promotion/Fall Prevention:   toileting scheduled   safety round/check completed   room organization consistent   nonskid shoes/slippers when out of bed   fall prevention program maintained   assistive device/personal items within reach   clutter free environment maintained  Taken 8/13/2024 0910 by Lien Alfonso, RN  Safety Promotion/Fall Prevention:   toileting scheduled   safety round/check completed   room organization consistent   nonskid shoes/slippers when out of bed   fall prevention program maintained   clutter free environment maintained   assistive  device/personal items within reach  Taken 8/13/2024 0758 by Lien Alfonso RN  Safety Promotion/Fall Prevention:   toileting scheduled   safety round/check completed   room organization consistent   patient off unit   fall prevention program maintained   nonskid shoes/slippers when out of bed   clutter free environment maintained   assistive device/personal items within reach  Intervention: Prevent Skin Injury  Recent Flowsheet Documentation  Taken 8/13/2024 1700 by Lien Alfonso RN  Body Position:   position changed independently   weight shifting  Taken 8/13/2024 1500 by Lien Alfonso RN  Body Position:   position changed independently   weight shifting  Taken 8/13/2024 1326 by Lien Alfonso RN  Body Position:   position changed independently   weight shifting  Taken 8/13/2024 1100 by Lien Alfonso RN  Body Position:   position changed independently   weight shifting  Taken 8/13/2024 0910 by Lien Alfonso RN  Body Position:   position changed independently   weight shifting  Taken 8/13/2024 0758 by Lien Alfonso RN  Body Position: position changed independently  Intervention: Prevent and Manage VTE (Venous Thromboembolism) Risk  Recent Flowsheet Documentation  Taken 8/13/2024 1558 by Lien Alfonso RN  Activity Management: ambulated in room  Intervention: Prevent Infection  Recent Flowsheet Documentation  Taken 8/13/2024 1700 by Lien Alfonso RN  Infection Prevention:   rest/sleep promoted   hand hygiene promoted   environmental surveillance performed  Taken 8/13/2024 1500 by Lien Alfonso RN  Infection Prevention:   rest/sleep promoted   hand hygiene promoted   environmental surveillance performed  Taken 8/13/2024 1326 by Lien Alfonso RN  Infection Prevention:   rest/sleep promoted   hand hygiene promoted   environmental surveillance performed  Taken 8/13/2024 1100 by Lien Alfonso RN  Infection Prevention:   rest/sleep promoted   hand hygiene promoted    environmental surveillance performed  Taken 8/13/2024 0910 by Lien Alfonso RN  Infection Prevention:   rest/sleep promoted   hand hygiene promoted   environmental surveillance performed  Taken 8/13/2024 0758 by Lien Alfonso RN  Infection Prevention:   rest/sleep promoted   hand hygiene promoted   environmental surveillance performed  Goal: Optimal Comfort and Wellbeing  Outcome: Ongoing, Progressing  Intervention: Monitor Pain and Promote Comfort  Recent Flowsheet Documentation  Taken 8/13/2024 1326 by Lien Alfonso RN  Pain Management Interventions: heat applied  Taken 8/13/2024 1253 by Lien Alfonso RN  Pain Management Interventions: see MAR  Taken 8/13/2024 1140 by Lien Alfonso RN  Pain Management Interventions:   pillow support provided   heat applied  Taken 8/13/2024 0918 by Lien Alfonso RN  Pain Management Interventions:   heat applied   breathing exercises   position adjusted   pillow support provided  Taken 8/13/2024 0848 by Lien Alfonso RN  Pain Management Interventions: see MAR  Goal: Readiness for Transition of Care  Outcome: Ongoing, Progressing     Problem: Pain Acute  Goal: Acceptable Pain Control and Functional Ability  Outcome: Ongoing, Progressing  Intervention: Prevent or Manage Pain  Recent Flowsheet Documentation  Taken 8/13/2024 0758 by Lien Alfonso RN  Medication Review/Management: medications reviewed  Intervention: Develop Pain Management Plan  Recent Flowsheet Documentation  Taken 8/13/2024 1326 by Lien Alfonso RN  Pain Management Interventions: heat applied  Taken 8/13/2024 1253 by Lien Alfonso RN  Pain Management Interventions: see MAR  Taken 8/13/2024 1140 by Lien Alfonso RN  Pain Management Interventions:   pillow support provided   heat applied  Taken 8/13/2024 0918 by Lien Alfonso RN  Pain Management Interventions:   heat applied   breathing exercises   position adjusted   pillow support provided  Taken 8/13/2024  0848 by Lien Alfonso RN  Pain Management Interventions: see MAR     Problem: Gas Exchange Impaired  Goal: Optimal Gas Exchange  Outcome: Ongoing, Progressing  Intervention: Optimize Oxygenation and Ventilation  Recent Flowsheet Documentation  Taken 8/13/2024 1700 by Lien Alfonso RN  Head of Bed (HOB) Positioning: HOB elevated  Taken 8/13/2024 1500 by Lien Alfonso RN  Head of Bed (HOB) Positioning: HOB elevated  Taken 8/13/2024 1326 by Lien Alfonso RN  Head of Bed (HOB) Positioning: HOB elevated  Taken 8/13/2024 1100 by Lien Alfonso RN  Head of Bed (HOB) Positioning: HOB elevated  Taken 8/13/2024 0910 by Lien Alfonso RN  Head of Bed (HOB) Positioning: HOB elevated  Taken 8/13/2024 0758 by Lien Alfonso RN  Head of Bed (HOB) Positioning: HOB at 20-30 degrees     Problem: Fall Injury Risk  Goal: Absence of Fall and Fall-Related Injury  Outcome: Ongoing, Progressing  Intervention: Identify and Manage Contributors  Recent Flowsheet Documentation  Taken 8/13/2024 0758 by Lien Alfonso RN  Medication Review/Management: medications reviewed  Intervention: Promote Injury-Free Environment  Recent Flowsheet Documentation  Taken 8/13/2024 1700 by Lien Alfonso RN  Safety Promotion/Fall Prevention:   toileting scheduled   safety round/check completed   room organization consistent   nonskid shoes/slippers when out of bed   fall prevention program maintained   clutter free environment maintained   assistive device/personal items within reach  Taken 8/13/2024 1500 by Lien Alfonso RN  Safety Promotion/Fall Prevention:   toileting scheduled   safety round/check completed   room organization consistent   nonskid shoes/slippers when out of bed   fall prevention program maintained   clutter free environment maintained   assistive device/personal items within reach  Taken 8/13/2024 1326 by Lien Alfonso RN  Safety Promotion/Fall Prevention:   toileting scheduled   safety  round/check completed   room organization consistent   nonskid shoes/slippers when out of bed   fall prevention program maintained   clutter free environment maintained   assistive device/personal items within reach  Taken 8/13/2024 1100 by Lien Alfonso RN  Safety Promotion/Fall Prevention:   toileting scheduled   safety round/check completed   room organization consistent   nonskid shoes/slippers when out of bed   fall prevention program maintained   assistive device/personal items within reach   clutter free environment maintained  Taken 8/13/2024 0910 by Lien Alfonso RN  Safety Promotion/Fall Prevention:   toileting scheduled   safety round/check completed   room organization consistent   nonskid shoes/slippers when out of bed   fall prevention program maintained   clutter free environment maintained   assistive device/personal items within reach  Taken 8/13/2024 0758 by Lien Alfonso RN  Safety Promotion/Fall Prevention:   toileting scheduled   safety round/check completed   room organization consistent   patient off unit   fall prevention program maintained   nonskid shoes/slippers when out of bed   clutter free environment maintained   assistive device/personal items within reach     Problem: Fluid Imbalance (Pneumonia)  Goal: Fluid Balance  Outcome: Ongoing, Progressing     Problem: Infection (Pneumonia)  Goal: Resolution of Infection Signs and Symptoms  Outcome: Ongoing, Progressing     Problem: Respiratory Compromise (Pneumonia)  Goal: Effective Oxygenation and Ventilation  Outcome: Ongoing, Progressing  Intervention: Optimize Oxygenation and Ventilation  Recent Flowsheet Documentation  Taken 8/13/2024 1700 by Lien Alfonso, RN  Head of Bed (HOB) Positioning: HOB elevated  Taken 8/13/2024 1500 by Lien Alfonso RN  Head of Bed (HOB) Positioning: HOB elevated  Taken 8/13/2024 1326 by Lien Alfonso RN  Head of Bed (HOB) Positioning: HOB elevated  Taken 8/13/2024 1100 by Kaden  NITIHS Emmanuel  Head of Bed (HOB) Positioning: HOB elevated  Taken 8/13/2024 0910 by Lien Alfonso RN  Head of Bed (HOB) Positioning: HOB elevated  Taken 8/13/2024 0758 by Lien Alfonso RN  Head of Bed (HOB) Positioning: HOB at 20-30 degrees     Problem: Skin Injury Risk Increased  Goal: Skin Health and Integrity  Outcome: Ongoing, Progressing  Intervention: Optimize Skin Protection  Recent Flowsheet Documentation  Taken 8/13/2024 1700 by Lien Alfonso RN  Head of Bed (HOB) Positioning: HOB elevated  Taken 8/13/2024 1500 by Lien Alfonso RN  Head of Bed (HOB) Positioning: HOB elevated  Taken 8/13/2024 1326 by Lien Alfnoso RN  Head of Bed (HOB) Positioning: HOB elevated  Taken 8/13/2024 1100 by Lien Alfonso RN  Head of Bed (HOB) Positioning: HOB elevated  Taken 8/13/2024 0910 by Lien Alfonso RN  Head of Bed (HOB) Positioning: HOB elevated  Taken 8/13/2024 0758 by Lien Alfonso RN  Head of Bed (HOB) Positioning: HOB at 20-30 degrees   Goal Outcome Evaluation:  Plan of Care Reviewed With: patient        Progress: no change       t's vss remained stable throughout shift. AOx4.  Pain and anxiety was controlled per MAR. New ordered inputted for sleep, pain and anxiety. PT on contact precaution.  Denied discomfort. Provider was at bedside. Appetite shows signs of improvement. New MAR orders available. Had one BM today.  Pt encouraged to continue to work on food intake. Much improved food intake. Ate 100% of all meals. Tolerated all meds well. See MAR for administration. Pt was pleasant and cooperative during shift. Transfer orders in place.     Continue plan of care.

## 2024-08-13 NOTE — CONSULTS
[facility]   Hematology/Oncology  Consult Note    Patient Name: Lluvia Archer  : 1965  MRN: [id number]  Primary Care Physician:  Aleksandar Edge DO  Referring Physician: No ref. provider found  Date of admission: 8/10/2024    Subjective   Subjective     Reason for Consult/ Chief Complaint: shortness of breath    HPI:  Lluvia Archer is a 58 y.o. female currently on chemotherapy with carboplatin etoposide and durvalumab for her metastatic small cell lung cancer who presented with shortness of breath and generalized weakness.  She presented on 10 August.  She received most recent cycle of chemotherapy on  through .  She did receive Neulasta on the ninth.  She was also admitted after cycle 2 for weakness.  She did have dose adjustment with cycle 3.  She also had subjective fever.  Patient had a white blood cell count 78 on presentation..  Hemoglobin 9.4.  Lactic acid 1.7.  Small amount of Estrace in urine.  She is initially hypotensive that responded to fluids.  She was placed on broad-spectrum antibiotics.  Patient was found to have possible pneumonia in right lung on xray imaging.  At time my interview today patient does report feeling better.  She does remain weak.  Her breathing is getting closer to her baseline.  Her white count has down trended and is now normal.  Hemoglobin remains low at 7.6.  Platelets are low at 41.  She denies any bleeding.  No fevers or chills.  She is hoping to be discharged in next couple days. Now on Ceftriaxone alone. Remains on oxygen via NC.     Review of Systems   All systems were reviewed and negative except for as mentioned above.     Personal History     Past Medical History:   Diagnosis Date    Abnormal mammogram     PT DENIES KNOWING OF THIS HX    Anxiety     Arthritis     R SHOULDER, R HIP, AND R LEG    Cancer     LUNG    Chronic nausea 01/15/2019    COPD (chronic obstructive pulmonary disease)     O2 3 LITERS NC CONT    Hernia, hiatal      Hyperlipidemia     Hypertension     Knee pain, right 2018    Memory loss     FORGETFULNESS ? ETIOLOGY    Migraine headache     Moderate episode of recurrent major depressive disorder 2017    Night sweats     Sciatica of right side 2017    Severe episode of recurrent major depressive disorder, without psychotic features 10/22/2019    Shingles     OUTBREAK 24 ON ANTIVIRAL , WHELPS NOTED NO SORES.    Shoulder pain, left 2018       Past Surgical History:   Procedure Laterality Date    BRONCHOSCOPY N/A 2022    Procedure: BRONCHOSCOPY WITH BRONCHOALVEOLAR LAVAGE, BIOPSY;  Surgeon: Shin Mcdonald DO;  Location: [facility] ENDOSCOPY;  Service: Pulmonary;  Laterality: N/A;  RIGHT LOWER LOBE INFILTRATE    BRONCHOSCOPY N/A 2024    Procedure: BRONCHOSCOPY WITH ENDOBRONCHIAL ULTRASOUND AND FINE NEEDLE ASPIRATE;  Surgeon: Shin Mcdonald DO;  Location: [facility] ENDOSCOPY;  Service: Pulmonary;  Laterality: N/A;  MEDIASTINAL ADENOPATHY     SECTION      CHOLECYSTECTOMY      LAPAROSCOPIC    COLONOSCOPY      PORTACATH PLACEMENT Left 2024    Procedure: INSERTION OF PORTACATH;  Surgeon: Josh Patton MD;  Location: [facility] OR OSC;  Service: General;  Laterality: Left;    TOTAL HIP ARTHROPLASTY Right 2022    Procedure: RIGHT TOTAL HIP ARTHROPLASTY;  Surgeon: Cecil Gomes MD;  Location: [facility] MAIN OR;  Service: Orthopedics;  Laterality: Right;       Family History: family history includes Arthritis in her mother; Cancer in an other family member; Heart disease in her father and another family member; Hypertension in an other family member; Other in her mother. Otherwise pertinent FHx was reviewed and not pertinent to current issue.    Social History:  reports that she has been smoking cigarettes. She started smoking about 42 years ago. She has a 85.2 pack-year smoking history. She has been exposed to tobacco smoke. She has never used smokeless tobacco. She  reports that she does not drink alcohol and does not use drugs.    Home Medications:  Cariprazine HCl, Fluticasone-Umeclidin-Vilant, HYDROcodone-acetaminophen, LORazepam, OLANZapine, albuterol sulfate HFA, atorvastatin, buPROPion SR, escitalopram, gabapentin, ipratropium-albuterol, melatonin, nicotine, nystatin, omeprazole, ondansetron ODT, prochlorperazine, and traMADol    Allergies:  No Known Allergies    Objective    Objective     Vitals:   Temp:  [98.1 °F (36.7 °C)-99 °F (37.2 °C)] 98.1 °F (36.7 °C)  Heart Rate:  [74-87] 77  Resp:  [16-18] 18  BP: (109-140)/(59-75) 140/72  Flow (L/min):  [3] 3    Physical Exam:   Constitutional: Awake, alert, appears weak, NC in place   Eyes: PERRLA, sclerae anicteric, no conjunctival injection   HENT: NCAT, mucous membranes moist   Respiratory: nonlabored respirations    Cardiovascular: no edema in the extremities   Gastrointestinal:  nondistended   Musculoskeletal: Reduced strength and tone, no joint swelling   Psychiatric: Appropriate affect, cooperative   Neurologic: Awake, alert, speech clear   Skin: Normal tone, no rashes    Result Review    Result Review:  I have personally reviewed the results from the time of this admission to 8/13/2024 12:53 EDT and agree with these findings:  [x]  Laboratory  [x]  Microbiology  []  Radiology  []  EKG/Telemetry   []  Cardiology/Vascular   []  Pathology  []  Old records  []  Other:  Most notable findings include: leukocytosis now resolved, worse thrombocytopenia, anemia, strep antigen positive.    Assessment & Plan   Assessment / Plan     Brief Patient Summary:  Lluvia Archer is a 58 y.o. female who has metastatic small cell lung cancer on outpatient chemoimmunotherapy presented with generalized weakness and leukocytosis.  Admitted for treatment of pneumonia    Active Hospital Problems:  Active Hospital Problems    Diagnosis     **CAP (community acquired pneumonia)        Plan:   Sepsis criteria  Strep pneumonia  Likely pneumonia  from chest x-ray.  Cultures have remained negative.  Strep pneumo antigen positive. Patient currently on ceftriaxone, which is de-escalated from Stephen Ibarra continue supplemental oxygen to maintain SpO2 above 90%.  IV fluids as needed.    Leukocytosis  Likely from G-CSF and not likely from infection. Now resolved.     Thrombocytopenia secondary to chemotherapy  Worsened to 41K today.  Will hold off on transfusion of platelets unless platelets less than 10K or if she has bleeding and platelets are less than 50K.    Anemia secondary to chemotherapy  Recommend CBC daily.  Transfusion if hemoglobin less than 7-7.5.    Metastatic small cell cell lung cancer to bone  She is due for cycle 4 of carboplatin, etoposide, durvalumab on the 27th.  Will likely manage with further dose reduction.  Plan for repeat CT imaging after this cycle.  This is last planned cycle of chemotherapy before proceeding with maintenance immunotherapy.  Will continue G-CSF with this cycle.      Electronically signed by Brian Dickson MD, 08/13/24, 12:53 PM EDT.

## 2024-08-14 ENCOUNTER — READMISSION MANAGEMENT (OUTPATIENT)
Dept: CALL CENTER | Facility: HOSPITAL | Age: 59
End: 2024-08-14
Payer: MEDICARE

## 2024-08-14 VITALS
TEMPERATURE: 99 F | HEART RATE: 90 BPM | BODY MASS INDEX: 35.08 KG/M2 | OXYGEN SATURATION: 96 % | RESPIRATION RATE: 18 BRPM | HEIGHT: 64 IN | SYSTOLIC BLOOD PRESSURE: 112 MMHG | WEIGHT: 205.47 LBS | DIASTOLIC BLOOD PRESSURE: 70 MMHG

## 2024-08-14 LAB
ANION GAP SERPL CALCULATED.3IONS-SCNC: 11.8 MMOL/L (ref 5–15)
ANISOCYTOSIS BLD QL: NORMAL
BASOPHILS # BLD AUTO: 0.02 10*3/MM3 (ref 0–0.2)
BASOPHILS NFR BLD AUTO: 1.8 % (ref 0–1.5)
BUN SERPL-MCNC: 10 MG/DL (ref 6–20)
BUN/CREAT SERPL: 15.6 (ref 7–25)
CALCIUM SPEC-SCNC: 8.5 MG/DL (ref 8.6–10.5)
CHLORIDE SERPL-SCNC: 97 MMOL/L (ref 98–107)
CO2 SERPL-SCNC: 26.2 MMOL/L (ref 22–29)
CREAT SERPL-MCNC: 0.64 MG/DL (ref 0.57–1)
DEPRECATED RDW RBC AUTO: 55.4 FL (ref 37–54)
EGFRCR SERPLBLD CKD-EPI 2021: 103.2 ML/MIN/1.73
EOSINOPHIL # BLD AUTO: 0 10*3/MM3 (ref 0–0.4)
EOSINOPHIL NFR BLD AUTO: 0 % (ref 0.3–6.2)
ERYTHROCYTE [DISTWIDTH] IN BLOOD BY AUTOMATED COUNT: 16.4 % (ref 12.3–15.4)
GLUCOSE SERPL-MCNC: 154 MG/DL (ref 65–99)
HCT VFR BLD AUTO: 23.9 % (ref 34–46.6)
HGB BLD-MCNC: 7.5 G/DL (ref 12–15.9)
HYPOCHROMIA BLD QL: NORMAL
IMM GRANULOCYTES # BLD AUTO: 0.06 10*3/MM3 (ref 0–0.05)
IMM GRANULOCYTES NFR BLD AUTO: 5.5 % (ref 0–0.5)
LYMPHOCYTES # BLD AUTO: 0.55 10*3/MM3 (ref 0.7–3.1)
LYMPHOCYTES NFR BLD AUTO: 50 % (ref 19.6–45.3)
MAGNESIUM SERPL-MCNC: 1.4 MG/DL (ref 1.6–2.6)
MCH RBC QN AUTO: 29.9 PG (ref 26.6–33)
MCHC RBC AUTO-ENTMCNC: 31.4 G/DL (ref 31.5–35.7)
MCV RBC AUTO: 95.2 FL (ref 79–97)
MICROCYTES BLD QL: NORMAL
MONOCYTES # BLD AUTO: 0.1 10*3/MM3 (ref 0.1–0.9)
MONOCYTES NFR BLD AUTO: 9.1 % (ref 5–12)
NEUTROPHILS NFR BLD AUTO: 0.37 10*3/MM3 (ref 1.7–7)
NEUTROPHILS NFR BLD AUTO: 33.6 % (ref 42.7–76)
NRBC BLD AUTO-RTO: 0 /100 WBC (ref 0–0.2)
PLATELET # BLD AUTO: 21 10*3/MM3 (ref 140–450)
PMV BLD AUTO: 10.1 FL (ref 6–12)
POTASSIUM SERPL-SCNC: 3.5 MMOL/L (ref 3.5–5.2)
RBC # BLD AUTO: 2.51 10*6/MM3 (ref 3.77–5.28)
SMALL PLATELETS BLD QL SMEAR: NORMAL
SODIUM SERPL-SCNC: 135 MMOL/L (ref 136–145)
WBC MORPH BLD: NORMAL
WBC NRBC COR # BLD AUTO: 1.1 10*3/MM3 (ref 3.4–10.8)

## 2024-08-14 PROCEDURE — 85025 COMPLETE CBC W/AUTO DIFF WBC: CPT | Performed by: INTERNAL MEDICINE

## 2024-08-14 PROCEDURE — 94664 DEMO&/EVAL PT USE INHALER: CPT

## 2024-08-14 PROCEDURE — 94799 UNLISTED PULMONARY SVC/PX: CPT

## 2024-08-14 PROCEDURE — 25010000002 CEFTRIAXONE PER 250 MG: Performed by: INTERNAL MEDICINE

## 2024-08-14 PROCEDURE — 80048 BASIC METABOLIC PNL TOTAL CA: CPT | Performed by: INTERNAL MEDICINE

## 2024-08-14 PROCEDURE — 99239 HOSP IP/OBS DSCHRG MGMT >30: CPT | Performed by: INTERNAL MEDICINE

## 2024-08-14 PROCEDURE — 85007 BL SMEAR W/DIFF WBC COUNT: CPT | Performed by: INTERNAL MEDICINE

## 2024-08-14 PROCEDURE — 83735 ASSAY OF MAGNESIUM: CPT | Performed by: INTERNAL MEDICINE

## 2024-08-14 RX ORDER — OXYCODONE AND ACETAMINOPHEN 7.5; 325 MG/1; MG/1
1 TABLET ORAL EVERY 6 HOURS PRN
Qty: 16 TABLET | Refills: 0 | Status: SHIPPED | OUTPATIENT
Start: 2024-08-14 | End: 2024-08-20 | Stop reason: SDUPTHER

## 2024-08-14 RX ADMIN — TIOTROPIUM BROMIDE INHALATION SPRAY 2 PUFF: 3.12 SPRAY, METERED RESPIRATORY (INHALATION) at 07:08

## 2024-08-14 RX ADMIN — GABAPENTIN 300 MG: 300 CAPSULE ORAL at 08:57

## 2024-08-14 RX ADMIN — Medication 10 ML: at 08:57

## 2024-08-14 RX ADMIN — OXYCODONE HYDROCHLORIDE AND ACETAMINOPHEN 1 TABLET: 7.5; 325 TABLET ORAL at 04:26

## 2024-08-14 RX ADMIN — BUPROPION HYDROCHLORIDE 150 MG: 150 TABLET, EXTENDED RELEASE ORAL at 08:57

## 2024-08-14 RX ADMIN — BUDESONIDE AND FORMOTEROL FUMARATE DIHYDRATE 2 PUFF: 160; 4.5 AEROSOL RESPIRATORY (INHALATION) at 07:08

## 2024-08-14 RX ADMIN — CEFTRIAXONE SODIUM 2000 MG: 2 INJECTION, POWDER, FOR SOLUTION INTRAMUSCULAR; INTRAVENOUS at 08:57

## 2024-08-14 RX ADMIN — ESCITALOPRAM OXALATE 20 MG: 10 TABLET ORAL at 08:57

## 2024-08-14 RX ADMIN — OXYCODONE HYDROCHLORIDE AND ACETAMINOPHEN 1 TABLET: 7.5; 325 TABLET ORAL at 10:34

## 2024-08-14 NOTE — PLAN OF CARE
Problem: Adult Inpatient Plan of Care  Goal: Plan of Care Review  Outcome: Ongoing, Progressing  Goal: Readiness for Transition of Care  Outcome: Ongoing, Progressing     Problem: Pain Acute  Goal: Acceptable Pain Control and Functional Ability  Outcome: Ongoing, Progressing   Goal Outcome Evaluation:         Patient not agreeable to transfer to another floor overnight, stated she would sign out AMA if she had to move. Patient was able to sleep more with the addition of trazodone to her MAR. A&Ox4 overnight.                                       -- DO NOT REPLY / DO NOT REPLY ALL --  -- Message is from Engagement Center Operations (ECO) --    General Patient Message: PATIENT called in and is requesting  Call back... patient says has pink eye  And wants pcp to write her a rx  For it......... Please return call to patient .....    Caller Information       Type Contact Phone/Fax    03/24/2023 12:09 PM CDT Phone (Incoming) Arielle Coffman (Self) [phone] ()        Alternative phone number: same    Can a detailed message be left? Yes    Message Turnaround:     Is it Working Hours? Yes - Working Hours     [place]:    Please give this turnaround time to the caller:   \"This message will be sent to [state Provider's name]. The clinical team will fulfill your request as soon as they review your message.\"

## 2024-08-14 NOTE — OUTREACH NOTE
Prep Survey      Flowsheet Row Responses   [facility] facility patient discharged from? [facility]   Is LACE score < 7 ? No   Eligibility [facility]   Date of Admission 08/10/24   Date of Discharge 08/14/24   Discharge Disposition Home or Self Care   Discharge diagnosis Community acquired PNA   Does the patient have one of the following disease processes/diagnoses(primary or secondary)? Pneumonia   Does the patient have Home health ordered? No   Is there a DME ordered? No   Prep survey completed? Yes            ELLIE A - Registered Nurse

## 2024-08-14 NOTE — DISCHARGE SUMMARY
[facility]         HOSPITALIST  DISCHARGE SUMMARY    Patient Name: Lluvia Archer  : 1965  MRN: [id number]    Date of Admission: 8/10/2024  Date of Discharge: 2024  Primary Care Physician: Aleksandar Edge,     Consults       Date and Time Order Name Status Description    2024 12:53 PM Hematology & Oncology Inpatient Consult Completed     8/10/2024  3:12 PM IP General Consult (Use specialty-specific consult if known)      8/10/2024  3:12 PM Hospitalist (on-call MD unless specified)      2024  6:34 PM General MD Inpatient Consult Completed             Active and Resolved Hospital Problems:  Active Hospital Problems    Diagnosis POA    **CAP (community acquired pneumonia) [J18.9] Yes      Resolved Hospital Problems   No resolved problems to display.   Sepsis secondary to Streptococcus pneumonia  Chronic hypoxic respiratory failure  Metastatic lung cancer on chemotherapy  COPD without exacerbation  Hypotension, resolved  Asymptomatic bacteriuria  Symptomatic anemia  Chronic normocytic anemia  Musculoskeletal back pain  Peripheral neuropathy, unspecified  Tardive dyskinesia  Trouble sleeping  History of anxiety depression  Immunocompromised status    Hospital Course     Hospital Course:  Lluvia Archer is a 58 y.o. female with COPD, chronic oxygen use 3 L, metastatic lung cancer on chemotherapy, chemotherapy induced neutropenia, recently received Neulasta on 24 who presented with sob. Hypotensive and tachycardic. SpO2 99% on 4L. Lactate WNL. WBC 78.1.  CXR with Mild inflammatory changes suspected at the right lung base. BP responded to IV fluids.proBNP 928. Pro-Erik 0.08. on IV broad spectrum abx.  Strep pneumo positive, antibiotics de-escalated.  She improved clinically during her hospital stay and was weaned back to 3 L nasal cannula.  Transfused 1 unit of blood on  for hemoglobin 7.1, likely from chemotherapy.  Oncology consulted and felt her dropping  white count and platelet count were due to the chemotherapy.  [place] she was cleared for discharge with close follow-up.  She was discharged home in stable condition on 8/14/2024.  She will follow-up with oncology this coming Friday for lab work and IV fluids.  Recommend follow-up with PCP within 1 week.    Day of Discharge     Vital Signs:  Temp:  [98.6 °F (37 °C)-99 °F (37.2 °C)] 98.6 °F (37 °C)  Heart Rate:  [84-90] 88  Resp:  [18-20] 18  BP: (105-127)/(59-67) 127/65  Flow (L/min):  [3] 3  Physical Exam:   Gen: NAD, WDWN  ENT: PERRL, EOMI   CV: RRR no MRG  Pulm: CTAB, no w/r/r  GI: Abd soft, NTND, +bs  Neuro: Moving all extremities spontaneously, CN II-XII grossly intact   Psych: A&O*3, normal mood and affect  Skin: No lesions or rashes noted    Discharge Details        Discharge Medications        New Medications        Instructions Start Date   amoxicillin-clavulanate 875-125 MG per tablet  Commonly known as: AUGMENTIN   1 tablet, Oral, 2 Times Daily      oxyCODONE-acetaminophen 7.5-325 MG per tablet  Commonly known as: PERCOCET   1 tablet, Oral, Every 6 Hours PRN             Continue These Medications        Instructions Start Date   albuterol sulfate  (90 Base) MCG/ACT inhaler  Commonly known as: PROVENTIL HFA;VENTOLIN HFA;PROAIR HFA   2 puffs, Inhalation, Every 4 Hours PRN      atorvastatin 10 MG tablet  Commonly known as: LIPITOR   10 mg, Oral, Nightly      buPROPion  MG 12 hr tablet  Commonly known as: WELLBUTRIN SR   150 mg, Oral, 2 Times Daily      escitalopram 20 MG tablet  Commonly known as: LEXAPRO   20 mg, Oral, Daily      gabapentin 300 MG capsule  Commonly known as: NEURONTIN   300 mg, Oral, 3 Times Daily      melatonin 5 MG tablet tablet   5 mg, Oral, As Needed      nicotine 21 MG/24HR patch  Commonly known as: NICODERM CQ   1 patch, Transdermal, Every 24 Hours      nystatin 100,000 unit/mL suspension  Commonly known as: MYCOSTATIN   500,000 Units, Swish & Swallow, 4 Times Daily PRN       omeprazole 40 MG capsule  Commonly known as: priLOSEC   40 mg, Oral, Daily      ondansetron ODT 4 MG disintegrating tablet  Commonly known as: ZOFRAN-ODT   8 mg, Translingual, Every 8 Hours PRN      prochlorperazine 10 MG tablet  Commonly known as: COMPAZINE   10 mg, Oral, Every 6 Hours PRN      Trelegy Ellipta 100-62.5-25 MCG/ACT inhaler  Generic drug: Fluticasone-Umeclidin-Vilant   1 puff, Inhalation, Daily             Stop These Medications      Cariprazine HCl 1.5 MG capsule capsule  Commonly known as: Vraylar     HYDROcodone-acetaminophen 5-325 MG per tablet  Commonly known as: NORCO     ipratropium-albuterol 0.5-2.5 mg/3 ml nebulizer  Commonly known as: DUO-NEB     LORazepam 0.5 MG tablet  Commonly known as: ATIVAN     OLANZapine 5 MG tablet  Commonly known as: zyPREXA     traMADol 50 MG tablet  Commonly known as: ULTRAM              No Known Allergies    Discharge Disposition:  Home or Self Care    Diet:  Hospital:  Diet Order   Procedures    Diet: Regular/House; Fluid Consistency: Thin (IDDSI 0)       Discharge Activity:       CODE STATUS:  Code Status and Medical Interventions: CPR (Attempt to Resuscitate); Full Support   Ordered at: 08/10/24 1607     Code Status (Patient has no pulse and is not breathing):    CPR (Attempt to Resuscitate)     Medical Interventions (Patient has pulse or is breathing):    Full Support         Future Appointments   Date Time Provider Department Center   8/16/2024 10:15 AM CHAIR 02 [facility]   8/20/2024  1:15 PM CHAIR 02 [facility] OP INFU [facility] OPIF [facility]   8/27/2024  8:45 AM CHAIR 15 [facility] OP INFU [facility] OP[facility]   8/27/2024  9:15 AM Brian Dickson MD [facility] ONC E521 [facility]   8/28/2024  2:00 PM CHAIR 16 [facility] OP INFU [facility] OPIF [facility]   8/29/2024  2:00 PM CHAIR 09 [facility] OP INFU [facility] OPIF [facility]   10/3/2024  8:30 AM Norah Han APRN [facility]   12/23/2024  9:00 AM Aleksandar Edge DO [facility]       Additional Instructions for the Follow-ups that You  Need to Schedule       Ambulatory Referral to Hematology / Oncology   As directed      3 days            Pertinent  and/or Most Recent Results     PROCEDURES:   None    LAB RESULTS:      Lab 08/14/24  0917 08/13/24  0516 08/12/24  0443 08/11/24  0445 08/10/24  1433 08/10/24  1243   WBC 1.10* 5.70 22.83* 48.42*  --  78.11*   HEMOGLOBIN 7.5* 7.6* 7.1* 7.2*  --  9.4*   HEMATOCRIT 23.9* 23.5* 22.5* 21.8*  --  28.4*   PLATELETS 21* 41* 72* 107*  --  171   NEUTROS ABS 0.37*  --   --  46.97*  --  67.25*   IMMATURE GRANS (ABS) 0.06*  --   --   --   --  8.72*   LYMPHS ABS 0.55*  --   --   --   --  1.88   MONOS ABS 0.10  --   --   --   --  0.23   EOS ABS 0.00  --   --   --   --  0.00   MCV 95.2 92.5 94.1 93.2  --  91.3   PROCALCITONIN  --   --   --   --   --  0.08   LACTATE  --   --   --   --  1.3  --          Lab 08/14/24  0917 08/13/24  0516 08/12/24 0443 08/11/24  [facility] 08/10/24  1243   SODIUM 135* 141 137 137 137   POTASSIUM 3.5 3.9 3.6 3.7 4.0   CHLORIDE 97* 102 104 103 98   CO2 26.2 28.7 24.3 24.6 25.7   ANION GAP 11.8 10.3 8.7 9.4 13.3   BUN 10 8 11 10 12   CREATININE 0.64 0.56* 0.67 0.64 0.80   EGFR 103.2 106.6 102.1 103.2 86.1   GLUCOSE 154* 101* 93 79 147*   CALCIUM 8.5* 8.1* 7.9* 7.8* 8.6   MAGNESIUM 1.4*  --   --   --   --          Lab 08/10/24  1243   TOTAL PROTEIN 6.5   ALBUMIN 3.4*   GLOBULIN 3.1   ALT (SGPT) 37*   AST (SGOT) 41*   BILIRUBIN 0.4   ALK PHOS 156*         Lab 08/10/24  1243   PROBNP 928.5*   HSTROP T 20*             Lab 08/12/24  0929 08/11/24  0445   IRON  --  150*   IRON SATURATION (TSAT)  --  79*   TIBC  --  191*   TRANSFERRIN  --  128*   ABO TYPING O  --    RH TYPING Positive  --    ANTIBODY SCREEN Negative  --          Brief Urine Lab Results  (Last result in the past 365 days)        Color   Clarity   Blood   Leuk Est   Nitrite   Protein   CREAT   Urine HCG        08/10/24 1422 Yellow   Cloudy   Negative   Small (1+)   Negative   30 mg/dL (1+)                 Microbiology Results (last 10  days)       Procedure Component Value - Date/Time    Respiratory Culture - Sputum, Cough [[id number]] Collected: 08/11/24 1209    Lab Status: Final result Specimen: Sputum from Cough Updated: 08/13/24 0729     Respiratory Culture Scant growth (1+) Normal respiratory sabina. No S. aureus or Pseudomonas aeruginosa detected. Final report.     Gram Stain Rare (1+) Epithelial cells per low power field      Moderate (3+) WBCs per low power field      No organisms seen    MRSA Screen, PCR (Inpatient) - Swab, Nares [[id number]]  (Abnormal) Collected: 08/11/24 0901    Lab Status: Final result Specimen: Swab from Nares Updated: 08/11/24 1035     MRSA PCR MRSA Detected    Narrative:      The negative predictive value of this diagnostic test is high and should only be used to consider de-escalating anti-MRSA therapy. A positive result may indicate colonization with MRSA and must be correlated clinically.    Blood Culture - Blood, Arm, Right [[id number]]  (Normal) Collected: 08/10/24 1433    Lab Status: Preliminary result Specimen: Blood from Arm, Right Updated: 08/13/24 1445     Blood Culture No growth at 3 days    Blood Culture - Blood, Arm, Left [[id number]]  (Normal) Collected: 08/10/24 1433    Lab Status: Preliminary result Specimen: Blood from Arm, Left Updated: 08/13/24 1445     Blood Culture No growth at 3 days    Urine Culture - Urine, Urine, Clean Catch [[id number]]  (Abnormal) Collected: 08/10/24 1422    Lab Status: Final result Specimen: Urine, Clean Catch Updated: 08/11/24 1342     Urine Culture 25,000 CFU/mL Streptococcus agalactiae (Group B)     Comment:   This organism is considered to be universally susceptible to penicillin.  No further antibiotic testing will be performed.       Narrative:      Colonization of the urinary tract without infection is common. Treatment is discouraged unless the patient is symptomatic, pregnant, or undergoing an invasive urologic procedure.    S. Pneumo Ag Urine or CSF - Urine, Urine,  Clean Catch [[id number]]  (Abnormal) Collected: 08/10/24 1422    Lab Status: Final result Specimen: Urine, Clean Catch Updated: 08/11/24 0917     Strep Pneumo Ag Positive    Legionella Antigen, Urine - Urine, Urine, Clean Catch [[id number]]  (Normal) Collected: 08/10/24 1422    Lab Status: Final result Specimen: Urine, Clean Catch Updated: 08/11/24 0917     LEGIONELLA ANTIGEN, URINE Negative            XR Chest 1 View    Result Date: 8/10/2024  Impression: 1. Mild inflammatory changes suspected at the right lung base. Electronically Signed: Mabel Pabon MD  8/10/2024 1:16 PM EDT  Workstation ID: [id number]              Results for orders placed during the hospital encounter of 04/04/22    Adult Transthoracic Echo Complete W/ Cont if Necessary Per Protocol    Interpretation Summary  · The left ventricular cavity is borderline dilated.  · Calculated left ventricular EF = 58% Estimated left ventricular EF was in agreement with the calculated left ventricular EF.  · Left ventricular diastolic function is consistent with (grade I) impaired relaxation.  · Aortic valve sclerosis without obstruction to flow.  · Mild aortic valve regurgitation.  · Mild mitral valve regurgitation.      Labs Pending at Discharge:  Pending Labs       Order Current Status    Blood Culture - Blood, Arm, Left Preliminary result    Blood Culture - Blood, Arm, Right Preliminary result              Time spent on Discharge including face to face service: 33 minutes    Electronically signed by Ruben Cordero MD, 08/14/24, 10:40 AM EDT.

## 2024-08-14 NOTE — DISCHARGE INSTR - LAB
PT NEEDS REFERRAL  FROM PCP TO GO AMBULATORY HERATOLOGY  BUT SHE HAS AN APPT: ON THE 16 AT 10:15  ALREADY AND DOCTOR DWAINE  PCP IS ON 8/20/24  AT 3:OO PM

## 2024-08-14 NOTE — PLAN OF CARE
Problem: Adult Inpatient Plan of Care  Goal: Plan of Care Review  Outcome: Met  Flowsheets (Taken 8/14/2024 3081)  Plan of Care Reviewed With: patient  Outcome Evaluation: Patient alert and oriented throughout shift. Patient on 3 liters NC with no signs of distress. Patient is pleasant. Patient plan of care discussed at bedside. Patient is to be discharged today. Will continue with patient plan of care until discharged.   Goal Outcome Evaluation:  Plan of Care Reviewed With: patient           Outcome Evaluation: Patient alert and oriented throughout shift. Patient on 3 liters NC with no signs of distress. Patient is pleasant. Patient plan of care discussed at bedside. Patient is to be discharged today. Will continue with patient plan of care until discharged.

## 2024-08-14 NOTE — NURSING NOTE
Patient not wanting to transfer to a new floor. States if she has to transfer she wants to leave. Charge RN notified and talked with patient and spoke with house supervisor. Patient is OK to stay in current room at this time.

## 2024-08-15 ENCOUNTER — TRANSITIONAL CARE MANAGEMENT TELEPHONE ENCOUNTER (OUTPATIENT)
Dept: CALL CENTER | Facility: HOSPITAL | Age: 59
End: 2024-08-15
Payer: MEDICARE

## 2024-08-15 LAB
BACTERIA SPEC AEROBE CULT: NORMAL
BACTERIA SPEC AEROBE CULT: NORMAL

## 2024-08-15 NOTE — OUTREACH NOTE
Call Center TCM Note      Flowsheet Row Responses   [facility] patient discharged from? [facility]   Does the patient have one of the following disease processes/diagnoses(primary or secondary)? Pneumonia   TCM attempt successful? Yes  [VR for Ahmet and Daria Archer]   Call start time 1030   Unsuccessful attempts Attempt 1   Call end time 1038   Discharge diagnosis Community acquired PNA   Meds reviewed with patient/caregiver? Yes   Is the patient having any side effects they believe may be caused by any medication additions or changes? No   Does the patient have all medications ordered at discharge? Yes   Is the patient taking all medications as directed (includes completed medication regime)? Yes   Medication comments Pt has not taken oxycodone as of yet.  Pt also questioned discontinuation of ativan as felt helpful for her,  she will discuss at her f/u appt   Comments Hospital f/u on 8/20/24@300pm, Oncology appt 8/16/24   Does the patient have an appointment with their PCP within 7-14 days of discharge? Yes   Has home health visited the patient within 72 hours of discharge? N/A   Pulse Ox monitoring Intermittent   Pulse Ox device source Patient, Other   O2 Sat comments Pt using O2 at 3 lpm but there are currently issues with her O2,  DME company to visit today to troubleshoot.   Adivsed to monitor sats to ensure above 88-90%   O2 Sat: education provided Sat levels, When to seek care   Psychosocial issues? No   Did the patient receive a copy of their discharge instructions? Yes   Nursing interventions Reviewed instructions with patient   What is the patient's perception of their health status since discharge? Improving  [Reports she still has a cough and some nasal congestion, using inhaler and O2 to be repaired today.  Reviewed worsening resp s/s and need to seek care.]   Nursing Interventions Nurse provided patient education   If the patient is a current smoker, are they able to teach back resources for  cessation? Not a smoker   Is the patient/caregiver able to teach back the hierarchy of who to call/visit for symptoms/problems? PCP, Specialist, Home health nurse, Urgent Care, ED, 911 Yes   Is the patient/caregiver able to teach back signs and symptoms of worsening condition: Fever/chills, Shortness of breath, Chest pain   Is the patient/caregiver able to teach back importance of completing antibiotic course of treatment? Yes   TCM call completed? Yes   Call end time 1038            Kathleen Saldana RN    8/15/2024, 10:42 EDT

## 2024-08-16 ENCOUNTER — HOSPITAL ENCOUNTER (OUTPATIENT)
Dept: ONCOLOGY | Facility: HOSPITAL | Age: 59
Discharge: HOME | End: 2024-08-16
Payer: MEDICARE

## 2024-08-16 VITALS
TEMPERATURE: 98 F | BODY MASS INDEX: 34.89 KG/M2 | SYSTOLIC BLOOD PRESSURE: 103 MMHG | HEART RATE: 95 BPM | HEIGHT: 64 IN | RESPIRATION RATE: 18 BRPM | WEIGHT: 204.37 LBS | DIASTOLIC BLOOD PRESSURE: 52 MMHG | OXYGEN SATURATION: 96 %

## 2024-08-16 DIAGNOSIS — E86.0 DEHYDRATION: ICD-10-CM

## 2024-08-16 DIAGNOSIS — Z45.2 ENCOUNTER FOR ADJUSTMENT OR MANAGEMENT OF VASCULAR ACCESS DEVICE: Primary | ICD-10-CM

## 2024-08-16 DIAGNOSIS — D69.6 THROMBOCYTOPENIA: ICD-10-CM

## 2024-08-16 DIAGNOSIS — D64.89 ANEMIA DUE TO OTHER CAUSE, NOT CLASSIFIED: ICD-10-CM

## 2024-08-16 LAB
ABO GROUP BLD: NORMAL
ALBUMIN SERPL-MCNC: 3.6 G/DL (ref 3.5–5.2)
ALBUMIN/GLOB SERPL: 1.5 G/DL
ALP SERPL-CCNC: 105 U/L (ref 39–117)
ALT SERPL W P-5'-P-CCNC: 36 U/L (ref 1–33)
ANION GAP SERPL CALCULATED.3IONS-SCNC: 5.6 MMOL/L (ref 5–15)
AST SERPL-CCNC: 22 U/L (ref 1–32)
BASOPHILS # BLD AUTO: 0.02 10*3/MM3 (ref 0–0.2)
BASOPHILS NFR BLD AUTO: 1.2 % (ref 0–1.5)
BILIRUB SERPL-MCNC: 0.3 MG/DL (ref 0–1.2)
BLD GP AB SCN SERPL QL: NEGATIVE
BUN SERPL-MCNC: 13 MG/DL (ref 6–20)
BUN/CREAT SERPL: 18.6 (ref 7–25)
CALCIUM SPEC-SCNC: 8.9 MG/DL (ref 8.6–10.5)
CHLORIDE SERPL-SCNC: 101 MMOL/L (ref 98–107)
CO2 SERPL-SCNC: 28.4 MMOL/L (ref 22–29)
CREAT SERPL-MCNC: 0.7 MG/DL (ref 0.57–1)
DEPRECATED RDW RBC AUTO: 52.5 FL (ref 37–54)
EGFRCR SERPLBLD CKD-EPI 2021: 101 ML/MIN/1.73
EOSINOPHIL # BLD AUTO: 0 10*3/MM3 (ref 0–0.4)
EOSINOPHIL NFR BLD AUTO: 0 % (ref 0.3–6.2)
ERYTHROCYTE [DISTWIDTH] IN BLOOD BY AUTOMATED COUNT: 15.8 % (ref 12.3–15.4)
GLOBULIN UR ELPH-MCNC: 2.4 GM/DL
GLUCOSE SERPL-MCNC: 106 MG/DL (ref 65–99)
HCT VFR BLD AUTO: 22.6 % (ref 34–46.6)
HGB BLD-MCNC: 7.5 G/DL (ref 12–15.9)
IMM GRANULOCYTES # BLD AUTO: 0.02 10*3/MM3 (ref 0–0.05)
IMM GRANULOCYTES NFR BLD AUTO: 1.2 % (ref 0–0.5)
LYMPHOCYTES # BLD AUTO: 1.15 10*3/MM3 (ref 0.7–3.1)
LYMPHOCYTES NFR BLD AUTO: 67.6 % (ref 19.6–45.3)
MCH RBC QN AUTO: 30.6 PG (ref 26.6–33)
MCHC RBC AUTO-ENTMCNC: 33.2 G/DL (ref 31.5–35.7)
MCV RBC AUTO: 92.2 FL (ref 79–97)
MONOCYTES # BLD AUTO: 0.19 10*3/MM3 (ref 0.1–0.9)
MONOCYTES NFR BLD AUTO: 11.2 % (ref 5–12)
NEUTROPHILS NFR BLD AUTO: 0.32 10*3/MM3 (ref 1.7–7)
NEUTROPHILS NFR BLD AUTO: 18.8 % (ref 42.7–76)
PLATELET # BLD AUTO: 8 10*3/MM3 (ref 140–450)
PMV BLD AUTO: 12.2 FL (ref 6–12)
POTASSIUM SERPL-SCNC: 3.7 MMOL/L (ref 3.5–5.2)
PROT SERPL-MCNC: 6 G/DL (ref 6–8.5)
RBC # BLD AUTO: 2.45 10*6/MM3 (ref 3.77–5.28)
RH BLD: POSITIVE
SODIUM SERPL-SCNC: 135 MMOL/L (ref 136–145)
T&S EXPIRATION DATE: NORMAL
WBC NRBC COR # BLD AUTO: 1.7 10*3/MM3 (ref 3.4–10.8)

## 2024-08-16 PROCEDURE — 86850 RBC ANTIBODY SCREEN: CPT | Performed by: INTERNAL MEDICINE

## 2024-08-16 PROCEDURE — 86900 BLOOD TYPING SEROLOGIC ABO: CPT | Performed by: INTERNAL MEDICINE

## 2024-08-16 PROCEDURE — 86901 BLOOD TYPING SEROLOGIC RH(D): CPT | Performed by: INTERNAL MEDICINE

## 2024-08-16 PROCEDURE — 85025 COMPLETE CBC W/AUTO DIFF WBC: CPT | Performed by: INTERNAL MEDICINE

## 2024-08-16 PROCEDURE — 96360 HYDRATION IV INFUSION INIT: CPT

## 2024-08-16 PROCEDURE — 25810000003 SODIUM CHLORIDE 0.9 % SOLUTION: Performed by: INTERNAL MEDICINE

## 2024-08-16 PROCEDURE — 80053 COMPREHEN METABOLIC PANEL: CPT | Performed by: INTERNAL MEDICINE

## 2024-08-16 PROCEDURE — 25010000002 HEPARIN LOCK FLUSH PER 10 UNITS: Performed by: INTERNAL MEDICINE

## 2024-08-16 RX ORDER — HEPARIN SODIUM (PORCINE) LOCK FLUSH IV SOLN 100 UNIT/ML 100 UNIT/ML
500 SOLUTION INTRAVENOUS AS NEEDED
OUTPATIENT
Start: 2024-08-20

## 2024-08-16 RX ORDER — SODIUM CHLORIDE 0.9 % (FLUSH) 0.9 %
20 SYRINGE (ML) INJECTION AS NEEDED
Status: DISCONTINUED | OUTPATIENT
Start: 2024-08-16 | End: 2024-08-17 | Stop reason: HOSPADM

## 2024-08-16 RX ORDER — HEPARIN SODIUM (PORCINE) LOCK FLUSH IV SOLN 100 UNIT/ML 100 UNIT/ML
500 SOLUTION INTRAVENOUS AS NEEDED
Status: DISCONTINUED | OUTPATIENT
Start: 2024-08-16 | End: 2024-08-17 | Stop reason: HOSPADM

## 2024-08-16 RX ORDER — SODIUM CHLORIDE 9 MG/ML
250 INJECTION, SOLUTION INTRAVENOUS AS NEEDED
Status: CANCELLED | OUTPATIENT
Start: 2024-08-16

## 2024-08-16 RX ORDER — SODIUM CHLORIDE 0.9 % (FLUSH) 0.9 %
20 SYRINGE (ML) INJECTION AS NEEDED
OUTPATIENT
Start: 2024-08-16

## 2024-08-16 RX ADMIN — SODIUM CHLORIDE 1000 ML: 9 INJECTION, SOLUTION INTRAVENOUS at 10:50

## 2024-08-16 RX ADMIN — HEPARIN 500 UNITS: 100 SYRINGE at 12:02

## 2024-08-16 RX ADMIN — Medication 20 ML: at 12:03

## 2024-08-17 ENCOUNTER — HOSPITAL ENCOUNTER (OUTPATIENT)
Dept: INFUSION THERAPY | Facility: HOSPITAL | Age: 59
Discharge: HOME | End: 2024-08-17
Payer: MEDICARE

## 2024-08-17 VITALS
RESPIRATION RATE: 14 BRPM | HEART RATE: 91 BPM | SYSTOLIC BLOOD PRESSURE: 132 MMHG | TEMPERATURE: 98.5 F | OXYGEN SATURATION: 97 % | DIASTOLIC BLOOD PRESSURE: 57 MMHG

## 2024-08-17 DIAGNOSIS — D69.6 THROMBOCYTOPENIA: ICD-10-CM

## 2024-08-17 DIAGNOSIS — D64.89 ANEMIA DUE TO OTHER CAUSE, NOT CLASSIFIED: ICD-10-CM

## 2024-08-17 RX ORDER — SODIUM CHLORIDE 9 MG/ML
250 INJECTION, SOLUTION INTRAVENOUS AS NEEDED
Status: DISCONTINUED | OUTPATIENT
Start: 2024-08-17 | End: 2024-08-18 | Stop reason: HOSPADM

## 2024-08-17 NOTE — NURSING NOTE
Pt arrived to  for blood product transfusion, VSS, tolerated blood transfusion products well, pt discharged home to self care, care complete.

## 2024-08-18 LAB
BH BB BLOOD EXPIRATION DATE: NORMAL
BH BB BLOOD EXPIRATION DATE: NORMAL
BH BB BLOOD TYPE BARCODE: 5100
BH BB BLOOD TYPE BARCODE: 6200
BH BB DISPENSE STATUS: NORMAL
BH BB DISPENSE STATUS: NORMAL
BH BB PRODUCT CODE: NORMAL
BH BB PRODUCT CODE: NORMAL
BH BB UNIT NUMBER: NORMAL
BH BB UNIT NUMBER: NORMAL
CROSSMATCH INTERPRETATION: NORMAL
UNIT  ABO: NORMAL
UNIT  ABO: NORMAL
UNIT  RH: NORMAL
UNIT  RH: NORMAL

## 2024-08-19 ENCOUNTER — HOSPITAL ENCOUNTER (EMERGENCY)
Facility: HOSPITAL | Age: 59
Discharge: HOME | End: 2024-08-20
Attending: EMERGENCY MEDICINE
Payer: MEDICARE

## 2024-08-19 ENCOUNTER — TELEPHONE (OUTPATIENT)
Dept: ONCOLOGY | Facility: HOSPITAL | Age: 59
End: 2024-08-19
Payer: MEDICARE

## 2024-08-19 ENCOUNTER — APPOINTMENT (OUTPATIENT)
Dept: CT IMAGING | Facility: HOSPITAL | Age: 59
End: 2024-08-19
Payer: MEDICARE

## 2024-08-19 ENCOUNTER — APPOINTMENT (OUTPATIENT)
Dept: GENERAL RADIOLOGY | Facility: HOSPITAL | Age: 59
End: 2024-08-19
Payer: MEDICARE

## 2024-08-19 VITALS
HEIGHT: 64 IN | DIASTOLIC BLOOD PRESSURE: 60 MMHG | BODY MASS INDEX: 35.49 KG/M2 | HEART RATE: 97 BPM | OXYGEN SATURATION: 94 % | WEIGHT: 207.89 LBS | TEMPERATURE: 98.9 F | SYSTOLIC BLOOD PRESSURE: 99 MMHG | RESPIRATION RATE: 16 BRPM

## 2024-08-19 DIAGNOSIS — R00.2 PALPITATIONS: ICD-10-CM

## 2024-08-19 DIAGNOSIS — D64.9 ANEMIA, UNSPECIFIED TYPE: ICD-10-CM

## 2024-08-19 DIAGNOSIS — D69.6 THROMBOCYTOPENIA: ICD-10-CM

## 2024-08-19 DIAGNOSIS — R04.0 EPISTAXIS, RECURRENT: Primary | ICD-10-CM

## 2024-08-19 LAB
ALBUMIN SERPL-MCNC: 4 G/DL (ref 3.5–5.2)
ALBUMIN/GLOB SERPL: 1.3 G/DL
ALP SERPL-CCNC: 151 U/L (ref 39–117)
ALT SERPL W P-5'-P-CCNC: 46 U/L (ref 1–33)
ANION GAP SERPL CALCULATED.3IONS-SCNC: 12.5 MMOL/L (ref 5–15)
ANISOCYTOSIS BLD QL: NORMAL
AST SERPL-CCNC: 40 U/L (ref 1–32)
BASOPHILS # BLD AUTO: 0.07 10*3/MM3 (ref 0–0.2)
BASOPHILS NFR BLD AUTO: 0.8 % (ref 0–1.5)
BILIRUB SERPL-MCNC: 0.2 MG/DL (ref 0–1.2)
BUN SERPL-MCNC: 5 MG/DL (ref 6–20)
BUN/CREAT SERPL: 6.8 (ref 7–25)
CALCIUM SPEC-SCNC: 9.2 MG/DL (ref 8.6–10.5)
CHLORIDE SERPL-SCNC: 101 MMOL/L (ref 98–107)
CO2 SERPL-SCNC: 27.5 MMOL/L (ref 22–29)
CREAT SERPL-MCNC: 0.74 MG/DL (ref 0.57–1)
DEPRECATED RDW RBC AUTO: 51.5 FL (ref 37–54)
EGFRCR SERPLBLD CKD-EPI 2021: 94.5 ML/MIN/1.73
EOSINOPHIL # BLD AUTO: 0.03 10*3/MM3 (ref 0–0.4)
EOSINOPHIL NFR BLD AUTO: 0.4 % (ref 0.3–6.2)
ERYTHROCYTE [DISTWIDTH] IN BLOOD BY AUTOMATED COUNT: 16.3 % (ref 12.3–15.4)
GLOBULIN UR ELPH-MCNC: 3 GM/DL
GLUCOSE SERPL-MCNC: 100 MG/DL (ref 65–99)
HCT VFR BLD AUTO: 31.3 % (ref 34–46.6)
HGB BLD-MCNC: 10.4 G/DL (ref 12–15.9)
HOLD SPECIMEN: NORMAL
HOLD SPECIMEN: NORMAL
HYPOCHROMIA BLD QL: NORMAL
IMM GRANULOCYTES # BLD AUTO: 0.64 10*3/MM3 (ref 0–0.05)
IMM GRANULOCYTES NFR BLD AUTO: 7.6 % (ref 0–0.5)
LARGE PLATELETS: NORMAL
LIPASE SERPL-CCNC: 12 U/L (ref 13–60)
LYMPHOCYTES # BLD AUTO: 3.02 10*3/MM3 (ref 0.7–3.1)
LYMPHOCYTES NFR BLD AUTO: 35.8 % (ref 19.6–45.3)
MAGNESIUM SERPL-MCNC: 1.5 MG/DL (ref 1.6–2.6)
MCH RBC QN AUTO: 29.7 PG (ref 26.6–33)
MCHC RBC AUTO-ENTMCNC: 33.2 G/DL (ref 31.5–35.7)
MCV RBC AUTO: 89.4 FL (ref 79–97)
MICROCYTES BLD QL: NORMAL
MONOCYTES # BLD AUTO: 1.01 10*3/MM3 (ref 0.1–0.9)
MONOCYTES NFR BLD AUTO: 12 % (ref 5–12)
NEUTROPHILS NFR BLD AUTO: 3.67 10*3/MM3 (ref 1.7–7)
NEUTROPHILS NFR BLD AUTO: 43.4 % (ref 42.7–76)
NRBC BLD AUTO-RTO: 1.1 /100 WBC (ref 0–0.2)
NT-PROBNP SERPL-MCNC: 271.8 PG/ML (ref 0–900)
PLATELET # BLD AUTO: 22 10*3/MM3 (ref 140–450)
PMV BLD AUTO: 10.3 FL (ref 6–12)
POTASSIUM SERPL-SCNC: 3.2 MMOL/L (ref 3.5–5.2)
PROT SERPL-MCNC: 7 G/DL (ref 6–8.5)
RBC # BLD AUTO: 3.5 10*6/MM3 (ref 3.77–5.28)
SMALL PLATELETS BLD QL SMEAR: NORMAL
SODIUM SERPL-SCNC: 141 MMOL/L (ref 136–145)
TROPONIN T SERPL HS-MCNC: 13 NG/L
WBC MORPH BLD: NORMAL
WBC NRBC COR # BLD AUTO: 8.44 10*3/MM3 (ref 3.4–10.8)
WHOLE BLOOD HOLD COAG: NORMAL
WHOLE BLOOD HOLD SPECIMEN: NORMAL

## 2024-08-19 PROCEDURE — 99285 EMERGENCY DEPT VISIT HI MDM: CPT

## 2024-08-19 PROCEDURE — 84484 ASSAY OF TROPONIN QUANT: CPT

## 2024-08-19 PROCEDURE — 83690 ASSAY OF LIPASE: CPT

## 2024-08-19 PROCEDURE — 83880 ASSAY OF NATRIURETIC PEPTIDE: CPT

## 2024-08-19 PROCEDURE — 83735 ASSAY OF MAGNESIUM: CPT

## 2024-08-19 PROCEDURE — 80053 COMPREHEN METABOLIC PANEL: CPT

## 2024-08-19 PROCEDURE — 85025 COMPLETE CBC W/AUTO DIFF WBC: CPT

## 2024-08-19 PROCEDURE — 93005 ELECTROCARDIOGRAM TRACING: CPT | Performed by: EMERGENCY MEDICINE

## 2024-08-19 PROCEDURE — 85007 BL SMEAR W/DIFF WBC COUNT: CPT

## 2024-08-19 PROCEDURE — 71045 X-RAY EXAM CHEST 1 VIEW: CPT

## 2024-08-19 PROCEDURE — 25510000001 IOPAMIDOL PER 1 ML: Performed by: EMERGENCY MEDICINE

## 2024-08-19 PROCEDURE — 71260 CT THORAX DX C+: CPT

## 2024-08-19 PROCEDURE — 93010 ELECTROCARDIOGRAM REPORT: CPT | Performed by: INTERNAL MEDICINE

## 2024-08-19 PROCEDURE — 93005 ELECTROCARDIOGRAM TRACING: CPT

## 2024-08-19 RX ORDER — OXYMETAZOLINE HYDROCHLORIDE 0.05 G/100ML
2 SPRAY NASAL ONCE
Status: COMPLETED | OUTPATIENT
Start: 2024-08-19 | End: 2024-08-20

## 2024-08-19 RX ORDER — ASPIRIN 81 MG/1
324 TABLET, CHEWABLE ORAL ONCE
Status: COMPLETED | OUTPATIENT
Start: 2024-08-19 | End: 2024-08-19

## 2024-08-19 RX ORDER — SODIUM CHLORIDE 0.9 % (FLUSH) 0.9 %
10 SYRINGE (ML) INJECTION AS NEEDED
Status: DISCONTINUED | OUTPATIENT
Start: 2024-08-19 | End: 2024-08-20 | Stop reason: HOSPADM

## 2024-08-19 RX ORDER — IOPAMIDOL 755 MG/ML
100 INJECTION, SOLUTION INTRAVASCULAR
Status: COMPLETED | OUTPATIENT
Start: 2024-08-19 | End: 2024-08-19

## 2024-08-19 RX ADMIN — IOPAMIDOL 100 ML: 755 INJECTION, SOLUTION INTRAVENOUS at 22:32

## 2024-08-19 RX ADMIN — ASPIRIN 324 MG: 81 TABLET, CHEWABLE ORAL at 19:35

## 2024-08-19 NOTE — TELEPHONE ENCOUNTER
Caller: Lluvia Archer    Relationship: Self    Best call back number: [phone]    What was the call regarding: PATIENT HAS A SORE THROAT AND HAS HAD 2 NOSE BLEEDS THIS MORNING AND WANTED TO KNOW WHAT TO DO. SHE ALSO HAD A BLOOD TRANSFUSION THIS PAST SATURDAY       PLEASE CALL TO ADVISE

## 2024-08-19 NOTE — TELEPHONE ENCOUNTER
The pt's Plt's was 8 on 8/16/24. The pt has an infusion scheduled for tomorrow. Do you want to get labs again today?

## 2024-08-19 NOTE — TELEPHONE ENCOUNTER
"Patient said she had a small nose bleed during the night and another \"clot of blood \" this morning when she woke up. Patient denies any further bleeding. Patient has an appointment to come to the cancer center tomorrow to get her labs checked. Offered for lher to get labs checked today. Patient prefers not to get out today, wants to wait until tomorrow. Advised her if she has any further nose bleeds or bleeding, she should go to the [facility]. Patient voices understanding and agrees with this plan.  "

## 2024-08-20 ENCOUNTER — HOSPITAL ENCOUNTER (OUTPATIENT)
Dept: ONCOLOGY | Facility: HOSPITAL | Age: 59
Discharge: HOME | End: 2024-08-20
Admitting: INTERNAL MEDICINE
Payer: MEDICARE

## 2024-08-20 VITALS
TEMPERATURE: 98.7 F | SYSTOLIC BLOOD PRESSURE: 132 MMHG | DIASTOLIC BLOOD PRESSURE: 68 MMHG | OXYGEN SATURATION: 97 % | RESPIRATION RATE: 18 BRPM | HEART RATE: 108 BPM

## 2024-08-20 DIAGNOSIS — Z45.2 ENCOUNTER FOR ADJUSTMENT OR MANAGEMENT OF VASCULAR ACCESS DEVICE: ICD-10-CM

## 2024-08-20 DIAGNOSIS — E86.0 DEHYDRATION: Primary | ICD-10-CM

## 2024-08-20 DIAGNOSIS — C79.51 CANCER, METASTATIC TO BONE: ICD-10-CM

## 2024-08-20 LAB
ALBUMIN SERPL-MCNC: 3.9 G/DL (ref 3.5–5.2)
ALBUMIN/GLOB SERPL: 1.7 G/DL
ALP SERPL-CCNC: 141 U/L (ref 39–117)
ALT SERPL W P-5'-P-CCNC: 44 U/L (ref 1–33)
ANION GAP SERPL CALCULATED.3IONS-SCNC: 10.8 MMOL/L (ref 5–15)
AST SERPL-CCNC: 33 U/L (ref 1–32)
BASOPHILS # BLD AUTO: 0.01 10*3/MM3 (ref 0–0.2)
BASOPHILS NFR BLD AUTO: 0.1 % (ref 0–1.5)
BILIRUB SERPL-MCNC: 0.3 MG/DL (ref 0–1.2)
BUN SERPL-MCNC: 4 MG/DL (ref 6–20)
BUN/CREAT SERPL: 5.5 (ref 7–25)
CALCIUM SPEC-SCNC: 9 MG/DL (ref 8.6–10.5)
CHLORIDE SERPL-SCNC: 100 MMOL/L (ref 98–107)
CO2 SERPL-SCNC: 25.2 MMOL/L (ref 22–29)
CREAT SERPL-MCNC: 0.73 MG/DL (ref 0.57–1)
DEPRECATED RDW RBC AUTO: 51.8 FL (ref 37–54)
EGFRCR SERPLBLD CKD-EPI 2021: 96.1 ML/MIN/1.73
EOSINOPHIL # BLD AUTO: 0.04 10*3/MM3 (ref 0–0.4)
EOSINOPHIL NFR BLD AUTO: 0.4 % (ref 0.3–6.2)
ERYTHROCYTE [DISTWIDTH] IN BLOOD BY AUTOMATED COUNT: 16.1 % (ref 12.3–15.4)
GLOBULIN UR ELPH-MCNC: 2.3 GM/DL
GLUCOSE SERPL-MCNC: 115 MG/DL (ref 65–99)
HCT VFR BLD AUTO: 29.5 % (ref 34–46.6)
HGB BLD-MCNC: 9.7 G/DL (ref 12–15.9)
IMM GRANULOCYTES # BLD AUTO: 1 10*3/MM3 (ref 0–0.05)
IMM GRANULOCYTES NFR BLD AUTO: 10.1 % (ref 0–0.5)
LYMPHOCYTES # BLD AUTO: 3.08 10*3/MM3 (ref 0.7–3.1)
LYMPHOCYTES NFR BLD AUTO: 31.2 % (ref 19.6–45.3)
MCH RBC QN AUTO: 29.8 PG (ref 26.6–33)
MCHC RBC AUTO-ENTMCNC: 32.9 G/DL (ref 31.5–35.7)
MCV RBC AUTO: 90.5 FL (ref 79–97)
MONOCYTES # BLD AUTO: 1.08 10*3/MM3 (ref 0.1–0.9)
MONOCYTES NFR BLD AUTO: 11 % (ref 5–12)
NEUTROPHILS NFR BLD AUTO: 4.65 10*3/MM3 (ref 1.7–7)
NEUTROPHILS NFR BLD AUTO: 47.2 % (ref 42.7–76)
PLATELET # BLD AUTO: 29 10*3/MM3 (ref 140–450)
PMV BLD AUTO: 9.8 FL (ref 6–12)
POTASSIUM SERPL-SCNC: 3.1 MMOL/L (ref 3.5–5.2)
PROT SERPL-MCNC: 6.2 G/DL (ref 6–8.5)
QT INTERVAL: 348 MS
QT INTERVAL: 376 MS
QTC INTERVAL: 454 MS
QTC INTERVAL: 484 MS
RBC # BLD AUTO: 3.26 10*6/MM3 (ref 3.77–5.28)
SODIUM SERPL-SCNC: 136 MMOL/L (ref 136–145)
WBC NRBC COR # BLD AUTO: 9.86 10*3/MM3 (ref 3.4–10.8)

## 2024-08-20 PROCEDURE — 80053 COMPREHEN METABOLIC PANEL: CPT | Performed by: INTERNAL MEDICINE

## 2024-08-20 PROCEDURE — 96360 HYDRATION IV INFUSION INIT: CPT

## 2024-08-20 PROCEDURE — 85025 COMPLETE CBC W/AUTO DIFF WBC: CPT | Performed by: INTERNAL MEDICINE

## 2024-08-20 PROCEDURE — 25010000002 HEPARIN LOCK FLUSH PER 10 UNITS: Performed by: INTERNAL MEDICINE

## 2024-08-20 PROCEDURE — 25810000003 SODIUM CHLORIDE 0.9 % SOLUTION: Performed by: INTERNAL MEDICINE

## 2024-08-20 RX ORDER — OXYCODONE AND ACETAMINOPHEN 7.5; 325 MG/1; MG/1
1 TABLET ORAL EVERY 6 HOURS PRN
Qty: 90 TABLET | Refills: 0 | Status: SHIPPED | OUTPATIENT
Start: 2024-08-20

## 2024-08-20 RX ORDER — HEPARIN SODIUM (PORCINE) LOCK FLUSH IV SOLN 100 UNIT/ML 100 UNIT/ML
500 SOLUTION INTRAVENOUS AS NEEDED
OUTPATIENT
Start: 2024-08-20

## 2024-08-20 RX ORDER — POTASSIUM CHLORIDE 20 MEQ/1
20 TABLET, EXTENDED RELEASE ORAL DAILY
Qty: 14 TABLET | Refills: 0 | Status: SHIPPED | OUTPATIENT
Start: 2024-08-20

## 2024-08-20 RX ORDER — SODIUM CHLORIDE 0.9 % (FLUSH) 0.9 %
20 SYRINGE (ML) INJECTION AS NEEDED
Status: DISCONTINUED | OUTPATIENT
Start: 2024-08-20 | End: 2024-08-21 | Stop reason: HOSPADM

## 2024-08-20 RX ORDER — HEPARIN SODIUM (PORCINE) LOCK FLUSH IV SOLN 100 UNIT/ML 100 UNIT/ML
500 SOLUTION INTRAVENOUS AS NEEDED
Status: DISCONTINUED | OUTPATIENT
Start: 2024-08-20 | End: 2024-08-21 | Stop reason: HOSPADM

## 2024-08-20 RX ORDER — SODIUM CHLORIDE 0.9 % (FLUSH) 0.9 %
20 SYRINGE (ML) INJECTION AS NEEDED
OUTPATIENT
Start: 2024-08-20

## 2024-08-20 RX ADMIN — Medication 20 ML: at 14:38

## 2024-08-20 RX ADMIN — SODIUM CHLORIDE 1000 ML: 9 INJECTION, SOLUTION INTRAVENOUS at 13:14

## 2024-08-20 RX ADMIN — HEPARIN 500 UNITS: 100 SYRINGE at 14:38

## 2024-08-20 RX ADMIN — Medication 2 SPRAY: at 00:03

## 2024-08-20 NOTE — ED PROVIDER NOTES
Time: 9:04 PM EDT  Date of encounter:  8/19/2024  Independent Historian/Clinical History and Information was obtained by:   Patient    History is limited by: N/A    Chief Complaint   Patient presents with    Chest Pain     Generalized weakness since blood transfusion on saturday         History of Present Illness:  Patient is a 58 y.o. year old female who presents to the emergency department for evaluation of intermittent chest pain and what she describes to be palpitations. She has also been having nose bleeds. Her CP and SOA started after she had blood and platelet transfusion on Saturday.  She sees her oncologist tomorrow. (DAMARI Marshall, APRN) patient states she has had recurrent nosebleeds on the left side approximately 3 in the past 1 day.  States her chest pain is resolved at this time and states it was more of a palpitation sensation that her heart was racing.  She states she is due to follow-up with her oncologist tomorrow.      Patient Care Team  Primary Care Provider: Aleksandar Edge DO    Past Medical History:     No Known Allergies  Past Medical History:   Diagnosis Date    Abnormal mammogram     PT DENIES KNOWING OF THIS HX    Anxiety     Arthritis     R SHOULDER, R HIP, AND R LEG    Cancer     LUNG    Chronic nausea 01/15/2019    COPD (chronic obstructive pulmonary disease)     O2 3 LITERS NC CONT    Hernia, hiatal     Hyperlipidemia     Hypertension     Knee pain, right 08/30/2018    Memory loss     FORGETFULNESS ? ETIOLOGY    Migraine headache     Moderate episode of recurrent major depressive disorder 05/22/2017    Night sweats     Sciatica of right side 08/18/2017    Severe episode of recurrent major depressive disorder, without psychotic features 10/22/2019    Shingles     OUTBREAK 6/11/24 ON ANTIVIRAL , WHELPS NOTED NO SORES.    Shoulder pain, left 08/30/2018     Past Surgical History:   Procedure Laterality Date    BRONCHOSCOPY N/A 04/12/2022    Procedure: BRONCHOSCOPY WITH  BRONCHOALVEOLAR LAVAGE, BIOPSY;  Surgeon: Shin Mcdonald DO;  Location: [facility] ENDOSCOPY;  Service: Pulmonary;  Laterality: N/A;  RIGHT LOWER LOBE INFILTRATE    BRONCHOSCOPY N/A 2024    Procedure: BRONCHOSCOPY WITH ENDOBRONCHIAL ULTRASOUND AND FINE NEEDLE ASPIRATE;  Surgeon: Shin Mcdonald DO;  Location: [facility] ENDOSCOPY;  Service: Pulmonary;  Laterality: N/A;  MEDIASTINAL ADENOPATHY     SECTION      CHOLECYSTECTOMY      LAPAROSCOPIC    COLONOSCOPY      PORTACATH PLACEMENT Left 2024    Procedure: INSERTION OF PORTACATH;  Surgeon: Josh Patton MD;  Location: [facility] OR OSC;  Service: General;  Laterality: Left;    TOTAL HIP ARTHROPLASTY Right 2022    Procedure: RIGHT TOTAL HIP ARTHROPLASTY;  Surgeon: Cecil Gomes MD;  Location: [facility] MAIN OR;  Service: Orthopedics;  Laterality: Right;     Family History   Problem Relation Age of Onset    Other Mother         BLOOD DISEASE    Arthritis Mother     Heart disease Father     Hypertension Other     Cancer Other     Heart disease Other     Malig Hyperthermia Neg Hx        Home Medications:  Prior to Admission medications    Medication Sig Start Date End Date Taking? Authorizing Provider   albuterol sulfate  (90 Base) MCG/ACT inhaler Inhale 2 puffs Every 4 (Four) Hours As Needed for Wheezing or Shortness of Air. 24   Aleksandar Edge DO   atorvastatin (LIPITOR) 10 MG tablet Take 1 tablet by mouth Every Night. 24   Aleksandar Edge DO   buPROPion SR (WELLBUTRIN SR) 150 MG 12 hr tablet Take 1 tablet by mouth 2 (Two) Times a Day. 24   Aleksandar Edge DO   escitalopram (LEXAPRO) 20 MG tablet Take 1 tablet by mouth Daily. 24   Aleksandar Edge DO   gabapentin (NEURONTIN) 300 MG capsule Take 1 capsule by mouth 3 (Three) Times a Day. 24   Brian Dickson MD   melatonin 5 MG tablet tablet Take 1 tablet by mouth As Needed.    Provider, MD Parvin   nicotine  (NICODERM CQ) 21 MG/24HR patch Place 1 patch on the skin as directed by provider Daily. 6/12/24   Zoe León APRN   nystatin (MYCOSTATIN) 100,000 unit/mL suspension Swish and swallow 5 mL 4 (Four) Times a Day As Needed (for thrush, if needing to take use for 7-10days until symptoms resolve). 8/2/24   Saud Apodaca MD   omeprazole (priLOSEC) 40 MG capsule Take 1 capsule by mouth Daily. 7/23/24   ProviderParvin MD   ondansetron ODT (ZOFRAN-ODT) 4 MG disintegrating tablet Place 2 tablets on the tongue Every 8 (Eight) Hours As Needed for Nausea or Vomiting. 8/5/24   Kamari Tsai MD   oxyCODONE-acetaminophen (PERCOCET) 7.5-325 MG per tablet Take 1 tablet by mouth Every 6 (Six) Hours As Needed for Severe Pain for up to 5 days. 8/14/24 8/19/24  Ruben Linder MD   prochlorperazine (COMPAZINE) 10 MG tablet Take 1 tablet by mouth Every 6 (Six) Hours As Needed for Nausea. 8/6/24   Brian Dickson MD   Trelegy Ellipta 100-62.5-25 MCG/ACT inhaler Inhale 1 puff Daily. 8/5/24   Aleksandar Edge DO        Social History:   Social History     Tobacco Use    Smoking status: Some Days     Current packs/day: 2.00     Average packs/day: 2.0 packs/day for 42.6 years (85.3 ttl pk-yrs)     Types: Cigarettes     Start date: 1982     Passive exposure: Past    Smokeless tobacco: Never    Tobacco comments:     Pt stopped smoking on 04/01/2022. Pt stated at her appt today 06/12/2024. Pt states she smokes less than a 1/2 ppd      INST. PER ANESTHESIA PROTOCOL   Vaping Use    Vaping status: Former    Substances: Nicotine, Flavoring    Devices: Disposable   Substance Use Topics    Alcohol use: Never    Drug use: Never         Review of Systems:  Review of Systems   Constitutional:  Positive for fatigue. Negative for chills and fever.   HENT:  Positive for nosebleeds. Negative for congestion, ear pain and sore throat.    Eyes:  Negative for pain.   Respiratory:  Negative for cough, chest tightness and  "shortness of breath.    Cardiovascular:  Positive for chest pain and palpitations.   Gastrointestinal:  Negative for abdominal pain, diarrhea, nausea and vomiting.   Genitourinary:  Negative for flank pain and hematuria.   Musculoskeletal:  Negative for joint swelling.   Skin:  Negative for pallor.   Neurological:  Positive for weakness. Negative for seizures and headaches.   All other systems reviewed and are negative.       Physical Exam:  BP 99/60   Pulse 97   Temp 98.9 °F (37.2 °C) (Oral)   Resp 16   Ht 162.6 cm (64\")   Wt 94.3 kg (207 lb 14.3 oz)   LMP  (LMP Unknown)   SpO2 94%   Breastfeeding No   BMI 35.68 kg/m²         Physical Exam  Vitals and nursing note reviewed.   Constitutional:       General: She is not in acute distress.     Appearance: Normal appearance. She is not toxic-appearing.   HENT:      Head: Normocephalic and atraumatic.      Jaw: There is normal jaw occlusion.      Comments: Small amount of dried blood within the left nare, no obvious bleeding source.  No active bleeding.  Eyes:      General: Lids are normal.      Extraocular Movements: Extraocular movements intact.      Conjunctiva/sclera: Conjunctivae normal.      Pupils: Pupils are equal, round, and reactive to light.   Cardiovascular:      Rate and Rhythm: Normal rate and regular rhythm.      Pulses: Normal pulses.      Heart sounds: Normal heart sounds.   Pulmonary:      Effort: Pulmonary effort is normal. No respiratory distress.      Breath sounds: Normal breath sounds. No wheezing or rhonchi.   Abdominal:      General: Abdomen is flat.      Palpations: Abdomen is soft.      Tenderness: There is no abdominal tenderness. There is no guarding or rebound.   Musculoskeletal:         General: Normal range of motion.      Cervical back: Normal range of motion and neck supple.      Right lower leg: No edema.      Left lower leg: No edema.   Skin:     General: Skin is warm and dry.   Neurological:      Mental Status: She is alert " and oriented to person, place, and time. Mental status is at baseline.   Psychiatric:         Mood and Affect: Mood normal.            Procedures:  Procedures      Medical Decision Making:      Comorbidities that affect care:    Small cell lung cancer, anemia, memory loss, anxiety, hyperlipidemia, COPD, tobacco use    External Notes reviewed:    Previous Clinic Note: Outpatient infusion center encounter for blood transfusion 8/17/2024      The following orders were placed and all results were independently analyzed by me:  Orders Placed This Encounter   Procedures    XR Chest 1 View    CT Chest With Contrast Diagnostic    [place] Draw    High Sensitivity Troponin T    Comprehensive Metabolic Panel    Lipase    BNP    Magnesium    CBC Auto Differential    Scan Slide    Undress & Gown    Continuous Pulse Oximetry    ECG 12 Lead ED Triage Standing Order; Chest Pain    CBC & Differential    Green Top (Gel)    Lavender Top    Gold Top - SST    Light Blue Top       Medications Given in the Emergency Department:  Medications   aspirin chewable tablet 324 mg (324 mg Oral Given 8/19/24 1935)   iopamidol (ISOVUE-370) 76 % injection 100 mL (100 mL Intravenous Given 8/19/24 2232)   oxymetazoline (AFRIN) nasal spray 2 spray (2 sprays Each Nare Given 8/20/24 0003)        ED Course:    The patient was initially evaluated in the triage area where orders were placed. The patient was later dispositioned by Abram Contreras MD.      The patient was advised to stay for completion of workup which includes but is not limited to communication of labs and radiological results, reassessment and plan. The patient was advised that leaving prior to disposition by a provider could result in critical findings that are not communicated to the patient.     ED Course as of 08/20/24 0712   Mon Aug 19, 2024   2956   --- PROVIDER IN TRIAGE NOTE ---    Patient was seen and evaluated in triage by SANDRO Freeman.  Orders were written and the  patient is currently awaiting disposition.   [MS]   2113 I discussed imaging with ER attending Dr. Clark. [MS]   2231 My interpretation of EKG: Sinus tachycardia 100, nonspecific ST change, no ST elevation, grossly unchanged from previous August 10, 2024 [JS]      ED Course User Index  [JS] Abram Contreras MD  [MS] Hafsa Marshall, SANDRO       Labs:    Lab Results (last 24 hours)       Procedure Component Value Units Date/Time    High Sensitivity Troponin T [[id number]]  (Normal) Collected: 08/19/24 1930    Specimen: Blood from Arm, Right Updated: 08/19/24 1959     HS Troponin T 13 ng/L     Narrative:      High Sensitive Troponin T Reference Range:  <14.0 ng/L- Negative Female for AMI  <22.0 ng/L- Negative Male for AMI  >=14 - Abnormal Female indicating possible myocardial injury.  >=22 - Abnormal Male indicating possible myocardial injury.   Clinicians would have to utilize clinical acumen, EKG, Troponin, and serial changes to determine if it is an Acute Myocardial Infarction or myocardial injury due to an underlying chronic condition.         CBC & Differential [[id number]]  (Abnormal) Collected: 08/19/24 1930    Specimen: Blood from Arm, Right Updated: 08/19/24 2004    Narrative:      The following orders were created for panel order CBC & Differential.  Procedure                               Abnormality         Status                     ---------                               -----------         ------                     CBC Auto Differential[[id number]]        Abnormal            Final result               Scan Slide[[id number]]                                       Final result                 Please view results for these tests on the individual orders.    Comprehensive Metabolic Panel [[id number]]  (Abnormal) Collected: 08/19/24 1930    Specimen: Blood from Arm, Right Updated: 08/19/24 2002     Glucose 100 mg/dL      BUN 5 mg/dL      Creatinine 0.74 mg/dL      Sodium 141 mmol/L      Potassium 3.2  mmol/L      Chloride 101 mmol/L      CO2 27.5 mmol/L      Calcium 9.2 mg/dL      Total Protein 7.0 g/dL      Albumin 4.0 g/dL      ALT (SGPT) 46 U/L      AST (SGOT) 40 U/L      Alkaline Phosphatase 151 U/L      Total Bilirubin 0.2 mg/dL      Globulin 3.0 gm/dL      A/G Ratio 1.3 g/dL      BUN/Creatinine Ratio 6.8     Anion Gap 12.5 mmol/L      eGFR 94.5 mL/min/1.73     Narrative:      GFR Normal >60  Chronic Kidney Disease <60  Kidney Failure <15      Lipase [[id number]]  (Abnormal) Collected: 08/19/24 1930    Specimen: Blood from Arm, Right Updated: 08/19/24 1959     Lipase 12 U/L     BNP [[id number]]  (Normal) Collected: 08/19/24 1930    Specimen: Blood from Arm, Right Updated: 08/19/24 1957     proBNP 271.8 pg/mL     Narrative:      This assay is used as an aid in the diagnosis of individuals suspected of having heart failure. It can be used as an aid in the diagnosis of acute decompensated heart failure (ADHF) in patients presenting with signs and symptoms of ADHF to the emergency department (ED). In addition, NT-proBNP of <300 pg/mL indicates ADHF is not likely.    Age Range Result Interpretation  NT-proBNP Concentration (pg/mL:      <50             Positive            >450                   Gray                 300-450                    Negative             <300    50-75           Positive            >900                  Gray                300-900                  Negative            <300      >75             Positive            >1800                  Gray                300-1800                  Negative            <300    Magnesium [[id number]]  (Abnormal) Collected: 08/19/24 1930    Specimen: Blood from Arm, Right Updated: 08/19/24 2002     Magnesium 1.5 mg/dL     CBC Auto Differential [[id number]]  (Abnormal) Collected: 08/19/24 1930    Specimen: Blood from Arm, Right Updated: 08/19/24 1948     WBC 8.44 10*3/mm3      RBC 3.50 10*6/mm3      Hemoglobin 10.4 g/dL      Hematocrit 31.3 %      MCV 89.4 fL       MCH 29.7 pg      MCHC 33.2 g/dL      RDW 16.3 %      RDW-SD 51.5 fl      MPV 10.3 fL      Platelets 22 10*3/mm3      Neutrophil % 43.4 %      Lymphocyte % 35.8 %      Monocyte % 12.0 %      Eosinophil % 0.4 %      Basophil % 0.8 %      Immature Grans % 7.6 %      Neutrophils, Absolute 3.67 10*3/mm3      Lymphocytes, Absolute 3.02 10*3/mm3      Monocytes, Absolute 1.01 10*3/mm3      Eosinophils, Absolute 0.03 10*3/mm3      Basophils, Absolute 0.07 10*3/mm3      Immature Grans, Absolute 0.64 10*3/mm3      nRBC 1.1 /100 WBC     Scan Slide [[id number]] Collected: 08/19/24 1930    Specimen: Blood from Arm, Right Updated: 08/19/24 2004     Anisocytosis Slight/1+     Hypochromia Slight/1+     Microcytes Slight/1+     WBC Morphology Normal     Platelet Estimate Decreased     Large Platelets Slight/1+             Imaging:    CT Chest With Contrast Diagnostic    Result Date: 8/19/2024  CT CHEST W CONTRAST DIAGNOSTIC Date of Exam: 8/19/2024 10:15 PM EDT Indication: SOa and CP after blood and platlet trasnfusion, hx cancer. Comparison: 6/3/2024 Technique: Axial CT images were obtained of the chest after the uneventful intravenous administration of iodinated contrast.  Reconstructed coronal and sagittal images were also obtained. Automated exposure control and iterative construction methods were  used. Findings: There is no pulmonary embolism or aortic dissection. There is atherosclerotic disease and coronary artery disease, including a nearly 50% stenosis at the proximal left subclavian artery. No pleural or pericardial effusion is identified. Left-sided Port-A-Cath is present. Upper abdominal images show changes of cholecystectomy. Previous study showed mediastinal adenopathy. This is markedly improved since the prior. For example, there is some residual turner tissue along the anterior aspect of the right mainstem bronchus measuring about 1.1 cm in thickness. Adenopathy in the precarinal space previously measured at least 1.9  cm. A small right hilar node measures 1.2 cm. It is poorly characterized on the prior noncontrast study, but it does appear improved. Subcarinal lymph node tissue now measures about 1.2 cm, previously about 1.9 cm. No left hilar adenopathy. Lung windows show emphysema. There is some chronic scarring/atelectasis in the lingula and right middle lobe. Overall however, this is improved. There are scattered tiny pulmonary nodular opacities in both lungs measuring under 5 mm in size. A right middle lobe nodule measures about 5 mm on image 48. This was previously at least 8 mm. Multiple other pulmonary parenchymal densities appear improved. There are a few scattered granulomatous calcifications. No evidence of acute pneumonia or pulmonary edema. There are subtle areas of bony sclerosis in the posterior inferior manubrium, with a few nonspecific areas of sclerosis in the thoracic spine that are somewhat poorly defined. These could be secondary to treated metastatic disease. There is also a  lesion in the mid sternum.     1. No PE or aortic dissection. 2. Response to therapy with improved adenopathy in the chest and improved pulmonary parenchymal densities and nodules. 3. No evidence of acute pneumonia or pulmonary edema. 4. Sclerotic lesions are now noted in the skeleton. These may be the result of treated metastatic disease. Attention on follow-up recommended. Electronically Signed: Ahmet Hill MD  8/19/2024 10:53 PM EDT  Workstation ID: [id number]    XR Chest 1 View    Result Date: 8/19/2024  XR CHEST 1 VW Date of Exam: 8/19/2024 7:36 PM EDT Indication: Chest Pain Triage Protocol Comparison: AP chest x-ray 8/10/2024, CT chest 6/3/2024 Findings: There are mild bibasilar airspace opacities, mildly increased on the left compared to 8/10/2024 chest x-ray. No pneumothorax or large pleural effusion is seen. Cardiomediastinal contours are stable. Tip of the left internal jugular central venous catheter terminates in the right  atrium.     Impression: Mild bibasilar airspace opacities, which may be due to atelectasis or perhaps pneumonia. Electronically Signed: Love Garcia  8/19/2024 8:05 PM EDT  Workstation ID: [id number]       Differential Diagnosis and Discussion:      Chest Pain:  Based on the patient's signs and symptoms, I considered aortic dissection, myocardial infaction, pulmonary embolism, cardiac tamponade, pericarditis, pneumothorax, musculoskeletal chest pain and other differential diagnosis as an etiology of the patient's chest pain.     All labs were reviewed and interpreted by me.  All X-rays impressions were independently interpreted by me.  EKG was interpreted by me.  CT scan radiology impression was interpreted by me.    MDM     Amount and/or Complexity of Data Reviewed  Decide to obtain previous medical records or to obtain history from someone other than the patient: yes                 Patient Care Considerations:    NARCOTICS: I considered prescribing opiate pain medication as an outpatient, however no pain control required in the ED.      Consultants/Shared Management Plan:    None    Social Determinants of Health:    Patient is independent, reliable, and has access to care.       Disposition and Care Coordination:    Discharged: The patient is suitable and stable for discharge with no need for consideration of admission.    I have explained the patient´s condition, diagnoses and treatment plan based on the information available to me at this time. I have answered questions and addressed any concerns. The patient has a good  understanding of the patient´s diagnosis, condition, and treatment plan as can be expected at this point. The vital signs have been stable. The patient´s condition is stable and appropriate for discharge from the emergency department.      The patient will pursue further outpatient evaluation with the primary care physician or other designated or consulting physician as outlined in the discharge  instructions. They are agreeable to this plan of care and follow-up instructions have been explained in detail. The patient has received these instructions in written format and has expressed an understanding of the discharge instructions. The patient is aware that any significant change in condition or worsening of symptoms should prompt an immediate return to this or the closest emergency department or call to 911.  I have explained discharge medications and the need for follow up with the patient/caretakers. This was also printed in the discharge instructions. Patient was discharged with the following medications and follow up:      Medication List        ASK your doctor about these medications      oxyCODONE-acetaminophen 7.5-325 MG per tablet  Commonly known as: PERCOCET  Take 1 tablet by mouth Every 6 (Six) Hours As Needed for Severe Pain for up to 5 days.  Ask about: Should I take this medication?           Brian Dickson MD  [address] [address]  [phone]    Schedule an appointment as soon as possible for a visit          Final diagnoses:   Epistaxis, recurrent   Palpitations   Anemia, unspecified type   Thrombocytopenia        ED Disposition       ED Disposition   Discharge    Condition   Stable    Comment   --               This medical record created using voice recognition software.             Abram Contreras MD  08/20/24 4621

## 2024-08-20 NOTE — DISCHARGE INSTRUCTIONS
These use Afrin no spray for the next 3 days.  Please follow-up with your oncologist as previously scheduled.

## 2024-08-27 ENCOUNTER — READMISSION MANAGEMENT (OUTPATIENT)
Dept: CALL CENTER | Facility: HOSPITAL | Age: 59
End: 2024-08-27
Payer: MEDICARE

## 2024-08-27 ENCOUNTER — OFFICE VISIT (OUTPATIENT)
Dept: ONCOLOGY | Facility: HOSPITAL | Age: 59
End: 2024-08-27
Payer: MEDICARE

## 2024-08-27 ENCOUNTER — HOSPITAL ENCOUNTER (OUTPATIENT)
Dept: ONCOLOGY | Facility: HOSPITAL | Age: 59
Discharge: HOME | End: 2024-08-27
Payer: MEDICARE

## 2024-08-27 VITALS
SYSTOLIC BLOOD PRESSURE: 118 MMHG | RESPIRATION RATE: 18 BRPM | HEIGHT: 64 IN | HEART RATE: 102 BPM | TEMPERATURE: 98 F | OXYGEN SATURATION: 94 % | DIASTOLIC BLOOD PRESSURE: 61 MMHG | BODY MASS INDEX: 34.83 KG/M2 | WEIGHT: 204 LBS

## 2024-08-27 VITALS
OXYGEN SATURATION: 94 % | BODY MASS INDEX: 34.82 KG/M2 | RESPIRATION RATE: 18 BRPM | HEIGHT: 64 IN | TEMPERATURE: 98 F | WEIGHT: 203.93 LBS | SYSTOLIC BLOOD PRESSURE: 118 MMHG | HEART RATE: 102 BPM | DIASTOLIC BLOOD PRESSURE: 61 MMHG

## 2024-08-27 DIAGNOSIS — D64.81 ANEMIA ASSOCIATED WITH CHEMOTHERAPY: ICD-10-CM

## 2024-08-27 DIAGNOSIS — T45.1X5A CHEMOTHERAPY-INDUCED NAUSEA: ICD-10-CM

## 2024-08-27 DIAGNOSIS — T45.1X5A ANEMIA ASSOCIATED WITH CHEMOTHERAPY: ICD-10-CM

## 2024-08-27 DIAGNOSIS — D70.1 CHEMOTHERAPY INDUCED NEUTROPENIA: ICD-10-CM

## 2024-08-27 DIAGNOSIS — E86.0 DEHYDRATION: ICD-10-CM

## 2024-08-27 DIAGNOSIS — T45.1X5A CHEMOTHERAPY INDUCED NEUTROPENIA: ICD-10-CM

## 2024-08-27 DIAGNOSIS — Z45.2 ENCOUNTER FOR ADJUSTMENT OR MANAGEMENT OF VASCULAR ACCESS DEVICE: ICD-10-CM

## 2024-08-27 DIAGNOSIS — C79.51 CANCER, METASTATIC TO BONE: ICD-10-CM

## 2024-08-27 DIAGNOSIS — C79.51 METASTATIC CANCER TO BONE: ICD-10-CM

## 2024-08-27 DIAGNOSIS — C34.90 SMALL CELL LUNG CANCER: Primary | ICD-10-CM

## 2024-08-27 DIAGNOSIS — R11.0 CHEMOTHERAPY-INDUCED NAUSEA: ICD-10-CM

## 2024-08-27 LAB
ALBUMIN SERPL-MCNC: 3.6 G/DL (ref 3.5–5.2)
ALBUMIN/GLOB SERPL: 1.7 G/DL
ALP SERPL-CCNC: 116 U/L (ref 39–117)
ALT SERPL W P-5'-P-CCNC: 14 U/L (ref 1–33)
ANION GAP SERPL CALCULATED.3IONS-SCNC: 7.7 MMOL/L (ref 5–15)
AST SERPL-CCNC: 14 U/L (ref 1–32)
BASOPHILS # BLD AUTO: 0.02 10*3/MM3 (ref 0–0.2)
BASOPHILS NFR BLD AUTO: 0.2 % (ref 0–1.5)
BILIRUB SERPL-MCNC: 0.3 MG/DL (ref 0–1.2)
BUN SERPL-MCNC: 6 MG/DL (ref 6–20)
BUN/CREAT SERPL: 8 (ref 7–25)
CALCIUM SPEC-SCNC: 8.5 MG/DL (ref 8.6–10.5)
CHLORIDE SERPL-SCNC: 103 MMOL/L (ref 98–107)
CO2 SERPL-SCNC: 28.3 MMOL/L (ref 22–29)
CREAT SERPL-MCNC: 0.75 MG/DL (ref 0.57–1)
DEPRECATED RDW RBC AUTO: 68.3 FL (ref 37–54)
EGFRCR SERPLBLD CKD-EPI 2021: 93 ML/MIN/1.73
EOSINOPHIL # BLD AUTO: 0.02 10*3/MM3 (ref 0–0.4)
EOSINOPHIL NFR BLD AUTO: 0.2 % (ref 0.3–6.2)
ERYTHROCYTE [DISTWIDTH] IN BLOOD BY AUTOMATED COUNT: 21.5 % (ref 12.3–15.4)
GLOBULIN UR ELPH-MCNC: 2.1 GM/DL
GLUCOSE SERPL-MCNC: 107 MG/DL (ref 65–99)
HCT VFR BLD AUTO: 29.3 % (ref 34–46.6)
HGB BLD-MCNC: 9.3 G/DL (ref 12–15.9)
IMM GRANULOCYTES # BLD AUTO: 0.17 10*3/MM3 (ref 0–0.05)
IMM GRANULOCYTES NFR BLD AUTO: 1.8 % (ref 0–0.5)
LYMPHOCYTES # BLD AUTO: 1.74 10*3/MM3 (ref 0.7–3.1)
LYMPHOCYTES NFR BLD AUTO: 18.8 % (ref 19.6–45.3)
MAGNESIUM SERPL-MCNC: 1.6 MG/DL (ref 1.6–2.6)
MCH RBC QN AUTO: 30.4 PG (ref 26.6–33)
MCHC RBC AUTO-ENTMCNC: 31.7 G/DL (ref 31.5–35.7)
MCV RBC AUTO: 95.8 FL (ref 79–97)
MONOCYTES # BLD AUTO: 1.34 10*3/MM3 (ref 0.1–0.9)
MONOCYTES NFR BLD AUTO: 14.4 % (ref 5–12)
NEUTROPHILS NFR BLD AUTO: 5.99 10*3/MM3 (ref 1.7–7)
NEUTROPHILS NFR BLD AUTO: 64.6 % (ref 42.7–76)
PHOSPHATE SERPL-MCNC: 4 MG/DL (ref 2.5–4.5)
PLATELET # BLD AUTO: 147 10*3/MM3 (ref 140–450)
PMV BLD AUTO: 9.9 FL (ref 6–12)
POTASSIUM SERPL-SCNC: 3.4 MMOL/L (ref 3.5–5.2)
PROT SERPL-MCNC: 5.7 G/DL (ref 6–8.5)
RBC # BLD AUTO: 3.06 10*6/MM3 (ref 3.77–5.28)
SODIUM SERPL-SCNC: 139 MMOL/L (ref 136–145)
WBC NRBC COR # BLD AUTO: 9.28 10*3/MM3 (ref 3.4–10.8)

## 2024-08-27 PROCEDURE — 25810000003 SODIUM CHLORIDE 0.9 % SOLUTION 500 ML FLEX CONT: Performed by: INTERNAL MEDICINE

## 2024-08-27 PROCEDURE — 96413 CHEMO IV INFUSION 1 HR: CPT

## 2024-08-27 PROCEDURE — 25010000002 HEPARIN LOCK FLUSH PER 10 UNITS: Performed by: INTERNAL MEDICINE

## 2024-08-27 PROCEDURE — 25010000002 DENOSUMAB 120 MG/1.7ML SOLUTION: Performed by: INTERNAL MEDICINE

## 2024-08-27 PROCEDURE — 80053 COMPREHEN METABOLIC PANEL: CPT | Performed by: INTERNAL MEDICINE

## 2024-08-27 PROCEDURE — 3074F SYST BP LT 130 MM HG: CPT | Performed by: INTERNAL MEDICINE

## 2024-08-27 PROCEDURE — 96372 THER/PROPH/DIAG INJ SC/IM: CPT

## 2024-08-27 PROCEDURE — 96375 TX/PRO/DX INJ NEW DRUG ADDON: CPT

## 2024-08-27 PROCEDURE — 1126F AMNT PAIN NOTED NONE PRSNT: CPT | Performed by: INTERNAL MEDICINE

## 2024-08-27 PROCEDURE — 84100 ASSAY OF PHOSPHORUS: CPT | Performed by: INTERNAL MEDICINE

## 2024-08-27 PROCEDURE — 96417 CHEMO IV INFUS EACH ADDL SEQ: CPT

## 2024-08-27 PROCEDURE — 25810000003 SODIUM CHLORIDE 0.9 % SOLUTION: Performed by: INTERNAL MEDICINE

## 2024-08-27 PROCEDURE — G2211 COMPLEX E/M VISIT ADD ON: HCPCS | Performed by: INTERNAL MEDICINE

## 2024-08-27 PROCEDURE — 25010000002 ETOPOSIDE 1 GM/50ML SOLUTION 50 ML VIAL: Performed by: INTERNAL MEDICINE

## 2024-08-27 PROCEDURE — 99214 OFFICE O/P EST MOD 30 MIN: CPT | Performed by: INTERNAL MEDICINE

## 2024-08-27 PROCEDURE — 25010000002 PALONOSETRON PER 25 MCG: Performed by: INTERNAL MEDICINE

## 2024-08-27 PROCEDURE — 85025 COMPLETE CBC W/AUTO DIFF WBC: CPT | Performed by: INTERNAL MEDICINE

## 2024-08-27 PROCEDURE — 3078F DIAST BP <80 MM HG: CPT | Performed by: INTERNAL MEDICINE

## 2024-08-27 PROCEDURE — 25010000002 FOSAPREPITANT PER 1 MG: Performed by: INTERNAL MEDICINE

## 2024-08-27 PROCEDURE — 83735 ASSAY OF MAGNESIUM: CPT | Performed by: INTERNAL MEDICINE

## 2024-08-27 PROCEDURE — 96367 TX/PROPH/DG ADDL SEQ IV INF: CPT

## 2024-08-27 PROCEDURE — 25010000002 DURVALUMAB 50 MG/ML SOLUTION 10 ML VIAL: Performed by: INTERNAL MEDICINE

## 2024-08-27 PROCEDURE — 25010000002 DEXAMETHASONE SODIUM PHOSPHATE 120 MG/30ML SOLUTION: Performed by: INTERNAL MEDICINE

## 2024-08-27 PROCEDURE — 25010000002 CARBOPLATIN PER 50 MG: Performed by: INTERNAL MEDICINE

## 2024-08-27 PROCEDURE — 25810000003 SODIUM CHLORIDE 0.9 % SOLUTION 250 ML FLEX CONT: Performed by: INTERNAL MEDICINE

## 2024-08-27 RX ORDER — SODIUM CHLORIDE 9 MG/ML
20 INJECTION, SOLUTION INTRAVENOUS ONCE
Status: COMPLETED | OUTPATIENT
Start: 2024-08-27 | End: 2024-08-27

## 2024-08-27 RX ORDER — SODIUM CHLORIDE 0.9 % (FLUSH) 0.9 %
20 SYRINGE (ML) INJECTION AS NEEDED
Status: CANCELLED | OUTPATIENT
Start: 2024-08-27

## 2024-08-27 RX ORDER — SODIUM CHLORIDE 9 MG/ML
20 INJECTION, SOLUTION INTRAVENOUS ONCE
Status: CANCELLED | OUTPATIENT
Start: 2024-08-28

## 2024-08-27 RX ORDER — SODIUM CHLORIDE 9 MG/ML
20 INJECTION, SOLUTION INTRAVENOUS ONCE
Status: CANCELLED | OUTPATIENT
Start: 2024-08-27

## 2024-08-27 RX ORDER — PALONOSETRON 0.05 MG/ML
0.25 INJECTION, SOLUTION INTRAVENOUS ONCE
Status: CANCELLED | OUTPATIENT
Start: 2024-08-27

## 2024-08-27 RX ORDER — SODIUM CHLORIDE 9 MG/ML
20 INJECTION, SOLUTION INTRAVENOUS ONCE
Status: CANCELLED | OUTPATIENT
Start: 2024-08-29

## 2024-08-27 RX ORDER — PALONOSETRON 0.05 MG/ML
0.25 INJECTION, SOLUTION INTRAVENOUS ONCE
Status: COMPLETED | OUTPATIENT
Start: 2024-08-27 | End: 2024-08-27

## 2024-08-27 RX ORDER — HEPARIN SODIUM (PORCINE) LOCK FLUSH IV SOLN 100 UNIT/ML 100 UNIT/ML
500 SOLUTION INTRAVENOUS AS NEEDED
Status: DISCONTINUED | OUTPATIENT
Start: 2024-08-27 | End: 2024-08-28 | Stop reason: HOSPADM

## 2024-08-27 RX ORDER — SODIUM CHLORIDE 0.9 % (FLUSH) 0.9 %
20 SYRINGE (ML) INJECTION AS NEEDED
Status: DISCONTINUED | OUTPATIENT
Start: 2024-08-27 | End: 2024-08-28 | Stop reason: HOSPADM

## 2024-08-27 RX ORDER — HEPARIN SODIUM (PORCINE) LOCK FLUSH IV SOLN 100 UNIT/ML 100 UNIT/ML
500 SOLUTION INTRAVENOUS AS NEEDED
Status: CANCELLED | OUTPATIENT
Start: 2024-08-27

## 2024-08-27 RX ADMIN — FOSAPREPITANT 100 ML: 150 INJECTION, POWDER, LYOPHILIZED, FOR SOLUTION INTRAVENOUS at 11:59

## 2024-08-27 RX ADMIN — SODIUM CHLORIDE 20 ML/HR: 9 INJECTION, SOLUTION INTRAVENOUS at 10:28

## 2024-08-27 RX ADMIN — CARBOPLATIN 500 MG: 10 INJECTION, SOLUTION INTRAVENOUS at 12:32

## 2024-08-27 RX ADMIN — Medication 20 ML: at 15:00

## 2024-08-27 RX ADMIN — ETOPOSIDE 140 MG: 20 INJECTION, SOLUTION INTRAVENOUS at 13:38

## 2024-08-27 RX ADMIN — PALONOSETRON HYDROCHLORIDE 0.25 MG: 0.25 INJECTION INTRAVENOUS at 11:34

## 2024-08-27 RX ADMIN — DENOSUMAB 120 MG: 120 INJECTION SUBCUTANEOUS at 11:31

## 2024-08-27 RX ADMIN — HEPARIN 500 UNITS: 100 SYRINGE at 15:00

## 2024-08-27 RX ADMIN — DEXAMETHASONE SODIUM PHOSPHATE 12 MG: 4 INJECTION, SOLUTION INTRA-ARTICULAR; INTRALESIONAL; INTRAMUSCULAR; INTRAVENOUS; SOFT TISSUE at 11:38

## 2024-08-27 RX ADMIN — SODIUM CHLORIDE 1500 MG: 9 INJECTION, SOLUTION INTRAVENOUS at 10:30

## 2024-08-27 NOTE — PROGRESS NOTES
Chief Complaint  Small cell lung cancer    Provider, No Known  Aleksandar Edge DO Subjective Rhonda REGINA Archer presents to [facility]EMAT[facility]Y for small cell lung cancer    Ms. Archer is a very pleasant 58-year-old female with past medical history of hypertension hyperlipidemia, COPD, osteoarthritis, anxiety who presents for new oncology evaluation with her daughter for diagnosis of small cell lung cancer.  Patient previously had PET scan in September 2023 without any concerning findings.  Follow-up CT chest in February showed multiple right middle lobe nodules with mediastinal and right hilar adenopathy.  Patient was admitted to [facility] on June 2 with shortness of breath, fever, cough.  She was started on treatment for pneumonia.  She was found to be hyponatremic to 126.  Repeat CT chest on 3 Tia showed progression of right middle lobe nodules and mediastinal right hilar lymphadenopathy.  Patient was discharged on 4 June.  She had outpatient bronchoscopy on 7 June with station 4R and station 7 possible small cell carcinoma.  Unable to get MRI due to claustrophobia.  CT head with and without contrast on 13 June did not show any intracranial mets.  PET scan confirmed metastatic disease to bone and right axilla    Interval History  Here for follow up.  Patient is on chemotherapy with carboplatin, etoposide, and durvalumab.  She is due for cycle 4.  Patient was again hospitalized after the third cycle.  She was hospitalized with hypoxia and streptococcal pneumonia.  Patient's oxygen requirement was weaned back to her baseline.  She did get a transfusion of 1 unit of blood.  Platelets and white count did downtrend.  She was discharged on the 14th.  She has required additional transfusion outpatient.  She has been getting weekly fluids.  She reports feeling the best now that she has been well.  She has been active here recently.  No fevers or chills  recently.  Denies any bleeding.  Ready to proceed with chemo.  Okay for dose adjustment.    Oncology/Hematology History Overview Note   2/20/24: CT Chest:  No PE or aortic dissection. Multiple right middle lobe nodules are highly suspicious for malignancy.  Additionally, there is mediastinal and right hilar adenopathy now noted.  Follow-up with a PET-CT is recommended. Emphysema with chronic changes in both lungs that otherwise appears stable. Atherosclerotic disease and coronary artery disease.    6/2/24: admitted to [facility] with shortness of breath, fever, cough and started on treatment for pneumonia. Found to have hyponatremia to 126    6/3/24 CT Chest: Right middle lobe nodules and mediastinal and right hilar lymphadenopathy has progressed from prior CT, and remains concerning for malignancy.  Partial right middle lobe atelectasis. Moderate emphysema. Discharged on 6/4/24 with Na of 133.    6/7/24: Bronchoscopy: Station 4R and station 7 positive for small cell carcinoma.    6/13/24: CT head with and without contrast (due to claustrophobia): No mets seen    6/19/24: NM PET: New hypermetabolic nodules within the right middle lobe. There are also multiple new enlarged hypermetabolic mediastinal lymph nodes consistent with metastatic disease. Right axillary mets as well. There are scattered foci of hypermetabolism throughout the bony structures consistent with bone metastases.    6/24/24: C1D1 Carbo/Etop/Durva. Tolerated well    7/16/24: C2D1 Carbo/Etop/Durva. Complicated by inpatient admission due to dehydration from nausea/vomiting as well as pancytopenia. ANC 0.11. Required transfusion for hgb 6.8.     8/6/24: C3D1 Carbo/Etop/Durva. 20% reduction in Carbo and 20% reduction in Etoposide. Add G-CSF with this cycle due to severe neutropenia with C2.     Repeat scan have shown disease response, however scan was due to PE rule out.     8/27/24: C4D1 Carbo/Etop/Durva. 33% reduction in Carbo and 33% reduction in Etoposide.  Continue G-CSF with this cycle due to severe neutropenia with C2. Labs appropriate to proceed. Ok to treat for elevated alk phos.         Small cell lung cancer   6/12/2024 Initial Diagnosis    Small cell lung cancer     6/14/2024 - 6/14/2024 Chemotherapy    OP LUNG CISplatin 60mg/m2 / Etoposide 120mg/m2 + XRT     6/14/2024 Cancer Staged    Staging form: Lung, AJCC 8th Edition  - Clinical: Stage IVB (cT1b, cN2, cM1c) - Signed by Brian Dickson MD on 6/20/2024 6/24/2024 -  Chemotherapy    OP LUNG CARBOplatin AUC=5 / Etoposide / Durvalumab     8/27/2024 -  Chemotherapy    OP SUPPORTIVE Denosumab (Xgeva) Q28D     Cancer, metastatic to bone   8/6/2024 Initial Diagnosis    Cancer, metastatic to bone     8/27/2024 -  Chemotherapy    OP SUPPORTIVE Denosumab (Xgeva) Q28D           Review of Systems   Constitutional:  Positive for fatigue. Negative for appetite change, diaphoresis, fever, unexpected weight gain and unexpected weight loss.   HENT:  Negative for hearing loss, sore throat and voice change.    Eyes:  Negative for blurred vision, double vision, pain, redness and visual disturbance.   Respiratory:  Negative for cough, shortness of breath and wheezing.    Cardiovascular:  Positive for chest pain (pt had her port put in today). Negative for palpitations and leg swelling.   Gastrointestinal:  Positive for nausea.   Endocrine: Negative for cold intolerance, heat intolerance, polydipsia and polyuria.   Genitourinary:  Negative for decreased urine volume, difficulty urinating, frequency and urinary incontinence.   Musculoskeletal:  Negative for arthralgias, back pain, joint swelling and myalgias.   Skin:  Negative for color change, rash, skin lesions and wound.   Neurological:  Negative for dizziness, seizures, numbness and headache.   Hematological:  Negative for adenopathy. Does not bruise/bleed easily.   Psychiatric/Behavioral:  Negative for depressed mood. The patient is not nervous/anxious.    All  other systems reviewed and are negative.    Current Outpatient Medications on File Prior to Visit   Medication Sig Dispense Refill    albuterol sulfate  (90 Base) MCG/ACT inhaler Inhale 2 puffs Every 4 (Four) Hours As Needed for Wheezing or Shortness of Air. 18 g 5    atorvastatin (LIPITOR) 10 MG tablet Take 1 tablet by mouth Every Night. 90 tablet 3    buPROPion SR (WELLBUTRIN SR) 150 MG 12 hr tablet Take 1 tablet by mouth 2 (Two) Times a Day. 180 tablet 3    escitalopram (LEXAPRO) 20 MG tablet Take 1 tablet by mouth Daily. 90 tablet 3    gabapentin (NEURONTIN) 300 MG capsule Take 1 capsule by mouth 3 (Three) Times a Day. 90 capsule 0    melatonin 5 MG tablet tablet Take 1 tablet by mouth As Needed.      nicotine (NICODERM CQ) 21 MG/24HR patch Place 1 patch on the skin as directed by provider Daily. 30 patch 0    nystatin (MYCOSTATIN) 100,000 unit/mL suspension Swish and swallow 5 mL 4 (Four) Times a Day As Needed (for thrush, if needing to take use for 7-10days until symptoms resolve). 473 mL 0    omeprazole (priLOSEC) 40 MG capsule Take 1 capsule by mouth Daily.      ondansetron ODT (ZOFRAN-ODT) 4 MG disintegrating tablet Place 2 tablets on the tongue Every 8 (Eight) Hours As Needed for Nausea or Vomiting. 30 tablet 0    oxyCODONE-acetaminophen (PERCOCET) 7.5-325 MG per tablet Take 1 tablet by mouth Every 6 (Six) Hours As Needed for Severe Pain. 90 tablet 0    potassium chloride (KLOR-CON M20) 20 MEQ CR tablet Take 1 tablet by mouth Daily. 14 tablet 0    prochlorperazine (COMPAZINE) 10 MG tablet Take 1 tablet by mouth Every 6 (Six) Hours As Needed for Nausea. 60 tablet 3    Trelegy Ellipta 100-62.5-25 MCG/ACT inhaler Inhale 1 puff Daily. 3 each 3     No current facility-administered medications on file prior to visit.       No Known Allergies  Past Medical History:   Diagnosis Date    Abnormal mammogram     PT DENIES KNOWING OF THIS HX    Anxiety     Arthritis     R SHOULDER, R HIP, AND R LEG    Cancer      LUNG    Chronic nausea 01/15/2019    COPD (chronic obstructive pulmonary disease)     O2 3 LITERS NC CONT    Hernia, hiatal     Hyperlipidemia     Hypertension     Knee pain, right 2018    Memory loss     FORGETFULNESS ? ETIOLOGY    Migraine headache     Moderate episode of recurrent major depressive disorder 2017    Night sweats     Sciatica of right side 2017    Severe episode of recurrent major depressive disorder, without psychotic features 10/22/2019    Shingles     OUTBREAK 24 ON ANTIVIRAL , WHELPS NOTED NO SORES.    Shoulder pain, left 2018     Past Surgical History:   Procedure Laterality Date    BRONCHOSCOPY N/A 2022    Procedure: BRONCHOSCOPY WITH BRONCHOALVEOLAR LAVAGE, BIOPSY;  Surgeon: Shin Mcdonald DO;  Location: [facility] ENDOSCOPY;  Service: Pulmonary;  Laterality: N/A;  RIGHT LOWER LOBE INFILTRATE    BRONCHOSCOPY N/A 2024    Procedure: BRONCHOSCOPY WITH ENDOBRONCHIAL ULTRASOUND AND FINE NEEDLE ASPIRATE;  Surgeon: Shin Mcdonald DO;  Location: [facility] ENDOSCOPY;  Service: Pulmonary;  Laterality: N/A;  MEDIASTINAL ADENOPATHY     SECTION      CHOLECYSTECTOMY      LAPAROSCOPIC    COLONOSCOPY      PORTACATH PLACEMENT Left 2024    Procedure: INSERTION OF PORTACATH;  Surgeon: Josh Patton MD;  Location: [facility] OR OSC;  Service: General;  Laterality: Left;    TOTAL HIP ARTHROPLASTY Right 2022    Procedure: RIGHT TOTAL HIP ARTHROPLASTY;  Surgeon: Cecil Goems MD;  Location: [facility] MAIN OR;  Service: Orthopedics;  Laterality: Right;     Social History     Socioeconomic History    Marital status:      Spouse name: DEVAN    Number of children: 2    Years of education: GED    Highest education level: GED or equivalent   Tobacco Use    Smoking status: Some Days     Current packs/day: 2.00     Average packs/day: 2.0 packs/day for 42.7 years (85.3 ttl pk-yrs)     Types: Cigarettes     Start date:      Passive  "exposure: Past    Smokeless tobacco: Never    Tobacco comments:     Pt stopped smoking on 04/01/2022. Pt stated at her appt today 06/12/2024. Pt states she smokes less than a 1/2 ppd      INST. PER ANESTHESIA PROTOCOL   Vaping Use    Vaping status: Former    Substances: Nicotine, Flavoring    Devices: Disposable   Substance and Sexual Activity    Alcohol use: Never    Drug use: Never    Sexual activity: Defer     Family History   Problem Relation Age of Onset    Other Mother         BLOOD DISEASE    Arthritis Mother     Heart disease Father     Hypertension Other     Cancer Other     Heart disease Other     Malig Hyperthermia Neg Hx        Objective   Physical Exam  Well appearing patient in no acute distress on RA  Anicteric sclerae, + healing shingles rash on abdomen with radiation to back on right.   + poor dentition.   Respirations non-labored  Awake, alert, and oriented x 4. Speech intact. No gross neurologic deficit  Appropriate mood and affect    Vitals:    08/27/24 0914   BP: 118/61   Pulse: 102   Resp: 18   Temp: 98 °F (36.7 °C)   TempSrc: Temporal   SpO2: 94%   Weight: 92.5 kg (203 lb 14.8 oz)   Height: 162.6 cm (64.02\")   PainSc: 0-No pain       ECOG score: 2         PHQ-9 Total Score:           Result Review :   The following data was reviewed by: Brian Dickson MD on 08/27/24:  Lab Results   Component Value Date    HGB 9.3 (L) 08/27/2024    HCT 29.3 (L) 08/27/2024    MCV 95.8 08/27/2024     08/27/2024    WBC 9.28 08/27/2024    NEUTROABS 5.99 08/27/2024    LYMPHSABS 1.74 08/27/2024    MONOSABS 1.34 (H) 08/27/2024    EOSABS 0.02 08/27/2024    BASOSABS 0.02 08/27/2024     Lab Results   Component Value Date    GLUCOSE 107 (H) 08/27/2024    BUN 6 08/27/2024    CREATININE 0.75 08/27/2024     08/27/2024    K 3.4 (L) 08/27/2024     08/27/2024    CO2 28.3 08/27/2024    CALCIUM 8.5 (L) 08/27/2024    PROTEINTOT 5.7 (L) 08/27/2024    ALBUMIN 3.6 08/27/2024    BILITOT 0.3 08/27/2024    " ALKPHOS 116 08/27/2024    AST 14 08/27/2024    ALT 14 08/27/2024     Lab Results   Component Value Date    MG 1.5 (L) 08/19/2024    PHOS 2.9 06/04/2024    FREET4 1.36 08/06/2024    TSH 1.220 08/06/2024     Labs personally reviewed. Sodium normal. Hgb low. WBC high. Plts wnl.     Discharge summary personally reviewed      CT Chest With Contrast Diagnostic    Result Date: 8/19/2024  1. No PE or aortic dissection. 2. Response to therapy with improved adenopathy in the chest and improved pulmonary parenchymal densities and nodules. 3. No evidence of acute pneumonia or pulmonary edema. 4. Sclerotic lesions are now noted in the skeleton. These may be the result of treated metastatic disease. Attention on follow-up recommended. Electronically Signed: Ahmet Hill MD  8/19/2024 10:53 PM EDT  Workstation ID: [id number]    XR Chest 1 View    Result Date: 8/19/2024  Impression: Mild bibasilar airspace opacities, which may be due to atelectasis or perhaps pneumonia. Electronically Signed: Love Garcia  8/19/2024 8:05 PM EDT  Workstation ID: [id number]    XR Chest 1 View    Result Date: 8/10/2024  Impression: 1. Mild inflammatory changes suspected at the right lung base. Electronically Signed: Mabel Pabon MD  8/10/2024 1:16 PM EDT  Workstation ID: [id number]    XR Chest 1 View    Result Date: 8/5/2024  Impression: Mild left basilar opacity, which could reflect atelectasis or pneumonia. Electronically Signed: Isma Roche MD  8/5/2024 3:32 PM EDT  Workstation ID: [id number]    XR Chest 1 View    Result Date: 7/31/2024  Impression: 1. Patchy nodular densities in the lung bases appear unchanged. 2. No new airspace disease. Electronically Signed: Cecy De Jesus MD  7/31/2024 4:32 PM EDT  Workstation ID: [id number]         Assessment and Plan    Diagnoses and all orders for this visit:    1. Small cell lung cancer (Primary)  -     CT chest w contrast; Future  -     CT abdomen pelvis w contrast; Future  -     CBC and Differential;  Future  -     Comprehensive metabolic panel; Future  -     ONCBCN INFUSION APPOINTMENT REQUEST 01; Future  -     ONCBCN INFUSION APPOINTMENT REQUEST 01; Future    2. Anemia associated with chemotherapy    3. Chemotherapy-induced nausea    4. Chemotherapy induced neutropenia    Other orders  -     sodium chloride 0.9 % infusion  -     durvalumab (IMFINZI) 1,500 mg in sodium chloride 0.9 % 280 mL chemo IVPB  -     palonosetron (ALOXI) injection 0.25 mg  -     fosaprepitant (EMEND) 150 mg in sodium chloride 0.9 % 100 mL IVPB  -     dexAMETHasone (DECADRON) 12 mg in sodium chloride 0.9 % IVPB  -     CARBOplatin (PARAPLATIN) 500 mg in sodium chloride 0.9 % 300 mL chemo IVPB  -     etoposide (TOPOSAR) 140 mg in sodium chloride 0.9 % 507 mL chemo IVPB  -     sodium chloride 0.9 % infusion  -     dexAMETHasone (DECADRON) 12 mg in sodium chloride 0.9 % IVPB  -     etoposide (TOPOSAR) 140 mg in sodium chloride 0.9 % 507 mL chemo IVPB  -     sodium chloride 0.9 % infusion  -     dexAMETHasone (DECADRON) 12 mg in sodium chloride 0.9 % IVPB  -     etoposide (TOPOSAR) 140 mg in sodium chloride 0.9 % 507 mL chemo IVPB  -     pegfilgrastim (NEULASTA ONPRO) on-body injector 6 mg  -     OK To Treat        Small cell lung cancer, extensive stage  RML with right hilar and mediastinal adenopathy on CT chest.  Bronchoscopy with positive small cell pathology at station 4R and station 7.  PET with multiple new enlarged hypermetabolic mediastinal lymph nodes consistent with metastatic disease, also with new right axillary FDG avid mets. There are scattered foci of hypermetabolism throughout the bony structures consistent with bone metastases. Discussed results. Discussed that this represents metastatic or stage IV cancer.  CT head with and without without any intracranial mets.  Unable to do MRI due to claustrophobia and anxiety. Recommended treatment is systemic alone with for IV carboplatin, etoposide and durvalumab every 3 weeks. First  cycle 6/24/24.     8/6/24: C3D1 Carbo/Etop/Durva. 20% reduction in Carbo and 20% reduction in Etoposide. Add G-CSF with this cycle due to severe neutropenia with C2.    Repeat scan have shown disease response, however scan was due to PE rule out.     8/27/24: C4D1 Carbo/Etop/Durva. 33% reduction in Carbo and 33% reduction in Etoposide. Continue G-CSF with this cycle due to severe neutropenia with C2. Labs appropriate to proceed. Ok to treat for elevated alk phos.     Repeat scans after C4. Consider durva alone if no progression seen.     Likely to hold on consolidative radiation as no dominant lung lesion.     Anemia 2/2 chemo  DR chemo as above. Will trend weekly.     Chemo nausea  Extend olanzapine to 7 nights starting with C3. Alternate compazine and zofran. Plan for weekly labs and IV fluids.     Mucositis  Recommend salt/soda rinse.    Met cancer to bone  Recommend bone modifying agent. Discussed at this visit. Patient has all teeth except 2 removed. Plan to start monthly Xgeva today 8/27/24. Will monitor electrolytes.     Anxiety  Present at baseline now worse. PRN ativan 0.5 mg.     Shingles  Treated with valacyclovir. Currently healing. Still has some pain. Gabapentin previously added.    Insomnia  Recommend low dose melatonin vs benadryl. Also has Ativan if anxiety is contributing to poor sleep.      I spent 25 minutes caring for Lluvia on this date of service. This time includes time spent by me in the following activities:preparing for the visit, reviewing tests, obtaining and/or reviewing a separately obtained history, performing a medically appropriate examination and/or evaluation , counseling and educating the patient/family/caregiver, ordering medications, tests, or procedures, referring and communicating with other health care professionals , documenting information in the medical record, independently interpreting results and communicating that information with the patient/family/caregiver, and  care coordination    Patient Follow Up: Cycle 5  Patient was given instructions and counseling regarding her condition or for health maintenance advice. Please see specific information pulled into the AVS if appropriate.

## 2024-08-27 NOTE — OUTREACH NOTE
COPD/PN Week 2 Survey      Flowsheet Row Responses   [facility] patient discharged from? [facility]   Does the patient have one of the following disease processes/diagnoses(primary or secondary)? Pneumonia   Week 2 attempt successful? Yes   Call start time 0918   Call end time 0919   Discharge diagnosis Community acquired PNA   Is patient permission given to speak with other caregiver? Yes   List who call center can speak with Mike spouse   Person spoke with today (if not patient) and relationship Mike spouse   Meds reviewed with patient/caregiver? Yes   Is the patient taking all medications as directed (includes completed medication regime)? Yes   Does the patient have a primary care provider?  Yes   Has the patient kept scheduled appointments due by today? Yes  [Chemo today]   Pulse Ox monitoring Intermittent   Pulse Ox device source Patient   O2 Sat: education provided Sat levels, Monitoring frequency, When to seek care   What is the patient's perception of their health status since discharge? Improving   Week 2 call completed? Yes   Graduated Yes   Graduated/Revoked comments Spouse states pt is improving-no issues during call   Call end time 0919            Marybel GOMEZ - Registered Nurse

## 2024-08-28 ENCOUNTER — HOSPITAL ENCOUNTER (OUTPATIENT)
Dept: ONCOLOGY | Facility: HOSPITAL | Age: 59
Discharge: HOME | End: 2024-08-28
Admitting: INTERNAL MEDICINE
Payer: MEDICARE

## 2024-08-28 VITALS
SYSTOLIC BLOOD PRESSURE: 116 MMHG | RESPIRATION RATE: 18 BRPM | TEMPERATURE: 98.3 F | HEIGHT: 64 IN | BODY MASS INDEX: 35.91 KG/M2 | DIASTOLIC BLOOD PRESSURE: 52 MMHG | WEIGHT: 210.32 LBS | HEART RATE: 93 BPM | OXYGEN SATURATION: 95 %

## 2024-08-28 DIAGNOSIS — C34.90 SMALL CELL LUNG CANCER: Primary | ICD-10-CM

## 2024-08-28 DIAGNOSIS — Z45.2 ENCOUNTER FOR ADJUSTMENT OR MANAGEMENT OF VASCULAR ACCESS DEVICE: ICD-10-CM

## 2024-08-28 PROCEDURE — 96413 CHEMO IV INFUSION 1 HR: CPT

## 2024-08-28 PROCEDURE — 96375 TX/PRO/DX INJ NEW DRUG ADDON: CPT

## 2024-08-28 PROCEDURE — 25010000002 ETOPOSIDE 1 GM/50ML SOLUTION 50 ML VIAL: Performed by: INTERNAL MEDICINE

## 2024-08-28 PROCEDURE — 25810000003 SODIUM CHLORIDE 0.9 % SOLUTION 500 ML FLEX CONT: Performed by: INTERNAL MEDICINE

## 2024-08-28 PROCEDURE — 25810000003 SODIUM CHLORIDE 0.9 % SOLUTION: Performed by: INTERNAL MEDICINE

## 2024-08-28 PROCEDURE — 25010000002 DEXAMETHASONE SODIUM PHOSPHATE 120 MG/30ML SOLUTION: Performed by: INTERNAL MEDICINE

## 2024-08-28 PROCEDURE — 25010000002 HEPARIN LOCK FLUSH PER 10 UNITS: Performed by: INTERNAL MEDICINE

## 2024-08-28 RX ORDER — SODIUM CHLORIDE 9 MG/ML
20 INJECTION, SOLUTION INTRAVENOUS ONCE
Status: COMPLETED | OUTPATIENT
Start: 2024-08-28 | End: 2024-08-28

## 2024-08-28 RX ORDER — SODIUM CHLORIDE 0.9 % (FLUSH) 0.9 %
20 SYRINGE (ML) INJECTION AS NEEDED
Status: CANCELLED | OUTPATIENT
Start: 2024-08-28

## 2024-08-28 RX ORDER — SODIUM CHLORIDE 0.9 % (FLUSH) 0.9 %
20 SYRINGE (ML) INJECTION AS NEEDED
Status: DISCONTINUED | OUTPATIENT
Start: 2024-08-28 | End: 2024-08-29 | Stop reason: HOSPADM

## 2024-08-28 RX ORDER — HEPARIN SODIUM (PORCINE) LOCK FLUSH IV SOLN 100 UNIT/ML 100 UNIT/ML
500 SOLUTION INTRAVENOUS AS NEEDED
Status: CANCELLED | OUTPATIENT
Start: 2024-08-29

## 2024-08-28 RX ORDER — HEPARIN SODIUM (PORCINE) LOCK FLUSH IV SOLN 100 UNIT/ML 100 UNIT/ML
500 SOLUTION INTRAVENOUS AS NEEDED
Status: DISCONTINUED | OUTPATIENT
Start: 2024-08-28 | End: 2024-08-29 | Stop reason: HOSPADM

## 2024-08-28 RX ADMIN — Medication 20 ML: at 15:54

## 2024-08-28 RX ADMIN — SODIUM CHLORIDE 20 ML/HR: 9 INJECTION, SOLUTION INTRAVENOUS at 14:20

## 2024-08-28 RX ADMIN — HEPARIN 500 UNITS: 100 SYRINGE at 15:54

## 2024-08-28 RX ADMIN — DEXAMETHASONE SODIUM PHOSPHATE 12 MG: 4 INJECTION, SOLUTION INTRA-ARTICULAR; INTRALESIONAL; INTRAMUSCULAR; INTRAVENOUS; SOFT TISSUE at 14:22

## 2024-08-28 RX ADMIN — ETOPOSIDE 140 MG: 20 INJECTION, SOLUTION INTRAVENOUS at 14:47

## 2024-08-29 ENCOUNTER — HOSPITAL ENCOUNTER (OUTPATIENT)
Dept: ONCOLOGY | Facility: HOSPITAL | Age: 59
Discharge: HOME | End: 2024-08-29
Admitting: INTERNAL MEDICINE
Payer: MEDICARE

## 2024-08-29 VITALS
DIASTOLIC BLOOD PRESSURE: 73 MMHG | BODY MASS INDEX: 36.02 KG/M2 | SYSTOLIC BLOOD PRESSURE: 138 MMHG | WEIGHT: 210.98 LBS | TEMPERATURE: 99.6 F | RESPIRATION RATE: 18 BRPM | OXYGEN SATURATION: 94 % | HEART RATE: 116 BPM | HEIGHT: 64 IN

## 2024-08-29 DIAGNOSIS — Z45.2 ENCOUNTER FOR ADJUSTMENT OR MANAGEMENT OF VASCULAR ACCESS DEVICE: ICD-10-CM

## 2024-08-29 DIAGNOSIS — C34.90 SMALL CELL LUNG CANCER: Primary | ICD-10-CM

## 2024-08-29 PROCEDURE — 25010000002 ETOPOSIDE 1 GM/50ML SOLUTION 50 ML VIAL: Performed by: INTERNAL MEDICINE

## 2024-08-29 PROCEDURE — 96413 CHEMO IV INFUSION 1 HR: CPT

## 2024-08-29 PROCEDURE — 25010000002 HEPARIN LOCK FLUSH PER 10 UNITS: Performed by: INTERNAL MEDICINE

## 2024-08-29 PROCEDURE — 96375 TX/PRO/DX INJ NEW DRUG ADDON: CPT

## 2024-08-29 PROCEDURE — 25010000002 PEGFILGRASTIM 6 MG/0.6ML PREFILLED SYRINGE KIT: Performed by: INTERNAL MEDICINE

## 2024-08-29 PROCEDURE — 25810000003 SODIUM CHLORIDE 0.9 % SOLUTION 500 ML FLEX CONT: Performed by: INTERNAL MEDICINE

## 2024-08-29 PROCEDURE — 96377 APPLICATON ON-BODY INJECTOR: CPT

## 2024-08-29 PROCEDURE — 25810000003 SODIUM CHLORIDE 0.9 % SOLUTION: Performed by: INTERNAL MEDICINE

## 2024-08-29 PROCEDURE — 25010000002 DEXAMETHASONE SODIUM PHOSPHATE 120 MG/30ML SOLUTION: Performed by: INTERNAL MEDICINE

## 2024-08-29 RX ORDER — HEPARIN SODIUM (PORCINE) LOCK FLUSH IV SOLN 100 UNIT/ML 100 UNIT/ML
500 SOLUTION INTRAVENOUS AS NEEDED
OUTPATIENT
Start: 2024-08-29

## 2024-08-29 RX ORDER — SODIUM CHLORIDE 0.9 % (FLUSH) 0.9 %
20 SYRINGE (ML) INJECTION AS NEEDED
Status: DISCONTINUED | OUTPATIENT
Start: 2024-08-29 | End: 2024-08-31 | Stop reason: HOSPADM

## 2024-08-29 RX ORDER — HEPARIN SODIUM (PORCINE) LOCK FLUSH IV SOLN 100 UNIT/ML 100 UNIT/ML
500 SOLUTION INTRAVENOUS AS NEEDED
Status: DISCONTINUED | OUTPATIENT
Start: 2024-08-29 | End: 2024-08-31 | Stop reason: HOSPADM

## 2024-08-29 RX ORDER — SODIUM CHLORIDE 0.9 % (FLUSH) 0.9 %
20 SYRINGE (ML) INJECTION AS NEEDED
OUTPATIENT
Start: 2024-08-29

## 2024-08-29 RX ORDER — SODIUM CHLORIDE 9 MG/ML
20 INJECTION, SOLUTION INTRAVENOUS ONCE
Status: COMPLETED | OUTPATIENT
Start: 2024-08-29 | End: 2024-08-29

## 2024-08-29 RX ADMIN — HEPARIN 500 UNITS: 100 SYRINGE at 15:47

## 2024-08-29 RX ADMIN — ETOPOSIDE 140 MG: 20 INJECTION, SOLUTION INTRAVENOUS at 14:36

## 2024-08-29 RX ADMIN — SODIUM CHLORIDE 20 ML/HR: 9 INJECTION, SOLUTION INTRAVENOUS at 14:13

## 2024-08-29 RX ADMIN — Medication 20 ML: at 15:47

## 2024-08-29 RX ADMIN — DEXAMETHASONE SODIUM PHOSPHATE 12 MG: 4 INJECTION, SOLUTION INTRA-ARTICULAR; INTRALESIONAL; INTRAMUSCULAR; INTRAVENOUS; SOFT TISSUE at 14:14

## 2024-08-29 RX ADMIN — PEGFILGRASTIM 6 MG: KIT SUBCUTANEOUS at 14:42

## 2024-09-05 ENCOUNTER — TELEPHONE (OUTPATIENT)
Dept: ONCOLOGY | Facility: HOSPITAL | Age: 59
End: 2024-09-05
Payer: MEDICARE

## 2024-09-05 ENCOUNTER — HOSPITAL ENCOUNTER (OUTPATIENT)
Dept: ONCOLOGY | Facility: HOSPITAL | Age: 59
Discharge: HOME | End: 2024-09-05
Payer: MEDICARE

## 2024-09-05 VITALS
DIASTOLIC BLOOD PRESSURE: 56 MMHG | HEART RATE: 119 BPM | SYSTOLIC BLOOD PRESSURE: 124 MMHG | OXYGEN SATURATION: 100 % | TEMPERATURE: 98.5 F | RESPIRATION RATE: 20 BRPM

## 2024-09-05 DIAGNOSIS — Z45.2 ENCOUNTER FOR ADJUSTMENT OR MANAGEMENT OF VASCULAR ACCESS DEVICE: ICD-10-CM

## 2024-09-05 DIAGNOSIS — D64.81 ANEMIA ASSOCIATED WITH CHEMOTHERAPY: ICD-10-CM

## 2024-09-05 DIAGNOSIS — C34.90 SMALL CELL LUNG CANCER: ICD-10-CM

## 2024-09-05 DIAGNOSIS — E86.0 DEHYDRATION: Primary | ICD-10-CM

## 2024-09-05 DIAGNOSIS — T45.1X5A ANEMIA ASSOCIATED WITH CHEMOTHERAPY: ICD-10-CM

## 2024-09-05 DIAGNOSIS — D64.81 ANEMIA ASSOCIATED WITH CHEMOTHERAPY: Primary | ICD-10-CM

## 2024-09-05 DIAGNOSIS — T45.1X5A ANEMIA ASSOCIATED WITH CHEMOTHERAPY: Primary | ICD-10-CM

## 2024-09-05 LAB
ALBUMIN SERPL-MCNC: 3.7 G/DL (ref 3.5–5.2)
ALBUMIN/GLOB SERPL: 1.2 G/DL
ALP SERPL-CCNC: 196 U/L (ref 39–117)
ALT SERPL W P-5'-P-CCNC: 13 U/L (ref 1–33)
ANION GAP SERPL CALCULATED.3IONS-SCNC: 14.6 MMOL/L (ref 5–15)
ANISOCYTOSIS BLD QL: NORMAL
AST SERPL-CCNC: 13 U/L (ref 1–32)
BASOPHILS # BLD AUTO: 0.08 10*3/MM3 (ref 0–0.2)
BASOPHILS NFR BLD AUTO: 1.1 % (ref 0–1.5)
BB HOLD TUBE: NORMAL
BILIRUB SERPL-MCNC: 0.3 MG/DL (ref 0–1.2)
BUN SERPL-MCNC: 6 MG/DL (ref 6–20)
BUN/CREAT SERPL: 8.6 (ref 7–25)
BURR CELLS BLD QL SMEAR: NORMAL
CALCIUM SPEC-SCNC: 6.8 MG/DL (ref 8.6–10.5)
CHLORIDE SERPL-SCNC: 100 MMOL/L (ref 98–107)
CO2 SERPL-SCNC: 21.4 MMOL/L (ref 22–29)
CREAT SERPL-MCNC: 0.7 MG/DL (ref 0.57–1)
DEPRECATED RDW RBC AUTO: 70.3 FL (ref 37–54)
EGFRCR SERPLBLD CKD-EPI 2021: 101 ML/MIN/1.73
EOSINOPHIL # BLD AUTO: 0.01 10*3/MM3 (ref 0–0.4)
EOSINOPHIL NFR BLD AUTO: 0.1 % (ref 0.3–6.2)
ERYTHROCYTE [DISTWIDTH] IN BLOOD BY AUTOMATED COUNT: 20.6 % (ref 12.3–15.4)
GLOBULIN UR ELPH-MCNC: 3.1 GM/DL
GLUCOSE SERPL-MCNC: 89 MG/DL (ref 65–99)
HCT VFR BLD AUTO: 27.7 % (ref 34–46.6)
HGB BLD-MCNC: 8.8 G/DL (ref 12–15.9)
IMM GRANULOCYTES # BLD AUTO: 0.07 10*3/MM3 (ref 0–0.05)
IMM GRANULOCYTES NFR BLD AUTO: 1 % (ref 0–0.5)
LYMPHOCYTES # BLD AUTO: 1.6 10*3/MM3 (ref 0.7–3.1)
LYMPHOCYTES NFR BLD AUTO: 22.7 % (ref 19.6–45.3)
MACROCYTES BLD QL SMEAR: NORMAL
MCH RBC QN AUTO: 30.2 PG (ref 26.6–33)
MCHC RBC AUTO-ENTMCNC: 31.8 G/DL (ref 31.5–35.7)
MCV RBC AUTO: 95.2 FL (ref 79–97)
MONOCYTES # BLD AUTO: 1.36 10*3/MM3 (ref 0.1–0.9)
MONOCYTES NFR BLD AUTO: 19.3 % (ref 5–12)
NEUTROPHILS NFR BLD AUTO: 3.92 10*3/MM3 (ref 1.7–7)
NEUTROPHILS NFR BLD AUTO: 55.8 % (ref 42.7–76)
NRBC BLD AUTO-RTO: 0 /100 WBC (ref 0–0.2)
PLATELET # BLD AUTO: 31 10*3/MM3 (ref 140–450)
PMV BLD AUTO: 11.9 FL (ref 6–12)
POIKILOCYTOSIS BLD QL SMEAR: NORMAL
POLYCHROMASIA BLD QL SMEAR: NORMAL
POTASSIUM SERPL-SCNC: 3.4 MMOL/L (ref 3.5–5.2)
PROT SERPL-MCNC: 6.8 G/DL (ref 6–8.5)
RBC # BLD AUTO: 2.91 10*6/MM3 (ref 3.77–5.28)
SMALL PLATELETS BLD QL SMEAR: NORMAL
SODIUM SERPL-SCNC: 136 MMOL/L (ref 136–145)
WBC MORPH BLD: NORMAL
WBC NRBC COR # BLD AUTO: 7.04 10*3/MM3 (ref 3.4–10.8)

## 2024-09-05 PROCEDURE — 25810000003 SODIUM CHLORIDE 0.9 % SOLUTION: Performed by: INTERNAL MEDICINE

## 2024-09-05 PROCEDURE — 25010000002 ONDANSETRON PER 1 MG: Performed by: INTERNAL MEDICINE

## 2024-09-05 PROCEDURE — 96374 THER/PROPH/DIAG INJ IV PUSH: CPT

## 2024-09-05 PROCEDURE — 96375 TX/PRO/DX INJ NEW DRUG ADDON: CPT

## 2024-09-05 PROCEDURE — 85007 BL SMEAR W/DIFF WBC COUNT: CPT | Performed by: INTERNAL MEDICINE

## 2024-09-05 PROCEDURE — 96361 HYDRATE IV INFUSION ADD-ON: CPT

## 2024-09-05 PROCEDURE — 25010000002 HEPARIN LOCK FLUSH PER 10 UNITS: Performed by: INTERNAL MEDICINE

## 2024-09-05 PROCEDURE — 80053 COMPREHEN METABOLIC PANEL: CPT | Performed by: INTERNAL MEDICINE

## 2024-09-05 PROCEDURE — 96365 THER/PROPH/DIAG IV INF INIT: CPT

## 2024-09-05 PROCEDURE — 85025 COMPLETE CBC W/AUTO DIFF WBC: CPT | Performed by: INTERNAL MEDICINE

## 2024-09-05 RX ORDER — PROCHLORPERAZINE MALEATE 10 MG
10 TABLET ORAL EVERY 6 HOURS PRN
Qty: 60 TABLET | Refills: 3 | Status: SHIPPED | OUTPATIENT
Start: 2024-09-05

## 2024-09-05 RX ORDER — HEPARIN SODIUM (PORCINE) LOCK FLUSH IV SOLN 100 UNIT/ML 100 UNIT/ML
500 SOLUTION INTRAVENOUS AS NEEDED
OUTPATIENT
Start: 2024-09-05

## 2024-09-05 RX ORDER — SODIUM CHLORIDE 0.9 % (FLUSH) 0.9 %
20 SYRINGE (ML) INJECTION AS NEEDED
OUTPATIENT
Start: 2024-09-05

## 2024-09-05 RX ORDER — ONDANSETRON 8 MG/1
8 TABLET, ORALLY DISINTEGRATING ORAL EVERY 8 HOURS PRN
Qty: 60 TABLET | Refills: 3 | Status: SHIPPED | OUTPATIENT
Start: 2024-09-05

## 2024-09-05 RX ORDER — HEPARIN SODIUM (PORCINE) LOCK FLUSH IV SOLN 100 UNIT/ML 100 UNIT/ML
500 SOLUTION INTRAVENOUS AS NEEDED
Status: DISCONTINUED | OUTPATIENT
Start: 2024-09-05 | End: 2024-09-06 | Stop reason: HOSPADM

## 2024-09-05 RX ORDER — SODIUM CHLORIDE 0.9 % (FLUSH) 0.9 %
20 SYRINGE (ML) INJECTION AS NEEDED
Status: DISCONTINUED | OUTPATIENT
Start: 2024-09-05 | End: 2024-09-06 | Stop reason: HOSPADM

## 2024-09-05 RX ADMIN — ONDANSETRON 8 MG: 2 INJECTION INTRAMUSCULAR; INTRAVENOUS at 14:09

## 2024-09-05 RX ADMIN — Medication 20 ML: at 15:25

## 2024-09-05 RX ADMIN — HEPARIN 500 UNITS: 100 SYRINGE at 15:25

## 2024-09-05 RX ADMIN — HEPARIN 500 UNITS: 100 SYRINGE at 14:00

## 2024-09-05 RX ADMIN — SODIUM CHLORIDE 1000 ML: 9 INJECTION, SOLUTION INTRAVENOUS at 14:09

## 2024-09-05 NOTE — TELEPHONE ENCOUNTER
The pt called and states that she feels like her Plt's or Hgb is low. The pt states taht she has a 1400 apt for IV Fluids today, here at the [facility]. The pt is asking if she could have a CBC drawn at her apt. When questioned the pt states that she felt this way when she was admitted to the hospital for low Plt's. The pt states that she is weak, nausea, and diarrhea x3 days.     Do you want me to order a CBC?

## 2024-09-05 NOTE — TELEPHONE ENCOUNTER
Advised patient we will draw labs today, give IV zofran and refill her prescription for Zofran at home.

## 2024-09-06 ENCOUNTER — TELEPHONE (OUTPATIENT)
Dept: ONCOLOGY | Facility: HOSPITAL | Age: 59
End: 2024-09-06
Payer: MEDICARE

## 2024-09-06 NOTE — TELEPHONE ENCOUNTER
Caller: Lluvia Archer    Relationship: Self    Best call back number: [phone]    Caller requesting test results: PATIENT     What test was performed: LABS    When was the test performed: 9-5-24    Where was the test performed:

## 2024-09-11 ENCOUNTER — HOSPITAL ENCOUNTER (OUTPATIENT)
Dept: CT IMAGING | Facility: HOSPITAL | Age: 59
Discharge: HOME | End: 2024-09-11
Admitting: INTERNAL MEDICINE
Payer: MEDICARE

## 2024-09-11 DIAGNOSIS — C34.90 SMALL CELL LUNG CANCER: ICD-10-CM

## 2024-09-11 PROCEDURE — 71260 CT THORAX DX C+: CPT

## 2024-09-11 PROCEDURE — 25510000001 IOPAMIDOL PER 1 ML: Performed by: INTERNAL MEDICINE

## 2024-09-11 PROCEDURE — 74177 CT ABD & PELVIS W/CONTRAST: CPT

## 2024-09-11 RX ORDER — IOPAMIDOL 755 MG/ML
100 INJECTION, SOLUTION INTRAVASCULAR
Status: COMPLETED | OUTPATIENT
Start: 2024-09-11 | End: 2024-09-11

## 2024-09-11 RX ADMIN — IOPAMIDOL 100 ML: 755 INJECTION, SOLUTION INTRAVENOUS at 08:58

## 2024-09-12 ENCOUNTER — HOSPITAL ENCOUNTER (OUTPATIENT)
Dept: ONCOLOGY | Facility: HOSPITAL | Age: 59
Discharge: HOME | End: 2024-09-12
Payer: MEDICARE

## 2024-09-12 VITALS
TEMPERATURE: 97.7 F | RESPIRATION RATE: 20 BRPM | DIASTOLIC BLOOD PRESSURE: 68 MMHG | HEART RATE: 122 BPM | SYSTOLIC BLOOD PRESSURE: 137 MMHG | OXYGEN SATURATION: 98 %

## 2024-09-12 DIAGNOSIS — E86.0 DEHYDRATION: Primary | ICD-10-CM

## 2024-09-12 DIAGNOSIS — Z45.2 ENCOUNTER FOR ADJUSTMENT OR MANAGEMENT OF VASCULAR ACCESS DEVICE: ICD-10-CM

## 2024-09-12 LAB
ALBUMIN SERPL-MCNC: 3.7 G/DL (ref 3.5–5.2)
ALBUMIN/GLOB SERPL: 1.6 G/DL
ALP SERPL-CCNC: 124 U/L (ref 39–117)
ALT SERPL W P-5'-P-CCNC: 13 U/L (ref 1–33)
ANION GAP SERPL CALCULATED.3IONS-SCNC: 4.9 MMOL/L (ref 5–15)
AST SERPL-CCNC: 13 U/L (ref 1–32)
BASOPHILS # BLD AUTO: 0.01 10*3/MM3 (ref 0–0.2)
BASOPHILS NFR BLD AUTO: 0.1 % (ref 0–1.5)
BILIRUB SERPL-MCNC: 0.2 MG/DL (ref 0–1.2)
BUN SERPL-MCNC: 7 MG/DL (ref 6–20)
BUN/CREAT SERPL: 9.3 (ref 7–25)
CALCIUM SPEC-SCNC: 9 MG/DL (ref 8.6–10.5)
CHLORIDE SERPL-SCNC: 106 MMOL/L (ref 98–107)
CO2 SERPL-SCNC: 28.1 MMOL/L (ref 22–29)
CREAT SERPL-MCNC: 0.75 MG/DL (ref 0.57–1)
DEPRECATED RDW RBC AUTO: 76.2 FL (ref 37–54)
EGFRCR SERPLBLD CKD-EPI 2021: 93 ML/MIN/1.73
EOSINOPHIL # BLD AUTO: 0.02 10*3/MM3 (ref 0–0.4)
EOSINOPHIL NFR BLD AUTO: 0.2 % (ref 0.3–6.2)
ERYTHROCYTE [DISTWIDTH] IN BLOOD BY AUTOMATED COUNT: 22 % (ref 12.3–15.4)
GLOBULIN UR ELPH-MCNC: 2.3 GM/DL
GLUCOSE SERPL-MCNC: 136 MG/DL (ref 65–99)
HCT VFR BLD AUTO: 28 % (ref 34–46.6)
HGB BLD-MCNC: 9 G/DL (ref 12–15.9)
IMM GRANULOCYTES # BLD AUTO: 0.65 10*3/MM3 (ref 0–0.05)
IMM GRANULOCYTES NFR BLD AUTO: 6.6 % (ref 0–0.5)
LYMPHOCYTES # BLD AUTO: 2.23 10*3/MM3 (ref 0.7–3.1)
LYMPHOCYTES NFR BLD AUTO: 22.6 % (ref 19.6–45.3)
MCH RBC QN AUTO: 31.6 PG (ref 26.6–33)
MCHC RBC AUTO-ENTMCNC: 32.1 G/DL (ref 31.5–35.7)
MCV RBC AUTO: 98.2 FL (ref 79–97)
MONOCYTES # BLD AUTO: 0.87 10*3/MM3 (ref 0.1–0.9)
MONOCYTES NFR BLD AUTO: 8.8 % (ref 5–12)
NEUTROPHILS NFR BLD AUTO: 6.07 10*3/MM3 (ref 1.7–7)
NEUTROPHILS NFR BLD AUTO: 61.7 % (ref 42.7–76)
PLATELET # BLD AUTO: 40 10*3/MM3 (ref 140–450)
PMV BLD AUTO: 12 FL (ref 6–12)
POTASSIUM SERPL-SCNC: 3.7 MMOL/L (ref 3.5–5.2)
PROT SERPL-MCNC: 6 G/DL (ref 6–8.5)
RBC # BLD AUTO: 2.85 10*6/MM3 (ref 3.77–5.28)
SODIUM SERPL-SCNC: 139 MMOL/L (ref 136–145)
WBC NRBC COR # BLD AUTO: 9.85 10*3/MM3 (ref 3.4–10.8)

## 2024-09-12 PROCEDURE — 25010000002 HEPARIN LOCK FLUSH PER 10 UNITS: Performed by: INTERNAL MEDICINE

## 2024-09-12 PROCEDURE — 80053 COMPREHEN METABOLIC PANEL: CPT | Performed by: INTERNAL MEDICINE

## 2024-09-12 PROCEDURE — 25810000003 SODIUM CHLORIDE 0.9 % SOLUTION: Performed by: INTERNAL MEDICINE

## 2024-09-12 PROCEDURE — 85025 COMPLETE CBC W/AUTO DIFF WBC: CPT | Performed by: INTERNAL MEDICINE

## 2024-09-12 PROCEDURE — 96360 HYDRATION IV INFUSION INIT: CPT

## 2024-09-12 RX ORDER — HEPARIN SODIUM (PORCINE) LOCK FLUSH IV SOLN 100 UNIT/ML 100 UNIT/ML
500 SOLUTION INTRAVENOUS AS NEEDED
Status: DISCONTINUED | OUTPATIENT
Start: 2024-09-12 | End: 2024-09-13 | Stop reason: HOSPADM

## 2024-09-12 RX ORDER — SODIUM CHLORIDE 0.9 % (FLUSH) 0.9 %
20 SYRINGE (ML) INJECTION AS NEEDED
OUTPATIENT
Start: 2024-09-12

## 2024-09-12 RX ORDER — HEPARIN SODIUM (PORCINE) LOCK FLUSH IV SOLN 100 UNIT/ML 100 UNIT/ML
500 SOLUTION INTRAVENOUS AS NEEDED
OUTPATIENT
Start: 2024-09-17

## 2024-09-12 RX ORDER — SODIUM CHLORIDE 0.9 % (FLUSH) 0.9 %
20 SYRINGE (ML) INJECTION AS NEEDED
Status: DISCONTINUED | OUTPATIENT
Start: 2024-09-12 | End: 2024-09-13 | Stop reason: HOSPADM

## 2024-09-12 RX ADMIN — HEPARIN 500 UNITS: 100 SYRINGE at 14:12

## 2024-09-12 RX ADMIN — SODIUM CHLORIDE 1000 ML: 9 INJECTION, SOLUTION INTRAVENOUS at 13:12

## 2024-09-12 RX ADMIN — Medication 20 ML: at 14:12

## 2024-09-16 ENCOUNTER — DOCUMENTATION (OUTPATIENT)
Dept: PHARMACY | Facility: HOSPITAL | Age: 59
End: 2024-09-16
Payer: MEDICARE

## 2024-09-16 PROBLEM — Z79.899 ENCOUNTER FOR LONG-TERM (CURRENT) USE OF HIGH-RISK MEDICATION: Status: ACTIVE | Noted: 2024-09-16

## 2024-09-17 ENCOUNTER — HOSPITAL ENCOUNTER (OUTPATIENT)
Dept: ONCOLOGY | Facility: HOSPITAL | Age: 59
Discharge: HOME | End: 2024-09-17
Payer: MEDICARE

## 2024-09-17 ENCOUNTER — OFFICE VISIT (OUTPATIENT)
Dept: ONCOLOGY | Facility: HOSPITAL | Age: 59
End: 2024-09-17
Payer: MEDICARE

## 2024-09-17 VITALS
DIASTOLIC BLOOD PRESSURE: 62 MMHG | TEMPERATURE: 97.2 F | SYSTOLIC BLOOD PRESSURE: 100 MMHG | RESPIRATION RATE: 18 BRPM | WEIGHT: 205.25 LBS | HEART RATE: 101 BPM | OXYGEN SATURATION: 95 % | BODY MASS INDEX: 35.21 KG/M2

## 2024-09-17 VITALS
DIASTOLIC BLOOD PRESSURE: 62 MMHG | SYSTOLIC BLOOD PRESSURE: 100 MMHG | WEIGHT: 205.25 LBS | BODY MASS INDEX: 35.04 KG/M2 | RESPIRATION RATE: 18 BRPM | OXYGEN SATURATION: 95 % | HEART RATE: 101 BPM | HEIGHT: 64 IN | TEMPERATURE: 97.2 F

## 2024-09-17 DIAGNOSIS — Z45.2 ENCOUNTER FOR ADJUSTMENT OR MANAGEMENT OF VASCULAR ACCESS DEVICE: ICD-10-CM

## 2024-09-17 DIAGNOSIS — C79.51 CANCER, METASTATIC TO BONE: ICD-10-CM

## 2024-09-17 DIAGNOSIS — C79.51 METASTATIC CANCER TO BONE: ICD-10-CM

## 2024-09-17 DIAGNOSIS — C34.90 SMALL CELL LUNG CANCER: ICD-10-CM

## 2024-09-17 DIAGNOSIS — D64.81 ANEMIA ASSOCIATED WITH CHEMOTHERAPY: ICD-10-CM

## 2024-09-17 DIAGNOSIS — Z79.899 ENCOUNTER FOR LONG-TERM (CURRENT) USE OF HIGH-RISK MEDICATION: ICD-10-CM

## 2024-09-17 DIAGNOSIS — E86.0 DEHYDRATION: Primary | ICD-10-CM

## 2024-09-17 DIAGNOSIS — C34.90 SMALL CELL LUNG CANCER: Primary | ICD-10-CM

## 2024-09-17 DIAGNOSIS — L29.9 ITCHING: ICD-10-CM

## 2024-09-17 DIAGNOSIS — T45.1X5A ANEMIA ASSOCIATED WITH CHEMOTHERAPY: ICD-10-CM

## 2024-09-17 DIAGNOSIS — C79.51 METASTATIC CANCER TO BONE: Primary | ICD-10-CM

## 2024-09-17 LAB
ALBUMIN SERPL-MCNC: 3.6 G/DL (ref 3.5–5.2)
ALBUMIN/GLOB SERPL: 1.6 G/DL
ALP SERPL-CCNC: 114 U/L (ref 39–117)
ALT SERPL W P-5'-P-CCNC: 14 U/L (ref 1–33)
ANION GAP SERPL CALCULATED.3IONS-SCNC: 8.6 MMOL/L (ref 5–15)
AST SERPL-CCNC: 20 U/L (ref 1–32)
BASOPHILS # BLD AUTO: 0.01 10*3/MM3 (ref 0–0.2)
BASOPHILS NFR BLD AUTO: 0.1 % (ref 0–1.5)
BILIRUB SERPL-MCNC: 0.2 MG/DL (ref 0–1.2)
BUN SERPL-MCNC: 7 MG/DL (ref 6–20)
BUN/CREAT SERPL: 10.1 (ref 7–25)
CALCIUM SPEC-SCNC: 8.1 MG/DL (ref 8.6–10.5)
CHLORIDE SERPL-SCNC: 105 MMOL/L (ref 98–107)
CO2 SERPL-SCNC: 24.4 MMOL/L (ref 22–29)
CREAT SERPL-MCNC: 0.69 MG/DL (ref 0.57–1)
DEPRECATED RDW RBC AUTO: 86.8 FL (ref 37–54)
EGFRCR SERPLBLD CKD-EPI 2021: 101.4 ML/MIN/1.73
EOSINOPHIL # BLD AUTO: 0.03 10*3/MM3 (ref 0–0.4)
EOSINOPHIL NFR BLD AUTO: 0.3 % (ref 0.3–6.2)
ERYTHROCYTE [DISTWIDTH] IN BLOOD BY AUTOMATED COUNT: 24 % (ref 12.3–15.4)
GLOBULIN UR ELPH-MCNC: 2.2 GM/DL
GLUCOSE SERPL-MCNC: 91 MG/DL (ref 65–99)
HCT VFR BLD AUTO: 30.2 % (ref 34–46.6)
HGB BLD-MCNC: 9.5 G/DL (ref 12–15.9)
IMM GRANULOCYTES # BLD AUTO: 0.29 10*3/MM3 (ref 0–0.05)
IMM GRANULOCYTES NFR BLD AUTO: 3.1 % (ref 0–0.5)
LYMPHOCYTES # BLD AUTO: 1.99 10*3/MM3 (ref 0.7–3.1)
LYMPHOCYTES NFR BLD AUTO: 21.1 % (ref 19.6–45.3)
MAGNESIUM SERPL-MCNC: 1.6 MG/DL (ref 1.6–2.6)
MCH RBC QN AUTO: 32.1 PG (ref 26.6–33)
MCHC RBC AUTO-ENTMCNC: 31.5 G/DL (ref 31.5–35.7)
MCV RBC AUTO: 102 FL (ref 79–97)
MONOCYTES # BLD AUTO: 1.03 10*3/MM3 (ref 0.1–0.9)
MONOCYTES NFR BLD AUTO: 10.9 % (ref 5–12)
NEUTROPHILS NFR BLD AUTO: 6.09 10*3/MM3 (ref 1.7–7)
NEUTROPHILS NFR BLD AUTO: 64.5 % (ref 42.7–76)
PHOSPHATE SERPL-MCNC: 2.7 MG/DL (ref 2.5–4.5)
PLATELET # BLD AUTO: 114 10*3/MM3 (ref 140–450)
PMV BLD AUTO: 11 FL (ref 6–12)
POTASSIUM SERPL-SCNC: 3.8 MMOL/L (ref 3.5–5.2)
PROT SERPL-MCNC: 5.8 G/DL (ref 6–8.5)
RBC # BLD AUTO: 2.96 10*6/MM3 (ref 3.77–5.28)
SODIUM SERPL-SCNC: 138 MMOL/L (ref 136–145)
T4 FREE SERPL-MCNC: 1.09 NG/DL (ref 0.92–1.68)
TSH SERPL DL<=0.05 MIU/L-ACNC: 2.04 UIU/ML (ref 0.27–4.2)
WBC NRBC COR # BLD AUTO: 9.44 10*3/MM3 (ref 3.4–10.8)

## 2024-09-17 PROCEDURE — G2211 COMPLEX E/M VISIT ADD ON: HCPCS | Performed by: INTERNAL MEDICINE

## 2024-09-17 PROCEDURE — 99214 OFFICE O/P EST MOD 30 MIN: CPT | Performed by: INTERNAL MEDICINE

## 2024-09-17 PROCEDURE — 1126F AMNT PAIN NOTED NONE PRSNT: CPT | Performed by: INTERNAL MEDICINE

## 2024-09-17 PROCEDURE — 3078F DIAST BP <80 MM HG: CPT | Performed by: INTERNAL MEDICINE

## 2024-09-17 PROCEDURE — 84100 ASSAY OF PHOSPHORUS: CPT | Performed by: INTERNAL MEDICINE

## 2024-09-17 PROCEDURE — 84443 ASSAY THYROID STIM HORMONE: CPT | Performed by: INTERNAL MEDICINE

## 2024-09-17 PROCEDURE — 83735 ASSAY OF MAGNESIUM: CPT | Performed by: INTERNAL MEDICINE

## 2024-09-17 PROCEDURE — 3074F SYST BP LT 130 MM HG: CPT | Performed by: INTERNAL MEDICINE

## 2024-09-17 PROCEDURE — 85025 COMPLETE CBC W/AUTO DIFF WBC: CPT | Performed by: INTERNAL MEDICINE

## 2024-09-17 PROCEDURE — 25010000002 DURVALUMAB 50 MG/ML SOLUTION 10 ML VIAL: Performed by: INTERNAL MEDICINE

## 2024-09-17 PROCEDURE — 25010000002 HEPARIN LOCK FLUSH PER 10 UNITS: Performed by: INTERNAL MEDICINE

## 2024-09-17 PROCEDURE — 25810000003 SODIUM CHLORIDE 0.9 % SOLUTION 250 ML FLEX CONT: Performed by: INTERNAL MEDICINE

## 2024-09-17 PROCEDURE — 80053 COMPREHEN METABOLIC PANEL: CPT | Performed by: INTERNAL MEDICINE

## 2024-09-17 PROCEDURE — 25810000003 SODIUM CHLORIDE 0.9 % SOLUTION: Performed by: INTERNAL MEDICINE

## 2024-09-17 PROCEDURE — 84439 ASSAY OF FREE THYROXINE: CPT | Performed by: INTERNAL MEDICINE

## 2024-09-17 PROCEDURE — 96361 HYDRATE IV INFUSION ADD-ON: CPT

## 2024-09-17 PROCEDURE — 96413 CHEMO IV INFUSION 1 HR: CPT

## 2024-09-17 RX ORDER — HEPARIN SODIUM (PORCINE) LOCK FLUSH IV SOLN 100 UNIT/ML 100 UNIT/ML
500 SOLUTION INTRAVENOUS AS NEEDED
OUTPATIENT
Start: 2024-09-17

## 2024-09-17 RX ORDER — LISINOPRIL 5 MG/1
TABLET ORAL
COMMUNITY

## 2024-09-17 RX ORDER — SODIUM CHLORIDE 9 MG/ML
20 INJECTION, SOLUTION INTRAVENOUS ONCE
Status: COMPLETED | OUTPATIENT
Start: 2024-09-17 | End: 2024-09-17

## 2024-09-17 RX ORDER — SODIUM CHLORIDE 0.9 % (FLUSH) 0.9 %
20 SYRINGE (ML) INJECTION AS NEEDED
Status: DISCONTINUED | OUTPATIENT
Start: 2024-09-17 | End: 2024-09-18 | Stop reason: HOSPADM

## 2024-09-17 RX ORDER — TRIAMCINOLONE ACETONIDE 1 MG/G
1 OINTMENT TOPICAL 2 TIMES DAILY
Qty: 30 G | Refills: 1 | Status: SHIPPED | OUTPATIENT
Start: 2024-09-17

## 2024-09-17 RX ORDER — CALCIUM CARBONATE 500 MG/1
2 TABLET, CHEWABLE ORAL DAILY
Qty: 60 TABLET | Refills: 0 | Status: SHIPPED | OUTPATIENT
Start: 2024-09-17

## 2024-09-17 RX ORDER — SODIUM CHLORIDE 9 MG/ML
20 INJECTION, SOLUTION INTRAVENOUS ONCE
Status: CANCELLED | OUTPATIENT
Start: 2024-09-17

## 2024-09-17 RX ORDER — SODIUM CHLORIDE 0.9 % (FLUSH) 0.9 %
20 SYRINGE (ML) INJECTION AS NEEDED
OUTPATIENT
Start: 2024-09-17

## 2024-09-17 RX ORDER — HYDROXYZINE HYDROCHLORIDE 25 MG/1
25 TABLET, FILM COATED ORAL 3 TIMES DAILY PRN
Qty: 90 TABLET | Refills: 2 | Status: SHIPPED | OUTPATIENT
Start: 2024-09-17

## 2024-09-17 RX ORDER — HEPARIN SODIUM (PORCINE) LOCK FLUSH IV SOLN 100 UNIT/ML 100 UNIT/ML
500 SOLUTION INTRAVENOUS AS NEEDED
Status: DISCONTINUED | OUTPATIENT
Start: 2024-09-17 | End: 2024-09-18 | Stop reason: HOSPADM

## 2024-09-17 RX ADMIN — Medication 20 ML: at 11:06

## 2024-09-17 RX ADMIN — SODIUM CHLORIDE 1500 MG: 9 INJECTION, SOLUTION INTRAVENOUS at 10:04

## 2024-09-17 RX ADMIN — HEPARIN 500 UNITS: 100 SYRINGE at 11:06

## 2024-09-17 RX ADMIN — SODIUM CHLORIDE 20 ML/HR: 9 INJECTION, SOLUTION INTRAVENOUS at 10:05

## 2024-09-17 RX ADMIN — SODIUM CHLORIDE 1000 ML: 9 INJECTION, SOLUTION INTRAVENOUS at 09:00

## 2024-09-24 ENCOUNTER — HOSPITAL ENCOUNTER (OUTPATIENT)
Dept: ONCOLOGY | Facility: HOSPITAL | Age: 59
Discharge: HOME | End: 2024-09-24
Admitting: INTERNAL MEDICINE
Payer: MEDICARE

## 2024-09-24 VITALS
OXYGEN SATURATION: 96 % | SYSTOLIC BLOOD PRESSURE: 140 MMHG | HEART RATE: 91 BPM | DIASTOLIC BLOOD PRESSURE: 63 MMHG | TEMPERATURE: 97.9 F | RESPIRATION RATE: 20 BRPM

## 2024-09-24 DIAGNOSIS — C79.51 CANCER, METASTATIC TO BONE: ICD-10-CM

## 2024-09-24 DIAGNOSIS — C34.90 SMALL CELL LUNG CANCER: ICD-10-CM

## 2024-09-24 DIAGNOSIS — Z45.2 ENCOUNTER FOR ADJUSTMENT OR MANAGEMENT OF VASCULAR ACCESS DEVICE: ICD-10-CM

## 2024-09-24 DIAGNOSIS — E86.0 DEHYDRATION: Primary | ICD-10-CM

## 2024-09-24 PROCEDURE — 25810000003 SODIUM CHLORIDE 0.9 % SOLUTION: Performed by: INTERNAL MEDICINE

## 2024-09-24 PROCEDURE — 25010000002 HEPARIN LOCK FLUSH PER 10 UNITS: Performed by: INTERNAL MEDICINE

## 2024-09-24 PROCEDURE — 96360 HYDRATION IV INFUSION INIT: CPT

## 2024-09-24 RX ORDER — SODIUM CHLORIDE 0.9 % (FLUSH) 0.9 %
20 SYRINGE (ML) INJECTION AS NEEDED
OUTPATIENT
Start: 2024-09-24

## 2024-09-24 RX ORDER — HEPARIN SODIUM (PORCINE) LOCK FLUSH IV SOLN 100 UNIT/ML 100 UNIT/ML
500 SOLUTION INTRAVENOUS AS NEEDED
OUTPATIENT
Start: 2024-09-24

## 2024-09-24 RX ORDER — SODIUM CHLORIDE 0.9 % (FLUSH) 0.9 %
20 SYRINGE (ML) INJECTION AS NEEDED
Status: DISCONTINUED | OUTPATIENT
Start: 2024-09-24 | End: 2024-09-25 | Stop reason: HOSPADM

## 2024-09-24 RX ORDER — HEPARIN SODIUM (PORCINE) LOCK FLUSH IV SOLN 100 UNIT/ML 100 UNIT/ML
500 SOLUTION INTRAVENOUS AS NEEDED
Status: DISCONTINUED | OUTPATIENT
Start: 2024-09-24 | End: 2024-09-25 | Stop reason: HOSPADM

## 2024-09-24 RX ORDER — OXYCODONE AND ACETAMINOPHEN 7.5; 325 MG/1; MG/1
1 TABLET ORAL EVERY 6 HOURS PRN
Qty: 90 TABLET | Refills: 0 | Status: SHIPPED | OUTPATIENT
Start: 2024-09-24 | End: 2024-09-24 | Stop reason: SDUPTHER

## 2024-09-24 RX ADMIN — SODIUM CHLORIDE 1000 ML: 9 INJECTION, SOLUTION INTRAVENOUS at 13:15

## 2024-09-24 RX ADMIN — Medication 20 ML: at 14:59

## 2024-09-24 RX ADMIN — HEPARIN 500 UNITS: 100 SYRINGE at 14:59

## 2024-09-25 RX ORDER — OXYCODONE AND ACETAMINOPHEN 7.5; 325 MG/1; MG/1
1 TABLET ORAL EVERY 6 HOURS PRN
Qty: 90 TABLET | Refills: 0 | Status: SHIPPED | OUTPATIENT
Start: 2024-09-25

## 2024-09-27 ENCOUNTER — HOSPITAL ENCOUNTER (OUTPATIENT)
Dept: CT IMAGING | Facility: HOSPITAL | Age: 59
Discharge: HOME | End: 2024-09-27
Payer: MEDICARE

## 2024-09-27 DIAGNOSIS — C34.90 SMALL CELL LUNG CANCER: ICD-10-CM

## 2024-09-27 PROCEDURE — 70470 CT HEAD/BRAIN W/O & W/DYE: CPT

## 2024-09-27 PROCEDURE — 25510000001 IOPAMIDOL PER 1 ML: Performed by: INTERNAL MEDICINE

## 2024-09-27 RX ORDER — IOPAMIDOL 755 MG/ML
100 INJECTION, SOLUTION INTRAVASCULAR
Status: COMPLETED | OUTPATIENT
Start: 2024-09-27 | End: 2024-09-27

## 2024-09-27 RX ADMIN — IOPAMIDOL 100 ML: 755 INJECTION, SOLUTION INTRAVENOUS at 09:15

## 2024-10-03 ENCOUNTER — TELEPHONE (OUTPATIENT)
Dept: FAMILY MEDICINE CLINIC | Facility: CLINIC | Age: 59
End: 2024-10-03

## 2024-10-03 NOTE — TELEPHONE ENCOUNTER
Caller: KARMA    Relationship: [employer]     Best call back number: [phone]     What form or medical record are you requesting: CERTIFICATE MEDICAL NECESSITY FOR OXYGEN EQUIPMENT RECERTIFICATION     Who is requesting this form or medical record from you: [facility]     How would you like to receive the form or medical records (pick-up, mail, fax): FAX  If fax, what is the fax number: [phone]    Timeframe paperwork needed: AS SOON AS POSSIBLE     Additional notes: CALLER STATED THAT THIS WAS SENT ON 9/20/24 AND 10/2/24, NEEDING TO KNOW THE STATUS.

## 2024-10-15 ENCOUNTER — OFFICE VISIT (OUTPATIENT)
Dept: ONCOLOGY | Facility: HOSPITAL | Age: 59
End: 2024-10-15
Payer: MEDICARE

## 2024-10-15 ENCOUNTER — HOSPITAL ENCOUNTER (OUTPATIENT)
Dept: ONCOLOGY | Facility: HOSPITAL | Age: 59
Discharge: HOME | End: 2024-10-15
Payer: MEDICARE

## 2024-10-15 VITALS
HEART RATE: 88 BPM | TEMPERATURE: 98.5 F | WEIGHT: 209.66 LBS | BODY MASS INDEX: 35.79 KG/M2 | HEIGHT: 64 IN | SYSTOLIC BLOOD PRESSURE: 107 MMHG | RESPIRATION RATE: 18 BRPM | OXYGEN SATURATION: 97 % | DIASTOLIC BLOOD PRESSURE: 50 MMHG

## 2024-10-15 VITALS
TEMPERATURE: 98.5 F | HEART RATE: 88 BPM | OXYGEN SATURATION: 97 % | BODY MASS INDEX: 35.97 KG/M2 | WEIGHT: 209.66 LBS | SYSTOLIC BLOOD PRESSURE: 107 MMHG | DIASTOLIC BLOOD PRESSURE: 50 MMHG

## 2024-10-15 DIAGNOSIS — F41.9 ANXIETY: ICD-10-CM

## 2024-10-15 DIAGNOSIS — Z79.899 ENCOUNTER FOR LONG-TERM (CURRENT) USE OF HIGH-RISK MEDICATION: Primary | ICD-10-CM

## 2024-10-15 DIAGNOSIS — C34.90 SMALL CELL CARCINOMA OF LUNG, UNSPECIFIED LATERALITY, UNSPECIFIED PART OF LUNG: ICD-10-CM

## 2024-10-15 DIAGNOSIS — C34.90 SMALL CELL CARCINOMA OF LUNG, UNSPECIFIED LATERALITY, UNSPECIFIED PART OF LUNG: Primary | ICD-10-CM

## 2024-10-15 DIAGNOSIS — J44.9 CHRONIC OBSTRUCTIVE PULMONARY DISEASE, UNSPECIFIED COPD TYPE: ICD-10-CM

## 2024-10-15 DIAGNOSIS — D64.81 ANEMIA ASSOCIATED WITH CHEMOTHERAPY: ICD-10-CM

## 2024-10-15 DIAGNOSIS — C79.51 CANCER, METASTATIC TO BONE: ICD-10-CM

## 2024-10-15 DIAGNOSIS — C34.2 SMALL CELL CARCINOMA OF MIDDLE LOBE OF RIGHT LUNG: ICD-10-CM

## 2024-10-15 DIAGNOSIS — T45.1X5A ANEMIA ASSOCIATED WITH CHEMOTHERAPY: ICD-10-CM

## 2024-10-15 DIAGNOSIS — Z45.2 ENCOUNTER FOR ADJUSTMENT OR MANAGEMENT OF VASCULAR ACCESS DEVICE: ICD-10-CM

## 2024-10-15 LAB
ALBUMIN SERPL-MCNC: 3.9 G/DL (ref 3.5–5.2)
ALBUMIN/GLOB SERPL: 1.7 G/DL
ALP SERPL-CCNC: 93 U/L (ref 39–117)
ALT SERPL W P-5'-P-CCNC: 12 U/L (ref 1–33)
ANION GAP SERPL CALCULATED.3IONS-SCNC: 1.5 MMOL/L (ref 5–15)
AST SERPL-CCNC: 15 U/L (ref 1–32)
BASOPHILS # BLD AUTO: 0.03 10*3/MM3 (ref 0–0.2)
BASOPHILS NFR BLD AUTO: 0.4 % (ref 0–1.5)
BILIRUB SERPL-MCNC: 0.2 MG/DL (ref 0–1.2)
BUN SERPL-MCNC: 13 MG/DL (ref 6–20)
BUN/CREAT SERPL: 17.3 (ref 7–25)
CALCIUM SPEC-SCNC: 8.7 MG/DL (ref 8.6–10.5)
CHLORIDE SERPL-SCNC: 106 MMOL/L (ref 98–107)
CO2 SERPL-SCNC: 30.5 MMOL/L (ref 22–29)
CREAT SERPL-MCNC: 0.75 MG/DL (ref 0.57–1)
DEPRECATED RDW RBC AUTO: 61.8 FL (ref 37–54)
EGFRCR SERPLBLD CKD-EPI 2021: 93 ML/MIN/1.73
EOSINOPHIL # BLD AUTO: 0.29 10*3/MM3 (ref 0–0.4)
EOSINOPHIL NFR BLD AUTO: 4.3 % (ref 0.3–6.2)
ERYTHROCYTE [DISTWIDTH] IN BLOOD BY AUTOMATED COUNT: 15.9 % (ref 12.3–15.4)
GLOBULIN UR ELPH-MCNC: 2.3 GM/DL
GLUCOSE SERPL-MCNC: 92 MG/DL (ref 65–99)
HCT VFR BLD AUTO: 38.2 % (ref 34–46.6)
HGB BLD-MCNC: 12 G/DL (ref 12–15.9)
IMM GRANULOCYTES # BLD AUTO: 0.02 10*3/MM3 (ref 0–0.05)
IMM GRANULOCYTES NFR BLD AUTO: 0.3 % (ref 0–0.5)
LYMPHOCYTES # BLD AUTO: 1.5 10*3/MM3 (ref 0.7–3.1)
LYMPHOCYTES NFR BLD AUTO: 22.2 % (ref 19.6–45.3)
MAGNESIUM SERPL-MCNC: 2 MG/DL (ref 1.6–2.6)
MCH RBC QN AUTO: 32.3 PG (ref 26.6–33)
MCHC RBC AUTO-ENTMCNC: 31.4 G/DL (ref 31.5–35.7)
MCV RBC AUTO: 102.7 FL (ref 79–97)
MONOCYTES # BLD AUTO: 0.69 10*3/MM3 (ref 0.1–0.9)
MONOCYTES NFR BLD AUTO: 10.2 % (ref 5–12)
NEUTROPHILS NFR BLD AUTO: 4.24 10*3/MM3 (ref 1.7–7)
NEUTROPHILS NFR BLD AUTO: 62.6 % (ref 42.7–76)
PHOSPHATE SERPL-MCNC: 3.1 MG/DL (ref 2.5–4.5)
PLATELET # BLD AUTO: 150 10*3/MM3 (ref 140–450)
PMV BLD AUTO: 9.8 FL (ref 6–12)
POTASSIUM SERPL-SCNC: 4.4 MMOL/L (ref 3.5–5.2)
PROT SERPL-MCNC: 6.2 G/DL (ref 6–8.5)
RBC # BLD AUTO: 3.72 10*6/MM3 (ref 3.77–5.28)
SODIUM SERPL-SCNC: 138 MMOL/L (ref 136–145)
T4 FREE SERPL-MCNC: 1.13 NG/DL (ref 0.92–1.68)
TSH SERPL DL<=0.05 MIU/L-ACNC: 1 UIU/ML (ref 0.27–4.2)
WBC NRBC COR # BLD AUTO: 6.77 10*3/MM3 (ref 3.4–10.8)

## 2024-10-15 PROCEDURE — 83735 ASSAY OF MAGNESIUM: CPT | Performed by: INTERNAL MEDICINE

## 2024-10-15 PROCEDURE — 25010000002 HEPARIN LOCK FLUSH PER 10 UNITS: Performed by: INTERNAL MEDICINE

## 2024-10-15 PROCEDURE — 25010000002 DENOSUMAB 120 MG/1.7ML SOLUTION: Performed by: INTERNAL MEDICINE

## 2024-10-15 PROCEDURE — 84443 ASSAY THYROID STIM HORMONE: CPT | Performed by: INTERNAL MEDICINE

## 2024-10-15 PROCEDURE — 84439 ASSAY OF FREE THYROXINE: CPT | Performed by: INTERNAL MEDICINE

## 2024-10-15 PROCEDURE — 96413 CHEMO IV INFUSION 1 HR: CPT

## 2024-10-15 PROCEDURE — 25010000002 DURVALUMAB 50 MG/ML SOLUTION 10 ML VIAL: Performed by: INTERNAL MEDICINE

## 2024-10-15 PROCEDURE — 25810000003 SODIUM CHLORIDE 0.9 % SOLUTION 250 ML FLEX CONT: Performed by: INTERNAL MEDICINE

## 2024-10-15 PROCEDURE — 96372 THER/PROPH/DIAG INJ SC/IM: CPT

## 2024-10-15 PROCEDURE — 84100 ASSAY OF PHOSPHORUS: CPT | Performed by: INTERNAL MEDICINE

## 2024-10-15 PROCEDURE — 80053 COMPREHEN METABOLIC PANEL: CPT | Performed by: INTERNAL MEDICINE

## 2024-10-15 PROCEDURE — 85025 COMPLETE CBC W/AUTO DIFF WBC: CPT | Performed by: INTERNAL MEDICINE

## 2024-10-15 RX ORDER — HEPARIN SODIUM (PORCINE) LOCK FLUSH IV SOLN 100 UNIT/ML 100 UNIT/ML
500 SOLUTION INTRAVENOUS AS NEEDED
Status: DISCONTINUED | OUTPATIENT
Start: 2024-10-15 | End: 2024-10-16 | Stop reason: HOSPADM

## 2024-10-15 RX ORDER — SODIUM CHLORIDE 9 MG/ML
20 INJECTION, SOLUTION INTRAVENOUS ONCE
Status: COMPLETED | OUTPATIENT
Start: 2024-10-15 | End: 2024-10-15

## 2024-10-15 RX ORDER — SODIUM CHLORIDE 9 MG/ML
20 INJECTION, SOLUTION INTRAVENOUS ONCE
Status: CANCELLED | OUTPATIENT
Start: 2024-10-15

## 2024-10-15 RX ORDER — SODIUM CHLORIDE 0.9 % (FLUSH) 0.9 %
20 SYRINGE (ML) INJECTION AS NEEDED
OUTPATIENT
Start: 2024-10-15

## 2024-10-15 RX ORDER — SODIUM CHLORIDE 0.9 % (FLUSH) 0.9 %
20 SYRINGE (ML) INJECTION AS NEEDED
Status: DISCONTINUED | OUTPATIENT
Start: 2024-10-15 | End: 2024-10-16 | Stop reason: HOSPADM

## 2024-10-15 RX ORDER — FAMOTIDINE 40 MG/1
TABLET, FILM COATED ORAL
COMMUNITY
Start: 2024-09-20

## 2024-10-15 RX ORDER — HEPARIN SODIUM (PORCINE) LOCK FLUSH IV SOLN 100 UNIT/ML 100 UNIT/ML
500 SOLUTION INTRAVENOUS AS NEEDED
OUTPATIENT
Start: 2024-10-15

## 2024-10-15 RX ADMIN — SODIUM CHLORIDE 20 ML/HR: 900 INJECTION, SOLUTION INTRAVENOUS at 12:03

## 2024-10-15 RX ADMIN — HEPARIN 500 UNITS: 100 SYRINGE at 13:20

## 2024-10-15 RX ADMIN — SODIUM CHLORIDE 1500 MG: 9 INJECTION, SOLUTION INTRAVENOUS at 12:08

## 2024-10-15 RX ADMIN — DENOSUMAB 120 MG: 120 INJECTION SUBCUTANEOUS at 13:12

## 2024-10-15 RX ADMIN — Medication 20 ML: at 13:20

## 2024-10-15 NOTE — PROGRESS NOTES
Chief Complaint  FOLLOW UP 1     Provider, No Known  Aleksandar Edge, DO Ildefonso Teixeira REGINA Archer presents to [facility] for small cell lung cancer    Ms. Archer is a very pleasant 58-year-old female with past medical history of hypertension hyperlipidemia, COPD, osteoarthritis, anxiety who presents for new oncology evaluation with her daughter for diagnosis of small cell lung cancer.  Patient previously had PET scan in September 2023 without any concerning findings.  Follow-up CT chest in February showed multiple right middle lobe nodules with mediastinal and right hilar adenopathy.  Patient was admitted to [facility] on June 2 with shortness of breath, fever, cough.  She was started on treatment for pneumonia.  She was found to be hyponatremic to 126.  Repeat CT chest on 3 Tia showed progression of right middle lobe nodules and mediastinal right hilar lymphadenopathy.  Patient was discharged on 4 June.  She had outpatient bronchoscopy on 7 June with station 4R and station 7 possible small cell carcinoma.  Unable to get MRI due to claustrophobia.  CT head with and without contrast on 13 June did not show any intracranial mets.  PET scan confirmed metastatic disease to bone and right axilla    Interval History  Here for follow up.  She is now on durvalumab maintenance. Due for C2 of this today. She has had more lower extremity edema in last few weeks. Worse at end of day. Can cause pain due to the fluid. Edema much improved in the morning. She has been nauseous in last week or so. Reports that she remains anxious and thinks the anxiety makes her more nauseous. She is still frustrated that she is not feeling back to normal. She remains fatigued. Itching much improved on atarax, she takes 2 pills at once.  No fevers, chills, infection.      Oncology/Hematology History Overview Note   2/20/24: CT Chest:  No PE or aortic dissection. Multiple  right middle lobe nodules are highly suspicious for malignancy.  Additionally, there is mediastinal and right hilar adenopathy now noted.  Follow-up with a PET-CT is recommended. Emphysema with chronic changes in both lungs that otherwise appears stable. Atherosclerotic disease and coronary artery disease.    6/2/24: admitted to [facility] with shortness of breath, fever, cough and started on treatment for pneumonia. Found to have hyponatremia to 126    6/3/24 CT Chest: Right middle lobe nodules and mediastinal and right hilar lymphadenopathy has progressed from prior CT, and remains concerning for malignancy.  Partial right middle lobe atelectasis. Moderate emphysema. Discharged on 6/4/24 with Na of 133.    6/7/24: Bronchoscopy: Station 4R and station 7 positive for small cell carcinoma.    6/13/24: CT head with and without contrast (due to claustrophobia): No mets seen    6/19/24: NM PET: New hypermetabolic nodules within the right middle lobe. There are also multiple new enlarged hypermetabolic mediastinal lymph nodes consistent with metastatic disease. Right axillary mets as well. There are scattered foci of hypermetabolism throughout the bony structures consistent with bone metastases.    6/24/24: C1D1 Carbo/Etop/Durva. Tolerated well    7/16/24: C2D1 Carbo/Etop/Durva. Complicated by inpatient admission due to dehydration from nausea/vomiting as well as pancytopenia. ANC 0.11. Required transfusion for hgb 6.8.     8/6/24: C3D1 Carbo/Etop/Durva. 20% reduction in Carbo and 20% reduction in Etoposide. Add G-CSF with this cycle due to severe neutropenia with C2.     Repeat scan have shown disease response, however scan was due to PE rule out.     8/27/24: C4D1 Carbo/Etop/Durva. 33% reduction in Carbo and 33% reduction in Etoposide. Continue G-CSF with this cycle due to severe neutropenia with C2. Labs appropriate to proceed. Ok to treat for elevated alk phos.     8/6/24: C3D1 Carbo/Etop/Durva. 20% reduction in Carbo and  20% reduction in Etoposide. Add G-CSF with this cycle due to severe neutropenia with C2.    Repeat scan have shown disease response, however scan was due to PE rule out.     8/27/24: C4D1 Carbo/Etop/Durva. 33% reduction in Carbo and 33% reduction in Etoposide. Continue G-CSF with this cycle due to severe neutropenia with C2.      Repeat scans without progression. Plan for durvalumab alone for maintenance    9/17/24: C5D1 durvalumab alone        Small cell lung cancer   6/12/2024 Initial Diagnosis    Small cell lung cancer     6/14/2024 - 6/14/2024 Chemotherapy    OP LUNG CISplatin 60mg/m2 / Etoposide 120mg/m2 + XRT     6/14/2024 Cancer Staged    Staging form: Lung, AJCC 8th Edition  - Clinical: Stage IVB (cT1b, cN2, cM1c) - Signed by Brian Dickson MD on 6/20/2024 6/24/2024 - 8/29/2024 Chemotherapy    OP LUNG CARBOplatin AUC=5 / Etoposide / Durvalumab     8/27/2024 -  Chemotherapy    OP SUPPORTIVE Denosumab (Xgeva) Q28D     9/17/2024 Biopsy    OP LUNG Durvalumab 1500 mg  Plan Provider: Brian Dickson MD  Treatment goal: Control  Line of treatment: [No plan line of treatment]     Cancer, metastatic to bone   8/6/2024 Initial Diagnosis    Cancer, metastatic to bone     8/27/2024 -  Chemotherapy    OP SUPPORTIVE Denosumab (Xgeva) Q28D           Review of Systems   Constitutional:  Positive for fatigue. Negative for appetite change, diaphoresis, fever, unexpected weight gain and unexpected weight loss.   HENT:  Negative for hearing loss, mouth sores, sore throat, swollen glands, trouble swallowing and voice change.    Eyes:  Negative for blurred vision, double vision, pain, redness and visual disturbance.   Respiratory:  Negative for cough, shortness of breath and wheezing.    Cardiovascular:  Positive for chest pain (pt had her port put in today) and leg swelling (both legs are swelling). Negative for palpitations.   Gastrointestinal:  Positive for nausea. Negative for abdominal pain, blood in  stool, constipation, diarrhea and vomiting.   Endocrine: Negative for cold intolerance, heat intolerance, polydipsia and polyuria.   Genitourinary:  Negative for decreased urine volume, difficulty urinating, dysuria, frequency, hematuria and urinary incontinence.   Musculoskeletal:  Negative for arthralgias, back pain, joint swelling and myalgias.   Skin:  Negative for color change, rash, skin lesions and wound.   Neurological:  Negative for dizziness, seizures, weakness, numbness and headache.   Hematological:  Negative for adenopathy. Does not bruise/bleed easily.   Psychiatric/Behavioral:  Negative for depressed mood. The patient is not nervous/anxious.    All other systems reviewed and are negative.    Current Outpatient Medications on File Prior to Visit   Medication Sig Dispense Refill    albuterol sulfate  (90 Base) MCG/ACT inhaler Inhale 2 puffs Every 4 (Four) Hours As Needed for Wheezing or Shortness of Air. 18 g 5    atorvastatin (LIPITOR) 10 MG tablet Take 1 tablet by mouth Every Night. 90 tablet 3    buPROPion SR (WELLBUTRIN SR) 150 MG 12 hr tablet Take 1 tablet by mouth 2 (Two) Times a Day. 180 tablet 3    calcium carbonate (Tums) 500 MG chewable tablet Chew 2 tablets Daily. 60 tablet 0    escitalopram (LEXAPRO) 20 MG tablet Take 1 tablet by mouth Daily. 90 tablet 3    gabapentin (NEURONTIN) 300 MG capsule Take 1 capsule by mouth 3 (Three) Times a Day. 90 capsule 0    hydrOXYzine (ATARAX) 25 MG tablet Take 1 tablet by mouth 3 (Three) Times a Day As Needed for Itching. 90 tablet 2    lisinopril (PRINIVIL,ZESTRIL) 5 MG tablet TAKE ONE TABLET BY MOUTH DAILY AT 9 AM      melatonin 5 MG tablet tablet Take 1 tablet by mouth As Needed.      nicotine (NICODERM CQ) 21 MG/24HR patch Place 1 patch on the skin as directed by provider Daily. 30 patch 0    nystatin (MYCOSTATIN) 100,000 unit/mL suspension Swish and swallow 5 mL 4 (Four) Times a Day As Needed (for thrush, if needing to take use for 7-10days  until symptoms resolve). 473 mL 0    omeprazole (priLOSEC) 40 MG capsule Take 1 capsule by mouth Daily.      ondansetron ODT (ZOFRAN-ODT) 8 MG disintegrating tablet Place 1 tablet on the tongue Every 8 (Eight) Hours As Needed for Nausea or Vomiting. 60 tablet 3    oxyCODONE-acetaminophen (PERCOCET) 7.5-325 MG per tablet Take 1 tablet by mouth Every 6 (Six) Hours As Needed for Severe Pain. 90 tablet 0    potassium chloride (KLOR-CON M20) 20 MEQ CR tablet Take 1 tablet by mouth Daily. 14 tablet 0    prochlorperazine (COMPAZINE) 10 MG tablet Take 1 tablet by mouth Every 6 (Six) Hours As Needed for Nausea. 60 tablet 3    Trelegy Ellipta 100-62.5-25 MCG/ACT inhaler Inhale 1 puff Daily. 3 each 3    triamcinolone (KENALOG) 0.1 % ointment Apply 1 Application topically to the appropriate area as directed 2 (Two) Times a Day. Apply to itchy areas 30 g 1     No current facility-administered medications on file prior to visit.       No Known Allergies  Past Medical History:   Diagnosis Date    Abnormal mammogram     PT DENIES KNOWING OF THIS HX    Anxiety     Arthritis     R SHOULDER, R HIP, AND R LEG    Cancer     LUNG    Chronic nausea 01/15/2019    COPD (chronic obstructive pulmonary disease)     O2 3 LITERS NC CONT    Hernia, hiatal     Hyperlipidemia     Hypertension     Knee pain, right 08/30/2018    Memory loss     FORGETFULNESS ? ETIOLOGY    Migraine headache     Moderate episode of recurrent major depressive disorder 05/22/2017    Night sweats     Sciatica of right side 08/18/2017    Severe episode of recurrent major depressive disorder, without psychotic features 10/22/2019    Shingles     OUTBREAK 6/11/24 ON ANTIVIRAL , WHELPS NOTED NO SORES.    Shoulder pain, left 08/30/2018     Past Surgical History:   Procedure Laterality Date    BRONCHOSCOPY N/A 04/12/2022    Procedure: BRONCHOSCOPY WITH BRONCHOALVEOLAR LAVAGE, BIOPSY;  Surgeon: Shin Mcdonald DO;  Location: [facility] ENDOSCOPY;  Service: Pulmonary;   Laterality: N/A;  RIGHT LOWER LOBE INFILTRATE    BRONCHOSCOPY N/A 2024    Procedure: BRONCHOSCOPY WITH ENDOBRONCHIAL ULTRASOUND AND FINE NEEDLE ASPIRATE;  Surgeon: Shin Mcdonald DO;  Location: [facility] ENDOSCOPY;  Service: Pulmonary;  Laterality: N/A;  MEDIASTINAL ADENOPATHY     SECTION      CHOLECYSTECTOMY      LAPAROSCOPIC    COLONOSCOPY      PORTACATH PLACEMENT Left 2024    Procedure: INSERTION OF PORTACATH;  Surgeon: Josh Patton MD;  Location: [facility] OR OSC;  Service: General;  Laterality: Left;    TOTAL HIP ARTHROPLASTY Right 2022    Procedure: RIGHT TOTAL HIP ARTHROPLASTY;  Surgeon: Cecil Gomes MD;  Location: [facility] MAIN OR;  Service: Orthopedics;  Laterality: Right;     Social History     Socioeconomic History    Marital status:      Spouse name: DEVAN    Number of children: 2    Years of education: GED    Highest education level: GED or equivalent   Tobacco Use    Smoking status: Some Days     Current packs/day: 2.00     Average packs/day: 2.0 packs/day for 42.8 years (85.6 ttl pk-yrs)     Types: Cigarettes     Start date:      Passive exposure: Past    Smokeless tobacco: Never    Tobacco comments:     Pt stopped smoking on 2022. Pt stated at her appt today 2024. Pt states she smokes less than a 1/2 ppd      INST. PER ANESTHESIA PROTOCOL   Vaping Use    Vaping status: Former    Substances: Nicotine, Flavoring    Devices: Disposable   Substance and Sexual Activity    Alcohol use: Never    Drug use: Never    Sexual activity: Defer     Family History   Problem Relation Age of Onset    Other Mother         BLOOD DISEASE    Arthritis Mother     Heart disease Father     Hypertension Other     Cancer Other     Heart disease Other     Malig Hyperthermia Neg Hx        Objective   Physical Exam  Well appearing patient in no acute distress on RA  Anicteric sclerae, no rash on exposed skin  + poor dentition.   Respirations non-labored  Awake, alert, and  "oriented x 4. Speech intact. No gross neurologic deficit  Appropriate mood and affect  + trace LE edema    Vitals:    10/15/24 1051   BP: 107/50   Pulse: 88   Resp: 18   Temp: 98.5 °F (36.9 °C)   TempSrc: Temporal   SpO2: 97%   Weight: 95.1 kg (209 lb 10.5 oz)   Height: 162.6 cm (64.02\")                 PHQ-9 Total Score:           Result Review :   The following data was reviewed by: Brian Dickson MD on 10/15/24:  Lab Results   Component Value Date    HGB 12.0 10/15/2024    HCT 38.2 10/15/2024    .7 (H) 10/15/2024     10/15/2024    WBC 6.77 10/15/2024    NEUTROABS 4.24 10/15/2024    LYMPHSABS 1.50 10/15/2024    MONOSABS 0.69 10/15/2024    EOSABS 0.29 10/15/2024    BASOSABS 0.03 10/15/2024     Lab Results   Component Value Date    GLUCOSE 91 09/17/2024    BUN 7 09/17/2024    CREATININE 0.69 09/17/2024     09/17/2024    K 3.8 09/17/2024     09/17/2024    CO2 24.4 09/17/2024    CALCIUM 8.1 (L) 09/17/2024    PROTEINTOT 5.8 (L) 09/17/2024    ALBUMIN 3.6 09/17/2024    BILITOT 0.2 09/17/2024    ALKPHOS 114 09/17/2024    AST 20 09/17/2024    ALT 14 09/17/2024     Lab Results   Component Value Date    MG 1.6 09/17/2024    PHOS 2.7 09/17/2024    FREET4 1.09 09/17/2024    TSH 2.040 09/17/2024     Labs personally reviewed. Sodium normal. Hgb wnl. WBC wnl. Plts wnl.         CT Head With & Without Contrast    Result Date: 9/29/2024  Impression: Stable examination. No evidence of acute intracranial process or abnormal intracranial enhancement. Electronically Signed: Ирина Morales MD  9/29/2024 4:15 PM EDT  Workstation ID: [id number]         Assessment and Plan    Diagnoses and all orders for this visit:    1. Small cell carcinoma of lung, unspecified laterality, unspecified part of lung (Primary)    2. Anxiety    3. Cancer, metastatic to bone    4. Chronic obstructive pulmonary disease, unspecified COPD type    5. Anemia associated with chemotherapy    Other orders  -     Cancel: denosumab (XGEVA) " injection 120 mg  -     Cancel: sodium chloride 0.9 % infusion  -     Cancel: durvalumab (IMFINZI) 1,500 mg in sodium chloride 0.9 % 280 mL chemo IVPB        Small cell lung cancer, extensive stage  RML with right hilar and mediastinal adenopathy on CT chest.  Bronchoscopy with positive small cell pathology at station 4R and station 7.  PET with multiple new enlarged hypermetabolic mediastinal lymph nodes consistent with metastatic disease, also with new right axillary FDG avid mets. There are scattered foci of hypermetabolism throughout the bony structures consistent with bone metastases. Discussed results. Discussed that this represents metastatic or stage IV cancer.  CT head with and without without any intracranial mets.  Unable to do MRI due to claustrophobia and anxiety. Recommended treatment is systemic alone with for IV carboplatin, etoposide and durvalumab every 3 weeks. First cycle 6/24/24. 8/6/24: C3D1 Carbo/Etop/Durva. 20% reduction in Carbo and 20% reduction in Etoposide. Add G-CSF with this cycle due to severe neutropenia with C2.Repeat scan have shown disease response, however scan was due to PE rule out. 8/27/24: C4D1 Carbo/Etop/Durva. 33% reduction in Carbo and 33% reduction in Etoposide. Last cycle of chemotherapy.    Repeat scans after C4 without clear progression, new 7 mm RUL nodule could be infectious. Plan for durvalumab alone.    9/17/24: C6D1 Durvalumab (second with Durvalumab alone). Labs appropriate to proceed.     Needs intracranial monitoring. Repeat CT head with and without 9/27/24 negative for intracranial disease.     Hold on consolidative radiation as no dominant lung lesion.     Itching  No rash. Could be related to immunotherapy. Atarax as needed has helped. Topical steroid can be used on itchiest areas. Also can use PRN calamine lotion.     Anemia 2/2 chemo  Resolved now that chemo has completed.     Chemo nausea  Improved now that chemo has completed.     Met cancer to  bone  Recommend bone modifying agent. Patient has all teeth except 2 removed. Monthly Xgeva started 8/27/24. Will monitor electrolytes.     Anxiety  Present at baseline now worse. PRN Ativan 0.5 mg.     Insomnia  Recommend low dose melatonin vs benadryl. Also has Ativan if anxiety is contributing to poor sleep.     LE edema  Recommend leg elevation. Hold on diuretic for now.         Patient Follow Up: Cycle 7  Patient was given instructions and counseling regarding her condition or for health maintenance advice. Please see specific information pulled into the AVS if appropriate.

## 2024-10-24 DIAGNOSIS — C79.51 CANCER, METASTATIC TO BONE: ICD-10-CM

## 2024-10-24 RX ORDER — OXYCODONE AND ACETAMINOPHEN 7.5; 325 MG/1; MG/1
1 TABLET ORAL EVERY 6 HOURS PRN
Qty: 90 TABLET | Refills: 0 | Status: SHIPPED | OUTPATIENT
Start: 2024-10-24

## 2024-10-24 NOTE — TELEPHONE ENCOUNTER
Caller: Lluvai Archer REGINA    Relationship: Self    Best call back number: [phone]     Requested Prescriptions:   Requested Prescriptions     Pending Prescriptions Disp Refills    oxyCODONE-acetaminophen (PERCOCET) 7.5-325 MG per tablet 90 tablet 0     Sig: Take 1 tablet by mouth Every 6 (Six) Hours As Needed for Severe Pain.        Pharmacy where request should be sent: [facility]Y #[id number] - [place] - [address] [facility] [phone] [facility] [phone] FX     Last office visit with prescribing clinician: 10/15/2024   Last telemedicine visit with prescribing clinician: Visit date not found   Next office visit with prescribing clinician: 11/12/2024       Does the patient have less than a 3 day supply:  [x] Yes  [] No    Would you like a call back once the refill request has been completed: [x] Yes [] No    If the office needs to give you a call back, can they leave a voicemail: [x] Yes [] No    Que Mcghee Rep   10/24/24 10:10 EDT

## 2024-10-29 RX ORDER — FAMOTIDINE 40 MG/1
TABLET, FILM COATED ORAL
Qty: 180 TABLET | Refills: 11 | Status: SHIPPED | OUTPATIENT
Start: 2024-10-29

## 2024-10-29 RX ORDER — LISINOPRIL 5 MG/1
TABLET ORAL
Qty: 30 TABLET | Refills: 11 | Status: SHIPPED | OUTPATIENT
Start: 2024-10-29

## 2024-10-31 ENCOUNTER — OFFICE VISIT (OUTPATIENT)
Dept: RADIATION ONCOLOGY | Facility: HOSPITAL | Age: 59
End: 2024-10-31
Payer: MEDICARE

## 2024-10-31 VITALS
SYSTOLIC BLOOD PRESSURE: 108 MMHG | HEART RATE: 86 BPM | RESPIRATION RATE: 16 BRPM | WEIGHT: 212.52 LBS | DIASTOLIC BLOOD PRESSURE: 79 MMHG | OXYGEN SATURATION: 97 % | BODY MASS INDEX: 36.46 KG/M2 | TEMPERATURE: 97.3 F

## 2024-10-31 DIAGNOSIS — Z86.59 HISTORY OF CLAUSTROPHOBIA: ICD-10-CM

## 2024-10-31 DIAGNOSIS — C79.51 METASTATIC CANCER TO BONE: ICD-10-CM

## 2024-10-31 DIAGNOSIS — Z29.89 IMMUNOTHERAPY: ICD-10-CM

## 2024-10-31 DIAGNOSIS — C34.90 SMALL CELL LUNG CANCER IN ADULT: Primary | ICD-10-CM

## 2024-10-31 DIAGNOSIS — F41.9 ANXIETY: ICD-10-CM

## 2024-10-31 DIAGNOSIS — F17.200 NICOTINE DEPENDENCE WITH CURRENT USE: ICD-10-CM

## 2024-10-31 DIAGNOSIS — G89.3 CANCER-RELATED PAIN: ICD-10-CM

## 2024-10-31 PROCEDURE — G0463 HOSPITAL OUTPT CLINIC VISIT: HCPCS | Performed by: NURSE PRACTITIONER

## 2024-10-31 NOTE — PROGRESS NOTES
Follow Up Office Visit      Encounter Date: 10/31/2024   Patient Name: Lluvia Archer  YOB: 1965   Medical Record Number: [id number]   Primary Diagnosis: Small cell lung cancer in adult [C34.90]     Cancer Staging   Small cell lung cancer  Staging form: Lung, AJCC 8th Edition  - Clinical: Stage IVB (cT1b, cN2, cM1c) - Signed by Brian Dickson MD on 6/20/2024    Radiation Completion Date:  N/A    Chief Complaint:    Chief Complaint   Patient presents with    Follow-up    Lung Cancer     Small cell lung cancer       Oncology/Hematology History Overview Note   2/20/24: CT Chest:  No PE or aortic dissection. Multiple right middle lobe nodules are highly suspicious for malignancy.  Additionally, there is mediastinal and right hilar adenopathy now noted.  Follow-up with a PET-CT is recommended. Emphysema with chronic changes in both lungs that otherwise appears stable. Atherosclerotic disease and coronary artery disease.    6/2/24: admitted to [facility] with shortness of breath, fever, cough and started on treatment for pneumonia. Found to have hyponatremia to 126    6/3/24 CT Chest: Right middle lobe nodules and mediastinal and right hilar lymphadenopathy has progressed from prior CT, and remains concerning for malignancy.  Partial right middle lobe atelectasis. Moderate emphysema. Discharged on 6/4/24 with Na of 133.    6/7/24: Bronchoscopy: Station 4R and station 7 positive for small cell carcinoma.    6/13/24: CT head with and without contrast (due to claustrophobia): No mets seen    6/19/24: NM PET: New hypermetabolic nodules within the right middle lobe. There are also multiple new enlarged hypermetabolic mediastinal lymph nodes consistent with metastatic disease. Right axillary mets as well. There are scattered foci of hypermetabolism throughout the bony structures consistent with bone metastases.    6/24/24: C1D1 Carbo/Etop/Durva. Tolerated well    7/16/24: C2D1 Carbo/Etop/Durva.  Complicated by inpatient admission due to dehydration from nausea/vomiting as well as pancytopenia. ANC 0.11. Required transfusion for hgb 6.8.     8/6/24: C3D1 Carbo/Etop/Durva. 20% reduction in Carbo and 20% reduction in Etoposide. Add G-CSF with this cycle due to severe neutropenia with C2.     Repeat scan have shown disease response, however scan was due to PE rule out.     8/27/24: C4D1 Carbo/Etop/Durva. 33% reduction in Carbo and 33% reduction in Etoposide. Continue G-CSF with this cycle due to severe neutropenia with C2. Labs appropriate to proceed. Ok to treat for elevated alk phos.     8/6/24: C3D1 Carbo/Etop/Durva. 20% reduction in Carbo and 20% reduction in Etoposide. Add G-CSF with this cycle due to severe neutropenia with C2.    Repeat scan have shown disease response, however scan was due to PE rule out.     8/27/24: C4D1 Carbo/Etop/Durva. 33% reduction in Carbo and 33% reduction in Etoposide. Continue G-CSF with this cycle due to severe neutropenia with C2.      Repeat scans without progression. Plan for durvalumab alone for maintenance    9/17/24: C5D1 durvalumab alone        Small cell lung cancer   6/12/2024 Initial Diagnosis    Small cell lung cancer     6/14/2024 - 6/14/2024 Chemotherapy    OP LUNG CISplatin 60mg/m2 / Etoposide 120mg/m2 + XRT     6/14/2024 Cancer Staged    Staging form: Lung, AJCC 8th Edition  - Clinical: Stage IVB (cT1b, cN2, cM1c) - Signed by Brian Dickson MD on 6/20/2024 6/24/2024 - 8/29/2024 Chemotherapy    OP LUNG CARBOplatin AUC=5 / Etoposide / Durvalumab     8/27/2024 -  Chemotherapy    OP SUPPORTIVE Denosumab (Xgeva) Q28D     9/17/2024 Biopsy    OP LUNG Durvalumab 1500 mg  Plan Provider: Brian Dickson MD  Treatment goal: Control  Line of treatment: [No plan line of treatment]     Cancer, metastatic to bone   8/6/2024 Initial Diagnosis    Cancer, metastatic to bone     8/27/2024 -  Chemotherapy    OP SUPPORTIVE Denosumab (Xgeva) Q28D         History  of Present Illness: Lluvia Archer is a 59 y.o. female who returns to [facility] for re-evaluation of her small cell lung cancer. She reports feeling well overall with no new complaints or concerns. She followed up with Dr. Dickson on 10/15/24; note reviewed and she is receiving durvalumab and Xgeva. She is tolerating immunotherapy well with the exception of pruritus with no associated rash. She chronically has shortness of breath with productive cough and occasional wheezing. She has home oxygen that she wears PRN. Denies hemoptysis. Her bone pain is reasonably well controlled with pain medication with the exception of pain in her right great toe.     Subjective      Review of Systems: Review of Systems   Constitutional:  Positive for activity change (Decreased due to soa.), appetite change (Comes and goes, improved.) and fatigue (7/10, fatigues easily). Negative for chills, fever and unexpected weight change.   HENT:  Negative for sore throat, tinnitus and trouble swallowing.    Eyes:  Negative for visual disturbance.   Respiratory:  Positive for cough (productive with thick green secretion, ongoing), shortness of breath (with activity, ongoing) and wheezing (Constant, ongoing). Negative for chest tightness.         Becomes more of soa and wheezes more at night, ongoing but worsens.   Cardiovascular:  Positive for palpitations (occasional with anxiety) and leg swelling. Negative for chest pain.   Gastrointestinal:  Positive for nausea (occaisonal, relieved with prn meds.). Negative for anal bleeding, blood in stool, constipation, diarrhea, rectal pain and vomiting.        Colonoscopy 2023     Genitourinary:  Negative for difficulty urinating, dyspareunia, dysuria, frequency and urgency.   Musculoskeletal:  Positive for gait problem (r/t weakness and sob). Negative for arthralgias and back pain.   Skin:  Positive for color change. Negative for rash.        Shingles to right back   Neurological:   Positive for dizziness (Occasional, ongoing), weakness (Generalized), numbness (Fingers ongoing and right great toe-newer) and headaches (Occasional, ongoing, noted at random times.). Negative for tremors, seizures, syncope and speech difficulty.   Psychiatric/Behavioral:  Positive for decreased concentration (Newer) and sleep disturbance (difficulty falling and staying asleep). Negative for agitation, confusion, self-injury and suicidal ideas. The patient is nervous/anxious.        The following portions of the patient's history were reviewed and updated as appropriate: allergies, current medications, past family history, past medical history, past social history, past surgical history and problem list.    Medications:     Current Outpatient Medications:     albuterol sulfate  (90 Base) MCG/ACT inhaler, Inhale 2 puffs Every 4 (Four) Hours As Needed for Wheezing or Shortness of Air., Disp: 18 g, Rfl: 5    atorvastatin (LIPITOR) 10 MG tablet, Take 1 tablet by mouth Every Night., Disp: 90 tablet, Rfl: 3    buPROPion SR (WELLBUTRIN SR) 150 MG 12 hr tablet, Take 1 tablet by mouth 2 (Two) Times a Day., Disp: 180 tablet, Rfl: 3    calcium carbonate (Tums) 500 MG chewable tablet, Chew 2 tablets Daily., Disp: 60 tablet, Rfl: 0    escitalopram (LEXAPRO) 20 MG tablet, Take 1 tablet by mouth Daily., Disp: 90 tablet, Rfl: 3    famotidine (PEPCID) 40 MG tablet, TAKE ONE TABLET BY MOUTH TWICE DAILY @ 9AM & 5PM, Disp: 180 tablet, Rfl: 11    gabapentin (NEURONTIN) 300 MG capsule, Take 1 capsule by mouth 3 (Three) Times a Day., Disp: 90 capsule, Rfl: 0    hydrOXYzine (ATARAX) 25 MG tablet, Take 1 tablet by mouth 3 (Three) Times a Day As Needed for Itching., Disp: 90 tablet, Rfl: 2    lisinopril (PRINIVIL,ZESTRIL) 5 MG tablet, TAKE ONE TABLET BY MOUTH DAILY AT 9 AM, Disp: 30 tablet, Rfl: 11    melatonin 5 MG tablet tablet, Take 1 tablet by mouth As Needed., Disp: , Rfl:     ondansetron ODT (ZOFRAN-ODT) 8 MG disintegrating  tablet, Place 1 tablet on the tongue Every 8 (Eight) Hours As Needed for Nausea or Vomiting., Disp: 60 tablet, Rfl: 3    oxyCODONE-acetaminophen (PERCOCET) 7.5-325 MG per tablet, Take 1 tablet by mouth Every 6 (Six) Hours As Needed for Severe Pain., Disp: 90 tablet, Rfl: 0    prochlorperazine (COMPAZINE) 10 MG tablet, Take 1 tablet by mouth Every 6 (Six) Hours As Needed for Nausea., Disp: 60 tablet, Rfl: 3    Trelegy Ellipta 100-62.5-25 MCG/ACT inhaler, Inhale 1 puff Daily., Disp: 3 each, Rfl: 3    triamcinolone (KENALOG) 0.1 % ointment, Apply 1 Application topically to the appropriate area as directed 2 (Two) Times a Day. Apply to itchy areas, Disp: 30 g, Rfl: 1    nicotine (NICODERM CQ) 21 MG/24HR patch, Place 1 patch on the skin as directed by provider Daily. (Patient not taking: Reported on 10/31/2024), Disp: 30 patch, Rfl: 0    nystatin (MYCOSTATIN) 100,000 unit/mL suspension, Swish and swallow 5 mL 4 (Four) Times a Day As Needed (for thrush, if needing to take use for 7-10days until symptoms resolve). (Patient not taking: Reported on 10/31/2024), Disp: 473 mL, Rfl: 0    omeprazole (priLOSEC) 40 MG capsule, Take 1 capsule by mouth Daily. (Patient not taking: Reported on 10/31/2024), Disp: , Rfl:     potassium chloride (KLOR-CON M20) 20 MEQ CR tablet, Take 1 tablet by mouth Daily. (Patient not taking: Reported on 10/31/2024), Disp: 14 tablet, Rfl: 0    Allergies:   No Known Allergies    Patient Smoking History:   Social History     Tobacco Use   Smoking Status Some Days    Current packs/day: 2.00    Average packs/day: 2.0 packs/day for 42.8 years (85.7 ttl pk-yrs)    Types: Cigarettes    Start date: 1982    Passive exposure: Past   Smokeless Tobacco Never       Measures:  PHQ-9 Total Score: 17   Patient screened positive for depression based on a PHQ-9 score of 17 on 10/31/2024. Follow-up recommendations include: Elevated PHQ score reflective of acute illness, not depression.   Quality of Life: 80 - Restricted  Physical Activity   ECOG score: 2  ECOG: (2) Ambulatory and capable of self care, unable to carry out work activity, up and about > 50% or waking hours  Pain: (on a scale of 0-10)   Pain Score    10/31/24 1516   PainSc: 0-No pain       Objective     Physical Exam:   Vital Signs:   Vitals:    10/31/24 1516   BP: 108/79   Pulse: 86   Resp: 16   Temp: 97.3 °F (36.3 °C)   TempSrc: Temporal   SpO2: 97%   Weight: 96.4 kg (212 lb 8.4 oz)   PainSc: 0-No pain     Body mass index is 36.46 kg/m².   Wt Readings from Last 3 Encounters:   10/31/24 96.4 kg (212 lb 8.4 oz)   10/15/24 95.1 kg (209 lb 10.5 oz)   10/15/24 95.1 kg (209 lb 10.5 oz)       Physical Exam  Vitals reviewed.   Constitutional:       General: She is not in acute distress.     Appearance: Normal appearance. She is normal weight. She is not ill-appearing.      Comments: Sitting comfortably in wheelchair   HENT:      Head: Normocephalic and atraumatic.      Mouth/Throat:      Mouth: Mucous membranes are moist.      Pharynx: Oropharynx is clear. No oropharyngeal exudate or posterior oropharyngeal erythema.   Eyes:      Conjunctiva/sclera: Conjunctivae normal.      Pupils: Pupils are equal, round, and reactive to light.   Cardiovascular:      Rate and Rhythm: Normal rate and regular rhythm.      Pulses: Normal pulses.      Heart sounds: Normal heart sounds.   Pulmonary:      Effort: Pulmonary effort is normal. No respiratory distress.      Breath sounds: Normal breath sounds.   Musculoskeletal:         General: Normal range of motion.      Cervical back: Normal range of motion.   Skin:     General: Skin is warm and dry.   Neurological:      General: No focal deficit present.      Mental Status: She is alert and oriented to person, place, and time. Mental status is at baseline.   Psychiatric:         Mood and Affect: Mood normal.         Behavior: Behavior normal.       Result Review: I independently reviewed the following data. I discussed 9/11/24 CT CAP with   Evan and he independently reviewed images.   -- CT Head With & Without Contrast (09/27/2024 09:15)   -- CT Chest With Contrast Diagnostic (09/11/2024 08:58)   -- CT Abdomen Pelvis With Contrast (09/11/2024 08:58)   -- Non-gynecologic Cytology (06/07/2024 11:44)   -- NM PET/CT Skull Base to Mid Thigh (06/19/2024 16:03)     Pathology:   Lab Results   Component Value Date    CLININFO  07/31/2024     Admitting Diagnoses    D64.9 Acute anemia ICD-10-CM   D70.1, T45.1X5A Chemotherapy-induced neutropenia ICD-10-CM   D69.6 Thrombocytopenia ICD-10-CM   R11.2 Nausea and vomiting, unspecified vomiting type ICD-10-CM   C34.90 Malignant neoplasm of lung, unspecified laterality, unspecified part of lung ICD-10-CM   R26.2 Difficulty walking ICD-10-CM         FINALDX  07/31/2024     Peripheral blood (CBC and smear):   - WBC:  Leukopenia with absolute neutropenia, myeloid left shift to promyelocyte stage   - RBC:  Normocytic anemia with anisocytosis, circulating nucleated red blood cells   - Platelets:  Thrombocytopenia, normal morphology         Imaging: CT Head With & Without Contrast    Result Date: 9/29/2024  Impression: Stable examination. No evidence of acute intracranial process or abnormal intracranial enhancement. Electronically Signed: Ирина Morales MD  9/29/2024 4:15 PM EDT  Workstation ID: [id number]    CT Chest With Contrast Diagnostic    Result Date: 9/11/2024  Impression: 1.There is a new 7 mm right upper lobe nodule which may be infectious/inflammatory or neoplastic. Other pulmonary nodules are relatively stable. 2.There is widespread osseous metastatic disease. Electronically Signed: Chapo Pabon MD  9/11/2024 4:03 PM EDT  Workstation ID: [id number]    CT Abdomen Pelvis With Contrast    Result Date: 9/11/2024  Impression: 1.There is a new 7 mm right upper lobe nodule which may be infectious/inflammatory or neoplastic. Other pulmonary nodules are relatively stable. 2.There is widespread osseous metastatic disease.  Electronically Signed: Chapo Pabon MD  9/11/2024 4:03 PM EDT  Workstation ID: [id number]    CT Chest With Contrast Diagnostic    Result Date: 8/19/2024  1. No PE or aortic dissection. 2. Response to therapy with improved adenopathy in the chest and improved pulmonary parenchymal densities and nodules. 3. No evidence of acute pneumonia or pulmonary edema. 4. Sclerotic lesions are now noted in the skeleton. These may be the result of treated metastatic disease. Attention on follow-up recommended. Electronically Signed: Ahmet Hill MD  8/19/2024 10:53 PM EDT  Workstation ID: [id number]    XR Chest 1 View    Result Date: 8/19/2024  Impression: Mild bibasilar airspace opacities, which may be due to atelectasis or perhaps pneumonia. Electronically Signed: Love Garcia  8/19/2024 8:05 PM EDT  Workstation ID: [id number]    XR Chest 1 View    Result Date: 8/10/2024  Impression: 1. Mild inflammatory changes suspected at the right lung base. Electronically Signed: Mabel Pabon MD  8/10/2024 1:16 PM EDT  Workstation ID: [id number]    XR Chest 1 View    Result Date: 8/5/2024  Impression: Mild left basilar opacity, which could reflect atelectasis or pneumonia. Electronically Signed: Isma Roche MD  8/5/2024 3:32 PM EDT  Workstation ID: [id number]      Labs:   WBC   Date Value Ref Range Status   10/15/2024 6.77 3.40 - 10.80 10*3/mm3 Final     Hemoglobin   Date Value Ref Range Status   10/15/2024 12.0 12.0 - 15.9 g/dL Final     Hematocrit   Date Value Ref Range Status   10/15/2024 38.2 34.0 - 46.6 % Final     Platelets   Date Value Ref Range Status   10/15/2024 150 140 - 450 10*3/mm3 Final     Creatinine   Date Value Ref Range Status   10/15/2024 0.75 0.57 - 1.00 mg/dL Final     BUN   Date Value Ref Range Status   10/15/2024 13 6 - 20 mg/dL Final     eGFR   Date Value Ref Range Status   10/15/2024 93.0 >60.0 mL/min/1.73 Final     TSH   Date Value Ref Range Status   10/15/2024 0.997 0.270 - 4.200 uIU/mL Final     Assessment /  Plan      Impression: Lluvia Archer is a pleasant 59 y.o. female with extensive stage small cell lung cancer with osseous metastatic disease as well as right axillary adenopathy. Pathology from bronchoscopy and biopsy on 6/7/24 from station 4R and station 7 revealed small cell carcinoma. As she was unable to undergo MRI of the brain due to claustrophobia, CT scan of the head with and without contrast performed on 6/13/2024 revealed no evidence of intracranial metastatic disease. She has commenced carbo/etoposide/Durvalumab and is currently on durvalumab alone and Xgeva per Dr. Dickson. No plan for consolidative radiation  given the extent of her thoracic disease is primarily mediastinal adenopathy without a dominant/large parenchymal lung lesion. Will consider future radiotherapy in the setting of progression of disease resulting in symptoms. Her recent CT CAP on 9/11/24 is without clear progression. There is a new 7 mm right upper lobe nodule which may be infectious/inflammatory given her history of frequent pneumonia. CT Head on 9/27/24 shows no evidence of intracranial metastatic disease. She will return for follow-up in 3 months with repeat CT head for surveillance.     Anxiety: present at baseline. Prescribed Ativan 0.5 mg PRN per Dr. Dickson. Unable to get MRI brain for intracranial monitoring due to claustrophobia.     Cancer-related pain: pain reasonably well controlled with percocet prescribed by Dr. Dickson. Her biggest source of pain currently is within her right great toe. Encouraged her to discuss possible gout at upcoming appointment with Dr. Dickson.     Assessment/Plan:   Diagnoses and all orders for this visit:    1. Small cell lung cancer in adult (Primary)  -     CT Head With & Without Contrast; Future    2. Metastatic cancer to bone  -     CT Head With & Without Contrast; Future    3. Immunotherapy    4. Anxiety    5. History of claustrophobia    6. Nicotine dependence with current use    7.  Cancer-related pain      Follow Up:   Return for follow-up in 3 months with CT head prior. Other surveillance imaging deferred to Dr. Dickson.   Follow-up with Dr. Dickson on 11/12/24.      Return in about 3 months (around 1/31/2025) for Office Visit.  Lluvia TAPIA Gianni was encouraged to contact me in the interim with any questions or concerns regarding her care.      SANDRO Terry  Radiation Oncology  [facility]    This document has been signed by SANDRO Bolanos on October 31, 2024 16:18 EDT

## 2024-11-04 ENCOUNTER — TELEPHONE (OUTPATIENT)
Dept: ONCOLOGY | Facility: HOSPITAL | Age: 59
End: 2024-11-04
Payer: MEDICARE

## 2024-11-04 NOTE — TELEPHONE ENCOUNTER
Caller: Lluvia Archer    Relationship to patient: Self    Best call back number: [phone]    Chief complaint: CANC. - R/S - DUE TO SON'S SURGERY    Type of visit: F/U & INFUSION     Requested date: 11/13/24     If rescheduling, when is the original appointment: 11/12/24

## 2024-11-05 NOTE — TELEPHONE ENCOUNTER
CALLING BACK TODAY 11/5/2024 AT 12:54PM     Caller: Lluvia Archer     Relationship to patient: Self     Best call back number: [phone]     Chief complaint: CANC. - R/S - DUE TO SON'S SURGERY     Type of visit: F/U & INFUSION      Requested date: 11/13/24      If rescheduling, when is the original appointment: 11/12/24

## 2024-11-08 DIAGNOSIS — L29.9 ITCHING: ICD-10-CM

## 2024-11-11 RX ORDER — HYDROXYZINE HYDROCHLORIDE 25 MG/1
TABLET, FILM COATED ORAL
Qty: 270 TABLET | Refills: 2 | Status: SHIPPED | OUTPATIENT
Start: 2024-11-11

## 2024-11-14 ENCOUNTER — HOSPITAL ENCOUNTER (OUTPATIENT)
Dept: ONCOLOGY | Facility: HOSPITAL | Age: 59
Discharge: HOME | End: 2024-11-14
Payer: MEDICARE

## 2024-11-14 ENCOUNTER — OFFICE VISIT (OUTPATIENT)
Dept: ONCOLOGY | Facility: HOSPITAL | Age: 59
End: 2024-11-14
Payer: MEDICARE

## 2024-11-14 VITALS
DIASTOLIC BLOOD PRESSURE: 78 MMHG | HEART RATE: 101 BPM | TEMPERATURE: 97.6 F | OXYGEN SATURATION: 98 % | RESPIRATION RATE: 20 BRPM | HEIGHT: 64 IN | BODY MASS INDEX: 36.21 KG/M2 | SYSTOLIC BLOOD PRESSURE: 116 MMHG | WEIGHT: 212.08 LBS

## 2024-11-14 VITALS
WEIGHT: 212.08 LBS | HEART RATE: 101 BPM | HEIGHT: 64 IN | SYSTOLIC BLOOD PRESSURE: 116 MMHG | BODY MASS INDEX: 36.21 KG/M2 | RESPIRATION RATE: 20 BRPM | DIASTOLIC BLOOD PRESSURE: 78 MMHG | TEMPERATURE: 97.6 F | OXYGEN SATURATION: 98 %

## 2024-11-14 DIAGNOSIS — C34.90 SMALL CELL CARCINOMA OF LUNG, UNSPECIFIED LATERALITY, UNSPECIFIED PART OF LUNG: ICD-10-CM

## 2024-11-14 DIAGNOSIS — J44.1 COPD EXACERBATION: ICD-10-CM

## 2024-11-14 DIAGNOSIS — C34.2 SMALL CELL CARCINOMA OF MIDDLE LOBE OF RIGHT LUNG: Primary | ICD-10-CM

## 2024-11-14 DIAGNOSIS — Z79.899 ENCOUNTER FOR LONG-TERM (CURRENT) USE OF HIGH-RISK MEDICATION: Primary | ICD-10-CM

## 2024-11-14 DIAGNOSIS — C34.2 SMALL CELL CARCINOMA OF MIDDLE LOBE OF RIGHT LUNG: ICD-10-CM

## 2024-11-14 DIAGNOSIS — C79.51 CANCER, METASTATIC TO BONE: ICD-10-CM

## 2024-11-14 DIAGNOSIS — Z45.2 ENCOUNTER FOR ADJUSTMENT OR MANAGEMENT OF VASCULAR ACCESS DEVICE: ICD-10-CM

## 2024-11-14 LAB
ALBUMIN SERPL-MCNC: 4 G/DL (ref 3.5–5.2)
ALBUMIN/GLOB SERPL: 1.4 G/DL
ALP SERPL-CCNC: 79 U/L (ref 39–117)
ALT SERPL W P-5'-P-CCNC: 8 U/L (ref 1–33)
ANION GAP SERPL CALCULATED.3IONS-SCNC: 9.2 MMOL/L (ref 5–15)
AST SERPL-CCNC: 12 U/L (ref 1–32)
BASOPHILS # BLD AUTO: 0.03 10*3/MM3 (ref 0–0.2)
BASOPHILS NFR BLD AUTO: 0.4 % (ref 0–1.5)
BILIRUB SERPL-MCNC: 0.2 MG/DL (ref 0–1.2)
BUN SERPL-MCNC: 9 MG/DL (ref 6–20)
BUN/CREAT SERPL: 11 (ref 7–25)
CALCIUM SPEC-SCNC: 8.5 MG/DL (ref 8.6–10.5)
CHLORIDE SERPL-SCNC: 106 MMOL/L (ref 98–107)
CO2 SERPL-SCNC: 24.8 MMOL/L (ref 22–29)
CREAT SERPL-MCNC: 0.82 MG/DL (ref 0.57–1)
DEPRECATED RDW RBC AUTO: 52.4 FL (ref 37–54)
EGFRCR SERPLBLD CKD-EPI 2021: 83 ML/MIN/1.73
EOSINOPHIL # BLD AUTO: 0.15 10*3/MM3 (ref 0–0.4)
EOSINOPHIL NFR BLD AUTO: 1.9 % (ref 0.3–6.2)
ERYTHROCYTE [DISTWIDTH] IN BLOOD BY AUTOMATED COUNT: 14.4 % (ref 12.3–15.4)
GLOBULIN UR ELPH-MCNC: 2.8 GM/DL
GLUCOSE SERPL-MCNC: 95 MG/DL (ref 65–99)
HCT VFR BLD AUTO: 41.9 % (ref 34–46.6)
HGB BLD-MCNC: 13.5 G/DL (ref 12–15.9)
IMM GRANULOCYTES # BLD AUTO: 0.02 10*3/MM3 (ref 0–0.05)
IMM GRANULOCYTES NFR BLD AUTO: 0.3 % (ref 0–0.5)
LYMPHOCYTES # BLD AUTO: 1.88 10*3/MM3 (ref 0.7–3.1)
LYMPHOCYTES NFR BLD AUTO: 24.4 % (ref 19.6–45.3)
MAGNESIUM SERPL-MCNC: 1.9 MG/DL (ref 1.6–2.6)
MCH RBC QN AUTO: 31.3 PG (ref 26.6–33)
MCHC RBC AUTO-ENTMCNC: 32.2 G/DL (ref 31.5–35.7)
MCV RBC AUTO: 97.2 FL (ref 79–97)
MONOCYTES # BLD AUTO: 0.75 10*3/MM3 (ref 0.1–0.9)
MONOCYTES NFR BLD AUTO: 9.7 % (ref 5–12)
NEUTROPHILS NFR BLD AUTO: 4.87 10*3/MM3 (ref 1.7–7)
NEUTROPHILS NFR BLD AUTO: 63.3 % (ref 42.7–76)
NRBC BLD AUTO-RTO: 0 /100 WBC (ref 0–0.2)
PHOSPHATE SERPL-MCNC: 2.6 MG/DL (ref 2.5–4.5)
PLATELET # BLD AUTO: 151 10*3/MM3 (ref 140–450)
PMV BLD AUTO: 9.8 FL (ref 6–12)
POTASSIUM SERPL-SCNC: 4 MMOL/L (ref 3.5–5.2)
PROT SERPL-MCNC: 6.8 G/DL (ref 6–8.5)
RBC # BLD AUTO: 4.31 10*6/MM3 (ref 3.77–5.28)
SODIUM SERPL-SCNC: 140 MMOL/L (ref 136–145)
T4 FREE SERPL-MCNC: 1.1 NG/DL (ref 0.92–1.68)
TSH SERPL DL<=0.05 MIU/L-ACNC: 1.78 UIU/ML (ref 0.27–4.2)
WBC NRBC COR # BLD AUTO: 7.7 10*3/MM3 (ref 3.4–10.8)

## 2024-11-14 PROCEDURE — 84443 ASSAY THYROID STIM HORMONE: CPT | Performed by: INTERNAL MEDICINE

## 2024-11-14 PROCEDURE — 25010000002 DURVALUMAB 50 MG/ML SOLUTION 10 ML VIAL: Performed by: INTERNAL MEDICINE

## 2024-11-14 PROCEDURE — 80053 COMPREHEN METABOLIC PANEL: CPT | Performed by: INTERNAL MEDICINE

## 2024-11-14 PROCEDURE — 25010000002 HEPARIN LOCK FLUSH PER 10 UNITS: Performed by: INTERNAL MEDICINE

## 2024-11-14 PROCEDURE — 25810000003 SODIUM CHLORIDE 0.9 % SOLUTION 250 ML FLEX CONT: Performed by: INTERNAL MEDICINE

## 2024-11-14 PROCEDURE — 84439 ASSAY OF FREE THYROXINE: CPT | Performed by: INTERNAL MEDICINE

## 2024-11-14 PROCEDURE — 96372 THER/PROPH/DIAG INJ SC/IM: CPT

## 2024-11-14 PROCEDURE — 25010000002 DENOSUMAB 120 MG/1.7ML SOLUTION: Performed by: INTERNAL MEDICINE

## 2024-11-14 PROCEDURE — 96413 CHEMO IV INFUSION 1 HR: CPT

## 2024-11-14 PROCEDURE — 85025 COMPLETE CBC W/AUTO DIFF WBC: CPT | Performed by: INTERNAL MEDICINE

## 2024-11-14 PROCEDURE — 83735 ASSAY OF MAGNESIUM: CPT | Performed by: INTERNAL MEDICINE

## 2024-11-14 PROCEDURE — 84100 ASSAY OF PHOSPHORUS: CPT | Performed by: INTERNAL MEDICINE

## 2024-11-14 RX ORDER — AZITHROMYCIN 250 MG/1
TABLET, FILM COATED ORAL
Qty: 6 TABLET | Refills: 0 | Status: SHIPPED | OUTPATIENT
Start: 2024-11-14

## 2024-11-14 RX ORDER — SODIUM CHLORIDE 9 MG/ML
20 INJECTION, SOLUTION INTRAVENOUS ONCE
Status: COMPLETED | OUTPATIENT
Start: 2024-11-14 | End: 2024-11-14

## 2024-11-14 RX ORDER — ONDANSETRON 8 MG/1
8 TABLET, ORALLY DISINTEGRATING ORAL EVERY 8 HOURS PRN
Qty: 60 TABLET | Refills: 3 | Status: SHIPPED | OUTPATIENT
Start: 2024-11-14

## 2024-11-14 RX ORDER — HEPARIN SODIUM (PORCINE) LOCK FLUSH IV SOLN 100 UNIT/ML 100 UNIT/ML
500 SOLUTION INTRAVENOUS AS NEEDED
Status: DISCONTINUED | OUTPATIENT
Start: 2024-11-14 | End: 2024-11-15 | Stop reason: HOSPADM

## 2024-11-14 RX ORDER — HEPARIN SODIUM (PORCINE) LOCK FLUSH IV SOLN 100 UNIT/ML 100 UNIT/ML
500 SOLUTION INTRAVENOUS AS NEEDED
OUTPATIENT
Start: 2024-11-14

## 2024-11-14 RX ORDER — SODIUM CHLORIDE 0.9 % (FLUSH) 0.9 %
20 SYRINGE (ML) INJECTION AS NEEDED
Status: DISCONTINUED | OUTPATIENT
Start: 2024-11-14 | End: 2024-11-15 | Stop reason: HOSPADM

## 2024-11-14 RX ORDER — SODIUM CHLORIDE 0.9 % (FLUSH) 0.9 %
20 SYRINGE (ML) INJECTION AS NEEDED
OUTPATIENT
Start: 2024-11-14

## 2024-11-14 RX ORDER — PROCHLORPERAZINE MALEATE 10 MG
10 TABLET ORAL EVERY 6 HOURS PRN
Qty: 60 TABLET | Refills: 3 | Status: SHIPPED | OUTPATIENT
Start: 2024-11-14

## 2024-11-14 RX ORDER — SODIUM CHLORIDE 9 MG/ML
20 INJECTION, SOLUTION INTRAVENOUS ONCE
Status: CANCELLED | OUTPATIENT
Start: 2024-11-14

## 2024-11-14 RX ORDER — METHYLPREDNISOLONE 4 MG/1
TABLET ORAL
Qty: 21 TABLET | Refills: 0 | Status: SHIPPED | OUTPATIENT
Start: 2024-11-14

## 2024-11-14 RX ADMIN — Medication 20 ML: at 12:18

## 2024-11-14 RX ADMIN — HEPARIN 500 UNITS: 100 SYRINGE at 12:18

## 2024-11-14 RX ADMIN — DENOSUMAB 120 MG: 120 INJECTION SUBCUTANEOUS at 12:18

## 2024-11-14 RX ADMIN — SODIUM CHLORIDE 20 ML/HR: 900 INJECTION, SOLUTION INTRAVENOUS at 11:12

## 2024-11-14 RX ADMIN — SODIUM CHLORIDE 1500 MG: 9 INJECTION, SOLUTION INTRAVENOUS at 11:12

## 2024-11-14 NOTE — PROGRESS NOTES
Chief Complaint  FOLLOW UP 1     Provider, No Known  Aleksandar Edge, DO Ildefonso Teixeira REGINA Archer presents to [facility] for small cell lung cancer    Ms. Archer is a very pleasant 58-year-old female with past medical history of hypertension hyperlipidemia, COPD, osteoarthritis, anxiety who presents for new oncology evaluation with her daughter for diagnosis of small cell lung cancer.  Patient previously had PET scan in September 2023 without any concerning findings.  Follow-up CT chest in February showed multiple right middle lobe nodules with mediastinal and right hilar adenopathy.  Patient was admitted to [facility] on June 2 with shortness of breath, fever, cough.  She was started on treatment for pneumonia.  She was found to be hyponatremic to 126.  Repeat CT chest on 3 Tia showed progression of right middle lobe nodules and mediastinal right hilar lymphadenopathy.  Patient was discharged on 4 June.  She had outpatient bronchoscopy on 7 June with station 4R and station 7 possible small cell carcinoma.  Unable to get MRI due to claustrophobia.  CT head with and without contrast on 13 June did not show any intracranial mets.  PET scan confirmed metastatic disease to bone and right axilla    Interval History  Here for follow up.  She is now on durvalumab maintenance. Due for C3 of this today.  She reports more shortness of breath past few days.  Associate with cough.  Reports sore chest feels full.  She reports more bone pain.  She reports she is feeling slightly better.  She denies any fevers or chills.  She is ready for treatment today.  No rash but still has itching. Has increased atarax and this really helps the itching. No diarrhea.  Weight is up.     Oncology/Hematology History Overview Note   2/20/24: CT Chest:  No PE or aortic dissection. Multiple right middle lobe nodules are highly suspicious for malignancy.  Additionally, there  is mediastinal and right hilar adenopathy now noted.  Follow-up with a PET-CT is recommended. Emphysema with chronic changes in both lungs that otherwise appears stable. Atherosclerotic disease and coronary artery disease.    6/2/24: admitted to [facility] with shortness of breath, fever, cough and started on treatment for pneumonia. Found to have hyponatremia to 126    6/3/24 CT Chest: Right middle lobe nodules and mediastinal and right hilar lymphadenopathy has progressed from prior CT, and remains concerning for malignancy.  Partial right middle lobe atelectasis. Moderate emphysema. Discharged on 6/4/24 with Na of 133.    6/7/24: Bronchoscopy: Station 4R and station 7 positive for small cell carcinoma.    6/13/24: CT head with and without contrast (due to claustrophobia): No mets seen    6/19/24: NM PET: New hypermetabolic nodules within the right middle lobe. There are also multiple new enlarged hypermetabolic mediastinal lymph nodes consistent with metastatic disease. Right axillary mets as well. There are scattered foci of hypermetabolism throughout the bony structures consistent with bone metastases.    6/24/24: C1D1 Carbo/Etop/Durva. Tolerated well    7/16/24: C2D1 Carbo/Etop/Durva. Complicated by inpatient admission due to dehydration from nausea/vomiting as well as pancytopenia. ANC 0.11. Required transfusion for hgb 6.8.     8/6/24: C3D1 Carbo/Etop/Durva. 20% reduction in Carbo and 20% reduction in Etoposide. Add G-CSF with this cycle due to severe neutropenia with C2.     Repeat scan have shown disease response, however scan was due to PE rule out.     8/27/24: C4D1 Carbo/Etop/Durva. 33% reduction in Carbo and 33% reduction in Etoposide. Continue G-CSF with this cycle due to severe neutropenia with C2. Labs appropriate to proceed. Ok to treat for elevated alk phos.     8/6/24: C3D1 Carbo/Etop/Durva. 20% reduction in Carbo and 20% reduction in Etoposide. Add G-CSF with this cycle due to severe neutropenia with  C2.    Repeat scan have shown disease response, however scan was due to PE rule out.     8/27/24: C4D1 Carbo/Etop/Durva. 33% reduction in Carbo and 33% reduction in Etoposide. Continue G-CSF with this cycle due to severe neutropenia with C2.      Repeat scans without progression. Plan for durvalumab alone for maintenance    9/17/24: C5D1 durvalumab alone        Small cell lung cancer   6/12/2024 Initial Diagnosis    Small cell lung cancer     6/14/2024 - 6/14/2024 Chemotherapy    OP LUNG CISplatin 60mg/m2 / Etoposide 120mg/m2 + XRT     6/14/2024 Cancer Staged    Staging form: Lung, AJCC 8th Edition  - Clinical: Stage IVB (cT1b, cN2, cM1c) - Signed by Brian Dickson MD on 6/20/2024 6/24/2024 - 8/29/2024 Chemotherapy    OP LUNG CARBOplatin AUC=5 / Etoposide / Durvalumab     8/27/2024 -  Chemotherapy    OP SUPPORTIVE Denosumab (Xgeva) Q28D     9/17/2024 Biopsy    OP LUNG Durvalumab 1500 mg  Plan Provider: Brian Dickson MD  Treatment goal: Control  Line of treatment: [No plan line of treatment]     Cancer, metastatic to bone   8/6/2024 Initial Diagnosis    Cancer, metastatic to bone     8/27/2024 -  Chemotherapy    OP SUPPORTIVE Denosumab (Xgeva) Q28D           Review of Systems   Constitutional:  Positive for fatigue. Negative for appetite change, diaphoresis, fever, unexpected weight gain and unexpected weight loss.   HENT:  Negative for hearing loss, mouth sores, sore throat, swollen glands, trouble swallowing and voice change.    Eyes:  Negative for blurred vision, double vision, pain, redness and visual disturbance.   Respiratory:  Negative for cough, shortness of breath and wheezing.    Cardiovascular:  Positive for chest pain (pt had her port put in today) and leg swelling (both legs are swelling). Negative for palpitations.   Gastrointestinal:  Positive for nausea. Negative for abdominal pain, blood in stool, constipation, diarrhea and vomiting.   Endocrine: Negative for cold intolerance,  heat intolerance, polydipsia and polyuria.   Genitourinary:  Negative for decreased urine volume, difficulty urinating, dysuria, frequency, hematuria and urinary incontinence.   Musculoskeletal:  Negative for arthralgias, back pain, joint swelling and myalgias.   Skin:  Negative for color change, rash, skin lesions and wound.   Neurological:  Negative for dizziness, seizures, weakness, numbness and headache.   Hematological:  Negative for adenopathy. Does not bruise/bleed easily.   Psychiatric/Behavioral:  Negative for depressed mood. The patient is not nervous/anxious.    All other systems reviewed and are negative.    Current Outpatient Medications on File Prior to Visit   Medication Sig Dispense Refill    albuterol sulfate  (90 Base) MCG/ACT inhaler Inhale 2 puffs Every 4 (Four) Hours As Needed for Wheezing or Shortness of Air. 18 g 5    atorvastatin (LIPITOR) 10 MG tablet Take 1 tablet by mouth Every Night. 90 tablet 3    buPROPion SR (WELLBUTRIN SR) 150 MG 12 hr tablet Take 1 tablet by mouth 2 (Two) Times a Day. 180 tablet 3    calcium carbonate (Tums) 500 MG chewable tablet Chew 2 tablets Daily. 60 tablet 0    escitalopram (LEXAPRO) 20 MG tablet Take 1 tablet by mouth Daily. 90 tablet 3    famotidine (PEPCID) 40 MG tablet TAKE ONE TABLET BY MOUTH TWICE DAILY @ 9AM & 5PM 180 tablet 11    gabapentin (NEURONTIN) 300 MG capsule Take 1 capsule by mouth 3 (Three) Times a Day. 90 capsule 0    hydrOXYzine (ATARAX) 25 MG tablet TAKE 1 TABLET BY MOUTH THREE TIMES A DAY AS NEEDED FOR ITCHING 270 tablet 2    lisinopril (PRINIVIL,ZESTRIL) 5 MG tablet TAKE ONE TABLET BY MOUTH DAILY AT 9 AM 30 tablet 11    melatonin 5 MG tablet tablet Take 1 tablet by mouth As Needed.      nicotine (NICODERM CQ) 21 MG/24HR patch Place 1 patch on the skin as directed by provider Daily. (Patient not taking: Reported on 10/31/2024) 30 patch 0    nystatin (MYCOSTATIN) 100,000 unit/mL suspension Swish and swallow 5 mL 4 (Four) Times a Day  As Needed (for thrush, if needing to take use for 7-10days until symptoms resolve). (Patient not taking: Reported on 10/31/2024) 473 mL 0    omeprazole (priLOSEC) 40 MG capsule Take 1 capsule by mouth Daily. (Patient not taking: Reported on 10/31/2024)      ondansetron ODT (ZOFRAN-ODT) 8 MG disintegrating tablet Place 1 tablet on the tongue Every 8 (Eight) Hours As Needed for Nausea or Vomiting. 60 tablet 3    oxyCODONE-acetaminophen (PERCOCET) 7.5-325 MG per tablet Take 1 tablet by mouth Every 6 (Six) Hours As Needed for Severe Pain. 90 tablet 0    potassium chloride (KLOR-CON M20) 20 MEQ CR tablet Take 1 tablet by mouth Daily. (Patient not taking: Reported on 10/31/2024) 14 tablet 0    prochlorperazine (COMPAZINE) 10 MG tablet Take 1 tablet by mouth Every 6 (Six) Hours As Needed for Nausea. 60 tablet 3    Trelegy Ellipta 100-62.5-25 MCG/ACT inhaler Inhale 1 puff Daily. 3 each 3    triamcinolone (KENALOG) 0.1 % ointment Apply 1 Application topically to the appropriate area as directed 2 (Two) Times a Day. Apply to itchy areas 30 g 1     No current facility-administered medications on file prior to visit.       No Known Allergies  Past Medical History:   Diagnosis Date    Abnormal mammogram     PT DENIES KNOWING OF THIS HX    Anxiety     Arthritis     R SHOULDER, R HIP, AND R LEG    Cancer     LUNG    Chronic nausea 01/15/2019    COPD (chronic obstructive pulmonary disease)     O2 3 LITERS NC CONT    Hernia, hiatal     Hyperlipidemia     Hypertension     Knee pain, right 08/30/2018    Memory loss     FORGETFULNESS ? ETIOLOGY    Migraine headache     Moderate episode of recurrent major depressive disorder 05/22/2017    Night sweats     Sciatica of right side 08/18/2017    Severe episode of recurrent major depressive disorder, without psychotic features 10/22/2019    Shingles     OUTBREAK 6/11/24 ON ANTIVIRAL , WHELPS NOTED NO SORES.    Shoulder pain, left 08/30/2018     Past Surgical History:   Procedure Laterality  Date    BRONCHOSCOPY N/A 2022    Procedure: BRONCHOSCOPY WITH BRONCHOALVEOLAR LAVAGE, BIOPSY;  Surgeon: Shin Mcdonald DO;  Location: [facility] ENDOSCOPY;  Service: Pulmonary;  Laterality: N/A;  RIGHT LOWER LOBE INFILTRATE    BRONCHOSCOPY N/A 2024    Procedure: BRONCHOSCOPY WITH ENDOBRONCHIAL ULTRASOUND AND FINE NEEDLE ASPIRATE;  Surgeon: Shin Mcdonald DO;  Location: [facility] ENDOSCOPY;  Service: Pulmonary;  Laterality: N/A;  MEDIASTINAL ADENOPATHY     SECTION      CHOLECYSTECTOMY      LAPAROSCOPIC    COLONOSCOPY      PORTACATH PLACEMENT Left 2024    Procedure: INSERTION OF PORTACATH;  Surgeon: Josh Patton MD;  Location: [facility] OR OSC;  Service: General;  Laterality: Left;    TOTAL HIP ARTHROPLASTY Right 2022    Procedure: RIGHT TOTAL HIP ARTHROPLASTY;  Surgeon: Cecil Gomes MD;  Location: [facility] MAIN OR;  Service: Orthopedics;  Laterality: Right;     Social History     Socioeconomic History    Marital status:      Spouse name: DEVAN    Number of children: 2    Years of education: GED    Highest education level: GED or equivalent   Tobacco Use    Smoking status: Some Days     Current packs/day: 2.00     Average packs/day: 2.0 packs/day for 42.9 years (85.7 ttl pk-yrs)     Types: Cigarettes     Start date:      Passive exposure: Past    Smokeless tobacco: Never   Vaping Use    Vaping status: Former    Substances: Nicotine, Flavoring    Devices: Disposable   Substance and Sexual Activity    Alcohol use: Never    Drug use: Never    Sexual activity: Defer     Family History   Problem Relation Age of Onset    Other Mother         BLOOD DISEASE    Arthritis Mother     Heart disease Father     Hypertension Other     Cancer Other     Heart disease Other     Malig Hyperthermia Neg Hx        Objective   Physical Exam  Well appearing patient in no acute distress on RA  Anicteric sclerae, no rash on exposed skin  + poor dentition.   Respirations  "non-labored  Awake, alert, and oriented x 4. Speech intact. No gross neurologic deficit  Appropriate mood and affect  + trace LE edema    Vitals:    11/14/24 0954   BP: 116/78   Pulse: 101   Resp: 20   Temp: 97.6 °F (36.4 °C)   TempSrc: Temporal   SpO2: 98%   Weight: 96.2 kg (212 lb 1.3 oz)   Height: 162.6 cm (64.02\")       ECOG score: 2         PHQ-9 Total Score:           Result Review :   The following data was reviewed by: Brian Dickson MD on 11/14/24:  Lab Results   Component Value Date    HGB 12.0 10/15/2024    HCT 38.2 10/15/2024    .7 (H) 10/15/2024     10/15/2024    WBC 6.77 10/15/2024    NEUTROABS 4.24 10/15/2024    LYMPHSABS 1.50 10/15/2024    MONOSABS 0.69 10/15/2024    EOSABS 0.29 10/15/2024    BASOSABS 0.03 10/15/2024     Lab Results   Component Value Date    GLUCOSE 92 10/15/2024    BUN 13 10/15/2024    CREATININE 0.75 10/15/2024     10/15/2024    K 4.4 10/15/2024     10/15/2024    CO2 30.5 (H) 10/15/2024    CALCIUM 8.7 10/15/2024    PROTEINTOT 6.2 10/15/2024    ALBUMIN 3.9 10/15/2024    BILITOT 0.2 10/15/2024    ALKPHOS 93 10/15/2024    AST 15 10/15/2024    ALT 12 10/15/2024     Lab Results   Component Value Date    MG 2.0 10/15/2024    PHOS 3.1 10/15/2024    FREET4 1.13 10/15/2024    TSH 0.997 10/15/2024     Labs personally reviewed. Sodium normal. Hgb wnl. WBC wnl. Plts wnl.       No radiology results for the last 30 days.        Assessment and Plan    Diagnoses and all orders for this visit:    1. Small cell carcinoma of middle lobe of right lung (Primary)  -     CT chest w contrast; Future  -     CT abdomen pelvis w contrast; Future    2. Cancer, metastatic to bone  -     Cancel: CBC and Differential; Future  -     Cancel: Comprehensive metabolic panel; Future    3. Small cell carcinoma of lung, unspecified laterality, unspecified part of lung  -     Cancel: CBC and Differential; Future  -     Cancel: Comprehensive metabolic panel; Future    4. COPD " exacerbation    Other orders  -     ondansetron ODT (ZOFRAN-ODT) 8 MG disintegrating tablet; Place 1 tablet on the tongue Every 8 (Eight) Hours As Needed for Nausea or Vomiting.  Dispense: 60 tablet; Refill: 3  -     prochlorperazine (COMPAZINE) 10 MG tablet; Take 1 tablet by mouth Every 6 (Six) Hours As Needed for Nausea.  Dispense: 60 tablet; Refill: 3  -     methylPREDNISolone (MEDROL) 4 MG dose pack; Take as directed on package instructions.  Dispense: 21 tablet; Refill: 0  -     azithromycin (Zithromax Z-Homer) 250 MG tablet; Take 2 tablets by mouth on day 1, then 1 tablet daily on days 2-5  Dispense: 6 tablet; Refill: 0  -     Cancel: sodium chloride 0.9 % infusion  -     Cancel: durvalumab (IMFINZI) 1,500 mg in sodium chloride 0.9 % 280 mL chemo IVPB  -     Cancel: denosumab (XGEVA) injection 120 mg          Small cell lung cancer, extensive stage  RML with right hilar and mediastinal adenopathy on CT chest.  Bronchoscopy with positive small cell pathology at station 4R and station 7.  PET with multiple new enlarged hypermetabolic mediastinal lymph nodes consistent with metastatic disease, also with new right axillary FDG avid mets. There are scattered foci of hypermetabolism throughout the bony structures consistent with bone metastases. CT head with and without without any intracranial mets.  Unable to do MRI due to claustrophobia and anxiety. Recommended treatment is systemic alone with for IV carboplatin, etoposide and durvalumab every 3 weeks. First cycle 6/24/24. 8/6/24: C3D1 Carbo/Etop/Durva. 20% reduction in Carbo and 20% reduction in Etoposide. Add G-CSF with this cycle due to severe neutropenia with C2.  8/27/24: C4D1 Carbo/Etop/Durva. 33% reduction in Carbo and 33% reduction in Etoposide. Last cycle of chemotherapy.    Repeat scans after C4 without clear progression, new 7 mm RUL nodule could be infectious. Plan for durvalumab alone.    11/14/24: C7D1 Durvalumab (third with Durvalumab alone). Labs  appropriate to proceed.     Needs intracranial monitoring, ordered for Jan. Repeat CT head with and without 9/27/24 negative for intracranial disease.     Repeat staging CT scans prior to next visit.     Hold on consolidative radiation as no dominant lung lesion.     Itching  No rash. Could be related to immunotherapy. Atarax as needed has helped. Topical steroid can be used on itchiest areas. Also can use PRN calamine lotion.     COPD exacerbation  Start steroids tomorrow as to not interfere with immunotherapy. Add z-pack.     Met cancer to bone  Recommend bone modifying agent. Patient has all teeth except 2 removed. Monthly Xgeva started 8/27/24. Will monitor electrolytes.     Anxiety  Present at baseline now worse. PRN Ativan 0.5 mg.     Insomnia  Recommend low dose melatonin vs benadryl. Also has Ativan if anxiety is contributing to poor sleep.     LE edema  Recommend leg elevation. Hold on diuretic for now. Can consider if worsening.       Patient Follow Up: Cycle 8  Patient was given instructions and counseling regarding her condition or for health maintenance advice. Please see specific information pulled into the AVS if appropriate.

## 2024-11-21 DIAGNOSIS — C79.51 CANCER, METASTATIC TO BONE: ICD-10-CM

## 2024-11-25 RX ORDER — OXYCODONE AND ACETAMINOPHEN 7.5; 325 MG/1; MG/1
1 TABLET ORAL EVERY 6 HOURS PRN
Qty: 90 TABLET | Refills: 0 | Status: SHIPPED | OUTPATIENT
Start: 2024-11-25

## 2024-12-09 ENCOUNTER — HOSPITAL ENCOUNTER (OUTPATIENT)
Dept: CT IMAGING | Facility: HOSPITAL | Age: 59
Discharge: HOME | End: 2024-12-09
Admitting: INTERNAL MEDICINE
Payer: MEDICARE

## 2024-12-09 DIAGNOSIS — C34.2 SMALL CELL CARCINOMA OF MIDDLE LOBE OF RIGHT LUNG: ICD-10-CM

## 2024-12-09 PROCEDURE — 71260 CT THORAX DX C+: CPT

## 2024-12-09 PROCEDURE — 74177 CT ABD & PELVIS W/CONTRAST: CPT

## 2024-12-09 PROCEDURE — 25510000001 IOPAMIDOL PER 1 ML: Performed by: INTERNAL MEDICINE

## 2024-12-09 RX ORDER — IOPAMIDOL 755 MG/ML
100 INJECTION, SOLUTION INTRAVASCULAR
Status: COMPLETED | OUTPATIENT
Start: 2024-12-09 | End: 2024-12-09

## 2024-12-09 RX ADMIN — IOPAMIDOL 100 ML: 755 INJECTION, SOLUTION INTRAVENOUS at 14:45

## 2024-12-12 ENCOUNTER — HOSPITAL ENCOUNTER (OUTPATIENT)
Dept: ONCOLOGY | Facility: HOSPITAL | Age: 59
Discharge: HOME | End: 2024-12-12
Payer: MEDICARE

## 2024-12-12 ENCOUNTER — OFFICE VISIT (OUTPATIENT)
Dept: ONCOLOGY | Facility: HOSPITAL | Age: 59
End: 2024-12-12
Payer: MEDICARE

## 2024-12-12 VITALS
HEART RATE: 98 BPM | HEIGHT: 64 IN | RESPIRATION RATE: 20 BRPM | SYSTOLIC BLOOD PRESSURE: 103 MMHG | WEIGHT: 215.83 LBS | TEMPERATURE: 98.1 F | DIASTOLIC BLOOD PRESSURE: 86 MMHG | BODY MASS INDEX: 36.85 KG/M2 | OXYGEN SATURATION: 93 %

## 2024-12-12 VITALS
DIASTOLIC BLOOD PRESSURE: 86 MMHG | WEIGHT: 215.83 LBS | BODY MASS INDEX: 37.03 KG/M2 | OXYGEN SATURATION: 93 % | TEMPERATURE: 98.1 F | HEART RATE: 98 BPM | RESPIRATION RATE: 20 BRPM | SYSTOLIC BLOOD PRESSURE: 103 MMHG

## 2024-12-12 DIAGNOSIS — C34.2 SMALL CELL CARCINOMA OF MIDDLE LOBE OF RIGHT LUNG: ICD-10-CM

## 2024-12-12 DIAGNOSIS — C79.51 CANCER, METASTATIC TO BONE: ICD-10-CM

## 2024-12-12 DIAGNOSIS — Z45.2 ENCOUNTER FOR ADJUSTMENT OR MANAGEMENT OF VASCULAR ACCESS DEVICE: ICD-10-CM

## 2024-12-12 DIAGNOSIS — C34.90 SMALL CELL CARCINOMA OF LUNG, UNSPECIFIED LATERALITY, UNSPECIFIED PART OF LUNG: ICD-10-CM

## 2024-12-12 DIAGNOSIS — C79.51 CANCER, METASTATIC TO BONE: Primary | ICD-10-CM

## 2024-12-12 DIAGNOSIS — Z79.899 ENCOUNTER FOR LONG-TERM (CURRENT) USE OF HIGH-RISK MEDICATION: Primary | ICD-10-CM

## 2024-12-12 DIAGNOSIS — G89.3 CHRONIC NEOPLASM-RELATED PAIN: ICD-10-CM

## 2024-12-12 LAB
ALBUMIN SERPL-MCNC: 3.6 G/DL (ref 3.5–5.2)
ALBUMIN/GLOB SERPL: 1.3 G/DL
ALP SERPL-CCNC: 93 U/L (ref 39–117)
ALT SERPL W P-5'-P-CCNC: 11 U/L (ref 1–33)
ANION GAP SERPL CALCULATED.3IONS-SCNC: 8.8 MMOL/L (ref 5–15)
AST SERPL-CCNC: 14 U/L (ref 1–32)
BASOPHILS # BLD AUTO: 0.04 10*3/MM3 (ref 0–0.2)
BASOPHILS NFR BLD AUTO: 0.5 % (ref 0–1.5)
BILIRUB SERPL-MCNC: <0.2 MG/DL (ref 0–1.2)
BUN SERPL-MCNC: 13 MG/DL (ref 6–20)
BUN/CREAT SERPL: 14.9 (ref 7–25)
CALCIUM SPEC-SCNC: 8.4 MG/DL (ref 8.6–10.5)
CHLORIDE SERPL-SCNC: 108 MMOL/L (ref 98–107)
CO2 SERPL-SCNC: 24.2 MMOL/L (ref 22–29)
CREAT SERPL-MCNC: 0.87 MG/DL (ref 0.57–1)
DEPRECATED RDW RBC AUTO: 47.7 FL (ref 37–54)
EGFRCR SERPLBLD CKD-EPI 2021: 77.3 ML/MIN/1.73
EOSINOPHIL # BLD AUTO: 0.19 10*3/MM3 (ref 0–0.4)
EOSINOPHIL NFR BLD AUTO: 2.4 % (ref 0.3–6.2)
ERYTHROCYTE [DISTWIDTH] IN BLOOD BY AUTOMATED COUNT: 13.9 % (ref 12.3–15.4)
GLOBULIN UR ELPH-MCNC: 2.8 GM/DL
GLUCOSE SERPL-MCNC: 93 MG/DL (ref 65–99)
HCT VFR BLD AUTO: 40.8 % (ref 34–46.6)
HGB BLD-MCNC: 12.8 G/DL (ref 12–15.9)
IMM GRANULOCYTES # BLD AUTO: 0.04 10*3/MM3 (ref 0–0.05)
IMM GRANULOCYTES NFR BLD AUTO: 0.5 % (ref 0–0.5)
LYMPHOCYTES # BLD AUTO: 2.39 10*3/MM3 (ref 0.7–3.1)
LYMPHOCYTES NFR BLD AUTO: 30.8 % (ref 19.6–45.3)
MAGNESIUM SERPL-MCNC: 2 MG/DL (ref 1.6–2.6)
MCH RBC QN AUTO: 29.2 PG (ref 26.6–33)
MCHC RBC AUTO-ENTMCNC: 31.4 G/DL (ref 31.5–35.7)
MCV RBC AUTO: 92.9 FL (ref 79–97)
MONOCYTES # BLD AUTO: 0.9 10*3/MM3 (ref 0.1–0.9)
MONOCYTES NFR BLD AUTO: 11.6 % (ref 5–12)
NEUTROPHILS NFR BLD AUTO: 4.2 10*3/MM3 (ref 1.7–7)
NEUTROPHILS NFR BLD AUTO: 54.2 % (ref 42.7–76)
NRBC BLD AUTO-RTO: 0 /100 WBC (ref 0–0.2)
PHOSPHATE SERPL-MCNC: 3.1 MG/DL (ref 2.5–4.5)
PLATELET # BLD AUTO: 148 10*3/MM3 (ref 140–450)
PMV BLD AUTO: 9.3 FL (ref 6–12)
POTASSIUM SERPL-SCNC: 3.9 MMOL/L (ref 3.5–5.2)
PROT SERPL-MCNC: 6.4 G/DL (ref 6–8.5)
RBC # BLD AUTO: 4.39 10*6/MM3 (ref 3.77–5.28)
SODIUM SERPL-SCNC: 141 MMOL/L (ref 136–145)
T4 FREE SERPL-MCNC: 0.97 NG/DL (ref 0.92–1.68)
TSH SERPL DL<=0.05 MIU/L-ACNC: 4.01 UIU/ML (ref 0.27–4.2)
WBC NRBC COR # BLD AUTO: 7.76 10*3/MM3 (ref 3.4–10.8)

## 2024-12-12 PROCEDURE — 25810000003 SODIUM CHLORIDE 0.9 % SOLUTION 250 ML FLEX CONT: Performed by: INTERNAL MEDICINE

## 2024-12-12 PROCEDURE — 25010000002 DENOSUMAB 120 MG/1.7ML SOLUTION: Performed by: INTERNAL MEDICINE

## 2024-12-12 PROCEDURE — 80053 COMPREHEN METABOLIC PANEL: CPT | Performed by: INTERNAL MEDICINE

## 2024-12-12 PROCEDURE — 96413 CHEMO IV INFUSION 1 HR: CPT

## 2024-12-12 PROCEDURE — 84443 ASSAY THYROID STIM HORMONE: CPT | Performed by: INTERNAL MEDICINE

## 2024-12-12 PROCEDURE — 25010000002 DURVALUMAB 50 MG/ML SOLUTION 10 ML VIAL: Performed by: INTERNAL MEDICINE

## 2024-12-12 PROCEDURE — 96372 THER/PROPH/DIAG INJ SC/IM: CPT

## 2024-12-12 PROCEDURE — 84439 ASSAY OF FREE THYROXINE: CPT | Performed by: INTERNAL MEDICINE

## 2024-12-12 PROCEDURE — 84100 ASSAY OF PHOSPHORUS: CPT | Performed by: INTERNAL MEDICINE

## 2024-12-12 PROCEDURE — 25010000002 HEPARIN LOCK FLUSH PER 10 UNITS: Performed by: INTERNAL MEDICINE

## 2024-12-12 PROCEDURE — 83735 ASSAY OF MAGNESIUM: CPT | Performed by: INTERNAL MEDICINE

## 2024-12-12 PROCEDURE — 85025 COMPLETE CBC W/AUTO DIFF WBC: CPT | Performed by: INTERNAL MEDICINE

## 2024-12-12 RX ORDER — HEPARIN SODIUM (PORCINE) LOCK FLUSH IV SOLN 100 UNIT/ML 100 UNIT/ML
500 SOLUTION INTRAVENOUS AS NEEDED
OUTPATIENT
Start: 2024-12-12

## 2024-12-12 RX ORDER — HEPARIN SODIUM (PORCINE) LOCK FLUSH IV SOLN 100 UNIT/ML 100 UNIT/ML
500 SOLUTION INTRAVENOUS AS NEEDED
Status: DISCONTINUED | OUTPATIENT
Start: 2024-12-12 | End: 2024-12-13 | Stop reason: HOSPADM

## 2024-12-12 RX ORDER — SODIUM CHLORIDE 0.9 % (FLUSH) 0.9 %
20 SYRINGE (ML) INJECTION AS NEEDED
OUTPATIENT
Start: 2024-12-12

## 2024-12-12 RX ORDER — NAPROXEN 500 MG/1
500 TABLET ORAL 2 TIMES DAILY WITH MEALS
Qty: 30 TABLET | Refills: 1 | Status: SHIPPED | OUTPATIENT
Start: 2024-12-12

## 2024-12-12 RX ORDER — OXYCODONE AND ACETAMINOPHEN 10; 325 MG/1; MG/1
1 TABLET ORAL EVERY 6 HOURS PRN
Qty: 120 TABLET | Refills: 0 | Status: SHIPPED | OUTPATIENT
Start: 2024-12-12

## 2024-12-12 RX ORDER — SODIUM CHLORIDE 0.9 % (FLUSH) 0.9 %
20 SYRINGE (ML) INJECTION AS NEEDED
Status: DISCONTINUED | OUTPATIENT
Start: 2024-12-12 | End: 2024-12-13 | Stop reason: HOSPADM

## 2024-12-12 RX ORDER — SODIUM CHLORIDE 9 MG/ML
20 INJECTION, SOLUTION INTRAVENOUS ONCE
Status: COMPLETED | OUTPATIENT
Start: 2024-12-12 | End: 2024-12-12

## 2024-12-12 RX ORDER — CALCIUM CARBONATE 300MG(750)
400 TABLET,CHEWABLE ORAL DAILY
Qty: 30 TABLET | Refills: 5 | Status: SHIPPED | OUTPATIENT
Start: 2024-12-12

## 2024-12-12 RX ORDER — SODIUM CHLORIDE 9 MG/ML
20 INJECTION, SOLUTION INTRAVENOUS ONCE
Status: CANCELLED | OUTPATIENT
Start: 2024-12-12

## 2024-12-12 RX ORDER — ALBUTEROL SULFATE 0.63 MG/3ML
SOLUTION RESPIRATORY (INHALATION)
COMMUNITY
Start: 2024-11-23

## 2024-12-12 RX ADMIN — SODIUM CHLORIDE 1500 MG: 9 INJECTION, SOLUTION INTRAVENOUS at 11:23

## 2024-12-12 RX ADMIN — SODIUM CHLORIDE 20 ML/HR: 900 INJECTION, SOLUTION INTRAVENOUS at 11:23

## 2024-12-12 RX ADMIN — HEPARIN 500 UNITS: 100 SYRINGE at 12:34

## 2024-12-12 RX ADMIN — Medication 20 ML: at 12:34

## 2024-12-12 RX ADMIN — DENOSUMAB 120 MG: 120 INJECTION SUBCUTANEOUS at 12:02

## 2024-12-12 NOTE — PROGRESS NOTES
Chief Complaint  FOLLOW UP 1    Provider, No Known  Aleksandar Edge, DO    Subjective          Lluvia REGINA Archer presents to [facility] for small cell lung cancer    Ms. Archer is a very pleasant 58-year-old female with past medical history of hypertension hyperlipidemia, COPD, osteoarthritis, anxiety who presents for new oncology evaluation with her daughter for diagnosis of small cell lung cancer.  Patient previously had PET scan in September 2023 without any concerning findings.  Follow-up CT chest in February showed multiple right middle lobe nodules with mediastinal and right hilar adenopathy.  Patient was admitted to [facility] on June 2 with shortness of breath, fever, cough.  She was started on treatment for pneumonia.  She was found to be hyponatremic to 126.  Repeat CT chest on 3 Tia showed progression of right middle lobe nodules and mediastinal right hilar lymphadenopathy.  Patient was discharged on 4 June.  She had outpatient bronchoscopy on 7 June with station 4R and station 7 possible small cell carcinoma.  Unable to get MRI due to claustrophobia.  CT head with and without contrast on 13 June did not show any intracranial mets.  PET scan confirmed metastatic disease to bone and right axilla    Interval History  Here for follow up.  She is now on durvalumab maintenance. Due for C4 of this today.  Completed steroids and antibiotic after last visit with improvement in her shortness of breath. She reports more muscle pains in her legs. Reports it feels like her muscles are getting squeezed. Denies sharp pains. No major numbness or tingling in her legs. States the pain medicine does not help much with the pain. No fevers or chills. No rash or diarrhea. Has mild constipation. Repeat scans with stable disease, results discussed.      Oncology/Hematology History Overview Note   2/20/24: CT Chest:  No PE or aortic dissection. Multiple right middle  lobe nodules are highly suspicious for malignancy.  Additionally, there is mediastinal and right hilar adenopathy now noted.  Follow-up with a PET-CT is recommended. Emphysema with chronic changes in both lungs that otherwise appears stable. Atherosclerotic disease and coronary artery disease.    6/2/24: admitted to [facility] with shortness of breath, fever, cough and started on treatment for pneumonia. Found to have hyponatremia to 126    6/3/24 CT Chest: Right middle lobe nodules and mediastinal and right hilar lymphadenopathy has progressed from prior CT, and remains concerning for malignancy.  Partial right middle lobe atelectasis. Moderate emphysema. Discharged on 6/4/24 with Na of 133.    6/7/24: Bronchoscopy: Station 4R and station 7 positive for small cell carcinoma.    6/13/24: CT head with and without contrast (due to claustrophobia): No mets seen    6/19/24: NM PET: New hypermetabolic nodules within the right middle lobe. There are also multiple new enlarged hypermetabolic mediastinal lymph nodes consistent with metastatic disease. Right axillary mets as well. There are scattered foci of hypermetabolism throughout the bony structures consistent with bone metastases.    6/24/24: C1D1 Carbo/Etop/Durva. Tolerated well    7/16/24: C2D1 Carbo/Etop/Durva. Complicated by inpatient admission due to dehydration from nausea/vomiting as well as pancytopenia. ANC 0.11. Required transfusion for hgb 6.8.     8/6/24: C3D1 Carbo/Etop/Durva. 20% reduction in Carbo and 20% reduction in Etoposide. Add G-CSF with this cycle due to severe neutropenia with C2.     Repeat scan have shown disease response, however scan was due to PE rule out.     8/27/24: C4D1 Carbo/Etop/Durva. 33% reduction in Carbo and 33% reduction in Etoposide. Continue G-CSF with this cycle due to severe neutropenia with C2. Labs appropriate to proceed. Ok to treat for elevated alk phos.     8/6/24: C3D1 Carbo/Etop/Durva. 20% reduction in Carbo and 20% reduction  in Etoposide. Add G-CSF with this cycle due to severe neutropenia with C2.    Repeat scan have shown disease response, however scan was due to PE rule out.     8/27/24: C4D1 Carbo/Etop/Durva. 33% reduction in Carbo and 33% reduction in Etoposide. Continue G-CSF with this cycle due to severe neutropenia with C2.      Repeat scans without progression. Plan for durvalumab alone for maintenance    9/17/24: C5D1 durvalumab alone        Small cell lung cancer   6/12/2024 Initial Diagnosis    Small cell lung cancer     6/14/2024 - 6/14/2024 Chemotherapy    OP LUNG CISplatin 60mg/m2 / Etoposide 120mg/m2 + XRT     6/14/2024 Cancer Staged    Staging form: Lung, AJCC 8th Edition  - Clinical: Stage IVB (cT1b, cN2, cM1c) - Signed by Brian Dickson MD on 6/20/2024 6/24/2024 - 8/29/2024 Chemotherapy    OP LUNG CARBOplatin AUC=5 / Etoposide / Durvalumab     8/27/2024 -  Chemotherapy    OP SUPPORTIVE Denosumab (Xgeva) Q28D     9/17/2024 Biopsy    OP LUNG Durvalumab 1500 mg  Plan Provider: Brian Dickson MD  Treatment goal: Control  Line of treatment: [No plan line of treatment]     Cancer, metastatic to bone   8/6/2024 Initial Diagnosis    Cancer, metastatic to bone     8/27/2024 -  Chemotherapy    OP SUPPORTIVE Denosumab (Xgeva) Q28D           Review of Systems   Constitutional:  Positive for fatigue. Negative for appetite change, diaphoresis, fever, unexpected weight gain and unexpected weight loss.   HENT:  Negative for hearing loss, mouth sores, sore throat, swollen glands, trouble swallowing and voice change.    Eyes:  Negative for blurred vision, double vision, pain, redness and visual disturbance.   Respiratory:  Negative for cough, shortness of breath and wheezing.    Cardiovascular:  Positive for chest pain (pt had her port put in today) and leg swelling (both legs are swelling). Negative for palpitations.   Gastrointestinal:  Positive for nausea. Negative for abdominal pain, blood in stool,  constipation, diarrhea and vomiting.   Endocrine: Negative for cold intolerance, heat intolerance, polydipsia and polyuria.   Genitourinary:  Negative for decreased urine volume, difficulty urinating, dysuria, frequency, hematuria and urinary incontinence.   Musculoskeletal:  Negative for arthralgias, back pain, joint swelling and myalgias.   Skin:  Negative for color change, rash, skin lesions and wound.        PT IS GETTING SORES ON THE SKIN   Neurological:  Negative for dizziness, seizures, weakness, numbness and headache.   Hematological:  Negative for adenopathy. Does not bruise/bleed easily.   Psychiatric/Behavioral:  Negative for depressed mood. The patient is not nervous/anxious.    All other systems reviewed and are negative.    Current Outpatient Medications on File Prior to Visit   Medication Sig Dispense Refill    albuterol (ACCUNEB) 0.63 MG/3ML nebulizer solution USE 3 MILLILITERS WITH NEBULIZER EVERY 6 HOURS AS NEEDED FOR WHEEZE      albuterol sulfate  (90 Base) MCG/ACT inhaler Inhale 2 puffs Every 4 (Four) Hours As Needed for Wheezing or Shortness of Air. 18 g 5    atorvastatin (LIPITOR) 10 MG tablet Take 1 tablet by mouth Every Night. 90 tablet 3    azithromycin (Zithromax Z-Homer) 250 MG tablet Take 2 tablets by mouth on day 1, then 1 tablet daily on days 2-5 6 tablet 0    buPROPion SR (WELLBUTRIN SR) 150 MG 12 hr tablet Take 1 tablet by mouth 2 (Two) Times a Day. 180 tablet 3    calcium carbonate (Tums) 500 MG chewable tablet Chew 2 tablets Daily. 60 tablet 0    escitalopram (LEXAPRO) 20 MG tablet Take 1 tablet by mouth Daily. 90 tablet 3    famotidine (PEPCID) 40 MG tablet TAKE ONE TABLET BY MOUTH TWICE DAILY @ 9AM & 5PM 180 tablet 11    gabapentin (NEURONTIN) 300 MG capsule Take 1 capsule by mouth 3 (Three) Times a Day. 90 capsule 0    hydrOXYzine (ATARAX) 25 MG tablet TAKE 1 TABLET BY MOUTH THREE TIMES A DAY AS NEEDED FOR ITCHING 270 tablet 2    lisinopril (PRINIVIL,ZESTRIL) 5 MG tablet  TAKE ONE TABLET BY MOUTH DAILY AT 9 AM 30 tablet 11    melatonin 5 MG tablet tablet Take 1 tablet by mouth As Needed.      methylPREDNISolone (MEDROL) 4 MG dose pack Take as directed on package instructions. 21 tablet 0    nicotine (NICODERM CQ) 21 MG/24HR patch Place 1 patch on the skin as directed by provider Daily. (Patient not taking: Reported on 10/15/2024) 30 patch 0    nystatin (MYCOSTATIN) 100,000 unit/mL suspension Swish and swallow 5 mL 4 (Four) Times a Day As Needed (for thrush, if needing to take use for 7-10days until symptoms resolve). (Patient not taking: Reported on 10/15/2024) 473 mL 0    omeprazole (priLOSEC) 40 MG capsule Take 1 capsule by mouth Daily. (Patient not taking: Reported on 10/15/2024)      ondansetron ODT (ZOFRAN-ODT) 8 MG disintegrating tablet Place 1 tablet on the tongue Every 8 (Eight) Hours As Needed for Nausea or Vomiting. 60 tablet 3    oxyCODONE-acetaminophen (PERCOCET) 7.5-325 MG per tablet Take 1 tablet by mouth Every 6 (Six) Hours As Needed for Severe Pain. 90 tablet 0    potassium chloride (KLOR-CON M20) 20 MEQ CR tablet Take 1 tablet by mouth Daily. (Patient not taking: Reported on 10/15/2024) 14 tablet 0    prochlorperazine (COMPAZINE) 10 MG tablet Take 1 tablet by mouth Every 6 (Six) Hours As Needed for Nausea. 60 tablet 3    Trelegy Ellipta 100-62.5-25 MCG/ACT inhaler Inhale 1 puff Daily. 3 each 3    triamcinolone (KENALOG) 0.1 % ointment Apply 1 Application topically to the appropriate area as directed 2 (Two) Times a Day. Apply to itchy areas 30 g 1     No current facility-administered medications on file prior to visit.       No Known Allergies  Past Medical History:   Diagnosis Date    Abnormal mammogram     PT DENIES KNOWING OF THIS HX    Anxiety     Arthritis     R SHOULDER, R HIP, AND R LEG    Cancer     LUNG    Chronic nausea 01/15/2019    COPD (chronic obstructive pulmonary disease)     O2 3 LITERS NC CONT    Hernia, hiatal     Hyperlipidemia     Hypertension      Knee pain, right 2018    Memory loss     FORGETFULNESS ? ETIOLOGY    Migraine headache     Moderate episode of recurrent major depressive disorder 2017    Night sweats     Sciatica of right side 2017    Severe episode of recurrent major depressive disorder, without psychotic features 10/22/2019    Shingles     OUTBREAK 24 ON ANTIVIRAL , WHELPS NOTED NO SORES.    Shoulder pain, left 2018     Past Surgical History:   Procedure Laterality Date    BRONCHOSCOPY N/A 2022    Procedure: BRONCHOSCOPY WITH BRONCHOALVEOLAR LAVAGE, BIOPSY;  Surgeon: Shin Mcdonald DO;  Location: [facility] ENDOSCOPY;  Service: Pulmonary;  Laterality: N/A;  RIGHT LOWER LOBE INFILTRATE    BRONCHOSCOPY N/A 2024    Procedure: BRONCHOSCOPY WITH ENDOBRONCHIAL ULTRASOUND AND FINE NEEDLE ASPIRATE;  Surgeon: Shin Mcdonald DO;  Location: [facility] ENDOSCOPY;  Service: Pulmonary;  Laterality: N/A;  MEDIASTINAL ADENOPATHY     SECTION      CHOLECYSTECTOMY      LAPAROSCOPIC    COLONOSCOPY      PORTACATH PLACEMENT Left 2024    Procedure: INSERTION OF PORTACATH;  Surgeon: Josh Patton MD;  Location: [facility] OR OSC;  Service: General;  Laterality: Left;    TOTAL HIP ARTHROPLASTY Right 2022    Procedure: RIGHT TOTAL HIP ARTHROPLASTY;  Surgeon: Cecil Gomes MD;  Location: [facility] MAIN OR;  Service: Orthopedics;  Laterality: Right;     Social History     Socioeconomic History    Marital status:      Spouse name: DEVAN    Number of children: 2    Years of education: GED    Highest education level: GED or equivalent   Tobacco Use    Smoking status: Some Days     Current packs/day: 2.00     Average packs/day: 2.0 packs/day for 42.9 years (85.9 ttl pk-yrs)     Types: Cigarettes     Start date:      Passive exposure: Past    Smokeless tobacco: Never   Vaping Use    Vaping status: Former    Substances: Nicotine, Flavoring    Devices: Disposable   Substance and Sexual  "Activity    Alcohol use: Never    Drug use: Never    Sexual activity: Defer     Family History   Problem Relation Age of Onset    Other Mother         BLOOD DISEASE    Arthritis Mother     Heart disease Father     Hypertension Other     Cancer Other     Heart disease Other     Malig Hyperthermia Neg Hx        Objective   Physical Exam  Well appearing patient in no acute distress on RA  Anicteric sclerae, no rash on exposed skin  + poor dentition.   Respirations non-labored  Awake, alert, and oriented x 4. Speech intact. No gross neurologic deficit  Appropriate mood and affect    Vitals:    12/12/24 0939   BP: 103/86   Pulse: 98   Resp: 20   Temp: 98.1 °F (36.7 °C)   TempSrc: Temporal   SpO2: 93%   Weight: 97.9 kg (215 lb 13.3 oz)   Height: 162.6 cm (64.02\")       ECOG score: 2         PHQ-9 Total Score:           Result Review :   The following data was reviewed by: Brian Dickson MD on 12/12/24:  Lab Results   Component Value Date    HGB 12.8 12/12/2024    HCT 40.8 12/12/2024    MCV 92.9 12/12/2024     12/12/2024    WBC 7.76 12/12/2024    NEUTROABS 4.20 12/12/2024    LYMPHSABS 2.39 12/12/2024    MONOSABS 0.90 12/12/2024    EOSABS 0.19 12/12/2024    BASOSABS 0.04 12/12/2024     Lab Results   Component Value Date    GLUCOSE 93 12/12/2024    BUN 13 12/12/2024    CREATININE 0.87 12/12/2024     12/12/2024    K 3.9 12/12/2024     (H) 12/12/2024    CO2 24.2 12/12/2024    CALCIUM 8.4 (L) 12/12/2024    PROTEINTOT 6.4 12/12/2024    ALBUMIN 3.6 12/12/2024    BILITOT <0.2 12/12/2024    ALKPHOS 93 12/12/2024    AST 14 12/12/2024    ALT 11 12/12/2024     Lab Results   Component Value Date    MG 2.0 12/12/2024    PHOS 3.1 12/12/2024    FREET4 0.97 12/12/2024    TSH 4.010 12/12/2024     Labs personally reviewed. Sodium normal. Hgb wnl. WBC wnl. Plts wnl.       CT Chest With Contrast Diagnostic    Result Date: 12/11/2024  CHEST: 1.Stable appearance of noncalcified nodules within the lungs bilaterally. " 2.Stable appearance of mediastinal and hilar adenopathy. 3.    Stable appearance of diffuse sclerotic osseous metastatic disease. ABDOMEN AND PELVIS: 1.No evidence of new metastatic disease within the abdomen or pelvis. 2.Stable appearance of diffuse sclerotic osseous metastatic disease. Electronically Signed: Los Rai MD  12/11/2024 1:49 PM EST  Workstation ID: [id number]    CT Abdomen Pelvis With Contrast    Result Date: 12/11/2024  CHEST: 1.Stable appearance of noncalcified nodules within the lungs bilaterally. 2.Stable appearance of mediastinal and hilar adenopathy. 3.    Stable appearance of diffuse sclerotic osseous metastatic disease. ABDOMEN AND PELVIS: 1.No evidence of new metastatic disease within the abdomen or pelvis. 2.Stable appearance of diffuse sclerotic osseous metastatic disease. Electronically Signed: Los Rai MD  12/11/2024 1:49 PM EST  Workstation ID: [id number]         Assessment and Plan    Diagnoses and all orders for this visit:    1. Cancer, metastatic to bone (Primary)  -     CBC and Differential; Future  -     Comprehensive metabolic panel; Future  -     oxyCODONE-acetaminophen (PERCOCET)  MG per tablet; Take 1 tablet by mouth Every 6 (Six) Hours As Needed for Moderate Pain.  Dispense: 120 tablet; Refill: 0    2. Small cell carcinoma of lung, unspecified laterality, unspecified part of lung  -     CBC and Differential; Future  -     Comprehensive metabolic panel; Future    3. Chronic neoplasm-related pain  -     oxyCODONE-acetaminophen (PERCOCET)  MG per tablet; Take 1 tablet by mouth Every 6 (Six) Hours As Needed for Moderate Pain.  Dispense: 120 tablet; Refill: 0    Other orders  -     Cancel: denosumab (XGEVA) injection 120 mg  -     Cancel: sodium chloride 0.9 % infusion  -     Cancel: durvalumab (IMFINZI) 1,500 mg in sodium chloride 0.9 % 280 mL chemo IVPB  -     naproxen (Naprosyn) 500 MG tablet; Take 1 tablet by mouth 2 (Two) Times a Day With Meals.   Dispense: 30 tablet; Refill: 1  -     Magnesium 400 MG tablet; Take 400 mg by mouth Daily.  Dispense: 30 tablet; Refill: 5            Small cell lung cancer, extensive stage  RML with right hilar and mediastinal adenopathy on CT chest.  Bronchoscopy with positive small cell pathology at station 4R and station 7.  PET with multiple new enlarged hypermetabolic mediastinal lymph nodes consistent with metastatic disease, also with new right axillary FDG avid mets. There are scattered foci of hypermetabolism throughout the bony structures consistent with bone metastases. CT head with and without without any intracranial mets.  Unable to do MRI due to claustrophobia and anxiety. Recommended treatment is systemic alone with for IV carboplatin, etoposide and durvalumab every 3 weeks. First cycle 6/24/24. 8/6/24: C3D1 Carbo/Etop/Durva. 20% reduction in Carbo and 20% reduction in Etoposide. Add G-CSF with this cycle due to severe neutropenia with C2.  8/27/24: C4D1 Carbo/Etop/Durva. 33% reduction in Carbo and 33% reduction in Etoposide. Last cycle of chemotherapy.    Repeat scans after C4 without clear progression, new 7 mm RUL nodule could be infectious. Plan for durvalumab alone.    Repeat scans 12/9/24 with stable disease. No new disease. Continue current treatment.     12/12/24: C8D1 Durvalumab (fourth with Durvalumab alone). Labs appropriate to proceed.     Needs intracranial monitoring, ordered for Jan. Last CT head with and without 9/27/24 negative for intracranial disease.     Hold on consolidative radiation as no dominant lung lesion.     Itching  No rash. Could be related to immunotherapy. Atarax as needed has helped. Topical steroid can be used on itchiest areas. Also can use PRN calamine lotion.     Aly pain  Worse in legs. Does not sound like neuropathy. Increase oxycodone to 10 mg. Start naproxen 500 mg PRN twice daily. Start daily mag 400 mg (mag level is normal). May need to consider plaquenil.     COPD  exacerbation  Resolved    Met cancer to bone  Recommend bone modifying agent. Patient has all teeth except 2 removed. Monthly Xgeva started 8/27/24. Will monitor electrolytes.     Anxiety  Present at baseline now worse. PRN Ativan 0.5 mg.     Insomnia  Recommend low dose melatonin vs benadryl. Also has Ativan if anxiety is contributing to poor sleep.     LE edema  Recommend leg elevation. Hold on diuretic for now. Can consider if worsening.       Patient Follow Up: Cycle 9  Patient was given instructions and counseling regarding her condition or for health maintenance advice. Please see specific information pulled into the AVS if appropriate.

## 2025-01-01 ENCOUNTER — APPOINTMENT (OUTPATIENT)
Dept: GENERAL RADIOLOGY | Facility: HOSPITAL | Age: 60
End: 2025-01-01
Payer: MEDICARE

## 2025-01-01 ENCOUNTER — HOSPITAL ENCOUNTER (INPATIENT)
Facility: HOSPITAL | Age: 60
LOS: 12 days | End: 2025-07-01
Attending: EMERGENCY MEDICINE | Admitting: INTERNAL MEDICINE
Payer: MEDICARE

## 2025-01-01 ENCOUNTER — HOSPITAL ENCOUNTER (INPATIENT)
Facility: HOSPITAL | Age: 60
LOS: 2 days | End: 2025-07-03
Attending: INTERNAL MEDICINE | Admitting: INTERNAL MEDICINE
Payer: COMMERCIAL

## 2025-01-01 ENCOUNTER — APPOINTMENT (OUTPATIENT)
Dept: CT IMAGING | Facility: HOSPITAL | Age: 60
End: 2025-01-01
Payer: MEDICARE

## 2025-01-01 ENCOUNTER — APPOINTMENT (OUTPATIENT)
Dept: ONCOLOGY | Facility: HOSPITAL | Age: 60
End: 2025-01-01
Payer: MEDICARE

## 2025-01-01 ENCOUNTER — APPOINTMENT (OUTPATIENT)
Dept: CARDIOLOGY | Facility: HOSPITAL | Age: 60
End: 2025-01-01
Payer: MEDICARE

## 2025-01-01 VITALS
SYSTOLIC BLOOD PRESSURE: 135 MMHG | HEIGHT: 64 IN | WEIGHT: 205.69 LBS | HEART RATE: 112 BPM | BODY MASS INDEX: 35.12 KG/M2 | DIASTOLIC BLOOD PRESSURE: 95 MMHG | OXYGEN SATURATION: 99 % | RESPIRATION RATE: 16 BRPM | TEMPERATURE: 97.9 F

## 2025-01-01 VITALS
HEART RATE: 115 BPM | OXYGEN SATURATION: 87 % | DIASTOLIC BLOOD PRESSURE: 60 MMHG | RESPIRATION RATE: 18 BRPM | TEMPERATURE: 101.1 F | SYSTOLIC BLOOD PRESSURE: 82 MMHG

## 2025-01-01 DIAGNOSIS — R26.2 DIFFICULTY WALKING: ICD-10-CM

## 2025-01-01 DIAGNOSIS — Z78.9 DECREASED ACTIVITIES OF DAILY LIVING (ADL): ICD-10-CM

## 2025-01-01 DIAGNOSIS — E87.1 HYPONATREMIA: Primary | ICD-10-CM

## 2025-01-01 DIAGNOSIS — E86.0 DEHYDRATION: ICD-10-CM

## 2025-01-01 LAB
ABO GROUP BLD: NORMAL
ACB CMPLX DNA BAL NAA+NON-PRB-NCNCRNG: NOT DETECTED
ALBUMIN SERPL-MCNC: 2.9 G/DL (ref 3.5–5.2)
ALBUMIN SERPL-MCNC: 3.1 G/DL (ref 3.5–5.2)
ALBUMIN SERPL-MCNC: 3.1 G/DL (ref 3.5–5.2)
ALBUMIN SERPL-MCNC: 3.3 G/DL (ref 3.5–5.2)
ALBUMIN SERPL-MCNC: 3.4 G/DL (ref 3.5–5.2)
ALBUMIN SERPL-MCNC: 3.4 G/DL (ref 3.5–5.2)
ALBUMIN SERPL-MCNC: 3.5 G/DL (ref 3.5–5.2)
ALBUMIN SERPL-MCNC: 4 G/DL (ref 3.5–5.2)
ALBUMIN/GLOB SERPL: 1.3 G/DL
ALBUMIN/GLOB SERPL: 1.5 G/DL
ALBUMIN/GLOB SERPL: 1.7 G/DL
ALP SERPL-CCNC: 103 U/L (ref 39–117)
ALP SERPL-CCNC: 111 U/L (ref 39–117)
ALP SERPL-CCNC: 120 U/L (ref 39–117)
ALP SERPL-CCNC: 124 U/L (ref 39–117)
ALP SERPL-CCNC: 139 U/L (ref 39–117)
ALP SERPL-CCNC: 59 U/L (ref 39–117)
ALP SERPL-CCNC: 72 U/L (ref 39–117)
ALP SERPL-CCNC: 73 U/L (ref 39–117)
ALP SERPL-CCNC: 88 U/L (ref 39–117)
ALP SERPL-CCNC: 95 U/L (ref 39–117)
ALT SERPL W P-5'-P-CCNC: 13 U/L (ref 1–33)
ALT SERPL W P-5'-P-CCNC: 338 U/L (ref 1–33)
ALT SERPL W P-5'-P-CCNC: 510 U/L (ref 1–33)
ALT SERPL W P-5'-P-CCNC: 552 U/L (ref 1–33)
ALT SERPL W P-5'-P-CCNC: 6 U/L (ref 1–33)
ALT SERPL W P-5'-P-CCNC: 6 U/L (ref 1–33)
ALT SERPL W P-5'-P-CCNC: 631 U/L (ref 1–33)
ALT SERPL W P-5'-P-CCNC: 7 U/L (ref 1–33)
ALT SERPL W P-5'-P-CCNC: 871 U/L (ref 1–33)
ALT SERPL W P-5'-P-CCNC: 894 U/L (ref 1–33)
ANION GAP SERPL CALCULATED.3IONS-SCNC: 10 MMOL/L (ref 5–15)
ANION GAP SERPL CALCULATED.3IONS-SCNC: 10.1 MMOL/L (ref 5–15)
ANION GAP SERPL CALCULATED.3IONS-SCNC: 10.4 MMOL/L (ref 5–15)
ANION GAP SERPL CALCULATED.3IONS-SCNC: 10.5 MMOL/L (ref 5–15)
ANION GAP SERPL CALCULATED.3IONS-SCNC: 10.6 MMOL/L (ref 5–15)
ANION GAP SERPL CALCULATED.3IONS-SCNC: 10.7 MMOL/L (ref 5–15)
ANION GAP SERPL CALCULATED.3IONS-SCNC: 10.8 MMOL/L (ref 5–15)
ANION GAP SERPL CALCULATED.3IONS-SCNC: 10.8 MMOL/L (ref 5–15)
ANION GAP SERPL CALCULATED.3IONS-SCNC: 11 MMOL/L (ref 5–15)
ANION GAP SERPL CALCULATED.3IONS-SCNC: 11 MMOL/L (ref 5–15)
ANION GAP SERPL CALCULATED.3IONS-SCNC: 11.3 MMOL/L (ref 5–15)
ANION GAP SERPL CALCULATED.3IONS-SCNC: 11.3 MMOL/L (ref 5–15)
ANION GAP SERPL CALCULATED.3IONS-SCNC: 11.5 MMOL/L (ref 5–15)
ANION GAP SERPL CALCULATED.3IONS-SCNC: 11.6 MMOL/L (ref 5–15)
ANION GAP SERPL CALCULATED.3IONS-SCNC: 11.8 MMOL/L (ref 5–15)
ANION GAP SERPL CALCULATED.3IONS-SCNC: 11.9 MMOL/L (ref 5–15)
ANION GAP SERPL CALCULATED.3IONS-SCNC: 12.4 MMOL/L (ref 5–15)
ANION GAP SERPL CALCULATED.3IONS-SCNC: 12.9 MMOL/L (ref 5–15)
ANION GAP SERPL CALCULATED.3IONS-SCNC: 13 MMOL/L (ref 5–15)
ANION GAP SERPL CALCULATED.3IONS-SCNC: 13.2 MMOL/L (ref 5–15)
ANION GAP SERPL CALCULATED.3IONS-SCNC: 13.7 MMOL/L (ref 5–15)
ANION GAP SERPL CALCULATED.3IONS-SCNC: 13.7 MMOL/L (ref 5–15)
ANION GAP SERPL CALCULATED.3IONS-SCNC: 13.9 MMOL/L (ref 5–15)
ANION GAP SERPL CALCULATED.3IONS-SCNC: 14 MMOL/L (ref 5–15)
ANION GAP SERPL CALCULATED.3IONS-SCNC: 14 MMOL/L (ref 5–15)
ANION GAP SERPL CALCULATED.3IONS-SCNC: 14.1 MMOL/L (ref 5–15)
ANION GAP SERPL CALCULATED.3IONS-SCNC: 14.2 MMOL/L (ref 5–15)
ANION GAP SERPL CALCULATED.3IONS-SCNC: 14.2 MMOL/L (ref 5–15)
ANION GAP SERPL CALCULATED.3IONS-SCNC: 14.3 MMOL/L (ref 5–15)
ANION GAP SERPL CALCULATED.3IONS-SCNC: 15.6 MMOL/L (ref 5–15)
ANION GAP SERPL CALCULATED.3IONS-SCNC: 15.6 MMOL/L (ref 5–15)
ANION GAP SERPL CALCULATED.3IONS-SCNC: 15.7 MMOL/L (ref 5–15)
ANION GAP SERPL CALCULATED.3IONS-SCNC: 16.1 MMOL/L (ref 5–15)
ANION GAP SERPL CALCULATED.3IONS-SCNC: 17 MMOL/L (ref 5–15)
ANION GAP SERPL CALCULATED.3IONS-SCNC: 18.2 MMOL/L (ref 5–15)
ANION GAP SERPL CALCULATED.3IONS-SCNC: 6.6 MMOL/L (ref 5–15)
ANION GAP SERPL CALCULATED.3IONS-SCNC: 6.9 MMOL/L (ref 5–15)
ANION GAP SERPL CALCULATED.3IONS-SCNC: 7 MMOL/L (ref 5–15)
ANION GAP SERPL CALCULATED.3IONS-SCNC: 7.2 MMOL/L (ref 5–15)
ANION GAP SERPL CALCULATED.3IONS-SCNC: 7.6 MMOL/L (ref 5–15)
ANION GAP SERPL CALCULATED.3IONS-SCNC: 7.8 MMOL/L (ref 5–15)
ANION GAP SERPL CALCULATED.3IONS-SCNC: 7.8 MMOL/L (ref 5–15)
ANION GAP SERPL CALCULATED.3IONS-SCNC: 7.9 MMOL/L (ref 5–15)
ANION GAP SERPL CALCULATED.3IONS-SCNC: 8 MMOL/L (ref 5–15)
ANION GAP SERPL CALCULATED.3IONS-SCNC: 8.1 MMOL/L (ref 5–15)
ANION GAP SERPL CALCULATED.3IONS-SCNC: 8.2 MMOL/L (ref 5–15)
ANION GAP SERPL CALCULATED.3IONS-SCNC: 8.7 MMOL/L (ref 5–15)
ANION GAP SERPL CALCULATED.3IONS-SCNC: 8.8 MMOL/L (ref 5–15)
ANION GAP SERPL CALCULATED.3IONS-SCNC: 9 MMOL/L (ref 5–15)
ANION GAP SERPL CALCULATED.3IONS-SCNC: 9 MMOL/L (ref 5–15)
ANION GAP SERPL CALCULATED.3IONS-SCNC: 9.2 MMOL/L (ref 5–15)
ANION GAP SERPL CALCULATED.3IONS-SCNC: 9.5 MMOL/L (ref 5–15)
ANION GAP SERPL CALCULATED.3IONS-SCNC: 9.6 MMOL/L (ref 5–15)
ANION GAP SERPL CALCULATED.3IONS-SCNC: 9.7 MMOL/L (ref 5–15)
ANION GAP SERPL CALCULATED.3IONS-SCNC: 9.9 MMOL/L (ref 5–15)
ANISOCYTOSIS BLD QL: ABNORMAL
ANISOCYTOSIS BLD QL: NORMAL
AORTIC DIMENSIONLESS INDEX: 0.78 (DI)
ARTERIAL PATENCY WRIST A: ABNORMAL
ARTERIAL PATENCY WRIST A: POSITIVE
AST SERPL-CCNC: 10 U/L (ref 1–32)
AST SERPL-CCNC: 1006 U/L (ref 1–32)
AST SERPL-CCNC: 122 U/L (ref 1–32)
AST SERPL-CCNC: 138 U/L (ref 1–32)
AST SERPL-CCNC: 217 U/L (ref 1–32)
AST SERPL-CCNC: 41 U/L (ref 1–32)
AST SERPL-CCNC: 456 U/L (ref 1–32)
AST SERPL-CCNC: 6 U/L (ref 1–32)
ATMOSPHERIC PRESS: 741.9 MMHG
ATMOSPHERIC PRESS: 742.7 MMHG
ATMOSPHERIC PRESS: 743.3 MMHG
ATMOSPHERIC PRESS: 743.4 MMHG
ATMOSPHERIC PRESS: 744.1 MMHG
ATMOSPHERIC PRESS: 746.6 MMHG
ATMOSPHERIC PRESS: 746.7 MMHG
ATMOSPHERIC PRESS: 750 MMHG
AV MEAN PRESS GRAD SYS DOP V1V2: 9 MMHG
AV VMAX SYS DOP: 219 CM/SEC
BACTERIA SPEC AEROBE CULT: ABNORMAL
BACTERIA SPEC AEROBE CULT: ABNORMAL
BACTERIA SPEC AEROBE CULT: NO GROWTH
BACTERIA SPEC AEROBE CULT: NORMAL
BACTERIA SPEC AEROBE CULT: NORMAL
BASE EXCESS BLDA CALC-SCNC: -0.4 MMOL/L (ref -2–2)
BASE EXCESS BLDA CALC-SCNC: -2.1 MMOL/L (ref -2–2)
BASE EXCESS BLDA CALC-SCNC: -2.9 MMOL/L (ref -2–2)
BASE EXCESS BLDA CALC-SCNC: -3.6 MMOL/L (ref -2–2)
BASE EXCESS BLDA CALC-SCNC: -5.2 MMOL/L (ref -2–2)
BASE EXCESS BLDA CALC-SCNC: -5.5 MMOL/L (ref -2–2)
BASE EXCESS BLDA CALC-SCNC: 0 MMOL/L (ref -2–2)
BASE EXCESS BLDA CALC-SCNC: 0.7 MMOL/L (ref -2–2)
BASOPHILS # BLD AUTO: 0 10*3/MM3 (ref 0–0.2)
BASOPHILS # BLD AUTO: 0.01 10*3/MM3 (ref 0–0.2)
BASOPHILS # BLD AUTO: 0.01 10*3/MM3 (ref 0–0.2)
BASOPHILS # BLD AUTO: 0.02 10*3/MM3 (ref 0–0.2)
BASOPHILS # BLD AUTO: 0.02 10*3/MM3 (ref 0–0.2)
BASOPHILS NFR BLD AUTO: 0 % (ref 0–1.5)
BASOPHILS NFR BLD AUTO: 0.1 % (ref 0–1.5)
BASOPHILS NFR BLD AUTO: 0.9 % (ref 0–1.5)
BASOPHILS NFR BLD AUTO: 0.9 % (ref 0–1.5)
BASOPHILS NFR BLD AUTO: 1 % (ref 0–1.5)
BDY SITE: ABNORMAL
BH BB BLOOD EXPIRATION DATE: NORMAL
BH BB BLOOD TYPE BARCODE: 5100
BH BB BLOOD TYPE BARCODE: 6200
BH BB DISPENSE STATUS: NORMAL
BH BB PRODUCT CODE: NORMAL
BH BB UNIT NUMBER: NORMAL
BH CV ECHO MEAS - AI P1/2T: 423.2 MSEC
BH CV ECHO MEAS - AO MAX PG: 19.2 MMHG
BH CV ECHO MEAS - AO ROOT DIAM: 2.9 CM
BH CV ECHO MEAS - AO V2 VTI: 32.7 CM
BH CV ECHO MEAS - AVA(I,D): 2.44 CM2
BH CV ECHO MEAS - EDV(CUBED): 97.3 ML
BH CV ECHO MEAS - EDV(MOD-SP2): 190.7 ML
BH CV ECHO MEAS - EDV(MOD-SP2): 69.5 ML
BH CV ECHO MEAS - EDV(MOD-SP4): 135 ML
BH CV ECHO MEAS - EDV(MOD-SP4): 71.1 ML
BH CV ECHO MEAS - EF(MOD-SP2): 25.5 %
BH CV ECHO MEAS - EF(MOD-SP2): 57.1 %
BH CV ECHO MEAS - EF(MOD-SP4): 32.7 %
BH CV ECHO MEAS - EF(MOD-SP4): 52.7 %
BH CV ECHO MEAS - ESV(CUBED): 35.9 ML
BH CV ECHO MEAS - ESV(MOD-SP2): 142 ML
BH CV ECHO MEAS - ESV(MOD-SP2): 29.8 ML
BH CV ECHO MEAS - ESV(MOD-SP4): 33.6 ML
BH CV ECHO MEAS - ESV(MOD-SP4): 90.8 ML
BH CV ECHO MEAS - FS: 28.3 %
BH CV ECHO MEAS - IVS/LVPW: 1 CM
BH CV ECHO MEAS - IVSD: 1 CM
BH CV ECHO MEAS - LA DIMENSION: 3.1 CM
BH CV ECHO MEAS - LAT PEAK E' VEL: 9.9 CM/SEC
BH CV ECHO MEAS - LV DIASTOLIC VOL/BSA (35-75): 38.3 CM2
BH CV ECHO MEAS - LV DIASTOLIC VOL/BSA (35-75): 71.5 CM2
BH CV ECHO MEAS - LV MASS(C)D: 158.8 GRAMS
BH CV ECHO MEAS - LV MAX PG: 11 MMHG
BH CV ECHO MEAS - LV MEAN PG: 5 MMHG
BH CV ECHO MEAS - LV SYSTOLIC VOL/BSA (12-30): 18.1 CM2
BH CV ECHO MEAS - LV SYSTOLIC VOL/BSA (12-30): 48.1 CM2
BH CV ECHO MEAS - LV V1 MAX: 166 CM/SEC
BH CV ECHO MEAS - LV V1 VTI: 25.4 CM
BH CV ECHO MEAS - LVIDD: 4.6 CM
BH CV ECHO MEAS - LVIDS: 3.3 CM
BH CV ECHO MEAS - LVOT AREA: 3.1 CM2
BH CV ECHO MEAS - LVOT DIAM: 2 CM
BH CV ECHO MEAS - LVPWD: 1 CM
BH CV ECHO MEAS - MED PEAK E' VEL: 6 CM/SEC
BH CV ECHO MEAS - MV A MAX VEL: 170 CM/SEC
BH CV ECHO MEAS - MV DEC SLOPE: 1247 CM/SEC2
BH CV ECHO MEAS - MV DEC TIME: 0.09 SEC
BH CV ECHO MEAS - MV E MAX VEL: 114 CM/SEC
BH CV ECHO MEAS - MV E/A: 0.67
BH CV ECHO MEAS - MV MEAN PG: 7 MMHG
BH CV ECHO MEAS - MV V2 VTI: 19.2 CM
BH CV ECHO MEAS - MVA(VTI): 4.2 CM2
BH CV ECHO MEAS - RVDD: 2.5 CM
BH CV ECHO MEAS - SV(LVOT): 79.8 ML
BH CV ECHO MEAS - SV(MOD-SP2): 39.7 ML
BH CV ECHO MEAS - SV(MOD-SP2): 48.7 ML
BH CV ECHO MEAS - SV(MOD-SP4): 37.5 ML
BH CV ECHO MEAS - SV(MOD-SP4): 44.2 ML
BH CV ECHO MEAS - SVI(LVOT): 43 ML/M2
BH CV ECHO MEAS - SVI(MOD-SP2): 21.4 ML/M2
BH CV ECHO MEAS - SVI(MOD-SP2): 25.8 ML/M2
BH CV ECHO MEAS - SVI(MOD-SP4): 20.2 ML/M2
BH CV ECHO MEAS - SVI(MOD-SP4): 23.4 ML/M2
BH CV ECHO MEAS - TAPSE (>1.6): 2.33 CM
BH CV ECHO MEASUREMENTS AVERAGE E/E' RATIO: 14.34
BH CV XLRA - RV BASE: 5.1 CM
BH CV XLRA - RV LENGTH: 8.4 CM
BH CV XLRA - RV MID: 3.8 CM
BH CV XLRA - TDI S': 18.6 CM/SEC
BILIRUB CONJ SERPL-MCNC: 0.6 MG/DL (ref 0–0.3)
BILIRUB CONJ SERPL-MCNC: 0.7 MG/DL (ref 0–0.3)
BILIRUB INDIRECT SERPL-MCNC: 0.3 MG/DL
BILIRUB INDIRECT SERPL-MCNC: 0.3 MG/DL
BILIRUB INDIRECT SERPL-MCNC: 0.4 MG/DL
BILIRUB SERPL-MCNC: 0.2 MG/DL (ref 0–1.2)
BILIRUB SERPL-MCNC: 0.4 MG/DL (ref 0–1.2)
BILIRUB SERPL-MCNC: 0.7 MG/DL (ref 0–1.2)
BILIRUB SERPL-MCNC: 0.7 MG/DL (ref 0–1.2)
BILIRUB SERPL-MCNC: 0.9 MG/DL (ref 0–1.2)
BILIRUB SERPL-MCNC: 0.9 MG/DL (ref 0–1.2)
BILIRUB SERPL-MCNC: 1 MG/DL (ref 0–1.2)
BILIRUB SERPL-MCNC: 1 MG/DL (ref 0–1.2)
BILIRUB SERPL-MCNC: 1.1 MG/DL (ref 0–1.2)
BILIRUB SERPL-MCNC: 1.2 MG/DL (ref 0–1.2)
BILIRUB UR QL STRIP: NEGATIVE
BILIRUB UR QL STRIP: NEGATIVE
BLACTX-M ISLT/SPM QL: NOT DETECTED
BLAIMP ISLT/SPM QL: NOT DETECTED
BLAKPC ISLT/SPM QL: NOT DETECTED
BLAOXA-48-LIKE ISLT/SPM QL: ABNORMAL
BLAVIM ISLT/SPM QL: NOT DETECTED
BLD GP AB SCN SERPL QL: NEGATIVE
BLD GP AB SCN SERPL QL: NEGATIVE
BUN BLDA-MCNC: 46 MG/DL (ref 8–23)
BUN SERPL-MCNC: 10.1 MG/DL (ref 6–20)
BUN SERPL-MCNC: 10.9 MG/DL (ref 6–20)
BUN SERPL-MCNC: 11.4 MG/DL (ref 6–20)
BUN SERPL-MCNC: 11.5 MG/DL (ref 6–20)
BUN SERPL-MCNC: 11.6 MG/DL (ref 6–20)
BUN SERPL-MCNC: 11.7 MG/DL (ref 6–20)
BUN SERPL-MCNC: 11.7 MG/DL (ref 6–20)
BUN SERPL-MCNC: 12 MG/DL (ref 6–20)
BUN SERPL-MCNC: 12 MG/DL (ref 6–20)
BUN SERPL-MCNC: 13.4 MG/DL (ref 6–20)
BUN SERPL-MCNC: 13.9 MG/DL (ref 6–20)
BUN SERPL-MCNC: 14 MG/DL (ref 6–20)
BUN SERPL-MCNC: 14.2 MG/DL (ref 6–20)
BUN SERPL-MCNC: 14.3 MG/DL (ref 6–20)
BUN SERPL-MCNC: 14.7 MG/DL (ref 6–20)
BUN SERPL-MCNC: 14.7 MG/DL (ref 6–20)
BUN SERPL-MCNC: 14.8 MG/DL (ref 6–20)
BUN SERPL-MCNC: 15.5 MG/DL (ref 6–20)
BUN SERPL-MCNC: 15.5 MG/DL (ref 6–20)
BUN SERPL-MCNC: 15.7 MG/DL (ref 6–20)
BUN SERPL-MCNC: 16.1 MG/DL (ref 6–20)
BUN SERPL-MCNC: 16.2 MG/DL (ref 6–20)
BUN SERPL-MCNC: 17.6 MG/DL (ref 6–20)
BUN SERPL-MCNC: 18.2 MG/DL (ref 6–20)
BUN SERPL-MCNC: 20.9 MG/DL (ref 6–20)
BUN SERPL-MCNC: 21.5 MG/DL (ref 6–20)
BUN SERPL-MCNC: 21.7 MG/DL (ref 6–20)
BUN SERPL-MCNC: 22.2 MG/DL (ref 6–20)
BUN SERPL-MCNC: 22.7 MG/DL (ref 6–20)
BUN SERPL-MCNC: 23.2 MG/DL (ref 6–20)
BUN SERPL-MCNC: 24.8 MG/DL (ref 6–20)
BUN SERPL-MCNC: 27.7 MG/DL (ref 6–20)
BUN SERPL-MCNC: 27.8 MG/DL (ref 6–20)
BUN SERPL-MCNC: 33.2 MG/DL (ref 6–20)
BUN SERPL-MCNC: 36.3 MG/DL (ref 6–20)
BUN SERPL-MCNC: 38.4 MG/DL (ref 6–20)
BUN SERPL-MCNC: 4.5 MG/DL (ref 6–20)
BUN SERPL-MCNC: 41.6 MG/DL (ref 6–20)
BUN SERPL-MCNC: 42.4 MG/DL (ref 6–20)
BUN SERPL-MCNC: 42.9 MG/DL (ref 6–20)
BUN SERPL-MCNC: 44.2 MG/DL (ref 6–20)
BUN SERPL-MCNC: 44.4 MG/DL (ref 6–20)
BUN SERPL-MCNC: 44.5 MG/DL (ref 6–20)
BUN SERPL-MCNC: 44.7 MG/DL (ref 6–20)
BUN SERPL-MCNC: 45.7 MG/DL (ref 6–20)
BUN SERPL-MCNC: 5.2 MG/DL (ref 6–20)
BUN SERPL-MCNC: 5.7 MG/DL (ref 6–20)
BUN SERPL-MCNC: 5.7 MG/DL (ref 6–20)
BUN SERPL-MCNC: 5.9 MG/DL (ref 6–20)
BUN SERPL-MCNC: 51.5 MG/DL (ref 6–20)
BUN SERPL-MCNC: 51.8 MG/DL (ref 6–20)
BUN SERPL-MCNC: 51.8 MG/DL (ref 6–20)
BUN SERPL-MCNC: 52.1 MG/DL (ref 6–20)
BUN SERPL-MCNC: 53.9 MG/DL (ref 6–20)
BUN SERPL-MCNC: 54.9 MG/DL (ref 6–20)
BUN SERPL-MCNC: 55.1 MG/DL (ref 6–20)
BUN SERPL-MCNC: 55.9 MG/DL (ref 6–20)
BUN SERPL-MCNC: 57.6 MG/DL (ref 6–20)
BUN SERPL-MCNC: 6 MG/DL (ref 6–20)
BUN SERPL-MCNC: 60.3 MG/DL (ref 6–20)
BUN SERPL-MCNC: 64.1 MG/DL (ref 6–20)
BUN SERPL-MCNC: 64.7 MG/DL (ref 6–20)
BUN SERPL-MCNC: 66.7 MG/DL (ref 6–20)
BUN SERPL-MCNC: 8.6 MG/DL (ref 6–20)
BUN SERPL-MCNC: 9.2 MG/DL (ref 6–20)
BUN SERPL-MCNC: 9.7 MG/DL (ref 6–20)
BUN SERPL-MCNC: 9.8 MG/DL (ref 6–20)
BUN SERPL-MCNC: 9.8 MG/DL (ref 6–20)
BUN/CREAT SERPL: 10.5 (ref 7–25)
BUN/CREAT SERPL: 10.8 (ref 7–25)
BUN/CREAT SERPL: 11.3 (ref 7–25)
BUN/CREAT SERPL: 11.9 (ref 7–25)
BUN/CREAT SERPL: 12 (ref 7–25)
BUN/CREAT SERPL: 13.3 (ref 7–25)
BUN/CREAT SERPL: 17.9 (ref 7–25)
BUN/CREAT SERPL: 17.9 (ref 7–25)
BUN/CREAT SERPL: 18.4 (ref 7–25)
BUN/CREAT SERPL: 19.1 (ref 7–25)
BUN/CREAT SERPL: 19.2 (ref 7–25)
BUN/CREAT SERPL: 20 (ref 7–25)
BUN/CREAT SERPL: 20.4 (ref 7–25)
BUN/CREAT SERPL: 21.1 (ref 7–25)
BUN/CREAT SERPL: 21.7 (ref 7–25)
BUN/CREAT SERPL: 22 (ref 7–25)
BUN/CREAT SERPL: 23.4 (ref 7–25)
BUN/CREAT SERPL: 24.2 (ref 7–25)
BUN/CREAT SERPL: 24.5 (ref 7–25)
BUN/CREAT SERPL: 24.5 (ref 7–25)
BUN/CREAT SERPL: 24.8 (ref 7–25)
BUN/CREAT SERPL: 24.8 (ref 7–25)
BUN/CREAT SERPL: 25.5 (ref 7–25)
BUN/CREAT SERPL: 25.5 (ref 7–25)
BUN/CREAT SERPL: 25.6 (ref 7–25)
BUN/CREAT SERPL: 26.8 (ref 7–25)
BUN/CREAT SERPL: 27.4 (ref 7–25)
BUN/CREAT SERPL: 27.6 (ref 7–25)
BUN/CREAT SERPL: 27.8 (ref 7–25)
BUN/CREAT SERPL: 28.4 (ref 7–25)
BUN/CREAT SERPL: 30.9 (ref 7–25)
BUN/CREAT SERPL: 31 (ref 7–25)
BUN/CREAT SERPL: 32.2 (ref 7–25)
BUN/CREAT SERPL: 33.7 (ref 7–25)
BUN/CREAT SERPL: 34.9 (ref 7–25)
BUN/CREAT SERPL: 36 (ref 7–25)
BUN/CREAT SERPL: 39.1 (ref 7–25)
BUN/CREAT SERPL: 40.4 (ref 7–25)
BUN/CREAT SERPL: 42 (ref 7–25)
BUN/CREAT SERPL: 42.7 (ref 7–25)
BUN/CREAT SERPL: 43.7 (ref 7–25)
BUN/CREAT SERPL: 43.7 (ref 7–25)
BUN/CREAT SERPL: 46.5 (ref 7–25)
BUN/CREAT SERPL: 46.7 (ref 7–25)
BUN/CREAT SERPL: 46.8 (ref 7–25)
BUN/CREAT SERPL: 49.4 (ref 7–25)
BUN/CREAT SERPL: 55.3 (ref 7–25)
BUN/CREAT SERPL: 55.6 (ref 7–25)
BUN/CREAT SERPL: 58.6 (ref 7–25)
BUN/CREAT SERPL: 61.2 (ref 7–25)
BUN/CREAT SERPL: 61.8 (ref 7–25)
BUN/CREAT SERPL: 63.8 (ref 7–25)
BUN/CREAT SERPL: 64.1 (ref 7–25)
BUN/CREAT SERPL: 64.8 (ref 7–25)
BUN/CREAT SERPL: 65 (ref 7–25)
BUN/CREAT SERPL: 65.7 (ref 7–25)
BUN/CREAT SERPL: 65.8 (ref 7–25)
BUN/CREAT SERPL: 66 (ref 7–25)
BUN/CREAT SERPL: 66.3 (ref 7–25)
BUN/CREAT SERPL: 66.3 (ref 7–25)
BUN/CREAT SERPL: 66.5 (ref 7–25)
BUN/CREAT SERPL: 68.5 (ref 7–25)
BUN/CREAT SERPL: 68.6 (ref 7–25)
BUN/CREAT SERPL: 70 (ref 7–25)
BUN/CREAT SERPL: 71.9 (ref 7–25)
BUN/CREAT SERPL: 72.5 (ref 7–25)
BUN/CREAT SERPL: 75.5 (ref 7–25)
BUN/CREAT SERPL: 76.3 (ref 7–25)
C PNEUM DNA NPH QL NAA+NON-PROBE: NOT DETECTED
CA-I BLDA-SCNC: 1.01 MMOL/L (ref 1.13–1.32)
CA-I BLDA-SCNC: 1.06 MMOL/L (ref 1.13–1.32)
CA-I BLDA-SCNC: 1.11 MMOL/L (ref 1.13–1.32)
CALCIUM SPEC-SCNC: 6.8 MG/DL (ref 8.6–10.5)
CALCIUM SPEC-SCNC: 6.9 MG/DL (ref 8.6–10.5)
CALCIUM SPEC-SCNC: 7 MG/DL (ref 8.6–10.5)
CALCIUM SPEC-SCNC: 7.1 MG/DL (ref 8.6–10.5)
CALCIUM SPEC-SCNC: 7.2 MG/DL (ref 8.6–10.5)
CALCIUM SPEC-SCNC: 7.3 MG/DL (ref 8.6–10.5)
CALCIUM SPEC-SCNC: 7.4 MG/DL (ref 8.6–10.5)
CALCIUM SPEC-SCNC: 7.5 MG/DL (ref 8.6–10.5)
CALCIUM SPEC-SCNC: 7.6 MG/DL (ref 8.6–10.5)
CALCIUM SPEC-SCNC: 7.8 MG/DL (ref 8.6–10.5)
CALCIUM SPEC-SCNC: 7.9 MG/DL (ref 8.6–10.5)
CALCIUM SPEC-SCNC: 7.9 MG/DL (ref 8.6–10.5)
CALCIUM SPEC-SCNC: 8 MG/DL (ref 8.6–10.5)
CALCIUM SPEC-SCNC: 8.1 MG/DL (ref 8.6–10.5)
CALCIUM SPEC-SCNC: 8.1 MG/DL (ref 8.6–10.5)
CHLORIDE BLDA-SCNC: 109 MMOL/L (ref 98–107)
CHLORIDE BLDA-SCNC: 112 MMOL/L (ref 98–107)
CHLORIDE BLDA-SCNC: 99 MMOL/L (ref 98–107)
CHLORIDE SERPL-SCNC: 100 MMOL/L (ref 98–107)
CHLORIDE SERPL-SCNC: 102 MMOL/L (ref 98–107)
CHLORIDE SERPL-SCNC: 103 MMOL/L (ref 98–107)
CHLORIDE SERPL-SCNC: 104 MMOL/L (ref 98–107)
CHLORIDE SERPL-SCNC: 104 MMOL/L (ref 98–107)
CHLORIDE SERPL-SCNC: 105 MMOL/L (ref 98–107)
CHLORIDE SERPL-SCNC: 105 MMOL/L (ref 98–107)
CHLORIDE SERPL-SCNC: 106 MMOL/L (ref 98–107)
CHLORIDE SERPL-SCNC: 107 MMOL/L (ref 98–107)
CHLORIDE SERPL-SCNC: 108 MMOL/L (ref 98–107)
CHLORIDE SERPL-SCNC: 109 MMOL/L (ref 98–107)
CHLORIDE SERPL-SCNC: 109 MMOL/L (ref 98–107)
CHLORIDE SERPL-SCNC: 110 MMOL/L (ref 98–107)
CHLORIDE SERPL-SCNC: 111 MMOL/L (ref 98–107)
CHLORIDE SERPL-SCNC: 112 MMOL/L (ref 98–107)
CHLORIDE SERPL-SCNC: 113 MMOL/L (ref 98–107)
CHLORIDE SERPL-SCNC: 85 MMOL/L (ref 98–107)
CHLORIDE SERPL-SCNC: 87 MMOL/L (ref 98–107)
CHLORIDE SERPL-SCNC: 87 MMOL/L (ref 98–107)
CHLORIDE SERPL-SCNC: 88 MMOL/L (ref 98–107)
CHLORIDE SERPL-SCNC: 89 MMOL/L (ref 98–107)
CHLORIDE SERPL-SCNC: 89 MMOL/L (ref 98–107)
CHLORIDE SERPL-SCNC: 90 MMOL/L (ref 98–107)
CHLORIDE SERPL-SCNC: 91 MMOL/L (ref 98–107)
CHLORIDE SERPL-SCNC: 92 MMOL/L (ref 98–107)
CHLORIDE SERPL-SCNC: 92 MMOL/L (ref 98–107)
CHLORIDE SERPL-SCNC: 93 MMOL/L (ref 98–107)
CHLORIDE SERPL-SCNC: 93 MMOL/L (ref 98–107)
CHLORIDE SERPL-SCNC: 94 MMOL/L (ref 98–107)
CHLORIDE SERPL-SCNC: 94 MMOL/L (ref 98–107)
CHLORIDE SERPL-SCNC: 95 MMOL/L (ref 98–107)
CHLORIDE SERPL-SCNC: 95 MMOL/L (ref 98–107)
CHLORIDE SERPL-SCNC: 96 MMOL/L (ref 98–107)
CHLORIDE SERPL-SCNC: 97 MMOL/L (ref 98–107)
CHLORIDE SERPL-SCNC: 98 MMOL/L (ref 98–107)
CHLORIDE SERPL-SCNC: 99 MMOL/L (ref 98–107)
CILIATED BAL QL: 1 %
CK SERPL-CCNC: 81 U/L (ref 20–180)
CLARITY UR: CLEAR
CLARITY UR: CLEAR
CO2 BLDA-SCNC: 23.9 MMOL/L (ref 23–27)
CO2 SERPL-SCNC: 15.8 MMOL/L (ref 22–29)
CO2 SERPL-SCNC: 18.1 MMOL/L (ref 22–29)
CO2 SERPL-SCNC: 18.3 MMOL/L (ref 22–29)
CO2 SERPL-SCNC: 18.7 MMOL/L (ref 22–29)
CO2 SERPL-SCNC: 19.2 MMOL/L (ref 22–29)
CO2 SERPL-SCNC: 19.3 MMOL/L (ref 22–29)
CO2 SERPL-SCNC: 19.4 MMOL/L (ref 22–29)
CO2 SERPL-SCNC: 20 MMOL/L (ref 22–29)
CO2 SERPL-SCNC: 20.2 MMOL/L (ref 22–29)
CO2 SERPL-SCNC: 20.3 MMOL/L (ref 22–29)
CO2 SERPL-SCNC: 20.5 MMOL/L (ref 22–29)
CO2 SERPL-SCNC: 20.6 MMOL/L (ref 22–29)
CO2 SERPL-SCNC: 20.6 MMOL/L (ref 22–29)
CO2 SERPL-SCNC: 20.7 MMOL/L (ref 22–29)
CO2 SERPL-SCNC: 20.7 MMOL/L (ref 22–29)
CO2 SERPL-SCNC: 20.9 MMOL/L (ref 22–29)
CO2 SERPL-SCNC: 20.9 MMOL/L (ref 22–29)
CO2 SERPL-SCNC: 21 MMOL/L (ref 22–29)
CO2 SERPL-SCNC: 21.1 MMOL/L (ref 22–29)
CO2 SERPL-SCNC: 21.4 MMOL/L (ref 22–29)
CO2 SERPL-SCNC: 21.4 MMOL/L (ref 22–29)
CO2 SERPL-SCNC: 21.6 MMOL/L (ref 22–29)
CO2 SERPL-SCNC: 21.6 MMOL/L (ref 22–29)
CO2 SERPL-SCNC: 21.7 MMOL/L (ref 22–29)
CO2 SERPL-SCNC: 21.8 MMOL/L (ref 22–29)
CO2 SERPL-SCNC: 22 MMOL/L (ref 22–29)
CO2 SERPL-SCNC: 22.1 MMOL/L (ref 22–29)
CO2 SERPL-SCNC: 22.2 MMOL/L (ref 22–29)
CO2 SERPL-SCNC: 22.2 MMOL/L (ref 22–29)
CO2 SERPL-SCNC: 22.4 MMOL/L (ref 22–29)
CO2 SERPL-SCNC: 22.4 MMOL/L (ref 22–29)
CO2 SERPL-SCNC: 22.5 MMOL/L (ref 22–29)
CO2 SERPL-SCNC: 22.6 MMOL/L (ref 22–29)
CO2 SERPL-SCNC: 22.6 MMOL/L (ref 22–29)
CO2 SERPL-SCNC: 22.7 MMOL/L (ref 22–29)
CO2 SERPL-SCNC: 22.7 MMOL/L (ref 22–29)
CO2 SERPL-SCNC: 23.1 MMOL/L (ref 22–29)
CO2 SERPL-SCNC: 23.1 MMOL/L (ref 22–29)
CO2 SERPL-SCNC: 23.4 MMOL/L (ref 22–29)
CO2 SERPL-SCNC: 23.5 MMOL/L (ref 22–29)
CO2 SERPL-SCNC: 23.8 MMOL/L (ref 22–29)
CO2 SERPL-SCNC: 23.9 MMOL/L (ref 22–29)
CO2 SERPL-SCNC: 23.9 MMOL/L (ref 22–29)
CO2 SERPL-SCNC: 24 MMOL/L (ref 22–29)
CO2 SERPL-SCNC: 24.2 MMOL/L (ref 22–29)
CO2 SERPL-SCNC: 24.3 MMOL/L (ref 22–29)
CO2 SERPL-SCNC: 24.4 MMOL/L (ref 22–29)
CO2 SERPL-SCNC: 24.8 MMOL/L (ref 22–29)
CO2 SERPL-SCNC: 24.8 MMOL/L (ref 22–29)
CO2 SERPL-SCNC: 25 MMOL/L (ref 22–29)
CO2 SERPL-SCNC: 25.2 MMOL/L (ref 22–29)
COLOR UR: YELLOW
COLOR UR: YELLOW
CREAT BLDA-MCNC: 0.77 MG/DL (ref 0.6–1.3)
CREAT SERPL-MCNC: 0.42 MG/DL (ref 0.57–1)
CREAT SERPL-MCNC: 0.43 MG/DL (ref 0.57–1)
CREAT SERPL-MCNC: 0.43 MG/DL (ref 0.57–1)
CREAT SERPL-MCNC: 0.44 MG/DL (ref 0.57–1)
CREAT SERPL-MCNC: 0.45 MG/DL (ref 0.57–1)
CREAT SERPL-MCNC: 0.46 MG/DL (ref 0.57–1)
CREAT SERPL-MCNC: 0.47 MG/DL (ref 0.57–1)
CREAT SERPL-MCNC: 0.48 MG/DL (ref 0.57–1)
CREAT SERPL-MCNC: 0.49 MG/DL (ref 0.57–1)
CREAT SERPL-MCNC: 0.5 MG/DL (ref 0.57–1)
CREAT SERPL-MCNC: 0.52 MG/DL (ref 0.57–1)
CREAT SERPL-MCNC: 0.53 MG/DL (ref 0.57–1)
CREAT SERPL-MCNC: 0.54 MG/DL (ref 0.57–1)
CREAT SERPL-MCNC: 0.54 MG/DL (ref 0.57–1)
CREAT SERPL-MCNC: 0.55 MG/DL (ref 0.57–1)
CREAT SERPL-MCNC: 0.55 MG/DL (ref 0.57–1)
CREAT SERPL-MCNC: 0.56 MG/DL (ref 0.57–1)
CREAT SERPL-MCNC: 0.6 MG/DL (ref 0.57–1)
CREAT SERPL-MCNC: 0.64 MG/DL (ref 0.57–1)
CREAT SERPL-MCNC: 0.65 MG/DL (ref 0.57–1)
CREAT SERPL-MCNC: 0.66 MG/DL (ref 0.57–1)
CREAT SERPL-MCNC: 0.66 MG/DL (ref 0.57–1)
CREAT SERPL-MCNC: 0.67 MG/DL (ref 0.57–1)
CREAT SERPL-MCNC: 0.68 MG/DL (ref 0.57–1)
CREAT SERPL-MCNC: 0.68 MG/DL (ref 0.57–1)
CREAT SERPL-MCNC: 0.69 MG/DL (ref 0.57–1)
CREAT SERPL-MCNC: 0.71 MG/DL (ref 0.57–1)
CREAT SERPL-MCNC: 0.71 MG/DL (ref 0.57–1)
CREAT SERPL-MCNC: 0.74 MG/DL (ref 0.57–1)
CREAT SERPL-MCNC: 0.76 MG/DL (ref 0.57–1)
CREAT SERPL-MCNC: 0.78 MG/DL (ref 0.57–1)
CREAT SERPL-MCNC: 0.8 MG/DL (ref 0.57–1)
CREAT SERPL-MCNC: 0.81 MG/DL (ref 0.57–1)
CREAT SERPL-MCNC: 0.82 MG/DL (ref 0.57–1)
CREAT SERPL-MCNC: 0.84 MG/DL (ref 0.57–1)
CREAT SERPL-MCNC: 0.84 MG/DL (ref 0.57–1)
CREAT SERPL-MCNC: 0.85 MG/DL (ref 0.57–1)
CREAT SERPL-MCNC: 0.86 MG/DL (ref 0.57–1)
CREAT SERPL-MCNC: 0.86 MG/DL (ref 0.57–1)
CREAT SERPL-MCNC: 0.88 MG/DL (ref 0.57–1)
CREAT SERPL-MCNC: 0.9 MG/DL (ref 0.57–1)
CREAT SERPL-MCNC: 0.92 MG/DL (ref 0.57–1)
CROSSMATCH INTERPRETATION: NORMAL
CYTO UR: NORMAL
D-LACTATE SERPL-SCNC: 1 MMOL/L
D-LACTATE SERPL-SCNC: 1.1 MMOL/L
D-LACTATE SERPL-SCNC: 1.2 MMOL/L (ref 0.5–2)
D-LACTATE SERPL-SCNC: 1.4 MMOL/L
D-LACTATE SERPL-SCNC: 1.5 MMOL/L
D-LACTATE SERPL-SCNC: 1.6 MMOL/L (ref 0.5–2)
D-LACTATE SERPL-SCNC: 2.2 MMOL/L (ref 0.5–2)
D-LACTATE SERPL-SCNC: 2.5 MMOL/L (ref 0.5–2)
D-LACTATE SERPL-SCNC: 2.5 MMOL/L (ref 0.5–2)
D-LACTATE SERPL-SCNC: 2.6 MMOL/L (ref 0.5–2)
D-LACTATE SERPL-SCNC: 3.4 MMOL/L (ref 0.5–2)
D-LACTATE SERPL-SCNC: 4.2 MMOL/L (ref 0.5–2)
D-LACTATE SERPL-SCNC: 4.6 MMOL/L (ref 0.5–2)
D-LACTATE SERPL-SCNC: 5.2 MMOL/L (ref 0.5–2)
DEPRECATED RDW RBC AUTO: 52.8 FL (ref 37–54)
DEPRECATED RDW RBC AUTO: 53.2 FL (ref 37–54)
DEPRECATED RDW RBC AUTO: 54.1 FL (ref 37–54)
DEPRECATED RDW RBC AUTO: 55.8 FL (ref 37–54)
DEPRECATED RDW RBC AUTO: 57.1 FL (ref 37–54)
DEPRECATED RDW RBC AUTO: 57.2 FL (ref 37–54)
DEPRECATED RDW RBC AUTO: 57.4 FL (ref 37–54)
DEPRECATED RDW RBC AUTO: 57.6 FL (ref 37–54)
DEPRECATED RDW RBC AUTO: 58.6 FL (ref 37–54)
DEPRECATED RDW RBC AUTO: 59.9 FL (ref 37–54)
DEPRECATED RDW RBC AUTO: 60.3 FL (ref 37–54)
DEPRECATED RDW RBC AUTO: 61.8 FL (ref 37–54)
DEPRECATED RDW RBC AUTO: 63.1 FL (ref 37–54)
DEPRECATED RDW RBC AUTO: 63.6 FL (ref 37–54)
DEPRECATED RDW RBC AUTO: 67.9 FL (ref 37–54)
DOHLE BOD BLD QL SMEAR: PRESENT
DOHLE BOD BLD QL SMEAR: PRESENT
E CLOAC COMP DNA BAL NAA+NON-PRB-NCNCRNG: NOT DETECTED
E COLI DNA BAL NAA+NON-PRB-NCNCRNG: NOT DETECTED
EGFRCR SERPLBLD CKD-EPI 2021: 100.7 ML/MIN/1.73
EGFRCR SERPLBLD CKD-EPI 2021: 101.1 ML/MIN/1.73
EGFRCR SERPLBLD CKD-EPI 2021: 101.1 ML/MIN/1.73
EGFRCR SERPLBLD CKD-EPI 2021: 101.5 ML/MIN/1.73
EGFRCR SERPLBLD CKD-EPI 2021: 101.8 ML/MIN/1.73
EGFRCR SERPLBLD CKD-EPI 2021: 101.8 ML/MIN/1.73
EGFRCR SERPLBLD CKD-EPI 2021: 102.2 ML/MIN/1.73
EGFRCR SERPLBLD CKD-EPI 2021: 102.6 ML/MIN/1.73
EGFRCR SERPLBLD CKD-EPI 2021: 104.2 ML/MIN/1.73
EGFRCR SERPLBLD CKD-EPI 2021: 105.9 ML/MIN/1.73
EGFRCR SERPLBLD CKD-EPI 2021: 106.4 ML/MIN/1.73
EGFRCR SERPLBLD CKD-EPI 2021: 106.4 ML/MIN/1.73
EGFRCR SERPLBLD CKD-EPI 2021: 106.9 ML/MIN/1.73
EGFRCR SERPLBLD CKD-EPI 2021: 106.9 ML/MIN/1.73
EGFRCR SERPLBLD CKD-EPI 2021: 107.4 ML/MIN/1.73
EGFRCR SERPLBLD CKD-EPI 2021: 107.8 ML/MIN/1.73
EGFRCR SERPLBLD CKD-EPI 2021: 108.9 ML/MIN/1.73
EGFRCR SERPLBLD CKD-EPI 2021: 109.4 ML/MIN/1.73
EGFRCR SERPLBLD CKD-EPI 2021: 109.9 ML/MIN/1.73
EGFRCR SERPLBLD CKD-EPI 2021: 110.5 ML/MIN/1.73
EGFRCR SERPLBLD CKD-EPI 2021: 111.1 ML/MIN/1.73
EGFRCR SERPLBLD CKD-EPI 2021: 111.7 ML/MIN/1.73
EGFRCR SERPLBLD CKD-EPI 2021: 112.3 ML/MIN/1.73
EGFRCR SERPLBLD CKD-EPI 2021: 112.9 ML/MIN/1.73
EGFRCR SERPLBLD CKD-EPI 2021: 112.9 ML/MIN/1.73
EGFRCR SERPLBLD CKD-EPI 2021: 113.5 ML/MIN/1.73
EGFRCR SERPLBLD CKD-EPI 2021: 72.3 ML/MIN/1.73
EGFRCR SERPLBLD CKD-EPI 2021: 74.3 ML/MIN/1.73
EGFRCR SERPLBLD CKD-EPI 2021: 76.3 ML/MIN/1.73
EGFRCR SERPLBLD CKD-EPI 2021: 78.4 ML/MIN/1.73
EGFRCR SERPLBLD CKD-EPI 2021: 78.4 ML/MIN/1.73
EGFRCR SERPLBLD CKD-EPI 2021: 79.5 ML/MIN/1.73
EGFRCR SERPLBLD CKD-EPI 2021: 80.7 ML/MIN/1.73
EGFRCR SERPLBLD CKD-EPI 2021: 80.7 ML/MIN/1.73
EGFRCR SERPLBLD CKD-EPI 2021: 83 ML/MIN/1.73
EGFRCR SERPLBLD CKD-EPI 2021: 84.3 ML/MIN/1.73
EGFRCR SERPLBLD CKD-EPI 2021: 85.5 ML/MIN/1.73
EGFRCR SERPLBLD CKD-EPI 2021: 88.2 ML/MIN/1.73
EGFRCR SERPLBLD CKD-EPI 2021: 91 ML/MIN/1.73
EGFRCR SERPLBLD CKD-EPI 2021: 93.9 ML/MIN/1.73
EGFRCR SERPLBLD CKD-EPI 2021: 98.7 ML/MIN/1.73
EGFRCR SERPLBLD CKD-EPI 2021: 98.7 ML/MIN/1.73
EOSINOPHIL # BLD AUTO: 0 10*3/MM3 (ref 0–0.4)
EOSINOPHIL NFR BLD AUTO: 0 % (ref 0.3–6.2)
ERYTHROCYTE [DISTWIDTH] IN BLOOD BY AUTOMATED COUNT: 16.1 % (ref 12.3–15.4)
ERYTHROCYTE [DISTWIDTH] IN BLOOD BY AUTOMATED COUNT: 16.2 % (ref 12.3–15.4)
ERYTHROCYTE [DISTWIDTH] IN BLOOD BY AUTOMATED COUNT: 16.2 % (ref 12.3–15.4)
ERYTHROCYTE [DISTWIDTH] IN BLOOD BY AUTOMATED COUNT: 16.5 % (ref 12.3–15.4)
ERYTHROCYTE [DISTWIDTH] IN BLOOD BY AUTOMATED COUNT: 16.7 % (ref 12.3–15.4)
ERYTHROCYTE [DISTWIDTH] IN BLOOD BY AUTOMATED COUNT: 16.8 % (ref 12.3–15.4)
ERYTHROCYTE [DISTWIDTH] IN BLOOD BY AUTOMATED COUNT: 16.9 % (ref 12.3–15.4)
ERYTHROCYTE [DISTWIDTH] IN BLOOD BY AUTOMATED COUNT: 17.3 % (ref 12.3–15.4)
ERYTHROCYTE [DISTWIDTH] IN BLOOD BY AUTOMATED COUNT: 17.3 % (ref 12.3–15.4)
ERYTHROCYTE [DISTWIDTH] IN BLOOD BY AUTOMATED COUNT: 17.4 % (ref 12.3–15.4)
ERYTHROCYTE [DISTWIDTH] IN BLOOD BY AUTOMATED COUNT: 17.8 % (ref 12.3–15.4)
ERYTHROCYTE [DISTWIDTH] IN BLOOD BY AUTOMATED COUNT: 17.9 % (ref 12.3–15.4)
ERYTHROCYTE [DISTWIDTH] IN BLOOD BY AUTOMATED COUNT: 19.5 % (ref 12.3–15.4)
ERYTHROCYTE [DISTWIDTH] IN BLOOD BY AUTOMATED COUNT: 19.5 % (ref 12.3–15.4)
ERYTHROCYTE [DISTWIDTH] IN BLOOD BY AUTOMATED COUNT: 20.7 % (ref 12.3–15.4)
FLUAV SUBTYP SPEC NAA+PROBE: NOT DETECTED
FLUBV RNA ISLT QL NAA+PROBE: NOT DETECTED
FUNGUS WND CULT: NORMAL
GALACTOMANNAN AG SPEC IA-ACNC: 0.39 INDEX (ref 0–0.49)
GAS FLOW AIRWAY: 5 LPM
GAS FLOW AIRWAY: 8 LPM
GEN 5 1HR TROPONIN T REFLEX: 17 NG/L
GLOBULIN UR ELPH-MCNC: 1.8 GM/DL
GLOBULIN UR ELPH-MCNC: 2.5 GM/DL
GLOBULIN UR ELPH-MCNC: 2.6 GM/DL
GLOBULIN UR ELPH-MCNC: 2.7 GM/DL
GLOBULIN UR ELPH-MCNC: 2.8 GM/DL
GLUCOSE BLDC GLUCOMTR-MCNC: 126 MG/DL (ref 70–99)
GLUCOSE BLDC GLUCOMTR-MCNC: 126 MG/DL (ref 70–99)
GLUCOSE BLDC GLUCOMTR-MCNC: 136 MG/DL (ref 70–99)
GLUCOSE BLDC GLUCOMTR-MCNC: 147 MG/DL (ref 65–99)
GLUCOSE BLDC GLUCOMTR-MCNC: 156 MG/DL (ref 65–99)
GLUCOSE BLDC GLUCOMTR-MCNC: 163 MG/DL (ref 70–99)
GLUCOSE BLDC GLUCOMTR-MCNC: 172 MG/DL (ref 70–99)
GLUCOSE BLDC GLUCOMTR-MCNC: 173 MG/DL (ref 70–99)
GLUCOSE BLDC GLUCOMTR-MCNC: 184 MG/DL (ref 70–99)
GLUCOSE BLDC GLUCOMTR-MCNC: 190 MG/DL (ref 70–99)
GLUCOSE BLDC GLUCOMTR-MCNC: 202 MG/DL (ref 70–99)
GLUCOSE BLDC GLUCOMTR-MCNC: 206 MG/DL (ref 70–99)
GLUCOSE BLDC GLUCOMTR-MCNC: 207 MG/DL (ref 70–99)
GLUCOSE BLDC GLUCOMTR-MCNC: 213 MG/DL (ref 70–99)
GLUCOSE BLDC GLUCOMTR-MCNC: 216 MG/DL (ref 65–99)
GLUCOSE BLDC GLUCOMTR-MCNC: 257 MG/DL (ref 70–99)
GLUCOSE BLDC GLUCOMTR-MCNC: 282 MG/DL (ref 70–99)
GLUCOSE SERPL-MCNC: 100 MG/DL (ref 65–99)
GLUCOSE SERPL-MCNC: 100 MG/DL (ref 65–99)
GLUCOSE SERPL-MCNC: 101 MG/DL (ref 65–99)
GLUCOSE SERPL-MCNC: 101 MG/DL (ref 65–99)
GLUCOSE SERPL-MCNC: 108 MG/DL (ref 65–99)
GLUCOSE SERPL-MCNC: 113 MG/DL (ref 65–99)
GLUCOSE SERPL-MCNC: 114 MG/DL (ref 65–99)
GLUCOSE SERPL-MCNC: 133 MG/DL (ref 65–99)
GLUCOSE SERPL-MCNC: 133 MG/DL (ref 65–99)
GLUCOSE SERPL-MCNC: 138 MG/DL (ref 65–99)
GLUCOSE SERPL-MCNC: 145 MG/DL (ref 65–99)
GLUCOSE SERPL-MCNC: 145 MG/DL (ref 65–99)
GLUCOSE SERPL-MCNC: 148 MG/DL (ref 65–99)
GLUCOSE SERPL-MCNC: 154 MG/DL (ref 65–99)
GLUCOSE SERPL-MCNC: 161 MG/DL (ref 65–99)
GLUCOSE SERPL-MCNC: 163 MG/DL (ref 65–99)
GLUCOSE SERPL-MCNC: 163 MG/DL (ref 65–99)
GLUCOSE SERPL-MCNC: 164 MG/DL (ref 65–99)
GLUCOSE SERPL-MCNC: 168 MG/DL (ref 65–99)
GLUCOSE SERPL-MCNC: 168 MG/DL (ref 65–99)
GLUCOSE SERPL-MCNC: 179 MG/DL (ref 65–99)
GLUCOSE SERPL-MCNC: 183 MG/DL (ref 65–99)
GLUCOSE SERPL-MCNC: 185 MG/DL (ref 65–99)
GLUCOSE SERPL-MCNC: 185 MG/DL (ref 65–99)
GLUCOSE SERPL-MCNC: 186 MG/DL (ref 65–99)
GLUCOSE SERPL-MCNC: 186 MG/DL (ref 65–99)
GLUCOSE SERPL-MCNC: 187 MG/DL (ref 65–99)
GLUCOSE SERPL-MCNC: 187 MG/DL (ref 65–99)
GLUCOSE SERPL-MCNC: 193 MG/DL (ref 65–99)
GLUCOSE SERPL-MCNC: 200 MG/DL (ref 65–99)
GLUCOSE SERPL-MCNC: 200 MG/DL (ref 65–99)
GLUCOSE SERPL-MCNC: 202 MG/DL (ref 65–99)
GLUCOSE SERPL-MCNC: 206 MG/DL (ref 65–99)
GLUCOSE SERPL-MCNC: 211 MG/DL (ref 65–99)
GLUCOSE SERPL-MCNC: 213 MG/DL (ref 65–99)
GLUCOSE SERPL-MCNC: 217 MG/DL (ref 65–99)
GLUCOSE SERPL-MCNC: 223 MG/DL (ref 65–99)
GLUCOSE SERPL-MCNC: 225 MG/DL (ref 65–99)
GLUCOSE SERPL-MCNC: 239 MG/DL (ref 65–99)
GLUCOSE SERPL-MCNC: 246 MG/DL (ref 65–99)
GLUCOSE SERPL-MCNC: 249 MG/DL (ref 65–99)
GLUCOSE SERPL-MCNC: 256 MG/DL (ref 65–99)
GLUCOSE SERPL-MCNC: 266 MG/DL (ref 65–99)
GLUCOSE SERPL-MCNC: 276 MG/DL (ref 65–99)
GLUCOSE SERPL-MCNC: 80 MG/DL (ref 65–99)
GLUCOSE SERPL-MCNC: 81 MG/DL (ref 65–99)
GLUCOSE SERPL-MCNC: 81 MG/DL (ref 65–99)
GLUCOSE SERPL-MCNC: 83 MG/DL (ref 65–99)
GLUCOSE SERPL-MCNC: 84 MG/DL (ref 65–99)
GLUCOSE SERPL-MCNC: 85 MG/DL (ref 65–99)
GLUCOSE SERPL-MCNC: 85 MG/DL (ref 65–99)
GLUCOSE SERPL-MCNC: 87 MG/DL (ref 65–99)
GLUCOSE SERPL-MCNC: 88 MG/DL (ref 65–99)
GLUCOSE SERPL-MCNC: 88 MG/DL (ref 65–99)
GLUCOSE SERPL-MCNC: 89 MG/DL (ref 65–99)
GLUCOSE SERPL-MCNC: 89 MG/DL (ref 65–99)
GLUCOSE SERPL-MCNC: 90 MG/DL (ref 65–99)
GLUCOSE SERPL-MCNC: 90 MG/DL (ref 65–99)
GLUCOSE SERPL-MCNC: 93 MG/DL (ref 65–99)
GLUCOSE SERPL-MCNC: 95 MG/DL (ref 65–99)
GLUCOSE SERPL-MCNC: 95 MG/DL (ref 65–99)
GLUCOSE SERPL-MCNC: 96 MG/DL (ref 65–99)
GLUCOSE UR STRIP-MCNC: NEGATIVE MG/DL
GLUCOSE UR STRIP-MCNC: NEGATIVE MG/DL
GP B STREP DNA BAL NAA+NON-PRB-NCNCRNG: NOT DETECTED
GRAM STN SPEC: ABNORMAL
HADV DNA SPEC NAA+PROBE: NOT DETECTED
HAEM INFLU DNA BAL NAA+NON-PRB-NCNCRNG: NOT DETECTED
HCO3 BLDA-SCNC: 20.1 MMOL/L (ref 22–26)
HCO3 BLDA-SCNC: 22 MMOL/L (ref 22–26)
HCO3 BLDA-SCNC: 22.7 MMOL/L (ref 22–26)
HCO3 BLDA-SCNC: 23.2 MMOL/L (ref 22–26)
HCO3 BLDA-SCNC: 24.5 MMOL/L (ref 22–26)
HCO3 BLDA-SCNC: 25.1 MMOL/L (ref 22–26)
HCO3 BLDA-SCNC: 25.1 MMOL/L (ref 22–26)
HCO3 BLDA-SCNC: 25.9 MMOL/L (ref 22–26)
HCOV RNA LOWER RESP QL NAA+NON-PROBE: NOT DETECTED
HCT VFR BLD AUTO: 18.7 % (ref 34–46.6)
HCT VFR BLD AUTO: 19.7 % (ref 34–46.6)
HCT VFR BLD AUTO: 20.6 % (ref 34–46.6)
HCT VFR BLD AUTO: 21.2 % (ref 34–46.6)
HCT VFR BLD AUTO: 21.3 % (ref 34–46.6)
HCT VFR BLD AUTO: 22.1 % (ref 34–46.6)
HCT VFR BLD AUTO: 22.9 % (ref 34–46.6)
HCT VFR BLD AUTO: 23.6 % (ref 34–46.6)
HCT VFR BLD AUTO: 24 % (ref 34–46.6)
HCT VFR BLD AUTO: 24.1 % (ref 34–46.6)
HCT VFR BLD AUTO: 25.3 % (ref 34–46.6)
HCT VFR BLD AUTO: 25.4 % (ref 34–46.6)
HCT VFR BLD AUTO: 25.8 % (ref 34–46.6)
HCT VFR BLD AUTO: 26.2 % (ref 34–46.6)
HCT VFR BLD AUTO: 26.4 % (ref 34–46.6)
HCT VFR BLD AUTO: 26.4 % (ref 34–46.6)
HCT VFR BLD AUTO: 26.6 % (ref 34–46.6)
HCT VFR BLD AUTO: 27.4 % (ref 34–46.6)
HCT VFR BLD CALC: 21 % (ref 38–51)
HCT VFR BLD CALC: 23 % (ref 38–51)
HCT VFR BLD CALC: 26 % (ref 38–51)
HCT VFR BLD CALC: 26 % (ref 38–51)
HCT VFR BLD CALC: 27 % (ref 38–51)
HCT VFR BLD CALC: 27 % (ref 38–51)
HCT VFR BLD CALC: 36 % (ref 38–51)
HCT VFR BLD CALC: 37 % (ref 38–51)
HEMODILUTION: NO
HGB BLD-MCNC: 6.2 G/DL (ref 12–15.9)
HGB BLD-MCNC: 6.6 G/DL (ref 12–15.9)
HGB BLD-MCNC: 6.7 G/DL (ref 12–15.9)
HGB BLD-MCNC: 6.9 G/DL (ref 12–15.9)
HGB BLD-MCNC: 7.1 G/DL (ref 12–15.9)
HGB BLD-MCNC: 7.3 G/DL (ref 12–15.9)
HGB BLD-MCNC: 7.5 G/DL (ref 12–15.9)
HGB BLD-MCNC: 7.8 G/DL (ref 12–15.9)
HGB BLD-MCNC: 8 G/DL (ref 12–15.9)
HGB BLD-MCNC: 8.2 G/DL (ref 12–15.9)
HGB BLD-MCNC: 8.3 G/DL (ref 12–15.9)
HGB BLD-MCNC: 8.4 G/DL (ref 12–15.9)
HGB BLD-MCNC: 8.6 G/DL (ref 12–15.9)
HGB BLD-MCNC: 8.6 G/DL (ref 12–15.9)
HGB BLD-MCNC: 8.7 G/DL (ref 12–15.9)
HGB BLD-MCNC: 8.8 G/DL (ref 12–15.9)
HGB BLDA-MCNC: 12.4 G/DL (ref 12–18)
HGB BLDA-MCNC: 12.6 G/DL (ref 12–18)
HGB BLDA-MCNC: 7.2 G/DL (ref 12–18)
HGB BLDA-MCNC: 7.9 G/DL (ref 12–18)
HGB BLDA-MCNC: 8.7 G/DL (ref 12–18)
HGB BLDA-MCNC: 8.9 G/DL (ref 12–18)
HGB BLDA-MCNC: 9.2 G/DL (ref 12–18)
HGB BLDA-MCNC: 9.3 G/DL (ref 12–18)
HGB UR QL STRIP.AUTO: NEGATIVE
HGB UR QL STRIP.AUTO: NEGATIVE
HMPV RNA NPH QL NAA+NON-PROBE: NOT DETECTED
HOLD SPECIMEN: NORMAL
HPIV RNA LOWER RESP QL NAA+NON-PROBE: NOT DETECTED
HYPOCHROMIA BLD QL: NORMAL
IMM GRANULOCYTES # BLD AUTO: 0.06 10*3/MM3 (ref 0–0.05)
IMM GRANULOCYTES # BLD AUTO: 0.07 10*3/MM3 (ref 0–0.05)
IMM GRANULOCYTES # BLD AUTO: 0.15 10*3/MM3 (ref 0–0.05)
IMM GRANULOCYTES # BLD AUTO: 0.28 10*3/MM3 (ref 0–0.05)
IMM GRANULOCYTES # BLD AUTO: 1.13 10*3/MM3 (ref 0–0.05)
IMM GRANULOCYTES NFR BLD AUTO: 12.6 % (ref 0–0.5)
IMM GRANULOCYTES NFR BLD AUTO: 2.7 % (ref 0–0.5)
IMM GRANULOCYTES NFR BLD AUTO: 3.1 % (ref 0–0.5)
IMM GRANULOCYTES NFR BLD AUTO: 6.8 % (ref 0–0.5)
IMM GRANULOCYTES NFR BLD AUTO: 7.1 % (ref 0–0.5)
INHALED O2 CONCENTRATION: 30 %
INHALED O2 CONCENTRATION: 35 %
INHALED O2 CONCENTRATION: 35 %
INHALED O2 CONCENTRATION: 36 %
INHALED O2 CONCENTRATION: 40 %
INHALED O2 CONCENTRATION: 50 %
INTERPRETATION: NEGATIVE
K AEROGENES DNA BAL NAA+NON-PRB-NCNCRNG: NOT DETECTED
K OXYTOCA DNA BAL NAA+NON-PRB-NCNCRNG: NOT DETECTED
K PNEU GRP DNA BAL NAA+NON-PRB-NCNCRNG: NOT DETECTED
KETONES UR QL STRIP: ABNORMAL
KETONES UR QL STRIP: NEGATIVE
L PNEUMO DNA LOWER RESP QL NAA+NON-PROBE: NOT DETECTED
LAB AP CASE REPORT: NORMAL
LAB AP CLINICAL INFORMATION: NORMAL
LEFT ATRIUM VOLUME INDEX: 16.4 ML/M2
LEUKOCYTE ESTERASE UR QL STRIP.AUTO: NEGATIVE
LEUKOCYTE ESTERASE UR QL STRIP.AUTO: NEGATIVE
LV EF 2D ECHO EST: 40 %
LV EF BIPLANE MOD: 29 %
LV EF BIPLANE MOD: 55 %
LYMPHOCYTES # BLD AUTO: 0.28 10*3/MM3 (ref 0.7–3.1)
LYMPHOCYTES # BLD AUTO: 0.81 10*3/MM3 (ref 0.7–3.1)
LYMPHOCYTES # BLD AUTO: 1.17 10*3/MM3 (ref 0.7–3.1)
LYMPHOCYTES # BLD AUTO: 1.22 10*3/MM3 (ref 0.7–3.1)
LYMPHOCYTES # BLD AUTO: 1.3 10*3/MM3 (ref 0.7–3.1)
LYMPHOCYTES # BLD MANUAL: 0.98 10*3/MM3 (ref 0.7–3.1)
LYMPHOCYTES NFR BLD AUTO: 14.3 % (ref 19.6–45.3)
LYMPHOCYTES NFR BLD AUTO: 28.6 % (ref 19.6–45.3)
LYMPHOCYTES NFR BLD AUTO: 52.7 % (ref 19.6–45.3)
LYMPHOCYTES NFR BLD AUTO: 55 % (ref 19.6–45.3)
LYMPHOCYTES NFR BLD AUTO: 9.1 % (ref 19.6–45.3)
LYMPHOCYTES NFR BLD MANUAL: 6 % (ref 5–12)
LYMPHOCYTES NFR FLD MANUAL: 17 %
Lab: ABNORMAL
Lab: ABNORMAL
M CATARRHALIS DNA BAL NAA+NON-PRB-NCNCRNG: NOT DETECTED
M PNEUMO IGG SER IA-ACNC: NOT DETECTED
MACROPHAGE FLUID %: 1 %
MAGNESIUM SERPL-MCNC: 1.6 MG/DL (ref 1.6–2.6)
MAGNESIUM SERPL-MCNC: 1.7 MG/DL (ref 1.6–2.6)
MAGNESIUM SERPL-MCNC: 1.8 MG/DL (ref 1.6–2.6)
MAGNESIUM SERPL-MCNC: 1.9 MG/DL (ref 1.6–2.6)
MAGNESIUM SERPL-MCNC: 2.1 MG/DL (ref 1.6–2.6)
MAGNESIUM SERPL-MCNC: 2.1 MG/DL (ref 1.6–2.6)
MAGNESIUM SERPL-MCNC: 2.2 MG/DL (ref 1.6–2.6)
MAGNESIUM SERPL-MCNC: 2.3 MG/DL (ref 1.6–2.6)
MCH RBC QN AUTO: 29.5 PG (ref 26.6–33)
MCH RBC QN AUTO: 29.7 PG (ref 26.6–33)
MCH RBC QN AUTO: 29.8 PG (ref 26.6–33)
MCH RBC QN AUTO: 29.9 PG (ref 26.6–33)
MCH RBC QN AUTO: 30.7 PG (ref 26.6–33)
MCH RBC QN AUTO: 30.8 PG (ref 26.6–33)
MCH RBC QN AUTO: 30.9 PG (ref 26.6–33)
MCH RBC QN AUTO: 31 PG (ref 26.6–33)
MCH RBC QN AUTO: 31.1 PG (ref 26.6–33)
MCH RBC QN AUTO: 31.2 PG (ref 26.6–33)
MCH RBC QN AUTO: 31.3 PG (ref 26.6–33)
MCHC RBC AUTO-ENTMCNC: 31.3 G/DL (ref 31.5–35.7)
MCHC RBC AUTO-ENTMCNC: 31.6 G/DL (ref 31.5–35.7)
MCHC RBC AUTO-ENTMCNC: 31.9 G/DL (ref 31.5–35.7)
MCHC RBC AUTO-ENTMCNC: 32.1 G/DL (ref 31.5–35.7)
MCHC RBC AUTO-ENTMCNC: 32.3 G/DL (ref 31.5–35.7)
MCHC RBC AUTO-ENTMCNC: 32.4 G/DL (ref 31.5–35.7)
MCHC RBC AUTO-ENTMCNC: 32.6 G/DL (ref 31.5–35.7)
MCHC RBC AUTO-ENTMCNC: 33 G/DL (ref 31.5–35.7)
MCHC RBC AUTO-ENTMCNC: 33.1 G/DL (ref 31.5–35.7)
MCHC RBC AUTO-ENTMCNC: 33.2 G/DL (ref 31.5–35.7)
MCHC RBC AUTO-ENTMCNC: 33.2 G/DL (ref 31.5–35.7)
MCHC RBC AUTO-ENTMCNC: 33.5 G/DL (ref 31.5–35.7)
MCHC RBC AUTO-ENTMCNC: 33.9 G/DL (ref 31.5–35.7)
MCHC RBC AUTO-ENTMCNC: 33.9 G/DL (ref 31.5–35.7)
MCHC RBC AUTO-ENTMCNC: 34.4 G/DL (ref 31.5–35.7)
MCV RBC AUTO: 88.7 FL (ref 79–97)
MCV RBC AUTO: 89.9 FL (ref 79–97)
MCV RBC AUTO: 90.1 FL (ref 79–97)
MCV RBC AUTO: 91.1 FL (ref 79–97)
MCV RBC AUTO: 91.5 FL (ref 79–97)
MCV RBC AUTO: 92.1 FL (ref 79–97)
MCV RBC AUTO: 92.2 FL (ref 79–97)
MCV RBC AUTO: 92.3 FL (ref 79–97)
MCV RBC AUTO: 93 FL (ref 79–97)
MCV RBC AUTO: 93.6 FL (ref 79–97)
MCV RBC AUTO: 95.5 FL (ref 79–97)
MCV RBC AUTO: 95.9 FL (ref 79–97)
MCV RBC AUTO: 97.4 FL (ref 79–97)
MCV RBC AUTO: 98.1 FL (ref 79–97)
MCV RBC AUTO: 98.1 FL (ref 79–97)
MECA+MECC ISLT/SPM QL: ABNORMAL
MODALITY: ABNORMAL
MONOCYTES # BLD AUTO: 0.05 10*3/MM3 (ref 0.1–0.9)
MONOCYTES # BLD AUTO: 0.08 10*3/MM3 (ref 0.1–0.9)
MONOCYTES # BLD AUTO: 0.09 10*3/MM3 (ref 0.1–0.9)
MONOCYTES # BLD AUTO: 0.52 10*3/MM3 (ref 0.1–0.9)
MONOCYTES # BLD AUTO: 1.03 10*3/MM3 (ref 0.1–0.9)
MONOCYTES # BLD: 0.98 10*3/MM3 (ref 0.1–0.9)
MONOCYTES NFR BLD AUTO: 11.5 % (ref 5–12)
MONOCYTES NFR BLD AUTO: 3.6 % (ref 5–12)
MONOCYTES NFR BLD AUTO: 4.1 % (ref 5–12)
MONOCYTES NFR BLD AUTO: 5.1 % (ref 5–12)
MONOCYTES NFR BLD AUTO: 5.7 % (ref 5–12)
MYCOBACTERIUM SPEC CULT: NORMAL
MYELOCYTES NFR BLD MANUAL: 3 % (ref 0–0)
NDM GENE: NOT DETECTED
NEUTROPHILS # BLD AUTO: 13.86 10*3/MM3 (ref 1.7–7)
NEUTROPHILS NFR BLD AUTO: 0.57 10*3/MM3 (ref 1.7–7)
NEUTROPHILS NFR BLD AUTO: 0.75 10*3/MM3 (ref 1.7–7)
NEUTROPHILS NFR BLD AUTO: 0.88 10*3/MM3 (ref 1.7–7)
NEUTROPHILS NFR BLD AUTO: 33.7 % (ref 42.7–76)
NEUTROPHILS NFR BLD AUTO: 39.6 % (ref 42.7–76)
NEUTROPHILS NFR BLD AUTO: 5.97 10*3/MM3 (ref 1.7–7)
NEUTROPHILS NFR BLD AUTO: 58.2 % (ref 42.7–76)
NEUTROPHILS NFR BLD AUTO: 6.96 10*3/MM3 (ref 1.7–7)
NEUTROPHILS NFR BLD AUTO: 66.8 % (ref 42.7–76)
NEUTROPHILS NFR BLD AUTO: 76.8 % (ref 42.7–76)
NEUTROPHILS NFR BLD MANUAL: 80 % (ref 42.7–76)
NEUTROPHILS NFR FLD MANUAL: 81 %
NEUTS BAND NFR BLD MANUAL: 5 % (ref 0–5)
NIGHT BLUE STAIN TISS: NORMAL
NIGHT BLUE STAIN TISS: NORMAL
NITRITE UR QL STRIP: NEGATIVE
NITRITE UR QL STRIP: NEGATIVE
NOTIFIED WHO: ABNORMAL
NOTIFIED WHO: ABNORMAL
NRBC BLD AUTO-RTO: 0 /100 WBC (ref 0–0.2)
NRBC BLD AUTO-RTO: 1.2 /100 WBC (ref 0–0.2)
NRBC BLD AUTO-RTO: 4 /100 WBC (ref 0–0.2)
NRBC SPEC MANUAL: 7 /100 WBC (ref 0–0.2)
OSMOLALITY UR: 437 MOSM/KG (ref 50–1400)
P AERUGINOSA DNA BAL NAA+NON-PRB-NCNCRNG: DETECTED
PATH REPORT.FINAL DX SPEC: NORMAL
PATH REPORT.GROSS SPEC: NORMAL
PAW @ PEAK INSP FLOW SETTING VENT: 15 CMH2O
PCO2 BLDA: 38.1 MM HG (ref 35–45)
PCO2 BLDA: 38.2 MM HG (ref 35–45)
PCO2 BLDA: 38.6 MM HG (ref 35–45)
PCO2 BLDA: 44.3 MM HG (ref 35–45)
PCO2 BLDA: 45.5 MM HG (ref 35–45)
PCO2 BLDA: 46 MM HG (ref 35–45)
PCO2 BLDA: 51.2 MM HG (ref 35–45)
PCO2 BLDA: 60.3 MM HG (ref 35–45)
PEEP RESPIRATORY: 5 CM[H2O]
PH BLDA: 7.22 PH UNITS (ref 7.35–7.45)
PH BLDA: 7.24 PH UNITS (ref 7.35–7.45)
PH BLDA: 7.31 PH UNITS (ref 7.35–7.45)
PH BLDA: 7.33 PH UNITS (ref 7.35–7.45)
PH BLDA: 7.36 PH UNITS (ref 7.35–7.45)
PH BLDA: 7.36 PH UNITS (ref 7.35–7.45)
PH BLDA: 7.38 PH UNITS (ref 7.35–7.45)
PH BLDA: 7.42 PH UNITS (ref 7.35–7.45)
PH UR STRIP.AUTO: 7 [PH] (ref 5–8)
PH UR STRIP.AUTO: 8.5 [PH] (ref 5–8)
PHOSPHATE SERPL-MCNC: 1.2 MG/DL (ref 2.5–4.5)
PHOSPHATE SERPL-MCNC: 1.3 MG/DL (ref 2.5–4.5)
PHOSPHATE SERPL-MCNC: 1.4 MG/DL (ref 2.5–4.5)
PHOSPHATE SERPL-MCNC: 2.1 MG/DL (ref 2.5–4.5)
PHOSPHATE SERPL-MCNC: 2.6 MG/DL (ref 2.5–4.5)
PHOSPHATE SERPL-MCNC: 2.7 MG/DL (ref 2.5–4.5)
PHOSPHATE SERPL-MCNC: 3 MG/DL (ref 2.5–4.5)
PLATELET # BLD AUTO: 10 10*3/MM3 (ref 140–450)
PLATELET # BLD AUTO: 11 10*3/MM3 (ref 140–450)
PLATELET # BLD AUTO: 12 10*3/MM3 (ref 140–450)
PLATELET # BLD AUTO: 12 10*3/MM3 (ref 140–450)
PLATELET # BLD AUTO: 17 10*3/MM3 (ref 140–450)
PLATELET # BLD AUTO: 18 10*3/MM3 (ref 140–450)
PLATELET # BLD AUTO: 19 10*3/MM3 (ref 140–450)
PLATELET # BLD AUTO: 20 10*3/MM3 (ref 140–450)
PLATELET # BLD AUTO: 20 10*3/MM3 (ref 140–450)
PLATELET # BLD AUTO: 22 10*3/MM3 (ref 140–450)
PLATELET # BLD AUTO: 24 10*3/MM3 (ref 140–450)
PLATELET # BLD AUTO: 34 10*3/MM3 (ref 140–450)
PLATELET # BLD AUTO: 4 10*3/MM3 (ref 140–450)
PLATELET # BLD AUTO: 7 10*3/MM3 (ref 140–450)
PLATELET # BLD AUTO: 71 10*3/MM3 (ref 140–450)
PLATELET # BLD AUTO: 8 10*3/MM3 (ref 140–450)
PMV BLD AUTO: 10 FL (ref 6–12)
PMV BLD AUTO: 10.1 FL (ref 6–12)
PMV BLD AUTO: 10.2 FL (ref 6–12)
PMV BLD AUTO: 10.3 FL (ref 6–12)
PMV BLD AUTO: 10.9 FL (ref 6–12)
PMV BLD AUTO: 11.2 FL (ref 6–12)
PMV BLD AUTO: 12.3 FL (ref 6–12)
PMV BLD AUTO: 9.1 FL (ref 6–12)
PMV BLD AUTO: 9.5 FL (ref 6–12)
PMV BLD AUTO: 9.7 FL (ref 6–12)
PMV BLD AUTO: 9.7 FL (ref 6–12)
PMV BLD AUTO: 9.9 FL (ref 6–12)
PMV BLD AUTO: ABNORMAL FL
PMV BLD AUTO: ABNORMAL FL
PO2 BLD: 140 MM[HG] (ref 0–500)
PO2 BLD: 156 MM[HG] (ref 0–500)
PO2 BLD: 212 MM[HG] (ref 0–500)
PO2 BLD: 222 MM[HG] (ref 0–500)
PO2 BLD: 272 MM[HG] (ref 0–500)
PO2 BLD: 285 MM[HG] (ref 0–500)
PO2 BLD: 321 MM[HG] (ref 0–500)
PO2 BLD: 352 MM[HG] (ref 0–500)
PO2 BLDA: 126.7 MM HG (ref 80–100)
PO2 BLDA: 62.5 MM HG (ref 80–100)
PO2 BLDA: 70 MM HG (ref 80–100)
PO2 BLDA: 84.8 MM HG (ref 80–100)
PO2 BLDA: 88.7 MM HG (ref 80–100)
PO2 BLDA: 95.3 MM HG (ref 80–100)
PO2 BLDA: 96.3 MM HG (ref 80–100)
PO2 BLDA: 99.6 MM HG (ref 80–100)
POIKILOCYTOSIS BLD QL SMEAR: NORMAL
POIKILOCYTOSIS BLD QL SMEAR: NORMAL
POTASSIUM BLDA-SCNC: 3.8 MMOL/L (ref 3.5–5)
POTASSIUM BLDA-SCNC: 3.9 MMOL/L (ref 3.5–5)
POTASSIUM BLDA-SCNC: 4.5 MMOL/L (ref 3.5–5)
POTASSIUM SERPL-SCNC: 3.2 MMOL/L (ref 3.5–5.2)
POTASSIUM SERPL-SCNC: 3.3 MMOL/L (ref 3.5–5.2)
POTASSIUM SERPL-SCNC: 3.3 MMOL/L (ref 3.5–5.2)
POTASSIUM SERPL-SCNC: 3.4 MMOL/L (ref 3.5–5.2)
POTASSIUM SERPL-SCNC: 3.5 MMOL/L (ref 3.5–5.2)
POTASSIUM SERPL-SCNC: 3.6 MMOL/L (ref 3.5–5.2)
POTASSIUM SERPL-SCNC: 3.6 MMOL/L (ref 3.5–5.2)
POTASSIUM SERPL-SCNC: 3.7 MMOL/L (ref 3.5–5.2)
POTASSIUM SERPL-SCNC: 3.8 MMOL/L (ref 3.5–5.2)
POTASSIUM SERPL-SCNC: 3.8 MMOL/L (ref 3.5–5.2)
POTASSIUM SERPL-SCNC: 3.9 MMOL/L (ref 3.5–5.2)
POTASSIUM SERPL-SCNC: 4 MMOL/L (ref 3.5–5.2)
POTASSIUM SERPL-SCNC: 4.1 MMOL/L (ref 3.5–5.2)
POTASSIUM SERPL-SCNC: 4.2 MMOL/L (ref 3.5–5.2)
POTASSIUM SERPL-SCNC: 4.3 MMOL/L (ref 3.5–5.2)
POTASSIUM SERPL-SCNC: 4.4 MMOL/L (ref 3.5–5.2)
POTASSIUM SERPL-SCNC: 4.5 MMOL/L (ref 3.5–5.2)
POTASSIUM SERPL-SCNC: 4.5 MMOL/L (ref 3.5–5.2)
POTASSIUM SERPL-SCNC: 4.6 MMOL/L (ref 3.5–5.2)
POTASSIUM SERPL-SCNC: 4.6 MMOL/L (ref 3.5–5.2)
POTASSIUM SERPL-SCNC: 4.7 MMOL/L (ref 3.5–5.2)
POTASSIUM SERPL-SCNC: 4.7 MMOL/L (ref 3.5–5.2)
POTASSIUM SERPL-SCNC: 4.8 MMOL/L (ref 3.5–5.2)
PROCALCITONIN SERPL-MCNC: 0.06 NG/ML (ref 0–0.25)
PROCALCITONIN SERPL-MCNC: 0.07 NG/ML (ref 0–0.25)
PROCALCITONIN SERPL-MCNC: 0.08 NG/ML (ref 0–0.25)
PROT SERPL-MCNC: 4.9 G/DL (ref 6–8.5)
PROT SERPL-MCNC: 5.2 G/DL (ref 6–8.5)
PROT SERPL-MCNC: 5.7 G/DL (ref 6–8.5)
PROT SERPL-MCNC: 5.8 G/DL (ref 6–8.5)
PROT SERPL-MCNC: 6 G/DL (ref 6–8.5)
PROT SERPL-MCNC: 6.1 G/DL (ref 6–8.5)
PROT SERPL-MCNC: 6.3 G/DL (ref 6–8.5)
PROT SERPL-MCNC: 6.6 G/DL (ref 6–8.5)
PROT UR QL STRIP: ABNORMAL
PROT UR QL STRIP: NEGATIVE
PROTEUS SP DNA BAL NAA+NON-PRB-NCNCRNG: NOT DETECTED
QT INTERVAL: 324 MS
QT INTERVAL: 380 MS
QTC INTERVAL: 486 MS
QTC INTERVAL: 495 MS
RBC # BLD AUTO: 2.01 10*6/MM3 (ref 3.77–5.28)
RBC # BLD AUTO: 2.16 10*6/MM3 (ref 3.77–5.28)
RBC # BLD AUTO: 2.22 10*6/MM3 (ref 3.77–5.28)
RBC # BLD AUTO: 2.31 10*6/MM3 (ref 3.77–5.28)
RBC # BLD AUTO: 2.35 10*6/MM3 (ref 3.77–5.28)
RBC # BLD AUTO: 2.4 10*6/MM3 (ref 3.77–5.28)
RBC # BLD AUTO: 2.58 10*6/MM3 (ref 3.77–5.28)
RBC # BLD AUTO: 2.67 10*6/MM3 (ref 3.77–5.28)
RBC # BLD AUTO: 2.68 10*6/MM3 (ref 3.77–5.28)
RBC # BLD AUTO: 2.69 10*6/MM3 (ref 3.77–5.28)
RBC # BLD AUTO: 2.74 10*6/MM3 (ref 3.77–5.28)
RBC # BLD AUTO: 2.82 10*6/MM3 (ref 3.77–5.28)
RBC # BLD AUTO: 2.82 10*6/MM3 (ref 3.77–5.28)
RBC # BLD AUTO: 2.87 10*6/MM3 (ref 3.77–5.28)
RBC # BLD AUTO: 2.92 10*6/MM3 (ref 3.77–5.28)
READ BACK: YES
READ BACK: YES
RESPIRATORY RATE: 20
RESPIRATORY RATE: 24
RESULT: NORMAL NG/ML
RH BLD: POSITIVE
RHINOVIRUS RNA SPEC NAA+PROBE: NOT DETECTED
RSV RNA NPH QL NAA+NON-PROBE: NOT DETECTED
S AUREUS DNA BAL NAA+NON-PRB-NCNCRNG: NOT DETECTED
S MARCESCENS DNA BAL NAA+NON-PRB-NCNCRNG: NOT DETECTED
S PNEUM DNA BAL NAA+NON-PRB-NCNCRNG: NOT DETECTED
S PYO DNA BAL NAA+NON-PRB-NCNCRNG: NOT DETECTED
SAO2 % BLDCOA: 89.3 % (ref 95–99)
SAO2 % BLDCOA: 89.9 % (ref 95–99)
SAO2 % BLDCOA: 94.2 % (ref 95–99)
SAO2 % BLDCOA: 96.4 % (ref 95–99)
SAO2 % BLDCOA: 96.6 % (ref 95–99)
SAO2 % BLDCOA: 97.1 % (ref 95–99)
SAO2 % BLDCOA: 97.6 % (ref 95–99)
SAO2 % BLDCOA: 99 % (ref 95–99)
SMALL PLATELETS BLD QL SMEAR: ABNORMAL
SMALL PLATELETS BLD QL SMEAR: NORMAL
SODIUM BLD-SCNC: 132 MMOL/L (ref 131–143)
SODIUM BLD-SCNC: 145 MMOL/L (ref 131–143)
SODIUM BLD-SCNC: 149 MMOL/L (ref 131–143)
SODIUM SERPL-SCNC: 118 MMOL/L (ref 136–145)
SODIUM SERPL-SCNC: 119 MMOL/L (ref 136–145)
SODIUM SERPL-SCNC: 120 MMOL/L (ref 136–145)
SODIUM SERPL-SCNC: 121 MMOL/L (ref 136–145)
SODIUM SERPL-SCNC: 122 MMOL/L (ref 136–145)
SODIUM SERPL-SCNC: 123 MMOL/L (ref 136–145)
SODIUM SERPL-SCNC: 123 MMOL/L (ref 136–145)
SODIUM SERPL-SCNC: 124 MMOL/L (ref 136–145)
SODIUM SERPL-SCNC: 125 MMOL/L (ref 136–145)
SODIUM SERPL-SCNC: 126 MMOL/L (ref 136–145)
SODIUM SERPL-SCNC: 127 MMOL/L (ref 136–145)
SODIUM SERPL-SCNC: 129 MMOL/L (ref 136–145)
SODIUM SERPL-SCNC: 129 MMOL/L (ref 136–145)
SODIUM SERPL-SCNC: 130 MMOL/L (ref 136–145)
SODIUM SERPL-SCNC: 131 MMOL/L (ref 136–145)
SODIUM SERPL-SCNC: 131 MMOL/L (ref 136–145)
SODIUM SERPL-SCNC: 132 MMOL/L (ref 136–145)
SODIUM SERPL-SCNC: 133 MMOL/L (ref 136–145)
SODIUM SERPL-SCNC: 135 MMOL/L (ref 136–145)
SODIUM SERPL-SCNC: 136 MMOL/L (ref 136–145)
SODIUM SERPL-SCNC: 137 MMOL/L (ref 136–145)
SODIUM SERPL-SCNC: 139 MMOL/L (ref 136–145)
SODIUM SERPL-SCNC: 140 MMOL/L (ref 136–145)
SODIUM SERPL-SCNC: 141 MMOL/L (ref 136–145)
SODIUM SERPL-SCNC: 142 MMOL/L (ref 136–145)
SODIUM SERPL-SCNC: 143 MMOL/L (ref 136–145)
SODIUM SERPL-SCNC: 143 MMOL/L (ref 136–145)
SODIUM SERPL-SCNC: 144 MMOL/L (ref 136–145)
SODIUM SERPL-SCNC: 144 MMOL/L (ref 136–145)
SODIUM SERPL-SCNC: 145 MMOL/L (ref 136–145)
SODIUM SERPL-SCNC: 146 MMOL/L (ref 136–145)
SODIUM SERPL-SCNC: 147 MMOL/L (ref 136–145)
SODIUM SERPL-SCNC: 148 MMOL/L (ref 136–145)
SODIUM SERPL-SCNC: 148 MMOL/L (ref 136–145)
SODIUM SERPL-SCNC: 149 MMOL/L (ref 136–145)
SODIUM UR-SCNC: 96 MMOL/L
SP GR UR STRIP: 1.01 (ref 1–1.03)
SP GR UR STRIP: 1.02 (ref 1–1.03)
SPECIMEN SOURCE: NORMAL
T&S EXPIRATION DATE: NORMAL
T&S EXPIRATION DATE: NORMAL
TOXIC GRANULATION: NORMAL
TROPONIN T % DELTA: -11
TROPONIN T NUMERIC DELTA: -2 NG/L
TROPONIN T SERPL HS-MCNC: 19 NG/L
TSH SERPL DL<=0.05 MIU/L-ACNC: 1 UIU/ML (ref 0.27–4.2)
UNIT  ABO: NORMAL
UNIT  RH: NORMAL
URATE SERPL-MCNC: 3.3 MG/DL (ref 2.4–5.7)
UROBILINOGEN UR QL STRIP: ABNORMAL
UROBILINOGEN UR QL STRIP: ABNORMAL
VANCOMYCIN SERPL-MCNC: 15.38 MCG/ML (ref 5–40)
VARIANT LYMPHS NFR BLD MANUAL: 6 % (ref 19.6–45.3)
VENTILATOR MODE: AC
VISUAL PRESENCE OF BLOOD: NORMAL
VT ON VENT VENT: 400 ML
WBC MORPH BLD: NORMAL
WBC MORPH BLD: NORMAL
WBC NRBC COR # BLD AUTO: 0.98 10*3/MM3 (ref 3.4–10.8)
WBC NRBC COR # BLD AUTO: 1.27 10*3/MM3 (ref 3.4–10.8)
WBC NRBC COR # BLD AUTO: 1.33 10*3/MM3 (ref 3.4–10.8)
WBC NRBC COR # BLD AUTO: 1.51 10*3/MM3 (ref 3.4–10.8)
WBC NRBC COR # BLD AUTO: 1.66 10*3/MM3 (ref 3.4–10.8)
WBC NRBC COR # BLD AUTO: 1.72 10*3/MM3 (ref 3.4–10.8)
WBC NRBC COR # BLD AUTO: 1.81 10*3/MM3 (ref 3.4–10.8)
WBC NRBC COR # BLD AUTO: 16.3 10*3/MM3 (ref 3.4–10.8)
WBC NRBC COR # BLD AUTO: 2.13 10*3/MM3 (ref 3.4–10.8)
WBC NRBC COR # BLD AUTO: 2.22 10*3/MM3 (ref 3.4–10.8)
WBC NRBC COR # BLD AUTO: 2.22 10*3/MM3 (ref 3.4–10.8)
WBC NRBC COR # BLD AUTO: 3.78 10*3/MM3 (ref 3.4–10.8)
WBC NRBC COR # BLD AUTO: 5.44 10*3/MM3 (ref 3.4–10.8)
WBC NRBC COR # BLD AUTO: 8.94 10*3/MM3 (ref 3.4–10.8)
WBC NRBC COR # BLD AUTO: 9.07 10*3/MM3 (ref 3.4–10.8)
WHOLE BLOOD HOLD COAG: NORMAL
WHOLE BLOOD HOLD SPECIMEN: NORMAL
WHOLE BLOOD HOLD SPECIMEN: NORMAL

## 2025-01-01 PROCEDURE — P9035 PLATELET PHERES LEUKOREDUCED: HCPCS

## 2025-01-01 PROCEDURE — 25010000003 DEXTROSE 5 % SOLUTION: Performed by: STUDENT IN AN ORGANIZED HEALTH CARE EDUCATION/TRAINING PROGRAM

## 2025-01-01 PROCEDURE — 80048 BASIC METABOLIC PNL TOTAL CA: CPT | Performed by: INTERNAL MEDICINE

## 2025-01-01 PROCEDURE — 82948 REAGENT STRIP/BLOOD GLUCOSE: CPT

## 2025-01-01 PROCEDURE — 80076 HEPATIC FUNCTION PANEL: CPT | Performed by: INTERNAL MEDICINE

## 2025-01-01 PROCEDURE — 85025 COMPLETE CBC W/AUTO DIFF WBC: CPT | Performed by: INTERNAL MEDICINE

## 2025-01-01 PROCEDURE — 94761 N-INVAS EAR/PLS OXIMETRY MLT: CPT

## 2025-01-01 PROCEDURE — 82330 ASSAY OF CALCIUM: CPT

## 2025-01-01 PROCEDURE — 25010000002 DIAZEPAM PER 5 MG: Performed by: STUDENT IN AN ORGANIZED HEALTH CARE EDUCATION/TRAINING PROGRAM

## 2025-01-01 PROCEDURE — 86850 RBC ANTIBODY SCREEN: CPT

## 2025-01-01 PROCEDURE — 87305 ASPERGILLUS AG IA: CPT

## 2025-01-01 PROCEDURE — 25010000002 CEFEPIME PER 500 MG

## 2025-01-01 PROCEDURE — 93308 TTE F-UP OR LMTD: CPT

## 2025-01-01 PROCEDURE — 94799 UNLISTED PULMONARY SVC/PX: CPT

## 2025-01-01 PROCEDURE — 25810000003 SODIUM CHLORIDE 0.9 % SOLUTION: Performed by: EMERGENCY MEDICINE

## 2025-01-01 PROCEDURE — 25810000003 LACTATED RINGERS SOLUTION: Performed by: STUDENT IN AN ORGANIZED HEALTH CARE EDUCATION/TRAINING PROGRAM

## 2025-01-01 PROCEDURE — 83605 ASSAY OF LACTIC ACID: CPT

## 2025-01-01 PROCEDURE — 80047 BASIC METABLC PNL IONIZED CA: CPT

## 2025-01-01 PROCEDURE — 36415 COLL VENOUS BLD VENIPUNCTURE: CPT | Performed by: INTERNAL MEDICINE

## 2025-01-01 PROCEDURE — 25010000002 MORPHINE PER 10 MG: Performed by: STUDENT IN AN ORGANIZED HEALTH CARE EDUCATION/TRAINING PROGRAM

## 2025-01-01 PROCEDURE — 63710000001 PROCHLORPERAZINE MALEATE PER 5 MG: Performed by: INTERNAL MEDICINE

## 2025-01-01 PROCEDURE — 93005 ELECTROCARDIOGRAM TRACING: CPT | Performed by: EMERGENCY MEDICINE

## 2025-01-01 PROCEDURE — 25010000002 MILRINONE LACTATE IN DEXTROSE 20-5 MG/100ML-% SOLUTION: Performed by: INTERNAL MEDICINE

## 2025-01-01 PROCEDURE — 25010000002 MORPHINE PER 10 MG: Performed by: INTERNAL MEDICINE

## 2025-01-01 PROCEDURE — 84550 ASSAY OF BLOOD/URIC ACID: CPT | Performed by: INTERNAL MEDICINE

## 2025-01-01 PROCEDURE — 80053 COMPREHEN METABOLIC PANEL: CPT | Performed by: EMERGENCY MEDICINE

## 2025-01-01 PROCEDURE — 99223 1ST HOSP IP/OBS HIGH 75: CPT | Performed by: INTERNAL MEDICINE

## 2025-01-01 PROCEDURE — 99232 SBSQ HOSP IP/OBS MODERATE 35: CPT | Performed by: INTERNAL MEDICINE

## 2025-01-01 PROCEDURE — 99233 SBSQ HOSP IP/OBS HIGH 50: CPT | Performed by: INTERNAL MEDICINE

## 2025-01-01 PROCEDURE — 25010000002 PROPOFOL 10 MG/ML EMULSION

## 2025-01-01 PROCEDURE — P9037 PLATE PHERES LEUKOREDU IRRAD: HCPCS

## 2025-01-01 PROCEDURE — 25010000002 FUROSEMIDE PER 20 MG

## 2025-01-01 PROCEDURE — 86923 COMPATIBILITY TEST ELECTRIC: CPT

## 2025-01-01 PROCEDURE — 63710000001 DRONABINOL PER 2.5 MG: Performed by: STUDENT IN AN ORGANIZED HEALTH CARE EDUCATION/TRAINING PROGRAM

## 2025-01-01 PROCEDURE — 85027 COMPLETE CBC AUTOMATED: CPT | Performed by: STUDENT IN AN ORGANIZED HEALTH CARE EDUCATION/TRAINING PROGRAM

## 2025-01-01 PROCEDURE — 25010000002 PROPOFOL 10 MG/ML EMULSION: Performed by: INTERNAL MEDICINE

## 2025-01-01 PROCEDURE — 80053 COMPREHEN METABOLIC PANEL: CPT | Performed by: INTERNAL MEDICINE

## 2025-01-01 PROCEDURE — 86900 BLOOD TYPING SEROLOGIC ABO: CPT | Performed by: INTERNAL MEDICINE

## 2025-01-01 PROCEDURE — 84443 ASSAY THYROID STIM HORMONE: CPT | Performed by: INTERNAL MEDICINE

## 2025-01-01 PROCEDURE — 99291 CRITICAL CARE FIRST HOUR: CPT | Performed by: INTERNAL MEDICINE

## 2025-01-01 PROCEDURE — 25810000003 LACTATED RINGERS SOLUTION

## 2025-01-01 PROCEDURE — 25010000002 THIAMINE PER 100 MG

## 2025-01-01 PROCEDURE — 84100 ASSAY OF PHOSPHORUS: CPT | Performed by: INTERNAL MEDICINE

## 2025-01-01 PROCEDURE — 0B9F8ZX DRAINAGE OF RIGHT LOWER LUNG LOBE, VIA NATURAL OR ARTIFICIAL OPENING ENDOSCOPIC, DIAGNOSTIC: ICD-10-PCS | Performed by: INTERNAL MEDICINE

## 2025-01-01 PROCEDURE — 25010000002 METHYLPREDNISOLONE PER 40 MG: Performed by: INTERNAL MEDICINE

## 2025-01-01 PROCEDURE — 36600 WITHDRAWAL OF ARTERIAL BLOOD: CPT | Performed by: INTERNAL MEDICINE

## 2025-01-01 PROCEDURE — 94664 DEMO&/EVAL PT USE INHALER: CPT

## 2025-01-01 PROCEDURE — 84100 ASSAY OF PHOSPHORUS: CPT

## 2025-01-01 PROCEDURE — 83735 ASSAY OF MAGNESIUM: CPT | Performed by: INTERNAL MEDICINE

## 2025-01-01 PROCEDURE — 25010000002 SULFUR HEXAFLUORIDE MICROSPH 60.7-25 MG RECONSTITUTED SUSPENSION: Performed by: INTERNAL MEDICINE

## 2025-01-01 PROCEDURE — 25010000002 MAGNESIUM SULFATE IN D5W 1G/100ML (PREMIX) 1-5 GM/100ML-% SOLUTION

## 2025-01-01 PROCEDURE — 36430 TRANSFUSION BLD/BLD COMPNT: CPT

## 2025-01-01 PROCEDURE — 25010000002 VANCOMYCIN HCL 1.25 G RECONSTITUTED SOLUTION 1 EACH VIAL

## 2025-01-01 PROCEDURE — 86901 BLOOD TYPING SEROLOGIC RH(D): CPT | Performed by: INTERNAL MEDICINE

## 2025-01-01 PROCEDURE — 85007 BL SMEAR W/DIFF WBC COUNT: CPT | Performed by: EMERGENCY MEDICINE

## 2025-01-01 PROCEDURE — 82803 BLOOD GASES ANY COMBINATION: CPT | Performed by: INTERNAL MEDICINE

## 2025-01-01 PROCEDURE — 87116 MYCOBACTERIA CULTURE: CPT

## 2025-01-01 PROCEDURE — 87205 SMEAR GRAM STAIN: CPT

## 2025-01-01 PROCEDURE — 93010 ELECTROCARDIOGRAM REPORT: CPT | Performed by: INTERNAL MEDICINE

## 2025-01-01 PROCEDURE — 0042T HC CT CEREBRAL PERFUSION W/WO CONTRAST: CPT

## 2025-01-01 PROCEDURE — 85025 COMPLETE CBC W/AUTO DIFF WBC: CPT | Performed by: EMERGENCY MEDICINE

## 2025-01-01 PROCEDURE — 25010000002 METHYLPREDNISOLONE PER 40 MG

## 2025-01-01 PROCEDURE — 71045 X-RAY EXAM CHEST 1 VIEW: CPT

## 2025-01-01 PROCEDURE — 93306 TTE W/DOPPLER COMPLETE: CPT

## 2025-01-01 PROCEDURE — 25010000002 ACETAMINOPHEN 10 MG/ML SOLUTION: Performed by: INTERNAL MEDICINE

## 2025-01-01 PROCEDURE — P9016 RBC LEUKOCYTES REDUCED: HCPCS

## 2025-01-01 PROCEDURE — 99232 SBSQ HOSP IP/OBS MODERATE 35: CPT | Performed by: STUDENT IN AN ORGANIZED HEALTH CARE EDUCATION/TRAINING PROGRAM

## 2025-01-01 PROCEDURE — 25010000003 DEXTROSE 5 % SOLUTION

## 2025-01-01 PROCEDURE — 25010000002 ONDANSETRON PER 1 MG: Performed by: STUDENT IN AN ORGANIZED HEALTH CARE EDUCATION/TRAINING PROGRAM

## 2025-01-01 PROCEDURE — 86850 RBC ANTIBODY SCREEN: CPT | Performed by: INTERNAL MEDICINE

## 2025-01-01 PROCEDURE — 83605 ASSAY OF LACTIC ACID: CPT | Performed by: INTERNAL MEDICINE

## 2025-01-01 PROCEDURE — 84145 PROCALCITONIN (PCT): CPT | Performed by: INTERNAL MEDICINE

## 2025-01-01 PROCEDURE — 86901 BLOOD TYPING SEROLOGIC RH(D): CPT | Performed by: EMERGENCY MEDICINE

## 2025-01-01 PROCEDURE — 85018 HEMOGLOBIN: CPT | Performed by: INTERNAL MEDICINE

## 2025-01-01 PROCEDURE — 70450 CT HEAD/BRAIN W/O DYE: CPT

## 2025-01-01 PROCEDURE — 86900 BLOOD TYPING SEROLOGIC ABO: CPT

## 2025-01-01 PROCEDURE — 31624 DX BRONCHOSCOPE/LAVAGE: CPT | Performed by: INTERNAL MEDICINE

## 2025-01-01 PROCEDURE — 94003 VENT MGMT INPAT SUBQ DAY: CPT

## 2025-01-01 PROCEDURE — 94660 CPAP INITIATION&MGMT: CPT

## 2025-01-01 PROCEDURE — 83735 ASSAY OF MAGNESIUM: CPT

## 2025-01-01 PROCEDURE — 31500 INSERT EMERGENCY AIRWAY: CPT

## 2025-01-01 PROCEDURE — P9040 RBC LEUKOREDUCED IRRADIATED: HCPCS

## 2025-01-01 PROCEDURE — 25810000003 SODIUM CHLORIDE 0.9 % SOLUTION

## 2025-01-01 PROCEDURE — 80051 ELECTROLYTE PANEL: CPT

## 2025-01-01 PROCEDURE — 85007 BL SMEAR W/DIFF WBC COUNT: CPT | Performed by: INTERNAL MEDICINE

## 2025-01-01 PROCEDURE — 82248 BILIRUBIN DIRECT: CPT

## 2025-01-01 PROCEDURE — 25010000002 FENTANYL CITRATE (PF) 50 MCG/ML SOLUTION: Performed by: INTERNAL MEDICINE

## 2025-01-01 PROCEDURE — 25810000003 SODIUM CHLORIDE 0.9 % SOLUTION: Performed by: INTERNAL MEDICINE

## 2025-01-01 PROCEDURE — 87206 SMEAR FLUORESCENT/ACID STAI: CPT

## 2025-01-01 PROCEDURE — 25010000002 VANCOMYCIN 5 G RECONSTITUTED SOLUTION

## 2025-01-01 PROCEDURE — 80053 COMPREHEN METABOLIC PANEL: CPT

## 2025-01-01 PROCEDURE — 36600 WITHDRAWAL OF ARTERIAL BLOOD: CPT

## 2025-01-01 PROCEDURE — 94760 N-INVAS EAR/PLS OXIMETRY 1: CPT

## 2025-01-01 PROCEDURE — 87077 CULTURE AEROBIC IDENTIFY: CPT

## 2025-01-01 PROCEDURE — 99222 1ST HOSP IP/OBS MODERATE 55: CPT | Performed by: PSYCHIATRY & NEUROLOGY

## 2025-01-01 PROCEDURE — 74018 RADEX ABDOMEN 1 VIEW: CPT

## 2025-01-01 PROCEDURE — 97161 PT EVAL LOW COMPLEX 20 MIN: CPT

## 2025-01-01 PROCEDURE — 89051 BODY FLUID CELL COUNT: CPT

## 2025-01-01 PROCEDURE — P9100 PATHOGEN TEST FOR PLATELETS: HCPCS

## 2025-01-01 PROCEDURE — 36415 COLL VENOUS BLD VENIPUNCTURE: CPT | Performed by: EMERGENCY MEDICINE

## 2025-01-01 PROCEDURE — 82803 BLOOD GASES ANY COMBINATION: CPT

## 2025-01-01 PROCEDURE — 25810000003 SODIUM CHLORIDE 0.9 % SOLUTION 250 ML FLEX CONT

## 2025-01-01 PROCEDURE — 99222 1ST HOSP IP/OBS MODERATE 55: CPT | Performed by: INTERNAL MEDICINE

## 2025-01-01 PROCEDURE — 85027 COMPLETE CBC AUTOMATED: CPT

## 2025-01-01 PROCEDURE — 85014 HEMATOCRIT: CPT | Performed by: INTERNAL MEDICINE

## 2025-01-01 PROCEDURE — 25010000002 FAMOTIDINE 10 MG/ML SOLUTION

## 2025-01-01 PROCEDURE — 85025 COMPLETE CBC W/AUTO DIFF WBC: CPT

## 2025-01-01 PROCEDURE — 25010000002 FUROSEMIDE PER 20 MG: Performed by: INTERNAL MEDICINE

## 2025-01-01 PROCEDURE — 81003 URINALYSIS AUTO W/O SCOPE: CPT | Performed by: INTERNAL MEDICINE

## 2025-01-01 PROCEDURE — 85018 HEMOGLOBIN: CPT | Performed by: STUDENT IN AN ORGANIZED HEALTH CARE EDUCATION/TRAINING PROGRAM

## 2025-01-01 PROCEDURE — 25510000001 IOPAMIDOL PER 1 ML: Performed by: INTERNAL MEDICINE

## 2025-01-01 PROCEDURE — 25010000002 ONDANSETRON PER 1 MG: Performed by: EMERGENCY MEDICINE

## 2025-01-01 PROCEDURE — 99291 CRITICAL CARE FIRST HOUR: CPT

## 2025-01-01 PROCEDURE — 03HY32Z INSERTION OF MONITORING DEVICE INTO UPPER ARTERY, PERCUTANEOUS APPROACH: ICD-10-PCS

## 2025-01-01 PROCEDURE — 94002 VENT MGMT INPAT INIT DAY: CPT

## 2025-01-01 PROCEDURE — 87071 CULTURE AEROBIC QUANT OTHER: CPT

## 2025-01-01 PROCEDURE — 25810000003 SODIUM CHLORIDE 0.9 % SOLUTION: Performed by: STUDENT IN AN ORGANIZED HEALTH CARE EDUCATION/TRAINING PROGRAM

## 2025-01-01 PROCEDURE — 83935 ASSAY OF URINE OSMOLALITY: CPT | Performed by: INTERNAL MEDICINE

## 2025-01-01 PROCEDURE — 93306 TTE W/DOPPLER COMPLETE: CPT | Performed by: INTERNAL MEDICINE

## 2025-01-01 PROCEDURE — 85007 BL SMEAR W/DIFF WBC COUNT: CPT

## 2025-01-01 PROCEDURE — 93308 TTE F-UP OR LMTD: CPT | Performed by: INTERNAL MEDICINE

## 2025-01-01 PROCEDURE — 86900 BLOOD TYPING SEROLOGIC ABO: CPT | Performed by: EMERGENCY MEDICINE

## 2025-01-01 PROCEDURE — 80202 ASSAY OF VANCOMYCIN: CPT

## 2025-01-01 PROCEDURE — 85027 COMPLETE CBC AUTOMATED: CPT | Performed by: INTERNAL MEDICINE

## 2025-01-01 PROCEDURE — 85049 AUTOMATED PLATELET COUNT: CPT | Performed by: INTERNAL MEDICINE

## 2025-01-01 PROCEDURE — 85014 HEMATOCRIT: CPT | Performed by: STUDENT IN AN ORGANIZED HEALTH CARE EDUCATION/TRAINING PROGRAM

## 2025-01-01 PROCEDURE — 87040 BLOOD CULTURE FOR BACTERIA: CPT | Performed by: INTERNAL MEDICINE

## 2025-01-01 PROCEDURE — 70496 CT ANGIOGRAPHY HEAD: CPT

## 2025-01-01 PROCEDURE — 88108 CYTOPATH CONCENTRATE TECH: CPT

## 2025-01-01 PROCEDURE — 81003 URINALYSIS AUTO W/O SCOPE: CPT | Performed by: EMERGENCY MEDICINE

## 2025-01-01 PROCEDURE — 25010000002 FUROSEMIDE PER 20 MG: Performed by: STUDENT IN AN ORGANIZED HEALTH CARE EDUCATION/TRAINING PROGRAM

## 2025-01-01 PROCEDURE — 87186 SC STD MICRODIL/AGAR DIL: CPT

## 2025-01-01 PROCEDURE — 87633 RESP VIRUS 12-25 TARGETS: CPT

## 2025-01-01 PROCEDURE — 93005 ELECTROCARDIOGRAM TRACING: CPT | Performed by: INTERNAL MEDICINE

## 2025-01-01 PROCEDURE — 25010000002 METHYLNALTREXONE 12 MG/0.6ML SOLUTION: Performed by: INTERNAL MEDICINE

## 2025-01-01 PROCEDURE — 5A1945Z RESPIRATORY VENTILATION, 24-96 CONSECUTIVE HOURS: ICD-10-PCS | Performed by: INTERNAL MEDICINE

## 2025-01-01 PROCEDURE — 84484 ASSAY OF TROPONIN QUANT: CPT | Performed by: EMERGENCY MEDICINE

## 2025-01-01 PROCEDURE — 94640 AIRWAY INHALATION TREATMENT: CPT

## 2025-01-01 PROCEDURE — 99239 HOSP IP/OBS DSCHRG MGMT >30: CPT | Performed by: INTERNAL MEDICINE

## 2025-01-01 PROCEDURE — 87385 HISTOPLASMA CAPSUL AG IA: CPT

## 2025-01-01 PROCEDURE — 70498 CT ANGIOGRAPHY NECK: CPT

## 2025-01-01 PROCEDURE — 87102 FUNGUS ISOLATION CULTURE: CPT

## 2025-01-01 PROCEDURE — 84300 ASSAY OF URINE SODIUM: CPT | Performed by: INTERNAL MEDICINE

## 2025-01-01 PROCEDURE — 86901 BLOOD TYPING SEROLOGIC RH(D): CPT

## 2025-01-01 PROCEDURE — 97165 OT EVAL LOW COMPLEX 30 MIN: CPT

## 2025-01-01 PROCEDURE — 0BH17EZ INSERTION OF ENDOTRACHEAL AIRWAY INTO TRACHEA, VIA NATURAL OR ARTIFICIAL OPENING: ICD-10-PCS | Performed by: INTERNAL MEDICINE

## 2025-01-01 PROCEDURE — 82550 ASSAY OF CK (CPK): CPT

## 2025-01-01 PROCEDURE — 83735 ASSAY OF MAGNESIUM: CPT | Performed by: EMERGENCY MEDICINE

## 2025-01-01 PROCEDURE — 87086 URINE CULTURE/COLONY COUNT: CPT | Performed by: INTERNAL MEDICINE

## 2025-01-01 PROCEDURE — 36620 INSERTION CATHETER ARTERY: CPT

## 2025-01-01 RX ORDER — MORPHINE SULFATE 2 MG/ML
1 INJECTION, SOLUTION INTRAMUSCULAR; INTRAVENOUS
Status: DISCONTINUED | OUTPATIENT
Start: 2025-01-01 | End: 2025-01-01

## 2025-01-01 RX ORDER — MORPHINE SULFATE 20 MG/ML
15 SOLUTION ORAL
Refills: 0 | Status: CANCELLED | OUTPATIENT
Start: 2025-01-01 | End: 2025-07-05

## 2025-01-01 RX ORDER — MORPHINE SULFATE 20 MG/ML
15 SOLUTION ORAL
Refills: 0 | Status: DISCONTINUED | OUTPATIENT
Start: 2025-01-01 | End: 2025-01-01 | Stop reason: HOSPADM

## 2025-01-01 RX ORDER — ETOMIDATE 2 MG/ML
INJECTION INTRAVENOUS
Status: DISCONTINUED | OUTPATIENT
Start: 2025-01-01 | End: 2025-01-01

## 2025-01-01 RX ORDER — PROCHLORPERAZINE MALEATE 5 MG/1
10 TABLET ORAL EVERY 6 HOURS PRN
Status: DISCONTINUED | OUTPATIENT
Start: 2025-01-01 | End: 2025-01-01

## 2025-01-01 RX ORDER — MILRINONE LACTATE 0.2 MG/ML
0.3 INJECTION, SOLUTION INTRAVENOUS
Status: DISCONTINUED | OUTPATIENT
Start: 2025-01-01 | End: 2025-01-01

## 2025-01-01 RX ORDER — THIAMINE HYDROCHLORIDE 100 MG/ML
100 INJECTION, SOLUTION INTRAMUSCULAR; INTRAVENOUS DAILY
Status: DISCONTINUED | OUTPATIENT
Start: 2025-01-01 | End: 2025-01-01

## 2025-01-01 RX ORDER — MORPHINE SULFATE 2 MG/ML
2 INJECTION, SOLUTION INTRAMUSCULAR; INTRAVENOUS
Status: DISCONTINUED | OUTPATIENT
Start: 2025-01-01 | End: 2025-01-01 | Stop reason: HOSPADM

## 2025-01-01 RX ORDER — SODIUM CHLORIDE 1 G/1
2 TABLET ORAL
Status: DISCONTINUED | OUTPATIENT
Start: 2025-01-01 | End: 2025-01-01

## 2025-01-01 RX ORDER — LACTULOSE 10 G/15ML
15 SOLUTION ORAL EVERY 8 HOURS
Status: DISCONTINUED | OUTPATIENT
Start: 2025-01-01 | End: 2025-01-01

## 2025-01-01 RX ORDER — MORPHINE SULFATE 20 MG/ML
5 SOLUTION ORAL
Refills: 0 | Status: DISCONTINUED | OUTPATIENT
Start: 2025-01-01 | End: 2025-01-01

## 2025-01-01 RX ORDER — DIAZEPAM 10 MG/2ML
2.5 INJECTION, SOLUTION INTRAMUSCULAR; INTRAVENOUS
Status: DISCONTINUED | OUTPATIENT
Start: 2025-01-01 | End: 2025-01-01 | Stop reason: HOSPADM

## 2025-01-01 RX ORDER — SCOPOLAMINE 1 MG/3D
1 PATCH, EXTENDED RELEASE TRANSDERMAL
Status: DISCONTINUED | OUTPATIENT
Start: 2025-01-01 | End: 2025-01-01 | Stop reason: HOSPADM

## 2025-01-01 RX ORDER — DIAZEPAM 10 MG/2ML
10 INJECTION, SOLUTION INTRAMUSCULAR; INTRAVENOUS
Status: DISCONTINUED | OUTPATIENT
Start: 2025-01-01 | End: 2025-01-01 | Stop reason: HOSPADM

## 2025-01-01 RX ORDER — DIPHENOXYLATE HYDROCHLORIDE AND ATROPINE SULFATE 2.5; .025 MG/1; MG/1
1 TABLET ORAL
Status: CANCELLED | OUTPATIENT
Start: 2025-01-01

## 2025-01-01 RX ORDER — DIAZEPAM 10 MG/2ML
10 INJECTION, SOLUTION INTRAMUSCULAR; INTRAVENOUS
Status: CANCELLED | OUTPATIENT
Start: 2025-01-01 | End: 2025-07-05

## 2025-01-01 RX ORDER — FUROSEMIDE 40 MG/1
40 TABLET ORAL ONCE
Status: COMPLETED | OUTPATIENT
Start: 2025-01-01 | End: 2025-01-01

## 2025-01-01 RX ORDER — LORAZEPAM 2 MG/ML
1 CONCENTRATE ORAL
Status: DISCONTINUED | OUTPATIENT
Start: 2025-01-01 | End: 2025-01-01

## 2025-01-01 RX ORDER — IOPAMIDOL 755 MG/ML
100 INJECTION, SOLUTION INTRAVASCULAR
Status: COMPLETED | OUTPATIENT
Start: 2025-01-01 | End: 2025-01-01

## 2025-01-01 RX ORDER — FUROSEMIDE 10 MG/ML
40 INJECTION INTRAMUSCULAR; INTRAVENOUS ONCE
Status: CANCELLED | OUTPATIENT
Start: 2025-01-01

## 2025-01-01 RX ORDER — SENNOSIDES 8.6 MG/1
2 TABLET ORAL 2 TIMES DAILY
Status: DISCONTINUED | OUTPATIENT
Start: 2025-01-01 | End: 2025-01-01

## 2025-01-01 RX ORDER — SODIUM CHLORIDE 0.9 % (FLUSH) 0.9 %
10 SYRINGE (ML) INJECTION AS NEEDED
Status: DISCONTINUED | OUTPATIENT
Start: 2025-01-01 | End: 2025-01-01 | Stop reason: HOSPADM

## 2025-01-01 RX ORDER — LORAZEPAM 2 MG/ML
0.5 CONCENTRATE ORAL
Status: DISCONTINUED | OUTPATIENT
Start: 2025-01-01 | End: 2025-01-01 | Stop reason: HOSPADM

## 2025-01-01 RX ORDER — LORAZEPAM 0.5 MG/1
1 TABLET ORAL
Status: DISCONTINUED | OUTPATIENT
Start: 2025-01-01 | End: 2025-01-01

## 2025-01-01 RX ORDER — MIDODRINE HYDROCHLORIDE 10 MG/1
10 TABLET ORAL
Status: DISCONTINUED | OUTPATIENT
Start: 2025-01-01 | End: 2025-01-01

## 2025-01-01 RX ORDER — HYDROXYZINE HYDROCHLORIDE 25 MG/1
25 TABLET, FILM COATED ORAL 3 TIMES DAILY PRN
Status: DISCONTINUED | OUTPATIENT
Start: 2025-01-01 | End: 2025-01-01

## 2025-01-01 RX ORDER — FLUDROCORTISONE ACETATE 0.1 MG/1
0.1 TABLET ORAL DAILY
Status: DISCONTINUED | OUTPATIENT
Start: 2025-01-01 | End: 2025-01-01

## 2025-01-01 RX ORDER — FAMOTIDINE 10 MG/ML
20 INJECTION, SOLUTION INTRAVENOUS 2 TIMES DAILY
Status: DISCONTINUED | OUTPATIENT
Start: 2025-01-01 | End: 2025-01-01

## 2025-01-01 RX ORDER — DEXMEDETOMIDINE HYDROCHLORIDE 4 UG/ML
.2-1.5 INJECTION, SOLUTION INTRAVENOUS
Status: DISCONTINUED | OUTPATIENT
Start: 2025-01-01 | End: 2025-01-01

## 2025-01-01 RX ORDER — POTASSIUM CHLORIDE 750 MG/1
40 CAPSULE, EXTENDED RELEASE ORAL ONCE
Status: COMPLETED | OUTPATIENT
Start: 2025-01-01 | End: 2025-01-01

## 2025-01-01 RX ORDER — MAGNESIUM SULFATE 1 G/100ML
2 INJECTION INTRAVENOUS ONCE
Status: COMPLETED | OUTPATIENT
Start: 2025-01-01 | End: 2025-01-01

## 2025-01-01 RX ORDER — FENTANYL CITRATE 50 UG/ML
50 INJECTION, SOLUTION INTRAMUSCULAR; INTRAVENOUS ONCE
Refills: 0 | Status: COMPLETED | OUTPATIENT
Start: 2025-01-01 | End: 2025-01-01

## 2025-01-01 RX ORDER — MORPHINE SULFATE 2 MG/ML
2 INJECTION, SOLUTION INTRAMUSCULAR; INTRAVENOUS
Status: CANCELLED | OUTPATIENT
Start: 2025-01-01 | End: 2025-07-05

## 2025-01-01 RX ORDER — ONDANSETRON 2 MG/ML
4 INJECTION INTRAMUSCULAR; INTRAVENOUS EVERY 6 HOURS PRN
Status: DISCONTINUED | OUTPATIENT
Start: 2025-01-01 | End: 2025-01-01

## 2025-01-01 RX ORDER — SODIUM CHLORIDE 0.9 % (FLUSH) 0.9 %
10 SYRINGE (ML) INJECTION AS NEEDED
Status: CANCELLED | OUTPATIENT
Start: 2025-01-01

## 2025-01-01 RX ORDER — FUROSEMIDE 10 MG/ML
INJECTION INTRAMUSCULAR; INTRAVENOUS
Status: DISPENSED
Start: 2025-01-01 | End: 2025-01-01

## 2025-01-01 RX ORDER — PROPOFOL 10 MG/ML
VIAL (ML) INTRAVENOUS
Status: COMPLETED
Start: 2025-01-01 | End: 2025-01-01

## 2025-01-01 RX ORDER — MORPHINE SULFATE 20 MG/ML
10 SOLUTION ORAL
Refills: 0 | Status: DISCONTINUED | OUTPATIENT
Start: 2025-01-01 | End: 2025-01-01 | Stop reason: HOSPADM

## 2025-01-01 RX ORDER — ATROPINE SULFATE 10 MG/ML
2 SOLUTION/ DROPS OPHTHALMIC
Status: DISCONTINUED | OUTPATIENT
Start: 2025-01-01 | End: 2025-01-01 | Stop reason: HOSPADM

## 2025-01-01 RX ORDER — DIAZEPAM 10 MG/2ML
5 INJECTION, SOLUTION INTRAMUSCULAR; INTRAVENOUS
Status: DISCONTINUED | OUTPATIENT
Start: 2025-01-01 | End: 2025-01-01

## 2025-01-01 RX ORDER — LORAZEPAM 2 MG/ML
0.5 CONCENTRATE ORAL
Status: DISCONTINUED | OUTPATIENT
Start: 2025-01-01 | End: 2025-01-01

## 2025-01-01 RX ORDER — LORAZEPAM 2 MG/ML
0.5 CONCENTRATE ORAL
Status: CANCELLED | OUTPATIENT
Start: 2025-01-01 | End: 2025-07-05

## 2025-01-01 RX ORDER — FENTANYL/ROPIVACAINE/NS/PF 2-625MCG/1
15 PLASTIC BAG, INJECTION (ML) EPIDURAL EVERY 4 HOURS
Status: COMPLETED | OUTPATIENT
Start: 2025-01-01 | End: 2025-01-01

## 2025-01-01 RX ORDER — LORAZEPAM 2 MG/ML
1 CONCENTRATE ORAL
Status: CANCELLED | OUTPATIENT
Start: 2025-01-01 | End: 2025-07-05

## 2025-01-01 RX ORDER — LORAZEPAM 2 MG/ML
1 CONCENTRATE ORAL
Status: DISCONTINUED | OUTPATIENT
Start: 2025-01-01 | End: 2025-01-01 | Stop reason: HOSPADM

## 2025-01-01 RX ORDER — SODIUM CHLORIDE 1 G/1
1 TABLET ORAL
Status: DISCONTINUED | OUTPATIENT
Start: 2025-01-01 | End: 2025-01-01

## 2025-01-01 RX ORDER — GABAPENTIN 100 MG/1
200 CAPSULE ORAL 3 TIMES DAILY
Status: DISCONTINUED | OUTPATIENT
Start: 2025-01-01 | End: 2025-01-01

## 2025-01-01 RX ORDER — NICOTINE 21 MG/24HR
1 PATCH, TRANSDERMAL 24 HOURS TRANSDERMAL
Status: DISCONTINUED | OUTPATIENT
Start: 2025-01-01 | End: 2025-01-01

## 2025-01-01 RX ORDER — DIAZEPAM 10 MG/2ML
5 INJECTION, SOLUTION INTRAMUSCULAR; INTRAVENOUS EVERY 4 HOURS
Status: DISCONTINUED | OUTPATIENT
Start: 2025-01-01 | End: 2025-01-01 | Stop reason: HOSPADM

## 2025-01-01 RX ORDER — LORAZEPAM 2 MG/1
2 TABLET ORAL
Status: DISCONTINUED | OUTPATIENT
Start: 2025-01-01 | End: 2025-01-01

## 2025-01-01 RX ORDER — LORAZEPAM 2 MG/ML
2 CONCENTRATE ORAL
Status: DISCONTINUED | OUTPATIENT
Start: 2025-01-01 | End: 2025-01-01 | Stop reason: HOSPADM

## 2025-01-01 RX ORDER — ACETAMINOPHEN 10 MG/ML
1000 INJECTION, SOLUTION INTRAVENOUS EVERY 8 HOURS PRN
Status: DISCONTINUED | OUTPATIENT
Start: 2025-01-01 | End: 2025-01-01 | Stop reason: HOSPADM

## 2025-01-01 RX ORDER — OLANZAPINE 5 MG/1
5 TABLET, FILM COATED ORAL NIGHTLY
Status: DISCONTINUED | OUTPATIENT
Start: 2025-01-01 | End: 2025-01-01

## 2025-01-01 RX ORDER — FUROSEMIDE 10 MG/ML
40 INJECTION INTRAMUSCULAR; INTRAVENOUS ONCE
Status: COMPLETED | OUTPATIENT
Start: 2025-01-01 | End: 2025-01-01

## 2025-01-01 RX ORDER — FUROSEMIDE 10 MG/ML
40 INJECTION INTRAMUSCULAR; INTRAVENOUS
Status: DISCONTINUED | OUTPATIENT
Start: 2025-01-01 | End: 2025-01-01

## 2025-01-01 RX ORDER — FENTANYL CITRATE 50 UG/ML
INJECTION, SOLUTION INTRAMUSCULAR; INTRAVENOUS
Status: DISCONTINUED | OUTPATIENT
Start: 2025-01-01 | End: 2025-01-01

## 2025-01-01 RX ORDER — ACETAMINOPHEN 10 MG/ML
1000 INJECTION, SOLUTION INTRAVENOUS EVERY 8 HOURS PRN
Status: CANCELLED | OUTPATIENT
Start: 2025-01-01

## 2025-01-01 RX ORDER — ATROPINE SULFATE 10 MG/ML
2 SOLUTION/ DROPS OPHTHALMIC
Status: CANCELLED | OUTPATIENT
Start: 2025-01-01

## 2025-01-01 RX ORDER — DOCUSATE SODIUM 50 MG/5 ML
100 LIQUID (ML) ORAL DAILY
Status: DISCONTINUED | OUTPATIENT
Start: 2025-01-01 | End: 2025-01-01

## 2025-01-01 RX ORDER — LORAZEPAM 0.5 MG/1
0.5 TABLET ORAL
Status: DISCONTINUED | OUTPATIENT
Start: 2025-01-01 | End: 2025-01-01

## 2025-01-01 RX ORDER — FENTANYL CITRATE-0.9 % NACL/PF 10 MCG/ML
50-300 PLASTIC BAG, INJECTION (ML) INTRAVENOUS
Status: DISCONTINUED | OUTPATIENT
Start: 2025-01-01 | End: 2025-01-01

## 2025-01-01 RX ORDER — ESCITALOPRAM OXALATE 10 MG/1
20 TABLET ORAL DAILY
Status: DISCONTINUED | OUTPATIENT
Start: 2025-01-01 | End: 2025-01-01

## 2025-01-01 RX ORDER — IPRATROPIUM BROMIDE AND ALBUTEROL SULFATE 2.5; .5 MG/3ML; MG/3ML
3 SOLUTION RESPIRATORY (INHALATION) EVERY 4 HOURS PRN
Status: DISCONTINUED | OUTPATIENT
Start: 2025-01-01 | End: 2025-01-01

## 2025-01-01 RX ORDER — SODIUM CHLORIDE 0.9 % (FLUSH) 0.9 %
10 SYRINGE (ML) INJECTION EVERY 12 HOURS SCHEDULED
Status: DISCONTINUED | OUTPATIENT
Start: 2025-01-01 | End: 2025-01-01

## 2025-01-01 RX ORDER — ROCURONIUM BROMIDE 10 MG/ML
INJECTION, SOLUTION INTRAVENOUS
Status: DISCONTINUED | OUTPATIENT
Start: 2025-01-01 | End: 2025-01-01

## 2025-01-01 RX ORDER — OXYCODONE AND ACETAMINOPHEN 10; 325 MG/1; MG/1
1 TABLET ORAL EVERY 4 HOURS PRN
Refills: 0 | Status: DISCONTINUED | OUTPATIENT
Start: 2025-01-01 | End: 2025-01-01

## 2025-01-01 RX ORDER — DRONABINOL 2.5 MG/1
2.5 CAPSULE ORAL 2 TIMES DAILY
Status: DISCONTINUED | OUTPATIENT
Start: 2025-01-01 | End: 2025-01-01

## 2025-01-01 RX ORDER — NOREPINEPHRINE BITARTRATE 0.03 MG/ML
.02-.3 INJECTION, SOLUTION INTRAVENOUS
Status: DISCONTINUED | OUTPATIENT
Start: 2025-01-01 | End: 2025-01-01

## 2025-01-01 RX ORDER — MORPHINE SULFATE 20 MG/ML
10 SOLUTION ORAL
Status: DISCONTINUED | OUTPATIENT
Start: 2025-01-01 | End: 2025-01-01 | Stop reason: HOSPADM

## 2025-01-01 RX ORDER — IPRATROPIUM BROMIDE AND ALBUTEROL SULFATE 2.5; .5 MG/3ML; MG/3ML
3 SOLUTION RESPIRATORY (INHALATION)
Status: DISCONTINUED | OUTPATIENT
Start: 2025-01-01 | End: 2025-01-01

## 2025-01-01 RX ORDER — DIAZEPAM 10 MG/2ML
5 INJECTION, SOLUTION INTRAMUSCULAR; INTRAVENOUS
Status: CANCELLED | OUTPATIENT
Start: 2025-01-01 | End: 2025-07-05

## 2025-01-01 RX ORDER — SCOPOLAMINE 1 MG/3D
1 PATCH, EXTENDED RELEASE TRANSDERMAL
Status: CANCELLED | OUTPATIENT
Start: 2025-01-01

## 2025-01-01 RX ORDER — NOREPINEPHRINE BITARTRATE 0.03 MG/ML
INJECTION, SOLUTION INTRAVENOUS
Status: COMPLETED
Start: 2025-01-01 | End: 2025-01-01

## 2025-01-01 RX ORDER — MORPHINE SULFATE 20 MG/ML
10 SOLUTION ORAL
Refills: 0 | Status: CANCELLED | OUTPATIENT
Start: 2025-01-01 | End: 2025-07-05

## 2025-01-01 RX ORDER — DIAZEPAM 10 MG/2ML
2.5 INJECTION, SOLUTION INTRAMUSCULAR; INTRAVENOUS
Status: CANCELLED | OUTPATIENT
Start: 2025-01-01 | End: 2025-07-05

## 2025-01-01 RX ORDER — LORAZEPAM 2 MG/ML
2 CONCENTRATE ORAL
Status: CANCELLED | OUTPATIENT
Start: 2025-01-01 | End: 2025-07-05

## 2025-01-01 RX ORDER — DIAZEPAM 10 MG/2ML
10 INJECTION, SOLUTION INTRAMUSCULAR; INTRAVENOUS
Status: DISCONTINUED | OUTPATIENT
Start: 2025-01-01 | End: 2025-01-01

## 2025-01-01 RX ORDER — OXYCODONE HYDROCHLORIDE 5 MG/1
5 TABLET ORAL EVERY 8 HOURS
Refills: 0 | Status: DISCONTINUED | OUTPATIENT
Start: 2025-01-01 | End: 2025-01-01

## 2025-01-01 RX ORDER — SODIUM CHLORIDE 9 MG/ML
40 INJECTION, SOLUTION INTRAVENOUS AS NEEDED
Status: DISCONTINUED | OUTPATIENT
Start: 2025-01-01 | End: 2025-01-01

## 2025-01-01 RX ORDER — MORPHINE SULFATE 20 MG/ML
10 SOLUTION ORAL
Refills: 0 | Status: DISCONTINUED | OUTPATIENT
Start: 2025-01-01 | End: 2025-01-01

## 2025-01-01 RX ORDER — LORAZEPAM 2 MG/ML
2 CONCENTRATE ORAL
Status: DISCONTINUED | OUTPATIENT
Start: 2025-01-01 | End: 2025-01-01

## 2025-01-01 RX ORDER — MORPHINE SULFATE 20 MG/ML
15 SOLUTION ORAL
Status: DISCONTINUED | OUTPATIENT
Start: 2025-01-01 | End: 2025-01-01 | Stop reason: HOSPADM

## 2025-01-01 RX ORDER — DEXTROSE MONOHYDRATE 50 MG/ML
250 INJECTION, SOLUTION INTRAVENOUS CONTINUOUS
Status: ACTIVE | OUTPATIENT
Start: 2025-01-01 | End: 2025-01-01

## 2025-01-01 RX ORDER — POLYETHYLENE GLYCOL 3350 17 G/17G
17 POWDER, FOR SOLUTION ORAL DAILY
Status: DISCONTINUED | OUTPATIENT
Start: 2025-01-01 | End: 2025-01-01

## 2025-01-01 RX ORDER — DIPHENOXYLATE HYDROCHLORIDE AND ATROPINE SULFATE 2.5; .025 MG/1; MG/1
1 TABLET ORAL
Status: DISCONTINUED | OUTPATIENT
Start: 2025-01-01 | End: 2025-01-01 | Stop reason: HOSPADM

## 2025-01-01 RX ORDER — DIAZEPAM 10 MG/2ML
2.5 INJECTION, SOLUTION INTRAMUSCULAR; INTRAVENOUS
Status: DISCONTINUED | OUTPATIENT
Start: 2025-01-01 | End: 2025-01-01

## 2025-01-01 RX ORDER — ATROPINE SULFATE 10 MG/ML
2 SOLUTION/ DROPS OPHTHALMIC 2 TIMES DAILY PRN
Status: DISCONTINUED | OUTPATIENT
Start: 2025-01-01 | End: 2025-01-01

## 2025-01-01 RX ORDER — LORAZEPAM 2 MG/ML
1 CONCENTRATE ORAL EVERY 4 HOURS
Status: DISCONTINUED | OUTPATIENT
Start: 2025-01-01 | End: 2025-01-01 | Stop reason: HOSPADM

## 2025-01-01 RX ORDER — DIAZEPAM 10 MG/2ML
5 INJECTION, SOLUTION INTRAMUSCULAR; INTRAVENOUS
Status: DISCONTINUED | OUTPATIENT
Start: 2025-01-01 | End: 2025-01-01 | Stop reason: HOSPADM

## 2025-01-01 RX ORDER — VANCOMYCIN/0.9 % SOD CHLORIDE 1.5G/250ML
20 PLASTIC BAG, INJECTION (ML) INTRAVENOUS ONCE
Status: COMPLETED | OUTPATIENT
Start: 2025-01-01 | End: 2025-01-01

## 2025-01-01 RX ORDER — ONDANSETRON 2 MG/ML
4 INJECTION INTRAMUSCULAR; INTRAVENOUS ONCE
Status: COMPLETED | OUTPATIENT
Start: 2025-01-01 | End: 2025-01-01

## 2025-01-01 RX ADMIN — Medication 10 ML: at 21:34

## 2025-01-01 RX ADMIN — POLYETHYLENE GLYCOL 3350 17 G: 17 POWDER, FOR SOLUTION ORAL at 08:17

## 2025-01-01 RX ADMIN — DEXMEDETOMIDINE HYDROCHLORIDE 1.1 MCG/KG/HR: 400 INJECTION INTRAVENOUS at 19:01

## 2025-01-01 RX ADMIN — Medication 15 G: at 21:27

## 2025-01-01 RX ADMIN — DEXMEDETOMIDINE HYDROCHLORIDE 0.2 MCG/KG/HR: 400 INJECTION INTRAVENOUS at 09:47

## 2025-01-01 RX ADMIN — DEXMEDETOMIDINE HYDROCHLORIDE 1 MCG/KG/HR: 400 INJECTION INTRAVENOUS at 04:18

## 2025-01-01 RX ADMIN — CEFEPIME 2000 MG: 2 INJECTION, POWDER, FOR SOLUTION INTRAVENOUS at 22:15

## 2025-01-01 RX ADMIN — MORPHINE SULFATE 4 MG: 4 INJECTION, SOLUTION INTRAMUSCULAR; INTRAVENOUS at 05:39

## 2025-01-01 RX ADMIN — NICOTINE 1 PATCH: 14 PATCH TRANSDERMAL at 09:49

## 2025-01-01 RX ADMIN — DRONABINOL 2.5 MG: 2.5 CAPSULE ORAL at 12:04

## 2025-01-01 RX ADMIN — WATER 40 MG: 1 INJECTION INTRAMUSCULAR; INTRAVENOUS; SUBCUTANEOUS at 23:22

## 2025-01-01 RX ADMIN — DEXMEDETOMIDINE HYDROCHLORIDE 1.1 MCG/KG/HR: 400 INJECTION INTRAVENOUS at 14:21

## 2025-01-01 RX ADMIN — FAMOTIDINE 20 MG: 10 INJECTION INTRAVENOUS at 08:18

## 2025-01-01 RX ADMIN — Medication 125 MCG/HR: at 22:18

## 2025-01-01 RX ADMIN — ACETAMINOPHEN 1000 MG: 1000 INJECTION, SOLUTION INTRAVENOUS at 12:47

## 2025-01-01 RX ADMIN — MORPHINE SULFATE 4 MG: 4 INJECTION, SOLUTION INTRAMUSCULAR; INTRAVENOUS at 11:17

## 2025-01-01 RX ADMIN — Medication 15 G: at 09:01

## 2025-01-01 RX ADMIN — CEFEPIME 2000 MG: 2 INJECTION, POWDER, FOR SOLUTION INTRAVENOUS at 14:02

## 2025-01-01 RX ADMIN — DEXMEDETOMIDINE HYDROCHLORIDE 0.2 MCG/KG/HR: 400 INJECTION INTRAVENOUS at 10:01

## 2025-01-01 RX ADMIN — Medication 10 ML: at 09:49

## 2025-01-01 RX ADMIN — MUPIROCIN 1 APPLICATION: 20 OINTMENT TOPICAL at 09:48

## 2025-01-01 RX ADMIN — FUROSEMIDE 40 MG: 10 INJECTION, SOLUTION INTRAMUSCULAR; INTRAVENOUS at 17:30

## 2025-01-01 RX ADMIN — HYDROXYZINE HYDROCHLORIDE 25 MG: 25 TABLET, FILM COATED ORAL at 12:27

## 2025-01-01 RX ADMIN — GABAPENTIN 200 MG: 100 CAPSULE ORAL at 21:27

## 2025-01-01 RX ADMIN — WATER 40 MG: 1 INJECTION INTRAMUSCULAR; INTRAVENOUS; SUBCUTANEOUS at 17:07

## 2025-01-01 RX ADMIN — SODIUM CHLORIDE TAB 1 GM 2 G: 1 TAB at 12:08

## 2025-01-01 RX ADMIN — Medication 2 TABLET: at 08:00

## 2025-01-01 RX ADMIN — MICONAZOLE NITRATE 1 APPLICATION: 2 POWDER TOPICAL at 08:38

## 2025-01-01 RX ADMIN — WATER 40 MG: 1 INJECTION INTRAMUSCULAR; INTRAVENOUS; SUBCUTANEOUS at 17:43

## 2025-01-01 RX ADMIN — MUPIROCIN 1 APPLICATION: 20 OINTMENT TOPICAL at 08:18

## 2025-01-01 RX ADMIN — LORAZEPAM 1 MG: 2 SOLUTION, CONCENTRATE ORAL at 16:35

## 2025-01-01 RX ADMIN — SODIUM CHLORIDE TAB 1 GM 2 G: 1 TAB at 08:18

## 2025-01-01 RX ADMIN — IPRATROPIUM BROMIDE AND ALBUTEROL SULFATE 3 ML: .5; 3 SOLUTION RESPIRATORY (INHALATION) at 03:56

## 2025-01-01 RX ADMIN — CEFEPIME 2000 MG: 2 INJECTION, POWDER, FOR SOLUTION INTRAVENOUS at 05:45

## 2025-01-01 RX ADMIN — ACETAMINOPHEN 1000 MG: 1000 INJECTION, SOLUTION INTRAVENOUS at 13:17

## 2025-01-01 RX ADMIN — CEFEPIME 2000 MG: 2 INJECTION, POWDER, FOR SOLUTION INTRAVENOUS at 06:24

## 2025-01-01 RX ADMIN — OXYCODONE HYDROCHLORIDE AND ACETAMINOPHEN 1 TABLET: 10; 325 TABLET ORAL at 12:07

## 2025-01-01 RX ADMIN — LORAZEPAM 2 MG: 2 SOLUTION, CONCENTRATE ORAL at 17:50

## 2025-01-01 RX ADMIN — Medication 15 G: at 21:34

## 2025-01-01 RX ADMIN — PROPOFOL 35 MCG/KG/MIN: 10 INJECTION, EMULSION INTRAVENOUS at 04:49

## 2025-01-01 RX ADMIN — CEFEPIME 2000 MG: 2 INJECTION, POWDER, FOR SOLUTION INTRAVENOUS at 14:39

## 2025-01-01 RX ADMIN — SODIUM CHLORIDE TAB 1 GM 2 G: 1 TAB at 07:53

## 2025-01-01 RX ADMIN — ACETAMINOPHEN 1000 MG: 1000 INJECTION, SOLUTION INTRAVENOUS at 11:56

## 2025-01-01 RX ADMIN — LORAZEPAM 2 MG: 2 SOLUTION, CONCENTRATE ORAL at 08:04

## 2025-01-01 RX ADMIN — GABAPENTIN 200 MG: 100 CAPSULE ORAL at 08:00

## 2025-01-01 RX ADMIN — GABAPENTIN 200 MG: 100 CAPSULE ORAL at 08:30

## 2025-01-01 RX ADMIN — DOCUSATE SODIUM 100 MG: 50 LIQUID ORAL at 08:17

## 2025-01-01 RX ADMIN — IPRATROPIUM BROMIDE AND ALBUTEROL SULFATE 3 ML: .5; 3 SOLUTION RESPIRATORY (INHALATION) at 12:14

## 2025-01-01 RX ADMIN — HYDROXYZINE HYDROCHLORIDE 25 MG: 25 TABLET, FILM COATED ORAL at 18:47

## 2025-01-01 RX ADMIN — WATER 40 MG: 1 INJECTION INTRAMUSCULAR; INTRAVENOUS; SUBCUTANEOUS at 05:42

## 2025-01-01 RX ADMIN — MORPHINE SULFATE 10 MG: 100 SOLUTION ORAL at 09:28

## 2025-01-01 RX ADMIN — SODIUM CHLORIDE 500 ML: 9 INJECTION, SOLUTION INTRAVENOUS at 11:54

## 2025-01-01 RX ADMIN — DRONABINOL 2.5 MG: 2.5 CAPSULE ORAL at 20:28

## 2025-01-01 RX ADMIN — MIDODRINE HYDROCHLORIDE 10 MG: 10 TABLET ORAL at 11:26

## 2025-01-01 RX ADMIN — CEFEPIME 2000 MG: 2 INJECTION, POWDER, FOR SOLUTION INTRAVENOUS at 06:12

## 2025-01-01 RX ADMIN — MAGNESIUM SULFATE 2 G: 1 INJECTION INTRAVENOUS at 14:16

## 2025-01-01 RX ADMIN — IPRATROPIUM BROMIDE AND ALBUTEROL SULFATE 3 ML: .5; 3 SOLUTION RESPIRATORY (INHALATION) at 18:23

## 2025-01-01 RX ADMIN — MORPHINE SULFATE 4 MG: 4 INJECTION, SOLUTION INTRAMUSCULAR; INTRAVENOUS at 18:33

## 2025-01-01 RX ADMIN — Medication 150 MCG/HR: at 14:47

## 2025-01-01 RX ADMIN — MORPHINE SULFATE 10 MG: 100 SOLUTION ORAL at 13:33

## 2025-01-01 RX ADMIN — PROCHLORPERAZINE MALEATE 10 MG: 5 TABLET ORAL at 07:25

## 2025-01-01 RX ADMIN — ACETAMINOPHEN 1000 MG: 1000 INJECTION, SOLUTION INTRAVENOUS at 14:39

## 2025-01-01 RX ADMIN — MICONAZOLE NITRATE 1 APPLICATION: 2 POWDER TOPICAL at 08:18

## 2025-01-01 RX ADMIN — MIDODRINE HYDROCHLORIDE 10 MG: 10 TABLET ORAL at 16:48

## 2025-01-01 RX ADMIN — MUPIROCIN 1 APPLICATION: 20 OINTMENT TOPICAL at 20:23

## 2025-01-01 RX ADMIN — SODIUM CHLORIDE TAB 1 GM 2 G: 1 TAB at 16:49

## 2025-01-01 RX ADMIN — Medication 10 ML: at 20:44

## 2025-01-01 RX ADMIN — PROPOFOL 5 MCG/KG/MIN: 10 INJECTION, EMULSION INTRAVENOUS at 17:28

## 2025-01-01 RX ADMIN — OXYCODONE HYDROCHLORIDE AND ACETAMINOPHEN 1 TABLET: 10; 325 TABLET ORAL at 16:49

## 2025-01-01 RX ADMIN — OXYCODONE HYDROCHLORIDE AND ACETAMINOPHEN 1 TABLET: 10; 325 TABLET ORAL at 08:59

## 2025-01-01 RX ADMIN — MORPHINE SULFATE 4 MG: 4 INJECTION, SOLUTION INTRAMUSCULAR; INTRAVENOUS at 05:03

## 2025-01-01 RX ADMIN — CEFEPIME 2000 MG: 2 INJECTION, POWDER, FOR SOLUTION INTRAVENOUS at 06:16

## 2025-01-01 RX ADMIN — MIDODRINE HYDROCHLORIDE 10 MG: 10 TABLET ORAL at 08:59

## 2025-01-01 RX ADMIN — GABAPENTIN 200 MG: 100 CAPSULE ORAL at 21:34

## 2025-01-01 RX ADMIN — ATROPINE SULFATE 2 DROP: 10 SOLUTION/ DROPS OPHTHALMIC at 05:36

## 2025-01-01 RX ADMIN — GABAPENTIN 200 MG: 100 CAPSULE ORAL at 21:44

## 2025-01-01 RX ADMIN — LORAZEPAM 2 MG: 2 SOLUTION, CONCENTRATE ORAL at 13:17

## 2025-01-01 RX ADMIN — SODIUM CHLORIDE, POTASSIUM CHLORIDE, SODIUM LACTATE AND CALCIUM CHLORIDE 1000 ML: 600; 310; 30; 20 INJECTION, SOLUTION INTRAVENOUS at 04:07

## 2025-01-01 RX ADMIN — Medication 15 G: at 08:48

## 2025-01-01 RX ADMIN — DEXMEDETOMIDINE HYDROCHLORIDE 1.2 MCG/KG/HR: 400 INJECTION INTRAVENOUS at 08:15

## 2025-01-01 RX ADMIN — Medication 15 G: at 12:22

## 2025-01-01 RX ADMIN — DEXMEDETOMIDINE HYDROCHLORIDE 1.1 MCG/KG/HR: 400 INJECTION INTRAVENOUS at 22:55

## 2025-01-01 RX ADMIN — MORPHINE SULFATE 4 MG: 4 INJECTION, SOLUTION INTRAMUSCULAR; INTRAVENOUS at 01:32

## 2025-01-01 RX ADMIN — Medication 150 MCG/HR: at 19:52

## 2025-01-01 RX ADMIN — SODIUM CHLORIDE TAB 1 GM 1 G: 1 TAB at 11:21

## 2025-01-01 RX ADMIN — LORAZEPAM 1 MG: 2 SOLUTION, CONCENTRATE ORAL at 13:33

## 2025-01-01 RX ADMIN — MICONAZOLE NITRATE 1 APPLICATION: 2 POWDER TOPICAL at 08:50

## 2025-01-01 RX ADMIN — ONDANSETRON 4 MG: 2 INJECTION INTRAMUSCULAR; INTRAVENOUS at 08:57

## 2025-01-01 RX ADMIN — OXYCODONE HYDROCHLORIDE AND ACETAMINOPHEN 1 TABLET: 10; 325 TABLET ORAL at 09:02

## 2025-01-01 RX ADMIN — Medication 100 MCG/HR: at 20:32

## 2025-01-01 RX ADMIN — DRONABINOL 2.5 MG: 2.5 CAPSULE ORAL at 08:49

## 2025-01-01 RX ADMIN — Medication 10 ML: at 08:04

## 2025-01-01 RX ADMIN — CEFEPIME 2000 MG: 2 INJECTION, POWDER, FOR SOLUTION INTRAVENOUS at 06:08

## 2025-01-01 RX ADMIN — PROPOFOL 25 MCG/KG/MIN: 10 INJECTION, EMULSION INTRAVENOUS at 08:17

## 2025-01-01 RX ADMIN — MORPHINE SULFATE 2 MG: 2 INJECTION, SOLUTION INTRAMUSCULAR; INTRAVENOUS at 09:17

## 2025-01-01 RX ADMIN — ACETAMINOPHEN 1000 MG: 1000 INJECTION, SOLUTION INTRAVENOUS at 03:36

## 2025-01-01 RX ADMIN — OXYCODONE HYDROCHLORIDE 5 MG: 5 TABLET ORAL at 08:17

## 2025-01-01 RX ADMIN — SODIUM CHLORIDE TAB 1 GM 2 G: 1 TAB at 12:21

## 2025-01-01 RX ADMIN — FAMOTIDINE 20 MG: 10 INJECTION INTRAVENOUS at 08:03

## 2025-01-01 RX ADMIN — MORPHINE SULFATE 10 MG: 100 SOLUTION ORAL at 12:19

## 2025-01-01 RX ADMIN — NICOTINE 1 PATCH: 14 PATCH TRANSDERMAL at 08:00

## 2025-01-01 RX ADMIN — ATROPINE SULFATE 2 DROP: 10 SOLUTION/ DROPS OPHTHALMIC at 00:29

## 2025-01-01 RX ADMIN — CEFEPIME 2000 MG: 2 INJECTION, POWDER, FOR SOLUTION INTRAVENOUS at 14:15

## 2025-01-01 RX ADMIN — NICOTINE 1 PATCH: 14 PATCH TRANSDERMAL at 08:20

## 2025-01-01 RX ADMIN — FUROSEMIDE 40 MG: 10 INJECTION, SOLUTION INTRAMUSCULAR; INTRAVENOUS at 22:58

## 2025-01-01 RX ADMIN — MIDODRINE HYDROCHLORIDE 10 MG: 10 TABLET ORAL at 11:17

## 2025-01-01 RX ADMIN — OXYCODONE HYDROCHLORIDE AND ACETAMINOPHEN 1 TABLET: 10; 325 TABLET ORAL at 20:28

## 2025-01-01 RX ADMIN — POLYETHYLENE GLYCOL 3350 17 G: 17 POWDER, FOR SOLUTION ORAL at 08:00

## 2025-01-01 RX ADMIN — DEXTROSE MONOHYDRATE 15 MMOL: 50 INJECTION, SOLUTION INTRAVENOUS at 17:44

## 2025-01-01 RX ADMIN — PROPOFOL 30 MCG/KG/MIN: 10 INJECTION, EMULSION INTRAVENOUS at 04:05

## 2025-01-01 RX ADMIN — DEXMEDETOMIDINE HYDROCHLORIDE 1 MCG/KG/HR: 400 INJECTION INTRAVENOUS at 12:57

## 2025-01-01 RX ADMIN — DIAZEPAM 10 MG: 10 INJECTION, SOLUTION INTRAMUSCULAR; INTRAVENOUS at 01:29

## 2025-01-01 RX ADMIN — NICOTINE 1 PATCH: 14 PATCH TRANSDERMAL at 08:31

## 2025-01-01 RX ADMIN — SODIUM CHLORIDE TAB 1 GM 2 G: 1 TAB at 16:44

## 2025-01-01 RX ADMIN — WATER 40 MG: 1 INJECTION INTRAMUSCULAR; INTRAVENOUS; SUBCUTANEOUS at 05:40

## 2025-01-01 RX ADMIN — FLUDROCORTISONE ACETATE 0.1 MG: 0.1 TABLET ORAL at 08:59

## 2025-01-01 RX ADMIN — Medication 175 MCG/HR: at 04:38

## 2025-01-01 RX ADMIN — CEFEPIME 2000 MG: 2 INJECTION, POWDER, FOR SOLUTION INTRAVENOUS at 22:00

## 2025-01-01 RX ADMIN — Medication 10 ML: at 20:58

## 2025-01-01 RX ADMIN — SODIUM PHOSPHATE, MONOBASIC, MONOHYDRATE AND SODIUM PHOSPHATE, DIBASIC, ANHYDROUS 15 MMOL: 142; 276 INJECTION, SOLUTION INTRAVENOUS at 04:42

## 2025-01-01 RX ADMIN — SODIUM CHLORIDE TAB 1 GM 2 G: 1 TAB at 08:49

## 2025-01-01 RX ADMIN — MICONAZOLE NITRATE 1 APPLICATION: 2 POWDER TOPICAL at 12:22

## 2025-01-01 RX ADMIN — IPRATROPIUM BROMIDE AND ALBUTEROL SULFATE 3 ML: .5; 3 SOLUTION RESPIRATORY (INHALATION) at 06:18

## 2025-01-01 RX ADMIN — SODIUM CHLORIDE 1500 MG: 9 INJECTION, SOLUTION INTRAVENOUS at 14:03

## 2025-01-01 RX ADMIN — PROCHLORPERAZINE MALEATE 10 MG: 5 TABLET ORAL at 16:49

## 2025-01-01 RX ADMIN — LORAZEPAM 0.5 MG: 2 SOLUTION, CONCENTRATE ORAL at 20:25

## 2025-01-01 RX ADMIN — FAMOTIDINE 20 MG: 10 INJECTION INTRAVENOUS at 20:23

## 2025-01-01 RX ADMIN — LORAZEPAM 1 MG: 2 SOLUTION, CONCENTRATE ORAL at 20:28

## 2025-01-01 RX ADMIN — FAMOTIDINE 20 MG: 10 INJECTION INTRAVENOUS at 08:40

## 2025-01-01 RX ADMIN — THIAMINE HYDROCHLORIDE 100 MG: 100 INJECTION, SOLUTION INTRAMUSCULAR; INTRAVENOUS at 17:43

## 2025-01-01 RX ADMIN — FAMOTIDINE 20 MG: 10 INJECTION INTRAVENOUS at 21:20

## 2025-01-01 RX ADMIN — Medication 10 ML: at 09:00

## 2025-01-01 RX ADMIN — MUPIROCIN 1 APPLICATION: 20 OINTMENT TOPICAL at 20:44

## 2025-01-01 RX ADMIN — FUROSEMIDE 40 MG: 10 INJECTION, SOLUTION INTRAMUSCULAR; INTRAVENOUS at 08:40

## 2025-01-01 RX ADMIN — Medication 10 ML: at 21:20

## 2025-01-01 RX ADMIN — CEFEPIME 2000 MG: 2 INJECTION, POWDER, FOR SOLUTION INTRAVENOUS at 14:29

## 2025-01-01 RX ADMIN — DEXMEDETOMIDINE HYDROCHLORIDE 1 MCG/KG/HR: 400 INJECTION INTRAVENOUS at 14:45

## 2025-01-01 RX ADMIN — Medication 10 ML: at 08:31

## 2025-01-01 RX ADMIN — IOPAMIDOL 100 ML: 755 INJECTION, SOLUTION INTRAVENOUS at 12:09

## 2025-01-01 RX ADMIN — MORPHINE SULFATE 4 MG: 4 INJECTION, SOLUTION INTRAMUSCULAR; INTRAVENOUS at 02:53

## 2025-01-01 RX ADMIN — NICOTINE 1 PATCH: 14 PATCH TRANSDERMAL at 08:38

## 2025-01-01 RX ADMIN — PROPOFOL 40 MCG/KG/MIN: 10 INJECTION, EMULSION INTRAVENOUS at 14:09

## 2025-01-01 RX ADMIN — Medication 10 ML: at 08:38

## 2025-01-01 RX ADMIN — FLUDROCORTISONE ACETATE 0.1 MG: 0.1 TABLET ORAL at 08:31

## 2025-01-01 RX ADMIN — OXYCODONE HYDROCHLORIDE AND ACETAMINOPHEN 1 TABLET: 10; 325 TABLET ORAL at 05:15

## 2025-01-01 RX ADMIN — POLYETHYLENE GLYCOL 3350 17 G: 17 POWDER, FOR SOLUTION ORAL at 08:48

## 2025-01-01 RX ADMIN — CEFEPIME 2000 MG: 2 INJECTION, POWDER, FOR SOLUTION INTRAVENOUS at 14:40

## 2025-01-01 RX ADMIN — POTASSIUM CHLORIDE 40 MEQ: 750 CAPSULE, EXTENDED RELEASE ORAL at 18:16

## 2025-01-01 RX ADMIN — Medication 10 ML: at 21:46

## 2025-01-01 RX ADMIN — IOPAMIDOL 40 ML: 755 INJECTION, SOLUTION INTRAVENOUS at 12:08

## 2025-01-01 RX ADMIN — MORPHINE SULFATE 1 MG: 2 INJECTION, SOLUTION INTRAMUSCULAR; INTRAVENOUS at 08:11

## 2025-01-01 RX ADMIN — MIDODRINE HYDROCHLORIDE 10 MG: 10 TABLET ORAL at 08:18

## 2025-01-01 RX ADMIN — WATER 40 MG: 1 INJECTION INTRAMUSCULAR; INTRAVENOUS; SUBCUTANEOUS at 06:12

## 2025-01-01 RX ADMIN — Medication 10 ML: at 20:39

## 2025-01-01 RX ADMIN — ESCITALOPRAM OXALATE 20 MG: 10 TABLET ORAL at 16:44

## 2025-01-01 RX ADMIN — HYDROXYZINE HYDROCHLORIDE 25 MG: 25 TABLET, FILM COATED ORAL at 09:05

## 2025-01-01 RX ADMIN — MIDODRINE HYDROCHLORIDE 10 MG: 10 TABLET ORAL at 11:16

## 2025-01-01 RX ADMIN — IPRATROPIUM BROMIDE AND ALBUTEROL SULFATE 3 ML: .5; 3 SOLUTION RESPIRATORY (INHALATION) at 13:58

## 2025-01-01 RX ADMIN — LACTULOSE SOLUTION USP, 10 G/15 ML 15.33 G: 10 SOLUTION ORAL; RECTAL at 02:29

## 2025-01-01 RX ADMIN — MIDODRINE HYDROCHLORIDE 10 MG: 10 TABLET ORAL at 12:21

## 2025-01-01 RX ADMIN — GABAPENTIN 200 MG: 100 CAPSULE ORAL at 08:59

## 2025-01-01 RX ADMIN — OXYCODONE HYDROCHLORIDE AND ACETAMINOPHEN 1 TABLET: 10; 325 TABLET ORAL at 18:20

## 2025-01-01 RX ADMIN — LORAZEPAM 2 MG: 2 SOLUTION, CONCENTRATE ORAL at 12:19

## 2025-01-01 RX ADMIN — MORPHINE SULFATE 4 MG: 4 INJECTION, SOLUTION INTRAMUSCULAR; INTRAVENOUS at 13:16

## 2025-01-01 RX ADMIN — SODIUM CHLORIDE TAB 1 GM 2 G: 1 TAB at 11:16

## 2025-01-01 RX ADMIN — MORPHINE SULFATE 4 MG: 4 INJECTION, SOLUTION INTRAMUSCULAR; INTRAVENOUS at 10:18

## 2025-01-01 RX ADMIN — OXYCODONE HYDROCHLORIDE AND ACETAMINOPHEN 1 TABLET: 10; 325 TABLET ORAL at 15:59

## 2025-01-01 RX ADMIN — MORPHINE SULFATE 4 MG: 4 INJECTION, SOLUTION INTRAMUSCULAR; INTRAVENOUS at 13:39

## 2025-01-01 RX ADMIN — MICONAZOLE NITRATE 1 APPLICATION: 2 POWDER TOPICAL at 09:48

## 2025-01-01 RX ADMIN — MIDODRINE HYDROCHLORIDE 10 MG: 10 TABLET ORAL at 16:44

## 2025-01-01 RX ADMIN — FLUDROCORTISONE ACETATE 0.1 MG: 0.1 TABLET ORAL at 07:25

## 2025-01-01 RX ADMIN — SCOLOPAMINE TRANSDERMAL SYSTEM 1 PATCH: 1 PATCH, EXTENDED RELEASE TRANSDERMAL at 01:29

## 2025-01-01 RX ADMIN — FUROSEMIDE 40 MG: 10 INJECTION, SOLUTION INTRAMUSCULAR; INTRAVENOUS at 18:11

## 2025-01-01 RX ADMIN — DIAZEPAM 5 MG: 10 INJECTION, SOLUTION INTRAMUSCULAR; INTRAVENOUS at 19:58

## 2025-01-01 RX ADMIN — ESCITALOPRAM OXALATE 20 MG: 10 TABLET ORAL at 08:00

## 2025-01-01 RX ADMIN — SODIUM CHLORIDE TAB 1 GM 2 G: 1 TAB at 08:03

## 2025-01-01 RX ADMIN — WATER 40 MG: 1 INJECTION INTRAMUSCULAR; INTRAVENOUS; SUBCUTANEOUS at 11:57

## 2025-01-01 RX ADMIN — MILRINONE LACTATE IN DEXTROSE 0.3 MCG/KG/MIN: 200 INJECTION, SOLUTION INTRAVENOUS at 17:50

## 2025-01-01 RX ADMIN — PROCHLORPERAZINE MALEATE 10 MG: 5 TABLET ORAL at 16:45

## 2025-01-01 RX ADMIN — SODIUM CHLORIDE TAB 1 GM 2 G: 1 TAB at 07:25

## 2025-01-01 RX ADMIN — Medication 2 TABLET: at 08:49

## 2025-01-01 RX ADMIN — DEXMEDETOMIDINE HYDROCHLORIDE 1.4 MCG/KG/HR: 400 INJECTION INTRAVENOUS at 16:28

## 2025-01-01 RX ADMIN — NICOTINE 1 PATCH: 14 PATCH TRANSDERMAL at 09:06

## 2025-01-01 RX ADMIN — FUROSEMIDE 40 MG: 10 INJECTION, SOLUTION INTRAMUSCULAR; INTRAVENOUS at 08:37

## 2025-01-01 RX ADMIN — NOREPINEPHRINE BITARTRATE 0.02 MCG/KG/MIN: 32 SOLUTION INTRAVENOUS at 13:11

## 2025-01-01 RX ADMIN — OXYCODONE HYDROCHLORIDE AND ACETAMINOPHEN 1 TABLET: 10; 325 TABLET ORAL at 21:55

## 2025-01-01 RX ADMIN — IPRATROPIUM BROMIDE AND ALBUTEROL SULFATE 3 ML: .5; 3 SOLUTION RESPIRATORY (INHALATION) at 18:08

## 2025-01-01 RX ADMIN — Medication 10 ML: at 07:27

## 2025-01-01 RX ADMIN — FAMOTIDINE 20 MG: 10 INJECTION INTRAVENOUS at 21:46

## 2025-01-01 RX ADMIN — DIAZEPAM 5 MG: 10 INJECTION, SOLUTION INTRAMUSCULAR; INTRAVENOUS at 05:25

## 2025-01-01 RX ADMIN — FENTANYL CITRATE 50 MCG: 50 INJECTION, SOLUTION INTRAMUSCULAR; INTRAVENOUS at 11:42

## 2025-01-01 RX ADMIN — ATROPINE SULFATE 2 DROP: 10 SOLUTION/ DROPS OPHTHALMIC at 12:19

## 2025-01-01 RX ADMIN — FUROSEMIDE 40 MG: 40 TABLET ORAL at 14:04

## 2025-01-01 RX ADMIN — IPRATROPIUM BROMIDE AND ALBUTEROL SULFATE 3 ML: .5; 3 SOLUTION RESPIRATORY (INHALATION) at 11:59

## 2025-01-01 RX ADMIN — FLUDROCORTISONE ACETATE 0.1 MG: 0.1 TABLET ORAL at 08:48

## 2025-01-01 RX ADMIN — DEXMEDETOMIDINE HYDROCHLORIDE 0.2 MCG/KG/HR: 400 INJECTION INTRAVENOUS at 10:19

## 2025-01-01 RX ADMIN — Medication 100 MG: at 08:18

## 2025-01-01 RX ADMIN — ATROPINE SULFATE 2 DROP: 10 SOLUTION/ DROPS OPHTHALMIC at 10:20

## 2025-01-01 RX ADMIN — PROPOFOL 15 MCG/KG/MIN: 10 INJECTION, EMULSION INTRAVENOUS at 08:04

## 2025-01-01 RX ADMIN — MORPHINE SULFATE 4 MG: 4 INJECTION, SOLUTION INTRAMUSCULAR; INTRAVENOUS at 04:38

## 2025-01-01 RX ADMIN — Medication 10 ML: at 20:06

## 2025-01-01 RX ADMIN — IPRATROPIUM BROMIDE AND ALBUTEROL SULFATE 3 ML: .5; 3 SOLUTION RESPIRATORY (INHALATION) at 18:38

## 2025-01-01 RX ADMIN — SODIUM CHLORIDE TAB 1 GM 2 G: 1 TAB at 07:59

## 2025-01-01 RX ADMIN — SODIUM PHOSPHATE, MONOBASIC, MONOHYDRATE AND SODIUM PHOSPHATE, DIBASIC, ANHYDROUS 15 MMOL: 142; 276 INJECTION, SOLUTION INTRAVENOUS at 08:41

## 2025-01-01 RX ADMIN — POLYETHYLENE GLYCOL 3350 17 G: 17 POWDER, FOR SOLUTION ORAL at 09:48

## 2025-01-01 RX ADMIN — NICOTINE 1 PATCH: 14 PATCH TRANSDERMAL at 07:27

## 2025-01-01 RX ADMIN — LORAZEPAM 1 MG: 2 SOLUTION, CONCENTRATE ORAL at 12:19

## 2025-01-01 RX ADMIN — MORPHINE SULFATE 4 MG: 4 INJECTION, SOLUTION INTRAMUSCULAR; INTRAVENOUS at 12:19

## 2025-01-01 RX ADMIN — VANCOMYCIN HYDROCHLORIDE 1250 MG: 1.25 INJECTION, POWDER, LYOPHILIZED, FOR SOLUTION INTRAVENOUS at 15:27

## 2025-01-01 RX ADMIN — FAMOTIDINE 20 MG: 10 INJECTION INTRAVENOUS at 20:39

## 2025-01-01 RX ADMIN — FENTANYL CITRATE 50 MCG: 50 INJECTION, SOLUTION INTRAMUSCULAR; INTRAVENOUS at 16:53

## 2025-01-01 RX ADMIN — GABAPENTIN 200 MG: 100 CAPSULE ORAL at 07:25

## 2025-01-01 RX ADMIN — LORAZEPAM 1 MG: 2 SOLUTION, CONCENTRATE ORAL at 09:16

## 2025-01-01 RX ADMIN — ROCURONIUM BROMIDE 100 MG: 10 INJECTION INTRAVENOUS at 16:56

## 2025-01-01 RX ADMIN — SENNOSIDES 2 TABLET: 8.6 TABLET, FILM COATED ORAL at 20:44

## 2025-01-01 RX ADMIN — SODIUM CHLORIDE 1000 ML: 9 INJECTION, SOLUTION INTRAVENOUS at 20:58

## 2025-01-01 RX ADMIN — GABAPENTIN 200 MG: 100 CAPSULE ORAL at 20:28

## 2025-01-01 RX ADMIN — MIDODRINE HYDROCHLORIDE 10 MG: 10 TABLET ORAL at 11:30

## 2025-01-01 RX ADMIN — MORPHINE SULFATE 4 MG: 4 INJECTION, SOLUTION INTRAMUSCULAR; INTRAVENOUS at 22:37

## 2025-01-01 RX ADMIN — PROPOFOL 45 MCG/KG/MIN: 10 INJECTION, EMULSION INTRAVENOUS at 16:36

## 2025-01-01 RX ADMIN — SODIUM CHLORIDE TAB 1 GM 2 G: 1 TAB at 11:57

## 2025-01-01 RX ADMIN — MIDODRINE HYDROCHLORIDE 10 MG: 10 TABLET ORAL at 17:44

## 2025-01-01 RX ADMIN — LORAZEPAM 2 MG: 2 SOLUTION, CONCENTRATE ORAL at 02:53

## 2025-01-01 RX ADMIN — MICONAZOLE NITRATE 1 APPLICATION: 2 POWDER TOPICAL at 09:05

## 2025-01-01 RX ADMIN — CEFEPIME 2000 MG: 2 INJECTION, POWDER, FOR SOLUTION INTRAVENOUS at 21:52

## 2025-01-01 RX ADMIN — MICONAZOLE NITRATE 1 APPLICATION: 2 POWDER TOPICAL at 07:27

## 2025-01-01 RX ADMIN — MIDODRINE HYDROCHLORIDE 10 MG: 10 TABLET ORAL at 08:49

## 2025-01-01 RX ADMIN — SODIUM CHLORIDE, POTASSIUM CHLORIDE, SODIUM LACTATE AND CALCIUM CHLORIDE 1500 ML: 600; 310; 30; 20 INJECTION, SOLUTION INTRAVENOUS at 13:17

## 2025-01-01 RX ADMIN — MIDODRINE HYDROCHLORIDE 10 MG: 10 TABLET ORAL at 11:46

## 2025-01-01 RX ADMIN — MORPHINE SULFATE 4 MG: 4 INJECTION, SOLUTION INTRAMUSCULAR; INTRAVENOUS at 15:40

## 2025-01-01 RX ADMIN — MORPHINE SULFATE 2 MG: 2 INJECTION, SOLUTION INTRAMUSCULAR; INTRAVENOUS at 20:26

## 2025-01-01 RX ADMIN — LORAZEPAM 1 MG: 2 SOLUTION, CONCENTRATE ORAL at 22:57

## 2025-01-01 RX ADMIN — MIDODRINE HYDROCHLORIDE 10 MG: 10 TABLET ORAL at 17:43

## 2025-01-01 RX ADMIN — LORAZEPAM 2 MG: 2 SOLUTION, CONCENTRATE ORAL at 10:18

## 2025-01-01 RX ADMIN — SODIUM CHLORIDE TAB 1 GM 2 G: 1 TAB at 17:02

## 2025-01-01 RX ADMIN — SODIUM CHLORIDE TAB 1 GM 2 G: 1 TAB at 08:59

## 2025-01-01 RX ADMIN — Medication 100 MCG/HR: at 10:30

## 2025-01-01 RX ADMIN — DEXMEDETOMIDINE HYDROCHLORIDE 1.1 MCG/KG/HR: 400 INJECTION INTRAVENOUS at 09:47

## 2025-01-01 RX ADMIN — LORAZEPAM 0.5 MG: 2 SOLUTION, CONCENTRATE ORAL at 08:11

## 2025-01-01 RX ADMIN — Medication 15 G: at 10:07

## 2025-01-01 RX ADMIN — DEXTROSE MONOHYDRATE 15 MMOL: 50 INJECTION, SOLUTION INTRAVENOUS at 14:17

## 2025-01-01 RX ADMIN — MORPHINE SULFATE 4 MG: 4 INJECTION, SOLUTION INTRAMUSCULAR; INTRAVENOUS at 10:20

## 2025-01-01 RX ADMIN — DOCUSATE SODIUM 100 MG: 50 LIQUID ORAL at 10:16

## 2025-01-01 RX ADMIN — VANCOMYCIN HYDROCHLORIDE 1250 MG: 1.25 INJECTION, POWDER, LYOPHILIZED, FOR SOLUTION INTRAVENOUS at 03:39

## 2025-01-01 RX ADMIN — OXYCODONE HYDROCHLORIDE AND ACETAMINOPHEN 1 TABLET: 10; 325 TABLET ORAL at 22:34

## 2025-01-01 RX ADMIN — MORPHINE SULFATE 4 MG: 4 INJECTION, SOLUTION INTRAMUSCULAR; INTRAVENOUS at 17:50

## 2025-01-01 RX ADMIN — MIDODRINE HYDROCHLORIDE 10 MG: 10 TABLET ORAL at 08:03

## 2025-01-01 RX ADMIN — CEFEPIME 2000 MG: 2 INJECTION, POWDER, FOR SOLUTION INTRAVENOUS at 23:22

## 2025-01-01 RX ADMIN — MIDODRINE HYDROCHLORIDE 10 MG: 10 TABLET ORAL at 07:59

## 2025-01-01 RX ADMIN — MICONAZOLE NITRATE 1 APPLICATION: 2 POWDER TOPICAL at 08:33

## 2025-01-01 RX ADMIN — MORPHINE SULFATE 4 MG: 4 INJECTION, SOLUTION INTRAMUSCULAR; INTRAVENOUS at 06:30

## 2025-01-01 RX ADMIN — NICOTINE 1 PATCH: 14 PATCH TRANSDERMAL at 18:13

## 2025-01-01 RX ADMIN — GABAPENTIN 200 MG: 100 CAPSULE ORAL at 21:55

## 2025-01-01 RX ADMIN — NOREPINEPHRINE BITARTRATE 0.1 MCG/KG/MIN: 32 SOLUTION INTRAVENOUS at 08:55

## 2025-01-01 RX ADMIN — MORPHINE SULFATE 2 MG: 2 INJECTION, SOLUTION INTRAMUSCULAR; INTRAVENOUS at 08:02

## 2025-01-01 RX ADMIN — SODIUM CHLORIDE TAB 1 GM 2 G: 1 TAB at 11:30

## 2025-01-01 RX ADMIN — MORPHINE SULFATE 4 MG: 4 INJECTION, SOLUTION INTRAMUSCULAR; INTRAVENOUS at 08:15

## 2025-01-01 RX ADMIN — DEXTROSE MONOHYDRATE 250 ML/HR: 50 INJECTION, SOLUTION INTRAVENOUS at 20:58

## 2025-01-01 RX ADMIN — MILRINONE LACTATE IN DEXTROSE 0.3 MCG/KG/MIN: 200 INJECTION, SOLUTION INTRAVENOUS at 01:49

## 2025-01-01 RX ADMIN — MORPHINE SULFATE 4 MG: 4 INJECTION, SOLUTION INTRAMUSCULAR; INTRAVENOUS at 12:51

## 2025-01-01 RX ADMIN — MIDODRINE HYDROCHLORIDE 10 MG: 10 TABLET ORAL at 16:49

## 2025-01-01 RX ADMIN — FLUDROCORTISONE ACETATE 0.1 MG: 0.1 TABLET ORAL at 08:00

## 2025-01-01 RX ADMIN — LORAZEPAM 1 MG: 2 SOLUTION, CONCENTRATE ORAL at 05:35

## 2025-01-01 RX ADMIN — OXYCODONE HYDROCHLORIDE AND ACETAMINOPHEN 1 TABLET: 10; 325 TABLET ORAL at 18:47

## 2025-01-01 RX ADMIN — FAMOTIDINE 20 MG: 10 INJECTION INTRAVENOUS at 21:52

## 2025-01-01 RX ADMIN — Medication 10 ML: at 21:27

## 2025-01-01 RX ADMIN — POLYETHYLENE GLYCOL 3350 17 G: 17 POWDER, FOR SOLUTION ORAL at 08:03

## 2025-01-01 RX ADMIN — FLUDROCORTISONE ACETATE 0.1 MG: 0.1 TABLET ORAL at 08:03

## 2025-01-01 RX ADMIN — Medication 150 MCG/HR: at 04:04

## 2025-01-01 RX ADMIN — CEFEPIME 2000 MG: 2 INJECTION, POWDER, FOR SOLUTION INTRAVENOUS at 22:32

## 2025-01-01 RX ADMIN — NICOTINE 1 PATCH: 14 PATCH TRANSDERMAL at 08:49

## 2025-01-01 RX ADMIN — WATER 40 MG: 1 INJECTION INTRAMUSCULAR; INTRAVENOUS; SUBCUTANEOUS at 05:41

## 2025-01-01 RX ADMIN — SULFUR HEXAFLUORIDE 4 ML: KIT at 14:28

## 2025-01-01 RX ADMIN — FLUDROCORTISONE ACETATE 0.1 MG: 0.1 TABLET ORAL at 08:17

## 2025-01-01 RX ADMIN — GABAPENTIN 200 MG: 100 CAPSULE ORAL at 14:04

## 2025-01-01 RX ADMIN — SODIUM CHLORIDE TAB 1 GM 2 G: 1 TAB at 11:26

## 2025-01-01 RX ADMIN — PROPOFOL 45 MCG/KG/MIN: 10 INJECTION, EMULSION INTRAVENOUS at 00:31

## 2025-01-01 RX ADMIN — NICOTINE 1 PATCH: 14 PATCH TRANSDERMAL at 08:06

## 2025-01-01 RX ADMIN — MORPHINE SULFATE 2 MG: 2 INJECTION, SOLUTION INTRAMUSCULAR; INTRAVENOUS at 01:52

## 2025-01-01 RX ADMIN — LORAZEPAM 2 MG: 2 SOLUTION, CONCENTRATE ORAL at 11:17

## 2025-01-01 RX ADMIN — MIDODRINE HYDROCHLORIDE 10 MG: 10 TABLET ORAL at 17:02

## 2025-01-01 RX ADMIN — Medication 10 ML: at 08:50

## 2025-01-01 RX ADMIN — ONDANSETRON 4 MG: 2 INJECTION INTRAMUSCULAR; INTRAVENOUS at 10:53

## 2025-01-01 RX ADMIN — OXYCODONE HYDROCHLORIDE AND ACETAMINOPHEN 1 TABLET: 10; 325 TABLET ORAL at 21:44

## 2025-01-01 RX ADMIN — SODIUM CHLORIDE TAB 1 GM 2 G: 1 TAB at 11:17

## 2025-01-01 RX ADMIN — GABAPENTIN 200 MG: 100 CAPSULE ORAL at 16:49

## 2025-01-01 RX ADMIN — ACETAMINOPHEN 1000 MG: 1000 INJECTION, SOLUTION INTRAVENOUS at 15:18

## 2025-01-01 RX ADMIN — WATER 40 MG: 1 INJECTION INTRAMUSCULAR; INTRAVENOUS; SUBCUTANEOUS at 23:30

## 2025-01-01 RX ADMIN — Medication 10 ML: at 08:00

## 2025-01-01 RX ADMIN — MORPHINE SULFATE 4 MG: 4 INJECTION, SOLUTION INTRAMUSCULAR; INTRAVENOUS at 20:29

## 2025-01-01 RX ADMIN — OXYCODONE HYDROCHLORIDE AND ACETAMINOPHEN 1 TABLET: 10; 325 TABLET ORAL at 14:04

## 2025-01-01 RX ADMIN — PROPOFOL 45 MCG/KG/MIN: 10 INJECTION, EMULSION INTRAVENOUS at 20:43

## 2025-01-01 RX ADMIN — ETOMIDATE 20 MG: 40 INJECTION, SOLUTION INTRAVENOUS at 16:55

## 2025-01-01 RX ADMIN — Medication 10 ML: at 08:18

## 2025-01-01 RX ADMIN — METHYLNALTREXONE BROMIDE 6 MG: 12 INJECTION, SOLUTION SUBCUTANEOUS at 08:16

## 2025-01-01 RX ADMIN — Medication 150 MCG/HR: at 02:30

## 2025-01-01 RX ADMIN — SODIUM CHLORIDE TAB 1 GM 2 G: 1 TAB at 11:46

## 2025-01-01 RX ADMIN — MICONAZOLE NITRATE 1 APPLICATION: 2 POWDER TOPICAL at 08:41

## 2025-01-01 RX ADMIN — Medication 100 MG: at 08:03

## 2025-01-01 RX ADMIN — Medication 125 MCG/HR: at 05:41

## 2025-01-01 RX ADMIN — ACETAMINOPHEN 1000 MG: 1000 INJECTION, SOLUTION INTRAVENOUS at 20:23

## 2025-01-01 RX ADMIN — LORAZEPAM 1 MG: 2 SOLUTION, CONCENTRATE ORAL at 01:52

## 2025-01-01 RX ADMIN — SODIUM CHLORIDE TAB 1 GM 2 G: 1 TAB at 17:43

## 2025-01-01 RX ADMIN — SODIUM CHLORIDE, POTASSIUM CHLORIDE, SODIUM LACTATE AND CALCIUM CHLORIDE 500 ML: 600; 310; 30; 20 INJECTION, SOLUTION INTRAVENOUS at 05:30

## 2025-01-01 RX ADMIN — OXYCODONE HYDROCHLORIDE AND ACETAMINOPHEN 1 TABLET: 10; 325 TABLET ORAL at 07:25

## 2025-01-01 RX ADMIN — FUROSEMIDE 40 MG: 10 INJECTION, SOLUTION INTRAMUSCULAR; INTRAVENOUS at 10:35

## 2025-01-01 RX ADMIN — NICOTINE 1 PATCH: 14 PATCH TRANSDERMAL at 10:19

## 2025-01-01 RX ADMIN — LORAZEPAM 1 MG: 2 SOLUTION, CONCENTRATE ORAL at 08:15

## 2025-01-01 RX ADMIN — ATROPINE SULFATE 2 DROP: 10 SOLUTION/ DROPS OPHTHALMIC at 08:15

## 2025-01-01 RX ADMIN — LORAZEPAM 2 MG: 2 SOLUTION, CONCENTRATE ORAL at 15:41

## 2025-01-01 RX ADMIN — PROPOFOL 5 MCG/KG/MIN: 10 INJECTION, EMULSION INTRAVENOUS at 17:49

## 2025-01-01 RX ADMIN — DEXMEDETOMIDINE HYDROCHLORIDE 1.2 MCG/KG/HR: 400 INJECTION INTRAVENOUS at 23:21

## 2025-01-01 RX ADMIN — SENNOSIDES 2 TABLET: 8.6 TABLET, FILM COATED ORAL at 09:48

## 2025-01-01 RX ADMIN — WATER 40 MG: 1 INJECTION INTRAMUSCULAR; INTRAVENOUS; SUBCUTANEOUS at 06:24

## 2025-01-01 RX ADMIN — CEFEPIME 2000 MG: 2 INJECTION, POWDER, FOR SOLUTION INTRAVENOUS at 21:46

## 2025-01-01 RX ADMIN — ATROPINE SULFATE 2 DROP: 10 SOLUTION/ DROPS OPHTHALMIC at 03:00

## 2025-01-01 RX ADMIN — MUPIROCIN 1 APPLICATION: 20 OINTMENT TOPICAL at 21:20

## 2025-01-01 RX ADMIN — WATER 40 MG: 1 INJECTION INTRAMUSCULAR; INTRAVENOUS; SUBCUTANEOUS at 05:30

## 2025-01-01 RX ADMIN — DEXMEDETOMIDINE HYDROCHLORIDE 1.5 MCG/KG/HR: 400 INJECTION INTRAVENOUS at 13:14

## 2025-01-01 RX ADMIN — FAMOTIDINE 20 MG: 10 INJECTION INTRAVENOUS at 09:58

## 2025-01-01 RX ADMIN — MIDODRINE HYDROCHLORIDE 10 MG: 10 TABLET ORAL at 07:26

## 2025-01-01 RX ADMIN — MORPHINE SULFATE 4 MG: 4 INJECTION, SOLUTION INTRAMUSCULAR; INTRAVENOUS at 16:34

## 2025-01-01 RX ADMIN — NICOTINE 1 PATCH: 14 PATCH TRANSDERMAL at 08:42

## 2025-01-01 RX ADMIN — FAMOTIDINE 20 MG: 10 INJECTION INTRAVENOUS at 20:44

## 2025-01-01 RX ADMIN — Medication 15 G: at 20:29

## 2025-01-01 RX ADMIN — Medication 10 ML: at 21:52

## 2025-01-01 RX ADMIN — MORPHINE SULFATE 4 MG: 4 INJECTION, SOLUTION INTRAMUSCULAR; INTRAVENOUS at 00:29

## 2025-01-01 RX ADMIN — POTASSIUM PHOSPHATE, MONOBASIC AND POTASSIUM PHOSPHATE, DIBASIC 15 MMOL: 224; 236 INJECTION, SOLUTION, CONCENTRATE INTRAVENOUS at 10:28

## 2025-01-01 RX ADMIN — SODIUM CHLORIDE 500 ML: 9 INJECTION, SOLUTION INTRAVENOUS at 12:21

## 2025-01-01 RX ADMIN — VANCOMYCIN HYDROCHLORIDE 1250 MG: 1.25 INJECTION, POWDER, LYOPHILIZED, FOR SOLUTION INTRAVENOUS at 03:36

## 2025-01-01 RX ADMIN — MUPIROCIN 1 APPLICATION: 20 OINTMENT TOPICAL at 08:41

## 2025-01-01 RX ADMIN — MORPHINE SULFATE 4 MG: 4 INJECTION, SOLUTION INTRAMUSCULAR; INTRAVENOUS at 01:28

## 2025-01-01 RX ADMIN — FAMOTIDINE 20 MG: 10 INJECTION INTRAVENOUS at 08:37

## 2025-01-01 RX ADMIN — GABAPENTIN 200 MG: 100 CAPSULE ORAL at 17:02

## 2025-01-01 RX ADMIN — GABAPENTIN 200 MG: 100 CAPSULE ORAL at 15:59

## 2025-01-01 RX ADMIN — OXYCODONE HYDROCHLORIDE AND ACETAMINOPHEN 1 TABLET: 10; 325 TABLET ORAL at 11:17

## 2025-01-01 RX ADMIN — OXYCODONE HYDROCHLORIDE AND ACETAMINOPHEN 1 TABLET: 10; 325 TABLET ORAL at 08:48

## 2025-01-01 RX ADMIN — MORPHINE SULFATE 4 MG: 4 INJECTION, SOLUTION INTRAMUSCULAR; INTRAVENOUS at 16:22

## 2025-01-01 RX ADMIN — ESCITALOPRAM OXALATE 20 MG: 10 TABLET ORAL at 07:25

## 2025-01-01 RX ADMIN — POTASSIUM PHOSPHATE, MONOBASIC AND POTASSIUM PHOSPHATE, DIBASIC 15 MMOL: 224; 236 INJECTION, SOLUTION, CONCENTRATE INTRAVENOUS at 14:29

## 2025-01-01 RX ADMIN — PROPOFOL 15 MCG/KG/MIN: 10 INJECTION, EMULSION INTRAVENOUS at 12:49

## 2025-01-01 RX ADMIN — SENNOSIDES 2 TABLET: 8.6 TABLET, FILM COATED ORAL at 08:37

## 2025-01-01 RX ADMIN — ATROPINE SULFATE 2 DROP: 10 SOLUTION/ DROPS OPHTHALMIC at 23:15

## 2025-01-01 RX ADMIN — THIAMINE HYDROCHLORIDE 100 MG: 100 INJECTION, SOLUTION INTRAMUSCULAR; INTRAVENOUS at 09:48

## 2025-01-01 RX ADMIN — PROPOFOL 20 MCG/KG/MIN: 10 INJECTION, EMULSION INTRAVENOUS at 22:27

## 2025-01-01 RX ADMIN — SENNOSIDES 2 TABLET: 8.6 TABLET, FILM COATED ORAL at 20:23

## 2025-01-01 RX ADMIN — Medication 10 ML: at 20:36

## 2025-01-01 RX ADMIN — MICONAZOLE NITRATE 1 APPLICATION: 2 POWDER TOPICAL at 08:00

## 2025-01-01 RX ADMIN — MORPHINE SULFATE 10 MG: 100 SOLUTION ORAL at 23:26

## 2025-01-01 RX ADMIN — SODIUM CHLORIDE TAB 1 GM 2 G: 1 TAB at 17:44

## 2025-01-01 RX ADMIN — GABAPENTIN 200 MG: 100 CAPSULE ORAL at 08:49

## 2025-01-01 RX ADMIN — MUPIROCIN 1 APPLICATION: 20 OINTMENT TOPICAL at 08:03

## 2025-01-01 RX ADMIN — ACETAMINOPHEN 1000 MG: 1000 INJECTION, SOLUTION INTRAVENOUS at 03:04

## 2025-01-01 RX ADMIN — Medication 2 TABLET: at 20:29

## 2025-01-01 RX ADMIN — IPRATROPIUM BROMIDE AND ALBUTEROL SULFATE 3 ML: .5; 3 SOLUTION RESPIRATORY (INHALATION) at 13:31

## 2025-01-01 RX ADMIN — FUROSEMIDE 40 MG: 10 INJECTION, SOLUTION INTRAMUSCULAR; INTRAVENOUS at 15:38

## 2025-01-01 RX ADMIN — MIDODRINE HYDROCHLORIDE 10 MG: 10 TABLET ORAL at 17:11

## 2025-01-01 RX ADMIN — Medication 50 MCG/HR: at 17:26

## 2025-01-01 RX ADMIN — MORPHINE SULFATE 4 MG: 4 INJECTION, SOLUTION INTRAMUSCULAR; INTRAVENOUS at 23:19

## 2025-01-01 RX ADMIN — SODIUM CHLORIDE TAB 1 GM 2 G: 1 TAB at 17:11

## 2025-01-01 RX ADMIN — MUPIROCIN 1 APPLICATION: 20 OINTMENT TOPICAL at 21:53

## 2025-01-01 RX ADMIN — IPRATROPIUM BROMIDE AND ALBUTEROL SULFATE 3 ML: .5; 3 SOLUTION RESPIRATORY (INHALATION) at 06:23

## 2025-01-01 RX ADMIN — MIDODRINE HYDROCHLORIDE 10 MG: 10 TABLET ORAL at 11:57

## 2025-01-01 RX ADMIN — NOREPINEPHRINE BITARTRATE 0.02 MCG/KG/MIN: 32 SOLUTION INTRAVENOUS at 14:26

## 2025-01-01 RX ADMIN — PROCHLORPERAZINE MALEATE 10 MG: 5 TABLET ORAL at 22:21

## 2025-01-01 RX ADMIN — PROPOFOL 5 MCG/KG/MIN: 10 INJECTION, EMULSION INTRAVENOUS at 03:03

## 2025-01-01 RX ADMIN — SENNOSIDES 2 TABLET: 8.6 TABLET, FILM COATED ORAL at 21:53

## 2025-01-01 RX ADMIN — Medication 10 ML: at 08:41

## 2025-01-01 RX ADMIN — POLYETHYLENE GLYCOL 3350 17 G: 17 POWDER, FOR SOLUTION ORAL at 08:18

## 2025-01-01 RX ADMIN — Medication 10 ML: at 21:56

## 2025-01-01 RX ADMIN — WATER 40 MG: 1 INJECTION INTRAMUSCULAR; INTRAVENOUS; SUBCUTANEOUS at 17:42

## 2025-01-01 RX ADMIN — ONDANSETRON 4 MG: 2 INJECTION INTRAMUSCULAR; INTRAVENOUS at 17:47

## 2025-01-01 RX ADMIN — DEXMEDETOMIDINE HYDROCHLORIDE 0.8 MCG/KG/HR: 400 INJECTION INTRAVENOUS at 23:13

## 2025-01-01 RX ADMIN — PROPOFOL 25 MCG/KG/MIN: 10 INJECTION, EMULSION INTRAVENOUS at 04:38

## 2025-01-01 RX ADMIN — SENNOSIDES 2 TABLET: 8.6 TABLET, FILM COATED ORAL at 08:17

## 2025-01-01 RX ADMIN — MIDODRINE HYDROCHLORIDE 10 MG: 10 TABLET ORAL at 07:53

## 2025-01-01 RX ADMIN — MIDODRINE HYDROCHLORIDE 10 MG: 10 TABLET ORAL at 12:08

## 2025-01-01 RX ADMIN — MORPHINE SULFATE 4 MG: 4 INJECTION, SOLUTION INTRAMUSCULAR; INTRAVENOUS at 09:10

## 2025-01-01 RX ADMIN — LORAZEPAM 1 MG: 2 SOLUTION, CONCENTRATE ORAL at 09:27

## 2025-01-01 RX ADMIN — FLUDROCORTISONE ACETATE 0.1 MG: 0.1 TABLET ORAL at 09:49

## 2025-01-01 RX ADMIN — DEXMEDETOMIDINE HYDROCHLORIDE 0.7 MCG/KG/HR: 400 INJECTION INTRAVENOUS at 16:18

## 2025-01-01 RX ADMIN — DEXMEDETOMIDINE HYDROCHLORIDE 1.2 MCG/KG/HR: 400 INJECTION INTRAVENOUS at 02:59

## 2025-01-01 RX ADMIN — FLUDROCORTISONE ACETATE 0.1 MG: 0.1 TABLET ORAL at 16:48

## 2025-01-01 RX ADMIN — SODIUM CHLORIDE TAB 1 GM 2 G: 1 TAB at 17:07

## 2025-01-01 RX ADMIN — LACTULOSE SOLUTION USP, 10 G/15 ML 15.33 G: 10 SOLUTION ORAL; RECTAL at 10:29

## 2025-01-01 RX ADMIN — IPRATROPIUM BROMIDE AND ALBUTEROL SULFATE 3 ML: .5; 3 SOLUTION RESPIRATORY (INHALATION) at 12:10

## 2025-01-01 RX ADMIN — WATER 40 MG: 1 INJECTION INTRAMUSCULAR; INTRAVENOUS; SUBCUTANEOUS at 17:11

## 2025-01-01 RX ADMIN — FUROSEMIDE 40 MG: 10 INJECTION, SOLUTION INTRAMUSCULAR; INTRAVENOUS at 08:18

## 2025-01-01 RX ADMIN — MIDODRINE HYDROCHLORIDE 10 MG: 10 TABLET ORAL at 08:31

## 2025-01-01 RX ADMIN — Medication 100 MCG/HR: at 13:12

## 2025-01-01 RX ADMIN — DEXMEDETOMIDINE HYDROCHLORIDE 1 MCG/KG/HR: 400 INJECTION INTRAVENOUS at 19:33

## 2025-01-01 RX ADMIN — Medication 10 ML: at 08:20

## 2025-01-01 RX ADMIN — MICONAZOLE NITRATE 1 APPLICATION: 2 POWDER TOPICAL at 08:19

## 2025-01-08 ENCOUNTER — TELEPHONE (OUTPATIENT)
Dept: ONCOLOGY | Facility: HOSPITAL | Age: 60
End: 2025-01-08
Payer: MEDICARE

## 2025-01-08 NOTE — TELEPHONE ENCOUNTER
Caller: Lluvia Archer    Relationship to patient: Self    Best call back number: [phone]    Chief complaint: PATIENT TO RESCHEDULE 1/9/25 APPTS      Type of visit: FU AND INFUSION    Requested date: NEXT AVAILABE    ATTEMPTED WT

## 2025-01-09 NOTE — TELEPHONE ENCOUNTER
LEFT MESSAGE FOR PATIENT IN REGARDS TO RESCHEDULING APPOINTMENTS, ASKED FOR PATIENT TO CALL OFFICE BACK TO GET RESCHEDULED.

## 2025-01-22 DIAGNOSIS — C79.51 CANCER, METASTATIC TO BONE: Primary | ICD-10-CM

## 2025-01-22 RX ORDER — ALBUTEROL SULFATE 0.63 MG/3ML
SOLUTION RESPIRATORY (INHALATION)
Qty: 225 ML | Refills: 5 | Status: SHIPPED | OUTPATIENT
Start: 2025-01-22

## 2025-01-23 RX ORDER — NAPROXEN 500 MG/1
500 TABLET ORAL 2 TIMES DAILY WITH MEALS
Qty: 30 TABLET | Refills: 1 | Status: SHIPPED | OUTPATIENT
Start: 2025-01-23

## 2025-01-24 ENCOUNTER — HOSPITAL ENCOUNTER (OUTPATIENT)
Dept: ONCOLOGY | Facility: HOSPITAL | Age: 60
Discharge: HOME | End: 2025-01-24
Payer: MEDICARE

## 2025-01-24 ENCOUNTER — OFFICE VISIT (OUTPATIENT)
Dept: ONCOLOGY | Facility: HOSPITAL | Age: 60
End: 2025-01-24
Payer: MEDICARE

## 2025-01-24 ENCOUNTER — TELEPHONE (OUTPATIENT)
Dept: FAMILY MEDICINE CLINIC | Facility: CLINIC | Age: 60
End: 2025-01-24
Payer: MEDICARE

## 2025-01-24 VITALS
DIASTOLIC BLOOD PRESSURE: 62 MMHG | TEMPERATURE: 97.9 F | OXYGEN SATURATION: 92 % | WEIGHT: 214.07 LBS | SYSTOLIC BLOOD PRESSURE: 97 MMHG | HEART RATE: 93 BPM | BODY MASS INDEX: 36.73 KG/M2 | RESPIRATION RATE: 16 BRPM

## 2025-01-24 VITALS
HEIGHT: 64 IN | DIASTOLIC BLOOD PRESSURE: 62 MMHG | WEIGHT: 214.07 LBS | OXYGEN SATURATION: 92 % | HEART RATE: 93 BPM | BODY MASS INDEX: 36.55 KG/M2 | TEMPERATURE: 97.9 F | SYSTOLIC BLOOD PRESSURE: 97 MMHG | RESPIRATION RATE: 16 BRPM

## 2025-01-24 DIAGNOSIS — C79.51 CANCER, METASTATIC TO BONE: Primary | ICD-10-CM

## 2025-01-24 DIAGNOSIS — R11.0 NAUSEA: ICD-10-CM

## 2025-01-24 DIAGNOSIS — G89.3 CHRONIC NEOPLASM-RELATED PAIN: ICD-10-CM

## 2025-01-24 DIAGNOSIS — Z45.2 ENCOUNTER FOR ADJUSTMENT OR MANAGEMENT OF VASCULAR ACCESS DEVICE: ICD-10-CM

## 2025-01-24 DIAGNOSIS — F41.9 ANXIETY: ICD-10-CM

## 2025-01-24 DIAGNOSIS — C34.90 SMALL CELL CARCINOMA OF LUNG, UNSPECIFIED LATERALITY, UNSPECIFIED PART OF LUNG: ICD-10-CM

## 2025-01-24 DIAGNOSIS — Z79.899 ENCOUNTER FOR LONG-TERM (CURRENT) USE OF HIGH-RISK MEDICATION: ICD-10-CM

## 2025-01-24 DIAGNOSIS — C34.90 SMALL CELL CARCINOMA OF LUNG, UNSPECIFIED LATERALITY, UNSPECIFIED PART OF LUNG: Primary | ICD-10-CM

## 2025-01-24 DIAGNOSIS — M79.2 NEUROPATHIC PAIN: ICD-10-CM

## 2025-01-24 DIAGNOSIS — C34.2 SMALL CELL CARCINOMA OF MIDDLE LOBE OF RIGHT LUNG: ICD-10-CM

## 2025-01-24 DIAGNOSIS — C79.51 CANCER, METASTATIC TO BONE: ICD-10-CM

## 2025-01-24 LAB
ALBUMIN SERPL-MCNC: 3.7 G/DL (ref 3.5–5.2)
ALBUMIN/GLOB SERPL: 1.4 G/DL
ALP SERPL-CCNC: 86 U/L (ref 39–117)
ALT SERPL W P-5'-P-CCNC: 15 U/L (ref 1–33)
ANION GAP SERPL CALCULATED.3IONS-SCNC: 7.9 MMOL/L (ref 5–15)
AST SERPL-CCNC: 15 U/L (ref 1–32)
BASOPHILS # BLD AUTO: 0.04 10*3/MM3 (ref 0–0.2)
BASOPHILS NFR BLD AUTO: 0.4 % (ref 0–1.5)
BILIRUB SERPL-MCNC: 0.2 MG/DL (ref 0–1.2)
BUN SERPL-MCNC: 9 MG/DL (ref 6–20)
BUN/CREAT SERPL: 10.6 (ref 7–25)
CALCIUM SPEC-SCNC: 8.3 MG/DL (ref 8.6–10.5)
CHLORIDE SERPL-SCNC: 108 MMOL/L (ref 98–107)
CO2 SERPL-SCNC: 24.1 MMOL/L (ref 22–29)
CREAT SERPL-MCNC: 0.85 MG/DL (ref 0.57–1)
DEPRECATED RDW RBC AUTO: 51.1 FL (ref 37–54)
EGFRCR SERPLBLD CKD-EPI 2021: 79.5 ML/MIN/1.73
EOSINOPHIL # BLD AUTO: 0.22 10*3/MM3 (ref 0–0.4)
EOSINOPHIL NFR BLD AUTO: 2.4 % (ref 0.3–6.2)
ERYTHROCYTE [DISTWIDTH] IN BLOOD BY AUTOMATED COUNT: 15.3 % (ref 12.3–15.4)
GLOBULIN UR ELPH-MCNC: 2.6 GM/DL
GLUCOSE SERPL-MCNC: 97 MG/DL (ref 65–99)
HCT VFR BLD AUTO: 39.8 % (ref 34–46.6)
HGB BLD-MCNC: 12.6 G/DL (ref 12–15.9)
IMM GRANULOCYTES # BLD AUTO: 0.08 10*3/MM3 (ref 0–0.05)
IMM GRANULOCYTES NFR BLD AUTO: 0.9 % (ref 0–0.5)
LYMPHOCYTES # BLD AUTO: 2.62 10*3/MM3 (ref 0.7–3.1)
LYMPHOCYTES NFR BLD AUTO: 28.2 % (ref 19.6–45.3)
MAGNESIUM SERPL-MCNC: 1.8 MG/DL (ref 1.6–2.6)
MCH RBC QN AUTO: 29.1 PG (ref 26.6–33)
MCHC RBC AUTO-ENTMCNC: 31.7 G/DL (ref 31.5–35.7)
MCV RBC AUTO: 91.9 FL (ref 79–97)
MONOCYTES # BLD AUTO: 1.16 10*3/MM3 (ref 0.1–0.9)
MONOCYTES NFR BLD AUTO: 12.5 % (ref 5–12)
NEUTROPHILS NFR BLD AUTO: 5.18 10*3/MM3 (ref 1.7–7)
NEUTROPHILS NFR BLD AUTO: 55.6 % (ref 42.7–76)
NRBC BLD AUTO-RTO: 0 /100 WBC (ref 0–0.2)
PHOSPHATE SERPL-MCNC: 2.8 MG/DL (ref 2.5–4.5)
PLATELET # BLD AUTO: 185 10*3/MM3 (ref 140–450)
PMV BLD AUTO: 9.2 FL (ref 6–12)
POTASSIUM SERPL-SCNC: 4 MMOL/L (ref 3.5–5.2)
PROT SERPL-MCNC: 6.3 G/DL (ref 6–8.5)
RBC # BLD AUTO: 4.33 10*6/MM3 (ref 3.77–5.28)
SODIUM SERPL-SCNC: 140 MMOL/L (ref 136–145)
T4 FREE SERPL-MCNC: 1.29 NG/DL (ref 0.92–1.68)
TSH SERPL DL<=0.05 MIU/L-ACNC: 3.24 UIU/ML (ref 0.27–4.2)
WBC NRBC COR # BLD AUTO: 9.3 10*3/MM3 (ref 3.4–10.8)

## 2025-01-24 PROCEDURE — 84100 ASSAY OF PHOSPHORUS: CPT | Performed by: INTERNAL MEDICINE

## 2025-01-24 PROCEDURE — 96413 CHEMO IV INFUSION 1 HR: CPT

## 2025-01-24 PROCEDURE — 25810000003 SODIUM CHLORIDE 0.9 % SOLUTION 250 ML FLEX CONT: Performed by: INTERNAL MEDICINE

## 2025-01-24 PROCEDURE — 96372 THER/PROPH/DIAG INJ SC/IM: CPT

## 2025-01-24 PROCEDURE — 25010000002 DENOSUMAB 120 MG/1.7ML SOLUTION: Performed by: INTERNAL MEDICINE

## 2025-01-24 PROCEDURE — 25010000002 DURVALUMAB 50 MG/ML SOLUTION 10 ML VIAL: Performed by: INTERNAL MEDICINE

## 2025-01-24 PROCEDURE — 85025 COMPLETE CBC W/AUTO DIFF WBC: CPT | Performed by: INTERNAL MEDICINE

## 2025-01-24 PROCEDURE — 80053 COMPREHEN METABOLIC PANEL: CPT | Performed by: INTERNAL MEDICINE

## 2025-01-24 PROCEDURE — 84439 ASSAY OF FREE THYROXINE: CPT | Performed by: INTERNAL MEDICINE

## 2025-01-24 PROCEDURE — 25010000002 HEPARIN LOCK FLUSH PER 10 UNITS: Performed by: INTERNAL MEDICINE

## 2025-01-24 PROCEDURE — 84443 ASSAY THYROID STIM HORMONE: CPT | Performed by: INTERNAL MEDICINE

## 2025-01-24 PROCEDURE — 83735 ASSAY OF MAGNESIUM: CPT | Performed by: INTERNAL MEDICINE

## 2025-01-24 PROCEDURE — 25810000003 SODIUM CHLORIDE 0.9 % SOLUTION: Performed by: INTERNAL MEDICINE

## 2025-01-24 RX ORDER — SODIUM CHLORIDE 0.9 % (FLUSH) 0.9 %
20 SYRINGE (ML) INJECTION AS NEEDED
Status: DISCONTINUED | OUTPATIENT
Start: 2025-01-24 | End: 2025-01-25 | Stop reason: HOSPADM

## 2025-01-24 RX ORDER — LORAZEPAM 0.5 MG/1
0.5 TABLET ORAL EVERY 8 HOURS PRN
Qty: 60 TABLET | Refills: 2 | Status: SHIPPED | OUTPATIENT
Start: 2025-01-24

## 2025-01-24 RX ORDER — SODIUM CHLORIDE 9 MG/ML
20 INJECTION, SOLUTION INTRAVENOUS ONCE
Status: CANCELLED | OUTPATIENT
Start: 2025-01-24

## 2025-01-24 RX ORDER — SODIUM CHLORIDE 0.9 % (FLUSH) 0.9 %
20 SYRINGE (ML) INJECTION AS NEEDED
OUTPATIENT
Start: 2025-01-24

## 2025-01-24 RX ORDER — PROCHLORPERAZINE MALEATE 10 MG
10 TABLET ORAL EVERY 6 HOURS PRN
Qty: 60 TABLET | Refills: 3 | Status: SHIPPED | OUTPATIENT
Start: 2025-01-24

## 2025-01-24 RX ORDER — HEPARIN SODIUM (PORCINE) LOCK FLUSH IV SOLN 100 UNIT/ML 100 UNIT/ML
500 SOLUTION INTRAVENOUS AS NEEDED
OUTPATIENT
Start: 2025-01-24

## 2025-01-24 RX ORDER — GABAPENTIN 100 MG/1
200 CAPSULE ORAL 3 TIMES DAILY
Qty: 90 CAPSULE | Refills: 2 | Status: SHIPPED | OUTPATIENT
Start: 2025-01-24

## 2025-01-24 RX ORDER — OXYCODONE AND ACETAMINOPHEN 10; 325 MG/1; MG/1
1 TABLET ORAL EVERY 4 HOURS PRN
Qty: 180 TABLET | Refills: 0 | Status: SHIPPED | OUTPATIENT
Start: 2025-01-24

## 2025-01-24 RX ORDER — ONDANSETRON 8 MG/1
8 TABLET, ORALLY DISINTEGRATING ORAL EVERY 8 HOURS PRN
Qty: 60 TABLET | Refills: 3 | Status: SHIPPED | OUTPATIENT
Start: 2025-01-24

## 2025-01-24 RX ORDER — SODIUM CHLORIDE 9 MG/ML
20 INJECTION, SOLUTION INTRAVENOUS ONCE
Status: COMPLETED | OUTPATIENT
Start: 2025-01-24 | End: 2025-01-24

## 2025-01-24 RX ORDER — HEPARIN SODIUM (PORCINE) LOCK FLUSH IV SOLN 100 UNIT/ML 100 UNIT/ML
500 SOLUTION INTRAVENOUS AS NEEDED
Status: DISCONTINUED | OUTPATIENT
Start: 2025-01-24 | End: 2025-01-25 | Stop reason: HOSPADM

## 2025-01-24 RX ADMIN — Medication 20 ML: at 13:03

## 2025-01-24 RX ADMIN — HEPARIN 500 UNITS: 100 SYRINGE at 13:03

## 2025-01-24 RX ADMIN — SODIUM CHLORIDE 1500 MG: 9 INJECTION, SOLUTION INTRAVENOUS at 11:48

## 2025-01-24 RX ADMIN — DENOSUMAB 120 MG: 120 INJECTION SUBCUTANEOUS at 12:52

## 2025-01-24 RX ADMIN — SODIUM CHLORIDE 20 ML/HR: 9 INJECTION, SOLUTION INTRAVENOUS at 11:48

## 2025-01-24 NOTE — TELEPHONE ENCOUNTER
Esteban - [facility] [phone]  concerned about drug reaction warning for escitalopram (LEXAPRO) 20 MG tablet - He confirmed the patient has  naproxen (NAPROSYN) 500 MG tablet on her current med list so is comfortable filling this prescription.  No call back needed unless you have a concern.

## 2025-01-24 NOTE — NURSING NOTE
Patient is c/o worsening neuropathy and mild swelling in the R hand extending to her elbow, this RN visually assessed and did not see significant swelling or redness. Night prior to arriving to clinic she had intermittent numbness in her L hand but reports that subsided quickly and fully resolved. Dr. Dickson notified of patient symptoms and seeing pt in clinic no further orders at this time will cont to monitor

## 2025-01-24 NOTE — PROGRESS NOTES
Chief Complaint   FOLLOW UP 1     Provider, No Known  Aleksandar Edge, DO    Subjective          Lluvia REGINA Acrher presents to [facility]EMAT[facility] for small cell lung cancer    Ms. Archer is a very pleasant 58-year-old female with past medical history of hypertension hyperlipidemia, COPD, osteoarthritis, anxiety who presents for new oncology evaluation with her daughter for diagnosis of small cell lung cancer.  Patient previously had PET scan in September 2023 without any concerning findings.  Follow-up CT chest in February showed multiple right middle lobe nodules with mediastinal and right hilar adenopathy.  Patient was admitted to [facility] on June 2 with shortness of breath, fever, cough.  She was started on treatment for pneumonia.  She was found to be hyponatremic to 126.  Repeat CT chest on 3 Tia showed progression of right middle lobe nodules and mediastinal right hilar lymphadenopathy.  Patient was discharged on 4 June.  She had outpatient bronchoscopy on 7 June with station 4R and station 7 possible small cell carcinoma.  Unable to get MRI due to claustrophobia.  CT head with and without contrast on 13 June did not show any intracranial mets.  PET scan confirmed metastatic disease to bone and right axilla    Interval History  Here for follow up.  She is now on durvalumab maintenance. Due for C5 of this today.  Reports in the last week she has more numbness and pain in her right hand.  Symptoms worse in her middle finger.  Symptoms can wake her up at night.  Now having some symptoms in the left hand.  Pain in her legs is stable.  Pain medicines help but do not last for the full 6 hours.  Reports that in the last week she has also been more nauseous.  She gets nauseous anticipating food.  She has not been able to eat for last few days.  Needs refill of her Zofran.  No fevers or chills.  No diarrhea reported.  No headaches.    Oncology/Hematology History  Overview Note   2/20/24: CT Chest:  No PE or aortic dissection. Multiple right middle lobe nodules are highly suspicious for malignancy.  Additionally, there is mediastinal and right hilar adenopathy now noted.  Follow-up with a PET-CT is recommended. Emphysema with chronic changes in both lungs that otherwise appears stable. Atherosclerotic disease and coronary artery disease.    6/2/24: admitted to [facility] with shortness of breath, fever, cough and started on treatment for pneumonia. Found to have hyponatremia to 126    6/3/24 CT Chest: Right middle lobe nodules and mediastinal and right hilar lymphadenopathy has progressed from prior CT, and remains concerning for malignancy.  Partial right middle lobe atelectasis. Moderate emphysema. Discharged on 6/4/24 with Na of 133.    6/7/24: Bronchoscopy: Station 4R and station 7 positive for small cell carcinoma.    6/13/24: CT head with and without contrast (due to claustrophobia): No mets seen    6/19/24: NM PET: New hypermetabolic nodules within the right middle lobe. There are also multiple new enlarged hypermetabolic mediastinal lymph nodes consistent with metastatic disease. Right axillary mets as well. There are scattered foci of hypermetabolism throughout the bony structures consistent with bone metastases.    6/24/24: C1D1 Carbo/Etop/Durva. Tolerated well    7/16/24: C2D1 Carbo/Etop/Durva. Complicated by inpatient admission due to dehydration from nausea/vomiting as well as pancytopenia. ANC 0.11. Required transfusion for hgb 6.8.     8/6/24: C3D1 Carbo/Etop/Durva. 20% reduction in Carbo and 20% reduction in Etoposide. Add G-CSF with this cycle due to severe neutropenia with C2.     Repeat scan have shown disease response, however scan was due to PE rule out.     8/27/24: C4D1 Carbo/Etop/Durva. 33% reduction in Carbo and 33% reduction in Etoposide. Continue G-CSF with this cycle due to severe neutropenia with C2. Labs appropriate to proceed. Ok to treat for elevated  alk phos.     8/6/24: C3D1 Carbo/Etop/Durva. 20% reduction in Carbo and 20% reduction in Etoposide. Add G-CSF with this cycle due to severe neutropenia with C2.    Repeat scan have shown disease response, however scan was due to PE rule out.     8/27/24: C4D1 Carbo/Etop/Durva. 33% reduction in Carbo and 33% reduction in Etoposide. Continue G-CSF with this cycle due to severe neutropenia with C2.      Repeat scans without progression. Plan for durvalumab alone for maintenance    9/17/24: C5D1 durvalumab alone        Small cell lung cancer   6/12/2024 Initial Diagnosis    Small cell lung cancer     6/14/2024 - 6/14/2024 Chemotherapy    OP LUNG CISplatin 60mg/m2 / Etoposide 120mg/m2 + XRT     6/14/2024 Cancer Staged    Staging form: Lung, AJCC 8th Edition  - Clinical: Stage IVB (cT1b, cN2, cM1c) - Signed by Brian Dickson MD on 6/20/2024 6/24/2024 - 8/29/2024 Chemotherapy    OP LUNG CARBOplatin AUC=5 / Etoposide / Durvalumab     8/27/2024 -  Chemotherapy    OP SUPPORTIVE Denosumab (Xgeva) Q28D     9/17/2024 Biopsy    OP LUNG Durvalumab 1500 mg  Plan Provider: Brian Dickson MD  Treatment goal: Control  Line of treatment: [No plan line of treatment]     Cancer, metastatic to bone   8/6/2024 Initial Diagnosis    Cancer, metastatic to bone     8/27/2024 -  Chemotherapy    OP SUPPORTIVE Denosumab (Xgeva) Q28D           Review of Systems   Constitutional:  Positive for fatigue. Negative for appetite change, diaphoresis, fever, unexpected weight gain and unexpected weight loss.   HENT:  Negative for hearing loss, mouth sores, sore throat, swollen glands, trouble swallowing and voice change.    Eyes:  Negative for blurred vision, double vision, pain, redness and visual disturbance.   Respiratory:  Negative for cough, shortness of breath and wheezing.    Cardiovascular:  Positive for chest pain (pt had her port put in today) and leg swelling (both legs are swelling). Negative for palpitations.    Gastrointestinal:  Positive for nausea. Negative for abdominal pain, blood in stool, constipation, diarrhea and vomiting.   Endocrine: Negative for cold intolerance, heat intolerance, polydipsia and polyuria.   Genitourinary:  Negative for decreased urine volume, difficulty urinating, dysuria, frequency, hematuria and urinary incontinence.   Musculoskeletal:  Negative for arthralgias, back pain, joint swelling and myalgias.   Skin:  Negative for color change, rash, skin lesions and wound.        PT IS GETTING SORES ON THE SKIN   Neurological:  Negative for dizziness, seizures, weakness, numbness and headache.   Hematological:  Negative for adenopathy. Does not bruise/bleed easily.   Psychiatric/Behavioral:  Positive for sleep disturbance. Negative for depressed mood. The patient is not nervous/anxious.    All other systems reviewed and are negative.    Current Outpatient Medications on File Prior to Visit   Medication Sig Dispense Refill    albuterol (ACCUNEB) 0.63 MG/3ML nebulizer solution USE 3 MILLILITERS WITH NEBULIZER EVERY 6 HOURS AS NEEDED FOR WHEEZE 225 mL 5    albuterol sulfate  (90 Base) MCG/ACT inhaler Inhale 2 puffs Every 4 (Four) Hours As Needed for Wheezing or Shortness of Air. 18 g 5    atorvastatin (LIPITOR) 10 MG tablet Take 1 tablet by mouth Every Night. 90 tablet 3    azithromycin (Zithromax Z-Homer) 250 MG tablet Take 2 tablets by mouth on day 1, then 1 tablet daily on days 2-5 6 tablet 0    buPROPion SR (WELLBUTRIN SR) 150 MG 12 hr tablet Take 1 tablet by mouth 2 (Two) Times a Day. 180 tablet 3    calcium carbonate (Tums) 500 MG chewable tablet Chew 2 tablets Daily. 60 tablet 0    escitalopram (LEXAPRO) 20 MG tablet Take 1 tablet by mouth Daily. 90 tablet 3    famotidine (PEPCID) 40 MG tablet TAKE ONE TABLET BY MOUTH TWICE DAILY @ 9AM & 5PM 180 tablet 11    gabapentin (NEURONTIN) 300 MG capsule Take 1 capsule by mouth 3 (Three) Times a Day. 90 capsule 0    hydrOXYzine (ATARAX) 25 MG  tablet TAKE 1 TABLET BY MOUTH THREE TIMES A DAY AS NEEDED FOR ITCHING 270 tablet 2    lisinopril (PRINIVIL,ZESTRIL) 5 MG tablet TAKE ONE TABLET BY MOUTH DAILY AT 9 AM 30 tablet 11    Magnesium 400 MG tablet Take 400 mg by mouth Daily. 30 tablet 5    melatonin 5 MG tablet tablet Take 1 tablet by mouth As Needed.      methylPREDNISolone (MEDROL) 4 MG dose pack Take as directed on package instructions. 21 tablet 0    naproxen (NAPROSYN) 500 MG tablet TAKE 1 TABLET BY MOUTH TWICE A DAY WITH MEALS 30 tablet 1    nicotine (NICODERM CQ) 21 MG/24HR patch Place 1 patch on the skin as directed by provider Daily. (Patient not taking: Reported on 1/24/2025) 30 patch 0    nystatin (MYCOSTATIN) 100,000 unit/mL suspension Swish and swallow 5 mL 4 (Four) Times a Day As Needed (for thrush, if needing to take use for 7-10days until symptoms resolve). (Patient not taking: Reported on 1/24/2025) 473 mL 0    omeprazole (priLOSEC) 40 MG capsule Take 1 capsule by mouth Daily.      ondansetron ODT (ZOFRAN-ODT) 8 MG disintegrating tablet Place 1 tablet on the tongue Every 8 (Eight) Hours As Needed for Nausea or Vomiting. 60 tablet 3    oxyCODONE-acetaminophen (PERCOCET)  MG per tablet Take 1 tablet by mouth Every 6 (Six) Hours As Needed for Moderate Pain. 120 tablet 0    potassium chloride (KLOR-CON M20) 20 MEQ CR tablet Take 1 tablet by mouth Daily. (Patient not taking: Reported on 1/24/2025) 14 tablet 0    prochlorperazine (COMPAZINE) 10 MG tablet Take 1 tablet by mouth Every 6 (Six) Hours As Needed for Nausea. 60 tablet 3    Trelegy Ellipta 100-62.5-25 MCG/ACT inhaler Inhale 1 puff Daily. 3 each 3    triamcinolone (KENALOG) 0.1 % ointment Apply 1 Application topically to the appropriate area as directed 2 (Two) Times a Day. Apply to itchy areas (Patient not taking: Reported on 1/24/2025) 30 g 1     No current facility-administered medications on file prior to visit.       No Known Allergies  Past Medical History:   Diagnosis Date     Abnormal mammogram     PT DENIES KNOWING OF THIS HX    Anxiety     Arthritis     R SHOULDER, R HIP, AND R LEG    Cancer     LUNG    Chronic nausea 01/15/2019    COPD (chronic obstructive pulmonary disease)     O2 3 LITERS NC CONT    Hernia, hiatal     Hyperlipidemia     Hypertension     Knee pain, right 2018    Memory loss     FORGETFULNESS ? ETIOLOGY    Migraine headache     Moderate episode of recurrent major depressive disorder 2017    Night sweats     Sciatica of right side 2017    Severe episode of recurrent major depressive disorder, without psychotic features 10/22/2019    Shingles     OUTBREAK 24 ON ANTIVIRAL , WHELPS NOTED NO SORES.    Shoulder pain, left 2018     Past Surgical History:   Procedure Laterality Date    BRONCHOSCOPY N/A 2022    Procedure: BRONCHOSCOPY WITH BRONCHOALVEOLAR LAVAGE, BIOPSY;  Surgeon: Shin Mcdonald DO;  Location: [facility] ENDOSCOPY;  Service: Pulmonary;  Laterality: N/A;  RIGHT LOWER LOBE INFILTRATE    BRONCHOSCOPY N/A 2024    Procedure: BRONCHOSCOPY WITH ENDOBRONCHIAL ULTRASOUND AND FINE NEEDLE ASPIRATE;  Surgeon: Shin Mcdonald DO;  Location: [facility] ENDOSCOPY;  Service: Pulmonary;  Laterality: N/A;  MEDIASTINAL ADENOPATHY     SECTION      CHOLECYSTECTOMY      LAPAROSCOPIC    COLONOSCOPY      PORTACATH PLACEMENT Left 2024    Procedure: INSERTION OF PORTACATH;  Surgeon: Josh Patton MD;  Location: [facility] OR [facility];  Service: General;  Laterality: Left;    TOTAL HIP ARTHROPLASTY Right 2022    Procedure: RIGHT TOTAL HIP ARTHROPLASTY;  Surgeon: Cecil Gomes MD;  Location: [facility] MAIN OR;  Service: Orthopedics;  Laterality: Right;     Social History     Socioeconomic History    Marital status:      Spouse name: DEVAN    Number of children: 2    Years of education: GED    Highest education level: GED or equivalent   Tobacco Use    Smoking status: Some Days     Current packs/day: 2.00      "Average packs/day: 2.0 packs/day for 43.1 years (86.1 ttl pk-yrs)     Types: Cigarettes     Start date: 1982     Passive exposure: Past    Smokeless tobacco: Never   Vaping Use    Vaping status: Former    Substances: Nicotine, Flavoring    Devices: Disposable   Substance and Sexual Activity    Alcohol use: Never    Drug use: Never    Sexual activity: Defer     Family History   Problem Relation Age of Onset    Other Mother         BLOOD DISEASE    Arthritis Mother     Heart disease Father     Hypertension Other     Cancer Other     Heart disease Other     Malig Hyperthermia Neg Hx        Objective   Physical Exam  Well appearing patient in no acute distress on RA  Anicteric sclerae, no rash on exposed skin  Respirations non-labored  Awake, alert, and oriented x 4. Speech intact. No gross neurologic deficit  Appropriate mood and affect    Vitals:    01/24/25 1038   BP: 97/62   Pulse: 93   Resp: 16   Temp: 97.9 °F (36.6 °C)   TempSrc: Temporal   SpO2: 92%   Weight: 97.1 kg (214 lb 1.1 oz)   Height: 162.6 cm (64.02\")       ECOG score: 2         PHQ-9 Total Score:           Result Review :   The following data was reviewed by: Brian Dickson MD on 01/24/25:  Lab Results   Component Value Date    HGB 12.6 01/24/2025    HCT 39.8 01/24/2025    MCV 91.9 01/24/2025     01/24/2025    WBC 9.30 01/24/2025    NEUTROABS 5.18 01/24/2025    LYMPHSABS 2.62 01/24/2025    MONOSABS 1.16 (H) 01/24/2025    EOSABS 0.22 01/24/2025    BASOSABS 0.04 01/24/2025     Lab Results   Component Value Date    GLUCOSE 97 01/24/2025    BUN 9 01/24/2025    CREATININE 0.85 01/24/2025     01/24/2025    K 4.0 01/24/2025     (H) 01/24/2025    CO2 24.1 01/24/2025    CALCIUM 8.3 (L) 01/24/2025    PROTEINTOT 6.3 01/24/2025    ALBUMIN 3.7 01/24/2025    BILITOT 0.2 01/24/2025    ALKPHOS 86 01/24/2025    AST 15 01/24/2025    ALT 15 01/24/2025     Lab Results   Component Value Date    MG 2.0 12/12/2024    PHOS 3.1 12/12/2024    FREET4 0.97 " 12/12/2024    TSH 4.010 12/12/2024     Labs personally reviewed. Sodium normal. Hgb wnl. WBC wnl. Plts wnl.       No radiology results for the last 30 days.        Assessment and Plan    Diagnoses and all orders for this visit:    1. Small cell carcinoma of lung, unspecified laterality, unspecified part of lung (Primary)    2. Cancer, metastatic to bone  -     oxyCODONE-acetaminophen (PERCOCET)  MG per tablet; Take 1 tablet by mouth Every 4 (Four) Hours As Needed for Moderate Pain.  Dispense: 180 tablet; Refill: 0    3. Chronic neoplasm-related pain  -     oxyCODONE-acetaminophen (PERCOCET)  MG per tablet; Take 1 tablet by mouth Every 4 (Four) Hours As Needed for Moderate Pain.  Dispense: 180 tablet; Refill: 0    4. Neuropathic pain  -     gabapentin (NEURONTIN) 100 MG capsule; Take 2 capsules by mouth 3 (Three) Times a Day.  Dispense: 90 capsule; Refill: 2    5. Anxiety  -     LORazepam (ATIVAN) 0.5 MG tablet; Take 1 tablet by mouth Every 8 (Eight) Hours As Needed for Anxiety.  Dispense: 60 tablet; Refill: 2    6. Nausea    Other orders  -     ondansetron ODT (ZOFRAN-ODT) 8 MG disintegrating tablet; Place 1 tablet on the tongue Every 8 (Eight) Hours As Needed for Nausea or Vomiting.  Dispense: 60 tablet; Refill: 3  -     prochlorperazine (COMPAZINE) 10 MG tablet; Take 1 tablet by mouth Every 6 (Six) Hours As Needed for Nausea.  Dispense: 60 tablet; Refill: 3  -     naloxone (NARCAN) 4 MG/0.1ML nasal spray; Call 911. Don't prime. [place] in 1 nostril for overdose. Repeat in 2-3 minutes in other nostril if no or minimal breathing/responsiveness.  Dispense: 2 each; Refill: 0          Small cell lung cancer, extensive stage  RML with right hilar and mediastinal adenopathy on CT chest.  Bronchoscopy with positive small cell pathology at station 4R and station 7.  PET with multiple new enlarged hypermetabolic mediastinal lymph nodes consistent with metastatic disease, also with new right axillary FDG avid mets.  There are scattered foci of hypermetabolism throughout the bony structures consistent with bone metastases. CT head with and without without any intracranial mets.  Unable to do MRI due to claustrophobia and anxiety. Recommended treatment is systemic alone with for IV carboplatin, etoposide and durvalumab every 3 weeks. First cycle 6/24/24. 8/6/24: C3D1 Carbo/Etop/Durva. 20% reduction in Carbo and 20% reduction in Etoposide. Add G-CSF with this cycle due to severe neutropenia with C2.  8/27/24: C4D1 Carbo/Etop/Durva. 33% reduction in Carbo and 33% reduction in Etoposide. Last cycle of chemotherapy.    Repeat scans after C4 without clear progression, new 7 mm RUL nodule could be infectious. Plan for durvalumab alone.    Repeat scans 12/9/24 with stable disease. No new disease. Continue current treatment.     1/24/25: C9D1 Durvalumab (fifth with Durvalumab alone). Labs appropriate to proceed.     Needs intracranial monitoring, ordered for this month. Last CT head with and without 9/27/24 negative for intracranial disease.       Anticipatory nausea  Try PRN ativan prior to meals. Refilled zofran. Not likely side effect of immunotherapy.     Right hand pain  Associated numbness. Symptoms concerning for carpal tunnel. Not a common side effect of immunotherapy. Try gabapentin 200 mg PRN. Recommend nighttime wrist brace.     Itching  No rash. Could be related to immunotherapy. Atarax as needed has helped. Topical steroid can be used on itchiest areas. Also can use PRN calamine lotion.     Neoplasm related pain  Worse in legs. Does not sound like neuropathy. Increase oxycodone to 10 mg. Has naproxen 500 mg PRN twice daily to use. Start daily mag 400 mg (mag level is normal). May need to consider plaquenil. Better as of 1/24/25.    Metastatic cancer to bone  Recommend bone modifying agent. Patient has all teeth except 2 removed. Monthly Xgeva started 8/27/24. Due today. Will monitor electrolytes.     Insomnia  Recommend low  dose melatonin vs benadryl. Also has Ativan if anxiety is contributing to poor sleep.     LE edema  Recommend leg elevation. Hold on diuretic for now. Can consider if worsening.       Patient Follow Up: Cycle 10  Patient was given instructions and counseling regarding her condition or for health maintenance advice. Please see specific information pulled into the AVS if appropriate.

## 2025-02-04 ENCOUNTER — HOSPITAL ENCOUNTER (OUTPATIENT)
Dept: CT IMAGING | Facility: HOSPITAL | Age: 60
Discharge: HOME | End: 2025-02-04
Admitting: NURSE PRACTITIONER
Payer: MEDICARE

## 2025-02-04 DIAGNOSIS — C34.90 SMALL CELL LUNG CANCER IN ADULT: ICD-10-CM

## 2025-02-04 DIAGNOSIS — C79.51 METASTATIC CANCER TO BONE: ICD-10-CM

## 2025-02-04 PROCEDURE — 70470 CT HEAD/BRAIN W/O & W/DYE: CPT

## 2025-02-04 PROCEDURE — 25510000001 IOPAMIDOL PER 1 ML: Performed by: NURSE PRACTITIONER

## 2025-02-04 RX ORDER — IOPAMIDOL 755 MG/ML
50 INJECTION, SOLUTION INTRAVASCULAR
Status: COMPLETED | OUTPATIENT
Start: 2025-02-04 | End: 2025-02-04

## 2025-02-04 RX ADMIN — IOPAMIDOL 50 ML: 755 INJECTION, SOLUTION INTRAVENOUS at 11:03

## 2025-02-06 ENCOUNTER — HOSPITAL ENCOUNTER (OUTPATIENT)
Dept: RADIATION ONCOLOGY | Facility: HOSPITAL | Age: 60
Setting detail: RADIATION/ONCOLOGY SERIES
End: 2025-02-06
Payer: MEDICARE

## 2025-02-06 ENCOUNTER — OFFICE VISIT (OUTPATIENT)
Dept: RADIATION ONCOLOGY | Facility: HOSPITAL | Age: 60
End: 2025-02-06
Payer: MEDICARE

## 2025-02-06 VITALS
BODY MASS INDEX: 37.33 KG/M2 | RESPIRATION RATE: 16 BRPM | WEIGHT: 217.59 LBS | SYSTOLIC BLOOD PRESSURE: 125 MMHG | TEMPERATURE: 97.4 F | HEART RATE: 91 BPM | OXYGEN SATURATION: 95 % | DIASTOLIC BLOOD PRESSURE: 78 MMHG

## 2025-02-06 DIAGNOSIS — C79.51 METASTATIC CANCER TO BONE: ICD-10-CM

## 2025-02-06 DIAGNOSIS — Z86.59 HISTORY OF CLAUSTROPHOBIA: ICD-10-CM

## 2025-02-06 DIAGNOSIS — F17.200 NICOTINE DEPENDENCE WITH CURRENT USE: ICD-10-CM

## 2025-02-06 DIAGNOSIS — G89.3 CANCER-RELATED PAIN: ICD-10-CM

## 2025-02-06 DIAGNOSIS — M79.641 BILATERAL HAND PAIN: ICD-10-CM

## 2025-02-06 DIAGNOSIS — C34.90 SMALL CELL LUNG CANCER IN ADULT: Primary | ICD-10-CM

## 2025-02-06 DIAGNOSIS — F41.9 ANXIETY: ICD-10-CM

## 2025-02-06 DIAGNOSIS — M79.642 BILATERAL HAND PAIN: ICD-10-CM

## 2025-02-06 DIAGNOSIS — C79.31 SECONDARY MALIGNANT NEOPLASM OF BRAIN: ICD-10-CM

## 2025-02-06 NOTE — PROGRESS NOTES
Patient unable to provide complete list of medications, only able to cover medications patient had with her at appointment.

## 2025-02-06 NOTE — PROGRESS NOTES
Follow Up Office Visit      Encounter Date: 02/06/2025   Patient Name: Lluvia Archer  YOB: 1965   Medical Record Number: [id number]   Primary Diagnosis: Small cell lung cancer in adult [C34.90]     Cancer Staging   Small cell lung cancer  Staging form: Lung, AJCC 8th Edition  - Clinical: Stage IVB (cT1b, cN2, cM1c) - Signed by Brian Dickson MD on 6/20/2024    Radiation Completion Date:  N/A    Chief Complaint:    Chief Complaint   Patient presents with    Follow-up    Lung Cancer     Small cell lung cancer       Oncology/Hematology History Overview Note   2/20/24: CT Chest:  No PE or aortic dissection. Multiple right middle lobe nodules are highly suspicious for malignancy.  Additionally, there is mediastinal and right hilar adenopathy now noted.  Follow-up with a PET-CT is recommended. Emphysema with chronic changes in both lungs that otherwise appears stable. Atherosclerotic disease and coronary artery disease.    6/2/24: admitted to [facility] with shortness of breath, fever, cough and started on treatment for pneumonia. Found to have hyponatremia to 126    6/3/24 CT Chest: Right middle lobe nodules and mediastinal and right hilar lymphadenopathy has progressed from prior CT, and remains concerning for malignancy.  Partial right middle lobe atelectasis. Moderate emphysema. Discharged on 6/4/24 with Na of 133.    6/7/24: Bronchoscopy: Station 4R and station 7 positive for small cell carcinoma.    6/13/24: CT head with and without contrast (due to claustrophobia): No mets seen    6/19/24: NM PET: New hypermetabolic nodules within the right middle lobe. There are also multiple new enlarged hypermetabolic mediastinal lymph nodes consistent with metastatic disease. Right axillary mets as well. There are scattered foci of hypermetabolism throughout the bony structures consistent with bone metastases.    6/24/24: C1D1 Carbo/Etop/Durva. Tolerated well    7/16/24: C2D1 Carbo/Etop/Durva.  Complicated by inpatient admission due to dehydration from nausea/vomiting as well as pancytopenia. ANC 0.11. Required transfusion for hgb 6.8.     8/6/24: C3D1 Carbo/Etop/Durva. 20% reduction in Carbo and 20% reduction in Etoposide. Add G-CSF with this cycle due to severe neutropenia with C2.     Repeat scan have shown disease response, however scan was due to PE rule out.     8/27/24: C4D1 Carbo/Etop/Durva. 33% reduction in Carbo and 33% reduction in Etoposide. Continue G-CSF with this cycle due to severe neutropenia with C2. Labs appropriate to proceed. Ok to treat for elevated alk phos.     8/6/24: C3D1 Carbo/Etop/Durva. 20% reduction in Carbo and 20% reduction in Etoposide. Add G-CSF with this cycle due to severe neutropenia with C2.    Repeat scan have shown disease response, however scan was due to PE rule out.     8/27/24: C4D1 Carbo/Etop/Durva. 33% reduction in Carbo and 33% reduction in Etoposide. Continue G-CSF with this cycle due to severe neutropenia with C2.      Repeat scans without progression. Plan for durvalumab alone for maintenance    9/17/24: C5D1 durvalumab alone        Small cell lung cancer   6/12/2024 Initial Diagnosis    Small cell lung cancer     6/14/2024 - 6/14/2024 Chemotherapy    OP LUNG CISplatin 60mg/m2 / Etoposide 120mg/m2 + XRT     6/14/2024 Cancer Staged    Staging form: Lung, AJCC 8th Edition  - Clinical: Stage IVB (cT1b, cN2, cM1c) - Signed by Brian Dickson MD on 6/20/2024 6/24/2024 - 8/29/2024 Chemotherapy    OP LUNG CARBOplatin AUC=5 / Etoposide / Durvalumab     8/27/2024 -  Chemotherapy    OP SUPPORTIVE Denosumab (Xgeva) Q28D     9/17/2024 Biopsy    OP LUNG Durvalumab 1500 mg  Plan Provider: Brian Dickson MD  Treatment goal: Control  Line of treatment: [No plan line of treatment]     Cancer, metastatic to bone   8/6/2024 Initial Diagnosis    Cancer, metastatic to bone     8/27/2024 -  Chemotherapy    OP SUPPORTIVE Denosumab (Xgeva) Q28D         History  of Present Illness: Lluvia Archer is a 59 y.o. female who returns to [facility] for re-evaluation of her small cell lung cancer. She is accompanied by her .   History of Present Illness  She has been experiencing severe, escalating pain in both hands for approximately one month, which has disrupted her sleep. The pain is localized to the palms and fingers, described as a burning sensation akin to being set on fire. She reports no exacerbation of pain with wrist flexion. She also reports a sensation of needles in her hands. She is right-handed and has been using a soft glove brace without metal support. She has not appreciated any improvement in pain with the brace. She has an upcoming appointment with her primary care physician on 11th to discuss this pain. Despite an increase in her pain medication dosage, including gabapentin and codeine every 4 hours, there has been no significant relief.    She reports no headaches, double vision, or visual disturbances. She has not noticed any unilateral weakness in her arms or legs. She reports no seizures but admits to occasional confusion, which she attributes to aging. She continues to receive monthly immunotherapy under the care of Dr. Dickson.     She has been experiencing swelling in her feet for the past few weeks, which becomes more pronounced when wearing shoes.    Subjective      Review of Systems: Review of Systems   Constitutional:  Positive for activity change (Decreased due to soa.), appetite change (Comes and goes, onl eats 1 meal/day), fatigue (10/10, ongoing) and unexpected weight change (Up 3lbs in 2 weeks.). Negative for chills and fever.   HENT:  Negative for hearing loss, sore throat and trouble swallowing.    Eyes:  Negative for visual disturbance.   Respiratory:  Positive for cough (productive with thick green secretions, ongoing), shortness of breath (with activity, ongoing) and wheezing (Constant, ongoing). Negative for chest  tightness.         Becomes more of soa and wheezes more at night, ongoing.   Cardiovascular:  Positive for palpitations (occasional with anxiety) and leg swelling (Noted bilaterally, worsened in the last week.). Negative for chest pain.   Gastrointestinal:  Positive for nausea (occaisonal, relieved with prn meds.). Negative for anal bleeding, blood in stool, constipation, diarrhea, rectal pain and vomiting.        Colonoscopy 2023     Genitourinary:  Negative for difficulty urinating, dysuria, frequency, hematuria and urgency.   Musculoskeletal:  Positive for arthralgias (Bilateral hands, noted x1 month.  8/10), gait problem (r/t weakness and sob) and joint swelling (Feet and ankles, worsened in the last week.). Negative for back pain.   Skin:  Negative for color change and rash.   Neurological:  Positive for weakness (Generalized, bilateral hands but right side is worse, noted x1 month.) and numbness (Fingers ongoing). Negative for dizziness, tremors, seizures, syncope, speech difficulty and headaches.   Psychiatric/Behavioral:  Positive for decreased concentration (Ongoing) and sleep disturbance (difficulty falling and staying asleep). Negative for confusion. The patient is nervous/anxious.        The following portions of the patient's history were reviewed and updated as appropriate: allergies, current medications, past family history, past medical history, past social history, past surgical history and problem list.    Medications:     Current Outpatient Medications:     gabapentin (NEURONTIN) 100 MG capsule, Take 2 capsules by mouth 3 (Three) Times a Day., Disp: 90 capsule, Rfl: 2    hydrOXYzine (ATARAX) 25 MG tablet, TAKE 1 TABLET BY MOUTH THREE TIMES A DAY AS NEEDED FOR ITCHING, Disp: 270 tablet, Rfl: 2    LORazepam (ATIVAN) 0.5 MG tablet, Take 1 tablet by mouth Every 8 (Eight) Hours As Needed for Anxiety., Disp: 60 tablet, Rfl: 2    naproxen (NAPROSYN) 500 MG tablet, TAKE 1 TABLET BY MOUTH TWICE A DAY WITH  MEALS, Disp: 30 tablet, Rfl: 1    oxyCODONE-acetaminophen (PERCOCET)  MG per tablet, Take 1 tablet by mouth Every 4 (Four) Hours As Needed for Moderate Pain., Disp: 180 tablet, Rfl: 0    prochlorperazine (COMPAZINE) 10 MG tablet, Take 1 tablet by mouth Every 6 (Six) Hours As Needed for Nausea., Disp: 60 tablet, Rfl: 3    albuterol (ACCUNEB) 0.63 MG/3ML nebulizer solution, USE 3 MILLILITERS WITH NEBULIZER EVERY 6 HOURS AS NEEDED FOR WHEEZE, Disp: 225 mL, Rfl: 5    albuterol sulfate  (90 Base) MCG/ACT inhaler, Inhale 2 puffs Every 4 (Four) Hours As Needed for Wheezing or Shortness of Air., Disp: 18 g, Rfl: 5    atorvastatin (LIPITOR) 10 MG tablet, Take 1 tablet by mouth Every Night., Disp: 90 tablet, Rfl: 3    azithromycin (Zithromax Z-Homer) 250 MG tablet, Take 2 tablets by mouth on day 1, then 1 tablet daily on days 2-5, Disp: 6 tablet, Rfl: 0    buPROPion SR (WELLBUTRIN SR) 150 MG 12 hr tablet, Take 1 tablet by mouth 2 (Two) Times a Day., Disp: 180 tablet, Rfl: 3    calcium carbonate (Tums) 500 MG chewable tablet, Chew 2 tablets Daily., Disp: 60 tablet, Rfl: 0    escitalopram (LEXAPRO) 20 MG tablet, Take 1 tablet by mouth Daily., Disp: 90 tablet, Rfl: 3    famotidine (PEPCID) 40 MG tablet, TAKE ONE TABLET BY MOUTH TWICE DAILY @ 9AM & 5PM, Disp: 180 tablet, Rfl: 11    lisinopril (PRINIVIL,ZESTRIL) 5 MG tablet, TAKE ONE TABLET BY MOUTH DAILY AT 9 AM, Disp: 30 tablet, Rfl: 11    Magnesium 400 MG tablet, Take 400 mg by mouth Daily., Disp: 30 tablet, Rfl: 5    melatonin 5 MG tablet tablet, Take 1 tablet by mouth As Needed., Disp: , Rfl:     methylPREDNISolone (MEDROL) 4 MG dose pack, Take as directed on package instructions., Disp: 21 tablet, Rfl: 0    naloxone (NARCAN) 4 MG/0.1ML nasal spray, Call 911. Don't prime. Frederic in 1 nostril for overdose. Repeat in 2-3 minutes in other nostril if no or minimal breathing/responsiveness., Disp: 2 each, Rfl: 0    nicotine (NICODERM CQ) 21 MG/24HR patch, Place 1 patch  on the skin as directed by provider Daily. (Patient not taking: Reported on 10/15/2024), Disp: 30 patch, Rfl: 0    nystatin (MYCOSTATIN) 100,000 unit/mL suspension, Swish and swallow 5 mL 4 (Four) Times a Day As Needed (for thrush, if needing to take use for 7-10days until symptoms resolve). (Patient not taking: Reported on 10/15/2024), Disp: 473 mL, Rfl: 0    omeprazole (priLOSEC) 40 MG capsule, Take 1 capsule by mouth Daily., Disp: , Rfl:     ondansetron ODT (ZOFRAN-ODT) 8 MG disintegrating tablet, Place 1 tablet on the tongue Every 8 (Eight) Hours As Needed for Nausea or Vomiting., Disp: 60 tablet, Rfl: 3    potassium chloride (KLOR-CON M20) 20 MEQ CR tablet, Take 1 tablet by mouth Daily. (Patient not taking: Reported on 10/15/2024), Disp: 14 tablet, Rfl: 0    Trelegy Ellipta 100-62.5-25 MCG/ACT inhaler, Inhale 1 puff Daily., Disp: 3 each, Rfl: 3    triamcinolone (KENALOG) 0.1 % ointment, Apply 1 Application topically to the appropriate area as directed 2 (Two) Times a Day. Apply to itchy areas (Patient not taking: Reported on 12/12/2024), Disp: 30 g, Rfl: 1    Allergies:   No Known Allergies    Patient Smoking History:   Social History     Tobacco Use   Smoking Status Every Day    Current packs/day: 2.00    Average packs/day: 2.0 packs/day for 47.1 years (94.2 ttl pk-yrs)    Types: Cigarettes    Start date: 1978    Passive exposure: Past   Smokeless Tobacco Never       Measures:  PHQ-9 Total Score: 2   Quality of Life: 80 - Restricted Physical Activity   ECOG score: 2  ECOG: (2) Ambulatory and capable of self care, unable to carry out work activity, up and about > 50% or waking hours  Pain: (on a scale of 0-10)   Pain Score    02/06/25 1042   PainSc:   8   PainLoc: Hand  Comment: Bilateral   Lluvia Archer reports a pain score of 8.  Given her pain assessment as noted, treatment options were discussed and the following options were decided upon as a follow-up plan to address the patient's pain: continuation of  current treatment plan for pain. Will order imaging for further evaluation.       Objective     Physical Exam:   Vital Signs:   Vitals:    02/06/25 1042   BP: 125/78   Pulse: 91   Resp: 16   Temp: 97.4 °F (36.3 °C)   TempSrc: Temporal   SpO2: 95%   Weight: 98.7 kg (217 lb 9.5 oz)   PainSc:   8   PainLoc: Hand  Comment: Bilateral     Body mass index is 37.33 kg/m².   Wt Readings from Last 3 Encounters:   02/06/25 98.7 kg (217 lb 9.5 oz)   01/24/25 97.1 kg (214 lb 1.1 oz)   01/24/25 97.1 kg (214 lb 1.1 oz)       Physical Exam  Vitals reviewed.   Constitutional:       General: She is not in acute distress.     Appearance: Normal appearance. She is normal weight. She is not ill-appearing.      Comments: Sitting comfortably in wheelchair   HENT:      Head: Normocephalic and atraumatic.      Mouth/Throat:      Mouth: Mucous membranes are moist.      Pharynx: Oropharynx is clear. No oropharyngeal exudate or posterior oropharyngeal erythema.   Eyes:      General: No visual field deficit.     Conjunctiva/sclera: Conjunctivae normal.      Pupils: Pupils are equal, round, and reactive to light.   Cardiovascular:      Rate and Rhythm: Normal rate and regular rhythm.      Pulses: Normal pulses.      Heart sounds: Normal heart sounds.   Pulmonary:      Effort: Pulmonary effort is normal. No respiratory distress.      Breath sounds: Normal breath sounds.   Musculoskeletal:         General: Normal range of motion.      Cervical back: Normal range of motion.   Skin:     General: Skin is warm and dry.   Neurological:      General: No focal deficit present.      Mental Status: She is alert and oriented to person, place, and time. Mental status is at baseline.      Cranial Nerves: No dysarthria or facial asymmetry.      Motor: Weakness (generalized) present. No abnormal muscle tone or pronator drift.   Psychiatric:         Mood and Affect: Mood normal.         Behavior: Behavior normal.       Result Review: I independently reviewed the  following data. I discussed 2/4/25 CT Head with Dr. Gordon and he independently reviewed images.   -- CT Head With & Without Contrast (02/04/2025 11:03)   -- CT Chest With Contrast Diagnostic (12/09/2024 14:57)   -- CT Abdomen Pelvis With Contrast (12/09/2024 14:57)   Results  Imaging  CT scan of the brain shows at least 4-5 new intracranial lesions.     Pathology:   Lab Results   Component Value Date    CLININFO  07/31/2024     Admitting Diagnoses    D64.9 Acute anemia ICD-10-CM   D70.1, T45.1X5A Chemotherapy-induced neutropenia ICD-10-CM   D69.6 Thrombocytopenia ICD-10-CM   R11.2 Nausea and vomiting, unspecified vomiting type ICD-10-CM   C34.90 Malignant neoplasm of lung, unspecified laterality, unspecified part of lung ICD-10-CM   R26.2 Difficulty walking ICD-10-CM         FINALDX  07/31/2024     Peripheral blood (CBC and smear):   - WBC:  Leukopenia with absolute neutropenia, myeloid left shift to promyelocyte stage   - RBC:  Normocytic anemia with anisocytosis, circulating nucleated red blood cells   - Platelets:  Thrombocytopenia, normal morphology         Imaging: CT Head With & Without Contrast    Result Date: 2/5/2025  Impression: Multiple new enhancing parenchymal lesions are identified, both supratentorial and infratentorial, consistent with metastatic disease progression. 3 lesions in the posterior fossa are present associated with some minimal surrounding edema, otherwise without significant mass effect. The fourth ventricle does not appear effaced. Electronically Signed: Arnel Crowley MD  2/5/2025 2:36 PM EST  Workstation ID: [id number]    CT Chest With Contrast Diagnostic    Result Date: 12/11/2024  CHEST: 1.Stable appearance of noncalcified nodules within the lungs bilaterally. 2.Stable appearance of mediastinal and hilar adenopathy. 3.    Stable appearance of diffuse sclerotic osseous metastatic disease. ABDOMEN AND PELVIS: 1.No evidence of new metastatic disease within the abdomen or pelvis.  2.Stable appearance of diffuse sclerotic osseous metastatic disease. Electronically Signed: Los Rai MD  12/11/2024 1:49 PM EST  Workstation ID: [id number]    CT Abdomen Pelvis With Contrast    Result Date: 12/11/2024  CHEST: 1.Stable appearance of noncalcified nodules within the lungs bilaterally. 2.Stable appearance of mediastinal and hilar adenopathy. 3.    Stable appearance of diffuse sclerotic osseous metastatic disease. ABDOMEN AND PELVIS: 1.No evidence of new metastatic disease within the abdomen or pelvis. 2.Stable appearance of diffuse sclerotic osseous metastatic disease. Electronically Signed: Los Rai MD  12/11/2024 1:49 PM EST  Workstation ID: [id number]      Labs:   WBC   Date Value Ref Range Status   01/24/2025 9.30 3.40 - 10.80 10*3/mm3 Final     Hemoglobin   Date Value Ref Range Status   01/24/2025 12.6 12.0 - 15.9 g/dL Final     Hematocrit   Date Value Ref Range Status   01/24/2025 39.8 34.0 - 46.6 % Final     Platelets   Date Value Ref Range Status   01/24/2025 185 140 - 450 10*3/mm3 Final     Creatinine   Date Value Ref Range Status   01/24/2025 0.85 0.57 - 1.00 mg/dL Final     BUN   Date Value Ref Range Status   01/24/2025 9 6 - 20 mg/dL Final     eGFR   Date Value Ref Range Status   01/24/2025 79.5 >60.0 mL/min/1.73 Final     TSH   Date Value Ref Range Status   01/24/2025 3.240 0.270 - 4.200 uIU/mL Final     Assessment / Plan      Impression: Lluvia Archer is a pleasant 59 y.o. female with extensive stage small cell lung cancer with osseous metastatic disease as well as right axillary adenopathy. Pathology from bronchoscopy and biopsy on 6/7/24 from station 4R and station 7 revealed small cell carcinoma. As she was unable to undergo MRI of the brain due to claustrophobia, CT scan of the head with and without contrast performed on 6/13/2024 revealed no evidence of intracranial metastatic disease. She has commenced carbo/etoposide/Durvalumab and is currently on durvalumab alone and  Xgeva per Dr. Dickson. No plan for consolidative radiation given the extent of her thoracic disease is primarily mediastinal adenopathy without a dominant/large parenchymal lung lesion. Will consider future radiotherapy in the setting of progression of disease resulting in symptoms. Her recent CT CAP on 9/11/24 is without clear progression. There is a new 7 mm right upper lobe nodule which may be infectious/inflammatory given her history of frequent pneumonia. Her recent CT CAP on 12/9/24 shows stable disease. She is currently receiving immunotherapy per Dr. Dickson.     Brain metastases: she is unable to undergo MRI brain for intracranial monitoring due to claustrophobia. CT head on 2/4/25 demonstrates new intracranial metastases. There are believed to be at least 5 lesions. There is a 14 mm right cerebellar lesion, 15 mm left cerebellar lesion, 10 mm lesion within the midline valentina, 8 mm right frontal lesion, and enhancement seen along the left parietal convexity near the vertex measuring 6 mm that is likely an additional metastasis. Discussed the possibility of additional smaller lesions not visible on CT scan.  Discussed CT head findings with Dr. Gordon and he independently reviewed images. Discussed with Mrs. Archer the recommendation that she undergo whole brain radiotherapy to treat all existing lesions and she is agreeable with this recommendation. The potential side effects, including short-term memory loss, hair loss, and fatigue, have been discussed. Dr. Gordon was present at conclusion of today's visit to answer all questions and obtain informed consent for radiation therapy. She will be scheduled for CT simulation for treatment planning purposes. She is currently prescribed Ativan per Dr. Dickson and had been advised to take Ativan 1 tablet an hour before the CT simulation appointment and bring additional tablets to her appointment in case another dose is needed for tolerance of the mask fitting.      Anxiety: present at baseline. Prescribed Ativan 0.5 mg PRN per Dr. Dickson.     Cancer-related pain: pain reasonably well controlled with percocet prescribed by Dr. Dickson. Her biggest source of pain currently is within her right great toe. Encouraged her to discuss possible gout at upcoming appointment with Dr. Dickson.     Assessment/Plan:   Diagnoses and all orders for this visit:    1. Small cell lung cancer in adult (Primary)  -     CT Cervical Spine With & Without Contrast; Future    2. Secondary malignant neoplasm of brain  -     CT Cervical Spine With & Without Contrast; Future    3. Bilateral hand pain  -     CT Cervical Spine With & Without Contrast; Future    4. Metastatic cancer to bone    5. Anxiety    6. History of claustrophobia    7. Nicotine dependence with current use    8. Cancer-related pain      Assessment & Plan  1. Bilateral hand pain.  The symptoms suggest a potential diagnosis of neuropathy or nerve pain. She reports severe pain in both hands, described as a burning sensation, which has been ongoing for about a month and is worsening. The pain is located in the palms and fingers and is severe enough to disrupt sleep. She has been taking gabapentin and percocet, but these have not provided sufficient relief. She is advised to try over-the-counter wrist splints with metal support to prevent bending and potentially alleviate the pain. Additionally, will proceed with ordering MRI of cervical spine with and without contrast for further evaluation given the posterior fossa metastasis and new onset bilateral hand dysesthesias.     2. Feet swelling.  She reports swelling in her feet for the past couple of weeks, which becomes significant enough to cause discomfort while wearing shoes. She is advised to discuss this symptom with her primary care physician during her upcoming appointment on the 11th.    Follow Up:   Schedule CT simulation for whole brain radiotherapy.   Schedule CT cervical  spine.   Follow-up with Dr. Edge on 2/11/25.   Follow-up with Dr. Dickson on 2/21/25.     No follow-ups on file.  Lluvia REGINA Archer was encouraged to contact me in the interim with any questions or concerns regarding her care.      SANDRO Terry  Radiation Oncology  [facility]    This document has been signed by SANDRO Bolanos on February 6, 2025 13:06 EST     Patient or patient representative verbalized consent for the use of Ambient Listening during the visit with  SANDRO Bolanos for chart documentation. 2/6/2025  11:26 EST

## 2025-02-06 NOTE — Clinical Note
CT head on 2/4/25 shows new intracranial metastases. We plan to treat with whole brain radiotherapy.

## 2025-02-10 ENCOUNTER — HOSPITAL ENCOUNTER (OUTPATIENT)
Dept: RADIATION ONCOLOGY | Facility: HOSPITAL | Age: 60
Discharge: HOME | End: 2025-02-10
Payer: MEDICARE

## 2025-02-10 DIAGNOSIS — C79.31 SECONDARY MALIGNANT NEOPLASM OF BRAIN: ICD-10-CM

## 2025-02-10 PROCEDURE — 77334 RADIATION TREATMENT AID(S): CPT | Performed by: RADIOLOGY

## 2025-02-10 PROCEDURE — 77290 THER RAD SIMULAJ FIELD CPLX: CPT | Performed by: RADIOLOGY

## 2025-02-11 DIAGNOSIS — J44.9 CHRONIC OBSTRUCTIVE PULMONARY DISEASE, UNSPECIFIED COPD TYPE: ICD-10-CM

## 2025-02-11 RX ORDER — ALBUTEROL SULFATE 90 UG/1
2 INHALANT RESPIRATORY (INHALATION) EVERY 4 HOURS PRN
Qty: 18 G | Refills: 1 | Status: SHIPPED | OUTPATIENT
Start: 2025-02-11

## 2025-02-11 NOTE — TELEPHONE ENCOUNTER
Caller: Lluvia Archer    Relationship: Self    Best call back number: [phone]    Requested Prescriptions:   Requested Prescriptions     Pending Prescriptions Disp Refills    albuterol sulfate  (90 Base) MCG/ACT inhaler 18 g 5     Sig: Inhale 2 puffs Every 4 (Four) Hours As Needed for Wheezing or Shortness of Air.        Pharmacy where request should be sent: [facility] #[id number] - [place], [place] - [address] [facility] [phone] [facility] [phone] FX     Last office visit with prescribing clinician: 6/13/2024   Last telemedicine visit with prescribing clinician: Visit date not found   Next office visit with prescribing clinician: 2/13/2025     Additional details provided by patient:     Does the patient have less than a 3 day supply:  [x] Yes  [] No      Que Teixeira Rep   02/11/25 11:07 EST

## 2025-02-13 ENCOUNTER — HOSPITAL ENCOUNTER (OUTPATIENT)
Dept: RADIATION ONCOLOGY | Facility: HOSPITAL | Age: 60
Discharge: HOME | End: 2025-02-13

## 2025-02-13 LAB
RAD ONC ARIA COURSE ID: NORMAL
RAD ONC ARIA COURSE INTENT: NORMAL
RAD ONC ARIA COURSE LAST TREATMENT DATE: NORMAL
RAD ONC ARIA COURSE START DATE: NORMAL
RAD ONC ARIA COURSE TREATMENT ELAPSED DAYS: 0
RAD ONC ARIA FIRST TREATMENT DATE: NORMAL
RAD ONC ARIA PLAN FRACTIONS TREATED TO DATE: 1
RAD ONC ARIA PLAN ID: NORMAL
RAD ONC ARIA PLAN NAME: NORMAL
RAD ONC ARIA PLAN PRESCRIBED DOSE PER FRACTION: 3 GY
RAD ONC ARIA PLAN PRIMARY REFERENCE POINT: NORMAL
RAD ONC ARIA PLAN TOTAL FRACTIONS PRESCRIBED: 10
RAD ONC ARIA PLAN TOTAL PRESCRIBED DOSE: 3000 CGY
RAD ONC ARIA REFERENCE POINT DOSAGE GIVEN TO DATE: 3 GY
RAD ONC ARIA REFERENCE POINT ID: NORMAL
RAD ONC ARIA REFERENCE POINT SESSION DOSAGE GIVEN: 3 GY

## 2025-02-13 PROCEDURE — 77295 3-D RADIOTHERAPY PLAN: CPT | Performed by: RADIOLOGY

## 2025-02-13 PROCEDURE — 77280 THER RAD SIMULAJ FIELD SMPL: CPT | Performed by: RADIOLOGY

## 2025-02-13 PROCEDURE — 77427 RADIATION TX MANAGEMENT X5: CPT | Performed by: RADIOLOGY

## 2025-02-13 PROCEDURE — 77334 RADIATION TREATMENT AID(S): CPT | Performed by: RADIOLOGY

## 2025-02-13 PROCEDURE — 77412 RADIATION TX DELIVERY LVL 3: CPT | Performed by: RADIOLOGY

## 2025-02-13 PROCEDURE — 77300 RADIATION THERAPY DOSE PLAN: CPT | Performed by: RADIOLOGY

## 2025-02-14 ENCOUNTER — DOCUMENTATION (OUTPATIENT)
Dept: RADIATION ONCOLOGY | Facility: HOSPITAL | Age: 60
End: 2025-02-14
Payer: MEDICARE

## 2025-02-14 ENCOUNTER — HOSPITAL ENCOUNTER (OUTPATIENT)
Dept: RADIATION ONCOLOGY | Facility: HOSPITAL | Age: 60
Discharge: HOME | End: 2025-02-14

## 2025-02-14 LAB
RAD ONC ARIA COURSE ID: NORMAL
RAD ONC ARIA COURSE INTENT: NORMAL
RAD ONC ARIA COURSE LAST TREATMENT DATE: NORMAL
RAD ONC ARIA COURSE START DATE: NORMAL
RAD ONC ARIA COURSE TREATMENT ELAPSED DAYS: 1
RAD ONC ARIA FIRST TREATMENT DATE: NORMAL
RAD ONC ARIA PLAN FRACTIONS TREATED TO DATE: 2
RAD ONC ARIA PLAN ID: NORMAL
RAD ONC ARIA PLAN NAME: NORMAL
RAD ONC ARIA PLAN PRESCRIBED DOSE PER FRACTION: 3 GY
RAD ONC ARIA PLAN PRIMARY REFERENCE POINT: NORMAL
RAD ONC ARIA PLAN TOTAL FRACTIONS PRESCRIBED: 10
RAD ONC ARIA PLAN TOTAL PRESCRIBED DOSE: 3000 CGY
RAD ONC ARIA REFERENCE POINT DOSAGE GIVEN TO DATE: 6 GY
RAD ONC ARIA REFERENCE POINT ID: NORMAL
RAD ONC ARIA REFERENCE POINT SESSION DOSAGE GIVEN: 3 GY

## 2025-02-14 PROCEDURE — 77387 GUIDANCE FOR RADJ TX DLVR: CPT | Performed by: RADIOLOGY

## 2025-02-14 PROCEDURE — 77412 RADIATION TX DELIVERY LVL 3: CPT | Performed by: RADIOLOGY

## 2025-02-14 PROCEDURE — G6002 STEREOSCOPIC X-RAY GUIDANCE: HCPCS | Performed by: RADIOLOGY

## 2025-02-14 NOTE — PROGRESS NOTES
OSW provided patient with a $15 gas voucher through [facility] [employer] [employer]. Patient stated she will have 10 radiation treatments. OSW provided emotional support through active listening, empathizing, normalizing, and validating patient's thoughts and feelings as she disclosed her cancer was spreading to her brain and was being treated for 5 lesions. Patient expressed appreciation for assistance today.

## 2025-02-17 ENCOUNTER — HOSPITAL ENCOUNTER (OUTPATIENT)
Dept: RADIATION ONCOLOGY | Facility: HOSPITAL | Age: 60
Discharge: HOME | End: 2025-02-17
Payer: MEDICARE

## 2025-02-17 LAB
RAD ONC ARIA COURSE ID: NORMAL
RAD ONC ARIA COURSE INTENT: NORMAL
RAD ONC ARIA COURSE LAST TREATMENT DATE: NORMAL
RAD ONC ARIA COURSE START DATE: NORMAL
RAD ONC ARIA COURSE TREATMENT ELAPSED DAYS: 4
RAD ONC ARIA FIRST TREATMENT DATE: NORMAL
RAD ONC ARIA PLAN FRACTIONS TREATED TO DATE: 3
RAD ONC ARIA PLAN ID: NORMAL
RAD ONC ARIA PLAN NAME: NORMAL
RAD ONC ARIA PLAN PRESCRIBED DOSE PER FRACTION: 3 GY
RAD ONC ARIA PLAN PRIMARY REFERENCE POINT: NORMAL
RAD ONC ARIA PLAN TOTAL FRACTIONS PRESCRIBED: 10
RAD ONC ARIA PLAN TOTAL PRESCRIBED DOSE: 3000 CGY
RAD ONC ARIA REFERENCE POINT DOSAGE GIVEN TO DATE: 9 GY
RAD ONC ARIA REFERENCE POINT ID: NORMAL
RAD ONC ARIA REFERENCE POINT SESSION DOSAGE GIVEN: 3 GY

## 2025-02-17 PROCEDURE — 77412 RADIATION TX DELIVERY LVL 3: CPT | Performed by: RADIOLOGY

## 2025-02-17 PROCEDURE — G6002 STEREOSCOPIC X-RAY GUIDANCE: HCPCS | Performed by: RADIOLOGY

## 2025-02-17 PROCEDURE — 77387 GUIDANCE FOR RADJ TX DLVR: CPT | Performed by: RADIOLOGY

## 2025-02-18 ENCOUNTER — HOSPITAL ENCOUNTER (OUTPATIENT)
Dept: RADIATION ONCOLOGY | Facility: HOSPITAL | Age: 60
Discharge: HOME | End: 2025-02-18

## 2025-02-18 ENCOUNTER — EDUCATION (OUTPATIENT)
Dept: RADIATION ONCOLOGY | Facility: HOSPITAL | Age: 60
End: 2025-02-18
Payer: MEDICARE

## 2025-02-18 VITALS
SYSTOLIC BLOOD PRESSURE: 104 MMHG | HEART RATE: 102 BPM | RESPIRATION RATE: 20 BRPM | OXYGEN SATURATION: 94 % | DIASTOLIC BLOOD PRESSURE: 74 MMHG | BODY MASS INDEX: 37.82 KG/M2 | TEMPERATURE: 97.6 F | WEIGHT: 220.46 LBS

## 2025-02-18 DIAGNOSIS — F41.9 ANXIETY: ICD-10-CM

## 2025-02-18 DIAGNOSIS — C79.31 SECONDARY MALIGNANT NEOPLASM OF BRAIN: Primary | ICD-10-CM

## 2025-02-18 LAB
RAD ONC ARIA COURSE ID: NORMAL
RAD ONC ARIA COURSE INTENT: NORMAL
RAD ONC ARIA COURSE LAST TREATMENT DATE: NORMAL
RAD ONC ARIA COURSE START DATE: NORMAL
RAD ONC ARIA COURSE TREATMENT ELAPSED DAYS: 5
RAD ONC ARIA FIRST TREATMENT DATE: NORMAL
RAD ONC ARIA PLAN FRACTIONS TREATED TO DATE: 4
RAD ONC ARIA PLAN ID: NORMAL
RAD ONC ARIA PLAN NAME: NORMAL
RAD ONC ARIA PLAN PRESCRIBED DOSE PER FRACTION: 3 GY
RAD ONC ARIA PLAN PRIMARY REFERENCE POINT: NORMAL
RAD ONC ARIA PLAN TOTAL FRACTIONS PRESCRIBED: 10
RAD ONC ARIA PLAN TOTAL PRESCRIBED DOSE: 3000 CGY
RAD ONC ARIA REFERENCE POINT DOSAGE GIVEN TO DATE: 12 GY
RAD ONC ARIA REFERENCE POINT ID: NORMAL
RAD ONC ARIA REFERENCE POINT SESSION DOSAGE GIVEN: 3 GY

## 2025-02-18 PROCEDURE — 77412 RADIATION TX DELIVERY LVL 3: CPT | Performed by: RADIOLOGY

## 2025-02-18 PROCEDURE — 77387 GUIDANCE FOR RADJ TX DLVR: CPT | Performed by: RADIOLOGY

## 2025-02-18 PROCEDURE — G6002 STEREOSCOPIC X-RAY GUIDANCE: HCPCS | Performed by: RADIOLOGY

## 2025-02-18 NOTE — PROGRESS NOTES
"PATIENT NAME: Lluvia Archer    MRN: [id number]    DX/TX Area: Brain    CURRENT FRACTIONS AT TEACHIN/10    EDUCATION GIVEN:    Patient education performed today in clinic.  Education folder with handouts given:    --- Radiation Therapy contact numbers for Main Line, Nurse Line, Treatment area,  and Dietician with instruction on when to use each one.   --- Radiation Therapy packet with site specific information.   --- Radiation Therapy bulleted points  --- Radiation Oncology skin care  --- Fatigue/Energy Conservation Technique Guidelines  --- Short and Long Term Symptoms Management \"Bubble\" Sheet (Brain)    Discussed all handouts. Time given for patient to ask questions.  All questions answered to patient satisfaction.  No further needs or concerns at this time.     OTHER:    Additional education provided to patient on Ativan administration.  Patient script written for 1 tab Q8hrs prn. Patient reports taking 3-4 tabs at once at times for treatments. Education provided on appropriate use of medication.   "

## 2025-02-18 NOTE — TELEPHONE ENCOUNTER
Caller: Ajay Archera R    Relationship: Self    Best call back number: [phone]    Requested Prescriptions:   Requested Prescriptions     Pending Prescriptions Disp Refills    LORazepam (ATIVAN) 0.5 MG tablet 60 tablet 2     Sig: Take 1 tablet by mouth Every 8 (Eight) Hours As Needed for Anxiety.        Pharmacy where request should be sent: [facility] [id number] [place], [place] - [address] AT [facility] [address] [facility] [phone] [facility] [phone] FX     Last office visit with prescribing clinician: 1/24/2025   Last telemedicine visit with prescribing clinician: Visit date not found   Next office visit with prescribing clinician: 2/21/2025     Additional details provided by patient: NA    Does the patient have less than a 3 day supply:  [x] Yes  [] No    Would you like a call back once the refill request has been completed: [] Yes [] No    If the office needs to give you a call back, can they leave a voicemail: [] Yes [] No    Que Justin Rep   02/18/25 14:18 EST

## 2025-02-18 NOTE — PROGRESS NOTES
On Treatment Visit       Patient: Lluvia Archer   YOB: 1965   Medical Record Number: [id number]     Date of Visit  February 18, 2025   Primary Diagnosis:Secondary malignant neoplasm of brain [C79.31]  Cancer Staging: Cancer Staging   Small cell lung cancer  Staging form: Lung, AJCC 8th Edition  - Clinical: Stage IVB (cT1b, cN2, cM1c) - Signed by Brian Dickson MD on 6/20/2024         was seen today for an on treatment visit.  She is receiving radiation therapy to the whole brain. She  has received 1200 cGy in 4 fractions out of a planned dose of 3000 cGy in 10 fractions.     Anxiety associated with daily radiotherapy.  Prescribed 60 pills of 0.5 lorazepam on 1/24/2025 but is using lorazepam throughout the day                                            Review of Systems:   Review of Systems   Constitutional:  Positive for fatigue (8/10). Negative for appetite change.   HENT:  Positive for congestion. Negative for sore throat.    Eyes:  Negative for visual disturbance.   Respiratory:  Positive for cough (occasionally productive) and shortness of breath (with activity).    Gastrointestinal:  Positive for constipation (last bm 4 days) and nausea (relieved with medication). Negative for diarrhea.   Genitourinary:  Negative for difficulty urinating, dysuria, frequency and urgency.   Musculoskeletal:  Positive for arthralgias, back pain and joint swelling.   Skin:  Negative for color change and rash.   Neurological:  Positive for weakness (generalized) and numbness (hands). Negative for dizziness and headaches.   Psychiatric/Behavioral:  Positive for sleep disturbance (wakes throughout the night).        Vitals:     Vitals:    02/18/25 0952   BP: 104/74   Pulse: 102   Resp: 20   Temp: 97.6 °F (36.4 °C)   SpO2: 94%       Weight:   Wt Readings from Last 3 Encounters:   02/18/25 100 kg (220 lb 7.4 oz)   02/06/25 98.7 kg (217 lb 9.5 oz)   01/24/25 97.1 kg (214 lb 1.1 oz)      Pain:    Pain  Score    02/18/25 0952   PainSc: 5    PainLoc: Hand         Physical Exam:  Gen: WD/WN; NAD  HEENT: MMM  Trachea: midline  Chest: symmetric  Resp: normal respiratory effort  Extr: warm, well-perfused  Neuro: awake and alert; no aphasia or neglect    Plan: I have reviewed treatment setup notes, checked and approved the daily guidance images.  I reviewed dose delivery, treatment parameters and deemed them appropriate. We plan to continue radiation therapy as prescribed.          Radiation Oncology    Electronically signed by Shin Gordon MD 2/18/2025  10:18 EST

## 2025-02-19 RX ORDER — LORAZEPAM 0.5 MG/1
0.5 TABLET ORAL EVERY 8 HOURS PRN
Qty: 60 TABLET | Refills: 2 | Status: SHIPPED | OUTPATIENT
Start: 2025-02-19

## 2025-02-19 NOTE — TELEPHONE ENCOUNTER
Called [facility] pharmacy. Patient transferring her prescriptions from [facility] to [facility]. They need a new prescription sent since it is a controlled substance.

## 2025-02-21 ENCOUNTER — HOSPITAL ENCOUNTER (OUTPATIENT)
Dept: RADIATION ONCOLOGY | Facility: HOSPITAL | Age: 60
Discharge: HOME | End: 2025-02-21

## 2025-02-21 ENCOUNTER — HOSPITAL ENCOUNTER (OUTPATIENT)
Dept: ONCOLOGY | Facility: HOSPITAL | Age: 60
Discharge: HOME | End: 2025-02-21
Payer: MEDICARE

## 2025-02-21 ENCOUNTER — OFFICE VISIT (OUTPATIENT)
Dept: ONCOLOGY | Facility: HOSPITAL | Age: 60
End: 2025-02-21
Payer: MEDICARE

## 2025-02-21 VITALS
WEIGHT: 215.17 LBS | TEMPERATURE: 97.3 F | BODY MASS INDEX: 36.92 KG/M2 | HEART RATE: 95 BPM | RESPIRATION RATE: 20 BRPM | DIASTOLIC BLOOD PRESSURE: 66 MMHG | SYSTOLIC BLOOD PRESSURE: 104 MMHG | OXYGEN SATURATION: 94 %

## 2025-02-21 VITALS
BODY MASS INDEX: 36.92 KG/M2 | HEART RATE: 95 BPM | SYSTOLIC BLOOD PRESSURE: 104 MMHG | TEMPERATURE: 97.3 F | DIASTOLIC BLOOD PRESSURE: 66 MMHG | OXYGEN SATURATION: 94 % | WEIGHT: 215.17 LBS | RESPIRATION RATE: 20 BRPM

## 2025-02-21 DIAGNOSIS — F41.9 ANXIETY: ICD-10-CM

## 2025-02-21 DIAGNOSIS — C34.2 SMALL CELL CARCINOMA OF MIDDLE LOBE OF RIGHT LUNG: ICD-10-CM

## 2025-02-21 DIAGNOSIS — C79.31 METASTATIC CANCER TO BRAIN: ICD-10-CM

## 2025-02-21 DIAGNOSIS — M79.2 NEUROPATHIC PAIN: ICD-10-CM

## 2025-02-21 DIAGNOSIS — C79.51 CANCER, METASTATIC TO BONE: ICD-10-CM

## 2025-02-21 DIAGNOSIS — C34.2 SMALL CELL CARCINOMA OF MIDDLE LOBE OF RIGHT LUNG: Primary | ICD-10-CM

## 2025-02-21 DIAGNOSIS — Z79.899 ENCOUNTER FOR LONG-TERM (CURRENT) USE OF HIGH-RISK MEDICATION: Primary | ICD-10-CM

## 2025-02-21 DIAGNOSIS — C34.90 SMALL CELL CARCINOMA OF LUNG, UNSPECIFIED LATERALITY, UNSPECIFIED PART OF LUNG: ICD-10-CM

## 2025-02-21 DIAGNOSIS — Z45.2 ENCOUNTER FOR ADJUSTMENT OR MANAGEMENT OF VASCULAR ACCESS DEVICE: ICD-10-CM

## 2025-02-21 LAB
ALBUMIN SERPL-MCNC: 3.7 G/DL (ref 3.5–5.2)
ALBUMIN/GLOB SERPL: 1.3 G/DL
ALP SERPL-CCNC: 74 U/L (ref 39–117)
ALT SERPL W P-5'-P-CCNC: 13 U/L (ref 1–33)
ANION GAP SERPL CALCULATED.3IONS-SCNC: 10.6 MMOL/L (ref 5–15)
AST SERPL-CCNC: 18 U/L (ref 1–32)
BASOPHILS # BLD AUTO: 0.03 10*3/MM3 (ref 0–0.2)
BASOPHILS NFR BLD AUTO: 0.3 % (ref 0–1.5)
BILIRUB SERPL-MCNC: 0.2 MG/DL (ref 0–1.2)
BUN SERPL-MCNC: 12 MG/DL (ref 6–20)
BUN/CREAT SERPL: 14 (ref 7–25)
CALCIUM SPEC-SCNC: 8 MG/DL (ref 8.6–10.5)
CHLORIDE SERPL-SCNC: 100 MMOL/L (ref 98–107)
CO2 SERPL-SCNC: 23.4 MMOL/L (ref 22–29)
CREAT SERPL-MCNC: 0.86 MG/DL (ref 0.57–1)
DEPRECATED RDW RBC AUTO: 60.2 FL (ref 37–54)
EGFRCR SERPLBLD CKD-EPI 2021: 78.4 ML/MIN/1.73
EOSINOPHIL # BLD AUTO: 0.2 10*3/MM3 (ref 0–0.4)
EOSINOPHIL NFR BLD AUTO: 2.3 % (ref 0.3–6.2)
ERYTHROCYTE [DISTWIDTH] IN BLOOD BY AUTOMATED COUNT: 17.9 % (ref 12.3–15.4)
GLOBULIN UR ELPH-MCNC: 2.8 GM/DL
GLUCOSE SERPL-MCNC: 100 MG/DL (ref 65–99)
HCT VFR BLD AUTO: 35.4 % (ref 34–46.6)
HGB BLD-MCNC: 11.2 G/DL (ref 12–15.9)
IMM GRANULOCYTES # BLD AUTO: 0.04 10*3/MM3 (ref 0–0.05)
IMM GRANULOCYTES NFR BLD AUTO: 0.5 % (ref 0–0.5)
LYMPHOCYTES # BLD AUTO: 1.84 10*3/MM3 (ref 0.7–3.1)
LYMPHOCYTES NFR BLD AUTO: 20.8 % (ref 19.6–45.3)
MAGNESIUM SERPL-MCNC: 1.8 MG/DL (ref 1.6–2.6)
MCH RBC QN AUTO: 29.6 PG (ref 26.6–33)
MCHC RBC AUTO-ENTMCNC: 31.6 G/DL (ref 31.5–35.7)
MCV RBC AUTO: 93.4 FL (ref 79–97)
MONOCYTES # BLD AUTO: 0.95 10*3/MM3 (ref 0.1–0.9)
MONOCYTES NFR BLD AUTO: 10.7 % (ref 5–12)
NEUTROPHILS NFR BLD AUTO: 5.79 10*3/MM3 (ref 1.7–7)
NEUTROPHILS NFR BLD AUTO: 65.4 % (ref 42.7–76)
NRBC BLD AUTO-RTO: 0 /100 WBC (ref 0–0.2)
PHOSPHATE SERPL-MCNC: 3.1 MG/DL (ref 2.5–4.5)
PLATELET # BLD AUTO: 196 10*3/MM3 (ref 140–450)
PMV BLD AUTO: 9.2 FL (ref 6–12)
POTASSIUM SERPL-SCNC: 4.4 MMOL/L (ref 3.5–5.2)
PROT SERPL-MCNC: 6.5 G/DL (ref 6–8.5)
RAD ONC ARIA COURSE ID: NORMAL
RAD ONC ARIA COURSE INTENT: NORMAL
RAD ONC ARIA COURSE LAST TREATMENT DATE: NORMAL
RAD ONC ARIA COURSE START DATE: NORMAL
RAD ONC ARIA COURSE TREATMENT ELAPSED DAYS: 8
RAD ONC ARIA FIRST TREATMENT DATE: NORMAL
RAD ONC ARIA PLAN FRACTIONS TREATED TO DATE: 5
RAD ONC ARIA PLAN ID: NORMAL
RAD ONC ARIA PLAN NAME: NORMAL
RAD ONC ARIA PLAN PRESCRIBED DOSE PER FRACTION: 3 GY
RAD ONC ARIA PLAN PRIMARY REFERENCE POINT: NORMAL
RAD ONC ARIA PLAN TOTAL FRACTIONS PRESCRIBED: 10
RAD ONC ARIA PLAN TOTAL PRESCRIBED DOSE: 3000 CGY
RAD ONC ARIA REFERENCE POINT DOSAGE GIVEN TO DATE: 15 GY
RAD ONC ARIA REFERENCE POINT ID: NORMAL
RAD ONC ARIA REFERENCE POINT SESSION DOSAGE GIVEN: 3 GY
RBC # BLD AUTO: 3.79 10*6/MM3 (ref 3.77–5.28)
SODIUM SERPL-SCNC: 134 MMOL/L (ref 136–145)
T4 FREE SERPL-MCNC: 1.14 NG/DL (ref 0.92–1.68)
TSH SERPL DL<=0.05 MIU/L-ACNC: 1.67 UIU/ML (ref 0.27–4.2)
WBC NRBC COR # BLD AUTO: 8.85 10*3/MM3 (ref 3.4–10.8)

## 2025-02-21 PROCEDURE — 25810000003 SODIUM CHLORIDE 0.9 % SOLUTION 250 ML FLEX CONT: Performed by: INTERNAL MEDICINE

## 2025-02-21 PROCEDURE — 80053 COMPREHEN METABOLIC PANEL: CPT | Performed by: INTERNAL MEDICINE

## 2025-02-21 PROCEDURE — 25810000003 SODIUM CHLORIDE 0.9 % SOLUTION: Performed by: INTERNAL MEDICINE

## 2025-02-21 PROCEDURE — 84100 ASSAY OF PHOSPHORUS: CPT | Performed by: INTERNAL MEDICINE

## 2025-02-21 PROCEDURE — 77387 GUIDANCE FOR RADJ TX DLVR: CPT | Performed by: RADIOLOGY

## 2025-02-21 PROCEDURE — 85025 COMPLETE CBC W/AUTO DIFF WBC: CPT | Performed by: INTERNAL MEDICINE

## 2025-02-21 PROCEDURE — 83735 ASSAY OF MAGNESIUM: CPT | Performed by: INTERNAL MEDICINE

## 2025-02-21 PROCEDURE — 25010000002 DURVALUMAB 50 MG/ML SOLUTION 10 ML VIAL: Performed by: INTERNAL MEDICINE

## 2025-02-21 PROCEDURE — 77412 RADIATION TX DELIVERY LVL 3: CPT | Performed by: RADIOLOGY

## 2025-02-21 PROCEDURE — G6002 STEREOSCOPIC X-RAY GUIDANCE: HCPCS | Performed by: RADIOLOGY

## 2025-02-21 PROCEDURE — 84443 ASSAY THYROID STIM HORMONE: CPT | Performed by: INTERNAL MEDICINE

## 2025-02-21 PROCEDURE — 25010000002 HEPARIN LOCK FLUSH PER 10 UNITS: Performed by: INTERNAL MEDICINE

## 2025-02-21 PROCEDURE — 77336 RADIATION PHYSICS CONSULT: CPT | Performed by: RADIOLOGY

## 2025-02-21 PROCEDURE — 84439 ASSAY OF FREE THYROXINE: CPT | Performed by: INTERNAL MEDICINE

## 2025-02-21 PROCEDURE — 96413 CHEMO IV INFUSION 1 HR: CPT

## 2025-02-21 RX ORDER — CLONAZEPAM 0.5 MG/1
0.5 TABLET ORAL 2 TIMES DAILY PRN
Qty: 60 TABLET | Refills: 0 | Status: SHIPPED | OUTPATIENT
Start: 2025-02-21

## 2025-02-21 RX ORDER — SODIUM CHLORIDE 0.9 % (FLUSH) 0.9 %
20 SYRINGE (ML) INJECTION AS NEEDED
Status: DISCONTINUED | OUTPATIENT
Start: 2025-02-21 | End: 2025-02-22 | Stop reason: HOSPADM

## 2025-02-21 RX ORDER — CLONAZEPAM 0.5 MG/1
0.5 TABLET ORAL 2 TIMES DAILY PRN
Qty: 60 TABLET | Refills: 0 | Status: SHIPPED | OUTPATIENT
Start: 2025-02-21 | End: 2025-02-21

## 2025-02-21 RX ORDER — CALCIUM CARBONATE 500 MG/1
2 TABLET, CHEWABLE ORAL DAILY
Qty: 28 TABLET | Refills: 0 | Status: SHIPPED | OUTPATIENT
Start: 2025-02-21

## 2025-02-21 RX ORDER — SODIUM CHLORIDE 9 MG/ML
20 INJECTION, SOLUTION INTRAVENOUS ONCE
Status: CANCELLED | OUTPATIENT
Start: 2025-02-21

## 2025-02-21 RX ORDER — HEPARIN SODIUM (PORCINE) LOCK FLUSH IV SOLN 100 UNIT/ML 100 UNIT/ML
500 SOLUTION INTRAVENOUS AS NEEDED
OUTPATIENT
Start: 2025-02-21

## 2025-02-21 RX ORDER — SODIUM CHLORIDE 0.9 % (FLUSH) 0.9 %
20 SYRINGE (ML) INJECTION AS NEEDED
OUTPATIENT
Start: 2025-02-21

## 2025-02-21 RX ORDER — SODIUM CHLORIDE 9 MG/ML
20 INJECTION, SOLUTION INTRAVENOUS ONCE
Status: COMPLETED | OUTPATIENT
Start: 2025-02-21 | End: 2025-02-21

## 2025-02-21 RX ORDER — HEPARIN SODIUM (PORCINE) LOCK FLUSH IV SOLN 100 UNIT/ML 100 UNIT/ML
500 SOLUTION INTRAVENOUS AS NEEDED
Status: DISCONTINUED | OUTPATIENT
Start: 2025-02-21 | End: 2025-02-22 | Stop reason: HOSPADM

## 2025-02-21 RX ADMIN — Medication 20 ML: at 12:03

## 2025-02-21 RX ADMIN — SODIUM CHLORIDE 1500 MG: 9 INJECTION, SOLUTION INTRAVENOUS at 10:47

## 2025-02-21 RX ADMIN — SODIUM CHLORIDE 20 ML/HR: 9 INJECTION, SOLUTION INTRAVENOUS at 10:47

## 2025-02-21 RX ADMIN — HEPARIN 500 UNITS: 100 SYRINGE at 12:03

## 2025-02-21 NOTE — DISCHARGE INSTRUCTIONS
To alternate between Zofran and Compazine for nausea relief, you can follow this schedule:    Take 1 tablet of Zofran at 6 a.m.  Take 1 tablet of Compazine at 9 a.m.  Take 1 tablet of Zofran at 12 p.m.  Take 1 tablet of Compazine at 3 p.m.  Repeat this order every 3 hours.

## 2025-02-21 NOTE — PROGRESS NOTES
Chief Complaint   FOLLOW UP 1     Provider, No Known  Aleksandar Edge, DO    Subjective          Lluvia REGINA Archer presents to [facility] for small cell lung cancer    Ms. Archer is a very pleasant 58-year-old female with past medical history of hypertension hyperlipidemia, COPD, osteoarthritis, anxiety who presents for new oncology evaluation with her daughter for diagnosis of small cell lung cancer.  Patient previously had PET scan in September 2023 without any concerning findings.  Follow-up CT chest in February showed multiple right middle lobe nodules with mediastinal and right hilar adenopathy.  Patient was admitted to [facility] on June 2 with shortness of breath, fever, cough.  She was started on treatment for pneumonia.  She was found to be hyponatremic to 126.  Repeat CT chest on 3 Tia showed progression of right middle lobe nodules and mediastinal right hilar lymphadenopathy.  Patient was discharged on 4 June.  She had outpatient bronchoscopy on 7 June with station 4R and station 7 possible small cell carcinoma.  Unable to get MRI due to claustrophobia.  CT head with and without contrast on 13 June did not show any intracranial mets.  PET scan confirmed metastatic disease to bone and right axilla    Interval History  Here for follow up.  She is now on durvalumab maintenance. Due for C6 of this today.  She has worsening pain in both hands.  Pain starts in the wrist and goes into first 3 fingers.  She is wearing wrist braces.  She is on pain medicines. Also on naproxen. Does not get much relief from these.  Pain keeps her up at night so she is not getting good rest.  She has had increased anxiety.  She has been doubling Ativan without much relief.  She is now on radiation for new brain mets.  Has 5 radiation treatments left.  Denies any nausea, vomiting, diarrhea.  Continues to have lower extremity edema.      Oncology/Hematology History Overview  Note   2/20/24: CT Chest:  No PE or aortic dissection. Multiple right middle lobe nodules are highly suspicious for malignancy.  Additionally, there is mediastinal and right hilar adenopathy now noted.  Follow-up with a PET-CT is recommended. Emphysema with chronic changes in both lungs that otherwise appears stable. Atherosclerotic disease and coronary artery disease.    6/2/24: admitted to [facility] with shortness of breath, fever, cough and started on treatment for pneumonia. Found to have hyponatremia to 126    6/3/24 CT Chest: Right middle lobe nodules and mediastinal and right hilar lymphadenopathy has progressed from prior CT, and remains concerning for malignancy.  Partial right middle lobe atelectasis. Moderate emphysema. Discharged on 6/4/24 with Na of 133.    6/7/24: Bronchoscopy: Station 4R and station 7 positive for small cell carcinoma.    6/13/24: CT head with and without contrast (due to claustrophobia): No mets seen    6/19/24: NM PET: New hypermetabolic nodules within the right middle lobe. There are also multiple new enlarged hypermetabolic mediastinal lymph nodes consistent with metastatic disease. Right axillary mets as well. There are scattered foci of hypermetabolism throughout the bony structures consistent with bone metastases.    6/24/24: C1D1 Carbo/Etop/Durva. Tolerated well    7/16/24: C2D1 Carbo/Etop/Durva. Complicated by inpatient admission due to dehydration from nausea/vomiting as well as pancytopenia. ANC 0.11. Required transfusion for hgb 6.8.     8/6/24: C3D1 Carbo/Etop/Durva. 20% reduction in Carbo and 20% reduction in Etoposide. Add G-CSF with this cycle due to severe neutropenia with C2.     Repeat scan have shown disease response, however scan was due to PE rule out.     8/27/24: C4D1 Carbo/Etop/Durva. 33% reduction in Carbo and 33% reduction in Etoposide. Continue G-CSF with this cycle due to severe neutropenia with C2. Labs appropriate to proceed. Ok to treat for elevated alk phos.      8/6/24: C3D1 Carbo/Etop/Durva. 20% reduction in Carbo and 20% reduction in Etoposide. Add G-CSF with this cycle due to severe neutropenia with C2.    Repeat scan have shown disease response, however scan was due to PE rule out.     8/27/24: C4D1 Carbo/Etop/Durva. 33% reduction in Carbo and 33% reduction in Etoposide. Continue G-CSF with this cycle due to severe neutropenia with C2.      Repeat scans without progression. Plan for durvalumab alone for maintenance    9/17/24: C5D1 durvalumab alone        Small cell lung cancer   6/12/2024 Initial Diagnosis    Small cell lung cancer     6/14/2024 - 6/14/2024 Chemotherapy    OP LUNG CISplatin 60mg/m2 / Etoposide 120mg/m2 + XRT     6/14/2024 Cancer Staged    Staging form: Lung, AJCC 8th Edition  - Clinical: Stage IVB (cT1b, cN2, cM1c) - Signed by Brian Dickson MD on 6/20/2024 6/24/2024 - 8/29/2024 Chemotherapy    OP LUNG CARBOplatin AUC=5 / Etoposide / Durvalumab     8/27/2024 -  Chemotherapy    OP SUPPORTIVE Denosumab (Xgeva) Q28D     9/17/2024 Biopsy    OP LUNG Durvalumab 1500 mg  Plan Provider: Brian Dickson MD  Treatment goal: Control  Line of treatment: [No plan line of treatment]     Cancer, metastatic to bone   8/6/2024 Initial Diagnosis    Cancer, metastatic to bone     8/27/2024 -  Chemotherapy    OP SUPPORTIVE Denosumab (Xgeva) Q28D     Secondary malignant neoplasm of brain   2/10/2025 Initial Diagnosis    Secondary malignant neoplasm of brain     2/10/2025 -  Radiation    RADIATION THERAPY Treatment Details (Noted on 2/10/2025)  Site: Brain  Technique: 3D CRT  Goal: No goal specified  Planned Treatment Start Date: No planned start date specified           Review of Systems   Constitutional:  Positive for fatigue. Negative for appetite change, diaphoresis, fever, unexpected weight gain and unexpected weight loss.   HENT:  Negative for hearing loss, mouth sores, sore throat, swollen glands, trouble swallowing and voice change.    Eyes:   Negative for blurred vision, double vision, pain, redness and visual disturbance.   Respiratory:  Negative for cough, shortness of breath and wheezing.    Cardiovascular:  Positive for chest pain (pt had her port put in today) and leg swelling (both legs are swelling). Negative for palpitations.   Gastrointestinal:  Positive for nausea. Negative for abdominal pain, blood in stool, constipation, diarrhea and vomiting.   Endocrine: Negative for cold intolerance, heat intolerance, polydipsia and polyuria.   Genitourinary:  Negative for decreased urine volume, difficulty urinating, dysuria, frequency, hematuria and urinary incontinence.   Musculoskeletal:  Negative for arthralgias, back pain, joint swelling and myalgias.   Skin:  Negative for color change, rash, skin lesions and wound.        PT IS GETTING SORES ON THE SKIN   Neurological:  Negative for dizziness, seizures, weakness, numbness and headache.   Hematological:  Negative for adenopathy. Does not bruise/bleed easily.   Psychiatric/Behavioral:  Positive for sleep disturbance. Negative for depressed mood. The patient is not nervous/anxious.    All other systems reviewed and are negative.    Current Outpatient Medications on File Prior to Visit   Medication Sig Dispense Refill    albuterol (ACCUNEB) 0.63 MG/3ML nebulizer solution USE 3 MILLILITERS WITH NEBULIZER EVERY 6 HOURS AS NEEDED FOR WHEEZE 225 mL 5    albuterol sulfate  (90 Base) MCG/ACT inhaler Inhale 2 puffs Every 4 (Four) Hours As Needed for Wheezing or Shortness of Air. 18 g 1    atorvastatin (LIPITOR) 10 MG tablet Take 1 tablet by mouth Every Night. 90 tablet 3    azithromycin (Zithromax Z-Homer) 250 MG tablet Take 2 tablets by mouth on day 1, then 1 tablet daily on days 2-5 6 tablet 0    buPROPion SR (WELLBUTRIN SR) 150 MG 12 hr tablet Take 1 tablet by mouth 2 (Two) Times a Day. 180 tablet 3    calcium carbonate (Tums) 500 MG chewable tablet Chew 2 tablets Daily. 60 tablet 0    escitalopram  (LEXAPRO) 20 MG tablet Take 1 tablet by mouth Daily. 90 tablet 3    famotidine (PEPCID) 40 MG tablet TAKE ONE TABLET BY MOUTH TWICE DAILY @ 9AM & 5PM 180 tablet 11    gabapentin (NEURONTIN) 100 MG capsule Take 2 capsules by mouth 3 (Three) Times a Day. 90 capsule 2    hydrOXYzine (ATARAX) 25 MG tablet TAKE 1 TABLET BY MOUTH THREE TIMES A DAY AS NEEDED FOR ITCHING 270 tablet 2    lisinopril (PRINIVIL,ZESTRIL) 5 MG tablet TAKE ONE TABLET BY MOUTH DAILY AT 9 AM 30 tablet 11    LORazepam (ATIVAN) 0.5 MG tablet Take 1 tablet by mouth Every 8 (Eight) Hours As Needed for Anxiety. 60 tablet 2    Magnesium 400 MG tablet Take 400 mg by mouth Daily. 30 tablet 5    melatonin 5 MG tablet tablet Take 1 tablet by mouth As Needed.      methylPREDNISolone (MEDROL) 4 MG dose pack Take as directed on package instructions. 21 tablet 0    naloxone (NARCAN) 4 MG/0.1ML nasal spray CALL 911. DON'T PRIME. SPRAY IN 1 NOSTRIL FOR OVERDOSE. REPEAT IN 2-3 MINUTES IN OTHER NOSTRIL IF NO OR MINIMAL BREATHING/RESPONSIVENESS. 2 each 0    naproxen (NAPROSYN) 500 MG tablet TAKE 1 TABLET BY MOUTH TWICE A DAY WITH MEALS 30 tablet 1    omeprazole (priLOSEC) 40 MG capsule Take 1 capsule by mouth Daily.      ondansetron ODT (ZOFRAN-ODT) 8 MG disintegrating tablet Place 1 tablet on the tongue Every 8 (Eight) Hours As Needed for Nausea or Vomiting. 60 tablet 3    oxyCODONE-acetaminophen (PERCOCET)  MG per tablet Take 1 tablet by mouth Every 4 (Four) Hours As Needed for Moderate Pain. 180 tablet 0    prochlorperazine (COMPAZINE) 10 MG tablet Take 1 tablet by mouth Every 6 (Six) Hours As Needed for Nausea. 60 tablet 3    Trelegy Ellipta 100-62.5-25 MCG/ACT inhaler Inhale 1 puff Daily. 3 each 3    nicotine (NICODERM CQ) 21 MG/24HR patch Place 1 patch on the skin as directed by provider Daily. (Patient not taking: Reported on 2/21/2025) 30 patch 0    nystatin (MYCOSTATIN) 100,000 unit/mL suspension Swish and swallow 5 mL 4 (Four) Times a Day As Needed  (for thrush, if needing to take use for 7-10days until symptoms resolve). (Patient not taking: Reported on 2025) 473 mL 0    potassium chloride (KLOR-CON M20) 20 MEQ CR tablet Take 1 tablet by mouth Daily. (Patient not taking: Reported on 2025) 14 tablet 0    triamcinolone (KENALOG) 0.1 % ointment Apply 1 Application topically to the appropriate area as directed 2 (Two) Times a Day. Apply to itchy areas (Patient not taking: Reported on 2025) 30 g 1     No current facility-administered medications on file prior to visit.       No Known Allergies  Past Medical History:   Diagnosis Date    Abnormal mammogram     PT DENIES KNOWING OF THIS HX    Anxiety     Arthritis     R SHOULDER, R HIP, AND R LEG    Cancer     LUNG    Chronic nausea 01/15/2019    COPD (chronic obstructive pulmonary disease)     O2 3 LITERS NC CONT    Hernia, hiatal     Hyperlipidemia     Hypertension     Knee pain, right 2018    Memory loss     FORGETFULNESS ? ETIOLOGY    Migraine headache     Moderate episode of recurrent major depressive disorder 2017    Night sweats     Sciatica of right side 2017    Severe episode of recurrent major depressive disorder, without psychotic features 10/22/2019    Shingles     OUTBREAK 24 ON ANTIVIRAL , WHELPS NOTED NO SORES.    Shoulder pain, left 2018     Past Surgical History:   Procedure Laterality Date    BRONCHOSCOPY N/A 2022    Procedure: BRONCHOSCOPY WITH BRONCHOALVEOLAR LAVAGE, BIOPSY;  Surgeon: Shin Mcdonald DO;  Location: [facility] END[facility]COPY;  Service: Pulmonary;  Laterality: N/A;  RIGHT LOWER LOBE INFILTRATE    BRONCHOSCOPY N/A 2024    Procedure: BRONCHOSCOPY WITH ENDOBRONCHIAL ULTRASOUND AND FINE NEEDLE ASPIRATE;  Surgeon: Shin Mcdonald DO;  Location: [facility] ENDOSCOPY;  Service: Pulmonary;  Laterality: N/A;  MEDIASTINAL ADENOPATHY     SECTION      CHOLECYSTECTOMY      LAPAROSCOPIC    COLONOSCOPY      PORTACATH PLACEMENT  Left 6/20/2024    Procedure: INSERTION OF PORTACATH;  Surgeon: Josh Patton MD;  Location: [facility] OR [facility];  Service: General;  Laterality: Left;    TOTAL HIP ARTHROPLASTY Right 5/13/2022    Procedure: RIGHT TOTAL HIP ARTHROPLASTY;  Surgeon: Cecil Gomes MD;  Location: [facility] MAIN OR;  Service: Orthopedics;  Laterality: Right;     Social History     Socioeconomic History    Marital status:      Spouse name: DEVAN    Number of children: 2    Years of education: GED    Highest education level: GED or equivalent   Tobacco Use    Smoking status: Every Day     Current packs/day: 2.00     Average packs/day: 2.0 packs/day for 47.1 years (94.3 ttl pk-yrs)     Types: Cigarettes     Start date: 1978     Passive exposure: Past    Smokeless tobacco: Never   Vaping Use    Vaping status: Former    Substances: Nicotine, Flavoring    Devices: Disposable   Substance and Sexual Activity    Alcohol use: Never    Drug use: Never    Sexual activity: Defer     Family History   Problem Relation Age of Onset    Other Mother         BLOOD DISEASE    Arthritis Mother     Heart disease Father     Hypertension Other     Cancer Other     Heart disease Other     Malig Hyperthermia Neg Hx        Objective   Physical Exam  Well appearing patient in no acute distress on RA  Anicteric sclerae, no rash on exposed skin  Respirations non-labored  Awake, alert, and oriented x 4. Speech intact. No gross neurologic deficit  Appropriate mood and affect  LE edema    Vitals:    02/21/25 0935   BP: 104/66   Pulse: 95   Resp: 20   Temp: 97.3 °F (36.3 °C)   TempSrc: Temporal   SpO2: 94%   Weight: 97.6 kg (215 lb 2.7 oz)   PainSc: 0-No pain       ECOG score: 2         PHQ-9 Total Score:           Result Review :   The following data was reviewed by: Brian Dickson MD on 02/21/25:  Lab Results   Component Value Date    HGB 11.2 (L) 02/21/2025    HCT 35.4 02/21/2025    MCV 93.4 02/21/2025     02/21/2025    WBC 8.85 02/21/2025     NEUTROABS 5.79 02/21/2025    LYMPHSABS 1.84 02/21/2025    MONOSABS 0.95 (H) 02/21/2025    EOSABS 0.20 02/21/2025    BASOSABS 0.03 02/21/2025     Lab Results   Component Value Date    GLUCOSE 100 (H) 02/21/2025    BUN 12 02/21/2025    CREATININE 0.86 02/21/2025     (L) 02/21/2025    K 4.4 02/21/2025     02/21/2025    CO2 23.4 02/21/2025    CALCIUM 8.0 (L) 02/21/2025    PROTEINTOT 6.5 02/21/2025    ALBUMIN 3.7 02/21/2025    BILITOT 0.2 02/21/2025    ALKPHOS 74 02/21/2025    AST 18 02/21/2025    ALT 13 02/21/2025     Lab Results   Component Value Date    MG 1.8 02/21/2025    PHOS 3.1 02/21/2025    FREET4 1.14 02/21/2025    TSH 1.670 02/21/2025     Labs personally reviewed. Sodium normal. Hgb wnl. WBC wnl. Plts wnl.       CT Head With & Without Contrast    Result Date: 2/5/2025  Impression: Multiple new enhancing parenchymal lesions are identified, both supratentorial and infratentorial, consistent with metastatic disease progression. 3 lesions in the posterior fossa are present associated with some minimal surrounding edema, otherwise without significant mass effect. The fourth ventricle does not appear effaced. Electronically Signed: Arnel Crowley MD  2/5/2025 2:36 PM EST  Workstation ID: [id number]         Assessment and Plan    Diagnoses and all orders for this visit:    1. Small cell carcinoma of middle lobe of right lung (Primary)  -     CT chest w contrast; Future  -     CT abdomen pelvis w contrast; Future    2. Neuropathic pain  -     Ambulatory Referral to Neurology    3. Anxiety  -     Discontinue: clonazePAM (KlonoPIN) 0.5 MG tablet; Take 1 tablet by mouth 2 (Two) Times a Day As Needed for Anxiety.  Dispense: 60 tablet; Refill: 0  -     clonazePAM (KlonoPIN) 0.5 MG tablet; Take 1 tablet by mouth 2 (Two) Times a Day As Needed for Anxiety.  Dispense: 60 tablet; Refill: 0    4. Metastatic cancer to brain    5. Cancer, metastatic to bone    Other orders  -     denosumab (XGEVA) injection 120 mg  -      Cancel: sodium chloride 0.9 % infusion  -     Cancel: durvalumab (IMFINZI) 1,500 mg in sodium chloride 0.9 % 280 mL chemo IVPB  -     calcium carbonate (Tums) 500 MG chewable tablet; Chew 2 tablets Daily.  Dispense: 28 tablet; Refill: 0          Small cell lung cancer, extensive stage  RML with right hilar and mediastinal adenopathy on CT chest.  Bronchoscopy with positive small cell pathology at station 4R and station 7.  PET with multiple new enlarged hypermetabolic mediastinal lymph nodes consistent with metastatic disease, also with new right axillary FDG avid mets. There are scattered foci of hypermetabolism throughout the bony structures consistent with bone metastases. CT head with and without without any intracranial mets.  Unable to do MRI due to claustrophobia and anxiety. Recommended treatment is systemic alone with for IV carboplatin, etoposide and durvalumab every 3 weeks. First cycle 6/24/24. 8/6/24: C3D1 Carbo/Etop/Durva. 20% reduction in Carbo and 20% reduction in Etoposide. Add G-CSF with this cycle due to severe neutropenia with C2.  8/27/24: C4D1 Carbo/Etop/Durva. 33% reduction in Carbo and 33% reduction in Etoposide. Last cycle of chemotherapy.    Repeat scans after C4 without clear progression, new 7 mm RUL nodule could be infectious. Plan for durvalumab alone.    Repeat scans 12/9/24 with stable disease. No new disease. Continue current treatment. Repeat scans due prior to next cycle.      2/21/25: C10D1 Durvalumab (sixth with Durvalumab alone). Labs appropriate to proceed.       Bilateral Hand pain  Could be carpal tunnel. Do not suspect cancer related.  Can use naproxen. Also on gabapentin. Agree with wrist braces. Refer to neurology for consideration of injections.     Met cancer to brain  CT head 2/4/25  with multiple new enhancing lesions, consistent with mets. Now on XRT. F/u per rad onc.     Anxiety  Ativan not beneficial. Switch to clonazepam (2/21/25) PRN    Itching  No rash. Could be  related to immunotherapy. Atarax as needed has helped. Topical steroid can be used on itchiest areas. Also can use PRN calamine lotion.     Neoplasm related pain  Worse in legs. Does not sound like neuropathy. Previously increased oxycodone to 10 mg. Has naproxen 500 mg PRN twice daily to use. Started on daily mag 400 mg (mag level is normal). May need to consider plaquenil. Better as of 1/24/25.    Metastatic cancer to bone  Recommend bone modifying agent. Patient has all teeth except 2 removed. Monthly Xgeva started 8/27/24. Due today but will hold due to low calcium - will replace orally. Will monitor electrolytes.     Insomnia  Recommend low dose melatonin vs benadryl. Also has Klonopin if anxiety is contributing to poor sleep.     LE edema  Recommend leg elevation. Hold on diuretic for now. Can consider if worsening.       Patient Follow Up: Cycle 11  Patient was given instructions and counseling regarding her condition or for health maintenance advice. Please see specific information pulled into the AVS if appropriate.

## 2025-02-24 ENCOUNTER — HOSPITAL ENCOUNTER (OUTPATIENT)
Dept: RADIATION ONCOLOGY | Facility: HOSPITAL | Age: 60
Discharge: HOME | End: 2025-02-24

## 2025-02-24 VITALS
WEIGHT: 217.81 LBS | DIASTOLIC BLOOD PRESSURE: 68 MMHG | HEART RATE: 104 BPM | RESPIRATION RATE: 16 BRPM | BODY MASS INDEX: 37.37 KG/M2 | OXYGEN SATURATION: 94 % | SYSTOLIC BLOOD PRESSURE: 120 MMHG

## 2025-02-24 DIAGNOSIS — M79.2 NEUROPATHIC PAIN: Primary | ICD-10-CM

## 2025-02-24 DIAGNOSIS — M79.642 BILATERAL HAND PAIN: ICD-10-CM

## 2025-02-24 DIAGNOSIS — M79.641 BILATERAL HAND PAIN: ICD-10-CM

## 2025-02-24 DIAGNOSIS — C79.31 SECONDARY MALIGNANT NEOPLASM OF BRAIN: Primary | ICD-10-CM

## 2025-02-24 LAB
RAD ONC ARIA COURSE ID: NORMAL
RAD ONC ARIA COURSE INTENT: NORMAL
RAD ONC ARIA COURSE LAST TREATMENT DATE: NORMAL
RAD ONC ARIA COURSE START DATE: NORMAL
RAD ONC ARIA COURSE TREATMENT ELAPSED DAYS: 11
RAD ONC ARIA FIRST TREATMENT DATE: NORMAL
RAD ONC ARIA PLAN FRACTIONS TREATED TO DATE: 6
RAD ONC ARIA PLAN ID: NORMAL
RAD ONC ARIA PLAN NAME: NORMAL
RAD ONC ARIA PLAN PRESCRIBED DOSE PER FRACTION: 3 GY
RAD ONC ARIA PLAN PRIMARY REFERENCE POINT: NORMAL
RAD ONC ARIA PLAN TOTAL FRACTIONS PRESCRIBED: 10
RAD ONC ARIA PLAN TOTAL PRESCRIBED DOSE: 3000 CGY
RAD ONC ARIA REFERENCE POINT DOSAGE GIVEN TO DATE: 18 GY
RAD ONC ARIA REFERENCE POINT ID: NORMAL
RAD ONC ARIA REFERENCE POINT SESSION DOSAGE GIVEN: 3 GY

## 2025-02-24 PROCEDURE — G6002 STEREOSCOPIC X-RAY GUIDANCE: HCPCS | Performed by: RADIOLOGY

## 2025-02-24 PROCEDURE — 77412 RADIATION TX DELIVERY LVL 3: CPT | Performed by: RADIOLOGY

## 2025-02-24 PROCEDURE — 77387 GUIDANCE FOR RADJ TX DLVR: CPT | Performed by: RADIOLOGY

## 2025-02-24 NOTE — PROGRESS NOTES
"On Treatment Visit       Patient: Lluvia Archer   YOB: 1965   Medical Record Number: [id number]     Date of Visit  February 24, 2025   Diagnosis: Small cell lung cancer  Staging form: Lung, AJCC 8th Edition  - Clinical: Stage IVB (cT1b, cN2, cM1c) - Signed by Biran Dickson MD on 6/20/2024         was seen today for an on treatment visit.  She is receiving radiation therapy to the whole brain. She  has received 1800 cGy in 6 fractions out of a planned dose of 3000 cGy in 10 fractions.     Anxiety associated with daily radiotherapy.  Prescribed 60 pills of 0.5 lorazepam on 1/24/2025 but is using lorazepam throughout the day                                            Review of Systems:   Review of Systems   Constitutional:  Positive for appetite change (decreased, eats one full meal a day) and fatigue (10/10).   HENT:  Negative for congestion and sore throat.    Eyes:  Negative for visual disturbance.   Respiratory:  Positive for cough (occasionally productive), shortness of breath (with activity) and wheezing.    Cardiovascular:  Negative for chest pain.   Gastrointestinal:  Positive for nausea (relieved with medication, occasionally). Negative for abdominal pain, constipation, diarrhea and vomiting.   Genitourinary:  Negative for difficulty urinating, dysuria, frequency and urgency.   Musculoskeletal:  Positive for arthralgias, back pain and joint swelling.        Pain in hands, \"burning\" sensation      Skin:  Negative for color change and rash.   Neurological:  Positive for weakness (generalized), light-headedness (occasionally with fast movement, getting up) and numbness (hands). Negative for dizziness and headaches.   Psychiatric/Behavioral:  Positive for sleep disturbance (wakes throughout the night).        Vitals:     Vitals:    02/24/25 0947   BP: 120/68   Pulse: 104   Resp: 16   SpO2: 94%       Weight:   Wt Readings from Last 3 Encounters:   02/24/25 98.8 kg (217 lb 13 " "oz)   02/21/25 97.6 kg (215 lb 2.7 oz)   02/21/25 97.6 kg (215 lb 2.7 oz)      Pain:    Pain Score    02/24/25 0947   PainSc: 7    PainLoc: Hand  Comment: both hands, \"feels like they're on fire\"         Physical Exam:  Gen: WD/WN; NAD  HEENT: MMM  Trachea: midline  Chest: symmetric  Resp: normal respiratory effort  Extr: warm, well-perfused  Neuro: awake and alert; no aphasia or neglect    Plan: I have reviewed treatment setup notes, checked and approved the daily guidance images.  I reviewed dose delivery, treatment parameters and deemed them appropriate. We plan to continue radiation therapy as prescribed.    She is already taking gabapentin for neuropathy  Lorazepam helpful for anxiety  Her daughter helps with meal prep    She has body scans scheduled for next week    Radiation Oncology    Electronically signed by Stacie Zarco MD 2/24/2025  09:54 EST    "

## 2025-02-25 ENCOUNTER — APPOINTMENT (OUTPATIENT)
Dept: GENERAL RADIOLOGY | Facility: HOSPITAL | Age: 60
DRG: 177 | End: 2025-02-25
Payer: MEDICARE

## 2025-02-25 ENCOUNTER — HOSPITAL ENCOUNTER (INPATIENT)
Facility: HOSPITAL | Age: 60
LOS: 6 days | Discharge: HOME | DRG: 177 | End: 2025-03-03
Attending: EMERGENCY MEDICINE | Admitting: HOSPITALIST
Payer: MEDICARE

## 2025-02-25 ENCOUNTER — APPOINTMENT (OUTPATIENT)
Dept: CT IMAGING | Facility: HOSPITAL | Age: 60
DRG: 177 | End: 2025-02-25
Payer: MEDICARE

## 2025-02-25 DIAGNOSIS — Z78.9 DECREASED ACTIVITIES OF DAILY LIVING (ADL): ICD-10-CM

## 2025-02-25 DIAGNOSIS — J96.11 CHRONIC RESPIRATORY FAILURE WITH HYPOXIA: ICD-10-CM

## 2025-02-25 DIAGNOSIS — C34.90 PRIMARY MALIGNANT NEOPLASM OF LUNG METASTATIC TO OTHER SITE, UNSPECIFIED LATERALITY: ICD-10-CM

## 2025-02-25 DIAGNOSIS — J10.1 INFLUENZA A: ICD-10-CM

## 2025-02-25 DIAGNOSIS — J44.9 CHRONIC OBSTRUCTIVE PULMONARY DISEASE, UNSPECIFIED COPD TYPE: Primary | ICD-10-CM

## 2025-02-25 DIAGNOSIS — J44.1 ACUTE EXACERBATION OF CHRONIC OBSTRUCTIVE PULMONARY DISEASE (COPD): ICD-10-CM

## 2025-02-25 DIAGNOSIS — R26.2 DIFFICULTY WALKING: ICD-10-CM

## 2025-02-25 PROBLEM — J11.1 INFLUENZA: Status: ACTIVE | Noted: 2025-02-25

## 2025-02-25 LAB
ALBUMIN SERPL-MCNC: 3.8 G/DL (ref 3.5–5.2)
ALBUMIN/GLOB SERPL: 1.3 G/DL
ALP SERPL-CCNC: 83 U/L (ref 39–117)
ALT SERPL W P-5'-P-CCNC: 11 U/L (ref 1–33)
ANION GAP SERPL CALCULATED.3IONS-SCNC: 10.4 MMOL/L (ref 5–15)
ARTERIAL PATENCY WRIST A: POSITIVE
AST SERPL-CCNC: 15 U/L (ref 1–32)
ATMOSPHERIC PRESS: 740.8 MMHG
BASE EXCESS BLDA CALC-SCNC: -3.8 MMOL/L (ref -2–2)
BASOPHILS # BLD AUTO: 0.02 10*3/MM3 (ref 0–0.2)
BASOPHILS NFR BLD AUTO: 0.2 % (ref 0–1.5)
BDY SITE: ABNORMAL
BILIRUB SERPL-MCNC: 0.3 MG/DL (ref 0–1.2)
BUN SERPL-MCNC: 12 MG/DL (ref 6–20)
BUN/CREAT SERPL: 12.9 (ref 7–25)
CALCIUM SPEC-SCNC: 8.2 MG/DL (ref 8.6–10.5)
CHLORIDE SERPL-SCNC: 101 MMOL/L (ref 98–107)
CO2 SERPL-SCNC: 24.6 MMOL/L (ref 22–29)
CREAT SERPL-MCNC: 0.93 MG/DL (ref 0.57–1)
D-LACTATE SERPL-SCNC: 1.2 MMOL/L (ref 0.5–2)
DEPRECATED RDW RBC AUTO: 62.4 FL (ref 37–54)
EGFRCR SERPLBLD CKD-EPI 2021: 71.4 ML/MIN/1.73
EOSINOPHIL # BLD AUTO: 0.03 10*3/MM3 (ref 0–0.4)
EOSINOPHIL NFR BLD AUTO: 0.4 % (ref 0.3–6.2)
ERYTHROCYTE [DISTWIDTH] IN BLOOD BY AUTOMATED COUNT: 18.2 % (ref 12.3–15.4)
FLUAV SUBTYP SPEC NAA+PROBE: DETECTED
FLUBV RNA ISLT QL NAA+PROBE: NOT DETECTED
GEN 5 1HR TROPONIN T REFLEX: 15 NG/L
GLOBULIN UR ELPH-MCNC: 3 GM/DL
GLUCOSE SERPL-MCNC: 119 MG/DL (ref 65–99)
HCO3 BLDA-SCNC: 22.4 MMOL/L (ref 22–26)
HCT VFR BLD AUTO: 36.3 % (ref 34–46.6)
HCT VFR BLD CALC: 37 % (ref 38–51)
HEMODILUTION: NO
HGB BLD-MCNC: 11.5 G/DL (ref 12–15.9)
HGB BLDA-MCNC: 12.4 G/DL (ref 12–18)
HOLD SPECIMEN: NORMAL
HOLD SPECIMEN: NORMAL
IMM GRANULOCYTES # BLD AUTO: 0.05 10*3/MM3 (ref 0–0.05)
IMM GRANULOCYTES NFR BLD AUTO: 0.6 % (ref 0–0.5)
LYMPHOCYTES # BLD AUTO: 0.56 10*3/MM3 (ref 0.7–3.1)
LYMPHOCYTES NFR BLD AUTO: 6.7 % (ref 19.6–45.3)
MAGNESIUM SERPL-MCNC: 2.6 MG/DL (ref 1.6–2.6)
MCH RBC QN AUTO: 29.7 PG (ref 26.6–33)
MCHC RBC AUTO-ENTMCNC: 31.7 G/DL (ref 31.5–35.7)
MCV RBC AUTO: 93.8 FL (ref 79–97)
MODALITY: ABNORMAL
MONOCYTES # BLD AUTO: 0.85 10*3/MM3 (ref 0.1–0.9)
MONOCYTES NFR BLD AUTO: 10.1 % (ref 5–12)
NEUTROPHILS NFR BLD AUTO: 6.89 10*3/MM3 (ref 1.7–7)
NEUTROPHILS NFR BLD AUTO: 82 % (ref 42.7–76)
NRBC BLD AUTO-RTO: 0 /100 WBC (ref 0–0.2)
NT-PROBNP SERPL-MCNC: 298.8 PG/ML (ref 0–900)
PCO2 BLDA: 44.1 MM HG (ref 35–45)
PH BLDA: 7.31 PH UNITS (ref 7.35–7.45)
PHOSPHATE SERPL-MCNC: 3.8 MG/DL (ref 2.5–4.5)
PLATELET # BLD AUTO: 140 10*3/MM3 (ref 140–450)
PMV BLD AUTO: 9.4 FL (ref 6–12)
PO2 BLDA: 66.3 MM HG (ref 80–100)
POTASSIUM SERPL-SCNC: 4.2 MMOL/L (ref 3.5–5.2)
PROCALCITONIN SERPL-MCNC: 0.09 NG/ML (ref 0–0.25)
PROT SERPL-MCNC: 6.8 G/DL (ref 6–8.5)
QT INTERVAL: 321 MS
QTC INTERVAL: 447 MS
RBC # BLD AUTO: 3.87 10*6/MM3 (ref 3.77–5.28)
RSV RNA NPH QL NAA+NON-PROBE: NOT DETECTED
SAO2 % BLDCOA: 90.9 % (ref 95–99)
SARS-COV-2 RNA RESP QL NAA+PROBE: NOT DETECTED
SODIUM SERPL-SCNC: 136 MMOL/L (ref 136–145)
TROPONIN T % DELTA: -17
TROPONIN T NUMERIC DELTA: -3 NG/L
TROPONIN T SERPL HS-MCNC: 18 NG/L
WBC NRBC COR # BLD AUTO: 8.4 10*3/MM3 (ref 3.4–10.8)
WHOLE BLOOD HOLD COAG: NORMAL
WHOLE BLOOD HOLD SPECIMEN: NORMAL

## 2025-02-25 PROCEDURE — 85025 COMPLETE CBC W/AUTO DIFF WBC: CPT

## 2025-02-25 PROCEDURE — 36415 COLL VENOUS BLD VENIPUNCTURE: CPT | Performed by: EMERGENCY MEDICINE

## 2025-02-25 PROCEDURE — 84100 ASSAY OF PHOSPHORUS: CPT | Performed by: HOSPITALIST

## 2025-02-25 PROCEDURE — 84484 ASSAY OF TROPONIN QUANT: CPT | Performed by: EMERGENCY MEDICINE

## 2025-02-25 PROCEDURE — 83735 ASSAY OF MAGNESIUM: CPT | Performed by: HOSPITALIST

## 2025-02-25 PROCEDURE — 94799 UNLISTED PULMONARY SVC/PX: CPT

## 2025-02-25 PROCEDURE — 87070 CULTURE OTHR SPECIMN AEROBIC: CPT | Performed by: HOSPITALIST

## 2025-02-25 PROCEDURE — 80053 COMPREHEN METABOLIC PANEL: CPT

## 2025-02-25 PROCEDURE — 25810000003 SODIUM CHLORIDE 0.9 % SOLUTION 250 ML FLEX CONT: Performed by: HOSPITALIST

## 2025-02-25 PROCEDURE — 25010000002 LORAZEPAM PER 2 MG: Performed by: EMERGENCY MEDICINE

## 2025-02-25 PROCEDURE — 84145 PROCALCITONIN (PCT): CPT | Performed by: HOSPITALIST

## 2025-02-25 PROCEDURE — 83880 ASSAY OF NATRIURETIC PEPTIDE: CPT

## 2025-02-25 PROCEDURE — 87186 SC STD MICRODIL/AGAR DIL: CPT

## 2025-02-25 PROCEDURE — 25010000002 METHYLPREDNISOLONE PER 125 MG: Performed by: EMERGENCY MEDICINE

## 2025-02-25 PROCEDURE — 87637 SARSCOV2&INF A&B&RSV AMP PRB: CPT | Performed by: EMERGENCY MEDICINE

## 2025-02-25 PROCEDURE — 93005 ELECTROCARDIOGRAM TRACING: CPT

## 2025-02-25 PROCEDURE — 99223 1ST HOSP IP/OBS HIGH 75: CPT | Performed by: HOSPITALIST

## 2025-02-25 PROCEDURE — 71275 CT ANGIOGRAPHY CHEST: CPT

## 2025-02-25 PROCEDURE — 87186 SC STD MICRODIL/AGAR DIL: CPT | Performed by: HOSPITALIST

## 2025-02-25 PROCEDURE — 87077 CULTURE AEROBIC IDENTIFY: CPT

## 2025-02-25 PROCEDURE — 99285 EMERGENCY DEPT VISIT HI MDM: CPT

## 2025-02-25 PROCEDURE — 84484 ASSAY OF TROPONIN QUANT: CPT

## 2025-02-25 PROCEDURE — 25010000002 METHYLPREDNISOLONE PER 40 MG: Performed by: HOSPITALIST

## 2025-02-25 PROCEDURE — 87147 CULTURE TYPE IMMUNOLOGIC: CPT | Performed by: HOSPITALIST

## 2025-02-25 PROCEDURE — 99223 1ST HOSP IP/OBS HIGH 75: CPT | Performed by: INTERNAL MEDICINE

## 2025-02-25 PROCEDURE — 87040 BLOOD CULTURE FOR BACTERIA: CPT | Performed by: HOSPITALIST

## 2025-02-25 PROCEDURE — 93005 ELECTROCARDIOGRAM TRACING: CPT | Performed by: EMERGENCY MEDICINE

## 2025-02-25 PROCEDURE — 83605 ASSAY OF LACTIC ACID: CPT | Performed by: HOSPITALIST

## 2025-02-25 PROCEDURE — 87205 SMEAR GRAM STAIN: CPT | Performed by: HOSPITALIST

## 2025-02-25 PROCEDURE — 25810000003 SODIUM CHLORIDE 0.9 % SOLUTION: Performed by: EMERGENCY MEDICINE

## 2025-02-25 PROCEDURE — 25510000001 IOPAMIDOL PER 1 ML: Performed by: EMERGENCY MEDICINE

## 2025-02-25 PROCEDURE — 36600 WITHDRAWAL OF ARTERIAL BLOOD: CPT

## 2025-02-25 PROCEDURE — 94640 AIRWAY INHALATION TREATMENT: CPT

## 2025-02-25 PROCEDURE — 25010000002 AZITHROMYCIN PER 500 MG: Performed by: HOSPITALIST

## 2025-02-25 PROCEDURE — 71045 X-RAY EXAM CHEST 1 VIEW: CPT

## 2025-02-25 PROCEDURE — 82803 BLOOD GASES ANY COMBINATION: CPT

## 2025-02-25 RX ORDER — LISINOPRIL 5 MG/1
5 TABLET ORAL DAILY
Status: DISCONTINUED | OUTPATIENT
Start: 2025-02-26 | End: 2025-03-03 | Stop reason: HOSPADM

## 2025-02-25 RX ORDER — ARFORMOTEROL TARTRATE 15 UG/2ML
15 SOLUTION RESPIRATORY (INHALATION)
Status: DISCONTINUED | OUTPATIENT
Start: 2025-02-25 | End: 2025-03-03 | Stop reason: HOSPADM

## 2025-02-25 RX ORDER — ESCITALOPRAM OXALATE 10 MG/1
20 TABLET ORAL DAILY
Status: DISCONTINUED | OUTPATIENT
Start: 2025-02-26 | End: 2025-03-03 | Stop reason: HOSPADM

## 2025-02-25 RX ORDER — POLYETHYLENE GLYCOL 3350 17 G/17G
17 POWDER, FOR SOLUTION ORAL DAILY PRN
Status: DISCONTINUED | OUTPATIENT
Start: 2025-02-25 | End: 2025-03-03 | Stop reason: HOSPADM

## 2025-02-25 RX ORDER — ACETAMINOPHEN 325 MG/1
650 TABLET ORAL EVERY 4 HOURS PRN
Status: DISCONTINUED | OUTPATIENT
Start: 2025-02-25 | End: 2025-03-03 | Stop reason: HOSPADM

## 2025-02-25 RX ORDER — CALCIUM CARBONATE 500 MG/1
2 TABLET, CHEWABLE ORAL
Status: DISCONTINUED | OUTPATIENT
Start: 2025-02-26 | End: 2025-03-03 | Stop reason: HOSPADM

## 2025-02-25 RX ORDER — BISACODYL 5 MG/1
5 TABLET, DELAYED RELEASE ORAL DAILY PRN
Status: DISCONTINUED | OUTPATIENT
Start: 2025-02-25 | End: 2025-03-03 | Stop reason: HOSPADM

## 2025-02-25 RX ORDER — OXYCODONE AND ACETAMINOPHEN 10; 325 MG/1; MG/1
1 TABLET ORAL EVERY 4 HOURS PRN
Status: DISCONTINUED | OUTPATIENT
Start: 2025-02-25 | End: 2025-02-27

## 2025-02-25 RX ORDER — AMOXICILLIN 250 MG
2 CAPSULE ORAL 2 TIMES DAILY
Status: DISCONTINUED | OUTPATIENT
Start: 2025-02-25 | End: 2025-03-03 | Stop reason: HOSPADM

## 2025-02-25 RX ORDER — BUPROPION HYDROCHLORIDE 150 MG/1
150 TABLET, EXTENDED RELEASE ORAL 2 TIMES DAILY
Status: DISCONTINUED | OUTPATIENT
Start: 2025-02-25 | End: 2025-03-03 | Stop reason: HOSPADM

## 2025-02-25 RX ORDER — OSELTAMIVIR PHOSPHATE 75 MG/1
75 CAPSULE ORAL EVERY 12 HOURS SCHEDULED
Status: COMPLETED | OUTPATIENT
Start: 2025-02-25 | End: 2025-03-01

## 2025-02-25 RX ORDER — CLONAZEPAM 0.5 MG/1
0.5 TABLET ORAL 2 TIMES DAILY PRN
Status: DISCONTINUED | OUTPATIENT
Start: 2025-02-25 | End: 2025-03-03 | Stop reason: HOSPADM

## 2025-02-25 RX ORDER — SODIUM CHLORIDE 0.9 % (FLUSH) 0.9 %
10 SYRINGE (ML) INJECTION EVERY 12 HOURS SCHEDULED
Status: DISCONTINUED | OUTPATIENT
Start: 2025-02-25 | End: 2025-03-03 | Stop reason: HOSPADM

## 2025-02-25 RX ORDER — LORAZEPAM 0.5 MG/1
0.5 TABLET ORAL EVERY 8 HOURS PRN
Status: DISCONTINUED | OUTPATIENT
Start: 2025-02-25 | End: 2025-03-03 | Stop reason: HOSPADM

## 2025-02-25 RX ORDER — FAMOTIDINE 20 MG/1
40 TABLET, FILM COATED ORAL 2 TIMES DAILY
Status: DISCONTINUED | OUTPATIENT
Start: 2025-02-25 | End: 2025-03-03 | Stop reason: HOSPADM

## 2025-02-25 RX ORDER — ONDANSETRON 2 MG/ML
4 INJECTION INTRAMUSCULAR; INTRAVENOUS EVERY 6 HOURS PRN
Status: DISCONTINUED | OUTPATIENT
Start: 2025-02-25 | End: 2025-03-03 | Stop reason: HOSPADM

## 2025-02-25 RX ORDER — IPRATROPIUM BROMIDE AND ALBUTEROL SULFATE 2.5; .5 MG/3ML; MG/3ML
3 SOLUTION RESPIRATORY (INHALATION) ONCE
Status: COMPLETED | OUTPATIENT
Start: 2025-02-25 | End: 2025-02-25

## 2025-02-25 RX ORDER — SODIUM CHLORIDE 0.9 % (FLUSH) 0.9 %
10 SYRINGE (ML) INJECTION AS NEEDED
Status: DISCONTINUED | OUTPATIENT
Start: 2025-02-25 | End: 2025-03-03 | Stop reason: HOSPADM

## 2025-02-25 RX ORDER — BISACODYL 10 MG
10 SUPPOSITORY, RECTAL RECTAL DAILY PRN
Status: DISCONTINUED | OUTPATIENT
Start: 2025-02-25 | End: 2025-03-03 | Stop reason: HOSPADM

## 2025-02-25 RX ORDER — ONDANSETRON 4 MG/1
4 TABLET, ORALLY DISINTEGRATING ORAL EVERY 6 HOURS PRN
Status: DISCONTINUED | OUTPATIENT
Start: 2025-02-25 | End: 2025-03-03 | Stop reason: HOSPADM

## 2025-02-25 RX ORDER — BUDESONIDE 0.5 MG/2ML
0.5 INHALANT ORAL
Status: DISCONTINUED | OUTPATIENT
Start: 2025-02-25 | End: 2025-03-03 | Stop reason: HOSPADM

## 2025-02-25 RX ORDER — ATORVASTATIN CALCIUM 10 MG/1
10 TABLET, FILM COATED ORAL NIGHTLY
Status: DISCONTINUED | OUTPATIENT
Start: 2025-02-25 | End: 2025-03-03 | Stop reason: HOSPADM

## 2025-02-25 RX ORDER — IOPAMIDOL 755 MG/ML
100 INJECTION, SOLUTION INTRAVASCULAR
Status: COMPLETED | OUTPATIENT
Start: 2025-02-25 | End: 2025-02-25

## 2025-02-25 RX ORDER — ACETAMINOPHEN 650 MG/1
650 SUPPOSITORY RECTAL EVERY 4 HOURS PRN
Status: DISCONTINUED | OUTPATIENT
Start: 2025-02-25 | End: 2025-03-03 | Stop reason: HOSPADM

## 2025-02-25 RX ORDER — LORAZEPAM 2 MG/ML
1 INJECTION INTRAMUSCULAR ONCE
Status: COMPLETED | OUTPATIENT
Start: 2025-02-25 | End: 2025-02-25

## 2025-02-25 RX ORDER — ACETAMINOPHEN 160 MG/5ML
650 SOLUTION ORAL EVERY 4 HOURS PRN
Status: DISCONTINUED | OUTPATIENT
Start: 2025-02-25 | End: 2025-03-03 | Stop reason: HOSPADM

## 2025-02-25 RX ORDER — SODIUM CHLORIDE 9 MG/ML
40 INJECTION, SOLUTION INTRAVENOUS AS NEEDED
Status: DISCONTINUED | OUTPATIENT
Start: 2025-02-25 | End: 2025-03-03 | Stop reason: HOSPADM

## 2025-02-25 RX ORDER — HYDROXYZINE HYDROCHLORIDE 25 MG/1
25 TABLET, FILM COATED ORAL 3 TIMES DAILY PRN
Status: DISCONTINUED | OUTPATIENT
Start: 2025-02-25 | End: 2025-03-03 | Stop reason: HOSPADM

## 2025-02-25 RX ORDER — GABAPENTIN 100 MG/1
200 CAPSULE ORAL 3 TIMES DAILY
Status: DISCONTINUED | OUTPATIENT
Start: 2025-02-25 | End: 2025-03-03 | Stop reason: HOSPADM

## 2025-02-25 RX ORDER — IPRATROPIUM BROMIDE AND ALBUTEROL SULFATE 2.5; .5 MG/3ML; MG/3ML
3 SOLUTION RESPIRATORY (INHALATION)
Status: DISCONTINUED | OUTPATIENT
Start: 2025-02-25 | End: 2025-03-03 | Stop reason: HOSPADM

## 2025-02-25 RX ADMIN — FAMOTIDINE 40 MG: 20 TABLET, FILM COATED ORAL at 21:14

## 2025-02-25 RX ADMIN — BUPROPION HYDROCHLORIDE 150 MG: 150 TABLET, FILM COATED, EXTENDED RELEASE ORAL at 21:14

## 2025-02-25 RX ADMIN — IPRATROPIUM BROMIDE AND ALBUTEROL SULFATE 3 ML: .5; 3 SOLUTION RESPIRATORY (INHALATION) at 20:18

## 2025-02-25 RX ADMIN — OSELTAMIVIR PHOSPHATE 75 MG: 75 CAPSULE ORAL at 14:03

## 2025-02-25 RX ADMIN — LORAZEPAM 1 MG: 2 INJECTION INTRAMUSCULAR; INTRAVENOUS at 11:50

## 2025-02-25 RX ADMIN — OSELTAMIVIR PHOSPHATE 75 MG: 75 CAPSULE ORAL at 21:55

## 2025-02-25 RX ADMIN — ARFORMOTEROL TARTRATE 15 MCG: 15 SOLUTION RESPIRATORY (INHALATION) at 20:18

## 2025-02-25 RX ADMIN — IOPAMIDOL 100 ML: 755 INJECTION, SOLUTION INTRAVENOUS at 12:54

## 2025-02-25 RX ADMIN — SODIUM CHLORIDE 1000 ML: 9 INJECTION, SOLUTION INTRAVENOUS at 14:03

## 2025-02-25 RX ADMIN — METHYLPREDNISOLONE SODIUM SUCCINATE 125 MG: 125 INJECTION INTRAMUSCULAR; INTRAVENOUS at 11:49

## 2025-02-25 RX ADMIN — OXYCODONE AND ACETAMINOPHEN 1 TABLET: 10; 325 TABLET ORAL at 18:20

## 2025-02-25 RX ADMIN — IPRATROPIUM BROMIDE AND ALBUTEROL SULFATE 3 ML: .5; 3 SOLUTION RESPIRATORY (INHALATION) at 11:41

## 2025-02-25 RX ADMIN — AZITHROMYCIN DIHYDRATE 500 MG: 500 INJECTION, POWDER, LYOPHILIZED, FOR SOLUTION INTRAVENOUS at 14:31

## 2025-02-25 RX ADMIN — METHYLPREDNISOLONE SODIUM SUCCINATE 40 MG: 40 INJECTION, POWDER, LYOPHILIZED, FOR SOLUTION INTRAMUSCULAR; INTRAVENOUS at 18:09

## 2025-02-25 RX ADMIN — CLONAZEPAM 0.5 MG: 0.5 TABLET ORAL at 21:20

## 2025-02-25 RX ADMIN — ATORVASTATIN CALCIUM 10 MG: 10 TABLET, FILM COATED ORAL at 21:15

## 2025-02-25 RX ADMIN — BUDESONIDE 0.5 MG: 0.5 SUSPENSION RESPIRATORY (INHALATION) at 20:18

## 2025-02-25 RX ADMIN — GABAPENTIN 200 MG: 100 CAPSULE ORAL at 21:14

## 2025-02-25 RX ADMIN — HYDROXYZINE HYDROCHLORIDE 25 MG: 25 TABLET, FILM COATED ORAL at 21:20

## 2025-02-25 NOTE — CONSULTS
Pulmonary / Critical Care Consult Note      Patient Name: Lluvia Archer  : 1965  MRN: [id number]  Primary Care Physician:  Aleksandar Edge DO  Referring Physician: No Known Provider  Date of admission: 2025    Subjective   Subjective     Reason for Consult/ Chief Complaint: Influenza A pneumonia, resp failure, Lung cancer    HPI:  Lluvia Archer is a 59 y.o. female with history of stage IV lung cancer, chronic smoking and COPD, presented to the hospital with worsening shortness of breath for few days.  She was tachypneic and tachycardic in the ER and requiring Ventimask for hypoxia and desaturations.  CT scan of the chest showed left lower lobe airspace disease which was new there was also concern for enlarging pulmonary nodules and new enlarged mediastinal and hilar lymph nodes.  Patient was positive for influenza A.  She is admitted for influenza a pneumonia, COPD with acute exacerbation.  Pulmonary and critical services consulted for extensive management of her acute issues.  She has a history of small cell lung cancer diagnosed last year, was on chemotherapy, has been on immunotherapy once a month over the last 4 months.  She is currently also getting intracranial radiation.  She continues to smoke, currently smoking up to half pack a day.    Review of Systems  General:  Fatigue, chills, no fever  HEENT: No dysphagia, No Visual Changes, no rhinorrhea  Respiratory:  +cough,+Dyspnea, scant phlegm, No Pleuritic Pain, + wheezing, no hemoptysis  Cardiovascular: Denies chest pain, denies palpitations,+GOFF, No Chest Pressure  Gastrointestinal:  No Abdominal Pain, No Nausea, No Vomiting, No Diarrhea  Genitourinary:  No Dysuria, No Frequency, No Hesitancy  Musculoskeletal: No muscle pain or swelling  Endocrine:  No Heat Intolerance, No Cold Intolerance  Hematologic:  No Bleeding, No Bruising  Psychiatric:  No Anxiety, No Depression  Neurologic:  No Confusion, no Dysarthria, No Headaches  Skin:   No Rash, No Open Wounds        Personal History     Past Medical History:   Diagnosis Date    Abnormal mammogram     PT DENIES KNOWING OF THIS HX    Anxiety     Arthritis     R SHOULDER, R HIP, AND R LEG    Cancer     LUNG    Chronic nausea 01/15/2019    COPD (chronic obstructive pulmonary disease)     O2 3 LITERS NC CONT    Hernia, hiatal     Hyperlipidemia     Hypertension     Knee pain, right 2018    Memory loss     FORGETFULNESS ? ETIOLOGY    Migraine headache     Moderate episode of recurrent major depressive disorder 2017    Night sweats     Sciatica of right side 2017    Severe episode of recurrent major depressive disorder, without psychotic features 10/22/2019    Shingles     OUTBREAK 24 ON ANTIVIRAL , WHELPS NOTED NO SORES.    Shoulder pain, left 2018       Past Surgical History:   Procedure Laterality Date    BRONCHOSCOPY N/A 2022    Procedure: BRONCHOSCOPY WITH BRONCHOALVEOLAR LAVAGE, BIOPSY;  Surgeon: Shin Mcdonald DO;  Location: [facility] ENDOSCOPY;  Service: Pulmonary;  Laterality: N/A;  RIGHT LOWER LOBE INFILTRATE    BRONCHOSCOPY N/A 2024    Procedure: BRONCHOSCOPY WITH ENDOBRONCHIAL ULTRASOUND AND FINE NEEDLE ASPIRATE;  Surgeon: Shin Mcdonald DO;  Location: [facility] ENDOSCOPY;  Service: Pulmonary;  Laterality: N/A;  MEDIASTINAL ADENOPATHY     SECTION      CHOLECYSTECTOMY      LAPAROSCOPIC    COLONOSCOPY      PORTACATH PLACEMENT Left 2024    Procedure: INSERTION OF PORTACATH;  Surgeon: Josh Patton MD;  Location: [facility] OR OSC;  Service: General;  Laterality: Left;    TOTAL HIP ARTHROPLASTY Right 2022    Procedure: RIGHT TOTAL HIP ARTHROPLASTY;  Surgeon: Cecil Gomes MD;  Location: [facility] MAIN OR;  Service: Orthopedics;  Laterality: Right;       Family History: family history includes Arthritis in her mother; Cancer in an other family member; Heart disease in her father and another family member; Hypertension in  an other family member; Other in her mother.     Social History:  reports that she has been smoking cigarettes. She started smoking about 47 years ago. She has a 94.3 pack-year smoking history. She has been exposed to tobacco smoke. She has never used smokeless tobacco. She reports that she does not drink alcohol and does not use drugs.    Home Medications:  Fluticasone-Umeclidin-Vilant, LORazepam, Magnesium, Melatonin, albuterol, albuterol sulfate HFA, atorvastatin, buPROPion SR, calcium carbonate, clonazePAM, escitalopram, famotidine, gabapentin, hydrOXYzine, lisinopril, naloxone, naproxen, ondansetron ODT, oxyCODONE-acetaminophen, and prochlorperazine    Allergies:  No Known Allergies    Objective    Objective     Vitals:   Temp:  [99.6 °F (37.6 °C)] 99.6 °F (37.6 °C)  Heart Rate:  [115-137] 136  Resp:  [21-40] 21  BP: (105-168)/(69-89) 142/71  Flow (L/min) (Oxygen Therapy):  [4-10] 10    Physical Exam:  Vital Signs Reviewed   General:  WDWN, Alert, in mild distress, on venturi mask   HEENT:  PERRL, EOMI.  OP, nares clear, no sinus tenderness  Neck:  Supple, no JVD, no thyromegaly  Lymph: no axillary, cervical, supraclavicular lymphadenopathy noted bilaterally  Chest: Poor aeration, bilateral scattered rhonchi, expiratory wheezing, tympanic to percussion bilaterally, no work of breathing noted  CV: RRR, no MGR, pulses 2+, equal  Abd:  Soft, NT, ND, + BS, no HSM  EXT:  no clubbing, no cyanosis, no edema, no joint tenderness  Neuro:  A&Ox3, CN grossly intact, no focal deficits  Skin: No rashes or lesions noted      Result Review    Result Review:  I have personally reviewed the results from the time of this admission to 2/25/2025 16:40 EST and agree with these findings:  [x]  Laboratory  [x]  Microbiology  [x]  Radiology  [x]  EKG/Telemetry   [x]  Cardiology/Vascular   []  Pathology  []  Old records  []  Other:  Most notable findings include:         Lab 02/25/25  1018 02/21/25  0900   WBC 8.40 8.85   HEMOGLOBIN  11.5* 11.2*   HEMATOCRIT 36.3 35.4   PLATELETS 140 196   SODIUM 136 134*   POTASSIUM 4.2 4.4   CHLORIDE 101 100   CO2 24.6 23.4   BUN 12 12   CREATININE 0.93 0.86   GLUCOSE 119* 100*   CALCIUM 8.2* 8.0*   PHOSPHORUS  --  3.1   TOTAL PROTEIN 6.8 6.5   ALBUMIN 3.8 3.7   GLOBULIN 3.0 2.8       XR Chest 1 View    Result Date: 2/25/2025  XR CHEST 1 VW Date of Exam: 2/25/2025 10:36 AM EST Indication: Shortness of breath Comparison: 8/19/2024. Findings: The heart is enlarged. There is mixed interstitial/airspace disease in the right lung, mainly in the right lung base and also within the left lung base. This raises question of multifocal pneumonia possibly from aspiration. There are no moderate to large  pleural effusions. There is no pneumothorax. There is a left-sided port in place. There are chronic age-related changes involving the bony thorax and thoracic aorta.     Impression: Impression: 1. Cardiomegaly. 2. Mixed interstitial/airspace disease, right greater than left side which could be on the basis of multifocal pneumonia from aspiration. Electronically Signed: Memo Luque MD  2/25/2025 11:02 AM EST  Workstation ID: [id number]       Assessment & Plan   Assessment / Plan     Active Hospital Problems:  Active Hospital Problems    Diagnosis     **Influenza          Impression:  Influenza A pneumonia  COPD with acute exacerbation  Small cell lung cancer, extensive stage, on immunotherapy, likely with progression now  Acute hypoxic respiratory failure  Chronic smoking    Plan:  Start Brovana, Pulmicort twice daily and Yupelri once daily.  Pain control per primary service.   Start Tamiflu twice daily.  Start Solu-Medrol 40 mg every 8 hours.  Consider consulting oncology service given likely progression of small cell lung cancer.  May need to consider radiation oncology consult as well.  Continue supplemental oxygen via nasal cannula.  Nicotine patch if needed.    I have reviewed labs, imaging, pertinent clinical data and  provider notes.   I have discussed with bedside nurse and primary service.     Electronically signed by Kelby Patel MD, 2/25/2025, 16:40 EST.

## 2025-02-25 NOTE — H&P
[facility]  [facility]: [facility]25   Patient Name: Lluvia Archer  : 1965  MRN: [id number]  Primary Care Physician:  Aleksandar Edge DO  Date of admission: 2025    Subjective shortness of breath  Subjective     Chief Complaint: Shortness of breath    HPI: Patient is a 59-year-old female who presents to the emergency room for evaluation of shortness of breath.  She has stage IV metastatic lung disease and is a patient of Dr. Tubbs.  Additionally, patient continues to smoke and has COPD.    On arrival to the ED, patient has a temperature 99.6, pulse of 120, respiratory rate of 22, blood pressure of 142/71.  Patient is currently wearing a Venturi mask and saturating 93%.    CT of her chest shows: New anterior peripheral left lower lobe airspace disease may be related to bronchial narrowing and atelectasis. However, pneumonia not excluded. Progression of metastatic disease with enlarging pulmonary nodules and new enlarged mediastinal/hilar lymph nodes.     Patient's high-sensitivity troponin is 18 and then 15.  proBNP is 298.  Sodium is 136.  Calcium is 8.2.  Hemoglobin is 11.5.  White blood cell count is 8.4.  Lymphocytes are 6.7.  Patient is positive for influenza.  Personal History     Past Medical History:  Past Medical History:   Diagnosis Date    Abnormal mammogram     PT DENIES KNOWING OF THIS HX    Anxiety     Arthritis     R SHOULDER, R HIP, AND R LEG    Cancer     LUNG    Chronic nausea 01/15/2019    COPD (chronic obstructive pulmonary disease)     O2 3 LITERS NC CONT    Hernia, hiatal     Hyperlipidemia     Hypertension     Knee pain, right 2018    Memory loss     FORGETFULNESS ? ETIOLOGY    Migraine headache     Moderate episode of recurrent major depressive disorder 2017    Night sweats     Sciatica of right side 2017    Severe episode of recurrent major depressive disorder, without psychotic features 10/22/2019    Shingles      OUTBREAK 24 ON ANTIVIRAL , WHELPS NOTED NO SORES.    Shoulder pain, left 2018       Past Surgical History:  Past Surgical History:   Procedure Laterality Date    BRONCHOSCOPY N/A 2022    Procedure: BRONCHOSCOPY WITH BRONCHOALVEOLAR LAVAGE, BIOPSY;  Surgeon: Shin Mcdonald DO;  Location: [facility] ENDOSCOPY;  Service: Pulmonary;  Laterality: N/A;  RIGHT LOWER LOBE INFILTRATE    BRONCHOSCOPY N/A 2024    Procedure: BRONCHOSCOPY WITH ENDOBRONCHIAL ULTRASOUND AND FINE NEEDLE ASPIRATE;  Surgeon: Shin Mcdonald DO;  Location: [facility] ENDOSCOPY;  Service: Pulmonary;  Laterality: N/A;  MEDIASTINAL ADENOPATHY     SECTION      CHOLECYSTECTOMY      LAPAROSCOPIC    COLONOSCOPY      PORTACATH PLACEMENT Left 2024    Procedure: INSERTION OF PORTACATH;  Surgeon: Josh Patton MD;  Location: [facility] OR OSC;  Service: General;  Laterality: Left;    TOTAL HIP ARTHROPLASTY Right 2022    Procedure: RIGHT TOTAL HIP ARTHROPLASTY;  Surgeon: Cecil Gomes MD;  Location: [facility] MAIN OR;  Service: Orthopedics;  Laterality: Right;       Family History:   Family History   Problem Relation Age of Onset    Other Mother         BLOOD DISEASE    Arthritis Mother     Heart disease Father     Hypertension Other     Cancer Other     Heart disease Other     Malig Hyperthermia Neg Hx        Social History:   Social History     Socioeconomic History    Marital status:      Spouse name: DEVNA    Number of children: 2    Years of education: GED    Highest education level: GED or equivalent   Tobacco Use    Smoking status: Every Day     Current packs/day: 2.00     Average packs/day: 2.0 packs/day for 47.2 years (94.3 ttl pk-yrs)     Types: Cigarettes     Start date:      Passive exposure: Past    Smokeless tobacco: Never   Vaping Use    Vaping status: Former    Substances: Nicotine, Flavoring    Devices: Disposable   Substance and Sexual Activity    Alcohol use: Never    Drug use:  Never    Sexual activity: Defer       Home Medications:  Fluticasone-Umeclidin-Vilant, LORazepam, Magnesium, Melatonin, albuterol, albuterol sulfate HFA, atorvastatin, buPROPion SR, calcium carbonate, clonazePAM, escitalopram, famotidine, gabapentin, hydrOXYzine, lisinopril, naloxone, naproxen, ondansetron ODT, oxyCODONE-acetaminophen, and prochlorperazine    Allergies:  No Known Allergies    Review of Systems   All systems were reviewed and negative except for: Shortness of breath     Objective   Objective     Vitals:   Temp:  [99.6 °F (37.6 °C)] 99.6 °F (37.6 °C)  Heart Rate:  [115-137] 120  Resp:  [21-40] 21  BP: (105-168)/(69-89) 142/71  Flow (L/min) (Oxygen Therapy):  [4-10] 10    Physical Exam    Constitutional: Awake, alert, no acute distress   Eyes: Pupils equal, sclerae anicteric, no conjunctival injection   HENT: NCAT, mucous membranes moist   Neck: Supple, no thyromegaly, no lymphadenopathy, trachea midline   Respiratory:  wheezing   Cardiovascular: RRR, no murmurs, rubs, or gallops, palpable pedal pulses bilaterally   Gastrointestinal: Positive bowel sounds, soft, nontender, nondistended   Musculoskeletal: No bilateral ankle edema, no clubbing or cyanosis to extremities   Psychiatric: Appropriate affect, cooperative   Neurologic: Oriented x 3, strength symmetric in all extremities, Cranial Nerves grossly intact to confrontation, speech clear   Skin: No rashes     Result Review    Result Review:  I have personally reviewed the results from the time of this admission to 2/25/2025 14:36 EST and agree with these findings:  [x]  Laboratory  [x]  Microbiology  [x]  Radiology  [x]  EKG/Telemetry   [x]  Cardiology/Vascular   [x]  Pathology  [x]  Old records  []  Other:      Assessment & Plan   Assessment / Plan   #1 influenza  -Started on Tamiflu  -Complete respiratory panel ordered to find any secondary infections.    #2 possible COPD exacerbation  -DuoNeb, Brovana, Pulmicort, steroids, azithromycin, RT for  bronchopulmonary hygiene  .-Pulmonology consulted because of influenza, COPD and stage IV lung cancer    #3 stage IV lung cancer she follows with Dr. Dickson      #4 significant depression and anxiety continue home Wellbutrin, Klonopin, Lexapro, Ativan, and Atarax.    #5 GERD continue Pepcid    #6 hypertension continue lisinopril     #7 chronic pain  -Continue home gabapentin and oxycodone      VTE Prophylaxis:  Mechanical VTE prophylaxis orders are signed & held.          CODE STATUS:    Level Of Support Discussed With: Patient  Code Status (Patient has no pulse and is not breathing): CPR (Attempt to Resuscitate)  Medical Interventions (Patient has pulse or is breathing): Full Support      Admission Status:  I believe this patient meets inpatient status.    Electronically signed by Joanne Osborne DO, 02/25/25, 2:36 PM EST.

## 2025-02-25 NOTE — ED NOTES
pt called out for water RN said it was ok. while this tech was getting water pt climbed over rails to get on to bedside. PT DID NOT COMMUNICATE TO ANYONE THAT SHE NEED TO USE THE RESTROOM. pt told to please call cause she could have hurt herself

## 2025-02-25 NOTE — ED PROVIDER NOTES
Time: 11:21 AM EST  Date of encounter:  2025  Independent Historian/Clinical History and Information was obtained by:   Patient    History is limited by: N/A    Chief Complaint: Short of breath      History of Present Illness:  Patient is a 59 y.o. year old female who presents to the emergency department for evaluation of shortness of breath.  Patient had stage IV metastatic lung cancer and is a patient of Dr. Dickson, oncology.      Patient Care Team  Primary Care Provider: Aleksandar Edge DO    Past Medical History:     No Known Allergies  Past Medical History:   Diagnosis Date    Abnormal mammogram     PT DENIES KNOWING OF THIS HX    Anxiety     Arthritis     R SHOULDER, R HIP, AND R LEG    Cancer     LUNG    Chronic nausea 01/15/2019    COPD (chronic obstructive pulmonary disease)     O2 3 LITERS NC CONT    Hernia, hiatal     Hyperlipidemia     Hypertension     Knee pain, right 2018    Memory loss     FORGETFULNESS ? ETIOLOGY    Migraine headache     Moderate episode of recurrent major depressive disorder 2017    Night sweats     Sciatica of right side 2017    Severe episode of recurrent major depressive disorder, without psychotic features 10/22/2019    Shingles     OUTBREAK 24 ON ANTIVIRAL , WHELPS NOTED NO SORES.    Shoulder pain, left 2018     Past Surgical History:   Procedure Laterality Date    BRONCHOSCOPY N/A 2022    Procedure: BRONCHOSCOPY WITH BRONCHOALVEOLAR LAVAGE, BIOPSY;  Surgeon: Shin Mcdonald DO;  Location: [facility] ENDOSCOPY;  Service: Pulmonary;  Laterality: N/A;  RIGHT LOWER LOBE INFILTRATE    BRONCHOSCOPY N/A 2024    Procedure: BRONCHOSCOPY WITH ENDOBRONCHIAL ULTRASOUND AND FINE NEEDLE ASPIRATE;  Surgeon: Shin Mcdonald DO;  Location: [facility] ENDOSCOPY;  Service: Pulmonary;  Laterality: N/A;  MEDIASTINAL ADENOPATHY     SECTION      CHOLECYSTECTOMY      LAPAROSCOPIC    COLONOSCOPY      PORTACATH PLACEMENT Left  6/20/2024    Procedure: INSERTION OF PORTACATH;  Surgeon: Josh Patton MD;  Location: [facility] OR [facility] [place];  Service: General;  Laterality: Left;    TOTAL HIP ARTHROPLASTY Right 5/13/2022    Procedure: RIGHT TOTAL HIP ARTHROPLASTY;  Surgeon: Cecil Gomes MD;  Location: [facility] MAIN OR;  Service: Orthopedics;  Laterality: Right;     Family History   Problem Relation Age of Onset    Other Mother         BLOOD DISEASE    Arthritis Mother     Heart disease Father     Hypertension Other     Cancer Other     Heart disease Other     Malig Hyperthermia Neg Hx        Home Medications:  Prior to Admission medications    Medication Sig Start Date End Date Taking? Authorizing Provider   albuterol (ACCUNEB) 0.63 MG/3ML nebulizer solution USE 3 MILLILITERS WITH NEBULIZER EVERY 6 HOURS AS NEEDED FOR WHEEZE 1/22/25   Aleksandar Edge DO   albuterol sulfate  (90 Base) MCG/ACT inhaler Inhale 2 puffs Every 4 (Four) Hours As Needed for Wheezing or Shortness of Air. 2/11/25   Aleksandar Edge DO   atorvastatin (LIPITOR) 10 MG tablet Take 1 tablet by mouth Every Night. 8/5/24   Aleksandar Edge DO   azithromycin (Zithromax Z-Homer) 250 MG tablet Take 2 tablets by mouth on day 1, then 1 tablet daily on days 2-5 11/14/24   Brian Dickson MD   buPROPion SR (WELLBUTRIN SR) 150 MG 12 hr tablet Take 1 tablet by mouth 2 (Two) Times a Day. 8/5/24   Aleksandar Edge DO   calcium carbonate (Tums) 500 MG chewable tablet Chew 2 tablets Daily. 2/21/25   Brian Dickson MD   clonazePAM (KlonoPIN) 0.5 MG tablet Take 1 tablet by mouth 2 (Two) Times a Day As Needed for Anxiety. 2/21/25   Brian Dickson MD   escitalopram (LEXAPRO) 20 MG tablet Take 1 tablet by mouth Daily. 8/5/24   Aleksandar Edge DO   famotidine (PEPCID) 40 MG tablet TAKE ONE TABLET BY MOUTH TWICE DAILY @ 9AM & 5PM 10/29/24   Aleksandar Edge DO   gabapentin (NEURONTIN) 100 MG capsule Take 2 capsules by  mouth 3 (Three) Times a Day. 1/24/25   Brian Dickson MD   hydrOXYzine (ATARAX) 25 MG tablet TAKE 1 TABLET BY MOUTH THREE TIMES A DAY AS NEEDED FOR ITCHING 11/11/24   Brian Dickson MD   lisinopril (PRINIVIL,ZESTRIL) 5 MG tablet TAKE ONE TABLET BY MOUTH DAILY AT 9 AM 10/29/24   Aleksandar Edge DO   LORazepam (ATIVAN) 0.5 MG tablet Take 1 tablet by mouth Every 8 (Eight) Hours As Needed for Anxiety. 2/19/25   Brian Dickson MD   Magnesium 400 MG tablet Take 400 mg by mouth Daily. 12/12/24   Brian Dickson MD   melatonin 5 MG tablet tablet Take 1 tablet by mouth As Needed.    Provider, MD Parvin   methylPREDNISolone (MEDROL) 4 MG dose pack Take as directed on package instructions. 11/14/24   Brian Dickson MD   naloxone (NARCAN) 4 MG/0.1ML nasal spray CALL 911. DON'T PRIME. SPRAY IN 1 NOSTRIL FOR OVERDOSE. REPEAT IN 2-3 MINUTES IN OTHER NOSTRIL IF NO OR MINIMAL BREATHING/RESPONSIVENESS. 2/10/25   Brian Dickson MD   naproxen (NAPROSYN) 500 MG tablet TAKE 1 TABLET BY MOUTH TWICE A DAY WITH MEALS 1/23/25   Brian Dickson MD   nicotine (NICODERM CQ) 21 MG/24HR patch Place 1 patch on the skin as directed by provider Daily.  Patient not taking: Reported on 2/21/2025 6/12/24   Zoe León APRN   nystatin (MYCOSTATIN) 100,000 unit/mL suspension Swish and swallow 5 mL 4 (Four) Times a Day As Needed (for thrush, if needing to take use for 7-10days until symptoms resolve).  Patient not taking: Reported on 2/21/2025 8/2/24   Saud Apodaca MD   omeprazole (priLOSEC) 40 MG capsule Take 1 capsule by mouth Daily. 7/23/24   Provider, MD Parvin   ondansetron ODT (ZOFRAN-ODT) 8 MG disintegrating tablet Place 1 tablet on the tongue Every 8 (Eight) Hours As Needed for Nausea or Vomiting. 1/24/25   Brian Dickson MD   oxyCODONE-acetaminophen (PERCOCET)  MG per tablet Take 1 tablet by mouth Every 4 (Four) Hours As Needed for Moderate  "Pain. 1/24/25   Brian Dickson MD   potassium chloride (KLOR-CON M20) 20 MEQ CR tablet Take 1 tablet by mouth Daily.  Patient not taking: Reported on 2/21/2025 8/20/24   Brian Dickson MD   prochlorperazine (COMPAZINE) 10 MG tablet Take 1 tablet by mouth Every 6 (Six) Hours As Needed for Nausea. 1/24/25   Brian Dickson MD   Trelegy Ellipta 100-62.5-25 MCG/ACT inhaler Inhale 1 puff Daily. 8/5/24   Aleksandar Edge,    triamcinolone (KENALOG) 0.1 % ointment Apply 1 Application topically to the appropriate area as directed 2 (Two) Times a Day. Apply to itchy areas  Patient not taking: Reported on 2/21/2025 9/17/24   Brian Dickson MD        Social History:   Social History     Tobacco Use    Smoking status: Every Day     Current packs/day: 2.00     Average packs/day: 2.0 packs/day for 47.2 years (94.3 ttl pk-yrs)     Types: Cigarettes     Start date: 1978     Passive exposure: Past    Smokeless tobacco: Never   Vaping Use    Vaping status: Former    Substances: Nicotine, Flavoring    Devices: Disposable   Substance Use Topics    Alcohol use: Never    Drug use: Never         Review of Systems:  Review of Systems   Respiratory:  Positive for cough, shortness of breath and wheezing.         Physical Exam:  /69   Pulse (!) 132   Temp 99.6 °F (37.6 °C) (Oral)   Resp 21   Ht 162.6 cm (64\")   Wt 99.8 kg (220 lb)   LMP  (LMP Unknown)   SpO2 95%   BMI 37.76 kg/m²     Physical Exam  Vitals and nursing note reviewed.   Constitutional:       General: She is not in acute distress.  Eyes:      Extraocular Movements: Extraocular movements intact.   Cardiovascular:      Rate and Rhythm: Regular rhythm. Tachycardia present.      Heart sounds: Normal heart sounds.   Pulmonary:      Effort: Pulmonary effort is normal. No respiratory distress.      Breath sounds: Examination of the right-upper field reveals decreased breath sounds. Examination of the left-upper field reveals " decreased breath sounds. Examination of the right-middle field reveals decreased breath sounds and wheezing. Examination of the left-middle field reveals decreased breath sounds and wheezing. Examination of the right-lower field reveals decreased breath sounds. Examination of the left-lower field reveals decreased breath sounds.   Abdominal:      General: Abdomen is flat.      Palpations: Abdomen is soft.      Tenderness: There is no abdominal tenderness.   Musculoskeletal:         General: Normal range of motion.      Cervical back: Normal range of motion and neck supple.   Skin:     General: Skin is warm and dry.   Neurological:      Mental Status: She is alert and oriented to person, place, and time. Mental status is at baseline.                    Medical Decision Making:      Comorbidities that affect care:    Metastatic small cell lung, hypertension, COPD, anxiety    External Notes reviewed:    Previous Clinic Note: Patient seen 2/21/2025 by Dr. Dickson, oncology, who was attending patient's chemotherapy for metastatic lung cancer.      The following orders were placed and all results were independently analyzed by me:  Orders Placed This Encounter   Procedures    COVID-19, FLU A/B, RSV PCR 1 HR TAT - Swab, Nasopharynx    XR Chest 1 View    CT Angiogram Chest Pulmonary Embolism    [place] Draw    Comprehensive Metabolic Panel    BNP    High Sensitivity Troponin T    CBC Auto Differential    High Sensitivity Troponin T 1Hr    Blood Gas, Arterial -    Blood Gas, Arterial -    NPO Diet NPO Type: Strict NPO    Undress & Gown    Continuous Pulse Oximetry    Vital Signs    Inpatient Hospitalist Consult    Oxygen Therapy- Nasal Cannula; Titrate 1-6 LPM Per SpO2; 90 - 95%    ECG 12 Lead ED Triage Standing Order; SOA    Insert Peripheral IV    Inpatient Admission    CBC & Differential    Green Top (Gel)    Lavender Top    Gold Top - SST    Light Blue Top       Medications Given in the Emergency Department:  Medications    sodium chloride 0.9 % flush 10 mL (has no administration in time range)   sodium chloride 0.9 % bolus 1,000 mL (has no administration in time range)   oseltamivir (TAMIFLU) capsule 75 mg (has no administration in time range)   azithromycin (ZITHROMAX) 500 mg in sodium chloride 0.9 % 250 mL IVPB-VTB (has no administration in time range)   ipratropium-albuterol (DUO-NEB) nebulizer solution 3 mL (3 mL Nebulization Given 2/25/25 1141)   methylPREDNISolone sodium succinate (SOLU-Medrol) 125 mg in sterile water (preservative free) 2 mL (125 mg Intravenous Given 2/25/25 1149)   LORazepam (ATIVAN) injection 1 mg (1 mg Intravenous Given 2/25/25 1150)   iopamidol (ISOVUE-370) 76 % injection 100 mL (100 mL Intravenous Given 2/25/25 1254)        ED Course:    ED Course as of 02/25/25 1352   Tue Feb 25, 2025   1031 EKG:    Rhythm: Sinus tachycardia  Rate: 116  Intervals: IVCD  T-wave: Normal  ST Segment: Nonspecific changes    EKG Comparison: 8/19/2024 similar    Interpreted by me   [NL]      ED Course User Index  [NL] Ke Reed DO       Labs:    Lab Results (last 24 hours)       Procedure Component Value Units Date/Time    CBC & Differential [[id number]]  (Abnormal) Collected: 02/25/25 1018    Specimen: Blood from Arm, Left Updated: 02/25/25 1025    Narrative:      The following orders were created for panel order CBC & Differential.  Procedure                               Abnormality         Status                     ---------                               -----------         ------                     CBC Auto Differential[[id number]]        Abnormal            Final result                 Please view results for these tests on the individual orders.    Comprehensive Metabolic Panel [[id number]]  (Abnormal) Collected: 02/25/25 1018    Specimen: Blood from Arm, Left Updated: 02/25/25 1054     Glucose 119 mg/dL      BUN 12 mg/dL      Creatinine 0.93 mg/dL      Sodium 136 mmol/L      Potassium 4.2 mmol/L      Chloride 101  mmol/L      CO2 24.6 mmol/L      Calcium 8.2 mg/dL      Total Protein 6.8 g/dL      Albumin 3.8 g/dL      ALT (SGPT) 11 U/L      AST (SGOT) 15 U/L      Alkaline Phosphatase 83 U/L      Total Bilirubin 0.3 mg/dL      Globulin 3.0 gm/dL      A/G Ratio 1.3 g/dL      BUN/Creatinine Ratio 12.9     Anion Gap 10.4 mmol/L      eGFR 71.4 mL/min/1.73     Narrative:      GFR Categories in Chronic Kidney Disease (CKD)      GFR Category          GFR (mL/min/1.73)    Interpretation  G1                     90 or greater         Normal or high (1)  G2                      60-89                Mild decrease (1)  G3a                   45-59                Mild to moderate decrease  G3b                   30-44                Moderate to severe decrease  G4                    15-29                Severe decrease  G5                    14 or less           Kidney failure          (1)In the absence of evidence of kidney disease, neither GFR category G1 or G2 fulfill the criteria for CKD.    eGFR calculation 2021 CKD-EPI creatinine equation, which does not include race as a factor    BNP [[id number]]  (Normal) Collected: 02/25/25 1018    Specimen: Blood from Arm, Left Updated: 02/25/25 1052     proBNP 298.8 pg/mL     Narrative:      This assay is used as an aid in the diagnosis of individuals suspected of having heart failure. It can be used as an aid in the diagnosis of acute decompensated heart failure (ADHF) in patients presenting with signs and symptoms of ADHF to the emergency department (ED). In addition, NT-proBNP of <300 pg/mL indicates ADHF is not likely.    Age Range Result Interpretation  NT-proBNP Concentration (pg/mL:      <50             Positive            >450                   Gray                 300-450                    Negative             <300    50-75           Positive            >900                  Gray                300-900                  Negative            <300      >75             Positive             >1800                  Harrell                300-1800                  Negative            <300    High Sensitivity Troponin T [[id number]]  (Abnormal) Collected: 02/25/25 1018    Specimen: Blood from Arm, Left Updated: 02/25/25 1054     HS Troponin T 18 ng/L     Narrative:      High Sensitive Troponin T Reference Range:  <14.0 ng/L- Negative Female for AMI  <22.0 ng/L- Negative Male for AMI  >=14 - Abnormal Female indicating possible myocardial injury.  >=22 - Abnormal Male indicating possible myocardial injury.   Clinicians would have to utilize clinical acumen, EKG, Troponin, and serial changes to determine if it is an Acute Myocardial Infarction or myocardial injury due to an underlying chronic condition.         CBC Auto Differential [[id number]]  (Abnormal) Collected: 02/25/25 1018    Specimen: Blood from Arm, Left Updated: 02/25/25 1025     WBC 8.40 10*3/mm3      RBC 3.87 10*6/mm3      Hemoglobin 11.5 g/dL      Hematocrit 36.3 %      MCV 93.8 fL      MCH 29.7 pg      MCHC 31.7 g/dL      RDW 18.2 %      RDW-SD 62.4 fl      MPV 9.4 fL      Platelets 140 10*3/mm3      Neutrophil % 82.0 %      Lymphocyte % 6.7 %      Monocyte % 10.1 %      Eosinophil % 0.4 %      Basophil % 0.2 %      Immature Grans % 0.6 %      Neutrophils, Absolute 6.89 10*3/mm3      Lymphocytes, Absolute 0.56 10*3/mm3      Monocytes, Absolute 0.85 10*3/mm3      Eosinophils, Absolute 0.03 10*3/mm3      Basophils, Absolute 0.02 10*3/mm3      Immature Grans, Absolute 0.05 10*3/mm3      nRBC 0.0 /100 WBC     Blood Gas, Arterial - [[id number]]  (Abnormal) Collected: 02/25/25 1136    Specimen: Arterial Blood Updated: 02/25/25 1149     Site Left Radial     Romaine's Test Positive     pH, Arterial 7.313 pH units      pCO2, Arterial 44.1 mm Hg      pO2, Arterial 66.3 mm Hg      HCO3, Arterial 22.4 mmol/L      Base Excess, Arterial -3.8 mmol/L      Comment: Serial Number: [id number]:  [id number]        O2 Saturation, Arterial 90.9 %      Hemoglobin, Blood Gas  12.4 g/dL      Hematocrit, Blood Gas 37.0 %      Barometric Pressure for Blood Gas 740.8000 mmHg      Modality HFNC     Hemodilution No    High Sensitivity Troponin T 1Hr [[id number]]  (Abnormal) Collected: 02/25/25 1138    Specimen: Blood from Arm, Right Updated: 02/25/25 1217     HS Troponin T 15 ng/L      Troponin T Numeric Delta -3 ng/L      Troponin T % Delta -17    Narrative:      High Sensitive Troponin T Reference Range:  <14.0 ng/L- Negative Female for AMI  <22.0 ng/L- Negative Male for AMI  >=14 - Abnormal Female indicating possible myocardial injury.  >=22 - Abnormal Male indicating possible myocardial injury.   Clinicians would have to utilize clinical acumen, EKG, Troponin, and serial changes to determine if it is an Acute Myocardial Infarction or myocardial injury due to an underlying chronic condition.         COVID-19, FLU A/B, RSV PCR 1 HR TAT - Swab, Nasopharynx [[id number]]  (Abnormal) Collected: 02/25/25 1156    Specimen: Swab from Nasopharynx Updated: 02/25/25 1251     COVID19 Not Detected     Influenza A PCR Detected     Influenza B PCR Not Detected     RSV, PCR Not Detected    Narrative:      Fact sheet for providers: https://www.fda.gov/media/281374/download    Fact sheet for patients: https://www.fda.gov/media/686640/download    Test performed by PCR.             Imaging:    CT Angiogram Chest Pulmonary Embolism    Result Date: 2/25/2025  CT ANGIOGRAM CHEST PULMONARY EMBOLISM Date of Exam: 2/25/2025 12:47 PM EST Indication: Tachycardic/hypoxic. Comparison: 12/9/2024 Technique: Axial CT images were obtained of the chest after the uneventful intravenous administration of iodinated contrast utilizing pulmonary embolism protocol.  Reconstructed coronal and sagittal images were also obtained. Automated exposure control and iterative construction methods were used. Findings: PULMONARY VASCULATURE: Pulmonary arteries are widely patent without evidence of embolus.  Main pulmonary artery is normal in  size. No evidence of right heart strain. MEDIASTINUM: Similar high-grade atherosclerotic stenosis involving the proximal left subclavian artery. Aortic and heart size are normal. No aortic dissection identified. No mass nor pericardial effusion. CORONARY ARTERIES: There is calcified atherosclerotic disease. LUNGS: There is minimal consolidation within the anterior periphery of the left lower lobe. No dense consolidation. There are enlarging pulmonary nodules primarily in the right lung including: *8 mm right upper lobe nodule (image 26, series 8), previously 4 mm *15 x 12 mm right middle lobe nodule (image 42, series 8), previously 9 x 7 mm *10 mm right middle lobe nodule (image 46, series 8), previously 5 mm PLEURAL SPACE: No effusion, mass, nor pneumothorax. LYMPH NODES: There are new enlarged mediastinal and hilar lymph nodes including: *1.5 x 1.1 cm prevascular node (image 82, series 5), new *2.5 x 2.0 cm left hilar node (image 128, series 5) with minimal narrowing of the left lower lobe bronchus, new UPPER ABDOMEN: Unremarkable OSSEOUS STRUCTURES: No acute osseous process is identified. There is sclerotic osseous metastatic lesions throughout.     Impression: 1.No evidence of pulmonary embolism. 2.New anterior peripheral left lower lobe airspace disease may be related to bronchial narrowing and atelectasis. However, pneumonia not excluded. 3.Progression of metastatic disease with enlarging pulmonary nodules and new enlarged mediastinal/hilar lymph nodes. Electronically Signed: Chapo Pabon MD  2/25/2025 1:07 PM EST  Workstation ID: [id number]    XR Chest 1 View    Result Date: 2/25/2025  XR CHEST 1 VW Date of Exam: 2/25/2025 10:36 AM EST Indication: Shortness of breath Comparison: 8/19/2024. Findings: The heart is enlarged. There is mixed interstitial/airspace disease in the right lung, mainly in the right lung base and also within the left lung base. This raises question of multifocal pneumonia possibly from  aspiration. There are no moderate to large  pleural effusions. There is no pneumothorax. There is a left-sided port in place. There are chronic age-related changes involving the bony thorax and thoracic aorta.     Impression: 1. Cardiomegaly. 2. Mixed interstitial/airspace disease, right greater than left side which could be on the basis of multifocal pneumonia from aspiration. Electronically Signed: Memo Luque MD  2/25/2025 11:02 AM EST  Workstation ID: [id number]       Differential Diagnosis and Discussion:    Dyspnea: Differential diagnosis includes but is not limited to metabolic acidosis, neurological disorders, psychogenic, asthma, pneumothorax, upper airway obstruction, COPD, pneumonia, noncardiogenic pulmonary edema, interstitial lung disease, anemia, congestive heart failure, and pulmonary embolism    PROCEDURES:    Labs were collected in the emergency department and all labs were reviewed and interpreted by me.  X-ray were performed in the emergency department and all X-ray impressions were independently interpreted by me.  An EKG was performed and the EKG was interpreted by me.  CT scan was performed in the emergency department and the CT scan radiology impression was interpreted by me.    ECG 12 Lead ED Triage Standing Order; SOA   Preliminary Result   HEART IRLM=390  bpm   RR Rptwiile=255  ms   MA Jwsblvbp=407  ms   P Horizontal Axis=0  deg   P Front Axis=66  deg   QRSD Interval=92  ms   QT Nfzzdmhz=762  ms   OSdS=599  ms   QRS Axis=34  deg   T Wave Axis=51  deg   - OTHERWISE NORMAL ECG -   Sinus tachycardia   Date and Time of Study:2025-02-25 10:30:17          Procedures    MDM     Patient's workup shows she is positive for influenza A.  Patient still tachypneic and tachycardic.  She will be admitted to the hospital service.  Patient CT scan of the chest was negative for PE.        Patient Care Considerations:    SEPSIS was considered but is NOT present in the emergency department as SIRS criteria is  not present.      Consultants/Shared Management Plan:    Hospitalist: I have discussed the case with Dr. Bernstein who agrees to accept the patient for admission.    Social Determinants of Health:    Patient is independent, reliable, and has access to care.       Disposition and Care Coordination:    Admit:   Through independent evaluation of the patient's history, physical, and imperical data, the patient meets criteria for inpatient admission to the hospital.        Final diagnoses:   Acute exacerbation of chronic obstructive pulmonary disease (COPD)   Primary malignant neoplasm of lung metastatic to other site, unspecified laterality   Influenza A        ED Disposition       ED Disposition   Decision to Admit    Condition   --    Comment   Level of Care: Telemetry [5]   Diagnosis: Influenza [881515]   Admitting Physician: YEN BERNSTEIN [[id number]]   Attending Physician: YEN BERNSTEIN [[id number]]   Certification: I Certify That Inpatient Hospital Services Are Medically Necessary For Greater Than 2 Midnights                 This medical record created using voice recognition software.             Ke Reed DO  02/25/25 5601

## 2025-02-26 ENCOUNTER — APPOINTMENT (OUTPATIENT)
Dept: CT IMAGING | Facility: HOSPITAL | Age: 60
DRG: 177 | End: 2025-02-26
Payer: MEDICARE

## 2025-02-26 LAB
ANION GAP SERPL CALCULATED.3IONS-SCNC: 8.8 MMOL/L (ref 5–15)
BASOPHILS # BLD AUTO: 0.01 10*3/MM3 (ref 0–0.2)
BASOPHILS NFR BLD AUTO: 0.1 % (ref 0–1.5)
BUN SERPL-MCNC: 15 MG/DL (ref 6–20)
BUN/CREAT SERPL: 19.2 (ref 7–25)
CALCIUM SPEC-SCNC: 7.8 MG/DL (ref 8.6–10.5)
CHLORIDE SERPL-SCNC: 103 MMOL/L (ref 98–107)
CO2 SERPL-SCNC: 24.2 MMOL/L (ref 22–29)
CREAT SERPL-MCNC: 0.78 MG/DL (ref 0.57–1)
DEPRECATED RDW RBC AUTO: 61.9 FL (ref 37–54)
EGFRCR SERPLBLD CKD-EPI 2021: 88.2 ML/MIN/1.73
EOSINOPHIL # BLD AUTO: 0 10*3/MM3 (ref 0–0.4)
EOSINOPHIL NFR BLD AUTO: 0 % (ref 0.3–6.2)
ERYTHROCYTE [DISTWIDTH] IN BLOOD BY AUTOMATED COUNT: 18 % (ref 12.3–15.4)
GLUCOSE SERPL-MCNC: 131 MG/DL (ref 65–99)
HCT VFR BLD AUTO: 32.4 % (ref 34–46.6)
HGB BLD-MCNC: 10.1 G/DL (ref 12–15.9)
IMM GRANULOCYTES # BLD AUTO: 0.11 10*3/MM3 (ref 0–0.05)
IMM GRANULOCYTES NFR BLD AUTO: 1.5 % (ref 0–0.5)
L PNEUMO1 AG UR QL IA: NEGATIVE
LYMPHOCYTES # BLD AUTO: 0.31 10*3/MM3 (ref 0.7–3.1)
LYMPHOCYTES NFR BLD AUTO: 4.2 % (ref 19.6–45.3)
MAGNESIUM SERPL-MCNC: 2.3 MG/DL (ref 1.6–2.6)
MCH RBC QN AUTO: 29.3 PG (ref 26.6–33)
MCHC RBC AUTO-ENTMCNC: 31.2 G/DL (ref 31.5–35.7)
MCV RBC AUTO: 93.9 FL (ref 79–97)
MONOCYTES # BLD AUTO: 0.38 10*3/MM3 (ref 0.1–0.9)
MONOCYTES NFR BLD AUTO: 5.2 % (ref 5–12)
MRSA DNA SPEC QL NAA+PROBE: NORMAL
NEUTROPHILS NFR BLD AUTO: 6.5 10*3/MM3 (ref 1.7–7)
NEUTROPHILS NFR BLD AUTO: 89 % (ref 42.7–76)
NRBC BLD AUTO-RTO: 0 /100 WBC (ref 0–0.2)
PHOSPHATE SERPL-MCNC: 2.8 MG/DL (ref 2.5–4.5)
PLATELET # BLD AUTO: 126 10*3/MM3 (ref 140–450)
PMV BLD AUTO: 9.2 FL (ref 6–12)
POTASSIUM SERPL-SCNC: 5 MMOL/L (ref 3.5–5.2)
RBC # BLD AUTO: 3.45 10*6/MM3 (ref 3.77–5.28)
S PNEUM AG SPEC QL LA: NEGATIVE
SODIUM SERPL-SCNC: 136 MMOL/L (ref 136–145)
WBC NRBC COR # BLD AUTO: 7.31 10*3/MM3 (ref 3.4–10.8)

## 2025-02-26 PROCEDURE — 99222 1ST HOSP IP/OBS MODERATE 55: CPT | Performed by: INTERNAL MEDICINE

## 2025-02-26 PROCEDURE — 94664 DEMO&/EVAL PT USE INHALER: CPT

## 2025-02-26 PROCEDURE — 94799 UNLISTED PULMONARY SVC/PX: CPT

## 2025-02-26 PROCEDURE — 97165 OT EVAL LOW COMPLEX 30 MIN: CPT

## 2025-02-26 PROCEDURE — 74177 CT ABD & PELVIS W/CONTRAST: CPT

## 2025-02-26 PROCEDURE — 99233 SBSQ HOSP IP/OBS HIGH 50: CPT | Performed by: INTERNAL MEDICINE

## 2025-02-26 PROCEDURE — 25010000002 METHYLPREDNISOLONE PER 40 MG: Performed by: HOSPITALIST

## 2025-02-26 PROCEDURE — 85025 COMPLETE CBC W/AUTO DIFF WBC: CPT | Performed by: HOSPITALIST

## 2025-02-26 PROCEDURE — 97161 PT EVAL LOW COMPLEX 20 MIN: CPT

## 2025-02-26 PROCEDURE — 94761 N-INVAS EAR/PLS OXIMETRY MLT: CPT

## 2025-02-26 PROCEDURE — 25510000001 IOPAMIDOL PER 1 ML: Performed by: INTERNAL MEDICINE

## 2025-02-26 PROCEDURE — 25010000002 CEFTRIAXONE PER 250 MG: Performed by: INTERNAL MEDICINE

## 2025-02-26 PROCEDURE — 80048 BASIC METABOLIC PNL TOTAL CA: CPT | Performed by: HOSPITALIST

## 2025-02-26 PROCEDURE — 84100 ASSAY OF PHOSPHORUS: CPT | Performed by: HOSPITALIST

## 2025-02-26 PROCEDURE — 83735 ASSAY OF MAGNESIUM: CPT | Performed by: HOSPITALIST

## 2025-02-26 PROCEDURE — 25510000002 IOHEXOL 240 MG/ML SOLUTION 50 ML BOTTLE: Performed by: INTERNAL MEDICINE

## 2025-02-26 PROCEDURE — 87899 AGENT NOS ASSAY W/OPTIC: CPT | Performed by: HOSPITALIST

## 2025-02-26 PROCEDURE — 87449 NOS EACH ORGANISM AG IA: CPT | Performed by: HOSPITALIST

## 2025-02-26 PROCEDURE — 87641 MR-STAPH DNA AMP PROBE: CPT | Performed by: INTERNAL MEDICINE

## 2025-02-26 PROCEDURE — 72126 CT NECK SPINE W/DYE: CPT

## 2025-02-26 RX ORDER — SODIUM CHLORIDE FOR INHALATION 3 %
4 VIAL, NEBULIZER (ML) INHALATION
Status: DISCONTINUED | OUTPATIENT
Start: 2025-02-26 | End: 2025-03-03 | Stop reason: HOSPADM

## 2025-02-26 RX ORDER — NICOTINE 21 MG/24HR
1 PATCH, TRANSDERMAL 24 HOURS TRANSDERMAL
Status: DISCONTINUED | OUTPATIENT
Start: 2025-02-26 | End: 2025-03-03 | Stop reason: HOSPADM

## 2025-02-26 RX ORDER — GUAIFENESIN 600 MG/1
1200 TABLET, EXTENDED RELEASE ORAL 2 TIMES DAILY
Status: DISCONTINUED | OUTPATIENT
Start: 2025-02-26 | End: 2025-03-03 | Stop reason: HOSPADM

## 2025-02-26 RX ORDER — ALBUTEROL SULFATE 0.83 MG/ML
2.5 SOLUTION RESPIRATORY (INHALATION) EVERY 6 HOURS PRN
Status: DISCONTINUED | OUTPATIENT
Start: 2025-02-26 | End: 2025-03-03 | Stop reason: HOSPADM

## 2025-02-26 RX ORDER — METOPROLOL TARTRATE 25 MG/1
12.5 TABLET, FILM COATED ORAL EVERY 12 HOURS SCHEDULED
Status: DISCONTINUED | OUTPATIENT
Start: 2025-02-26 | End: 2025-03-03 | Stop reason: HOSPADM

## 2025-02-26 RX ORDER — IOPAMIDOL 755 MG/ML
100 INJECTION, SOLUTION INTRAVASCULAR
Status: COMPLETED | OUTPATIENT
Start: 2025-02-26 | End: 2025-02-26

## 2025-02-26 RX ORDER — BROMPHENIRAMINE MALEATE, PSEUDOEPHEDRINE HYDROCHLORIDE, AND DEXTROMETHORPHAN HYDROBROMIDE 2; 30; 10 MG/5ML; MG/5ML; MG/5ML
5 SYRUP ORAL EVERY 6 HOURS PRN
Status: DISCONTINUED | OUTPATIENT
Start: 2025-02-26 | End: 2025-03-03 | Stop reason: HOSPADM

## 2025-02-26 RX ORDER — AZITHROMYCIN 250 MG/1
250 TABLET, FILM COATED ORAL
Status: COMPLETED | OUTPATIENT
Start: 2025-02-26 | End: 2025-03-01

## 2025-02-26 RX ADMIN — IOPAMIDOL 100 ML: 755 INJECTION, SOLUTION INTRAVENOUS at 19:20

## 2025-02-26 RX ADMIN — IPRATROPIUM BROMIDE AND ALBUTEROL SULFATE 3 ML: .5; 3 SOLUTION RESPIRATORY (INHALATION) at 00:53

## 2025-02-26 RX ADMIN — NICOTINE 1 PATCH: 21 PATCH, EXTENDED RELEASE TRANSDERMAL at 11:04

## 2025-02-26 RX ADMIN — HYDROXYZINE HYDROCHLORIDE 25 MG: 25 TABLET, FILM COATED ORAL at 14:37

## 2025-02-26 RX ADMIN — FAMOTIDINE 40 MG: 20 TABLET, FILM COATED ORAL at 08:32

## 2025-02-26 RX ADMIN — Medication 10 ML: at 08:32

## 2025-02-26 RX ADMIN — GABAPENTIN 200 MG: 100 CAPSULE ORAL at 08:31

## 2025-02-26 RX ADMIN — AZITHROMYCIN DIHYDRATE 250 MG: 250 TABLET ORAL at 11:05

## 2025-02-26 RX ADMIN — IPRATROPIUM BROMIDE AND ALBUTEROL SULFATE 3 ML: .5; 3 SOLUTION RESPIRATORY (INHALATION) at 07:50

## 2025-02-26 RX ADMIN — Medication 10 ML: at 20:52

## 2025-02-26 RX ADMIN — ARFORMOTEROL TARTRATE 15 MCG: 15 SOLUTION RESPIRATORY (INHALATION) at 20:16

## 2025-02-26 RX ADMIN — OSELTAMIVIR PHOSPHATE 75 MG: 75 CAPSULE ORAL at 20:39

## 2025-02-26 RX ADMIN — CLONAZEPAM 0.5 MG: 0.5 TABLET ORAL at 20:37

## 2025-02-26 RX ADMIN — OXYCODONE AND ACETAMINOPHEN 1 TABLET: 10; 325 TABLET ORAL at 04:55

## 2025-02-26 RX ADMIN — BUDESONIDE 0.5 MG: 0.5 SUSPENSION RESPIRATORY (INHALATION) at 07:50

## 2025-02-26 RX ADMIN — BUPROPION HYDROCHLORIDE 150 MG: 150 TABLET, FILM COATED, EXTENDED RELEASE ORAL at 20:39

## 2025-02-26 RX ADMIN — ESCITALOPRAM OXALATE 20 MG: 10 TABLET ORAL at 08:31

## 2025-02-26 RX ADMIN — ACETAMINOPHEN 650 MG: 325 TABLET ORAL at 08:34

## 2025-02-26 RX ADMIN — IPRATROPIUM BROMIDE AND ALBUTEROL SULFATE 3 ML: .5; 3 SOLUTION RESPIRATORY (INHALATION) at 20:16

## 2025-02-26 RX ADMIN — CALCIUM CARBONATE 2 TABLET: 500 TABLET, CHEWABLE ORAL at 12:24

## 2025-02-26 RX ADMIN — Medication 10 ML: at 00:22

## 2025-02-26 RX ADMIN — OXYCODONE AND ACETAMINOPHEN 1 TABLET: 10; 325 TABLET ORAL at 20:37

## 2025-02-26 RX ADMIN — OXYCODONE AND ACETAMINOPHEN 1 TABLET: 10; 325 TABLET ORAL at 14:37

## 2025-02-26 RX ADMIN — ARFORMOTEROL TARTRATE 15 MCG: 15 SOLUTION RESPIRATORY (INHALATION) at 07:50

## 2025-02-26 RX ADMIN — GABAPENTIN 200 MG: 100 CAPSULE ORAL at 16:14

## 2025-02-26 RX ADMIN — GABAPENTIN 200 MG: 100 CAPSULE ORAL at 20:39

## 2025-02-26 RX ADMIN — IOHEXOL: 240 INJECTION, SOLUTION INTRATHECAL; INTRAVASCULAR; INTRAVENOUS; ORAL at 16:14

## 2025-02-26 RX ADMIN — OSELTAMIVIR PHOSPHATE 75 MG: 75 CAPSULE ORAL at 08:31

## 2025-02-26 RX ADMIN — METHYLPREDNISOLONE SODIUM SUCCINATE 40 MG: 40 INJECTION, POWDER, LYOPHILIZED, FOR SOLUTION INTRAMUSCULAR; INTRAVENOUS at 17:51

## 2025-02-26 RX ADMIN — OXYCODONE AND ACETAMINOPHEN 1 TABLET: 10; 325 TABLET ORAL at 00:22

## 2025-02-26 RX ADMIN — METOPROLOL TARTRATE 12.5 MG: 25 TABLET, FILM COATED ORAL at 11:04

## 2025-02-26 RX ADMIN — GUAIFENESIN 1200 MG: 600 TABLET ORAL at 20:39

## 2025-02-26 RX ADMIN — GUAIFENESIN 1200 MG: 600 TABLET ORAL at 12:24

## 2025-02-26 RX ADMIN — Medication 4 ML: at 20:17

## 2025-02-26 RX ADMIN — METOPROLOL TARTRATE 12.5 MG: 25 TABLET, FILM COATED ORAL at 20:38

## 2025-02-26 RX ADMIN — BUPROPION HYDROCHLORIDE 150 MG: 150 TABLET, FILM COATED, EXTENDED RELEASE ORAL at 08:32

## 2025-02-26 RX ADMIN — LISINOPRIL 5 MG: 5 TABLET ORAL at 08:31

## 2025-02-26 RX ADMIN — FAMOTIDINE 40 MG: 20 TABLET, FILM COATED ORAL at 20:38

## 2025-02-26 RX ADMIN — SODIUM CHLORIDE 2000 MG: 9 INJECTION INTRAMUSCULAR; INTRAVENOUS; SUBCUTANEOUS at 11:04

## 2025-02-26 RX ADMIN — IPRATROPIUM BROMIDE AND ALBUTEROL SULFATE 3 ML: .5; 3 SOLUTION RESPIRATORY (INHALATION) at 11:51

## 2025-02-26 RX ADMIN — BUDESONIDE 0.5 MG: 0.5 SUSPENSION RESPIRATORY (INHALATION) at 20:16

## 2025-02-26 RX ADMIN — METHYLPREDNISOLONE SODIUM SUCCINATE 40 MG: 40 INJECTION, POWDER, LYOPHILIZED, FOR SOLUTION INTRAMUSCULAR; INTRAVENOUS at 06:34

## 2025-02-26 RX ADMIN — HYDROXYZINE HYDROCHLORIDE 25 MG: 25 TABLET, FILM COATED ORAL at 20:40

## 2025-02-26 RX ADMIN — ATORVASTATIN CALCIUM 10 MG: 10 TABLET, FILM COATED ORAL at 20:39

## 2025-02-26 NOTE — PROGRESS NOTES
Pulmonary / Critical Care Progress Note      Patient Name: Lluvia Archer  : 1965  MRN: [id number]  Primary Care Physician:  Aleksandar Edge DO  Date of admission: 2025    Subjective   Subjective   Follow-up for  Influenza A pneumonia, resp failure, Lung cancer     Over past 24 hours: Remains on nebulizers.  Remains on ceftriaxone, azithromycin, and Tamiflu.  Solu-Medrol    No acute events overnight.     This morning,   Currently on 5 L high flow  Sitting up in bed  Feels about the same  Cough slowly improving  Dyspnea slowly improving  No fever or chills  Denies any chest pain or chest tightness  Heart rate improved    Objective   Objective     Vitals:   Temp:  [97.7 °F (36.5 °C)-99.6 °F (37.6 °C)] 97.7 °F (36.5 °C)  Heart Rate:  [] 87  Resp:  [20-40] 20  BP: ()/(62-89) 150/83  Flow (L/min) (Oxygen Therapy):  [2-10] 5  Physical Exam   Vital Signs Reviewed   General:  WDWN, Alert, NAD.  Chronically ill-appearing female, sitting up in bed  HEENT:  PERRL, EOMI.  OP, nares clear, no sinus tenderness  Neck:  Supple, no JVD, no thyromegaly  Chest: Creased aeration with scattered rhonchi and wheezing, no increased work of breathing on 5 L [place]  CV: RRR, no MGR, pulses 2+, equal.  Abd:  Soft, NT, ND, + BS, no HSM, obese  EXT:  no clubbing, no cyanosis, no edema  Neuro:  A&Ox3, CN grossly intact, no focal deficits.  Skin: No rashes or lesions noted      Result Review    Result Review:  I have personally reviewed the results from the time of this admission to 2025 07:56 EST and agree with these findings:  [x]  Laboratory  [x]  Microbiology  [x]  Radiology  [x]  EKG/Telemetry   [x]  Cardiology/Vascular   []  Pathology  []  Old records  []  Other:  Most notable findings include:         Lab 25  0521 25  1138 25  1018 25  0900   WBC 7.31  --  8.40 8.85   HEMOGLOBIN 10.1*  --  11.5* 11.2*   HEMATOCRIT 32.4*  --  36.3 35.4   PLATELETS 126*  --  140 196   SODIUM  136  --  136 134*   POTASSIUM 5.0  --  4.2 4.4   CHLORIDE 103  --  101 100   CO2 24.2  --  24.6 23.4   BUN 15  --  12 12   CREATININE 0.78  --  0.93 0.86   GLUCOSE 131*  --  119* 100*   CALCIUM 7.8*  --  8.2* 8.0*   PHOSPHORUS 2.8 3.8  --  3.1   TOTAL PROTEIN  --   --  6.8 6.5   ALBUMIN  --   --  3.8 3.7   GLOBULIN  --   --  3.0 2.8       XR Chest 1 View    Result Date: 2/25/2025  XR CHEST 1 VW Date of Exam: 2/25/2025 10:36 AM EST Indication: Shortness of breath Comparison: 8/19/2024. Findings: The heart is enlarged. There is mixed interstitial/airspace disease in the right lung, mainly in the right lung base and also within the left lung base. This raises question of multifocal pneumonia possibly from aspiration. There are no moderate to large  pleural effusions. There is no pneumothorax. There is a left-sided port in place. There are chronic age-related changes involving the bony thorax and thoracic aorta.     Impression: Impression: 1. Cardiomegaly. 2. Mixed interstitial/airspace disease, right greater than left side which could be on the basis of multifocal pneumonia from aspiration. Electronically Signed: Memo Luque MD  2/25/2025 11:02 AM EST  Workstation ID: [id number]       Assessment & Plan   Assessment / Plan     Active Hospital Problems:  Active Hospital Problems    Diagnosis     **Influenza      Impression:   Influenza A pneumonia  COPD with acute exacerbation  Small cell lung cancer, extensive stage, on immunotherapy, likely with progression now  Acute hypoxic respiratory failure  Chronic smoking    Plan:   Continue Brovana, Pulmicort twice daily and Yupelri once daily.  Pain control per primary service.   Continue Tamiflu twice daily.  Continue Solu-Medrol 40 mg every 8 hours.  Consider consulting oncology service given likely progression of small cell lung cancer.  Continue supplemental oxygen via nasal cannula.  Nicotine patch if needed.  Recommend getting oncology involved.  Patient was reportedly  supposed to have treatment today.  Patient is being seen by Dr. Gordon and Dr. Dickson     VTE Prophylaxis:  Mechanical VTE prophylaxis orders are present.    CODE STATUS:   Level Of Support Discussed With: Patient  Code Status (Patient has no pulse and is not breathing): CPR (Attempt to Resuscitate)  Medical Interventions (Patient has pulse or is breathing): Full Support    I personally reviewed pertinent labs, imaging and provider notes. Discussed with bedside nurse and will discuss with primary service.     Electronically signed by SANDRO Gu, 02/26/25, 10:44 AM EST.  Electronically signed by Kelby Patel MD, 2/26/2025, 07:56 EST.      This visit was performed by BOTH a physician and an APC. I personally evaluated and examined the patient. I performed all aspects of MDM as documented. , I have reviewed and confirmed the accuracy of the patient's history as documented in this note., and I have reexamined the patient and the results are consistent with the previously documented exam. I have updated the documentation as necessary.     Electronically signed by Kelby Patel MD, 02/26/25, 3:02 PM EST.

## 2025-02-26 NOTE — NURSING NOTE
Patient Lluvia Archer admitted to 4MTU from the ED. Patient is alert and oriented to person, place, time, and situation. Patient oriented to unit, call light system and bed alarm use, teaching effective. Patient agreed to bed alarm use. Candida Auris screening revealed patient will NOT need tested, contact isolation and bleach cleaning due to no risk factors.    4 eyes skin assessment completed with Qiana Chambers RN. Per policy, the following skin interventions were put in place as a result of the skin assessment below: Barrier cream for skin breakdown    Skin Assessments (most recent)      Flowsheet Row Most Recent Value   Skin WDL .WDL except, integrity   Skin Integrity excoriation   Sensory Perception 4-->no impairment   Moisture 3-->occasionally moist   Activity 3-->walks occasionally   Mobility 3-->slightly limited   Nutrition 3-->adequate   Friction and Shear 2-->potential problem   Moises Score 18            Fall assessment completed. Per policy, the patient was determined be a Low fall risk due to Devine Prince score 14 or less (only used within the first 24 hours of admission). Patient assessed to need the following mobility assistance: Standby Assist with the following assistive devices: None, and will be using a bedside commode for toileting. Per policy, the following fall interventions were put in place: Standard Fall Precautions - oriented to room and call light, bed low and locked, clutter free environment, adequate lighting, directed to call for assistance, non-skid footwear, hourly rounding and personal belongings within reach..     Patient's belongings that were sent with family: None  Patient's belongings that were sent to security: None  Patient's belongings locked in safe box in room: None  Patient's belongings that were sent to pharmacy: Home Medications  Patient's belongings kept at bedside per patient: Clothing and Other:       denies past or current suicidality

## 2025-02-26 NOTE — CONSULTS
[facility]   Hematology/Oncology  Consult Note    Patient Name: Lluvia Archer  : 1965  MRN: [id number]  Primary Care Physician:  Aleksandar Edge DO  Referring Physician: No Known Provider  Date of admission: 2025    Subjective   Subjective     Reason for Consult/ Chief Complaint: shortness of breath, flu, small cell lung cancer    HPI:  Lluvia Archer is a 59 y.o. female with past medical history of stage IV small cell lung cancer currently on immunotherapy with Durvalumab, COPD Who presents to the ED on  with shortness of breath.  Patient was afebrile in the ER.  Heart rate of 120, respiratory rate 22.  Blood pressure 142/71.  She was initially placed on Venturi mask but has not been transition to nasal cannula.  COVID-negative but flu a positive.  CT chest showed no pulmonary embolism but left lower lobe airspace disease may be related to bronchial narrowing and atelectasis.  Progression of metastatic disease with enlarging pulmonary nodules and new enlarged mediastinal hilar lymph node seen.  My blood cell count was normal at 8.4.  She has mild anemia with hemoglobin 11.5.  Patient was started on antibiotics as well as Solu-Medrol.  She was also started on Tamiflu.  Patient denies any new pain.  No fevers or chills reported.  She does feel a little better today.     Review of Systems   All systems were reviewed and negative except for as mentioned above.     Personal History     Past Medical History:   Diagnosis Date    Abnormal mammogram     PT DENIES KNOWING OF THIS HX    Anxiety     Arthritis     R SHOULDER, R HIP, AND R LEG    Cancer     LUNG    Chronic nausea 01/15/2019    COPD (chronic obstructive pulmonary disease)     O2 3 LITERS NC CONT    Hernia, hiatal     Hyperlipidemia     Hypertension     Knee pain, right 2018    Memory loss     FORGETFULNESS ? ETIOLOGY    Migraine headache     Moderate episode of recurrent major depressive disorder 2017     Night sweats     Sciatica of right side 2017    Severe episode of recurrent major depressive disorder, without psychotic features 10/22/2019    Shingles     OUTBREAK 24 ON ANTIVIRAL , WHELPS NOTED NO SORES.    Shoulder pain, left 2018       Past Surgical History:   Procedure Laterality Date    BRONCHOSCOPY N/A 2022    Procedure: BRONCHOSCOPY WITH BRONCHOALVEOLAR LAVAGE, BIOPSY;  Surgeon: Shin Mcdonald DO;  Location: [facility] ENDOSCOPY;  Service: Pulmonary;  Laterality: N/A;  RIGHT LOWER LOBE INFILTRATE    BRONCHOSCOPY N/A 2024    Procedure: BRONCHOSCOPY WITH ENDOBRONCHIAL ULTRASOUND AND FINE NEEDLE ASPIRATE;  Surgeon: Shin Mcdonald DO;  Location: [facility] ENDOSCOPY;  Service: Pulmonary;  Laterality: N/A;  MEDIASTINAL ADENOPATHY     SECTION      CHOLECYSTECTOMY      LAPAROSCOPIC    COLONOSCOPY      PORTACATH PLACEMENT Left 2024    Procedure: INSERTION OF PORTACATH;  Surgeon: Josh Patton MD;  Location: [facility] OR [facility];  Service: General;  Laterality: Left;    TOTAL HIP ARTHROPLASTY Right 2022    Procedure: RIGHT TOTAL HIP ARTHROPLASTY;  Surgeon: Cecil Gomes MD;  Location: [facility] MAIN OR;  Service: Orthopedics;  Laterality: Right;       Family History: family history includes Arthritis in her mother; Cancer in an other family member; Heart disease in her father and another family member; Hypertension in an other family member; Other in her mother. Otherwise pertinent FHx was reviewed and not pertinent to current issue.    Social History:  reports that she has been smoking cigarettes. She started smoking about 47 years ago. She has a 47.2 pack-year smoking history. She has been exposed to tobacco smoke. She has never used smokeless tobacco. She reports that she does not drink alcohol and does not use drugs.    Home Medications:  Fluticasone-Umeclidin-Vilant, LORazepam, Magnesium, Melatonin, albuterol, albuterol sulfate HFA, atorvastatin,  buPROPion SR, calcium carbonate, clonazePAM, escitalopram, famotidine, gabapentin, hydrOXYzine, lisinopril, naloxone, naproxen, ondansetron ODT, oxyCODONE-acetaminophen, and prochlorperazine    Allergies:  No Known Allergies    Objective    Objective     Vitals:   Temp:  [97.7 °F (36.5 °C)-98.8 °F (37.1 °C)] 98.4 °F (36.9 °C)  Heart Rate:  [] 83  Resp:  [18-20] 18  BP: ()/(61-83) 145/61  Flow (L/min) (Oxygen Therapy):  [2-10] 4    Physical Exam:   Constitutional: Awake, alert, lying in bed with NC in place   Eyes: PERRLA, sclerae anicteric, no conjunctival injection   HENT: NCAT, mucous membranes moist   Respiratory: nonlabored respirations    Cardiovascular: no edema in the extremities   Gastrointestinal: nondistended   Musculoskeletal: Normal strength and tone, no joint swelling   Psychiatric: Appropriate affect, cooperative   Neurologic: Awake, alert, speech clear   Skin: Normal tone, no rashes    Result Review    Result Review:  I have personally reviewed the results from the time of this admission to 2/26/2025 14:04 EST and agree with these findings:  [x]  Laboratory  [x]  Microbiology  [x]  Radiology  []  EKG/Telemetry   []  Cardiology/Vascular   []  Pathology  []  Old records  []  Other:  Most notable findings include: H and P personally reviewed, mild anemia, normal WBC, flu A positive, covid negative, CTA personally reviewed and no PE but did show progression of metastatic disease with enlarging pulmonary nodules and new enlarged mediastinal hilar lymph nodes.  Possible airspace disease on the left    Assessment & Plan   Assessment / Plan     Brief Patient Summary:  Lluvia Archer is a 59 y.o. female who has metastatic SCLC on immunotherapy who presents with shortness of breath and found to be flu A positive.    Active Hospital Problems:  Active Hospital Problems    Diagnosis     **Influenza        Plan:   COPD exacerbation  Flu A positive  Agree with steroids.  Agree with Tamiflu.  Possible  pneumonia also being covered with antibiotics.    SCLC  Patient currently on immunotherapy maintenance with durvalumab.  Patient with concerning signs on recent CT scan of progression in her lungs.  Pulmonary nodules have worsened and she has new/enlarging mediastinal and hilar nodes.  Results discussed with patient.  Discussed this likely represents treatment failure.  Discussed this likely means that we need to change therapy. Next line reatment would be with chemotherapy, patient understands this.  Will complete staging inpatient with CT abdomen/pelvis - ordered. Will follow up outpatient.     Upper extremity paresthesias  Outpatient CT cervical spine was planned.  Will order this so that patient can get this while admitted.    Electronically signed by Brian Dickson MD, 02/26/25, 2:04 PM EST.

## 2025-02-26 NOTE — THERAPY EVALUATION
Patient Name: Lluvia Archer  : 1965    MRN: [id number]                              Today's Date: 2025       Admit Date: 2025    Visit Dx:     ICD-10-CM ICD-9-CM   1. Acute exacerbation of chronic obstructive pulmonary disease (COPD)  J44.1 491.21   2. Primary malignant neoplasm of lung metastatic to other site, unspecified laterality  C34.90 162.9   3. Influenza A  J10.1 487.1   4. Decreased activities of daily living (ADL)  Z78.9 V49.89     Patient Active Problem List   Diagnosis    Abnormal mammogram    Anxiety    Arthritis    Chronic nausea    Chronic obstructive pulmonary disease (COPD)    Counseling on substance use and abuse    Esophageal reflux    Hernia, hiatal    Hyperlipidemia    Hypertension    Knee pain, right    Memory loss    Migraine    Moderate episode of recurrent major depressive disorder    Night sweats    Numbness    Sciatica of right side    Severe episode of recurrent major depressive disorder, without psychotic features    Shoulder pain, left    Other diseases of nasal cavity and sinuses    Sleep apnea, unspecified    Tobacco abuse    Strain of groin, right, subsequent encounter    Osteoarthritis of spine with radiculopathy, lumbar region    Chronic right-sided low back pain with right-sided sciatica    Aftercare following right hip joint replacement surgery    Primary osteoarthritis of right hip    Right hip pain    Sepsis, due to unspecified organism, unspecified whether acute organ dysfunction present    Severe malnutrition    Pneumonia of right lower lobe due to infectious organism    Hospital discharge follow-up    Other specified anemias    Encounter for screening mammogram for malignant neoplasm of breast    Multifocal pneumonia    Acute on chronic respiratory failure with hypoxia    Medicare annual wellness visit, subsequent    Community acquired pneumonia    Pneumonia due to infectious organism, unspecified laterality, unspecified part of lung    Mediastinal  adenopathy    Small cell lung cancer    Tobacco abuse, in remission    Chronic respiratory failure with hypoxia    Lung nodules    Encounter for adjustment or management of vascular access device    Dehydration    Intractable nausea and vomiting    Pancytopenia due to chemotherapy    Thrombocytopenia    Cancer, metastatic to bone    CAP (community acquired pneumonia)    Encounter for long-term (current) use of high-risk medication    Secondary malignant neoplasm of brain    Influenza     Past Medical History:   Diagnosis Date    Abnormal mammogram     PT DENIES KNOWING OF THIS HX    Anxiety     Arthritis     R SHOULDER, R HIP, AND R LEG    Cancer     LUNG    Chronic nausea 01/15/2019    COPD (chronic obstructive pulmonary disease)     O2 3 LITERS NC CONT    Hernia, hiatal     Hyperlipidemia     Hypertension     Knee pain, right 2018    Memory loss     FORGETFULNESS ? ETIOLOGY    Migraine headache     Moderate episode of recurrent major depressive disorder 2017    Night sweats     Sciatica of right side 2017    Severe episode of recurrent major depressive disorder, without psychotic features 10/22/2019    Shingles     OUTBREAK 24 ON ANTIVIRAL , WHELPS NOTED NO SORES.    Shoulder pain, left 2018     Past Surgical History:   Procedure Laterality Date    BRONCHOSCOPY N/A 2022    Procedure: BRONCHOSCOPY WITH BRONCHOALVEOLAR LAVAGE, BIOPSY;  Surgeon: Shin Mcdonald DO;  Location: [facility] ENDOSCOPY;  Service: Pulmonary;  Laterality: N/A;  RIGHT LOWER LOBE INFILTRATE    BRONCHOSCOPY N/A 2024    Procedure: BRONCHOSCOPY WITH ENDOBRONCHIAL ULTRASOUND AND FINE NEEDLE ASPIRATE;  Surgeon: Shin Mcdonald DO;  Location: [facility] ENDOSCOPY;  Service: Pulmonary;  Laterality: N/A;  MEDIASTINAL ADENOPATHY     SECTION      CHOLECYSTECTOMY      LAPAROSCOPIC    COLONOSCOPY      PORTACATH PLACEMENT Left 2024    Procedure: INSERTION OF PORTACATH;  Surgeon: Abdi  Josh ADEN MD;  Location: [facility] OR OSC;  Service: General;  Laterality: Left;    TOTAL HIP ARTHROPLASTY Right 5/13/2022    Procedure: RIGHT TOTAL HIP ARTHROPLASTY;  Surgeon: Cecil Gomes MD;  Location: [facility] MAIN OR;  Service: Orthopedics;  Laterality: Right;      General Information       Row Name 02/26/25 0927          OT Time and Intention    Document Type evaluation  -AV     Mode of Treatment individual therapy;occupational therapy  -AV       Row Name 02/26/25 0927          General Information    Patient Profile Reviewed yes  -AV     Prior Level of Function --  Independent ADLs/transfers with exception of assist for tub transfers. standard commode. ambulates without assistive device. 4L continuous O2.  -AV     Existing Precautions/Restrictions fall;oxygen therapy device and L/min  droplet isolation: flu A  -AV     Barriers to Rehab none identified  -AV       Row Name 02/26/25 0927          Occupational Profile    Reason for Services/Referral (Occupational Profile) Patient is a 59 year old female admitted to [facility] on 2/25/25 with shortness of air. She is currently on 4th floor/ 5L high flow O2. OT consulted due to recent decline in ADL/ transfer independence. No previous OT services for current condition.  -AV       Row Name 02/26/25 0927          Living Environment    People in Home spouse  -AV       Row Name 02/26/25 0927          Home Main Entrance    Number of Stairs, Main Entrance none  -AV       Row Name 02/26/25 0927          Stairs Within Home, Primary    Number of Stairs, Within Home, Primary none  -AV       Row Name 02/26/25 0927          Cognition    Orientation Status (Cognition) --  alert. able to follow commands. impaired attention to task: cues to redirect. questionable safety awareness.  -AV       Row Name 02/26/25 0927          Safety Issues/Impairments Affecting Functional Mobility    Impairments Affecting Function (Mobility) balance;endurance/activity tolerance;strength  -AV   "             User Key  (r) = Recorded By, (t) = Taken By, (c) = Cosigned By      Initials Name Provider Type    AV Alphonse Garcia OT Occupational Therapist                     Mobility/ADL's       Row Name 02/26/25 0932          Transfers    Comment, (Transfers) assist of 1 per report. pt declined all transfers due to back pain.  -AV       Row Name 02/26/25 0932          Activities of Daily Living    BADL Assessment/Intervention --  Independent feeding with setup. Min assist bathing and dressing. CGA/min assist toilet hygiene using BSC.  -AV               User Key  (r) = Recorded By, (t) = Taken By, (c) = Cosigned By      Initials Name Provider Type    AV Alphonse Garcia OT Occupational Therapist                   Obj/Interventions       Row Name 02/26/25 0934          Sensory Assessment (Somatosensory)    Sensory Assessment sensory awareness to light touch intact at eval. pt reports hands feel \"asleep or on fire all the time\" from chemo.  -AV       Row Name 02/26/25 0934          Vision Assessment/Intervention    Visual Impairment/Limitations WFL  -AV       Row Name 02/26/25 0934          Range of Motion Comprehensive    General Range of Motion bilateral upper extremity ROM WFL  -AV     Comment, General Range of Motion AROM  -AV       Row Name 02/26/25 0934          Strength Comprehensive (MMT)    Comment, General Manual Muscle Testing (MMT) Assessment 4(-)/5 bilateral biceps, triceps and   -AV       Row Name 02/26/25 0934          Motor Skills    Motor Skills coordination;functional endurance  -AV     Coordination WFL  right dominant  -AV     Functional Endurance fair minus  -AV       Row Name 02/26/25 0934          Balance    Balance Assessment sitting dynamic balance;standing dynamic balance  -AV     Dynamic Sitting Balance independent  -AV     Dynamic Standing Balance 1-person assist  -AV               User Key  (r) = Recorded By, (t) = Taken By, (c) = Cosigned By      Initials Name Provider Type    AV " Alphonse Garcia, OT Occupational Therapist                   Goals/Plan       Row Name 02/26/25 0944          Transfer Goal 1 (OT)    Activity/Assistive Device (Transfer Goal 1, OT) transfers, all  -AV     [place] Level/Cues Needed (Transfer Goal 1, OT) modified independence  -AV     Time Frame (Transfer Goal 1, OT) long term goal (LTG);10 days  -AV       Row Name 02/26/25 0944          Bathing Goal 1 (OT)    Activity/Device (Bathing Goal 1, OT) bathing skills, all;tub bench  -AV     [place] Level/Cues Needed (Bathing Goal 1, OT) modified independence  -AV     Time Frame (Bathing Goal 1, OT) long term goal (LTG);10 days  -AV       Row Name 02/26/25 0944          Dressing Goal 1 (OT)    Activity/Device (Dressing Goal 1, OT) dressing skills, all  -AV     [place]/Cues Needed (Dressing Goal 1, OT) modified independence  -AV     Time Frame (Dressing Goal 1, OT) long term goal (LTG);10 days  -AV       Row Name 02/26/25 0944          Toileting Goal 1 (OT)    Activity/Device (Toileting Goal 1, OT) toileting skills, all;raised toilet seat  -AV     [place] Level/Cues Needed (Toileting Goal 1, OT) modified independence  -AV     Time Frame (Toileting Goal 1, OT) long term goal (LTG);10 days  -AV       Row Name 02/26/25 0944          Strength Goal 1 (OT)    Strength Goal 1 (OT) Patient will demonstrate 4/5 bilateral biceps, triceps and  to increase ADL/transfer independence.  -AV     Time Frame (Strength Goal 1, OT) long term goal (LTG);10 days  -AV       Row Name 02/26/25 0944          Problem Specific Goal 1 (OT)    Problem Specific Goal 1 (OT) Patient will demonstrate fair endurance/activity tolerance needed to support ADLs.  -AV     Time Frame (Problem Specific Goal 1, OT) long term goal (LTG);10 days  -AV       Row Name 02/26/25 0944          Therapy Assessment/Plan (OT)    Planned Therapy Interventions (OT) activity tolerance training;BADL retraining;functional balance retraining;occupation/activity  based interventions;patient/caregiver education/training;strengthening exercise;transfer/mobility retraining  -AV               User Key  (r) = Recorded By, (t) = Taken By, (c) = Cosigned By      Initials Name Provider Type    AV Alphonse Garcia, OT Occupational Therapist                   Clinical Impression       Row Name 02/26/25 0936          Pain Assessment    Pain Location back  -AV     Pain Management Interventions nursing notified  nurse in room- aware  -AV     Additional Documentation Pain Scale: FACES Pre/Post-Treatment (Group)  -AV       Row Name 02/26/25 0936          Pain Scale: FACES Pre/Post-Treatment    Pain: FACES Scale, Pretreatment 4-->hurts little more  -AV     Posttreatment Pain Rating 4-->hurts little more  -AV       Row Name 02/26/25 0936          Plan of Care Review    Plan of Care Reviewed With patient  -AV     Progress no change  first session for evaluation  -AV     Outcome Evaluation Patient presents with limitations of balance, strength and endurance/ activity tolerance which are impacting ADL/transfer independence. Skilled OT services are indicated to remediate/ compensate for deficits to maximize independence and safety with functional ADL tasks.  -AV       Row Name 02/26/25 0936          Therapy Assessment/Plan (OT)    Patient/Family Therapy Goal Statement (OT) none stated  -AV     Rehab Potential (OT) good  -AV     Criteria for Skilled Therapeutic Interventions Met (OT) yes;meets criteria;skilled treatment is necessary  -AV     Therapy Frequency (OT) 5 times/wk  -AV       Row Name 02/26/25 0936          Therapy Plan Review/Discharge Plan (OT)    Equipment Needs Upon Discharge (OT) commode chair  reports having tub seat and rollator available  -AV     Anticipated Discharge Disposition (OT) home with assist;home with home health  -AV       Row Name 02/26/25 0936          Vital Signs    O2 Delivery Pre Treatment hi-flow  5  -AV     O2 Delivery Intra Treatment hi-flow  5  -AV     O2  Delivery Post Treatment hi-flow  5  -AV       Row Name 02/26/25 0936          Positioning and Restraints    Pre-Treatment Position in bed  -AV     Post Treatment Position bed  -AV     In Bed call light within reach;encouraged to call for assist;exit alarm on  -AV               User Key  (r) = Recorded By, (t) = Taken By, (c) = Cosigned By      Initials Name Provider Type    Alphonse Serrato OT Occupational Therapist                   Outcome Measures       Row Name 02/26/25 0946          How much help from another is currently needed...    Putting on and taking off regular lower body clothing? 3  -AV     Bathing (including washing, rinsing, and drying) 3  -AV     Toileting (which includes using toilet bed pan or urinal) 3  -AV     Putting on and taking off regular upper body clothing 4  -AV     Taking care of personal grooming (such as brushing teeth) 4  -AV     Eating meals 4  -AV     AM-PAC 6 Clicks Score (OT) 21  -AV       Row Name 02/26/25 0946          Functional Assessment    Outcome Measure Options AM-PAC 6 Clicks Daily Activity (OT);Optimal Instrument  -AV       Row Name 02/26/25 0946          Optimal Instrument    Optimal Instrument Optimal - 3  -AV     Bending/Stooping 1  -AV     Standing 2  -AV     Reaching 1  -AV     From the list, choose the 3 activities you would most like to be able to do without any difficulty Bending/stooping;Standing;Reaching  -AV     Total Score Optimal - 3 4  -AV               User Key  (r) = Recorded By, (t) = Taken By, (c) = Cosigned By      Initials Name Provider Type    Alphonse Serrato OT Occupational Therapist                    Occupational Therapy Education       Title: PT OT SLP Therapies (Done)       Topic: Occupational Therapy (Done)       Point: ADL training (Done)       Description:   Instruct learner(s) on proper safety adaptation and remediation techniques during self care or transfers.   Instruct in proper use of assistive devices.                  Learning  Progress Summary            Patient Acceptance, E, VU by AV at 2/26/2025 0946                      Point: Home exercise program (Done)       Description:   Instruct learner(s) on appropriate technique for monitoring, assisting and/or progressing therapeutic exercises/activities.                  Learning Progress Summary            Patient Acceptance, E, VU by AV at 2/26/2025 0946                      Point: Precautions (Done)       Description:   Instruct learner(s) on prescribed precautions during self-care and functional transfers.                  Learning Progress Summary            Patient Acceptance, E, VU by AV at 2/26/2025 0946                      Point: Body mechanics (Done)       Description:   Instruct learner(s) on proper positioning and spine alignment during self-care, functional mobility activities and/or exercises.                  Learning Progress Summary            Patient Acceptance, E, VU by AV at 2/26/2025 0946                                      User Key       Initials Effective Dates Name Provider Type Discipline     06/16/21 -  Alphonse Garcia OT Occupational Therapist OT                  OT Recommendation and Plan  Planned Therapy Interventions (OT): activity tolerance training, BADL retraining, functional balance retraining, occupation/activity based interventions, patient/caregiver education/training, strengthening exercise, transfer/mobility retraining  Therapy Frequency (OT): 5 times/wk  Plan of Care Review  Plan of Care Reviewed With: patient  Progress: no change (first session for evaluation)  Outcome Evaluation: Patient presents with limitations of balance, strength and endurance/ activity tolerance which are impacting ADL/transfer independence. Skilled OT services are indicated to remediate/ compensate for deficits to maximize independence and safety with functional ADL tasks.     Time Calculation:   Evaluation Complexity (OT)  Review Occupational Profile/Medical/Therapy  History Complexity: expanded/moderate complexity  Assessment, Occupational Performance/Identification of Deficit Complexity: 1-3 performance deficits  Clinical Decision Making Complexity (OT): problem focused assessment/low complexity  Overall Complexity of Evaluation (OT): low complexity     Time Calculation- OT       Row Name 02/26/25 0948             Time Calculation- OT    OT Received On 02/26/25  -AV      OT Goal Re-Cert Due Date 03/07/25  -AV         Untimed Charges    OT Eval/Re-eval Minutes 35  -AV         Total Minutes    Untimed Charges Total Minutes 35  -AV       Total Minutes 35  -AV                User Key  (r) = Recorded By, (t) = Taken By, (c) = Cosigned By      Initials Name Provider Type    AV Alphonse Garcia OT Occupational Therapist                  Therapy Charges for Today       Code Description Service Date Service Provider Modifiers Qty    [id number] HC OT EVAL LOW COMPLEXITY 3 2/26/2025 Alphonse Garcia OT GO 1                 Alphonse Garcia OT  2/26/2025

## 2025-02-26 NOTE — NURSING NOTE
Patient Lluvia Archer admitted to 4MTU from {4MTU Admit Location:47745}. Patient is alert and oriented to {A&O:13114}. Patient oriented to unit, call light system and bed alarm use, teaching {teachin}. Patient {Bed Alarm:18636} bed alarm use. Candida Auris screening revealed patient {Krysten Auris:32364}.    4 eyes skin assessment completed with {Nurse:69484}. Per policy, the following skin interventions were put in place as a result of the skin assessment below: {Skin interventions:25620}    Skin Assessments (most recent)      Flowsheet Row Most Recent Value   Skin WDL WDL   Skin Integrity excoriation   Sensory Perception 4-->no impairment   Moisture 4-->rarely moist   Activity 4-->walks frequently   Mobility 3-->slightly limited   Nutrition 3-->adequate   Friction and Shear 3-->no apparent problem   Moises Score 21   Pressure Reduction Devices heel offloading device utilized, positioning supports utilized            Fall assessment completed. Per policy, the patient was determined be a {Fall Risk:99634}. Patient assessed to need the following mobility assistance: {Mobility Assistance:58245} with the following assistive devices: {Assistive devices:41127}, and will be using {Toiletin} for toileting. Per policy, the following fall interventions were put in place: {Fall Interventions:67804}.     Patient's belongings that were sent with family: {Belongings:80503}  Patient's belongings that were sent to security: {Belongings:38045}  Patient's belongings locked in safe box in room: {Belongings:10108}  Patient's belongings that were sent to pharmacy: {Belongings:52947}  Patient's belongings kept at bedside per patient: {Belongings:85023}

## 2025-02-26 NOTE — THERAPY EVALUATION
Respiratory Therapist Broncho-Pulmonary Hygiene Progress Note      Patient Name:  Lluvia Archer  YOB: 1965    Lluvia Archer meets the qualification for Level 1 of the Bronco-Pulmonary Hygiene Protocol. This was based on my daily patient assessment and includes review of chest x-ray results, cough ability and quality, oxygenation, secretions or risk for secretion development and patient mobility.     Broncho-Pulmonary Hygiene Assessment:    Level of Movement: Actively changing positions without assistance  Alert/ oriented/ cooperative    Breath Sounds: Diminished and/or coarse rhonchi    Cough: Strong, effective    Chest X-Ray: Possible signs of consolidation and/or atelectasis or clear.     Sputum Productions: Able to produce small to moderate amount, of moderately thick secretions    History and Physical: Home use of oxygen     SpO2 to Oxygen Need: greater than 90% on  greater than 6L, Vapotherm, oxygen mask greater than 40% or ventilator    Current SpO2 is: 96 on 8    Based on this information, I have completed the following interventions: Aerobika with bronchodialtor medication or TID      Electronically signed by Yoly Eugene RRT, 02/26/25, 2:54 AM EST.

## 2025-02-26 NOTE — PROGRESS NOTES
[facility]   Hospitalist Progress Note  Date: 2025  Patient Name: Lluvia Archer  : 1965  MRN: [id number]  Date of admission: 2025      Subjective   Subjective     Chief Complaint: Shortness of air    Summary:    Patient is a 59-year-old female who presents to the emergency room for evaluation of shortness of breath.  She has stage IV metastatic lung disease and is a patient of Dr. Tubbs.  Additionally, patient continues to smoke and has COPD.     On arrival to the ED, patient has a temperature 99.6, pulse of 120, respiratory rate of 22, blood pressure of 142/71.  Patient is currently wearing a Venturi mask and saturating 93%.     CT of her chest shows: New anterior peripheral left lower lobe airspace disease may be related to bronchial narrowing and atelectasis. However, pneumonia not excluded. Progression of metastatic disease with enlarging pulmonary nodules and new enlarged mediastinal/hilar lymph nodes.      Patient's high-sensitivity troponin is 18 and then 15.  proBNP is 298.  Sodium is 136.  Calcium is 8.2.  Hemoglobin is 11.5.  White blood cell count is 8.4.  Lymphocytes are 6.7.  Patient is positive for influenza.  Patient seen by pulmonary.  Patient seen by her oncologist due to progression of her lung cancer.  Plan for chemotherapy as an outpatient and to stop immunotherapy.  Patient to get staging studies while inpatient.  Interval Followup:   Remains on 5 L high flow oxygen via nasal cannula earlier she was on 10 L.  At home uses 4 L.  Shortness of air is improving.  Does have cough not able to bring up any mucus complaining of having difficulty bringing up mucus.  No chest pain.  Occasional wheezing.  No fever or chills.    Review of Systems   All systems were reviewed and negative except for: Summary and interval follow-up.    Objective   Objective     Vitals:   Temp:  [97.7 °F (36.5 °C)-98.8 °F (37.1 °C)] 98.8 °F (37.1 °C)  Heart Rate:  [] 78  Resp:  [18-20]  18  BP: ()/(61-99) 115/99  Flow (L/min) (Oxygen Therapy):  [2-10] 4  Physical Exam    Constitutional: Awake, alert, no acute distress   Eyes: Pupils equal, sclerae anicteric, no conjunctival injection   HENT: NCAT, mucous membranes moist   Neck: Supple, no thyromegaly, no lymphadenopathy, trachea midline   Respiratory: Occasional rhonchi, decreased  to auscultation bilaterally, nonlabored respirations    Cardiovascular: RRR, no murmurs, rubs, or gallops, palpable pedal pulses bilaterally   Gastrointestinal: Positive bowel sounds, soft, nontender, nondistended   Musculoskeletal: No bilateral ankle edema, no clubbing or cyanosis to extremities   Psychiatric: Appropriate affect, cooperative   Neurologic: Oriented x 3, strength symmetric in all extremities, Cranial Nerves grossly intact to confrontation, speech clear   Skin: No rashes     Result Review    Result Review:  I have personally reviewed the results for the past 24 hours and agree with these findings:  [x]  Laboratory  [x]  Microbiology  [x]  Radiology  [x]  EKG/Telemetry sinus tachycardia 116, QT 0.44  []  Cardiology/Vascular   []  Pathology  [x]  Old records  [x]  Other: Medications    Assessment & Plan   Assessment / Plan     Assessment:  Acute on chronic hypoxemia.  Patient on 4 L at home.  Acute influenza A infection.  COPD exacerbation.  Mucous plugging  Community-acquired left lower lobe pneumonia.  Unspecified bacterial pathogen.  Tobacco use disorder.  Progressing stage IV small cell lung cancer on immunotherapy by Dr. Dickson.  Mets to brain status post XRT.  Depression.  Hypertension.  Chronic pain syndrome.  Chronic anemia and thrombocytopenia.  Stable.       Plan:  Continue supplemental oxygen keep sats more than 90%.  Rocephin and p.o. Zithromax.  Tamiflu.  IV steroids.  Nebulizer treatment.  Bronchodilator and bronchopulmonary hygiene protocol.  Mucinex, MetaNeb, chest vest and hypertonic saline neb to break up mucus  Nicotine  patch.  Check MRSA PCR.  Sputum and blood culture pending.  Legionella and strep pneumo antigen negative  CT chest noted no PE.  Lactate and Pro-Erik negative  DC home lisinopril.  Started on metoprolol to help with tachycardia.  Discussed with pulmonary.  Appreciate input.  Discussed with oncology.  Appreciate input.  Getting C-spine CT scan for paresthesias of upper extremity.  Getting CT abdomen pelvis for staging workup.  Continue home medication including Wellbutrin, Klonopin, Lexapro, Ativan, Atarax, Neurontin and oxycodone.  Continue telemetry  Discussed plan with RN.  Discharge home when stable    VTE Prophylaxis:  Mechanical VTE prophylaxis orders are present.        CODE STATUS:   Level Of Support Discussed With: Patient  Code Status (Patient has no pulse and is not breathing): CPR (Attempt to Resuscitate)  Medical Interventions (Patient has pulse or is breathing): Full Support      Part of this note may be an electronic transcription/translation of spoken language to printed text using the Dragon Dictation System.     Electronically signed by Sage Rosario MD, 02/26/25, 5:31 PM EST.

## 2025-02-26 NOTE — THERAPY EVALUATION
Acute Care - Physical Therapy Initial Evaluation  [facility]     Patient Name: Lluvia Archer  : 1965  MRN: [id number]  Today's Date: 2025      Visit Dx:     ICD-10-CM ICD-9-CM   1. Acute exacerbation of chronic obstructive pulmonary disease (COPD)  J44.1 491.21   2. Primary malignant neoplasm of lung metastatic to other site, unspecified laterality  C34.90 162.9   3. Influenza A  J10.1 487.1   4. Decreased activities of daily living (ADL)  Z78.9 V49.89   5. Difficulty walking  R26.2 719.7     Patient Active Problem List   Diagnosis    Abnormal mammogram    Anxiety    Arthritis    Chronic nausea    Chronic obstructive pulmonary disease (COPD)    Counseling on substance use and abuse    Esophageal reflux    Hernia, hiatal    Hyperlipidemia    Hypertension    Knee pain, right    Memory loss    Migraine    Moderate episode of recurrent major depressive disorder    Night sweats    Numbness    Sciatica of right side    Severe episode of recurrent major depressive disorder, without psychotic features    Shoulder pain, left    Other diseases of nasal cavity and sinuses    Sleep apnea, unspecified    Tobacco abuse    Strain of groin, right, subsequent encounter    Osteoarthritis of spine with radiculopathy, lumbar region    Chronic right-sided low back pain with right-sided sciatica    Aftercare following right hip joint replacement surgery    Primary osteoarthritis of right hip    Right hip pain    Sepsis, due to unspecified organism, unspecified whether acute organ dysfunction present    Severe malnutrition    Pneumonia of right lower lobe due to infectious organism    Hospital discharge follow-up    Other specified anemias    Encounter for screening mammogram for malignant neoplasm of breast    Multifocal pneumonia    Acute on chronic respiratory failure with hypoxia    Medicare annual wellness visit, subsequent    Community acquired pneumonia    Pneumonia due to infectious organism, unspecified  laterality, unspecified part of lung    Mediastinal adenopathy    Small cell lung cancer    Tobacco abuse, in remission    Chronic respiratory failure with hypoxia    Lung nodules    Encounter for adjustment or management of vascular access device    Dehydration    Intractable nausea and vomiting    Pancytopenia due to chemotherapy    Thrombocytopenia    Cancer, metastatic to bone    CAP (community acquired pneumonia)    Encounter for long-term (current) use of high-risk medication    Secondary malignant neoplasm of brain    Influenza     Past Medical History:   Diagnosis Date    Abnormal mammogram     PT DENIES KNOWING OF THIS HX    Anxiety     Arthritis     R SHOULDER, R HIP, AND R LEG    Cancer     LUNG    Chronic nausea 01/15/2019    COPD (chronic obstructive pulmonary disease)     O2 3 LITERS NC CONT    Hernia, hiatal     Hyperlipidemia     Hypertension     Knee pain, right 2018    Memory loss     FORGETFULNESS ? ETIOLOGY    Migraine headache     Moderate episode of recurrent major depressive disorder 2017    Night sweats     Sciatica of right side 2017    Severe episode of recurrent major depressive disorder, without psychotic features 10/22/2019    Shingles     OUTBREAK 24 ON ANTIVIRAL , WHELPS NOTED NO SORES.    Shoulder pain, left 2018     Past Surgical History:   Procedure Laterality Date    BRONCHOSCOPY N/A 2022    Procedure: BRONCHOSCOPY WITH BRONCHOALVEOLAR LAVAGE, BIOPSY;  Surgeon: Shin Mcdonald DO;  Location: [facility] ENDOSCOPY;  Service: Pulmonary;  Laterality: N/A;  RIGHT LOWER LOBE INFILTRATE    BRONCHOSCOPY N/A 2024    Procedure: BRONCHOSCOPY WITH ENDOBRONCHIAL ULTRASOUND AND FINE NEEDLE ASPIRATE;  Surgeon: Shin Mcdonald DO;  Location: [facility] ENDOSCOPY;  Service: Pulmonary;  Laterality: N/A;  MEDIASTINAL ADENOPATHY     SECTION      CHOLECYSTECTOMY      LAPAROSCOPIC    COLONOSCOPY      PORTACATH PLACEMENT Left 2024     Procedure: INSERTION OF PORTACATH;  Surgeon: Josh Patton MD;  Location: [facility] OR [facility];  Service: General;  Laterality: Left;    TOTAL HIP ARTHROPLASTY Right 5/13/2022    Procedure: RIGHT TOTAL HIP ARTHROPLASTY;  Surgeon: Cecil Gomes MD;  Location: [facility] MAIN OR;  Service: Orthopedics;  Laterality: Right;     PT Assessment (Last 12 Hours)       PT Evaluation and Treatment       Row Name 02/26/25 1000          Physical Therapy Time and Intention    Subjective Information no complaints  -DP     Document Type evaluation  -DP     Mode of Treatment individual therapy;physical therapy  -DP     Patient Effort good  -DP       Row Name 02/26/25 1000          General Information    Patient Profile Reviewed yes  -DP     Patient Observations alert;cooperative;agree to therapy  -DP     Prior Level of Function independent:;gait;transfer;bed mobility;ADL's  -DP     Existing Precautions/Restrictions no known precautions/restrictions  -DP     Barriers to Rehab none identified  -DP       Row Name 02/26/25 1000          Living Environment    Current Living Arrangements home  -DP     People in Home spouse  -DP       Row Name 02/26/25 1000          Cognition    Orientation Status (Cognition) oriented x 3  -DP       Row Name 02/26/25 1000          Range of Motion Comprehensive    General Range of Motion bilateral lower extremity ROM WFL  -DP       Row Name 02/26/25 1000          Strength (Manual Muscle Testing)    Strength (Manual Muscle Testing) bilateral lower extremities;strength is WFL  -DP       Row Name 02/26/25 1000          Bed Mobility    Bed Mobility supine-sit-supine  -DP     Supine-Sit-Supine [place] (Bed Mobility) independent  -DP       Row Name 02/26/25 1000          Transfers    Transfers sit-stand transfer  -DP       Row Name 02/26/25 1000          Sit-Stand Transfer    Sit-Stand [place] (Transfers) independent  -DP       Row Name 02/26/25 1000          Gait/Stairs (Locomotion)    Gait/Stairs  Locomotion gait/ambulation independence  -DP     [place] Level (Gait) modified independence  -DP     Comment, (Gait/Stairs) Patient is able to ambulate ad billy. in her room.  She reported she does not need PT services  -DP       Row Name 02/26/25 1000          Balance    Balance Assessment standing dynamic balance  -DP     Dynamic Standing Balance supervision  -DP       Row Name 02/26/25 1000          Plan of Care Review    Plan of Care Reviewed With patient  -DP     Outcome Evaluation Patient is able to complete all transfers and bed mobilities indepedently in the room. Pt is able to ambulate ad billy in the room. Pt does not need inpatient PT services. Recommend DC home.  -DP       Row Name 02/26/25 1000          Therapy Assessment/Plan (PT)    Criteria for Skilled Interventions Met (PT) no problems identified which require skilled intervention  -DP     Therapy Frequency (PT) evaluation only  -DP       Row Name 02/26/25 1000          PT Evaluation Complexity    History, PT Evaluation Complexity no personal factors and/or comorbidities  -DP     Examination of Body Systems (PT Eval Complexity) total of 4 or more elements  -DP     Clinical Presentation (PT Evaluation Complexity) stable  -DP     Clinical Decision Making (PT Evaluation Complexity) low complexity  -DP     Overall Complexity (PT Evaluation Complexity) low complexity  -DP       Row Name 02/26/25 1000          Physical Therapy Goals    Problem Specific Goal Selection (PT) problem specific goal 1, PT  -DP       Row Name 02/26/25 1000          Problem Specific Goal 1 (PT)    Problem Specific Goal 1 (PT) Complete PT evaluation.  -DP     Time Frame (Problem Specific Goal 1, PT) 1 day  -DP     Progress/Outcome (Problem Specific Goal 1, PT) goal met  -DP               User Key  (r) = Recorded By, (t) = Taken By, (c) = Cosigned By      Initials Name Provider Type    Ryanne George, PT Physical Therapist                    Physical Therapy Education         No education to display                  PT Recommendation and Plan  Anticipated Discharge Disposition (PT): home  Therapy Frequency (PT): evaluation only  Plan of Care Reviewed With: patient  Outcome Evaluation: Patient is able to complete all transfers and bed mobilities indepedently in the room. Pt is able to ambulate ad billy in the room. Pt does not need inpatient PT services. Recommend DC home.   Outcome Measures       Row Name 02/26/25 1000             How much help from another person do you currently need...    Turning from your back to your side while in flat bed without using bedrails? 4  -DP      Moving from lying on back to sitting on the side of a flat bed without bedrails? 4  -DP      Moving to and from a bed to a chair (including a wheelchair)? 4  -DP      Standing up from a chair using your arms (e.g., wheelchair, bedside chair)? 4  -DP      Climbing 3-5 steps with a railing? 4  -DP      To walk in hospital room? 4  -DP      AM-PAC 6 Clicks Score (PT) 24  -DP         Functional Assessment    Outcome Measure Options AM-PAC 6 Clicks Basic Mobility (PT)  -DP                User Key  (r) = Recorded By, (t) = Taken By, (c) = Cosigned By      Initials Name Provider Type    Ryanne George, PT Physical Therapist                     Time Calculation:    PT Charges       Row Name 02/26/25 1027             Time Calculation    PT Received On 02/26/25  -DP         Untimed Charges    PT Eval/Re-eval Minutes 20  -DP         Total Minutes    Untimed Charges Total Minutes 20  -DP       Total Minutes 20  -DP                User Key  (r) = Recorded By, (t) = Taken By, (c) = Cosigned By      Initials Name Provider Type    Ryanne George PT Physical Therapist                      PT G-Codes  Outcome Measure Options: AM-PAC 6 Clicks Basic Mobility (PT)  AM-PAC 6 Clicks Score (PT): 24  AM-PAC 6 Clicks Score (OT): 21    Ryanne Osborne PT  2/26/2025

## 2025-02-26 NOTE — PLAN OF CARE
Goal Outcome Evaluation:  Plan of Care Reviewed With: patient           Outcome Evaluation: alert and oriented. 4L NC. Pain managed per MAR. will continue with pt plan of care.

## 2025-02-26 NOTE — PLAN OF CARE
Goal Outcome Evaluation:  Plan of Care Reviewed With: patient        Progress: no change  Outcome Evaluation: Pt AAOx4, VSS and complaints of pain treated per MAR. O2 @ 10L HFNC.

## 2025-02-26 NOTE — PLAN OF CARE
Goal Outcome Evaluation:  Plan of Care Reviewed With: patient        Progress: no change (first session for evaluation)  Outcome Evaluation: Patient presents with limitations of balance, strength and endurance/ activity tolerance which are impacting ADL/transfer independence. Skilled OT services are indicated to remediate/ compensate for deficits to maximize independence and safety with functional ADL tasks.    Anticipated Discharge Disposition (OT): home with assist, home with home health

## 2025-02-27 ENCOUNTER — DOCUMENTATION (OUTPATIENT)
Dept: RADIATION ONCOLOGY | Facility: HOSPITAL | Age: 60
End: 2025-02-27
Payer: MEDICARE

## 2025-02-27 LAB
ANION GAP SERPL CALCULATED.3IONS-SCNC: 8.6 MMOL/L (ref 5–15)
BASOPHILS # BLD AUTO: 0.01 10*3/MM3 (ref 0–0.2)
BASOPHILS NFR BLD AUTO: 0.1 % (ref 0–1.5)
BUN SERPL-MCNC: 20 MG/DL (ref 6–20)
BUN/CREAT SERPL: 28.2 (ref 7–25)
CALCIUM SPEC-SCNC: 7.3 MG/DL (ref 8.6–10.5)
CHLORIDE SERPL-SCNC: 99 MMOL/L (ref 98–107)
CO2 SERPL-SCNC: 22.4 MMOL/L (ref 22–29)
CREAT SERPL-MCNC: 0.71 MG/DL (ref 0.57–1)
DEPRECATED RDW RBC AUTO: 63 FL (ref 37–54)
EGFRCR SERPLBLD CKD-EPI 2021: 98.7 ML/MIN/1.73
EOSINOPHIL # BLD AUTO: 0 10*3/MM3 (ref 0–0.4)
EOSINOPHIL NFR BLD AUTO: 0 % (ref 0.3–6.2)
ERYTHROCYTE [DISTWIDTH] IN BLOOD BY AUTOMATED COUNT: 17.8 % (ref 12.3–15.4)
GLUCOSE SERPL-MCNC: 103 MG/DL (ref 65–99)
HCT VFR BLD AUTO: 31.8 % (ref 34–46.6)
HGB BLD-MCNC: 10.1 G/DL (ref 12–15.9)
IMM GRANULOCYTES # BLD AUTO: 0.08 10*3/MM3 (ref 0–0.05)
IMM GRANULOCYTES NFR BLD AUTO: 0.9 % (ref 0–0.5)
LYMPHOCYTES # BLD AUTO: 0.58 10*3/MM3 (ref 0.7–3.1)
LYMPHOCYTES NFR BLD AUTO: 6.3 % (ref 19.6–45.3)
MAGNESIUM SERPL-MCNC: 2.3 MG/DL (ref 1.6–2.6)
MCH RBC QN AUTO: 30.3 PG (ref 26.6–33)
MCHC RBC AUTO-ENTMCNC: 31.8 G/DL (ref 31.5–35.7)
MCV RBC AUTO: 95.5 FL (ref 79–97)
MONOCYTES # BLD AUTO: 0.8 10*3/MM3 (ref 0.1–0.9)
MONOCYTES NFR BLD AUTO: 8.6 % (ref 5–12)
NEUTROPHILS NFR BLD AUTO: 7.78 10*3/MM3 (ref 1.7–7)
NEUTROPHILS NFR BLD AUTO: 84.1 % (ref 42.7–76)
NRBC BLD AUTO-RTO: 0 /100 WBC (ref 0–0.2)
PHOSPHATE SERPL-MCNC: 2.6 MG/DL (ref 2.5–4.5)
PLATELET # BLD AUTO: 132 10*3/MM3 (ref 140–450)
PMV BLD AUTO: 9.7 FL (ref 6–12)
POTASSIUM SERPL-SCNC: 5.3 MMOL/L (ref 3.5–5.2)
RBC # BLD AUTO: 3.33 10*6/MM3 (ref 3.77–5.28)
SODIUM SERPL-SCNC: 130 MMOL/L (ref 136–145)
WBC NRBC COR # BLD AUTO: 9.25 10*3/MM3 (ref 3.4–10.8)

## 2025-02-27 PROCEDURE — 85025 COMPLETE CBC W/AUTO DIFF WBC: CPT | Performed by: HOSPITALIST

## 2025-02-27 PROCEDURE — 99232 SBSQ HOSP IP/OBS MODERATE 35: CPT | Performed by: INTERNAL MEDICINE

## 2025-02-27 PROCEDURE — 94799 UNLISTED PULMONARY SVC/PX: CPT

## 2025-02-27 PROCEDURE — 94761 N-INVAS EAR/PLS OXIMETRY MLT: CPT

## 2025-02-27 PROCEDURE — 25010000002 ONDANSETRON PER 1 MG: Performed by: HOSPITALIST

## 2025-02-27 PROCEDURE — 83735 ASSAY OF MAGNESIUM: CPT | Performed by: HOSPITALIST

## 2025-02-27 PROCEDURE — 25010000002 CEFTRIAXONE PER 250 MG: Performed by: INTERNAL MEDICINE

## 2025-02-27 PROCEDURE — 25010000002 METHYLPREDNISOLONE PER 40 MG: Performed by: HOSPITALIST

## 2025-02-27 PROCEDURE — 80048 BASIC METABOLIC PNL TOTAL CA: CPT | Performed by: HOSPITALIST

## 2025-02-27 PROCEDURE — 94760 N-INVAS EAR/PLS OXIMETRY 1: CPT

## 2025-02-27 PROCEDURE — 84100 ASSAY OF PHOSPHORUS: CPT | Performed by: HOSPITALIST

## 2025-02-27 PROCEDURE — 94664 DEMO&/EVAL PT USE INHALER: CPT

## 2025-02-27 RX ORDER — OXYCODONE AND ACETAMINOPHEN 10; 325 MG/1; MG/1
1 TABLET ORAL EVERY 4 HOURS PRN
Status: DISCONTINUED | OUTPATIENT
Start: 2025-02-27 | End: 2025-03-03 | Stop reason: HOSPADM

## 2025-02-27 RX ADMIN — OSELTAMIVIR PHOSPHATE 75 MG: 75 CAPSULE ORAL at 09:34

## 2025-02-27 RX ADMIN — IPRATROPIUM BROMIDE AND ALBUTEROL SULFATE 3 ML: .5; 3 SOLUTION RESPIRATORY (INHALATION) at 13:32

## 2025-02-27 RX ADMIN — Medication 4 ML: at 07:12

## 2025-02-27 RX ADMIN — AZITHROMYCIN DIHYDRATE 250 MG: 250 TABLET ORAL at 09:35

## 2025-02-27 RX ADMIN — GABAPENTIN 200 MG: 100 CAPSULE ORAL at 09:33

## 2025-02-27 RX ADMIN — ONDANSETRON 4 MG: 2 INJECTION INTRAMUSCULAR; INTRAVENOUS at 12:31

## 2025-02-27 RX ADMIN — METOPROLOL TARTRATE 12.5 MG: 25 TABLET, FILM COATED ORAL at 21:09

## 2025-02-27 RX ADMIN — BUDESONIDE 0.5 MG: 0.5 SUSPENSION RESPIRATORY (INHALATION) at 19:49

## 2025-02-27 RX ADMIN — OSELTAMIVIR PHOSPHATE 75 MG: 75 CAPSULE ORAL at 21:09

## 2025-02-27 RX ADMIN — OXYCODONE AND ACETAMINOPHEN 1 TABLET: 10; 325 TABLET ORAL at 17:28

## 2025-02-27 RX ADMIN — METHYLPREDNISOLONE SODIUM SUCCINATE 40 MG: 40 INJECTION, POWDER, LYOPHILIZED, FOR SOLUTION INTRAMUSCULAR; INTRAVENOUS at 17:29

## 2025-02-27 RX ADMIN — METOPROLOL TARTRATE 12.5 MG: 25 TABLET, FILM COATED ORAL at 09:35

## 2025-02-27 RX ADMIN — IPRATROPIUM BROMIDE AND ALBUTEROL SULFATE 3 ML: .5; 3 SOLUTION RESPIRATORY (INHALATION) at 07:12

## 2025-02-27 RX ADMIN — OXYCODONE AND ACETAMINOPHEN 1 TABLET: 10; 325 TABLET ORAL at 21:23

## 2025-02-27 RX ADMIN — Medication 10 ML: at 09:37

## 2025-02-27 RX ADMIN — IPRATROPIUM BROMIDE AND ALBUTEROL SULFATE 3 ML: .5; 3 SOLUTION RESPIRATORY (INHALATION) at 00:50

## 2025-02-27 RX ADMIN — GUAIFENESIN 1200 MG: 600 TABLET ORAL at 21:09

## 2025-02-27 RX ADMIN — METHYLPREDNISOLONE SODIUM SUCCINATE 40 MG: 40 INJECTION, POWDER, LYOPHILIZED, FOR SOLUTION INTRAMUSCULAR; INTRAVENOUS at 06:16

## 2025-02-27 RX ADMIN — GABAPENTIN 200 MG: 100 CAPSULE ORAL at 21:08

## 2025-02-27 RX ADMIN — OXYCODONE AND ACETAMINOPHEN 1 TABLET: 10; 325 TABLET ORAL at 03:49

## 2025-02-27 RX ADMIN — SODIUM CHLORIDE 2000 MG: 9 INJECTION INTRAMUSCULAR; INTRAVENOUS; SUBCUTANEOUS at 09:35

## 2025-02-27 RX ADMIN — BUDESONIDE 0.5 MG: 0.5 SUSPENSION RESPIRATORY (INHALATION) at 07:12

## 2025-02-27 RX ADMIN — CALCIUM CARBONATE 2 TABLET: 500 TABLET, CHEWABLE ORAL at 12:31

## 2025-02-27 RX ADMIN — Medication 10 ML: at 21:07

## 2025-02-27 RX ADMIN — Medication: at 10:48

## 2025-02-27 RX ADMIN — METHYLPREDNISOLONE SODIUM SUCCINATE 40 MG: 40 INJECTION, POWDER, LYOPHILIZED, FOR SOLUTION INTRAMUSCULAR; INTRAVENOUS at 17:46

## 2025-02-27 RX ADMIN — ARFORMOTEROL TARTRATE 15 MCG: 15 SOLUTION RESPIRATORY (INHALATION) at 19:49

## 2025-02-27 RX ADMIN — ARFORMOTEROL TARTRATE 15 MCG: 15 SOLUTION RESPIRATORY (INHALATION) at 07:12

## 2025-02-27 RX ADMIN — Medication 4 ML: at 19:54

## 2025-02-27 RX ADMIN — FAMOTIDINE 40 MG: 20 TABLET, FILM COATED ORAL at 09:34

## 2025-02-27 RX ADMIN — BUPROPION HYDROCHLORIDE 150 MG: 150 TABLET, FILM COATED, EXTENDED RELEASE ORAL at 21:08

## 2025-02-27 RX ADMIN — ATORVASTATIN CALCIUM 10 MG: 10 TABLET, FILM COATED ORAL at 21:09

## 2025-02-27 RX ADMIN — GUAIFENESIN 1200 MG: 600 TABLET ORAL at 09:34

## 2025-02-27 RX ADMIN — Medication: at 21:10

## 2025-02-27 RX ADMIN — NICOTINE 1 PATCH: 21 PATCH, EXTENDED RELEASE TRANSDERMAL at 09:37

## 2025-02-27 RX ADMIN — Medication 5 MG: at 21:08

## 2025-02-27 RX ADMIN — IPRATROPIUM BROMIDE AND ALBUTEROL SULFATE 3 ML: .5; 3 SOLUTION RESPIRATORY (INHALATION) at 19:54

## 2025-02-27 RX ADMIN — FAMOTIDINE 40 MG: 20 TABLET, FILM COATED ORAL at 21:08

## 2025-02-27 RX ADMIN — ESCITALOPRAM OXALATE 20 MG: 10 TABLET ORAL at 09:34

## 2025-02-27 RX ADMIN — OXYCODONE AND ACETAMINOPHEN 1 TABLET: 10; 325 TABLET ORAL at 12:31

## 2025-02-27 RX ADMIN — GABAPENTIN 200 MG: 100 CAPSULE ORAL at 17:28

## 2025-02-27 RX ADMIN — BUPROPION HYDROCHLORIDE 150 MG: 150 TABLET, FILM COATED, EXTENDED RELEASE ORAL at 09:33

## 2025-02-27 NOTE — PLAN OF CARE
Goal Outcome Evaluation:  Plan of Care Reviewed With: patient           Outcome Evaluation: A&O x4, 4L NC, able to communicate needs,continue POC

## 2025-02-27 NOTE — PROGRESS NOTES
Called 4th floor to inquire as to whether patient would be coming down for treatment.  Spoke with unit secretary,  asked patient's nurse Jg and was informed that patient declined radiation therapy for today. Radiation therapists informed patient would not be coming down for treatment.

## 2025-02-27 NOTE — PROGRESS NOTES
Pulmonary / Critical Care Progress Note      Patient Name: Lluvia Archer  : 1965  MRN: [id number]  Primary Care Physician:  Aleksandar Edge DO  Date of admission: 2025    Subjective   Subjective   Follow-up for  Influenza A pneumonia, resp failure, Lung cancer     Over past 24 hours: Remains on nebulizers.  Remains on ceftriaxone, azithromycin, and Tamiflu.  Solu-Medrol    No acute events overnight.     This morning,   Down to 4 L nasal cannula  Resting in bed  Feels slightly better  Cough improving and able to bring up more secretions  Dyspnea continues to improve  No fever or chills  Reports some chest pain likely related to coughing  Denies any chest tightness  No fever or chills    Objective   Objective     Vitals:   Temp:  [98.2 °F (36.8 °C)-98.8 °F (37.1 °C)] 98.4 °F (36.9 °C)  Heart Rate:  [65-93] 84  Resp:  [18-22] 20  BP: ()/(61-99) 115/64  Flow (L/min) (Oxygen Therapy):  [4-8] 4  Physical Exam   Vital Signs Reviewed   General:  WDWN, Alert, NAD.  Chronically ill-appearing female, sitting up in bed  HEENT:  PERRL, EOMI.  OP, nares clear, no sinus tenderness  Neck:  Supple, no JVD, no thyromegaly  Chest: Creased aeration with scattered rhonchi and wheezing, no increased work of breathing on 4 L while at rest  CV: RRR, no MGR, pulses 2+, equal.  Abd:  Soft, NT, ND, + BS, no HSM, obese  EXT:  no clubbing, no cyanosis, no edema  Neuro:  A&Ox3, CN grossly intact, no focal deficits.  Skin: No rashes or lesions noted      Result Review    Result Review:  I have personally reviewed the results from the time of this admission to 2025 07:20 EST and agree with these findings:  [x]  Laboratory  [x]  Microbiology  [x]  Radiology  [x]  EKG/Telemetry   [x]  Cardiology/Vascular   []  Pathology  []  Old records  []  Other:  Most notable findings include:         Lab 25  0458 25  0521 25  1138 25  1018 25  0900   WBC 9.25 7.31  --  8.40 8.85   HEMOGLOBIN  10.1* 10.1*  --  11.5* 11.2*   HEMATOCRIT 31.8* 32.4*  --  36.3 35.4   PLATELETS 132* 126*  --  140 196   SODIUM 130* 136  --  136 134*   POTASSIUM 5.3* 5.0  --  4.2 4.4   CHLORIDE 99 103  --  101 100   CO2 22.4 24.2  --  24.6 23.4   BUN 20 15  --  12 12   CREATININE 0.71 0.78  --  0.93 0.86   GLUCOSE 103* 131*  --  119* 100*   CALCIUM 7.3* 7.8*  --  8.2* 8.0*   PHOSPHORUS 2.6 2.8 3.8  --  3.1   TOTAL PROTEIN  --   --   --  6.8 6.5   ALBUMIN  --   --   --  3.8 3.7   GLOBULIN  --   --   --  3.0 2.8       XR Chest 1 View    Result Date: 2/25/2025  XR CHEST 1 VW Date of Exam: 2/25/2025 10:36 AM EST Indication: Shortness of breath Comparison: 8/19/2024. Findings: The heart is enlarged. There is mixed interstitial/airspace disease in the right lung, mainly in the right lung base and also within the left lung base. This raises question of multifocal pneumonia possibly from aspiration. There are no moderate to large  pleural effusions. There is no pneumothorax. There is a left-sided port in place. There are chronic age-related changes involving the bony thorax and thoracic aorta.     Impression: Impression: 1. Cardiomegaly. 2. Mixed interstitial/airspace disease, right greater than left side which could be on the basis of multifocal pneumonia from aspiration. Electronically Signed: Memo Luque MD  2/25/2025 11:02 AM EST  Workstation ID: [id number]       Assessment & Plan   Assessment / Plan     Active Hospital Problems:  Active Hospital Problems    Diagnosis     **Influenza      Impression:   Influenza A pneumonia  COPD with acute exacerbation  Small cell lung cancer, extensive stage, on immunotherapy, likely with progression now  Acute hypoxic respiratory failure  Chronic smoking    Plan:   Continue Brovana, Pulmicort twice daily and Yupelri once daily.  Pain control per primary service.   Continue Tamiflu twice daily.  Agree with decreasing Solu-Medrol 40 mg to every 12 hours.  Can likely begin to de-escalate steroids  further over the next day or so  Consider consulting oncology service given likely progression of small cell lung cancer.  Continue supplemental oxygen via nasal cannula.  Nicotine patch if needed.  Oncology on board.  Appreciate input  Oncology planning to obtain imaging for better staging and to adjust treatment  Patient is being seen by Dr. Gordon and Dr. Dickson     VTE Prophylaxis:  Mechanical VTE prophylaxis orders are present.    CODE STATUS:   Level Of Support Discussed With: Patient  Code Status (Patient has no pulse and is not breathing): CPR (Attempt to Resuscitate)  Medical Interventions (Patient has pulse or is breathing): Full Support    I personally reviewed pertinent labs, imaging and provider notes. Discussed with bedside nurse and will discuss with primary service.     Electronically signed by SANDRO Gu, 02/27/25, 10:55 AM EST.    This visit was performed by BOTH a physician and an APC. I personally evaluated and examined the patient. I performed all aspects of MDM as documented. , I have reviewed and confirmed the accuracy of the patient's history as documented in this note., and I have reexamined the patient and the results are consistent with the previously documented exam. I have updated the documentation as necessary.     Electronically signed by Kelby Patel MD, 02/27/25, 4:47 PM EST.     Electronically signed by Kelby Patel MD, 2/27/2025, 07:20 EST.

## 2025-02-28 LAB
ANION GAP SERPL CALCULATED.3IONS-SCNC: 7.9 MMOL/L (ref 5–15)
BASOPHILS # BLD AUTO: 0.01 10*3/MM3 (ref 0–0.2)
BASOPHILS NFR BLD AUTO: 0.2 % (ref 0–1.5)
BUN SERPL-MCNC: 18 MG/DL (ref 6–20)
BUN/CREAT SERPL: 23.4 (ref 7–25)
CALCIUM SPEC-SCNC: 7.9 MG/DL (ref 8.6–10.5)
CHLORIDE SERPL-SCNC: 100 MMOL/L (ref 98–107)
CO2 SERPL-SCNC: 24.1 MMOL/L (ref 22–29)
CREAT SERPL-MCNC: 0.77 MG/DL (ref 0.57–1)
DEPRECATED RDW RBC AUTO: 59.5 FL (ref 37–54)
EGFRCR SERPLBLD CKD-EPI 2021: 89.5 ML/MIN/1.73
EOSINOPHIL # BLD AUTO: 0 10*3/MM3 (ref 0–0.4)
EOSINOPHIL NFR BLD AUTO: 0 % (ref 0.3–6.2)
ERYTHROCYTE [DISTWIDTH] IN BLOOD BY AUTOMATED COUNT: 17.2 % (ref 12.3–15.4)
GLUCOSE SERPL-MCNC: 109 MG/DL (ref 65–99)
HCT VFR BLD AUTO: 33.3 % (ref 34–46.6)
HGB BLD-MCNC: 10.4 G/DL (ref 12–15.9)
IMM GRANULOCYTES # BLD AUTO: 0.07 10*3/MM3 (ref 0–0.05)
IMM GRANULOCYTES NFR BLD AUTO: 1.1 % (ref 0–0.5)
LYMPHOCYTES # BLD AUTO: 0.8 10*3/MM3 (ref 0.7–3.1)
LYMPHOCYTES NFR BLD AUTO: 12.1 % (ref 19.6–45.3)
MCH RBC QN AUTO: 29.1 PG (ref 26.6–33)
MCHC RBC AUTO-ENTMCNC: 31.2 G/DL (ref 31.5–35.7)
MCV RBC AUTO: 93 FL (ref 79–97)
MONOCYTES # BLD AUTO: 0.86 10*3/MM3 (ref 0.1–0.9)
MONOCYTES NFR BLD AUTO: 13 % (ref 5–12)
NEUTROPHILS NFR BLD AUTO: 4.89 10*3/MM3 (ref 1.7–7)
NEUTROPHILS NFR BLD AUTO: 73.6 % (ref 42.7–76)
NRBC BLD AUTO-RTO: 0 /100 WBC (ref 0–0.2)
PLATELET # BLD AUTO: 128 10*3/MM3 (ref 140–450)
PMV BLD AUTO: 9.4 FL (ref 6–12)
POTASSIUM SERPL-SCNC: 5.2 MMOL/L (ref 3.5–5.2)
RAD ONC ARIA COURSE ID: NORMAL
RAD ONC ARIA COURSE INTENT: NORMAL
RAD ONC ARIA COURSE LAST TREATMENT DATE: NORMAL
RAD ONC ARIA COURSE START DATE: NORMAL
RAD ONC ARIA COURSE TREATMENT ELAPSED DAYS: 15
RAD ONC ARIA FIRST TREATMENT DATE: NORMAL
RAD ONC ARIA PLAN FRACTIONS TREATED TO DATE: 7
RAD ONC ARIA PLAN ID: NORMAL
RAD ONC ARIA PLAN NAME: NORMAL
RAD ONC ARIA PLAN PRESCRIBED DOSE PER FRACTION: 3 GY
RAD ONC ARIA PLAN PRIMARY REFERENCE POINT: NORMAL
RAD ONC ARIA PLAN TOTAL FRACTIONS PRESCRIBED: 10
RAD ONC ARIA PLAN TOTAL PRESCRIBED DOSE: 3000 CGY
RAD ONC ARIA REFERENCE POINT DOSAGE GIVEN TO DATE: 21 GY
RAD ONC ARIA REFERENCE POINT ID: NORMAL
RAD ONC ARIA REFERENCE POINT SESSION DOSAGE GIVEN: 3 GY
RBC # BLD AUTO: 3.58 10*6/MM3 (ref 3.77–5.28)
SODIUM SERPL-SCNC: 132 MMOL/L (ref 136–145)
WBC NRBC COR # BLD AUTO: 6.63 10*3/MM3 (ref 3.4–10.8)

## 2025-02-28 PROCEDURE — 94799 UNLISTED PULMONARY SVC/PX: CPT

## 2025-02-28 PROCEDURE — 94664 DEMO&/EVAL PT USE INHALER: CPT

## 2025-02-28 PROCEDURE — 25010000002 METHYLPREDNISOLONE PER 40 MG: Performed by: HOSPITALIST

## 2025-02-28 PROCEDURE — 85025 COMPLETE CBC W/AUTO DIFF WBC: CPT | Performed by: HOSPITALIST

## 2025-02-28 PROCEDURE — 80048 BASIC METABOLIC PNL TOTAL CA: CPT | Performed by: HOSPITALIST

## 2025-02-28 PROCEDURE — 77387 GUIDANCE FOR RADJ TX DLVR: CPT | Performed by: RADIOLOGY

## 2025-02-28 PROCEDURE — 77412 RADIATION TX DELIVERY LVL 3: CPT | Performed by: RADIOLOGY

## 2025-02-28 PROCEDURE — 63710000001 ONDANSETRON ODT 4 MG TABLET DISPERSIBLE: Performed by: HOSPITALIST

## 2025-02-28 PROCEDURE — G6002 STEREOSCOPIC X-RAY GUIDANCE: HCPCS | Performed by: RADIOLOGY

## 2025-02-28 PROCEDURE — 25010000002 CEFTRIAXONE PER 250 MG: Performed by: INTERNAL MEDICINE

## 2025-02-28 PROCEDURE — 99232 SBSQ HOSP IP/OBS MODERATE 35: CPT | Performed by: INTERNAL MEDICINE

## 2025-02-28 PROCEDURE — 25010000002 ONDANSETRON PER 1 MG: Performed by: HOSPITALIST

## 2025-02-28 PROCEDURE — 94761 N-INVAS EAR/PLS OXIMETRY MLT: CPT

## 2025-02-28 RX ORDER — PROCHLORPERAZINE EDISYLATE 5 MG/ML
10 INJECTION INTRAMUSCULAR; INTRAVENOUS EVERY 6 HOURS PRN
Status: DISCONTINUED | OUTPATIENT
Start: 2025-02-28 | End: 2025-03-03 | Stop reason: HOSPADM

## 2025-02-28 RX ADMIN — AZITHROMYCIN DIHYDRATE 250 MG: 250 TABLET ORAL at 09:08

## 2025-02-28 RX ADMIN — Medication 10 ML: at 20:48

## 2025-02-28 RX ADMIN — GUAIFENESIN 1200 MG: 600 TABLET ORAL at 09:08

## 2025-02-28 RX ADMIN — BUDESONIDE 0.5 MG: 0.5 SUSPENSION RESPIRATORY (INHALATION) at 06:36

## 2025-02-28 RX ADMIN — NICOTINE 1 PATCH: 21 PATCH, EXTENDED RELEASE TRANSDERMAL at 09:06

## 2025-02-28 RX ADMIN — LORAZEPAM 0.5 MG: 0.5 TABLET ORAL at 12:58

## 2025-02-28 RX ADMIN — OXYCODONE AND ACETAMINOPHEN 1 TABLET: 10; 325 TABLET ORAL at 13:05

## 2025-02-28 RX ADMIN — ARFORMOTEROL TARTRATE 15 MCG: 15 SOLUTION RESPIRATORY (INHALATION) at 18:32

## 2025-02-28 RX ADMIN — METOPROLOL TARTRATE 12.5 MG: 25 TABLET, FILM COATED ORAL at 09:07

## 2025-02-28 RX ADMIN — METHYLPREDNISOLONE SODIUM SUCCINATE 40 MG: 40 INJECTION, POWDER, LYOPHILIZED, FOR SOLUTION INTRAMUSCULAR; INTRAVENOUS at 18:02

## 2025-02-28 RX ADMIN — Medication: at 20:48

## 2025-02-28 RX ADMIN — Medication 5 MG: at 20:45

## 2025-02-28 RX ADMIN — OSELTAMIVIR PHOSPHATE 75 MG: 75 CAPSULE ORAL at 20:45

## 2025-02-28 RX ADMIN — GABAPENTIN 200 MG: 100 CAPSULE ORAL at 20:44

## 2025-02-28 RX ADMIN — SODIUM CHLORIDE 2000 MG: 9 INJECTION INTRAMUSCULAR; INTRAVENOUS; SUBCUTANEOUS at 09:06

## 2025-02-28 RX ADMIN — HYDROXYZINE HYDROCHLORIDE 25 MG: 25 TABLET, FILM COATED ORAL at 20:48

## 2025-02-28 RX ADMIN — GABAPENTIN 200 MG: 100 CAPSULE ORAL at 16:41

## 2025-02-28 RX ADMIN — GUAIFENESIN 1200 MG: 600 TABLET ORAL at 20:45

## 2025-02-28 RX ADMIN — CALCIUM CARBONATE 2 TABLET: 500 TABLET, CHEWABLE ORAL at 12:58

## 2025-02-28 RX ADMIN — ESCITALOPRAM OXALATE 20 MG: 10 TABLET ORAL at 09:07

## 2025-02-28 RX ADMIN — BUDESONIDE 0.5 MG: 0.5 SUSPENSION RESPIRATORY (INHALATION) at 18:32

## 2025-02-28 RX ADMIN — ATORVASTATIN CALCIUM 10 MG: 10 TABLET, FILM COATED ORAL at 20:44

## 2025-02-28 RX ADMIN — Medication 4 ML: at 06:36

## 2025-02-28 RX ADMIN — METOPROLOL TARTRATE 12.5 MG: 25 TABLET, FILM COATED ORAL at 20:47

## 2025-02-28 RX ADMIN — ONDANSETRON 4 MG: 2 INJECTION INTRAMUSCULAR; INTRAVENOUS at 12:57

## 2025-02-28 RX ADMIN — FAMOTIDINE 40 MG: 20 TABLET, FILM COATED ORAL at 20:44

## 2025-02-28 RX ADMIN — Medication: at 11:33

## 2025-02-28 RX ADMIN — OXYCODONE AND ACETAMINOPHEN 1 TABLET: 10; 325 TABLET ORAL at 20:45

## 2025-02-28 RX ADMIN — METHYLPREDNISOLONE SODIUM SUCCINATE 40 MG: 40 INJECTION, POWDER, LYOPHILIZED, FOR SOLUTION INTRAMUSCULAR; INTRAVENOUS at 05:49

## 2025-02-28 RX ADMIN — GABAPENTIN 200 MG: 100 CAPSULE ORAL at 09:07

## 2025-02-28 RX ADMIN — IPRATROPIUM BROMIDE AND ALBUTEROL SULFATE 3 ML: .5; 3 SOLUTION RESPIRATORY (INHALATION) at 18:32

## 2025-02-28 RX ADMIN — ONDANSETRON 4 MG: 4 TABLET, ORALLY DISINTEGRATING ORAL at 16:41

## 2025-02-28 RX ADMIN — Medication 10 ML: at 05:49

## 2025-02-28 RX ADMIN — OXYCODONE AND ACETAMINOPHEN 1 TABLET: 10; 325 TABLET ORAL at 03:55

## 2025-02-28 RX ADMIN — SENNOSIDES AND DOCUSATE SODIUM 2 TABLET: 50; 8.6 TABLET ORAL at 20:46

## 2025-02-28 RX ADMIN — BUPROPION HYDROCHLORIDE 150 MG: 150 TABLET, FILM COATED, EXTENDED RELEASE ORAL at 20:45

## 2025-02-28 RX ADMIN — BUPROPION HYDROCHLORIDE 150 MG: 150 TABLET, FILM COATED, EXTENDED RELEASE ORAL at 09:08

## 2025-02-28 RX ADMIN — ARFORMOTEROL TARTRATE 15 MCG: 15 SOLUTION RESPIRATORY (INHALATION) at 06:36

## 2025-02-28 RX ADMIN — OSELTAMIVIR PHOSPHATE 75 MG: 75 CAPSULE ORAL at 09:07

## 2025-02-28 RX ADMIN — Medication 4 ML: at 18:32

## 2025-02-28 RX ADMIN — Medication 10 ML: at 09:14

## 2025-02-28 RX ADMIN — IPRATROPIUM BROMIDE AND ALBUTEROL SULFATE 3 ML: .5; 3 SOLUTION RESPIRATORY (INHALATION) at 01:17

## 2025-02-28 RX ADMIN — IPRATROPIUM BROMIDE AND ALBUTEROL SULFATE 3 ML: .5; 3 SOLUTION RESPIRATORY (INHALATION) at 06:35

## 2025-02-28 RX ADMIN — FAMOTIDINE 40 MG: 20 TABLET, FILM COATED ORAL at 09:07

## 2025-02-28 NOTE — PROGRESS NOTES
[facility]   Hospitalist Progress Note  Date: 2025  Patient Name: Lluvia Archer  : 1965  MRN: [id number]  Date of admission: 2025      Subjective   Subjective     Chief Complaint: Shortness of air    Summary:    Patient is a 59-year-old female who presents to the emergency room for evaluation of shortness of breath.  She has stage IV metastatic lung disease and is a patient of Dr. Tubbs.  Additionally, patient continues to smoke and has COPD.     On arrival to the ED, patient has a temperature 99.6, pulse of 120, respiratory rate of 22, blood pressure of 142/71.  Patient is currently wearing a Venturi mask and saturating 93%.     CT of her chest shows: New anterior peripheral left lower lobe airspace disease may be related to bronchial narrowing and atelectasis. However, pneumonia not excluded. Progression of metastatic disease with enlarging pulmonary nodules and new enlarged mediastinal/hilar lymph nodes.      Patient's high-sensitivity troponin is 18 and then 15.  proBNP is 298.  Sodium is 136.  Calcium is 8.2.  Hemoglobin is 11.5.  White blood cell count is 8.4.  Lymphocytes are 6.7.  Patient is positive for influenza.  Patient seen by pulmonary.  Patient seen by her oncologist due to progression of her lung cancer.  Plan for chemotherapy as an outpatient and to stop immunotherapy.  Patient to get staging studies while inpatient.  Staging CT scan abdomen pelvis no new acute findings.  CT scan of C-spine showed moderate left C3-C4 foraminal narrowing.  Interval Followup:   Remains on 5 L high flow oxygen .  At home uses 4 L.  Shortness of air is improving.  Refused XRT today  Does have cough not able to bring up any mucus complaining of having difficulty bringing up mucus.  No chest pain.  Occasional wheezing.  No fever or chills.  Not feeling well today due to watery episodes of diarrhea along with vomiting.  No vomiting now  Wants her Percocet to be every 4 hours as home  schedule.    Review of Systems   All systems were reviewed and negative except for: Summary and interval follow-up.    Objective   Objective     Vitals:   Temp:  [98.4 °F (36.9 °C)-98.8 °F (37.1 °C)] 98.8 °F (37.1 °C)  Heart Rate:  [65-84] 71  Resp:  [18-22] 18  BP: (108-129)/(56-80) 108/56  Flow (L/min) (Oxygen Therapy):  [3-4] 3  Physical Exam    Constitutional: Awake, alert, no acute distress   Eyes: Pupils equal, sclerae anicteric, no conjunctival injection   HENT: NCAT, mucous membranes moist   Neck: Supple, no thyromegaly, no lymphadenopathy, trachea midline   Respiratory: Occasional wheezing and rhonchi, decreased  to auscultation bilaterally, nonlabored respirations    Cardiovascular: RRR, no murmurs, rubs, or gallops, palpable pedal pulses bilaterally   Gastrointestinal: Positive bowel sounds, soft, nontender, nondistended   Musculoskeletal: No bilateral ankle edema, no clubbing or cyanosis to extremities   Psychiatric: Appropriate affect, cooperative   Neurologic: Oriented x 3, strength symmetric in all extremities, Cranial Nerves grossly intact to confrontation, speech clear   Skin: No rashes     Result Review    Result Review:  I have personally reviewed the results for the past 24 hours and agree with these findings:  [x]  Laboratory  [x]  Microbiology  [x]  Radiology  [x]  EKG/Telemetry sinus tachycardia 116, QT 0.44  []  Cardiology/Vascular   []  Pathology  [x]  Old records  [x]  Other: Medications    Assessment & Plan   Assessment / Plan     Assessment:  Acute on chronic hypoxemia.  Patient on 4 L at home.  Acute influenza A infection.  COPD exacerbation.  Mucous plugging  Community-acquired left lower lobe pneumonia.  MSSA.  Tobacco use disorder.  Progressing stage IV small cell lung cancer on immunotherapy by Dr. Dickson.  Mets to brain status post XRT.  Multiple sclerotic osseous mets.  Depression.  Hypertension.  Chronic pain syndrome.  Chronic anemia and thrombocytopenia.   Stable.  Gastroenteritis       Plan:  Continue supplemental oxygen keep sats more than 90%.  Hold bowel regimen due to diarrhea.  If does not improve will check for C. difficile  Rocephin and p.o. Zithromax.  Continue Tamiflu.  Continue IV steroids.  Nebulizer treatment.  Bronchodilator and bronchopulmonary hygiene protocol.  Mucinex, MetaNeb, chest vest and hypertonic saline neb to break up mucus  Nicotine patch.  Negative MRSA PCR.  Sputum culture noted.  Blood culture pending  Legionella and strep pneumo antigen negative  CT chest noted no PE.  Lactate and Pro-Erik negative  DC home lisinopril.  Started on metoprolol to help with tachycardia.  Discussed with pulmonary.  Appreciate input.  Discussed with oncology.  Appreciate input.  Getting C-spine CT scan for paresthesias of upper extremity.   CT abdomen pelvis for staging workup noted.  Continue home medication including Wellbutrin, Klonopin, Lexapro, Ativan, Atarax, Neurontin and oxycodone.  Continue telemetry  Discussed plan with RN.  Discharge home when stable    VTE Prophylaxis:  Mechanical VTE prophylaxis orders are present.        CODE STATUS:   Level Of Support Discussed With: Patient  Code Status (Patient has no pulse and is not breathing): CPR (Attempt to Resuscitate)  Medical Interventions (Patient has pulse or is breathing): Full Support      Part of this note may be an electronic transcription/translation of spoken language to printed text using the Dragon Dictation System.     Electronically signed by Sage Rosario MD, 02/27/25, 7:14 PM EST.

## 2025-02-28 NOTE — PROGRESS NOTES
Pulmonary / Critical Care Progress Note      Patient Name: Lluvia Archer  : 1965  MRN: [id number]  Primary Care Physician:  Aleksandar Edge DO  Date of admission: 2025    Subjective   Subjective   Follow-up for  Influenza A pneumonia, resp failure, Lung cancer     Over past 24 hours: Remains on nebulizers.  Remains on ceftriaxone, azithromycin, and Tamiflu.  Solu-Medrol    No acute events overnight.     This morning,   Currently on 3 L nasal cannula  Sitting up in bed  Feels well today  Going for radiation today  Denies any chest pain chest tightness  Cough improving  Dyspnea improved  No fever or chills    Objective   Objective     Vitals:   Temp:  [98.2 °F (36.8 °C)-98.8 °F (37.1 °C)] 98.6 °F (37 °C)  Heart Rate:  [61-93] 62  Resp:  [16-20] 18  BP: ()/(56-80) 91/71  Flow (L/min) (Oxygen Therapy):  [3-4] 3  Physical Exam   Vital Signs Reviewed   General:  WDWN, Alert, NAD.  Chronically ill-appearing female, sitting up in bed  HEENT:  PERRL, EOMI.  OP, nares clear, no sinus tenderness  Neck:  Supple, no JVD, no thyromegaly  Chest: Creased aeration with scattered rhonchi and wheezing, no increased work of breathing on 4 L while at rest  CV: RRR, no MGR, pulses 2+, equal.  Abd:  Soft, NT, ND, + BS, no HSM, obese  EXT:  no clubbing, no cyanosis, no edema  Neuro:  A&Ox3, CN grossly intact, no focal deficits.  Skin: No rashes or lesions noted      Result Review    Result Review:  I have personally reviewed the results from the time of this admission to 2025 08:01 EST and agree with these findings:  [x]  Laboratory  [x]  Microbiology  [x]  Radiology  [x]  EKG/Telemetry   [x]  Cardiology/Vascular   []  Pathology  []  Old records  []  Other:  Most notable findings include:         Lab 25  0536 25  0458 25  0521 25  1138 25  1018 25  0900   WBC 6.63 9.25 7.31  --  8.40 8.85   HEMOGLOBIN 10.4* 10.1* 10.1*  --  11.5* 11.2*   HEMATOCRIT 33.3* 31.8* 32.4*   --  36.3 35.4   PLATELETS 128* 132* 126*  --  140 196   SODIUM 132* 130* 136  --  136 134*   POTASSIUM 5.2 5.3* 5.0  --  4.2 4.4   CHLORIDE 100 99 103  --  101 100   CO2 24.1 22.4 24.2  --  24.6 23.4   BUN 18 20 15  --  12 12   CREATININE 0.77 0.71 0.78  --  0.93 0.86   GLUCOSE 109* 103* 131*  --  119* 100*   CALCIUM 7.9* 7.3* 7.8*  --  8.2* 8.0*   PHOSPHORUS  --  2.6 2.8 3.8  --  3.1   TOTAL PROTEIN  --   --   --   --  6.8 6.5   ALBUMIN  --   --   --   --  3.8 3.7   GLOBULIN  --   --   --   --  3.0 2.8       XR Chest 1 View    Result Date: 2/25/2025  XR CHEST 1 VW Date of Exam: 2/25/2025 10:36 AM EST Indication: Shortness of breath Comparison: 8/19/2024. Findings: The heart is enlarged. There is mixed interstitial/airspace disease in the right lung, mainly in the right lung base and also within the left lung base. This raises question of multifocal pneumonia possibly from aspiration. There are no moderate to large  pleural effusions. There is no pneumothorax. There is a left-sided port in place. There are chronic age-related changes involving the bony thorax and thoracic aorta.     Impression: Impression: 1. Cardiomegaly. 2. Mixed interstitial/airspace disease, right greater than left side which could be on the basis of multifocal pneumonia from aspiration. Electronically Signed: Memo Luque MD  2/25/2025 11:02 AM EST  Workstation ID: [id number]       Assessment & Plan   Assessment / Plan     Active Hospital Problems:  Active Hospital Problems    Diagnosis     **Influenza      Impression:   Influenza A pneumonia  COPD with acute exacerbation  Small cell lung cancer, extensive stage, on immunotherapy, likely with progression now  Acute hypoxic respiratory failure  Chronic smoking    Plan:   Continue Brovana, Pulmicort twice daily and Yupelri once daily.  Pain control per primary service.   Continue Tamiflu twice daily.  Continue Solu-Medrol 40 mg to every 12 hours.  Can likely begin to de-escalate steroids further  over the next day or so  Consider consulting oncology service given likely progression of small cell lung cancer.  Continue supplemental oxygen via nasal cannula.  Nicotine patch if needed.  Oncology on board.  Appreciate input  Oncology planning to obtain imaging for better staging and to adjust treatment  Patient is being seen by Dr. Gordon and Dr. Dickson  Going for radiation today     VTE Prophylaxis:  Mechanical VTE prophylaxis orders are present.    CODE STATUS:   Level Of Support Discussed With: Patient  Code Status (Patient has no pulse and is not breathing): CPR (Attempt to Resuscitate)  Medical Interventions (Patient has pulse or is breathing): Full Support    I personally reviewed pertinent labs, imaging and provider notes. Discussed with bedside nurse and will discuss with primary service.     Electronically signed by SANDRO Gu, 02/28/25, 1:44 PM EST.    This visit was performed by BOTH a physician and an APC. I personally evaluated and examined the patient. I performed all aspects of MDM as documented. , I have reviewed and confirmed the accuracy of the patient's history as documented in this note., and I have reexamined the patient and the results are consistent with the previously documented exam. I have updated the documentation as necessary.     Electronically signed by Kelby Patel MD, 02/28/25, 3:44 PM EST.       Electronically signed by Kelby Patel MD, 2/28/2025, 08:01 EST.

## 2025-02-28 NOTE — PROGRESS NOTES
[facility]   Hospitalist Progress Note  Date: 2025  Patient Name: Lluvia Archer  : 1965  MRN: [id number]  Date of admission: 2025      Subjective   Subjective     Chief Complaint: Shortness of air    Summary:    Patient is a 59-year-old female who presents to the emergency room for evaluation of shortness of breath.  She has stage IV metastatic lung disease and is a patient of Dr. Tubbs.  Additionally, patient continues to smoke and has COPD.     On arrival to the ED, patient has a temperature 99.6, pulse of 120, respiratory rate of 22, blood pressure of 142/71.  Patient is currently wearing a Venturi mask and saturating 93%.     CT of her chest shows: New anterior peripheral left lower lobe airspace disease may be related to bronchial narrowing and atelectasis. However, pneumonia not excluded. Progression of metastatic disease with enlarging pulmonary nodules and new enlarged mediastinal/hilar lymph nodes.      Patient's high-sensitivity troponin is 18 and then 15.  proBNP is 298.  Sodium is 136.  Calcium is 8.2.  Hemoglobin is 11.5.  White blood cell count is 8.4.  Lymphocytes are 6.7.  Patient is positive for influenza.  Patient seen by pulmonary.  Patient seen by her oncologist due to progression of her lung cancer.  Plan for chemotherapy as an outpatient and to stop immunotherapy.  Patient to get staging studies while inpatient.  Staging CT scan abdomen pelvis no new acute findings.  CT scan of C-spine showed moderate left C3-C4 foraminal narrowing.  Interval Followup:   Patient down to 3 L which is close to her baseline.  Seen after XRT to brain.  Shortness of air is improving.  Does have cough not able to bring up any mucus complaining of having difficulty bringing up mucus.  No chest pain.  Occasional wheezing.  No fever or chills.  Feels nauseous in the morning after getting medications in a cup.  Wondering if some medicine is causing nausea  Continues to have watery diarrhea  3 times today so far    Review of Systems   All systems were reviewed and negative except for: Summary and interval follow-up.    Objective   Objective     Vitals:   Temp:  [97.9 °F (36.6 °C)-98.6 °F (37 °C)] 98.4 °F (36.9 °C)  Heart Rate:  [61-93] 67  Resp:  [16-18] 18  BP: ()/(65-73) 101/72  Flow (L/min) (Oxygen Therapy):  [3-4] 3  Physical Exam    Constitutional: Awake, alert, no acute distress   Eyes: Pupils equal, sclerae anicteric, no conjunctival injection   HENT: NCAT, mucous membranes moist   Neck: Supple, no thyromegaly, no lymphadenopathy, trachea midline   Respiratory: Occasional wheezing and rhonchi, decreased  to auscultation bilaterally, nonlabored respirations    Cardiovascular: RRR, no murmurs, rubs, or gallops, palpable pedal pulses bilaterally   Gastrointestinal: Positive bowel sounds, soft, nontender, nondistended   Musculoskeletal: No bilateral ankle edema, no clubbing or cyanosis to extremities   Psychiatric: Appropriate affect, cooperative   Neurologic: Oriented x 3, strength symmetric in all extremities, Cranial Nerves grossly intact to confrontation, speech clear   Skin: No rashes     Result Review    Result Review:  I have personally reviewed the results for the past 24 hours and agree with these findings:  [x]  Laboratory  [x]  Microbiology  [x]  Radiology  [x]  EKG/Telemetry sinus tachycardia 116, QT 0.44  []  Cardiology/Vascular   []  Pathology  [x]  Old records  [x]  Other: Medications    Assessment & Plan   Assessment / Plan     Assessment:  Acute on chronic hypoxemia.  Patient on 4 L at home.  Improving  Acute influenza A infection.  COPD exacerbation.  Mucous plugging  Community-acquired left lower lobe pneumonia.  MSSA.  Tobacco use disorder.  Progressing stage IV small cell lung cancer on immunotherapy by Dr. Dickson.  Mets to brain status post XRT.  Multiple sclerotic osseous mets.  Depression.  Hypertension.  Chronic pain syndrome.  Chronic anemia and thrombocytopenia.   Stable.  Gastroenteritis       Plan:  Continue supplemental oxygen keep sats more than 90%.   check for C. difficile  Rocephin and p.o. Zithromax.  Continue Tamiflu.  Continue IV steroids.  Nebulizer treatment.  Bronchodilator and bronchopulmonary hygiene protocol.  Mucinex, MetaNeb, chest vest and hypertonic saline neb to break up mucus  Nicotine patch.  Negative MRSA PCR.  Sputum culture noted.  Blood culture pending  Legionella and strep pneumo antigen negative  CT chest noted no PE.  Lactate and Pro-Erik negative  DC home lisinopril.  Started on metoprolol to help with tachycardia.  Discussed with pulmonary.  Appreciate input.  Discussed with oncology.  Appreciate input.  Getting C-spine CT scan for paresthesias of upper extremity.   CT abdomen pelvis for staging workup noted.  Continue home medication including Wellbutrin, Klonopin, Lexapro, Ativan, Atarax, Neurontin and oxycodone.  Continue telemetry  Discussed plan with RN.  Discharge home when stable    VTE Prophylaxis:  Mechanical VTE prophylaxis orders are present.        CODE STATUS:   Level Of Support Discussed With: Patient  Code Status (Patient has no pulse and is not breathing): CPR (Attempt to Resuscitate)  Medical Interventions (Patient has pulse or is breathing): Full Support      Part of this note may be an electronic transcription/translation of spoken language to printed text using the Dragon Dictation System.     Electronically signed by Sage Rosario MD, 02/28/25, 5:47 PM EST.

## 2025-02-28 NOTE — PLAN OF CARE
Goal Outcome Evaluation:  Plan of Care Reviewed With: patient        Progress: improving  Outcome Evaluation: NSR on tele monitor. VSS. Pain managed effectively with PRN Percocet.  Nicotine patch in place d/t current every day smoker. 4L oxygen per NC. Continue with chest vest and Tamiflu r/t Influenza A. Contact/droplet precautions maintained. Continue Rocephin and Zithromax r/t Pneumonia. Productive cough with small amount of green sputum. Call light and belongings left within reach.

## 2025-02-28 NOTE — PLAN OF CARE
Goal Outcome Evaluation:  Plan of Care Reviewed With: patient           Outcome Evaluation: Pt A&Ox4 very calm and pleasant. Reported pain and nausea, treated per MAR. Had radiation done this afternoon. Patient rested well after appointment. Continue ABX as ordered. Continue POC.

## 2025-03-01 PROCEDURE — 25010000002 PROCHLORPERAZINE 10 MG/2ML SOLUTION: Performed by: INTERNAL MEDICINE

## 2025-03-01 PROCEDURE — 99232 SBSQ HOSP IP/OBS MODERATE 35: CPT | Performed by: INTERNAL MEDICINE

## 2025-03-01 PROCEDURE — 99232 SBSQ HOSP IP/OBS MODERATE 35: CPT | Performed by: STUDENT IN AN ORGANIZED HEALTH CARE EDUCATION/TRAINING PROGRAM

## 2025-03-01 PROCEDURE — 94761 N-INVAS EAR/PLS OXIMETRY MLT: CPT

## 2025-03-01 PROCEDURE — 94664 DEMO&/EVAL PT USE INHALER: CPT

## 2025-03-01 PROCEDURE — 63710000001 ONDANSETRON ODT 4 MG TABLET DISPERSIBLE: Performed by: HOSPITALIST

## 2025-03-01 PROCEDURE — 94799 UNLISTED PULMONARY SVC/PX: CPT

## 2025-03-01 PROCEDURE — 25010000002 CEFTRIAXONE PER 250 MG: Performed by: INTERNAL MEDICINE

## 2025-03-01 PROCEDURE — 25010000002 METHYLPREDNISOLONE PER 40 MG: Performed by: HOSPITALIST

## 2025-03-01 RX ADMIN — ARFORMOTEROL TARTRATE 15 MCG: 15 SOLUTION RESPIRATORY (INHALATION) at 07:57

## 2025-03-01 RX ADMIN — GABAPENTIN 200 MG: 100 CAPSULE ORAL at 20:36

## 2025-03-01 RX ADMIN — GABAPENTIN 200 MG: 100 CAPSULE ORAL at 17:27

## 2025-03-01 RX ADMIN — GABAPENTIN 200 MG: 100 CAPSULE ORAL at 10:02

## 2025-03-01 RX ADMIN — Medication 4 ML: at 07:57

## 2025-03-01 RX ADMIN — METOPROLOL TARTRATE 12.5 MG: 25 TABLET, FILM COATED ORAL at 10:01

## 2025-03-01 RX ADMIN — BUPROPION HYDROCHLORIDE 150 MG: 150 TABLET, FILM COATED, EXTENDED RELEASE ORAL at 10:02

## 2025-03-01 RX ADMIN — BROMPHENIRAMINE MALEATE, PSEUDOEPHEDRINE HYDROCHLORIDE, AND DEXTROMETHORPHAN HYDROBROMIDE 5 ML: 2; 30; 10 SYRUP ORAL at 06:07

## 2025-03-01 RX ADMIN — METHYLPREDNISOLONE SODIUM SUCCINATE 40 MG: 40 INJECTION, POWDER, LYOPHILIZED, FOR SOLUTION INTRAMUSCULAR; INTRAVENOUS at 18:19

## 2025-03-01 RX ADMIN — IPRATROPIUM BROMIDE AND ALBUTEROL SULFATE 3 ML: .5; 3 SOLUTION RESPIRATORY (INHALATION) at 00:46

## 2025-03-01 RX ADMIN — PROCHLORPERAZINE EDISYLATE 10 MG: 5 INJECTION INTRAMUSCULAR; INTRAVENOUS at 10:03

## 2025-03-01 RX ADMIN — GUAIFENESIN 1200 MG: 600 TABLET ORAL at 20:36

## 2025-03-01 RX ADMIN — AZITHROMYCIN DIHYDRATE 250 MG: 250 TABLET ORAL at 10:02

## 2025-03-01 RX ADMIN — GUAIFENESIN 1200 MG: 600 TABLET ORAL at 10:01

## 2025-03-01 RX ADMIN — IPRATROPIUM BROMIDE AND ALBUTEROL SULFATE 3 ML: .5; 3 SOLUTION RESPIRATORY (INHALATION) at 07:57

## 2025-03-01 RX ADMIN — ESCITALOPRAM OXALATE 20 MG: 10 TABLET ORAL at 10:02

## 2025-03-01 RX ADMIN — NICOTINE 1 PATCH: 21 PATCH, EXTENDED RELEASE TRANSDERMAL at 10:03

## 2025-03-01 RX ADMIN — METHYLPREDNISOLONE SODIUM SUCCINATE 40 MG: 40 INJECTION, POWDER, LYOPHILIZED, FOR SOLUTION INTRAMUSCULAR; INTRAVENOUS at 06:07

## 2025-03-01 RX ADMIN — FAMOTIDINE 40 MG: 20 TABLET, FILM COATED ORAL at 20:36

## 2025-03-01 RX ADMIN — Medication 10 ML: at 10:03

## 2025-03-01 RX ADMIN — IPRATROPIUM BROMIDE AND ALBUTEROL SULFATE 3 ML: .5; 3 SOLUTION RESPIRATORY (INHALATION) at 20:25

## 2025-03-01 RX ADMIN — OXYCODONE AND ACETAMINOPHEN 1 TABLET: 10; 325 TABLET ORAL at 20:35

## 2025-03-01 RX ADMIN — OSELTAMIVIR PHOSPHATE 75 MG: 75 CAPSULE ORAL at 20:36

## 2025-03-01 RX ADMIN — ONDANSETRON 4 MG: 4 TABLET, ORALLY DISINTEGRATING ORAL at 20:35

## 2025-03-01 RX ADMIN — ARFORMOTEROL TARTRATE 15 MCG: 15 SOLUTION RESPIRATORY (INHALATION) at 20:25

## 2025-03-01 RX ADMIN — OSELTAMIVIR PHOSPHATE 75 MG: 75 CAPSULE ORAL at 10:01

## 2025-03-01 RX ADMIN — OXYCODONE AND ACETAMINOPHEN 1 TABLET: 10; 325 TABLET ORAL at 00:38

## 2025-03-01 RX ADMIN — Medication 10 ML: at 20:35

## 2025-03-01 RX ADMIN — SODIUM CHLORIDE 2000 MG: 9 INJECTION INTRAMUSCULAR; INTRAVENOUS; SUBCUTANEOUS at 10:01

## 2025-03-01 RX ADMIN — Medication 5 MG: at 20:36

## 2025-03-01 RX ADMIN — Medication 4 ML: at 20:25

## 2025-03-01 RX ADMIN — CALCIUM CARBONATE 2 TABLET: 500 TABLET, CHEWABLE ORAL at 13:39

## 2025-03-01 RX ADMIN — FAMOTIDINE 40 MG: 20 TABLET, FILM COATED ORAL at 10:02

## 2025-03-01 RX ADMIN — BUPROPION HYDROCHLORIDE 150 MG: 150 TABLET, FILM COATED, EXTENDED RELEASE ORAL at 20:35

## 2025-03-01 RX ADMIN — BUDESONIDE 0.5 MG: 0.5 SUSPENSION RESPIRATORY (INHALATION) at 20:25

## 2025-03-01 RX ADMIN — OXYCODONE AND ACETAMINOPHEN 1 TABLET: 10; 325 TABLET ORAL at 13:38

## 2025-03-01 RX ADMIN — ATORVASTATIN CALCIUM 10 MG: 10 TABLET, FILM COATED ORAL at 20:35

## 2025-03-01 RX ADMIN — METOPROLOL TARTRATE 12.5 MG: 25 TABLET, FILM COATED ORAL at 20:36

## 2025-03-01 RX ADMIN — METHYLPREDNISOLONE SODIUM SUCCINATE 40 MG: 40 INJECTION, POWDER, LYOPHILIZED, FOR SOLUTION INTRAMUSCULAR; INTRAVENOUS at 17:27

## 2025-03-01 RX ADMIN — ACETAMINOPHEN 650 MG: 325 TABLET ORAL at 17:27

## 2025-03-01 RX ADMIN — IPRATROPIUM BROMIDE AND ALBUTEROL SULFATE 3 ML: .5; 3 SOLUTION RESPIRATORY (INHALATION) at 11:43

## 2025-03-01 RX ADMIN — BUDESONIDE 0.5 MG: 0.5 SUSPENSION RESPIRATORY (INHALATION) at 07:57

## 2025-03-01 RX ADMIN — OXYCODONE AND ACETAMINOPHEN 1 TABLET: 10; 325 TABLET ORAL at 06:06

## 2025-03-01 NOTE — PLAN OF CARE
Goal Outcome Evaluation:  Plan of Care Reviewed With: patient        Progress: improving  Outcome Evaluation: Maintain droplet isolation for Flu A and spore to rule out cdiff. Complains of pain. Pain managed effectively per MAR. Denies shortness of breath. Call light left in reach. Solumedrol per PIV.

## 2025-03-01 NOTE — PROGRESS NOTES
Pulmonary / Critical Care Progress Note      Patient Name: Lluvia Archer  : 1965  MRN: [id number]  Primary Care Physician:  Aleksandar Edge DO  Date of admission: 2025    Subjective   Subjective   Follow-up for  Influenza A pneumonia, resp failure, Lung cancer     Over past 24 hours: Underwent radiation    No acute events overnight.     This morning,   Currently on 4 L nasal cannula  Feels okay today  Resting in bed  Denies any chest pain chest tightness  Cough improving  No fever or chills  Nausea or vomiting  Denies any abdominal pain      Objective   Objective     Vitals:   Temp:  [98.4 °F (36.9 °C)-99 °F (37.2 °C)] 98.4 °F (36.9 °C)  Heart Rate:  [59-81] 61  Resp:  [18-26] 20  BP: ()/(64-75) 136/75  Flow (L/min) (Oxygen Therapy):  [4] 4  Physical Exam   Vital Signs Reviewed   General:  WDWN, Alert, NAD.  Chronically ill-appearing female, sitting up in bed  HEENT:  PERRL, EOMI.  OP, nares clear, no sinus tenderness  Neck:  Supple, no JVD, no thyromegaly  Chest: Creased aeration with scattered rhonchi and wheezing, no increased work of breathing on 4 L while at rest  CV: RRR, no MGR, pulses 2+, equal.  Abd:  Soft, NT, ND, + BS, no HSM, obese  EXT:  no clubbing, no cyanosis, no edema  Neuro:  A&Ox3, CN grossly intact, no focal deficits.  Skin: No rashes or lesions noted      Result Review    Result Review:  I have personally reviewed the results from the time of this admission to 3/1/2025 17:02 EST and agree with these findings:  [x]  Laboratory  [x]  Microbiology  [x]  Radiology  [x]  EKG/Telemetry   [x]  Cardiology/Vascular   []  Pathology  []  Old records  []  Other:  Most notable findings include:         Lab 25  0536 25  0458 25  0521 25  1138 25  1018   WBC 6.63 9.25 7.31  --  8.40   HEMOGLOBIN 10.4* 10.1* 10.1*  --  11.5*   HEMATOCRIT 33.3* 31.8* 32.4*  --  36.3   PLATELETS 128* 132* 126*  --  140   SODIUM 132* 130* 136  --  136   POTASSIUM 5.2  5.3* 5.0  --  4.2   CHLORIDE 100 99 103  --  101   CO2 24.1 22.4 24.2  --  24.6   BUN 18 20 15  --  12   CREATININE 0.77 0.71 0.78  --  0.93   GLUCOSE 109* 103* 131*  --  119*   CALCIUM 7.9* 7.3* 7.8*  --  8.2*   PHOSPHORUS  --  2.6 2.8 3.8  --    TOTAL PROTEIN  --   --   --   --  6.8   ALBUMIN  --   --   --   --  3.8   GLOBULIN  --   --   --   --  3.0              Assessment & Plan   Assessment / Plan     Active Hospital Problems:  Active Hospital Problems    Diagnosis     **Influenza      Impression:   Influenza A pneumonia  COPD with acute exacerbation, 4 L at baseline  Small cell lung cancer, extensive stage, on immunotherapy, likely with progression now  Acute hypoxic respiratory failure  Chronic smoking    Plan:   Continue supplemental oxygen to keep SpO2 greater than 90%  Continue ceftriaxone total 5 days  Continue Tamiflu for total 5 days  Continue Solu-Medrol 40 mg twice daily, transition to oral prednisone in the next day or so  Continue nebs and bronchopulmonary hygiene  Encourage activity as tolerated  Encourage tobacco cessation  RT manager following  Oncology on board  Pending disposition    VTE Prophylaxis:  Mechanical VTE prophylaxis orders are present.    CODE STATUS:   Level Of Support Discussed With: Patient  Code Status (Patient has no pulse and is not breathing): CPR (Attempt to Resuscitate)  Medical Interventions (Patient has pulse or is breathing): Full Support    Labs, imaging, and medications personally reviewed  Discussed with primary and patient    Electronically signed by SANDRO Gu, 03/01/25, 5:02 PM EST.  This patient was seen by both a physician and a NP. Stacy VEGA MD, spent >50% of time in accordance with split shared billing. This included personally reviewing all pertinent labs, imaging, microbiology and documentation. Also discussing the case with the patient and any available family, the admitting physician and any available ancillary staff.   Electronically  signed by Stacy Luu MD, 03/01/25, 5:26 PM EST.

## 2025-03-01 NOTE — PROGRESS NOTES
[facility]   Hospitalist Progress Note  Date: 3/1/2025  Patient Name: Lluvia Archer  : 1965  MRN: [id number]  Date of admission: 2025      Subjective   Subjective     Chief Complaint: Shortness of air    Summary:    Patient is a 59-year-old female who presents to the emergency room for evaluation of shortness of breath.  She has stage IV metastatic lung disease and is a patient of Dr. Tubbs.  Additionally, patient continues to smoke and has COPD.     On arrival to the ED, patient has a temperature 99.6, pulse of 120, respiratory rate of 22, blood pressure of 142/71.  Patient is currently wearing a Venturi mask and saturating 93%.     CT of her chest shows: New anterior peripheral left lower lobe airspace disease may be related to bronchial narrowing and atelectasis. However, pneumonia not excluded. Progression of metastatic disease with enlarging pulmonary nodules and new enlarged mediastinal/hilar lymph nodes.      Patient's high-sensitivity troponin is 18 and then 15.  proBNP is 298.  Sodium is 136.  Calcium is 8.2.  Hemoglobin is 11.5.  White blood cell count is 8.4.  Lymphocytes are 6.7.  Patient is positive for influenza.  Patient seen by pulmonary.  Patient seen by her oncologist due to progression of her lung cancer.  Plan for chemotherapy as an outpatient and to stop immunotherapy.  Patient to get staging studies while inpatient.  Staging CT scan abdomen pelvis no new acute findings.  CT scan of C-spine showed moderate left C3-C4 foraminal narrowing.  Diarrhea improved.  Sputum culture grew MSSA.    Interval Followup:   Patient down to 3 L which is close to her baseline.  Getting XRT to brain.  Shortness of air is improving.  Does have cough not able to bring up any mucus complaining of having difficulty bringing up mucus.  No chest pain.  Occasional wheezing.  No fever or chills.  Less nauseous in the morning after getting medications in a cup.  Wondering if some medicine is  causing nausea.  No vomiting  Diarrhea resolved    Review of Systems   All systems were reviewed and negative except for: Summary and interval follow-up.    Objective   Objective     Vitals:   Temp:  [98.4 °F (36.9 °C)-99 °F (37.2 °C)] 98.4 °F (36.9 °C)  Heart Rate:  [59-81] 61  Resp:  [18-26] 20  BP: ()/(64-75) 136/75  Flow (L/min) (Oxygen Therapy):  [4] 4  Physical Exam    Constitutional: Awake, alert, no acute distress   Eyes: Pupils equal, sclerae anicteric, no conjunctival injection   HENT: NCAT, mucous membranes moist   Neck: Supple, no thyromegaly, no lymphadenopathy, trachea midline   Respiratory: Occasional wheezing and rhonchi mostly in left lung, decreased  to auscultation bilaterally, nonlabored respirations    Cardiovascular: RRR, no murmurs, rubs, or gallops, palpable pedal pulses bilaterally   Gastrointestinal: Positive bowel sounds, soft, nontender, nondistended   Musculoskeletal: No bilateral ankle edema, no clubbing or cyanosis to extremities   Psychiatric: Appropriate affect, cooperative   Neurologic: Oriented x 3, strength symmetric in all extremities, Cranial Nerves grossly intact to confrontation, speech clear   Skin: No rashes     Result Review    Result Review:  I have personally reviewed the results for the past 24 hours and agree with these findings:  [x]  Laboratory  [x]  Microbiology  [x]  Radiology  []  EKG/Telemetry   []  Cardiology/Vascular   []  Pathology  [x]  Old records  [x]  Other: Medications    Assessment & Plan   Assessment / Plan     Assessment:  Acute on chronic hypoxemia.  Patient on 4 L at home.  Improving  Acute influenza A infection.  COPD exacerbation.  Mucous plugging  Community-acquired left lower lobe pneumonia.  MSSA.  Tobacco use disorder.  Progressing stage IV small cell lung cancer on immunotherapy by Dr. Dickson.  Mets to brain status post XRT.  Multiple sclerotic osseous mets.  Depression.  Hypertension.  Chronic pain syndrome.  Chronic anemia and  thrombocytopenia.  Stable.  Gastroenteritis.  Resolved       Plan:  Continue supplemental oxygen keep sats more than 90%.  DC stools for C. difficile as diarrhea resolved  Rocephin and p.o. Zithromax.  Continue Tamiflu.  Continue IV steroids as per pulmonary.  Nebulizer treatment.  Bronchodilator and bronchopulmonary hygiene protocol.  Mucinex, MetaNeb, chest vest and hypertonic saline neb to break up mucus  Nicotine patch.  Negative MRSA PCR.  Sputum culture noted.  Blood culture pending  Legionella and strep pneumo antigen negative  CT chest noted no PE.  Lactate and Pro-Erik negative  DC home lisinopril.  Started on metoprolol to help with tachycardia.  Discussed with pulmonary.  Appreciate input.  Discussed with oncology.  Appreciate input.  Getting C-spine CT scan for paresthesias of upper extremity.   CT abdomen pelvis for staging workup noted.  Continue home medication including Wellbutrin, Klonopin, Lexapro, Ativan, Atarax, Neurontin and oxycodone.  Continue telemetry  Discussed plan with RN.  Discharge home when stable    VTE Prophylaxis:  Mechanical VTE prophylaxis orders are present.    CODE STATUS:   Level Of Support Discussed With: Patient  Code Status (Patient has no pulse and is not breathing): CPR (Attempt to Resuscitate)  Medical Interventions (Patient has pulse or is breathing): Full Support      Part of this note may be an electronic transcription/translation of spoken language to printed text using the Dragon Dictation System.       Electronically signed by Sage Rosario MD, 03/01/25, 6:06 PM EST.

## 2025-03-02 LAB
BACTERIA SPEC AEROBE CULT: NORMAL
BACTERIA SPEC AEROBE CULT: NORMAL

## 2025-03-02 PROCEDURE — 25010000002 METHYLPREDNISOLONE PER 40 MG: Performed by: HOSPITALIST

## 2025-03-02 PROCEDURE — 94799 UNLISTED PULMONARY SVC/PX: CPT

## 2025-03-02 PROCEDURE — 94664 DEMO&/EVAL PT USE INHALER: CPT

## 2025-03-02 PROCEDURE — 25010000002 METHYLPREDNISOLONE PER 40 MG

## 2025-03-02 PROCEDURE — 25010000002 CEFTRIAXONE PER 250 MG: Performed by: INTERNAL MEDICINE

## 2025-03-02 PROCEDURE — 63710000001 ONDANSETRON ODT 4 MG TABLET DISPERSIBLE: Performed by: HOSPITALIST

## 2025-03-02 PROCEDURE — 63710000001 PREDNISONE PER 1 MG

## 2025-03-02 PROCEDURE — 25010000002 PROCHLORPERAZINE 10 MG/2ML SOLUTION: Performed by: INTERNAL MEDICINE

## 2025-03-02 PROCEDURE — 99232 SBSQ HOSP IP/OBS MODERATE 35: CPT | Performed by: STUDENT IN AN ORGANIZED HEALTH CARE EDUCATION/TRAINING PROGRAM

## 2025-03-02 PROCEDURE — 94761 N-INVAS EAR/PLS OXIMETRY MLT: CPT

## 2025-03-02 PROCEDURE — 99232 SBSQ HOSP IP/OBS MODERATE 35: CPT | Performed by: INTERNAL MEDICINE

## 2025-03-02 RX ORDER — PREDNISONE 20 MG/1
40 TABLET ORAL
Status: DISCONTINUED | OUTPATIENT
Start: 2025-03-02 | End: 2025-03-03 | Stop reason: HOSPADM

## 2025-03-02 RX ADMIN — ARFORMOTEROL TARTRATE 15 MCG: 15 SOLUTION RESPIRATORY (INHALATION) at 20:20

## 2025-03-02 RX ADMIN — GUAIFENESIN 1200 MG: 600 TABLET ORAL at 20:16

## 2025-03-02 RX ADMIN — SODIUM CHLORIDE 2000 MG: 9 INJECTION INTRAMUSCULAR; INTRAVENOUS; SUBCUTANEOUS at 10:34

## 2025-03-02 RX ADMIN — BUPROPION HYDROCHLORIDE 150 MG: 150 TABLET, FILM COATED, EXTENDED RELEASE ORAL at 10:35

## 2025-03-02 RX ADMIN — GABAPENTIN 200 MG: 100 CAPSULE ORAL at 10:35

## 2025-03-02 RX ADMIN — CALCIUM CARBONATE 2 TABLET: 500 TABLET, CHEWABLE ORAL at 12:44

## 2025-03-02 RX ADMIN — OXYCODONE AND ACETAMINOPHEN 1 TABLET: 10; 325 TABLET ORAL at 10:35

## 2025-03-02 RX ADMIN — ESCITALOPRAM OXALATE 20 MG: 10 TABLET ORAL at 10:35

## 2025-03-02 RX ADMIN — METHYLPREDNISOLONE SODIUM SUCCINATE 40 MG: 40 INJECTION, POWDER, LYOPHILIZED, FOR SOLUTION INTRAMUSCULAR; INTRAVENOUS at 17:18

## 2025-03-02 RX ADMIN — ONDANSETRON 4 MG: 4 TABLET, ORALLY DISINTEGRATING ORAL at 20:17

## 2025-03-02 RX ADMIN — GABAPENTIN 200 MG: 100 CAPSULE ORAL at 20:16

## 2025-03-02 RX ADMIN — OXYCODONE AND ACETAMINOPHEN 1 TABLET: 10; 325 TABLET ORAL at 04:27

## 2025-03-02 RX ADMIN — OXYCODONE AND ACETAMINOPHEN 1 TABLET: 10; 325 TABLET ORAL at 00:42

## 2025-03-02 RX ADMIN — BUDESONIDE 0.5 MG: 0.5 SUSPENSION RESPIRATORY (INHALATION) at 08:49

## 2025-03-02 RX ADMIN — FAMOTIDINE 40 MG: 20 TABLET, FILM COATED ORAL at 20:16

## 2025-03-02 RX ADMIN — ARFORMOTEROL TARTRATE 15 MCG: 15 SOLUTION RESPIRATORY (INHALATION) at 08:49

## 2025-03-02 RX ADMIN — Medication 10 ML: at 20:17

## 2025-03-02 RX ADMIN — BUPROPION HYDROCHLORIDE 150 MG: 150 TABLET, FILM COATED, EXTENDED RELEASE ORAL at 20:16

## 2025-03-02 RX ADMIN — PROCHLORPERAZINE EDISYLATE 10 MG: 5 INJECTION INTRAMUSCULAR; INTRAVENOUS at 10:34

## 2025-03-02 RX ADMIN — Medication 4 ML: at 08:49

## 2025-03-02 RX ADMIN — IPRATROPIUM BROMIDE AND ALBUTEROL SULFATE 3 ML: .5; 3 SOLUTION RESPIRATORY (INHALATION) at 08:49

## 2025-03-02 RX ADMIN — Medication 10 ML: at 10:38

## 2025-03-02 RX ADMIN — FAMOTIDINE 40 MG: 20 TABLET, FILM COATED ORAL at 10:35

## 2025-03-02 RX ADMIN — METOPROLOL TARTRATE 12.5 MG: 25 TABLET, FILM COATED ORAL at 20:16

## 2025-03-02 RX ADMIN — ACETAMINOPHEN 650 MG: 325 TABLET ORAL at 17:18

## 2025-03-02 RX ADMIN — NICOTINE 1 PATCH: 21 PATCH, EXTENDED RELEASE TRANSDERMAL at 10:00

## 2025-03-02 RX ADMIN — PREDNISONE 40 MG: 20 TABLET ORAL at 12:44

## 2025-03-02 RX ADMIN — GABAPENTIN 200 MG: 100 CAPSULE ORAL at 17:19

## 2025-03-02 RX ADMIN — IPRATROPIUM BROMIDE AND ALBUTEROL SULFATE 3 ML: .5; 3 SOLUTION RESPIRATORY (INHALATION) at 20:20

## 2025-03-02 RX ADMIN — ATORVASTATIN CALCIUM 10 MG: 10 TABLET, FILM COATED ORAL at 20:16

## 2025-03-02 RX ADMIN — METOPROLOL TARTRATE 12.5 MG: 25 TABLET, FILM COATED ORAL at 10:36

## 2025-03-02 RX ADMIN — GUAIFENESIN 1200 MG: 600 TABLET ORAL at 10:35

## 2025-03-02 RX ADMIN — OXYCODONE AND ACETAMINOPHEN 1 TABLET: 10; 325 TABLET ORAL at 20:17

## 2025-03-02 RX ADMIN — IPRATROPIUM BROMIDE AND ALBUTEROL SULFATE 3 ML: .5; 3 SOLUTION RESPIRATORY (INHALATION) at 01:22

## 2025-03-02 RX ADMIN — PROCHLORPERAZINE EDISYLATE 10 MG: 5 INJECTION INTRAMUSCULAR; INTRAVENOUS at 00:42

## 2025-03-02 RX ADMIN — METHYLPREDNISOLONE SODIUM SUCCINATE 40 MG: 40 INJECTION, POWDER, LYOPHILIZED, FOR SOLUTION INTRAMUSCULAR; INTRAVENOUS at 06:02

## 2025-03-02 RX ADMIN — BUDESONIDE 0.5 MG: 0.5 SUSPENSION RESPIRATORY (INHALATION) at 20:20

## 2025-03-02 RX ADMIN — ONDANSETRON 4 MG: 4 TABLET, ORALLY DISINTEGRATING ORAL at 04:27

## 2025-03-02 RX ADMIN — Medication 5 MG: at 20:16

## 2025-03-02 NOTE — PLAN OF CARE
Goal Outcome Evaluation:  Plan of Care Reviewed With: patient        Progress: no change  Outcome Evaluation: A&O x4, 3L NC, able to voice needs, no concerns, continue POC.

## 2025-03-02 NOTE — PLAN OF CARE
Goal Outcome Evaluation:  Plan of Care Reviewed With: patient        Progress: no change  Outcome Evaluation: A&Ox4, 4L NC, able to voice needs, continue POC

## 2025-03-02 NOTE — PLAN OF CARE
Goal Outcome Evaluation:  Plan of Care Reviewed With: patient required pain meds q4h along with n/v meds r/t sever pain in bones and pain med causes nausea.  VSS.  Cont with POC.

## 2025-03-02 NOTE — PROGRESS NOTES
Pulmonary / Critical Care Progress Note      Patient Name: Lluvia Archer  : 1965  MRN: [id number]  Primary Care Physician:  Aleksandar Edge DO  Date of admission: 2025    Subjective   Subjective   Follow-up for  Influenza A pneumonia, resp failure, Lung cancer     Over past 24 hours: Underwent radiation    No acute events overnight.     This morning,   Currently on 4 L nasal cannula  Feels okay today  Resting in bed  Denies any chest pain chest tightness  Cough improving  No fever or chills  Nausea or vomiting  Denies any abdominal pain      Objective   Objective     Vitals:   Temp:  [97.9 °F (36.6 °C)-98.6 °F (37 °C)] 98.2 °F (36.8 °C)  Heart Rate:  [54-71] 62  Resp:  [16-20] 18  BP: ()/(55-74) 127/74  Flow (L/min) (Oxygen Therapy):  [4] 4  Physical Exam   Vital Signs Reviewed   General:  WDWN, Alert, NAD.  Chronically ill-appearing female, sitting up in bed  HEENT:  PERRL, EOMI.  OP, nares clear, no sinus tenderness  Neck:  Supple, no JVD, no thyromegaly  Chest: Creased aeration with scattered rhonchi and wheezing, no increased work of breathing on 4 L while at rest  CV: RRR, no MGR, pulses 2+, equal.  Abd:  Soft, NT, ND, + BS, no HSM, obese  EXT:  no clubbing, no cyanosis, no edema  Neuro:  A&Ox3, CN grossly intact, no focal deficits.  Skin: No rashes or lesions noted      Result Review    Result Review:  I have personally reviewed the results from the time of this admission to 3/2/2025 11:35 EST and agree with these findings:  [x]  Laboratory  [x]  Microbiology  [x]  Radiology  [x]  EKG/Telemetry   [x]  Cardiology/Vascular   []  Pathology  []  Old records  []  Other:  Most notable findings include:         Lab 25  0536 25  0458 25  0521 25  1138 25  1018   WBC 6.63 9.25 7.31  --  8.40   HEMOGLOBIN 10.4* 10.1* 10.1*  --  11.5*   HEMATOCRIT 33.3* 31.8* 32.4*  --  36.3   PLATELETS 128* 132* 126*  --  140   SODIUM 132* 130* 136  --  136   POTASSIUM 5.2  5.3* 5.0  --  4.2   CHLORIDE 100 99 103  --  101   CO2 24.1 22.4 24.2  --  24.6   BUN 18 20 15  --  12   CREATININE 0.77 0.71 0.78  --  0.93   GLUCOSE 109* 103* 131*  --  119*   CALCIUM 7.9* 7.3* 7.8*  --  8.2*   PHOSPHORUS  --  2.6 2.8 3.8  --    TOTAL PROTEIN  --   --   --   --  6.8   ALBUMIN  --   --   --   --  3.8   GLOBULIN  --   --   --   --  3.0              Assessment & Plan   Assessment / Plan     Active Hospital Problems:  Active Hospital Problems    Diagnosis     **Influenza      Impression:   Influenza A pneumonia  COPD with acute exacerbation, 4 L at baseline  Small cell lung cancer, extensive stage, on immunotherapy, likely with progression now  Acute hypoxic respiratory failure  Chronic smoking    Plan:   Remains on 4 L nasal cannula  Continue supplemental oxygen to keep SpO2 greater than 90%  Continue ceftriaxone total 5 days  Continue Tamiflu for total 5 days  Continue Solu-Medrol 40 mg twice daily, transition to oral prednisone in the next day or so  Continue nebs and bronchopulmonary hygiene  Encourage activity as tolerated  Encourage tobacco cessation  RT manager following  Oncology on board  Pending disposition    VTE Prophylaxis:  Mechanical VTE prophylaxis orders are present.    CODE STATUS:   Level Of Support Discussed With: Patient  Code Status (Patient has no pulse and is not breathing): CPR (Attempt to Resuscitate)  Medical Interventions (Patient has pulse or is breathing): Full Support    Labs, imaging, and medications personally reviewed  Discussed with primary and patient    Electronically signed by SANDRO Gu, 03/01/25, 5:02 PM EST.  This patient was seen by both a physician and a NP. Stacy VEGA MD, spent >50% of time in accordance with split shared billing. This included personally reviewing all pertinent labs, imaging, microbiology and documentation. Also discussing the case with the patient and any available family, the admitting physician and any available  ancillary staff.   Electronically signed by Stacy Luu MD, 03/02/25, 4:40 PM EST.

## 2025-03-02 NOTE — PROGRESS NOTES
[facility]   Hospitalist Progress Note  Date: 3/2/2025  Patient Name: Lluvia Archer  : 1965  MRN: [id number]  Date of admission: 2025      Subjective   Subjective     Chief Complaint: Shortness of air    Summary:    Patient is a 59-year-old female who presents to the emergency room for evaluation of shortness of breath.  She has stage IV metastatic lung disease and is a patient of Dr. Tubbs.  Additionally, patient continues to smoke and has COPD.     On arrival to the ED, patient has a temperature 99.6, pulse of 120, respiratory rate of 22, blood pressure of 142/71.  Patient is currently wearing a Venturi mask and saturating 93%.     CT of her chest shows: New anterior peripheral left lower lobe airspace disease may be related to bronchial narrowing and atelectasis. However, pneumonia not excluded. Progression of metastatic disease with enlarging pulmonary nodules and new enlarged mediastinal/hilar lymph nodes.      Patient's high-sensitivity troponin is 18 and then 15.  proBNP is 298.  Sodium is 136.  Calcium is 8.2.  Hemoglobin is 11.5.  White blood cell count is 8.4.  Lymphocytes are 6.7.  Patient is positive for influenza.  Patient seen by pulmonary.  Patient seen by her oncologist due to progression of her lung cancer.  Plan for chemotherapy as an outpatient and to stop immunotherapy.  Patient to get staging studies while inpatient.  Staging CT scan abdomen pelvis no new acute findings.  CT scan of C-spine showed moderate left C3-C4 foraminal narrowing.  Diarrhea improved.  Sputum culture grew MSSA.    Interval Followup:   Patient down to 4 L which is close to her baseline of 3 L at home.  Getting XRT to brain.  Shortness of air is improving.  Does have cough not able to bring up any mucus complaining of having difficulty bringing up mucus.  No chest pain.  Occasional wheezing.  No fever or chills.  Less nauseous in the morning after getting medications in a cup.  Wondering if some  medicine is causing nausea.  No vomiting  Diarrhea resolved    Review of Systems   All systems were reviewed and negative except for: Summary and interval follow-up.    Objective   Objective     Vitals:   Temp:  [98.2 °F (36.8 °C)-98.4 °F (36.9 °C)] 98.4 °F (36.9 °C)  Heart Rate:  [54-71] 59  Resp:  [16-20] 18  BP: ()/(55-74) 121/68  Flow (L/min) (Oxygen Therapy):  [4] 4  Physical Exam    Constitutional: Awake, alert, no acute distress   Eyes: Pupils equal, sclerae anicteric, no conjunctival injection   HENT: NCAT, mucous membranes moist   Neck: Supple, no thyromegaly, no lymphadenopathy, trachea midline   Respiratory: Occasional wheezing and rhonchi mostly in left lung, decreased  to auscultation bilaterally, nonlabored respirations    Cardiovascular: RRR, no murmurs, rubs, or gallops, palpable pedal pulses bilaterally   Gastrointestinal: Positive bowel sounds, soft, nontender, nondistended   Musculoskeletal: No bilateral ankle edema, no clubbing or cyanosis to extremities   Psychiatric: Appropriate affect, cooperative   Neurologic: Oriented x 3, strength symmetric in all extremities, Cranial Nerves grossly intact to confrontation, speech clear   Skin: No rashes     Result Review    Result Review:  I have personally reviewed the results for the past 24 hours and agree with these findings:  [x]  Laboratory  [x]  Microbiology  [x]  Radiology  []  EKG/Telemetry   []  Cardiology/Vascular   []  Pathology  [x]  Old records  [x]  Other: Medications    Assessment & Plan   Assessment / Plan     Assessment:  Acute on chronic hypoxemia.  Patient on 4 L at home.  Improving  Acute influenza A infection.  COPD exacerbation.  Mucous plugging  Community-acquired left lower lobe pneumonia.  MSSA.  Tobacco use disorder.  Progressing stage IV small cell lung cancer on immunotherapy by Dr. Dickson.  Mets to brain status post XRT.  Multiple sclerotic osseous mets.  Depression.  Hypertension.  Chronic pain syndrome.  Chronic  anemia and thrombocytopenia.  Stable.  Gastroenteritis.  Resolved       Plan:  Continue supplemental oxygen keep sats more than 90%.  DC stools for C. difficile as diarrhea resolved  Finishing Rocephin and p.o. Zithromax.  Finished  Tamiflu.  Continue  steroids as per pulmonary.  Switch to p.o.  Nebulizer treatment.  Bronchodilator and bronchopulmonary hygiene protocol.  Mucinex, MetaNeb, chest vest and hypertonic saline neb to break up mucus  Nicotine patch.  Negative MRSA PCR.  Sputum culture noted.  Blood culture pending  Legionella and strep pneumo antigen negative  CT chest noted no PE.  Lactate and Pro-Erik negative  DC home lisinopril.  Started on metoprolol to help with tachycardia.  Discussed with pulmonary.  Appreciate input.  Discussed with oncology.  Appreciate input.  Getting C-spine CT scan for paresthesias of upper extremity.   CT abdomen pelvis for staging workup noted.  Continue home medication including Wellbutrin, Klonopin, Lexapro, Ativan, Atarax, Neurontin and oxycodone.  Continue telemetry  Discussed plan with RN.  Discharge home in a.m. after XRT    VTE Prophylaxis:  Mechanical VTE prophylaxis orders are present.    CODE STATUS:   Level Of Support Discussed With: Patient  Code Status (Patient has no pulse and is not breathing): CPR (Attempt to Resuscitate)  Medical Interventions (Patient has pulse or is breathing): Full Support      Part of this note may be an electronic transcription/translation of spoken language to printed text using the Dragon Dictation System.       Electronically signed by Sage Rosario MD, 03/02/25, 6:18 PM EST.

## 2025-03-03 ENCOUNTER — READMISSION MANAGEMENT (OUTPATIENT)
Dept: CALL CENTER | Facility: HOSPITAL | Age: 60
End: 2025-03-03
Payer: MEDICARE

## 2025-03-03 VITALS
HEART RATE: 73 BPM | WEIGHT: 220 LBS | RESPIRATION RATE: 18 BRPM | DIASTOLIC BLOOD PRESSURE: 71 MMHG | SYSTOLIC BLOOD PRESSURE: 121 MMHG | OXYGEN SATURATION: 94 % | TEMPERATURE: 98.4 F | HEIGHT: 64 IN | BODY MASS INDEX: 37.56 KG/M2

## 2025-03-03 PROBLEM — J11.1 INFLUENZA: Status: RESOLVED | Noted: 2025-02-25 | Resolved: 2025-03-03

## 2025-03-03 LAB
RAD ONC ARIA COURSE ID: NORMAL
RAD ONC ARIA COURSE INTENT: NORMAL
RAD ONC ARIA COURSE LAST TREATMENT DATE: NORMAL
RAD ONC ARIA COURSE START DATE: NORMAL
RAD ONC ARIA COURSE TREATMENT ELAPSED DAYS: 18
RAD ONC ARIA FIRST TREATMENT DATE: NORMAL
RAD ONC ARIA PLAN FRACTIONS TREATED TO DATE: 8
RAD ONC ARIA PLAN ID: NORMAL
RAD ONC ARIA PLAN NAME: NORMAL
RAD ONC ARIA PLAN PRESCRIBED DOSE PER FRACTION: 3 GY
RAD ONC ARIA PLAN PRIMARY REFERENCE POINT: NORMAL
RAD ONC ARIA PLAN TOTAL FRACTIONS PRESCRIBED: 10
RAD ONC ARIA PLAN TOTAL PRESCRIBED DOSE: 3000 CGY
RAD ONC ARIA REFERENCE POINT DOSAGE GIVEN TO DATE: 24 GY
RAD ONC ARIA REFERENCE POINT ID: NORMAL
RAD ONC ARIA REFERENCE POINT SESSION DOSAGE GIVEN: 3 GY

## 2025-03-03 PROCEDURE — 94761 N-INVAS EAR/PLS OXIMETRY MLT: CPT

## 2025-03-03 PROCEDURE — 94618 PULMONARY STRESS TESTING: CPT

## 2025-03-03 PROCEDURE — 94799 UNLISTED PULMONARY SVC/PX: CPT

## 2025-03-03 PROCEDURE — 63710000001 PREDNISONE PER 1 MG

## 2025-03-03 PROCEDURE — 94664 DEMO&/EVAL PT USE INHALER: CPT

## 2025-03-03 PROCEDURE — 25010000002 PROCHLORPERAZINE 10 MG/2ML SOLUTION: Performed by: INTERNAL MEDICINE

## 2025-03-03 PROCEDURE — 99232 SBSQ HOSP IP/OBS MODERATE 35: CPT | Performed by: INTERNAL MEDICINE

## 2025-03-03 PROCEDURE — 99239 HOSP IP/OBS DSCHRG MGMT >30: CPT | Performed by: INTERNAL MEDICINE

## 2025-03-03 PROCEDURE — 77412 RADIATION TX DELIVERY LVL 3: CPT | Performed by: RADIOLOGY

## 2025-03-03 PROCEDURE — 25010000002 ONDANSETRON PER 1 MG: Performed by: HOSPITALIST

## 2025-03-03 PROCEDURE — 77387 GUIDANCE FOR RADJ TX DLVR: CPT | Performed by: RADIOLOGY

## 2025-03-03 PROCEDURE — G6002 STEREOSCOPIC X-RAY GUIDANCE: HCPCS | Performed by: RADIOLOGY

## 2025-03-03 RX ORDER — NICOTINE 21 MG/24HR
1 PATCH, TRANSDERMAL 24 HOURS TRANSDERMAL
Qty: 14 PATCH | Refills: 0 | Status: SHIPPED | OUTPATIENT
Start: 2025-03-04 | End: 2025-03-18

## 2025-03-03 RX ORDER — PREDNISONE 20 MG/1
40 TABLET ORAL
Qty: 6 TABLET | Refills: 0 | Status: SHIPPED | OUTPATIENT
Start: 2025-03-04 | End: 2025-03-07

## 2025-03-03 RX ORDER — GUAIFENESIN 600 MG/1
1200 TABLET, EXTENDED RELEASE ORAL 2 TIMES DAILY
Qty: 120 TABLET | Refills: 0 | Status: SHIPPED | OUTPATIENT
Start: 2025-03-03 | End: 2025-04-02

## 2025-03-03 RX ORDER — METOPROLOL TARTRATE 25 MG/1
12.5 TABLET, FILM COATED ORAL 2 TIMES DAILY
Qty: 30 TABLET | Refills: 0 | Status: SHIPPED | OUTPATIENT
Start: 2025-03-03 | End: 2025-04-02

## 2025-03-03 RX ADMIN — OXYCODONE AND ACETAMINOPHEN 1 TABLET: 10; 325 TABLET ORAL at 01:21

## 2025-03-03 RX ADMIN — IPRATROPIUM BROMIDE AND ALBUTEROL SULFATE 3 ML: .5; 3 SOLUTION RESPIRATORY (INHALATION) at 11:54

## 2025-03-03 RX ADMIN — HYDROXYZINE HYDROCHLORIDE 25 MG: 25 TABLET, FILM COATED ORAL at 07:55

## 2025-03-03 RX ADMIN — OXYCODONE AND ACETAMINOPHEN 1 TABLET: 10; 325 TABLET ORAL at 10:33

## 2025-03-03 RX ADMIN — ONDANSETRON 4 MG: 2 INJECTION INTRAMUSCULAR; INTRAVENOUS at 10:33

## 2025-03-03 RX ADMIN — METOPROLOL TARTRATE 12.5 MG: 25 TABLET, FILM COATED ORAL at 10:21

## 2025-03-03 RX ADMIN — ARFORMOTEROL TARTRATE 15 MCG: 15 SOLUTION RESPIRATORY (INHALATION) at 07:33

## 2025-03-03 RX ADMIN — Medication 4 ML: at 07:33

## 2025-03-03 RX ADMIN — CLONAZEPAM 0.5 MG: 0.5 TABLET ORAL at 07:55

## 2025-03-03 RX ADMIN — PREDNISONE 40 MG: 20 TABLET ORAL at 10:21

## 2025-03-03 RX ADMIN — GUAIFENESIN 1200 MG: 600 TABLET ORAL at 10:21

## 2025-03-03 RX ADMIN — Medication 10 ML: at 10:23

## 2025-03-03 RX ADMIN — GABAPENTIN 200 MG: 100 CAPSULE ORAL at 16:44

## 2025-03-03 RX ADMIN — GABAPENTIN 200 MG: 100 CAPSULE ORAL at 10:22

## 2025-03-03 RX ADMIN — OXYCODONE AND ACETAMINOPHEN 1 TABLET: 10; 325 TABLET ORAL at 06:04

## 2025-03-03 RX ADMIN — FAMOTIDINE 40 MG: 20 TABLET, FILM COATED ORAL at 10:20

## 2025-03-03 RX ADMIN — ESCITALOPRAM OXALATE 20 MG: 10 TABLET ORAL at 10:21

## 2025-03-03 RX ADMIN — IPRATROPIUM BROMIDE AND ALBUTEROL SULFATE 3 ML: .5; 3 SOLUTION RESPIRATORY (INHALATION) at 07:33

## 2025-03-03 RX ADMIN — ONDANSETRON 4 MG: 2 INJECTION INTRAMUSCULAR; INTRAVENOUS at 01:21

## 2025-03-03 RX ADMIN — CALCIUM CARBONATE 2 TABLET: 500 TABLET, CHEWABLE ORAL at 12:38

## 2025-03-03 RX ADMIN — IPRATROPIUM BROMIDE AND ALBUTEROL SULFATE 3 ML: .5; 3 SOLUTION RESPIRATORY (INHALATION) at 01:23

## 2025-03-03 RX ADMIN — PROCHLORPERAZINE EDISYLATE 10 MG: 5 INJECTION INTRAMUSCULAR; INTRAVENOUS at 06:04

## 2025-03-03 RX ADMIN — BUDESONIDE 0.5 MG: 0.5 SUSPENSION RESPIRATORY (INHALATION) at 07:33

## 2025-03-03 RX ADMIN — BUPROPION HYDROCHLORIDE 150 MG: 150 TABLET, FILM COATED, EXTENDED RELEASE ORAL at 10:21

## 2025-03-03 RX ADMIN — NICOTINE 1 PATCH: 21 PATCH, EXTENDED RELEASE TRANSDERMAL at 10:22

## 2025-03-03 NOTE — PLAN OF CARE
Goal Outcome Evaluation:  Plan of Care Reviewed With: patient cont on o2 at 4l.  Requires pain meds along with antinausea meds to prevent n/v.  No c/o resp distress.  Cont POC.  VSS.

## 2025-03-03 NOTE — PROGRESS NOTES
Walking Oximetry Progress Note      Patient Name:  Lluvia Archer  YOB: 1965  Date of Procedure: 03/03/25              ROOM AIR BASELINE   SpO2% 85   Heart Rate 74       EXERCISE ON ROOM AIR SpO2% EXERCISE ON O2 LPM SpO2%   1 MINUTE  1 MINUTE Pd 2 87   2 MINUTES  2 MINUTES Pd 3 88   3 MINUTES  3 MINUTES Pd  3 87   4 MINUTES  4 MINUTES Pd  4 90   5 MINUTES  5 MINUTES Pd  4 92   6 MINUTES  6 MINUTES Pd  4 93              Time to Recovery     SpO2% Post Exercise  94 on 3.5 lpm.    HR Post Exercise  78     Comments:           Electronically signed by Vivian Sauceda, CHARLES, 03/03/25, 3:24 PM EST.

## 2025-03-03 NOTE — PLAN OF CARE
Goal Outcome Evaluation:  Plan of Care Reviewed With: patient           Outcome Evaluation: Patient is alert and oriented. On 4L NC (patient's baseline). Complained of pain and medicated once per MAR. Patient is being discharged.

## 2025-03-03 NOTE — DISCHARGE SUMMARY
[facility]        HOSPITALIST  DISCHARGE SUMMARY    Patient Name: Lluvia Archer  : 1965  MRN: [id number]    Date of Admission: 2025  Date of Discharge:  3/3/2025  Primary Care Physician: Aleksandar Edge DO    Consults       Date and Time Order Name Status Description    2025  9:06 AM Hematology & Oncology Inpatient Consult Completed     2025  4:11 PM Inpatient Pulmonology Consult Completed     2025  1:33 PM Inpatient Hospitalist Consult              Active and Resolved Hospital Problems:  Active Hospital Problems   No active problems to display.      Resolved Hospital Problems    Diagnosis POA   • **Influenza [J11.1] Yes   Acute on chronic hypoxemia.  Patient on 4 L at home.  Improving  Acute influenza A infection.  Finished Tamiflu  COPD exacerbation.  Improving  Mucous plugging  Community-acquired left lower lobe pneumonia.  MSSA.  Finished antibiotics  Tobacco use disorder.  Progressing stage IV small cell lung cancer on immunotherapy by Dr. Dickson.  Mets to brain status post XRT.  Multiple sclerotic osseous mets.  Depression.  Hypertension.  Chronic pain syndrome.  Chronic anemia and thrombocytopenia.  Stable.  Gastroenteritis.  Resolved    Hospital Course     Hospital Course:  Lluvia Archer is a 59 y.o. female who presents to the emergency room for evaluation of shortness of breath.  She has stage IV metastatic lung disease and is a patient of Dr. Dickson's.  Additionally, patient continues to smoke and has COPD.     On arrival to the ED, patient has a temperature 99.6, pulse of 120, respiratory rate of 22, blood pressure of 142/71.  Patient is currently wearing a Venturi mask and saturating 93%.     CT of her chest shows: New anterior peripheral left lower lobe airspace disease may be related to bronchial narrowing and atelectasis. However, pneumonia not excluded. Progression of metastatic disease with enlarging pulmonary nodules and new enlarged  mediastinal/hilar lymph nodes.      Patient's high-sensitivity troponin is 18 and then 15.  proBNP is 298.  Sodium is 136.  Calcium is 8.2.  Hemoglobin is 11.5.  White blood cell count is 8.4.  Lymphocytes are 6.7.  Patient is positive for influenza.  Patient seen by pulmonary.  Patient seen by her oncologist due to progression of her lung cancer.  Plan for chemotherapy as an outpatient and to stop immunotherapy.  Patient to get staging studies while inpatient.  Staging CT scan abdomen pelvis no new acute findings.  CT scan of C-spine showed moderate left C3-C4 foraminal narrowing.  Diarrhea improved.  Sputum culture grew MSSA.     Interval Followup:   Patient down to 4 L which is close to her baseline of 3 L at home.  Getting XRT to brain.  Shortness of air is improving.  Does have cough not able to bring up any mucus complaining of having difficulty bringing up mucus.  No chest pain.  Occasional wheezing.  No fever or chills.  Nausea better.  Diarrhea resolved  Patient wants to go home.  Has been cleared by pulmonary      DISCHARGE Follow Up Recommendations for labs and diagnostics: PCP, pulmonary and heme-onc.  Follow-up with XRT.  Continue oxygen.  Started on metoprolol due to tachycardia.  Home lisinopril DC'd as blood pressure soft      Day of Discharge     Vital Signs:  Temp:  [97.7 °F (36.5 °C)-98.4 °F (36.9 °C)] 98.4 °F (36.9 °C)  Heart Rate:  [56-69] 65  Resp:  [17-22] 18  BP: (100-131)/(59-90) 121/69  Flow (L/min) (Oxygen Therapy):  [4] 4    Physical Exam:     Constitutional: Awake, alert, no acute distress              Eyes: Pupils equal, sclerae anicteric, no conjunctival injection              HENT: NCAT, mucous membranes moist              Neck: Supple, no thyromegaly, no lymphadenopathy, trachea midline              Respiratory: Occasional wheezing and rhonchi mostly in left lung, decreased  to auscultation bilaterally, nonlabored respirations               Cardiovascular: RRR, no murmurs, rubs, or  gallops, palpable pedal pulses bilaterally              Gastrointestinal: Positive bowel sounds, soft, nontender, nondistended              Musculoskeletal: No bilateral ankle edema, no clubbing or cyanosis to extremities              Psychiatric: Appropriate affect, cooperative              Neurologic: Oriented x 3, strength symmetric in all extremities, Cranial Nerves grossly intact to confrontation, speech clear              Skin: No rashes    Discharge Details        Discharge Medications        New Medications        Instructions Start Date   guaiFENesin 600 MG 12 hr tablet  Commonly known as: MUCINEX   1,200 mg, Oral, 2 Times Daily      metoprolol tartrate 25 MG tablet  Commonly known as: LOPRESSOR   12.5 mg, Oral, 2 Times Daily      nicotine 21 MG/24HR patch  Commonly known as: NICODERM CQ   1 patch, Transdermal, Every 24 Hours Scheduled   Start Date: March 4, 2025     predniSONE 20 MG tablet  Commonly known as: DELTASONE   40 mg, Oral, Daily With Breakfast   Start Date: March 4, 2025            Changes to Medications        Instructions Start Date   albuterol 0.63 MG/3ML nebulizer solution  Commonly known as: ACCUNEB  What changed: See the new instructions.   USE 3 MILLILITERS WITH NEBULIZER EVERY 6 HOURS AS NEEDED FOR WHEEZE      albuterol sulfate  (90 Base) MCG/ACT inhaler  Commonly known as: PROVENTIL HFA;VENTOLIN HFA;PROAIR HFA  What changed: Another medication with the same name was changed. Make sure you understand how and when to take each.   2 puffs, Inhalation, Every 4 Hours PRN      famotidine 40 MG tablet  Commonly known as: PEPCID  What changed: See the new instructions.   TAKE ONE TABLET BY MOUTH TWICE DAILY @ 9AM & 5PM      hydrOXYzine 25 MG tablet  Commonly known as: ATARAX  What changed: See the new instructions.   TAKE 1 TABLET BY MOUTH THREE TIMES A DAY AS NEEDED FOR ITCHING             Continue These Medications        Instructions Start Date   atorvastatin 10 MG tablet  Commonly  known as: LIPITOR   10 mg, Oral, Nightly      buPROPion  MG 12 hr tablet  Commonly known as: WELLBUTRIN SR   150 mg, Oral, 2 Times Daily      calcium carbonate 500 MG chewable tablet  Commonly known as: Tums   2 tablets, Oral, Daily      clonazePAM 0.5 MG tablet  Commonly known as: KlonoPIN   0.5 mg, Oral, 2 Times Daily PRN      escitalopram 20 MG tablet  Commonly known as: LEXAPRO   20 mg, Oral, Daily      gabapentin 100 MG capsule  Commonly known as: NEURONTIN   200 mg, Oral, 3 Times Daily      LORazepam 0.5 MG tablet  Commonly known as: ATIVAN   0.5 mg, Oral, Every 8 Hours PRN      Magnesium 400 MG tablet   400 mg, Oral, Daily      Melatonin 10 MG tablet   10 mg, Nightly PRN      naloxone 4 MG/0.1ML nasal spray  Commonly known as: NARCAN   CALL 911. DON'T PRIME. SPRAY IN 1 NOSTRIL FOR OVERDOSE. REPEAT IN 2-3 MINUTES IN OTHER NOSTRIL IF NO OR MINIMAL BREATHING/RESPONSIVENESS.      naproxen 500 MG tablet  Commonly known as: NAPROSYN   500 mg, Oral, 2 Times Daily With Meals      ondansetron ODT 8 MG disintegrating tablet  Commonly known as: ZOFRAN-ODT   8 mg, Translingual, Every 8 Hours PRN      oxyCODONE-acetaminophen  MG per tablet  Commonly known as: PERCOCET   1 tablet, Oral, Every 4 Hours PRN      prochlorperazine 10 MG tablet  Commonly known as: COMPAZINE   10 mg, Oral, Every 6 Hours PRN      Trelegy Ellipta 100-62.5-25 MCG/ACT inhaler  Generic drug: Fluticasone-Umeclidin-Vilant   1 puff, Inhalation, Daily             Stop These Medications      lisinopril 5 MG tablet  Commonly known as: PRINIVIL,ZESTRIL              No Known Allergies    Discharge Disposition:  Home or Self Care    Diet:  Diet Instructions       Diet: Regular/House Diet; Thin (IDDSI 0)      Discharge Diet: Regular/House Diet    Fluid Consistency: Thin (IDDSI 0)            Discharge Activity:   Activity Instructions       Activity as Tolerated              CODE STATUS:  Code Status and Medical Interventions: CPR (Attempt to  Resuscitate); Full Support   Ordered at: 02/25/25 1426     Level Of Support Discussed With:    Patient     Code Status (Patient has no pulse and is not breathing):    CPR (Attempt to Resuscitate)     Medical Interventions (Patient has pulse or is breathing):    Full Support         Future Appointments   Date Time Provider Department Center   3/4/2025  8:45 AM [facility] ALEX CT 2  SANNA ETWCT [facility]   3/5/2025  9:45 AM Cecil Gomes [place] [facility] [place] [facility]   3/12/2025 11:30 AM Vicenta Funk APRN [facility] [place] HFC [facility]   3/21/2025  8:45 AM CHAIR 11 SANNA OP INFU [facility] OPIF [facility]   3/21/2025  9:30 AM Brian Dickson [place] [facility] [place] [facility]   4/18/2025  8:45 AM [facility] INFUSION LAB [facility] OPIF [facility]   4/18/2025  9:15 AM Brian Dickson MD [facility] [place] [facility]   4/18/2025 10:00 AM CHAIR 07 [facility] OP INFU [facility] OPIF [facility]       Additional Instructions for the Follow-ups that You Need to Schedule       Discharge Follow-up with PCP   As directed       Currently Documented PCP:    Aleksandar Edge DO    PCP Phone Number:    [phone]     Follow Up Details: 1 week        Discharge Follow-up with Specialty: Radiation oncology   As directed      Specialty: Radiation oncology        Discharge Follow-up with Specified Provider: Dr. Dickson   As directed      To: Dr. Dickson                Pertinent  and/or Most Recent Results     PROCEDURES:   * Cannot find OR case *     LAB RESULTS:      Lab 02/28/25  0536 02/27/25  0458 02/26/25  0521 02/25/25  1511 02/25/25  1138 02/25/25  1018   WBC 6.63 9.25 7.31  --   --  8.40   HEMOGLOBIN 10.4* 10.1* 10.1*  --   --  11.5*   HEMATOCRIT 33.3* 31.8* 32.4*  --   --  36.3   PLATELETS 128* 132* 126*  --   --  140   NEUTROS ABS 4.89 7.78* 6.50  --   --  6.89   IMMATURE GRANS (ABS) 0.07* 0.08* 0.11*  --   --  0.05   LYMPHS ABS 0.80 0.58* 0.31*  --   --  0.56*   MONOS ABS 0.86 0.80 0.38  --   --  0.85   EOS ABS 0.00 0.00 0.00  --   --  0.03   MCV 93.0 95.5 93.9  --   --  93.8   PROCALCITONIN  --   --    --   --  0.09  --    LACTATE  --   --   --  1.2  --   --          Lab 02/28/25  0536 02/27/25  0458 02/26/25  0521 02/25/25  1138 02/25/25  1018   SODIUM 132* 130* 136  --  136   POTASSIUM 5.2 5.3* 5.0  --  4.2   CHLORIDE 100 99 103  --  101   CO2 24.1 22.4 24.2  --  24.6   ANION GAP 7.9 8.6 8.8  --  10.4   BUN 18 20 15  --  12   CREATININE 0.77 0.71 0.78  --  0.93   EGFR 89.5 98.7 88.2  --  71.4   GLUCOSE 109* 103* 131*  --  119*   CALCIUM 7.9* 7.3* 7.8*  --  8.2*   MAGNESIUM  --  2.3 2.3 2.6  --    PHOSPHORUS  --  2.6 2.8 3.8  --          Lab 02/25/25  1018   TOTAL PROTEIN 6.8   ALBUMIN 3.8   GLOBULIN 3.0   ALT (SGPT) 11   AST (SGOT) 15   BILIRUBIN 0.3   ALK PHOS 83         Lab 02/25/25  1138 02/25/25  1018   PROBNP  --  298.8   HSTROP T 15* 18*                 Lab 02/25/25  1136   PH, ARTERIAL 7.313*   PCO2, ARTERIAL 44.1   PO2 ART 66.3*   O2 SATURATION ART 90.9*   HCO3 ART 22.4   BASE EXCESS ART -3.8*     Brief Urine Lab Results  (Last result in the past 365 days)        Color   Clarity   Blood   Leuk Est   Nitrite   Protein   CREAT   Urine HCG        08/10/24 1422 Yellow   Cloudy   Negative   Small (1+)   Negative   30 mg/dL (1+)                 Microbiology Results (last 10 days)       Procedure Component Value - Date/Time    MRSA Screen, PCR (Inpatient) - Swab, Nares [[id number]]  (Normal) Collected: 02/26/25 1225    Lab Status: Final result Specimen: Swab from Nares Updated: 02/26/25 1356     MRSA PCR No MRSA Detected    Narrative:      The negative predictive value of this diagnostic test is high and should only be used to consider de-escalating anti-MRSA therapy. A positive result may indicate colonization with MRSA and must be correlated clinically.    Legionella Antigen, Urine - Urine, Urine, Clean Catch [[id number]]  (Normal) Collected: 02/26/25 5508    Lab Status: Final result Specimen: Urine, Clean Catch Updated: 02/26/25 0501     LEGIONELLA ANTIGEN, URINE Negative    S. Pneumo Ag Urine or CSF - Urine,  Urine, Clean Catch [[id number]]  (Normal) Collected: 02/26/25 0419    Lab Status: Final result Specimen: Urine, Clean Catch Updated: 02/26/25 0509     Strep Pneumo Ag Negative    Respiratory Culture - Sputum, Throat [[id number]]  (Abnormal) Collected: 02/25/25 2226    Lab Status: Preliminary result Specimen: Sputum from Throat Updated: 03/02/25 0948     Respiratory Culture Light growth (2+) Staphylococcus aureus     Comment: Sending to [facility] for [facility] 3/3/25           Scant growth (1+) Normal Respiratory Tamika     Gram Stain Moderate (3+) Gram positive cocci in pairs and chains      Rare (1+) Gram positive bacilli    Blood Culture - Blood, Arm, Right [[id number]]  (Normal) Collected: 02/25/25 1415    Lab Status: Final result Specimen: Blood from Arm, Right Updated: 03/02/25 1430     Blood Culture No growth at 5 days    Blood Culture - Blood, Arm, Left [[id number]]  (Normal) Collected: 02/25/25 1415    Lab Status: Final result Specimen: Blood from Arm, Left Updated: 03/02/25 1430     Blood Culture No growth at 5 days    COVID-19, FLU A/B, RSV PCR 1 HR TAT - Swab, Nasopharynx [[id number]]  (Abnormal) Collected: 02/25/25 1156    Lab Status: Final result Specimen: Swab from Nasopharynx Updated: 02/25/25 1251     COVID19 Not Detected     Influenza A PCR Detected     Influenza B PCR Not Detected     RSV, PCR Not Detected    Narrative:      Fact sheet for providers: https://www.fda.gov/media/289062/download    Fact sheet for patients: https://www.fda.gov/media/819599/download    Test performed by PCR.            CT Cervical Spine With Contrast    Result Date: 2/26/2025  Impression: 1.The study is motion-limited. 2.No acute fracture or acute malalignment is identified. 3.There is moderate left foraminal narrowing at C3-4, due to uncovertebral and facet osteoarthritis. 4.No significant spinal canal narrowing is suggested by CT examination without intrathecal contrast agent administration. 5.No aggressive osseous lesion is  suggested. 6.Please see above comments for further detail.    Portions of this note were completed with a voice recognition program. Electronically Signed: Conrad Morel MD  2/26/2025 10:57 PM EST  Workstation ID: [id number]    CT Abdomen Pelvis With Contrast    Result Date: 2/26/2025  Impression: Impression: CT scan of the abdomen and pelvis demonstrating previous cholecystectomy. No intra-abdominal metastatic disease. Stable diffuse sclerotic osseous metastatic disease. Electronically Signed: Fly Chavez MD  2/26/2025 10:57 PM EST  Workstation ID: [id number]    CT Angiogram Chest Pulmonary Embolism    Result Date: 2/25/2025  Impression: Impression: 1.No evidence of pulmonary embolism. 2.New anterior peripheral left lower lobe airspace disease may be related to bronchial narrowing and atelectasis. However, pneumonia not excluded. 3.Progression of metastatic disease with enlarging pulmonary nodules and new enlarged mediastinal/hilar lymph nodes. Electronically Signed: Chapo Pabon MD  2/25/2025 1:07 PM EST  Workstation ID: [id number]    XR Chest 1 View    Result Date: 2/25/2025  Impression: Impression: 1. Cardiomegaly. 2. Mixed interstitial/airspace disease, right greater than left side which could be on the basis of multifocal pneumonia from aspiration. Electronically Signed: Memo Luque MD  2/25/2025 11:02 AM EST  Workstation ID: [id number]    CT Head With & Without Contrast    Result Date: 2/5/2025  Impression: Impression: Multiple new enhancing parenchymal lesions are identified, both supratentorial and infratentorial, consistent with metastatic disease progression. 3 lesions in the posterior fossa are present associated with some minimal surrounding edema, otherwise without significant mass effect. The fourth ventricle does not appear effaced. Electronically Signed: Arnel Crowley MD  2/5/2025 2:36 PM EST  Workstation ID: [id number]             Results for orders placed during the hospital encounter of  04/04/22    Adult Transthoracic Echo Complete W/ Cont if Necessary Per Protocol    Interpretation Summary  · The left ventricular cavity is borderline dilated.  · Calculated left ventricular EF = 58% Estimated left ventricular EF was in agreement with the calculated left ventricular EF.  · Left ventricular diastolic function is consistent with (grade I) impaired relaxation.  · Aortic valve sclerosis without obstruction to flow.  · Mild aortic valve regurgitation.  · Mild mitral valve regurgitation.      Imaging Results (All)       Procedure Component Value Units Date/Time    CT Cervical Spine With Contrast [[id number]] Collected: 02/26/25 2245     Updated: 02/26/25 2301    Narrative:      CT CERVICAL SPINE W CONTRAST    Date of exam: 2/26/2025, 7:05 P.M., EST.    Indication: upper extremity paresthesias    Comparison: None available.    TECHNIQUE:   Axial CT images were obtained of the cervical spine after the uneventful intravenous administration of iodinated contrast. No intrathecal contrast was administered for the study. Reconstructed 2D coronal and sagittal images were also obtained. Automated   exposure control and iterative construction methods were used.    FINDINGS:  No acute fracture or acute malalignment is identified. The study is motion-limited and habitus-limited. There is mild lateral curvature of the cervical spine with the convexity to the right, which may be fixed or positional in nature. Degenerative   changes are seen, which are overall mild-to-moderate in degree and predominantly due to uncovertebral and facet osteoarthritis. Degenerative change also involves the atlantoaxial joint. Neural foraminal narrowing is seen, especially involving the left C4   foramen, with moderate stenosis due to facet and to a lesser extent uncovertebral osteoarthritis, such as seen on image 45 of series 4 and adjacent images. No severe spinal canal narrowing is seen. Grossly, no CT evidence of disc herniation (that  is,   cervical CT without intrathecal contrast agent administration). Minimal chronic anterolisthesis is seen at C3-4, estimated at 3 mm or less. Minimal chronic retrolisthesis is seen at C4-5 and C5-6, estimated at 3 mm or slightly less. No abnormalities of   the prevertebral soft tissues are appreciated. There is asymmetric dilatation of the left internal jugular (IJ) vein which contains a partially visualized central venous line/port. If symptoms or clinical concerns persist, consider imaging follow-up.        Impression:      1.The study is motion-limited.   2.No acute fracture or acute malalignment is identified.   3.There is moderate left foraminal narrowing at C3-4, due to uncovertebral and facet osteoarthritis.   4.No significant spinal canal narrowing is suggested by CT examination without intrathecal contrast agent administration.   5.No aggressive osseous lesion is suggested.   6.Please see above comments for further detail.           Portions of this note were completed with a voice recognition program.    Electronically Signed: Conrad Morel MD    2/26/2025 10:57 PM EST    Workstation ID: [id number]    CT Abdomen Pelvis With Contrast [[id number]] Collected: 02/26/25 2246     Updated: 02/26/25 2259    Narrative:      CT ABDOMEN PELVIS W CONTRAST    Date of Exam: 2/26/2025 7:05 PM EST    Indication: lung cancer staging.    Comparison: CT AP 12/9/2024, CT chest 2/25/2025    Technique: Axial CT images were obtained of the abdomen and pelvis after the uneventful intravenous administration of iodinated contrast. Reconstructed coronal and sagittal images were also obtained. Automated exposure control and iterative construction   methods were used.      Findings:  The liver is of normal size and uniform density. The gallbladder is absent, surgical clips are seen in the gallbladder bed. No pancreatic or adrenal mass is evident. The spleen is of normal size.    The kidneys enhance bilaterally. No renal or ureteral  stones are seen. There is no evidence of hydronephrosis. The urinary bladder is not abnormally distended. The uterus measures 4 cm in greatest transverse dimension. No pelvic mass is seen.    The stomach is not abnormally distended. Bowel loops are of normal caliber. No diverticula are evident.    Small umbilical hernia containing fat is stable. Small bilateral inguinal hernias containing fat are stable.    Degenerative changes are seen in the lower thoracic and lumbar spine. Diffuse sclerotic bony metastatic disease is stable.      Impression:      Impression:  CT scan of the abdomen and pelvis demonstrating previous cholecystectomy.    No intra-abdominal metastatic disease.    Stable diffuse sclerotic osseous metastatic disease.        Electronically Signed: Fly Chavez MD    2/26/2025 10:57 PM EST    Workstation ID: [id number]    CT Angiogram Chest Pulmonary Embolism [[id number]] Collected: 02/25/25 1259     Updated: 02/25/25 1310    Narrative:      CT ANGIOGRAM CHEST PULMONARY EMBOLISM    Date of Exam: 2/25/2025 12:47 PM EST    Indication: Tachycardic/hypoxic.    Comparison: 12/9/2024    Technique: Axial CT images were obtained of the chest after the uneventful intravenous administration of iodinated contrast utilizing pulmonary embolism protocol.  Reconstructed coronal and sagittal images were also obtained. Automated exposure control   and iterative construction methods were used.      Findings:  PULMONARY VASCULATURE: Pulmonary arteries are widely patent without evidence of embolus.  Main pulmonary artery is normal in size. No evidence of right heart strain.    MEDIASTINUM: Similar high-grade atherosclerotic stenosis involving the proximal left subclavian artery. Aortic and heart size are normal. No aortic dissection identified. No mass nor pericardial effusion.  CORONARY ARTERIES: There is calcified atherosclerotic disease.  LUNGS: There is minimal consolidation within the anterior periphery of the left  lower lobe. No dense consolidation. There are enlarging pulmonary nodules primarily in the right lung including:  *8 mm right upper lobe nodule (image 26, series 8), previously 4 mm  *15 x 12 mm right middle lobe nodule (image 42, series 8), previously 9 x 7 mm  *10 mm right middle lobe nodule (image 46, series 8), previously 5 mm  PLEURAL SPACE: No effusion, mass, nor pneumothorax.  LYMPH NODES: There are new enlarged mediastinal and hilar lymph nodes including:  *1.5 x 1.1 cm prevascular node (image 82, series 5), new  *2.5 x 2.0 cm left hilar node (image 128, series 5) with minimal narrowing of the left lower lobe bronchus, new    UPPER ABDOMEN: Unremarkable    OSSEOUS STRUCTURES: No acute osseous process is identified. There is sclerotic osseous metastatic lesions throughout.      Impression:      Impression:  1.No evidence of pulmonary embolism.  2.New anterior peripheral left lower lobe airspace disease may be related to bronchial narrowing and atelectasis. However, pneumonia not excluded.  3.Progression of metastatic disease with enlarging pulmonary nodules and new enlarged mediastinal/hilar lymph nodes.        Electronically Signed: Chapo Pabon MD    2/25/2025 1:07 PM EST    Workstation ID: [id number]    XR Chest 1 View [[id number]] Collected: 02/25/25 1056     Updated: 02/25/25 1104    Narrative:      XR CHEST 1 VW    Date of Exam: 2/25/2025 10:36 AM EST    Indication: Shortness of breath    Comparison: 8/19/2024.    Findings:  The heart is enlarged. There is mixed interstitial/airspace disease in the right lung, mainly in the right lung base and also within the left lung base. This raises question of multifocal pneumonia possibly from aspiration. There are no moderate to large   pleural effusions. There is no pneumothorax. There is a left-sided port in place. There are chronic age-related changes involving the bony thorax and thoracic aorta.      Impression:      Impression:    1. Cardiomegaly.  2. Mixed  interstitial/airspace disease, right greater than left side which could be on the basis of multifocal pneumonia from aspiration.      Electronically Signed: Memo Luque MD    2/25/2025 11:02 AM EST    Workstation ID: [id number]             Labs Pending at Discharge:  Pending Labs       Order Current Status    Respiratory Culture - Sputum, Throat Preliminary result                Time spent on Discharge including face to face service: 31 minutes  Part of this note may be an electronic transcription/translation of spoken language to printed text using the Dragon Dictation System.     TElectronically signed by Sage Rosario MD, 03/03/25, 3:44 PM EST.

## 2025-03-03 NOTE — PROGRESS NOTES
Pulmonary / Critical Care Progress Note      Patient Name: Lluvia Archer  : 1965  MRN: [id number]  Primary Care Physician:  Aleksandar Edge DO  Date of admission: 2025    Subjective   Subjective   Follow-up for  Influenza A pneumonia, resp failure, Lung cancer     On 4 L of oxygen  Feeling better      Objective   Objective     Vitals:   Temp:  [97.7 °F (36.5 °C)-98.4 °F (36.9 °C)] 98.4 °F (36.9 °C)  Heart Rate:  [56-73] 73  Resp:  [17-22] 18  BP: (100-131)/(59-90) 121/71  Flow (L/min) (Oxygen Therapy):  [4] 4  Physical Exam   Vital Signs Reviewed   Chronically ill-appearing female  Diminished breath sounds both bases      Result Review    Result Review:  I have personally reviewed the results from the time of this admission to 3/3/2025 16:49 EST and agree with these findings:  [x]  Laboratory  [x]  Microbiology  [x]  Radiology  [x]  EKG/Telemetry   [x]  Cardiology/Vascular   []  Pathology  []  Old records  []  Other:  Most notable findings include:         Lab 25  0536 25  0458 25  0521 25  1138 25  1018   WBC 6.63 9.25 7.31  --  8.40   HEMOGLOBIN 10.4* 10.1* 10.1*  --  11.5*   HEMATOCRIT 33.3* 31.8* 32.4*  --  36.3   PLATELETS 128* 132* 126*  --  140   SODIUM 132* 130* 136  --  136   POTASSIUM 5.2 5.3* 5.0  --  4.2   CHLORIDE 100 99 103  --  101   CO2 24.1 22.4 24.2  --  24.6   BUN 18 20 15  --  12   CREATININE 0.77 0.71 0.78  --  0.93   GLUCOSE 109* 103* 131*  --  119*   CALCIUM 7.9* 7.3* 7.8*  --  8.2*   PHOSPHORUS  --  2.6 2.8 3.8  --    TOTAL PROTEIN  --   --   --   --  6.8   ALBUMIN  --   --   --   --  3.8   GLOBULIN  --   --   --   --  3.0              Assessment & Plan   Assessment / Plan     Active Hospital Problems:  There are no active hospital problems to display for this patient.    Impression:   COPD exacerbation  Small cell lung cancer  Influenza A    Plan:   Continue 4 L of oxygen  Continue with IV steroids  Tamiflu for total of 5 days  Continue  LABA LAMA inhaled corticosteroids      VTE Prophylaxis:  Mechanical VTE prophylaxis orders are present.    CODE STATUS:   Level Of Support Discussed With: Patient  Code Status (Patient has no pulse and is not breathing): CPR (Attempt to Resuscitate)  Medical Interventions (Patient has pulse or is breathing): Full Support    Electronically signed by Shin Mcdonald DO, 03/03/25, 4:49 PM EST.

## 2025-03-03 NOTE — OUTREACH NOTE
Prep Survey      Flowsheet Row Responses   [facility] patient discharged from? [facility]   Is LACE score < 7 ? No   Eligibility [facility]   Date of Admission 03/25/25   Date of Discharge 03/03/25   Discharge Disposition Home or Self Care   Discharge diagnosis *Influenza   Does the patient have one of the following disease processes/diagnoses(primary or secondary)? Other   Does the patient have Home health ordered? No   Is there a DME ordered? Yes   What DME was ordered? [employer] nebulizer and portable O2   Prep survey completed? Yes            LENNOX RIVERA - Registered Nurse

## 2025-03-03 NOTE — PROGRESS NOTES
[facility]   Hematology/Oncology  Progress Note    Patient Name: Lluvia Archer  : 1965  MRN: [id number]  Primary Care Physician:  Aleksandar Edge DO  Date of admission: 2025    Subjective   Subjective     Chief Complaint: shortness of breath    HPI:  Patient Reports feeling better. Off antibiotics. On PO prednisone. Got radiation this morning.  Has 2 treatments left.  Plan for discharge today.  Results of scans discussed.  She is agreeable to next treatment with chemotherapy    Review of Systems   All systems were reviewed and negative except for: as stated above    Objective   Objective     Vitals:   Temp:  [97.7 °F (36.5 °C)-98.4 °F (36.9 °C)] 98.4 °F (36.9 °C)  Heart Rate:  [56-69] 65  Resp:  [17-22] 18  BP: (100-131)/(59-90) 121/69  Flow (L/min) (Oxygen Therapy):  [4] 4  Physical Exam    Constitutional: Awake, alert, lying in bed with NC in place              Eyes: PERRLA, sclerae anicteric, no conjunctival injection              HENT: NCAT, mucous membranes moist              Respiratory: nonlabored respirations               Cardiovascular: no edema in the extremities              Gastrointestinal: nondistended              Musculoskeletal: Normal strength and tone, no joint swelling              Psychiatric: Appropriate affect, cooperative              Neurologic: Awake, alert, speech clear              Skin: Normal tone, no rashes    Result Review    Result Review:  I have personally reviewed the results from the time of this admission to 3/3/2025 12:35 EST and agree with these findings:  [x]  Laboratory  []  Microbiology  [x]  Radiology  []  EKG/Telemetry   []  Cardiology/Vascular   []  Pathology  []  Old records  []  Other:  Most notable findings include: CT abdomen personally reviewed and per my independent read with no abnormal lesions, Ca low, creatinine normal, anemia, progress note personally reviewed      Assessment & Plan   Assessment / Plan     Brief Patient  Summary:  Lluvia Archer is a 59 y.o. female who has metastatic SCLC on immunotherapy who presents with shortness of breath and found to be flu A positive.     Active Hospital Problems:  Active Hospital Problems    Diagnosis     **Influenza        Plan:   COPD exacerbation  Flu A positive  Tamiflu completed.  Steroids transition to p.o.  Antibiotics completed.     SCLC  Patient currently on immunotherapy maintenance with durvalumab.  Patient with concerning signs on recent CT scan of progression in her lungs.  Pulmonary nodules have worsened and she has new/enlarging mediastinal and hilar nodes. CT abdomen/pelvis negative for disease.  Results discussed with patient.  Discussed this represents treatment failure.  Discussed she needs change in therapy.  Will plan for next therapy with lurbinectedin.  Will attempt to move up her follow-up appointment to get this initiated soon as possible.  Patient to complete radiation to the brain for metastatic disease to brain.      Upper extremity paresthesias  Outpatient CT cervical spine was planned, completed inpatient without any metastatic disease seen

## 2025-03-04 ENCOUNTER — HOSPITAL ENCOUNTER (OUTPATIENT)
Dept: RADIATION ONCOLOGY | Facility: HOSPITAL | Age: 60
Setting detail: RADIATION/ONCOLOGY SERIES
End: 2025-03-04
Payer: MEDICARE

## 2025-03-04 ENCOUNTER — HOSPITAL ENCOUNTER (OUTPATIENT)
Dept: RADIATION ONCOLOGY | Facility: HOSPITAL | Age: 60
Discharge: HOME | End: 2025-03-04

## 2025-03-04 ENCOUNTER — TRANSITIONAL CARE MANAGEMENT TELEPHONE ENCOUNTER (OUTPATIENT)
Dept: CALL CENTER | Facility: HOSPITAL | Age: 60
End: 2025-03-04
Payer: MEDICARE

## 2025-03-04 ENCOUNTER — SPECIMEN COLLECTION (OUTPATIENT)
Dept: RADIATION ONCOLOGY | Facility: HOSPITAL | Age: 60
End: 2025-03-04
Payer: MEDICARE

## 2025-03-04 VITALS
DIASTOLIC BLOOD PRESSURE: 71 MMHG | RESPIRATION RATE: 20 BRPM | OXYGEN SATURATION: 90 % | BODY MASS INDEX: 34.59 KG/M2 | WEIGHT: 201.5 LBS | SYSTOLIC BLOOD PRESSURE: 95 MMHG | TEMPERATURE: 99.5 F | HEART RATE: 103 BPM

## 2025-03-04 DIAGNOSIS — C79.31 SECONDARY MALIGNANT NEOPLASM OF BRAIN: Primary | ICD-10-CM

## 2025-03-04 LAB
RAD ONC ARIA COURSE ID: NORMAL
RAD ONC ARIA COURSE INTENT: NORMAL
RAD ONC ARIA COURSE LAST TREATMENT DATE: NORMAL
RAD ONC ARIA COURSE START DATE: NORMAL
RAD ONC ARIA COURSE TREATMENT ELAPSED DAYS: 19
RAD ONC ARIA FIRST TREATMENT DATE: NORMAL
RAD ONC ARIA PLAN FRACTIONS TREATED TO DATE: 9
RAD ONC ARIA PLAN ID: NORMAL
RAD ONC ARIA PLAN NAME: NORMAL
RAD ONC ARIA PLAN PRESCRIBED DOSE PER FRACTION: 3 GY
RAD ONC ARIA PLAN PRIMARY REFERENCE POINT: NORMAL
RAD ONC ARIA PLAN TOTAL FRACTIONS PRESCRIBED: 10
RAD ONC ARIA PLAN TOTAL PRESCRIBED DOSE: 3000 CGY
RAD ONC ARIA REFERENCE POINT DOSAGE GIVEN TO DATE: 27 GY
RAD ONC ARIA REFERENCE POINT ID: NORMAL
RAD ONC ARIA REFERENCE POINT SESSION DOSAGE GIVEN: 3 GY

## 2025-03-04 PROCEDURE — 77412 RADIATION TX DELIVERY LVL 3: CPT | Performed by: RADIOLOGY

## 2025-03-04 PROCEDURE — 77387 GUIDANCE FOR RADJ TX DLVR: CPT | Performed by: RADIOLOGY

## 2025-03-04 PROCEDURE — G6002 STEREOSCOPIC X-RAY GUIDANCE: HCPCS | Performed by: RADIOLOGY

## 2025-03-04 NOTE — PROGRESS NOTES
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  She is receiving radiation therapy to the whole brain. She  has received 4250 cGy in 4 fractions out of a planned dose of 3000 cGy in 10 fractions.     Poor appetite and resultant weight loss.                                             Review of Systems:   Review of Systems   Constitutional:  Positive for appetite change (poor), fatigue (10/10) and unexpected weight change (16lb wt loss since 2/17).   HENT:  Positive for congestion. Negative for sore throat.    Eyes:  Negative for visual disturbance.   Respiratory:  Positive for cough (occasionally productive) and shortness of breath (with activity).    Gastrointestinal:  Positive for nausea (relieved with medication). Negative for constipation and diarrhea.   Genitourinary:  Negative for difficulty urinating, dysuria, frequency and urgency.   Musculoskeletal:  Positive for arthralgias, back pain and joint swelling.   Skin:  Negative for color change and rash.   Neurological:  Positive for weakness (generalized), numbness (hands) and headaches (last night relieved on its own). Negative for dizziness.   Psychiatric/Behavioral:  Positive for sleep disturbance (wakes throughout the night).        Vitals:     Vitals:    03/04/25 1404   BP: 95/71   Pulse: 103   Resp: 20   Temp: 99.5 °F (37.5 °C)   SpO2: 90%       Weight:   Wt Readings from Last 3 Encounters:   03/04/25 91.4 kg (201 lb 8 oz)   02/25/25 99.8 kg (220 lb)   02/24/25 98.8 kg (217 lb 13 oz)      Pain:    Pain Score    03/04/25 1404   PainSc: 0-No pain          Physical Exam:  Gen: WD/WN; NAD; sitting upright in wheelchair  HEENT: MMM  Trachea: midline  Chest: symmetric  Resp: normal respiratory effort  Extr: warm, well-perfused  Neuro: awake and alert; no aphasia or neglect    Plan: I have reviewed treatment setup notes, checked and approved the daily guidance images.  I reviewed dose delivery, treatment parameters and deemed them appropriate. We plan to continue radiation therapy as prescribed.    Offered Remeron, but declined. Plans to increased caloric intake and fluid intake  MRI with and without contrast in 1 month after completion of radiotherapy      Radiation Oncology    Electronically signed by Shin Gordon MD 3/4/2025  14:25 EST

## 2025-03-05 ENCOUNTER — TELEPHONE (OUTPATIENT)
Dept: ONCOLOGY | Facility: HOSPITAL | Age: 60
End: 2025-03-05

## 2025-03-05 DIAGNOSIS — C79.31 SECONDARY MALIGNANT NEOPLASM OF BRAIN: Primary | ICD-10-CM

## 2025-03-05 LAB
RAD ONC ARIA COURSE ID: NORMAL
RAD ONC ARIA COURSE INTENT: NORMAL
RAD ONC ARIA COURSE LAST TREATMENT DATE: NORMAL
RAD ONC ARIA COURSE START DATE: NORMAL
RAD ONC ARIA COURSE TREATMENT ELAPSED DAYS: 20
RAD ONC ARIA FIRST TREATMENT DATE: NORMAL
RAD ONC ARIA PLAN FRACTIONS TREATED TO DATE: 10
RAD ONC ARIA PLAN ID: NORMAL
RAD ONC ARIA PLAN NAME: NORMAL
RAD ONC ARIA PLAN PRESCRIBED DOSE PER FRACTION: 3 GY
RAD ONC ARIA PLAN PRIMARY REFERENCE POINT: NORMAL
RAD ONC ARIA PLAN TOTAL FRACTIONS PRESCRIBED: 10
RAD ONC ARIA PLAN TOTAL PRESCRIBED DOSE: 3000 CGY
RAD ONC ARIA REFERENCE POINT DOSAGE GIVEN TO DATE: 30 GY
RAD ONC ARIA REFERENCE POINT ID: NORMAL
RAD ONC ARIA REFERENCE POINT SESSION DOSAGE GIVEN: 3 GY

## 2025-03-05 PROCEDURE — 77387 GUIDANCE FOR RADJ TX DLVR: CPT | Performed by: RADIOLOGY

## 2025-03-05 PROCEDURE — 77412 RADIATION TX DELIVERY LVL 3: CPT | Performed by: RADIOLOGY

## 2025-03-05 PROCEDURE — 77336 RADIATION PHYSICS CONSULT: CPT | Performed by: RADIOLOGY

## 2025-03-05 PROCEDURE — G6002 STEREOSCOPIC X-RAY GUIDANCE: HCPCS | Performed by: RADIOLOGY

## 2025-03-05 NOTE — PROGRESS NOTES
Enter Query Response Below      Query Response: That is why I put in my progress note.  Not sure why I need to answer the query again that it is MSSA pneumonia.    Electronically signed by Sage Rosario MD, 03/05/25, 2:37 PM EST.               If applicable, please update the problem list.

## 2025-03-05 NOTE — TELEPHONE ENCOUNTER
Caller: Lluvia Archer    Relationship: Self    Best call back number:   Telephone Information:   Mobile [phone]     What was the call regarding: PLEASE LET DR. RODARTE KNOW HER CT SCANS WERE DONE ON 2/26/2025, SEE IN HER CHART. SHE IS DUE IN FOR A F/U ON 3/10/2025.    DO NOT NEED TO CALL BACK UNLESS YOU NEED TO SPEAK TO HER.

## 2025-03-07 NOTE — PROGRESS NOTES
Transitional Care Follow Up Visit     Patient Name: Lluvia Archer  : 1965   MRN: [id number]     Chief Complaint:    Chief Complaint   Patient presents with    Hospital Follow Up Visit       History of Present Illness: Lluvia Archer is a 59 y.o. female who presents today for transitional care management visit.  The patient was contacted by our office within 48 hours after the discharge of the patient via telephone to coordinate their follow up care and needs.         Patient is wanting to get the mobile oxygen machine    Wants her flu shot today and second pneumonia vaccine, states dr dickson wants her to get her vaccination caught up     Date of Admission: 2025  Date of Discharge:  3/3/2025     Active and Resolved Hospital Problems:       Active Hospital Problems   No active problems to display.       Resolved Hospital Problems     Diagnosis POA    **Influenza [J11.1] Yes   Acute on chronic hypoxemia.  Patient on 4 L at home.  Improving  Acute influenza A infection.  Finished Tamiflu  COPD exacerbation.  Improving  Mucous plugging  Community-acquired left lower lobe pneumonia.  MSSA.  Finished antibiotics  Tobacco use disorder.  Progressing stage IV small cell lung cancer on immunotherapy by Dr. Dickson.  Mets to brain status post XRT.  Multiple sclerotic osseous mets.  Depression.  Hypertension.  Chronic pain syndrome.  Chronic anemia and thrombocytopenia.  Stable.  Gastroenteritis.  Resolved             LAB RESULTS:               Lab 25  0536 25  0458 25  0521 25  1511 25  1138 25  1018   WBC 6.63 9.25 7.31  --   --  8.40   HEMOGLOBIN 10.4* 10.1* 10.1*  --   --  11.5*   HEMATOCRIT 33.3* 31.8* 32.4*  --   --  36.3   PLATELETS 128* 132* 126*  --   --  140   NEUTROS ABS 4.89 7.78* 6.50  --   --  6.89   IMMATURE GRANS (ABS) 0.07* 0.08* 0.11*  --   --  0.05   LYMPHS ABS 0.80 0.58* 0.31*  --   --  0.56*   MONOS ABS 0.86 0.80 0.38  --   --  0.85   EOS ABS 0.00  0.00 0.00  --   --  0.03   MCV 93.0 95.5 93.9  --   --  93.8   PROCALCITONIN  --   --   --   --  0.09  --    LACTATE  --   --   --  1.2  --   --                   Lab 02/28/25  0536 02/27/25  0458 02/26/25  0521 02/25/25  1138 02/25/25  1018   SODIUM 132* 130* 136  --  136   POTASSIUM 5.2 5.3* 5.0  --  4.2   CHLORIDE 100 99 103  --  101   CO2 24.1 22.4 24.2  --  24.6   ANION GAP 7.9 8.6 8.8  --  10.4   BUN 18 20 15  --  12   CREATININE 0.77 0.71 0.78  --  0.93   EGFR 89.5 98.7 88.2  --  71.4   GLUCOSE 109* 103* 131*  --  119*   CALCIUM 7.9* 7.3* 7.8*  --  8.2*   MAGNESIUM  --  2.3 2.3 2.6  --    PHOSPHORUS  --  2.6 2.8 3.8  --               Lab 02/25/25  1018   TOTAL PROTEIN 6.8   ALBUMIN 3.8   GLOBULIN 3.0   ALT (SGPT) 11   AST (SGOT) 15   BILIRUBIN 0.3   ALK PHOS 83               Lab 02/25/25  1138 02/25/25  1018   PROBNP  --  298.8   HSTROP T 15* 18*                      Lab 02/25/25  1136   PH, ARTERIAL 7.313*   PCO2, ARTERIAL 44.1   PO2 ART 66.3*   O2 SATURATION ART 90.9*   HCO3 ART 22.4   BASE EXCESS ART -3.8*       Consulted with dr paniagua 3/10/2025  Per progress note  Small cell lung cancer, extensive stage  RML with right hilar and mediastinal adenopathy on CT chest.  Bronchoscopy with positive small cell pathology at station 4R and station 7.  PET with multiple new enlarged hypermetabolic mediastinal lymph nodes consistent with metastatic disease, also with new right axillary FDG avid mets. There are scattered foci of hypermetabolism throughout the bony structures consistent with bone metastases. CT head with and without without any intracranial mets.  Unable to do MRI due to claustrophobia and anxiety. Recommended treatment is systemic alone with for IV carboplatin, etoposide and durvalumab every 3 weeks. First cycle 6/24/24. 8/6/24: C3D1 Carbo/Etop/Durva. 20% reduction in Carbo and 20% reduction in Etoposide. Add G-CSF with this cycle due to severe neutropenia with C2.  8/27/24: C4D1 Carbo/Etop/Durva.  33% reduction in Carbo and 33% reduction in Etoposide. Last cycle of chemotherapy.     Repeat scans after C4 without clear progression, new 7 mm RUL nodule could be infectious. Plan for durvalumab alone.Repeat scans 12/9/24 with stable disease. 2/21/25: C10D1 Durvalumab (sixth with Durvalumab alone).       Repeat imaging obtained inpatient with disease progression in thorax. CT abdomen/pelvis with no evidence of disease.  Due to progression recommend changing therapy from immunotherapy to topotecan.  This is for 5 days every 3 weeks.  This is for 5 days every 3 weeks. G-CSF to be provided prophylactically.  Will attempt to start on Monday 3/17/25.  See patient back for C2     Bilateral Hand pain  Could be carpal tunnel. Do not suspect cancer related.  Can use naproxen. Also on gabapentin which has helped. CT C spine negative for mets. Agree with wrist braces. Referred to ortho for consideration of injections. 3/10/25: Pain better on gabapentin     Metastatic cancer to brain  CT head 2/4/25  with multiple new enhancing lesions, consistent with mets. Has completed XRT. F/u per rad onc.      Anxiety  Ativan not beneficial. Switch to clonazepam (2/21/25) PRN     Itching  No rash. Could be related to immunotherapy. Atarax as needed has helped. Topical steroid can be used on itchiest areas. Also can use PRN calamine lotion.      Neoplasm related pain  Worse in legs. Does not sound like neuropathy. Previously increased oxycodone to 10 mg. Has naproxen 500 mg PRN twice daily to use. Started on daily mag 400 mg (mag level is normal). May need to consider plaquenil. Better as of 1/24/25.     Metastatic cancer to bone  Recommend bone modifying agent. Patient has all teeth except 2 removed. Monthly Xgeva started 8/27/24. Due today but will hold due to low calcium - will replace orally. Will monitor electrolytes.      Insomnia  Recommend low dose melatonin vs benadryl. Also has Klonopin if anxiety is contributing to poor sleep.       LE edema  Recommend leg elevation. Hold on diuretic for now. Can consider if worsening.       Subjective      Review of Systems:   Review of Systems   Constitutional:  Positive for fatigue. Negative for fever.   HENT:  Negative for ear pain and sore throat.    Respiratory:  Positive for cough and shortness of breath.    Cardiovascular:  Negative for chest pain.   Gastrointestinal:  Negative for abdominal pain, diarrhea, nausea and vomiting.   Genitourinary:  Negative for dysuria.   Musculoskeletal:  Negative for myalgias.   Neurological:  Negative for headache.       I reviewed and discussed the details of that call along with the discharge summary, hospital problems, inpatient lab results, inpatient diagnostic studies, and consultation reports with Lluvia Archer.     Current outpatient and discharge medications have been reconciled for the patient.      3/3/2025     5:33 PM   Date of TCM Phone Call   [facility]   Date of Admission 3/25/2025   Date of Discharge 3/3/2025   Discharge Disposition Home or Self Care       Medications:     Current Outpatient Medications:     albuterol (ACCUNEB) 0.63 MG/3ML nebulizer solution, Take 3 mL by nebulization Every 6 (Six) Hours As Needed for Wheezing or Shortness of Air., Disp: 60 each, Rfl: 2    albuterol sulfate  (90 Base) MCG/ACT inhaler, Inhale 2 puffs Every 4 (Four) Hours As Needed for Wheezing or Shortness of Air., Disp: 18 g, Rfl: 5    atorvastatin (LIPITOR) 10 MG tablet, Take 1 tablet by mouth Every Night., Disp: 90 tablet, Rfl: 3    buPROPion SR (WELLBUTRIN SR) 150 MG 12 hr tablet, Take 1 tablet by mouth 2 (Two) Times a Day., Disp: 180 tablet, Rfl: 3    calcium carbonate (Tums) 500 MG chewable tablet, Chew 2 tablets Daily., Disp: 28 tablet, Rfl: 0    clonazePAM (KlonoPIN) 0.5 MG tablet, Take 1 tablet by mouth 2 (Two) Times a Day As Needed for Anxiety., Disp: 60 tablet, Rfl: 0    escitalopram (LEXAPRO) 20 MG tablet, Take 1 tablet by  mouth Daily., Disp: 90 tablet, Rfl: 3    famotidine (PEPCID) 40 MG tablet, TAKE ONE TABLET BY MOUTH TWICE DAILY @ 9AM & 5PM (Patient taking differently: Take 1 tablet by mouth 2 (Two) Times a Day.), Disp: 180 tablet, Rfl: 11    gabapentin (NEURONTIN) 100 MG capsule, Take 2 capsules by mouth 3 (Three) Times a Day., Disp: 90 capsule, Rfl: 2    guaiFENesin (MUCINEX) 600 MG 12 hr tablet, Take 2 tablets by mouth 2 (Two) Times a Day for 30 days., Disp: 120 tablet, Rfl: 0    hydrOXYzine (ATARAX) 25 MG tablet, TAKE 1 TABLET BY MOUTH THREE TIMES A DAY AS NEEDED FOR ITCHING (Patient taking differently: Take 1 tablet by mouth 3 (Three) Times a Day As Needed.), Disp: 270 tablet, Rfl: 2    LORazepam (ATIVAN) 0.5 MG tablet, Take 1 tablet by mouth Every 8 (Eight) Hours As Needed for Anxiety., Disp: 60 tablet, Rfl: 2    Magnesium 400 MG tablet, Take 400 mg by mouth Daily., Disp: 30 tablet, Rfl: 5    Melatonin 10 MG tablet, Take 1 tablet by mouth At Night As Needed., Disp: , Rfl:     metoprolol tartrate (LOPRESSOR) 25 MG tablet, Take 0.5 tablets by mouth 2 (Two) Times a Day for 30 days., Disp: 30 tablet, Rfl: 0    naloxone (NARCAN) 4 MG/0.1ML nasal spray, CALL 911. DON'T PRIME. SPRAY IN 1 NOSTRIL FOR OVERDOSE. REPEAT IN 2-3 MINUTES IN OTHER NOSTRIL IF NO OR MINIMAL BREATHING/RESPONSIVENESS., Disp: 2 each, Rfl: 0    naproxen (NAPROSYN) 500 MG tablet, TAKE 1 TABLET BY MOUTH TWICE A DAY WITH MEALS, Disp: 30 tablet, Rfl: 1    nicotine (NICODERM CQ) 21 MG/24HR patch, Place 1 patch on the skin as directed by provider Daily for 14 days., Disp: 14 patch, Rfl: 0    ondansetron ODT (ZOFRAN-ODT) 8 MG disintegrating tablet, Place 1 tablet on the tongue Every 8 (Eight) Hours As Needed for Nausea or Vomiting., Disp: 60 tablet, Rfl: 3    oxyCODONE-acetaminophen (PERCOCET)  MG per tablet, Take 1 tablet by mouth Every 4 (Four) Hours As Needed for Moderate Pain., Disp: 180 tablet, Rfl: 0    prochlorperazine (COMPAZINE) 10 MG tablet, Take 1  tablet by mouth Every 6 (Six) Hours As Needed for Nausea., Disp: 60 tablet, Rfl: 3    Fluticasone-Umeclidin-Vilant (Trelegy Ellipta) 200-62.5-25 MCG/ACT inhaler, Inhale 1 puff Daily., Disp: 90 each, Rfl: 1    Allergies:   No Known Allergies    Hospital Course Information:  Risk for Readmission (LACE) Score: 13 (3/3/2025  6:00 AM)       Course During Hospital Stay:        Hospital Course:  Lluvia Archer is a 59 y.o. female who presents to the emergency room for evaluation of shortness of breath.  She has stage IV metastatic lung disease and is a patient of Dr. Tubbs.  Additionally, patient continues to smoke and has COPD.     On arrival to the ED, patient has a temperature 99.6, pulse of 120, respiratory rate of 22, blood pressure of 142/71.  Patient is currently wearing a Venturi mask and saturating 93%.     CT of her chest shows: New anterior peripheral left lower lobe airspace disease may be related to bronchial narrowing and atelectasis. However, pneumonia not excluded. Progression of metastatic disease with enlarging pulmonary nodules and new enlarged mediastinal/hilar lymph nodes.      Patient's high-sensitivity troponin is 18 and then 15.  proBNP is 298.  Sodium is 136.  Calcium is 8.2.  Hemoglobin is 11.5.  White blood cell count is 8.4.  Lymphocytes are 6.7.  Patient is positive for influenza.  Patient seen by pulmonary.  Patient seen by her oncologist due to progression of her lung cancer.  Plan for chemotherapy as an outpatient and to stop immunotherapy.  Patient to get staging studies while inpatient.  Staging CT scan abdomen pelvis no new acute findings.  CT scan of C-spine showed moderate left C3-C4 foraminal narrowing.  Diarrhea improved.  Sputum culture grew MSSA.     Interval Followup:   Patient down to 4 L which is close to her baseline of 3 L at home.  Getting XRT to brain.  Shortness of air is improving.  Does have cough not able to bring up any mucus complaining of having difficulty  "bringing up mucus.  No chest pain.  Occasional wheezing.  No fever or chills.  Nausea better.  Diarrhea resolved  Patient wants to go home.  Has been cleared by pulmonary    PMH, PSH, Care Team, Medications including OTC/CAM and Allergies reviewed and updated as appropriate.     Objective     Physical Exam:  Vital Signs:   Vitals:    03/11/25 1118   BP: 108/57   Pulse: 75   Temp: 99.5 °F (37.5 °C)   SpO2: 98%   Weight: 94.7 kg (208 lb 12.8 oz)   Height: 162.6 cm (64\")   PainSc: 8    PainLoc: Generalized     Body mass index is 35.84 kg/m².     Physical Exam  Eyes:      Conjunctiva/sclera: Conjunctivae normal.   Neck:      Vascular: No carotid bruit.   Cardiovascular:      Rate and Rhythm: Normal rate and regular rhythm.      Heart sounds: Normal heart sounds.   Pulmonary:      Effort: Pulmonary effort is normal.      Breath sounds: Wheezing present.      Comments: Diffuse expiratory wheezes  Abdominal:      General: Bowel sounds are normal.      Palpations: Abdomen is soft.   Musculoskeletal:      Right lower leg: No edema.      Left lower leg: No edema.   Skin:     General: Skin is warm and dry.   Neurological:      Mental Status: She is alert.   Psychiatric:         Mood and Affect: Mood normal.         Behavior: Behavior normal.         Assessment / Plan      Assessment/Plan:   Diagnoses and all orders for this visit:    1. Chronic obstructive pulmonary disease with acute exacerbation (Primary)  -     Ambulatory Referral to Chronic Care Management  -     albuterol sulfate  (90 Base) MCG/ACT inhaler; Inhale 2 puffs Every 4 (Four) Hours As Needed for Wheezing or Shortness of Air.  Dispense: 18 g; Refill: 5    2. Community acquired pneumonia of left lower lobe of lung  -     Ambulatory Referral to Chronic Care Management  -     XR Chest PA & Lateral; Future    3. Small cell carcinoma of lung, unspecified laterality, unspecified part of lung  -     Ambulatory Referral to Chronic Care Management    4. " "Hyponatremia  -     Comprehensive Metabolic Panel    5. Hyperkalemia  -     Comprehensive Metabolic Panel    6. Encounter for immunization  -     Pneumococcal Conjugate Vaccine 20-Valent (PCV20)  -     Fluzone >6mos (8560-1296)    Other orders  -     Fluticasone-Umeclidin-Vilant (Trelegy Ellipta) 200-62.5-25 MCG/ACT inhaler; Inhale 1 puff Daily.  Dispense: 90 each; Refill: 1  -     albuterol (ACCUNEB) 0.63 MG/3ML nebulizer solution; Take 3 mL by nebulization Every 6 (Six) Hours As Needed for Wheezing or Shortness of Air.  Dispense: 60 each; Refill: 2       COPD with acute exacerbation on continuous oxygen requesting portable oxygen concentrator will increase Trelegy inhaler to 200 do recommend consistent use of albuterol nebulizer recommend patient to schedule follow-up with pulmonology will refer to chronic care management to assist in obtaining a portable oxygen concentrator  Community-acquired pneumonia of left lower lobe will obtain chest x-ray 4 weeks from original to ensure resolution  Small cell carcinoma  Reviewed oncology note with patient  Hyponatremia and hyper anemia will obtain CMP to monitor      Follow Up:   Return in about 3 months (around 6/11/2025).      Krissy Dickinson, SANDRO    \"Please note that portions of this note were completed with a voice recognition program.\"      *Note: 21498 is for high complexity patients with a face to face visit within 7 days of discharge.  77519 is for high complexity patients with a face to face on days 8-14 post discharge or medium complexity with face to face visit within 14 days post discharge.   "

## 2025-03-08 DIAGNOSIS — J44.9 CHRONIC OBSTRUCTIVE PULMONARY DISEASE, UNSPECIFIED COPD TYPE: ICD-10-CM

## 2025-03-10 ENCOUNTER — OFFICE VISIT (OUTPATIENT)
Dept: ONCOLOGY | Facility: HOSPITAL | Age: 60
End: 2025-03-10
Payer: MEDICARE

## 2025-03-10 VITALS
WEIGHT: 207.01 LBS | TEMPERATURE: 98.2 F | RESPIRATION RATE: 16 BRPM | DIASTOLIC BLOOD PRESSURE: 81 MMHG | OXYGEN SATURATION: 94 % | HEART RATE: 71 BPM | SYSTOLIC BLOOD PRESSURE: 103 MMHG | HEIGHT: 64 IN | BODY MASS INDEX: 35.34 KG/M2

## 2025-03-10 DIAGNOSIS — F41.9 ANXIETY: ICD-10-CM

## 2025-03-10 DIAGNOSIS — G89.3 NEOPLASM RELATED PAIN: ICD-10-CM

## 2025-03-10 DIAGNOSIS — C34.90 SMALL CELL CARCINOMA OF LUNG, UNSPECIFIED LATERALITY, UNSPECIFIED PART OF LUNG: Primary | ICD-10-CM

## 2025-03-10 DIAGNOSIS — C79.31 METASTATIC CANCER TO BRAIN: ICD-10-CM

## 2025-03-10 DIAGNOSIS — G56.03 BILATERAL CARPAL TUNNEL SYNDROME: ICD-10-CM

## 2025-03-10 LAB
BACTERIA SPEC RESP CULT: ABNORMAL
BACTERIA SPEC RESP CULT: ABNORMAL
GRAM STN SPEC: ABNORMAL
GRAM STN SPEC: ABNORMAL

## 2025-03-10 PROCEDURE — G0463 HOSPITAL OUTPT CLINIC VISIT: HCPCS | Performed by: INTERNAL MEDICINE

## 2025-03-10 RX ORDER — SODIUM CHLORIDE 9 MG/ML
20 INJECTION, SOLUTION INTRAVENOUS ONCE
OUTPATIENT
Start: 2025-03-11

## 2025-03-10 RX ORDER — SODIUM CHLORIDE 9 MG/ML
20 INJECTION, SOLUTION INTRAVENOUS ONCE
OUTPATIENT
Start: 2025-03-10

## 2025-03-10 RX ORDER — ALBUTEROL SULFATE 90 UG/1
2 INHALANT RESPIRATORY (INHALATION) EVERY 4 HOURS PRN
Qty: 18 G | Refills: 5 | Status: SHIPPED | OUTPATIENT
Start: 2025-03-10 | End: 2025-03-11 | Stop reason: SDUPTHER

## 2025-03-10 NOTE — PROGRESS NOTES
Chief Complaint  HOSPITAL FOLLOW UP - EST PT    Aleksandar Edge*  Aleksandar Edge, DO    Subjective          Lluvia REGINA Gianni presents to [facility] for small cell lung cancer    Ms. Archer is a very pleasant 58-year-old female with past medical history of hypertension hyperlipidemia, COPD, osteoarthritis, anxiety who presents for new oncology evaluation with her daughter for diagnosis of small cell lung cancer.  Patient previously had PET scan in September 2023 without any concerning findings.  Follow-up CT chest in February showed multiple right middle lobe nodules with mediastinal and right hilar adenopathy.  Patient was admitted to [facility] on June 2 with shortness of breath, fever, cough.  She was started on treatment for pneumonia.  She was found to be hyponatremic to 126.  Repeat CT chest on 3 Tia showed progression of right middle lobe nodules and mediastinal right hilar lymphadenopathy.  Patient was discharged on 4 June.  She had outpatient bronchoscopy on 7 June with station 4R and station 7 possible small cell carcinoma.  Unable to get MRI due to claustrophobia.  CT head with and without contrast on 13 June did not show any intracranial mets.  PET scan confirmed metastatic disease to bone and right axilla    Interval History  Patient presents for follow up.  Patient recently hospitalized for shortness of breath and influenza A.  Completed brain radiation while inpatient.  She had CT scan of the chest showed show progression in her chest.  CT abdomen pelvis done while inpatient was negative.  CT C-spine done for bilateral hand tingling was negative.  Patient reports that hand tingling has improved.  The gabapentin has helped this.  She is feeling better from the flu.  She is willing to start next treatment with topotecan.      Oncology/Hematology History Overview Note   2/20/24: CT Chest:  No PE or aortic dissection. Multiple right  middle lobe nodules are highly suspicious for malignancy.  Additionally, there is mediastinal and right hilar adenopathy now noted.  Follow-up with a PET-CT is recommended. Emphysema with chronic changes in both lungs that otherwise appears stable. Atherosclerotic disease and coronary artery disease.    6/2/24: admitted to [facility] with shortness of breath, fever, cough and started on treatment for pneumonia. Found to have hyponatremia to 126    6/3/24 CT Chest: Right middle lobe nodules and mediastinal and right hilar lymphadenopathy has progressed from prior CT, and remains concerning for malignancy.  Partial right middle lobe atelectasis. Moderate emphysema. Discharged on 6/4/24 with Na of 133.    6/7/24: Bronchoscopy: Station 4R and station 7 positive for small cell carcinoma.    6/13/24: CT head with and without contrast (due to claustrophobia): No mets seen    6/19/24: NM PET: New hypermetabolic nodules within the right middle lobe. There are also multiple new enlarged hypermetabolic mediastinal lymph nodes consistent with metastatic disease. Right axillary mets as well. There are scattered foci of hypermetabolism throughout the bony structures consistent with bone metastases.    6/24/24: C1D1 Carbo/Etop/Durva. Tolerated well    7/16/24: C2D1 Carbo/Etop/Durva. Complicated by inpatient admission due to dehydration from nausea/vomiting as well as pancytopenia. ANC 0.11. Required transfusion for hgb 6.8.     8/6/24: C3D1 Carbo/Etop/Durva. 20% reduction in Carbo and 20% reduction in Etoposide. Add G-CSF with this cycle due to severe neutropenia with C2.     Repeat scan have shown disease response, however scan was due to PE rule out.     8/27/24: C4D1 Carbo/Etop/Durva. 33% reduction in Carbo and 33% reduction in Etoposide. Continue G-CSF with this cycle due to severe neutropenia with C2. Labs appropriate to proceed. Ok to treat for elevated alk phos.     8/6/24: C3D1 Carbo/Etop/Durva. 20% reduction in Carbo and 20%  reduction in Etoposide. Add G-CSF with this cycle due to severe neutropenia with C2.    Repeat scan have shown disease response, however scan was due to PE rule out.     8/27/24: C4D1 Carbo/Etop/Durva. 33% reduction in Carbo and 33% reduction in Etoposide. Continue G-CSF with this cycle due to severe neutropenia with C2.      Repeat scans without progression. Plan for durvalumab alone for maintenance    9/17/24: C5D1 durvalumab alone    2/21/25: C10D1 durvalumab alone    2/25/25: Admitted with shortness of breath and found to be influenza A positive.  CTA of chest showed progression of metastatic disease with enlarging pulmonary nodules and new enlarged mediastinal hilar lymph nodes.  CT of the abdomen pelvis done inpatient was negative for disease.  3/3/25: Completed XRT to brain    3/10/25: Orders written for next line topotecan        Small cell lung cancer   6/12/2024 Initial Diagnosis    Small cell lung cancer     6/14/2024 - 6/14/2024 Chemotherapy    OP LUNG CISplatin 60mg/m2 / Etoposide 120mg/m2 + XRT     6/14/2024 Cancer Staged    Staging form: Lung, AJCC 8th Edition  - Clinical: Stage IVB (cT1b, cN2, cM1c) - Signed by Brian Dickson MD on 6/20/2024 6/24/2024 - 8/29/2024 Chemotherapy    OP LUNG CARBOplatin AUC=5 / Etoposide / Durvalumab     8/27/2024 -  Chemotherapy    OP SUPPORTIVE Denosumab (Xgeva) Q28D     9/17/2024 - 2/21/2025 Biopsy    OP LUNG Durvalumab 1500 mg  Plan Provider: Brian Dickson MD  Treatment goal: Control  Line of treatment: [No plan line of treatment]     3/3/2025 - 3/3/2025 Chemotherapy    OP LUNG Lurbinectedin      3/7/2025 -  Chemotherapy    OP LUNG Topotecan (IV)     Cancer, metastatic to bone   8/6/2024 Initial Diagnosis    Cancer, metastatic to bone     8/27/2024 -  Chemotherapy    OP SUPPORTIVE Denosumab (Xgeva) Q28D     Secondary malignant neoplasm of brain   2/10/2025 Initial Diagnosis    Secondary malignant neoplasm of brain     2/10/2025 -  Radiation     RADIATION THERAPY Treatment Details (Noted on 2/10/2025)  Site: Brain  Technique: 3D CRT  Goal: No goal specified  Planned Treatment Start Date: No planned start date specified           Review of Systems   Constitutional:  Positive for fatigue. Negative for appetite change, diaphoresis, fever, unexpected weight gain and unexpected weight loss.   HENT:  Negative for hearing loss, mouth sores, sore throat, swollen glands, trouble swallowing and voice change.    Eyes:  Negative for blurred vision, double vision, pain, redness and visual disturbance.   Respiratory:  Negative for cough, shortness of breath and wheezing.    Cardiovascular:  Positive for chest pain (pt had her port put in today) and leg swelling (both legs are swelling). Negative for palpitations.   Gastrointestinal:  Positive for nausea. Negative for abdominal pain, blood in stool, constipation, diarrhea and vomiting.   Endocrine: Negative for cold intolerance, heat intolerance, polydipsia and polyuria.   Genitourinary:  Negative for decreased urine volume, difficulty urinating, dysuria, frequency, hematuria and urinary incontinence.   Musculoskeletal:  Negative for arthralgias, back pain, joint swelling and myalgias.   Skin:  Negative for color change, rash, skin lesions and wound.        PT IS GETTING SORES ON THE SKIN   Neurological:  Negative for dizziness, seizures, weakness, numbness and headache.   Hematological:  Negative for adenopathy. Does not bruise/bleed easily.   Psychiatric/Behavioral:  Positive for sleep disturbance. Negative for depressed mood. The patient is not nervous/anxious.    All other systems reviewed and are negative.    Current Outpatient Medications on File Prior to Visit   Medication Sig Dispense Refill    albuterol (ACCUNEB) 0.63 MG/3ML nebulizer solution USE 3 MILLILITERS WITH NEBULIZER EVERY 6 HOURS AS NEEDED FOR WHEEZE (Patient taking differently: Take 3 mL by nebulization Every 6 (Six) Hours As Needed.) 225 mL 5     albuterol sulfate  (90 Base) MCG/ACT inhaler INHALE 2 PUFFS EVERY 4 HOURS AS NEEDED FOR WHEEZING OR SHORTNESS OF AIR 18 g 5    atorvastatin (LIPITOR) 10 MG tablet Take 1 tablet by mouth Every Night. 90 tablet 3    buPROPion SR (WELLBUTRIN SR) 150 MG 12 hr tablet Take 1 tablet by mouth 2 (Two) Times a Day. 180 tablet 3    calcium carbonate (Tums) 500 MG chewable tablet Chew 2 tablets Daily. 28 tablet 0    clonazePAM (KlonoPIN) 0.5 MG tablet Take 1 tablet by mouth 2 (Two) Times a Day As Needed for Anxiety. 60 tablet 0    escitalopram (LEXAPRO) 20 MG tablet Take 1 tablet by mouth Daily. 90 tablet 3    famotidine (PEPCID) 40 MG tablet TAKE ONE TABLET BY MOUTH TWICE DAILY @ 9AM & 5PM (Patient taking differently: Take 1 tablet by mouth 2 (Two) Times a Day.) 180 tablet 11    gabapentin (NEURONTIN) 100 MG capsule Take 2 capsules by mouth 3 (Three) Times a Day. 90 capsule 2    guaiFENesin (MUCINEX) 600 MG 12 hr tablet Take 2 tablets by mouth 2 (Two) Times a Day for 30 days. 120 tablet 0    hydrOXYzine (ATARAX) 25 MG tablet TAKE 1 TABLET BY MOUTH THREE TIMES A DAY AS NEEDED FOR ITCHING (Patient taking differently: Take 1 tablet by mouth 3 (Three) Times a Day As Needed.) 270 tablet 2    LORazepam (ATIVAN) 0.5 MG tablet Take 1 tablet by mouth Every 8 (Eight) Hours As Needed for Anxiety. 60 tablet 2    Magnesium 400 MG tablet Take 400 mg by mouth Daily. 30 tablet 5    Melatonin 10 MG tablet Take 1 tablet by mouth At Night As Needed.      metoprolol tartrate (LOPRESSOR) 25 MG tablet Take 0.5 tablets by mouth 2 (Two) Times a Day for 30 days. 30 tablet 0    naloxone (NARCAN) 4 MG/0.1ML nasal spray CALL 911. DON'T PRIME. SPRAY IN 1 NOSTRIL FOR OVERDOSE. REPEAT IN 2-3 MINUTES IN OTHER NOSTRIL IF NO OR MINIMAL BREATHING/RESPONSIVENESS. 2 each 0    naproxen (NAPROSYN) 500 MG tablet TAKE 1 TABLET BY MOUTH TWICE A DAY WITH MEALS 30 tablet 1    nicotine (NICODERM CQ) 21 MG/24HR patch Place 1 patch on the skin as directed by  provider Daily for 14 days. 14 patch 0    ondansetron ODT (ZOFRAN-ODT) 8 MG disintegrating tablet Place 1 tablet on the tongue Every 8 (Eight) Hours As Needed for Nausea or Vomiting. 60 tablet 3    oxyCODONE-acetaminophen (PERCOCET)  MG per tablet Take 1 tablet by mouth Every 4 (Four) Hours As Needed for Moderate Pain. 180 tablet 0    prochlorperazine (COMPAZINE) 10 MG tablet Take 1 tablet by mouth Every 6 (Six) Hours As Needed for Nausea. 60 tablet 3    Trelegy Ellipta 100-62.5-25 MCG/ACT inhaler Inhale 1 puff Daily. 3 each 3    [DISCONTINUED] albuterol sulfate  (90 Base) MCG/ACT inhaler Inhale 2 puffs Every 4 (Four) Hours As Needed for Wheezing or Shortness of Air. 18 g 1     No current facility-administered medications on file prior to visit.       No Known Allergies  Past Medical History:   Diagnosis Date    Abnormal mammogram     PT DENIES KNOWING OF THIS HX    Anxiety     Arthritis     R SHOULDER, R HIP, AND R LEG    Cancer     LUNG    Chronic nausea 01/15/2019    COPD (chronic obstructive pulmonary disease)     O2 3 LITERS NC CONT    Hernia, hiatal     Hyperlipidemia     Hypertension     Knee pain, right 08/30/2018    Memory loss     FORGETFULNESS ? ETIOLOGY    Migraine headache     Moderate episode of recurrent major depressive disorder 05/22/2017    Night sweats     Sciatica of right side 08/18/2017    Severe episode of recurrent major depressive disorder, without psychotic features 10/22/2019    Shingles     OUTBREAK 6/11/24 ON ANTIVIRAL , WHELPS NOTED NO SORES.    Shoulder pain, left 08/30/2018     Past Surgical History:   Procedure Laterality Date    BRONCHOSCOPY N/A 04/12/2022    Procedure: BRONCHOSCOPY WITH BRONCHOALVEOLAR LAVAGE, BIOPSY;  Surgeon: Shin Mcdonald DO;  Location: [facility] ENDOSCOPY;  Service: Pulmonary;  Laterality: N/A;  RIGHT LOWER LOBE INFILTRATE    BRONCHOSCOPY N/A 6/7/2024    Procedure: BRONCHOSCOPY WITH ENDOBRONCHIAL ULTRASOUND AND FINE NEEDLE ASPIRATE;   "Surgeon: Shin Mcdonald DO;  Location: [facility] ENDOSCOPY;  Service: Pulmonary;  Laterality: N/A;  MEDIASTINAL ADENOPATHY     SECTION      CHOLECYSTECTOMY      LAPAROSCOPIC    COLONOSCOPY      PORTACATH PLACEMENT Left 2024    Procedure: INSERTION OF PORTACATH;  Surgeon: Josh Patton MD;  Location: [facility] OR OSC;  Service: General;  Laterality: Left;    TOTAL HIP ARTHROPLASTY Right 2022    Procedure: RIGHT TOTAL HIP ARTHROPLASTY;  Surgeon: Cecil Gomes MD;  Location: [facility] MAIN OR;  Service: Orthopedics;  Laterality: Right;     Social History     Socioeconomic History    Marital status:      Spouse name: DEVAN    Number of children: 2    Years of education: GED    Highest education level: GED or equivalent   Tobacco Use    Smoking status: Every Day     Current packs/day: 1.00     Average packs/day: 1 pack/day for 47.2 years (47.2 ttl pk-yrs)     Types: Cigarettes     Start date:      Passive exposure: Past    Smokeless tobacco: Never   Vaping Use    Vaping status: Former    Substances: Nicotine, Flavoring    Devices: Disposable   Substance and Sexual Activity    Alcohol use: Never    Drug use: Never    Sexual activity: Defer     Family History   Problem Relation Age of Onset    Other Mother         BLOOD DISEASE    Arthritis Mother     Heart disease Father     Hypertension Other     Cancer Other     Heart disease Other     Malig Hyperthermia Neg Hx        Objective   Physical Exam  Well appearing patient in no acute distress on RA  Anicteric sclerae, no rash on exposed skin  Respirations non-labored  Awake, alert, and oriented x 4. Speech intact. No gross neurologic deficit  Appropriate mood and affect    Vitals:    03/10/25 1435   BP: 103/81   Pulse: 71   Resp: 16   Temp: 98.2 °F (36.8 °C)   TempSrc: Temporal   SpO2: 94%   Weight: 93.9 kg (207 lb 0.2 oz)   Height: 162.6 cm (64.02\")   PainSc: 6                    PHQ-9 Total Score:           Result Review :   The " following data was reviewed by: Brian Dickson MD on 03/10/25:  Lab Results   Component Value Date    HGB 10.4 (L) 02/28/2025    HCT 33.3 (L) 02/28/2025    MCV 93.0 02/28/2025     (L) 02/28/2025    WBC 6.63 02/28/2025    NEUTROABS 4.89 02/28/2025    LYMPHSABS 0.80 02/28/2025    MONOSABS 0.86 02/28/2025    EOSABS 0.00 02/28/2025    BASOSABS 0.01 02/28/2025     Lab Results   Component Value Date    GLUCOSE 109 (H) 02/28/2025    BUN 18 02/28/2025    CREATININE 0.77 02/28/2025     (L) 02/28/2025    K 5.2 02/28/2025     02/28/2025    CO2 24.1 02/28/2025    CALCIUM 7.9 (L) 02/28/2025    PROTEINTOT 6.8 02/25/2025    ALBUMIN 3.8 02/25/2025    BILITOT 0.3 02/25/2025    ALKPHOS 83 02/25/2025    AST 15 02/25/2025    ALT 11 02/25/2025     Lab Results   Component Value Date    MG 2.3 02/27/2025    PHOS 2.6 02/27/2025    FREET4 1.14 02/21/2025    TSH 1.670 02/21/2025     Labs personally reviewed. Sodium normal. Hgb wnl. WBC wnl. Plts wnl.     D/C summary personally reviewed      CT Cervical Spine With Contrast  Result Date: 2/26/2025  1.The study is motion-limited. 2.No acute fracture or acute malalignment is identified. 3.There is moderate left foraminal narrowing at C3-4, due to uncovertebral and facet osteoarthritis. 4.No significant spinal canal narrowing is suggested by CT examination without intrathecal contrast agent administration. 5.No aggressive osseous lesion is suggested. 6.Please see above comments for further detail.    Portions of this note were completed with a voice recognition program. Electronically Signed: Conrad Morel MD  2/26/2025 10:57 PM EST  Workstation ID: [id number]    CT Abdomen Pelvis With Contrast  Result Date: 2/26/2025  Impression: CT scan of the abdomen and pelvis demonstrating previous cholecystectomy. No intra-abdominal metastatic disease. Stable diffuse sclerotic osseous metastatic disease. Electronically Signed: Fly Chavez MD  2/26/2025 10:57 PM EST  Workstation  ID: [id number]    CT Angiogram Chest Pulmonary Embolism  Result Date: 2/25/2025  Impression: 1.No evidence of pulmonary embolism. 2.New anterior peripheral left lower lobe airspace disease may be related to bronchial narrowing and atelectasis. However, pneumonia not excluded. 3.Progression of metastatic disease with enlarging pulmonary nodules and new enlarged mediastinal/hilar lymph nodes. Electronically Signed: Chapo Pabon MD  2/25/2025 1:07 PM EST  Workstation ID: [id number]    XR Chest 1 View  Result Date: 2/25/2025  Impression: 1. Cardiomegaly. 2. Mixed interstitial/airspace disease, right greater than left side which could be on the basis of multifocal pneumonia from aspiration. Electronically Signed: Memo Luque MD  2/25/2025 11:02 AM EST  Workstation ID: [id number]          Assessment and Plan    Diagnoses and all orders for this visit:    1. Small cell carcinoma of lung, unspecified laterality, unspecified part of lung (Primary)  -     CBC and Differential; Future  -     Comprehensive metabolic panel; Future  -     Lab Appointment Request; Future  -     Clinic Appointment Request; Future  -     Infusion Appointment Request 3; Future  -     ONCBCN INFUSION APPOINTMENT REQUEST 01; Future  -     ONCBCN INFUSION APPOINTMENT REQUEST 01; Future  -     ONCBCN INFUSION APPOINTMENT REQUEST 01; Future  -     ONCBCN INFUSION APPOINTMENT REQUEST 01; Future    2. Metastatic cancer to brain    3. Anxiety    4. Neoplasm related pain    5. Bilateral carpal tunnel syndrome    Other orders  -     sodium chloride 0.9 % infusion  -     dexAMETHasone (DECADRON) 12 mg in sodium chloride 0.9 % IVPB  -     topotecan (HYCAMTIN) 2.9 mg in sodium chloride 0.9 % 102.9 mL chemo IVPB  -     sodium chloride 0.9 % infusion  -     dexAMETHasone (DECADRON) 12 mg in sodium chloride 0.9 % IVPB  -     topotecan (HYCAMTIN) 2.9 mg in sodium chloride 0.9 % 102.9 mL chemo IVPB  -     sodium chloride 0.9 % infusion  -     dexAMETHasone (DECADRON) 12 mg  in sodium chloride 0.9 % IVPB  -     topotecan (HYCAMTIN) 2.9 mg in sodium chloride 0.9 % 102.9 mL chemo IVPB  -     sodium chloride 0.9 % infusion  -     dexAMETHasone (DECADRON) 12 mg in sodium chloride 0.9 % IVPB  -     topotecan (HYCAMTIN) 2.9 mg in sodium chloride 0.9 % 102.9 mL chemo IVPB  -     sodium chloride 0.9 % infusion  -     dexAMETHasone (DECADRON) 12 mg in sodium chloride 0.9 % IVPB  -     topotecan (HYCAMTIN) 2.9 mg in sodium chloride 0.9 % 102.9 mL chemo IVPB  -     Pegfilgrastim-jmdb (FULPHILA) syringe 6 mg            Small cell lung cancer, extensive stage  RML with right hilar and mediastinal adenopathy on CT chest.  Bronchoscopy with positive small cell pathology at station 4R and station 7.  PET with multiple new enlarged hypermetabolic mediastinal lymph nodes consistent with metastatic disease, also with new right axillary FDG avid mets. There are scattered foci of hypermetabolism throughout the bony structures consistent with bone metastases. CT head with and without without any intracranial mets.  Unable to do MRI due to claustrophobia and anxiety. Recommended treatment is systemic alone with for IV carboplatin, etoposide and durvalumab every 3 weeks. First cycle 6/24/24. 8/6/24: C3D1 Carbo/Etop/Durva. 20% reduction in Carbo and 20% reduction in Etoposide. Add G-CSF with this cycle due to severe neutropenia with C2.  8/27/24: C4D1 Carbo/Etop/Durva. 33% reduction in Carbo and 33% reduction in Etoposide. Last cycle of chemotherapy.    Repeat scans after C4 without clear progression, new 7 mm RUL nodule could be infectious. Plan for durvalumab alone.Repeat scans 12/9/24 with stable disease. 2/21/25: C10D1 Durvalumab (sixth with Durvalumab alone).      Repeat imaging obtained inpatient with disease progression in thorax. CT abdomen/pelvis with no evidence of disease.  Due to progression recommend changing therapy from immunotherapy to topotecan.  This is for 5 days every 3 weeks.  This is for 5  days every 3 weeks. G-CSF to be provided prophylactically.  Will attempt to start on Monday 3/17/25.  See patient back for C2    Bilateral Hand pain  Could be carpal tunnel. Do not suspect cancer related.  Can use naproxen. Also on gabapentin which has helped. CT C spine negative for mets. Agree with wrist braces. Referred to ortho for consideration of injections. 3/10/25: Pain better on gabapentin    Metastatic cancer to brain  CT head 2/4/25  with multiple new enhancing lesions, consistent with mets. Has completed XRT. F/u per rad onc.     Anxiety  Ativan not beneficial. Switch to clonazepam (2/21/25) PRN    Itching  No rash. Could be related to immunotherapy. Atarax as needed has helped. Topical steroid can be used on itchiest areas. Also can use PRN calamine lotion.     Neoplasm related pain  Worse in legs. Does not sound like neuropathy. Previously increased oxycodone to 10 mg. Has naproxen 500 mg PRN twice daily to use. Started on daily mag 400 mg (mag level is normal). May need to consider plaquenil. Better as of 1/24/25.    Metastatic cancer to bone  Recommend bone modifying agent. Patient has all teeth except 2 removed. Monthly Xgeva started 8/27/24. Due today but will hold due to low calcium - will replace orally. Will monitor electrolytes.     Insomnia  Recommend low dose melatonin vs benadryl. Also has Klonopin if anxiety is contributing to poor sleep.     LE edema  Recommend leg elevation. Hold on diuretic for now. Can consider if worsening.       Patient Follow Up: Cycle 2  Patient was given instructions and counseling regarding her condition or for health maintenance advice. Please see specific information pulled into the AVS if appropriate.

## 2025-03-11 ENCOUNTER — DOCUMENTATION (OUTPATIENT)
Dept: PHARMACY | Facility: HOSPITAL | Age: 60
End: 2025-03-11
Payer: MEDICARE

## 2025-03-11 ENCOUNTER — TELEPHONE (OUTPATIENT)
Dept: ONCOLOGY | Facility: HOSPITAL | Age: 60
End: 2025-03-11
Payer: MEDICARE

## 2025-03-11 ENCOUNTER — PATIENT OUTREACH (OUTPATIENT)
Dept: CASE MANAGEMENT | Facility: OTHER | Age: 60
End: 2025-03-11
Payer: MEDICARE

## 2025-03-11 ENCOUNTER — REFERRAL TRIAGE (OUTPATIENT)
Dept: CASE MANAGEMENT | Facility: OTHER | Age: 60
End: 2025-03-11
Payer: MEDICARE

## 2025-03-11 ENCOUNTER — OFFICE VISIT (OUTPATIENT)
Dept: FAMILY MEDICINE CLINIC | Facility: CLINIC | Age: 60
End: 2025-03-11
Payer: MEDICARE

## 2025-03-11 VITALS
HEART RATE: 75 BPM | SYSTOLIC BLOOD PRESSURE: 108 MMHG | HEIGHT: 64 IN | BODY MASS INDEX: 35.65 KG/M2 | TEMPERATURE: 99.5 F | DIASTOLIC BLOOD PRESSURE: 57 MMHG | OXYGEN SATURATION: 98 % | WEIGHT: 208.8 LBS

## 2025-03-11 DIAGNOSIS — E87.5 HYPERKALEMIA: ICD-10-CM

## 2025-03-11 DIAGNOSIS — C34.30 SMALL CELL CARCINOMA OF LOWER LOBE OF LUNG, UNSPECIFIED LATERALITY: ICD-10-CM

## 2025-03-11 DIAGNOSIS — J44.1 CHRONIC OBSTRUCTIVE PULMONARY DISEASE WITH ACUTE EXACERBATION: Primary | ICD-10-CM

## 2025-03-11 DIAGNOSIS — C34.90 SMALL CELL CARCINOMA OF LUNG, UNSPECIFIED LATERALITY, UNSPECIFIED PART OF LUNG: ICD-10-CM

## 2025-03-11 DIAGNOSIS — C79.31 SECONDARY MALIGNANT NEOPLASM OF BRAIN: ICD-10-CM

## 2025-03-11 DIAGNOSIS — J18.9 COMMUNITY ACQUIRED PNEUMONIA OF LEFT LOWER LOBE OF LUNG: ICD-10-CM

## 2025-03-11 DIAGNOSIS — E87.1 HYPONATREMIA: ICD-10-CM

## 2025-03-11 DIAGNOSIS — Z23 ENCOUNTER FOR IMMUNIZATION: ICD-10-CM

## 2025-03-11 DIAGNOSIS — J44.9 CHRONIC OBSTRUCTIVE PULMONARY DISEASE, UNSPECIFIED COPD TYPE: Primary | ICD-10-CM

## 2025-03-11 LAB
QT INTERVAL: 321 MS
QTC INTERVAL: 447 MS

## 2025-03-11 RX ORDER — ALBUTEROL SULFATE 90 UG/1
2 INHALANT RESPIRATORY (INHALATION) EVERY 4 HOURS PRN
Qty: 18 G | Refills: 5 | Status: SHIPPED | OUTPATIENT
Start: 2025-03-11

## 2025-03-11 RX ORDER — ALBUTEROL SULFATE 0.63 MG/3ML
1 SOLUTION RESPIRATORY (INHALATION) EVERY 6 HOURS PRN
Qty: 60 EACH | Refills: 2 | Status: SHIPPED | OUTPATIENT
Start: 2025-03-11

## 2025-03-11 RX ORDER — FLUTICASONE FUROATE, UMECLIDINIUM BROMIDE AND VILANTEROL TRIFENATATE 200; 62.5; 25 UG/1; UG/1; UG/1
1 POWDER RESPIRATORY (INHALATION)
Qty: 90 EACH | Refills: 1 | Status: SHIPPED | OUTPATIENT
Start: 2025-03-11

## 2025-03-11 NOTE — PLAN OF CARE
Problem: COPD  Goal: Track and Manage My Symptoms  Intervention: My COPD Symptoms To Do List  Description: Why is this important?Tracking your symptoms and other information about your health helps your doctor plan your care.Write down the symptoms, the time of day, what you were doing and what medicine you are taking.You will soon learn how to manage your symptoms.  Flowsheets (Taken 3/11/2025 1249)  My COPD Symptoms To Do List: (wear oxygen as ordered)   follow rescue plan if symptoms flare up   keep follow-up appointments  Goal: Track and Manage My Triggers  Intervention: My COPD Triggers To Do List  Description: Why is this important?Triggers are activities or things, like tobacco smoke or cold weather, that make your COPD (chronic obstructive pulmonary disease) flare up.Knowing these triggers helps you plan how to stay away from them.When you cannot remove them, you can learn how to manage them.  Flowsheets (Taken 3/11/2025 1246)  My COPD Triggers To Do List:   avoid second-hand smoke   eliminate smoking in my home  Goal: Optimal Care Coordination of a Patient Experiencing COPD  Intervention: Alleviate Barriers to COPD Management  Flowsheets (Taken 3/11/2025 1245)  Alleviate Barriers to COPD Management: (POC ordered)   action plan reviewed   smoking counseling provided

## 2025-03-11 NOTE — TELEPHONE ENCOUNTER
Outpatient Nutrition Oncology Assessment    Patient Name: Lluvia Archer  YOB: 1965  MRN: [id number]  Assessment Date: 3/11/2025    CLINICAL NUTRITION ASSESSMENT    Dx:  lung Ca with metastasis to bone and brain       Type of Cancer Treatment Topotecan, Xgeva          Reason for Assessment  MST score 2+, Malnutrition Severity Assessment, Assessment, Reduced oral intake       Current Problems:   Patient Active Problem List   Diagnosis Code    Abnormal mammogram R92.8    Anxiety F41.9    Arthritis M19.90    Chronic nausea R11.0    Chronic obstructive pulmonary disease (COPD) J44.9    Counseling on substance use and abuse Z71.89    Esophageal reflux K21.9    Hernia, hiatal K44.9    Hyperlipidemia E78.5    Hypertension I10    Knee pain, right M25.561    Memory loss R41.3    Migraine G43.909    Moderate episode of recurrent major depressive disorder F33.1    Night sweats R61    Numbness R20.0    Sciatica of right side M54.31    Severe episode of recurrent major depressive disorder, without psychotic features F33.2    Shoulder pain, left M25.512    Other diseases of nasal cavity and sinuses J34.89    Sleep apnea, unspecified G47.30    Tobacco abuse Z72.0    Strain of groin, right, subsequent encounter S76.211D    Osteoarthritis of spine with radiculopathy, lumbar region M47.26    Chronic right-sided low back pain with right-sided sciatica M54.41, G89.29    Aftercare following right hip joint replacement surgery Z47.1, Z96.641    Primary osteoarthritis of right hip M16.11    Right hip pain M25.551    Sepsis, due to unspecified organism, unspecified whether acute organ dysfunction present A41.9    Severe malnutrition E43    Pneumonia of right lower lobe due to infectious organism J18.9    Hospital discharge follow-up Z09    Other specified anemias D64.89    Encounter for screening mammogram for malignant neoplasm of breast Z12.31    Multifocal pneumonia J18.9    Acute on chronic respiratory failure with  hypoxia J96.21    Medicare annual wellness visit, subsequent Z00.00    Community acquired pneumonia J18.9    Pneumonia due to infectious organism, unspecified laterality, unspecified part of lung J18.9    Mediastinal adenopathy R59.0    Small cell lung cancer C34.90    Tobacco abuse, in remission F17.201    Chronic respiratory failure with hypoxia J96.11    Lung nodules R91.8    Encounter for adjustment or management of vascular access device Z45.2    Dehydration E86.0    Intractable nausea and vomiting R11.2    Pancytopenia due to chemotherapy D61.810    Thrombocytopenia D69.6    Cancer, metastatic to bone C79.51    CAP (community acquired pneumonia) J18.9    Encounter for long-term (current) use of high-risk medication Z79.899    Secondary malignant neoplasm of brain C79.31    Hyperkalemia E87.5    Hyponatremia E87.1                Weight Hx  Wt Readings from Last 30 Encounters:   03/11/25 1118 94.7 kg (208 lb 12.8 oz)   03/10/25 1435 93.9 kg (207 lb 0.2 oz)   03/04/25 1404 91.4 kg (201 lb 8 oz)   02/25/25 1008 99.8 kg (220 lb)   02/24/25 0947 98.8 kg (217 lb 13 oz)   02/21/25 0900 97.6 kg (215 lb 2.7 oz)   02/21/25 0935 97.6 kg (215 lb 2.7 oz)   02/18/25 0952 100 kg (220 lb 7.4 oz)   02/06/25 1042 98.7 kg (217 lb 9.5 oz)   01/24/25 1022 97.1 kg (214 lb 1.1 oz)   01/24/25 1038 97.1 kg (214 lb 1.1 oz)   12/12/24 0932 97.9 kg (215 lb 13.3 oz)   12/12/24 0939 97.9 kg (215 lb 13.3 oz)   11/14/24 0937 96.2 kg (212 lb 1.3 oz)   11/14/24 0954 96.2 kg (212 lb 1.3 oz)   10/31/24 1516 96.4 kg (212 lb 8.4 oz)   10/15/24 1026 95.1 kg (209 lb 10.5 oz)   10/15/24 1051 95.1 kg (209 lb 10.5 oz)   09/17/24 0818 93.1 kg (205 lb 4 oz)   09/17/24 0831 93.1 kg (205 lb 4 oz)   08/29/24 1357 95.7 kg (210 lb 15.7 oz)   08/28/24 1300 95.4 kg (210 lb 5.1 oz)   08/27/24 0850 92.5 kg (204 lb)   08/27/24 0914 92.5 kg (203 lb 14.8 oz)   08/19/24 1914 94.3 kg (207 lb 14.3 oz)   08/16/24 1010 92.7 kg (204 lb 5.9 oz)   08/10/24 1706 93.2 kg (205  lb 7.5 oz)   08/10/24 1237 95.2 kg (209 lb 14.1 oz)   08/08/24 0845 95.2 kg (209 lb 14.1 oz)   08/07/24 0925 95.1 kg (209 lb 10.5 oz)   08/06/24 0924 94.1 kg (207 lb 7.3 oz)        Labs/Medications        Pertinent Labs Reviewed.       Pertinent Medications Fluticasone-Umeclidin-Vilant, LORazepam, Magnesium, Melatonin, albuterol, albuterol sulfate HFA, atorvastatin, buPROPion SR, calcium carbonate, clonazePAM, escitalopram, famotidine, gabapentin, guaiFENesin, hydrOXYzine, metoprolol tartrate, naloxone, naproxen, nicotine, ondansetron ODT, oxyCODONE-acetaminophen, and prochlorperazine     Current Nutrition Orders & Evaluation of Intake       Oral Nutrition     Current PO Diet Usually consumes 1 meal per day  Drinking milkshakes from [employer]   Supplement      Nutrition Diagnosis        Nutrition Dx Problem 1 Unintentional weight loss related to increased nutrient needs due to catabolic disease as evidenced by physiological causes increasing nutrient needs., hypermetabolic state., inadequate energy intake., decreased appetite., patient report., and pt reports reduced appetite and inadequate energy intake while hospitalized.       Nutrition Intervention       RD Action Nutrition assessment by review of medical record + pt interview (reviewed & discussed):  Hx of weight changes  Current nutrition-related concerns  Current PO intake, food tolerances, ONS     Nutrition Recommendations       Recommendations Consume smaller, more frequent meals if GI concerns occur with next round of chemotherapy     Monitor/Evaluation       Monitor Per oncology nutrition protocol.     Comments:    Nutrition referral due to MST score of 3 and reduced oral intake.  Records indicate pt's UBW has been 215-220# from July 2024-Feb 2025.  Wt has stayed between 201-207# since 2/25/25.  Approximate wt decline:  6-7% loss in 2-3 weeks.  Pt was hospitalized due to influenza, acute / chronic hypoxemia, COPD exac, and CAP during this time frame:   from 2/25/25 and discharged 3/3/25.      Spoke to pt over the phone. She reports she has gained 6# since being discharged from her IP visit.  Pt reports she ate minimal while in the hospital.  She denies current nutrition-related issues at this time, but will have another round of Topotecan in a couple of weeks and a little apprehensive about that.  With her prior tx she experienced severe nausea (no emesis), diarrhea, and decreased appetite.  She is hoping her side effects will not be as severe after the next treatment.  Pt reports to eat only 1 meal per day and has done this all her life.  Suggested small, frequent meals if experiencing GI distress w/ her upcoming tx.  Pt v/u.  She is not concerned with the wt decline because not currently having issues eating.     Electronically signed by:  Mildred Gee RD  03/11/25 14:19 EDT

## 2025-03-11 NOTE — OUTREACH NOTE
CCM Interim Update    Pt has dx lung cancer with mets to bone and brain. Recent hospitalization on 2/25/25 for COPD exacerbation,respiratory failure and flu A. Wears oxygen cont at 4 L. [facility] is [facility].. Presently undergoing chemotherapy treatments. She is requesting assistance in obtaining Portable Oxygen Concentrator. I have faxed POC order and office notes to [facility]. She has upcoming appt with COPD clinic on 3/27/25.     See Patient Assessment, Care Plan discussion, EOM documentation and Patient Instructions.         Names and Relationships of Patient/Support Persons: Contact: Lluvia Archer; Relationship: Self -     Adult Patient Profile  Questions/Answers      Flowsheet Row Most Recent Value   Symptoms/Conditions Managed at Home cancer, respiratory   Barriers to Managing Health none   Cancer Symptoms/Conditions lung, other (see comments)  [mets to brain and bone]   Cancer Management Strategies chemotherapy   Cancer Self-Management Outcome unsure   Cancer Comment Due to progression recommend changing therapy from immunotherapy to topotecan.  This is for 5 days every 3 weeks.  This is for 5 days every 3 weeks. G-CSF to be provided prophylactically.  Will attempt to start on Monday 3/17/25.   Respiratory Symptoms/Conditions COPD   Respiratory Management Strategies medication therapy, oxygen therapy   Oxygen Therapy Device nasal cannula   Oxygen Therapy Times continuous   Oxygen Flow (L/min) 4   Respiratory Self-Management Outcome unsure   Respiratory Comment needs POC.order and office notes faxed to [facility] updated and reviewed with the patient during this program:  --     Alcohol Use: Not At Risk (2/25/2025)    AUDIT-C     Frequency of Alcohol Consumption: Never     Average Number of Drinks: Patient does not drink     Frequency of Binge Drinking: Never      --     Depression: Not at risk (2/21/2025)    PHQ-2     PHQ-2 Score: 2   Recent Concern: Depression -  At risk (1/24/2025)    PHQ-2     PHQ-2 Score: 16      --     Disabilities: At Risk (2/25/2025)    Disabilities     Concentrating, Remembering, or Making Decisions Difficulty: no     Doing Errands Independently Difficulty: yes      --     Employment: Not At Risk (8/10/2024)    Employment     Do you want help finding or keeping work or a job?: I do not need or want help      Financial Resource Strain: Low Risk  (8/10/2024)    Overall Financial Resource Strain (CARDIA)     Difficulty of Paying Living Expenses: Not hard at all      --     Food Insecurity: No Food Insecurity (2/26/2025)    Hunger Vital Sign     Worried About Running Out of Food in the Last Year: Never true     Ran Out of Food in the Last Year: Never true      --     Health Literacy: Not At Risk (2/26/2025)    Education     Help with school or training?: No     Preferred Language: English      --     Housing Stability: Not At Risk (2/26/2025)    Housing Stability     Current Living Arrangements: home     Potentially Unsafe Housing Conditions: none      --     Interpersonal Safety: Not At Risk (3/10/2025)    Abuse Screen     Unsafe at Home or Work/School: no     Feels Threatened by Someone?: no     Does Anyone Keep You from Contacting Others or Doint Things Outside the Home?: no     Physical Sign of Abuse Present: no      --     Physical Activity: Inactive (2/26/2025)    Exercise Vital Sign     Days of Exercise per Week: 0 days     Minutes of Exercise per Session: 0 min      --     Social Connections: Not At Risk (2/26/2025)    Family and Community Support     Help with Day-to-Day Activities: I get all the help I need     Lonely or Isolated: Never      --     Stress: Stress Concern Present (8/10/2024)    [employer] - Occupational Stress Questionnaire     Feeling of Stress : Very much      --     Tobacco Use: High Risk (3/11/2025)    Patient History     Smoking Tobacco Use: Every Day     Smokeless Tobacco Use: Never     Passive  Exposure: Past      --     Transportation Needs: No Transportation Needs (2/26/2025)    PRAPARE - Transportation     Lack of Transportation (Medical): No     Lack of Transportation (Non-Medical): No      --     Utilities: Not At Risk (2/26/2025)    [facility] Utilities     Threatened with loss of utilities: No      Continuing Care   [facility]   [facility]    [address] [place] [address] [place] [address]    Phone: [phone]    Request Status: Accepted       Education Documentation  No documentation found.        [facility] [place]  Ambulatory Case Management    3/11/2025, 13:35 EDT

## 2025-03-11 NOTE — PROGRESS NOTES
"PHARMACY C1D1 PRE VISIT ASSESSMENT    Patient will present for C1D1 of topotecan 1.5 mg/m2 D1-5 Q21D x 12 cycles     59 y.o. female  Estimated body surface area is 1.99 meters squared as calculated from the following:    Height as of an earlier encounter on 3/11/25: 162.6 cm (64\").    Weight as of an earlier encounter on 3/11/25: 94.7 kg (208 lb 12.8 oz).    Past Medical History:   Diagnosis Date    Abnormal mammogram     PT DENIES KNOWING OF THIS HX    Anxiety     Arthritis     R SHOULDER, R HIP, AND R LEG    Cancer     LUNG    Chronic nausea 01/15/2019    COPD (chronic obstructive pulmonary disease)     O2 3 LITERS NC CONT    Hernia, hiatal     Hyperlipidemia     Hypertension     Knee pain, right 08/30/2018    Memory loss     FORGETFULNESS ? ETIOLOGY    Migraine headache     Moderate episode of recurrent major depressive disorder 05/22/2017    Night sweats     Sciatica of right side 08/18/2017    Severe episode of recurrent major depressive disorder, without psychotic features 10/22/2019    Shingles     OUTBREAK 6/11/24 ON ANTIVIRAL , WHELPS NOTED NO SORES.    Shoulder pain, left 08/30/2018       Oncology/Hematology History Overview Note   2/20/24: CT Chest:  No PE or aortic dissection. Multiple right middle lobe nodules are highly suspicious for malignancy.  Additionally, there is mediastinal and right hilar adenopathy now noted.  Follow-up with a PET-CT is recommended. Emphysema with chronic changes in both lungs that otherwise appears stable. Atherosclerotic disease and coronary artery disease.    6/2/24: admitted to [facility] with shortness of breath, fever, cough and started on treatment for pneumonia. Found to have hyponatremia to 126    6/3/24 CT Chest: Right middle lobe nodules and mediastinal and right hilar lymphadenopathy has progressed from prior CT, and remains concerning for malignancy.  Partial right middle lobe atelectasis. Moderate emphysema. Discharged on 6/4/24 with Na of 133.    6/7/24: Bronchoscopy: " Station 4R and station 7 positive for small cell carcinoma.    6/13/24: CT head with and without contrast (due to claustrophobia): No mets seen    6/19/24: NM PET: New hypermetabolic nodules within the right middle lobe. There are also multiple new enlarged hypermetabolic mediastinal lymph nodes consistent with metastatic disease. Right axillary mets as well. There are scattered foci of hypermetabolism throughout the bony structures consistent with bone metastases.    6/24/24: C1D1 Carbo/Etop/Durva. Tolerated well    7/16/24: C2D1 Carbo/Etop/Durva. Complicated by inpatient admission due to dehydration from nausea/vomiting as well as pancytopenia. ANC 0.11. Required transfusion for hgb 6.8.     8/6/24: C3D1 Carbo/Etop/Durva. 20% reduction in Carbo and 20% reduction in Etoposide. Add G-CSF with this cycle due to severe neutropenia with C2.     Repeat scan have shown disease response, however scan was due to PE rule out.     8/27/24: C4D1 Carbo/Etop/Durva. 33% reduction in Carbo and 33% reduction in Etoposide. Continue G-CSF with this cycle due to severe neutropenia with C2. Labs appropriate to proceed. Ok to treat for elevated alk phos.     8/6/24: C3D1 Carbo/Etop/Durva. 20% reduction in Carbo and 20% reduction in Etoposide. Add G-CSF with this cycle due to severe neutropenia with C2.    Repeat scan have shown disease response, however scan was due to PE rule out.     8/27/24: C4D1 Carbo/Etop/Durva. 33% reduction in Carbo and 33% reduction in Etoposide. Continue G-CSF with this cycle due to severe neutropenia with C2.      Repeat scans without progression. Plan for durvalumab alone for maintenance    9/17/24: C5D1 durvalumab alone    2/21/25: C10D1 durvalumab alone    2/25/25: Admitted with shortness of breath and found to be influenza A positive.  CTA of chest showed progression of metastatic disease with enlarging pulmonary nodules and new enlarged mediastinal hilar lymph nodes.  CT of the abdomen pelvis done  inpatient was negative for disease.  3/3/25: Completed XRT to brain    3/10/25: Orders written for next line topotecan        Small cell lung cancer   6/12/2024 Initial Diagnosis    Small cell lung cancer     6/14/2024 - 6/14/2024 Chemotherapy    OP LUNG CISplatin 60mg/m2 / Etoposide 120mg/m2 + XRT     6/14/2024 Cancer Staged    Staging form: Lung, AJCC 8th Edition  - Clinical: Stage IVB (cT1b, cN2, cM1c) - Signed by Brian Dickson MD on 6/20/2024 6/24/2024 - 8/29/2024 Chemotherapy    OP LUNG CARBOplatin AUC=5 / Etoposide / Durvalumab     8/27/2024 -  Chemotherapy    OP SUPPORTIVE Denosumab (Xgeva) Q28D     9/17/2024 - 2/21/2025 Biopsy    OP LUNG Durvalumab 1500 mg  Plan Provider: Brian Dickson MD  Treatment goal: Control  Line of treatment: [No plan line of treatment]     3/3/2025 - 3/3/2025 Chemotherapy    OP LUNG Lurbinectedin      3/7/2025 -  Chemotherapy    OP LUNG Topotecan (IV)     Cancer, metastatic to bone   8/6/2024 Initial Diagnosis    Cancer, metastatic to bone     8/27/2024 -  Chemotherapy    OP SUPPORTIVE Denosumab (Xgeva) Q28D     Secondary malignant neoplasm of brain   2/10/2025 Initial Diagnosis    Secondary malignant neoplasm of brain     2/10/2025 -  Radiation    RADIATION THERAPY Treatment Details (Noted on 2/10/2025)  Site: Brain  Technique: 3D CRT  Goal: No goal specified  Planned Treatment Start Date: No planned start date specified         Relevant Pathology:   Final Diagnosis   1. Lymph node, station 4R, FNA:               - Positive for malignant cells               - Metastatic small cell carcinoma        2. Lymph node, station 10 R, FNA:               - Negative for malignant cells               - Lymphoid tissue present        3. Lymph node, station 7, FNA:               - Positive for malignant cells               - Metastatic small cell carcinoma     The above positive (malignant) diagnosis was called to Minnie Johnson RN in Dr. ARIEL Mcdonald's office at  13:59 EDT on 6/11/2024 by Flako DIAMOND.             Electronically signed by Pura Bennett MD on 6/11/2024 at 1402     Potentially Actionable Mutation Present: NO     Relevant Imaging:    CT Cervical Spine With Contrast  Result Date: 2/26/2025  1.The study is motion-limited. 2.No acute fracture or acute malalignment is identified. 3.There is moderate left foraminal narrowing at C3-4, due to uncovertebral and facet osteoarthritis. 4.No significant spinal canal narrowing is suggested by CT examination without intrathecal contrast agent administration. 5.No aggressive osseous lesion is suggested. 6.Please see above comments for further detail.    Portions of this note were completed with a voice recognition program. Electronically Signed: Conrad Morel MD  2/26/2025 10:57 PM EST  Workstation ID: [id number]    CT Abdomen Pelvis With Contrast  Result Date: 2/26/2025  Impression: CT scan of the abdomen and pelvis demonstrating previous cholecystectomy. No intra-abdominal metastatic disease. Stable diffuse sclerotic osseous metastatic disease. Electronically Signed: Fly Chavez MD  2/26/2025 10:57 PM EST  Workstation ID: [id number]    CT Angiogram Chest Pulmonary Embolism  Result Date: 2/25/2025  Impression: 1.No evidence of pulmonary embolism. 2.New anterior peripheral left lower lobe airspace disease may be related to bronchial narrowing and atelectasis. However, pneumonia not excluded. 3.Progression of metastatic disease with enlarging pulmonary nodules and new enlarged mediastinal/hilar lymph nodes. Electronically Signed: Chapo Pabon MD  2/25/2025 1:07 PM EST  Workstation ID: [id number]    XR Chest 1 View  Result Date: 2/25/2025  Impression: 1. Cardiomegaly. 2. Mixed interstitial/airspace disease, right greater than left side which could be on the basis of multifocal pneumonia from aspiration. Electronically Signed: Memo Luque MD  2/25/2025 11:02 AM EST  Workstation ID: [id number]       Current treatment regimen has  insurance authorization approval? YES    Medication treatment plan entered is appropriate per NCCN Guidelines    Pharmacy Follow-up Considerations: Patient with metastatic lung cancer of note previously on maintenance durvalumab and developed progression. Plan to initiate topotecan. Pharmacy will continue to monitor labs throughout treatment and will  patient prior to infusion on C1D1.   Vinicio Valero, PharmAMINTA   03/11/25

## 2025-03-12 ENCOUNTER — PATIENT ROUNDING (BHMG ONLY) (OUTPATIENT)
Dept: RADIATION ONCOLOGY | Facility: HOSPITAL | Age: 60
End: 2025-03-12
Payer: MEDICARE

## 2025-03-12 NOTE — PROGRESS NOTES
"Patient completed radiation treatments to her brain on 3/5/25. Following up with patient regarding any concerns the patient may have at this time and receiving feedback in regards to patient over all care while under treatment.     Patient asked about symptoms and side effects that continue to be bothersome. Ms. Archer reports some skin irritations and ongoing fatigue. She reports using vasoline to her forehead. Education provided on using recommended Eucerin, Lubriderm or Aquaphor on skin in addition to education on fatigue being an expected side effect. She reports being on an antibiotic from a previous hospitalization. Denies fevers or any new/worsening SOA.  She wears O2 at home and is awaiting approval for a home concentrator. She informs that her \"lung cancer is back\" and that she will start chemo next week. Denies pain from radiation.    No medication changes other than the antibiotic from her hospital admission. No refills to report.    Patient was asked if there was anything that could've made their experience better at [facility] while they were under treatment. Patient states: \"no, you guys were wonderful. I have anxiety and you all made it very quick and easy for me get through my treatments.\"    Patient encouraged to call the office if any concerns arise. Patient reminded of follow up appointment on 3/12/25 with Norah Han at 0930.    "

## 2025-03-13 ENCOUNTER — PATIENT OUTREACH (OUTPATIENT)
Dept: CASE MANAGEMENT | Facility: OTHER | Age: 60
End: 2025-03-13
Payer: MEDICARE

## 2025-03-13 DIAGNOSIS — C34.30 SMALL CELL CARCINOMA OF LOWER LOBE OF LUNG, UNSPECIFIED LATERALITY: ICD-10-CM

## 2025-03-13 DIAGNOSIS — J44.9 CHRONIC OBSTRUCTIVE PULMONARY DISEASE, UNSPECIFIED COPD TYPE: Primary | ICD-10-CM

## 2025-03-13 NOTE — OUTREACH NOTE
Care Coordination    I called [facility] to verify they received order for Portable Oxygen Concentrator. They have not been successful in contacting pt to set up delivery of POC and  portable tanks. I verified they had correct contact number.     CCM Interim Update    I called number listed. No answer and no voicemail. I called  Mike and he will have Lluvia call [facility]. I gave him their office number to call.       315 pm---Lluvia returned my call. Reports she has called [employer]. She is awaiting a return call from them.         Names and Relationships of Patient/Support Persons: Contact: MIKE BUTTS; Relationship: Emergency Contact -         Education Documentation  No documentation found.        [facility] [place]  Ambulatory Case Management    3/13/2025, 11:35 EDT

## 2025-03-14 ENCOUNTER — TELEPHONE (OUTPATIENT)
Dept: ONCOLOGY | Facility: HOSPITAL | Age: 60
End: 2025-03-14

## 2025-03-14 ENCOUNTER — READMISSION MANAGEMENT (OUTPATIENT)
Dept: CALL CENTER | Facility: HOSPITAL | Age: 60
End: 2025-03-14
Payer: MEDICARE

## 2025-03-14 NOTE — OUTREACH NOTE
Medical Week 2 Survey      Flowsheet Row Responses   [facility] patient discharged from? [facility]   Does the patient have one of the following disease processes/diagnoses(primary or secondary)? Other   Week 2 attempt successful? Yes   Call start time 1421   Discharge diagnosis *Influenza   Call end time 1424   Meds reviewed with patient/caregiver? Yes   Does the patient have all medications ordered at discharge? Yes   Is the patient taking all medications as directed (includes completed medication regime)? Yes   Does the patient have a primary care provider?  Yes   Does the patient have an appointment with their PCP within 7 days of discharge? Yes   Comments regarding PCP dr. Edge   Has the patient kept scheduled appointments due by today? Yes   What DME was ordered? [employer] nebulizer and portable O2   Has all DME been delivered? Yes   DME comments 4L continuous.   Psychosocial issues? No   Did the patient receive a copy of their discharge instructions? Yes   Nursing interventions Reviewed instructions with patient   What is the patient's perception of their health status since discharge? Improving   If the patient is a current smoker, are they able to teach back resources for cessation? Not a smoker   Week 2 Call Completed? Yes   Graduated Yes   Is the patient interested in additional calls from an ambulatory ? No   Would this patient benefit from a Referral to Amb Social Work? No   Wrap up additional comments Patient doing well, she is tired, but improving.  Seeing providers.  Taking meds.  no needs.   Call end time 1424            ELLIE GOODRICH - Registered Nurse

## 2025-03-14 NOTE — TELEPHONE ENCOUNTER
Caller: Lluvia Archer    Relationship: Self    Best call back number: [phone]     What is the best time to reach you: ANYTIME    Who are you requesting to speak with (clinical staff, provider,  specific staff member):     What was the call regarding: PATIENT THOUGHT SHE WAS SUPPOSED TO START CHEMO MONDAY 3/17 BUT IS CURRENTLY ONLY SCHEDULED FOR FRIDAY 3/21.    PLEASE ADVISE.

## 2025-03-18 ENCOUNTER — TELEPHONE (OUTPATIENT)
Dept: ONCOLOGY | Facility: HOSPITAL | Age: 60
End: 2025-03-18
Payer: MEDICARE

## 2025-03-18 DIAGNOSIS — C79.51 CANCER, METASTATIC TO BONE: ICD-10-CM

## 2025-03-18 DIAGNOSIS — G89.3 CHRONIC NEOPLASM-RELATED PAIN: ICD-10-CM

## 2025-03-18 NOTE — TELEPHONE ENCOUNTER
Caller: Lluvia Archer    Relationship: Self    Best call back number:  Telephone Information:   Mobile [phone]       Requested Prescriptions:   Requested Prescriptions     Pending Prescriptions Disp Refills    oxyCODONE-acetaminophen (PERCOCET)  MG per tablet 180 tablet 0     Sig: Take 1 tablet by mouth Every 4 (Four) Hours As Needed for Moderate Pain.        Pharmacy where request should be sent: [facility] [id number]  [place], [place] - [address] [facility] [address] [facility] [phone] [facility] [phone] FX     Last office visit with prescribing clinician: 3/10/2025   Last telemedicine visit with prescribing clinician: Visit date not found   Next office visit with prescribing clinician: 3/21/2025     Does the patient have less than a 3 day supply:  [x] Yes  [] No    If the office needs to give you a call back, can they leave a voicemail: [] Yes [x] No

## 2025-03-18 NOTE — TELEPHONE ENCOUNTER
PATIENT CALLING BACK TODAY 3/18/2025     Caller: Lluvia Archer     Relationship: Self     Best call back number:       Telephone Information:   Mobile [phone]         What was the call regarding: PATIENT HAS A CHEMO INFUSION ON 3/21/2025.  BUT WAS TOLD BY DR. RODARTE HER INFUSIONS WOULD BY MONDAY THROUGH FRIDAY AND GETTING HER INJECTIONS ON SATURDAY.     CALL TO ADVISE

## 2025-03-19 ENCOUNTER — HOSPITAL ENCOUNTER (OUTPATIENT)
Dept: ONCOLOGY | Facility: HOSPITAL | Age: 60
Discharge: HOME | End: 2025-03-19
Payer: MEDICARE

## 2025-03-19 ENCOUNTER — DOCUMENTATION (OUTPATIENT)
Dept: RADIATION ONCOLOGY | Facility: HOSPITAL | Age: 60
End: 2025-03-19
Payer: MEDICARE

## 2025-03-19 VITALS
RESPIRATION RATE: 20 BRPM | TEMPERATURE: 98.2 F | WEIGHT: 217.5 LBS | SYSTOLIC BLOOD PRESSURE: 99 MMHG | OXYGEN SATURATION: 98 % | DIASTOLIC BLOOD PRESSURE: 74 MMHG | BODY MASS INDEX: 37.33 KG/M2 | HEART RATE: 83 BPM

## 2025-03-19 DIAGNOSIS — Z45.2 ENCOUNTER FOR ADJUSTMENT OR MANAGEMENT OF VASCULAR ACCESS DEVICE: ICD-10-CM

## 2025-03-19 DIAGNOSIS — E86.0 DEHYDRATION: ICD-10-CM

## 2025-03-19 DIAGNOSIS — C34.90 SMALL CELL CARCINOMA OF LUNG, UNSPECIFIED LATERALITY, UNSPECIFIED PART OF LUNG: Primary | ICD-10-CM

## 2025-03-19 LAB
ALBUMIN SERPL-MCNC: 3.6 G/DL (ref 3.5–5.2)
ALBUMIN/GLOB SERPL: 1.2 G/DL
ALP SERPL-CCNC: 81 U/L (ref 39–117)
ALT SERPL W P-5'-P-CCNC: 8 U/L (ref 1–33)
ANION GAP SERPL CALCULATED.3IONS-SCNC: 7.5 MMOL/L (ref 5–15)
AST SERPL-CCNC: 12 U/L (ref 1–32)
BASOPHILS # BLD AUTO: 0.02 10*3/MM3 (ref 0–0.2)
BASOPHILS NFR BLD AUTO: 0.3 % (ref 0–1.5)
BILIRUB SERPL-MCNC: 0.3 MG/DL (ref 0–1.2)
BUN SERPL-MCNC: 12 MG/DL (ref 6–20)
BUN/CREAT SERPL: 15 (ref 7–25)
CALCIUM SPEC-SCNC: 8.5 MG/DL (ref 8.6–10.5)
CHLORIDE SERPL-SCNC: 94 MMOL/L (ref 98–107)
CO2 SERPL-SCNC: 27.5 MMOL/L (ref 22–29)
CREAT SERPL-MCNC: 0.8 MG/DL (ref 0.57–1)
DEPRECATED RDW RBC AUTO: 56.7 FL (ref 37–54)
EGFRCR SERPLBLD CKD-EPI 2021: 85.5 ML/MIN/1.73
EOSINOPHIL # BLD AUTO: 0.23 10*3/MM3 (ref 0–0.4)
EOSINOPHIL NFR BLD AUTO: 3.3 % (ref 0.3–6.2)
ERYTHROCYTE [DISTWIDTH] IN BLOOD BY AUTOMATED COUNT: 16.1 % (ref 12.3–15.4)
GLOBULIN UR ELPH-MCNC: 2.9 GM/DL
GLUCOSE SERPL-MCNC: 87 MG/DL (ref 65–99)
HCT VFR BLD AUTO: 33.2 % (ref 34–46.6)
HGB BLD-MCNC: 10.4 G/DL (ref 12–15.9)
HOLD SPECIMEN: NORMAL
IMM GRANULOCYTES # BLD AUTO: 0.02 10*3/MM3 (ref 0–0.05)
IMM GRANULOCYTES NFR BLD AUTO: 0.3 % (ref 0–0.5)
LYMPHOCYTES # BLD AUTO: 1.18 10*3/MM3 (ref 0.7–3.1)
LYMPHOCYTES NFR BLD AUTO: 17.1 % (ref 19.6–45.3)
MCH RBC QN AUTO: 29.5 PG (ref 26.6–33)
MCHC RBC AUTO-ENTMCNC: 31.3 G/DL (ref 31.5–35.7)
MCV RBC AUTO: 94.3 FL (ref 79–97)
MONOCYTES # BLD AUTO: 0.63 10*3/MM3 (ref 0.1–0.9)
MONOCYTES NFR BLD AUTO: 9.1 % (ref 5–12)
NEUTROPHILS NFR BLD AUTO: 4.84 10*3/MM3 (ref 1.7–7)
NEUTROPHILS NFR BLD AUTO: 69.9 % (ref 42.7–76)
NRBC BLD AUTO-RTO: 0 /100 WBC (ref 0–0.2)
PLATELET # BLD AUTO: 110 10*3/MM3 (ref 140–450)
PMV BLD AUTO: 10.3 FL (ref 6–12)
POTASSIUM SERPL-SCNC: 4.1 MMOL/L (ref 3.5–5.2)
PROT SERPL-MCNC: 6.5 G/DL (ref 6–8.5)
RBC # BLD AUTO: 3.52 10*6/MM3 (ref 3.77–5.28)
SODIUM SERPL-SCNC: 129 MMOL/L (ref 136–145)
WBC NRBC COR # BLD AUTO: 6.92 10*3/MM3 (ref 3.4–10.8)

## 2025-03-19 PROCEDURE — 25810000003 SODIUM CHLORIDE 0.9 % SOLUTION: Performed by: INTERNAL MEDICINE

## 2025-03-19 PROCEDURE — 25010000002 DEXAMETHASONE SODIUM PHOSPHATE 120 MG/30ML SOLUTION: Performed by: INTERNAL MEDICINE

## 2025-03-19 PROCEDURE — 25010000002 HEPARIN LOCK FLUSH PER 10 UNITS: Performed by: INTERNAL MEDICINE

## 2025-03-19 PROCEDURE — 80053 COMPREHEN METABOLIC PANEL: CPT | Performed by: INTERNAL MEDICINE

## 2025-03-19 PROCEDURE — 96375 TX/PRO/DX INJ NEW DRUG ADDON: CPT

## 2025-03-19 PROCEDURE — 96413 CHEMO IV INFUSION 1 HR: CPT

## 2025-03-19 PROCEDURE — 25010000002 TOPOTECAN 4 MG/4ML SOLUTION 4 ML VIAL: Performed by: INTERNAL MEDICINE

## 2025-03-19 PROCEDURE — 85025 COMPLETE CBC W/AUTO DIFF WBC: CPT | Performed by: INTERNAL MEDICINE

## 2025-03-19 RX ORDER — HEPARIN SODIUM (PORCINE) LOCK FLUSH IV SOLN 100 UNIT/ML 100 UNIT/ML
500 SOLUTION INTRAVENOUS AS NEEDED
Status: DISCONTINUED | OUTPATIENT
Start: 2025-03-19 | End: 2025-03-20 | Stop reason: HOSPADM

## 2025-03-19 RX ORDER — SODIUM CHLORIDE 9 MG/ML
20 INJECTION, SOLUTION INTRAVENOUS ONCE
Status: DISCONTINUED | OUTPATIENT
Start: 2025-03-19 | End: 2025-03-20 | Stop reason: HOSPADM

## 2025-03-19 RX ORDER — HEPARIN SODIUM (PORCINE) LOCK FLUSH IV SOLN 100 UNIT/ML 100 UNIT/ML
500 SOLUTION INTRAVENOUS AS NEEDED
Status: CANCELLED | OUTPATIENT
Start: 2025-03-19

## 2025-03-19 RX ORDER — SODIUM CHLORIDE 0.9 % (FLUSH) 0.9 %
20 SYRINGE (ML) INJECTION AS NEEDED
Status: DISCONTINUED | OUTPATIENT
Start: 2025-03-19 | End: 2025-03-20 | Stop reason: HOSPADM

## 2025-03-19 RX ORDER — OXYCODONE AND ACETAMINOPHEN 10; 325 MG/1; MG/1
1 TABLET ORAL EVERY 4 HOURS PRN
Qty: 180 TABLET | Refills: 0 | Status: SHIPPED | OUTPATIENT
Start: 2025-03-19

## 2025-03-19 RX ORDER — SODIUM CHLORIDE 0.9 % (FLUSH) 0.9 %
20 SYRINGE (ML) INJECTION AS NEEDED
Status: CANCELLED | OUTPATIENT
Start: 2025-03-19

## 2025-03-19 RX ADMIN — TOPOTECAN 2.9 MG: 1 INJECTION, SOLUTION, CONCENTRATE INTRAVENOUS at 14:49

## 2025-03-19 RX ADMIN — HEPARIN 500 UNITS: 100 SYRINGE at 15:28

## 2025-03-19 RX ADMIN — DEXAMETHASONE SODIUM PHOSPHATE 12 MG: 4 INJECTION, SOLUTION INTRA-ARTICULAR; INTRALESIONAL; INTRAMUSCULAR; INTRAVENOUS; SOFT TISSUE at 14:13

## 2025-03-19 RX ADMIN — Medication 20 ML: at 15:28

## 2025-03-19 RX ADMIN — SODIUM CHLORIDE 1000 ML: 0.9 INJECTION, SOLUTION INTRAVENOUS at 14:13

## 2025-03-19 NOTE — PROGRESS NOTES
"Outpatient Nutrition Oncology Follow Up    Patient Name: Lluvia Archer  YOB: 1965  MRN: [id number]    Recommendation(s):   Continue to recommend small, frequent meals as tolerated.  Adequate fluids.  If wt decline is accurate, may consider an appetite medication (Marinol, if medically feasible).      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 Accuracy)    Nutrition-related concerns: Constipation and Early Satiety    Current PO intake:  Consumes 1 meal per day; tries to snack during other times but not able to consume very much volume; protein consumption:  mainly animal proteins and some peanut butter    Consult or Intervention:  Pt receiving first infusion of Topotecan today.  She had previously lost significant wt from 2/25/25-3/3/25 during an inpatient visit (6-7% loss in 2-3 weeks- also significant).  Pt had then gained 6# after her hospitalization and denied nutrition-related concerns on 3/11/25.  Additionally pt lost significant wt while receiving her first round of chemotherapy from June-August 2024 (~80# loss, per pt account).  If today's wt is accurate, pt has lost 12% body wt in 1 month which is fairly severe.  When speaking to pt today she stated she doesn't feel she has lost that much weight.  Question the accuracy of her wt therefore discussed with clinic nurse and pt to be re-weighed prior to leaving.  Pt eats what she craves and most of the time she consumes some protein sources at her good meal, but likely inadequate.  She remains constipated and reports \"painful\" BM's.   Pt reports taking a stool softener w/ a laxative while inpatient but has not since she was d/c'd home.  Encouraged to try a stool softener + laxative.  Discussed potential for current tx to cause " diarrhea and recommended to stop all laxatives / stool softeners if this occurs.      Nutrition Diagnosis: Unintentional weight loss related to decreased ability to consume sufficient energy as evidenced by physiological causes increasing nutrient needs., hypermetabolic state., decreased appetite., and patient report.    Plan: Will follow up per oncology nutrition protocol      Addendum and Correction:  Pt reweighed and accurate wt is 98.7 kg, however some of this is fluid weight.  Total:  16# wt gain in 15 days (since 3/19/25).

## 2025-03-20 ENCOUNTER — HOSPITAL ENCOUNTER (OUTPATIENT)
Dept: ONCOLOGY | Facility: HOSPITAL | Age: 60
Discharge: HOME | End: 2025-03-20
Payer: MEDICARE

## 2025-03-20 VITALS
RESPIRATION RATE: 18 BRPM | SYSTOLIC BLOOD PRESSURE: 104 MMHG | BODY MASS INDEX: 37.31 KG/M2 | TEMPERATURE: 98.2 F | DIASTOLIC BLOOD PRESSURE: 59 MMHG | WEIGHT: 217.37 LBS | HEART RATE: 94 BPM | OXYGEN SATURATION: 93 %

## 2025-03-20 DIAGNOSIS — Z45.2 ENCOUNTER FOR ADJUSTMENT OR MANAGEMENT OF VASCULAR ACCESS DEVICE: ICD-10-CM

## 2025-03-20 DIAGNOSIS — C79.51 CANCER, METASTATIC TO BONE: Primary | ICD-10-CM

## 2025-03-20 DIAGNOSIS — C34.90 SMALL CELL CARCINOMA OF LUNG, UNSPECIFIED LATERALITY, UNSPECIFIED PART OF LUNG: ICD-10-CM

## 2025-03-20 PROCEDURE — 96372 THER/PROPH/DIAG INJ SC/IM: CPT

## 2025-03-20 PROCEDURE — 25810000003 SODIUM CHLORIDE 0.9 % SOLUTION: Performed by: INTERNAL MEDICINE

## 2025-03-20 PROCEDURE — 25010000002 DENOSUMAB 120 MG/1.7ML SOLUTION: Performed by: INTERNAL MEDICINE

## 2025-03-20 PROCEDURE — 96375 TX/PRO/DX INJ NEW DRUG ADDON: CPT

## 2025-03-20 PROCEDURE — 25010000002 DEXAMETHASONE SODIUM PHOSPHATE 120 MG/30ML SOLUTION: Performed by: INTERNAL MEDICINE

## 2025-03-20 PROCEDURE — 96413 CHEMO IV INFUSION 1 HR: CPT

## 2025-03-20 PROCEDURE — 25010000002 TOPOTECAN 4 MG/4ML SOLUTION 4 ML VIAL: Performed by: INTERNAL MEDICINE

## 2025-03-20 PROCEDURE — 25010000002 HEPARIN LOCK FLUSH PER 10 UNITS: Performed by: INTERNAL MEDICINE

## 2025-03-20 RX ORDER — HEPARIN SODIUM (PORCINE) LOCK FLUSH IV SOLN 100 UNIT/ML 100 UNIT/ML
500 SOLUTION INTRAVENOUS AS NEEDED
Status: CANCELLED | OUTPATIENT
Start: 2025-03-21

## 2025-03-20 RX ORDER — SODIUM CHLORIDE 0.9 % (FLUSH) 0.9 %
20 SYRINGE (ML) INJECTION AS NEEDED
Status: CANCELLED | OUTPATIENT
Start: 2025-03-20

## 2025-03-20 RX ORDER — SODIUM CHLORIDE 9 MG/ML
20 INJECTION, SOLUTION INTRAVENOUS ONCE
Status: COMPLETED | OUTPATIENT
Start: 2025-03-20 | End: 2025-03-20

## 2025-03-20 RX ORDER — SODIUM CHLORIDE 0.9 % (FLUSH) 0.9 %
20 SYRINGE (ML) INJECTION AS NEEDED
Status: DISCONTINUED | OUTPATIENT
Start: 2025-03-20 | End: 2025-03-21 | Stop reason: HOSPADM

## 2025-03-20 RX ORDER — HEPARIN SODIUM (PORCINE) LOCK FLUSH IV SOLN 100 UNIT/ML 100 UNIT/ML
500 SOLUTION INTRAVENOUS AS NEEDED
Status: DISCONTINUED | OUTPATIENT
Start: 2025-03-20 | End: 2025-03-21 | Stop reason: HOSPADM

## 2025-03-20 RX ADMIN — SODIUM CHLORIDE 20 ML/HR: 0.9 INJECTION, SOLUTION INTRAVENOUS at 14:11

## 2025-03-20 RX ADMIN — DEXAMETHASONE SODIUM PHOSPHATE 12 MG: 4 INJECTION, SOLUTION INTRA-ARTICULAR; INTRALESIONAL; INTRAMUSCULAR; INTRAVENOUS; SOFT TISSUE at 14:11

## 2025-03-20 RX ADMIN — TOPOTECAN 2.9 MG: 1 INJECTION, SOLUTION, CONCENTRATE INTRAVENOUS at 14:34

## 2025-03-20 RX ADMIN — Medication 20 ML: at 15:08

## 2025-03-20 RX ADMIN — HEPARIN 500 UNITS: 100 SYRINGE at 15:08

## 2025-03-20 RX ADMIN — DENOSUMAB 120 MG: 120 INJECTION SUBCUTANEOUS at 14:12

## 2025-03-20 NOTE — ADDENDUM NOTE
Encounter addended by: Evan Dumont [facility] on: 3/20/2025 8:41 AM   Actions taken: Clinical Note Signed, i-Vent created or edited

## 2025-03-20 NOTE — PROGRESS NOTES
"Presents for C1D1 of topotecan    Patient was educated on information about each medication including dose, route, frequency, and common adverse effects. Patient was provided both verbal and written counseling. UpToDate Lexidrug adult patient education printed and provided to patient and key information verbally highlighted including: Overview of regimen including but not limited to infusion times; recognition and management of allergic/infusion reactions; \"call your doctor right away\" parameters.     All of the patient's questions were answered and patient expressed verbal understanding    "

## 2025-03-21 ENCOUNTER — PATIENT OUTREACH (OUTPATIENT)
Dept: CASE MANAGEMENT | Facility: OTHER | Age: 60
End: 2025-03-21
Payer: MEDICARE

## 2025-03-21 ENCOUNTER — TELEPHONE (OUTPATIENT)
Dept: CASE MANAGEMENT | Facility: OTHER | Age: 60
End: 2025-03-21
Payer: MEDICARE

## 2025-03-21 ENCOUNTER — TELEPHONE (OUTPATIENT)
Dept: FAMILY MEDICINE CLINIC | Facility: CLINIC | Age: 60
End: 2025-03-21
Payer: MEDICARE

## 2025-03-21 ENCOUNTER — HOSPITAL ENCOUNTER (OUTPATIENT)
Dept: ONCOLOGY | Facility: HOSPITAL | Age: 60
Discharge: HOME | End: 2025-03-21
Payer: MEDICARE

## 2025-03-21 VITALS
SYSTOLIC BLOOD PRESSURE: 129 MMHG | RESPIRATION RATE: 18 BRPM | DIASTOLIC BLOOD PRESSURE: 75 MMHG | WEIGHT: 218.4 LBS | OXYGEN SATURATION: 94 % | BODY MASS INDEX: 37.49 KG/M2 | HEART RATE: 84 BPM | TEMPERATURE: 98.1 F

## 2025-03-21 DIAGNOSIS — C34.90 SMALL CELL CARCINOMA OF LUNG, UNSPECIFIED LATERALITY, UNSPECIFIED PART OF LUNG: Primary | ICD-10-CM

## 2025-03-21 DIAGNOSIS — Z45.2 ENCOUNTER FOR ADJUSTMENT OR MANAGEMENT OF VASCULAR ACCESS DEVICE: ICD-10-CM

## 2025-03-21 DIAGNOSIS — C79.31 SECONDARY MALIGNANT NEOPLASM OF BRAIN: ICD-10-CM

## 2025-03-21 DIAGNOSIS — C34.30 SMALL CELL CARCINOMA OF LOWER LOBE OF LUNG, UNSPECIFIED LATERALITY: ICD-10-CM

## 2025-03-21 DIAGNOSIS — J44.9 CHRONIC OBSTRUCTIVE PULMONARY DISEASE, UNSPECIFIED COPD TYPE: Primary | ICD-10-CM

## 2025-03-21 PROCEDURE — 96375 TX/PRO/DX INJ NEW DRUG ADDON: CPT

## 2025-03-21 PROCEDURE — 25010000002 DEXAMETHASONE SODIUM PHOSPHATE 120 MG/30ML SOLUTION: Performed by: INTERNAL MEDICINE

## 2025-03-21 PROCEDURE — 96413 CHEMO IV INFUSION 1 HR: CPT

## 2025-03-21 PROCEDURE — 25810000003 SODIUM CHLORIDE 0.9 % SOLUTION: Performed by: INTERNAL MEDICINE

## 2025-03-21 PROCEDURE — 25010000002 HEPARIN LOCK FLUSH PER 10 UNITS: Performed by: INTERNAL MEDICINE

## 2025-03-21 PROCEDURE — 25010000002 TOPOTECAN 4 MG/4ML SOLUTION 4 ML VIAL: Performed by: INTERNAL MEDICINE

## 2025-03-21 RX ORDER — HEPARIN SODIUM (PORCINE) LOCK FLUSH IV SOLN 100 UNIT/ML 100 UNIT/ML
500 SOLUTION INTRAVENOUS AS NEEDED
Status: DISCONTINUED | OUTPATIENT
Start: 2025-03-21 | End: 2025-03-22 | Stop reason: HOSPADM

## 2025-03-21 RX ORDER — SODIUM CHLORIDE 0.9 % (FLUSH) 0.9 %
20 SYRINGE (ML) INJECTION AS NEEDED
Status: CANCELLED | OUTPATIENT
Start: 2025-03-21

## 2025-03-21 RX ORDER — SODIUM CHLORIDE 9 MG/ML
20 INJECTION, SOLUTION INTRAVENOUS ONCE
Status: COMPLETED | OUTPATIENT
Start: 2025-03-21 | End: 2025-03-21

## 2025-03-21 RX ORDER — SODIUM CHLORIDE 0.9 % (FLUSH) 0.9 %
20 SYRINGE (ML) INJECTION AS NEEDED
Status: DISCONTINUED | OUTPATIENT
Start: 2025-03-21 | End: 2025-03-22 | Stop reason: HOSPADM

## 2025-03-21 RX ORDER — HEPARIN SODIUM (PORCINE) LOCK FLUSH IV SOLN 100 UNIT/ML 100 UNIT/ML
500 SOLUTION INTRAVENOUS AS NEEDED
Status: CANCELLED | OUTPATIENT
Start: 2025-03-21

## 2025-03-21 RX ADMIN — Medication 20 ML: at 15:13

## 2025-03-21 RX ADMIN — TOPOTECAN 2.9 MG: 1 INJECTION, SOLUTION, CONCENTRATE INTRAVENOUS at 14:36

## 2025-03-21 RX ADMIN — SODIUM CHLORIDE 20 ML/HR: 9 INJECTION, SOLUTION INTRAVENOUS at 14:18

## 2025-03-21 RX ADMIN — DEXAMETHASONE SODIUM PHOSPHATE 12 MG: 4 INJECTION, SOLUTION INTRA-ARTICULAR; INTRALESIONAL; INTRAMUSCULAR; INTRAVENOUS; SOFT TISSUE at 14:18

## 2025-03-21 RX ADMIN — HEPARIN 500 UNITS: 100 SYRINGE at 15:14

## 2025-03-21 NOTE — TELEPHONE ENCOUNTER
Called pt to verify she had obtained Portable Oxygen Concentrator. No answer and no voicemail set up.     I called [facility] and had to leave message for return phone call on their voicemail

## 2025-03-21 NOTE — OUTREACH NOTE
[facility] Interim Update    Ms Archer verified she has received portable oxygen concentrator from [employer].     Reviewed upcoming appts:  Pulmonology--3/27/25  MRI Brain--4/8/25            Names and Relationships of Patient/Support Persons: Contact: Lluvia Archer; Relationship: Self -         Education Documentation  No documentation found.        [place] [place]  Ambulatory Case Management    3/21/2025, 11:57 EDT

## 2025-03-21 NOTE — TELEPHONE ENCOUNTER
Caller: Lluvia Archer    Relationship: Self    Best call back number: [phone], CALL BEFORE 1:00 PM    Which medication are you concerned about:   Fluticasone-Umeclidin-Vilant (Trelegy Ellipta) 200-62.5-25 MCG/ACT inhaler     Who prescribed you this medication: SONA    When did you start taking this medication: NOT YET    What are your concerns: PATIENT HAS CHECKED WITH BOTH [facility] AND [facility] [address] AND NEITHER PHARMACY HAVE THE MEDICATION IN STOCK. PATIENT WOULD LIKE TO FIND OUT IF SHE CAN GET SOMETHING ELSE THAT WOULD BE THE SAME THING SENT TO [facility] [address].

## 2025-03-24 ENCOUNTER — HOSPITAL ENCOUNTER (OUTPATIENT)
Dept: ONCOLOGY | Facility: HOSPITAL | Age: 60
Discharge: HOME | End: 2025-03-24
Payer: MEDICARE

## 2025-03-24 VITALS
TEMPERATURE: 97.9 F | OXYGEN SATURATION: 93 % | SYSTOLIC BLOOD PRESSURE: 103 MMHG | RESPIRATION RATE: 18 BRPM | HEART RATE: 81 BPM | WEIGHT: 212.2 LBS | BODY MASS INDEX: 36.42 KG/M2 | DIASTOLIC BLOOD PRESSURE: 64 MMHG

## 2025-03-24 DIAGNOSIS — Z45.2 ENCOUNTER FOR ADJUSTMENT OR MANAGEMENT OF VASCULAR ACCESS DEVICE: ICD-10-CM

## 2025-03-24 DIAGNOSIS — C79.51 CANCER, METASTATIC TO BONE: ICD-10-CM

## 2025-03-24 DIAGNOSIS — C34.90 SMALL CELL CARCINOMA OF LUNG, UNSPECIFIED LATERALITY, UNSPECIFIED PART OF LUNG: Primary | ICD-10-CM

## 2025-03-24 PROCEDURE — 25010000002 DEXAMETHASONE SODIUM PHOSPHATE 120 MG/30ML SOLUTION: Performed by: INTERNAL MEDICINE

## 2025-03-24 PROCEDURE — 25810000003 SODIUM CHLORIDE 0.9 % SOLUTION: Performed by: INTERNAL MEDICINE

## 2025-03-24 PROCEDURE — 96375 TX/PRO/DX INJ NEW DRUG ADDON: CPT

## 2025-03-24 PROCEDURE — 25010000002 HEPARIN LOCK FLUSH PER 10 UNITS: Performed by: INTERNAL MEDICINE

## 2025-03-24 PROCEDURE — 96413 CHEMO IV INFUSION 1 HR: CPT

## 2025-03-24 PROCEDURE — 25010000002 TOPOTECAN 4 MG/4ML SOLUTION 4 ML VIAL: Performed by: INTERNAL MEDICINE

## 2025-03-24 RX ORDER — NYSTATIN 100000 [USP'U]/ML
500000 SUSPENSION ORAL 4 TIMES DAILY
Qty: 473 ML | Refills: 2 | Status: SHIPPED | OUTPATIENT
Start: 2025-03-24

## 2025-03-24 RX ORDER — SODIUM CHLORIDE 9 MG/ML
20 INJECTION, SOLUTION INTRAVENOUS ONCE
Status: COMPLETED | OUTPATIENT
Start: 2025-03-24 | End: 2025-03-24

## 2025-03-24 RX ORDER — HEPARIN SODIUM (PORCINE) LOCK FLUSH IV SOLN 100 UNIT/ML 100 UNIT/ML
500 SOLUTION INTRAVENOUS AS NEEDED
Status: CANCELLED | OUTPATIENT
Start: 2025-03-25

## 2025-03-24 RX ORDER — SODIUM CHLORIDE 0.9 % (FLUSH) 0.9 %
20 SYRINGE (ML) INJECTION AS NEEDED
Status: CANCELLED | OUTPATIENT
Start: 2025-03-24

## 2025-03-24 RX ORDER — NAPROXEN 500 MG/1
500 TABLET ORAL 2 TIMES DAILY WITH MEALS
Qty: 30 TABLET | Refills: 1 | Status: SHIPPED | OUTPATIENT
Start: 2025-03-24

## 2025-03-24 RX ORDER — HEPARIN SODIUM (PORCINE) LOCK FLUSH IV SOLN 100 UNIT/ML 100 UNIT/ML
500 SOLUTION INTRAVENOUS AS NEEDED
Status: DISCONTINUED | OUTPATIENT
Start: 2025-03-24 | End: 2025-03-25 | Stop reason: HOSPADM

## 2025-03-24 RX ORDER — SODIUM CHLORIDE 0.9 % (FLUSH) 0.9 %
20 SYRINGE (ML) INJECTION AS NEEDED
Status: DISCONTINUED | OUTPATIENT
Start: 2025-03-24 | End: 2025-03-25 | Stop reason: HOSPADM

## 2025-03-24 RX ADMIN — TOPOTECAN 2.9 MG: 1 INJECTION, SOLUTION, CONCENTRATE INTRAVENOUS at 14:36

## 2025-03-24 RX ADMIN — SODIUM CHLORIDE 20 ML/HR: 9 INJECTION, SOLUTION INTRAVENOUS at 14:19

## 2025-03-24 RX ADMIN — HEPARIN 500 UNITS: 100 SYRINGE at 15:12

## 2025-03-24 RX ADMIN — DEXAMETHASONE SODIUM PHOSPHATE 12 MG: 4 INJECTION, SOLUTION INTRA-ARTICULAR; INTRALESIONAL; INTRAMUSCULAR; INTRAVENOUS; SOFT TISSUE at 14:19

## 2025-03-24 RX ADMIN — Medication 20 ML: at 15:12

## 2025-03-25 ENCOUNTER — HOSPITAL ENCOUNTER (OUTPATIENT)
Dept: ONCOLOGY | Facility: HOSPITAL | Age: 60
Discharge: HOME | End: 2025-03-25
Payer: MEDICARE

## 2025-03-25 VITALS
WEIGHT: 210.6 LBS | TEMPERATURE: 97.8 F | HEART RATE: 85 BPM | SYSTOLIC BLOOD PRESSURE: 102 MMHG | DIASTOLIC BLOOD PRESSURE: 77 MMHG | BODY MASS INDEX: 36.15 KG/M2 | RESPIRATION RATE: 18 BRPM | OXYGEN SATURATION: 93 %

## 2025-03-25 DIAGNOSIS — C34.90 SMALL CELL CARCINOMA OF LUNG, UNSPECIFIED LATERALITY, UNSPECIFIED PART OF LUNG: Primary | ICD-10-CM

## 2025-03-25 DIAGNOSIS — Z45.2 ENCOUNTER FOR ADJUSTMENT OR MANAGEMENT OF VASCULAR ACCESS DEVICE: ICD-10-CM

## 2025-03-25 PROCEDURE — 25810000003 SODIUM CHLORIDE 0.9 % SOLUTION: Performed by: INTERNAL MEDICINE

## 2025-03-25 PROCEDURE — 96413 CHEMO IV INFUSION 1 HR: CPT

## 2025-03-25 PROCEDURE — 25010000002 PEGFILGRASTIM 6 MG/0.6ML PREFILLED SYRINGE KIT: Performed by: INTERNAL MEDICINE

## 2025-03-25 PROCEDURE — 96375 TX/PRO/DX INJ NEW DRUG ADDON: CPT

## 2025-03-25 PROCEDURE — 25010000002 DEXAMETHASONE SODIUM PHOSPHATE 120 MG/30ML SOLUTION: Performed by: INTERNAL MEDICINE

## 2025-03-25 PROCEDURE — 25010000002 TOPOTECAN 4 MG/4ML SOLUTION 4 ML VIAL: Performed by: INTERNAL MEDICINE

## 2025-03-25 PROCEDURE — 25010000002 HEPARIN LOCK FLUSH PER 10 UNITS: Performed by: INTERNAL MEDICINE

## 2025-03-25 PROCEDURE — 96377 APPLICATON ON-BODY INJECTOR: CPT

## 2025-03-25 RX ORDER — SODIUM CHLORIDE 9 MG/ML
20 INJECTION, SOLUTION INTRAVENOUS ONCE
Status: COMPLETED | OUTPATIENT
Start: 2025-03-25 | End: 2025-03-25

## 2025-03-25 RX ORDER — SODIUM CHLORIDE 0.9 % (FLUSH) 0.9 %
20 SYRINGE (ML) INJECTION AS NEEDED
OUTPATIENT
Start: 2025-03-25

## 2025-03-25 RX ORDER — HEPARIN SODIUM (PORCINE) LOCK FLUSH IV SOLN 100 UNIT/ML 100 UNIT/ML
500 SOLUTION INTRAVENOUS AS NEEDED
OUTPATIENT
Start: 2025-03-25

## 2025-03-25 RX ORDER — SODIUM CHLORIDE 0.9 % (FLUSH) 0.9 %
20 SYRINGE (ML) INJECTION AS NEEDED
Status: DISCONTINUED | OUTPATIENT
Start: 2025-03-25 | End: 2025-03-26 | Stop reason: HOSPADM

## 2025-03-25 RX ORDER — HEPARIN SODIUM (PORCINE) LOCK FLUSH IV SOLN 100 UNIT/ML 100 UNIT/ML
500 SOLUTION INTRAVENOUS AS NEEDED
Status: DISCONTINUED | OUTPATIENT
Start: 2025-03-25 | End: 2025-03-26 | Stop reason: HOSPADM

## 2025-03-25 RX ADMIN — DEXAMETHASONE SODIUM PHOSPHATE 12 MG: 4 INJECTION, SOLUTION INTRA-ARTICULAR; INTRALESIONAL; INTRAMUSCULAR; INTRAVENOUS; SOFT TISSUE at 14:18

## 2025-03-25 RX ADMIN — PEGFILGRASTIM 6 MG: KIT SUBCUTANEOUS at 15:15

## 2025-03-25 RX ADMIN — Medication 20 ML: at 15:18

## 2025-03-25 RX ADMIN — TOPOTECAN 2.9 MG: 1 INJECTION, SOLUTION, CONCENTRATE INTRAVENOUS at 14:39

## 2025-03-25 RX ADMIN — HEPARIN 500 UNITS: 100 SYRINGE at 15:18

## 2025-03-25 RX ADMIN — SODIUM CHLORIDE 20 ML/HR: 9 INJECTION, SOLUTION INTRAVENOUS at 14:18

## 2025-03-26 NOTE — PROGRESS NOTES
Primary Care Provider  Aleksandar Edge DO     Referring Provider  Sage Rosario MD     Chief Complaint  Hospital Follow Up Visit ([facility] 2/25-3/3)    Subjective          Lluvia Archer presents to [facility]  History of Present Illness  Lluvia Archer is a 59 y.o. female patient presenting in COPD clinic.    Patient was recently admitted to [facility] from 2/25/2025 until 3/3/2025.  Per her discharge summary, she had presented with shortness of breath.  Patient does have stage IV metastatic lung disease followed by oncology.  She continues to smoke and has COPD.  Patient did require a Venturi mask on presentation to the emergency room.  CT scan of the chest showed new anterior peripheral left lower lobe airspace disease with atelectasis.  Progression of metastatic disease with enlarging lymph nodes and new enlarged mediastinal/hilar lymph nodes.  Patient was positive for influenza.  Patient was planned for chemotherapy as an outpatient and to stop immunotherapy.  Patient did have staging studies while inpatient.  CT scan of the abdomen pelvis showed no acute findings.  CT scan of C-spine showed moderate left C3-C4 foraminal narrowing.  Sputum culture grew MSSA.    Patient states she is doing okay since discharge.  She does complain of fatigue but believes that is related to her cancer.  Since discharge she has seen her primary care provider who increased her Trelegy from 100-200.  Patient believe that this meant that she was to take her Trelegy twice a day rather than once a day.  I have educated the patient that Trelegy is a once daily inhaler and patient verbalizes understanding.  She is on 4 L of oxygen and continues to benefit from its use.  She does have a productive cough with clear mucus.  She is currently smoking approximately 0.5 packs of cigarettes daily and is working on quitting.     Her history of smoking is   Tobacco Use: High Risk (3/27/2025)     Patient History     Smoking Tobacco Use: Every Day     Smokeless Tobacco Use: Never     Passive Exposure: Past     Lluvia Archer  reports that she has been smoking cigarettes. She started smoking about 47 years ago. She has a 47.2 pack-year smoking history. She has been exposed to tobacco smoke. She has never used smokeless tobacco. I have educated her on the risk of diseases from using tobacco products such as cancer, COPD, and heart disease.     I advised her to quit and she is willing to quit. We have discussed the following method/s for tobacco cessation:  Education Material, Counseling, and [place].  Encourage a quit date for  3 months .  She will follow up with me in 3 months or sooner to check on her progress.    I spent 5 minutes counseling the patient.         Review of Systems   Constitutional:  Positive for fatigue. Negative for chills, fever, unexpected weight gain and unexpected weight loss.   HENT:  Congestion: Nasal.    Respiratory:  Positive for cough and shortness of breath. Negative for apnea and wheezing.         Negative for Hemoptysis     Cardiovascular:  Negative for chest pain, palpitations and leg swelling.   Skin:         Negative for cyanosis      Sleep: Negative for Excessive daytime sleepiness  Negative for morning headaches  Negative for Snoring    Family History   Problem Relation Age of Onset    Other Mother         BLOOD DISEASE    Arthritis Mother     Heart disease Father     Hypertension Other     Cancer Other     Heart disease Other     Malig Hyperthermia Neg Hx         Social History     Socioeconomic History    Marital status:      Spouse name: DEVAN    Number of children: 2    Years of education: GED    Highest education level: GED or equivalent   Tobacco Use    Smoking status: Every Day     Current packs/day: 1.00     Average packs/day: 1 pack/day for 47.2 years (47.2 ttl pk-yrs)     Types: Cigarettes     Start date: 1978     Passive exposure: Past    Smokeless  tobacco: Never   Vaping Use    Vaping status: Former    Substances: Nicotine, Flavoring    Devices: Disposable   Substance and Sexual Activity    Alcohol use: Never    Drug use: Never    Sexual activity: Defer        Past Medical History:   Diagnosis Date    Abnormal mammogram     PT DENIES KNOWING OF THIS HX    Anxiety     Arthritis     R SHOULDER, R HIP, AND R LEG    Cancer     LUNG    Chronic nausea 01/15/2019    COPD (chronic obstructive pulmonary disease)     O2 3 LITERS NC CONT    Hernia, hiatal     Hyperlipidemia     Hypertension     Knee pain, right 08/30/2018    Memory loss     FORGETFULNESS ? ETIOLOGY    Migraine headache     Moderate episode of recurrent major depressive disorder 05/22/2017    Night sweats     Sciatica of right side 08/18/2017    Severe episode of recurrent major depressive disorder, without psychotic features 10/22/2019    Shingles     OUTBREAK 6/11/24 ON ANTIVIRAL , WHELPS NOTED NO SORES.    Shoulder pain, left 08/30/2018        Immunization History   Administered Date(s) Administered    31-influenza Vac Quardvalent Preservativ 11/27/2016, 12/09/2022    COVID-19 (PFIZER) 12YRS+ (COMIRNATY) 12/21/2023    COVID-19 (PFIZER) BIVALENT 12+YRS 12/09/2022    COVID-19 (PFIZER) Purple Cap Monovalent 08/21/2021, 09/18/2021    Fluzone  >6mos 03/11/2025    Fluzone (or Fluarix & Flulaval for VFC) >6mos 11/27/2016, 10/22/2019, 12/09/2022, 12/21/2023    Fluzone Quad >6mos (Multi-dose) 10/22/2019    Influenza, Unspecified 11/27/2016, 01/15/2019, 10/22/2019    Pneumococcal Conjugate 13-Valent (PCV13) 05/22/2017    Pneumococcal Conjugate 20-Valent (PCV20) 03/11/2025         No Known Allergies       Current Outpatient Medications:     albuterol (ACCUNEB) 0.63 MG/3ML nebulizer solution, Take 3 mL by nebulization Every 6 (Six) Hours As Needed for Wheezing or Shortness of Air., Disp: 60 each, Rfl: 2    albuterol sulfate  (90 Base) MCG/ACT inhaler, Inhale 2 puffs Every 4 (Four) Hours As Needed for  Wheezing or Shortness of Air., Disp: 18 g, Rfl: 5    atorvastatin (LIPITOR) 10 MG tablet, Take 1 tablet by mouth Every Night., Disp: 90 tablet, Rfl: 3    buPROPion SR (WELLBUTRIN SR) 150 MG 12 hr tablet, Take 1 tablet by mouth 2 (Two) Times a Day., Disp: 180 tablet, Rfl: 3    calcium carbonate (Tums) 500 MG chewable tablet, Chew 2 tablets Daily., Disp: 28 tablet, Rfl: 0    clonazePAM (KlonoPIN) 0.5 MG tablet, Take 1 tablet by mouth 2 (Two) Times a Day As Needed for Anxiety., Disp: 60 tablet, Rfl: 0    escitalopram (LEXAPRO) 20 MG tablet, Take 1 tablet by mouth Daily., Disp: 90 tablet, Rfl: 3    famotidine (PEPCID) 40 MG tablet, TAKE ONE TABLET BY MOUTH TWICE DAILY @ 9AM & 5PM (Patient taking differently: Take 1 tablet by mouth 2 (Two) Times a Day.), Disp: 180 tablet, Rfl: 11    Fluticasone-Umeclidin-Vilant (Trelegy Ellipta) 200-62.5-25 MCG/ACT inhaler, Inhale 1 puff Daily., Disp: 90 each, Rfl: 1    gabapentin (NEURONTIN) 100 MG capsule, Take 2 capsules by mouth 3 (Three) Times a Day., Disp: 90 capsule, Rfl: 2    guaiFENesin (MUCINEX) 600 MG 12 hr tablet, Take 2 tablets by mouth 2 (Two) Times a Day for 30 days., Disp: 120 tablet, Rfl: 0    hydrOXYzine (ATARAX) 25 MG tablet, TAKE 1 TABLET BY MOUTH THREE TIMES A DAY AS NEEDED FOR ITCHING (Patient taking differently: Take 1 tablet by mouth 3 (Three) Times a Day As Needed.), Disp: 270 tablet, Rfl: 2    lisinopril (PRINIVIL,ZESTRIL) 5 MG tablet, TAKE ONE TABLET BY MOUTH DAILY AT 9 AM, Disp: , Rfl:     LORazepam (ATIVAN) 0.5 MG tablet, Take 1 tablet by mouth Every 8 (Eight) Hours As Needed for Anxiety., Disp: 60 tablet, Rfl: 2    Magnesium 400 MG tablet, Take 400 mg by mouth Daily., Disp: 30 tablet, Rfl: 5    Melatonin 10 MG tablet, Take 1 tablet by mouth At Night As Needed., Disp: , Rfl:     metoprolol tartrate (LOPRESSOR) 25 MG tablet, Take 0.5 tablets by mouth 2 (Two) Times a Day for 30 days., Disp: 30 tablet, Rfl: 0    naloxone (NARCAN) 4 MG/0.1ML nasal spray, CALL  911. DON'T PRIME. SPRAY IN 1 NOSTRIL FOR OVERDOSE. REPEAT IN 2-3 MINUTES IN OTHER NOSTRIL IF NO OR MINIMAL BREATHING/RESPONSIVENESS., Disp: 2 each, Rfl: 0    naproxen (NAPROSYN) 500 MG tablet, Take 1 tablet by mouth 2 (Two) Times a Day With Meals., Disp: 30 tablet, Rfl: 1    nystatin (MYCOSTATIN) 100,000 unit/mL suspension, Swish and swallow 5 mL 4 (Four) Times a Day., Disp: 473 mL, Rfl: 2    ondansetron ODT (ZOFRAN-ODT) 8 MG disintegrating tablet, Place 1 tablet on the tongue Every 8 (Eight) Hours As Needed for Nausea or Vomiting., Disp: 60 tablet, Rfl: 3    oxyCODONE-acetaminophen (PERCOCET)  MG per tablet, Take 1 tablet by mouth Every 4 (Four) Hours As Needed for Moderate Pain., Disp: 180 tablet, Rfl: 0    prochlorperazine (COMPAZINE) 10 MG tablet, Take 1 tablet by mouth Every 6 (Six) Hours As Needed for Nausea., Disp: 60 tablet, Rfl: 3    Ventolin  (90 Base) MCG/ACT inhaler, Inhale 2 puffs Every 4 (Four) Hours As Needed for Wheezing., Disp: 18 g, Rfl: 5     Objective   Physical Exam  Constitutional:       General: She is not in acute distress.     Appearance: Normal appearance. She is normal weight.   HENT:      Right Ear: Hearing normal.      Left Ear: Hearing normal.      Nose: No nasal tenderness or congestion.      Mouth/Throat:      Mouth: Mucous membranes are moist. No oral lesions.   Eyes:      Extraocular Movements: Extraocular movements intact.      Pupils: Pupils are equal, round, and reactive to light.   Cardiovascular:      Rate and Rhythm: Normal rate and regular rhythm.      Pulses: Normal pulses.      Heart sounds: Normal heart sounds. No murmur heard.  Pulmonary:      Effort: Pulmonary effort is normal.      Breath sounds: Decreased breath sounds present. No wheezing, rhonchi or rales.      Comments: Patient is on 4 L of oxygen.  She is able to speak full sentences without difficulty.  Musculoskeletal:      Right lower leg: No edema.      Left lower leg: No edema.   Skin:      "General: Skin is warm and dry.      Findings: No lesion or rash.   Neurological:      General: No focal deficit present.      Mental Status: She is alert and oriented to person, place, and time.   Psychiatric:         Mood and Affect: Affect normal. Mood is not anxious or depressed.         Vital Signs:   /53   Pulse 70   Temp 97.6 °F (36.4 °C) (Oral)   Resp 16   Ht 162.6 cm (64\")   Wt 92.5 kg (204 lb)   SpO2 99% Comment: 4 L's PD/C  BMI 35.02 kg/m²        Result Review :   The following data was reviewed by: SANDRO Munoz on 03/27/2025:  CMP          2/27/2025    04:58 2/28/2025    05:36 3/19/2025    13:12   CMP   Glucose 103  109  87    BUN 20  18  12    Creatinine 0.71  0.77  0.80    EGFR 98.7  89.5  85.5    Sodium 130  132  129    Potassium 5.3  5.2  4.1    Chloride 99  100  94    Calcium 7.3  7.9  8.5    Total Protein   6.5    Albumin   3.6    Globulin   2.9    Total Bilirubin   0.3    Alkaline Phosphatase   81    AST (SGOT)   12    ALT (SGPT)   8    Albumin/Globulin Ratio   1.2    BUN/Creatinine Ratio 28.2  23.4  15.0    Anion Gap 8.6  7.9  7.5      CBC w/diff          2/27/2025    04:58 2/28/2025    05:36 3/19/2025    13:12   CBC w/Diff   WBC 9.25  6.63  6.92    RBC 3.33  3.58  3.52    Hemoglobin 10.1  10.4  10.4    Hematocrit 31.8  33.3  33.2    MCV 95.5  93.0  94.3    MCH 30.3  29.1  29.5    MCHC 31.8  31.2  31.3    RDW 17.8  17.2  16.1    Platelets 132  128  110    Neutrophil Rel % 84.1  73.6  69.9    Immature Granulocyte Rel % 0.9  1.1  0.3    Lymphocyte Rel % 6.3  12.1  17.1    Monocyte Rel % 8.6  13.0  9.1    Eosinophil Rel % 0.0  0.0  3.3    Basophil Rel % 0.1  0.2  0.3      Data reviewed : Radiologic studies chest xray 2/25/2025, chest CT 2/25/2025, Consultant notes Dr. Patel consult note 2/25/2025, Recent hospitalization notes hospital discharge summary 3/3/2025, and SANDRO Abraham last office note    Procedures        Assessment and Plan    Diagnoses and all orders for " this visit:    1. Chronic obstructive pulmonary disease, unspecified COPD type (Primary)  Comments:  continue Trelegy  Orders:  -     Ventolin  (90 Base) MCG/ACT inhaler; Inhale 2 puffs Every 4 (Four) Hours As Needed for Wheezing.  Dispense: 18 g; Refill: 5    2. 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 Chronic respiratory failure with hypoxia  Comments:  continue oxygen.  Patient using and benefiting    4. Nicotine dependence, cigarettes, uncomplicated  Comments:  smoking cessation education    5. Dyspnea on exertion  Comments:  continue albuterol as needed    6. Encounter for smoking cessation counseling    Smoking cessation counseling provided.  I spent 5 minutes today counseling patient on the risks of smoking, including throat cancer, lung cancer, COPD, heart disease and death.  Also discussed the benefits of quitting.         Follow Up   Return in about 3 months (around 6/27/2025) for Recheck.  Patient was given instructions and counseling regarding her condition or for health maintenance advice. Please see specific information pulled into the AVS if appropriate.

## 2025-03-27 ENCOUNTER — HOSPITAL ENCOUNTER (OUTPATIENT)
Facility: HOSPITAL | Age: 60
Discharge: HOME | End: 2025-03-27
Payer: MEDICARE

## 2025-03-27 VITALS
WEIGHT: 204 LBS | HEART RATE: 70 BPM | RESPIRATION RATE: 16 BRPM | OXYGEN SATURATION: 99 % | BODY MASS INDEX: 34.83 KG/M2 | DIASTOLIC BLOOD PRESSURE: 53 MMHG | TEMPERATURE: 97.6 F | SYSTOLIC BLOOD PRESSURE: 104 MMHG | HEIGHT: 64 IN

## 2025-03-27 DIAGNOSIS — Z71.6 ENCOUNTER FOR SMOKING CESSATION COUNSELING: ICD-10-CM

## 2025-03-27 DIAGNOSIS — R06.09 DYSPNEA ON EXERTION: ICD-10-CM

## 2025-03-27 DIAGNOSIS — C78.01 SMALL CELL CARCINOMA METASTATIC TO RIGHT LUNG: ICD-10-CM

## 2025-03-27 DIAGNOSIS — F17.210 NICOTINE DEPENDENCE, CIGARETTES, UNCOMPLICATED: ICD-10-CM

## 2025-03-27 DIAGNOSIS — J96.11 CHRONIC RESPIRATORY FAILURE WITH HYPOXIA: Chronic | ICD-10-CM

## 2025-03-27 DIAGNOSIS — J44.9 CHRONIC OBSTRUCTIVE PULMONARY DISEASE, UNSPECIFIED COPD TYPE: Primary | Chronic | ICD-10-CM

## 2025-03-27 RX ORDER — ALBUTEROL SULFATE 90 UG/1
2 AEROSOL, METERED RESPIRATORY (INHALATION) EVERY 4 HOURS PRN
Qty: 18 G | Refills: 5 | Status: SHIPPED | OUTPATIENT
Start: 2025-03-27

## 2025-03-27 RX ORDER — LISINOPRIL 5 MG/1
TABLET ORAL
COMMUNITY
Start: 2025-03-20

## 2025-03-28 ENCOUNTER — PATIENT OUTREACH (OUTPATIENT)
Dept: CASE MANAGEMENT | Facility: OTHER | Age: 60
End: 2025-03-28
Payer: MEDICARE

## 2025-03-28 DIAGNOSIS — C79.31 SECONDARY MALIGNANT NEOPLASM OF BRAIN: ICD-10-CM

## 2025-03-28 DIAGNOSIS — J44.9 CHRONIC OBSTRUCTIVE PULMONARY DISEASE, UNSPECIFIED COPD TYPE: Primary | ICD-10-CM

## 2025-03-28 DIAGNOSIS — C34.30 SMALL CELL CARCINOMA OF LOWER LOBE OF LUNG, UNSPECIFIED LATERALITY: ICD-10-CM

## 2025-03-28 DIAGNOSIS — F41.9 ANXIETY: ICD-10-CM

## 2025-03-28 DIAGNOSIS — F33.2 SEVERE EPISODE OF RECURRENT MAJOR DEPRESSIVE DISORDER, WITHOUT PSYCHOTIC FEATURES: ICD-10-CM

## 2025-03-28 NOTE — OUTREACH NOTE
[facility] End of Month Documentation    This Chronic Medical Management Care Plan for Lluvia Archer, 59 y.o. female, has been established; reviewed and a new plan of care implemented for the month of March.  A cumulative time of 56  minutes was spent on this patient record this month, including chart review; face to face visit with provider and patient; phone call with other providers, pt has dx lung cancer with mets to bone and brain. wears oxygen cont at 4 L. she is established with [facility]. presently on chemotherapy. she is requesting assistance in obtaining Portable Oxygen Concentrator. I have faxed POC order and office notes to [facility].  she has upcoming appt with COPD clinic on 3/27/25.  3/13/25---[facility] has not been able to contact her to set up exchange for POC. I called  and he will have her contact [facility]. 3/21/25---has received POC.    Regarding the patient's problems: has Abnormal mammogram; Anxiety; Arthritis; Chronic nausea; Chronic obstructive pulmonary disease (COPD); Counseling on substance use and abuse; Esophageal reflux; Hernia, hiatal; Hyperlipidemia; Hypertension; Knee pain, right; Memory loss; Migraine; Moderate episode of recurrent major depressive disorder; Night sweats; Numbness; Sciatica of right side; Severe episode of recurrent major depressive disorder, without psychotic features; Shoulder pain, left; Other diseases of nasal cavity and sinuses; Sleep apnea, unspecified; Tobacco abuse; Strain of groin, right, subsequent encounter; Osteoarthritis of spine with radiculopathy, lumbar region; Chronic right-sided low back pain with right-sided sciatica; Aftercare following right hip joint replacement surgery; Primary osteoarthritis of right hip; Right hip pain; Sepsis, due to unspecified organism, unspecified whether acute organ dysfunction present; Severe malnutrition; Pneumonia of right lower lobe due to infectious organism; Hospital discharge follow-up; Other specified  anemias; Encounter for screening mammogram for malignant neoplasm of breast; Multifocal pneumonia; Acute on chronic respiratory failure with hypoxia; Medicare annual wellness visit, subsequent; Community acquired pneumonia; Pneumonia due to infectious organism, unspecified laterality, unspecified part of lung; Mediastinal adenopathy; Small cell lung cancer; Tobacco abuse, in remission; Chronic respiratory failure with hypoxia; Lung nodules; Encounter for adjustment or management of vascular access device; Dehydration; Intractable nausea and vomiting; Pancytopenia due to chemotherapy; Thrombocytopenia; Cancer, metastatic to bone; CAP (community acquired pneumonia); Encounter for long-term (current) use of high-risk medication; Secondary malignant neoplasm of brain; Hyperkalemia; and Hyponatremia on their problem list., the following items were addressed: medical records; medications, COPD action plan, oxygen/medication/appointment compliance and any changes can be found within the plan section of the note.  A detailed listing of time spent for chronic care management is tracked within each outreach encounter.  Current medications include:  has a current medication list which includes the following prescription(s): albuterol, albuterol sulfate hfa, atorvastatin, bupropion sr, calcium carbonate, clonazepam, escitalopram, famotidine, trelegy ellipta, gabapentin, guaifenesin, hydroxyzine, lisinopril, lorazepam, magnesium, melatonin, metoprolol tartrate, naloxone, naproxen, nystatin, ondansetron odt, oxycodone-acetaminophen, prochlorperazine, and ventolin hfa. and the patient is reported to be No data recorded,  Medications are reported to be effective.  Regarding these diagnoses, referrals were made to the following provider(s):  Encouraged to keep all scheduled appointments.  All notes on chart for PCP to review.    The patient was monitored remotely for medications, resp status.    The patient's physical needs include:   physical healthcare.     The patient's mental support needs include:  continued support    The patient's cognitive support needs include:  continued support    The patient's psychosocial support needs include:  continued support, has stress from current illness    The patient's functional needs include: has oxygen,cpap,nebulizer and wheelchair in the home    The patient's environmental needs include:  not applicable    Care Plan overall comments:  established and reviewed    Refer to previous outreach notes for more information on the areas listed above.    Monthly Billing Diagnoses  (J44.9) Chronic obstructive pulmonary disease, unspecified COPD type    (C34.30) Small cell carcinoma of lower lobe of lung, unspecified laterality    (C79.31) Secondary malignant neoplasm of brain    (F41.9) Anxiety    (F33.2) Severe episode of recurrent major depressive disorder, without psychotic features    Medications   Medications have been reconciled    Care Plan progress this month:      Recently Modified Care Plans Updates made since 2/25/2025 12:00 AM      No recently modified care plans.             COPD       Track and Manage My Symptoms       Start:  03/11/25         My COPD Symptoms To Do List       Start:  03/11/25       Why is this important?Tracking your symptoms and other information about your health helps your doctor plan your care.Write down the symptoms, the time of day, what you were doing and what medicine you are taking.You will soon learn how to manage your symptoms.       Initial    My COPD Symptoms To Do List: follow rescue plan if symptoms flare up;keep follow-up appointments wear oxygen as ordered taken at 03/11/25            Track and Manage My Triggers       Start:  03/11/25         My COPD Triggers To Do List       Start:  03/11/25       Why is this important?Triggers are activities or things, like tobacco smoke or cold weather, that make your COPD (chronic obstructive pulmonary disease) flare  up.Knowing these triggers helps you plan how to stay away from them.When you cannot remove them, you can learn how to manage them.       Initial    My COPD Triggers To Do List: avoid second-hand smoke;eliminate smoking in my home taken at 03/11/25            Manage My Fatigue (Tiredness)       Start:  03/11/25         My Fatigue Management To Do List       Start:  03/11/25       Why is this important?Feeling tired or worn out is a common symptom of COPD (chronic obstructive pulmonary disease).Learning when you feel your best and when you need rest is important.Managing the tiredness (fatigue) will help you be active and enjoy life.            Learn and Do Breathing Exercises       Start:  03/11/25         My Breathing Exercises To Do List       Start:  03/11/25       Why is this important?Breathing exercises can help lessen the cough that comes with chronic obstructive pulmonary disease.Doing the exercises will give you more energy.They will also help you to control your symptoms.            Optimal Care Coordination of a Patient Experiencing COPD       Start:  03/11/25         Support Psychosocial Response to COPD       Start:  03/11/25            Alleviate Barriers to COPD Management       Start:  03/11/25          Initial    Alleviate Barriers to COPD Management: action plan reviewed;smoking counseling provided POC ordered taken at 03/11/25         Identify and Minimize Risk of COPD Exacerbation       Start:  03/11/25                Current Specialty Plan of Care Status finalized    Instructions   Patient was provided an electronic copy of care plan  CCM services were explained and offered and patient has accepted these services.  Patient has given their written consent to receive CCM services and understands that this includes the authorization of electronic communication of medical information with the other treating providers.  Patient understands that they may stop CCM services at any time and these changes  will be effective at the end of the calendar month and will effectively revocate the agreement of CCM services.  Patient understands that only one practitioner can furnish and be paid for CCM services during one calendar month.  Patient also understands that there may be co-payment or deductible fees in association with CCM services.  Patient will continue with at least monthly follow-up calls with the Ambulatory .    [facility] [place]  Ambulatory Case Management    3/28/2025, 09:00 EDT

## 2025-04-08 ENCOUNTER — HOSPITAL ENCOUNTER (OUTPATIENT)
Dept: MRI IMAGING | Facility: HOSPITAL | Age: 60
Discharge: HOME | End: 2025-04-08
Payer: MEDICARE

## 2025-04-08 ENCOUNTER — HOSPITAL ENCOUNTER (OUTPATIENT)
Dept: CT IMAGING | Facility: HOSPITAL | Age: 60
Discharge: HOME | End: 2025-04-08
Payer: MEDICARE

## 2025-04-08 DIAGNOSIS — C79.31 SECONDARY MALIGNANT NEOPLASM OF BRAIN: ICD-10-CM

## 2025-04-08 DIAGNOSIS — C34.90 SMALL CELL LUNG CANCER IN ADULT: ICD-10-CM

## 2025-04-08 DIAGNOSIS — C34.90 SMALL CELL LUNG CANCER IN ADULT: Primary | ICD-10-CM

## 2025-04-08 PROCEDURE — 70470 CT HEAD/BRAIN W/O & W/DYE: CPT

## 2025-04-08 PROCEDURE — 25510000001 IOPAMIDOL PER 1 ML: Performed by: RADIOLOGY

## 2025-04-08 RX ORDER — IOPAMIDOL 755 MG/ML
50 INJECTION, SOLUTION INTRAVASCULAR
Status: COMPLETED | OUTPATIENT
Start: 2025-04-08 | End: 2025-04-08

## 2025-04-08 RX ADMIN — IOPAMIDOL 50 ML: 755 INJECTION, SOLUTION INTRAVENOUS at 12:58

## 2025-04-08 NOTE — PROGRESS NOTES
Follow Up Office Visit      Encounter Date: 04/10/2025   Patient Name: Lluvia Archer  YOB: 1965   Medical Record Number: [id number]   Primary Diagnosis: Hypotension, unspecified hypotension type [I95.9]     Cancer Staging   Small cell lung cancer  Staging form: Lung, AJCC 8th Edition  - Clinical: Stage IVB (cT1b, cN2, cM1c) - Signed by Brian Dickson MD on 6/20/2024    Radiation Completion Date: 3/5/2025 whole brain     Chief Complaint:    Chief Complaint   Patient presents with    Follow-up     Small cell lung cancer with brain metastasis        Oncology/Hematology History Overview Note   2/20/24: CT Chest:  No PE or aortic dissection. Multiple right middle lobe nodules are highly suspicious for malignancy.  Additionally, there is mediastinal and right hilar adenopathy now noted.  Follow-up with a PET-CT is recommended. Emphysema with chronic changes in both lungs that otherwise appears stable. Atherosclerotic disease and coronary artery disease.    6/2/24: admitted to [facility] with shortness of breath, fever, cough and started on treatment for pneumonia. Found to have hyponatremia to 126    6/3/24 CT Chest: Right middle lobe nodules and mediastinal and right hilar lymphadenopathy has progressed from prior CT, and remains concerning for malignancy.  Partial right middle lobe atelectasis. Moderate emphysema. Discharged on 6/4/24 with Na of 133.    6/7/24: Bronchoscopy: Station 4R and station 7 positive for small cell carcinoma.    6/13/24: CT head with and without contrast (due to claustrophobia): No mets seen    6/19/24: NM PET: New hypermetabolic nodules within the right middle lobe. There are also multiple new enlarged hypermetabolic mediastinal lymph nodes consistent with metastatic disease. Right axillary mets as well. There are scattered foci of hypermetabolism throughout the bony structures consistent with bone metastases.    6/24/24: C1D1 Carbo/Etop/Durva. Tolerated  well    7/16/24: C2D1 Carbo/Etop/Durva. Complicated by inpatient admission due to dehydration from nausea/vomiting as well as pancytopenia. ANC 0.11. Required transfusion for hgb 6.8.     8/6/24: C3D1 Carbo/Etop/Durva. 20% reduction in Carbo and 20% reduction in Etoposide. Add G-CSF with this cycle due to severe neutropenia with C2.     Repeat scan have shown disease response, however scan was due to PE rule out.     8/27/24: C4D1 Carbo/Etop/Durva. 33% reduction in Carbo and 33% reduction in Etoposide. Continue G-CSF with this cycle due to severe neutropenia with C2. Labs appropriate to proceed. Ok to treat for elevated alk phos.     8/6/24: C3D1 Carbo/Etop/Durva. 20% reduction in Carbo and 20% reduction in Etoposide. Add G-CSF with this cycle due to severe neutropenia with C2.    Repeat scan have shown disease response, however scan was due to PE rule out.     8/27/24: C4D1 Carbo/Etop/Durva. 33% reduction in Carbo and 33% reduction in Etoposide. Continue G-CSF with this cycle due to severe neutropenia with C2.      Repeat scans without progression. Plan for durvalumab alone for maintenance    9/17/24: C5D1 durvalumab alone    2/21/25: C10D1 durvalumab alone    2/25/25: Admitted with shortness of breath and found to be influenza A positive.  CTA of chest showed progression of metastatic disease with enlarging pulmonary nodules and new enlarged mediastinal hilar lymph nodes.  CT of the abdomen pelvis done inpatient was negative for disease.  3/3/25: Completed XRT to brain    3/10/25: Orders written for next line topotecan        Small cell lung cancer   6/12/2024 Initial Diagnosis    Small cell lung cancer     6/14/2024 - 6/14/2024 Chemotherapy    OP LUNG CISplatin 60mg/m2 / Etoposide 120mg/m2 + XRT     6/14/2024 Cancer Staged    Staging form: Lung, AJCC 8th Edition  - Clinical: Stage IVB (cT1b, cN2, cM1c) - Signed by Brian Dickson MD on 6/20/2024 6/24/2024 - 8/29/2024 Chemotherapy    OP LUNG  CARBOplatin AUC=5 / Etoposide / Durvalumab     8/27/2024 -  Chemotherapy    OP SUPPORTIVE Denosumab (Xgeva) Q28D     9/17/2024 - 2/21/2025 Biopsy    OP LUNG Durvalumab 1500 mg  Plan Provider: Brian Dickson MD  Treatment goal: Control  Line of treatment: [No plan line of treatment]     3/3/2025 - 3/3/2025 Chemotherapy    OP LUNG Lurbinectedin      3/19/2025 -  Chemotherapy    OP LUNG Topotecan (IV)     Cancer, metastatic to bone   8/6/2024 Initial Diagnosis    Cancer, metastatic to bone     8/27/2024 -  Chemotherapy    OP SUPPORTIVE Denosumab (Xgeva) Q28D     Secondary malignant neoplasm of brain   2/10/2025 Initial Diagnosis    Secondary malignant neoplasm of brain     2/13/2025 - 3/5/2025 Radiation    Radiation OncologyTreatment Course:  Lluvia Archer received 3000 cGy in 10 fractions to the whole brain.          History of Present Illness: Lluvia Archer is a 59 y.o. female who returns to [facility] follow-up after completing whole brain radiotherapy on 3/5/25. She is accompanied by her .   History of Present Illness  She has been experiencing difficulty in eating due to oral thrush, which has not responded to prescribed medication. This is her third episode of thrush, and she has not been previously treated with antifungal pills. She has been prescribed a yellow liquid medication for the thrush but reports that it causes discomfort during meals. She has attempted to consume soft foods such as baked potatoes but finds it challenging to identify suitable alternatives. She has been prescribed nystatin for the thrush but reports that it causes discomfort during meals.    She does not monitor her blood pressure at home and is uncertain about her current medication regimen. She has discontinued the use of metoprolol and plans to consult with Dr. Basilio next week to clarify her medication schedule.    She reports experiencing numbness in her hands and has been prescribed  gabapentin for this condition. She takes pain medication at night to aid sleep and also takes gabapentin.    She has completed her radiation therapy and is scheduled to resume chemotherapy on Monday. She expresses concern about potential weight loss during her upcoming chemotherapy sessions, as she experienced significant weight loss during her previous cycle. She has been experiencing insomnia, which she attributes to anxiety related to her recent CT scan results. She acknowledges inadequate fluid intake, preferring Mountain Dew over water. She has recently incorporated Boost into her diet but is not doing so consistently.     Subjective      Review of Systems: Review of Systems   Constitutional:  Positive for activity change (Decreased due to soa.), appetite change (Decreased), fatigue (8/10) and unexpected weight change. Negative for chills and fever.   HENT:  Negative for sore throat and trouble swallowing.    Eyes:  Negative for visual disturbance.   Respiratory:  Positive for cough (productive with thick green secretions, ongoing), shortness of breath (with activity, ongoing) and wheezing (Constant, ongoing). Negative for chest tightness.         Becomes more of soa and wheezes more at night, ongoing.  Wears 3-4L of o2 at all times at home, came in on room air and o2 was 96%.   Cardiovascular:  Positive for palpitations (occasional with anxiety) and leg swelling (Noted bilaterally, left is worse). Negative for chest pain.   Gastrointestinal:  Negative for constipation, diarrhea and vomiting. Nausea: Occasionally, takes antiemetics with relief..       Colonoscopy 2023     Genitourinary:  Negative for difficulty urinating, dysuria, frequency, hematuria and urgency.   Musculoskeletal:  Positive for gait problem (r/t weakness and soa) and joint swelling (Feet and ankles). Negative for arthralgias and back pain.   Skin:  Negative for color change and rash.   Neurological:  Positive for dizziness (Occasionally,  noted with positional changes, noted for couple of weeks.), weakness (Generalized, ongoing) and numbness (Fingers ongoing). Negative for tremors, seizures, syncope, speech difficulty and headaches.   Psychiatric/Behavioral:  Positive for sleep disturbance (difficulty falling and staying asleep). Negative for confusion and decreased concentration. The patient is nervous/anxious.        The following portions of the patient's history were reviewed and updated as appropriate: allergies, current medications, past family history, past medical history, past social history, past surgical history and problem list.    Medications:     Current Outpatient Medications:     albuterol (ACCUNEB) 0.63 MG/3ML nebulizer solution, Take 3 mL by nebulization Every 6 (Six) Hours As Needed for Wheezing or Shortness of Air., Disp: 60 each, Rfl: 2    albuterol sulfate  (90 Base) MCG/ACT inhaler, Inhale 2 puffs Every 4 (Four) Hours As Needed for Wheezing or Shortness of Air., Disp: 18 g, Rfl: 5    atorvastatin (LIPITOR) 10 MG tablet, Take 1 tablet by mouth Every Night., Disp: 90 tablet, Rfl: 3    buPROPion SR (WELLBUTRIN SR) 150 MG 12 hr tablet, Take 1 tablet by mouth 2 (Two) Times a Day., Disp: 180 tablet, Rfl: 3    calcium carbonate (Tums) 500 MG chewable tablet, Chew 2 tablets Daily., Disp: 28 tablet, Rfl: 0    clonazePAM (KlonoPIN) 0.5 MG tablet, Take 1 tablet by mouth 2 (Two) Times a Day As Needed for Anxiety., Disp: 60 tablet, Rfl: 0    escitalopram (LEXAPRO) 20 MG tablet, Take 1 tablet by mouth Daily., Disp: 90 tablet, Rfl: 3    famotidine (PEPCID) 40 MG tablet, TAKE ONE TABLET BY MOUTH TWICE DAILY @ 9AM & 5PM (Patient taking differently: Take 1 tablet by mouth 2 (Two) Times a Day.), Disp: 180 tablet, Rfl: 11    Fluticasone-Umeclidin-Vilant (Trelegy Ellipta) 200-62.5-25 MCG/ACT inhaler, Inhale 1 puff Daily., Disp: 90 each, Rfl: 1    gabapentin (NEURONTIN) 100 MG capsule, Take 2 capsules by mouth 3 (Three) Times a Day., Disp: 90  capsule, Rfl: 2    hydrOXYzine (ATARAX) 25 MG tablet, TAKE 1 TABLET BY MOUTH THREE TIMES A DAY AS NEEDED FOR ITCHING (Patient taking differently: Take 1 tablet by mouth 3 (Three) Times a Day As Needed.), Disp: 270 tablet, Rfl: 2    lisinopril (PRINIVIL,ZESTRIL) 5 MG tablet, TAKE ONE TABLET BY MOUTH DAILY AT 9 AM, Disp: , Rfl:     LORazepam (ATIVAN) 0.5 MG tablet, Take 1 tablet by mouth Every 8 (Eight) Hours As Needed for Anxiety., Disp: 60 tablet, Rfl: 2    Magnesium 400 MG tablet, Take 400 mg by mouth Daily., Disp: 30 tablet, Rfl: 5    Melatonin 10 MG tablet, Take 1 tablet by mouth At Night As Needed., Disp: , Rfl:     metoprolol tartrate (LOPRESSOR) 25 MG tablet, Take 0.5 tablets by mouth 2 (Two) Times a Day for 30 days., Disp: 30 tablet, Rfl: 0    naloxone (NARCAN) 4 MG/0.1ML nasal spray, CALL 911. DON'T PRIME. SPRAY IN 1 NOSTRIL FOR OVERDOSE. REPEAT IN 2-3 MINUTES IN OTHER NOSTRIL IF NO OR MINIMAL BREATHING/RESPONSIVENESS., Disp: 2 each, Rfl: 0    naproxen (NAPROSYN) 500 MG tablet, Take 1 tablet by mouth 2 (Two) Times a Day With Meals., Disp: 30 tablet, Rfl: 1    nystatin (MYCOSTATIN) 100,000 unit/mL suspension, Swish and swallow 5 mL 4 (Four) Times a Day., Disp: 473 mL, Rfl: 2    ondansetron ODT (ZOFRAN-ODT) 8 MG disintegrating tablet, Place 1 tablet on the tongue Every 8 (Eight) Hours As Needed for Nausea or Vomiting., Disp: 60 tablet, Rfl: 3    oxyCODONE-acetaminophen (PERCOCET)  MG per tablet, Take 1 tablet by mouth Every 4 (Four) Hours As Needed for Moderate Pain., Disp: 180 tablet, Rfl: 0    prochlorperazine (COMPAZINE) 10 MG tablet, Take 1 tablet by mouth Every 6 (Six) Hours As Needed for Nausea., Disp: 60 tablet, Rfl: 3    Ventolin  (90 Base) MCG/ACT inhaler, Inhale 2 puffs Every 4 (Four) Hours As Needed for Wheezing., Disp: 18 g, Rfl: 5    Allergies:   No Known Allergies    Patient Smoking History:   Social History     Tobacco Use   Smoking Status Every Day    Current packs/day: 1.00     Average packs/day: 1 pack/day for 47.3 years (47.3 ttl pk-yrs)    Types: Cigarettes    Start date: 1978    Passive exposure: Past   Smokeless Tobacco Never       Measures:  PHQ-9 Total Score: 14   Patient screened positive for depression based on a PHQ-9 score of 14 on 4/10/2025. Follow-up recommendations include: Elevated PHQ score reflective of acute illness, not depression.   Quality of Life: 80 - Restricted Physical Activity   ECOG score: 3  ECOG: Capable of only limited selfcare, confined to bed or chair more than 50% of waking hours   Pain: (on a scale of 0-10)   Pain Score    04/10/25 1012   PainSc: 0-No pain     Objective     Physical Exam:   Vital Signs:   Vitals:    04/10/25 1012 04/10/25 1055   BP: (!) 84/73 (!) 77/60   Pulse: 107    Resp: 24    Temp: 98 °F (36.7 °C)    TempSrc: Oral    SpO2: 96%  Comment: Room air.    Weight: 90.4 kg (199 lb 4.7 oz)    PainSc: 0-No pain      Body mass index is 34.21 kg/m².   Wt Readings from Last 3 Encounters:   04/10/25 90.4 kg (199 lb 4.7 oz)   03/27/25 92.5 kg (204 lb)   03/25/25 95.5 kg (210 lb 9.6 oz)       Physical Exam  Vitals reviewed.   Constitutional:       General: She is not in acute distress.     Appearance: Normal appearance. She is normal weight. She is not ill-appearing.      Comments: Sitting comfortably in wheelchair   HENT:      Head: Normocephalic and atraumatic.      Mouth/Throat:      Mouth: Mucous membranes are dry.      Comments: Significant oral thrush present   Eyes:      General: No visual field deficit.     Conjunctiva/sclera: Conjunctivae normal.      Pupils: Pupils are equal, round, and reactive to light.   Cardiovascular:      Rate and Rhythm: Regular rhythm. Tachycardia present.      Pulses: Normal pulses.      Heart sounds: Normal heart sounds.   Pulmonary:      Effort: Pulmonary effort is normal. No respiratory distress.      Breath sounds: Normal breath sounds.   Musculoskeletal:         General: Normal range of motion.      Cervical  back: Normal range of motion.   Skin:     General: Skin is warm and dry.   Neurological:      General: No focal deficit present.      Mental Status: She is alert and oriented to person, place, and time. Mental status is at baseline.      Cranial Nerves: No dysarthria or facial asymmetry.      Motor: Weakness (generalized) present. No abnormal muscle tone or pronator drift.   Psychiatric:         Mood and Affect: Mood normal.         Behavior: Behavior normal.       Result Review: I independently reviewed the following data. I discussed findings of 4/8/25 CT Head with Dr. Gordon and he independently reviewed images.   -- CT Head With & Without Contrast (04/08/2025 12:58)   -- CT Head With & Without Contrast (02/04/2025 11:03)   -- CT Cervical Spine With Contrast (02/26/2025 19:19)   -- CT Abdomen Pelvis With Contrast (02/26/2025 19:22)   -- CT Angiogram Chest Pulmonary Embolism (02/25/2025 12:54)   Results  Imaging  CT scan shows interval resolution of previously identified 4-5 intracranial lesions.    Pathology:   Lab Results   Component Value Date    CLININFO  07/31/2024     Admitting Diagnoses    D64.9 Acute anemia ICD-10-CM   D70.1, T45.1X5A Chemotherapy-induced neutropenia ICD-10-CM   D69.6 Thrombocytopenia ICD-10-CM   R11.2 Nausea and vomiting, unspecified vomiting type ICD-10-CM   C34.90 Malignant neoplasm of lung, unspecified laterality, unspecified part of lung ICD-10-CM   R26.2 Difficulty walking ICD-10-CM         FINALDX  07/31/2024     Peripheral blood (CBC and smear):   - WBC:  Leukopenia with absolute neutropenia, myeloid left shift to promyelocyte stage   - RBC:  Normocytic anemia with anisocytosis, circulating nucleated red blood cells   - Platelets:  Thrombocytopenia, normal morphology         Imaging: CT Head With & Without Contrast  Result Date: 4/8/2025  Impression: 1.No acute intracranial process identified. 2.No evidence of intracranial metastasis. Previously identified metastatic lesions have  resolved. Electronically Signed: Isma Roche MD  4/8/2025 7:04 PM EDT  Workstation ID: [id number]    CT Cervical Spine With Contrast  Result Date: 2/26/2025  1.The study is motion-limited. 2.No acute fracture or acute malalignment is identified. 3.There is moderate left foraminal narrowing at C3-4, due to uncovertebral and facet osteoarthritis. 4.No significant spinal canal narrowing is suggested by CT examination without intrathecal contrast agent administration. 5.No aggressive osseous lesion is suggested. 6.Please see above comments for further detail.    Portions of this note were completed with a voice recognition program. Electronically Signed: Conrad Morel MD  2/26/2025 10:57 PM EST  Workstation ID: [id number]    CT Abdomen Pelvis With Contrast  Result Date: 2/26/2025  Impression: CT scan of the abdomen and pelvis demonstrating previous cholecystectomy. No intra-abdominal metastatic disease. Stable diffuse sclerotic osseous metastatic disease. Electronically Signed: Fly Chavez MD  2/26/2025 10:57 PM EST  Workstation ID: [id number]    CT Angiogram Chest Pulmonary Embolism  Result Date: 2/25/2025  Impression: 1.No evidence of pulmonary embolism. 2.New anterior peripheral left lower lobe airspace disease may be related to bronchial narrowing and atelectasis. However, pneumonia not excluded. 3.Progression of metastatic disease with enlarging pulmonary nodules and new enlarged mediastinal/hilar lymph nodes. Electronically Signed: Chapo Pabon MD  2/25/2025 1:07 PM EST  Workstation ID: [id number]    XR Chest 1 View  Result Date: 2/25/2025  Impression: 1. Cardiomegaly. 2. Mixed interstitial/airspace disease, right greater than left side which could be on the basis of multifocal pneumonia from aspiration. Electronically Signed: Memo Luque MD  2/25/2025 11:02 AM EST  Workstation ID: [id number]    CT Head With & Without Contrast  Result Date: 2/5/2025  Impression: Multiple new enhancing parenchymal lesions are  identified, both supratentorial and infratentorial, consistent with metastatic disease progression. 3 lesions in the posterior fossa are present associated with some minimal surrounding edema, otherwise without significant mass effect. The fourth ventricle does not appear effaced. Electronically Signed: Arnel Crowley MD  2/5/2025 2:36 PM EST  Workstation ID: [id number]     Labs:   WBC   Date Value Ref Range Status   03/19/2025 6.92 3.40 - 10.80 10*3/mm3 Final     Hemoglobin   Date Value Ref Range Status   03/19/2025 10.4 (L) 12.0 - 15.9 g/dL Final     Hematocrit   Date Value Ref Range Status   03/19/2025 33.2 (L) 34.0 - 46.6 % Final     Platelets   Date Value Ref Range Status   03/19/2025 110 (L) 140 - 450 10*3/mm3 Final     Creatinine   Date Value Ref Range Status   03/19/2025 0.80 0.57 - 1.00 mg/dL Final     BUN   Date Value Ref Range Status   03/19/2025 12 6 - 20 mg/dL Final     eGFR   Date Value Ref Range Status   03/19/2025 85.5 >60.0 mL/min/1.73 Final     TSH   Date Value Ref Range Status   02/21/2025 1.670 0.270 - 4.200 uIU/mL Final     Assessment / Plan      Impression: Lluvia Archer is a pleasant 59 y.o. female with extensive stage small cell lung cancer with osseous metastatic disease as well as right axillary adenopathy. Pathology from bronchoscopy and biopsy on 6/7/24 from station 4R and station 7 revealed small cell carcinoma. As she was unable to undergo MRI of the brain due to claustrophobia, CT scan of the head with and without contrast performed on 6/13/2024 revealed no evidence of intracranial metastatic disease. She has commenced carbo/etoposide/Durvalumab and is currently on durvalumab alone and Xgeva per Dr. Dickson. No plan for consolidative radiation given the extent of her thoracic disease is primarily mediastinal adenopathy without a dominant/large parenchymal lung lesion. Will consider future radiotherapy in the setting of progression of disease resulting in symptoms. She was receiving  maintenance immunotherapy with durvalumab until CT scans in 2/2025 showed progression in her lungs. Pulmonary nodules have worsened and she has new/enlarging mediastinal and hilar nodes. Therapy was changed from immunotherapy to topotecan which she is currently receiving per Dr. Dickson.     Brain metastases: she is unable to undergo MRI brain for intracranial monitoring due to claustrophobia. CT head on 2/4/25 demonstrates new intracranial metastases. There are believed to be at least 5 lesions. There is a 14 mm right cerebellar lesion, 15 mm left cerebellar lesion, 10 mm lesion within the midline valentina, 8 mm right frontal lesion, and enhancement seen along the left parietal convexity near the vertex measuring 6 mm that is likely an additional metastasis. Discussed the possibility of additional smaller lesions not visible on CT scan. She is status post whole brain radiotherapy, completed on 3/5/2025. Received 3000 cGy in 10 fractions. Restaging CT head on 4/8/25 shows interval resolution of previously identified metastatic intracranial lesions. Will repeat CT head in 3 months for surveillance.     Anxiety: present at baseline. Ativan not beneficial. Prescribed clonazepam per Dr. Dickson.     Cancer-related pain: pain reasonably well controlled with percocet prescribed by Dr. Dickson.     Assessment/Plan:   Diagnoses and all orders for this visit:    1. Hypotension, unspecified hypotension type (Primary)    2. Secondary malignant neoplasm of brain  -     CT Head With & Without Contrast; Future    3. Status post radiation therapy within last four weeks    4. Encounter for follow-up examination after completed treatment for malignant neoplasm    5. Small cell lung cancer in adult  -     CT Head With & Without Contrast; Future    6. Metastatic cancer to bone    7. Anxiety    8. History of claustrophobia    9. Nicotine dependence with current use    10. Cancer-related pain      Assessment & Plan  1. Oral thrush.  The  patient reports persistent oral thrush despite using nystatin. She is advised to inform her oncologist about the persistent thrush during her visit tomorrow. An oral antifungal such as diflucan may be considered if the current treatment is ineffective if there is no contraindication with her current chemotherapy regimen.    2. Low blood pressure.  Her blood pressure is low and heart rate is elevated today. She is advised to increase her fluid intake, preferably water, and decrease her consumption of Mountain Dew. She reports not having a way to monitor her blood pressure at home. She does not believe she is currently taking lopressor. She has been having difficulty receiving her medications from the pharmacy and plans to clarify all her medications with her PCP at upcoming appointment. Her blood pressure was rechecked prior to leaving today and was lower than initial reading. Our office contacted Dr. Dickson's office and they advised they have no schedule availability for patient to receive IV fluids today and recommended patient present to the ER. Discussed with patient and explained rationale, explaining that she is at increased risk for syncope and potentially fall resulting in bodily harm. She reports understanding the risks and declined going to the ER today. She will be seen in Dr. Dickson's office tomorrow.     3. Neuropathy.  She is currently taking gabapentin for neuropathy prescribed by Dr. Dickson.     Follow Up:   Return for follow-up in 3 months with CT head prior.   Follow-up with Dr. Dickson on 4/11/25.     Return in about 3 months (around 7/10/2025) for Office Visit.  Lluvia Archer was encouraged to contact me in the interim with any questions or concerns regarding her care.      SANDRO Terry  Radiation Oncology  [facility]    This document has been signed by SANDRO Bolanos on April 10, 2025 11:13 EDT     Patient or patient representative verbalized consent for the use of  Ambient Listening during the visit with  SANDRO Bolanos for chart documentation. 4/10/2025  11:26 EST

## 2025-04-10 ENCOUNTER — OFFICE VISIT (OUTPATIENT)
Dept: RADIATION ONCOLOGY | Facility: HOSPITAL | Age: 60
End: 2025-04-10
Payer: MEDICARE

## 2025-04-10 VITALS
DIASTOLIC BLOOD PRESSURE: 60 MMHG | BODY MASS INDEX: 34.21 KG/M2 | WEIGHT: 199.3 LBS | SYSTOLIC BLOOD PRESSURE: 77 MMHG | TEMPERATURE: 98 F | HEART RATE: 107 BPM | RESPIRATION RATE: 24 BRPM | OXYGEN SATURATION: 96 %

## 2025-04-10 DIAGNOSIS — C34.90 SMALL CELL LUNG CANCER IN ADULT: ICD-10-CM

## 2025-04-10 DIAGNOSIS — F41.9 ANXIETY: ICD-10-CM

## 2025-04-10 DIAGNOSIS — C79.51 METASTATIC CANCER TO BONE: ICD-10-CM

## 2025-04-10 DIAGNOSIS — Z08 ENCOUNTER FOR FOLLOW-UP EXAMINATION AFTER COMPLETED TREATMENT FOR MALIGNANT NEOPLASM: ICD-10-CM

## 2025-04-10 DIAGNOSIS — I95.9 HYPOTENSION, UNSPECIFIED HYPOTENSION TYPE: Primary | ICD-10-CM

## 2025-04-10 DIAGNOSIS — F17.200 NICOTINE DEPENDENCE WITH CURRENT USE: ICD-10-CM

## 2025-04-10 DIAGNOSIS — Z92.3 STATUS POST RADIATION THERAPY WITHIN LAST FOUR WEEKS: ICD-10-CM

## 2025-04-10 DIAGNOSIS — G89.3 CANCER-RELATED PAIN: ICD-10-CM

## 2025-04-10 DIAGNOSIS — Z86.59 HISTORY OF CLAUSTROPHOBIA: ICD-10-CM

## 2025-04-10 DIAGNOSIS — C79.31 SECONDARY MALIGNANT NEOPLASM OF BRAIN: ICD-10-CM

## 2025-04-10 PROCEDURE — G0463 HOSPITAL OUTPT CLINIC VISIT: HCPCS | Performed by: NURSE PRACTITIONER

## 2025-04-10 NOTE — PROGRESS NOTES
Contacted medical oncology regarding patients blood pressure of 77/60. Spoke to Dr. Dickson's nurse, Sapphire to see if they had any availabilities for IV fluids.  Sapphire made Dr. Dickson aware of patients b/p and dizziness.  Medical oncology does not have any availability for IVF today.  Dr. Dickson recommended patient go to the ER for evaluation.

## 2025-04-11 ENCOUNTER — OFFICE VISIT (OUTPATIENT)
Dept: ONCOLOGY | Facility: HOSPITAL | Age: 60
End: 2025-04-11
Payer: MEDICARE

## 2025-04-11 ENCOUNTER — HOSPITAL ENCOUNTER (OUTPATIENT)
Dept: ONCOLOGY | Facility: HOSPITAL | Age: 60
Discharge: HOME | End: 2025-04-11
Payer: MEDICARE

## 2025-04-11 ENCOUNTER — APPOINTMENT (OUTPATIENT)
Dept: CT IMAGING | Facility: HOSPITAL | Age: 60
End: 2025-04-11
Payer: MEDICARE

## 2025-04-11 VITALS — HEART RATE: 100 BPM | DIASTOLIC BLOOD PRESSURE: 65 MMHG | SYSTOLIC BLOOD PRESSURE: 90 MMHG

## 2025-04-11 VITALS
DIASTOLIC BLOOD PRESSURE: 66 MMHG | TEMPERATURE: 97.7 F | HEART RATE: 110 BPM | BODY MASS INDEX: 34.06 KG/M2 | OXYGEN SATURATION: 96 % | RESPIRATION RATE: 16 BRPM | WEIGHT: 199.52 LBS | HEIGHT: 64 IN | SYSTOLIC BLOOD PRESSURE: 76 MMHG

## 2025-04-11 DIAGNOSIS — Z45.2 ENCOUNTER FOR ADJUSTMENT OR MANAGEMENT OF VASCULAR ACCESS DEVICE: ICD-10-CM

## 2025-04-11 DIAGNOSIS — E86.0 DEHYDRATION: Primary | ICD-10-CM

## 2025-04-11 DIAGNOSIS — C34.90 SMALL CELL CARCINOMA OF LUNG, UNSPECIFIED LATERALITY, UNSPECIFIED PART OF LUNG: ICD-10-CM

## 2025-04-11 DIAGNOSIS — M79.641 BILATERAL HAND PAIN: ICD-10-CM

## 2025-04-11 DIAGNOSIS — C79.51 CANCER, METASTATIC TO BONE: Primary | ICD-10-CM

## 2025-04-11 DIAGNOSIS — I95.2 HYPOTENSION DUE TO DRUGS: ICD-10-CM

## 2025-04-11 DIAGNOSIS — C79.31 SECONDARY MALIGNANT NEOPLASM OF BRAIN: ICD-10-CM

## 2025-04-11 DIAGNOSIS — M79.642 BILATERAL HAND PAIN: ICD-10-CM

## 2025-04-11 DIAGNOSIS — C79.51 CANCER, METASTATIC TO BONE: ICD-10-CM

## 2025-04-11 DIAGNOSIS — C34.90 SMALL CELL CARCINOMA OF LUNG, UNSPECIFIED LATERALITY, UNSPECIFIED PART OF LUNG: Primary | ICD-10-CM

## 2025-04-11 DIAGNOSIS — F41.9 ANXIETY: ICD-10-CM

## 2025-04-11 LAB
ALBUMIN SERPL-MCNC: 3.9 G/DL (ref 3.5–5.2)
ALBUMIN/GLOB SERPL: 1.3 G/DL
ALP SERPL-CCNC: 94 U/L (ref 39–117)
ALT SERPL W P-5'-P-CCNC: 7 U/L (ref 1–33)
ANION GAP SERPL CALCULATED.3IONS-SCNC: 11.8 MMOL/L (ref 5–15)
AST SERPL-CCNC: 13 U/L (ref 1–32)
BASO STIPL COARSE BLD QL SMEAR: ABNORMAL
BILIRUB SERPL-MCNC: <0.2 MG/DL (ref 0–1.2)
BUN SERPL-MCNC: 9 MG/DL (ref 6–20)
BUN/CREAT SERPL: 10.7 (ref 7–25)
CALCIUM SPEC-SCNC: 8.6 MG/DL (ref 8.6–10.5)
CHLORIDE SERPL-SCNC: 100 MMOL/L (ref 98–107)
CO2 SERPL-SCNC: 24.2 MMOL/L (ref 22–29)
CREAT SERPL-MCNC: 0.84 MG/DL (ref 0.57–1)
DACRYOCYTES BLD QL SMEAR: ABNORMAL
DEPRECATED RDW RBC AUTO: 53.1 FL (ref 37–54)
EGFRCR SERPLBLD CKD-EPI 2021: 80.7 ML/MIN/1.73
ERYTHROCYTE [DISTWIDTH] IN BLOOD BY AUTOMATED COUNT: 15.5 % (ref 12.3–15.4)
GLOBULIN UR ELPH-MCNC: 3 GM/DL
GLUCOSE SERPL-MCNC: 106 MG/DL (ref 65–99)
HCT VFR BLD AUTO: 31.5 % (ref 34–46.6)
HGB BLD-MCNC: 10 G/DL (ref 12–15.9)
LYMPHOCYTES # BLD MANUAL: 2.38 10*3/MM3 (ref 0.7–3.1)
LYMPHOCYTES NFR BLD MANUAL: 14 % (ref 5–12)
MAGNESIUM SERPL-MCNC: 1.9 MG/DL (ref 1.6–2.6)
MCH RBC QN AUTO: 30.3 PG (ref 26.6–33)
MCHC RBC AUTO-ENTMCNC: 31.7 G/DL (ref 31.5–35.7)
MCV RBC AUTO: 95.5 FL (ref 79–97)
MONOCYTES # BLD: 1.07 10*3/MM3 (ref 0.1–0.9)
NEUTROPHILS # BLD AUTO: 4.22 10*3/MM3 (ref 1.7–7)
NEUTROPHILS NFR BLD MANUAL: 52 % (ref 42.7–76)
NEUTS BAND NFR BLD MANUAL: 3 % (ref 0–5)
PHOSPHATE SERPL-MCNC: 2.3 MG/DL (ref 2.5–4.5)
PLATELET # BLD AUTO: 115 10*3/MM3 (ref 140–450)
PMV BLD AUTO: 9.9 FL (ref 6–12)
POIKILOCYTOSIS BLD QL SMEAR: ABNORMAL
POTASSIUM SERPL-SCNC: 4.2 MMOL/L (ref 3.5–5.2)
PROT SERPL-MCNC: 6.9 G/DL (ref 6–8.5)
RBC # BLD AUTO: 3.3 10*6/MM3 (ref 3.77–5.28)
SCAN SLIDE: NORMAL
SMALL PLATELETS BLD QL SMEAR: ABNORMAL
SODIUM SERPL-SCNC: 136 MMOL/L (ref 136–145)
VARIANT LYMPHS NFR BLD MANUAL: 31 % (ref 19.6–45.3)
WBC MORPH BLD: NORMAL
WBC NRBC COR # BLD AUTO: 7.67 10*3/MM3 (ref 3.4–10.8)

## 2025-04-11 PROCEDURE — 84100 ASSAY OF PHOSPHORUS: CPT | Performed by: INTERNAL MEDICINE

## 2025-04-11 PROCEDURE — 25010000002 HEPARIN LOCK FLUSH PER 10 UNITS: Performed by: INTERNAL MEDICINE

## 2025-04-11 PROCEDURE — 36415 COLL VENOUS BLD VENIPUNCTURE: CPT

## 2025-04-11 PROCEDURE — 83735 ASSAY OF MAGNESIUM: CPT | Performed by: INTERNAL MEDICINE

## 2025-04-11 PROCEDURE — 80053 COMPREHEN METABOLIC PANEL: CPT | Performed by: INTERNAL MEDICINE

## 2025-04-11 PROCEDURE — 85007 BL SMEAR W/DIFF WBC COUNT: CPT | Performed by: INTERNAL MEDICINE

## 2025-04-11 PROCEDURE — 96360 HYDRATION IV INFUSION INIT: CPT

## 2025-04-11 PROCEDURE — 25810000003 SODIUM CHLORIDE 0.9 % SOLUTION: Performed by: INTERNAL MEDICINE

## 2025-04-11 PROCEDURE — 85025 COMPLETE CBC W/AUTO DIFF WBC: CPT | Performed by: INTERNAL MEDICINE

## 2025-04-11 RX ORDER — SODIUM CHLORIDE 9 MG/ML
20 INJECTION, SOLUTION INTRAVENOUS ONCE
Status: CANCELLED | OUTPATIENT
Start: 2025-04-14

## 2025-04-11 RX ORDER — SODIUM CHLORIDE 9 MG/ML
20 INJECTION, SOLUTION INTRAVENOUS ONCE
OUTPATIENT
Start: 2025-04-18

## 2025-04-11 RX ORDER — SODIUM CHLORIDE 9 MG/ML
20 INJECTION, SOLUTION INTRAVENOUS ONCE
OUTPATIENT
Start: 2025-04-17

## 2025-04-11 RX ORDER — HEPARIN SODIUM (PORCINE) LOCK FLUSH IV SOLN 100 UNIT/ML 100 UNIT/ML
500 SOLUTION INTRAVENOUS AS NEEDED
Status: CANCELLED | OUTPATIENT
Start: 2025-04-11

## 2025-04-11 RX ORDER — SODIUM CHLORIDE 9 MG/ML
20 INJECTION, SOLUTION INTRAVENOUS ONCE
OUTPATIENT
Start: 2025-04-16

## 2025-04-11 RX ORDER — HEPARIN SODIUM (PORCINE) LOCK FLUSH IV SOLN 100 UNIT/ML 100 UNIT/ML
500 SOLUTION INTRAVENOUS AS NEEDED
Status: DISCONTINUED | OUTPATIENT
Start: 2025-04-11 | End: 2025-04-12 | Stop reason: HOSPADM

## 2025-04-11 RX ORDER — SODIUM CHLORIDE 0.9 % (FLUSH) 0.9 %
20 SYRINGE (ML) INJECTION AS NEEDED
Status: DISCONTINUED | OUTPATIENT
Start: 2025-04-11 | End: 2025-04-12 | Stop reason: HOSPADM

## 2025-04-11 RX ORDER — SODIUM CHLORIDE 0.9 % (FLUSH) 0.9 %
20 SYRINGE (ML) INJECTION AS NEEDED
Status: CANCELLED | OUTPATIENT
Start: 2025-04-11

## 2025-04-11 RX ORDER — SODIUM CHLORIDE 9 MG/ML
20 INJECTION, SOLUTION INTRAVENOUS ONCE
OUTPATIENT
Start: 2025-04-15

## 2025-04-11 RX ADMIN — HEPARIN 500 UNITS: 100 SYRINGE at 16:01

## 2025-04-11 RX ADMIN — HEPARIN 500 UNITS: 100 SYRINGE at 14:03

## 2025-04-11 RX ADMIN — SODIUM CHLORIDE 1000 ML: 9 INJECTION, SOLUTION INTRAVENOUS at 14:56

## 2025-04-11 RX ADMIN — Medication 20 ML: at 14:03

## 2025-04-11 RX ADMIN — Medication 20 ML: at 16:01

## 2025-04-11 NOTE — CODE DOCUMENTATION
Could not get enough blood to get a good sample for lab work from her port. I had to collect her labs by venipuncture. NITISH Bonilla

## 2025-04-11 NOTE — PROGRESS NOTES
Chief Complaint   FOLLOW UP 1    Aleksandar Edge*  Aleksandar Edge, DO    Subjective          Lluvia REGINA Archer presents to [facility] for small cell lung cancer    Ms. Archer is a very pleasant 58-year-old female with past medical history of hypertension hyperlipidemia, COPD, osteoarthritis, anxiety who presents for new oncology evaluation with her daughter for diagnosis of small cell lung cancer.  Patient previously had PET scan in September 2023 without any concerning findings.  Follow-up CT chest in February showed multiple right middle lobe nodules with mediastinal and right hilar adenopathy.  Patient was admitted to [facility] on June 2 with shortness of breath, fever, cough.  She was started on treatment for pneumonia.  She was found to be hyponatremic to 126.  Repeat CT chest on 3 Tia showed progression of right middle lobe nodules and mediastinal right hilar lymphadenopathy.  Patient was discharged on 4 June.  She had outpatient bronchoscopy on 7 June with station 4R and station 7 possible small cell carcinoma.  Unable to get MRI due to claustrophobia.  CT head with and without contrast on 13 June did not show any intracranial mets.  PET scan confirmed metastatic disease to bone and right axilla    Interval History  Patient presents for follow up.  Patient recently started topotecan on 3/19/25.  Patient tolerated this well.  Denies any nausea or vomiting with that.  No fevers or chills.  Reports he is a little weak in the last few days.  She reports she is drinking apple juice and Mountain Dew.  She reports she does not like drinking water.  Blood pressure is low in office today.  Was low yesterday as well.  Plan for IV fluids today.  IV fluids improved her blood pressure to 90 systolic.  Will also plan for IV fluids on Monday.  Patient is happy because her recent CT head showed no metastatic disease as the lesions had resolved status post  radiation.  She is due for cycle 2 topotecan on Monday.  She is ready to proceed.  Labs are appropriate.       Oncology/Hematology History Overview Note   2/20/24: CT Chest:  No PE or aortic dissection. Multiple right middle lobe nodules are highly suspicious for malignancy.  Additionally, there is mediastinal and right hilar adenopathy now noted.  Follow-up with a PET-CT is recommended. Emphysema with chronic changes in both lungs that otherwise appears stable. Atherosclerotic disease and coronary artery disease.    6/2/24: admitted to [facility] with shortness of breath, fever, cough and started on treatment for pneumonia. Found to have hyponatremia to 126    6/3/24 CT Chest: Right middle lobe nodules and mediastinal and right hilar lymphadenopathy has progressed from prior CT, and remains concerning for malignancy.  Partial right middle lobe atelectasis. Moderate emphysema. Discharged on 6/4/24 with Na of 133.    6/7/24: Bronchoscopy: Station 4R and station 7 positive for small cell carcinoma.    6/13/24: CT head with and without contrast (due to claustrophobia): No mets seen    6/19/24: NM PET: New hypermetabolic nodules within the right middle lobe. There are also multiple new enlarged hypermetabolic mediastinal lymph nodes consistent with metastatic disease. Right axillary mets as well. There are scattered foci of hypermetabolism throughout the bony structures consistent with bone metastases.    6/24/24: C1D1 Carbo/Etop/Durva. Tolerated well    7/16/24: C2D1 Carbo/Etop/Durva. Complicated by inpatient admission due to dehydration from nausea/vomiting as well as pancytopenia. ANC 0.11. Required transfusion for hgb 6.8.     8/6/24: C3D1 Carbo/Etop/Durva. 20% reduction in Carbo and 20% reduction in Etoposide. Add G-CSF with this cycle due to severe neutropenia with C2.     Repeat scan have shown disease response, however scan was due to PE rule out.     8/27/24: C4D1 Carbo/Etop/Durva. 33% reduction in Carbo and 33%  reduction in Etoposide. Continue G-CSF with this cycle due to severe neutropenia with C2. Labs appropriate to proceed. Ok to treat for elevated alk phos.     8/6/24: C3D1 Carbo/Etop/Durva. 20% reduction in Carbo and 20% reduction in Etoposide. Add G-CSF with this cycle due to severe neutropenia with C2.    Repeat scan have shown disease response, however scan was due to PE rule out.     8/27/24: C4D1 Carbo/Etop/Durva. 33% reduction in Carbo and 33% reduction in Etoposide. Continue G-CSF with this cycle due to severe neutropenia with C2.      Repeat scans without progression. Plan for durvalumab alone for maintenance    9/17/24: C5D1 durvalumab alone    2/21/25: C10D1 durvalumab alone    2/25/25: Admitted with shortness of breath and found to be influenza A positive.  CTA of chest showed progression of metastatic disease with enlarging pulmonary nodules and new enlarged mediastinal hilar lymph nodes.  CT of the abdomen pelvis done inpatient was negative for disease.    3/3/25: Completed XRT to brain    3/10/25: Orders written for next line topotecan    3/19/25: C1D1 Topotecan    4/8/25: Repeat CT head with resolution of brain mets        Small cell lung cancer   6/12/2024 Initial Diagnosis    Small cell lung cancer     6/14/2024 - 6/14/2024 Chemotherapy    OP LUNG CISplatin 60mg/m2 / Etoposide 120mg/m2 + XRT     6/14/2024 Cancer Staged    Staging form: Lung, AJCC 8th Edition  - Clinical: Stage IVB (cT1b, cN2, cM1c) - Signed by Brian Dickson MD on 6/20/2024 6/24/2024 - 8/29/2024 Chemotherapy    OP LUNG CARBOplatin AUC=5 / Etoposide / Durvalumab     8/27/2024 -  Chemotherapy    OP SUPPORTIVE Denosumab (Xgeva) Q28D     9/17/2024 - 2/21/2025 Biopsy    OP LUNG Durvalumab 1500 mg  Plan Provider: Brian Dickson MD  Treatment goal: Control  Line of treatment: [No plan line of treatment]     3/3/2025 - 3/3/2025 Chemotherapy    OP LUNG Lurbinectedin      3/19/2025 -  Chemotherapy    OP LUNG Topotecan  (IV)     Cancer, metastatic to bone   8/6/2024 Initial Diagnosis    Cancer, metastatic to bone     8/27/2024 -  Chemotherapy    OP SUPPORTIVE Denosumab (Xgeva) Q28D     Secondary malignant neoplasm of brain   2/10/2025 Initial Diagnosis    Secondary malignant neoplasm of brain     2/13/2025 - 3/5/2025 Radiation    Radiation OncologyTreatment Course:  Lluvia Archer received 3000 cGy in 10 fractions to the whole brain.            Review of Systems   Constitutional:  Positive for appetite change and fatigue. Negative for diaphoresis, fever, unexpected weight gain and unexpected weight loss.   HENT:  Negative for hearing loss, mouth sores, sore throat, swollen glands, trouble swallowing and voice change.    Eyes:  Negative for blurred vision, double vision, pain, redness and visual disturbance.   Respiratory:  Negative for cough, shortness of breath and wheezing.    Cardiovascular:  Positive for chest pain (pt had her port put in today) and leg swelling (both legs are swelling). Negative for palpitations.   Gastrointestinal:  Positive for nausea. Negative for abdominal pain, blood in stool, constipation, diarrhea and vomiting.   Endocrine: Negative for cold intolerance, heat intolerance, polydipsia and polyuria.   Genitourinary:  Negative for decreased urine volume, difficulty urinating, dysuria, frequency, hematuria and urinary incontinence.   Musculoskeletal:  Negative for arthralgias, back pain, joint swelling and myalgias.   Skin:  Negative for color change, rash, skin lesions and wound.        PT IS GETTING SORES ON THE SKIN   Neurological:  Negative for dizziness, seizures, weakness, numbness and headache.   Hematological:  Negative for adenopathy. Does not bruise/bleed easily.   Psychiatric/Behavioral:  Positive for sleep disturbance. Negative for depressed mood. The patient is not nervous/anxious.    All other systems reviewed and are negative.    Current Outpatient Medications on File Prior to Visit    Medication Sig Dispense Refill    albuterol (ACCUNEB) 0.63 MG/3ML nebulizer solution Take 3 mL by nebulization Every 6 (Six) Hours As Needed for Wheezing or Shortness of Air. 60 each 2    albuterol sulfate  (90 Base) MCG/ACT inhaler Inhale 2 puffs Every 4 (Four) Hours As Needed for Wheezing or Shortness of Air. 18 g 5    atorvastatin (LIPITOR) 10 MG tablet Take 1 tablet by mouth Every Night. 90 tablet 3    buPROPion SR (WELLBUTRIN SR) 150 MG 12 hr tablet Take 1 tablet by mouth 2 (Two) Times a Day. 180 tablet 3    calcium carbonate (Tums) 500 MG chewable tablet Chew 2 tablets Daily. 28 tablet 0    clonazePAM (KlonoPIN) 0.5 MG tablet Take 1 tablet by mouth 2 (Two) Times a Day As Needed for Anxiety. 60 tablet 0    escitalopram (LEXAPRO) 20 MG tablet Take 1 tablet by mouth Daily. 90 tablet 3    famotidine (PEPCID) 40 MG tablet TAKE ONE TABLET BY MOUTH TWICE DAILY @ 9AM & 5PM (Patient taking differently: Take 1 tablet by mouth 2 (Two) Times a Day.) 180 tablet 11    Fluticasone-Umeclidin-Vilant (Trelegy Ellipta) 200-62.5-25 MCG/ACT inhaler Inhale 1 puff Daily. 90 each 1    gabapentin (NEURONTIN) 100 MG capsule Take 2 capsules by mouth 3 (Three) Times a Day. 90 capsule 2    hydrOXYzine (ATARAX) 25 MG tablet TAKE 1 TABLET BY MOUTH THREE TIMES A DAY AS NEEDED FOR ITCHING (Patient taking differently: Take 1 tablet by mouth 3 (Three) Times a Day As Needed.) 270 tablet 2    lisinopril (PRINIVIL,ZESTRIL) 5 MG tablet TAKE ONE TABLET BY MOUTH DAILY AT 9 AM      LORazepam (ATIVAN) 0.5 MG tablet Take 1 tablet by mouth Every 8 (Eight) Hours As Needed for Anxiety. 60 tablet 2    Magnesium 400 MG tablet Take 400 mg by mouth Daily. 30 tablet 5    Melatonin 10 MG tablet Take 1 tablet by mouth At Night As Needed.      naloxone (NARCAN) 4 MG/0.1ML nasal spray CALL 911. DON'T PRIME. SPRAY IN 1 NOSTRIL FOR OVERDOSE. REPEAT IN 2-3 MINUTES IN OTHER NOSTRIL IF NO OR MINIMAL BREATHING/RESPONSIVENESS. 2 each 0    naproxen (NAPROSYN) 500  MG tablet Take 1 tablet by mouth 2 (Two) Times a Day With Meals. 30 tablet 1    nystatin (MYCOSTATIN) 100,000 unit/mL suspension Swish and swallow 5 mL 4 (Four) Times a Day. 473 mL 2    ondansetron ODT (ZOFRAN-ODT) 8 MG disintegrating tablet Place 1 tablet on the tongue Every 8 (Eight) Hours As Needed for Nausea or Vomiting. 60 tablet 3    oxyCODONE-acetaminophen (PERCOCET)  MG per tablet Take 1 tablet by mouth Every 4 (Four) Hours As Needed for Moderate Pain. 180 tablet 0    prochlorperazine (COMPAZINE) 10 MG tablet Take 1 tablet by mouth Every 6 (Six) Hours As Needed for Nausea. 60 tablet 3    Ventolin  (90 Base) MCG/ACT inhaler Inhale 2 puffs Every 4 (Four) Hours As Needed for Wheezing. 18 g 5    metoprolol tartrate (LOPRESSOR) 25 MG tablet Take 0.5 tablets by mouth 2 (Two) Times a Day for 30 days. 30 tablet 0     Current Facility-Administered Medications on File Prior to Visit   Medication Dose Route Frequency Provider Last Rate Last Admin    heparin injection 500 Units  500 Units Intravenous PRN Brian Dickson MD   500 Units at 04/11/25 1403    sodium chloride 0.9 % flush 20 mL  20 mL Intravenous PRN Brian Dickson MD   20 mL at 04/11/25 1403       No Known Allergies  Past Medical History:   Diagnosis Date    Abnormal mammogram     PT DENIES KNOWING OF THIS HX    Anxiety     Arthritis     R SHOULDER, R HIP, AND R LEG    Cancer     LUNG    Chronic nausea 01/15/2019    COPD (chronic obstructive pulmonary disease)     O2 3 LITERS NC CONT    Hernia, hiatal     Hyperlipidemia     Hypertension     Knee pain, right 08/30/2018    Memory loss     FORGETFULNESS ? ETIOLOGY    Migraine headache     Moderate episode of recurrent major depressive disorder 05/22/2017    Night sweats     Sciatica of right side 08/18/2017    Severe episode of recurrent major depressive disorder, without psychotic features 10/22/2019    Shingles     OUTBREAK 6/11/24 ON ANTIVIRAL , WHELPS NOTED NO SORES.    Shoulder  pain, left 2018     Past Surgical History:   Procedure Laterality Date    BRONCHOSCOPY N/A 2022    Procedure: BRONCHOSCOPY WITH BRONCHOALVEOLAR LAVAGE, BIOPSY;  Surgeon: Shin Mcdonald DO;  Location: [facility] ENDOSCOPY;  Service: Pulmonary;  Laterality: N/A;  RIGHT LOWER LOBE INFILTRATE    BRONCHOSCOPY N/A 2024    Procedure: BRONCHOSCOPY WITH ENDOBRONCHIAL ULTRASOUND AND FINE NEEDLE ASPIRATE;  Surgeon: Shin Mcdonald DO;  Location: [facility] ENDOSCOPY;  Service: Pulmonary;  Laterality: N/A;  MEDIASTINAL ADENOPATHY     SECTION      CHOLECYSTECTOMY      LAPAROSCOPIC    COLONOSCOPY      PORTACATH PLACEMENT Left 2024    Procedure: INSERTION OF PORTACATH;  Surgeon: Josh Patton MD;  Location: [facility] OR OSC;  Service: General;  Laterality: Left;    TOTAL HIP ARTHROPLASTY Right 2022    Procedure: RIGHT TOTAL HIP ARTHROPLASTY;  Surgeon: Cecil Gomes MD;  Location: [facility] MAIN OR;  Service: Orthopedics;  Laterality: Right;     Social History     Socioeconomic History    Marital status:      Spouse name: DEVAN    Number of children: 2    Years of education: GED    Highest education level: GED or equivalent   Tobacco Use    Smoking status: Every Day     Current packs/day: 1.00     Average packs/day: 1 pack/day for 47.3 years (47.3 ttl pk-yrs)     Types: Cigarettes     Start date:      Passive exposure: Past    Smokeless tobacco: Never   Vaping Use    Vaping status: Former    Substances: Nicotine, Flavoring    Devices: Disposable   Substance and Sexual Activity    Alcohol use: Never    Drug use: Never    Sexual activity: Defer     Family History   Problem Relation Age of Onset    Other Mother         BLOOD DISEASE    Arthritis Mother     Heart disease Father     Hypertension Other     Cancer Other     Heart disease Other     Malig Hyperthermia Neg Hx        Objective   Physical Exam  Well appearing patient in no acute distress on RA  Anicteric sclerae, no  "rash on exposed skin  Respirations non-labored  Awake, alert, and oriented x 4. Speech intact. No gross neurologic deficit  Appropriate mood and affect    Vitals:    04/11/25 1420   BP: (!) 76/66   Pulse: 110   Resp: 16   Temp: 97.7 °F (36.5 °C)   TempSrc: Temporal   SpO2: 96%   Weight: 90.5 kg (199 lb 8.3 oz)   Height: 162.6 cm (64.02\")   PainSc: 6          ECOG score: 3         PHQ-9 Total Score:           Result Review :   The following data was reviewed by: Brian Dickson MD on 04/11/25:  Lab Results   Component Value Date    HGB 10.4 (L) 03/19/2025    HCT 33.2 (L) 03/19/2025    MCV 94.3 03/19/2025     (L) 03/19/2025    WBC 6.92 03/19/2025    NEUTROABS 4.84 03/19/2025    LYMPHSABS 1.18 03/19/2025    MONOSABS 0.63 03/19/2025    EOSABS 0.23 03/19/2025    BASOSABS 0.02 03/19/2025     Lab Results   Component Value Date    GLUCOSE 87 03/19/2025    BUN 12 03/19/2025    CREATININE 0.80 03/19/2025     (L) 03/19/2025    K 4.1 03/19/2025    CL 94 (L) 03/19/2025    CO2 27.5 03/19/2025    CALCIUM 8.5 (L) 03/19/2025    PROTEINTOT 6.5 03/19/2025    ALBUMIN 3.6 03/19/2025    BILITOT 0.3 03/19/2025    ALKPHOS 81 03/19/2025    AST 12 03/19/2025    ALT 8 03/19/2025     Lab Results   Component Value Date    MG 2.3 02/27/2025    PHOS 2.6 02/27/2025    FREET4 1.14 02/21/2025    TSH 1.670 02/21/2025     Labs personally reviewed. Sodium normal. Hgb wnl. WBC wnl. Plts wnl.     CT head report personally reviewed        CT Head With & Without Contrast  Result Date: 4/8/2025  Impression: 1.No acute intracranial process identified. 2.No evidence of intracranial metastasis. Previously identified metastatic lesions have resolved. Electronically Signed: Isma Roche MD  4/8/2025 7:04 PM EDT  Workstation ID: [id number]          Assessment and Plan    Diagnoses and all orders for this visit:    1. Cancer, metastatic to bone (Primary)  -     CBC and Differential; Future  -     Comprehensive metabolic panel; Future    2. " Small cell carcinoma of lung, unspecified laterality, unspecified part of lung  -     CBC and Differential; Future  -     Comprehensive metabolic panel; Future  -     ONCBCN INFUSION APPOINTMENT REQUEST 01; Future  -     ONCBCN INFUSION APPOINTMENT REQUEST 01; Future  -     ONCBCN INFUSION APPOINTMENT REQUEST 01; Future  -     ONCBCN INFUSION APPOINTMENT REQUEST 01; Future    3. Secondary malignant neoplasm of brain    4. Hypotension due to drugs    5. Anxiety    6. Bilateral hand pain    Other orders  -     Cancel: sodium chloride 0.9 % bolus 1,000 mL  -     sodium chloride 0.9 % bolus 1,000 mL  -     denosumab (XGEVA) injection 120 mg  -     sodium chloride 0.9 % infusion  -     dexAMETHasone (DECADRON) 12 mg in sodium chloride 0.9 % IVPB  -     topotecan (HYCAMTIN) 2.9 mg in sodium chloride 0.9 % 102.9 mL chemo IVPB  -     sodium chloride 0.9 % infusion  -     dexAMETHasone (DECADRON) 12 mg in sodium chloride 0.9 % IVPB  -     topotecan (HYCAMTIN) 2.9 mg in sodium chloride 0.9 % 102.9 mL chemo IVPB  -     sodium chloride 0.9 % infusion  -     dexAMETHasone (DECADRON) 12 mg in sodium chloride 0.9 % IVPB  -     topotecan (HYCAMTIN) 2.9 mg in sodium chloride 0.9 % 102.9 mL chemo IVPB  -     sodium chloride 0.9 % infusion  -     dexAMETHasone (DECADRON) 12 mg in sodium chloride 0.9 % IVPB  -     topotecan (HYCAMTIN) 2.9 mg in sodium chloride 0.9 % 102.9 mL chemo IVPB  -     sodium chloride 0.9 % infusion  -     dexAMETHasone (DECADRON) 12 mg in sodium chloride 0.9 % IVPB  -     topotecan (HYCAMTIN) 2.9 mg in sodium chloride 0.9 % 102.9 mL chemo IVPB  -     pegfilgrastim (NEULASTA ONPRO) on-body injector 6 mg          Small cell lung cancer, extensive stage  Initial PET with multiple new enlarged hypermetabolic mediastinal lymph nodes consistent with metastatic disease, also with new right axillary FDG avid mets. There are scattered foci of hypermetabolism throughout the bony structures consistent with bone  metastases.  Recommended treatment is systemic alone with for IV carboplatin, etoposide and durvalumab every 3 weeks. First cycle 6/24/24. 8/6/24: C3D1 Carbo/Etop/Durva. 20% reduction in Carbo and 20% reduction in Etoposide. Add G-CSF with this cycle due to severe neutropenia with C2.  8/27/24: C4D1 Carbo/Etop/Durva. 33% reduction in Carbo and 33% reduction in Etoposide. Last cycle of chemotherapy.    Repeat scans after C4 without clear progression, new 7 mm RUL nodule could be infectious. Plan for durvalumab alone.Repeat scans 12/9/24 with stable disease. 2/21/25: C10D1 Durvalumab (sixth with Durvalumab alone).      Repeat imaging obtained inpatient with disease progression in thorax. CT abdomen/pelvis with no evidence of disease.  Due to progression recommend therapy changed to topotecan.  This is for 5 days every 3 weeks. Started 3/19/25. G-CSF to be provided prophylactically.  C1 tolerated well.    C2D1 topotecan due 4/14/24. Will attempt to start on Monday 3/17/25.  See patient back for C2    Hypotension  Likely from dehydration.  IV fluids today improved systolic to 90s. Repeat IV fluid on Monday.  Patient asymptomatic besides weakness.  No fevers or chills.  No signs of infection.  No diarrhea.  Discussed that if she feels worse or develops concerning symptoms to present to the ER over the weekend.    Bilateral Hand pain  Could be carpal tunnel. Do not suspect cancer related.  Can use naproxen. Also on gabapentin which has helped. CT C spine negative for mets. Agree with wrist braces. Referred to ortho for consideration of injections. 3/10/25: Pain better on gabapentin    Metastatic cancer to brain  CT head 2/4/25 with multiple new enhancing lesions, consistent with mets. Has completed XRT. Repeat CT 4/8/25 with resolution of metastatic lesions. F/u scans per rad onc.     Anxiety  Ativan not beneficial. Switched to clonazepam (2/21/25) PRN    Neoplasm related pain  Previously increased oxycodone to 10 mg. Has  naproxen 500 mg PRN twice daily to use. Started on daily mag 400 mg (mag level is normal).     Metastatic cancer to bone  Recommend bone modifying agent. Patient has all teeth except 2 removed. Monthly Xgeva started 8/27/24. Due 4/17/25. Will monitor electrolytes.     Insomnia  Recommend low dose melatonin vs benadryl. Also has Klonopin if anxiety is contributing to poor sleep.       Patient Follow Up: Cycle 3  Patient was given instructions and counseling regarding her condition or for health maintenance advice. Please see specific information pulled into the AVS if appropriate.

## 2025-04-14 ENCOUNTER — HOSPITAL ENCOUNTER (OUTPATIENT)
Dept: ONCOLOGY | Facility: HOSPITAL | Age: 60
Discharge: HOME | End: 2025-04-14
Payer: MEDICARE

## 2025-04-14 VITALS
TEMPERATURE: 97 F | BODY MASS INDEX: 34.18 KG/M2 | HEIGHT: 64 IN | SYSTOLIC BLOOD PRESSURE: 88 MMHG | OXYGEN SATURATION: 94 % | RESPIRATION RATE: 18 BRPM | HEART RATE: 99 BPM | WEIGHT: 200.18 LBS | DIASTOLIC BLOOD PRESSURE: 65 MMHG

## 2025-04-14 DIAGNOSIS — E86.0 DEHYDRATION: ICD-10-CM

## 2025-04-14 DIAGNOSIS — C34.90 SMALL CELL CARCINOMA OF LUNG, UNSPECIFIED LATERALITY, UNSPECIFIED PART OF LUNG: Primary | ICD-10-CM

## 2025-04-14 DIAGNOSIS — Z45.2 ENCOUNTER FOR ADJUSTMENT OR MANAGEMENT OF VASCULAR ACCESS DEVICE: ICD-10-CM

## 2025-04-14 PROCEDURE — 96375 TX/PRO/DX INJ NEW DRUG ADDON: CPT

## 2025-04-14 PROCEDURE — 25810000003 SODIUM CHLORIDE 0.9 % SOLUTION: Performed by: INTERNAL MEDICINE

## 2025-04-14 PROCEDURE — 25010000002 DEXAMETHASONE SODIUM PHOSPHATE 120 MG/30ML SOLUTION: Performed by: INTERNAL MEDICINE

## 2025-04-14 PROCEDURE — 25010000002 TOPOTECAN 4 MG/4ML SOLUTION 4 ML VIAL: Performed by: INTERNAL MEDICINE

## 2025-04-14 PROCEDURE — 25010000002 HEPARIN LOCK FLUSH PER 10 UNITS: Performed by: INTERNAL MEDICINE

## 2025-04-14 PROCEDURE — 96413 CHEMO IV INFUSION 1 HR: CPT

## 2025-04-14 RX ORDER — SODIUM CHLORIDE 9 MG/ML
20 INJECTION, SOLUTION INTRAVENOUS ONCE
Status: COMPLETED | OUTPATIENT
Start: 2025-04-14 | End: 2025-04-14

## 2025-04-14 RX ORDER — HEPARIN SODIUM (PORCINE) LOCK FLUSH IV SOLN 100 UNIT/ML 100 UNIT/ML
500 SOLUTION INTRAVENOUS AS NEEDED
Status: DISCONTINUED | OUTPATIENT
Start: 2025-04-14 | End: 2025-04-15 | Stop reason: HOSPADM

## 2025-04-14 RX ORDER — SODIUM CHLORIDE 0.9 % (FLUSH) 0.9 %
20 SYRINGE (ML) INJECTION AS NEEDED
Status: CANCELLED | OUTPATIENT
Start: 2025-04-14

## 2025-04-14 RX ORDER — SODIUM CHLORIDE 0.9 % (FLUSH) 0.9 %
20 SYRINGE (ML) INJECTION AS NEEDED
Status: DISCONTINUED | OUTPATIENT
Start: 2025-04-14 | End: 2025-04-15 | Stop reason: HOSPADM

## 2025-04-14 RX ORDER — HEPARIN SODIUM (PORCINE) LOCK FLUSH IV SOLN 100 UNIT/ML 100 UNIT/ML
500 SOLUTION INTRAVENOUS AS NEEDED
Status: CANCELLED | OUTPATIENT
Start: 2025-04-15

## 2025-04-14 RX ADMIN — HEPARIN 500 UNITS: 100 SYRINGE at 14:30

## 2025-04-14 RX ADMIN — DEXAMETHASONE SODIUM PHOSPHATE 12 MG: 4 INJECTION, SOLUTION INTRA-ARTICULAR; INTRALESIONAL; INTRAMUSCULAR; INTRAVENOUS; SOFT TISSUE at 13:24

## 2025-04-14 RX ADMIN — SODIUM CHLORIDE 20 ML/HR: 9 INJECTION, SOLUTION INTRAVENOUS at 13:22

## 2025-04-14 RX ADMIN — Medication 20 ML: at 14:30

## 2025-04-14 RX ADMIN — SODIUM CHLORIDE 2.9 MG: 9 INJECTION, SOLUTION INTRAVENOUS at 13:49

## 2025-04-14 RX ADMIN — SODIUM CHLORIDE 1000 ML: 9 INJECTION, SOLUTION INTRAVENOUS at 13:20

## 2025-04-15 ENCOUNTER — HOSPITAL ENCOUNTER (OUTPATIENT)
Dept: ONCOLOGY | Facility: HOSPITAL | Age: 60
Discharge: HOME | End: 2025-04-15
Payer: MEDICARE

## 2025-04-15 VITALS
WEIGHT: 200.18 LBS | DIASTOLIC BLOOD PRESSURE: 77 MMHG | BODY MASS INDEX: 34.34 KG/M2 | OXYGEN SATURATION: 96 % | HEART RATE: 96 BPM | TEMPERATURE: 98.3 F | SYSTOLIC BLOOD PRESSURE: 125 MMHG | RESPIRATION RATE: 20 BRPM

## 2025-04-15 DIAGNOSIS — C34.90 SMALL CELL CARCINOMA OF LUNG, UNSPECIFIED LATERALITY, UNSPECIFIED PART OF LUNG: Primary | ICD-10-CM

## 2025-04-15 DIAGNOSIS — G89.3 CHRONIC NEOPLASM-RELATED PAIN: ICD-10-CM

## 2025-04-15 DIAGNOSIS — C79.51 CANCER, METASTATIC TO BONE: ICD-10-CM

## 2025-04-15 DIAGNOSIS — Z45.2 ENCOUNTER FOR ADJUSTMENT OR MANAGEMENT OF VASCULAR ACCESS DEVICE: ICD-10-CM

## 2025-04-15 DIAGNOSIS — E86.0 DEHYDRATION: ICD-10-CM

## 2025-04-15 PROCEDURE — 96413 CHEMO IV INFUSION 1 HR: CPT

## 2025-04-15 PROCEDURE — 25810000003 SODIUM CHLORIDE 0.9 % SOLUTION: Performed by: INTERNAL MEDICINE

## 2025-04-15 PROCEDURE — 25010000002 TOPOTECAN 4 MG/4ML SOLUTION 4 ML VIAL: Performed by: INTERNAL MEDICINE

## 2025-04-15 PROCEDURE — 25010000002 HEPARIN LOCK FLUSH PER 10 UNITS: Performed by: INTERNAL MEDICINE

## 2025-04-15 PROCEDURE — 96367 TX/PROPH/DG ADDL SEQ IV INF: CPT

## 2025-04-15 PROCEDURE — 25010000002 DEXAMETHASONE SODIUM PHOSPHATE 120 MG/30ML SOLUTION: Performed by: INTERNAL MEDICINE

## 2025-04-15 PROCEDURE — 96375 TX/PRO/DX INJ NEW DRUG ADDON: CPT

## 2025-04-15 RX ORDER — SODIUM CHLORIDE 9 MG/ML
20 INJECTION, SOLUTION INTRAVENOUS ONCE
Status: COMPLETED | OUTPATIENT
Start: 2025-04-15 | End: 2025-04-15

## 2025-04-15 RX ORDER — SODIUM CHLORIDE 0.9 % (FLUSH) 0.9 %
20 SYRINGE (ML) INJECTION AS NEEDED
Status: DISCONTINUED | OUTPATIENT
Start: 2025-04-15 | End: 2025-04-16 | Stop reason: HOSPADM

## 2025-04-15 RX ORDER — HEPARIN SODIUM (PORCINE) LOCK FLUSH IV SOLN 100 UNIT/ML 100 UNIT/ML
500 SOLUTION INTRAVENOUS AS NEEDED
Status: CANCELLED | OUTPATIENT
Start: 2025-04-15

## 2025-04-15 RX ORDER — HEPARIN SODIUM (PORCINE) LOCK FLUSH IV SOLN 100 UNIT/ML 100 UNIT/ML
500 SOLUTION INTRAVENOUS AS NEEDED
Status: DISCONTINUED | OUTPATIENT
Start: 2025-04-15 | End: 2025-04-16 | Stop reason: HOSPADM

## 2025-04-15 RX ORDER — SODIUM CHLORIDE 0.9 % (FLUSH) 0.9 %
20 SYRINGE (ML) INJECTION AS NEEDED
Status: CANCELLED | OUTPATIENT
Start: 2025-04-15

## 2025-04-15 RX ORDER — OXYCODONE AND ACETAMINOPHEN 10; 325 MG/1; MG/1
1 TABLET ORAL EVERY 4 HOURS PRN
Qty: 180 TABLET | Refills: 0 | Status: SHIPPED | OUTPATIENT
Start: 2025-04-15

## 2025-04-15 RX ADMIN — Medication 20 ML: at 14:26

## 2025-04-15 RX ADMIN — HEPARIN 500 UNITS: 100 SYRINGE at 14:26

## 2025-04-15 RX ADMIN — SODIUM CHLORIDE 2.9 MG: 9 INJECTION, SOLUTION INTRAVENOUS at 13:41

## 2025-04-15 RX ADMIN — SODIUM CHLORIDE 1000 ML: 9 INJECTION, SOLUTION INTRAVENOUS at 13:21

## 2025-04-15 RX ADMIN — DEXAMETHASONE SODIUM PHOSPHATE 12 MG: 4 INJECTION, SOLUTION INTRA-ARTICULAR; INTRALESIONAL; INTRAMUSCULAR; INTRAVENOUS; SOFT TISSUE at 13:10

## 2025-04-15 RX ADMIN — SODIUM CHLORIDE 20 ML/HR: 9 INJECTION, SOLUTION INTRAVENOUS at 13:10

## 2025-04-16 ENCOUNTER — HOSPITAL ENCOUNTER (OUTPATIENT)
Dept: ONCOLOGY | Facility: HOSPITAL | Age: 60
Discharge: HOME | End: 2025-04-16
Payer: MEDICARE

## 2025-04-16 VITALS
TEMPERATURE: 96.7 F | DIASTOLIC BLOOD PRESSURE: 75 MMHG | OXYGEN SATURATION: 100 % | BODY MASS INDEX: 34.76 KG/M2 | SYSTOLIC BLOOD PRESSURE: 133 MMHG | HEART RATE: 74 BPM | WEIGHT: 202.6 LBS

## 2025-04-16 DIAGNOSIS — Z45.2 ENCOUNTER FOR ADJUSTMENT OR MANAGEMENT OF VASCULAR ACCESS DEVICE: ICD-10-CM

## 2025-04-16 DIAGNOSIS — C34.90 SMALL CELL CARCINOMA OF LUNG, UNSPECIFIED LATERALITY, UNSPECIFIED PART OF LUNG: Primary | ICD-10-CM

## 2025-04-16 PROCEDURE — 25010000002 DEXAMETHASONE SODIUM PHOSPHATE 120 MG/30ML SOLUTION: Performed by: INTERNAL MEDICINE

## 2025-04-16 PROCEDURE — 25010000002 TOPOTECAN 4 MG/4ML SOLUTION 4 ML VIAL: Performed by: INTERNAL MEDICINE

## 2025-04-16 PROCEDURE — 25810000003 SODIUM CHLORIDE 0.9 % SOLUTION: Performed by: INTERNAL MEDICINE

## 2025-04-16 PROCEDURE — 25010000002 HEPARIN LOCK FLUSH PER 10 UNITS: Performed by: INTERNAL MEDICINE

## 2025-04-16 PROCEDURE — 96375 TX/PRO/DX INJ NEW DRUG ADDON: CPT

## 2025-04-16 PROCEDURE — 96413 CHEMO IV INFUSION 1 HR: CPT

## 2025-04-16 RX ORDER — HEPARIN SODIUM (PORCINE) LOCK FLUSH IV SOLN 100 UNIT/ML 100 UNIT/ML
500 SOLUTION INTRAVENOUS AS NEEDED
Status: CANCELLED | OUTPATIENT
Start: 2025-04-17

## 2025-04-16 RX ORDER — SODIUM CHLORIDE 0.9 % (FLUSH) 0.9 %
20 SYRINGE (ML) INJECTION AS NEEDED
Status: DISCONTINUED | OUTPATIENT
Start: 2025-04-16 | End: 2025-04-17 | Stop reason: HOSPADM

## 2025-04-16 RX ORDER — SODIUM CHLORIDE 0.9 % (FLUSH) 0.9 %
20 SYRINGE (ML) INJECTION AS NEEDED
Status: CANCELLED | OUTPATIENT
Start: 2025-04-16

## 2025-04-16 RX ORDER — SODIUM CHLORIDE 9 MG/ML
20 INJECTION, SOLUTION INTRAVENOUS ONCE
Status: COMPLETED | OUTPATIENT
Start: 2025-04-16 | End: 2025-04-16

## 2025-04-16 RX ORDER — HEPARIN SODIUM (PORCINE) LOCK FLUSH IV SOLN 100 UNIT/ML 100 UNIT/ML
500 SOLUTION INTRAVENOUS AS NEEDED
Status: DISCONTINUED | OUTPATIENT
Start: 2025-04-16 | End: 2025-04-17 | Stop reason: HOSPADM

## 2025-04-16 RX ADMIN — HEPARIN 500 UNITS: 100 SYRINGE at 14:20

## 2025-04-16 RX ADMIN — DEXAMETHASONE SODIUM PHOSPHATE 12 MG: 4 INJECTION, SOLUTION INTRA-ARTICULAR; INTRALESIONAL; INTRAMUSCULAR; INTRAVENOUS; SOFT TISSUE at 13:15

## 2025-04-16 RX ADMIN — SODIUM CHLORIDE 20 ML/HR: 9 INJECTION, SOLUTION INTRAVENOUS at 13:15

## 2025-04-16 RX ADMIN — Medication 20 ML: at 14:19

## 2025-04-16 RX ADMIN — SODIUM CHLORIDE 2.9 MG: 9 INJECTION, SOLUTION INTRAVENOUS at 13:41

## 2025-04-17 ENCOUNTER — HOSPITAL ENCOUNTER (OUTPATIENT)
Dept: ONCOLOGY | Facility: HOSPITAL | Age: 60
Discharge: HOME | End: 2025-04-17
Payer: MEDICARE

## 2025-04-17 VITALS
DIASTOLIC BLOOD PRESSURE: 68 MMHG | BODY MASS INDEX: 34.42 KG/M2 | WEIGHT: 200.62 LBS | TEMPERATURE: 98.3 F | SYSTOLIC BLOOD PRESSURE: 102 MMHG | OXYGEN SATURATION: 98 % | HEART RATE: 77 BPM | RESPIRATION RATE: 20 BRPM

## 2025-04-17 DIAGNOSIS — C34.90 SMALL CELL CARCINOMA OF LUNG, UNSPECIFIED LATERALITY, UNSPECIFIED PART OF LUNG: Primary | ICD-10-CM

## 2025-04-17 DIAGNOSIS — Z45.2 ENCOUNTER FOR ADJUSTMENT OR MANAGEMENT OF VASCULAR ACCESS DEVICE: ICD-10-CM

## 2025-04-17 DIAGNOSIS — C79.51 CANCER, METASTATIC TO BONE: ICD-10-CM

## 2025-04-17 PROCEDURE — 25010000002 HEPARIN LOCK FLUSH PER 10 UNITS: Performed by: INTERNAL MEDICINE

## 2025-04-17 PROCEDURE — 96372 THER/PROPH/DIAG INJ SC/IM: CPT

## 2025-04-17 PROCEDURE — 25010000002 DENOSUMAB 120 MG/1.7ML SOLUTION: Performed by: INTERNAL MEDICINE

## 2025-04-17 PROCEDURE — 96375 TX/PRO/DX INJ NEW DRUG ADDON: CPT

## 2025-04-17 PROCEDURE — 96413 CHEMO IV INFUSION 1 HR: CPT

## 2025-04-17 PROCEDURE — 25010000002 DEXAMETHASONE SODIUM PHOSPHATE 120 MG/30ML SOLUTION: Performed by: INTERNAL MEDICINE

## 2025-04-17 PROCEDURE — 25010000002 TOPOTECAN 4 MG/4ML SOLUTION 4 ML VIAL: Performed by: INTERNAL MEDICINE

## 2025-04-17 PROCEDURE — 25810000003 SODIUM CHLORIDE 0.9 % SOLUTION: Performed by: INTERNAL MEDICINE

## 2025-04-17 RX ORDER — HEPARIN SODIUM (PORCINE) LOCK FLUSH IV SOLN 100 UNIT/ML 100 UNIT/ML
500 SOLUTION INTRAVENOUS AS NEEDED
Status: DISCONTINUED | OUTPATIENT
Start: 2025-04-17 | End: 2025-04-18 | Stop reason: HOSPADM

## 2025-04-17 RX ORDER — SODIUM CHLORIDE 0.9 % (FLUSH) 0.9 %
20 SYRINGE (ML) INJECTION AS NEEDED
Status: DISCONTINUED | OUTPATIENT
Start: 2025-04-17 | End: 2025-04-18 | Stop reason: HOSPADM

## 2025-04-17 RX ORDER — HEPARIN SODIUM (PORCINE) LOCK FLUSH IV SOLN 100 UNIT/ML 100 UNIT/ML
500 SOLUTION INTRAVENOUS AS NEEDED
Status: CANCELLED | OUTPATIENT
Start: 2025-04-18

## 2025-04-17 RX ORDER — SODIUM CHLORIDE 0.9 % (FLUSH) 0.9 %
20 SYRINGE (ML) INJECTION AS NEEDED
Status: CANCELLED | OUTPATIENT
Start: 2025-04-17

## 2025-04-17 RX ORDER — SODIUM CHLORIDE 9 MG/ML
20 INJECTION, SOLUTION INTRAVENOUS ONCE
Status: COMPLETED | OUTPATIENT
Start: 2025-04-17 | End: 2025-04-17

## 2025-04-17 RX ADMIN — HEPARIN 500 UNITS: 100 SYRINGE at 14:15

## 2025-04-17 RX ADMIN — DEXAMETHASONE SODIUM PHOSPHATE 12 MG: 4 INJECTION, SOLUTION INTRA-ARTICULAR; INTRALESIONAL; INTRAMUSCULAR; INTRAVENOUS; SOFT TISSUE at 13:15

## 2025-04-17 RX ADMIN — DENOSUMAB 120 MG: 120 INJECTION SUBCUTANEOUS at 14:24

## 2025-04-17 RX ADMIN — SODIUM CHLORIDE 2.9 MG: 9 INJECTION, SOLUTION INTRAVENOUS at 13:39

## 2025-04-17 RX ADMIN — Medication 20 ML: at 14:14

## 2025-04-17 RX ADMIN — SODIUM CHLORIDE 20 ML/HR: 0.9 INJECTION, SOLUTION INTRAVENOUS at 13:15

## 2025-04-18 ENCOUNTER — HOSPITAL ENCOUNTER (OUTPATIENT)
Dept: ONCOLOGY | Facility: HOSPITAL | Age: 60
Discharge: HOME | End: 2025-04-18
Payer: MEDICARE

## 2025-04-18 VITALS
DIASTOLIC BLOOD PRESSURE: 64 MMHG | SYSTOLIC BLOOD PRESSURE: 127 MMHG | TEMPERATURE: 98.1 F | HEART RATE: 81 BPM | RESPIRATION RATE: 20 BRPM | OXYGEN SATURATION: 97 % | BODY MASS INDEX: 33.85 KG/M2 | WEIGHT: 197.31 LBS

## 2025-04-18 DIAGNOSIS — C34.90 SMALL CELL CARCINOMA OF LUNG, UNSPECIFIED LATERALITY, UNSPECIFIED PART OF LUNG: Primary | ICD-10-CM

## 2025-04-18 DIAGNOSIS — Z45.2 ENCOUNTER FOR ADJUSTMENT OR MANAGEMENT OF VASCULAR ACCESS DEVICE: ICD-10-CM

## 2025-04-18 PROCEDURE — 25010000002 PEGFILGRASTIM 6 MG/0.6ML PREFILLED SYRINGE KIT: Performed by: INTERNAL MEDICINE

## 2025-04-18 PROCEDURE — 25010000002 HEPARIN LOCK FLUSH PER 10 UNITS: Performed by: INTERNAL MEDICINE

## 2025-04-18 PROCEDURE — 96413 CHEMO IV INFUSION 1 HR: CPT

## 2025-04-18 PROCEDURE — 96377 APPLICATON ON-BODY INJECTOR: CPT

## 2025-04-18 PROCEDURE — 96375 TX/PRO/DX INJ NEW DRUG ADDON: CPT

## 2025-04-18 PROCEDURE — 25010000002 DEXAMETHASONE SODIUM PHOSPHATE 120 MG/30ML SOLUTION: Performed by: INTERNAL MEDICINE

## 2025-04-18 PROCEDURE — 25010000002 TOPOTECAN 4 MG/4ML SOLUTION 4 ML VIAL: Performed by: INTERNAL MEDICINE

## 2025-04-18 PROCEDURE — 25810000003 SODIUM CHLORIDE 0.9 % SOLUTION: Performed by: INTERNAL MEDICINE

## 2025-04-18 RX ORDER — SODIUM CHLORIDE 9 MG/ML
20 INJECTION, SOLUTION INTRAVENOUS ONCE
Status: COMPLETED | OUTPATIENT
Start: 2025-04-18 | End: 2025-04-18

## 2025-04-18 RX ORDER — SODIUM CHLORIDE 0.9 % (FLUSH) 0.9 %
20 SYRINGE (ML) INJECTION AS NEEDED
OUTPATIENT
Start: 2025-04-18

## 2025-04-18 RX ORDER — HEPARIN SODIUM (PORCINE) LOCK FLUSH IV SOLN 100 UNIT/ML 100 UNIT/ML
500 SOLUTION INTRAVENOUS AS NEEDED
OUTPATIENT
Start: 2025-04-18

## 2025-04-18 RX ORDER — HEPARIN SODIUM (PORCINE) LOCK FLUSH IV SOLN 100 UNIT/ML 100 UNIT/ML
500 SOLUTION INTRAVENOUS AS NEEDED
Status: DISCONTINUED | OUTPATIENT
Start: 2025-04-18 | End: 2025-04-19 | Stop reason: HOSPADM

## 2025-04-18 RX ORDER — SODIUM CHLORIDE 0.9 % (FLUSH) 0.9 %
20 SYRINGE (ML) INJECTION AS NEEDED
Status: DISCONTINUED | OUTPATIENT
Start: 2025-04-18 | End: 2025-04-19 | Stop reason: HOSPADM

## 2025-04-18 RX ADMIN — Medication 20 ML: at 14:07

## 2025-04-18 RX ADMIN — DEXAMETHASONE SODIUM PHOSPHATE 12 MG: 4 INJECTION, SOLUTION INTRA-ARTICULAR; INTRALESIONAL; INTRAMUSCULAR; INTRAVENOUS; SOFT TISSUE at 13:11

## 2025-04-18 RX ADMIN — SODIUM CHLORIDE 2.9 MG: 9 INJECTION, SOLUTION INTRAVENOUS at 13:27

## 2025-04-18 RX ADMIN — SODIUM CHLORIDE 20 ML/HR: 9 INJECTION, SOLUTION INTRAVENOUS at 13:11

## 2025-04-18 RX ADMIN — PEGFILGRASTIM 6 MG: KIT SUBCUTANEOUS at 14:07

## 2025-04-18 RX ADMIN — HEPARIN 500 UNITS: 100 SYRINGE at 14:07

## 2025-04-21 ENCOUNTER — HOSPITAL ENCOUNTER (EMERGENCY)
Facility: HOSPITAL | Age: 60
Discharge: HOME | End: 2025-04-21
Attending: EMERGENCY MEDICINE | Admitting: EMERGENCY MEDICINE
Payer: MEDICARE

## 2025-04-21 ENCOUNTER — APPOINTMENT (OUTPATIENT)
Dept: GENERAL RADIOLOGY | Facility: HOSPITAL | Age: 60
End: 2025-04-21
Payer: MEDICARE

## 2025-04-21 VITALS
SYSTOLIC BLOOD PRESSURE: 93 MMHG | TEMPERATURE: 98.5 F | HEIGHT: 64 IN | BODY MASS INDEX: 34.4 KG/M2 | DIASTOLIC BLOOD PRESSURE: 46 MMHG | OXYGEN SATURATION: 100 % | HEART RATE: 85 BPM | WEIGHT: 201.5 LBS | RESPIRATION RATE: 18 BRPM

## 2025-04-21 DIAGNOSIS — R53.1 WEAKNESS: Primary | ICD-10-CM

## 2025-04-21 DIAGNOSIS — E87.1 HYPONATREMIA: ICD-10-CM

## 2025-04-21 DIAGNOSIS — D64.9 CHRONIC ANEMIA: ICD-10-CM

## 2025-04-21 DIAGNOSIS — T45.1X5A CHEMOTHERAPY-INDUCED NEUTROPENIA: ICD-10-CM

## 2025-04-21 DIAGNOSIS — C34.90 PRIMARY MALIGNANT NEOPLASM OF LUNG METASTATIC TO OTHER SITE, UNSPECIFIED LATERALITY: ICD-10-CM

## 2025-04-21 DIAGNOSIS — B37.0 THRUSH: ICD-10-CM

## 2025-04-21 DIAGNOSIS — J96.11 CHRONIC RESPIRATORY FAILURE WITH HYPOXIA: ICD-10-CM

## 2025-04-21 DIAGNOSIS — D70.1 CHEMOTHERAPY-INDUCED NEUTROPENIA: ICD-10-CM

## 2025-04-21 LAB
ALBUMIN SERPL-MCNC: 3.6 G/DL (ref 3.5–5.2)
ALBUMIN/GLOB SERPL: 1.3 G/DL
ALP SERPL-CCNC: 75 U/L (ref 39–117)
ALT SERPL W P-5'-P-CCNC: 12 U/L (ref 1–33)
ANION GAP SERPL CALCULATED.3IONS-SCNC: 10.3 MMOL/L (ref 5–15)
ANISOCYTOSIS BLD QL: NORMAL
AST SERPL-CCNC: 15 U/L (ref 1–32)
ATMOSPHERIC PRESS: 742.3 MMHG
BASE EXCESS BLDV CALC-SCNC: -0.8 MMOL/L (ref -2–2)
BASOPHILS # BLD AUTO: 0 10*3/MM3 (ref 0–0.2)
BASOPHILS NFR BLD AUTO: 0 % (ref 0–1.5)
BDY SITE: ABNORMAL
BILIRUB SERPL-MCNC: 0.5 MG/DL (ref 0–1.2)
BUN SERPL-MCNC: 8 MG/DL (ref 6–20)
BUN/CREAT SERPL: 12.3 (ref 7–25)
BURR CELLS BLD QL SMEAR: NORMAL
CA-I BLDA-SCNC: 1.04 MMOL/L (ref 1.13–1.32)
CALCIUM SPEC-SCNC: 7.6 MG/DL (ref 8.6–10.5)
CHLORIDE BLDA-SCNC: 85 MMOL/L (ref 98–107)
CHLORIDE SERPL-SCNC: 87 MMOL/L (ref 98–107)
CO2 SERPL-SCNC: 22.7 MMOL/L (ref 22–29)
CREAT SERPL-MCNC: 0.65 MG/DL (ref 0.57–1)
D-LACTATE SERPL-SCNC: 1.2 MMOL/L
D-LACTATE SERPL-SCNC: 1.3 MMOL/L (ref 0.5–2)
DEPRECATED RDW RBC AUTO: 50.2 FL (ref 37–54)
EGFRCR SERPLBLD CKD-EPI 2021: 102.2 ML/MIN/1.73
EOSINOPHIL # BLD AUTO: 0 10*3/MM3 (ref 0–0.4)
EOSINOPHIL NFR BLD AUTO: 0 % (ref 0.3–6.2)
ERYTHROCYTE [DISTWIDTH] IN BLOOD BY AUTOMATED COUNT: 15.6 % (ref 12.3–15.4)
FLUAV RNA RESP QL NAA+PROBE: NOT DETECTED
FLUBV RNA RESP QL NAA+PROBE: NOT DETECTED
GAS FLOW AIRWAY: 4 LPM
GEN 5 1HR TROPONIN T REFLEX: 14 NG/L
GLOBULIN UR ELPH-MCNC: 2.8 GM/DL
GLUCOSE BLDC GLUCOMTR-MCNC: 110 MG/DL (ref 65–99)
GLUCOSE SERPL-MCNC: 135 MG/DL (ref 65–99)
HCO3 BLDV-SCNC: 24.1 MMOL/L (ref 22–26)
HCT VFR BLD AUTO: 27.4 % (ref 34–46.6)
HGB BLD-MCNC: 9.4 G/DL (ref 12–15.9)
HGB BLDA-MCNC: 9.9 G/DL (ref 12–18)
HOLD SPECIMEN: NORMAL
HOLD SPECIMEN: NORMAL
IMM GRANULOCYTES # BLD AUTO: 0.12 10*3/MM3 (ref 0–0.05)
IMM GRANULOCYTES NFR BLD AUTO: 6.4 % (ref 0–0.5)
INHALED O2 CONCENTRATION: 36 %
LYMPHOCYTES # BLD AUTO: 0.77 10*3/MM3 (ref 0.7–3.1)
LYMPHOCYTES NFR BLD AUTO: 41 % (ref 19.6–45.3)
MAGNESIUM SERPL-MCNC: 2 MG/DL (ref 1.6–2.6)
MCH RBC QN AUTO: 30.6 PG (ref 26.6–33)
MCHC RBC AUTO-ENTMCNC: 34.3 G/DL (ref 31.5–35.7)
MCV RBC AUTO: 89.3 FL (ref 79–97)
MODALITY: ABNORMAL
MONOCYTES # BLD AUTO: 0.05 10*3/MM3 (ref 0.1–0.9)
MONOCYTES NFR BLD AUTO: 2.7 % (ref 5–12)
NEUTROPHILS NFR BLD AUTO: 0.94 10*3/MM3 (ref 1.7–7)
NEUTROPHILS NFR BLD AUTO: 49.9 % (ref 42.7–76)
NOTIFIED WHO: ABNORMAL
NRBC BLD AUTO-RTO: 0 /100 WBC (ref 0–0.2)
NT-PROBNP SERPL-MCNC: 104.6 PG/ML (ref 0–900)
OVALOCYTES BLD QL SMEAR: NORMAL
PCO2 BLDV: 39.6 MM HG (ref 41–51)
PH BLDV: 7.39 PH UNITS (ref 7.31–7.41)
PLATELET # BLD AUTO: 119 10*3/MM3 (ref 140–450)
PMV BLD AUTO: 9.4 FL (ref 6–12)
PO2 BLDV: 24.5 MM HG (ref 35–42)
POTASSIUM BLDA-SCNC: 3.7 MMOL/L (ref 3.5–5)
POTASSIUM SERPL-SCNC: 3.7 MMOL/L (ref 3.5–5.2)
PROT SERPL-MCNC: 6.4 G/DL (ref 6–8.5)
RBC # BLD AUTO: 3.07 10*6/MM3 (ref 3.77–5.28)
RSV RNA RESP QL NAA+PROBE: NOT DETECTED
SAO2 % BLDCOV: 43.5 % (ref 45–75)
SARS-COV-2 RNA RESP QL NAA+PROBE: NOT DETECTED
SMALL PLATELETS BLD QL SMEAR: NORMAL
SODIUM BLD-SCNC: 122 MMOL/L (ref 131–143)
SODIUM SERPL-SCNC: 120 MMOL/L (ref 136–145)
TROPONIN T NUMERIC DELTA: 2 NG/L
TROPONIN T SERPL HS-MCNC: 12 NG/L
WBC MORPH BLD: NORMAL
WBC NRBC COR # BLD AUTO: 1.88 10*3/MM3 (ref 3.4–10.8)
WHOLE BLOOD HOLD COAG: NORMAL
WHOLE BLOOD HOLD SPECIMEN: NORMAL
WHOLE BLOOD HOLD SPECIMEN: NORMAL

## 2025-04-21 PROCEDURE — 85025 COMPLETE CBC W/AUTO DIFF WBC: CPT | Performed by: EMERGENCY MEDICINE

## 2025-04-21 PROCEDURE — 83605 ASSAY OF LACTIC ACID: CPT | Performed by: EMERGENCY MEDICINE

## 2025-04-21 PROCEDURE — 80053 COMPREHEN METABOLIC PANEL: CPT | Performed by: EMERGENCY MEDICINE

## 2025-04-21 PROCEDURE — 71045 X-RAY EXAM CHEST 1 VIEW: CPT

## 2025-04-21 PROCEDURE — 84484 ASSAY OF TROPONIN QUANT: CPT | Performed by: EMERGENCY MEDICINE

## 2025-04-21 PROCEDURE — 99284 EMERGENCY DEPT VISIT MOD MDM: CPT

## 2025-04-21 PROCEDURE — 87040 BLOOD CULTURE FOR BACTERIA: CPT | Performed by: EMERGENCY MEDICINE

## 2025-04-21 PROCEDURE — 93005 ELECTROCARDIOGRAM TRACING: CPT | Performed by: EMERGENCY MEDICINE

## 2025-04-21 PROCEDURE — 80051 ELECTROLYTE PANEL: CPT

## 2025-04-21 PROCEDURE — 82803 BLOOD GASES ANY COMBINATION: CPT

## 2025-04-21 PROCEDURE — 94640 AIRWAY INHALATION TREATMENT: CPT

## 2025-04-21 PROCEDURE — 93005 ELECTROCARDIOGRAM TRACING: CPT

## 2025-04-21 PROCEDURE — 85007 BL SMEAR W/DIFF WBC COUNT: CPT | Performed by: EMERGENCY MEDICINE

## 2025-04-21 PROCEDURE — 36415 COLL VENOUS BLD VENIPUNCTURE: CPT

## 2025-04-21 PROCEDURE — 83880 ASSAY OF NATRIURETIC PEPTIDE: CPT | Performed by: EMERGENCY MEDICINE

## 2025-04-21 PROCEDURE — 82330 ASSAY OF CALCIUM: CPT

## 2025-04-21 PROCEDURE — 25810000003 SODIUM CHLORIDE 0.9 % SOLUTION: Performed by: EMERGENCY MEDICINE

## 2025-04-21 PROCEDURE — 82948 REAGENT STRIP/BLOOD GLUCOSE: CPT

## 2025-04-21 PROCEDURE — 94799 UNLISTED PULMONARY SVC/PX: CPT

## 2025-04-21 PROCEDURE — 83605 ASSAY OF LACTIC ACID: CPT

## 2025-04-21 PROCEDURE — 83735 ASSAY OF MAGNESIUM: CPT | Performed by: EMERGENCY MEDICINE

## 2025-04-21 PROCEDURE — 87637 SARSCOV2&INF A&B&RSV AMP PRB: CPT | Performed by: EMERGENCY MEDICINE

## 2025-04-21 RX ORDER — NYSTATIN 100000 [USP'U]/ML
5 SUSPENSION ORAL ONCE
Status: COMPLETED | OUTPATIENT
Start: 2025-04-21 | End: 2025-04-21

## 2025-04-21 RX ORDER — IPRATROPIUM BROMIDE AND ALBUTEROL SULFATE 2.5; .5 MG/3ML; MG/3ML
3 SOLUTION RESPIRATORY (INHALATION) ONCE
Status: COMPLETED | OUTPATIENT
Start: 2025-04-21 | End: 2025-04-21

## 2025-04-21 RX ORDER — SODIUM CHLORIDE 0.9 % (FLUSH) 0.9 %
10 SYRINGE (ML) INJECTION AS NEEDED
Status: DISCONTINUED | OUTPATIENT
Start: 2025-04-21 | End: 2025-04-21 | Stop reason: HOSPADM

## 2025-04-21 RX ADMIN — NYSTATIN 500000 UNITS: 100000 SUSPENSION ORAL at 15:06

## 2025-04-21 RX ADMIN — IPRATROPIUM BROMIDE AND ALBUTEROL SULFATE 3 ML: .5; 3 SOLUTION RESPIRATORY (INHALATION) at 14:53

## 2025-04-21 RX ADMIN — SODIUM CHLORIDE 1000 ML: 9 INJECTION, SOLUTION INTRAVENOUS at 15:05

## 2025-04-21 NOTE — DISCHARGE INSTRUCTIONS
Please drink plenty of fluids    Please continue your nystatin swish and swallow for thrush    Please wear your oxygen at all times    Please follow-up with your primary care physician or oncologist tomorrow to review your preliminary blood culture results, to recheck your sodium level and to recheck your CBC both to check your neutrophil count and white blood cell count    Please return to the emergency immediately for increasing weakness, near passing out, passing out, increasing shortness of breath, altered mental status, fever or any new symptoms you are concerned with

## 2025-04-21 NOTE — ED PROVIDER NOTES
Time: 2:29 PM EDT  Date of encounter:  4/21/2025  Independent Historian/Clinical History and Information was obtained by:   Patient  Chief Complaint: Oral pain and weakness    History is limited by: N/A    History of Present Illness:  Patient is a 59 y.o. year old female who presents to the emergency department for evaluation of oral pain and weakness.  The patient states that she has known thrush.  The patient is on nystatin.  She does not feel like it is working.  She states that her mouth and throat is very sore.  This is chronic.  The patient does note that she is receiving chemotherapy for metastatic lung cancer.  The patient's last chemo treatment was on Friday.  She notes since that time she has had progressive weakness.  She notes chronic shortness of breath.  She does admit that this may be slightly worse.  Patient has a chronic cough.  Patient has no nausea or vomiting.  She notes generalized weakness but no focal weakness.  She does also have chronic diarrhea from chemo.  She denies any abdominal pain.  She denies any fever or rigors.  She denies any urinary frequency urgency or dysuria.  She does note that she has some swelling in both legs    HPI    Patient Care Team  Primary Care Provider: Aleksandar Edge DO    Past Medical History:     No Known Allergies  Past Medical History:   Diagnosis Date    Abnormal mammogram     PT DENIES KNOWING OF THIS HX    Anxiety     Arthritis     R SHOULDER, R HIP, AND R LEG    Cancer     LUNG    Chronic nausea 01/15/2019    COPD (chronic obstructive pulmonary disease)     O2 3 LITERS NC CONT    Hernia, hiatal     Hyperlipidemia     Hypertension     Knee pain, right 08/30/2018    Memory loss     FORGETFULNESS ? ETIOLOGY    Migraine headache     Moderate episode of recurrent major depressive disorder 05/22/2017    Night sweats     Sciatica of right side 08/18/2017    Severe episode of recurrent major depressive disorder, without psychotic features 10/22/2019     Shingles     OUTBREAK 24 ON ANTIVIRAL , WHELPS NOTED NO SORES.    Shoulder pain, left 2018     Past Surgical History:   Procedure Laterality Date    BRONCHOSCOPY N/A 2022    Procedure: BRONCHOSCOPY WITH BRONCHOALVEOLAR LAVAGE, BIOPSY;  Surgeon: Shin Mcdonald DO;  Location: [facility] ENDOSCOPY;  Service: Pulmonary;  Laterality: N/A;  RIGHT LOWER LOBE INFILTRATE    BRONCHOSCOPY N/A 2024    Procedure: BRONCHOSCOPY WITH ENDOBRONCHIAL ULTRASOUND AND FINE NEEDLE ASPIRATE;  Surgeon: Shin Mcdonald DO;  Location: [facility] ENDOSCOPY;  Service: Pulmonary;  Laterality: N/A;  MEDIASTINAL ADENOPATHY     SECTION      CHOLECYSTECTOMY      LAPAROSCOPIC    COLONOSCOPY      PORTACATH PLACEMENT Left 2024    Procedure: INSERTION OF PORTACATH;  Surgeon: Josh Patton MD;  Location: [facility] OR OSC;  Service: General;  Laterality: Left;    TOTAL HIP ARTHROPLASTY Right 2022    Procedure: RIGHT TOTAL HIP ARTHROPLASTY;  Surgeon: Cecil Gomes MD;  Location: [facility] MAIN OR;  Service: Orthopedics;  Laterality: Right;     Family History   Problem Relation Age of Onset    Other Mother         BLOOD DISEASE    Arthritis Mother     Heart disease Father     Hypertension Other     Cancer Other     Heart disease Other     Malig Hyperthermia Neg Hx        Home Medications:  Prior to Admission medications    Medication Sig Start Date End Date Taking? Authorizing Provider   albuterol (ACCUNEB) 0.63 MG/3ML nebulizer solution Take 3 mL by nebulization Every 6 (Six) Hours As Needed for Wheezing or Shortness of Air. 3/11/25   Alok Rey APRN   albuterol sulfate  (90 Base) MCG/ACT inhaler Inhale 2 puffs Every 4 (Four) Hours As Needed for Wheezing or Shortness of Air. 3/11/25   Alok Rey APRN   atorvastatin (LIPITOR) 10 MG tablet Take 1 tablet by mouth Every Night. 24   Aleksandar Edge DO   buPROPion SR (WELLBUTRIN SR) 150 MG 12 hr tablet Take 1  tablet by mouth 2 (Two) Times a Day. 8/5/24   Aleksandar Edge,    calcium carbonate (Tums) 500 MG chewable tablet Chew 2 tablets Daily. 2/21/25   Brian Dickson MD   clonazePAM (KlonoPIN) 0.5 MG tablet Take 1 tablet by mouth 2 (Two) Times a Day As Needed for Anxiety. 2/21/25   Brian Dickson MD   escitalopram (LEXAPRO) 20 MG tablet Take 1 tablet by mouth Daily. 8/5/24   Aleksandar Edge DO   famotidine (PEPCID) 40 MG tablet TAKE ONE TABLET BY MOUTH TWICE DAILY @ 9AM & 5PM  Patient taking differently: Take 1 tablet by mouth 2 (Two) Times a Day. 10/29/24   Aleksandar Edge DO   Fluticasone-Umeclidin-Vilant (Trelegy Ellipta) 200-62.5-25 MCG/ACT inhaler Inhale 1 puff Daily. 3/11/25   Alok Rey APRN   gabapentin (NEURONTIN) 100 MG capsule Take 2 capsules by mouth 3 (Three) Times a Day. 1/24/25   Brian Dickson MD   hydrOXYzine (ATARAX) 25 MG tablet TAKE 1 TABLET BY MOUTH THREE TIMES A DAY AS NEEDED FOR ITCHING  Patient taking differently: Take 1 tablet by mouth 3 (Three) Times a Day As Needed. 11/11/24   Brian Dickson MD   lisinopril (PRINIVIL,ZESTRIL) 5 MG tablet TAKE ONE TABLET BY MOUTH DAILY AT 9 AM 3/20/25   ProviderParvin MD   LORazepam (ATIVAN) 0.5 MG tablet Take 1 tablet by mouth Every 8 (Eight) Hours As Needed for Anxiety. 2/19/25   Brian Dickson MD   Magnesium 400 MG tablet Take 400 mg by mouth Daily. 12/12/24   Brian Dickson MD   Melatonin 10 MG tablet Take 1 tablet by mouth At Night As Needed.    Parvin Gray MD   metoprolol tartrate (LOPRESSOR) 25 MG tablet Take 0.5 tablets by mouth 2 (Two) Times a Day for 30 days. 3/3/25 4/2/25  Sage Rosario MD   naloxone (NARCAN) 4 MG/0.1ML nasal spray CALL 911. DON'T PRIME. SPRAY IN 1 NOSTRIL FOR OVERDOSE. REPEAT IN 2-3 MINUTES IN OTHER NOSTRIL IF NO OR MINIMAL BREATHING/RESPONSIVENESS. 2/10/25   Brian Dickson MD   naproxen (NAPROSYN) 500 MG  tablet Take 1 tablet by mouth 2 (Two) Times a Day With Meals. 3/24/25   Brian Dickson MD   nystatin (MYCOSTATIN) 100,000 unit/mL suspension Swish and swallow 5 mL 4 (Four) Times a Day. 3/24/25   Brian Dickson MD   ondansetron ODT (ZOFRAN-ODT) 8 MG disintegrating tablet Place 1 tablet on the tongue Every 8 (Eight) Hours As Needed for Nausea or Vomiting. 1/24/25   Brian Dickson MD   oxyCODONE-acetaminophen (PERCOCET)  MG per tablet Take 1 tablet by mouth Every 4 (Four) Hours As Needed for Moderate Pain. 4/15/25   Brian Dickson MD   prochlorperazine (COMPAZINE) 10 MG tablet Take 1 tablet by mouth Every 6 (Six) Hours As Needed for Nausea. 1/24/25   Brian Dickson MD   Ventolin  (90 Base) MCG/ACT inhaler Inhale 2 puffs Every 4 (Four) Hours As Needed for Wheezing. 3/27/25   Alisa Vale APRN        Social History:   Social History     Tobacco Use    Smoking status: Every Day     Current packs/day: 1.00     Average packs/day: 1 pack/day for 47.3 years (47.3 ttl pk-yrs)     Types: Cigarettes     Start date: 1978     Passive exposure: Past    Smokeless tobacco: Never   Vaping Use    Vaping status: Former    Substances: Nicotine, Flavoring    Devices: Disposable   Substance Use Topics    Alcohol use: Never    Drug use: Never         Review of Systems:  Review of Systems   Constitutional:  Positive for fatigue. Negative for chills, diaphoresis and fever.   HENT:  Positive for mouth sores and sore throat. Negative for congestion, postnasal drip and rhinorrhea.    Eyes:  Negative for photophobia.   Respiratory:  Positive for cough and shortness of breath. Negative for chest tightness.    Cardiovascular:  Positive for leg swelling. Negative for chest pain and palpitations.   Gastrointestinal:  Positive for diarrhea. Negative for abdominal pain, nausea and vomiting.   Genitourinary:  Negative for difficulty urinating, dysuria, flank pain, frequency, hematuria and  "urgency.   Musculoskeletal:  Negative for neck pain and neck stiffness.   Skin:  Positive for pallor. Negative for rash.   Neurological:  Positive for weakness. Negative for dizziness, syncope, numbness and headaches.   Hematological:  Negative for adenopathy. Does not bruise/bleed easily.   Psychiatric/Behavioral: Negative.          Physical Exam:  BP 93/46   Pulse 85   Temp 98.5 °F (36.9 °C)   Resp 18   Ht 162.6 cm (64\")   Wt 91.4 kg (201 lb 8 oz)   LMP  (LMP Unknown)   SpO2 100%   BMI 34.59 kg/m²     Physical Exam  Vitals and nursing note reviewed.   Constitutional:       General: She is not in acute distress.     Appearance: Normal appearance. She is not ill-appearing, toxic-appearing or diaphoretic.   HENT:      Head: Normocephalic and atraumatic.      Mouth/Throat:      Mouth: Mucous membranes are moist.   Eyes:      Pupils: Pupils are equal, round, and reactive to light.   Cardiovascular:      Rate and Rhythm: Normal rate and regular rhythm.      Pulses: Normal pulses.           Carotid pulses are 2+ on the right side and 2+ on the left side.       Radial pulses are 2+ on the right side and 2+ on the left side.        Femoral pulses are 2+ on the right side and 2+ on the left side.       Popliteal pulses are 2+ on the right side and 2+ on the left side.        Dorsalis pedis pulses are 2+ on the right side and 2+ on the left side.        Posterior tibial pulses are 2+ on the right side and 2+ on the left side.      Heart sounds: Normal heart sounds. No murmur heard.  Pulmonary:      Effort: Pulmonary effort is normal. No accessory muscle usage, respiratory distress or retractions.      Breath sounds: Examination of the right-upper field reveals decreased breath sounds. Examination of the left-upper field reveals decreased breath sounds. Examination of the right-middle field reveals decreased breath sounds. Examination of the left-middle field reveals decreased breath sounds. Examination of the " right-lower field reveals decreased breath sounds. Examination of the left-lower field reveals decreased breath sounds. Decreased breath sounds present. No wheezing, rhonchi or rales.   Chest:      Chest wall: No mass or tenderness.   Abdominal:      General: Abdomen is flat. There is no distension.      Palpations: Abdomen is soft. There is no mass or pulsatile mass.      Tenderness: There is no abdominal tenderness. There is no right CVA tenderness, left CVA tenderness, guarding or rebound.      Comments: No rigidity   Musculoskeletal:         General: No swelling, tenderness or deformity.      Cervical back: Neck supple. No tenderness.      Right lower leg: No tenderness. Edema present.      Left lower leg: No tenderness. Edema present.   Skin:     General: Skin is warm and dry.      Capillary Refill: Capillary refill takes less than 2 seconds.      Coloration: Skin is not jaundiced or pale.      Findings: No erythema.   Neurological:      General: No focal deficit present.      Mental Status: She is alert and oriented to person, place, and time. Mental status is at baseline.      Cranial Nerves: Cranial nerves 2-12 are intact. No cranial nerve deficit.      Sensory: Sensation is intact. No sensory deficit.      Motor: Motor function is intact. No weakness or pronator drift.      Coordination: Coordination is intact. Coordination normal.   Psychiatric:         Mood and Affect: Mood normal.         Behavior: Behavior normal.                  Procedures:  Procedures      Medical Decision Making:      Comorbidities that affect care:    Metastatic lung cancer, migraine, COPD, chronic respiratory failure requiring oxygen at all times, hyperlipidemia, hypertension    External Notes reviewed:    None      The following orders were placed and all results were independently analyzed by me:  Orders Placed This Encounter   Procedures    COVID PRE-OP / PRE-PROCEDURE SCREENING ORDER (NO ISOLATION) - Swab, Nasopharynx     COVID-19, FLU A/B, RSV PCR 1 HR TAT - Swab, Nasopharynx    Blood Culture - Blood,    Blood Culture - Blood,    XR Chest 1 View    [place] Draw    Comprehensive Metabolic Panel    BNP    High Sensitivity Troponin T    CBC Auto Differential    Scan Slide    High Sensitivity Troponin T 1Hr    Lactic Acid, Plasma    Magnesium    Venous Blood Gas + Electrolytes    Blood Gas, Venous -    NPO Diet NPO Type: Strict NPO    Undress & Gown    Continuous Pulse Oximetry    Vital Signs    Oxygen Therapy- Nasal Cannula; Titrate 1-6 LPM Per SpO2; 90 - 95%    POC Glucose Once    POC Lactate    POC Electrolyte Panel    ECG 12 Lead ED Triage Standing Order; SOA    Insert Peripheral IV    CBC & Differential    Green Top (Gel)    Lavender Top    Gold Top - SST    Light Blue Top    Extra Tubes    Lavender Top       Medications Given in the Emergency Department:  Medications   sodium chloride 0.9 % flush 10 mL (has no administration in time range)   nystatin (MYCOSTATIN) 100,000 unit/mL suspension 500,000 Units (500,000 Units Swish & Swallow Given 4/21/25 1506)   sodium chloride 0.9 % bolus 1,000 mL (0 mL Intravenous Stopped 4/21/25 1535)   ipratropium-albuterol (DUO-NEB) nebulizer solution 3 mL (3 mL Nebulization Given 4/21/25 1453)        ED Course:    ED Course as of 04/21/25 1920   Mon Apr 21, 2025   1345 EKG:    Rhythm: Sinus rhythm  Rate: 96  Intervals: Normal OH and QT interval  T-wave: Nonspecific T wave flattening  ST Segment: No pathological ST elevation or reciprocal ST depression to suggest STEMI    EKG Comparison: No change from prior EKG, February 25, 2025    Interpreted by me   [SD]      ED Course User Index  [SD] Bill Baker DO       Labs:    Lab Results (last 24 hours)       Procedure Component Value Units Date/Time    CBC & Differential [[id number]]  (Abnormal) Collected: 04/21/25 1340    Specimen: Blood Updated: 04/21/25 1418    Narrative:      The following orders were created for panel order CBC &  Differential.  Procedure                               Abnormality         Status                     ---------                               -----------         ------                     CBC Auto Differential[[id number]]        Abnormal            Final result               Scan Slide[[id number]]                                       Final result                 Please view results for these tests on the individual orders.    Comprehensive Metabolic Panel [[id number]]  (Abnormal) Collected: 04/21/25 1340    Specimen: Blood Updated: 04/21/25 1403     Glucose 135 mg/dL      BUN 8 mg/dL      Creatinine 0.65 mg/dL      Sodium 120 mmol/L      Potassium 3.7 mmol/L      Comment: Slight hemolysis detected by analyzer. Result may be falsely elevated.        Chloride 87 mmol/L      CO2 22.7 mmol/L      Calcium 7.6 mg/dL      Total Protein 6.4 g/dL      Albumin 3.6 g/dL      ALT (SGPT) 12 U/L      AST (SGOT) 15 U/L      Alkaline Phosphatase 75 U/L      Total Bilirubin 0.5 mg/dL      Globulin 2.8 gm/dL      A/G Ratio 1.3 g/dL      BUN/Creatinine Ratio 12.3     Anion Gap 10.3 mmol/L      eGFR 102.2 mL/min/1.73     Narrative:      GFR Categories in Chronic Kidney Disease (CKD)      GFR Category          GFR (mL/min/1.73)    Interpretation  G1                     90 or greater         Normal or high (1)  G2                      60-89                Mild decrease (1)  G3a                   45-59                Mild to moderate decrease  G3b                   30-44                Moderate to severe decrease  G4                    15-29                Severe decrease  G5                    14 or less           Kidney failure          (1)In the absence of evidence of kidney disease, neither GFR category G1 or G2 fulfill the criteria for CKD.    eGFR calculation 2021 CKD-EPI creatinine equation, which does not include race as a factor    BNP [[id number]]  (Normal) Collected: 04/21/25 1340    Specimen: Blood Updated: 04/21/25 1407      proBNP 104.6 pg/mL     Narrative:      This assay is used as an aid in the diagnosis of individuals suspected of having heart failure. It can be used as an aid in the diagnosis of acute decompensated heart failure (ADHF) in patients presenting with signs and symptoms of ADHF to the emergency department (ED). In addition, NT-proBNP of <300 pg/mL indicates ADHF is not likely.    Age Range Result Interpretation  NT-proBNP Concentration (pg/mL:      <50             Positive            >450                   Gray                 300-450                    Negative             <300    50-75           Positive            >900                  Gray                300-900                  Negative            <300      >75             Positive            >1800                  Gray                300-1800                  Negative            <300    High Sensitivity Troponin T [[id number]]  (Normal) Collected: 04/21/25 1340    Specimen: Blood Updated: 04/21/25 1407     HS Troponin T 12 ng/L     Narrative:      High Sensitive Troponin T Reference Range:  <14.0 ng/L- Negative Female for AMI  <22.0 ng/L- Negative Male for AMI  >=14 - Abnormal Female indicating possible myocardial injury.  >=22 - Abnormal Male indicating possible myocardial injury.   Clinicians would have to utilize clinical acumen, EKG, Troponin, and serial changes to determine if it is an Acute Myocardial Infarction or myocardial injury due to an underlying chronic condition.         CBC Auto Differential [[id number]]  (Abnormal) Collected: 04/21/25 1340    Specimen: Blood Updated: 04/21/25 1418     WBC 1.88 10*3/mm3      RBC 3.07 10*6/mm3      Hemoglobin 9.4 g/dL      Hematocrit 27.4 %      MCV 89.3 fL      MCH 30.6 pg      MCHC 34.3 g/dL      RDW 15.6 %      RDW-SD 50.2 fl      MPV 9.4 fL      Platelets 119 10*3/mm3      Neutrophil % 49.9 %      Lymphocyte % 41.0 %      Monocyte % 2.7 %      Eosinophil % 0.0 %      Basophil % 0.0 %      Immature Grans % 6.4 %       Neutrophils, Absolute 0.94 10*3/mm3      Lymphocytes, Absolute 0.77 10*3/mm3      Monocytes, Absolute 0.05 10*3/mm3      Eosinophils, Absolute 0.00 10*3/mm3      Basophils, Absolute 0.00 10*3/mm3      Immature Grans, Absolute 0.12 10*3/mm3      nRBC 0.0 /100 WBC     COVID PRE-OP / PRE-PROCEDURE SCREENING ORDER (NO ISOLATION) - Swab, Nasopharynx [[id number]]  (Normal) Collected: 04/21/25 1340    Specimen: Swab from Nasopharynx Updated: 04/21/25 1427    Narrative:      The following orders were created for panel order COVID PRE-OP / PRE-PROCEDURE SCREENING ORDER (NO ISOLATION) - Swab, Nasopharynx.  Procedure                               Abnormality         Status                     ---------                               -----------         ------                     COVID-19, FLU A/B, RSV P...[[id number]]  Normal              Final result                 Please view results for these tests on the individual orders.    COVID-19, FLU A/B, RSV PCR 1 HR TAT - Swab, Nasopharynx [[id number]]  (Normal) Collected: 04/21/25 1340    Specimen: Swab from Nasopharynx Updated: 04/21/25 1427     COVID19 Not Detected     Influenza A PCR Not Detected     Influenza B PCR Not Detected     RSV, PCR Not Detected    Narrative:      Fact sheet for providers: https://www.fda.gov/media/492968/download    Fact sheet for patients: https://www.fda.gov/media/211087/download    Test performed by PCR.    Scan Slide [[id number]] Collected: 04/21/25 1340    Specimen: Blood Updated: 04/21/25 1418     Anisocytosis Slight/1+     Crenated RBC's Slight/1+     Ovalocytes Slight/1+     WBC Morphology Normal     Platelet Estimate Decreased    Magnesium [[id number]]  (Normal) Collected: 04/21/25 1340    Specimen: Blood Updated: 04/21/25 1518     Magnesium 2.0 mg/dL     High Sensitivity Troponin T 1Hr [[id number]]  (Abnormal) Collected: 04/21/25 1456    Specimen: Blood Updated: 04/21/25 1533     HS Troponin T 14 ng/L      Troponin T Numeric Delta 2 ng/L      Narrative:      High Sensitive Troponin T Reference Range:  <14.0 ng/L- Negative Female for AMI  <22.0 ng/L- Negative Male for AMI  >=14 - Abnormal Female indicating possible myocardial injury.  >=22 - Abnormal Male indicating possible myocardial injury.   Clinicians would have to utilize clinical acumen, EKG, Troponin, and serial changes to determine if it is an Acute Myocardial Infarction or myocardial injury due to an underlying chronic condition.         Lactic Acid, Plasma [[id number]]  (Normal) Collected: 04/21/25 1456    Specimen: Blood Updated: 04/21/25 1518     Lactate 1.3 mmol/L     POC Glucose Once [[id number]]  (Abnormal) Collected: 04/21/25 1500    Specimen: Venous Blood Updated: 04/21/25 1505     Glucose 110 mg/dL      Comment: Serial Number: [id number]:  [id number]       Blood Gas, Venous - [[id number]]  (Abnormal) Collected: 04/21/25 1500    Specimen: Venous Blood Updated: 04/21/25 1505     Site Right Brachial     pH, Venous 7.392 pH Units      pCO2, Venous 39.6 mm Hg      pO2, Venous 24.5 mm Hg      HCO3, Venous 24.1 mmol/L      Base Excess, Venous -0.8 mmol/L      Comment: Serial Number: [id number]:  [id number]        O2 Saturation, Venous 43.5 %      Hemoglobin, Blood Gas 9.9 g/dL      Barometric Pressure for Blood Gas 742.3000 mmHg      Modality Cannula     FIO2 36 %      Flow Rate 4.0000 lpm      Notified [facility]    POC Lactate [[id number]]  (Normal) Collected: 04/21/25 1500    Specimen: Venous Blood Updated: 04/21/25 1505     Lactate 1.2 mmol/L      Comment: Serial Number: [id number]:  [id number]       POC Electrolyte Panel [[id number]]  (Abnormal) Collected: 04/21/25 1500    Specimen: Venous Blood Updated: 04/21/25 1505     Sodium 122 mmol/L      POC Potassium 3.7 mmol/L      Chloride 85 mmol/L      Ionized Calcium 1.04 mmol/L      Comment: Serial Number: [id number]:  [id number]       Blood Culture - Blood, Arm, Left [[id number]] Collected: 04/21/25 1536    Specimen: Blood from Arm, Left Updated:  04/21/25 1539    Blood Culture - Blood, Arm, Right [[id number]] Collected: 04/21/25 1631    Specimen: Blood from Arm, Right Updated: 04/21/25 1634             Imaging:    XR Chest 1 View  Result Date: 4/21/2025  XR CHEST 1 VW Date of Exam: 4/21/2025 1:57 PM EDT Indication: SOA Triage Protocol Comparison: Chest radiograph and chest CT 2/25/2025. Findings: Left chest port tip terminates over the right atrium. Cardiomediastinal silhouette is unchanged. Improved bibasilar opacities seen on prior exam. No new or worsening focal consolidation. Pulmonary nodules seen to better advantage on prior CT. No sizable pleural effusion. No pneumothorax. Osseous structures are unchanged.     Impression: Improved bibasilar opacities seen on prior exam. No new or worsening focal consolidation. Electronically Signed: Satish Contreras MD  4/21/2025 2:23 PM EDT  Workstation ID: [id number]        Differential Diagnosis and Discussion:    Weakness: Based on the patient's history, signs, and symptoms, the diffential diagnosis includes but is not limited to meningitis, stroke, sepsis, subarachnoid hemorrhage, intracranial bleeding, encephalitis, acute uti, dehydration, MS, myasthenia gravis, Gu[place], migraine variant, neuromuscular disorders vertigo, electrolyte imbalance, and metabolic disorders.    Labs were collected in the emergency department and all labs were reviewed and interpreted by me.  X-ray were performed in the emergency department and all X-ray impressions were independently interpreted by me.  An EKG was performed and the EKG was interpreted by me.    MDM  Number of Diagnoses or Management Options  Chemotherapy-induced neutropenia  Chronic anemia  Chronic respiratory failure with hypoxia  Hyponatremia  Primary malignant neoplasm of lung metastatic to other site, unspecified laterality  Thrush  Weakness  Diagnosis management comments: The patient's blood pressure was initially low at 82/59.  The patient's blood pressure  improved to 93/46 prior to treatment.    The patient was given a liter of normal saline.    The patient was in no respiratory distress.  The patient's pulse ox was 100% on 4 L which is her baseline requirement.    Chest x-ray demonstrates improved basilar opacities.  There is no focal or worsening consolidation.    The patient's CBC demonstrates a white blood cell count of 1.88 which is low.  The patient's hemoglobin is 9.4.  In comparison to old CBCs the patient does have a history of chronic anemia.  Patient's platelets were 119,000.  The patient does have chronic thrombocytopenia.  The patient's neutrophil count was 0.94.    The patient is afebrile.    2 blood cultures were ordered.  The results are pending at the time of disposition    CMP demonstrates normal renal function.  The patient's sodium is low at 120.  The patient's potassium is acceptable 3.7.  The patient has normal liver enzymes and anion gap.    Venous blood gas was performed because of the hyponatremia did return at 122.  Again, this was treated with a liter of normal saline.    The patient's lactate is normal.  This typically indicates that the patient has normal tissue perfusion as well as severe sepsis is unlikely    The patient's Covid swab was negative   The patient's Influenza swab was negative   The patient's RSV swab was negative    The patient's magnesium level is unremarkable and thus I do not feel is contributory to the patient's symptoms    The patient's EKG demonstrated a normal sinus rhythm.  The EKG demonstrated no evidence of dysrhythmia.  The patient had no acute ST abnormalities or acute T wave abnormalities to indicate STEMI     Patient's thrush was treated with nystatin.    The time of disposition, the patient was resting comfortably in no acute distress and nontoxic.  I offered admission to the hospital due to the weakness and hyponatremia.  The patient states that she is feeling better.  The patient states that she does not  want to stay in the hospital.  Again I did offer admission to the hospital.  The patient wants to go home and follow-up as outpatient.  The patient will either follow-up with her primary care physician or oncologist tomorrow.  I discussed with her the need to recheck her sodium level to recheck a CBC including her neutrophil count and white blood cell count.  She also understands to review the blood cultures that were obtained and pending at this time.    The patient was given very specific instructions on when and why to return to the emergency room.  The patient voiced understanding and felt comfortable with the discharge instructions.  They would return to the emergency room if necessary.  The patient appears appropriate for discharge and outpatient follow-up.         Amount and/or Complexity of Data Reviewed  Clinical lab tests: reviewed  Tests in the radiology section of CPT®: reviewed  Tests in the medicine section of CPT®: reviewed  Decide to obtain previous medical records or to obtain history from someone other than the patient: yes               Social Determinants of Health:    Patient is independent, reliable, and has access to care.       Disposition and Care Coordination:    I advised the patient that in my opinion through evaluation of the history, physical, and objective data admission to the hospital is warranted. However, the patient/caretaker has declined admission understanding the risks of discharge.     have explained discharge medications and the need for follow up with the patient/caretakers. This was also printed in the discharge instructions. Patient was discharged with the following medications and follow up:      Medication List        Changed      famotidine 40 MG tablet  Commonly known as: PEPCID  TAKE ONE TABLET BY MOUTH TWICE DAILY @ 9AM & 5PM  What changed: See the new instructions.     hydrOXYzine 25 MG tablet  Commonly known as: ATARAX  TAKE 1 TABLET BY MOUTH THREE TIMES A DAY AS  NEEDED FOR ITCHING  What changed: See the new instructions.           Aleksandar Edge, DO  [address] [place] [address]  [phone]    On 4/22/2025  Hyponatremia, neutropenia       Final diagnoses:   Weakness   Chronic respiratory failure with hypoxia   Primary malignant neoplasm of lung metastatic to other site, unspecified laterality   Hyponatremia   Chemotherapy-induced neutropenia   Chronic anemia   Thrush        ED Disposition       ED Disposition   Discharge    Condition   Stable    Comment   --               This medical record created using voice recognition software.             Bill Baker,   04/22/25 [id number]

## 2025-04-22 ENCOUNTER — HOSPITAL ENCOUNTER (INPATIENT)
Facility: HOSPITAL | Age: 60
LOS: 11 days | Discharge: HOME | End: 2025-05-03
Attending: EMERGENCY MEDICINE | Admitting: HOSPITALIST
Payer: MEDICARE

## 2025-04-22 ENCOUNTER — APPOINTMENT (OUTPATIENT)
Dept: GENERAL RADIOLOGY | Facility: HOSPITAL | Age: 60
End: 2025-04-22
Payer: MEDICARE

## 2025-04-22 ENCOUNTER — TELEPHONE (OUTPATIENT)
Dept: ONCOLOGY | Facility: HOSPITAL | Age: 60
End: 2025-04-22
Payer: MEDICARE

## 2025-04-22 DIAGNOSIS — R53.1 GENERALIZED WEAKNESS: ICD-10-CM

## 2025-04-22 DIAGNOSIS — C34.90 MALIGNANT NEOPLASM OF LUNG, UNSPECIFIED LATERALITY, UNSPECIFIED PART OF LUNG: ICD-10-CM

## 2025-04-22 DIAGNOSIS — R26.2 DIFFICULTY WALKING: ICD-10-CM

## 2025-04-22 DIAGNOSIS — E87.1 HYPONATREMIA: Primary | ICD-10-CM

## 2025-04-22 DIAGNOSIS — D61.810 PANCYTOPENIA DUE TO CHEMOTHERAPY: ICD-10-CM

## 2025-04-22 LAB
ALBUMIN SERPL-MCNC: 3.9 G/DL (ref 3.5–5.2)
ALBUMIN/GLOB SERPL: 1.3 G/DL
ALP SERPL-CCNC: 75 U/L (ref 39–117)
ALT SERPL W P-5'-P-CCNC: 10 U/L (ref 1–33)
ANION GAP SERPL CALCULATED.3IONS-SCNC: 10.5 MMOL/L (ref 5–15)
ANISOCYTOSIS BLD QL: NORMAL
AST SERPL-CCNC: 9 U/L (ref 1–32)
BASOPHILS # BLD AUTO: 0 10*3/MM3 (ref 0–0.2)
BASOPHILS NFR BLD AUTO: 0 % (ref 0–1.5)
BILIRUB SERPL-MCNC: 0.4 MG/DL (ref 0–1.2)
BUN SERPL-MCNC: 9 MG/DL (ref 6–20)
BUN/CREAT SERPL: 13.6 (ref 7–25)
BURR CELLS BLD QL SMEAR: NORMAL
CALCIUM SPEC-SCNC: 7.9 MG/DL (ref 8.6–10.5)
CHLORIDE SERPL-SCNC: 88 MMOL/L (ref 98–107)
CO2 SERPL-SCNC: 22.5 MMOL/L (ref 22–29)
CORTIS SERPL-MCNC: 11.74 MCG/DL
CREAT SERPL-MCNC: 0.66 MG/DL (ref 0.57–1)
D-LACTATE SERPL-SCNC: 0.7 MMOL/L (ref 0.5–2)
DEPRECATED RDW RBC AUTO: 51 FL (ref 37–54)
EGFRCR SERPLBLD CKD-EPI 2021: 101.8 ML/MIN/1.73
EOSINOPHIL # BLD AUTO: 0 10*3/MM3 (ref 0–0.4)
EOSINOPHIL NFR BLD AUTO: 0 % (ref 0.3–6.2)
ERYTHROCYTE [DISTWIDTH] IN BLOOD BY AUTOMATED COUNT: 15.7 % (ref 12.3–15.4)
GLOBULIN UR ELPH-MCNC: 3 GM/DL
GLUCOSE SERPL-MCNC: 104 MG/DL (ref 65–99)
HCT VFR BLD AUTO: 28.3 % (ref 34–46.6)
HGB BLD-MCNC: 9.5 G/DL (ref 12–15.9)
HOLD SPECIMEN: NORMAL
HOLD SPECIMEN: NORMAL
IMM GRANULOCYTES # BLD AUTO: 0 10*3/MM3 (ref 0–0.05)
IMM GRANULOCYTES NFR BLD AUTO: 0 % (ref 0–0.5)
LYMPHOCYTES # BLD AUTO: 0.66 10*3/MM3 (ref 0.7–3.1)
LYMPHOCYTES NFR BLD AUTO: 84.6 % (ref 19.6–45.3)
MAGNESIUM SERPL-MCNC: 2.2 MG/DL (ref 1.6–2.6)
MCH RBC QN AUTO: 30.5 PG (ref 26.6–33)
MCHC RBC AUTO-ENTMCNC: 33.6 G/DL (ref 31.5–35.7)
MCV RBC AUTO: 91 FL (ref 79–97)
MONOCYTES # BLD AUTO: 0.04 10*3/MM3 (ref 0.1–0.9)
MONOCYTES NFR BLD AUTO: 5.1 % (ref 5–12)
NEUTROPHILS NFR BLD AUTO: 0.08 10*3/MM3 (ref 1.7–7)
NEUTROPHILS NFR BLD AUTO: 10.3 % (ref 42.7–76)
NRBC BLD AUTO-RTO: 0 /100 WBC (ref 0–0.2)
OSMOLALITY SERPL: 253 MOSM/KG (ref 275–295)
OSMOLALITY UR: 444 MOSM/KG (ref 50–1400)
PHOSPHATE SERPL-MCNC: 1.8 MG/DL (ref 2.5–4.5)
PLATELET # BLD AUTO: 75 10*3/MM3 (ref 140–450)
PMV BLD AUTO: 10.1 FL (ref 6–12)
POIKILOCYTOSIS BLD QL SMEAR: NORMAL
POTASSIUM SERPL-SCNC: 3.8 MMOL/L (ref 3.5–5.2)
PROT SERPL-MCNC: 6.9 G/DL (ref 6–8.5)
RBC # BLD AUTO: 3.11 10*6/MM3 (ref 3.77–5.28)
SMALL PLATELETS BLD QL SMEAR: NORMAL
SODIUM SERPL-SCNC: 121 MMOL/L (ref 136–145)
SODIUM SERPL-SCNC: 125 MMOL/L (ref 136–145)
SODIUM UR-SCNC: 30 MMOL/L
TSH SERPL DL<=0.05 MIU/L-ACNC: 0.77 UIU/ML (ref 0.27–4.2)
WBC MORPH BLD: NORMAL
WBC NRBC COR # BLD AUTO: 0.78 10*3/MM3 (ref 3.4–10.8)
WHOLE BLOOD HOLD COAG: NORMAL
WHOLE BLOOD HOLD SPECIMEN: NORMAL

## 2025-04-22 PROCEDURE — 99222 1ST HOSP IP/OBS MODERATE 55: CPT | Performed by: HOSPITALIST

## 2025-04-22 PROCEDURE — 84100 ASSAY OF PHOSPHORUS: CPT | Performed by: HOSPITALIST

## 2025-04-22 PROCEDURE — 36415 COLL VENOUS BLD VENIPUNCTURE: CPT | Performed by: HOSPITALIST

## 2025-04-22 PROCEDURE — 36415 COLL VENOUS BLD VENIPUNCTURE: CPT

## 2025-04-22 PROCEDURE — 83930 ASSAY OF BLOOD OSMOLALITY: CPT | Performed by: HOSPITALIST

## 2025-04-22 PROCEDURE — 25010000002 CALCIUM GLUCONATE-NACL 1-0.675 GM/50ML-% SOLUTION: Performed by: HOSPITALIST

## 2025-04-22 PROCEDURE — 85007 BL SMEAR W/DIFF WBC COUNT: CPT | Performed by: EMERGENCY MEDICINE

## 2025-04-22 PROCEDURE — 25010000003 DEXTROSE 5 % SOLUTION: Performed by: PHYSICIAN ASSISTANT

## 2025-04-22 PROCEDURE — 83935 ASSAY OF URINE OSMOLALITY: CPT | Performed by: HOSPITALIST

## 2025-04-22 PROCEDURE — 80053 COMPREHEN METABOLIC PANEL: CPT | Performed by: EMERGENCY MEDICINE

## 2025-04-22 PROCEDURE — 84295 ASSAY OF SERUM SODIUM: CPT | Performed by: HOSPITALIST

## 2025-04-22 PROCEDURE — 94799 UNLISTED PULMONARY SVC/PX: CPT

## 2025-04-22 PROCEDURE — 25810000003 SODIUM CHLORIDE 0.9 % SOLUTION: Performed by: EMERGENCY MEDICINE

## 2025-04-22 PROCEDURE — 83735 ASSAY OF MAGNESIUM: CPT | Performed by: HOSPITALIST

## 2025-04-22 PROCEDURE — 85025 COMPLETE CBC W/AUTO DIFF WBC: CPT | Performed by: EMERGENCY MEDICINE

## 2025-04-22 PROCEDURE — 71045 X-RAY EXAM CHEST 1 VIEW: CPT

## 2025-04-22 PROCEDURE — 84300 ASSAY OF URINE SODIUM: CPT | Performed by: HOSPITALIST

## 2025-04-22 PROCEDURE — 99285 EMERGENCY DEPT VISIT HI MDM: CPT

## 2025-04-22 PROCEDURE — 82533 TOTAL CORTISOL: CPT | Performed by: HOSPITALIST

## 2025-04-22 PROCEDURE — 84443 ASSAY THYROID STIM HORMONE: CPT | Performed by: HOSPITALIST

## 2025-04-22 PROCEDURE — 63710000001 REVEFENACIN 175 MCG/3ML SOLUTION: Performed by: HOSPITALIST

## 2025-04-22 PROCEDURE — 94640 AIRWAY INHALATION TREATMENT: CPT

## 2025-04-22 PROCEDURE — 83605 ASSAY OF LACTIC ACID: CPT | Performed by: EMERGENCY MEDICINE

## 2025-04-22 RX ORDER — NYSTATIN 100000 [USP'U]/ML
5 SUSPENSION ORAL 4 TIMES DAILY
Status: DISPENSED | OUTPATIENT
Start: 2025-04-22 | End: 2025-04-25

## 2025-04-22 RX ORDER — METOPROLOL TARTRATE 25 MG/1
12.5 TABLET, FILM COATED ORAL 2 TIMES DAILY
Status: DISCONTINUED | OUTPATIENT
Start: 2025-04-22 | End: 2025-05-03 | Stop reason: HOSPADM

## 2025-04-22 RX ORDER — LISINOPRIL 10 MG/1
5 TABLET ORAL DAILY
Status: DISCONTINUED | OUTPATIENT
Start: 2025-04-23 | End: 2025-05-03 | Stop reason: HOSPADM

## 2025-04-22 RX ORDER — CALCIUM CARBONATE 500 MG/1
2 TABLET, CHEWABLE ORAL DAILY
Status: DISCONTINUED | OUTPATIENT
Start: 2025-04-23 | End: 2025-05-01

## 2025-04-22 RX ORDER — LORAZEPAM 0.5 MG/1
0.5 TABLET ORAL EVERY 8 HOURS PRN
Status: DISCONTINUED | OUTPATIENT
Start: 2025-04-22 | End: 2025-05-03 | Stop reason: HOSPADM

## 2025-04-22 RX ORDER — ONDANSETRON 2 MG/ML
4 INJECTION INTRAMUSCULAR; INTRAVENOUS EVERY 6 HOURS PRN
Status: DISCONTINUED | OUTPATIENT
Start: 2025-04-22 | End: 2025-05-03 | Stop reason: HOSPADM

## 2025-04-22 RX ORDER — ALBUTEROL SULFATE 90 UG/1
2 INHALANT RESPIRATORY (INHALATION) EVERY 4 HOURS PRN
Status: DISCONTINUED | OUTPATIENT
Start: 2025-04-22 | End: 2025-05-03 | Stop reason: HOSPADM

## 2025-04-22 RX ORDER — PROCHLORPERAZINE MALEATE 5 MG/1
10 TABLET ORAL EVERY 6 HOURS PRN
Status: DISCONTINUED | OUTPATIENT
Start: 2025-04-22 | End: 2025-05-03 | Stop reason: HOSPADM

## 2025-04-22 RX ORDER — HYDROXYZINE HYDROCHLORIDE 25 MG/1
25 TABLET, FILM COATED ORAL 3 TIMES DAILY PRN
Status: DISCONTINUED | OUTPATIENT
Start: 2025-04-22 | End: 2025-05-03 | Stop reason: HOSPADM

## 2025-04-22 RX ORDER — OXYCODONE AND ACETAMINOPHEN 10; 325 MG/1; MG/1
1 TABLET ORAL EVERY 4 HOURS PRN
Status: DISCONTINUED | OUTPATIENT
Start: 2025-04-22 | End: 2025-05-03 | Stop reason: HOSPADM

## 2025-04-22 RX ORDER — CALCIUM GLUCONATE 20 MG/ML
1000 INJECTION, SOLUTION INTRAVENOUS ONCE
Status: COMPLETED | OUTPATIENT
Start: 2025-04-22 | End: 2025-04-22

## 2025-04-22 RX ORDER — POLYETHYLENE GLYCOL 3350 17 G/17G
17 POWDER, FOR SOLUTION ORAL DAILY PRN
Status: DISCONTINUED | OUTPATIENT
Start: 2025-04-22 | End: 2025-05-03 | Stop reason: HOSPADM

## 2025-04-22 RX ORDER — BUPROPION HYDROCHLORIDE 150 MG/1
150 TABLET, EXTENDED RELEASE ORAL 2 TIMES DAILY
Status: DISCONTINUED | OUTPATIENT
Start: 2025-04-22 | End: 2025-05-03 | Stop reason: HOSPADM

## 2025-04-22 RX ORDER — ALBUTEROL SULFATE 0.83 MG/ML
2.5 SOLUTION RESPIRATORY (INHALATION) EVERY 6 HOURS PRN
Status: DISCONTINUED | OUTPATIENT
Start: 2025-04-22 | End: 2025-05-03 | Stop reason: HOSPADM

## 2025-04-22 RX ORDER — SODIUM CHLORIDE 0.9 % (FLUSH) 0.9 %
10 SYRINGE (ML) INJECTION EVERY 12 HOURS SCHEDULED
Status: DISCONTINUED | OUTPATIENT
Start: 2025-04-22 | End: 2025-05-03 | Stop reason: HOSPADM

## 2025-04-22 RX ORDER — ATORVASTATIN CALCIUM 10 MG/1
10 TABLET, FILM COATED ORAL NIGHTLY
Status: DISCONTINUED | OUTPATIENT
Start: 2025-04-22 | End: 2025-05-03 | Stop reason: HOSPADM

## 2025-04-22 RX ORDER — AMOXICILLIN 250 MG
2 CAPSULE ORAL 2 TIMES DAILY PRN
Status: DISCONTINUED | OUTPATIENT
Start: 2025-04-22 | End: 2025-05-03 | Stop reason: HOSPADM

## 2025-04-22 RX ORDER — GABAPENTIN 100 MG/1
200 CAPSULE ORAL 3 TIMES DAILY
Status: DISCONTINUED | OUTPATIENT
Start: 2025-04-22 | End: 2025-05-03 | Stop reason: HOSPADM

## 2025-04-22 RX ORDER — BISACODYL 5 MG/1
5 TABLET, DELAYED RELEASE ORAL DAILY PRN
Status: DISCONTINUED | OUTPATIENT
Start: 2025-04-22 | End: 2025-05-03 | Stop reason: HOSPADM

## 2025-04-22 RX ORDER — NAPROXEN 250 MG/1
500 TABLET ORAL 2 TIMES DAILY WITH MEALS
Status: DISCONTINUED | OUTPATIENT
Start: 2025-04-22 | End: 2025-05-03 | Stop reason: HOSPADM

## 2025-04-22 RX ORDER — FAMOTIDINE 20 MG/1
40 TABLET, FILM COATED ORAL 2 TIMES DAILY
Status: DISCONTINUED | OUTPATIENT
Start: 2025-04-22 | End: 2025-05-03 | Stop reason: HOSPADM

## 2025-04-22 RX ORDER — BISACODYL 10 MG
10 SUPPOSITORY, RECTAL RECTAL DAILY PRN
Status: DISCONTINUED | OUTPATIENT
Start: 2025-04-22 | End: 2025-05-03 | Stop reason: HOSPADM

## 2025-04-22 RX ORDER — ESCITALOPRAM OXALATE 10 MG/1
20 TABLET ORAL DAILY
Status: DISCONTINUED | OUTPATIENT
Start: 2025-04-23 | End: 2025-05-03 | Stop reason: HOSPADM

## 2025-04-22 RX ORDER — ONDANSETRON 4 MG/1
4 TABLET, ORALLY DISINTEGRATING ORAL EVERY 6 HOURS PRN
Status: DISCONTINUED | OUTPATIENT
Start: 2025-04-22 | End: 2025-05-03 | Stop reason: HOSPADM

## 2025-04-22 RX ORDER — SODIUM CHLORIDE 0.9 % (FLUSH) 0.9 %
10 SYRINGE (ML) INJECTION AS NEEDED
Status: DISCONTINUED | OUTPATIENT
Start: 2025-04-22 | End: 2025-05-03 | Stop reason: HOSPADM

## 2025-04-22 RX ORDER — BUDESONIDE AND FORMOTEROL FUMARATE DIHYDRATE 160; 4.5 UG/1; UG/1
2 AEROSOL RESPIRATORY (INHALATION)
Status: DISCONTINUED | OUTPATIENT
Start: 2025-04-22 | End: 2025-05-03 | Stop reason: HOSPADM

## 2025-04-22 RX ORDER — CLONAZEPAM 0.5 MG/1
0.5 TABLET ORAL 2 TIMES DAILY PRN
Status: ACTIVE | OUTPATIENT
Start: 2025-04-22 | End: 2025-04-27

## 2025-04-22 RX ORDER — SODIUM CHLORIDE 9 MG/ML
40 INJECTION, SOLUTION INTRAVENOUS AS NEEDED
Status: DISCONTINUED | OUTPATIENT
Start: 2025-04-22 | End: 2025-05-03 | Stop reason: HOSPADM

## 2025-04-22 RX ADMIN — NAPROXEN 500 MG: 250 TABLET ORAL at 21:25

## 2025-04-22 RX ADMIN — OXYCODONE AND ACETAMINOPHEN 1 TABLET: 10; 325 TABLET ORAL at 21:31

## 2025-04-22 RX ADMIN — SODIUM CHLORIDE 1000 ML: 9 INJECTION, SOLUTION INTRAVENOUS at 16:02

## 2025-04-22 RX ADMIN — CALCIUM GLUCONATE 1000 MG: 20 INJECTION, SOLUTION INTRAVENOUS at 19:13

## 2025-04-22 RX ADMIN — FAMOTIDINE 40 MG: 20 TABLET, FILM COATED ORAL at 21:25

## 2025-04-22 RX ADMIN — ATORVASTATIN CALCIUM 10 MG: 10 TABLET, FILM COATED ORAL at 21:25

## 2025-04-22 RX ADMIN — GABAPENTIN 200 MG: 100 CAPSULE ORAL at 21:25

## 2025-04-22 RX ADMIN — BUPROPION HYDROCHLORIDE 150 MG: 150 TABLET, FILM COATED, EXTENDED RELEASE ORAL at 21:25

## 2025-04-22 RX ADMIN — REVEFENACIN 175 MCG: 175 SOLUTION RESPIRATORY (INHALATION) at 21:37

## 2025-04-22 RX ADMIN — BUDESONIDE AND FORMOTEROL FUMARATE DIHYDRATE 2 PUFF: 160; 4.5 AEROSOL RESPIRATORY (INHALATION) at 21:37

## 2025-04-22 RX ADMIN — Medication 10 ML: at 21:25

## 2025-04-22 RX ADMIN — NYSTATIN 500000 UNITS: 100000 SUSPENSION ORAL at 21:24

## 2025-04-22 NOTE — H&P
[facility]   Patient Name: Lluvia Archer  : 1965  MRN: [id number]  Primary Care Physician:  Aleksandar Edge DO  Date of admission: 2025    Subjective altered mental status  Subjective   Chief Complaint: Altered mental status    HPI:  Lluvia Archer is a 59 y.o. female with extensive small cell lung cancer.  Patient was seen by her oncologist today and they recommended she come back to the emergency room for admission.  Patient has acute hyponatremia.  She was seen in the emergency room yesterday for oral pain and was treated for thrush and then sent home.  Patient's last chemotherapy was on Friday.  Patient is lethargic and having difficulty telling her history.    On arrival to the ED, patient temperature 98.1, pulse 102, respiratory rate of 18, blood pressure 78/58, and she is on 4 L of oxygen saturating at 100%.  Chest x-ray shows a small left-sided pleural effusion and mild left basilar atelectasis.    On labs, patient sodium is 121, potassium is 3.8, chloride is 88, CO2 is 22.5, glucose is 104, ionized calcium is 1.04.  Cortisol is 11.74.  TSH is 0.77.  Osmolality is 253.  Hemoglobin is 9.5.  White blood cell count is 0.78.  Platelets are 75.    Personal History     Past Medical History:  Past Medical History:   Diagnosis Date    Abnormal mammogram     PT DENIES KNOWING OF THIS HX    Anxiety     Arthritis     R SHOULDER, R HIP, AND R LEG    Cancer     LUNG    Chronic nausea 01/15/2019    COPD (chronic obstructive pulmonary disease)     O2 3 LITERS NC CONT    Hernia, hiatal     Hyperlipidemia     Hypertension     Knee pain, right 2018    Memory loss     FORGETFULNESS ? ETIOLOGY    Migraine headache     Moderate episode of recurrent major depressive disorder 2017    Night sweats     Sciatica of right side 2017    Severe episode of recurrent major depressive disorder, without psychotic features 10/22/2019     Shingles     OUTBREAK 24 ON ANTIVIRAL , WHELPS NOTED NO SORES.    Shoulder pain, left 2018       Past Surgical History:  Past Surgical History:   Procedure Laterality Date    BRONCHOSCOPY N/A 2022    Procedure: BRONCHOSCOPY WITH BRONCHOALVEOLAR LAVAGE, BIOPSY;  Surgeon: Shin Mcdonald DO;  Location: [facility] ENDOSCOPY;  Service: Pulmonary;  Laterality: N/A;  RIGHT LOWER LOBE INFILTRATE    BRONCHOSCOPY N/A 2024    Procedure: BRONCHOSCOPY WITH ENDOBRONCHIAL ULTRASOUND AND FINE NEEDLE ASPIRATE;  Surgeon: Shin Mcdonald DO;  Location: [facility] ENDOSCOPY;  Service: Pulmonary;  Laterality: N/A;  MEDIASTINAL ADENOPATHY     SECTION      CHOLECYSTECTOMY      LAPAROSCOPIC    COLONOSCOPY      PORTACATH PLACEMENT Left 2024    Procedure: INSERTION OF PORTACATH;  Surgeon: Josh Patton MD;  Location: [facility] OR OSC;  Service: General;  Laterality: Left;    TOTAL HIP ARTHROPLASTY Right 2022    Procedure: RIGHT TOTAL HIP ARTHROPLASTY;  Surgeon: Cecil Gomes MD;  Location: [facility] MAIN OR;  Service: Orthopedics;  Laterality: Right;       Family History:   Family History   Problem Relation Age of Onset    Other Mother         BLOOD DISEASE    Arthritis Mother     Heart disease Father     Hypertension Other     Cancer Other     Heart disease Other     Malig Hyperthermia Neg Hx        Social History:   Social History     Socioeconomic History    Marital status:      Spouse name: DEVAN    Number of children: 2    Years of education: GED    Highest education level: GED or equivalent   Tobacco Use    Smoking status: Every Day     Current packs/day: 1.00     Average packs/day: 1 pack/day for 47.3 years (47.3 ttl pk-yrs)     Types: Cigarettes     Start date:      Passive exposure: Past    Smokeless tobacco: Never   Vaping Use    Vaping status: Former    Substances: Nicotine, Flavoring    Devices: Disposable   Substance and Sexual Activity    Alcohol use: Never     Drug use: Never    Sexual activity: Defer       Home Medications:  Fluticasone-Umeclidin-Vilant, LORazepam, Magnesium, Melatonin, albuterol, albuterol sulfate HFA, atorvastatin, buPROPion SR, calcium carbonate, clonazePAM, escitalopram, famotidine, gabapentin, hydrOXYzine, lisinopril, metoprolol tartrate, naloxone, naproxen, nystatin, ondansetron ODT, oxyCODONE-acetaminophen, and prochlorperazine    Allergies:  No Known Allergies    Review of Systems   All systems were reviewed and negative except for: Lethargic, confused    Objective   Objective     Vitals:   Temp:  [98.1 °F (36.7 °C)] 98.1 °F (36.7 °C)  Heart Rate:  [] 82  Resp:  [18] 18  BP: (78-92)/(58-64) 92/64  Flow (L/min) (Oxygen Therapy):  [4] 4    Physical Exam    Constitutional: Lethargic, confused, pale   Eyes: Pupils equal, sclerae anicteric, no conjunctival injection   HENT: NCAT, mucous membranes moist   Neck: Supple, no thyromegaly, no lymphadenopathy, trachea midline   Respiratory: Clear to auscultation bilaterally, nonlabored respirations    Cardiovascular: RRR, no murmurs, rubs, or gallops, palpable pedal pulses bilaterally   Gastrointestinal: Positive bowel sounds, soft, nontender, nondistended   Musculoskeletal: No bilateral ankle edema, no clubbing or cyanosis to extremities   Psychiatric: Appropriate affect, cooperative   Neurologic: Extremely weak   Skin: No rashes     Result Review    Result Review:  I have personally reviewed the results from the time of this admission to 4/22/2025 19:09 EDT and agree with these findings:  [x]  Laboratory  [x]  Microbiology  [x]  Radiology  [x]  EKG/Telemetry   [x]  Cardiology/Vascular   [x]  Pathology  [x]  Old records  [x]  Other:      Assessment & Plan   Assessment / Plan   Acute issues:  #1 acute hyponatremia with mild altered mental status  -Patient's sodium normally in the 130s.  Today, sodium is 120.  Patient given a liter of fluids in the ER.  Her sodium is jumped to 125.  Nephrology  consulted.  Will need to slowly correct her sodium.  Serial sodium checks.    #2 pancytopenia  -Patient will need neutropenic precautions.  Discussed the case with Dr. Gordon, oncologist.  No indication to give the patient Neupogen or broad-spectrum antibiotics.  He advises that we wait and monitor her white blood cells.    #3 hypocalcemia repleted with calcium gluconate    #4 hypotension-patient on a lot of anxiety medications and looks dehydrated.  Will have to judiciously give patient Klonopin, gabapentin since been to slowly correct her sodium and we will have to cautiously give her fluids.    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  Dr. Dickson's note said that Ativan did not really work.    #3 COPD-continue inhalers    #4 hypertension-hold lisinopril, Lopressor      VTE Prophylaxis:  Mechanical VTE prophylaxis orders are signed & held.          CODE STATUS:    Code Status (Patient has no pulse and is not breathing): CPR (Attempt to Resuscitate)  Medical Interventions (Patient has pulse or is breathing): Full Support  Level Of Support Discussed With: Patient      Admission Status:  I believe this patient meets inpatient status.    Electronically signed by Joanne Osborne DO, 04/22/25, 6:50 PM EDT.

## 2025-04-22 NOTE — ED PROVIDER NOTES
Time: 3:40 PM EDT  Date of encounter:  4/22/2025  Independent Historian/Clinical History and Information was obtained by:   Patient    History is limited by: Altered Mental Status    Chief Complaint: Altered mental status      History of Present Illness:  Patient is a 59 y.o. year old female with a past medical history of COPD and small cell carcinoma of the lung who presents to the emergency department for evaluation of altered mental status.  Patient was in the emergency room yesterday for oral pain and weakness.  She states that her oncologist sent her back to the emergency room to be admitted due to her labs. She denies any abdominal pain or any changes since yesterday.  Patient unable to answer other questions due to fatigue.      Patient Care Team  Primary Care Provider: Aleksandar Edge DO    Past Medical History:     No Known Allergies  Past Medical History:   Diagnosis Date    Abnormal mammogram     PT DENIES KNOWING OF THIS HX    Anxiety     Arthritis     R SHOULDER, R HIP, AND R LEG    Cancer     LUNG    Chronic nausea 01/15/2019    COPD (chronic obstructive pulmonary disease)     O2 3 LITERS NC CONT    Hernia, hiatal     Hyperlipidemia     Hypertension     Knee pain, right 08/30/2018    Memory loss     FORGETFULNESS ? ETIOLOGY    Migraine headache     Moderate episode of recurrent major depressive disorder 05/22/2017    Night sweats     Sciatica of right side 08/18/2017    Severe episode of recurrent major depressive disorder, without psychotic features 10/22/2019    Shingles     OUTBREAK 6/11/24 ON ANTIVIRAL , WHELPS NOTED NO SORES.    Shoulder pain, left 08/30/2018     Past Surgical History:   Procedure Laterality Date    BRONCHOSCOPY N/A 04/12/2022    Procedure: BRONCHOSCOPY WITH BRONCHOALVEOLAR LAVAGE, BIOPSY;  Surgeon: Shin Mcdonald DO;  Location: [facility] ENDOSCOPY;  Service: Pulmonary;  Laterality: N/A;  RIGHT LOWER LOBE INFILTRATE    BRONCHOSCOPY N/A 6/7/2024    Procedure:  BRONCHOSCOPY WITH ENDOBRONCHIAL ULTRASOUND AND FINE NEEDLE ASPIRATE;  Surgeon: Shin Mcdonald DO;  Location: [facility] ENDOSCOPY;  Service: Pulmonary;  Laterality: N/A;  MEDIASTINAL ADENOPATHY     SECTION      CHOLECYSTECTOMY      LAPAROSCOPIC    COLONOSCOPY      PORTACATH PLACEMENT Left 2024    Procedure: INSERTION OF PORTACATH;  Surgeon: Josh Patton MD;  Location: [facility] OR OSC;  Service: General;  Laterality: Left;    TOTAL HIP ARTHROPLASTY Right 2022    Procedure: RIGHT TOTAL HIP ARTHROPLASTY;  Surgeon: Cecil Gomes MD;  Location: [facility] MAIN OR;  Service: Orthopedics;  Laterality: Right;     Family History   Problem Relation Age of Onset    Other Mother         BLOOD DISEASE    Arthritis Mother     Heart disease Father     Hypertension Other     Cancer Other     Heart disease Other     Malig Hyperthermia Neg Hx        Home Medications:  Prior to Admission medications    Medication Sig Start Date End Date Taking? Authorizing Provider   albuterol (ACCUNEB) 0.63 MG/3ML nebulizer solution Take 3 mL by nebulization Every 6 (Six) Hours As Needed for Wheezing or Shortness of Air. 3/11/25   Alok Rey APRN   albuterol sulfate  (90 Base) MCG/ACT inhaler Inhale 2 puffs Every 4 (Four) Hours As Needed for Wheezing or Shortness of Air. 3/11/25   Alok Rey APRN   atorvastatin (LIPITOR) 10 MG tablet Take 1 tablet by mouth Every Night. 24   Aleksandar Edge DO   buPROPion SR (WELLBUTRIN SR) 150 MG 12 hr tablet Take 1 tablet by mouth 2 (Two) Times a Day. 24   Aleksandar Edge DO   calcium carbonate (Tums) 500 MG chewable tablet Chew 2 tablets Daily. 25   Brian Dickson MD   clonazePAM (KlonoPIN) 0.5 MG tablet Take 1 tablet by mouth 2 (Two) Times a Day As Needed for Anxiety. 25   Brian Dickson MD   escitalopram (LEXAPRO) 20 MG tablet Take 1 tablet by mouth Daily. 24   Aleksandar Edge DO    famotidine (PEPCID) 40 MG tablet TAKE ONE TABLET BY MOUTH TWICE DAILY @ 9AM & 5PM  Patient taking differently: Take 1 tablet by mouth 2 (Two) Times a Day. 10/29/24   Aleksandar Edge DO   Fluticasone-Umeclidin-Vilant (Trelegy Ellipta) 200-62.5-25 MCG/ACT inhaler Inhale 1 puff Daily. 3/11/25   Alok Rey APRN   gabapentin (NEURONTIN) 100 MG capsule Take 2 capsules by mouth 3 (Three) Times a Day. 1/24/25   Brian Dickson MD   hydrOXYzine (ATARAX) 25 MG tablet TAKE 1 TABLET BY MOUTH THREE TIMES A DAY AS NEEDED FOR ITCHING  Patient taking differently: Take 1 tablet by mouth 3 (Three) Times a Day As Needed. 11/11/24   Brian Dickson MD   lisinopril (PRINIVIL,ZESTRIL) 5 MG tablet TAKE ONE TABLET BY MOUTH DAILY AT 9 AM 3/20/25   ProviderParvin MD   LORazepam (ATIVAN) 0.5 MG tablet Take 1 tablet by mouth Every 8 (Eight) Hours As Needed for Anxiety. 2/19/25   Brian Dickson MD   Magnesium 400 MG tablet Take 400 mg by mouth Daily. 12/12/24   Brian Dickson MD   Melatonin 10 MG tablet Take 1 tablet by mouth At Night As Needed.    Provider, MD Parvin   metoprolol tartrate (LOPRESSOR) 25 MG tablet Take 0.5 tablets by mouth 2 (Two) Times a Day for 30 days. 3/3/25 4/2/25  Sage Rosario MD   naloxone (NARCAN) 4 MG/0.1ML nasal spray CALL 911. DON'T PRIME. SPRAY IN 1 NOSTRIL FOR OVERDOSE. REPEAT IN 2-3 MINUTES IN OTHER NOSTRIL IF NO OR MINIMAL BREATHING/RESPONSIVENESS. 2/10/25   Brian Dickson MD   naproxen (NAPROSYN) 500 MG tablet Take 1 tablet by mouth 2 (Two) Times a Day With Meals. 3/24/25   Brian Dickson MD   nystatin (MYCOSTATIN) 100,000 unit/mL suspension Swish and swallow 5 mL 4 (Four) Times a Day. 3/24/25   Brian Dickson MD   ondansetron ODT (ZOFRAN-ODT) 8 MG disintegrating tablet Place 1 tablet on the tongue Every 8 (Eight) Hours As Needed for Nausea or Vomiting. 1/24/25   Brian Dickson MD  "  oxyCODONE-acetaminophen (PERCOCET)  MG per tablet Take 1 tablet by mouth Every 4 (Four) Hours As Needed for Moderate Pain. 4/15/25   Brian Dickson MD   prochlorperazine (COMPAZINE) 10 MG tablet Take 1 tablet by mouth Every 6 (Six) Hours As Needed for Nausea. 1/24/25   Brian Dickson MD   Ventolin  (90 Base) MCG/ACT inhaler Inhale 2 puffs Every 4 (Four) Hours As Needed for Wheezing. 3/27/25   Alisa Vale APRN        Social History:   Social History     Tobacco Use    Smoking status: Every Day     Current packs/day: 1.00     Average packs/day: 1 pack/day for 47.3 years (47.3 ttl pk-yrs)     Types: Cigarettes     Start date: 1978     Passive exposure: Past    Smokeless tobacco: Never   Vaping Use    Vaping status: Former    Substances: Nicotine, Flavoring    Devices: Disposable   Substance Use Topics    Alcohol use: Never    Drug use: Never         Review of Systems:  Review of Systems   Constitutional:  Positive for fatigue. Negative for chills and fever.   HENT:  Negative for congestion, rhinorrhea and sore throat.    Eyes:  Negative for pain and visual disturbance.   Respiratory:  Positive for cough and shortness of breath. Negative for apnea and chest tightness.    Cardiovascular:  Negative for chest pain and palpitations.   Gastrointestinal:  Positive for diarrhea. Negative for abdominal pain, nausea and vomiting.   Genitourinary: Negative.  Negative for difficulty urinating and dysuria.   Musculoskeletal: Negative.  Negative for joint swelling and myalgias.   Skin:  Positive for pallor. Negative for color change.   Neurological:  Positive for weakness. Negative for seizures and headaches.   Hematological: Negative.    Psychiatric/Behavioral: Negative.     All other systems reviewed and are negative.       Physical Exam:  BP 92/64 (BP Location: Left arm, Patient Position: Lying)   Pulse 82   Temp 98.1 °F (36.7 °C)   Resp 18   Ht 162.6 cm (64\")   Wt 91.4 kg (201 lb 8 oz)  "  LMP  (LMP Unknown)   SpO2 100%   BMI 34.59 kg/m²     Physical Exam  Vitals and nursing note reviewed.   Constitutional:       General: She is not in acute distress.     Appearance: Normal appearance. She is ill-appearing. She is not toxic-appearing.   HENT:      Head: Normocephalic and atraumatic.      Jaw: There is normal jaw occlusion.   Eyes:      General: Lids are normal.      Extraocular Movements: Extraocular movements intact.      Conjunctiva/sclera: Conjunctivae normal.      Pupils: Pupils are equal, round, and reactive to light.   Cardiovascular:      Rate and Rhythm: Normal rate and regular rhythm.      Pulses: Normal pulses.      Heart sounds: Normal heart sounds.   Pulmonary:      Effort: Pulmonary effort is normal. No accessory muscle usage, respiratory distress or retractions.      Breath sounds: Normal breath sounds. No wheezing or rhonchi.   Chest:      Chest wall: No tenderness.   Abdominal:      General: Abdomen is flat.      Palpations: Abdomen is soft.      Tenderness: There is no abdominal tenderness. There is no guarding or rebound.   Musculoskeletal:         General: Normal range of motion.      Cervical back: Normal range of motion and neck supple.      Right lower leg: No edema.      Left lower leg: No edema.   Skin:     General: Skin is warm and dry.   Neurological:      Mental Status: She is alert. She is lethargic.   Psychiatric:         Mood and Affect: Mood normal.                    Medical Decision Making:      Comorbidities that affect care:    Cancer, hypertension, hyperlipidemia, COPD    External Notes reviewed:    None      The following orders were placed and all results were independently analyzed by me:  Orders Placed This Encounter   Procedures    XR Chest 1 View    Comprehensive Metabolic Panel    Lactic Acid, Plasma    CBC Auto Differential    Beverly Draw    Scan Slide    Sodium    Osmolality, Urine - Urine, Clean Catch    TSH Rfx On Abnormal To Free T4    Cortisol     Osmolality, Serum    Sodium, Urine, Random - Urine, Clean Catch    Inpatient Hospitalist Consult    Inpatient Nephrology Consult    Inpatient Admission    CBC & Differential    Green Top (Gel)    Lavender Top    Gold Top - SST    Light Blue Top       Medications Given in the Emergency Department:  Medications   calcium gluconate 1000 Mg/50ml 0.675% NaCl IV SOLN (has no administration in time range)   sodium chloride 0.9 % bolus 1,000 mL (0 mL Intravenous Stopped 4/22/25 1744)        ED Course:         Labs:    Lab Results (last 24 hours)       Procedure Component Value Units Date/Time    CBC & Differential [[id number]]  (Abnormal) Collected: 04/22/25 1533    Specimen: Blood Updated: 04/22/25 1627    Narrative:      The following orders were created for panel order CBC & Differential.  Procedure                               Abnormality         Status                     ---------                               -----------         ------                     CBC Auto Differential[[id number]]        Abnormal            Final result               Scan Slide[[id number]]                                       Final result                 Please view results for these tests on the individual orders.    Comprehensive Metabolic Panel [[id number]]  (Abnormal) Collected: 04/22/25 1533    Specimen: Blood Updated: 04/22/25 1601     Glucose 104 mg/dL      BUN 9 mg/dL      Creatinine 0.66 mg/dL      Sodium 121 mmol/L      Potassium 3.8 mmol/L      Comment: Slight hemolysis detected by analyzer. Result may be falsely elevated.        Chloride 88 mmol/L      CO2 22.5 mmol/L      Calcium 7.9 mg/dL      Total Protein 6.9 g/dL      Albumin 3.9 g/dL      ALT (SGPT) 10 U/L      AST (SGOT) 9 U/L      Alkaline Phosphatase 75 U/L      Total Bilirubin 0.4 mg/dL      Globulin 3.0 gm/dL      A/G Ratio 1.3 g/dL      BUN/Creatinine Ratio 13.6     Anion Gap 10.5 mmol/L      eGFR 101.8 mL/min/1.73     Narrative:      GFR Categories in Chronic Kidney  Disease (CKD)      GFR Category          GFR (mL/min/1.73)    Interpretation  G1                     90 or greater         Normal or high (1)  G2                      60-89                Mild decrease (1)  G3a                   45-59                Mild to moderate decrease  G3b                   30-44                Moderate to severe decrease  G4                    15-29                Severe decrease  G5                    14 or less           Kidney failure          (1)In the absence of evidence of kidney disease, neither GFR category G1 or G2 fulfill the criteria for CKD.    eGFR calculation 2021 CKD-EPI creatinine equation, which does not include race as a factor    Lactic Acid, Plasma [[id number]]  (Normal) Collected: 04/22/25 1533    Specimen: Blood Updated: 04/22/25 1559     Lactate 0.7 mmol/L     CBC Auto Differential [[id number]]  (Abnormal) Collected: 04/22/25 1533    Specimen: Blood Updated: 04/22/25 1627     WBC 0.78 10*3/mm3      RBC 3.11 10*6/mm3      Hemoglobin 9.5 g/dL      Hematocrit 28.3 %      MCV 91.0 fL      MCH 30.5 pg      MCHC 33.6 g/dL      RDW 15.7 %      RDW-SD 51.0 fl      MPV 10.1 fL      Platelets 75 10*3/mm3      Neutrophil % 10.3 %      Lymphocyte % 84.6 %      Monocyte % 5.1 %      Eosinophil % 0.0 %      Basophil % 0.0 %      Immature Grans % 0.0 %      Neutrophils, Absolute 0.08 10*3/mm3      Lymphocytes, Absolute 0.66 10*3/mm3      Monocytes, Absolute 0.04 10*3/mm3      Eosinophils, Absolute 0.00 10*3/mm3      Basophils, Absolute 0.00 10*3/mm3      Immature Grans, Absolute 0.00 10*3/mm3      nRBC 0.0 /100 WBC     Narrative:      Appended report. These results have been appended to a previously verified report.    Scan Slide [[id number]] Collected: 04/22/25 1533    Specimen: Blood Updated: 04/22/25 1627     Anisocytosis Slight/1+     Crenated RBC's Slight/1+     Poikilocytes Slight/1+     WBC Morphology Normal     Platelet Estimate Decreased    TSH Rfx On Abnormal To Free T4  [[id number]]  (Normal) Collected: 04/22/25 1533    Specimen: Blood Updated: 04/22/25 1732     TSH 0.771 uIU/mL     Cortisol [[id number]] Collected: 04/22/25 1533    Specimen: Blood Updated: 04/22/25 1732     Cortisol 11.74 mcg/dL     Narrative:      Cortisol Reference Ranges:    Cortisol 6AM - 10AM Range: 6.02-18.40 mcg/dl  Cortisol 4PM - 8PM Range: 2.68-10.50 mcg/dl      Results may be falsely increased if patient taking Biotin.      Osmolality, Serum [[id number]]  (Abnormal) Collected: 04/22/25 1533    Specimen: Blood Updated: 04/22/25 1738     Osmolality 253 mOsm/kg     Osmolality, Urine - Urine, Clean Catch [[id number]] Collected: 04/22/25 1805    Specimen: Urine, Clean Catch Updated: 04/22/25 1808    Sodium, Urine, Random - Urine, Clean Catch [[id number]] Collected: 04/22/25 1805    Specimen: Urine, Clean Catch Updated: 04/22/25 1808    Sodium [[id number]]  (Abnormal) Collected: 04/22/25 1807    Specimen: Blood from Arm, Left Updated: 04/22/25 1833     Sodium 125 mmol/L              Imaging:    XR Chest 1 View  Result Date: 4/22/2025  XR CHEST 1 VW Date of Exam: 4/22/2025 3:47 PM EDT Indication: soa Comparison: 4/21/2025 Findings: Heart size is within normal limits. The pulmonary vascular pattern is normal. There is a small left-sided pleural effusion and mild left basilar atelectasis. The lungs are otherwise clear. Left-sided chest port is in place with the tip in the right atrium.     Impression: Small left-sided pleural effusion and mild left basilar atelectasis. Electronically Signed: Luca Brewster MD  4/22/2025 3:52 PM EDT  Workstation ID: [id number]        Differential Diagnosis and Discussion:    Metabolic: Differential diagnosis includes but is not limited to hypertension, hyperglycemia, hyperkalemia, hypocalcemia, metabolic acidosis, hypokalemia, hypoglycemia, malnutrition, hypothyroidism, hyperthyroidism, and adrenal insufficiency.     PROCEDURES:    Labs were collected in the emergency department and all  labs were reviewed and interpreted by me.  X-ray were performed in the emergency department and all X-ray impressions were independently interpreted by me.    No orders to display       Procedures    MDM  Number of Diagnoses or Management Options  Generalized weakness  Hyponatremia  Malignant neoplasm of lung, unspecified laterality, unspecified part of lung  Pancytopenia due to chemotherapy  Diagnosis management comments: In summary this is a 59-year-old female with a history of lung cancer who presents to the emergency department for evaluation.  She was recently seen and evaluated and had hyponatremia and refused admission at that point however her oncologist advised her to come back to the emergency department and she is now amenable to admission.  CBC independently reviewed and interpreted by me and shows critical abnormalities of pancytopenia.  CMP independent reviewed interpreted me reveals critical abnormality of hyponatremia.  IV fluids were initiated.  Patient case has been discussed with the hospitalist team who will admit to the hospital for further evaluation and continuation of treatment.                       Patient Care Considerations:    CT HEAD: I considered ordering a noncontrast CT of the head, however no focal neurologic deficit      Consultants/Shared Management Plan:    Hospitalist: I have discussed the case with Dr. Osborne who agrees to accept the patient for admission.    Social Determinants of Health:    Patient is unable to carry out activities of daily life. Escalation of care is necessary.       Disposition and Care Coordination:    Admit:   Through independent evaluation of the patient's history, physical, and imperical data, the patient meets criteria for inpatient admission to the hospital.        Final diagnoses:   Hyponatremia   Generalized weakness   Malignant neoplasm of lung, unspecified laterality, unspecified part of lung   Pancytopenia due to chemotherapy        ED Disposition        ED Disposition   Decision to Admit    Condition   --    Comment   Level of Care: Progressive Care [20]   Diagnosis: Hyponatremia [198519]   Admitting Physician: YEN BERNSTEIN [[id number]]   Attending Physician: YEN BERNSTEIN [[id number]]   Bed Request Comments: on chemo needs private room.   Certification: I Certify That Inpatient Hospital Services Are Medically Necessary For Greater Than 2 Midnights                 This medical record created using voice recognition software.             Kamari Tsai MD  04/22/25 8133       Kamari Tsai MD  04/22/25 0790

## 2025-04-22 NOTE — TELEPHONE ENCOUNTER
The pt called and states that she had difficulty walking yesterday due to weak legs and is having muscle cramps in her legs today, pain 9/10. The pt states taht she was stumbling and having to hold on to whatever was near by in order to walk. The pt went to [facility] ER yesterday. The pt states that she did not want to be admitted so she came home. The pt states that she is having leg cramps today and her pain is a 9/10. The pt is requesting a medication for her cramps.     Note: The pt has a Sodium of 122, Calcium of 7.6, and a WBC of 1.88.

## 2025-04-23 LAB
ANION GAP SERPL CALCULATED.3IONS-SCNC: 11.1 MMOL/L (ref 5–15)
BASOPHILS # BLD AUTO: 0.01 10*3/MM3 (ref 0–0.2)
BASOPHILS NFR BLD AUTO: 1.1 % (ref 0–1.5)
BUN SERPL-MCNC: 8 MG/DL (ref 6–20)
BUN/CREAT SERPL: 13.1 (ref 7–25)
CALCIUM SPEC-SCNC: 7 MG/DL (ref 8.6–10.5)
CHLORIDE SERPL-SCNC: 92 MMOL/L (ref 98–107)
CO2 SERPL-SCNC: 20.9 MMOL/L (ref 22–29)
CREAT SERPL-MCNC: 0.61 MG/DL (ref 0.57–1)
DEPRECATED RDW RBC AUTO: 50.2 FL (ref 37–54)
EGFRCR SERPLBLD CKD-EPI 2021: 103.8 ML/MIN/1.73
EOSINOPHIL # BLD AUTO: 0.01 10*3/MM3 (ref 0–0.4)
EOSINOPHIL NFR BLD AUTO: 1.1 % (ref 0.3–6.2)
ERYTHROCYTE [DISTWIDTH] IN BLOOD BY AUTOMATED COUNT: 15.4 % (ref 12.3–15.4)
GLUCOSE SERPL-MCNC: 86 MG/DL (ref 65–99)
HCT VFR BLD AUTO: 22.9 % (ref 34–46.6)
HGB BLD-MCNC: 7.8 G/DL (ref 12–15.9)
IMM GRANULOCYTES # BLD AUTO: 0 10*3/MM3 (ref 0–0.05)
IMM GRANULOCYTES NFR BLD AUTO: 0 % (ref 0–0.5)
LYMPHOCYTES # BLD AUTO: 0.83 10*3/MM3 (ref 0.7–3.1)
LYMPHOCYTES NFR BLD AUTO: 89.2 % (ref 19.6–45.3)
MAGNESIUM SERPL-MCNC: 2 MG/DL (ref 1.6–2.6)
MCH RBC QN AUTO: 30.7 PG (ref 26.6–33)
MCHC RBC AUTO-ENTMCNC: 34.1 G/DL (ref 31.5–35.7)
MCV RBC AUTO: 90.2 FL (ref 79–97)
MONOCYTES # BLD AUTO: 0.04 10*3/MM3 (ref 0.1–0.9)
MONOCYTES NFR BLD AUTO: 4.3 % (ref 5–12)
NEUTROPHILS NFR BLD AUTO: 0.04 10*3/MM3 (ref 1.7–7)
NEUTROPHILS NFR BLD AUTO: 4.3 % (ref 42.7–76)
NRBC BLD AUTO-RTO: 0 /100 WBC (ref 0–0.2)
PHOSPHATE SERPL-MCNC: 2.8 MG/DL (ref 2.5–4.5)
PLATELET # BLD AUTO: 58 10*3/MM3 (ref 140–450)
PMV BLD AUTO: 10 FL (ref 6–12)
POTASSIUM SERPL-SCNC: 3.3 MMOL/L (ref 3.5–5.2)
RBC # BLD AUTO: 2.54 10*6/MM3 (ref 3.77–5.28)
SODIUM SERPL-SCNC: 124 MMOL/L (ref 136–145)
SODIUM SERPL-SCNC: 125 MMOL/L (ref 136–145)
SODIUM SERPL-SCNC: 127 MMOL/L (ref 136–145)
SODIUM SERPL-SCNC: 130 MMOL/L (ref 136–145)
WBC NRBC COR # BLD AUTO: 0.93 10*3/MM3 (ref 3.4–10.8)

## 2025-04-23 PROCEDURE — 84100 ASSAY OF PHOSPHORUS: CPT | Performed by: HOSPITALIST

## 2025-04-23 PROCEDURE — 94799 UNLISTED PULMONARY SVC/PX: CPT

## 2025-04-23 PROCEDURE — 83735 ASSAY OF MAGNESIUM: CPT | Performed by: HOSPITALIST

## 2025-04-23 PROCEDURE — 36415 COLL VENOUS BLD VENIPUNCTURE: CPT | Performed by: HOSPITALIST

## 2025-04-23 PROCEDURE — 99232 SBSQ HOSP IP/OBS MODERATE 35: CPT | Performed by: INTERNAL MEDICINE

## 2025-04-23 PROCEDURE — 85025 COMPLETE CBC W/AUTO DIFF WBC: CPT | Performed by: HOSPITALIST

## 2025-04-23 PROCEDURE — 63710000001 REVEFENACIN 175 MCG/3ML SOLUTION: Performed by: HOSPITALIST

## 2025-04-23 PROCEDURE — 25010000002 ENOXAPARIN PER 10 MG: Performed by: INTERNAL MEDICINE

## 2025-04-23 PROCEDURE — 80048 BASIC METABOLIC PNL TOTAL CA: CPT | Performed by: HOSPITALIST

## 2025-04-23 PROCEDURE — 84295 ASSAY OF SERUM SODIUM: CPT | Performed by: HOSPITALIST

## 2025-04-23 PROCEDURE — 94664 DEMO&/EVAL PT USE INHALER: CPT

## 2025-04-23 RX ORDER — ENOXAPARIN SODIUM 100 MG/ML
40 INJECTION SUBCUTANEOUS DAILY
Status: DISCONTINUED | OUTPATIENT
Start: 2025-04-23 | End: 2025-05-03 | Stop reason: HOSPADM

## 2025-04-23 RX ORDER — SODIUM CHLORIDE 1 G/1
2 TABLET ORAL
Status: DISCONTINUED | OUTPATIENT
Start: 2025-04-23 | End: 2025-04-25

## 2025-04-23 RX ORDER — POTASSIUM CHLORIDE 750 MG/1
40 CAPSULE, EXTENDED RELEASE ORAL 2 TIMES DAILY WITH MEALS
Status: COMPLETED | OUTPATIENT
Start: 2025-04-23 | End: 2025-04-23

## 2025-04-23 RX ADMIN — GABAPENTIN 200 MG: 100 CAPSULE ORAL at 16:18

## 2025-04-23 RX ADMIN — Medication 10 ML: at 08:20

## 2025-04-23 RX ADMIN — GABAPENTIN 200 MG: 100 CAPSULE ORAL at 08:19

## 2025-04-23 RX ADMIN — SODIUM CHLORIDE TAB 1 GM 2 G: 1 TAB at 17:58

## 2025-04-23 RX ADMIN — NYSTATIN 500000 UNITS: 100000 SUSPENSION ORAL at 17:58

## 2025-04-23 RX ADMIN — Medication 30 G: at 10:01

## 2025-04-23 RX ADMIN — FAMOTIDINE 40 MG: 20 TABLET, FILM COATED ORAL at 21:47

## 2025-04-23 RX ADMIN — ATORVASTATIN CALCIUM 10 MG: 10 TABLET, FILM COATED ORAL at 21:46

## 2025-04-23 RX ADMIN — REVEFENACIN 175 MCG: 175 SOLUTION RESPIRATORY (INHALATION) at 08:24

## 2025-04-23 RX ADMIN — NAPROXEN 500 MG: 250 TABLET ORAL at 08:19

## 2025-04-23 RX ADMIN — BUDESONIDE AND FORMOTEROL FUMARATE DIHYDRATE 2 PUFF: 160; 4.5 AEROSOL RESPIRATORY (INHALATION) at 19:49

## 2025-04-23 RX ADMIN — OXYCODONE AND ACETAMINOPHEN 1 TABLET: 10; 325 TABLET ORAL at 13:08

## 2025-04-23 RX ADMIN — ENOXAPARIN SODIUM 40 MG: 40 INJECTION SUBCUTANEOUS at 13:07

## 2025-04-23 RX ADMIN — SODIUM CHLORIDE TAB 1 GM 2 G: 1 TAB at 10:01

## 2025-04-23 RX ADMIN — OXYCODONE AND ACETAMINOPHEN 1 TABLET: 10; 325 TABLET ORAL at 03:55

## 2025-04-23 RX ADMIN — BUPROPION HYDROCHLORIDE 150 MG: 150 TABLET, FILM COATED, EXTENDED RELEASE ORAL at 21:46

## 2025-04-23 RX ADMIN — NYSTATIN 500000 UNITS: 100000 SUSPENSION ORAL at 08:20

## 2025-04-23 RX ADMIN — NYSTATIN 500000 UNITS: 100000 SUSPENSION ORAL at 13:07

## 2025-04-23 RX ADMIN — SODIUM CHLORIDE TAB 1 GM 2 G: 1 TAB at 13:07

## 2025-04-23 RX ADMIN — Medication 10 ML: at 21:47

## 2025-04-23 RX ADMIN — GABAPENTIN 200 MG: 100 CAPSULE ORAL at 21:46

## 2025-04-23 RX ADMIN — CALCIUM CARBONATE 2 TABLET: 500 TABLET, CHEWABLE ORAL at 08:19

## 2025-04-23 RX ADMIN — POTASSIUM CHLORIDE 40 MEQ: 750 CAPSULE, EXTENDED RELEASE ORAL at 17:58

## 2025-04-23 RX ADMIN — OXYCODONE AND ACETAMINOPHEN 1 TABLET: 10; 325 TABLET ORAL at 21:49

## 2025-04-23 RX ADMIN — NYSTATIN 500000 UNITS: 100000 SUSPENSION ORAL at 21:47

## 2025-04-23 RX ADMIN — NAPROXEN 500 MG: 250 TABLET ORAL at 17:58

## 2025-04-23 RX ADMIN — FAMOTIDINE 40 MG: 20 TABLET, FILM COATED ORAL at 08:19

## 2025-04-23 RX ADMIN — POTASSIUM CHLORIDE 40 MEQ: 750 CAPSULE, EXTENDED RELEASE ORAL at 10:01

## 2025-04-23 RX ADMIN — BUDESONIDE AND FORMOTEROL FUMARATE DIHYDRATE 2 PUFF: 160; 4.5 AEROSOL RESPIRATORY (INHALATION) at 08:24

## 2025-04-23 RX ADMIN — BUPROPION HYDROCHLORIDE 150 MG: 150 TABLET, FILM COATED, EXTENDED RELEASE ORAL at 08:19

## 2025-04-23 NOTE — SIGNIFICANT NOTE
04/23/25 1059   Physical Therapy Time and Intention   Comment, Session Not Performed Pt refused evaluation

## 2025-04-23 NOTE — NURSING NOTE
Patient Lluvia Archer admitted to [facility] from the ED. Patient is alert and oriented to person, place, time, and situation. Patient oriented to unit, call light system and bed alarm use, teaching effective. Patient despite education, refused and signed refusal form for bed alarm use. Krysten Auris screening revealed patient will NOT need tested, contact isolation and bleach cleaning due to no risk factors. Patient currently is not a concern for an active CDIFF infection.    4 eyes skin assessment completed with Mariam Green RN. Per policy, the following skin interventions were put in place as a result of the skin assessment below: None - Moises is greater than 18 and no skin issues noted    Skin Assessments (most recent)      Flowsheet Row Most Recent Value   Skin WDL WDL   Sensory Perception 4-->no impairment   Moisture 4-->rarely moist   Activity 3-->walks occasionally   Mobility 3-->slightly limited   Nutrition 3-->adequate   Friction and Shear 3-->no apparent problem   Moises Score 20            Fall assessment completed. Per policy, the patient was determined be a Low fall risk due to Devine Prince score 14 or less (only used within the first 24 hours of admission). Patient assessed to need the following mobility assistance: Standby Assist with the following assistive devices: Walker, and will be using a bedside commode for toileting. Per policy, the following fall interventions were put in place: Standard Fall Precautions - oriented to room and call light, bed low and locked, clutter free environment, adequate lighting, directed to call for assistance, non-skid footwear, hourly rounding and personal belongings within reach..     Patient's belongings that were sent with family: None  Patient's belongings that were sent to security: None  Patient's belongings locked in safe box in room: None  Patient's belongings that were sent to pharmacy: None  Patient's belongings kept at bedside per patient: Other: night  gown socks

## 2025-04-23 NOTE — PAYOR COMM NOTE
"Ambrosio Butts (59 y.o. Female)       Date of Birth   1965    Social Security Number       Address   [address] [place] [address]    Home Phone   [phone]    MRN   [id number]       Sikh   None    Marital Status                               Admission Date   4/22/2025    Admission Type   Emergency    Admitting Provider   Joanne Osborne DO    Attending Provider   King Westbrook MD    Department, Room/Bed   [facility] 4TH FLOOR MEDICAL TELEMETRY UNIT, 421/1       Discharge Date       Discharge Disposition       Discharge Destination                                 Attending Provider: King Westbrook MD    Allergies: No Known Allergies    Isolation: None   Infection: None   Code Status: CPR    Ht: 162.6 cm (64.02\")   Wt: 88.7 kg (195 lb 8.8 oz)    Admission Cmt: None   Principal Problem: Hyponatremia [E87.1]                   Active Insurance as of 4/22/2025       Primary Coverage       Payor Plan Insurance Group Employer/Plan Group    AETNA MEDICARE REPLACEMENT AETNA MEDICARE ADVANTAGE [id number]       Payor Plan Address Payor Plan Phone Number Payor Plan Fax Number Effective Dates    [address] [phone]  1/1/2024 - None Entered    [facility] [id number]         Subscriber Name Subscriber Birth Date Member ID       AMBROSIO BUTTS 1965 [id number]               Secondary Coverage       Payor Plan Insurance Group Employer/Plan Group    [employer] [id number]       Payor Plan Address Payor Plan Phone Number Payor Plan Fax Number Effective Dates    [address] [address]   1/1/2021 - None Entered    [facility] [id number]         Subscriber Name Subscriber Birth Date Member ID       AMBROSIO BUTTS 1965 [id number]                     Emergency Contacts        (Rel.) Home Phone Work Phone Mobile Phone    Daria Butts (Daughter) -- -- [phone]    SAKINA BUTTS (Spouse) -- -- [phone]      "            History & Physical        Joanne Osborne DO at 25 1850           [facility]  Date: [facility]25   Patient Name: Lluvia Archer  : 1965  MRN: [id number]  Primary Care Physician:  Aleksandar Edge DO  Date of admission: 2025    Subjectivealtered mental status  Subjective   Chief Complaint: Altered mental status    HPI:  Lluvia Archer is a 59 y.o. female with extensive small cell lung cancer.  Patient was seen by her oncologist today and they recommended she come back to the emergency room for admission.  Patient has acute hyponatremia.  She was seen in the emergency room yesterday for oral pain and was treated for thrush and then sent home.  Patient's last chemotherapy was on Friday.  Patient is lethargic and having difficulty telling her history.    On arrival to the ED, patient temperature 98.1, pulse 102, respiratory rate of 18, blood pressure 78/58, and she is on 4 L of oxygen saturating at 100%.  Chest x-ray shows a small left-sided pleural effusion and mild left basilar atelectasis.    On labs, patient sodium is 121, potassium is 3.8, chloride is 88, CO2 is 22.5, glucose is 104, ionized calcium is 1.04.  Cortisol is 11.74.  TSH is 0.77.  Osmolality is 253.  Hemoglobin is 9.5.  White blood cell count is 0.78.  Platelets are 75.    Personal History     Past Medical History:  Past Medical History:   Diagnosis Date    Abnormal mammogram     PT DENIES KNOWING OF THIS HX    Anxiety     Arthritis     R SHOULDER, R HIP, AND R LEG    Cancer     LUNG    Chronic nausea 01/15/2019    COPD (chronic obstructive pulmonary disease)     O2 3 LITERS NC CONT    Hernia, hiatal     Hyperlipidemia     Hypertension     Knee pain, right 2018    Memory loss     FORGETFULNESS ? ETIOLOGY    Migraine headache     Moderate episode of recurrent major depressive disorder 2017    Night sweats     Sciatica of right side 2017    Severe episode of  recurrent major depressive disorder, without psychotic features 10/22/2019    Shingles     OUTBREAK 24 ON ANTIVIRAL , WHELPS NOTED NO SORES.    Shoulder pain, left 2018       Past Surgical History:  Past Surgical History:   Procedure Laterality Date    BRONCHOSCOPY N/A 2022    Procedure: BRONCHOSCOPY WITH BRONCHOALVEOLAR LAVAGE, BIOPSY;  Surgeon: Shin Mcdonald DO;  Location: [facility] ENDOSCOPY;  Service: Pulmonary;  Laterality: N/A;  RIGHT LOWER LOBE INFILTRATE    BRONCHOSCOPY N/A 2024    Procedure: BRONCHOSCOPY WITH ENDOBRONCHIAL ULTRASOUND AND FINE NEEDLE ASPIRATE;  Surgeon: Shin Mcdonald DO;  Location: [facility] ENDOSCOPY;  Service: Pulmonary;  Laterality: N/A;  MEDIASTINAL ADENOPATHY     SECTION      CHOLECYSTECTOMY      LAPAROSCOPIC    COLONOSCOPY      PORTACATH PLACEMENT Left 2024    Procedure: INSERTION OF PORTACATH;  Surgeon: Josh Patton MD;  Location: [facility] OR OSC;  Service: General;  Laterality: Left;    TOTAL HIP ARTHROPLASTY Right 2022    Procedure: RIGHT TOTAL HIP ARTHROPLASTY;  Surgeon: Cecil Gomes MD;  Location: [facility] MAIN OR;  Service: Orthopedics;  Laterality: Right;       Family History:   Family History   Problem Relation Age of Onset    Other Mother         BLOOD DISEASE    Arthritis Mother     Heart disease Father     Hypertension Other     Cancer Other     Heart disease Other     Malig Hyperthermia Neg Hx        Social History:   Social History     Socioeconomic History    Marital status:      Spouse name: DEVAN    Number of children: 2    Years of education: GED    Highest education level: GED or equivalent   Tobacco Use    Smoking status: Every Day     Current packs/day: 1.00     Average packs/day: 1 pack/day for 47.3 years (47.3 ttl pk-yrs)     Types: Cigarettes     Start date:      Passive exposure: Past    Smokeless tobacco: Never   Vaping Use    Vaping status: Former    Substances: Nicotine, Flavoring     Devices: Disposable   Substance and Sexual Activity    Alcohol use: Never    Drug use: Never    Sexual activity: Defer       Home Medications:  Fluticasone-Umeclidin-Vilant, LORazepam, Magnesium, Melatonin, albuterol, albuterol sulfate HFA, atorvastatin, buPROPion SR, calcium carbonate, clonazePAM, escitalopram, famotidine, gabapentin, hydrOXYzine, lisinopril, metoprolol tartrate, naloxone, naproxen, nystatin, ondansetron ODT, oxyCODONE-acetaminophen, and prochlorperazine    Allergies:  No Known Allergies    Review of Systems   All systems were reviewed and negative except for: Lethargic, confused    Objective  Objective     Vitals:   Temp:  [98.1 °F (36.7 °C)] 98.1 °F (36.7 °C)  Heart Rate:  [] 82  Resp:  [18] 18  BP: (78-92)/(58-64) 92/64  Flow (L/min) (Oxygen Therapy):  [4] 4    Physical Exam    Constitutional: Lethargic, confused, pale   Eyes: Pupils equal, sclerae anicteric, no conjunctival injection   HENT: NCAT, mucous membranes moist   Neck: Supple, no thyromegaly, no lymphadenopathy, trachea midline   Respiratory: Clear to auscultation bilaterally, nonlabored respirations    Cardiovascular: RRR, no murmurs, rubs, or gallops, palpable pedal pulses bilaterally   Gastrointestinal: Positive bowel sounds, soft, nontender, nondistended   Musculoskeletal: No bilateral ankle edema, no clubbing or cyanosis to extremities   Psychiatric: Appropriate affect, cooperative   Neurologic: Extremely weak   Skin: No rashes     Result Review   Result Review:  I have personally reviewed the results from the time of this admission to 4/22/2025 19:09 EDT and agree with these findings:  [x]  Laboratory  [x]  Microbiology  [x]  Radiology  [x]  EKG/Telemetry   [x]  Cardiology/Vascular   [x]  Pathology  [x]  Old records  [x]  Other:      Assessment & Plan  Assessment / Plan   Acute issues:  #1 acute hyponatremia with mild altered mental status  -Patient's sodium normally in the 130s.  Today, sodium is 120.  Patient given a  liter of fluids in the ER.  Her sodium is jumped to 125.  Nephrology consulted.  Will need to slowly correct her sodium.  Serial sodium checks.    #2 pancytopenia  -Patient will need neutropenic precautions.  Discussed the case with Dr. Gordon, oncologist.  No indication to give the patient Neupogen or broad-spectrum antibiotics.  He advises that we wait and monitor her white blood cells.    #3 hypocalcemia repleted with calcium gluconate    #4 hypotension-patient on a lot of anxiety medications and looks dehydrated.  Will have to judiciously give patient Klonopin, gabapentin since been to slowly correct her sodium and we will have to cautiously give her fluids.    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  Dr. Dickson's note said that Ativan did not really work.    #3 COPD-continue inhalers    #4 hypertension-hold lisinopril, Lopressor      VTE Prophylaxis:  Mechanical VTE prophylaxis orders are signed & held.          CODE STATUS:    Code Status (Patient has no pulse and is not breathing): CPR (Attempt to Resuscitate)  Medical Interventions (Patient has pulse or is breathing): Full Support  Level Of Support Discussed With: Patient      Admission Status:  I believe this patient meets inpatient status.    Electronically signed by Joanne Osborne DO, 04/22/25, 6:50 PM EDT.             Electronically signed by Joanne Osborne DO at 04/22/25 2043          Emergency Department Notes        Jewell Matt RN at 04/22/25 1958          Report called to NITISH Spears       Electronically signed by Jewell Matt RN at 04/22/25 1958       Kamari Tsai MD at 04/22/25 1539          Time: 3:40 PM EDT  Date of encounter:  4/22/2025  Independent Historian/Clinical History and Information was obtained by:   Patient    History is limited by: Altered  Mental Status    Chief Complaint: Altered mental status      History of Present Illness:  Patient is a 59 y.o. year old female with a past medical history of COPD and small cell carcinoma of the lung who presents to the emergency department for evaluation of altered mental status.  Patient was in the emergency room yesterday for oral pain and weakness.  She states that her oncologist sent her back to the emergency room to be admitted due to her labs. She denies any abdominal pain or any changes since yesterday.  Patient unable to answer other questions due to fatigue.      Patient Care Team  Primary Care Provider: Aleksandar Edge DO    Past Medical History:     No Known Allergies  Past Medical History:   Diagnosis Date    Abnormal mammogram     PT DENIES KNOWING OF THIS HX    Anxiety     Arthritis     R SHOULDER, R HIP, AND R LEG    Cancer     LUNG    Chronic nausea 01/15/2019    COPD (chronic obstructive pulmonary disease)     O2 3 LITERS NC CONT    Hernia, hiatal     Hyperlipidemia     Hypertension     Knee pain, right 08/30/2018    Memory loss     FORGETFULNESS ? ETIOLOGY    Migraine headache     Moderate episode of recurrent major depressive disorder 05/22/2017    Night sweats     Sciatica of right side 08/18/2017    Severe episode of recurrent major depressive disorder, without psychotic features 10/22/2019    Shingles     OUTBREAK 6/11/24 ON ANTIVIRAL , WHELPS NOTED NO SORES.    Shoulder pain, left 08/30/2018     Past Surgical History:   Procedure Laterality Date    BRONCHOSCOPY N/A 04/12/2022    Procedure: BRONCHOSCOPY WITH BRONCHOALVEOLAR LAVAGE, BIOPSY;  Surgeon: Shin Mcdonald DO;  Location: [facility] ENDOSCOPY;  Service: Pulmonary;  Laterality: N/A;  RIGHT LOWER LOBE INFILTRATE    BRONCHOSCOPY N/A 6/7/2024    Procedure: BRONCHOSCOPY WITH ENDOBRONCHIAL ULTRASOUND AND FINE NEEDLE ASPIRATE;  Surgeon: Shin Mcdonald DO;  Location: [facility] ENDOSCOPY;  Service: Pulmonary;   Laterality: N/A;  MEDIASTINAL ADENOPATHY     SECTION      CHOLECYSTECTOMY      LAPAROSCOPIC    COLONOSCOPY      PORTACATH PLACEMENT Left 2024    Procedure: INSERTION OF PORTACATH;  Surgeon: Josh Patton MD;  Location: [facility] OR [facility];  Service: General;  Laterality: Left;    TOTAL HIP ARTHROPLASTY Right 2022    Procedure: RIGHT TOTAL HIP ARTHROPLASTY;  Surgeon: Cecil Gomes MD;  Location: [facility] MAIN OR;  Service: Orthopedics;  Laterality: Right;     Family History   Problem Relation Age of Onset    Other Mother         BLOOD DISEASE    Arthritis Mother     Heart disease Father     Hypertension Other     Cancer Other     Heart disease Other     Malig Hyperthermia Neg Hx        Home Medications:  Prior to Admission medications    Medication Sig Start Date End Date Taking? Authorizing Provider   albuterol (ACCUNEB) 0.63 MG/3ML nebulizer solution Take 3 mL by nebulization Every 6 (Six) Hours As Needed for Wheezing or Shortness of Air. 3/11/25   Alok Rey APRN   albuterol sulfate  (90 Base) MCG/ACT inhaler Inhale 2 puffs Every 4 (Four) Hours As Needed for Wheezing or Shortness of Air. 3/11/25   Alok Rey APRN   atorvastatin (LIPITOR) 10 MG tablet Take 1 tablet by mouth Every Night. 24   Aleksandar Edge, DO   buPROPion SR (WELLBUTRIN SR) 150 MG 12 hr tablet Take 1 tablet by mouth 2 (Two) Times a Day. 24   Aleksandar Edge, DO   calcium carbonate (Tums) 500 MG chewable tablet Chew 2 tablets Daily. 25   Brian Dickson MD   clonazePAM (KlonoPIN) 0.5 MG tablet Take 1 tablet by mouth 2 (Two) Times a Day As Needed for Anxiety. 25   Brian Dickson MD   escitalopram (LEXAPRO) 20 MG tablet Take 1 tablet by mouth Daily. 24   Aleksandar Edge,    famotidine (PEPCID) 40 MG tablet TAKE ONE TABLET BY MOUTH TWICE DAILY @ 9AM & 5PM  Patient taking differently: Take 1 tablet by mouth 2 (Two) Times a Day.  10/29/24   Aleksandar Edge DO   Fluticasone-Umeclidin-Vilant (Trelegy Ellipta) 200-62.5-25 MCG/ACT inhaler Inhale 1 puff Daily. 3/11/25   Alok Rey APRN   gabapentin (NEURONTIN) 100 MG capsule Take 2 capsules by mouth 3 (Three) Times a Day. 1/24/25   Brian Dickson MD   hydrOXYzine (ATARAX) 25 MG tablet TAKE 1 TABLET BY MOUTH THREE TIMES A DAY AS NEEDED FOR ITCHING  Patient taking differently: Take 1 tablet by mouth 3 (Three) Times a Day As Needed. 11/11/24   Brian Dickson MD   lisinopril (PRINIVIL,ZESTRIL) 5 MG tablet TAKE ONE TABLET BY MOUTH DAILY AT 9 AM 3/20/25   ProviderParvin MD   LORazepam (ATIVAN) 0.5 MG tablet Take 1 tablet by mouth Every 8 (Eight) Hours As Needed for Anxiety. 2/19/25   Brian Dickson MD   Magnesium 400 MG tablet Take 400 mg by mouth Daily. 12/12/24   Brian Dickson MD   Melatonin 10 MG tablet Take 1 tablet by mouth At Night As Needed.    Provider, MD Parvin   metoprolol tartrate (LOPRESSOR) 25 MG tablet Take 0.5 tablets by mouth 2 (Two) Times a Day for 30 days. 3/3/25 4/2/25  Sage Rosario MD   naloxone (NARCAN) 4 MG/0.1ML nasal spray CALL 911. DON'T PRIME. SPRAY IN 1 NOSTRIL FOR OVERDOSE. REPEAT IN 2-3 MINUTES IN OTHER NOSTRIL IF NO OR MINIMAL BREATHING/RESPONSIVENESS. 2/10/25   Brian Dickson MD   naproxen (NAPROSYN) 500 MG tablet Take 1 tablet by mouth 2 (Two) Times a Day With Meals. 3/24/25   Brian Dickson MD   nystatin (MYCOSTATIN) 100,000 unit/mL suspension Swish and swallow 5 mL 4 (Four) Times a Day. 3/24/25   Brian Dickson MD   ondansetron ODT (ZOFRAN-ODT) 8 MG disintegrating tablet Place 1 tablet on the tongue Every 8 (Eight) Hours As Needed for Nausea or Vomiting. 1/24/25   Brian Dickson MD   oxyCODONE-acetaminophen (PERCOCET)  MG per tablet Take 1 tablet by mouth Every 4 (Four) Hours As Needed for Moderate Pain. 4/15/25   Brian Dickson MD  "  prochlorperazine (COMPAZINE) 10 MG tablet Take 1 tablet by mouth Every 6 (Six) Hours As Needed for Nausea. 1/24/25   Brian Dickson MD   Ventolin  (90 Base) MCG/ACT inhaler Inhale 2 puffs Every 4 (Four) Hours As Needed for Wheezing. 3/27/25   Alisa Vale APRN        Social History:   Social History     Tobacco Use    Smoking status: Every Day     Current packs/day: 1.00     Average packs/day: 1 pack/day for 47.3 years (47.3 ttl pk-yrs)     Types: Cigarettes     Start date: 1978     Passive exposure: Past    Smokeless tobacco: Never   Vaping Use    Vaping status: Former    Substances: Nicotine, Flavoring    Devices: Disposable   Substance Use Topics    Alcohol use: Never    Drug use: Never         Review of Systems:  Review of Systems   Constitutional:  Positive for fatigue. Negative for chills and fever.   HENT:  Negative for congestion, rhinorrhea and sore throat.    Eyes:  Negative for pain and visual disturbance.   Respiratory:  Positive for cough and shortness of breath. Negative for apnea and chest tightness.    Cardiovascular:  Negative for chest pain and palpitations.   Gastrointestinal:  Positive for diarrhea. Negative for abdominal pain, nausea and vomiting.   Genitourinary: Negative.  Negative for difficulty urinating and dysuria.   Musculoskeletal: Negative.  Negative for joint swelling and myalgias.   Skin:  Positive for pallor. Negative for color change.   Neurological:  Positive for weakness. Negative for seizures and headaches.   Hematological: Negative.    Psychiatric/Behavioral: Negative.     All other systems reviewed and are negative.       Physical Exam:  BP 92/64 (BP Location: Left arm, Patient Position: Lying)   Pulse 82   Temp 98.1 °F (36.7 °C)   Resp 18   Ht 162.6 cm (64\")   Wt 91.4 kg (201 lb 8 oz)   LMP  (LMP Unknown)   SpO2 100%   BMI 34.59 kg/m²     Physical Exam  Vitals and nursing note reviewed.   Constitutional:       General: She is not in acute " distress.     Appearance: Normal appearance. She is ill-appearing. She is not toxic-appearing.   HENT:      Head: Normocephalic and atraumatic.      Jaw: There is normal jaw occlusion.   Eyes:      General: Lids are normal.      Extraocular Movements: Extraocular movements intact.      Conjunctiva/sclera: Conjunctivae normal.      Pupils: Pupils are equal, round, and reactive to light.   Cardiovascular:      Rate and Rhythm: Normal rate and regular rhythm.      Pulses: Normal pulses.      Heart sounds: Normal heart sounds.   Pulmonary:      Effort: Pulmonary effort is normal. No accessory muscle usage, respiratory distress or retractions.      Breath sounds: Normal breath sounds. No wheezing or rhonchi.   Chest:      Chest wall: No tenderness.   Abdominal:      General: Abdomen is flat.      Palpations: Abdomen is soft.      Tenderness: There is no abdominal tenderness. There is no guarding or rebound.   Musculoskeletal:         General: Normal range of motion.      Cervical back: Normal range of motion and neck supple.      Right lower leg: No edema.      Left lower leg: No edema.   Skin:     General: Skin is warm and dry.   Neurological:      Mental Status: She is alert. She is lethargic.   Psychiatric:         Mood and Affect: Mood normal.                    Medical Decision Making:      Comorbidities that affect care:    Cancer, hypertension, hyperlipidemia, COPD    External Notes reviewed:    None      The following orders were placed and all results were independently analyzed by me:  Orders Placed This Encounter   Procedures    XR Chest 1 View    Comprehensive Metabolic Panel    Lactic Acid, Plasma    CBC Auto Differential    Varney Draw    Scan Slide    Sodium    Osmolality, Urine - Urine, Clean Catch    TSH Rfx On Abnormal To Free T4    Cortisol    Osmolality, Serum    Sodium, Urine, Random - Urine, Clean Catch    Inpatient Hospitalist Consult    Inpatient Nephrology Consult    Inpatient Admission    CBC &  Differential    Green Top (Gel)    Lavender Top    Gold Top - SST    Light Blue Top       Medications Given in the Emergency Department:  Medications   calcium gluconate 1000 Mg/50ml 0.675% NaCl IV SOLN (has no administration in time range)   sodium chloride 0.9 % bolus 1,000 mL (0 mL Intravenous Stopped 4/22/25 1744)        ED Course:         Labs:    Lab Results (last 24 hours)       Procedure Component Value Units Date/Time    CBC & Differential [[id number]]  (Abnormal) Collected: 04/22/25 1533    Specimen: Blood Updated: 04/22/25 1627    Narrative:      The following orders were created for panel order CBC & Differential.  Procedure                               Abnormality         Status                     ---------                               -----------         ------                     CBC Auto Differential[[id number]]        Abnormal            Final result               Scan Slide[[id number]]                                       Final result                 Please view results for these tests on the individual orders.    Comprehensive Metabolic Panel [[id number]]  (Abnormal) Collected: 04/22/25 1533    Specimen: Blood Updated: 04/22/25 1601     Glucose 104 mg/dL      BUN 9 mg/dL      Creatinine 0.66 mg/dL      Sodium 121 mmol/L      Potassium 3.8 mmol/L      Comment: Slight hemolysis detected by analyzer. Result may be falsely elevated.        Chloride 88 mmol/L      CO2 22.5 mmol/L      Calcium 7.9 mg/dL      Total Protein 6.9 g/dL      Albumin 3.9 g/dL      ALT (SGPT) 10 U/L      AST (SGOT) 9 U/L      Alkaline Phosphatase 75 U/L      Total Bilirubin 0.4 mg/dL      Globulin 3.0 gm/dL      A/G Ratio 1.3 g/dL      BUN/Creatinine Ratio 13.6     Anion Gap 10.5 mmol/L      eGFR 101.8 mL/min/1.73     Narrative:      GFR Categories in Chronic Kidney Disease (CKD)      GFR Category          GFR (mL/min/1.73)    Interpretation  G1                     90 or greater         Normal or high (1)  G2                       60-89                Mild decrease (1)  G3a                   45-59                Mild to moderate decrease  G3b                   30-44                Moderate to severe decrease  G4                    15-29                Severe decrease  G5                    14 or less           Kidney failure          (1)In the absence of evidence of kidney disease, neither GFR category G1 or G2 fulfill the criteria for CKD.    eGFR calculation 2021 CKD-EPI creatinine equation, which does not include race as a factor    Lactic Acid, Plasma [[id number]]  (Normal) Collected: 04/22/25 1533    Specimen: Blood Updated: 04/22/25 1559     Lactate 0.7 mmol/L     CBC Auto Differential [[id number]]  (Abnormal) Collected: 04/22/25 1533    Specimen: Blood Updated: 04/22/25 1627     WBC 0.78 10*3/mm3      RBC 3.11 10*6/mm3      Hemoglobin 9.5 g/dL      Hematocrit 28.3 %      MCV 91.0 fL      MCH 30.5 pg      MCHC 33.6 g/dL      RDW 15.7 %      RDW-SD 51.0 fl      MPV 10.1 fL      Platelets 75 10*3/mm3      Neutrophil % 10.3 %      Lymphocyte % 84.6 %      Monocyte % 5.1 %      Eosinophil % 0.0 %      Basophil % 0.0 %      Immature Grans % 0.0 %      Neutrophils, Absolute 0.08 10*3/mm3      Lymphocytes, Absolute 0.66 10*3/mm3      Monocytes, Absolute 0.04 10*3/mm3      Eosinophils, Absolute 0.00 10*3/mm3      Basophils, Absolute 0.00 10*3/mm3      Immature Grans, Absolute 0.00 10*3/mm3      nRBC 0.0 /100 WBC     Narrative:      Appended report. These results have been appended to a previously verified report.    Scan Slide [[id number]] Collected: 04/22/25 1533    Specimen: Blood Updated: 04/22/25 1627     Anisocytosis Slight/1+     Crenated RBC's Slight/1+     Poikilocytes Slight/1+     WBC Morphology Normal     Platelet Estimate Decreased    TSH Rfx On Abnormal To Free T4 [[id number]]  (Normal) Collected: 04/22/25 1533    Specimen: Blood Updated: 04/22/25 1732     TSH 0.771 uIU/mL     Cortisol [[id number]] Collected: 04/22/25 1533     Specimen: Blood Updated: 04/22/25 1732     Cortisol 11.74 mcg/dL     Narrative:      Cortisol Reference Ranges:    Cortisol 6AM - 10AM Range: 6.02-18.40 mcg/dl  Cortisol 4PM - 8PM Range: 2.68-10.50 mcg/dl      Results may be falsely increased if patient taking Biotin.      Osmolality, Serum [[id number]]  (Abnormal) Collected: 04/22/25 1533    Specimen: Blood Updated: 04/22/25 1738     Osmolality 253 mOsm/kg     Osmolality, Urine - Urine, Clean Catch [[id number]] Collected: 04/22/25 1805    Specimen: Urine, Clean Catch Updated: 04/22/25 1808    Sodium, Urine, Random - Urine, Clean Catch [[id number]] Collected: 04/22/25 1805    Specimen: Urine, Clean Catch Updated: 04/22/25 1808    Sodium [[id number]]  (Abnormal) Collected: 04/22/25 1807    Specimen: Blood from Arm, Left Updated: 04/22/25 1833     Sodium 125 mmol/L              Imaging:    XR Chest 1 View  Result Date: 4/22/2025  XR CHEST 1 [facility] Date of Exam: 4/22/2025 3:47 PM EDT Indication: soa Comparison: 4/21/2025 Findings: Heart size is within normal limits. The pulmonary vascular pattern is normal. There is a small left-sided pleural effusion and mild left basilar atelectasis. The lungs are otherwise clear. Left-sided chest port is in place with the tip in the right atrium.     Impression: Small left-sided pleural effusion and mild left basilar atelectasis. Electronically Signed: Luca Brewster MD  4/22/2025 3:52 PM EDT  Workstation ID: [id number]        Differential Diagnosis and Discussion:    Metabolic: Differential diagnosis includes but is not limited to hypertension, hyperglycemia, hyperkalemia, hypocalcemia, metabolic acidosis, hypokalemia, hypoglycemia, malnutrition, hypothyroidism, hyperthyroidism, and adrenal insufficiency.     PROCEDURES:    Labs were collected in the emergency department and all labs were reviewed and interpreted by me.  X-ray were performed in the emergency department and all X-ray impressions were independently interpreted by me.    No  orders to display       Procedures    MDM  Number of Diagnoses or Management Options  Generalized weakness  Hyponatremia  Malignant neoplasm of lung, unspecified laterality, unspecified part of lung  Pancytopenia due to chemotherapy  Diagnosis management comments: In summary this is a 59-year-old female with a history of lung cancer who presents to the emergency department for evaluation.  She was recently seen and evaluated and had hyponatremia and refused admission at that point however her oncologist advised her to come back to the emergency department and she is now amenable to admission.  CBC independently reviewed and interpreted by me and shows critical abnormalities of pancytopenia.  CMP independent reviewed interpreted me reveals critical abnormality of hyponatremia.  IV fluids were initiated.  Patient case has been discussed with the hospitalist team who will admit to the hospital for further evaluation and continuation of treatment.                       Patient Care Considerations:    CT HEAD: I considered ordering a noncontrast CT of the head, however no focal neurologic deficit      Consultants/Shared Management Plan:    Hospitalist: I have discussed the case with Dr. Osborne who agrees to accept the patient for admission.    Social Determinants of Health:    Patient is unable to carry out activities of daily life. Escalation of care is necessary.       Disposition and Care Coordination:    Admit:   Through independent evaluation of the patient's history, physical, and imperical data, the patient meets criteria for inpatient admission to the hospital.        Final diagnoses:   Hyponatremia   Generalized weakness   Malignant neoplasm of lung, unspecified laterality, unspecified part of lung   Pancytopenia due to chemotherapy        ED Disposition       ED Disposition   Decision to Admit    Condition   --    Comment   Level of Care: Progressive Care [20]   Diagnosis: Hyponatremia [198519]   Admitting  Physician: YEN BERNSTEIN [[id number]]   Attending Physician: YEN BERNSTEIN [[id number]]   Bed Request Comments: on chemo needs private room.   Certification: I Certify That Inpatient Hospital Services Are Medically Necessary For Greater Than 2 Midnights                 This medical record created using voice recognition software.             Kamari Tsai MD  04/22/25 1851       Kamari Tsai MD  04/22/25 1851      Electronically signed by Kamari Tsai MD at 04/22/25 1851       Vital Signs (last day)       Date/Time Temp Temp src Pulse Resp BP Patient Position SpO2    04/23/25 0824 -- -- 88 18 -- Sitting 98    04/23/25 0819 -- -- 91 18 -- Sitting 98    04/23/25 0748 98.1 (36.7) Oral 85 18 98/64 Sitting 98    04/23/25 0525 97.9 (36.6) Oral 77 20 97/51 Lying 93    04/22/25 2300 98.1 (36.7) Oral -- 20 107/74 Lying 98    04/22/25 2132 -- -- 86 20 -- -- 100    04/22/25 2128 -- -- 91 20 -- -- 100    04/22/25 2106 98.4 (36.9) Oral 93 18 90/68 Sitting 100    04/22/25 1915 -- -- 85 17 84/53 Lying 100    04/22/25 1659 -- -- 82 18 92/64 Lying 100    04/22/25 1530 -- -- 99 -- -- -- 100    04/22/25 1438 98.1 (36.7) -- -- -- 78/58 Sitting --    04/22/25 1437 -- -- 102 18 -- Sitting 100          Oxygen Therapy (last day)       Date/Time SpO2 Device (Oxygen Therapy) Flow (L/min) (Oxygen Therapy) Oxygen Concentration (%) ETCO2 (mmHg)    04/23/25 0824 98 room air -- -- --    04/23/25 0819 98 room air -- -- --    04/23/25 0748 98 room air -- -- --    04/23/25 0525 93 humidified;nasal cannula -- -- --    04/22/25 2300 98 humidified;nasal cannula 3 -- --    04/22/25 2132 100 humidified;nasal cannula 3 32 --    04/22/25 2128 100 humidified;nasal cannula 4 36 --    04/22/25 2106 100 nasal cannula 4 -- --    04/22/25 1915 100 nasal cannula 4 -- --    04/22/25 1659 100 nasal cannula 4 -- --    04/22/25 1530 100 -- -- -- --    04/22/25 1437 100 nasal cannula 4 -- --          Facility-Administered Medications as of 4/23/2025   Medication  Dose Route Frequency Provider Last Rate Last Admin    albuterol (PROVENTIL) nebulizer solution 0.083% 2.5 mg/3mL  2.5 mg Nebulization Q6H PRN Osborne, Dimpi, DO        albuterol sulfate HFA (PROVENTIL HFA;VENTOLIN HFA;PROAIR HFA) inhaler 2 puff  2 puff Inhalation Q4H PRN Osborne, Dimpi, DO        atorvastatin (LIPITOR) tablet 10 mg  10 mg Oral Nightly Osborne, Dimpi, DO   10 mg at 04/22/25 2125    sennosides-docusate (PERICOLACE) 8.6-50 MG per tablet 2 tablet  2 tablet Oral BID PRN Osborne, Dimpi, DO        And    polyethylene glycol (MIRALAX) packet 17 g  17 g Oral Daily PRN Osborne, Dimpi, DO        And    bisacodyl (DULCOLAX) EC tablet 5 mg  5 mg Oral Daily PRN Osborne, Dimpi, DO        And    bisacodyl (DULCOLAX) suppository 10 mg  10 mg Rectal Daily PRN Osborne, Dimpi, DO        budesonide-formoterol (SYMBICORT) 160-4.5 MCG/ACT inhaler 2 puff  2 puff Inhalation BID - RT Osborne, Dimpi, DO   2 puff at 04/23/25 0824    And    revefenacin (YUPELRI) nebulizer solution 175 mcg  175 mcg Nebulization Daily - RT Osborne, Dimpi, DO   175 mcg at 04/23/25 0824    buPROPion SR (WELLBUTRIN SR) 12 hr tablet 150 mg  150 mg Oral BID Osborne, Dimpi, DO   150 mg at 04/23/25 0819    calcium carbonate (TUMS) chewable tablet 500 mg (200 mg elemental)  2 tablet Oral Daily Osborne, Dimpi, DO   2 tablet at 04/23/25 0819    [COMPLETED] calcium gluconate 1000 Mg/50ml 0.675% NaCl IV SOLN  1,000 mg Intravenous Once Osborne, Dimpi, DO   1,000 mg at 04/22/25 1913    clonazePAM (KlonoPIN) tablet 0.5 mg  0.5 mg Oral BID PRN Osborne, Dimpi, DO        [Held by provider] escitalopram (LEXAPRO) tablet 20 mg  20 mg Oral Daily Osborne, Dimpi, DO        famotidine (PEPCID) tablet 40 mg  40 mg Oral BID Osborne, Dimpi, DO   40 mg at 04/23/25 0819    gabapentin (NEURONTIN) capsule 200 mg  200 mg Oral TID Osborne, Dimpi, DO   200 mg at 04/23/25 0819    hydrOXYzine (ATARAX) tablet 25 mg  25 mg Oral TID PRN Osborne, Dimpi, DO        [Held by provider] lisinopril (PRINIVIL,ZESTRIL) tablet  5 mg  5 mg Oral Daily Osborne, Dimpi, DO        [Held by provider] LORazepam (ATIVAN) tablet 0.5 mg  0.5 mg Oral Q8H PRN Osborne, Dimpi, DO        melatonin tablet 10 mg  10 mg Oral Nightly PRN Osborne, Dimpi, DO        [Held by provider] metoprolol tartrate (LOPRESSOR) tablet 12.5 mg  12.5 mg Oral BID Osborne, Dimpi, DO        naproxen (NAPROSYN) tablet 500 mg  500 mg Oral BID With Meals Osborne, Dimpi, DO   500 mg at 04/23/25 0819    nystatin (MYCOSTATIN) 100,000 unit/mL suspension 500,000 Units  5 mL Swish & Swallow 4x Daily Osborne, Dimpi, DO   500,000 Units at 04/23/25 0820    ondansetron ODT (ZOFRAN-ODT) disintegrating tablet 4 mg  4 mg Oral Q6H PRN Osborne, Dimpi, DO        Or    ondansetron (ZOFRAN) injection 4 mg  4 mg Intravenous Q6H PRN Osborne, Dimpi, DO        oxyCODONE-acetaminophen (PERCOCET)  MG per tablet 1 tablet  1 tablet Oral Q4H PRN Osborne, Dimpi, DO   1 tablet at 04/23/25 0355    potassium chloride (MICRO-K/KLOR-CON) CR capsule  40 mEq Oral BID With Meals Liliam Hernandez MD   40 mEq at 04/23/25 1001    prochlorperazine (COMPAZINE) tablet 10 mg  10 mg Oral Q6H PRN Osborne, Dimpi, DO        [COMPLETED] sodium chloride 0.9 % bolus 1,000 mL  1,000 mL Intravenous Once Kamari Tsai MD   Stopped at 04/22/25 1744    sodium chloride 0.9 % flush 10 mL  10 mL Intravenous Q12H Osborne, Dimpi, DO   10 mL at 04/23/25 0820    sodium chloride 0.9 % flush 10 mL  10 mL Intravenous PRN Osborne, Dimpi, DO        sodium chloride 0.9 % infusion 40 mL  40 mL Intravenous PRN Osborne, Dimpi, DO        sodium chloride tablet 2 g  2 g Oral TID With Meals Liliam Hernandez MD   2 g at 04/23/25 1001    [COMPLETED] Sodium Phosphates-Dextrose IVPB 15 mmol  15 mmol Intravenous Once Eve Christine PA   15 mmol at 04/22/25 2209    Urea (URE-NA) packet 30 g  30 g Oral BID Liliam Hernandez MD   30 g at 04/23/25 1001     Orders (active)        Start     Ordered    04/23/25 0930  sodium chloride tablet 2 g  3 Times Daily With Meals    "      04/23/25 0832    04/23/25 0930  Urea (URE-NA) packet 30 g  2 Times Daily         04/23/25 0832 04/23/25 0930  potassium chloride (MICRO-K/KLOR-CON) CR capsule  2 Times Daily With Meals         04/23/25 0836    04/23/25 0900  calcium carbonate (TUMS) chewable tablet 500 mg (200 mg elemental)  Daily         04/22/25 2042 04/23/25 0900  [Held by provider]  escitalopram (LEXAPRO) tablet 20 mg  Daily        (On hold since yesterday at 2042 until manually unheld; held by Joanne Osborne DOHold Reason: Hold For Procedure)    04/22/25 2042 04/23/25 0900  [Held by provider]  lisinopril (PRINIVIL,ZESTRIL) tablet 5 mg  Daily        (On hold since yesterday at 2042 until manually unheld; held by Joanne Osborne DOHold Reason: Hold For Procedure)    04/22/25 2042 04/23/25 0800  Oral Care  2 Times Daily       04/22/25 2042 04/23/25 0727  Osmolality, Urine - Urine, Clean Catch  Once         04/23/25 0726    04/23/25 0727  Sodium, Urine, Random - Urine, Clean Catch  Once         04/23/25 0726    04/23/25 0600  Basic Metabolic Panel  Daily       04/22/25 2042 04/23/25 0600  CBC & Differential  Daily       04/22/25 2042 04/23/25 0000  Vital Signs  Every 4 Hours       04/22/25 2042 04/23/25 0000  Sodium  Every 4 Hours       04/22/25 2043 04/22/25 2213  PT Consult: Eval & Treat Functional Mobility Below Baseline  Once        Comments: Reason Why PT Needed: treat and evaluate    04/22/25 2212 04/22/25 2130  budesonide-formoterol (SYMBICORT) 160-4.5 MCG/ACT inhaler 2 puff  2 Times Daily - RT        Placed in \"And\" Linked Group    04/22/25 2042 04/22/25 2100  atorvastatin (LIPITOR) tablet 10 mg  Nightly         04/22/25 2042 04/22/25 2100  buPROPion SR (WELLBUTRIN SR) 12 hr tablet 150 mg  2 Times Daily         04/22/25 2042 04/22/25 2100  famotidine (PEPCID) tablet 40 mg  2 Times Daily         04/22/25 2042 04/22/25 2100  revefenacin (YUPELRI) nebulizer solution 175 mcg  Daily - RT        Placed " "in \"And\" Linked Group    04/22/25 2042 04/22/25 2100  gabapentin (NEURONTIN) capsule 200 mg  3 Times Daily         04/22/25 1946 04/22/25 2100  [Held by provider]  metoprolol tartrate (LOPRESSOR) tablet 12.5 mg  2 Times Daily        (On hold since yesterday at 2042 until manually unheld; held by Joanne Osborne, Minerva Reason: Hold For Procedure)    04/22/25 2042 04/22/25 2100  naproxen (NAPROSYN) tablet 500 mg  2 Times Daily With Meals         04/22/25 2042 04/22/25 2100  nystatin (MYCOSTATIN) 100,000 unit/mL suspension 500,000 Units  4 Times Daily         04/22/25 2042 04/22/25 2100  sodium chloride 0.9 % flush 10 mL  Every 12 Hours Scheduled         04/22/25 2042 04/22/25 2043  Intake & Output  Every Shift       04/22/25 2042 04/22/25 2043  Magnesium  Daily       04/22/25 2042 04/22/25 2043  Phosphorus  Daily       04/22/25 2042 04/22/25 2043  Insert Peripheral IV  Once         04/22/25 2042 04/22/25 2043  Maintain Sequential Compression Device  Continuous         04/22/25 2042 04/22/25 2043  Diet: Regular/House; Fluid Consistency: Thin (IDDSI 0)  Diet Effective Now         04/22/25 2042 04/22/25 2042  ondansetron ODT (ZOFRAN-ODT) disintegrating tablet 4 mg  Every 6 Hours PRN        Placed in \"Or\" Linked Group    04/22/25 2042 04/22/25 2042  ondansetron (ZOFRAN) injection 4 mg  Every 6 Hours PRN        Placed in \"Or\" Linked Group    04/22/25 2042 04/22/25 2042  sennosides-docusate (PERICOLACE) 8.6-50 MG per tablet 2 tablet  2 Times Daily PRN        Placed in \"And\" Linked Group    04/22/25 2042 04/22/25 2042  polyethylene glycol (MIRALAX) packet 17 g  Daily PRN        Placed in \"And\" Linked Group    04/22/25 2042 04/22/25 2042  bisacodyl (DULCOLAX) EC tablet 5 mg  Daily PRN        Placed in \"And\" Linked Group    04/22/25 2042 04/22/25 2042  bisacodyl (DULCOLAX) suppository 10 mg  Daily PRN        Placed in \"And\" Linked Group    04/22/25 2042 04/22/25 2042  " albuterol (PROVENTIL) nebulizer solution 0.083% 2.5 mg/3mL  Every 6 Hours PRN         25  albuterol sulfate HFA (PROVENTIL HFA;VENTOLIN HFA;PROAIR HFA) inhaler 2 puff  Every 4 Hours PRN         25  clonazePAM (KlonoPIN) tablet 0.5 mg  2 Times Daily PRN         25  hydrOXYzine (ATARAX) tablet 25 mg  3 Times Daily PRN         25  [Held by provider]  LORazepam (ATIVAN) tablet 0.5 mg  Every 8 Hours PRN        (On hold since yesterday at  until manually unheld; held by Joanne Osborne DOHold Reason: Hold For Procedure)    25  melatonin tablet 10 mg  Nightly PRN         25  oxyCODONE-acetaminophen (PERCOCET)  MG per tablet 1 tablet  Every 4 Hours PRN         25  prochlorperazine (COMPAZINE) tablet 10 mg  Every 6 Hours PRN         25  sodium chloride 0.9 % flush 10 mL  As Needed         25  sodium chloride 0.9 % infusion 40 mL  As Needed         25 191  Protective / Neutropenic Isolation Status  Continuous         25 1911    25 1854  Code Status and Medical Interventions: CPR (Attempt to Resuscitate); Full Support  Continuous         25 1854    25 1619  Inpatient Hospitalist Consult  Once        Specialty:  Hospitalist  Provider:  Joanne Osborne DO    25 1618                     Consult Notes (last 24 hours)        Liliam Hernandez MD at 25 0814        Consult Orders    1. Inpatient Nephrology Consult [[id number]] ordered by Joanne Osborne DO at 25 1832                   [facility]   Consult Note    Patient Name: Lluvia Archer  : 1965  MRN: [id number]  Primary Care Physician:  Aleksandar Edge DO  Referring Physician: No ref. provider found  Date of admission:  4/22/2025    Subjective   Subjective     Reason for Consult/ Chief Complaint: Altered mental status    HPI:  Lluvia Archer is a 59 y.o. female 59-year-old female with past medical history of small cell lung cancer, COPD, hypertension, osteoarthritis, anxiety/depression, hyperlipidemia who had a routine visit at her oncologist and had labs done after which she was recommended to come to the ER.  Patient had recent treatment for oral thrush over the last 24 to 48 hours.  Her last session of chemotherapy was about a week ago on Friday.    Upon presentation patient's blood pressures were soft.  She was noted to be on 4 L of oxygen and was quickly weaned off of it.  Her remaining vitals were otherwise normal.  Her chest x-ray shows Left-sided pleural effusion.  Her CBC shows pancytopenia with a white count of 0.9, hemoglobin of 7.8 and platelets of 58.  Her potassium was 3.3.  Renal function is normal.  Patient admitted for further management of her altered mental status.  Nephrology consulted for hyponatremia    Review of Systems  All review of systems negative except as given below.    Personal History     Past Medical History:   Diagnosis Date    Abnormal mammogram     PT DENIES KNOWING OF THIS HX    Anxiety     Arthritis     R SHOULDER, R HIP, AND R LEG    Cancer     LUNG    Chronic nausea 01/15/2019    COPD (chronic obstructive pulmonary disease)     O2 3 LITERS NC CONT    Hernia, hiatal     Hyperlipidemia     Hypertension     Knee pain, right 08/30/2018    Memory loss     FORGETFULNESS ? ETIOLOGY    Migraine headache     Moderate episode of recurrent major depressive disorder 05/22/2017    Night sweats     Sciatica of right side 08/18/2017    Severe episode of recurrent major depressive disorder, without psychotic features 10/22/2019    Shingles     OUTBREAK 6/11/24 ON ANTIVIRAL , WHELPS NOTED NO SORES.    Shoulder pain, left 08/30/2018       Past Surgical History:   Procedure Laterality Date    BRONCHOSCOPY N/A  2022    Procedure: BRONCHOSCOPY WITH BRONCHOALVEOLAR LAVAGE, BIOPSY;  Surgeon: Shin Mcdonald DO;  Location: [facility] ENDOSCOPY;  Service: Pulmonary;  Laterality: N/A;  RIGHT LOWER LOBE INFILTRATE    BRONCHOSCOPY N/A 2024    Procedure: BRONCHOSCOPY WITH ENDOBRONCHIAL ULTRASOUND AND FINE NEEDLE ASPIRATE;  Surgeon: Shin Mcdonald DO;  Location: [facility] ENDOSCOPY;  Service: Pulmonary;  Laterality: N/A;  MEDIASTINAL ADENOPATHY     SECTION      CHOLECYSTECTOMY      LAPAROSCOPIC    COLONOSCOPY      PORTACATH PLACEMENT Left 2024    Procedure: INSERTION OF PORTACATH;  Surgeon: Josh Patton MD;  Location: [facility] OR OSC;  Service: General;  Laterality: Left;    TOTAL HIP ARTHROPLASTY Right 2022    Procedure: RIGHT TOTAL HIP ARTHROPLASTY;  Surgeon: Cecil Gomes MD;  Location: [facility] MAIN OR;  Service: Orthopedics;  Laterality: Right;       Family History: family history includes Arthritis in her mother; Cancer in an other family member; Heart disease in her father and another family member; Hypertension in an other family member; Other in her mother. Otherwise pertinent FHx was reviewed and not pertinent to current issue.    Social History:  reports that she has been smoking cigarettes. She started smoking about 47 years ago. She has a 47.3 pack-year smoking history. She has been exposed to tobacco smoke. She has never used smokeless tobacco. She reports that she does not drink alcohol and does not use drugs.    Home Medications:  Fluticasone-Umeclidin-Vilant, LORazepam, Magnesium, Melatonin, albuterol, albuterol sulfate HFA, atorvastatin, buPROPion SR, calcium carbonate, clonazePAM, escitalopram, famotidine, gabapentin, hydrOXYzine, lisinopril, metoprolol tartrate, naloxone, naproxen, nystatin, ondansetron ODT, oxyCODONE-acetaminophen, and prochlorperazine    Allergies:  No Known Allergies    Objective    Objective     Vitals:   Temp:  [97.9 °F (36.6 °C)-98.4 °F (36.9  °C)] 98.1 °F (36.7 °C)  Heart Rate:  [] 85  Resp:  [17-20] 18  BP: ()/(51-74) 98/64  Flow (L/min) (Oxygen Therapy):  [3-4] 3    Physical Exam:             Constitutional:         Awake, alert responsive, conversant, no obvious distress   Eyes:                       PERRLA, sclerae anicteric, no conjunctival injection   HEENT:                   Moist mucous membranes, no nasal or eye discharge, no throat congestion   Neck:                      Supple, no thyromegaly, no lymphadenopathy, trachea midline, no elevated JVD   Respiratory:           Clear to auscultation bilaterally, nonlabored respirations    Cardiovascular:     RRR, no murmurs, rubs, or gallops, palpable pedal pulses bilaterally, No bilateral ankle edema   Gastrointestinal:   Positive bowel sounds, soft, nontender, non-distended, no organomegaly   Musculoskeletal:  No clubbing or cyanosis to extremities, muscle wasting, joint swelling, muscle weakness   Psychiatric:              Appropriate affect, cooperative   Neurologic:            Awake alert, oriented x 3, strength symmetric in all extremities, Cranial Nerves grossly intact to confrontation, speech clear   Skin:                      No rashes, bruising, skin ulcers, petechiae or ecchymosis    Result Review    Result Review:  I have personally reviewed the results from the time of this admission to 4/23/2025 08:14 EDT and agree with these findings:  []  Laboratory  []  Microbiology  []  Radiology  []  EKG/Telemetry   []  Cardiology/Vascular   []  Pathology  []  Old records  []  Other:    Results from last 7 days   Lab Units 04/23/25  0352 04/22/25  1533 04/21/25  1340   WBC 10*3/mm3 0.93* 0.78* 1.88*   HEMOGLOBIN g/dL 7.8* 9.5* 9.4*   PLATELETS 10*3/mm3 58* 75* 119*     Results from last 7 days   Lab Units 04/23/25  0352 04/22/25  2356 04/22/25  1807 04/22/25  1533 04/21/25  1340   SODIUM mmol/L 124* 125* 125* 121* 120*   POTASSIUM mmol/L 3.3*  --   --  3.8 3.7   CHLORIDE mmol/L 92*  --    --  88* 87*   CO2 mmol/L 20.9*  --   --  22.5 22.7   ANION GAP mmol/L 11.1  --   --  10.5 10.3   BUN mg/dL 8  --   --  9 8   CREATININE mg/dL 0.61  --   --  0.66 0.65   GLUCOSE mg/dL 86  --   --  104* 135*   EGFR mL/min/1.73 103.8  --   --  101.8 102.2   CALCIUM mg/dL 7.0*  --   --  7.9* 7.6*   MAGNESIUM mg/dL 2.0  --  2.2  --  2.0   ALBUMIN g/dL  --   --   --  3.9 3.6   BILIRUBIN mg/dL  --   --   --  0.4 0.5   ALK PHOS U/L  --   --   --  75 75   ALT (SGPT) U/L  --   --   --  10 12   AST (SGOT) U/L  --   --   --  9 15       Assessment & Plan   Assessment / Plan     Active Hospital Problems:  Active Hospital Problems    Diagnosis     **Hyponatremia      59-year-old female with past medical history of small cell lung cancer, COPD, hypertension, osteoarthritis, anxiety/depression, hyperlipidemia who was sent to the ER due to abnormal labs noted to have a sodium of 122-124 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  Patient is also on SSRIs urine osmolarity noted to be elevated and urine sodium at 30    Plan:   Will start salt tabs 2 g 3 times daily  Will also start urea 30 g twice daily  Fluid restriction 1500 daily  Patient is severely pancytopenic and may need bone marrow stimulating agents  Potassium 40 mEq twice daily for 2 doses    Thank you for involving me in the care of the patient.  Will continue to follow along    Electronically signed by Liliam Hernandez MD, 04/23/25, 8:14 AM EDT.         Electronically signed by Liliam Hernandez MD at 04/23/25 7862

## 2025-04-23 NOTE — PROGRESS NOTES
[facility]   Progress Note    Patient Name: Lluvia Archer  : 1965  MRN: [id number]  Primary Care Physician:  Aleksandar Edge DO  Date of admission: 2025    Subjective   Subjective     I have examined this patient at bedside this morning. They report feeling anxious. No acute events overnight. Patient is having bowel movements and is urinating. Patient is tolerating diet. I discussed the case with the patient's RN.      Review of Systems   Constitutional:  Positive for fatigue. Negative for chills, diaphoresis and fever.   HENT:  Positive for sore throat. Negative for facial swelling, rhinorrhea, sneezing, trouble swallowing and voice change.    Eyes:  Negative for photophobia, redness and visual disturbance.   Respiratory:  Negative for cough, shortness of breath and stridor.    Cardiovascular:  Negative for chest pain, palpitations and leg swelling.   Gastrointestinal:  Negative for abdominal pain, anal bleeding, blood in stool, constipation, diarrhea, nausea and vomiting.   Endocrine: Negative for polyuria.   Genitourinary:  Negative for difficulty urinating, dysuria, frequency, hematuria and urgency.   Musculoskeletal:  Negative for arthralgias, back pain, joint swelling, myalgias and neck stiffness.   Skin:  Negative for rash and wound.   Allergic/Immunologic: Positive for immunocompromised state.   Neurological:  Negative for seizures, syncope, facial asymmetry, speech difficulty, weakness, light-headedness, numbness and headaches.   Hematological:  Does not bruise/bleed easily.   Psychiatric/Behavioral:  Positive for confusion. Negative for agitation and hallucinations. The patient is nervous/anxious.        Objective   Objective     Vitals:   Temp:  [97.9 °F (36.6 °C)-99 °F (37.2 °C)] 99 °F (37.2 °C)  Heart Rate:  [] 95  Resp:  [16-20] 16  BP: ()/(51-74) 95/60  Flow (L/min) (Oxygen Therapy):  [3-4] 4    Physical Exam  Constitutional:       General: She is not in acute  distress.     Appearance: Normal appearance. She is normal weight. She is ill-appearing. She is not toxic-appearing or diaphoretic.   HENT:      Head: Normocephalic and atraumatic.      Nose: No rhinorrhea.      Mouth/Throat:      Mouth: Mucous membranes are moist.      Pharynx: Oropharynx is clear. Posterior oropharyngeal erythema present.   Eyes:      General: No scleral icterus.        Right eye: No discharge.         Left eye: No discharge.      Extraocular Movements: Extraocular movements intact.      Conjunctiva/sclera: Conjunctivae normal.   Cardiovascular:      Rate and Rhythm: Normal rate and regular rhythm.      Heart sounds: No murmur heard.  Pulmonary:      Effort: Pulmonary effort is normal. No respiratory distress.      Breath sounds: Rales (left lower lung field) present. No wheezing or rhonchi.   Abdominal:      General: Abdomen is flat. There is no distension.      Palpations: Abdomen is soft.      Tenderness: There is no abdominal tenderness. There is no guarding.   Musculoskeletal:         General: No signs of injury.      Cervical back: No rigidity or tenderness.      Right lower leg: No edema.      Left lower leg: No edema.   Skin:     General: Skin is warm and dry.      Coloration: Skin is not jaundiced or pale.   Neurological:      General: No focal deficit present.      Mental Status: She is alert and oriented to person, place, and time. Mental status is at baseline.      Cranial Nerves: No cranial nerve deficit.      Motor: No weakness.   Psychiatric:         Mood and Affect: Mood normal.         Thought Content: Thought content normal.          Result Review    Result Review:  I have personally reviewed the results from the time of this admission to 4/23/2025 12:16 EDT and agree with these findings:  [x]  Laboratory list / accordion  []  Microbiology  [x]  Radiology  [x]  EKG/Telemetry   []  Cardiology/Vascular   []  Pathology  [x]  Old records  []  Other:  Most notable findings include:  History of small cell lung cancer      Assessment & Plan   Assessment / Plan     Brief Patient Summary:  Lluvia Archer is a 59 y.o. female who was sent to the emergency room by her oncologist for concerns of abnormal lab work.  In emergency room, the patient was noted to have an acute hyponatremia and was mildly altered.  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    Active Hospital Problems:  Active Hospital Problems    Diagnosis     **Hyponatremia      Acute hyponatremia - improving  -Mentation is at baseline now and AOX4  -Sodium 125 this AM, trend Na  -sodium tablets and Urena started by nephrology  -Following nephrology, appreciate recs    Pancytopenia  -This was discussed by the admitting physician and the patient's oncologist.  There was no indication to give Neupogen or broad-spectrum antibiotics at this time.  -Will continue to monitor for developing signs of sepsis    Hypocalcemia  -Repleted    Hypotension  -IV fluids been given, blood pressure is borderline  -Lactic acid has been wnl    Candidiasis  -Cont nystatin    COPD  -No AE  -cont home trelegy interchange    HTN  -Hold home po meds, bp has been borderline    HLD  -Cont home lipitor    Depression/anxiety  -Continue home BuSpar, as needed Klonopin, as needed Atarax     Small cell lung cancer  -her oncologist is aware of her current admission    GERD  -Continue home Pepcid    Peripheral neuropathy  -Continue gabapentin    VTE ppx: subq lovenox    Stress Ulcer ppx: home pepcid     Estimated Medical Readiness Discharge Date:  04/25/25     Anticipated Disposition: home     Discharge Milestones:  improvement in symptoms and labs        CODE STATUS:    Code Status (Patient has no pulse and is not breathing): CPR (Attempt to Resuscitate)  Medical Interventions (Patient has pulse or is breathing): Full Support  Level Of Support Discussed With: Patient      King Westbrook MD

## 2025-04-23 NOTE — CONSULTS
[facility]   Consult Note    Patient Name: Lluvia Archer  : 1965  MRN: [id number]  Primary Care Physician:  Aleksandar Edge DO  Referring Physician: No ref. provider found  Date of admission: 2025    Subjective   Subjective     Reason for Consult/ Chief Complaint: Altered mental status    HPI:  Lluvia Archer is a 59 y.o. female 59-year-old female with past medical history of small cell lung cancer, COPD, hypertension, osteoarthritis, anxiety/depression, hyperlipidemia who had a routine visit at her oncologist and had labs done after which she was recommended to come to the ER.  Patient had recent treatment for oral thrush over the last 24 to 48 hours.  Her last session of chemotherapy was about a week ago on Friday.    Upon presentation patient's blood pressures were soft.  She was noted to be on 4 L of oxygen and was quickly weaned off of it.  Her remaining vitals were otherwise normal.  Her chest x-ray shows Left-sided pleural effusion.  Her CBC shows pancytopenia with a white count of 0.9, hemoglobin of 7.8 and platelets of 58.  Her potassium was 3.3.  Renal function is normal.  Patient admitted for further management of her altered mental status.  Nephrology consulted for hyponatremia    Review of Systems  All review of systems negative except as given below.    Personal History     Past Medical History:   Diagnosis Date    Abnormal mammogram     PT DENIES KNOWING OF THIS HX    Anxiety     Arthritis     R SHOULDER, R HIP, AND R LEG    Cancer     LUNG    Chronic nausea 01/15/2019    COPD (chronic obstructive pulmonary disease)     O2 3 LITERS NC CONT    Hernia, hiatal     Hyperlipidemia     Hypertension     Knee pain, right 2018    Memory loss     FORGETFULNESS ? ETIOLOGY    Migraine headache     Moderate episode of recurrent major depressive disorder 2017    Night sweats     Sciatica of right side 2017    Severe episode of recurrent major depressive disorder,  without psychotic features 10/22/2019    Shingles     OUTBREAK 24 ON ANTIVIRAL , WHELPS NOTED NO SORES.    Shoulder pain, left 2018       Past Surgical History:   Procedure Laterality Date    BRONCHOSCOPY N/A 2022    Procedure: BRONCHOSCOPY WITH BRONCHOALVEOLAR LAVAGE, BIOPSY;  Surgeon: Shin Mcdonald DO;  Location: [facility] ENDOSCOPY;  Service: Pulmonary;  Laterality: N/A;  RIGHT LOWER LOBE INFILTRATE    BRONCHOSCOPY N/A 2024    Procedure: BRONCHOSCOPY WITH ENDOBRONCHIAL ULTRASOUND AND FINE NEEDLE ASPIRATE;  Surgeon: Shin Mcdonald DO;  Location: [facility] ENDOSCOPY;  Service: Pulmonary;  Laterality: N/A;  MEDIASTINAL ADENOPATHY     SECTION      CHOLECYSTECTOMY      LAPAROSCOPIC    COLONOSCOPY      PORTACATH PLACEMENT Left 2024    Procedure: INSERTION OF PORTACATH;  Surgeon: Josh Patton MD;  Location: [facility] OR OSC;  Service: General;  Laterality: Left;    TOTAL HIP ARTHROPLASTY Right 2022    Procedure: RIGHT TOTAL HIP ARTHROPLASTY;  Surgeon: Cecil Gomes MD;  Location: [facility] MAIN OR;  Service: Orthopedics;  Laterality: Right;       Family History: family history includes Arthritis in her mother; Cancer in an other family member; Heart disease in her father and another family member; Hypertension in an other family member; Other in her mother. Otherwise pertinent FHx was reviewed and not pertinent to current issue.    Social History:  reports that she has been smoking cigarettes. She started smoking about 47 years ago. She has a 47.3 pack-year smoking history. She has been exposed to tobacco smoke. She has never used smokeless tobacco. She reports that she does not drink alcohol and does not use drugs.    Home Medications:  Fluticasone-Umeclidin-Vilant, LORazepam, Magnesium, Melatonin, albuterol, albuterol sulfate HFA, atorvastatin, buPROPion SR, calcium carbonate, clonazePAM, escitalopram, famotidine, gabapentin, hydrOXYzine, lisinopril,  metoprolol tartrate, naloxone, naproxen, nystatin, ondansetron ODT, oxyCODONE-acetaminophen, and prochlorperazine    Allergies:  No Known Allergies    Objective    Objective     Vitals:   Temp:  [97.9 °F (36.6 °C)-98.4 °F (36.9 °C)] 98.1 °F (36.7 °C)  Heart Rate:  [] 85  Resp:  [17-20] 18  BP: ()/(51-74) 98/64  Flow (L/min) (Oxygen Therapy):  [3-4] 3    Physical Exam:             Constitutional:         Awake, alert responsive, conversant, no obvious distress   Eyes:                       PERRLA, sclerae anicteric, no conjunctival injection   HEENT:                   Moist mucous membranes, no nasal or eye discharge, no throat congestion   Neck:                      Supple, no thyromegaly, no lymphadenopathy, trachea midline, no elevated JVD   Respiratory:           Clear to auscultation bilaterally, nonlabored respirations    Cardiovascular:     RRR, no murmurs, rubs, or gallops, palpable pedal pulses bilaterally, No bilateral ankle edema   Gastrointestinal:   Positive bowel sounds, soft, nontender, non-distended, no organomegaly   Musculoskeletal:  No clubbing or cyanosis to extremities, muscle wasting, joint swelling, muscle weakness   Psychiatric:              Appropriate affect, cooperative   Neurologic:            Awake alert, oriented x 3, strength symmetric in all extremities, Cranial Nerves grossly intact to confrontation, speech clear   Skin:                      No rashes, bruising, skin ulcers, petechiae or ecchymosis    Result Review    Result Review:  I have personally reviewed the results from the time of this admission to 4/23/2025 08:14 EDT and agree with these findings:  []  Laboratory  []  Microbiology  []  Radiology  []  EKG/Telemetry   []  Cardiology/Vascular   []  Pathology  []  Old records  []  Other:    Results from last 7 days   Lab Units 04/23/25  0352 04/22/25  1533 04/21/25  1340   WBC 10*3/mm3 0.93* 0.78* 1.88*   HEMOGLOBIN g/dL 7.8* 9.5* 9.4*   PLATELETS 10*3/mm3 58* 75*  119*     Results from last 7 days   Lab Units 04/23/25  0352 04/22/25  2356 04/22/25  1807 04/22/25  1533 04/21/25  1340   SODIUM mmol/L 124* 125* 125* 121* 120*   POTASSIUM mmol/L 3.3*  --   --  3.8 3.7   CHLORIDE mmol/L 92*  --   --  88* 87*   CO2 mmol/L 20.9*  --   --  22.5 22.7   ANION GAP mmol/L 11.1  --   --  10.5 10.3   BUN mg/dL 8  --   --  9 8   CREATININE mg/dL 0.61  --   --  0.66 0.65   GLUCOSE mg/dL 86  --   --  104* 135*   EGFR mL/min/1.73 103.8  --   --  101.8 102.2   CALCIUM mg/dL 7.0*  --   --  7.9* 7.6*   MAGNESIUM mg/dL 2.0  --  2.2  --  2.0   ALBUMIN g/dL  --   --   --  3.9 3.6   BILIRUBIN mg/dL  --   --   --  0.4 0.5   ALK PHOS U/L  --   --   --  75 75   ALT (SGPT) U/L  --   --   --  10 12   AST (SGOT) U/L  --   --   --  9 15       Assessment & Plan   Assessment / Plan     Active Hospital Problems:  Active Hospital Problems    Diagnosis     **Hyponatremia      59-year-old female with past medical history of small cell lung cancer, COPD, hypertension, osteoarthritis, anxiety/depression, hyperlipidemia who was sent to the ER due to abnormal labs noted to have a sodium of 122-124 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  Patient is also on SSRIs urine osmolarity noted to be elevated and urine sodium at 30    Plan:   Will start salt tabs 2 g 3 times daily  Will also start urea 30 g twice daily  Fluid restriction 1500 daily  Patient is severely pancytopenic and may need bone marrow stimulating agents  Potassium 40 mEq twice daily for 2 doses    Thank you for involving me in the care of the patient.  Will continue to follow along    Electronically signed by Liliam Hrenandez MD, 04/23/25, 8:14 AM EDT.

## 2025-04-23 NOTE — PLAN OF CARE
Problem: Adult Inpatient Plan of Care  Goal: Plan of Care Review  Outcome: Progressing  Flowsheets (Taken 4/23/2025 1603)  Outcome Evaluation: Patient alert and oriented throughout shift. On 4 liters NC with no signs of distress. Patient plan of care discussed at bedside. Up with standby assist. Patient pain treated per MAR. Will continue with patient plan of care.  Plan of Care Reviewed With: patient   Goal Outcome Evaluation:  Plan of Care Reviewed With: patient           Outcome Evaluation: Patient alert and oriented throughout shift. On 4 liters NC with no signs of distress. Patient plan of care discussed at bedside. Up with standby assist. Patient pain treated per MAR. Will continue with patient plan of care.

## 2025-04-23 NOTE — PLAN OF CARE
Goal Outcome Evaluation:  Plan of Care Reviewed With: patient        Progress: improving  Outcome Evaluation: Pt a/ox4, neutropenic precautions, pt uses walker, pain meds given for legs, pt makes needs known, signed refusal of bed alarm, will continue plan of care.

## 2025-04-24 LAB
ANION GAP SERPL CALCULATED.3IONS-SCNC: 7.3 MMOL/L (ref 5–15)
BASOPHILS # BLD AUTO: 0 10*3/MM3 (ref 0–0.2)
BASOPHILS NFR BLD AUTO: 0 % (ref 0–1.5)
BUN SERPL-MCNC: 15 MG/DL (ref 6–20)
BUN/CREAT SERPL: 22.1 (ref 7–25)
CALCIUM SPEC-SCNC: 7.4 MG/DL (ref 8.6–10.5)
CHLORIDE SERPL-SCNC: 102 MMOL/L (ref 98–107)
CO2 SERPL-SCNC: 20.7 MMOL/L (ref 22–29)
CREAT SERPL-MCNC: 0.68 MG/DL (ref 0.57–1)
DEPRECATED RDW RBC AUTO: 52.2 FL (ref 37–54)
EGFRCR SERPLBLD CKD-EPI 2021: 101.1 ML/MIN/1.73
EOSINOPHIL # BLD AUTO: 0.02 10*3/MM3 (ref 0–0.4)
EOSINOPHIL NFR BLD AUTO: 2.2 % (ref 0.3–6.2)
ERYTHROCYTE [DISTWIDTH] IN BLOOD BY AUTOMATED COUNT: 15.8 % (ref 12.3–15.4)
GLUCOSE SERPL-MCNC: 86 MG/DL (ref 65–99)
HCT VFR BLD AUTO: 22.1 % (ref 34–46.6)
HGB BLD-MCNC: 7.5 G/DL (ref 12–15.9)
IMM GRANULOCYTES # BLD AUTO: 0 10*3/MM3 (ref 0–0.05)
IMM GRANULOCYTES NFR BLD AUTO: 0 % (ref 0–0.5)
LYMPHOCYTES # BLD AUTO: 0.74 10*3/MM3 (ref 0.7–3.1)
LYMPHOCYTES NFR BLD AUTO: 83.1 % (ref 19.6–45.3)
MAGNESIUM SERPL-MCNC: 2.1 MG/DL (ref 1.6–2.6)
MCH RBC QN AUTO: 31.1 PG (ref 26.6–33)
MCHC RBC AUTO-ENTMCNC: 33.9 G/DL (ref 31.5–35.7)
MCV RBC AUTO: 91.7 FL (ref 79–97)
MONOCYTES # BLD AUTO: 0.04 10*3/MM3 (ref 0.1–0.9)
MONOCYTES NFR BLD AUTO: 4.5 % (ref 5–12)
NEUTROPHILS NFR BLD AUTO: 0.09 10*3/MM3 (ref 1.7–7)
NEUTROPHILS NFR BLD AUTO: 10.2 % (ref 42.7–76)
NRBC BLD AUTO-RTO: 0 /100 WBC (ref 0–0.2)
PHOSPHATE SERPL-MCNC: 2.4 MG/DL (ref 2.5–4.5)
PLATELET # BLD AUTO: 32 10*3/MM3 (ref 140–450)
PMV BLD AUTO: 10.2 FL (ref 6–12)
POTASSIUM SERPL-SCNC: 4.4 MMOL/L (ref 3.5–5.2)
QT INTERVAL: 383 MS
QTC INTERVAL: 485 MS
RBC # BLD AUTO: 2.41 10*6/MM3 (ref 3.77–5.28)
RBC MORPH BLD: NORMAL
SMALL PLATELETS BLD QL SMEAR: NORMAL
SODIUM SERPL-SCNC: 130 MMOL/L (ref 136–145)
SODIUM SERPL-SCNC: 130 MMOL/L (ref 136–145)
SODIUM SERPL-SCNC: 131 MMOL/L (ref 136–145)
SODIUM UR-SCNC: 22 MMOL/L
WBC MORPH BLD: NORMAL
WBC NRBC COR # BLD AUTO: 0.89 10*3/MM3 (ref 3.4–10.8)

## 2025-04-24 PROCEDURE — 85025 COMPLETE CBC W/AUTO DIFF WBC: CPT | Performed by: HOSPITALIST

## 2025-04-24 PROCEDURE — 83735 ASSAY OF MAGNESIUM: CPT | Performed by: HOSPITALIST

## 2025-04-24 PROCEDURE — 25810000003 SODIUM CHLORIDE 0.9 % SOLUTION: Performed by: INTERNAL MEDICINE

## 2025-04-24 PROCEDURE — 84295 ASSAY OF SERUM SODIUM: CPT | Performed by: HOSPITALIST

## 2025-04-24 PROCEDURE — 80048 BASIC METABOLIC PNL TOTAL CA: CPT | Performed by: HOSPITALIST

## 2025-04-24 PROCEDURE — 85007 BL SMEAR W/DIFF WBC COUNT: CPT | Performed by: HOSPITALIST

## 2025-04-24 PROCEDURE — 97161 PT EVAL LOW COMPLEX 20 MIN: CPT

## 2025-04-24 PROCEDURE — 84100 ASSAY OF PHOSPHORUS: CPT | Performed by: HOSPITALIST

## 2025-04-24 PROCEDURE — 99232 SBSQ HOSP IP/OBS MODERATE 35: CPT | Performed by: INTERNAL MEDICINE

## 2025-04-24 PROCEDURE — 25010000002 ENOXAPARIN PER 10 MG: Performed by: INTERNAL MEDICINE

## 2025-04-24 PROCEDURE — 84300 ASSAY OF URINE SODIUM: CPT | Performed by: INTERNAL MEDICINE

## 2025-04-24 PROCEDURE — 87040 BLOOD CULTURE FOR BACTERIA: CPT | Performed by: INTERNAL MEDICINE

## 2025-04-24 RX ADMIN — ATORVASTATIN CALCIUM 10 MG: 10 TABLET, FILM COATED ORAL at 20:41

## 2025-04-24 RX ADMIN — SODIUM CHLORIDE TAB 1 GM 2 G: 1 TAB at 17:42

## 2025-04-24 RX ADMIN — ENOXAPARIN SODIUM 40 MG: 40 INJECTION SUBCUTANEOUS at 09:38

## 2025-04-24 RX ADMIN — OXYCODONE AND ACETAMINOPHEN 1 TABLET: 10; 325 TABLET ORAL at 20:41

## 2025-04-24 RX ADMIN — NYSTATIN 500000 UNITS: 100000 SUSPENSION ORAL at 20:42

## 2025-04-24 RX ADMIN — GABAPENTIN 200 MG: 100 CAPSULE ORAL at 20:42

## 2025-04-24 RX ADMIN — NAPROXEN 500 MG: 250 TABLET ORAL at 17:42

## 2025-04-24 RX ADMIN — BUPROPION HYDROCHLORIDE 150 MG: 150 TABLET, FILM COATED, EXTENDED RELEASE ORAL at 09:38

## 2025-04-24 RX ADMIN — GABAPENTIN 200 MG: 100 CAPSULE ORAL at 09:38

## 2025-04-24 RX ADMIN — Medication 15 G: at 09:38

## 2025-04-24 RX ADMIN — SODIUM CHLORIDE TAB 1 GM 2 G: 1 TAB at 11:58

## 2025-04-24 RX ADMIN — GABAPENTIN 200 MG: 100 CAPSULE ORAL at 17:42

## 2025-04-24 RX ADMIN — NYSTATIN 500000 UNITS: 100000 SUSPENSION ORAL at 09:38

## 2025-04-24 RX ADMIN — BUPROPION HYDROCHLORIDE 150 MG: 150 TABLET, FILM COATED, EXTENDED RELEASE ORAL at 20:41

## 2025-04-24 RX ADMIN — Medication 10 ML: at 20:43

## 2025-04-24 RX ADMIN — SODIUM CHLORIDE TAB 1 GM 2 G: 1 TAB at 09:38

## 2025-04-24 RX ADMIN — NAPROXEN 500 MG: 250 TABLET ORAL at 09:39

## 2025-04-24 RX ADMIN — Medication 10 ML: at 09:38

## 2025-04-24 RX ADMIN — Medication 15 G: at 20:42

## 2025-04-24 RX ADMIN — FAMOTIDINE 40 MG: 20 TABLET, FILM COATED ORAL at 20:41

## 2025-04-24 RX ADMIN — SODIUM CHLORIDE 1000 ML: 9 INJECTION, SOLUTION INTRAVENOUS at 17:45

## 2025-04-24 RX ADMIN — OXYCODONE AND ACETAMINOPHEN 1 TABLET: 10; 325 TABLET ORAL at 11:58

## 2025-04-24 RX ADMIN — FAMOTIDINE 40 MG: 20 TABLET, FILM COATED ORAL at 09:38

## 2025-04-24 NOTE — PLAN OF CARE
Goal Outcome Evaluation:              Outcome Evaluation: Pt presents with deficits in mobility and endurance. She requires contact guard for ambulation and would benefit from skilled PT services to address these limitations.    Anticipated Discharge Disposition (PT): home with home health

## 2025-04-24 NOTE — PROGRESS NOTES
[facility]   Progress Note    Patient Name: Lluvia Archer  : 1965  MRN: [id number]  Primary Care Physician:  Aleksandar Edge DO  Date of admission: 2025    Subjective   Subjective     I have examined this patient at bedside this morning. They report feeling well. No acute events overnight. Patient is having bowel movements and is urinating. She does describe having multiple loose stools overnight. Patient is tolerating diet. I discussed the case with the patient's RN.        Review of Systems   Constitutional:  Negative for chills, diaphoresis, fatigue and fever.   HENT:  Negative for facial swelling, rhinorrhea, sneezing, sore throat, trouble swallowing and voice change.    Eyes:  Negative for photophobia, redness and visual disturbance.   Respiratory:  Negative for cough, shortness of breath and stridor.    Cardiovascular:  Negative for chest pain, palpitations and leg swelling.   Gastrointestinal:  Positive for diarrhea. Negative for abdominal pain, anal bleeding, blood in stool, constipation, nausea and vomiting.   Endocrine: Negative for polyuria.   Genitourinary:  Negative for difficulty urinating, dysuria, frequency, hematuria and urgency.   Musculoskeletal:  Negative for arthralgias, back pain, joint swelling, myalgias and neck stiffness.   Skin:  Negative for rash and wound.   Allergic/Immunologic: Negative for immunocompromised state.   Neurological:  Negative for seizures, syncope, facial asymmetry, speech difficulty, weakness, light-headedness, numbness and headaches.   Hematological:  Does not bruise/bleed easily.   Psychiatric/Behavioral:  Negative for agitation, confusion and hallucinations.        Objective   Objective     Vitals:   Temp:  [97.5 °F (36.4 °C)-98.8 °F (37.1 °C)] 98.8 °F (37.1 °C)  Heart Rate:  [80-91] 88  Resp:  [16] 16  BP: ()/(59-65) 85/59  Flow (L/min) (Oxygen Therapy):  [3.5] 3.5    Physical Exam  Constitutional:       General: She is not in  acute distress.     Appearance: Normal appearance. She is normal weight. She is ill-appearing. She is not toxic-appearing or diaphoretic.   HENT:      Head: Normocephalic and atraumatic.      Nose: No rhinorrhea.      Mouth/Throat:      Mouth: Mucous membranes are moist.      Pharynx: Oropharynx is clear.   Eyes:      General: No scleral icterus.        Right eye: No discharge.         Left eye: No discharge.      Extraocular Movements: Extraocular movements intact.      Conjunctiva/sclera: Conjunctivae normal.   Cardiovascular:      Rate and Rhythm: Normal rate and regular rhythm.      Heart sounds: No murmur heard.  Pulmonary:      Effort: Pulmonary effort is normal. No respiratory distress.      Breath sounds: Normal breath sounds. No wheezing, rhonchi or rales.   Abdominal:      General: Abdomen is flat. There is no distension.      Palpations: Abdomen is soft.      Tenderness: There is no abdominal tenderness. There is no guarding.   Musculoskeletal:         General: No signs of injury.      Cervical back: No rigidity or tenderness.      Right lower leg: No edema.      Left lower leg: No edema.   Skin:     General: Skin is warm and dry.      Coloration: Skin is not jaundiced or pale.   Neurological:      General: No focal deficit present.      Mental Status: She is alert and oriented to person, place, and time. Mental status is at baseline.      Cranial Nerves: No cranial nerve deficit.      Motor: No weakness.   Psychiatric:         Mood and Affect: Mood normal.         Thought Content: Thought content normal.          Result Review    Result Review:  I have personally reviewed the results from the time of this admission to 4/24/2025 16:45 EDT and agree with these findings:  [x]  Laboratory list / accordion  [x]  Microbiology  []  Radiology  []  EKG/Telemetry   []  Cardiology/Vascular   []  Pathology  [x]  Old records  []  Other:        Assessment & Plan   Assessment / Plan     Brief Patient Summary:  Lluvia TAPIA  Gianni is a 59 y.o. female who was sent to the emergency room by her oncologist for concerns of abnormal lab work.  In emergency room, the patient was noted to have an acute hyponatremia and was mildly altered.  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  Her Na started to improve but patient developed diarrhea.     Active Hospital Problems:  Active Hospital Problems    Diagnosis     **Hyponatremia         Acute hyponatremia - improving  -Mentation is at baseline now and AOX4  -Sodium 130 this AM, trend Na  -sodium tablets and Urena started by nephrology  -Following nephrology, appreciate recs     Diarrhea  -Check C Diff, stool cultures  -Check blood cultures  -Zosyn  -Will start po vanc if     Pancytopenia  -This was discussed by the admitting physician and the patient's oncologist.  There was no indication to give Neupogen or broad-spectrum antibiotics at the time of admission  -she has since developed diarrhea  -Will initiate abx and reach out to her oncologist for consult if cell lines worsen    Hypocalcemia  -Repleted     Hypotension  -IV fluids been given, blood pressure is borderline  -Lactic acid has been wnl     Candidiasis  -Cont nystatin     COPD  -No AE  -cont home trelegy interchange     HTN  -Hold home po meds, bp has been borderline     HLD  -Cont home lipitor     Depression/anxiety  -Continue home BuSpar, as needed Klonopin, as needed Atarax      Small cell lung cancer  -her oncologist is aware of her current admission     GERD  -Continue home Pepcid     Peripheral neuropathy  -Continue gabapentin     VTE ppx: subq lovenox     Stress Ulcer ppx: home pepcid     Estimated Medical Readiness Discharge Date:  04/25/25     Anticipated Disposition: home     Discharge Milestones:  improvement in symptoms and labs        CODE STATUS:    Code Status (Patient has no pulse and is not breathing): CPR (Attempt to Resuscitate)  Medical Interventions  (Patient has pulse or is breathing): Full Support  Level Of Support Discussed With: Patient      King Westbrook MD

## 2025-04-24 NOTE — THERAPY EVALUATION
Acute Care - Physical Therapy Initial Evaluation   [place]     Patient Name: Lluvia Archer  : 1965  MRN: [id number]  Today's Date: 2025      Visit Dx:     ICD-10-CM ICD-9-CM   1. Hyponatremia  E87.1 276.1   2. Generalized weakness  R53.1 780.79   3. Malignant neoplasm of lung, unspecified laterality, unspecified part of lung  C34.90 162.9   4. Pancytopenia due to chemotherapy  D61.810 284.11   5. Difficulty walking  R26.2 719.7     Patient Active Problem List   Diagnosis    Abnormal mammogram    Anxiety    Arthritis    Chronic nausea    Chronic obstructive pulmonary disease (COPD)    Counseling on substance use and abuse    Esophageal reflux    Hernia, hiatal    Hyperlipidemia    Hypertension    Knee pain, right    Memory loss    Migraine    Moderate episode of recurrent major depressive disorder    Night sweats    Numbness    Sciatica of right side    Severe episode of recurrent major depressive disorder, without psychotic features    Shoulder pain, left    Other diseases of nasal cavity and sinuses    Sleep apnea, unspecified    Tobacco abuse    Strain of groin, right, subsequent encounter    Osteoarthritis of spine with radiculopathy, lumbar region    Chronic right-sided low back pain with right-sided sciatica    Aftercare following right hip joint replacement surgery    Primary osteoarthritis of right hip    Right hip pain    Sepsis, due to unspecified organism, unspecified whether acute organ dysfunction present    Severe malnutrition    Pneumonia of right lower lobe due to infectious organism    Hospital discharge follow-up    Other specified anemias    Encounter for screening mammogram for malignant neoplasm of breast    Multifocal pneumonia    Acute on chronic respiratory failure with hypoxia    Medicare annual wellness visit, subsequent    Community acquired pneumonia    Pneumonia due to infectious organism, unspecified laterality, unspecified part of lung    Mediastinal adenopathy     Small cell lung cancer    Tobacco abuse, in remission    Chronic respiratory failure with hypoxia    Lung nodules    Encounter for adjustment or management of vascular access device    Dehydration    Intractable nausea and vomiting    Pancytopenia due to chemotherapy    Thrombocytopenia    Cancer, metastatic to bone    CAP (community acquired pneumonia)    Encounter for long-term (current) use of high-risk medication    Secondary malignant neoplasm of brain    Hyperkalemia    Hyponatremia     Past Medical History:   Diagnosis Date    Abnormal mammogram     PT DENIES KNOWING OF THIS HX    Anxiety     Arthritis     R SHOULDER, R HIP, AND R LEG    Cancer     LUNG    Chronic nausea 01/15/2019    COPD (chronic obstructive pulmonary disease)     O2 3 LITERS NC CONT    Hernia, hiatal     Hyperlipidemia     Hypertension     Knee pain, right 2018    Memory loss     FORGETFULNESS ? ETIOLOGY    Migraine headache     Moderate episode of recurrent major depressive disorder 2017    Night sweats     Sciatica of right side 2017    Severe episode of recurrent major depressive disorder, without psychotic features 10/22/2019    Shingles     OUTBREAK 24 ON ANTIVIRAL , WHELPS NOTED NO SORES.    Shoulder pain, left 2018     Past Surgical History:   Procedure Laterality Date    BRONCHOSCOPY N/A 2022    Procedure: BRONCHOSCOPY WITH BRONCHOALVEOLAR LAVAGE, BIOPSY;  Surgeon: Shin Mcdonald DO;  Location: [facility] ENDOSCOPY;  Service: Pulmonary;  Laterality: N/A;  RIGHT LOWER LOBE INFILTRATE    BRONCHOSCOPY N/A 2024    Procedure: BRONCHOSCOPY WITH ENDOBRONCHIAL ULTRASOUND AND FINE NEEDLE ASPIRATE;  Surgeon: Shin Mcdonald DO;  Location: [facility] ENDOSCOPY;  Service: Pulmonary;  Laterality: N/A;  MEDIASTINAL ADENOPATHY     SECTION      CHOLECYSTECTOMY      LAPAROSCOPIC    COLONOSCOPY      PORTACATH PLACEMENT Left 2024    Procedure: INSERTION OF PORTACATH;  Surgeon:  Josh Patton MD;  [place] [facility] [place] [facility]  Service: General;  Laterality: Left;    TOTAL HIP ARTHROPLASTY Right 5/13/2022    Procedure: RIGHT TOTAL HIP ARTHROPLASTY;  Surgeon: Cecil Gomes MD;  Location: [facility] MAIN OR;  Service: Orthopedics;  Laterality: Right;     PT Assessment (Last 12 Hours)       PT Evaluation and Treatment       Row Name 04/24/25 1300          Physical Therapy Time and Intention    Subjective Information no complaints  -DP     Document Type evaluation  -DP     Mode of Treatment individual therapy;physical therapy  -DP     Patient Effort good  -DP     Symptoms Noted During/After Treatment shortness of breath  after ambulating/using bathroom  -DP       Row Name 04/24/25 1300          General Information    Patient Profile Reviewed yes  -DP     Patient Observations alert;cooperative;agree to therapy  -DP     Prior Level of Function independent:;all household mobility  -DP     Equipment Currently Used at Home oxygen  4LNC  -DP     Existing Precautions/Restrictions fall;oxygen therapy device and L/min  -DP       Row Name 04/24/25 1300          Living Environment    Current Living Arrangements home  -DP     People in Home spouse  -DP     Primary Care Provided by self  -DP       Row Name 04/24/25 1300          Home Use of Assistive/Adaptive Equipment    Equipment Currently Used at Home oxygen  -DP       Row Name 04/24/25 1300          Cognition    Orientation Status (Cognition) oriented x 3  -DP       Row Name 04/24/25 1300          Range of Motion (ROM)    Range of Motion ROM is WFL  -DP       Row Name 04/24/25 1300          Strength (Manual Muscle Testing)    Strength (Manual Muscle Testing) bilateral lower extremities  4-/5  -DP       Row Name 04/24/25 1300          Bed Mobility    Bed Mobility supine-sit;sit-supine  -DP     Supine-Sit [place] (Bed Mobility) standby assist  -DP     Sit-Supine [place] (Bed Mobility) standby assist  -DP       Row Name 04/24/25 1300           Transfers    Transfers sit-stand transfer;stand-sit transfer  -DP       Row Name 04/24/25 1300          Sit-Stand Transfer    Sit-Stand [place] (Transfers) standby assist  -DP     Assistive Device (Sit-Stand Transfers) walker, front-wheeled  -DP       Row Name 04/24/25 1300          Stand-Sit Transfer    Stand-Sit [place] (Transfers) standby assist  -DP     Assistive Device (Stand-Sit Transfers) walker, front-wheeled  -DP       Row Name 04/24/25 1300          Gait/Stairs (Locomotion)    Gait/Stairs Locomotion gait/ambulation assistive device  -DP     [place] Level (Gait) contact guard  -DP     Assistive Device (Gait) walker, front-wheeled  -DP     Patient was able to Ambulate yes  -DP     Distance in Feet (Gait) 30  -DP       Row Name 04/24/25 1300          Balance    Balance Assessment standing dynamic balance  -DP     Dynamic Standing Balance contact guard  -DP     Position/Device Used, Standing Balance walker, front-wheeled  -DP       Row Name 04/24/25 1300          Plan of Care Review    Outcome Evaluation Pt presents with deficits in mobility and endurance. She requires contact guard for ambulation and would benefit from skilled PT services to address these limitations.  -DP       Row Name 04/24/25 1300          Therapy Assessment/Plan (PT)    Rehab Potential (PT) good  -DP     Criteria for Skilled Interventions Met (PT) yes;meets criteria  -DP     Therapy Frequency (PT) daily  -DP     Predicted Duration of Therapy Intervention (PT) 10 days  -DP     Problem List (PT) problems related to;mobility;other (see comments)  endurance  -DP     Activity Limitations Related to Problem List (PT) unable to ambulate safely  -DP       Row Name 04/24/25 1300          PT Evaluation Complexity    History, PT Evaluation Complexity no personal factors and/or comorbidities  -DP     Examination of Body Systems (PT Eval Complexity) total of 4 or more elements  -DP     Clinical Presentation (PT Evaluation Complexity)  stable  -DP     Clinical Decision Making (PT Evaluation Complexity) low complexity  -DP     Overall Complexity (PT Evaluation Complexity) low complexity  -DP       Row Name 04/24/25 1300          Physical Therapy Goals    Gait Training Goal Selection (PT) gait training, PT goal 1  -DP       Row Name 04/24/25 1300          Gait Training Goal 1 (PT)    Activity/Assistive Device (Gait Training Goal 1, PT) gait (walking locomotion);assistive device use;walker, rolling  -DP     [place] Level (Gait Training Goal 1, PT) independent  -DP     Distance (Gait Training Goal 1, PT) 200 ft  -DP     Time Frame (Gait Training Goal 1, PT) 10 days  -DP               User Key  (r) = Recorded By, (t) = Taken By, (c) = Cosigned By      Initials Name Provider Type    Ryanne George, PT Physical Therapist                      PT Recommendation and Plan  Anticipated Discharge Disposition (PT): home with home health  Planned Therapy Interventions (PT): balance training, bed mobility training, strengthening, transfer training  Therapy Frequency (PT): daily  Outcome Evaluation: Pt presents with deficits in mobility and endurance. She requires contact guard for ambulation and would benefit from skilled PT services to address these limitations.   Outcome Measures       Row Name 04/24/25 1300             How much help from another person do you currently need...    Turning from your back to your side while in flat bed without using bedrails? 4  -DP      Moving from lying on back to sitting on the side of a flat bed without bedrails? 4  -DP      Moving to and from a bed to a chair (including a wheelchair)? 4  -DP      Standing up from a chair using your arms (e.g., wheelchair, bedside chair)? 4  -DP      Climbing 3-5 steps with a railing? 3  -DP      To walk in hospital room? 3  -DP      AM-PAC 6 Clicks Score (PT) 22  -DP                User Key  (r) = Recorded By, (t) = Taken By, (c) = Cosigned By      Initials Name Provider Type    DP  Ryanne Osborne, PT Physical Therapist                     Time Calculation:    PT Charges       Row Name 04/24/25 1317             Time Calculation    PT Received On 04/24/25  -DP      PT Goal Re-Cert Due Date 05/03/25  -DP         Untimed Charges    PT Eval/Re-eval Minutes 25  -DP         Total Minutes    Untimed Charges Total Minutes 25  -DP       Total Minutes 25  -DP                User Key  (r) = Recorded By, (t) = Taken By, (c) = Cosigned By      Initials Name Provider Type    DP Ryanne Osborne, PT Physical Therapist                      PT G-Codes  AM-PAC 6 Clicks Score (PT): 22    Ryanne Osborne, PT  4/24/2025

## 2025-04-24 NOTE — PROGRESS NOTES
[facility]     Progress Note    Patient Name: Lluvia Archer  : 1965  MRN: [id number]  Primary Care Physician:  Aleksandar Edge DO  Date of admission: 2025    Subjective   Patient sodium levels are better  Continues to be on 4 L of oxygen which probably can be weaned off  Blood pressures noted to be soft  Continues to be pancytopenic and neutropenic      Scheduled Meds:atorvastatin, 10 mg, Oral, Nightly  budesonide-formoterol, 2 puff, Inhalation, BID - RT   And  revefenacin, 175 mcg, Nebulization, Daily - RT  buPROPion SR, 150 mg, Oral, BID  calcium carbonate, 2 tablet, Oral, Daily  enoxaparin sodium, 40 mg, Subcutaneous, Daily  [Held by provider] escitalopram, 20 mg, Oral, Daily  famotidine, 40 mg, Oral, BID  gabapentin, 200 mg, Oral, TID  [Held by provider] lisinopril, 5 mg, Oral, Daily  [Held by provider] metoprolol tartrate, 12.5 mg, Oral, BID  naproxen, 500 mg, Oral, BID With Meals  nystatin, 5 mL, Swish & Swallow, 4x Daily  sodium chloride, 10 mL, Intravenous, Q12H  sodium chloride, 2 g, Oral, TID With Meals  Urea, 30 g, Oral, BID      Continuous Infusions:   PRN Meds:.  albuterol    albuterol sulfate HFA    senna-docusate sodium **AND** polyethylene glycol **AND** bisacodyl **AND** bisacodyl    clonazePAM    hydrOXYzine    [Held by provider] LORazepam    melatonin    ondansetron ODT **OR** ondansetron    oxyCODONE-acetaminophen    phenol    prochlorperazine    sodium chloride    sodium chloride       Review of Systems  Constitutional:        Weakness tiredness fatigue  Eyes:                       No blurry vision, eye discharge, eye irritation, eye pain  HEENT:                   No acute hair loss, earache and discharge, nasal congestion or discharge, sore throat, postnasal drip  Respiratory:           No shortness of breath coughing sputum production wheezing hemoptysis pleuritic chest pain  Cardiovascular:     No chest pain, orthopnea, PND, dizziness, palpitation, lower  extremity edema  Gastrointestinal:   No nausea vomiting diarrhea abdominal pain constipation  Genitourinary:       No urinary incontinence, hesitancy, frequency, urgency, dysuria  Hematologic:         No bruising, bleeding, pallor, lymphadenopathy  Endocrine:            No coldness, hot flashes, polyuria, abnormal hair growth  Musculoskeletal:    No body pains, aches, arthritic pains, muscle pain ,muscle wasting  Psychiatric:          No low or high mood, anxiety, hallucinations, delusions  Skin.                      No rash, ulcers, bruising, itching  Neurological:        No confusion, headache, focal weakness, numbness, dysphasia    Objective   Objective     Vitals:   Temp:  [97.5 °F (36.4 °C)-99 °F (37.2 °C)] 97.9 °F (36.6 °C)  Heart Rate:  [80-97] 85  Resp:  [16] 16  BP: ()/(59-65) 102/59  Flow (L/min) (Oxygen Therapy):  [3.5] 3.5  Physical Exam    Constitutional: Awake, alert responsive, conversant, no obvious distress              Psychiatric:  Appropriate affect, cooperative   Neurologic:  Awake alert ,oriented x 3, strength symmetric in all extremities, Cranial Nerves grossly intact to confrontation, speech clear   Eyes:   PERRLA, sclerae anicteric, no conjunctival injection   HEENT:  Moist mucous membranes, no nasal or eye discharge, no throat congestion   Neck:   Supple, no thyromegaly, no lymphadenopathy, trachea midline, no elevated JVD   Respiratory:  Clear to auscultation bilaterally, nonlabored respirations    Cardiovascular: RRR, no murmurs, rubs, or gallops, palpable pedal pulses bilaterally, No bilateral ankle edema   Gastrointestinal: Positive bowel sounds, soft, nontender, nondistended, no organomegaly   Musculoskeletal:  No clubbing or cyanosis to extremities,muscle wasting, joint swelling, muscle weakness             Skin:                      No rashes, bruising, skin ulcers, petechiae or ecchymosis    Result Review    Result Review:  I have personally reviewed the results from the time  of this admission to 4/24/2025 08:40 EDT and agree with these findings:  []  Laboratory  []  Microbiology  []  Radiology  []  EKG/Telemetry   []  Cardiology/Vascular   []  Pathology  []  Old records  []  Other:    Assessment & Plan   Assessment / Plan       Active Hospital Problems:  Active Hospital Problems    Diagnosis     **Hyponatremia      59-year-old female with past medical history of small cell lung cancer, COPD, hypertension, osteoarthritis, anxiety/depression, hyperlipidemia who was sent to the ER due to abnormal labs noted to have a sodium of 122-124 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  Patient is also on SSRIs urine osmolarity noted to be elevated and urine sodium at 30     Plan:   Will start salt tabs 2 g 3 times daily  Decrease urea to 15 g twice daily  Fluid restriction 1500 daily  Patient is severely pancytopenic and may need bone marrow stimulating agents.  Currently plan to continue to monitor      Thank you for involving me in the care of the patient.  Will continue to follow along    Electronically signed by Liliam Hernandez MD, 04/24/25, 8:40 AM EDT.

## 2025-04-24 NOTE — PLAN OF CARE
Goal Outcome Evaluation:           Progress: no change  Outcome Evaluation: Alert and oriented X4. Awake most of the night. C/o pain to back. Remains on 4L NC. No overnight concerns or events to report. Will continue care per POC

## 2025-04-24 NOTE — PLAN OF CARE
Goal Outcome Evaluation:  Plan of Care Reviewed With: patient        Progress: no change  Outcome Evaluation: Patient is AO x4, able to make needs known. Patient is currently on 3.5L NC, no signs of respiratory distress noted. Patient pain treated per MAR. IVF bolus 1L currently infusing. Patient complained of multiple liquid bowel movements during shift, MD notified and aware. Patient placed in contact/spore isolation, stool samples ordered. No complaints at this time. No acute changes throughout shift. Continue with current plan of care.

## 2025-04-25 LAB
ANION GAP SERPL CALCULATED.3IONS-SCNC: 10.2 MMOL/L (ref 5–15)
BASOPHILS # BLD AUTO: 0 10*3/MM3 (ref 0–0.2)
BASOPHILS NFR BLD AUTO: 0 % (ref 0–1.5)
BUN SERPL-MCNC: 17 MG/DL (ref 6–20)
BUN/CREAT SERPL: 23.6 (ref 7–25)
CALCIUM SPEC-SCNC: 7 MG/DL (ref 8.6–10.5)
CHLORIDE SERPL-SCNC: 104 MMOL/L (ref 98–107)
CO2 SERPL-SCNC: 20.8 MMOL/L (ref 22–29)
CORTIS SERPL-MCNC: 11 MCG/DL
CREAT SERPL-MCNC: 0.72 MG/DL (ref 0.57–1)
DEPRECATED RDW RBC AUTO: 54 FL (ref 37–54)
EGFRCR SERPLBLD CKD-EPI 2021: 97.1 ML/MIN/1.73
EOSINOPHIL # BLD AUTO: 0.02 10*3/MM3 (ref 0–0.4)
EOSINOPHIL NFR BLD AUTO: 2.4 % (ref 0.3–6.2)
ERYTHROCYTE [DISTWIDTH] IN BLOOD BY AUTOMATED COUNT: 16.1 % (ref 12.3–15.4)
GLUCOSE SERPL-MCNC: 80 MG/DL (ref 65–99)
HCT VFR BLD AUTO: 21.7 % (ref 34–46.6)
HGB BLD-MCNC: 7.2 G/DL (ref 12–15.9)
IMM GRANULOCYTES # BLD AUTO: 0 10*3/MM3 (ref 0–0.05)
IMM GRANULOCYTES NFR BLD AUTO: 0 % (ref 0–0.5)
LYMPHOCYTES # BLD AUTO: 0.62 10*3/MM3 (ref 0.7–3.1)
LYMPHOCYTES NFR BLD AUTO: 72.9 % (ref 19.6–45.3)
MCH RBC QN AUTO: 30.9 PG (ref 26.6–33)
MCHC RBC AUTO-ENTMCNC: 33.2 G/DL (ref 31.5–35.7)
MCV RBC AUTO: 93.1 FL (ref 79–97)
MONOCYTES # BLD AUTO: 0.06 10*3/MM3 (ref 0.1–0.9)
MONOCYTES NFR BLD AUTO: 7.1 % (ref 5–12)
NEUTROPHILS NFR BLD AUTO: 0.15 10*3/MM3 (ref 1.7–7)
NEUTROPHILS NFR BLD AUTO: 17.6 % (ref 42.7–76)
NRBC BLD AUTO-RTO: 0 /100 WBC (ref 0–0.2)
PLATELET # BLD AUTO: 16 10*3/MM3 (ref 140–450)
PMV BLD AUTO: 10.3 FL (ref 6–12)
POTASSIUM SERPL-SCNC: 4.2 MMOL/L (ref 3.5–5.2)
RBC # BLD AUTO: 2.33 10*6/MM3 (ref 3.77–5.28)
SODIUM SERPL-SCNC: 135 MMOL/L (ref 136–145)
WBC NRBC COR # BLD AUTO: 0.85 10*3/MM3 (ref 3.4–10.8)

## 2025-04-25 PROCEDURE — 25810000003 SODIUM CHLORIDE 0.9 % SOLUTION: Performed by: INTERNAL MEDICINE

## 2025-04-25 PROCEDURE — 85025 COMPLETE CBC W/AUTO DIFF WBC: CPT | Performed by: HOSPITALIST

## 2025-04-25 PROCEDURE — 25010000002 ENOXAPARIN PER 10 MG: Performed by: INTERNAL MEDICINE

## 2025-04-25 PROCEDURE — 36415 COLL VENOUS BLD VENIPUNCTURE: CPT | Performed by: HOSPITALIST

## 2025-04-25 PROCEDURE — 82533 TOTAL CORTISOL: CPT | Performed by: STUDENT IN AN ORGANIZED HEALTH CARE EDUCATION/TRAINING PROGRAM

## 2025-04-25 PROCEDURE — 80048 BASIC METABOLIC PNL TOTAL CA: CPT | Performed by: HOSPITALIST

## 2025-04-25 PROCEDURE — 25010000002 PIPERACILLIN SOD-TAZOBACTAM PER 1 G: Performed by: INTERNAL MEDICINE

## 2025-04-25 PROCEDURE — 99222 1ST HOSP IP/OBS MODERATE 55: CPT | Performed by: INTERNAL MEDICINE

## 2025-04-25 PROCEDURE — 99232 SBSQ HOSP IP/OBS MODERATE 35: CPT | Performed by: INTERNAL MEDICINE

## 2025-04-25 PROCEDURE — 25010000002 FILGRASTIM PER 480 MCG: Performed by: INTERNAL MEDICINE

## 2025-04-25 RX ORDER — SODIUM CHLORIDE 1 G/1
1 TABLET ORAL
Status: DISCONTINUED | OUTPATIENT
Start: 2025-04-25 | End: 2025-04-30

## 2025-04-25 RX ORDER — DIPHENHYDRAMINE HYDROCHLORIDE AND LIDOCAINE HYDROCHLORIDE AND ALUMINUM HYDROXIDE AND MAGNESIUM HYDRO
10 KIT EVERY 6 HOURS
Status: DISPENSED | OUTPATIENT
Start: 2025-04-25 | End: 2025-04-30

## 2025-04-25 RX ADMIN — OXYCODONE AND ACETAMINOPHEN 1 TABLET: 10; 325 TABLET ORAL at 06:31

## 2025-04-25 RX ADMIN — FAMOTIDINE 40 MG: 20 TABLET, FILM COATED ORAL at 08:48

## 2025-04-25 RX ADMIN — SODIUM CHLORIDE 1000 ML: 9 INJECTION, SOLUTION INTRAVENOUS at 07:52

## 2025-04-25 RX ADMIN — PIPERACILLIN AND TAZOBACTAM 3.38 G: 3; .375 INJECTION, POWDER, FOR SOLUTION INTRAVENOUS at 06:31

## 2025-04-25 RX ADMIN — BUPROPION HYDROCHLORIDE 150 MG: 150 TABLET, FILM COATED, EXTENDED RELEASE ORAL at 08:48

## 2025-04-25 RX ADMIN — SODIUM CHLORIDE TAB 1 GM 1 G: 1 TAB at 08:49

## 2025-04-25 RX ADMIN — GABAPENTIN 200 MG: 100 CAPSULE ORAL at 15:32

## 2025-04-25 RX ADMIN — ATORVASTATIN CALCIUM 10 MG: 10 TABLET, FILM COATED ORAL at 20:12

## 2025-04-25 RX ADMIN — OXYCODONE AND ACETAMINOPHEN 1 TABLET: 10; 325 TABLET ORAL at 20:23

## 2025-04-25 RX ADMIN — FAMOTIDINE 40 MG: 20 TABLET, FILM COATED ORAL at 20:12

## 2025-04-25 RX ADMIN — DIPHENHYDRAMINE HYDROCHLORIDE AND LIDOCAINE HYDROCHLORIDE AND ALUMINUM HYDROXIDE AND MAGNESIUM HYDRO 10 ML: KIT at 22:16

## 2025-04-25 RX ADMIN — GABAPENTIN 200 MG: 100 CAPSULE ORAL at 08:49

## 2025-04-25 RX ADMIN — NYSTATIN 500000 UNITS: 100000 SUSPENSION ORAL at 08:48

## 2025-04-25 RX ADMIN — ENOXAPARIN SODIUM 40 MG: 40 INJECTION SUBCUTANEOUS at 08:49

## 2025-04-25 RX ADMIN — GABAPENTIN 200 MG: 100 CAPSULE ORAL at 20:12

## 2025-04-25 RX ADMIN — SODIUM CHLORIDE TAB 1 GM 1 G: 1 TAB at 11:43

## 2025-04-25 RX ADMIN — PIPERACILLIN AND TAZOBACTAM 3.38 G: 3; .375 INJECTION, POWDER, FOR SOLUTION INTRAVENOUS at 22:16

## 2025-04-25 RX ADMIN — PIPERACILLIN AND TAZOBACTAM 3.38 G: 3; .375 INJECTION, POWDER, FOR SOLUTION INTRAVENOUS at 14:11

## 2025-04-25 RX ADMIN — NAPROXEN 500 MG: 250 TABLET ORAL at 08:49

## 2025-04-25 RX ADMIN — SODIUM CHLORIDE TAB 1 GM 1 G: 1 TAB at 17:58

## 2025-04-25 RX ADMIN — PIPERACILLIN AND TAZOBACTAM 3.38 G: 3; .375 INJECTION, POWDER, FOR SOLUTION INTRAVENOUS at 00:35

## 2025-04-25 RX ADMIN — Medication 10 ML: at 20:12

## 2025-04-25 RX ADMIN — Medication 10 ML: at 08:49

## 2025-04-25 RX ADMIN — DIPHENHYDRAMINE HYDROCHLORIDE AND LIDOCAINE HYDROCHLORIDE AND ALUMINUM HYDROXIDE AND MAGNESIUM HYDRO 10 ML: KIT at 15:32

## 2025-04-25 RX ADMIN — CALCIUM CARBONATE 2 TABLET: 500 TABLET, CHEWABLE ORAL at 08:49

## 2025-04-25 RX ADMIN — BUPROPION HYDROCHLORIDE 150 MG: 150 TABLET, FILM COATED, EXTENDED RELEASE ORAL at 20:12

## 2025-04-25 RX ADMIN — NAPROXEN 500 MG: 250 TABLET ORAL at 17:58

## 2025-04-25 RX ADMIN — FILGRASTIM 480 MCG: 480 INJECTION, SOLUTION INTRAVENOUS; SUBCUTANEOUS at 14:11

## 2025-04-25 NOTE — PLAN OF CARE
Goal Outcome Evaluation:  Plan of Care Reviewed With: patient        Progress: improving  Outcome Evaluation: Patient alert and oriented X4. No overnight concerns or events to report. BP remains soft with SBP <100. IV abt given per MD order and tolerated well. 1 episode of loose stool. Unable retrieve sample r/t incontinent episode in bed. C/o pain to Bilateral knees resolved with PRN pain meds. MD notified of critical labs. Patient hopeful to d/c home soon. Will continue care per MD order

## 2025-04-25 NOTE — PROGRESS NOTES
[facility]   Progress Note    Patient Name: Lluvia Archer  : 1965  MRN: [id number]  Primary Care Physician:  Aleksandar Edge DO  Date of admission: 2025    Subjective   Subjective         Review of Systems    Objective   Objective     Vitals:   Temp:  [97.3 °F (36.3 °C)-98.2 °F (36.8 °C)] 97.3 °F (36.3 °C)  Heart Rate:  [74-86] 81  Resp:  [16] 16  BP: ()/(41-65) 96/64  Flow (L/min) (Oxygen Therapy):  [3.5] 3.5    Physical Exam     Result Review    Result Review:  I have personally reviewed the results from the time of this admission to 2025 17:41 EDT and agree with these findings:  [x]  Laboratory list / accordion  [x]  Microbiology  []  Radiology  []  EKG/Telemetry   []  Cardiology/Vascular   []  Pathology  []  Old records  []  Other:        Assessment & Plan   Assessment / Plan     Brief Patient Summary:  Lluvia Archer is a 59 y.o. female who was sent to the emergency room by her oncologist for concerns of abnormal lab work.  In emergency room, the patient was noted to have an acute hyponatremia and was mildly altered.  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  Her Na started to improve but patient developed diarrhea.     Active Hospital Problems:  Active Hospital Problems    Diagnosis     **Hyponatremia           Diarrhea  -Check C Diff, stool cultures  -Check blood cultures  -Zosyn  -Will start po vanc if c diff positive     Pancytopenia  -This was discussed by the admitting physician and the patient's oncologist.  There was no indication to give Neupogen or broad-spectrum antibiotics at the time of admission  -she has since developed diarrhea  -Zosyn, magic mouthwash and nystatin  -Oncology has started neupogen     Hypocalcemia  -Repleted     Hypotension  -IV fluids been given, blood pressure is borderline  -Lactic acid has been wnl     Acute hyponatremia - resolved  -Mentation is at baseline now and  AOX4  -Sodium 130 this AM, trend Na  -sodium tablets and Urena by nephrology  -Following nephrology, appreciate recs    Candidiasis  -Cont nystatin     COPD  -No AE  -cont home trelegy interchange     HTN  -Hold home po meds, bp has been borderline     HLD  -Cont home lipitor     Depression/anxiety  -Continue home BuSpar, as needed Klonopin, as needed Atarax      Small cell lung cancer  -her oncologist is aware of her current admission     GERD  -Continue home Pepcid     Peripheral neuropathy  -Continue gabapentin           VTE ppx: subq lovenox     Stress Ulcer ppx: home pepcid     Estimated Medical Readiness Discharge Date:  04/28/25     Anticipated Disposition: home     Discharge Milestones:  improvement in symptoms and labs          CODE STATUS:    Code Status (Patient has no pulse and is not breathing): CPR (Attempt to Resuscitate)  Medical Interventions (Patient has pulse or is breathing): Full Support  Level Of Support Discussed With: Patient      King Westbrook MD

## 2025-04-25 NOTE — CONSULTS
Reason For Consult  Small cell lung cancer    Subjective    History Of Present Illness  Lluvia is a 59 y.o. female with small cell lung cancer currently on treatment with topotecan was sent to the ER from clinic with hyponatremia.  She was seen in ER for oral thrush and was sent home with antifungals a day prior.  She was lethargic on presentation, afebrile, tachycardic and was hypotensive.  Chest x-ray showed a small left-sided pleural effusion and mild left basilar atelectasis.  Sodium level was 121, pancytopenia was noted with hemoglobin 9.5, WBC 0.78 and platelets 70 5K.  She has oral mucositis and oral thrush.  She denied any nausea after chemotherapy but was increasingly fatigued.  Her last cycle of topotecan was completed on 2025.  CT chest done on 2025 showed progression of metastatic disease with enlarging pulmonary nodules and new enlarged mediastinal/hilar lymph nodes, but last comparison imaging was from 2024 and her current line of treatment was ordered started in 2025.    ROS: Negative except for ones mentioned in HPI.    Past Medical History  She  has a past medical history of Abnormal mammogram, Anxiety, Arthritis, Cancer, Chronic nausea (01/15/2019), COPD (chronic obstructive pulmonary disease), Hernia, hiatal, Hyperlipidemia, Hypertension, Knee pain, right (2018), Memory loss, Migraine headache, Moderate episode of recurrent major depressive disorder (2017), Night sweats, Sciatica of right side (2017), Severe episode of recurrent major depressive disorder, without psychotic features (10/22/2019), Shingles, and Shoulder pain, left (2018).     Surgical History  She  has a past surgical history that includes  section; Cholecystectomy; Colonoscopy; Bronchoscopy (N/A, 2022); Total hip arthroplasty (Right, 2022); Bronchoscopy (N/A, 2024); and Portacath placement (Left, 2024).     Social History  She  reports that she has been  smoking cigarettes. She started smoking about 47 years ago. She has a 47.3 pack-year smoking history. She has been exposed to tobacco smoke. She has never used smokeless tobacco. She reports that she does not drink alcohol and does not use drugs.     Allergies  Patient has no known allergies.      Objective   Vitals:    04/25/25 1510   BP: 96/64   Pulse: 81   Resp: 16   Temp: 97.3 °F (36.3 °C)   SpO2: 100%     Physical Exam  Vitals reviewed.   Constitutional:       Appearance: She is ill-appearing.   HENT:      Head: Normocephalic.      Nose: Nose normal.      Mouth/Throat:      Mouth: Mucous membranes are moist.   Eyes:      General: No scleral icterus.     Conjunctiva/sclera: Conjunctivae normal.   Cardiovascular:      Rate and Rhythm: Normal rate.      Pulses: Normal pulses.   Pulmonary:      Effort: Pulmonary effort is normal.      Breath sounds: Normal breath sounds.   Musculoskeletal:      Cervical back: Normal range of motion.   Skin:     General: Skin is warm.   Neurological:      Mental Status: She is alert. Mental status is at baseline.   Psychiatric:         Mood and Affect: Mood normal.         Behavior: Behavior normal.          Labs, Imaging, Pathology:  CBC          4/23/2025    03:52 4/24/2025    03:58 4/25/2025    05:12   CBC   WBC 0.93  0.89  0.85    RBC 2.54  2.41  2.33    Hemoglobin 7.8  7.5  7.2    Hematocrit 22.9  22.1  21.7    MCV 90.2  91.7  93.1    MCH 30.7  31.1  30.9    MCHC 34.1  33.9  33.2    RDW 15.4  15.8  16.1    Platelets 58  32  16      CMP          4/23/2025    03:52 4/23/2025    07:49 4/23/2025    11:50 4/23/2025    15:38 4/23/2025    19:57 4/23/2025    23:49 4/24/2025    03:58 4/24/2025    07:54 4/25/2025    05:12   CMP   Glucose 86       86   80    BUN 8       15   17    Creatinine 0.61       0.68   0.72    EGFR 103.8       101.1   97.1    Sodium 124  124  124  127  130  130  130  131  135    Potassium 3.3       4.4   4.2    Chloride 92       102   104    Calcium 7.0       7.4    7.0    BUN/Creatinine Ratio 13.1       22.1   23.6    Anion Gap 11.1       7.3   10.2          Assessment & Plan   Small cell lung cancer  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  Continue transfusion support for hemoglobin less than 7 or symptomatic anemia and platelets less than 20K or bleeding manifestations.  - Continue neutropenic precautions.  - Will start Magic mouthwash in addition to nystatin with oral mucositis.  Antinausea medications as needed.  - Discussed with patient that we will hold chemotherapy until complete recovery and will possibly need dose reduction/switching treatment.  She does have other treatment options for small cell lung cancer including lurbinectedin and Tarlatamab. To follow up with primary oncologist Dr. Dickson after discharge.     I reviewed recent lab, imaging, and pathology results as available this visit and interpreted independently and discussed with the patient.      Aasems Jacob, MD  Hematology/Oncology

## 2025-04-25 NOTE — PROGRESS NOTES
[facility]     Progress Note    Patient Name: Lluvia Archer  : 1965  MRN: [id number]  Primary Care Physician:  Aleksandar Edge DO  Date of admission: 2025    Subjective   Patient sodium levels stable  Continues to have pancytopenia  Had episodes of hypotension  No major acute events otherwise overnight    Scheduled Meds:atorvastatin, 10 mg, Oral, Nightly  budesonide-formoterol, 2 puff, Inhalation, BID - RT   And  revefenacin, 175 mcg, Nebulization, Daily - RT  buPROPion SR, 150 mg, Oral, BID  calcium carbonate, 2 tablet, Oral, Daily  enoxaparin sodium, 40 mg, Subcutaneous, Daily  [Held by provider] escitalopram, 20 mg, Oral, Daily  famotidine, 40 mg, Oral, BID  gabapentin, 200 mg, Oral, TID  [Held by provider] lisinopril, 5 mg, Oral, Daily  [Held by provider] metoprolol tartrate, 12.5 mg, Oral, BID  naproxen, 500 mg, Oral, BID With Meals  nystatin, 5 mL, Swish & Swallow, 4x Daily  piperacillin-tazobactam, 3.375 g, Intravenous, Q8H  sodium chloride, 1,000 mL, Intravenous, Once  sodium chloride, 10 mL, Intravenous, Q12H  sodium chloride, 1 g, Oral, TID With Meals      Continuous Infusions:   PRN Meds:.  albuterol    albuterol sulfate HFA    senna-docusate sodium **AND** polyethylene glycol **AND** bisacodyl **AND** bisacodyl    clonazePAM    hydrOXYzine    [Held by provider] LORazepam    melatonin    ondansetron ODT **OR** ondansetron    oxyCODONE-acetaminophen    phenol    prochlorperazine    sodium chloride    sodium chloride       Review of Systems  Constitutional:        Weakness tiredness fatigue  Eyes:                       No blurry vision, eye discharge, eye irritation, eye pain  HEENT:                   No acute hair loss, earache and discharge, nasal congestion or discharge, sore throat, postnasal drip  Respiratory:           No shortness of breath coughing sputum production wheezing hemoptysis pleuritic chest pain  Cardiovascular:     No chest pain, orthopnea, PND, dizziness,  palpitation, lower extremity edema  Gastrointestinal:   No nausea vomiting diarrhea abdominal pain constipation  Genitourinary:       No urinary incontinence, hesitancy, frequency, urgency, dysuria  Hematologic:         No bruising, bleeding, pallor, lymphadenopathy  Endocrine:            No coldness, hot flashes, polyuria, abnormal hair growth  Musculoskeletal:    No body pains, aches, arthritic pains, muscle pain ,muscle wasting  Psychiatric:          No low or high mood, anxiety, hallucinations, delusions  Skin.                      No rash, ulcers, bruising, itching  Neurological:        No confusion, headache, focal weakness, numbness, dysphasia    Objective   Objective     Vitals:   Temp:  [97.5 °F (36.4 °C)-98.8 °F (37.1 °C)] 97.5 °F (36.4 °C)  Heart Rate:  [84-88] 86  Resp:  [16] 16  BP: ()/(50-64) 109/64  Flow (L/min) (Oxygen Therapy):  [3.5] 3.5  Physical Exam    Constitutional: Awake, alert responsive, conversant, no obvious distress              Psychiatric:  Appropriate affect, cooperative   Neurologic:  Awake alert ,oriented x 3, strength symmetric in all extremities, Cranial Nerves grossly intact to confrontation, speech clear   Eyes:   PERRLA, sclerae anicteric, no conjunctival injection   HEENT:  Moist mucous membranes, no nasal or eye discharge, no throat congestion   Neck:   Supple, no thyromegaly, no lymphadenopathy, trachea midline, no elevated JVD   Respiratory:  Clear to auscultation bilaterally, nonlabored respirations    Cardiovascular: RRR, no murmurs, rubs, or gallops, palpable pedal pulses bilaterally, No bilateral ankle edema   Gastrointestinal: Positive bowel sounds, soft, nontender, nondistended, no organomegaly   Musculoskeletal:  No clubbing or cyanosis to extremities,muscle wasting, joint swelling, muscle weakness             Skin:                      No rashes, bruising, skin ulcers, petechiae or ecchymosis    Result Review    Result Review:  I have personally reviewed the  results from the time of this admission to 4/25/2025 07:52 EDT and agree with these findings:  []  Laboratory  []  Microbiology  []  Radiology  []  EKG/Telemetry   []  Cardiology/Vascular   []  Pathology  []  Old records  []  Other:    Assessment & Plan   Assessment / Plan       Active Hospital Problems:  Active Hospital Problems    Diagnosis     **Hyponatremia      59-year-old female with past medical history of small cell lung cancer, COPD, hypertension, osteoarthritis, anxiety/depression, hyperlipidemia who was sent to the ER due to abnormal labs noted to have a sodium of 122-124 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  Patient is also on SSRIs urine osmolarity noted to be elevated and urine sodium at 30     Plan:   Will decrease salt tabs to 1 g 3 times daily  Discontinue urea  Patient is severely pancytopenic and may need bone marrow stimulating agents.  Currently plan to continue to monitor  Okay from renal standpoint to restart the patient on Lexapro  Cortisol levels ordered    Thank you for involving me in the care of the patient.  Will continue to follow along

## 2025-04-25 NOTE — PLAN OF CARE
Goal Outcome Evaluation:  Plan of Care Reviewed With: patient           Outcome Evaluation: pt alert and oriented. Bp managed per MD order. no episodes of loose stool on shift. will continue with pt plan of care.

## 2025-04-26 PROBLEM — I95.89 CHRONIC HYPOTENSION: Status: ACTIVE | Noted: 2025-04-26

## 2025-04-26 PROBLEM — T45.1X5A PANCYTOPENIA DUE TO ANTINEOPLASTIC CHEMOTHERAPY: Status: ACTIVE | Noted: 2024-08-01

## 2025-04-26 LAB
ABO GROUP BLD: NORMAL
ANION GAP SERPL CALCULATED.3IONS-SCNC: 9.6 MMOL/L (ref 5–15)
ANISOCYTOSIS BLD QL: NORMAL
BACTERIA SPEC AEROBE CULT: NORMAL
BACTERIA SPEC AEROBE CULT: NORMAL
BASOPHILS # BLD AUTO: 0 10*3/MM3 (ref 0–0.2)
BASOPHILS NFR BLD AUTO: 0 % (ref 0–1.5)
BLD GP AB SCN SERPL QL: NEGATIVE
BUN SERPL-MCNC: 10 MG/DL (ref 6–20)
BUN/CREAT SERPL: 14.3 (ref 7–25)
CALCIUM SPEC-SCNC: 6.7 MG/DL (ref 8.6–10.5)
CHLORIDE SERPL-SCNC: 108 MMOL/L (ref 98–107)
CO2 SERPL-SCNC: 21.4 MMOL/L (ref 22–29)
CORTIS SERPL-MCNC: 4.18 MCG/DL
CREAT SERPL-MCNC: 0.7 MG/DL (ref 0.57–1)
DEPRECATED RDW RBC AUTO: 55 FL (ref 37–54)
DEPRECATED RDW RBC AUTO: 55.8 FL (ref 37–54)
EGFRCR SERPLBLD CKD-EPI 2021: 100.4 ML/MIN/1.73
EOSINOPHIL # BLD AUTO: 0.04 10*3/MM3 (ref 0–0.4)
EOSINOPHIL NFR BLD AUTO: 5.1 % (ref 0.3–6.2)
ERYTHROCYTE [DISTWIDTH] IN BLOOD BY AUTOMATED COUNT: 16.3 % (ref 12.3–15.4)
ERYTHROCYTE [DISTWIDTH] IN BLOOD BY AUTOMATED COUNT: 16.4 % (ref 12.3–15.4)
GLUCOSE SERPL-MCNC: 73 MG/DL (ref 65–99)
HCT VFR BLD AUTO: 19.5 % (ref 34–46.6)
HCT VFR BLD AUTO: 20.5 % (ref 34–46.6)
HGB BLD-MCNC: 6.5 G/DL (ref 12–15.9)
HGB BLD-MCNC: 6.8 G/DL (ref 12–15.9)
HYPOCHROMIA BLD QL: NORMAL
IMM GRANULOCYTES # BLD AUTO: 0.01 10*3/MM3 (ref 0–0.05)
IMM GRANULOCYTES NFR BLD AUTO: 1.3 % (ref 0–0.5)
LYMPHOCYTES # BLD AUTO: 0.55 10*3/MM3 (ref 0.7–3.1)
LYMPHOCYTES NFR BLD AUTO: 69.6 % (ref 19.6–45.3)
MCH RBC QN AUTO: 30.9 PG (ref 26.6–33)
MCH RBC QN AUTO: 31.4 PG (ref 26.6–33)
MCHC RBC AUTO-ENTMCNC: 33.2 G/DL (ref 31.5–35.7)
MCHC RBC AUTO-ENTMCNC: 33.3 G/DL (ref 31.5–35.7)
MCV RBC AUTO: 93.2 FL (ref 79–97)
MCV RBC AUTO: 94.2 FL (ref 79–97)
MONOCYTES # BLD AUTO: 0.07 10*3/MM3 (ref 0.1–0.9)
MONOCYTES NFR BLD AUTO: 8.9 % (ref 5–12)
NEUTROPHILS NFR BLD AUTO: 0.12 10*3/MM3 (ref 1.7–7)
NEUTROPHILS NFR BLD AUTO: 15.1 % (ref 42.7–76)
NRBC BLD AUTO-RTO: 0 /100 WBC (ref 0–0.2)
PLAT MORPH BLD: NORMAL
PLATELET # BLD AUTO: 7 10*3/MM3 (ref 140–450)
PLATELET # BLD AUTO: 8 10*3/MM3 (ref 140–450)
PMV BLD AUTO: 11 FL (ref 6–12)
PMV BLD AUTO: 11.7 FL (ref 6–12)
POIKILOCYTOSIS BLD QL SMEAR: NORMAL
POTASSIUM SERPL-SCNC: 4.3 MMOL/L (ref 3.5–5.2)
RBC # BLD AUTO: 2.07 10*6/MM3 (ref 3.77–5.28)
RBC # BLD AUTO: 2.2 10*6/MM3 (ref 3.77–5.28)
RH BLD: POSITIVE
SODIUM SERPL-SCNC: 139 MMOL/L (ref 136–145)
T&S EXPIRATION DATE: NORMAL
WBC MORPH BLD: NORMAL
WBC NRBC COR # BLD AUTO: 0.79 10*3/MM3 (ref 3.4–10.8)
WBC NRBC COR # BLD AUTO: 0.83 10*3/MM3 (ref 3.4–10.8)

## 2025-04-26 PROCEDURE — 25010000002 PIPERACILLIN SOD-TAZOBACTAM PER 1 G: Performed by: INTERNAL MEDICINE

## 2025-04-26 PROCEDURE — 86900 BLOOD TYPING SEROLOGIC ABO: CPT | Performed by: INTERNAL MEDICINE

## 2025-04-26 PROCEDURE — 86900 BLOOD TYPING SEROLOGIC ABO: CPT

## 2025-04-26 PROCEDURE — P9016 RBC LEUKOCYTES REDUCED: HCPCS

## 2025-04-26 PROCEDURE — 99232 SBSQ HOSP IP/OBS MODERATE 35: CPT | Performed by: INTERNAL MEDICINE

## 2025-04-26 PROCEDURE — 25010000002 FILGRASTIM PER 480 MCG: Performed by: INTERNAL MEDICINE

## 2025-04-26 PROCEDURE — 86901 BLOOD TYPING SEROLOGIC RH(D): CPT | Performed by: INTERNAL MEDICINE

## 2025-04-26 PROCEDURE — 25010000002 ENOXAPARIN PER 10 MG: Performed by: INTERNAL MEDICINE

## 2025-04-26 PROCEDURE — 86923 COMPATIBILITY TEST ELECTRIC: CPT

## 2025-04-26 PROCEDURE — 85027 COMPLETE CBC AUTOMATED: CPT | Performed by: INTERNAL MEDICINE

## 2025-04-26 PROCEDURE — 85025 COMPLETE CBC W/AUTO DIFF WBC: CPT | Performed by: INTERNAL MEDICINE

## 2025-04-26 PROCEDURE — 82533 TOTAL CORTISOL: CPT | Performed by: INTERNAL MEDICINE

## 2025-04-26 PROCEDURE — 85007 BL SMEAR W/DIFF WBC COUNT: CPT | Performed by: INTERNAL MEDICINE

## 2025-04-26 PROCEDURE — P9037 PLATE PHERES LEUKOREDU IRRAD: HCPCS

## 2025-04-26 PROCEDURE — 36430 TRANSFUSION BLD/BLD COMPNT: CPT

## 2025-04-26 PROCEDURE — 94760 N-INVAS EAR/PLS OXIMETRY 1: CPT

## 2025-04-26 PROCEDURE — 94799 UNLISTED PULMONARY SVC/PX: CPT

## 2025-04-26 PROCEDURE — 80048 BASIC METABOLIC PNL TOTAL CA: CPT | Performed by: INTERNAL MEDICINE

## 2025-04-26 PROCEDURE — 86850 RBC ANTIBODY SCREEN: CPT | Performed by: INTERNAL MEDICINE

## 2025-04-26 PROCEDURE — P9100 PATHOGEN TEST FOR PLATELETS: HCPCS

## 2025-04-26 RX ORDER — MIDODRINE HYDROCHLORIDE 10 MG/1
10 TABLET ORAL
Status: DISCONTINUED | OUTPATIENT
Start: 2025-04-26 | End: 2025-05-03 | Stop reason: HOSPADM

## 2025-04-26 RX ADMIN — DIPHENHYDRAMINE HYDROCHLORIDE AND LIDOCAINE HYDROCHLORIDE AND ALUMINUM HYDROXIDE AND MAGNESIUM HYDRO 10 ML: KIT at 05:56

## 2025-04-26 RX ADMIN — DIPHENHYDRAMINE HYDROCHLORIDE AND LIDOCAINE HYDROCHLORIDE AND ALUMINUM HYDROXIDE AND MAGNESIUM HYDRO 10 ML: KIT at 16:02

## 2025-04-26 RX ADMIN — FAMOTIDINE 40 MG: 20 TABLET, FILM COATED ORAL at 08:51

## 2025-04-26 RX ADMIN — SODIUM CHLORIDE TAB 1 GM 1 G: 1 TAB at 12:31

## 2025-04-26 RX ADMIN — ATORVASTATIN CALCIUM 10 MG: 10 TABLET, FILM COATED ORAL at 20:24

## 2025-04-26 RX ADMIN — PIPERACILLIN AND TAZOBACTAM 3.38 G: 3; .375 INJECTION, POWDER, FOR SOLUTION INTRAVENOUS at 22:10

## 2025-04-26 RX ADMIN — Medication 10 ML: at 09:06

## 2025-04-26 RX ADMIN — SODIUM CHLORIDE TAB 1 GM 1 G: 1 TAB at 08:51

## 2025-04-26 RX ADMIN — GABAPENTIN 200 MG: 100 CAPSULE ORAL at 20:24

## 2025-04-26 RX ADMIN — FILGRASTIM 480 MCG: 480 INJECTION, SOLUTION INTRAVENOUS; SUBCUTANEOUS at 12:15

## 2025-04-26 RX ADMIN — OXYCODONE AND ACETAMINOPHEN 1 TABLET: 10; 325 TABLET ORAL at 20:24

## 2025-04-26 RX ADMIN — Medication 10 ML: at 20:25

## 2025-04-26 RX ADMIN — OXYCODONE AND ACETAMINOPHEN 1 TABLET: 10; 325 TABLET ORAL at 10:55

## 2025-04-26 RX ADMIN — GABAPENTIN 200 MG: 100 CAPSULE ORAL at 17:10

## 2025-04-26 RX ADMIN — PIPERACILLIN AND TAZOBACTAM 3.38 G: 3; .375 INJECTION, POWDER, FOR SOLUTION INTRAVENOUS at 14:37

## 2025-04-26 RX ADMIN — BUPROPION HYDROCHLORIDE 150 MG: 150 TABLET, FILM COATED, EXTENDED RELEASE ORAL at 08:51

## 2025-04-26 RX ADMIN — SODIUM CHLORIDE TAB 1 GM 1 G: 1 TAB at 17:10

## 2025-04-26 RX ADMIN — MIDODRINE HYDROCHLORIDE 10 MG: 10 TABLET ORAL at 17:10

## 2025-04-26 RX ADMIN — NAPROXEN 500 MG: 250 TABLET ORAL at 08:50

## 2025-04-26 RX ADMIN — PIPERACILLIN AND TAZOBACTAM 3.38 G: 3; .375 INJECTION, POWDER, FOR SOLUTION INTRAVENOUS at 05:56

## 2025-04-26 RX ADMIN — BUDESONIDE AND FORMOTEROL FUMARATE DIHYDRATE 2 PUFF: 160; 4.5 AEROSOL RESPIRATORY (INHALATION) at 19:09

## 2025-04-26 RX ADMIN — MIDODRINE HYDROCHLORIDE 10 MG: 10 TABLET ORAL at 12:15

## 2025-04-26 RX ADMIN — CALCIUM CARBONATE 2 TABLET: 500 TABLET, CHEWABLE ORAL at 08:51

## 2025-04-26 RX ADMIN — DIPHENHYDRAMINE HYDROCHLORIDE AND LIDOCAINE HYDROCHLORIDE AND ALUMINUM HYDROXIDE AND MAGNESIUM HYDRO 10 ML: KIT at 22:06

## 2025-04-26 RX ADMIN — FAMOTIDINE 40 MG: 20 TABLET, FILM COATED ORAL at 20:24

## 2025-04-26 RX ADMIN — GABAPENTIN 200 MG: 100 CAPSULE ORAL at 08:50

## 2025-04-26 RX ADMIN — NAPROXEN 500 MG: 250 TABLET ORAL at 17:10

## 2025-04-26 RX ADMIN — BUPROPION HYDROCHLORIDE 150 MG: 150 TABLET, FILM COATED, EXTENDED RELEASE ORAL at 20:24

## 2025-04-26 RX ADMIN — DIPHENHYDRAMINE HYDROCHLORIDE AND LIDOCAINE HYDROCHLORIDE AND ALUMINUM HYDROXIDE AND MAGNESIUM HYDRO 10 ML: KIT at 08:52

## 2025-04-26 RX ADMIN — ENOXAPARIN SODIUM 40 MG: 40 INJECTION SUBCUTANEOUS at 08:50

## 2025-04-26 NOTE — PLAN OF CARE
Goal Outcome Evaluation:  Plan of Care Reviewed With: patient        Progress: no change  Outcome Evaluation: Patient is alert and oriented. Complaints of pain throughout shift, treated per MAR. On 4L NC with no signs of respiratory distress. No loose stools this shift. Will continue with POC for remainder of shift.

## 2025-04-26 NOTE — PROGRESS NOTES
Hematology/Oncology Progress Note     Subjective     Continuing to have fatigue. Oral mucositis present. No more diarrhea.     ROS: Negative except for ones mentioned in HPI.         Objective      Vitals:    04/26/25 1105   BP: (!) 84/60   Pulse: 80   Resp: 20   Temp: 97.5 °F (36.4 °C)   SpO2: 98%     Physical Exam  Vitals reviewed.   Constitutional:       Appearance: Normal appearance.   HENT:      Head: Normocephalic.      Nose: Nose normal.      Mouth/Throat:      Mouth: Mucous membranes are moist.   Eyes:      General: No scleral icterus.     Conjunctiva/sclera: Conjunctivae normal.   Cardiovascular:      Rate and Rhythm: Normal rate.      Pulses: Normal pulses.   Pulmonary:      Effort: Pulmonary effort is normal.      Breath sounds: Normal breath sounds.   Musculoskeletal:      Cervical back: Normal range of motion.   Skin:     General: Skin is warm.   Neurological:      Mental Status: She is alert. Mental status is at baseline.   Psychiatric:         Mood and Affect: Mood normal.         Behavior: Behavior normal.          Labs, Imaging, Pathology:  CBC          4/24/2025    03:58 4/25/2025    05:12 4/26/2025    03:26 4/26/2025    11:50   CBC   WBC 0.89  0.85  0.83  0.79    RBC 2.41  2.33  2.07  2.20    Hemoglobin 7.5  7.2  6.5  6.8    Hematocrit 22.1  21.7  19.5  20.5    MCV 91.7  93.1  94.2  93.2    MCH 31.1  30.9  31.4  30.9    MCHC 33.9  33.2  33.3  33.2    RDW 15.8  16.1  16.4  16.3    Platelets 32  16  8  7      CMP          4/24/2025    03:58 4/24/2025    07:54 4/25/2025    05:12 4/26/2025    03:26   CMP   Glucose 86   80  73    BUN 15   17  10    Creatinine 0.68   0.72  0.70    EGFR 101.1   97.1  100.4    Sodium 130  131  135  139    Potassium 4.4   4.2  4.3    Chloride 102   104  108    Calcium 7.4   7.0  6.7    BUN/Creatinine Ratio 22.1   23.6  14.3    Anion Gap 7.3   10.2  9.6           Assessment & Plan   Small cell lung cancer  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  Continue transfusion support for hemoglobin less than 7 or symptomatic anemia and platelets less than 20K or bleeding manifestations. She is expected to have cytopenia for several days from the effect of chemotherapy.  - Continue neutropenic precautions.  - Continue Magic mouthwash in addition to nystatin with oral mucositis. If no improvement, will start fluconazole. Antinausea medications as needed.  - CT chest done on 2/25/2025 reported progression of metastatic disease with enlarging pulmonary nodules and new enlarged mediastinal/hilar lymph nodes, but last comparison imaging was from 12/9/2024 and her current line of treatment was started only in March 2025.   - Discussed with patient that we will hold chemotherapy until complete recovery and will possibly need dose reduction/switching treatment.  She does have other treatment options for small cell lung cancer including lurbinectedin and Tarlatamab. To follow up with primary oncologist Dr. Dickson after discharge.      I reviewed recent lab, imaging, and pathology results as available this visit and interpreted independently and discussed with the patient.       Aasems Jacob, MD  Hematology/Oncology

## 2025-04-26 NOTE — PROGRESS NOTES
[facility]   Progress Note    Patient Name: Lluvia Archer  : 1965  MRN: [id number]  Primary Care Physician:  Aleksandar Edge DO  Date of admission: 2025    Subjective   Subjective     I have examined this patient at bedside this morning. No acute events overnight. Patient reports feeling well. Patient is having formed bowel movements and is urinating. Patient is tolerating diet. I discussed the case with the patient's RN.      Review of Systems   Constitutional:  Negative for chills, diaphoresis, fatigue and fever.   HENT:  Negative for facial swelling, rhinorrhea, sneezing, sore throat, trouble swallowing and voice change.    Eyes:  Negative for photophobia, redness and visual disturbance.   Respiratory:  Negative for cough, shortness of breath and stridor.    Cardiovascular:  Negative for chest pain, palpitations and leg swelling.   Gastrointestinal:  Negative for abdominal pain, anal bleeding, blood in stool, constipation, diarrhea, nausea and vomiting.   Endocrine: Negative for polyuria.   Genitourinary:  Negative for difficulty urinating, dysuria, frequency, hematuria and urgency.   Musculoskeletal:  Negative for arthralgias, back pain, joint swelling, myalgias and neck stiffness.   Skin:  Negative for rash and wound.   Allergic/Immunologic: Negative for immunocompromised state.   Neurological:  Negative for seizures, syncope, facial asymmetry, speech difficulty, weakness, light-headedness, numbness and headaches.   Hematological:  Does not bruise/bleed easily.   Psychiatric/Behavioral:  Negative for agitation, confusion and hallucinations.        Objective   Objective     Vitals:   Temp:  [97 °F (36.1 °C)-98.6 °F (37 °C)] 97.4 °F (36.3 °C)  Heart Rate:  [69-88] 69  Resp:  [16-20] 20  BP: (80-97)/(57-70) 80/57  Flow (L/min) (Oxygen Therapy):  [3.5] 3.5    Physical Exam  Constitutional:       General: She is not in acute distress.     Appearance: Normal appearance. She is normal  weight. She is obese. She is ill-appearing. She is not toxic-appearing or diaphoretic.   HENT:      Head: Normocephalic and atraumatic.      Nose: No rhinorrhea.      Mouth/Throat:      Mouth: Mucous membranes are moist.      Pharynx: Oropharynx is clear.   Eyes:      General: No scleral icterus.        Right eye: No discharge.         Left eye: No discharge.      Extraocular Movements: Extraocular movements intact.      Conjunctiva/sclera: Conjunctivae normal.   Cardiovascular:      Rate and Rhythm: Normal rate and regular rhythm.      Heart sounds: No murmur heard.  Pulmonary:      Effort: Pulmonary effort is normal. No respiratory distress.      Breath sounds: Normal breath sounds. No wheezing, rhonchi or rales.   Abdominal:      General: Abdomen is flat. There is no distension.      Palpations: Abdomen is soft.      Tenderness: There is no abdominal tenderness. There is no guarding.   Musculoskeletal:         General: No signs of injury.      Cervical back: No rigidity or tenderness.      Right lower leg: No edema.      Left lower leg: No edema.   Skin:     General: Skin is warm and dry.      Coloration: Skin is not jaundiced or pale.   Neurological:      General: No focal deficit present.      Mental Status: She is alert and oriented to person, place, and time. Mental status is at baseline.      Cranial Nerves: No cranial nerve deficit.      Motor: No weakness.   Psychiatric:         Mood and Affect: Mood normal.         Thought Content: Thought content normal.          Result Review    Result Review:  I have personally reviewed the results from the time of this admission to 4/26/2025 11:47 EDT and agree with these findings:  [x]  Laboratory list / accordion  [x]  Microbiology  []  Radiology  []  EKG/Telemetry   []  Cardiology/Vascular   []  Pathology  []  Old records  []  Other:        Assessment & Plan   Assessment / Plan     Brief Patient Summary:  Lluvia Archer is a 59 y.o. female who was sent to the  emergency room by her oncologist for concerns of abnormal lab work. In emergency room, the patient was noted to have an acute hyponatremia and was mildly altered. 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 Her Na started to improve but patient developed diarrhea. She also has pancytopenia. She was treated w/ zosyn for diarrhea bu there pancytopenia did not improve. Oncology has started her on neupogen.     Active Hospital Problems:  Active Hospital Problems    Diagnosis     **Pancytopenia due to antineoplastic chemotherapy     Chronic hypotension     Hyponatremia           Pancytopenia  -Zosyn, magic mouthwash and nystatin  -Oncology has started neupogen  1u prbc, 2u platelets ordered    Hypotension  -IV fluids been given, blood pressure is borderline  -Lactic acid has been wnl  -started on midodrine today by nephrology, appreciate help    Candidiasis  -Cont nystatin    Hypocalcemia  -Repleted     Diarrhea -  resolved  -Check C Diff, stool cultures -> ordered but not collected  -Check blood cultures  -Zosyn; continue until cultures result    Acute hyponatremia - resolved  -Mentation is at baseline now and AOX4  -Sodium 130 this AM, trend Na  -sodium tablets and Urena by nephrology  -Following nephrology, appreciate recs     COPD  -No AE  -cont home trelegy interchange     HTN  -Hold home po meds, bp has been borderline     HLD  -Cont home lipitor     Depression/anxiety  -Continue home BuSpar, as needed Klonopin, as needed Atarax      Small cell lung cancer  -her oncologist is aware of her current admission     GERD  -Continue home Pepcid     Peripheral neuropathy  -Continue gabapentin              VTE ppx: subq lovenox     Stress Ulcer ppx: home pepcid     Estimated Medical Readiness Discharge Date:  04/28/25     Anticipated Disposition: home     Discharge Milestones:  improvement in symptoms and labs          CODE STATUS:    Code Status (Patient has no  pulse and is not breathing): CPR (Attempt to Resuscitate)  Medical Interventions (Patient has pulse or is breathing): Full Support  Level Of Support Discussed With: Patient      King Westbrook MD

## 2025-04-26 NOTE — PROGRESS NOTES
[facility]     Progress Note    Patient Name: Lluvia Archer  : 1965  MRN: [id number]  Primary Care Physician:  Aleksandar Edge DO  Date of admission: 2025    Subjective patient is fully awake alert oriented not in any acute distress she is very pleasant.  Patient is on 3.5 L of oxygen with 100% saturation we need to decrease the oxygen.  Blood pressure is very soft    Review of Systems  All review of systems are negative except as mentioned in subjective complaints.    Objective   Objective     Vitals:   Temp:  [97 °F (36.1 °C)-98.6 °F (37 °C)] 97.4 °F (36.3 °C)  Heart Rate:  [69-88] 69  Resp:  [16-20] 20  BP: (80-97)/(57-70) 80/57  Flow (L/min) (Oxygen Therapy):  [3.5] 3.5  Physical Exam    Constitutional: Awake, alert responsive, conversant, no obvious distress              Psychiatric:  Appropriate affect, cooperative   Neurologic:  Awake alert ,oriented x 3, strength symmetric in all extremities, Cranial Nerves grossly intact to confrontation, speech clear   Eyes:   PERRLA, sclerae anicteric, no conjunctival injection   HEENT:  Moist mucous membranes, no nasal or eye discharge, no throat congestion   Neck:   Supple, no thyromegaly, no lymphadenopathy, trachea midline, no elevated JVD   Respiratory:  Clear to auscultation bilaterally, nonlabored respirations    Cardiovascular: RRR, no murmurs, rubs, or gallops, palpable pedal pulses bilaterally, No bilateral ankle edema   Gastrointestinal: Positive bowel sounds, soft, nontender, nondistended, no organomegaly   Musculoskeletal:  No clubbing or cyanosis to extremities,muscle wasting, joint swelling, muscle weakness             Skin:                      No rashes, bruising, skin ulcers, petechiae or ecchymosis    Result Review    Result Review:  I have personally reviewed the results from the time of this admission to 2025 10:19 EDT and agree with these findings:  []  Laboratory  []  Microbiology  []  Radiology  []  EKG/Telemetry    []  Cardiology/Vascular   []  Pathology  []  Old records  []  Other:    Results from last 7 days   Lab Units 04/26/25  0326 04/25/25  0512 04/24/25  0358 04/23/25  0352 04/22/25  1533 04/21/25  1340   WBC 10*3/mm3 0.83* 0.85* 0.89* 0.93* 0.78* 1.88*   HEMOGLOBIN g/dL 6.5* 7.2* 7.5* 7.8* 9.5* 9.4*   PLATELETS 10*3/mm3 8* 16* 32* 58* 75* 119*     Results from last 7 days   Lab Units 04/26/25  0326 04/25/25  0512 04/24/25  0754 04/24/25  0358 04/23/25  2349 04/23/25  1957 04/23/25  1538 04/23/25  0749 04/23/25  0352 04/22/25  1807 04/22/25  1533 04/21/25  1340   SODIUM mmol/L 139 135* 131* 130* 130* 130* 127*   < > 124*   < > 121* 120*   POTASSIUM mmol/L 4.3 4.2  --  4.4  --   --   --   --  3.3*  --  3.8 3.7   CHLORIDE mmol/L 108* 104  --  102  --   --   --   --  92*  --  88* 87*   CO2 mmol/L 21.4* 20.8*  --  20.7*  --   --   --   --  20.9*  --  22.5 22.7   ANION GAP mmol/L 9.6 10.2  --  7.3  --   --   --   --  11.1  --  10.5 10.3   BUN mg/dL 10 17  --  15  --   --   --   --  8  --  9 8   CREATININE mg/dL 0.70 0.72  --  0.68  --   --   --   --  0.61  --  0.66 0.65   GLUCOSE mg/dL 73 80  --  86  --   --   --   --  86  --  104* 135*   EGFR mL/min/1.73 100.4 97.1  --  101.1  --   --   --   --  103.8  --  101.8 102.2   CALCIUM mg/dL 6.7* 7.0*  --  7.4*  --   --   --   --  7.0*  --  7.9* 7.6*   MAGNESIUM mg/dL  --   --   --  2.1  --   --   --   --  2.0   < >  --  2.0   ALBUMIN g/dL  --   --   --   --   --   --   --   --   --   --  3.9 3.6   BILIRUBIN mg/dL  --   --   --   --   --   --   --   --   --   --  0.4 0.5   ALK PHOS U/L  --   --   --   --   --   --   --   --   --   --  75 75   ALT (SGPT) U/L  --   --   --   --   --   --   --   --   --   --  10 12   AST (SGOT) U/L  --   --   --   --   --   --   --   --   --   --  9 15    < > = values in this interval not displayed.       Scheduled Meds:atorvastatin, 10 mg, Oral, Nightly  budesonide-formoterol, 2 puff, Inhalation, BID - RT   And  revefenacin, 175 mcg, Nebulization,  Daily - RT  buPROPion SR, 150 mg, Oral, BID  calcium carbonate, 2 tablet, Oral, Daily  enoxaparin sodium, 40 mg, Subcutaneous, Daily  [Held by provider] escitalopram, 20 mg, Oral, Daily  famotidine, 40 mg, Oral, BID  filgrastim (NEUPOGEN) injection, 480 mcg, Subcutaneous, Daily  First Mouthwash (Magic Mouthwash), 10 mL, Swish & Spit, Q6H  gabapentin, 200 mg, Oral, TID  [Held by provider] lisinopril, 5 mg, Oral, Daily  [Held by provider] metoprolol tartrate, 12.5 mg, Oral, BID  naproxen, 500 mg, Oral, BID With Meals  piperacillin-tazobactam, 3.375 g, Intravenous, Q8H  sodium chloride, 10 mL, Intravenous, Q12H  sodium chloride, 1 g, Oral, TID With Meals      Continuous Infusions:   PRN Meds:.  albuterol    albuterol sulfate HFA    senna-docusate sodium **AND** polyethylene glycol **AND** bisacodyl **AND** bisacodyl    clonazePAM    hydrOXYzine    [Held by provider] LORazepam    melatonin    ondansetron ODT **OR** ondansetron    oxyCODONE-acetaminophen    phenol    prochlorperazine    sodium chloride    sodium chloride    Assessment & Plan   Assessment / Plan       Active Hospital Problems:    Active Hospital Problems    Diagnosis  POA    **Hyponatremia [E87.1]  Yes     Secondary to SIADH.  Resolved      Chronic hypotension [I95.89]  Unknown     Start ProAmatine      Pancytopenia due to antineoplastic chemotherapy [D61.810, T45.1X5A]  Yes       Plan:   ProAmatine  Continue salt tablets  No need for urea    Electronically signed by Latonia Latham MD, 04/26/25, 10:16 AM EDT.

## 2025-04-26 NOTE — PLAN OF CARE
Goal Outcome Evaluation:      HGB and Platelets low. First unit of platelets infusing at this time. Medicated for pain.  Able to make all needs know. Plan for another unit of platelets and 1 unit PRBC's this evening.

## 2025-04-27 LAB
ANION GAP SERPL CALCULATED.3IONS-SCNC: 8.8 MMOL/L (ref 5–15)
BASOPHILS # BLD AUTO: 0.01 10*3/MM3 (ref 0–0.2)
BASOPHILS NFR BLD AUTO: 0.8 % (ref 0–1.5)
BUN SERPL-MCNC: 9 MG/DL (ref 6–20)
BUN/CREAT SERPL: 11.7 (ref 7–25)
CALCIUM SPEC-SCNC: 7.1 MG/DL (ref 8.6–10.5)
CHLORIDE SERPL-SCNC: 106 MMOL/L (ref 98–107)
CO2 SERPL-SCNC: 22.2 MMOL/L (ref 22–29)
CREAT SERPL-MCNC: 0.77 MG/DL (ref 0.57–1)
DEPRECATED RDW RBC AUTO: 55.7 FL (ref 37–54)
EGFRCR SERPLBLD CKD-EPI 2021: 89.5 ML/MIN/1.73
EOSINOPHIL # BLD AUTO: 0.06 10*3/MM3 (ref 0–0.4)
EOSINOPHIL NFR BLD AUTO: 4.5 % (ref 0.3–6.2)
ERYTHROCYTE [DISTWIDTH] IN BLOOD BY AUTOMATED COUNT: 16.6 % (ref 12.3–15.4)
GLUCOSE SERPL-MCNC: 88 MG/DL (ref 65–99)
HCT VFR BLD AUTO: 22.2 % (ref 34–46.6)
HGB BLD-MCNC: 7.5 G/DL (ref 12–15.9)
IMM GRANULOCYTES # BLD AUTO: 0.02 10*3/MM3 (ref 0–0.05)
IMM GRANULOCYTES NFR BLD AUTO: 1.5 % (ref 0–0.5)
LYMPHOCYTES # BLD AUTO: 0.8 10*3/MM3 (ref 0.7–3.1)
LYMPHOCYTES NFR BLD AUTO: 60.6 % (ref 19.6–45.3)
MCH RBC QN AUTO: 31 PG (ref 26.6–33)
MCHC RBC AUTO-ENTMCNC: 33.8 G/DL (ref 31.5–35.7)
MCV RBC AUTO: 91.7 FL (ref 79–97)
MONOCYTES # BLD AUTO: 0.18 10*3/MM3 (ref 0.1–0.9)
MONOCYTES NFR BLD AUTO: 13.6 % (ref 5–12)
NEUTROPHILS NFR BLD AUTO: 0.25 10*3/MM3 (ref 1.7–7)
NEUTROPHILS NFR BLD AUTO: 19 % (ref 42.7–76)
NRBC BLD AUTO-RTO: 0 /100 WBC (ref 0–0.2)
PLATELET # BLD AUTO: 46 10*3/MM3 (ref 140–450)
PMV BLD AUTO: 9.1 FL (ref 6–12)
POTASSIUM SERPL-SCNC: 4 MMOL/L (ref 3.5–5.2)
RBC # BLD AUTO: 2.42 10*6/MM3 (ref 3.77–5.28)
SODIUM SERPL-SCNC: 137 MMOL/L (ref 136–145)
WBC NRBC COR # BLD AUTO: 1.32 10*3/MM3 (ref 3.4–10.8)

## 2025-04-27 PROCEDURE — 80048 BASIC METABOLIC PNL TOTAL CA: CPT | Performed by: INTERNAL MEDICINE

## 2025-04-27 PROCEDURE — 85025 COMPLETE CBC W/AUTO DIFF WBC: CPT | Performed by: INTERNAL MEDICINE

## 2025-04-27 PROCEDURE — 25010000002 ONDANSETRON PER 1 MG: Performed by: HOSPITALIST

## 2025-04-27 PROCEDURE — 25810000003 SODIUM CHLORIDE 0.9 % SOLUTION: Performed by: INTERNAL MEDICINE

## 2025-04-27 PROCEDURE — 25010000002 PIPERACILLIN SOD-TAZOBACTAM PER 1 G: Performed by: INTERNAL MEDICINE

## 2025-04-27 PROCEDURE — 99232 SBSQ HOSP IP/OBS MODERATE 35: CPT | Performed by: INTERNAL MEDICINE

## 2025-04-27 PROCEDURE — 25010000002 FILGRASTIM PER 480 MCG: Performed by: INTERNAL MEDICINE

## 2025-04-27 PROCEDURE — 25010000002 ENOXAPARIN PER 10 MG: Performed by: INTERNAL MEDICINE

## 2025-04-27 RX ADMIN — SODIUM CHLORIDE 1000 ML: 9 INJECTION, SOLUTION INTRAVENOUS at 13:04

## 2025-04-27 RX ADMIN — Medication 10 ML: at 21:24

## 2025-04-27 RX ADMIN — DIPHENHYDRAMINE HYDROCHLORIDE AND LIDOCAINE HYDROCHLORIDE AND ALUMINUM HYDROXIDE AND MAGNESIUM HYDRO 10 ML: KIT at 09:29

## 2025-04-27 RX ADMIN — DIPHENHYDRAMINE HYDROCHLORIDE AND LIDOCAINE HYDROCHLORIDE AND ALUMINUM HYDROXIDE AND MAGNESIUM HYDRO 10 ML: KIT at 21:23

## 2025-04-27 RX ADMIN — FAMOTIDINE 40 MG: 20 TABLET, FILM COATED ORAL at 21:23

## 2025-04-27 RX ADMIN — PIPERACILLIN AND TAZOBACTAM 3.38 G: 3; .375 INJECTION, POWDER, FOR SOLUTION INTRAVENOUS at 22:23

## 2025-04-27 RX ADMIN — PIPERACILLIN AND TAZOBACTAM 3.38 G: 3; .375 INJECTION, POWDER, FOR SOLUTION INTRAVENOUS at 06:12

## 2025-04-27 RX ADMIN — SODIUM CHLORIDE TAB 1 GM 1 G: 1 TAB at 09:29

## 2025-04-27 RX ADMIN — SODIUM CHLORIDE TAB 1 GM 1 G: 1 TAB at 18:25

## 2025-04-27 RX ADMIN — OXYCODONE AND ACETAMINOPHEN 1 TABLET: 10; 325 TABLET ORAL at 09:30

## 2025-04-27 RX ADMIN — GABAPENTIN 200 MG: 100 CAPSULE ORAL at 18:25

## 2025-04-27 RX ADMIN — NAPROXEN 500 MG: 250 TABLET ORAL at 09:29

## 2025-04-27 RX ADMIN — FAMOTIDINE 40 MG: 20 TABLET, FILM COATED ORAL at 09:29

## 2025-04-27 RX ADMIN — GABAPENTIN 200 MG: 100 CAPSULE ORAL at 09:29

## 2025-04-27 RX ADMIN — OXYCODONE AND ACETAMINOPHEN 1 TABLET: 10; 325 TABLET ORAL at 18:26

## 2025-04-27 RX ADMIN — ATORVASTATIN CALCIUM 10 MG: 10 TABLET, FILM COATED ORAL at 21:23

## 2025-04-27 RX ADMIN — HYDROXYZINE HYDROCHLORIDE 25 MG: 25 TABLET, FILM COATED ORAL at 09:30

## 2025-04-27 RX ADMIN — MIDODRINE HYDROCHLORIDE 10 MG: 10 TABLET ORAL at 18:26

## 2025-04-27 RX ADMIN — PIPERACILLIN AND TAZOBACTAM 3.38 G: 3; .375 INJECTION, POWDER, FOR SOLUTION INTRAVENOUS at 14:08

## 2025-04-27 RX ADMIN — OXYCODONE AND ACETAMINOPHEN 1 TABLET: 10; 325 TABLET ORAL at 03:22

## 2025-04-27 RX ADMIN — DIPHENHYDRAMINE HYDROCHLORIDE AND LIDOCAINE HYDROCHLORIDE AND ALUMINUM HYDROXIDE AND MAGNESIUM HYDRO 10 ML: KIT at 15:08

## 2025-04-27 RX ADMIN — FILGRASTIM 480 MCG: 480 INJECTION, SOLUTION INTRAVENOUS; SUBCUTANEOUS at 09:29

## 2025-04-27 RX ADMIN — GABAPENTIN 200 MG: 100 CAPSULE ORAL at 21:23

## 2025-04-27 RX ADMIN — SODIUM CHLORIDE TAB 1 GM 1 G: 1 TAB at 12:48

## 2025-04-27 RX ADMIN — Medication 10 ML: at 09:39

## 2025-04-27 RX ADMIN — BUPROPION HYDROCHLORIDE 150 MG: 150 TABLET, FILM COATED, EXTENDED RELEASE ORAL at 09:29

## 2025-04-27 RX ADMIN — DIPHENHYDRAMINE HYDROCHLORIDE AND LIDOCAINE HYDROCHLORIDE AND ALUMINUM HYDROXIDE AND MAGNESIUM HYDRO 10 ML: KIT at 03:23

## 2025-04-27 RX ADMIN — MIDODRINE HYDROCHLORIDE 10 MG: 10 TABLET ORAL at 09:30

## 2025-04-27 RX ADMIN — ENOXAPARIN SODIUM 40 MG: 40 INJECTION SUBCUTANEOUS at 09:30

## 2025-04-27 RX ADMIN — MIDODRINE HYDROCHLORIDE 10 MG: 10 TABLET ORAL at 12:48

## 2025-04-27 RX ADMIN — BUPROPION HYDROCHLORIDE 150 MG: 150 TABLET, FILM COATED, EXTENDED RELEASE ORAL at 21:23

## 2025-04-27 RX ADMIN — CALCIUM CARBONATE 2 TABLET: 500 TABLET, CHEWABLE ORAL at 09:29

## 2025-04-27 RX ADMIN — NAPROXEN 500 MG: 250 TABLET ORAL at 18:26

## 2025-04-27 RX ADMIN — ONDANSETRON 4 MG: 2 INJECTION INTRAMUSCULAR; INTRAVENOUS at 21:23

## 2025-04-27 NOTE — PROGRESS NOTES
[facility]     Progress Note    Patient Name: Lluvia Archer  : 1965  MRN: [id number]  Primary Care Physician:  Aleksandar Edge DO  Date of admission: 2025    Subjective patient is extremely pleasant and she is not having any complaints except just generalized weakness    Review of Systems  All review of systems are negative except as mentioned in subjective complaints.    Objective   Objective     Vitals:   Temp:  [97.5 °F (36.4 °C)-98.2 °F (36.8 °C)] 97.6 °F (36.4 °C)  Heart Rate:  [64-80] 75  Resp:  [15-22] 18  BP: ()/(51-78) 80/62  Flow (L/min) (Oxygen Therapy):  [3.5] 3.5  Physical Exam    Constitutional: Awake, alert responsive, conversant, no obvious distress              Psychiatric:  Appropriate affect, cooperative   Neurologic:  Awake alert ,oriented x 3, strength symmetric in all extremities, Cranial Nerves grossly intact to confrontation, speech clear   Eyes:   PERRLA, sclerae anicteric, no conjunctival injection   HEENT:  Moist mucous membranes, no nasal or eye discharge, no throat congestion   Neck:   Supple, no thyromegaly, no lymphadenopathy, trachea midline, no elevated JVD   Respiratory:  Clear to auscultation bilaterally, nonlabored respirations    Cardiovascular: RRR, no murmurs, rubs, or gallops, palpable pedal pulses bilaterally, No bilateral ankle edema   Gastrointestinal: Positive bowel sounds, soft, nontender, nondistended, no organomegaly   Musculoskeletal:  No clubbing or cyanosis to extremities,muscle wasting, joint swelling, muscle weakness             Skin:                      No rashes, bruising, skin ulcers, petechiae or ecchymosis    Result Review    Result Review:  I have personally reviewed the results from the time of this admission to 2025 10:12 EDT and agree with these findings:  []  Laboratory  []  Microbiology  []  Radiology  []  EKG/Telemetry   []  Cardiology/Vascular   []  Pathology  []  Old records  []  Other:    Results from last 7  days   Lab Units 04/27/25  0506 04/26/25  1150 04/26/25  0326 04/25/25  0512 04/24/25  0358 04/23/25  0352 04/22/25  1533   WBC 10*3/mm3 1.32* 0.79* 0.83* 0.85* 0.89* 0.93* 0.78*   HEMOGLOBIN g/dL 7.5* 6.8* 6.5* 7.2* 7.5* 7.8* 9.5*   PLATELETS 10*3/mm3 46* 7* 8* 16* 32* 58* 75*     Results from last 7 days   Lab Units 04/27/25  0506 04/26/25  0326 04/25/25  0512 04/24/25  0754 04/24/25  0358 04/23/25  2349 04/23/25  1957 04/23/25  0749 04/23/25  0352 04/22/25  1807 04/22/25  1533 04/21/25  1340   SODIUM mmol/L 137 139 135* 131* 130* 130* 130*   < > 124*   < > 121* 120*   POTASSIUM mmol/L 4.0 4.3 4.2  --  4.4  --   --   --  3.3*  --  3.8 3.7   CHLORIDE mmol/L 106 108* 104  --  102  --   --   --  92*  --  88* 87*   CO2 mmol/L 22.2 21.4* 20.8*  --  20.7*  --   --   --  20.9*  --  22.5 22.7   ANION GAP mmol/L 8.8 9.6 10.2  --  7.3  --   --   --  11.1  --  10.5 10.3   BUN mg/dL 9 10 17  --  15  --   --   --  8  --  9 8   CREATININE mg/dL 0.77 0.70 0.72  --  0.68  --   --   --  0.61  --  0.66 0.65   GLUCOSE mg/dL 88 73 80  --  86  --   --   --  86  --  104* 135*   EGFR mL/min/1.73 89.5 100.4 97.1  --  101.1  --   --   --  103.8  --  101.8 102.2   CALCIUM mg/dL 7.1* 6.7* 7.0*  --  7.4*  --   --   --  7.0*  --  7.9* 7.6*   MAGNESIUM mg/dL  --   --   --   --  2.1  --   --   --  2.0   < >  --  2.0   ALBUMIN g/dL  --   --   --   --   --   --   --   --   --   --  3.9 3.6   BILIRUBIN mg/dL  --   --   --   --   --   --   --   --   --   --  0.4 0.5   ALK PHOS U/L  --   --   --   --   --   --   --   --   --   --  75 75   ALT (SGPT) U/L  --   --   --   --   --   --   --   --   --   --  10 12   AST (SGOT) U/L  --   --   --   --   --   --   --   --   --   --  9 15    < > = values in this interval not displayed.       Scheduled Meds:atorvastatin, 10 mg, Oral, Nightly  budesonide-formoterol, 2 puff, Inhalation, BID - RT   And  revefenacin, 175 mcg, Nebulization, Daily - RT  buPROPion SR, 150 mg, Oral, BID  calcium carbonate, 2 tablet,  Oral, Daily  enoxaparin sodium, 40 mg, Subcutaneous, Daily  [Held by provider] escitalopram, 20 mg, Oral, Daily  famotidine, 40 mg, Oral, BID  filgrastim (NEUPOGEN) injection, 480 mcg, Subcutaneous, Daily  First Mouthwash (Magic Mouthwash), 10 mL, Swish & Spit, Q6H  gabapentin, 200 mg, Oral, TID  [Held by provider] lisinopril, 5 mg, Oral, Daily  [Held by provider] metoprolol tartrate, 12.5 mg, Oral, BID  midodrine, 10 mg, Oral, TID AC  naproxen, 500 mg, Oral, BID With Meals  piperacillin-tazobactam, 3.375 g, Intravenous, Q8H  sodium chloride, 10 mL, Intravenous, Q12H  sodium chloride, 1 g, Oral, TID With Meals      Continuous Infusions:   PRN Meds:.  albuterol    albuterol sulfate HFA    senna-docusate sodium **AND** polyethylene glycol **AND** bisacodyl **AND** bisacodyl    clonazePAM    hydrOXYzine    [Held by provider] LORazepam    melatonin    ondansetron ODT **OR** ondansetron    oxyCODONE-acetaminophen    phenol    prochlorperazine    sodium chloride    sodium chloride    Assessment & Plan   Assessment / Plan       Active Hospital Problems:    Active Hospital Problems    Diagnosis  POA    **Pancytopenia due to antineoplastic chemotherapy [D61.810, T45.1X5A]  Yes    Chronic hypotension [I95.89]  Unknown     Start ProAmatine      Hyponatremia [E87.1]  Yes     Secondary to SIADH.  Resolved         Plan:   Continue present supportive care hyponatremia resolved       Electronically signed by Latonia Latham MD, 04/27/25, 10:12 AM EDT.

## 2025-04-27 NOTE — PLAN OF CARE
Goal Outcome Evaluation:  Plan of Care Reviewed With: patient        Progress: improving  Outcome Evaluation: Patient is alert and oriented. On 3.5L NC with no signs of respiratory distress. Report of pain throughout shift, treated per MAR. Recieved 1 unit of RBC. Up to bedside. Will continue with POC for remainder of shift.

## 2025-04-27 NOTE — PLAN OF CARE
Goal Outcome Evaluation:      Patient alert and able to make needs known, c/o pain, treated per MAR. Continues on o2 at 2L per nasal canula, lungs diminished. Will continue with plan of care

## 2025-04-28 ENCOUNTER — APPOINTMENT (OUTPATIENT)
Dept: GENERAL RADIOLOGY | Facility: HOSPITAL | Age: 60
End: 2025-04-28
Payer: MEDICARE

## 2025-04-28 LAB
ANION GAP SERPL CALCULATED.3IONS-SCNC: 10.5 MMOL/L (ref 5–15)
ANISOCYTOSIS BLD QL: ABNORMAL
BH BB BLOOD EXPIRATION DATE: NORMAL
BH BB BLOOD TYPE BARCODE: 5100
BH BB BLOOD TYPE BARCODE: 5100
BH BB BLOOD TYPE BARCODE: 6200
BH BB DISPENSE STATUS: NORMAL
BH BB PRODUCT CODE: NORMAL
BH BB UNIT NUMBER: NORMAL
BILIRUB UR QL STRIP: NEGATIVE
BUN SERPL-MCNC: 7 MG/DL (ref 6–20)
BUN/CREAT SERPL: 9 (ref 7–25)
BURR CELLS BLD QL SMEAR: ABNORMAL
CALCIUM SPEC-SCNC: 7.1 MG/DL (ref 8.6–10.5)
CHLORIDE SERPL-SCNC: 108 MMOL/L (ref 98–107)
CLARITY UR: CLEAR
CO2 SERPL-SCNC: 21.5 MMOL/L (ref 22–29)
COLOR UR: YELLOW
CREAT SERPL-MCNC: 0.78 MG/DL (ref 0.57–1)
CROSSMATCH INTERPRETATION: NORMAL
DEPRECATED RDW RBC AUTO: 56.4 FL (ref 37–54)
EGFRCR SERPLBLD CKD-EPI 2021: 88.2 ML/MIN/1.73
ERYTHROCYTE [DISTWIDTH] IN BLOOD BY AUTOMATED COUNT: 16.9 % (ref 12.3–15.4)
GLUCOSE SERPL-MCNC: 70 MG/DL (ref 65–99)
GLUCOSE UR STRIP-MCNC: NEGATIVE MG/DL
HCT VFR BLD AUTO: 23.7 % (ref 34–46.6)
HGB BLD-MCNC: 8 G/DL (ref 12–15.9)
HGB UR QL STRIP.AUTO: NEGATIVE
KETONES UR QL STRIP: NEGATIVE
LEUKOCYTE ESTERASE UR QL STRIP.AUTO: NEGATIVE
LYMPHOCYTES # BLD MANUAL: 1.27 10*3/MM3 (ref 0.7–3.1)
LYMPHOCYTES NFR BLD MANUAL: 14 % (ref 5–12)
MCH RBC QN AUTO: 31.3 PG (ref 26.6–33)
MCHC RBC AUTO-ENTMCNC: 33.8 G/DL (ref 31.5–35.7)
MCV RBC AUTO: 92.6 FL (ref 79–97)
METAMYELOCYTES NFR BLD MANUAL: 4 % (ref 0–0)
MONOCYTES # BLD: 0.4 10*3/MM3 (ref 0.1–0.9)
NEUTROPHILS # BLD AUTO: 0.92 10*3/MM3 (ref 1.7–7)
NEUTROPHILS NFR BLD MANUAL: 28 % (ref 42.7–76)
NEUTS BAND NFR BLD MANUAL: 4 % (ref 0–5)
NITRITE UR QL STRIP: NEGATIVE
NRBC SPEC MANUAL: 4 /100 WBC (ref 0–0.2)
OVALOCYTES BLD QL SMEAR: ABNORMAL
PH UR STRIP.AUTO: 7.5 [PH] (ref 5–8)
PLATELET # BLD AUTO: 27 10*3/MM3 (ref 140–450)
PMV BLD AUTO: 10.4 FL (ref 6–12)
POLYCHROMASIA BLD QL SMEAR: ABNORMAL
POTASSIUM SERPL-SCNC: 4.4 MMOL/L (ref 3.5–5.2)
PROMYELOCYTES NFR BLD MANUAL: 6 % (ref 0–0)
PROT UR QL STRIP: ABNORMAL
RBC # BLD AUTO: 2.56 10*6/MM3 (ref 3.77–5.28)
SCAN SLIDE: NORMAL
SMALL PLATELETS BLD QL SMEAR: ABNORMAL
SODIUM SERPL-SCNC: 140 MMOL/L (ref 136–145)
SP GR UR STRIP: 1.02 (ref 1–1.03)
TOXIC GRANULATION: ABNORMAL
UNIT  ABO: NORMAL
UNIT  RH: NORMAL
UROBILINOGEN UR QL STRIP: ABNORMAL
VARIANT LYMPHS NFR BLD MANUAL: 4 % (ref 0–5)
VARIANT LYMPHS NFR BLD MANUAL: 40 % (ref 19.6–45.3)
WBC NRBC COR # BLD AUTO: 2.89 10*3/MM3 (ref 3.4–10.8)

## 2025-04-28 PROCEDURE — 94799 UNLISTED PULMONARY SVC/PX: CPT

## 2025-04-28 PROCEDURE — 71045 X-RAY EXAM CHEST 1 VIEW: CPT

## 2025-04-28 PROCEDURE — 99232 SBSQ HOSP IP/OBS MODERATE 35: CPT | Performed by: INTERNAL MEDICINE

## 2025-04-28 PROCEDURE — 81003 URINALYSIS AUTO W/O SCOPE: CPT | Performed by: INTERNAL MEDICINE

## 2025-04-28 PROCEDURE — 25010000002 ONDANSETRON PER 1 MG: Performed by: HOSPITALIST

## 2025-04-28 PROCEDURE — 25010000002 PIPERACILLIN SOD-TAZOBACTAM PER 1 G: Performed by: INTERNAL MEDICINE

## 2025-04-28 PROCEDURE — 85007 BL SMEAR W/DIFF WBC COUNT: CPT | Performed by: INTERNAL MEDICINE

## 2025-04-28 PROCEDURE — 85025 COMPLETE CBC W/AUTO DIFF WBC: CPT | Performed by: INTERNAL MEDICINE

## 2025-04-28 PROCEDURE — 80048 BASIC METABOLIC PNL TOTAL CA: CPT | Performed by: INTERNAL MEDICINE

## 2025-04-28 PROCEDURE — 63710000001 REVEFENACIN 175 MCG/3ML SOLUTION: Performed by: HOSPITALIST

## 2025-04-28 PROCEDURE — 25010000002 ENOXAPARIN PER 10 MG: Performed by: INTERNAL MEDICINE

## 2025-04-28 PROCEDURE — 63710000001 DRONABINOL PER 2.5 MG: Performed by: INTERNAL MEDICINE

## 2025-04-28 RX ORDER — MEGESTROL ACETATE 40 MG/ML
400 SUSPENSION ORAL DAILY
Status: DISCONTINUED | OUTPATIENT
Start: 2025-04-28 | End: 2025-05-03 | Stop reason: HOSPADM

## 2025-04-28 RX ORDER — DRONABINOL 2.5 MG/1
5 CAPSULE ORAL DAILY
Status: DISCONTINUED | OUTPATIENT
Start: 2025-04-28 | End: 2025-05-03 | Stop reason: HOSPADM

## 2025-04-28 RX ORDER — FLUDROCORTISONE ACETATE 0.1 MG/1
100 TABLET ORAL DAILY
Status: DISCONTINUED | OUTPATIENT
Start: 2025-04-28 | End: 2025-05-03 | Stop reason: HOSPADM

## 2025-04-28 RX ADMIN — REVEFENACIN 175 MCG: 175 SOLUTION RESPIRATORY (INHALATION) at 07:55

## 2025-04-28 RX ADMIN — BUDESONIDE AND FORMOTEROL FUMARATE DIHYDRATE 2 PUFF: 160; 4.5 AEROSOL RESPIRATORY (INHALATION) at 07:55

## 2025-04-28 RX ADMIN — PIPERACILLIN AND TAZOBACTAM 3.38 G: 3; .375 INJECTION, POWDER, FOR SOLUTION INTRAVENOUS at 23:01

## 2025-04-28 RX ADMIN — SODIUM CHLORIDE TAB 1 GM 1 G: 1 TAB at 09:21

## 2025-04-28 RX ADMIN — Medication 10 ML: at 20:48

## 2025-04-28 RX ADMIN — DRONABINOL 5 MG: 2.5 CAPSULE ORAL at 09:21

## 2025-04-28 RX ADMIN — FAMOTIDINE 40 MG: 20 TABLET, FILM COATED ORAL at 20:48

## 2025-04-28 RX ADMIN — BUPROPION HYDROCHLORIDE 150 MG: 150 TABLET, FILM COATED, EXTENDED RELEASE ORAL at 09:22

## 2025-04-28 RX ADMIN — FAMOTIDINE 40 MG: 20 TABLET, FILM COATED ORAL at 09:21

## 2025-04-28 RX ADMIN — BUDESONIDE AND FORMOTEROL FUMARATE DIHYDRATE 2 PUFF: 160; 4.5 AEROSOL RESPIRATORY (INHALATION) at 20:10

## 2025-04-28 RX ADMIN — DIPHENHYDRAMINE HYDROCHLORIDE AND LIDOCAINE HYDROCHLORIDE AND ALUMINUM HYDROXIDE AND MAGNESIUM HYDRO 10 ML: KIT at 15:54

## 2025-04-28 RX ADMIN — ENOXAPARIN SODIUM 40 MG: 40 INJECTION SUBCUTANEOUS at 09:20

## 2025-04-28 RX ADMIN — MIDODRINE HYDROCHLORIDE 10 MG: 10 TABLET ORAL at 14:18

## 2025-04-28 RX ADMIN — PIPERACILLIN AND TAZOBACTAM 3.38 G: 3; .375 INJECTION, POWDER, FOR SOLUTION INTRAVENOUS at 06:16

## 2025-04-28 RX ADMIN — SODIUM CHLORIDE TAB 1 GM 1 G: 1 TAB at 14:19

## 2025-04-28 RX ADMIN — Medication 10 ML: at 09:23

## 2025-04-28 RX ADMIN — OXYCODONE AND ACETAMINOPHEN 1 TABLET: 10; 325 TABLET ORAL at 09:21

## 2025-04-28 RX ADMIN — GABAPENTIN 200 MG: 100 CAPSULE ORAL at 20:48

## 2025-04-28 RX ADMIN — BUPROPION HYDROCHLORIDE 150 MG: 150 TABLET, FILM COATED, EXTENDED RELEASE ORAL at 20:48

## 2025-04-28 RX ADMIN — OXYCODONE AND ACETAMINOPHEN 1 TABLET: 10; 325 TABLET ORAL at 23:01

## 2025-04-28 RX ADMIN — FLUDROCORTISONE ACETATE 100 MCG: 0.1 TABLET ORAL at 14:18

## 2025-04-28 RX ADMIN — NAPROXEN 500 MG: 250 TABLET ORAL at 09:22

## 2025-04-28 RX ADMIN — CALCIUM CARBONATE 2 TABLET: 500 TABLET, CHEWABLE ORAL at 09:21

## 2025-04-28 RX ADMIN — DIPHENHYDRAMINE HYDROCHLORIDE AND LIDOCAINE HYDROCHLORIDE AND ALUMINUM HYDROXIDE AND MAGNESIUM HYDRO 10 ML: KIT at 20:49

## 2025-04-28 RX ADMIN — PIPERACILLIN AND TAZOBACTAM 3.38 G: 3; .375 INJECTION, POWDER, FOR SOLUTION INTRAVENOUS at 14:18

## 2025-04-28 RX ADMIN — NAPROXEN 500 MG: 250 TABLET ORAL at 18:49

## 2025-04-28 RX ADMIN — MIDODRINE HYDROCHLORIDE 10 MG: 10 TABLET ORAL at 09:22

## 2025-04-28 RX ADMIN — ATORVASTATIN CALCIUM 10 MG: 10 TABLET, FILM COATED ORAL at 20:48

## 2025-04-28 RX ADMIN — DIPHENHYDRAMINE HYDROCHLORIDE AND LIDOCAINE HYDROCHLORIDE AND ALUMINUM HYDROXIDE AND MAGNESIUM HYDRO 10 ML: KIT at 09:23

## 2025-04-28 RX ADMIN — GABAPENTIN 200 MG: 100 CAPSULE ORAL at 09:20

## 2025-04-28 RX ADMIN — OXYCODONE AND ACETAMINOPHEN 1 TABLET: 10; 325 TABLET ORAL at 14:19

## 2025-04-28 RX ADMIN — MEGESTROL ACETATE 400 MG: 400 SUSPENSION ORAL at 09:21

## 2025-04-28 RX ADMIN — DIPHENHYDRAMINE HYDROCHLORIDE AND LIDOCAINE HYDROCHLORIDE AND ALUMINUM HYDROXIDE AND MAGNESIUM HYDRO 10 ML: KIT at 03:25

## 2025-04-28 RX ADMIN — ONDANSETRON 4 MG: 2 INJECTION INTRAMUSCULAR; INTRAVENOUS at 07:51

## 2025-04-28 NOTE — PLAN OF CARE
Goal Outcome Evaluation:      Patient had episode of confusion today, cxr completed, continues on 3L per nasal canula, c/o pain treated per MAR, will continue with plan of care

## 2025-04-28 NOTE — PLAN OF CARE
Goal Outcome Evaluation:  Plan of Care Reviewed With: patient        Progress: no change  Outcome Evaluation: Patient is alert and oriented. On 3.5L NC with no signs of respiratory distress. Report of nausea, treated per MAR. Plans to speak with oncologist this morning about her plan of care to determine her end of life care. Up to BSC with stand by assist. Will continue with POC for remainder of shift.

## 2025-04-28 NOTE — PROGRESS NOTES
Hematology/Oncology Progress Note     Subjective     Patient seen and examined.  Patient is feeling better.  She is wanting to continue fighting her cancer.  Platelets are lower today. ANC better. G-CSF to be stopped.      ROS: Negative except for ones mentioned in HPI.         Objective      Vitals:    04/28/25 1505   BP: 141/70   Pulse:    Resp: 19   Temp: 98.9 °F (37.2 °C)   SpO2: 97%     Physical Exam  Vitals reviewed.   Constitutional:       Appearance: Normal appearance.   HENT:      Head: Normocephalic.      Nose: Nose normal.      Mouth/Throat:      Mouth: Mucous membranes are moist.   Eyes:      General: No scleral icterus.     Conjunctiva/sclera: Conjunctivae normal.   Cardiovascular:      Rate and Rhythm: Normal rate.      Pulses: Normal pulses.   Pulmonary:      Effort: Pulmonary effort is normal.      Breath sounds: Normal breath sounds.   Musculoskeletal:      Cervical back: Normal range of motion.   Skin:     General: Skin is warm.   Neurological:      Mental Status: She is alert. Mental status is at baseline.   Psychiatric:         Mood and Affect: Mood normal.         Behavior: Behavior normal.          Labs, Imaging, Pathology:  CBC          4/26/2025    03:26 4/26/2025    11:50 4/27/2025    05:06 4/28/2025    03:38   CBC   WBC 0.83  0.79  1.32  2.89    RBC 2.07  2.20  2.42  2.56    Hemoglobin 6.5  6.8  7.5  8.0    Hematocrit 19.5  20.5  22.2  23.7    MCV 94.2  93.2  91.7  92.6    MCH 31.4  30.9  31.0  31.3    MCHC 33.3  33.2  33.8  33.8    RDW 16.4  16.3  16.6  16.9    Platelets 8  7  46  27      CMP          4/26/2025    03:26 4/27/2025    05:06 4/28/2025    03:38   CMP   Glucose 73  88  70    BUN 10  9  7    Creatinine 0.70  0.77  0.78    EGFR 100.4  89.5  88.2    Sodium 139  137  140    Potassium 4.3  4.0  4.4    Chloride 108  106  108    Calcium 6.7  7.1  7.1    BUN/Creatinine Ratio 14.3  11.7  9.0    Anion Gap 9.6  8.8  10.5           Assessment & Plan   Small cell lung cancer  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  Continue transfusion support for hemoglobin less than 7 or symptomatic anemia and platelets less than 20K or bleeding manifestations. She is expected to have cytopenia for several days from the effect of chemotherapy.  - Continue neutropenic precautions.  -Patient has completed 2 cycles of chemotherapy with topotecan.  Certainly will need to make dose adjustments to her  chemo considering her cytopenias and tolerance.  Patient is wanting to continue fighting her cancer.  Will continue to provide supportive care and have patient follow-up as regularly scheduled with me in clinic.  Will consider resuming chemo at that time with dose adjustments.  Plan to get repeat CT scans for staging after cycle 3.  -   She does have other treatment options for small cell lung cancer including lurbinectedin and Tarlatamab.     I reviewed recent lab, imaging, and pathology results as available this visit and interpreted independently and discussed with the patient.       Brian Dickson MD  Hematology/Oncology

## 2025-04-28 NOTE — PROGRESS NOTES
[facility]     Progress Note    Patient Name: Lluvia Archer  : 1965  MRN: [id number]  Primary Care Physician:  Aleksandar Edge DO  Date of admission: 2025    Subjective patient is very pleasant but she is complaining of no appetite and feels weak and tired.  According to patient she has no desire to eat    Review of Systems  All review of systems are negative except as mentioned in subjective complaints.    Objective   Objective     Vitals:   Temp:  [97.5 °F (36.4 °C)-98.6 °F (37 °C)] 97.9 °F (36.6 °C)  Heart Rate:  [] 79  Resp:  [18] 18  BP: ()/(60-76) 121/76  Flow (L/min) (Oxygen Therapy):  [3.5] 3.5  Physical Exam    Constitutional: Awake, alert responsive, conversant, no obvious distress              Psychiatric:  Appropriate affect, cooperative   Neurologic:  Awake alert ,oriented x 3, strength symmetric in all extremities, Cranial Nerves grossly intact to confrontation, speech clear   Eyes:   PERRLA, sclerae anicteric, no conjunctival injection   HEENT:  Moist mucous membranes, no nasal or eye discharge, no throat congestion   Neck:   Supple, no thyromegaly, no lymphadenopathy, trachea midline, no elevated JVD   Respiratory:  Clear to auscultation bilaterally, nonlabored respirations    Cardiovascular: RRR, no murmurs, rubs, or gallops, palpable pedal pulses bilaterally, No bilateral ankle edema   Gastrointestinal: Positive bowel sounds, soft, nontender, nondistended, no organomegaly   Musculoskeletal:  No clubbing or cyanosis to extremities,muscle wasting, joint swelling, muscle weakness             Skin:                      No rashes, bruising, skin ulcers, petechiae or ecchymosis    Result Review    Result Review:  I have personally reviewed the results from the time of this admission to 2025 09:04 EDT and agree with these findings:  []  Laboratory  []  Microbiology  []  Radiology  []  EKG/Telemetry   []  Cardiology/Vascular   []  Pathology  []  Old  records  []  Other:    Results from last 7 days   Lab Units 04/28/25  0338 04/27/25  0506 04/26/25  1150 04/26/25  0326 04/25/25  0512 04/24/25  0358 04/23/25  0352   WBC 10*3/mm3 2.89* 1.32* 0.79* 0.83* 0.85* 0.89* 0.93*   HEMOGLOBIN g/dL 8.0* 7.5* 6.8* 6.5* 7.2* 7.5* 7.8*   PLATELETS 10*3/mm3 27* 46* 7* 8* 16* 32* 58*     Results from last 7 days   Lab Units 04/28/25  0338 04/27/25  0506 04/26/25  0326 04/25/25  0512 04/24/25  0754 04/24/25  0358 04/23/25  2349 04/23/25  0749 04/23/25  0352 04/22/25  1807 04/22/25  1533 04/21/25  1340   SODIUM mmol/L 140 137 139 135* 131* 130* 130*   < > 124*   < > 121* 120*   POTASSIUM mmol/L 4.4 4.0 4.3 4.2  --  4.4  --   --  3.3*  --  3.8 3.7   CHLORIDE mmol/L 108* 106 108* 104  --  102  --   --  92*  --  88* 87*   CO2 mmol/L 21.5* 22.2 21.4* 20.8*  --  20.7*  --   --  20.9*  --  22.5 22.7   ANION GAP mmol/L 10.5 8.8 9.6 10.2  --  7.3  --   --  11.1  --  10.5 10.3   BUN mg/dL 7 9 10 17  --  15  --   --  8  --  9 8   CREATININE mg/dL 0.78 0.77 0.70 0.72  --  0.68  --   --  0.61  --  0.66 0.65   GLUCOSE mg/dL 70 88 73 80  --  86  --   --  86  --  104* 135*   EGFR mL/min/1.73 88.2 89.5 100.4 97.1  --  101.1  --   --  103.8  --  101.8 102.2   CALCIUM mg/dL 7.1* 7.1* 6.7* 7.0*  --  7.4*  --   --  7.0*  --  7.9* 7.6*   MAGNESIUM mg/dL  --   --   --   --   --  2.1  --   --  2.0   < >  --  2.0   ALBUMIN g/dL  --   --   --   --   --   --   --   --   --   --  3.9 3.6   BILIRUBIN mg/dL  --   --   --   --   --   --   --   --   --   --  0.4 0.5   ALK PHOS U/L  --   --   --   --   --   --   --   --   --   --  75 75   ALT (SGPT) U/L  --   --   --   --   --   --   --   --   --   --  10 12   AST (SGOT) U/L  --   --   --   --   --   --   --   --   --   --  9 15    < > = values in this interval not displayed.       Scheduled Meds:atorvastatin, 10 mg, Oral, Nightly  budesonide-formoterol, 2 puff, Inhalation, BID - RT   And  revefenacin, 175 mcg, Nebulization, Daily - RT  buPROPion SR, 150 mg,  Oral, BID  calcium carbonate, 2 tablet, Oral, Daily  dronabinol, 5 mg, Oral, Daily  enoxaparin sodium, 40 mg, Subcutaneous, Daily  [Held by provider] escitalopram, 20 mg, Oral, Daily  famotidine, 40 mg, Oral, BID  First Mouthwash (Magic Mouthwash), 10 mL, Swish & Spit, Q6H  gabapentin, 200 mg, Oral, TID  [Held by provider] lisinopril, 5 mg, Oral, Daily  megestrol, 400 mg, Oral, Daily  [Held by provider] metoprolol tartrate, 12.5 mg, Oral, BID  midodrine, 10 mg, Oral, TID AC  naproxen, 500 mg, Oral, BID With Meals  piperacillin-tazobactam, 3.375 g, Intravenous, Q8H  sodium chloride, 10 mL, Intravenous, Q12H  sodium chloride, 1 g, Oral, TID With Meals      Continuous Infusions:   PRN Meds:.  albuterol    albuterol sulfate HFA    senna-docusate sodium **AND** polyethylene glycol **AND** bisacodyl **AND** bisacodyl    hydrOXYzine    [Held by provider] LORazepam    melatonin    ondansetron ODT **OR** ondansetron    oxyCODONE-acetaminophen    phenol    prochlorperazine    sodium chloride    sodium chloride    Assessment & Plan   Assessment / Plan       Active Hospital Problems:    Active Hospital Problems    Diagnosis  POA    **Pancytopenia due to antineoplastic chemotherapy [D61.810, T45.1X5A]  Yes    Chronic hypotension [I95.89]  Unknown     Start ProAmatine      Hyponatremia [E87.1]  Yes     Secondary to SIADH.  Resolved         Plan:   Try Marinol Megace and hopefully it helps to stimulate her appetite       Electronically signed by Latonia Latham MD, 04/28/25, 9:04 AM EDT.

## 2025-04-28 NOTE — PROGRESS NOTES
[facility]   Progress Note    Patient Name: Lluvia Archer  : 1965  MRN: [id number]  Primary Care Physician:  Aleksandar Edge DO  Date of admission: 2025    Subjective   Subjective     I have examined this patient at bedside this morning. No acute events overnight. Patient reports feeling well. Patient is having bowel movements and is urinating. Patient is tolerating diet. I discussed the case with the patient's RN.      Review of Systems   Constitutional:  Negative for chills, diaphoresis, fatigue and fever.   HENT:  Negative for facial swelling, rhinorrhea, sneezing, sore throat, trouble swallowing and voice change.    Eyes:  Negative for photophobia, redness and visual disturbance.   Respiratory:  Negative for cough, shortness of breath and stridor.    Cardiovascular:  Negative for chest pain, palpitations and leg swelling.   Gastrointestinal:  Negative for abdominal pain, anal bleeding, blood in stool, constipation, diarrhea, nausea and vomiting.   Endocrine: Negative for polyuria.   Genitourinary:  Negative for difficulty urinating, dysuria, frequency, hematuria and urgency.   Musculoskeletal:  Negative for arthralgias, back pain, joint swelling, myalgias and neck stiffness.   Skin:  Negative for rash and wound.   Allergic/Immunologic: Negative for immunocompromised state.   Neurological:  Negative for seizures, syncope, facial asymmetry, speech difficulty, weakness, light-headedness, numbness and headaches.   Hematological:  Does not bruise/bleed easily.   Psychiatric/Behavioral:  Negative for agitation, confusion and hallucinations.        Objective   Objective     Vitals:   Temp:  [97.7 °F (36.5 °C)-98.6 °F (37 °C)] 98.2 °F (36.8 °C)  Heart Rate:  [] 80  Resp:  [18] 18  BP: ()/(54-76) 86/54  Flow (L/min) (Oxygen Therapy):  [3.5] 3.5    Physical Exam  Constitutional:       General: She is not in acute distress.     Appearance: Normal appearance. She is obese. She is  ill-appearing. She is not toxic-appearing or diaphoretic.   HENT:      Head: Normocephalic and atraumatic.      Nose: No rhinorrhea.      Mouth/Throat:      Mouth: Mucous membranes are moist.      Pharynx: Oropharynx is clear.   Eyes:      General: No scleral icterus.        Right eye: No discharge.         Left eye: No discharge.      Extraocular Movements: Extraocular movements intact.      Conjunctiva/sclera: Conjunctivae normal.   Cardiovascular:      Rate and Rhythm: Normal rate and regular rhythm.      Heart sounds: No murmur heard.  Pulmonary:      Effort: Pulmonary effort is normal. No respiratory distress.      Breath sounds: Normal breath sounds. No wheezing, rhonchi or rales.   Abdominal:      General: Abdomen is flat. There is no distension.      Palpations: Abdomen is soft.      Tenderness: There is no abdominal tenderness. There is no guarding.   Musculoskeletal:         General: No signs of injury.      Cervical back: No rigidity or tenderness.      Right lower leg: No edema.      Left lower leg: No edema.   Skin:     General: Skin is warm and dry.      Coloration: Skin is not jaundiced or pale.   Neurological:      General: No focal deficit present.      Mental Status: She is alert and oriented to person, place, and time. Mental status is at baseline.      Cranial Nerves: No cranial nerve deficit.      Motor: No weakness.   Psychiatric:         Mood and Affect: Mood normal.         Thought Content: Thought content normal.          Result Review    Result Review:  I have personally reviewed the results from the time of this admission to 4/28/2025 13:06 EDT and agree with these findings:  [x]  Laboratory list / accordion  [x]  Microbiology  []  Radiology  []  EKG/Telemetry   []  Cardiology/Vascular   []  Pathology  []  Old records  []  Other:        Assessment & Plan   Assessment / Plan     Brief Patient Summary:  Lluvia Archer is a 59 y.o. female who was sent to the emergency room by her oncologist  for concerns of abnormal lab work. In emergency room, the patient was noted to have an acute hyponatremia and was mildly altered. 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 Her Na started to improve. She also has pancytopenia. She was treated w/ zosyn but her pancytopenia did not improve. Oncology has started her on neupogen.  Her pancytopenia is improving after neupogen and transfusion of 1u prbc and 2u platelets.     Active Hospital Problems:  Active Hospital Problems    Diagnosis     **Pancytopenia due to antineoplastic chemotherapy     Chronic hypotension     Hyponatremia         Pancytopenia -  -Zosyn, magic mouthwash and nystatin  -Oncology has started neupogen  -1u prbc, 2u platelets ordered, improved cell counts yesterday  -oncology following     Hypotension  -IV fluids been given, blood pressure is borderline  -Lactic acid has been wnl  -started on midodrine today by nephrology, appreciate help  -Started on florinef this morning, pt had a suboptimal AM cortisol level  -if she has no response to florinef I suspect this may be her baseline bp    Candidiasis  -Cont nystatin     Acute hyponatremia - resolved  -Mentation is at baseline now and AOX4  -Sodium 130 this AM, trend Na  -sodium tablets and Urena by nephrology  -Following nephrology, appreciate recs     COPD  -No AE  -cont home trelegy interchange     HTN  -Hold home po meds, bp has been borderline     HLD  -Cont home lipitor     Depression/anxiety  -Continue home BuSpar, as needed Klonopin, as needed Atarax      Small cell lung cancer  -her oncologist is aware of her current admission     GERD  -Continue home Pepcid     Peripheral neuropathy  -Continue gabapentin                 VTE ppx: subq lovenox     Stress Ulcer ppx: home pepcid     Estimated Medical Readiness Discharge Date:  04/28/25     Anticipated Disposition: home w/ HH recommended at start of admission but pt will require pt eval  again     Discharge Milestones:  improvement in symptoms and labs           CODE STATUS:    Code Status (Patient has no pulse and is not breathing): CPR (Attempt to Resuscitate)  Medical Interventions (Patient has pulse or is breathing): Full Support  Level Of Support Discussed With: Patient      King Westbrook MD

## 2025-04-29 LAB
ANION GAP SERPL CALCULATED.3IONS-SCNC: 10.5 MMOL/L (ref 5–15)
BACTERIA SPEC AEROBE CULT: NORMAL
BACTERIA SPEC AEROBE CULT: NORMAL
BUN SERPL-MCNC: 7 MG/DL (ref 6–20)
BUN/CREAT SERPL: 8.5 (ref 7–25)
CALCIUM SPEC-SCNC: 7.4 MG/DL (ref 8.6–10.5)
CHLORIDE SERPL-SCNC: 107 MMOL/L (ref 98–107)
CO2 SERPL-SCNC: 22.5 MMOL/L (ref 22–29)
CREAT SERPL-MCNC: 0.82 MG/DL (ref 0.57–1)
DEPRECATED RDW RBC AUTO: 57.9 FL (ref 37–54)
EGFRCR SERPLBLD CKD-EPI 2021: 83 ML/MIN/1.73
EOSINOPHIL # BLD MANUAL: 0.17 10*3/MM3 (ref 0–0.4)
EOSINOPHIL NFR BLD MANUAL: 2 % (ref 0.3–6.2)
ERYTHROCYTE [DISTWIDTH] IN BLOOD BY AUTOMATED COUNT: 17 % (ref 12.3–15.4)
GLUCOSE SERPL-MCNC: 85 MG/DL (ref 65–99)
HCT VFR BLD AUTO: 23.1 % (ref 34–46.6)
HGB BLD-MCNC: 7.6 G/DL (ref 12–15.9)
LYMPHOCYTES # BLD MANUAL: 3.07 10*3/MM3 (ref 0.7–3.1)
LYMPHOCYTES NFR BLD MANUAL: 8 % (ref 5–12)
MCH RBC QN AUTO: 30.9 PG (ref 26.6–33)
MCHC RBC AUTO-ENTMCNC: 32.9 G/DL (ref 31.5–35.7)
MCV RBC AUTO: 93.9 FL (ref 79–97)
METAMYELOCYTES NFR BLD MANUAL: 2 % (ref 0–0)
MONOCYTES # BLD: 0.68 10*3/MM3 (ref 0.1–0.9)
MYELOCYTES NFR BLD MANUAL: 4 % (ref 0–0)
NEUTROPHILS # BLD AUTO: 4.09 10*3/MM3 (ref 1.7–7)
NEUTROPHILS NFR BLD MANUAL: 40 % (ref 42.7–76)
NEUTS BAND NFR BLD MANUAL: 8 % (ref 0–5)
PLATELET # BLD AUTO: 17 10*3/MM3 (ref 140–450)
PMV BLD AUTO: 11.3 FL (ref 6–12)
POLYCHROMASIA BLD QL SMEAR: ABNORMAL
POTASSIUM SERPL-SCNC: 4.2 MMOL/L (ref 3.5–5.2)
RBC # BLD AUTO: 2.46 10*6/MM3 (ref 3.77–5.28)
SCAN SLIDE: NORMAL
SMALL PLATELETS BLD QL SMEAR: ABNORMAL
SODIUM SERPL-SCNC: 140 MMOL/L (ref 136–145)
VARIANT LYMPHS NFR BLD MANUAL: 2 % (ref 0–5)
VARIANT LYMPHS NFR BLD MANUAL: 34 % (ref 19.6–45.3)
WBC MORPH BLD: NORMAL
WBC NRBC COR # BLD AUTO: 8.52 10*3/MM3 (ref 3.4–10.8)

## 2025-04-29 PROCEDURE — 25010000002 PIPERACILLIN SOD-TAZOBACTAM PER 1 G: Performed by: INTERNAL MEDICINE

## 2025-04-29 PROCEDURE — 94664 DEMO&/EVAL PT USE INHALER: CPT

## 2025-04-29 PROCEDURE — 99232 SBSQ HOSP IP/OBS MODERATE 35: CPT | Performed by: INTERNAL MEDICINE

## 2025-04-29 PROCEDURE — 94799 UNLISTED PULMONARY SVC/PX: CPT

## 2025-04-29 PROCEDURE — 85007 BL SMEAR W/DIFF WBC COUNT: CPT | Performed by: INTERNAL MEDICINE

## 2025-04-29 PROCEDURE — 25010000002 ENOXAPARIN PER 10 MG: Performed by: INTERNAL MEDICINE

## 2025-04-29 PROCEDURE — 97530 THERAPEUTIC ACTIVITIES: CPT

## 2025-04-29 PROCEDURE — 80048 BASIC METABOLIC PNL TOTAL CA: CPT | Performed by: INTERNAL MEDICINE

## 2025-04-29 PROCEDURE — 63710000001 REVEFENACIN 175 MCG/3ML SOLUTION: Performed by: HOSPITALIST

## 2025-04-29 PROCEDURE — 63710000001 DRONABINOL PER 2.5 MG: Performed by: INTERNAL MEDICINE

## 2025-04-29 PROCEDURE — 97110 THERAPEUTIC EXERCISES: CPT

## 2025-04-29 PROCEDURE — 85025 COMPLETE CBC W/AUTO DIFF WBC: CPT | Performed by: INTERNAL MEDICINE

## 2025-04-29 RX ORDER — NITROGLYCERIN 0.4 MG/1
0.4 TABLET SUBLINGUAL
Status: DISCONTINUED | OUTPATIENT
Start: 2025-04-29 | End: 2025-05-03 | Stop reason: HOSPADM

## 2025-04-29 RX ADMIN — OXYCODONE AND ACETAMINOPHEN 1 TABLET: 10; 325 TABLET ORAL at 22:07

## 2025-04-29 RX ADMIN — DIPHENHYDRAMINE HYDROCHLORIDE AND LIDOCAINE HYDROCHLORIDE AND ALUMINUM HYDROXIDE AND MAGNESIUM HYDRO 10 ML: KIT at 03:35

## 2025-04-29 RX ADMIN — FLUDROCORTISONE ACETATE 100 MCG: 0.1 TABLET ORAL at 10:24

## 2025-04-29 RX ADMIN — NAPROXEN 500 MG: 250 TABLET ORAL at 10:21

## 2025-04-29 RX ADMIN — FAMOTIDINE 40 MG: 20 TABLET, FILM COATED ORAL at 10:23

## 2025-04-29 RX ADMIN — GABAPENTIN 200 MG: 100 CAPSULE ORAL at 10:24

## 2025-04-29 RX ADMIN — ATORVASTATIN CALCIUM 10 MG: 10 TABLET, FILM COATED ORAL at 21:40

## 2025-04-29 RX ADMIN — MIDODRINE HYDROCHLORIDE 10 MG: 10 TABLET ORAL at 11:06

## 2025-04-29 RX ADMIN — DRONABINOL 5 MG: 2.5 CAPSULE ORAL at 11:07

## 2025-04-29 RX ADMIN — BUPROPION HYDROCHLORIDE 150 MG: 150 TABLET, FILM COATED, EXTENDED RELEASE ORAL at 10:23

## 2025-04-29 RX ADMIN — SODIUM CHLORIDE TAB 1 GM 1 G: 1 TAB at 17:27

## 2025-04-29 RX ADMIN — GABAPENTIN 200 MG: 100 CAPSULE ORAL at 21:39

## 2025-04-29 RX ADMIN — NAPROXEN 500 MG: 250 TABLET ORAL at 17:27

## 2025-04-29 RX ADMIN — FAMOTIDINE 40 MG: 20 TABLET, FILM COATED ORAL at 21:40

## 2025-04-29 RX ADMIN — BUDESONIDE AND FORMOTEROL FUMARATE DIHYDRATE 2 PUFF: 160; 4.5 AEROSOL RESPIRATORY (INHALATION) at 09:53

## 2025-04-29 RX ADMIN — MIDODRINE HYDROCHLORIDE 10 MG: 10 TABLET ORAL at 17:27

## 2025-04-29 RX ADMIN — PIPERACILLIN AND TAZOBACTAM 3.38 G: 3; .375 INJECTION, POWDER, FOR SOLUTION INTRAVENOUS at 22:07

## 2025-04-29 RX ADMIN — Medication 10 ML: at 10:25

## 2025-04-29 RX ADMIN — CALCIUM CARBONATE 2 TABLET: 500 TABLET, CHEWABLE ORAL at 11:07

## 2025-04-29 RX ADMIN — REVEFENACIN 175 MCG: 175 SOLUTION RESPIRATORY (INHALATION) at 09:53

## 2025-04-29 RX ADMIN — DIPHENHYDRAMINE HYDROCHLORIDE AND LIDOCAINE HYDROCHLORIDE AND ALUMINUM HYDROXIDE AND MAGNESIUM HYDRO 10 ML: KIT at 16:02

## 2025-04-29 RX ADMIN — ENOXAPARIN SODIUM 40 MG: 40 INJECTION SUBCUTANEOUS at 10:25

## 2025-04-29 RX ADMIN — PIPERACILLIN AND TAZOBACTAM 3.38 G: 3; .375 INJECTION, POWDER, FOR SOLUTION INTRAVENOUS at 06:36

## 2025-04-29 RX ADMIN — GABAPENTIN 200 MG: 100 CAPSULE ORAL at 16:02

## 2025-04-29 RX ADMIN — DIPHENHYDRAMINE HYDROCHLORIDE AND LIDOCAINE HYDROCHLORIDE AND ALUMINUM HYDROXIDE AND MAGNESIUM HYDRO 10 ML: KIT at 21:40

## 2025-04-29 RX ADMIN — BUDESONIDE AND FORMOTEROL FUMARATE DIHYDRATE 2 PUFF: 160; 4.5 AEROSOL RESPIRATORY (INHALATION) at 21:38

## 2025-04-29 RX ADMIN — BUPROPION HYDROCHLORIDE 150 MG: 150 TABLET, FILM COATED, EXTENDED RELEASE ORAL at 21:40

## 2025-04-29 RX ADMIN — MEGESTROL ACETATE 400 MG: 400 SUSPENSION ORAL at 10:21

## 2025-04-29 RX ADMIN — DIPHENHYDRAMINE HYDROCHLORIDE AND LIDOCAINE HYDROCHLORIDE AND ALUMINUM HYDROXIDE AND MAGNESIUM HYDRO 10 ML: KIT at 10:25

## 2025-04-29 RX ADMIN — PIPERACILLIN AND TAZOBACTAM 3.38 G: 3; .375 INJECTION, POWDER, FOR SOLUTION INTRAVENOUS at 16:01

## 2025-04-29 RX ADMIN — Medication 10 ML: at 21:39

## 2025-04-29 RX ADMIN — SODIUM CHLORIDE TAB 1 GM 1 G: 1 TAB at 11:58

## 2025-04-29 RX ADMIN — OXYCODONE AND ACETAMINOPHEN 1 TABLET: 10; 325 TABLET ORAL at 11:57

## 2025-04-29 NOTE — PLAN OF CARE
Goal Outcome Evaluation:  Plan of Care Reviewed With: patient           Outcome Evaluation: A/o x4. VSS. 3.5 L NC. Medicated for pain per mar. Precautions maintained. No additional needs at this time.

## 2025-04-29 NOTE — PROGRESS NOTES
[facility]     Progress Note    Patient Name: Lluvia Archer  : 1965  MRN: [id number]  Primary Care Physician:  Aleksandar Edge DO  Date of admission: 2025    Subjective patient is extremely pleasant.  She had a good meal last night she was started on Megace and Marinol    Review of Systems  All review of systems are negative except as mentioned in subjective complaints.    Objective   Objective     Vitals:   Temp:  [98.1 °F (36.7 °C)-98.9 °F (37.2 °C)] 98.1 °F (36.7 °C)  Heart Rate:  [72-84] 84  Resp:  [18-20] 18  BP: ()/(51-71) 124/65  Flow (L/min) (Oxygen Therapy):  [3.5] 3.5  Physical Exam    Constitutional: Awake, alert responsive, conversant, no obvious distress              Psychiatric:  Appropriate affect, cooperative   Neurologic:  Awake alert ,oriented x 3, strength symmetric in all extremities, Cranial Nerves grossly intact to confrontation, speech clear   Eyes:   PERRLA, sclerae anicteric, no conjunctival injection   HEENT:  Moist mucous membranes, no nasal or eye discharge, no throat congestion   Neck:   Supple, no thyromegaly, no lymphadenopathy, trachea midline, no elevated JVD   Respiratory:  Clear to auscultation bilaterally, nonlabored respirations    Cardiovascular: RRR, no murmurs, rubs, or gallops, palpable pedal pulses bilaterally, No bilateral ankle edema   Gastrointestinal: Positive bowel sounds, soft, nontender, nondistended, no organomegaly   Musculoskeletal:  No clubbing or cyanosis to extremities,muscle wasting, joint swelling, muscle weakness             Skin:                      No rashes, bruising, skin ulcers, petechiae or ecchymosis    Result Review    Result Review:  I have personally reviewed the results from the time of this admission to 2025 09:52 EDT and agree with these findings:  []  Laboratory  []  Microbiology  []  Radiology  []  EKG/Telemetry   []  Cardiology/Vascular   []  Pathology  []  Old records  []  Other:    Results from last  7 days   Lab Units 04/29/25  0510 04/28/25  0338 04/27/25  0506 04/26/25  1150 04/26/25  0326 04/25/25  0512 04/24/25  0358   WBC 10*3/mm3 8.52 2.89* 1.32* 0.79* 0.83* 0.85* 0.89*   HEMOGLOBIN g/dL 7.6* 8.0* 7.5* 6.8* 6.5* 7.2* 7.5*   PLATELETS 10*3/mm3 17* 27* 46* 7* 8* 16* 32*     Results from last 7 days   Lab Units 04/29/25  0510 04/28/25  0338 04/27/25  0506 04/26/25  0326 04/25/25  0512 04/24/25  0754 04/24/25  0358 04/23/25  0749 04/23/25  0352 04/22/25  1807 04/22/25  1533   SODIUM mmol/L 140 140 137 139 135* 131* 130*   < > 124*   < > 121*   POTASSIUM mmol/L 4.2 4.4 4.0 4.3 4.2  --  4.4  --  3.3*  --  3.8   CHLORIDE mmol/L 107 108* 106 108* 104  --  102  --  92*  --  88*   CO2 mmol/L 22.5 21.5* 22.2 21.4* 20.8*  --  20.7*  --  20.9*  --  22.5   ANION GAP mmol/L 10.5 10.5 8.8 9.6 10.2  --  7.3  --  11.1  --  10.5   BUN mg/dL 7 7 9 10 17  --  15  --  8  --  9   CREATININE mg/dL 0.82 0.78 0.77 0.70 0.72  --  0.68  --  0.61  --  0.66   GLUCOSE mg/dL 85 70 88 73 80  --  86  --  86  --  104*   EGFR mL/min/1.73 83.0 88.2 89.5 100.4 97.1  --  101.1  --  103.8  --  101.8   CALCIUM mg/dL 7.4* 7.1* 7.1* 6.7* 7.0*  --  7.4*  --  7.0*  --  7.9*   MAGNESIUM mg/dL  --   --   --   --   --   --  2.1  --  2.0   < >  --    ALBUMIN g/dL  --   --   --   --   --   --   --   --   --   --  3.9   BILIRUBIN mg/dL  --   --   --   --   --   --   --   --   --   --  0.4   ALK PHOS U/L  --   --   --   --   --   --   --   --   --   --  75   ALT (SGPT) U/L  --   --   --   --   --   --   --   --   --   --  10   AST (SGOT) U/L  --   --   --   --   --   --   --   --   --   --  9    < > = values in this interval not displayed.       Scheduled Meds:atorvastatin, 10 mg, Oral, Nightly  budesonide-formoterol, 2 puff, Inhalation, BID - RT   And  revefenacin, 175 mcg, Nebulization, Daily - RT  buPROPion SR, 150 mg, Oral, BID  calcium carbonate, 2 tablet, Oral, Daily  dronabinol, 5 mg, Oral, Daily  enoxaparin sodium, 40 mg, Subcutaneous, Daily  [Held  by provider] escitalopram, 20 mg, Oral, Daily  famotidine, 40 mg, Oral, BID  First Mouthwash (Magic Mouthwash), 10 mL, Swish & Spit, Q6H  fludrocortisone, 100 mcg, Oral, Daily  gabapentin, 200 mg, Oral, TID  [Held by provider] lisinopril, 5 mg, Oral, Daily  megestrol, 400 mg, Oral, Daily  [Held by provider] metoprolol tartrate, 12.5 mg, Oral, BID  midodrine, 10 mg, Oral, TID AC  naproxen, 500 mg, Oral, BID With Meals  piperacillin-tazobactam, 3.375 g, Intravenous, Q8H  sodium chloride, 10 mL, Intravenous, Q12H  sodium chloride, 1 g, Oral, TID With Meals      Continuous Infusions:   PRN Meds:.  albuterol    albuterol sulfate HFA    senna-docusate sodium **AND** polyethylene glycol **AND** bisacodyl **AND** bisacodyl    hydrOXYzine    [Held by provider] LORazepam    melatonin    ondansetron ODT **OR** ondansetron    oxyCODONE-acetaminophen    phenol    prochlorperazine    sodium chloride    sodium chloride    Assessment & Plan   Assessment / Plan       Active Hospital Problems:    Active Hospital Problems    Diagnosis  POA    **Pancytopenia due to antineoplastic chemotherapy [D61.810, T45.1X5A]  Yes    Chronic hypotension [I95.89]  Unknown     Start ProAmatine      Hyponatremia [E87.1]  Yes     Secondary to SIADH.  Resolved         Plan:   Continue present supportive care       Electronically signed by Latonia Latham MD, 04/29/25, 9:52 AM EDT.

## 2025-04-29 NOTE — THERAPY TREATMENT NOTE
Acute Care - Physical Therapy Treatment Note  KETTY Mcclellan     Patient Name: Lluvia Archer  : 1965  MRN: [id number]  Today's Date: 2025      Visit Dx:     ICD-10-CM ICD-9-CM   1. Hyponatremia  E87.1 276.1   2. Generalized weakness  R53.1 780.79   3. Malignant neoplasm of lung, unspecified laterality, unspecified part of lung  C34.90 162.9   4. Pancytopenia due to chemotherapy  D61.810 284.11   5. Difficulty walking  R26.2 719.7     Patient Active Problem List   Diagnosis    Abnormal mammogram    Anxiety    Arthritis    Chronic nausea    Chronic obstructive pulmonary disease (COPD)    Counseling on substance use and abuse    Esophageal reflux    Hernia, hiatal    Hyperlipidemia    Hypertension    Knee pain, right    Memory loss    Migraine    Moderate episode of recurrent major depressive disorder    Night sweats    Numbness    Sciatica of right side    Severe episode of recurrent major depressive disorder, without psychotic features    Shoulder pain, left    Other diseases of nasal cavity and sinuses    Sleep apnea, unspecified    Tobacco abuse    Strain of groin, right, subsequent encounter    Osteoarthritis of spine with radiculopathy, lumbar region    Chronic right-sided low back pain with right-sided sciatica    Aftercare following right hip joint replacement surgery    Primary osteoarthritis of right hip    Right hip pain    Sepsis, due to unspecified organism, unspecified whether acute organ dysfunction present    Severe malnutrition    Pneumonia of right lower lobe due to infectious organism    Hospital discharge follow-up    Other specified anemias    Encounter for screening mammogram for malignant neoplasm of breast    Multifocal pneumonia    Acute on chronic respiratory failure with hypoxia    Medicare annual wellness visit, subsequent    Community acquired pneumonia    Pneumonia due to infectious organism, unspecified laterality, unspecified part of lung    Mediastinal adenopathy    Small  cell lung cancer    Tobacco abuse, in remission    Chronic respiratory failure with hypoxia    Lung nodules    Encounter for adjustment or management of vascular access device    Dehydration    Intractable nausea and vomiting    Pancytopenia due to antineoplastic chemotherapy    Thrombocytopenia    Cancer, metastatic to bone    CAP (community acquired pneumonia)    Encounter for long-term (current) use of high-risk medication    Secondary malignant neoplasm of brain    Hyperkalemia    Hyponatremia    Chronic hypotension     Past Medical History:   Diagnosis Date    Abnormal mammogram     PT DENIES KNOWING OF THIS HX    Anxiety     Arthritis     R SHOULDER, R HIP, AND R LEG    Cancer     LUNG    Chronic nausea 01/15/2019    COPD (chronic obstructive pulmonary disease)     O2 3 LITERS NC CONT    Hernia, hiatal     Hyperlipidemia     Hypertension     Knee pain, right 2018    Memory loss     FORGETFULNESS ? ETIOLOGY    Migraine headache     Moderate episode of recurrent major depressive disorder 2017    Night sweats     Sciatica of right side 2017    Severe episode of recurrent major depressive disorder, without psychotic features 10/22/2019    Shingles     OUTBREAK 24 ON ANTIVIRAL , WHELPS NOTED NO SORES.    Shoulder pain, left 2018     Past Surgical History:   Procedure Laterality Date    BRONCHOSCOPY N/A 2022    Procedure: BRONCHOSCOPY WITH BRONCHOALVEOLAR LAVAGE, BIOPSY;  Surgeon: Shin Mcdonald DO;  Location: [facility] ENDOSCOPY;  Service: Pulmonary;  Laterality: N/A;  RIGHT LOWER LOBE INFILTRATE    BRONCHOSCOPY N/A 2024    Procedure: BRONCHOSCOPY WITH ENDOBRONCHIAL ULTRASOUND AND FINE NEEDLE ASPIRATE;  Surgeon: Shin Mcdonald DO;  Location: [facility] ENDOSCOPY;  Service: Pulmonary;  Laterality: N/A;  MEDIASTINAL ADENOPATHY     SECTION      CHOLECYSTECTOMY      LAPAROSCOPIC    COLONOSCOPY      PORTACATH PLACEMENT Left 2024    Procedure: INSERTION  [facility];  Surgeon: Josh Patton MD;  [place] [facility] [place] [facility]  Service: General;  Laterality: Left;    TOTAL HIP ARTHROPLASTY Right 5/13/2022    Procedure: RIGHT TOTAL HIP ARTHROPLASTY;  Surgeon: Cecil Gomes MD;  Location: [facility] MAIN OR;  Service: Orthopedics;  Laterality: Right;     PT Assessment (Last 12 Hours)       PT Evaluation and Treatment       Row Name 04/29/25 1403          Physical Therapy Time and Intention    Subjective Information complains of;weakness;fatigue;pain  -DK     Document Type therapy note (daily note)  -DK     Mode of Treatment individual therapy;physical therapy  -DK     Patient Effort good  -DK     Symptoms Noted During/After Treatment fatigue  -DK     Comment Pt is rather confused, impulsive.  She requires multiple repeated cues to perform activities correctly and appears easily distracted.  -DK       Row Name 04/29/25 1403          Pain    Pretreatment Pain Rating 5/10  -DK     Posttreatment Pain Rating 5/10  -DK     Pain Location back  -DK     Pain Side/Orientation generalized  -DK     Pain Management Interventions exercise or physical activity utilized  -DK       Row Name 04/29/25 1403          Cognition    Affect/Mental Status (Cognition) confused;flat/blunted affect  -DK     Orientation Status (Cognition) oriented to;person  -DK     Follows Commands (Cognition) physical/tactile prompts required;repetition of directions required;verbal cues/prompting required  -DK     Cognitive Function (Cognition) attention deficit  -DK     Attention Deficit (Cognition) minimal deficit;concentration;focused/sustained attention  easily distracted  -DK     Personal Safety Interventions gait belt;nonskid shoes/slippers when out of bed;supervised activity  -DK       Row Name 04/29/25 1403          Bed Mobility    Bed Mobility supine-sit-supine  -DK     Supine-Sit [place] (Bed Mobility) standby assist  -DK     Sit-Supine [place] (Bed Mobility) standby assist  -DK      Supine-Sit-Supine [place] (Bed Mobility) standby assist  -DK     Assistive Device (Bed Mobility) bed rails  -DK       Row Name 04/29/25 1403          Transfers    Transfers stand-sit transfer;sit-stand transfer  -DK       Row Name 04/29/25 1403          Sit-Stand Transfer    Sit-Stand [place] (Transfers) standby assist;contact guard;1 person assist  -DK     Assistive Device (Sit-Stand Transfers) --  no assistive device  -DK       Row Name 04/29/25 1403          Stand-Sit Transfer    Stand-Sit [place] (Transfers) standby assist;contact guard;1 person assist  -DK     Assistive Device (Stand-Sit Transfers) --  no assistive device  -DK       Row Name 04/29/25 1403          Gait/Stairs (Locomotion)    Gait/Stairs Locomotion gait/ambulation independence;gait/ambulation assistive device;distance ambulated;gait pattern  -DK     [place] Level (Gait) standby assist;contact guard;1 person assist  -DK     Assistive Device (Gait) --  no assistive device  -DK     Distance in Feet (Gait) 12  -DK     Pattern (Gait) step-to  -DK     Deviations/Abnormal Patterns (Gait) ashwin decreased;festinating/shuffling;gait speed decreased;stride length decreased;base of support, wide  -DK     Bilateral Gait Deviations forward flexed posture  -DK     Comment, (Gait/Stairs) Pt is somewhat impulsive with transfers and gait.  She was holding onto the O2 tubing, limiting extended ambulation. Pt ambulated with O2 and no assistive device.  She returned to bed post treatment.  -DK       Row Name 04/29/25 1403          Safety Issues/Impairments Affecting Functional Mobility    Safety Issues Affecting Function (Mobility) impulsivity;awareness of need for assistance;judgment;safety precaution awareness  -DK     Impairments Affecting Function (Mobility) balance;cognition;endurance/activity tolerance;pain;strength;shortness of breath  -DK     Cognitive Impairments, Mobility Safety/Performance attention;awareness, need for  assistance;impulsivity;judgment;safety precaution awareness  -       Row Name 04/29/25 1403          Balance    Balance Assessment sitting static balance;sitting dynamic balance;standing static balance;standing dynamic balance  -     Static Sitting Balance standby assist  -DK     Dynamic Sitting Balance standby assist  -DK     Position, Sitting Balance unsupported;sitting edge of bed  -DK     Static Standing Balance standby assist;contact guard;1-person assist  -DK     Dynamic Standing Balance standby assist;contact guard;1-person assist  -DK     Position/Device Used, Standing Balance --  no assistive device  -DK     Balance Interventions standing;dynamic;static;tandem gait;tandem standing  -       Row Name 04/29/25 1403          Motor Skills    Motor Skills --  therapeutic exercises  -     Therapeutic Exercise hip;knee;ankle  -     Additional Documentation --  Pt required repeated verbal cues and demonstration to perform exercises correctly and continuously.  -       Row Name 04/29/25 1403          Hip (Therapeutic Exercise)    Hip (Therapeutic Exercise) AROM (active range of motion)  -     Hip AROM (Therapeutic Exercise) bilateral;flexion;extension;aBduction;aDduction;sitting;15 repititions  -       Row Name 04/29/25 1403          Knee (Therapeutic Exercise)    Knee (Therapeutic Exercise) AROM (active range of motion)  -     Knee AROM (Therapeutic Exercise) bilateral;flexion;extension;LAQ (long arc quad);sitting;15 repititions  -       Row Name 04/29/25 1403          Ankle (Therapeutic Exercise)    Ankle (Therapeutic Exercise) AROM (active range of motion)  -     Ankle AROM (Therapeutic Exercise) bilateral;dorsiflexion;plantarflexion;sitting;15 repititions  -       Row Name 04/29/25 1403          Plan of Care Review    Plan of Care Reviewed With patient  -     Progress improving  -       Row Name 04/29/25 1403          Positioning and Restraints    Pre-Treatment Position in bed  -DK      Post Treatment Position bed  -DK     In Bed supine;call light within reach;encouraged to call for assist;side rails up x3;legs elevated  -DK               User Key  (r) = Recorded By, (t) = Taken By, (c) = Cosigned By      Initials Name Provider Type    Sakshi Lewis PTA Physical Therapist Assistant                      PT Recommendation and Plan     Plan of Care Reviewed With: patient  Progress: improving   Outcome Measures       Row Name 04/29/25 1403             How much help from another person do you currently need...    Turning from your back to your side while in flat bed without using bedrails? 4  -DK      Moving from lying on back to sitting on the side of a flat bed without bedrails? 4  -DK      Moving to and from a bed to a chair (including a wheelchair)? 3  -DK      Standing up from a chair using your arms (e.g., wheelchair, bedside chair)? 3  -DK      Climbing 3-5 steps with a railing? 2  -DK      To walk in hospital room? 3  -DK      AM-PAC 6 Clicks Score (PT) 19  -DK         Functional Assessment    Outcome Measure Options AM-PAC 6 Clicks Basic Mobility (PT)  -DK                User Key  (r) = Recorded By, (t) = Taken By, (c) = Cosigned By      Initials Name Provider Type    Sakshi Lewis PTA Physical Therapist Assistant                     Time Calculation:    PT Charges       Row Name 04/29/25 1410             Time Calculation    PT Received On 04/29/25  -DK      PT Goal Re-Cert Due Date 05/03/25  -DK         Timed Charges    45364 - PT Therapeutic Exercise Minutes 14  -DK      19210 - Gait Training Minutes  6  -DK      95238 - PT Therapeutic Activity Minutes 8  -DK         Total Minutes    Timed Charges Total Minutes 28  -DK       Total Minutes 28  -DK                User Key  (r) = Recorded By, (t) = Taken By, (c) = Cosigned By      Initials Name Provider Type    Sakshi Lewis PTA Physical Therapist Assistant                  Therapy Charges for Today       Code Description Service  Date Service Provider Modifiers Qty    [id number]  PT THER PROC EA 15 MIN 4/29/2025 Sakshi Odom, PTA GP 1    64093758913 HC PT THERAPEUTIC ACT EA 15 MIN 4/29/2025 Sakshi Odom PTA GP 1            PT G-Codes  Outcome Measure Options: AM-PAC 6 Clicks Basic Mobility (PT)  AM-PAC 6 Clicks Score (PT): 19    Sakshi Odom PTA  4/29/2025

## 2025-04-29 NOTE — PLAN OF CARE
Goal Outcome Evaluation:  Plan of Care Reviewed With: patient        Progress: improving  Outcome Evaluation: Patient A+Ox4 but does have frequent periods of confusion and patient will quickly realize that she is has said confused statements.  Patients appetite improves with medication.  Patient's pain is managed well per MAR.  Patient is independent as her pain and endurance will allow.

## 2025-04-29 NOTE — PROGRESS NOTES
[facility]   Progress Note    Patient Name: Lluvia Archer  : 1965  MRN: [id number]  Primary Care Physician:  Aleksandar Edge DO  Date of admission: 2025    Subjective   Subjective     No acute events overnight, patient resting comfortably in bed, denies any bleeding      Objective   Objective     Vitals:   Temp:  [97.9 °F (36.6 °C)-98.9 °F (37.2 °C)] 97.9 °F (36.6 °C)  Heart Rate:  [72-91] 91  Resp:  [18-20] 18  BP: ()/(51-71) 99/64  Flow (L/min) (Oxygen Therapy):  [3.5] 3.5    GEN: No acute distress  HEENT: Moist mucous membranes  LUNGS: Equal chest rise bilaterally  CARDIAC: Regular rate and rhythm  NEURO: Moving all 4 extremities spontaneously  SKIN: No obvious breakdown    Result Review    Result Review:  I have personally reviewed the results from the time of this admission to 2025 13:29 EDT and agree with these findings:  [x]  Laboratory list / accordion  [x]  Microbiology  []  Radiology  []  EKG/Telemetry   []  Cardiology/Vascular   []  Pathology  []  Old records  []  Other:        Assessment & Plan   Assessment / Plan     Brief Patient Summary:  Lluvia Archer is a 59 y.o. female who was sent to the emergency room by her oncologist for concerns of abnormal lab work. In emergency room, the patient was noted to have an acute hyponatremia and was mildly altered. 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 Her Na started to improve. She also has pancytopenia. She was treated w/ zosyn but her pancytopenia did not improve. Oncology has started her on neupogen.  Her pancytopenia is improving after neupogen and transfusion of 1u prbc and 2u platelets.     Active Hospital Problems:  Active Hospital Problems    Diagnosis     **Pancytopenia due to antineoplastic chemotherapy     Chronic hypotension     Hyponatremia         Pancytopenia -  -Zosyn, magic mouthwash and nystatin  -Oncology has started neupogen  -  continue to monitor cell counts closely  -oncology following     Hypotension  - Received IV fluids, monitor blood pressure closely  -Lactic acid has been wnl  - Continue midodrine today by nephrology, appreciate help  - Continue on florinef this morning, pt had a suboptimal AM cortisol level  -if she has no response to florinef I suspect this may be her baseline bp    Candidiasis  -Cont nystatin     Acute hyponatremia - resolved  -Mentation is at baseline now and AOX4  - Continue to monitor sodium closely  -sodium tablets and Urena by nephrology  -Following nephrology, appreciate recs     COPD  -No AE  -cont home trelegy interchange     HTN  -Hold home po meds, bp has been borderline     HLD  -Cont home lipitor     Depression/anxiety  -Continue home BuSpar, as needed Klonopin, as needed Atarax      Small cell lung cancer  -her oncologist is aware of her current admission     GERD  -Continue home Pepcid     Peripheral neuropathy  -Continue gabapentin                 VTE ppx: subq lovenox     Stress Ulcer ppx: home pepcid      Anticipated Disposition: home w/ HH recommended at start of admission but pt will require pt eval again     Discharge Milestones:  improvement in symptoms and labs           CODE STATUS:    Code Status (Patient has no pulse and is not breathing): CPR (Attempt to Resuscitate)  Medical Interventions (Patient has pulse or is breathing): Full Support  Level Of Support Discussed With: Patient        Electronically signed by Cecil Christensen MD, 4/29/2025, 13:30 EDT.

## 2025-04-30 ENCOUNTER — TELEPHONE (OUTPATIENT)
Dept: CASE MANAGEMENT | Facility: OTHER | Age: 60
End: 2025-04-30
Payer: MEDICARE

## 2025-04-30 DIAGNOSIS — J44.9 CHRONIC OBSTRUCTIVE PULMONARY DISEASE, UNSPECIFIED COPD TYPE: Primary | ICD-10-CM

## 2025-04-30 DIAGNOSIS — C34.30 SMALL CELL CARCINOMA OF LOWER LOBE OF LUNG, UNSPECIFIED LATERALITY: ICD-10-CM

## 2025-04-30 DIAGNOSIS — C79.31 SECONDARY MALIGNANT NEOPLASM OF BRAIN: ICD-10-CM

## 2025-04-30 LAB
ALBUMIN SERPL-MCNC: 3 G/DL (ref 3.5–5.2)
ALBUMIN/GLOB SERPL: 1.1 G/DL
ALP SERPL-CCNC: 105 U/L (ref 39–117)
ALT SERPL W P-5'-P-CCNC: 5 U/L (ref 1–33)
ANION GAP SERPL CALCULATED.3IONS-SCNC: 10.2 MMOL/L (ref 5–15)
AST SERPL-CCNC: 9 U/L (ref 1–32)
BASOPHILS # BLD AUTO: 0.01 10*3/MM3 (ref 0–0.2)
BASOPHILS NFR BLD AUTO: 0.1 % (ref 0–1.5)
BILIRUB SERPL-MCNC: 0.3 MG/DL (ref 0–1.2)
BUN SERPL-MCNC: 9 MG/DL (ref 6–20)
BUN/CREAT SERPL: 10.6 (ref 7–25)
CALCIUM SPEC-SCNC: 7.5 MG/DL (ref 8.6–10.5)
CHLORIDE SERPL-SCNC: 108 MMOL/L (ref 98–107)
CO2 SERPL-SCNC: 21.8 MMOL/L (ref 22–29)
CREAT SERPL-MCNC: 0.85 MG/DL (ref 0.57–1)
DEPRECATED RDW RBC AUTO: 57.8 FL (ref 37–54)
EGFRCR SERPLBLD CKD-EPI 2021: 79.5 ML/MIN/1.73
EOSINOPHIL # BLD AUTO: 0.17 10*3/MM3 (ref 0–0.4)
EOSINOPHIL NFR BLD AUTO: 1.2 % (ref 0.3–6.2)
ERYTHROCYTE [DISTWIDTH] IN BLOOD BY AUTOMATED COUNT: 17 % (ref 12.3–15.4)
GLOBULIN UR ELPH-MCNC: 2.7 GM/DL
GLUCOSE SERPL-MCNC: 91 MG/DL (ref 65–99)
HCT VFR BLD AUTO: 22.5 % (ref 34–46.6)
HGB BLD-MCNC: 7.3 G/DL (ref 12–15.9)
IMM GRANULOCYTES # BLD AUTO: 2.72 10*3/MM3 (ref 0–0.05)
IMM GRANULOCYTES NFR BLD AUTO: 18.7 % (ref 0–0.5)
LYMPHOCYTES # BLD AUTO: 1.84 10*3/MM3 (ref 0.7–3.1)
LYMPHOCYTES NFR BLD AUTO: 12.6 % (ref 19.6–45.3)
MAGNESIUM SERPL-MCNC: 1.8 MG/DL (ref 1.6–2.6)
MCH RBC QN AUTO: 30.4 PG (ref 26.6–33)
MCHC RBC AUTO-ENTMCNC: 32.4 G/DL (ref 31.5–35.7)
MCV RBC AUTO: 93.8 FL (ref 79–97)
MONOCYTES # BLD AUTO: 2.46 10*3/MM3 (ref 0.1–0.9)
MONOCYTES NFR BLD AUTO: 16.9 % (ref 5–12)
NEUTROPHILS NFR BLD AUTO: 50.5 % (ref 42.7–76)
NEUTROPHILS NFR BLD AUTO: 7.38 10*3/MM3 (ref 1.7–7)
NRBC BLD AUTO-RTO: 0 /100 WBC (ref 0–0.2)
PHOSPHATE SERPL-MCNC: 0.8 MG/DL (ref 2.5–4.5)
PLATELET # BLD AUTO: 13 10*3/MM3 (ref 140–450)
PMV BLD AUTO: 13.1 FL (ref 6–12)
POTASSIUM SERPL-SCNC: 3.8 MMOL/L (ref 3.5–5.2)
PROT SERPL-MCNC: 5.7 G/DL (ref 6–8.5)
RBC # BLD AUTO: 2.4 10*6/MM3 (ref 3.77–5.28)
SODIUM SERPL-SCNC: 140 MMOL/L (ref 136–145)
WBC NRBC COR # BLD AUTO: 14.58 10*3/MM3 (ref 3.4–10.8)

## 2025-04-30 PROCEDURE — 94799 UNLISTED PULMONARY SVC/PX: CPT

## 2025-04-30 PROCEDURE — P9035 PLATELET PHERES LEUKOREDUCED: HCPCS

## 2025-04-30 PROCEDURE — 25010000002 ENOXAPARIN PER 10 MG: Performed by: INTERNAL MEDICINE

## 2025-04-30 PROCEDURE — 99232 SBSQ HOSP IP/OBS MODERATE 35: CPT | Performed by: INTERNAL MEDICINE

## 2025-04-30 PROCEDURE — 94664 DEMO&/EVAL PT USE INHALER: CPT

## 2025-04-30 PROCEDURE — 36430 TRANSFUSION BLD/BLD COMPNT: CPT

## 2025-04-30 PROCEDURE — 63710000001 DRONABINOL PER 2.5 MG: Performed by: INTERNAL MEDICINE

## 2025-04-30 PROCEDURE — 25810000003 SODIUM CHLORIDE 0.9 % SOLUTION: Performed by: FAMILY MEDICINE

## 2025-04-30 PROCEDURE — 25810000003 SODIUM CHLORIDE 0.9 % SOLUTION: Performed by: STUDENT IN AN ORGANIZED HEALTH CARE EDUCATION/TRAINING PROGRAM

## 2025-04-30 PROCEDURE — 25010000002 PIPERACILLIN SOD-TAZOBACTAM PER 1 G: Performed by: INTERNAL MEDICINE

## 2025-04-30 PROCEDURE — 85025 COMPLETE CBC W/AUTO DIFF WBC: CPT | Performed by: INTERNAL MEDICINE

## 2025-04-30 PROCEDURE — 63710000001 REVEFENACIN 175 MCG/3ML SOLUTION: Performed by: HOSPITALIST

## 2025-04-30 PROCEDURE — 80053 COMPREHEN METABOLIC PANEL: CPT | Performed by: INTERNAL MEDICINE

## 2025-04-30 PROCEDURE — 83735 ASSAY OF MAGNESIUM: CPT | Performed by: INTERNAL MEDICINE

## 2025-04-30 PROCEDURE — P9037 PLATE PHERES LEUKOREDU IRRAD: HCPCS

## 2025-04-30 PROCEDURE — 84100 ASSAY OF PHOSPHORUS: CPT | Performed by: INTERNAL MEDICINE

## 2025-04-30 PROCEDURE — P9100 PATHOGEN TEST FOR PLATELETS: HCPCS

## 2025-04-30 RX ORDER — SODIUM CHLORIDE 1 G/1
1 TABLET ORAL DAILY
Status: DISCONTINUED | OUTPATIENT
Start: 2025-04-30 | End: 2025-05-01

## 2025-04-30 RX ORDER — FENTANYL/ROPIVACAINE/NS/PF 2-625MCG/1
15 PLASTIC BAG, INJECTION (ML) EPIDURAL
Status: COMPLETED | OUTPATIENT
Start: 2025-04-30 | End: 2025-04-30

## 2025-04-30 RX ORDER — FENTANYL/ROPIVACAINE/NS/PF 2-625MCG/1
15 PLASTIC BAG, INJECTION (ML) EPIDURAL ONCE
Status: COMPLETED | OUTPATIENT
Start: 2025-04-30 | End: 2025-04-30

## 2025-04-30 RX ADMIN — BUDESONIDE AND FORMOTEROL FUMARATE DIHYDRATE 2 PUFF: 160; 4.5 AEROSOL RESPIRATORY (INHALATION) at 21:50

## 2025-04-30 RX ADMIN — SODIUM CHLORIDE 15 MMOL: 9 INJECTION, SOLUTION INTRAVENOUS at 12:28

## 2025-04-30 RX ADMIN — PIPERACILLIN AND TAZOBACTAM 3.38 G: 3; .375 INJECTION, POWDER, FOR SOLUTION INTRAVENOUS at 18:50

## 2025-04-30 RX ADMIN — OXYCODONE AND ACETAMINOPHEN 1 TABLET: 10; 325 TABLET ORAL at 05:45

## 2025-04-30 RX ADMIN — NAPROXEN 500 MG: 250 TABLET ORAL at 09:24

## 2025-04-30 RX ADMIN — Medication 10 ML: at 09:33

## 2025-04-30 RX ADMIN — SODIUM CHLORIDE 15 MMOL: 9 INJECTION, SOLUTION INTRAVENOUS at 07:03

## 2025-04-30 RX ADMIN — MIDODRINE HYDROCHLORIDE 10 MG: 10 TABLET ORAL at 09:24

## 2025-04-30 RX ADMIN — FAMOTIDINE 40 MG: 20 TABLET, FILM COATED ORAL at 21:07

## 2025-04-30 RX ADMIN — ATORVASTATIN CALCIUM 10 MG: 10 TABLET, FILM COATED ORAL at 21:08

## 2025-04-30 RX ADMIN — DRONABINOL 5 MG: 2.5 CAPSULE ORAL at 09:26

## 2025-04-30 RX ADMIN — DIPHENHYDRAMINE HYDROCHLORIDE AND LIDOCAINE HYDROCHLORIDE AND ALUMINUM HYDROXIDE AND MAGNESIUM HYDRO 10 ML: KIT at 05:45

## 2025-04-30 RX ADMIN — CALCIUM CARBONATE 2 TABLET: 500 TABLET, CHEWABLE ORAL at 09:27

## 2025-04-30 RX ADMIN — ALBUTEROL SULFATE 2.5 MG: 2.5 SOLUTION RESPIRATORY (INHALATION) at 19:13

## 2025-04-30 RX ADMIN — BUPROPION HYDROCHLORIDE 150 MG: 150 TABLET, FILM COATED, EXTENDED RELEASE ORAL at 21:07

## 2025-04-30 RX ADMIN — Medication 10 ML: at 21:08

## 2025-04-30 RX ADMIN — NAPROXEN 500 MG: 250 TABLET ORAL at 17:47

## 2025-04-30 RX ADMIN — ESCITALOPRAM OXALATE 20 MG: 10 TABLET ORAL at 09:27

## 2025-04-30 RX ADMIN — REVEFENACIN 175 MCG: 175 SOLUTION RESPIRATORY (INHALATION) at 07:36

## 2025-04-30 RX ADMIN — OXYCODONE AND ACETAMINOPHEN 1 TABLET: 10; 325 TABLET ORAL at 18:51

## 2025-04-30 RX ADMIN — PIPERACILLIN AND TAZOBACTAM 3.38 G: 3; .375 INJECTION, POWDER, FOR SOLUTION INTRAVENOUS at 23:20

## 2025-04-30 RX ADMIN — FLUDROCORTISONE ACETATE 100 MCG: 0.1 TABLET ORAL at 09:27

## 2025-04-30 RX ADMIN — SODIUM CHLORIDE 15 MMOL: 9 INJECTION, SOLUTION INTRAVENOUS at 17:36

## 2025-04-30 RX ADMIN — MEGESTROL ACETATE 400 MG: 400 SUSPENSION ORAL at 09:26

## 2025-04-30 RX ADMIN — FAMOTIDINE 40 MG: 20 TABLET, FILM COATED ORAL at 09:24

## 2025-04-30 RX ADMIN — ENOXAPARIN SODIUM 40 MG: 40 INJECTION SUBCUTANEOUS at 09:26

## 2025-04-30 RX ADMIN — GABAPENTIN 200 MG: 100 CAPSULE ORAL at 09:26

## 2025-04-30 RX ADMIN — SODIUM CHLORIDE TAB 1 GM 1 G: 1 TAB at 09:32

## 2025-04-30 RX ADMIN — MIDODRINE HYDROCHLORIDE 10 MG: 10 TABLET ORAL at 17:47

## 2025-04-30 RX ADMIN — GABAPENTIN 200 MG: 100 CAPSULE ORAL at 17:47

## 2025-04-30 RX ADMIN — MIDODRINE HYDROCHLORIDE 10 MG: 10 TABLET ORAL at 12:27

## 2025-04-30 RX ADMIN — BUPROPION HYDROCHLORIDE 150 MG: 150 TABLET, FILM COATED, EXTENDED RELEASE ORAL at 09:24

## 2025-04-30 RX ADMIN — PIPERACILLIN AND TAZOBACTAM 3.38 G: 3; .375 INJECTION, POWDER, FOR SOLUTION INTRAVENOUS at 05:45

## 2025-04-30 RX ADMIN — GABAPENTIN 200 MG: 100 CAPSULE ORAL at 21:07

## 2025-04-30 RX ADMIN — OXYCODONE AND ACETAMINOPHEN 1 TABLET: 10; 325 TABLET ORAL at 22:57

## 2025-04-30 RX ADMIN — OXYCODONE AND ACETAMINOPHEN 1 TABLET: 10; 325 TABLET ORAL at 09:27

## 2025-04-30 RX ADMIN — SENNOSIDES AND DOCUSATE SODIUM 2 TABLET: 50; 8.6 TABLET ORAL at 21:08

## 2025-04-30 NOTE — PROGRESS NOTES
[facility]   Hospitalist Progress Note  Date: 2025  Patient Name: Lluvia Archer  : 1965  MRN: [id number]  Date of admission: 2025    Subjective   Subjective     Chief Complaint:   Pancytopenia    Summary:   Lluvia Archer is a 59 y.o. female who was sent to the emergency room by her oncologist for concerns of abnormal lab work. In emergency room, the patient was noted to have an acute hyponatremia and was mildly altered. 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 Her Na started to improve. She also has pancytopenia. She was treated w/ zosyn but her pancytopenia did not improve. Oncology has started her on neupogen.  Patient's white blood cell count is improving, her hemoglobin is stabilizing, her platelets remain low without any obvious sources of bleeding    Interval Followup:   No acute events over the past 24 hours, patient's platelets continue to downtrend, no obvious sources of bleeding at this time    Objective   Objective     Vitals:   Temp:  [97.9 °F (36.6 °C)-98.8 °F (37.1 °C)] 98.1 °F (36.7 °C)  Heart Rate:  [] 71  Resp:  [17-18] 18  BP: ()/(50-66) 114/50  Flow (L/min) (Oxygen Therapy):  [3.5-4] 4    Physical Exam   GEN: No acute distress  HEENT: Moist mucous membranes  LUNGS: Equal chest rise bilaterally  CARDIAC: Regular rate and rhythm  NEURO: Moving all 4 extremities spontaneously  SKIN: No obvious breakdown    Result Review    Result Review:  I have personally reviewed the results as below  [x]  Laboratory CBC, CMP personally reviewed  CBC          2025    03:38 2025    05:10 2025    05:00   CBC   WBC 2.89  8.52  14.58    RBC 2.56  2.46  2.40    Hemoglobin 8.0  7.6  7.3    Hematocrit 23.7  23.1  22.5    MCV 92.6  93.9  93.8    MCH 31.3  30.9  30.4    MCHC 33.8  32.9  32.4    RDW 16.9  17.0  17.0    Platelets 27  17  13      CMP          2025    03:38 2025    05:10 2025     05:00   CMP   Glucose 70  85  91    BUN 7  7  9    Creatinine 0.78  0.82  0.85    EGFR 88.2  83.0  79.5    Sodium 140  140  140    Potassium 4.4  4.2  3.8    Chloride 108  107  108    Calcium 7.1  7.4  7.5    Total Protein   5.7    Albumin   3.0    Globulin   2.7    Total Bilirubin   0.3    Alkaline Phosphatase   105    AST (SGOT)   9    ALT (SGPT)   5    Albumin/Globulin Ratio   1.1    BUN/Creatinine Ratio 9.0  8.5  10.6    Anion Gap 10.5  10.5  10.2      []  Microbiology  []  Radiology  []  EKG/Telemetry   []  Cardiology/Vascular   []  Pathology  []  Old records  []  Other:    Scheduled medications:  atorvastatin, 10 mg, Oral, Nightly  budesonide-formoterol, 2 puff, Inhalation, BID - RT   And  revefenacin, 175 mcg, Nebulization, Daily - RT  buPROPion SR, 150 mg, Oral, BID  calcium carbonate, 2 tablet, Oral, Daily  dronabinol, 5 mg, Oral, Daily  enoxaparin sodium, 40 mg, Subcutaneous, Daily  escitalopram, 20 mg, Oral, Daily  famotidine, 40 mg, Oral, BID  First Mouthwash (Magic Mouthwash), 10 mL, Swish & Spit, Q6H  fludrocortisone, 100 mcg, Oral, Daily  gabapentin, 200 mg, Oral, TID  [Held by provider] lisinopril, 5 mg, Oral, Daily  megestrol, 400 mg, Oral, Daily  [Held by provider] metoprolol tartrate, 12.5 mg, Oral, BID  midodrine, 10 mg, Oral, TID AC  naproxen, 500 mg, Oral, BID With Meals  piperacillin-tazobactam, 3.375 g, Intravenous, Q8H  potassium phosphate, 15 mmol, Intravenous, Q3H  sodium chloride, 10 mL, Intravenous, Q12H  sodium chloride, 1 g, Oral, Daily      As needed medications:    albuterol    albuterol sulfate HFA    senna-docusate sodium **AND** polyethylene glycol **AND** bisacodyl **AND** bisacodyl    hydrOXYzine    [Held by provider] LORazepam    melatonin    nitroglycerin    ondansetron ODT **OR** ondansetron    oxyCODONE-acetaminophen    phenol    prochlorperazine    sodium chloride    sodium chloride  Infusions:          Assessment & Plan   Assessment / Plan      Assessment:  Pancytopenia  Hypotension  Candidiasis  Acute hyponatremia  COPD  Hypertension  Hyperlipidemia  Depression/anxiety  Small cell lung cancer currently on treatment  GERD  Peripheral neuropathy    Plan:  Continues on empiric Zosyn for now  White blood cell count improved  Hemoglobin stabilizing  Platelets remain low, will transfuse 2 units on 4/30/2025  Monitor volume status closely, encourage oral intake  Blood pressure remains low, patient was started on Florinef, midodrine, had a.m. cortisol level drawn which was suboptimal, likely her baseline BP, may not be getting much benefit from Florinef and can consider discontinuing this in the next 24 hours  Being treated with nystatin for candidiasis  Serum sodium improved, nephrology consulted and following  Continue maintenance medications for COPD, no current exacerbation  Holding home antihypertensives  Continue Lipitor  Continue BuSpar, Atarax and as needed Klonopin for anxiety  Oncology consulted and following  Continue Pepcid  Continue Neurontin for neuropathy  CBC, CMP reviewed  Repeat CBC, CMP, mag and Phos in a.m.     Reviewed patient's labs and imaging, and discussed with patient and nurse at bedside.    VTE Prophylaxis:  Pharmacologic & mechanical VTE prophylaxis orders are present.        CODE STATUS:   Code Status (Patient has no pulse and is not breathing): CPR (Attempt to Resuscitate)  Medical Interventions (Patient has pulse or is breathing): Full Support  Level Of Support Discussed With: Patient      Electronically signed by Cecil Christensen MD, 4/30/2025, 10:32 EDT.

## 2025-04-30 NOTE — PLAN OF CARE
Goal Outcome Evaluation:  Plan of Care Reviewed With: patient        Progress: no change  Outcome Evaluation: A/o x4. VSS. Medicated per MAR. No additional needs at this time.

## 2025-04-30 NOTE — PROGRESS NOTES
[facility]     Progress Note    Patient Name: Lluvia Archer  : 1965  MRN: [id number]  Primary Care Physician:  Aleksandar Edge DO  Date of admission: 2025    Subjective   Patient has been having episodes of low blood pressures  Antihypertensives on hold  Sodium levels are stable  Phos levels low  Continues to be on 3 to 4 L of oxygen however saturating well at 100%  No major acute events otherwise overnight    Scheduled Meds:atorvastatin, 10 mg, Oral, Nightly  budesonide-formoterol, 2 puff, Inhalation, BID - RT   And  revefenacin, 175 mcg, Nebulization, Daily - RT  buPROPion SR, 150 mg, Oral, BID  calcium carbonate, 2 tablet, Oral, Daily  dronabinol, 5 mg, Oral, Daily  enoxaparin sodium, 40 mg, Subcutaneous, Daily  [Held by provider] escitalopram, 20 mg, Oral, Daily  famotidine, 40 mg, Oral, BID  First Mouthwash (Magic Mouthwash), 10 mL, Swish & Spit, Q6H  fludrocortisone, 100 mcg, Oral, Daily  gabapentin, 200 mg, Oral, TID  [Held by provider] lisinopril, 5 mg, Oral, Daily  megestrol, 400 mg, Oral, Daily  [Held by provider] metoprolol tartrate, 12.5 mg, Oral, BID  midodrine, 10 mg, Oral, TID AC  naproxen, 500 mg, Oral, BID With Meals  piperacillin-tazobactam, 3.375 g, Intravenous, Q8H  potassium phosphate, 15 mmol, Intravenous, Once  potassium phosphate, 15 mmol, Intravenous, Q3H  sodium chloride, 10 mL, Intravenous, Q12H  sodium chloride, 1 g, Oral, Daily      Continuous Infusions:   PRN Meds:.  albuterol    albuterol sulfate HFA    senna-docusate sodium **AND** polyethylene glycol **AND** bisacodyl **AND** bisacodyl    hydrOXYzine    [Held by provider] LORazepam    melatonin    nitroglycerin    ondansetron ODT **OR** ondansetron    oxyCODONE-acetaminophen    phenol    prochlorperazine    sodium chloride    sodium chloride       Review of Systems  Constitutional:        Weakness tiredness fatigue  Eyes:                       No blurry vision, eye discharge, eye irritation, eye  pain  HEENT:                   No acute hair loss, earache and discharge, nasal congestion or discharge, sore throat, postnasal drip  Respiratory:           No shortness of breath coughing sputum production wheezing hemoptysis pleuritic chest pain  Cardiovascular:     No chest pain, orthopnea, PND, dizziness, palpitation, lower extremity edema  Gastrointestinal:   No nausea vomiting diarrhea abdominal pain constipation  Genitourinary:       No urinary incontinence, hesitancy, frequency, urgency, dysuria  Hematologic:         No bruising, bleeding, pallor, lymphadenopathy  Endocrine:            No coldness, hot flashes, polyuria, abnormal hair growth  Musculoskeletal:    No body pains, aches, arthritic pains, muscle pain ,muscle wasting  Psychiatric:          No low or high mood, anxiety, hallucinations, delusions  Skin.                      No rash, ulcers, bruising, itching  Neurological:        No confusion, headache, focal weakness, numbness, dysphasia    Objective   Objective     Vitals:   Temp:  [97.9 °F (36.6 °C)-98.8 °F (37.1 °C)] 98.1 °F (36.7 °C)  Heart Rate:  [] 75  Resp:  [17-20] 17  BP: ()/(50-66) 114/50  Flow (L/min) (Oxygen Therapy):  [3.5-4] 4  Physical Exam    Constitutional: Awake, alert responsive, conversant, no obvious distress              Psychiatric:  Appropriate affect, cooperative   Neurologic:  Awake alert ,oriented x 3, strength symmetric in all extremities, Cranial Nerves grossly intact to confrontation, speech clear   Eyes:   PERRLA, sclerae anicteric, no conjunctival injection   HEENT:  Moist mucous membranes, no nasal or eye discharge, no throat congestion   Neck:   Supple, no thyromegaly, no lymphadenopathy, trachea midline, no elevated JVD   Respiratory:  Clear to auscultation bilaterally, nonlabored respirations    Cardiovascular: RRR, no murmurs, rubs, or gallops, palpable pedal pulses bilaterally, No bilateral ankle edema   Gastrointestinal: Positive bowel sounds,  soft, nontender, nondistended, no organomegaly   Musculoskeletal:  No clubbing or cyanosis to extremities,muscle wasting, joint swelling, muscle weakness             Skin:                      No rashes, bruising, skin ulcers, petechiae or ecchymosis    Result Review    Result Review:  I have personally reviewed the results from the time of this admission to 4/30/2025 07:18 EDT and agree with these findings:  []  Laboratory  []  Microbiology  []  Radiology  []  EKG/Telemetry   []  Cardiology/Vascular   []  Pathology  []  Old records  []  Other:    Assessment & Plan   Assessment / Plan       Active Hospital Problems:  Active Hospital Problems    Diagnosis     **Pancytopenia due to antineoplastic chemotherapy     Chronic hypotension     Hyponatremia      59-year-old female with past medical history of small cell lung cancer, COPD, hypertension, osteoarthritis, anxiety/depression, hyperlipidemia who was sent to the ER due to abnormal labs noted to have a sodium of 122-124 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  Patient is also on SSRIs urine osmolarity noted to be elevated and urine sodium at 30.  Sodium levels have improved and now stable at 140.  Patient pancytopenic improved with GM-CSF.  Patient's cortisol levels were on the lower side     Plan:   Will decrease salt tabs to 1 g daily  Phos repletion for 2 more doses  Patient is on Florinef for hypotension.  Off antihypertensives.  Response to medication is suboptimal.  Also on midodrine.  May be able to wean off Florinef.  Agree with primary that this may well be her new baseline  Okay from renal standpoint to restart the patient on Lexapro    Thank you for involving me in the care of the patient.  Will continue to follow along

## 2025-05-01 LAB
ABO GROUP BLD: NORMAL
ALBUMIN SERPL-MCNC: 3.2 G/DL (ref 3.5–5.2)
ALBUMIN/GLOB SERPL: 1.2 G/DL
ALP SERPL-CCNC: 107 U/L (ref 39–117)
ALT SERPL W P-5'-P-CCNC: 6 U/L (ref 1–33)
ANION GAP SERPL CALCULATED.3IONS-SCNC: 11.3 MMOL/L (ref 5–15)
AST SERPL-CCNC: 10 U/L (ref 1–32)
BASOPHILS # BLD AUTO: 0.01 10*3/MM3 (ref 0–0.2)
BASOPHILS NFR BLD AUTO: 0.1 % (ref 0–1.5)
BILIRUB SERPL-MCNC: 0.3 MG/DL (ref 0–1.2)
BLD GP AB SCN SERPL QL: NEGATIVE
BUN SERPL-MCNC: 8 MG/DL (ref 6–20)
BUN/CREAT SERPL: 10.4 (ref 7–25)
CALCIUM SPEC-SCNC: 7.3 MG/DL (ref 8.6–10.5)
CHLORIDE SERPL-SCNC: 108 MMOL/L (ref 98–107)
CO2 SERPL-SCNC: 20.7 MMOL/L (ref 22–29)
CREAT SERPL-MCNC: 0.77 MG/DL (ref 0.57–1)
DEPRECATED RDW RBC AUTO: 59.9 FL (ref 37–54)
EGFRCR SERPLBLD CKD-EPI 2021: 89.5 ML/MIN/1.73
EOSINOPHIL # BLD AUTO: 0.25 10*3/MM3 (ref 0–0.4)
EOSINOPHIL NFR BLD AUTO: 1.4 % (ref 0.3–6.2)
ERYTHROCYTE [DISTWIDTH] IN BLOOD BY AUTOMATED COUNT: 17.5 % (ref 12.3–15.4)
GLOBULIN UR ELPH-MCNC: 2.6 GM/DL
GLUCOSE SERPL-MCNC: 79 MG/DL (ref 65–99)
HCT VFR BLD AUTO: 21.3 % (ref 34–46.6)
HCT VFR BLD AUTO: 25.5 % (ref 34–46.6)
HGB BLD-MCNC: 6.8 G/DL (ref 12–15.9)
HGB BLD-MCNC: 8.4 G/DL (ref 12–15.9)
IMM GRANULOCYTES # BLD AUTO: 4.17 10*3/MM3 (ref 0–0.05)
IMM GRANULOCYTES NFR BLD AUTO: 23.2 % (ref 0–0.5)
LYMPHOCYTES # BLD AUTO: 2.26 10*3/MM3 (ref 0.7–3.1)
LYMPHOCYTES NFR BLD AUTO: 12.6 % (ref 19.6–45.3)
MAGNESIUM SERPL-MCNC: 1.7 MG/DL (ref 1.6–2.6)
MCH RBC QN AUTO: 30.1 PG (ref 26.6–33)
MCHC RBC AUTO-ENTMCNC: 31.9 G/DL (ref 31.5–35.7)
MCV RBC AUTO: 94.2 FL (ref 79–97)
MONOCYTES # BLD AUTO: 2.51 10*3/MM3 (ref 0.1–0.9)
MONOCYTES NFR BLD AUTO: 14 % (ref 5–12)
NEUTROPHILS NFR BLD AUTO: 48.7 % (ref 42.7–76)
NEUTROPHILS NFR BLD AUTO: 8.79 10*3/MM3 (ref 1.7–7)
NRBC BLD AUTO-RTO: 0.1 /100 WBC (ref 0–0.2)
PHOSPHATE SERPL-MCNC: 2.9 MG/DL (ref 2.5–4.5)
PLATELET # BLD AUTO: 65 10*3/MM3 (ref 140–450)
PMV BLD AUTO: 9 FL (ref 6–12)
POTASSIUM SERPL-SCNC: 4.1 MMOL/L (ref 3.5–5.2)
PROT SERPL-MCNC: 5.8 G/DL (ref 6–8.5)
RBC # BLD AUTO: 2.26 10*6/MM3 (ref 3.77–5.28)
RH BLD: POSITIVE
SODIUM SERPL-SCNC: 140 MMOL/L (ref 136–145)
T&S EXPIRATION DATE: NORMAL
WBC NRBC COR # BLD AUTO: 17.99 10*3/MM3 (ref 3.4–10.8)

## 2025-05-01 PROCEDURE — 84100 ASSAY OF PHOSPHORUS: CPT | Performed by: INTERNAL MEDICINE

## 2025-05-01 PROCEDURE — 94799 UNLISTED PULMONARY SVC/PX: CPT

## 2025-05-01 PROCEDURE — 63710000001 REVEFENACIN 175 MCG/3ML SOLUTION: Performed by: HOSPITALIST

## 2025-05-01 PROCEDURE — 83735 ASSAY OF MAGNESIUM: CPT | Performed by: INTERNAL MEDICINE

## 2025-05-01 PROCEDURE — 86850 RBC ANTIBODY SCREEN: CPT | Performed by: PHYSICIAN ASSISTANT

## 2025-05-01 PROCEDURE — 25010000002 ENOXAPARIN PER 10 MG: Performed by: INTERNAL MEDICINE

## 2025-05-01 PROCEDURE — 25010000002 ONDANSETRON PER 1 MG: Performed by: HOSPITALIST

## 2025-05-01 PROCEDURE — 85025 COMPLETE CBC W/AUTO DIFF WBC: CPT | Performed by: INTERNAL MEDICINE

## 2025-05-01 PROCEDURE — P9016 RBC LEUKOCYTES REDUCED: HCPCS

## 2025-05-01 PROCEDURE — 63710000001 DRONABINOL PER 2.5 MG: Performed by: INTERNAL MEDICINE

## 2025-05-01 PROCEDURE — 86900 BLOOD TYPING SEROLOGIC ABO: CPT

## 2025-05-01 PROCEDURE — 36430 TRANSFUSION BLD/BLD COMPNT: CPT

## 2025-05-01 PROCEDURE — 85018 HEMOGLOBIN: CPT | Performed by: INTERNAL MEDICINE

## 2025-05-01 PROCEDURE — 86923 COMPATIBILITY TEST ELECTRIC: CPT

## 2025-05-01 PROCEDURE — 85014 HEMATOCRIT: CPT | Performed by: INTERNAL MEDICINE

## 2025-05-01 PROCEDURE — 94664 DEMO&/EVAL PT USE INHALER: CPT

## 2025-05-01 PROCEDURE — 80053 COMPREHEN METABOLIC PANEL: CPT | Performed by: INTERNAL MEDICINE

## 2025-05-01 PROCEDURE — 25010000002 CALCIUM GLUCONATE 2-0.675 GM/100ML-% SOLUTION: Performed by: STUDENT IN AN ORGANIZED HEALTH CARE EDUCATION/TRAINING PROGRAM

## 2025-05-01 PROCEDURE — 97530 THERAPEUTIC ACTIVITIES: CPT

## 2025-05-01 PROCEDURE — 86901 BLOOD TYPING SEROLOGIC RH(D): CPT | Performed by: PHYSICIAN ASSISTANT

## 2025-05-01 PROCEDURE — 99233 SBSQ HOSP IP/OBS HIGH 50: CPT | Performed by: INTERNAL MEDICINE

## 2025-05-01 PROCEDURE — 86900 BLOOD TYPING SEROLOGIC ABO: CPT | Performed by: PHYSICIAN ASSISTANT

## 2025-05-01 RX ORDER — CALCIUM CARBONATE 500 MG/1
2 TABLET, CHEWABLE ORAL NIGHTLY
Status: DISCONTINUED | OUTPATIENT
Start: 2025-05-02 | End: 2025-05-03 | Stop reason: HOSPADM

## 2025-05-01 RX ORDER — CALCIUM GLUCONATE 20 MG/ML
2000 INJECTION, SOLUTION INTRAVENOUS ONCE
Status: COMPLETED | OUTPATIENT
Start: 2025-05-01 | End: 2025-05-01

## 2025-05-01 RX ADMIN — POLYETHYLENE GLYCOL 3350 17 G: 17 POWDER, FOR SOLUTION ORAL at 08:06

## 2025-05-01 RX ADMIN — OXYCODONE AND ACETAMINOPHEN 1 TABLET: 10; 325 TABLET ORAL at 18:22

## 2025-05-01 RX ADMIN — ALBUTEROL SULFATE 2.5 MG: 2.5 SOLUTION RESPIRATORY (INHALATION) at 19:52

## 2025-05-01 RX ADMIN — GABAPENTIN 200 MG: 100 CAPSULE ORAL at 21:57

## 2025-05-01 RX ADMIN — FAMOTIDINE 40 MG: 20 TABLET, FILM COATED ORAL at 08:05

## 2025-05-01 RX ADMIN — GABAPENTIN 200 MG: 100 CAPSULE ORAL at 18:22

## 2025-05-01 RX ADMIN — MEGESTROL ACETATE 400 MG: 400 SUSPENSION ORAL at 08:05

## 2025-05-01 RX ADMIN — BUPROPION HYDROCHLORIDE 150 MG: 150 TABLET, FILM COATED, EXTENDED RELEASE ORAL at 10:34

## 2025-05-01 RX ADMIN — FAMOTIDINE 40 MG: 20 TABLET, FILM COATED ORAL at 21:57

## 2025-05-01 RX ADMIN — BUDESONIDE AND FORMOTEROL FUMARATE DIHYDRATE 2 PUFF: 160; 4.5 AEROSOL RESPIRATORY (INHALATION) at 07:53

## 2025-05-01 RX ADMIN — DRONABINOL 5 MG: 2.5 CAPSULE ORAL at 08:05

## 2025-05-01 RX ADMIN — GABAPENTIN 200 MG: 100 CAPSULE ORAL at 08:05

## 2025-05-01 RX ADMIN — SODIUM CHLORIDE TAB 1 GM 1 G: 1 TAB at 08:05

## 2025-05-01 RX ADMIN — Medication 10 ML: at 08:06

## 2025-05-01 RX ADMIN — CALCIUM GLUCONATE 2000 MG: 20 INJECTION, SOLUTION INTRAVENOUS at 10:35

## 2025-05-01 RX ADMIN — NAPROXEN 500 MG: 250 TABLET ORAL at 18:22

## 2025-05-01 RX ADMIN — FLUDROCORTISONE ACETATE 100 MCG: 0.1 TABLET ORAL at 08:05

## 2025-05-01 RX ADMIN — NAPROXEN 500 MG: 250 TABLET ORAL at 08:04

## 2025-05-01 RX ADMIN — BUDESONIDE AND FORMOTEROL FUMARATE DIHYDRATE 2 PUFF: 160; 4.5 AEROSOL RESPIRATORY (INHALATION) at 19:50

## 2025-05-01 RX ADMIN — REVEFENACIN 175 MCG: 175 SOLUTION RESPIRATORY (INHALATION) at 07:53

## 2025-05-01 RX ADMIN — ONDANSETRON 4 MG: 2 INJECTION INTRAMUSCULAR; INTRAVENOUS at 18:21

## 2025-05-01 RX ADMIN — ENOXAPARIN SODIUM 40 MG: 40 INJECTION SUBCUTANEOUS at 08:05

## 2025-05-01 RX ADMIN — MIDODRINE HYDROCHLORIDE 10 MG: 10 TABLET ORAL at 08:04

## 2025-05-01 RX ADMIN — BUPROPION HYDROCHLORIDE 150 MG: 150 TABLET, FILM COATED, EXTENDED RELEASE ORAL at 21:57

## 2025-05-01 RX ADMIN — CALCIUM CARBONATE 2 TABLET: 500 TABLET, CHEWABLE ORAL at 08:05

## 2025-05-01 RX ADMIN — ATORVASTATIN CALCIUM 10 MG: 10 TABLET, FILM COATED ORAL at 21:58

## 2025-05-01 RX ADMIN — ESCITALOPRAM OXALATE 20 MG: 10 TABLET ORAL at 08:05

## 2025-05-01 RX ADMIN — Medication 10 ML: at 21:58

## 2025-05-01 RX ADMIN — MIDODRINE HYDROCHLORIDE 10 MG: 10 TABLET ORAL at 18:22

## 2025-05-01 NOTE — CONSULTS
Nutrition Services    Patient Name: Lluvia Archer  YOB: 1965  MRN: [id number]  Admission date: 4/22/2025    NUTRITION SCREENING      Encounter Information: Patient assessed by RD for LOS. Patient is at low risk per Malnutrition Screening Tool (MST).         PO Diet: Diet: Regular/House; Fluid Consistency: Thin (IDDSI 0)   PO Supplements: N/A   PO Intake:  ~50%       Labs (reviewed below): Reviewed        GI Function:  Last BM recorded on 4/26       Skin: Intact       Weight Hx Review: UBW of 200-210 lbs       Nutrition Intervention: No acute interventions at this time. RD will continue to follow per protocol.       Results from last 7 days   Lab Units 05/01/25  0519 04/30/25  0500 04/29/25  0510   SODIUM mmol/L 140 140 140   POTASSIUM mmol/L 4.1 3.8 4.2   CHLORIDE mmol/L 108* 108* 107   CO2 mmol/L 20.7* 21.8* 22.5   BUN mg/dL 8 9 7   CREATININE mg/dL 0.77 0.85 0.82   CALCIUM mg/dL 7.3* 7.5* 7.4*   BILIRUBIN mg/dL 0.3 0.3  --    ALK PHOS U/L 107 105  --    ALT (SGPT) U/L 6 5  --    AST (SGOT) U/L 10 9  --    GLUCOSE mg/dL 79 91 85     Results from last 7 days   Lab Units 05/01/25  0519 04/30/25  0500   MAGNESIUM mg/dL 1.7 1.8   PHOSPHORUS mg/dL 2.9 0.8*   HEMOGLOBIN g/dL 6.8* 7.3*   HEMATOCRIT % 21.3* 22.5*     COVID19   Date Value Ref Range Status   04/21/2025 Not Detected Not Detected - Ref. Range Final     Lab Results   Component Value Date    HGBA1C 5.80 (H) 05/01/2023       RD to follow up per protocol.    Electronically signed by:  Tamela Sanches RD  05/01/25 13:06 EDT

## 2025-05-01 NOTE — PLAN OF CARE
Goal Outcome Evaluation:  Plan of Care Reviewed With: patient        Progress: improving  Outcome Evaluation: Patient A+Ox4, with intermittent confusion, received 1 unit of PRBC today, patient states she feels much better.  Patient received a new walker today and is excited about using it.

## 2025-05-01 NOTE — PROGRESS NOTES
[facility]   Hospitalist Progress Note  Date: 2025  Patient Name: Lluvia Archer  : 1965  MRN: [id number]  Date of admission: 2025    Subjective   Subjective     Chief Complaint:   Pancytopenia    Summary:   Lluvia Archer is a 59 y.o. female who was sent to the emergency room by her oncologist for concerns of abnormal lab work. In emergency room, the patient was noted to have an acute hyponatremia and was mildly altered. 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 Her Na started to improve. She also has pancytopenia. She was treated w/ zosyn but her pancytopenia did not improve. Oncology has started her on neupogen.  Patient's white blood cell count is improving, her hemoglobin is stabilizing, her platelets remain low without any obvious sources of bleeding    Interval Followup:   Over the past 24 hours patient's hemoglobin dropped to 6.8, patient has no active sources of bleeding, patient received 2 units of platelets yesterday with improvement of her platelet count, white blood cell count continues to improve    Objective   Objective     Vitals:   Temp:  [97.5 °F (36.4 °C)-98.6 °F (37 °C)] 98.2 °F (36.8 °C)  Heart Rate:  [] 93  Resp:  [16-20] 18  BP: (102-150)/(56-89) 103/70  Flow (L/min) (Oxygen Therapy):  [3-4] 4    Physical Exam   GEN: No acute distress  HEENT: Moist mucous membranes  LUNGS: Equal chest rise bilaterally  CARDIAC: Regular rate and rhythm  NEURO: Moving all 4 extremities spontaneously  SKIN: No obvious breakdown    Result Review    Result Review:  I have personally reviewed the results as below  [x]  Laboratory CBC, CMP personally reviewed  CBC          2025    05:10 2025    05:00 2025    05:19   CBC   WBC 8.52  14.58  17.99    RBC 2.46  2.40  2.26    Hemoglobin 7.6  7.3  6.8    Hematocrit 23.1  22.5  21.3    MCV 93.9  93.8  94.2    MCH 30.9  30.4  30.1    MCHC 32.9  32.4  31.9    RDW  17.0  17.0  17.5    Platelets 17  13  65      CMP          4/29/2025    05:10 4/30/2025    05:00 5/1/2025    05:19   CMP   Glucose 85  91  79    BUN 7  9  8    Creatinine 0.82  0.85  0.77    EGFR 83.0  79.5  89.5    Sodium 140  140  140    Potassium 4.2  3.8  4.1    Chloride 107  108  108    Calcium 7.4  7.5  7.3    Total Protein  5.7  5.8    Albumin  3.0  3.2    Globulin  2.7  2.6    Total Bilirubin  0.3  0.3    Alkaline Phosphatase  105  107    AST (SGOT)  9  10    ALT (SGPT)  5  6    Albumin/Globulin Ratio  1.1  1.2    BUN/Creatinine Ratio 8.5  10.6  10.4    Anion Gap 10.5  10.2  11.3      []  Microbiology  []  Radiology  []  EKG/Telemetry   []  Cardiology/Vascular   []  Pathology  []  Old records  []  Other:    Scheduled medications:  atorvastatin, 10 mg, Oral, Nightly  budesonide-formoterol, 2 puff, Inhalation, BID - RT   And  revefenacin, 175 mcg, Nebulization, Daily - RT  buPROPion SR, 150 mg, Oral, BID  [START ON 5/2/2025] calcium carbonate, 2 tablet, Oral, Nightly  dronabinol, 5 mg, Oral, Daily  enoxaparin sodium, 40 mg, Subcutaneous, Daily  escitalopram, 20 mg, Oral, Daily  famotidine, 40 mg, Oral, BID  fludrocortisone, 100 mcg, Oral, Daily  gabapentin, 200 mg, Oral, TID  [Held by provider] lisinopril, 5 mg, Oral, Daily  megestrol, 400 mg, Oral, Daily  [Held by provider] metoprolol tartrate, 12.5 mg, Oral, BID  midodrine, 10 mg, Oral, TID AC  naproxen, 500 mg, Oral, BID With Meals  sodium chloride, 10 mL, Intravenous, Q12H      As needed medications:    albuterol    albuterol sulfate HFA    senna-docusate sodium **AND** polyethylene glycol **AND** bisacodyl **AND** bisacodyl    hydrOXYzine    [Held by provider] LORazepam    melatonin    nitroglycerin    ondansetron ODT **OR** ondansetron    oxyCODONE-acetaminophen    phenol    prochlorperazine    sodium chloride    sodium chloride  Infusions:          Assessment & Plan   Assessment / Plan     Assessment:  Pancytopenia  Hypotension  Candidiasis  Acute  hyponatremia  COPD  Hypertension  Hyperlipidemia  Depression/anxiety  Small cell lung cancer currently on treatment  GERD  Peripheral neuropathy    Plan:  Continues on empiric Zosyn for now  White blood cell count improved  Continue transfusion support as needed receiving 1 unit PRBCs today  Monitor volume status closely, encourage oral intake  Blood pressure remains low, patient was started on Florinef, midodrine, had a.m. cortisol level drawn which was suboptimal, likely her baseline BP, continue Florinef for now  Being treated with nystatin for candidiasis  Serum sodium improved, nephrology consulted and following  Continue maintenance medications for COPD, no current exacerbation  Holding home antihypertensives  Continue Lipitor  Continue BuSpar, Atarax and as needed Klonopin for anxiety  Oncology consulted and following  Continue Pepcid  Continue Neurontin for neuropathy  CBC, CMP reviewed 5/1/2025   Repeat CBC, CMP, mag and Phos in a.m. 5/1/2025      Blood products:  Platelets: 2 units 4/26/2025,  2 units 4/30/2025  PRBCs: 1 unit 4/26/2025, 1 unit 5/1/2025    Reviewed patient's labs and imaging, and discussed with patient and nurse at bedside.    VTE Prophylaxis:  Pharmacologic & mechanical VTE prophylaxis orders are present.    Time spent: Time spent involving patient care including face-to-face encounter 51 minutes    CODE STATUS:   Code Status (Patient has no pulse and is not breathing): CPR (Attempt to Resuscitate)  Medical Interventions (Patient has pulse or is breathing): Full Support  Level Of Support Discussed With: Patient      Electronically signed by Cecil Christensen MD, 5/1/2025, 11:10 EDT.

## 2025-05-01 NOTE — PLAN OF CARE
Goal Outcome Evaluation:  Plan of Care Reviewed With: patient        Progress: no change  Outcome Evaluation: Patient a&ox4 on 4L NC, VSS. Pain treated 1x via MAR. Antibiotics given, patient slept through most of the shift. No further changes                              no

## 2025-05-01 NOTE — PROGRESS NOTES
[facility]     Progress Note    Patient Name: Lluvia Archer  : 1965  MRN: [id number]  Primary Care Physician:  Aleksandar Edge DO  Date of admission: 2025    Subjective   Patient sodium levels are stable  Calcium levels are stable as well  Phos levels have improved  Hemoglobin noted to have dropped  White count improving  Blood pressures and vitals are stable    Scheduled Meds:atorvastatin, 10 mg, Oral, Nightly  budesonide-formoterol, 2 puff, Inhalation, BID - RT   And  revefenacin, 175 mcg, Nebulization, Daily - RT  buPROPion SR, 150 mg, Oral, BID  calcium carbonate, 2 tablet, Oral, Daily  dronabinol, 5 mg, Oral, Daily  enoxaparin sodium, 40 mg, Subcutaneous, Daily  escitalopram, 20 mg, Oral, Daily  famotidine, 40 mg, Oral, BID  fludrocortisone, 100 mcg, Oral, Daily  gabapentin, 200 mg, Oral, TID  [Held by provider] lisinopril, 5 mg, Oral, Daily  megestrol, 400 mg, Oral, Daily  [Held by provider] metoprolol tartrate, 12.5 mg, Oral, BID  midodrine, 10 mg, Oral, TID AC  naproxen, 500 mg, Oral, BID With Meals  sodium chloride, 10 mL, Intravenous, Q12H  sodium chloride, 1 g, Oral, Daily      Continuous Infusions:   PRN Meds:.  albuterol    albuterol sulfate HFA    senna-docusate sodium **AND** polyethylene glycol **AND** bisacodyl **AND** bisacodyl    hydrOXYzine    [Held by provider] LORazepam    melatonin    nitroglycerin    ondansetron ODT **OR** ondansetron    oxyCODONE-acetaminophen    phenol    prochlorperazine    sodium chloride    sodium chloride       Review of Systems  Constitutional:        Weakness tiredness fatigue  Eyes:                       No blurry vision, eye discharge, eye irritation, eye pain  HEENT:                   No acute hair loss, earache and discharge, nasal congestion or discharge, sore throat, postnasal drip  Respiratory:           No shortness of breath coughing sputum production wheezing hemoptysis pleuritic chest pain  Cardiovascular:     No chest pain,  orthopnea, PND, dizziness, palpitation, lower extremity edema  Gastrointestinal:   No nausea vomiting diarrhea abdominal pain constipation  Genitourinary:       No urinary incontinence, hesitancy, frequency, urgency, dysuria  Hematologic:         No bruising, bleeding, pallor, lymphadenopathy  Endocrine:            No coldness, hot flashes, polyuria, abnormal hair growth  Musculoskeletal:    No body pains, aches, arthritic pains, muscle pain ,muscle wasting  Psychiatric:          No low or high mood, anxiety, hallucinations, delusions  Skin.                      No rash, ulcers, bruising, itching  Neurological:        No confusion, headache, focal weakness, numbness, dysphasia    Objective   Objective     Vitals:   Temp:  [97.5 °F (36.4 °C)-98.6 °F (37 °C)] 97.7 °F (36.5 °C)  Heart Rate:  [] 81  Resp:  [16-20] 18  BP: (103-150)/(52-89) 115/72  Flow (L/min) (Oxygen Therapy):  [3-4] 4  Physical Exam    Constitutional: Awake, alert responsive, conversant, no obvious distress              Psychiatric:  Appropriate affect, cooperative   Neurologic:  Awake alert ,oriented x 3, strength symmetric in all extremities, Cranial Nerves grossly intact to confrontation, speech clear   Eyes:   PERRLA, sclerae anicteric, no conjunctival injection   HEENT:  Moist mucous membranes, no nasal or eye discharge, no throat congestion   Neck:   Supple, no thyromegaly, no lymphadenopathy, trachea midline, no elevated JVD   Respiratory:  Clear to auscultation bilaterally, nonlabored respirations    Cardiovascular: RRR, no murmurs, rubs, or gallops, palpable pedal pulses bilaterally, No bilateral ankle edema   Gastrointestinal: Positive bowel sounds, soft, nontender, nondistended, no organomegaly   Musculoskeletal:  No clubbing or cyanosis to extremities,muscle wasting, joint swelling, muscle weakness             Skin:                      No rashes, bruising, skin ulcers, petechiae or ecchymosis    Result Review    Result Review:  I  have personally reviewed the results from the time of this admission to 5/1/2025 08:26 EDT and agree with these findings:  []  Laboratory  []  Microbiology  []  Radiology  []  EKG/Telemetry   []  Cardiology/Vascular   []  Pathology  []  Old records  []  Other:    Assessment & Plan   Assessment / Plan       Active Hospital Problems:  Active Hospital Problems    Diagnosis     **Pancytopenia due to antineoplastic chemotherapy     Chronic hypotension     Hyponatremia      59-year-old female with past medical history of small cell lung cancer, COPD, hypertension, osteoarthritis, anxiety/depression, hyperlipidemia who was sent to the ER due to abnormal labs noted to have a sodium of 122-124 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  Patient is also on SSRIs urine osmolarity noted to be elevated and urine sodium at 30.  Sodium levels have improved and now stable at 140.  Patient pancytopenic improved with GM-CSF.  Patient's cortisol levels were on the lower side at 4.  Patient's status post platelet and blood transfusion     Plan:   Will discontinue salt tabs  Will give 2 g of calcium.  Will change calcium supplementation to at bedtime to avoid being given with food as it will act as a binder  Patient is on Florinef for hypotension and blood pressures have improved.  Also on midodrine.  Will continue at this time  Agree on restarting back on Lexapro.  Will continue to monitor    Thank you for involving me in the care of the patient.  Will continue to follow along

## 2025-05-01 NOTE — THERAPY TREATMENT NOTE
Acute Care - Physical Therapy Treatment Note  KETTY Mcclellan     Patient Name: Lluvia Archer  : 1965  MRN: [id number]  Today's Date: 2025      Visit Dx:     ICD-10-CM ICD-9-CM   1. Hyponatremia  E87.1 276.1   2. Generalized weakness  R53.1 780.79   3. Malignant neoplasm of lung, unspecified laterality, unspecified part of lung  C34.90 162.9   4. Pancytopenia due to chemotherapy  D61.810 284.11   5. Difficulty walking  R26.2 719.7     Patient Active Problem List   Diagnosis    Abnormal mammogram    Anxiety    Arthritis    Chronic nausea    Chronic obstructive pulmonary disease (COPD)    Counseling on substance use and abuse    Esophageal reflux    Hernia, hiatal    Hyperlipidemia    Hypertension    Knee pain, right    Memory loss    Migraine    Moderate episode of recurrent major depressive disorder    Night sweats    Numbness    Sciatica of right side    Severe episode of recurrent major depressive disorder, without psychotic features    Shoulder pain, left    Other diseases of nasal cavity and sinuses    Sleep apnea, unspecified    Tobacco abuse    Strain of groin, right, subsequent encounter    Osteoarthritis of spine with radiculopathy, lumbar region    Chronic right-sided low back pain with right-sided sciatica    Aftercare following right hip joint replacement surgery    Primary osteoarthritis of right hip    Right hip pain    Sepsis, due to unspecified organism, unspecified whether acute organ dysfunction present    Severe malnutrition    Pneumonia of right lower lobe due to infectious organism    Hospital discharge follow-up    Other specified anemias    Encounter for screening mammogram for malignant neoplasm of breast    Multifocal pneumonia    Acute on chronic respiratory failure with hypoxia    Medicare annual wellness visit, subsequent    Community acquired pneumonia    Pneumonia due to infectious organism, unspecified laterality, unspecified part of lung    Mediastinal adenopathy    Small  cell lung cancer    Tobacco abuse, in remission    Chronic respiratory failure with hypoxia    Lung nodules    Encounter for adjustment or management of vascular access device    Dehydration    Intractable nausea and vomiting    Pancytopenia due to antineoplastic chemotherapy    Thrombocytopenia    Cancer, metastatic to bone    CAP (community acquired pneumonia)    Encounter for long-term (current) use of high-risk medication    Secondary malignant neoplasm of brain    Hyperkalemia    Hyponatremia    Chronic hypotension     Past Medical History:   Diagnosis Date    Abnormal mammogram     PT DENIES KNOWING OF THIS HX    Anxiety     Arthritis     R SHOULDER, R HIP, AND R LEG    Cancer     LUNG    Chronic nausea 01/15/2019    COPD (chronic obstructive pulmonary disease)     O2 3 LITERS NC CONT    Hernia, hiatal     Hyperlipidemia     Hypertension     Knee pain, right 2018    Memory loss     FORGETFULNESS ? ETIOLOGY    Migraine headache     Moderate episode of recurrent major depressive disorder 2017    Night sweats     Sciatica of right side 2017    Severe episode of recurrent major depressive disorder, without psychotic features 10/22/2019    Shingles     OUTBREAK 24 ON ANTIVIRAL , WHELPS NOTED NO SORES.    Shoulder pain, left 2018     Past Surgical History:   Procedure Laterality Date    BRONCHOSCOPY N/A 2022    Procedure: BRONCHOSCOPY WITH BRONCHOALVEOLAR LAVAGE, BIOPSY;  Surgeon: Shin Mcdonald DO;  Location: [facility];  Service: Pulmonary;  Laterality: N/A;  RIGHT LOWER LOBE INFILTRATE    BRONCHOSCOPY N/A 2024    Procedure: BRONCHOSCOPY WITH ENDOBRONCHIAL ULTRASOUND AND FINE NEEDLE ASPIRATE;  Surgeon: Shin Mcdonald DO;  Location: [facility] ENDOSCOPY;  Service: Pulmonary;  Laterality: N/A;  MEDIASTINAL ADENOPATHY     SECTION      CHOLECYSTECTOMY      LAPAROSCOPIC    COLONOSCOPY      PORTACATH PLACEMENT Left 2024    Procedure: INSERTION  [facility];  Surgeon: Josh Patton MD;  [place] [facility] [place] [facility]  Service: General;  Laterality: Left;    TOTAL HIP ARTHROPLASTY Right 5/13/2022    Procedure: RIGHT TOTAL HIP ARTHROPLASTY;  Surgeon: Cecil Gomes MD;  Location: [facility] MAIN OR;  Service: Orthopedics;  Laterality: Right;     PT Assessment (Last 12 Hours)       PT Evaluation and Treatment       Row Name 05/01/25 1400          Physical Therapy Time and Intention    Subjective Information no complaints (P)   -RA     Document Type therapy note (daily note) (P)   -RA     Mode of Treatment individual therapy;physical therapy (P)   -RA     Patient Effort good (P)   -RA     Symptoms Noted During/After Treatment shortness of breath (P)   SpO2 back up to 98% end of session.  -RA       Row Name 05/01/25 1400          General Information    Patient Profile Reviewed yes (P)   -RA     Patient Observations alert;cooperative;agree to therapy (P)   -RA       Row Name 05/01/25 1400          Bed Mobility    Bed Mobility supine-sit;sit-supine (P)   -RA     Supine-Sit [place] (Bed Mobility) standby assist (P)   -RA     Sit-Supine [place] (Bed Mobility) standby assist (P)   -RA       Row Name 05/01/25 1400          Transfers    Transfers sit-stand transfer;stand-sit transfer (P)   -RA       Row Name 05/01/25 1400          Sit-Stand Transfer    Sit-Stand [place] (Transfers) contact guard (P)   -RA     Assistive Device (Sit-Stand Transfers) walker, front-wheeled (P)   -RA     Comment, (Sit-Stand Transfer) x2 (P)   -RA       Row Name 05/01/25 1400          Stand-Sit Transfer    Stand-Sit [place] (Transfers) contact guard (P)   -RA     Assistive Device (Stand-Sit Transfers) walker, front-wheeled (P)   -RA     Comment, (Stand-Sit Transfer) x2 (P)   -RA       Row Name 05/01/25 1400          Gait/Stairs (Locomotion)    Gait/Stairs Locomotion gait/ambulation assistive device (P)   -RA     [place] Level (Gait) contact guard (P)   -RA     Assistive  Device (Gait) walker, front-wheeled (P)   -RA     Patient was able to Ambulate yes (P)   -RA     Distance in Feet (Gait) 20 (P)   -RA       Row Name 05/01/25 1400          Safety Issues/Impairments Affecting Functional Mobility    Impairments Affecting Function (Mobility) balance;endurance/activity tolerance (P)   -RA       Row Name 05/01/25 1400          Balance    Balance Assessment standing dynamic balance (P)   -RA     Dynamic Standing Balance contact guard (P)   -RA       Row Name 05/01/25 1400          Progress Summary (PT)    Progress Toward Functional Goals (PT) progress toward functional goals is good (P)   -RA     Daily Progress Summary (PT) Pt tolerated transfers and ambulation well. She is making good progress towards her goals. Continue POC. (P)   -RA               User Key  (r) = Recorded By, (t) = Taken By, (c) = Cosigned By      Initials Name Provider Type    Mariola Laird, PT Student PT Student                      PT Recommendation and Plan     Progress Summary (PT)  Progress Toward Functional Goals (PT): (P) progress toward functional goals is good  Daily Progress Summary (PT): (P) Pt tolerated transfers and ambulation well. She is making good progress towards her goals. Continue POC.   Outcome Measures       Row Name 05/01/25 1500 04/29/25 1403          How much help from another person do you currently need...    Turning from your back to your side while in flat bed without using bedrails? 4 (P)   -RA 4  -DK     Moving from lying on back to sitting on the side of a flat bed without bedrails? 4 (P)   -RA 4  -DK     Moving to and from a bed to a chair (including a wheelchair)? 4 (P)   -RA 3  -DK     Standing up from a chair using your arms (e.g., wheelchair, bedside chair)? 3 (P)   -RA 3  -DK     Climbing 3-5 steps with a railing? 3 (P)   -RA 2  -DK     To walk in hospital room? 3 (P)   -RA 3  -DK     AM-PAC 6 Clicks Score (PT) 21 (P)   -RA 19  -DK        Functional Assessment    Outcome  Measure Options -- AM-PAC 6 Clicks Basic Mobility (PT)  -DK               User Key  (r) = Recorded By, (t) = Taken By, (c) = Cosigned By      Initials Name Provider Type    Sakshi Lewis, PTA Physical Therapist Assistant    Mariola Laird, PT Student PT Student                     Time Calculation:    PT Charges       Row Name 05/01/25 1502             Time Calculation    PT Received On 05/01/25 (P)   -RA         Timed Charges    99772 - PT Therapeutic Activity Minutes 15 (P)   -RA         Total Minutes    Timed Charges Total Minutes 15 (P)   -RA       Total Minutes 15 (P)   -RA                User Key  (r) = Recorded By, (t) = Taken By, (c) = Cosigned By      Initials Name Provider Type    Mariola Laird, PT Student PT Student                      PT G-Codes  Outcome Measure Options: AM-PAC 6 Clicks Basic Mobility (PT)  AM-PAC 6 Clicks Score (PT): (P) 21    Mariola Whaley, PT Student  5/1/2025

## 2025-05-02 LAB
ALBUMIN SERPL-MCNC: 3.3 G/DL (ref 3.5–5.2)
ALBUMIN/GLOB SERPL: 1.1 G/DL
ALP SERPL-CCNC: 121 U/L (ref 39–117)
ALT SERPL W P-5'-P-CCNC: 6 U/L (ref 1–33)
ANION GAP SERPL CALCULATED.3IONS-SCNC: 13.6 MMOL/L (ref 5–15)
ANISOCYTOSIS BLD QL: ABNORMAL
AST SERPL-CCNC: 14 U/L (ref 1–32)
BH BB BLOOD EXPIRATION DATE: NORMAL
BH BB BLOOD TYPE BARCODE: 5100
BH BB DISPENSE STATUS: NORMAL
BH BB PRODUCT CODE: NORMAL
BH BB UNIT NUMBER: NORMAL
BILIRUB SERPL-MCNC: 0.6 MG/DL (ref 0–1.2)
BLASTS NFR BLD MANUAL: 1 % (ref 0–0)
BUN SERPL-MCNC: 9 MG/DL (ref 6–20)
BUN/CREAT SERPL: 12.9 (ref 7–25)
BURR CELLS BLD QL SMEAR: ABNORMAL
CALCIUM SPEC-SCNC: 7.8 MG/DL (ref 8.6–10.5)
CHLORIDE SERPL-SCNC: 102 MMOL/L (ref 98–107)
CO2 SERPL-SCNC: 18.4 MMOL/L (ref 22–29)
CREAT SERPL-MCNC: 0.7 MG/DL (ref 0.57–1)
CROSSMATCH INTERPRETATION: NORMAL
CYTO UR: NORMAL
DEPRECATED RDW RBC AUTO: 55 FL (ref 37–54)
EGFRCR SERPLBLD CKD-EPI 2021: 100.4 ML/MIN/1.73
EOSINOPHIL # BLD MANUAL: 0.35 10*3/MM3 (ref 0–0.4)
EOSINOPHIL NFR BLD MANUAL: 2 % (ref 0.3–6.2)
ERYTHROCYTE [DISTWIDTH] IN BLOOD BY AUTOMATED COUNT: 16.6 % (ref 12.3–15.4)
GLOBULIN UR ELPH-MCNC: 2.9 GM/DL
GLUCOSE SERPL-MCNC: 87 MG/DL (ref 65–99)
HCT VFR BLD AUTO: 26.4 % (ref 34–46.6)
HGB BLD-MCNC: 8.7 G/DL (ref 12–15.9)
LAB AP CASE REPORT: NORMAL
LAB AP CLINICAL INFORMATION: NORMAL
LYMPHOCYTES # BLD MANUAL: 2.09 10*3/MM3 (ref 0.7–3.1)
LYMPHOCYTES NFR BLD MANUAL: 4 % (ref 5–12)
MAGNESIUM SERPL-MCNC: 1.7 MG/DL (ref 1.6–2.6)
MCH RBC QN AUTO: 30.2 PG (ref 26.6–33)
MCHC RBC AUTO-ENTMCNC: 33 G/DL (ref 31.5–35.7)
MCV RBC AUTO: 91.7 FL (ref 79–97)
METAMYELOCYTES NFR BLD MANUAL: 2 % (ref 0–0)
MONOCYTES # BLD: 0.7 10*3/MM3 (ref 0.1–0.9)
MYELOCYTES NFR BLD MANUAL: 6 % (ref 0–0)
NEUTROPHILS # BLD AUTO: 12.36 10*3/MM3 (ref 1.7–7)
NEUTROPHILS NFR BLD MANUAL: 69 % (ref 42.7–76)
NEUTS BAND NFR BLD MANUAL: 2 % (ref 0–5)
OVALOCYTES BLD QL SMEAR: ABNORMAL
PATH REPORT.FINAL DX SPEC: NORMAL
PATH REPORT.GROSS SPEC: NORMAL
PATHOLOGY REVIEW: YES
PLATELET # BLD AUTO: 53 10*3/MM3 (ref 140–450)
PMV BLD AUTO: 9.9 FL (ref 6–12)
POIKILOCYTOSIS BLD QL SMEAR: ABNORMAL
POTASSIUM SERPL-SCNC: 4.3 MMOL/L (ref 3.5–5.2)
PROMYELOCYTES NFR BLD MANUAL: 2 % (ref 0–0)
PROT SERPL-MCNC: 6.2 G/DL (ref 6–8.5)
RBC # BLD AUTO: 2.88 10*6/MM3 (ref 3.77–5.28)
SCAN SLIDE: NORMAL
SMALL PLATELETS BLD QL SMEAR: ABNORMAL
SODIUM SERPL-SCNC: 134 MMOL/L (ref 136–145)
UNIT  ABO: NORMAL
UNIT  RH: NORMAL
VARIANT LYMPHS NFR BLD MANUAL: 1 % (ref 0–5)
VARIANT LYMPHS NFR BLD MANUAL: 11 % (ref 19.6–45.3)
WBC NRBC COR # BLD AUTO: 17.41 10*3/MM3 (ref 3.4–10.8)

## 2025-05-02 PROCEDURE — 63710000001 PROCHLORPERAZINE MALEATE PER 5 MG: Performed by: HOSPITALIST

## 2025-05-02 PROCEDURE — 85007 BL SMEAR W/DIFF WBC COUNT: CPT | Performed by: INTERNAL MEDICINE

## 2025-05-02 PROCEDURE — 85025 COMPLETE CBC W/AUTO DIFF WBC: CPT | Performed by: INTERNAL MEDICINE

## 2025-05-02 PROCEDURE — 63710000001 DRONABINOL PER 2.5 MG: Performed by: INTERNAL MEDICINE

## 2025-05-02 PROCEDURE — 99233 SBSQ HOSP IP/OBS HIGH 50: CPT | Performed by: INTERNAL MEDICINE

## 2025-05-02 PROCEDURE — 25010000002 ONDANSETRON PER 1 MG: Performed by: HOSPITALIST

## 2025-05-02 PROCEDURE — 83735 ASSAY OF MAGNESIUM: CPT | Performed by: INTERNAL MEDICINE

## 2025-05-02 PROCEDURE — 94664 DEMO&/EVAL PT USE INHALER: CPT

## 2025-05-02 PROCEDURE — 63710000001 REVEFENACIN 175 MCG/3ML SOLUTION: Performed by: HOSPITALIST

## 2025-05-02 PROCEDURE — 94799 UNLISTED PULMONARY SVC/PX: CPT

## 2025-05-02 PROCEDURE — 80053 COMPREHEN METABOLIC PANEL: CPT | Performed by: INTERNAL MEDICINE

## 2025-05-02 RX ADMIN — FAMOTIDINE 40 MG: 20 TABLET, FILM COATED ORAL at 10:00

## 2025-05-02 RX ADMIN — GABAPENTIN 200 MG: 100 CAPSULE ORAL at 23:42

## 2025-05-02 RX ADMIN — PROCHLORPERAZINE MALEATE 10 MG: 5 TABLET ORAL at 10:09

## 2025-05-02 RX ADMIN — MIDODRINE HYDROCHLORIDE 10 MG: 10 TABLET ORAL at 12:53

## 2025-05-02 RX ADMIN — DRONABINOL 5 MG: 2.5 CAPSULE ORAL at 10:00

## 2025-05-02 RX ADMIN — Medication 10 ML: at 12:57

## 2025-05-02 RX ADMIN — REVEFENACIN 175 MCG: 175 SOLUTION RESPIRATORY (INHALATION) at 09:03

## 2025-05-02 RX ADMIN — OXYCODONE AND ACETAMINOPHEN 1 TABLET: 10; 325 TABLET ORAL at 05:42

## 2025-05-02 RX ADMIN — MIDODRINE HYDROCHLORIDE 10 MG: 10 TABLET ORAL at 17:04

## 2025-05-02 RX ADMIN — ONDANSETRON 4 MG: 2 INJECTION INTRAMUSCULAR; INTRAVENOUS at 12:51

## 2025-05-02 RX ADMIN — ATORVASTATIN CALCIUM 10 MG: 10 TABLET, FILM COATED ORAL at 23:42

## 2025-05-02 RX ADMIN — ONDANSETRON 4 MG: 2 INJECTION INTRAMUSCULAR; INTRAVENOUS at 05:42

## 2025-05-02 RX ADMIN — MEGESTROL ACETATE 400 MG: 400 SUSPENSION ORAL at 12:51

## 2025-05-02 RX ADMIN — BUDESONIDE AND FORMOTEROL FUMARATE DIHYDRATE 2 PUFF: 160; 4.5 AEROSOL RESPIRATORY (INHALATION) at 09:03

## 2025-05-02 RX ADMIN — GABAPENTIN 200 MG: 100 CAPSULE ORAL at 12:50

## 2025-05-02 RX ADMIN — Medication 10 ML: at 23:43

## 2025-05-02 RX ADMIN — FAMOTIDINE 40 MG: 20 TABLET, FILM COATED ORAL at 23:42

## 2025-05-02 RX ADMIN — FLUDROCORTISONE ACETATE 100 MCG: 0.1 TABLET ORAL at 12:50

## 2025-05-02 RX ADMIN — ESCITALOPRAM OXALATE 20 MG: 10 TABLET ORAL at 12:50

## 2025-05-02 RX ADMIN — PROCHLORPERAZINE MALEATE 10 MG: 5 TABLET ORAL at 17:05

## 2025-05-02 RX ADMIN — GABAPENTIN 200 MG: 100 CAPSULE ORAL at 17:05

## 2025-05-02 RX ADMIN — NAPROXEN 500 MG: 250 TABLET ORAL at 12:50

## 2025-05-02 RX ADMIN — BUPROPION HYDROCHLORIDE 150 MG: 150 TABLET, FILM COATED, EXTENDED RELEASE ORAL at 12:50

## 2025-05-02 RX ADMIN — NAPROXEN 500 MG: 250 TABLET ORAL at 17:04

## 2025-05-02 RX ADMIN — BUPROPION HYDROCHLORIDE 150 MG: 150 TABLET, FILM COATED, EXTENDED RELEASE ORAL at 23:42

## 2025-05-02 NOTE — PLAN OF CARE
Goal Outcome Evaluation:  Plan of Care Reviewed With: patient        Progress: improving  Outcome Evaluation: Patient alert and oriented throughout shift. Patient reported severe nausea throughout most of shift, treated per MAR. PO intake encouraged. Patient on 4L nasal cannula, no symptoms of respiratory distress noted at this time. Plan of care discussed with patient. Continue with plan of care.

## 2025-05-02 NOTE — PROGRESS NOTES
[facility]   Hospitalist Progress Note  Date: 2025  Patient Name: Lluvia Archer  : 1965  MRN: [id number]  Date of admission: 2025    Subjective   Subjective     Chief Complaint:   Pancytopenia    Summary:   Lluvia Archer is a 59 y.o. female who was sent to the emergency room by her oncologist for concerns of abnormal lab work. In emergency room, the patient was noted to have an acute hyponatremia and was mildly altered. 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 Her Na started to improve. She also has pancytopenia. She was treated w/ zosyn but her pancytopenia did not improve. Oncology has started her on neupogen.  Patient's white blood cell count is improving, her hemoglobin is stabilizing, her platelets remain low without any obvious sources of bleeding    Interval Followup:   No acute events overnight, patient anxious to get out of the hospital    Objective   Objective     Vitals:   Temp:  [97.3 °F (36.3 °C)-98.8 °F (37.1 °C)] 98.6 °F (37 °C)  Heart Rate:  [78-94] 88  Resp:  [16-20] 18  BP: (106-147)/(66-94) 129/71  Flow (L/min) (Oxygen Therapy):  [4] 4    Physical Exam   GEN: No acute distress  HEENT: Moist mucous membranes  LUNGS: Equal chest rise bilaterally  CARDIAC: Regular rate and rhythm  NEURO: Moving all 4 extremities spontaneously  SKIN: No obvious breakdown    Result Review    Result Review:  I have personally reviewed the results as below  [x]  Laboratory CBC, CMP personally reviewed  CBC          2025    05:00 2025    05:19 2025    19:09 2025    09:19   CBC   WBC 14.58  17.99   17.41    RBC 2.40  2.26   2.88    Hemoglobin 7.3  6.8  8.4  8.7    Hematocrit 22.5  21.3  25.5  26.4    MCV 93.8  94.2   91.7    MCH 30.4  30.1   30.2    MCHC 32.4  31.9   33.0    RDW 17.0  17.5   16.6    Platelets 13  65   53      CMP          2025    05:00 2025    05:19 2025    09:19   CMP   Glucose 91   79  87    BUN 9  8  9    Creatinine 0.85  0.77  0.70    EGFR 79.5  89.5  100.4    Sodium 140  140  134    Potassium 3.8  4.1  4.3    Chloride 108  108  102    Calcium 7.5  7.3  7.8    Total Protein 5.7  5.8  6.2    Albumin 3.0  3.2  3.3    Globulin 2.7  2.6  2.9    Total Bilirubin 0.3  0.3  0.6    Alkaline Phosphatase 105  107  121    AST (SGOT) 9  10  14    ALT (SGPT) 5  6  6    Albumin/Globulin Ratio 1.1  1.2  1.1    BUN/Creatinine Ratio 10.6  10.4  12.9    Anion Gap 10.2  11.3  13.6      []  Microbiology  []  Radiology  []  EKG/Telemetry   []  Cardiology/Vascular   []  Pathology  []  Old records  []  Other:    Scheduled medications:  atorvastatin, 10 mg, Oral, Nightly  budesonide-formoterol, 2 puff, Inhalation, BID - RT   And  revefenacin, 175 mcg, Nebulization, Daily - RT  buPROPion SR, 150 mg, Oral, BID  calcium carbonate, 2 tablet, Oral, Nightly  dronabinol, 5 mg, Oral, Daily  [Held by provider] enoxaparin sodium, 40 mg, Subcutaneous, Daily  escitalopram, 20 mg, Oral, Daily  famotidine, 40 mg, Oral, BID  fludrocortisone, 100 mcg, Oral, Daily  gabapentin, 200 mg, Oral, TID  [Held by provider] lisinopril, 5 mg, Oral, Daily  megestrol, 400 mg, Oral, Daily  [Held by provider] metoprolol tartrate, 12.5 mg, Oral, BID  midodrine, 10 mg, Oral, TID AC  naproxen, 500 mg, Oral, BID With Meals  sodium chloride, 10 mL, Intravenous, Q12H      As needed medications:    albuterol    albuterol sulfate HFA    senna-docusate sodium **AND** polyethylene glycol **AND** bisacodyl **AND** bisacodyl    hydrOXYzine    [Held by provider] LORazepam    melatonin    nitroglycerin    ondansetron ODT **OR** ondansetron    oxyCODONE-acetaminophen    phenol    prochlorperazine    sodium chloride    sodium chloride  Infusions:          Assessment & Plan   Assessment / Plan     Assessment:  Pancytopenia  Hypotension  Candidiasis  Acute hyponatremia  COPD  Hypertension  Hyperlipidemia  Depression/anxiety  Small cell lung cancer currently on  treatment  GERD  Peripheral neuropathy    Plan:  Continues on empiric Zosyn for now  White blood cell count improved  Continue transfusion support as needed   Hemoglobin improved  Monitor volume status closely, encourage oral intake  Blood pressure remains low, patient was started on Florinef, midodrine, had a.m. cortisol level drawn which was suboptimal, likely her baseline BP, continue Florinef for now  Being treated with nystatin for candidiasis  Serum sodium improved, nephrology consulted and following  Continue maintenance medications for COPD, no current exacerbation  Holding home antihypertensives  Continue Lipitor  Continue BuSpar, Atarax and as needed Klonopin for anxiety  Oncology consulted and following  Continue Pepcid  Continue Neurontin for neuropathy  CBC, CMP reviewed 5/2/2025   Repeat CBC, CMP, mag and Phos in a.m. 5/2/2025      Blood products:  Platelets: 2 units 4/26/2025,  2 units 4/30/2025  PRBCs: 1 unit 4/26/2025, 1 unit 5/1/2025    Reviewed patient's labs and imaging, and discussed with patient and nurse at bedside.    VTE Prophylaxis:  Pharmacologic & mechanical VTE prophylaxis orders are present.    Time spent: Time spent involving patient care including face-to-face encounter 52 minutes    CODE STATUS:   Code Status (Patient has no pulse and is not breathing): CPR (Attempt to Resuscitate)  Medical Interventions (Patient has pulse or is breathing): Full Support  Level Of Support Discussed With: Patient      Electronically signed by Cecil Christensen MD, 5/2/2025, 14:21 EDT.

## 2025-05-02 NOTE — PLAN OF CARE
Goal Outcome Evaluation:  Plan of Care Reviewed With: patient        Progress: no change  Outcome Evaluation: Patient a&ox4, VSS. Hgb within more stable limits after dayshift blood transfusion. No c/o pain or anxiety. Will continue POC

## 2025-05-02 NOTE — PROGRESS NOTES
[facility]     Progress Note    Patient Name: Lluvia Archer  : 1965  MRN: [id number]  Primary Care Physician:  Aleksandar Edge DO  Date of admission: 2025    Subjective   Patient's blood pressures and vitals stable  Status post transfusion      Scheduled Meds:atorvastatin, 10 mg, Oral, Nightly  budesonide-formoterol, 2 puff, Inhalation, BID - RT   And  revefenacin, 175 mcg, Nebulization, Daily - RT  buPROPion SR, 150 mg, Oral, BID  calcium carbonate, 2 tablet, Oral, Nightly  dronabinol, 5 mg, Oral, Daily  [Held by provider] enoxaparin sodium, 40 mg, Subcutaneous, Daily  escitalopram, 20 mg, Oral, Daily  famotidine, 40 mg, Oral, BID  fludrocortisone, 100 mcg, Oral, Daily  gabapentin, 200 mg, Oral, TID  [Held by provider] lisinopril, 5 mg, Oral, Daily  megestrol, 400 mg, Oral, Daily  [Held by provider] metoprolol tartrate, 12.5 mg, Oral, BID  midodrine, 10 mg, Oral, TID AC  naproxen, 500 mg, Oral, BID With Meals  sodium chloride, 10 mL, Intravenous, Q12H      Continuous Infusions:   PRN Meds:.  albuterol    albuterol sulfate HFA    senna-docusate sodium **AND** polyethylene glycol **AND** bisacodyl **AND** bisacodyl    hydrOXYzine    [Held by provider] LORazepam    melatonin    nitroglycerin    ondansetron ODT **OR** ondansetron    oxyCODONE-acetaminophen    phenol    prochlorperazine    sodium chloride    sodium chloride       Review of Systems  Constitutional:        Weakness tiredness fatigue  Eyes:                       No blurry vision, eye discharge, eye irritation, eye pain  HEENT:                   No acute hair loss, earache and discharge, nasal congestion or discharge, sore throat, postnasal drip  Respiratory:           No shortness of breath coughing sputum production wheezing hemoptysis pleuritic chest pain  Cardiovascular:     No chest pain, orthopnea, PND, dizziness, palpitation, lower extremity edema  Gastrointestinal:   No nausea vomiting diarrhea abdominal pain  constipation  Genitourinary:       No urinary incontinence, hesitancy, frequency, urgency, dysuria  Hematologic:         No bruising, bleeding, pallor, lymphadenopathy  Endocrine:            No coldness, hot flashes, polyuria, abnormal hair growth  Musculoskeletal:    No body pains, aches, arthritic pains, muscle pain ,muscle wasting  Psychiatric:          No low or high mood, anxiety, hallucinations, delusions  Skin.                      No rash, ulcers, bruising, itching  Neurological:        No confusion, headache, focal weakness, numbness, dysphasia    Objective   Objective     Vitals:   Temp:  [98.2 °F (36.8 °C)-98.8 °F (37.1 °C)] 98.8 °F (37.1 °C)  Heart Rate:  [78-94] 93  Resp:  [18-20] 18  BP: (101-147)/(66-87) 106/66  Flow (L/min) (Oxygen Therapy):  [4] 4  Physical Exam    Constitutional: Awake, alert responsive, conversant, no obvious distress              Psychiatric:  Appropriate affect, cooperative   Neurologic:  Awake alert ,oriented x 3, strength symmetric in all extremities, Cranial Nerves grossly intact to confrontation, speech clear   Eyes:   PERRLA, sclerae anicteric, no conjunctival injection   HEENT:  Moist mucous membranes, no nasal or eye discharge, no throat congestion   Neck:   Supple, no thyromegaly, no lymphadenopathy, trachea midline, no elevated JVD   Respiratory:  Clear to auscultation bilaterally, nonlabored respirations    Cardiovascular: RRR, no murmurs, rubs, or gallops, palpable pedal pulses bilaterally, No bilateral ankle edema   Gastrointestinal: Positive bowel sounds, soft, nontender, nondistended, no organomegaly   Musculoskeletal:  No clubbing or cyanosis to extremities,muscle wasting, joint swelling, muscle weakness             Skin:                      No rashes, bruising, skin ulcers, petechiae or ecchymosis    Result Review    Result Review:  I have personally reviewed the results from the time of this admission to 5/2/2025 08:00 EDT and agree with these findings:  []   Laboratory  []  Microbiology  []  Radiology  []  EKG/Telemetry   []  Cardiology/Vascular   []  Pathology  []  Old records  []  Other:    Assessment & Plan   Assessment / Plan       Active Hospital Problems:  Active Hospital Problems    Diagnosis     **Pancytopenia due to antineoplastic chemotherapy     Chronic hypotension     Hyponatremia      59-year-old female with past medical history of small cell lung cancer, COPD, hypertension, osteoarthritis, anxiety/depression, hyperlipidemia who was sent to the ER due to abnormal labs noted to have a sodium of 122-124 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  Patient is also on SSRIs urine osmolarity noted to be elevated and urine sodium at 30.  Sodium levels have improved and now stable at 140.  Patient pancytopenic improved with GM-CSF.  Patient's cortisol levels were on the lower side at 4.  Patient's status post platelet and blood transfusion     Plan:   Patient sodium levels are stable off salt tabs  Continue with calcium at bedtime  Patient is on Florinef for hypotension and blood pressures have improved.  Also on midodrine.  Will continue at this time  Agree on restarting back on Lexapro.  Will continue to monitor    Thank you for involving me in the care of the patient.  Will continue to follow along

## 2025-05-03 ENCOUNTER — READMISSION MANAGEMENT (OUTPATIENT)
Dept: CALL CENTER | Facility: HOSPITAL | Age: 60
End: 2025-05-03
Payer: MEDICARE

## 2025-05-03 VITALS
BODY MASS INDEX: 33.38 KG/M2 | DIASTOLIC BLOOD PRESSURE: 76 MMHG | RESPIRATION RATE: 20 BRPM | WEIGHT: 195.55 LBS | OXYGEN SATURATION: 97 % | TEMPERATURE: 98.2 F | HEIGHT: 64 IN | SYSTOLIC BLOOD PRESSURE: 105 MMHG | HEART RATE: 90 BPM

## 2025-05-03 LAB
ALBUMIN SERPL-MCNC: 3.1 G/DL (ref 3.5–5.2)
ALBUMIN/GLOB SERPL: 1 G/DL
ALP SERPL-CCNC: 120 U/L (ref 39–117)
ALT SERPL W P-5'-P-CCNC: 6 U/L (ref 1–33)
ANION GAP SERPL CALCULATED.3IONS-SCNC: 15.1 MMOL/L (ref 5–15)
AST SERPL-CCNC: 13 U/L (ref 1–32)
BASOPHILS # BLD AUTO: 0.02 10*3/MM3 (ref 0–0.2)
BASOPHILS NFR BLD AUTO: 0.1 % (ref 0–1.5)
BILIRUB SERPL-MCNC: 0.6 MG/DL (ref 0–1.2)
BUN SERPL-MCNC: 10 MG/DL (ref 6–20)
BUN/CREAT SERPL: 12.8 (ref 7–25)
CALCIUM SPEC-SCNC: 7.8 MG/DL (ref 8.6–10.5)
CHLORIDE SERPL-SCNC: 104 MMOL/L (ref 98–107)
CO2 SERPL-SCNC: 17.9 MMOL/L (ref 22–29)
CREAT SERPL-MCNC: 0.78 MG/DL (ref 0.57–1)
DEPRECATED RDW RBC AUTO: 53.8 FL (ref 37–54)
EGFRCR SERPLBLD CKD-EPI 2021: 88.2 ML/MIN/1.73
EOSINOPHIL # BLD AUTO: 0.18 10*3/MM3 (ref 0–0.4)
EOSINOPHIL NFR BLD AUTO: 1.1 % (ref 0.3–6.2)
ERYTHROCYTE [DISTWIDTH] IN BLOOD BY AUTOMATED COUNT: 16.5 % (ref 12.3–15.4)
GLOBULIN UR ELPH-MCNC: 3.1 GM/DL
GLUCOSE SERPL-MCNC: 81 MG/DL (ref 65–99)
HCT VFR BLD AUTO: 25.9 % (ref 34–46.6)
HGB BLD-MCNC: 8.8 G/DL (ref 12–15.9)
IMM GRANULOCYTES # BLD AUTO: 3.94 10*3/MM3 (ref 0–0.05)
IMM GRANULOCYTES NFR BLD AUTO: 23.6 % (ref 0–0.5)
LYMPHOCYTES # BLD AUTO: 2.01 10*3/MM3 (ref 0.7–3.1)
LYMPHOCYTES NFR BLD AUTO: 12 % (ref 19.6–45.3)
MAGNESIUM SERPL-MCNC: 1.8 MG/DL (ref 1.6–2.6)
MCH RBC QN AUTO: 30.9 PG (ref 26.6–33)
MCHC RBC AUTO-ENTMCNC: 34 G/DL (ref 31.5–35.7)
MCV RBC AUTO: 90.9 FL (ref 79–97)
MONOCYTES # BLD AUTO: 1.85 10*3/MM3 (ref 0.1–0.9)
MONOCYTES NFR BLD AUTO: 11.1 % (ref 5–12)
NEUTROPHILS NFR BLD AUTO: 52.1 % (ref 42.7–76)
NEUTROPHILS NFR BLD AUTO: 8.69 10*3/MM3 (ref 1.7–7)
NRBC BLD AUTO-RTO: 0.4 /100 WBC (ref 0–0.2)
PHOSPHATE SERPL-MCNC: 2.2 MG/DL (ref 2.5–4.5)
PLATELET # BLD AUTO: 61 10*3/MM3 (ref 140–450)
PMV BLD AUTO: 9.1 FL (ref 6–12)
POTASSIUM SERPL-SCNC: 4 MMOL/L (ref 3.5–5.2)
PROT SERPL-MCNC: 6.2 G/DL (ref 6–8.5)
RBC # BLD AUTO: 2.85 10*6/MM3 (ref 3.77–5.28)
SODIUM SERPL-SCNC: 137 MMOL/L (ref 136–145)
WBC NRBC COR # BLD AUTO: 16.69 10*3/MM3 (ref 3.4–10.8)

## 2025-05-03 PROCEDURE — 94799 UNLISTED PULMONARY SVC/PX: CPT

## 2025-05-03 PROCEDURE — 63710000001 REVEFENACIN 175 MCG/3ML SOLUTION: Performed by: HOSPITALIST

## 2025-05-03 PROCEDURE — 80053 COMPREHEN METABOLIC PANEL: CPT | Performed by: INTERNAL MEDICINE

## 2025-05-03 PROCEDURE — 83735 ASSAY OF MAGNESIUM: CPT | Performed by: INTERNAL MEDICINE

## 2025-05-03 PROCEDURE — 63710000001 DRONABINOL PER 2.5 MG: Performed by: INTERNAL MEDICINE

## 2025-05-03 PROCEDURE — 99239 HOSP IP/OBS DSCHRG MGMT >30: CPT | Performed by: INTERNAL MEDICINE

## 2025-05-03 PROCEDURE — 85025 COMPLETE CBC W/AUTO DIFF WBC: CPT | Performed by: INTERNAL MEDICINE

## 2025-05-03 PROCEDURE — 94664 DEMO&/EVAL PT USE INHALER: CPT

## 2025-05-03 PROCEDURE — 84100 ASSAY OF PHOSPHORUS: CPT | Performed by: INTERNAL MEDICINE

## 2025-05-03 RX ORDER — MIDODRINE HYDROCHLORIDE 10 MG/1
10 TABLET ORAL
Qty: 90 TABLET | Refills: 0 | Status: SHIPPED | OUTPATIENT
Start: 2025-05-03 | End: 2025-06-02

## 2025-05-03 RX ORDER — FLUDROCORTISONE ACETATE 0.1 MG/1
0.1 TABLET ORAL DAILY
Qty: 30 TABLET | Refills: 0 | Status: SHIPPED | OUTPATIENT
Start: 2025-05-03 | End: 2025-06-02

## 2025-05-03 RX ADMIN — MIDODRINE HYDROCHLORIDE 10 MG: 10 TABLET ORAL at 08:57

## 2025-05-03 RX ADMIN — NAPROXEN 500 MG: 250 TABLET ORAL at 08:56

## 2025-05-03 RX ADMIN — ESCITALOPRAM OXALATE 20 MG: 10 TABLET ORAL at 08:57

## 2025-05-03 RX ADMIN — MEGESTROL ACETATE 400 MG: 400 SUSPENSION ORAL at 08:56

## 2025-05-03 RX ADMIN — BUPROPION HYDROCHLORIDE 150 MG: 150 TABLET, FILM COATED, EXTENDED RELEASE ORAL at 08:56

## 2025-05-03 RX ADMIN — Medication 10 ML: at 11:07

## 2025-05-03 RX ADMIN — REVEFENACIN 175 MCG: 175 SOLUTION RESPIRATORY (INHALATION) at 09:57

## 2025-05-03 RX ADMIN — FAMOTIDINE 40 MG: 20 TABLET, FILM COATED ORAL at 08:57

## 2025-05-03 RX ADMIN — FLUDROCORTISONE ACETATE 100 MCG: 0.1 TABLET ORAL at 11:06

## 2025-05-03 RX ADMIN — GABAPENTIN 200 MG: 100 CAPSULE ORAL at 08:57

## 2025-05-03 RX ADMIN — DRONABINOL 5 MG: 2.5 CAPSULE ORAL at 08:57

## 2025-05-03 NOTE — PLAN OF CARE
Goal Outcome Evaluation:  Plan of Care Reviewed With: patient        Progress: improving  Outcome Evaluation: Patient to be discharged home today with

## 2025-05-03 NOTE — DISCHARGE SUMMARY
[facility]         HOSPITALIST  DISCHARGE SUMMARY    Patient Name: Lluvia Archer  : 1965  MRN: [id number]    Date of Admission: 2025  Date of Discharge:  5/3/2025  Primary Care Physician: Aleksandar Edge,     Consults       Date and Time Order Name Status Description    2025  8:12 AM Hematology & Oncology Inpatient Consult Completed     2025  6:32 PM Inpatient Nephrology Consult Completed     2025  4:18 PM Inpatient Hospitalist Consult              Active and Resolved Hospital Problems:  Pancytopenia  Hypotension  Candidiasis  Acute hyponatremia  COPD  Hypertension  Hyperlipidemia  Depression/anxiety  Small cell lung cancer currently on treatment  GERD  Peripheral neuropathy    Hospital Course     Hospital Course:  Lluvia Archer is a 59 y.o. female who was sent to the emergency room by her oncologist for concerns of abnormal lab work. In emergency room, the patient was noted to have an acute hyponatremia and was mildly altered. 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 Her Na started to improve. She also has pancytopenia. She was treated w/ zosyn but her pancytopenia did not improve. Oncology has started her on neupogen. Patient's white blood cell count is improving, her hemoglobin is stabilizing, her platelets remain low without any obvious sources of bleeding.  Patient's counts stabilized, she was deemed safe to discharge home will discharge today in stable condition.  Patient will follow-up with oncology next week for repeat labs to ensure stability in her counts.  Patient will discuss potential need for dose adjustments for her cancer treatment upon follow-up with oncology.    Day of Discharge     Vital Signs:  Temp:  [97.5 °F (36.4 °C)-98.6 °F (37 °C)] 98.2 °F (36.8 °C)  Heart Rate:  [74-91] 90  Resp:  [16-20] 20  BP: ()/(69-83) 105/76  Flow (L/min) (Oxygen Therapy):  [4]  4    Physical Exam:   GEN: No acute distress  HEENT: Moist mucous membranes  LUNGS: Equal chest rise bilaterally  CARDIAC: Regular rate and rhythm  NEURO: Moving all 4 extremities spontaneously  SKIN: No obvious breakdown    Discharge Details        Discharge Medications        New Medications        Instructions Start Date   fludrocortisone 0.1 MG tablet   0.1 mg, Oral, Daily      midodrine 10 MG tablet  Commonly known as: PROAMATINE   10 mg, Oral, 3 Times Daily Before Meals             Changes to Medications        Instructions Start Date   famotidine 40 MG tablet  Commonly known as: PEPCID  What changed: See the new instructions.   TAKE ONE TABLET BY MOUTH TWICE DAILY @ 9AM & 5PM      hydrOXYzine 25 MG tablet  Commonly known as: ATARAX  What changed: See the new instructions.   TAKE 1 TABLET BY MOUTH THREE TIMES A DAY AS NEEDED FOR ITCHING             Continue These Medications        Instructions Start Date   albuterol 0.63 MG/3ML nebulizer solution  Commonly known as: ACCUNEB   0.63 mg, Nebulization, Every 6 Hours PRN      albuterol sulfate  (90 Base) MCG/ACT inhaler  Commonly known as: PROVENTIL HFA;VENTOLIN HFA;PROAIR HFA   2 puffs, Inhalation, Every 4 Hours PRN      atorvastatin 10 MG tablet  Commonly known as: LIPITOR   10 mg, Oral, Nightly      buPROPion  MG 12 hr tablet  Commonly known as: WELLBUTRIN SR   150 mg, Oral, 2 Times Daily      calcium carbonate 500 MG chewable tablet  Commonly known as: Tums   2 tablets, Oral, Daily      escitalopram 20 MG tablet  Commonly known as: LEXAPRO   20 mg, Oral, Daily      gabapentin 100 MG capsule  Commonly known as: NEURONTIN   200 mg, Oral, 3 Times Daily      Magnesium 400 MG tablet   400 mg, Oral, Daily      Melatonin 10 MG tablet   10 mg, Nightly PRN      naloxone 4 MG/0.1ML nasal spray  Commonly known as: NARCAN   CALL 911. DON'T PRIME. SPRAY IN 1 NOSTRIL FOR OVERDOSE. REPEAT IN 2-3 MINUTES IN OTHER NOSTRIL IF NO OR MINIMAL  BREATHING/RESPONSIVENESS.      naproxen 500 MG tablet  Commonly known as: NAPROSYN   500 mg, Oral, 2 Times Daily With Meals      nystatin 100,000 unit/mL suspension  Commonly known as: MYCOSTATIN   500,000 Units, Swish & Swallow, 4 Times Daily      ondansetron ODT 8 MG disintegrating tablet  Commonly known as: ZOFRAN-ODT   8 mg, Translingual, Every 8 Hours PRN      oxyCODONE-acetaminophen  MG per tablet  Commonly known as: PERCOCET   1 tablet, Oral, Every 4 Hours PRN      prochlorperazine 10 MG tablet  Commonly known as: COMPAZINE   10 mg, Oral, Every 6 Hours PRN      Trelegy Ellipta 200-62.5-25 MCG/ACT inhaler  Generic drug: Fluticasone-Umeclidin-Vilant   1 puff, Inhalation, Daily - RT             Stop These Medications      clonazePAM 0.5 MG tablet  Commonly known as: KlonoPIN     lisinopril 5 MG tablet  Commonly known as: PRINIVIL,ZESTRIL     LORazepam 0.5 MG tablet  Commonly known as: ATIVAN     metoprolol tartrate 25 MG tablet  Commonly known as: LOPRESSOR              No Known Allergies    Discharge Disposition:  Home or Self Care    Diet:  Hospital:  Diet Order   Procedures   • Diet: Regular/House; Fluid Consistency: Thin (IDDSI 0)       Discharge Activity:       CODE STATUS:  Code Status and Medical Interventions: CPR (Attempt to Resuscitate); Full Support   Ordered at: 04/22/25 1854     Code Status (Patient has no pulse and is not breathing):    CPR (Attempt to Resuscitate)     Medical Interventions (Patient has pulse or is breathing):    Full Support     Level Of Support Discussed With:    Patient       Future Appointments   Date Time Provider Department Center   5/5/2025  1:30 PM CHAIR 09 [facility] OP INFU  [facility] OPIF [facility]   5/6/2025  1:30 PM CHAIR 05 [facility] OP INFU  [facility] OPIF [facility]   5/7/2025  1:30 PM CHAIR 21 [facility] OP INFU [facility] OPIF [facility]   5/8/2025  1:30 PM CHAIR 22 [facility] OP INFU [facility] OPIF [facility]   5/9/2025  1:30 PM CHAIR 19 [facility] OP INFU [facility] OPIF [facility]   6/12/2025 11:45 AM Aleksandar Edge,  [facility]  [facility]   7/10/2025 12:45 PM [facility] ALEX CT 2 BH [facility] ETWCT [facility]   7/17/2025 10:30 AM Norah Han APRN [facility]       Additional Instructions for the Follow-ups that You Need to Schedule       Discharge Follow-up with PCP   As directed       Currently Documented PCP:    Aleksandar Edge DO    PCP Phone Number:    [phone]     Follow Up Details: 3 to 7 days        Discharge Follow-up with Specified Provider: oncology - arcelia; 4 Days   As directed      To: oncology - arcelia   Follow Up: 4 Days                Pertinent  and/or Most Recent Results     IMAGING:  XR Chest 1 View  Result Date: 4/28/2025  XR CHEST 1 VW Date of Exam: 4/28/2025 3:10 PM EDT Indication: pna eval Comparison: Chest x-ray 4/22/2025 Findings: The heart is stable in size. There is a left chest wall port catheter with the distal tip overlying the right atrium. Patchy infiltrate is noted throughout the right lower lung but also at the left costophrenic angle and right apical region as well compatible with multifocal pneumonia. No evidence for pneumothorax. Underlying trace right pleural effusion not entirely excluded.     Impression: Patchy infiltrate throughout compatible with multifocal pneumonia. Electronically Signed: Mabel Pabon MD  4/28/2025 3:26 PM EDT  Workstation ID: [id number]    XR Chest 1 View  Result Date: 4/22/2025  XR CHEST 1 VW Date of Exam: 4/22/2025 3:47 PM EDT Indication: soa Comparison: 4/21/2025 Findings: Heart size is within normal limits. The pulmonary vascular pattern is normal. There is a small left-sided pleural effusion and mild left basilar atelectasis. The lungs are otherwise clear. Left-sided chest port is in place with the tip in the right atrium.     Impression: Small left-sided pleural effusion and mild left basilar atelectasis. Electronically Signed: Luca Brewster MD  4/22/2025 3:52 PM EDT  Workstation ID: [id number]    XR Chest 1 View  Result Date: 4/21/2025  XR CHEST 1 VW Date of Exam: 4/21/2025 1:57  PM EDT Indication: SOA Triage Protocol Comparison: Chest radiograph and chest CT 2/25/2025. Findings: Left chest port tip terminates over the right atrium. Cardiomediastinal silhouette is unchanged. Improved bibasilar opacities seen on prior exam. No new or worsening focal consolidation. Pulmonary nodules seen to better advantage on prior CT. No sizable pleural effusion. No pneumothorax. Osseous structures are unchanged.     Impression: Improved bibasilar opacities seen on prior exam. No new or worsening focal consolidation. Electronically Signed: Satish Contreras MD  4/21/2025 2:23 PM EDT  Workstation ID: [id number]    CT Head With & Without Contrast  Result Date: 4/8/2025  CT HEAD W WO CONTRAST Date of Exam: 4/8/2025 12:56 PM EDT Indication: Small Cell lung cancer, hx of XRT of whole brain. Comparison: February 4, 2025 Technique: Axial CT images were obtained of the head before and after the uneventful intravenous administration of iodinated contrast.  Reconstructed coronal and sagittal images were also obtained. Automated exposure control and iterative construction methods were used. Findings: No acute intracranial hemorrhage or extra-axial collection is identified. The ventricles appear normal in caliber, with no evidence of mass effect or midline shift. The basal cisterns appear patent. The gray-white differentiation appears preserved. The calvarium appear intact. The paranasal sinuses are clear. The mastoid air cells are well-aerated. No pathological parenchymal enhancement or mass is identified. The intracranial vasculature is grossly unremarkable. Degenerative changes appear stable.     Impression: 1.No acute intracranial process identified. 2.No evidence of intracranial metastasis. Previously identified metastatic lesions have resolved. Electronically Signed: Isma Roche MD  4/8/2025 7:04 PM EDT  Workstation ID: [id number]      LAB RESULTS:      Lab 05/03/25  0444 05/02/25  0919 05/01/25  1909 05/01/25  0519  04/30/25  0500 04/29/25  0510 04/28/25  0338 04/27/25  0506 04/26/25  1150   WBC 16.69* 17.41*  --  17.99* 14.58* 8.52   < > 1.32* 0.79*   HEMOGLOBIN 8.8* 8.7* 8.4* 6.8* 7.3* 7.6*   < > 7.5* 6.8*   HEMATOCRIT 25.9* 26.4* 25.5* 21.3* 22.5* 23.1*   < > 22.2* 20.5*   PLATELETS 61* 53*  --  65* 13* 17*   < > 46* 7*   NEUTROS ABS 8.69* 12.36*  --  8.79* 7.38* 4.09   < > 0.25* 0.12*   IMMATURE GRANS (ABS) 3.94*  --   --  4.17* 2.72*  --   --  0.02 0.01   LYMPHS ABS 2.01  --   --  2.26 1.84  --   --  0.80 0.55*   MONOS ABS 1.85*  --   --  2.51* 2.46*  --   --  0.18 0.07*   EOS ABS 0.18 0.35  --  0.25 0.17 0.17  --  0.06 0.04   MCV 90.9 91.7  --  94.2 93.8 93.9   < > 91.7 93.2    < > = values in this interval not displayed.         Lab 05/03/25  0444 05/02/25  0919 05/01/25  0519 04/30/25  0500 04/29/25  0510   SODIUM 137 134* 140 140 140   POTASSIUM 4.0 4.3 4.1 3.8 4.2   CHLORIDE 104 102 108* 108* 107   CO2 17.9* 18.4* 20.7* 21.8* 22.5   ANION GAP 15.1* 13.6 11.3 10.2 10.5   BUN 10 9 8 9 7   CREATININE 0.78 0.70 0.77 0.85 0.82   EGFR 88.2 100.4 89.5 79.5 83.0   GLUCOSE 81 87 79 91 85   CALCIUM 7.8* 7.8* 7.3* 7.5* 7.4*   MAGNESIUM 1.8 1.7 1.7 1.8  --    PHOSPHORUS 2.2*  --  2.9 0.8*  --          Lab 05/03/25  0444 05/02/25  0919 05/01/25  0519 04/30/25  0500   TOTAL PROTEIN 6.2 6.2 5.8* 5.7*   ALBUMIN 3.1* 3.3* 3.2* 3.0*   GLOBULIN 3.1 2.9 2.6 2.7   ALT (SGPT) 6 6 6 5   AST (SGOT) 13 14 10 9   BILIRUBIN 0.6 0.6 0.3 0.3   ALK PHOS 120* 121* 107 105                 Lab 05/01/25  0815 04/26/25  1408   ABO TYPING O O   RH TYPING Positive Positive   ANTIBODY SCREEN Negative Negative         Brief Urine Lab Results  (Last result in the past 365 days)        Color   Clarity   Blood   Leuk Est   Nitrite   Protein   CREAT   Urine HCG        04/28/25 1546 Yellow   Clear   Negative   Negative   Negative   Trace                 Microbiology Results (last 10 days)       Procedure Component Value - Date/Time    Blood Culture - Blood,  Hand, Right [[id number]]  (Normal) Collected: 04/24/25 1825    Lab Status: Final result Specimen: Blood from Hand, Right Updated: 04/29/25 2045     Blood Culture No growth at 5 days    Narrative:      Less than seven (7) mL's of blood was collected.  Insufficient quantity may yield false negative results.    Blood Culture - Blood, Hand, Right [[id number]]  (Normal) Collected: 04/24/25 1820    Lab Status: Final result Specimen: Blood from Hand, Right Updated: 04/29/25 2045     Blood Culture No growth at 5 days    Narrative:      Less than seven (7) mL's of blood was collected.  Insufficient quantity may yield false negative results.              Results for orders placed during the hospital encounter of 04/04/22    Adult Transthoracic Echo Complete W/ Cont if Necessary Per Protocol    Interpretation Summary  · The left ventricular cavity is borderline dilated.  · Calculated left ventricular EF = 58% Estimated left ventricular EF was in agreement with the calculated left ventricular EF.  · Left ventricular diastolic function is consistent with (grade I) impaired relaxation.  · Aortic valve sclerosis without obstruction to flow.  · Mild aortic valve regurgitation.  · Mild mitral valve regurgitation.      Time spent on Discharge including face to face service: Greater than 30 minutes      Electronically signed by Cecil Christensen MD, 05/03/25, 10:15 AM EDT.

## 2025-05-03 NOTE — PROGRESS NOTES
[facility]     Progress Note    Patient Name: Lluvia Archer  : 1965  MRN: [id number]  Primary Care Physician:  Aleksandar Edge DO  Date of admission: 2025    Subjective   Patient was seen today.  There were no acute events overnight.      Scheduled Meds:atorvastatin, 10 mg, Oral, Nightly  budesonide-formoterol, 2 puff, Inhalation, BID - RT   And  revefenacin, 175 mcg, Nebulization, Daily - RT  buPROPion SR, 150 mg, Oral, BID  calcium carbonate, 2 tablet, Oral, Nightly  dronabinol, 5 mg, Oral, Daily  [Held by provider] enoxaparin sodium, 40 mg, Subcutaneous, Daily  escitalopram, 20 mg, Oral, Daily  famotidine, 40 mg, Oral, BID  fludrocortisone, 100 mcg, Oral, Daily  gabapentin, 200 mg, Oral, TID  [Held by provider] lisinopril, 5 mg, Oral, Daily  megestrol, 400 mg, Oral, Daily  [Held by provider] metoprolol tartrate, 12.5 mg, Oral, BID  midodrine, 10 mg, Oral, TID AC  naproxen, 500 mg, Oral, BID With Meals  sodium chloride, 10 mL, Intravenous, Q12H      Continuous Infusions:   PRN Meds:.  albuterol    albuterol sulfate HFA    senna-docusate sodium **AND** polyethylene glycol **AND** bisacodyl **AND** bisacodyl    hydrOXYzine    [Held by provider] LORazepam    melatonin    nitroglycerin    ondansetron ODT **OR** ondansetron    oxyCODONE-acetaminophen    phenol    prochlorperazine    sodium chloride    sodium chloride       Review of Systems  Constitutional:        Weakness tiredness fatigue  Eyes:                       No blurry vision, eye discharge, eye irritation, eye pain  HEENT:                   No acute hair loss, earache and discharge, nasal congestion or discharge, sore throat, postnasal drip  Respiratory:           No shortness of breath coughing sputum production wheezing hemoptysis pleuritic chest pain  Cardiovascular:     No chest pain, orthopnea, PND, dizziness, palpitation, lower extremity edema  Gastrointestinal:   No nausea vomiting diarrhea abdominal pain  constipation  Genitourinary:       No urinary incontinence, hesitancy, frequency, urgency, dysuria  Hematologic:         No bruising, bleeding, pallor, lymphadenopathy  Endocrine:            No coldness, hot flashes, polyuria, abnormal hair growth  Musculoskeletal:    No body pains, aches, arthritic pains, muscle pain ,muscle wasting  Psychiatric:          No low or high mood, anxiety, hallucinations, delusions  Skin.                      No rash, ulcers, bruising, itching  Neurological:        No confusion, headache, focal weakness, numbness, dysphasia    Objective   Objective     Vitals:   Temp:  [97.5 °F (36.4 °C)-98.6 °F (37 °C)] 97.7 °F (36.5 °C)  Heart Rate:  [74-91] 80  Resp:  [16-18] 18  BP: ()/(69-83) 93/69  Flow (L/min) (Oxygen Therapy):  [4] 4  Physical Exam    Constitutional: Awake, alert responsive, conversant, no obvious distress              Psychiatric:  Appropriate affect, cooperative   Neurologic:  Awake alert ,oriented x 3, strength symmetric in all extremities, Cranial Nerves grossly intact to confrontation, speech clear   Eyes:   PERRLA, sclerae anicteric, no conjunctival injection   HEENT:  Moist mucous membranes, no nasal or eye discharge, no throat congestion   Neck:   Supple, no thyromegaly, no lymphadenopathy, trachea midline, no elevated JVD   Respiratory:  Clear to auscultation bilaterally, nonlabored respirations    Cardiovascular: RRR, no murmurs, rubs, or gallops, palpable pedal pulses bilaterally, No bilateral ankle edema   Gastrointestinal: Positive bowel sounds, soft, nontender, nondistended, no organomegaly   Musculoskeletal:  No clubbing or cyanosis to extremities,muscle wasting, joint swelling, muscle weakness             Skin:                      No rashes, bruising, skin ulcers, petechiae or ecchymosis    Result Review    Result Review:  I have personally reviewed the results from the time of this admission to 5/3/2025 08:17 EDT and agree with these findings:  [x]   Laboratory  []  Microbiology  []  Radiology  []  EKG/Telemetry   []  Cardiology/Vascular   []  Pathology  []  Old records  []  Other:    Assessment & Plan   Assessment / Plan       Active Hospital Problems:  Active Hospital Problems    Diagnosis     **Pancytopenia due to antineoplastic chemotherapy     Chronic hypotension     Hyponatremia      59-year-old female with past medical history of small cell lung cancer, COPD, hypertension, osteoarthritis, anxiety/depression, hyperlipidemia who was sent to the ER due to abnormal labs noted to have a sodium of 122-124 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  Patient is also on SSRIs urine osmolarity noted to be elevated and urine sodium at 30.  Sodium levels have improved and now stable at 140.  Patient pancytopenic improved with GM-CSF.  Patient's cortisol levels were on the lower side at 4.  Patient's status post platelet and blood transfusion     Plan:   Sodium has normalized now  Continue with calcium at bedtime  Continue Florinef and midodrine  Patient is back on Lexapro now    Renal will sign off at this point.  Please feel free to call with questions or concerns.

## 2025-05-03 NOTE — OUTREACH NOTE
Prep Survey      Flowsheet Row Responses   [facility] patient discharged from? [facility]   Is LACE score < 7 ? No   Eligibility [facility]   Date of Admission 04/22/25   Date of Discharge 05/03/25   Discharge Disposition Home or Self Care   Discharge diagnosis Pancytopenia   Does the patient have one of the following disease processes/diagnoses(primary or secondary)? Other   Prep survey completed? Yes            LENNOX RIVERA - Registered Nurse

## 2025-05-03 NOTE — PLAN OF CARE
Goal Outcome Evaluation:              Outcome Evaluation: Patient alert and oriented x4. No reports of pain or nausea noted throughout shift. Vital signs stable. Continues to be on 4L NC. Continue care per Plan of Care.

## 2025-05-05 ENCOUNTER — TRANSITIONAL CARE MANAGEMENT TELEPHONE ENCOUNTER (OUTPATIENT)
Dept: CALL CENTER | Facility: HOSPITAL | Age: 60
End: 2025-05-05
Payer: MEDICARE

## 2025-05-05 ENCOUNTER — PATIENT OUTREACH (OUTPATIENT)
Dept: CASE MANAGEMENT | Facility: OTHER | Age: 60
End: 2025-05-05
Payer: MEDICARE

## 2025-05-05 ENCOUNTER — TELEPHONE (OUTPATIENT)
Dept: ONCOLOGY | Facility: HOSPITAL | Age: 60
End: 2025-05-05
Payer: MEDICARE

## 2025-05-05 DIAGNOSIS — J44.9 CHRONIC OBSTRUCTIVE PULMONARY DISEASE, UNSPECIFIED COPD TYPE: ICD-10-CM

## 2025-05-05 DIAGNOSIS — T45.1X5A PANCYTOPENIA DUE TO ANTINEOPLASTIC CHEMOTHERAPY: ICD-10-CM

## 2025-05-05 DIAGNOSIS — C34.30 SMALL CELL CARCINOMA OF LOWER LOBE OF LUNG, UNSPECIFIED LATERALITY: Primary | ICD-10-CM

## 2025-05-05 DIAGNOSIS — D61.810 PANCYTOPENIA DUE TO ANTINEOPLASTIC CHEMOTHERAPY: ICD-10-CM

## 2025-05-05 NOTE — PLAN OF CARE
Problem: Fall Risk  Goal: Prevent Falls and Injury  Outcome: Not Progressing  Intervention: My Fall Prevention To Do List  Description: Why is this important?Most falls happen when it is hard for you to walk safely. Your balance may be off because of an illness. You may have pain in your knees, hip or other joints.You may be overly tired or taking medicines that make you sleepy. You may not be able to see or hear clearly.Falls can lead to broken bones, bruises or other injuries.There are things you can do to help prevent falling.  Flowsheets (Taken 5/5/2025 1207)  My Fall Prevention To Do List:   keep my cell phone with me always   use a cane or walker  Goal: Optimal Care Coordination of a Patient Experiencing Fall Risk  Outcome: Not Progressing  Intervention: Identify and Manage Contributors to Fall Risk  Flowsheets (Taken 5/5/2025 1207)  Identify and Manage Contributors to Fall Risk:   barriers to safety identified   fall prevention plan reviewed and updated

## 2025-05-05 NOTE — TELEPHONE ENCOUNTER
Caller: Lluvia Archer    Relationship to patient: Self    Best call back number: [phone]    Type of visit: FU, LAB    Requested date: NEXT AVAIL     If rescheduling, when is the original appointment: 5/2 (CAN)     Additional notes:PT WAS DISCHARGED 5/4

## 2025-05-05 NOTE — OUTREACH NOTE
Call Center TCM Note      Flowsheet Row Responses   [facility] patient discharged from? [facility]   Does the patient have one of the following disease processes/diagnoses(primary or secondary)? Other   TCM attempt successful? Yes  [VR- contacts]   Call start time 0910   Call end time 0911   Discharge diagnosis Pancytopenia   Meds reviewed with patient/caregiver? Yes   Is the patient having any side effects they believe may be caused by any medication additions or changes? No   Does the patient have all medications ordered at discharge? Yes   Is the patient taking all medications as directed (includes completed medication regime)? Yes   Comments Declines HOSP DC FU appt with PCP at this time. Pt will FU with Oncology.   Does the patient have an appointment with their PCP within 7-14 days of discharge? No   Nursing Interventions Patient desires to follow up with specialty only, Patient declined scheduling/rescheduling appointment at this time, Routed TCM call to PCP office   Has home health visited the patient within 72 hours of discharge? N/A   Psychosocial issues? No   Did the patient receive a copy of their discharge instructions? Yes   Nursing interventions Reviewed instructions with patient   What is the patient's perception of their health status since discharge? Improving   Is the patient/caregiver able to teach back the hierarchy of who to call/visit for symptoms/problems? PCP, Specialist, Home health nurse, Urgent Care, ED, 911 Yes   TCM call completed? Yes   Wrap up additional comments Pt reports she is doing fine except still weak.   Call end time 0911            DELFINA GOMEZ - Registered Nurse    5/5/2025, 09:12 EDT

## 2025-05-05 NOTE — OUTREACH NOTE
CCM Interim Update    Patient admitted [facility] on 4/22/25 for Hyponatremia and Pancytopenia. Reports poor mobility due to generalized weakness. Has recieved rollator walker and uses at all times. Declines home health referral. Declines to make PCP ADA appt at present time. Going to follow up with Oncology on 5/12/25. Healthy food choices and adequate hydration encouraged.     See Patient Assessment, EOM Documentation, Care Plan Review, Patient Instructions/Education.         Names and Relationships of Patient/Support Persons: Contact: Lluvia Archer; Relationship: Self -     Adult Patient Profile  Questions/Answers      Flowsheet Row Most Recent Value   Symptoms/Conditions Managed at Home musculoskeletal   Cancer Symptoms/Conditions lung   Cancer Management Strategies chemotherapy   Cancer Self-Management Outcome unsure   Cancer Comment Pancytopenia due to chemotherapy.  Declined PCP ADA at this time. Wants to follow up with Oncology only at this time   Musculoskeletal Symptoms/Conditions mobility limited  [weakness and fatigue]   Musculoskeletal Self-Management Outcome unsure   Musculoskeletal Comment using rollator walker at all times            Education Documentation  Home Safety, taught by Aspen Christian, RN at 5/5/2025 12:10 PM.  Learner: Patient  Readiness: Acceptance  Method: Explanation  Response: Verbalizes Understanding          Aspen DE  Ambulatory Case Management    5/5/2025, 12:11 EDT

## 2025-05-12 ENCOUNTER — HOSPITAL ENCOUNTER (OUTPATIENT)
Dept: ONCOLOGY | Facility: HOSPITAL | Age: 60
Discharge: HOME | End: 2025-05-12
Payer: MEDICARE

## 2025-05-12 ENCOUNTER — OFFICE VISIT (OUTPATIENT)
Dept: ONCOLOGY | Facility: HOSPITAL | Age: 60
End: 2025-05-12
Payer: MEDICARE

## 2025-05-12 VITALS
OXYGEN SATURATION: 97 % | SYSTOLIC BLOOD PRESSURE: 99 MMHG | WEIGHT: 183.86 LBS | DIASTOLIC BLOOD PRESSURE: 74 MMHG | BODY MASS INDEX: 31.39 KG/M2 | TEMPERATURE: 97.9 F | HEART RATE: 112 BPM | HEIGHT: 64 IN | RESPIRATION RATE: 18 BRPM

## 2025-05-12 DIAGNOSIS — C79.51 CANCER, METASTATIC TO BONE: ICD-10-CM

## 2025-05-12 DIAGNOSIS — C34.90 SMALL CELL CARCINOMA OF LUNG, UNSPECIFIED LATERALITY, UNSPECIFIED PART OF LUNG: ICD-10-CM

## 2025-05-12 DIAGNOSIS — G89.3 CHRONIC NEOPLASM-RELATED PAIN: ICD-10-CM

## 2025-05-12 DIAGNOSIS — Z45.2 ENCOUNTER FOR ADJUSTMENT OR MANAGEMENT OF VASCULAR ACCESS DEVICE: Primary | ICD-10-CM

## 2025-05-12 DIAGNOSIS — C34.90 SMALL CELL CARCINOMA OF LUNG, UNSPECIFIED LATERALITY, UNSPECIFIED PART OF LUNG: Primary | ICD-10-CM

## 2025-05-12 DIAGNOSIS — C79.31 SECONDARY MALIGNANT NEOPLASM OF BRAIN: Primary | ICD-10-CM

## 2025-05-12 DIAGNOSIS — I95.9 HYPOTENSION, UNSPECIFIED HYPOTENSION TYPE: ICD-10-CM

## 2025-05-12 DIAGNOSIS — R64 CANCER CACHEXIA: ICD-10-CM

## 2025-05-12 DIAGNOSIS — E22.2 SIADH (SYNDROME OF INAPPROPRIATE ADH PRODUCTION): ICD-10-CM

## 2025-05-12 DIAGNOSIS — G89.3 NEOPLASM RELATED PAIN: ICD-10-CM

## 2025-05-12 LAB
ALBUMIN SERPL-MCNC: 4.1 G/DL (ref 3.5–5.2)
ALBUMIN/GLOB SERPL: 1.3 G/DL
ALP SERPL-CCNC: 100 U/L (ref 39–117)
ALT SERPL W P-5'-P-CCNC: 7 U/L (ref 1–33)
ANION GAP SERPL CALCULATED.3IONS-SCNC: 10.7 MMOL/L (ref 5–15)
AST SERPL-CCNC: 11 U/L (ref 1–32)
BASOPHILS # BLD AUTO: 0.14 10*3/MM3 (ref 0–0.2)
BASOPHILS NFR BLD AUTO: 1.4 % (ref 0–1.5)
BILIRUB SERPL-MCNC: 0.3 MG/DL (ref 0–1.2)
BUN SERPL-MCNC: 15 MG/DL (ref 6–20)
BUN/CREAT SERPL: 20 (ref 7–25)
CALCIUM SPEC-SCNC: 8.6 MG/DL (ref 8.6–10.5)
CHLORIDE SERPL-SCNC: 110 MMOL/L (ref 98–107)
CO2 SERPL-SCNC: 20.3 MMOL/L (ref 22–29)
CREAT SERPL-MCNC: 0.75 MG/DL (ref 0.57–1)
DEPRECATED RDW RBC AUTO: 60.2 FL (ref 37–54)
EGFRCR SERPLBLD CKD-EPI 2021: 92.4 ML/MIN/1.73
EOSINOPHIL # BLD AUTO: 0.01 10*3/MM3 (ref 0–0.4)
EOSINOPHIL NFR BLD AUTO: 0.1 % (ref 0.3–6.2)
ERYTHROCYTE [DISTWIDTH] IN BLOOD BY AUTOMATED COUNT: 18.8 % (ref 12.3–15.4)
GLOBULIN UR ELPH-MCNC: 3.1 GM/DL
GLUCOSE SERPL-MCNC: 107 MG/DL (ref 65–99)
HCT VFR BLD AUTO: 30.3 % (ref 34–46.6)
HGB BLD-MCNC: 9.7 G/DL (ref 12–15.9)
IMM GRANULOCYTES # BLD AUTO: 0.14 10*3/MM3 (ref 0–0.05)
IMM GRANULOCYTES NFR BLD AUTO: 1.4 % (ref 0–0.5)
LYMPHOCYTES # BLD AUTO: 1.69 10*3/MM3 (ref 0.7–3.1)
LYMPHOCYTES NFR BLD AUTO: 17 % (ref 19.6–45.3)
MAGNESIUM SERPL-MCNC: 2.1 MG/DL (ref 1.6–2.6)
MCH RBC QN AUTO: 30.1 PG (ref 26.6–33)
MCHC RBC AUTO-ENTMCNC: 32 G/DL (ref 31.5–35.7)
MCV RBC AUTO: 94.1 FL (ref 79–97)
MONOCYTES # BLD AUTO: 0.88 10*3/MM3 (ref 0.1–0.9)
MONOCYTES NFR BLD AUTO: 8.8 % (ref 5–12)
NEUTROPHILS NFR BLD AUTO: 7.11 10*3/MM3 (ref 1.7–7)
NEUTROPHILS NFR BLD AUTO: 71.3 % (ref 42.7–76)
NRBC BLD AUTO-RTO: 0.3 /100 WBC (ref 0–0.2)
PHOSPHATE SERPL-MCNC: 2 MG/DL (ref 2.5–4.5)
PLATELET # BLD AUTO: 267 10*3/MM3 (ref 140–450)
PMV BLD AUTO: 9.2 FL (ref 6–12)
POTASSIUM SERPL-SCNC: 3.2 MMOL/L (ref 3.5–5.2)
PROT SERPL-MCNC: 7.2 G/DL (ref 6–8.5)
RBC # BLD AUTO: 3.22 10*6/MM3 (ref 3.77–5.28)
SODIUM SERPL-SCNC: 141 MMOL/L (ref 136–145)
WBC NRBC COR # BLD AUTO: 9.97 10*3/MM3 (ref 3.4–10.8)

## 2025-05-12 PROCEDURE — 99214 OFFICE O/P EST MOD 30 MIN: CPT | Performed by: INTERNAL MEDICINE

## 2025-05-12 PROCEDURE — 85025 COMPLETE CBC W/AUTO DIFF WBC: CPT | Performed by: INTERNAL MEDICINE

## 2025-05-12 PROCEDURE — 3074F SYST BP LT 130 MM HG: CPT | Performed by: INTERNAL MEDICINE

## 2025-05-12 PROCEDURE — 25010000002 HEPARIN LOCK FLUSH PER 10 UNITS: Performed by: INTERNAL MEDICINE

## 2025-05-12 PROCEDURE — 3078F DIAST BP <80 MM HG: CPT | Performed by: INTERNAL MEDICINE

## 2025-05-12 PROCEDURE — 80053 COMPREHEN METABOLIC PANEL: CPT | Performed by: INTERNAL MEDICINE

## 2025-05-12 PROCEDURE — 25010000002 TOPOTECAN 4 MG/4ML SOLUTION 4 ML VIAL: Performed by: INTERNAL MEDICINE

## 2025-05-12 PROCEDURE — 1125F AMNT PAIN NOTED PAIN PRSNT: CPT | Performed by: INTERNAL MEDICINE

## 2025-05-12 PROCEDURE — 25010000002 DEXAMETHASONE SODIUM PHOSPHATE 120 MG/30ML SOLUTION: Performed by: INTERNAL MEDICINE

## 2025-05-12 PROCEDURE — 83735 ASSAY OF MAGNESIUM: CPT | Performed by: INTERNAL MEDICINE

## 2025-05-12 PROCEDURE — G2211 COMPLEX E/M VISIT ADD ON: HCPCS | Performed by: INTERNAL MEDICINE

## 2025-05-12 PROCEDURE — 25810000003 SODIUM CHLORIDE 0.9 % SOLUTION: Performed by: INTERNAL MEDICINE

## 2025-05-12 PROCEDURE — 96375 TX/PRO/DX INJ NEW DRUG ADDON: CPT

## 2025-05-12 PROCEDURE — 96413 CHEMO IV INFUSION 1 HR: CPT

## 2025-05-12 PROCEDURE — 84100 ASSAY OF PHOSPHORUS: CPT | Performed by: INTERNAL MEDICINE

## 2025-05-12 RX ORDER — SODIUM CHLORIDE 9 MG/ML
20 INJECTION, SOLUTION INTRAVENOUS ONCE
Status: CANCELLED | OUTPATIENT
Start: 2025-05-14

## 2025-05-12 RX ORDER — OXYCODONE AND ACETAMINOPHEN 10; 325 MG/1; MG/1
1 TABLET ORAL EVERY 4 HOURS PRN
Qty: 180 TABLET | Refills: 0 | Status: SHIPPED | OUTPATIENT
Start: 2025-05-12

## 2025-05-12 RX ORDER — DEXAMETHASONE 4 MG/1
4 TABLET ORAL 2 TIMES DAILY WITH MEALS
Qty: 28 TABLET | Refills: 1 | Status: SHIPPED | OUTPATIENT
Start: 2025-05-12

## 2025-05-12 RX ORDER — OLANZAPINE 2.5 MG/1
2.5 TABLET, FILM COATED ORAL NIGHTLY
Qty: 30 TABLET | Refills: 1 | Status: SHIPPED | OUTPATIENT
Start: 2025-05-12 | End: 2025-05-16 | Stop reason: SDUPTHER

## 2025-05-12 RX ORDER — SODIUM CHLORIDE 9 MG/ML
20 INJECTION, SOLUTION INTRAVENOUS ONCE
Status: CANCELLED | OUTPATIENT
Start: 2025-05-12

## 2025-05-12 RX ORDER — HEPARIN SODIUM (PORCINE) LOCK FLUSH IV SOLN 100 UNIT/ML 100 UNIT/ML
500 SOLUTION INTRAVENOUS AS NEEDED
Status: DISCONTINUED | OUTPATIENT
Start: 2025-05-12 | End: 2025-05-13 | Stop reason: HOSPADM

## 2025-05-12 RX ORDER — SODIUM CHLORIDE 9 MG/ML
20 INJECTION, SOLUTION INTRAVENOUS ONCE
Status: CANCELLED | OUTPATIENT
Start: 2025-05-13

## 2025-05-12 RX ORDER — SODIUM CHLORIDE 0.9 % (FLUSH) 0.9 %
20 SYRINGE (ML) INJECTION AS NEEDED
Status: DISCONTINUED | OUTPATIENT
Start: 2025-05-12 | End: 2025-05-13 | Stop reason: HOSPADM

## 2025-05-12 RX ORDER — SODIUM CHLORIDE 9 MG/ML
20 INJECTION, SOLUTION INTRAVENOUS ONCE
Status: CANCELLED | OUTPATIENT
Start: 2025-05-16

## 2025-05-12 RX ORDER — SODIUM CHLORIDE 9 MG/ML
20 INJECTION, SOLUTION INTRAVENOUS ONCE
Status: CANCELLED | OUTPATIENT
Start: 2025-05-15

## 2025-05-12 RX ORDER — SODIUM CHLORIDE 0.9 % (FLUSH) 0.9 %
20 SYRINGE (ML) INJECTION AS NEEDED
Status: CANCELLED | OUTPATIENT
Start: 2025-05-12

## 2025-05-12 RX ORDER — HEPARIN SODIUM (PORCINE) LOCK FLUSH IV SOLN 100 UNIT/ML 100 UNIT/ML
500 SOLUTION INTRAVENOUS AS NEEDED
Status: CANCELLED | OUTPATIENT
Start: 2025-05-12

## 2025-05-12 RX ORDER — SODIUM CHLORIDE 9 MG/ML
20 INJECTION, SOLUTION INTRAVENOUS ONCE
Status: COMPLETED | OUTPATIENT
Start: 2025-05-12 | End: 2025-05-12

## 2025-05-12 RX ADMIN — Medication 20 ML: at 14:55

## 2025-05-12 RX ADMIN — SODIUM CHLORIDE 20 ML/HR: 9 INJECTION, SOLUTION INTRAVENOUS at 13:50

## 2025-05-12 RX ADMIN — HEPARIN 500 UNITS: 100 SYRINGE at 14:55

## 2025-05-12 RX ADMIN — SODIUM CHLORIDE 2.4 MG: 9 INJECTION, SOLUTION INTRAVENOUS at 14:13

## 2025-05-12 RX ADMIN — Medication 20 ML: at 12:55

## 2025-05-12 RX ADMIN — DEXAMETHASONE SODIUM PHOSPHATE 12 MG: 4 INJECTION, SOLUTION INTRA-ARTICULAR; INTRALESIONAL; INTRAMUSCULAR; INTRAVENOUS; SOFT TISSUE at 13:50

## 2025-05-12 NOTE — PROGRESS NOTES
Chief Complaint   FOLLOW UP 1     Aleksandar Edge*  Aleksandar Edge, DO    Subjective          Lluvia TAPIA Gianni presents to [facility] for small cell lung cancer    Ms. Archer is a very pleasant 58-year-old female with past medical history of hypertension hyperlipidemia, COPD, osteoarthritis, anxiety who presents for new oncology evaluation with her daughter for diagnosis of small cell lung cancer.  Patient previously had PET scan in September 2023 without any concerning findings.  Follow-up CT chest in February showed multiple right middle lobe nodules with mediastinal and right hilar adenopathy.  Patient was admitted to [facility] on June 2 with shortness of breath, fever, cough.  She was started on treatment for pneumonia.  She was found to be hyponatremic to 126.  Repeat CT chest on 3 Tia showed progression of right middle lobe nodules and mediastinal right hilar lymphadenopathy.  Patient was discharged on 4 June.  She had outpatient bronchoscopy on 7 June with station 4R and station 7 possible small cell carcinoma.  Unable to get MRI due to claustrophobia.  CT head with and without contrast on 13 June did not show any intracranial mets.  PET scan confirmed metastatic disease to bone and right axilla    Interval History  Patient presents for follow up.  Patient was recently in the hospital with cytopenias from chemotherapy.  Required multiple transfusions for thrombocytopenia.  Required G-CSF for neutropenia. Required IV antibiotics.  Patient was admitted from 4/22 through 5/3/2025.  She was also found to be hyponatremic and started on sodium tablets along with Urena.  Patient continues to have poor appetite.  She is weak and fatigued.  She reports food is hard to keep down.  She has lost significant weight since the 22nd.  Roughly about 12 pounds.  Due for refill of oxycodone.  Agreeable for treatment today with dose reduction.  Plan for  steroids and olanzapine.       Oncology/Hematology History Overview Note   2/20/24: CT Chest:  No PE or aortic dissection. Multiple right middle lobe nodules are highly suspicious for malignancy.  Additionally, there is mediastinal and right hilar adenopathy now noted.  Follow-up with a PET-CT is recommended. Emphysema with chronic changes in both lungs that otherwise appears stable. Atherosclerotic disease and coronary artery disease.    6/2/24: admitted to [facility] with shortness of breath, fever, cough and started on treatment for pneumonia. Found to have hyponatremia to 126    6/3/24 CT Chest: Right middle lobe nodules and mediastinal and right hilar lymphadenopathy has progressed from prior CT, and remains concerning for malignancy.  Partial right middle lobe atelectasis. Moderate emphysema. Discharged on 6/4/24 with Na of 133.    6/7/24: Bronchoscopy: Station 4R and station 7 positive for small cell carcinoma.    6/13/24: CT head with and without contrast (due to claustrophobia): No mets seen    6/19/24: NM PET: New hypermetabolic nodules within the right middle lobe. There are also multiple new enlarged hypermetabolic mediastinal lymph nodes consistent with metastatic disease. Right axillary mets as well. There are scattered foci of hypermetabolism throughout the bony structures consistent with bone metastases.    6/24/24: C1D1 Carbo/Etop/Durva. Tolerated well    7/16/24: C2D1 Carbo/Etop/Durva. Complicated by inpatient admission due to dehydration from nausea/vomiting as well as pancytopenia. ANC 0.11. Required transfusion for hgb 6.8.     8/6/24: C3D1 Carbo/Etop/Durva. 20% reduction in Carbo and 20% reduction in Etoposide. Add G-CSF with this cycle due to severe neutropenia with C2.     Repeat scan have shown disease response, however scan was due to PE rule out.     8/27/24: C4D1 Carbo/Etop/Durva. 33% reduction in Carbo and 33% reduction in Etoposide. Continue G-CSF with this cycle due to severe neutropenia with  C2. Labs appropriate to proceed. Ok to treat for elevated alk phos.     8/6/24: C3D1 Carbo/Etop/Durva. 20% reduction in Carbo and 20% reduction in Etoposide. Add G-CSF with this cycle due to severe neutropenia with C2.    Repeat scan have shown disease response, however scan was due to PE rule out.     8/27/24: C4D1 Carbo/Etop/Durva. 33% reduction in Carbo and 33% reduction in Etoposide. Continue G-CSF with this cycle due to severe neutropenia with C2.      Repeat scans without progression. Plan for durvalumab alone for maintenance    9/17/24: C5D1 durvalumab alone    2/21/25: C10D1 durvalumab alone    2/25/25: Admitted with shortness of breath and found to be influenza A positive.  CTA of chest showed progression of metastatic disease with enlarging pulmonary nodules and new enlarged mediastinal hilar lymph nodes.  CT of the abdomen pelvis done inpatient was negative for disease.    3/3/25: Completed XRT to brain    3/10/25: Orders written for next line topotecan    3/19/25: C1D1 Topotecan    4/8/25: Repeat CT head with resolution of brain mets        Small cell lung cancer   6/12/2024 Initial Diagnosis    Small cell lung cancer     6/14/2024 - 6/14/2024 Chemotherapy    OP LUNG CISplatin 60mg/m2 / Etoposide 120mg/m2 + XRT     6/14/2024 Cancer Staged    Staging form: Lung, AJCC 8th Edition  - Clinical: Stage IVB (cT1b, cN2, cM1c) - Signed by Brian Dickson MD on 6/20/2024 6/24/2024 - 8/29/2024 Chemotherapy    OP LUNG CARBOplatin AUC=5 / Etoposide / Durvalumab     8/27/2024 -  Chemotherapy    OP SUPPORTIVE Denosumab (Xgeva) Q28D     9/17/2024 - 2/21/2025 Biopsy    OP LUNG Durvalumab 1500 mg  Plan Provider: Brian Dickson MD  Treatment goal: Control  Line of treatment: [No plan line of treatment]     3/3/2025 - 3/3/2025 Chemotherapy    OP LUNG Lurbinectedin      3/19/2025 -  Chemotherapy    OP LUNG Topotecan (IV)     Cancer, metastatic to bone   8/6/2024 Initial Diagnosis    Cancer, metastatic to  bone     8/27/2024 -  Chemotherapy    OP SUPPORTIVE Denosumab (Xgeva) Q28D     Secondary malignant neoplasm of brain   2/10/2025 Initial Diagnosis    Secondary malignant neoplasm of brain     2/13/2025 - 3/5/2025 Radiation    Radiation OncologyTreatment Course:  Lluvia Archer received 3000 cGy in 10 fractions to the whole brain.            Review of Systems   Constitutional:  Positive for appetite change and fatigue. Negative for diaphoresis, fever, unexpected weight gain and unexpected weight loss.   HENT:  Negative for hearing loss, mouth sores, sore throat, swollen glands, trouble swallowing and voice change.    Eyes:  Negative for blurred vision, double vision, pain, redness and visual disturbance.   Respiratory:  Negative for cough, shortness of breath and wheezing.    Cardiovascular:  Positive for chest pain (pt had her port put in today) and leg swelling (both legs are swelling). Negative for palpitations.   Gastrointestinal:  Positive for nausea. Negative for abdominal pain, blood in stool, constipation, diarrhea and vomiting.   Endocrine: Negative for cold intolerance, heat intolerance, polydipsia and polyuria.   Genitourinary:  Negative for decreased urine volume, difficulty urinating, dysuria, frequency, hematuria and urinary incontinence.   Musculoskeletal:  Negative for arthralgias, back pain, joint swelling and myalgias.   Skin:  Negative for color change, rash, skin lesions and wound.        PT IS GETTING SORES ON THE SKIN   Neurological:  Negative for dizziness, seizures, weakness, numbness and headache.   Hematological:  Negative for adenopathy. Does not bruise/bleed easily.   Psychiatric/Behavioral:  Positive for sleep disturbance. Negative for depressed mood. The patient is not nervous/anxious.    All other systems reviewed and are negative.    Current Outpatient Medications on File Prior to Visit   Medication Sig Dispense Refill    albuterol (ACCUNEB) 0.63 MG/3ML nebulizer solution Take 3 mL  by nebulization Every 6 (Six) Hours As Needed for Wheezing or Shortness of Air. 60 each 2    albuterol sulfate  (90 Base) MCG/ACT inhaler Inhale 2 puffs Every 4 (Four) Hours As Needed for Wheezing or Shortness of Air. 18 g 5    atorvastatin (LIPITOR) 10 MG tablet Take 1 tablet by mouth Every Night. 90 tablet 3    buPROPion SR (WELLBUTRIN SR) 150 MG 12 hr tablet Take 1 tablet by mouth 2 (Two) Times a Day. 180 tablet 3    calcium carbonate (Tums) 500 MG chewable tablet Chew 2 tablets Daily. 28 tablet 0    escitalopram (LEXAPRO) 20 MG tablet Take 1 tablet by mouth Daily. 90 tablet 3    famotidine (PEPCID) 40 MG tablet TAKE ONE TABLET BY MOUTH TWICE DAILY @ 9AM & 5PM (Patient taking differently: Take 1 tablet by mouth 2 (Two) Times a Day.) 180 tablet 11    fludrocortisone 0.1 MG tablet Take 1 tablet by mouth Daily for 30 days. 30 tablet 0    Fluticasone-Umeclidin-Vilant (Trelegy Ellipta) 200-62.5-25 MCG/ACT inhaler Inhale 1 puff Daily. 90 each 1    gabapentin (NEURONTIN) 100 MG capsule Take 2 capsules by mouth 3 (Three) Times a Day. 90 capsule 2    hydrOXYzine (ATARAX) 25 MG tablet TAKE 1 TABLET BY MOUTH THREE TIMES A DAY AS NEEDED FOR ITCHING (Patient taking differently: Take 1 tablet by mouth 3 (Three) Times a Day As Needed.) 270 tablet 2    Magnesium 400 MG tablet Take 400 mg by mouth Daily. 30 tablet 5    Melatonin 10 MG tablet Take 1 tablet by mouth At Night As Needed.      midodrine (PROAMATINE) 10 MG tablet Take 1 tablet by mouth 3 (Three) Times a Day Before Meals for 30 days. 90 tablet 0    naloxone (NARCAN) 4 MG/0.1ML nasal spray CALL 911. DON'T PRIME. SPRAY IN 1 NOSTRIL FOR OVERDOSE. REPEAT IN 2-3 MINUTES IN OTHER NOSTRIL IF NO OR MINIMAL BREATHING/RESPONSIVENESS. (Patient taking differently: Administer 1 spray into the nostril(s) as directed by provider As Needed for Opioid Reversal or Respiratory Depression. Call 911. Don't prime. [place] in 1 nostril for overdose. Repeat in 2-3 minutes in other nostril  if no or minimal breathing/responsiveness.) 2 each 0    naproxen (NAPROSYN) 500 MG tablet Take 1 tablet by mouth 2 (Two) Times a Day With Meals. 30 tablet 1    nystatin (MYCOSTATIN) 100,000 unit/mL suspension Swish and swallow 5 mL 4 (Four) Times a Day. 473 mL 2    ondansetron ODT (ZOFRAN-ODT) 8 MG disintegrating tablet Place 1 tablet on the tongue Every 8 (Eight) Hours As Needed for Nausea or Vomiting. 60 tablet 3    oxyCODONE-acetaminophen (PERCOCET)  MG per tablet Take 1 tablet by mouth Every 4 (Four) Hours As Needed for Moderate Pain. 180 tablet 0    prochlorperazine (COMPAZINE) 10 MG tablet Take 1 tablet by mouth Every 6 (Six) Hours As Needed for Nausea. 60 tablet 3     Current Facility-Administered Medications on File Prior to Visit   Medication Dose Route Frequency Provider Last Rate Last Admin    heparin injection 500 Units  500 Units Intravenous PRN Brian Dickson MD        sodium chloride 0.9 % flush 20 mL  20 mL Intravenous PRN Brian Dickson MD           No Known Allergies  Past Medical History:   Diagnosis Date    Abnormal mammogram     PT DENIES KNOWING OF THIS HX    Anxiety     Arthritis     R SHOULDER, R HIP, AND R LEG    Cancer     LUNG    Chronic nausea 01/15/2019    COPD (chronic obstructive pulmonary disease)     O2 3 LITERS NC CONT    Hernia, hiatal     Hyperlipidemia     Hypertension     Knee pain, right 08/30/2018    Memory loss     FORGETFULNESS ? ETIOLOGY    Migraine headache     Moderate episode of recurrent major depressive disorder 05/22/2017    Night sweats     Sciatica of right side 08/18/2017    Severe episode of recurrent major depressive disorder, without psychotic features 10/22/2019    Shingles     OUTBREAK 6/11/24 ON ANTIVIRAL , WHELPS NOTED NO SORES.    Shoulder pain, left 08/30/2018     Past Surgical History:   Procedure Laterality Date    BRONCHOSCOPY N/A 04/12/2022    Procedure: BRONCHOSCOPY WITH BRONCHOALVEOLAR LAVAGE, BIOPSY;  Surgeon: Tamara  Shin Lynn DO;  Location: [facility] ENDOSCOPY;  Service: Pulmonary;  Laterality: N/A;  RIGHT LOWER LOBE INFILTRATE    BRONCHOSCOPY N/A 2024    Procedure: BRONCHOSCOPY WITH ENDOBRONCHIAL ULTRASOUND AND FINE NEEDLE ASPIRATE;  Surgeon: Shin Mcdonald DO;  Location: [facility] ENDOSCOPY;  Service: Pulmonary;  Laterality: N/A;  MEDIASTINAL ADENOPATHY     SECTION      CHOLECYSTECTOMY      LAPAROSCOPIC    COLONOSCOPY      PORTACATH PLACEMENT Left 2024    Procedure: INSERTION OF PORTACATH;  Surgeon: Josh Patton MD;  Location: [facility] OR OSC;  Service: General;  Laterality: Left;    TOTAL HIP ARTHROPLASTY Right 2022    Procedure: RIGHT TOTAL HIP ARTHROPLASTY;  Surgeon: Cecil Gomes MD;  Location: [facility] MAIN OR;  Service: Orthopedics;  Laterality: Right;     Social History     Socioeconomic History    Marital status:      Spouse name: DEVAN    Number of children: 2    Years of education: GED    Highest education level: GED or equivalent   Tobacco Use    Smoking status: Every Day     Current packs/day: 1.00     Average packs/day: 1 pack/day for 47.4 years (47.4 ttl pk-yrs)     Types: Cigarettes     Start date:      Passive exposure: Past    Smokeless tobacco: Never   Vaping Use    Vaping status: Former    Substances: Nicotine, Flavoring    Devices: Disposable   Substance and Sexual Activity    Alcohol use: Never    Drug use: Never    Sexual activity: Defer     Family History   Problem Relation Age of Onset    Other Mother         BLOOD DISEASE    Arthritis Mother     Heart disease Father     Hypertension Other     Cancer Other     Heart disease Other     Malig Hyperthermia Neg Hx        Objective   Physical Exam  Weak appearing patient in no acute distress on RA  Anicteric sclerae, no rash on exposed skin  Respirations non-labored  Awake, alert, and oriented x 4. Speech intact. No gross neurologic deficit  Appropriate mood and affect    Vitals:    25 1317   BP:  "99/74   Pulse: 112   Resp: 18   Temp: 97.9 °F (36.6 °C)   TempSrc: Temporal   SpO2: 97%   Weight: 83.4 kg (183 lb 13.8 oz)   Height: 162.6 cm (64.02\")   PainSc: 6                    PHQ-9 Total Score:           Result Review :   The following data was reviewed by: Brian Dickson MD on 05/12/25:  Lab Results   Component Value Date    HGB 9.7 (L) 05/12/2025    HCT 30.3 (L) 05/12/2025    MCV 94.1 05/12/2025     05/12/2025    WBC 9.97 05/12/2025    NEUTROABS 7.11 (H) 05/12/2025    LYMPHSABS 1.69 05/12/2025    MONOSABS 0.88 05/12/2025    EOSABS 0.01 05/12/2025    BASOSABS 0.14 05/12/2025     Lab Results   Component Value Date    GLUCOSE 107 (H) 05/12/2025    BUN 15 05/12/2025    CREATININE 0.75 05/12/2025     05/12/2025    K 3.2 (L) 05/12/2025     (H) 05/12/2025    CO2 20.3 (L) 05/12/2025    CALCIUM 8.6 05/12/2025    PROTEINTOT 7.2 05/12/2025    ALBUMIN 4.1 05/12/2025    BILITOT 0.3 05/12/2025    ALKPHOS 100 05/12/2025    AST 11 05/12/2025    ALT 7 05/12/2025     Lab Results   Component Value Date    MG 1.8 05/03/2025    PHOS 2.2 (L) 05/03/2025    FREET4 1.14 02/21/2025    TSH 0.771 04/22/2025     Labs personally reviewed. Sodium normal. Hgb wnl. WBC wnl. Plts wnl. Na normal. K low     Discharge summary note personally reviewed    Renal note personally reviewed      XR Chest 1 View  Result Date: 4/28/2025  Impression: Patchy infiltrate throughout compatible with multifocal pneumonia. Electronically Signed: Mabel Pabon MD  4/28/2025 3:26 PM EDT  Workstation ID: [id number]    XR Chest 1 View  Result Date: 4/22/2025  Impression: Small left-sided pleural effusion and mild left basilar atelectasis. Electronically Signed: Luca Brewster MD  4/22/2025 3:52 PM EDT  Workstation ID: [id number]    XR Chest 1 View  Result Date: 4/21/2025  Impression: Improved bibasilar opacities seen on prior exam. No new or worsening focal consolidation. Electronically Signed: Satish Contreras MD  4/21/2025 2:23 PM EDT  " Workstation ID: [id number]          Assessment and Plan    Diagnoses and all orders for this visit:    1. Secondary malignant neoplasm of brain (Primary)    2. Small cell carcinoma of lung, unspecified laterality, unspecified part of lung  -     Clinic Appointment Request  -     CT chest w contrast; Future  -     CT abdomen pelvis w contrast; Future  -     ONCBCN INFUSION APPOINTMENT REQUEST 01; Future  -     ONCBCN INFUSION APPOINTMENT REQUEST 01; Future  -     ONCBCN INFUSION APPOINTMENT REQUEST 01; Future  -     ONCBCN INFUSION APPOINTMENT REQUEST 01; Future  -     CBC & Differential; Standing  -     Comprehensive Metabolic Panel; Standing    3. Cancer, metastatic to bone  -     oxyCODONE-acetaminophen (PERCOCET)  MG per tablet; Take 1 tablet by mouth Every 4 (Four) Hours As Needed for Moderate Pain.  Dispense: 180 tablet; Refill: 0    4. Chronic neoplasm-related pain  -     oxyCODONE-acetaminophen (PERCOCET)  MG per tablet; Take 1 tablet by mouth Every 4 (Four) Hours As Needed for Moderate Pain.  Dispense: 180 tablet; Refill: 0    5. Cancer cachexia    6. Neoplasm related pain    7. SIADH (syndrome of inappropriate ADH production)    8. Hypotension, unspecified hypotension type    Other orders  -     OLANZapine (zyPREXA) 2.5 MG tablet; Take 1 tablet by mouth Every Night.  Dispense: 30 tablet; Refill: 1  -     dexAMETHasone (DECADRON) 4 MG tablet; Take 1 tablet by mouth 2 (Two) Times a Day With Meals.  Dispense: 28 tablet; Refill: 1  -     sodium chloride 0.9 % infusion  -     dexAMETHasone (DECADRON) 12 mg in sodium chloride 0.9 % IVPB  -     topotecan (HYCAMTIN) 2.4 mg in sodium chloride 0.9 % 102.4 mL chemo IVPB  -     sodium chloride 0.9 % infusion  -     dexAMETHasone (DECADRON) 12 mg in sodium chloride 0.9 % IVPB  -     topotecan (HYCAMTIN) 2.4 mg in sodium chloride 0.9 % 102.4 mL chemo IVPB  -     sodium chloride 0.9 % infusion  -     dexAMETHasone (DECADRON) 12 mg in sodium chloride 0.9 %  IVPB  -     topotecan (HYCAMTIN) 2.4 mg in sodium chloride 0.9 % 102.4 mL chemo IVPB  -     sodium chloride 0.9 % infusion  -     dexAMETHasone (DECADRON) 12 mg in sodium chloride 0.9 % IVPB  -     topotecan (HYCAMTIN) 2.4 mg in sodium chloride 0.9 % 102.4 mL chemo IVPB  -     sodium chloride 0.9 % infusion  -     dexAMETHasone (DECADRON) 12 mg in sodium chloride 0.9 % IVPB  -     topotecan (HYCAMTIN) 2.4 mg in sodium chloride 0.9 % 102.4 mL chemo IVPB  -     pegfilgrastim (NEULASTA ONPRO) on-body injector 6 mg          Small cell lung cancer, extensive stage  Initial PET with multiple new enlarged hypermetabolic mediastinal lymph nodes consistent with metastatic disease, also with new right axillary FDG avid mets. There are scattered foci of hypermetabolism throughout the bony structures consistent with bone metastases.  Recommended treatment is systemic alone with for IV carboplatin, etoposide and durvalumab every 3 weeks. First cycle 6/24/24. 8/6/24: C3D1 Carbo/Etop/Durva. 20% reduction in Carbo and 20% reduction in Etoposide. Add G-CSF with this cycle due to severe neutropenia with C2.  8/27/24: C4D1 Carbo/Etop/Durva. 33% reduction in Carbo and 33% reduction in Etoposide. Last cycle of chemotherapy.    Repeat scans after C4 without clear progression, new 7 mm RUL nodule could be infectious. Plan for durvalumab alone.Repeat scans 12/9/24 with stable disease. 2/21/25: C10D1 Durvalumab (sixth with Durvalumab alone).      Repeat imaging obtained inpatient with disease progression in thorax. CT abdomen/pelvis with no evidence of disease.  Due to progression recommend therapy changed to topotecan.  This is for 5 days every 3 weeks. Started 3/19/25. G-CSF to be provided prophylactically.  C1 tolerated well. C2 tolerated poorly with signficant cytopenias and hospitalization requiring transfusions and IV antibiotics.     Dose reduce C3 by 20%. C3D1 5/12/25.     RTC 3 weeks for C4. Will get new staging CT scans prior.      Plan for weekly IV fluids.    Hypotension  Combination of chemo, dehydration, cancer. Will set up for weekly IV fluids.    Hyponatremia  Related to SIADH. On Sodium tablets. Will trend. Na normal today 5/12/25.     Cancer cachexia  Poor appetite  5/12/25: Start nightly olanzapine 2.5 mg. Trial dex 4 mg BID x 2 weeks.     Bilateral Hand pain  Could be carpal tunnel. Do not suspect cancer related.  Can use naproxen. Also on gabapentin which has helped. CT C spine negative for mets. Agree with wrist braces. Referred to ortho for consideration of injections. 3/10/25: Pain better on gabapentin.     Metastatic cancer to brain  CT head 2/4/25 with multiple new enhancing lesions, consistent with mets. Has completed XRT. Repeat CT 4/8/25 with resolution of metastatic lesions. F/u scans per rad onc.     Anxiety  Ativan not beneficial. Switched to clonazepam (2/21/25) PRN.    Neoplasm related pain  Previously increased oxycodone to 10 mg. Has naproxen 500 mg PRN twice daily to use. Started on daily mag 400 mg (mag level is normal).     Metastatic cancer to bone  Recommend bone modifying agent. Patient has all teeth except 2 removed. Monthly Xgeva started 8/27/24. Due 5/15/25. Will monitor electrolytes.     Insomnia  Recommend low dose melatonin vs benadryl. Also has Klonopin if anxiety is contributing to poor sleep.       Patient Follow Up: Cycle 4  Patient was given instructions and counseling regarding her condition or for health maintenance advice. Please see specific information pulled into the AVS if appropriate.

## 2025-05-13 ENCOUNTER — HOSPITAL ENCOUNTER (OUTPATIENT)
Dept: ONCOLOGY | Facility: HOSPITAL | Age: 60
Discharge: HOME | End: 2025-05-13
Admitting: INTERNAL MEDICINE
Payer: MEDICARE

## 2025-05-13 VITALS
DIASTOLIC BLOOD PRESSURE: 90 MMHG | SYSTOLIC BLOOD PRESSURE: 109 MMHG | TEMPERATURE: 97.9 F | OXYGEN SATURATION: 98 % | WEIGHT: 184.4 LBS | BODY MASS INDEX: 31.64 KG/M2 | HEART RATE: 91 BPM | RESPIRATION RATE: 18 BRPM

## 2025-05-13 DIAGNOSIS — Z45.2 ENCOUNTER FOR ADJUSTMENT OR MANAGEMENT OF VASCULAR ACCESS DEVICE: ICD-10-CM

## 2025-05-13 DIAGNOSIS — C34.90 SMALL CELL CARCINOMA OF LUNG, UNSPECIFIED LATERALITY, UNSPECIFIED PART OF LUNG: Primary | ICD-10-CM

## 2025-05-13 PROCEDURE — 25010000002 HEPARIN LOCK FLUSH PER 10 UNITS: Performed by: INTERNAL MEDICINE

## 2025-05-13 PROCEDURE — 25010000002 DEXAMETHASONE SODIUM PHOSPHATE 120 MG/30ML SOLUTION: Performed by: INTERNAL MEDICINE

## 2025-05-13 PROCEDURE — 25010000002 TOPOTECAN 4 MG/4ML SOLUTION 4 ML VIAL: Performed by: INTERNAL MEDICINE

## 2025-05-13 PROCEDURE — 96413 CHEMO IV INFUSION 1 HR: CPT

## 2025-05-13 PROCEDURE — 25810000003 SODIUM CHLORIDE 0.9 % SOLUTION: Performed by: INTERNAL MEDICINE

## 2025-05-13 PROCEDURE — 96375 TX/PRO/DX INJ NEW DRUG ADDON: CPT

## 2025-05-13 RX ORDER — SODIUM CHLORIDE 0.9 % (FLUSH) 0.9 %
20 SYRINGE (ML) INJECTION AS NEEDED
Status: CANCELLED | OUTPATIENT
Start: 2025-05-13

## 2025-05-13 RX ORDER — HEPARIN SODIUM (PORCINE) LOCK FLUSH IV SOLN 100 UNIT/ML 100 UNIT/ML
500 SOLUTION INTRAVENOUS AS NEEDED
Status: DISCONTINUED | OUTPATIENT
Start: 2025-05-13 | End: 2025-05-14 | Stop reason: HOSPADM

## 2025-05-13 RX ORDER — HEPARIN SODIUM (PORCINE) LOCK FLUSH IV SOLN 100 UNIT/ML 100 UNIT/ML
500 SOLUTION INTRAVENOUS AS NEEDED
Status: CANCELLED | OUTPATIENT
Start: 2025-05-14

## 2025-05-13 RX ORDER — SODIUM CHLORIDE 0.9 % (FLUSH) 0.9 %
20 SYRINGE (ML) INJECTION AS NEEDED
Status: DISCONTINUED | OUTPATIENT
Start: 2025-05-13 | End: 2025-05-14 | Stop reason: HOSPADM

## 2025-05-13 RX ORDER — POTASSIUM CHLORIDE 750 MG/1
20 CAPSULE, EXTENDED RELEASE ORAL DAILY
Qty: 60 CAPSULE | Refills: 0 | Status: SHIPPED | OUTPATIENT
Start: 2025-05-13

## 2025-05-13 RX ORDER — SODIUM CHLORIDE 9 MG/ML
20 INJECTION, SOLUTION INTRAVENOUS ONCE
Status: COMPLETED | OUTPATIENT
Start: 2025-05-13 | End: 2025-05-13

## 2025-05-13 RX ADMIN — SODIUM CHLORIDE 2.4 MG: 9 INJECTION, SOLUTION INTRAVENOUS at 14:11

## 2025-05-13 RX ADMIN — SODIUM CHLORIDE 20 ML/HR: 9 INJECTION, SOLUTION INTRAVENOUS at 13:47

## 2025-05-13 RX ADMIN — Medication 20 ML: at 14:52

## 2025-05-13 RX ADMIN — DEXAMETHASONE SODIUM PHOSPHATE 12 MG: 4 INJECTION, SOLUTION INTRA-ARTICULAR; INTRALESIONAL; INTRAMUSCULAR; INTRAVENOUS; SOFT TISSUE at 13:46

## 2025-05-13 RX ADMIN — HEPARIN 500 UNITS: 100 SYRINGE at 14:52

## 2025-05-14 ENCOUNTER — APPOINTMENT (OUTPATIENT)
Dept: ONCOLOGY | Facility: HOSPITAL | Age: 60
End: 2025-05-14
Payer: MEDICARE

## 2025-05-14 ENCOUNTER — TELEPHONE (OUTPATIENT)
Dept: ONCOLOGY | Facility: HOSPITAL | Age: 60
End: 2025-05-14
Payer: MEDICARE

## 2025-05-14 ENCOUNTER — HOSPITAL ENCOUNTER (OUTPATIENT)
Dept: ONCOLOGY | Facility: HOSPITAL | Age: 60
Discharge: HOME | End: 2025-05-14
Admitting: INTERNAL MEDICINE
Payer: MEDICARE

## 2025-05-14 VITALS
WEIGHT: 188.6 LBS | TEMPERATURE: 98.3 F | SYSTOLIC BLOOD PRESSURE: 99 MMHG | DIASTOLIC BLOOD PRESSURE: 61 MMHG | HEART RATE: 101 BPM | OXYGEN SATURATION: 95 % | BODY MASS INDEX: 32.36 KG/M2

## 2025-05-14 DIAGNOSIS — Z45.2 ENCOUNTER FOR ADJUSTMENT OR MANAGEMENT OF VASCULAR ACCESS DEVICE: ICD-10-CM

## 2025-05-14 DIAGNOSIS — C34.90 SMALL CELL CARCINOMA OF LUNG, UNSPECIFIED LATERALITY, UNSPECIFIED PART OF LUNG: Primary | ICD-10-CM

## 2025-05-14 PROCEDURE — 96375 TX/PRO/DX INJ NEW DRUG ADDON: CPT

## 2025-05-14 PROCEDURE — 25010000002 TOPOTECAN 4 MG/4ML SOLUTION 4 ML VIAL: Performed by: INTERNAL MEDICINE

## 2025-05-14 PROCEDURE — 25010000002 HEPARIN LOCK FLUSH PER 10 UNITS: Performed by: INTERNAL MEDICINE

## 2025-05-14 PROCEDURE — 96413 CHEMO IV INFUSION 1 HR: CPT

## 2025-05-14 PROCEDURE — 25010000002 DEXAMETHASONE SODIUM PHOSPHATE 120 MG/30ML SOLUTION: Performed by: INTERNAL MEDICINE

## 2025-05-14 PROCEDURE — 25810000003 SODIUM CHLORIDE 0.9 % SOLUTION: Performed by: INTERNAL MEDICINE

## 2025-05-14 RX ORDER — SODIUM CHLORIDE 0.9 % (FLUSH) 0.9 %
20 SYRINGE (ML) INJECTION AS NEEDED
Status: CANCELLED | OUTPATIENT
Start: 2025-05-14

## 2025-05-14 RX ORDER — HEPARIN SODIUM (PORCINE) LOCK FLUSH IV SOLN 100 UNIT/ML 100 UNIT/ML
500 SOLUTION INTRAVENOUS AS NEEDED
Status: CANCELLED | OUTPATIENT
Start: 2025-05-15

## 2025-05-14 RX ORDER — SODIUM CHLORIDE 0.9 % (FLUSH) 0.9 %
20 SYRINGE (ML) INJECTION AS NEEDED
Status: DISCONTINUED | OUTPATIENT
Start: 2025-05-14 | End: 2025-05-15 | Stop reason: HOSPADM

## 2025-05-14 RX ORDER — HEPARIN SODIUM (PORCINE) LOCK FLUSH IV SOLN 100 UNIT/ML 100 UNIT/ML
500 SOLUTION INTRAVENOUS AS NEEDED
Status: DISCONTINUED | OUTPATIENT
Start: 2025-05-14 | End: 2025-05-15 | Stop reason: HOSPADM

## 2025-05-14 RX ORDER — SODIUM CHLORIDE 9 MG/ML
20 INJECTION, SOLUTION INTRAVENOUS ONCE
Status: COMPLETED | OUTPATIENT
Start: 2025-05-14 | End: 2025-05-14

## 2025-05-14 RX ADMIN — DEXAMETHASONE SODIUM PHOSPHATE 12 MG: 4 INJECTION, SOLUTION INTRA-ARTICULAR; INTRALESIONAL; INTRAMUSCULAR; INTRAVENOUS; SOFT TISSUE at 11:36

## 2025-05-14 RX ADMIN — SODIUM CHLORIDE 2.4 MG: 9 INJECTION, SOLUTION INTRAVENOUS at 11:52

## 2025-05-14 RX ADMIN — Medication 20 ML: at 12:45

## 2025-05-14 RX ADMIN — HEPARIN 500 UNITS: 100 SYRINGE at 12:45

## 2025-05-14 RX ADMIN — SODIUM CHLORIDE 20 ML/HR: 9 INJECTION, SOLUTION INTRAVENOUS at 11:36

## 2025-05-15 ENCOUNTER — HOSPITAL ENCOUNTER (OUTPATIENT)
Dept: ONCOLOGY | Facility: HOSPITAL | Age: 60
Discharge: HOME | End: 2025-05-15
Admitting: INTERNAL MEDICINE
Payer: MEDICARE

## 2025-05-15 ENCOUNTER — READMISSION MANAGEMENT (OUTPATIENT)
Dept: CALL CENTER | Facility: HOSPITAL | Age: 60
End: 2025-05-15
Payer: MEDICARE

## 2025-05-15 VITALS
OXYGEN SATURATION: 100 % | DIASTOLIC BLOOD PRESSURE: 74 MMHG | HEART RATE: 102 BPM | RESPIRATION RATE: 18 BRPM | TEMPERATURE: 97.6 F | SYSTOLIC BLOOD PRESSURE: 125 MMHG | WEIGHT: 183.4 LBS | BODY MASS INDEX: 31.46 KG/M2

## 2025-05-15 DIAGNOSIS — C79.51 CANCER, METASTATIC TO BONE: Primary | ICD-10-CM

## 2025-05-15 DIAGNOSIS — Z45.2 ENCOUNTER FOR ADJUSTMENT OR MANAGEMENT OF VASCULAR ACCESS DEVICE: ICD-10-CM

## 2025-05-15 DIAGNOSIS — C34.90 SMALL CELL CARCINOMA OF LUNG, UNSPECIFIED LATERALITY, UNSPECIFIED PART OF LUNG: ICD-10-CM

## 2025-05-15 PROCEDURE — 25010000002 TOPOTECAN 4 MG/4ML SOLUTION 4 ML VIAL: Performed by: INTERNAL MEDICINE

## 2025-05-15 PROCEDURE — 25010000002 DEXAMETHASONE SODIUM PHOSPHATE 120 MG/30ML SOLUTION: Performed by: INTERNAL MEDICINE

## 2025-05-15 PROCEDURE — 25810000003 SODIUM CHLORIDE 0.9 % SOLUTION: Performed by: INTERNAL MEDICINE

## 2025-05-15 PROCEDURE — 96372 THER/PROPH/DIAG INJ SC/IM: CPT

## 2025-05-15 PROCEDURE — 96375 TX/PRO/DX INJ NEW DRUG ADDON: CPT

## 2025-05-15 PROCEDURE — 25010000002 DENOSUMAB 120 MG/1.7ML SOLUTION: Performed by: INTERNAL MEDICINE

## 2025-05-15 PROCEDURE — 96413 CHEMO IV INFUSION 1 HR: CPT

## 2025-05-15 PROCEDURE — 25010000002 HEPARIN LOCK FLUSH PER 10 UNITS: Performed by: INTERNAL MEDICINE

## 2025-05-15 RX ORDER — SODIUM CHLORIDE 0.9 % (FLUSH) 0.9 %
20 SYRINGE (ML) INJECTION AS NEEDED
Status: CANCELLED | OUTPATIENT
Start: 2025-05-15

## 2025-05-15 RX ORDER — SODIUM CHLORIDE 0.9 % (FLUSH) 0.9 %
20 SYRINGE (ML) INJECTION AS NEEDED
Status: DISCONTINUED | OUTPATIENT
Start: 2025-05-15 | End: 2025-05-16 | Stop reason: HOSPADM

## 2025-05-15 RX ORDER — HEPARIN SODIUM (PORCINE) LOCK FLUSH IV SOLN 100 UNIT/ML 100 UNIT/ML
500 SOLUTION INTRAVENOUS AS NEEDED
Status: DISCONTINUED | OUTPATIENT
Start: 2025-05-15 | End: 2025-05-16 | Stop reason: HOSPADM

## 2025-05-15 RX ORDER — SODIUM CHLORIDE 9 MG/ML
20 INJECTION, SOLUTION INTRAVENOUS ONCE
Status: COMPLETED | OUTPATIENT
Start: 2025-05-15 | End: 2025-05-15

## 2025-05-15 RX ORDER — HEPARIN SODIUM (PORCINE) LOCK FLUSH IV SOLN 100 UNIT/ML 100 UNIT/ML
500 SOLUTION INTRAVENOUS AS NEEDED
Status: CANCELLED | OUTPATIENT
Start: 2025-05-15

## 2025-05-15 RX ADMIN — SODIUM CHLORIDE 2.4 MG: 9 INJECTION, SOLUTION INTRAVENOUS at 13:35

## 2025-05-15 RX ADMIN — HEPARIN 500 UNITS: 100 SYRINGE at 14:21

## 2025-05-15 RX ADMIN — SODIUM CHLORIDE 20 ML/HR: 9 INJECTION, SOLUTION INTRAVENOUS at 13:18

## 2025-05-15 RX ADMIN — DENOSUMAB 120 MG: 120 INJECTION SUBCUTANEOUS at 14:09

## 2025-05-15 RX ADMIN — Medication 20 ML: at 14:20

## 2025-05-15 RX ADMIN — DEXAMETHASONE SODIUM PHOSPHATE 12 MG: 4 INJECTION, SOLUTION INTRA-ARTICULAR; INTRALESIONAL; INTRAMUSCULAR; INTRAVENOUS; SOFT TISSUE at 13:19

## 2025-05-15 NOTE — OUTREACH NOTE
Medical Week 2 Survey      Flowsheet Row Responses   [facility] patient discharged from? [facility]   Does the patient have one of the following disease processes/diagnoses(primary or secondary)? Other   Week 2 attempt successful? No   Unsuccessful attempts Attempt 1   Allie Correa Registered Nurse

## 2025-05-16 ENCOUNTER — HOSPITAL ENCOUNTER (OUTPATIENT)
Dept: ONCOLOGY | Facility: HOSPITAL | Age: 60
Discharge: HOME | End: 2025-05-16
Payer: MEDICARE

## 2025-05-16 VITALS
SYSTOLIC BLOOD PRESSURE: 112 MMHG | OXYGEN SATURATION: 97 % | TEMPERATURE: 98.7 F | BODY MASS INDEX: 31.4 KG/M2 | RESPIRATION RATE: 18 BRPM | HEART RATE: 104 BPM | DIASTOLIC BLOOD PRESSURE: 68 MMHG | WEIGHT: 183 LBS

## 2025-05-16 DIAGNOSIS — Z45.2 ENCOUNTER FOR ADJUSTMENT OR MANAGEMENT OF VASCULAR ACCESS DEVICE: ICD-10-CM

## 2025-05-16 DIAGNOSIS — E86.0 DEHYDRATION: ICD-10-CM

## 2025-05-16 DIAGNOSIS — C34.90 SMALL CELL CARCINOMA OF LUNG, UNSPECIFIED LATERALITY, UNSPECIFIED PART OF LUNG: Primary | ICD-10-CM

## 2025-05-16 PROCEDURE — 25810000003 SODIUM CHLORIDE 0.9 % SOLUTION: Performed by: INTERNAL MEDICINE

## 2025-05-16 PROCEDURE — 25010000002 DEXAMETHASONE SODIUM PHOSPHATE 120 MG/30ML SOLUTION: Performed by: INTERNAL MEDICINE

## 2025-05-16 PROCEDURE — 25010000002 TOPOTECAN 4 MG/4ML SOLUTION 4 ML VIAL: Performed by: INTERNAL MEDICINE

## 2025-05-16 PROCEDURE — 96375 TX/PRO/DX INJ NEW DRUG ADDON: CPT

## 2025-05-16 PROCEDURE — 96361 HYDRATE IV INFUSION ADD-ON: CPT

## 2025-05-16 PROCEDURE — 96377 APPLICATON ON-BODY INJECTOR: CPT

## 2025-05-16 PROCEDURE — 25010000002 HEPARIN LOCK FLUSH PER 10 UNITS: Performed by: INTERNAL MEDICINE

## 2025-05-16 PROCEDURE — 25010000002 PEGFILGRASTIM 6 MG/0.6ML PREFILLED SYRINGE KIT: Performed by: INTERNAL MEDICINE

## 2025-05-16 PROCEDURE — 96413 CHEMO IV INFUSION 1 HR: CPT

## 2025-05-16 RX ORDER — HEPARIN SODIUM (PORCINE) LOCK FLUSH IV SOLN 100 UNIT/ML 100 UNIT/ML
500 SOLUTION INTRAVENOUS AS NEEDED
Status: DISCONTINUED | OUTPATIENT
Start: 2025-05-16 | End: 2025-05-17 | Stop reason: HOSPADM

## 2025-05-16 RX ORDER — OLANZAPINE 5 MG/1
5 TABLET, FILM COATED ORAL NIGHTLY
Qty: 30 TABLET | Refills: 1 | Status: SHIPPED | OUTPATIENT
Start: 2025-05-16

## 2025-05-16 RX ORDER — HEPARIN SODIUM (PORCINE) LOCK FLUSH IV SOLN 100 UNIT/ML 100 UNIT/ML
500 SOLUTION INTRAVENOUS AS NEEDED
Status: CANCELLED | OUTPATIENT
Start: 2025-05-19

## 2025-05-16 RX ORDER — SODIUM CHLORIDE 9 MG/ML
20 INJECTION, SOLUTION INTRAVENOUS ONCE
Status: COMPLETED | OUTPATIENT
Start: 2025-05-16 | End: 2025-05-16

## 2025-05-16 RX ORDER — SODIUM CHLORIDE 0.9 % (FLUSH) 0.9 %
20 SYRINGE (ML) INJECTION AS NEEDED
Status: DISCONTINUED | OUTPATIENT
Start: 2025-05-16 | End: 2025-05-17 | Stop reason: HOSPADM

## 2025-05-16 RX ORDER — NYSTATIN 100000 [USP'U]/ML
500000 SUSPENSION ORAL 4 TIMES DAILY
Qty: 473 ML | Refills: 2 | Status: SHIPPED | OUTPATIENT
Start: 2025-05-16

## 2025-05-16 RX ORDER — SODIUM CHLORIDE 0.9 % (FLUSH) 0.9 %
20 SYRINGE (ML) INJECTION AS NEEDED
Status: CANCELLED | OUTPATIENT
Start: 2025-05-16

## 2025-05-16 RX ADMIN — DEXAMETHASONE SODIUM PHOSPHATE 12 MG: 4 INJECTION, SOLUTION INTRA-ARTICULAR; INTRALESIONAL; INTRAMUSCULAR; INTRAVENOUS; SOFT TISSUE at 13:12

## 2025-05-16 RX ADMIN — SODIUM CHLORIDE 2.4 MG: 9 INJECTION, SOLUTION INTRAVENOUS at 13:26

## 2025-05-16 RX ADMIN — HEPARIN 500 UNITS: 100 SYRINGE at 15:02

## 2025-05-16 RX ADMIN — SODIUM CHLORIDE 1000 ML: 900 INJECTION, SOLUTION INTRAVENOUS at 14:00

## 2025-05-16 RX ADMIN — SODIUM CHLORIDE 20 ML/HR: 9 INJECTION, SOLUTION INTRAVENOUS at 13:12

## 2025-05-16 RX ADMIN — Medication 20 ML: at 15:02

## 2025-05-16 RX ADMIN — PEGFILGRASTIM 6 MG: KIT SUBCUTANEOUS at 14:32

## 2025-05-16 NOTE — NURSING NOTE
Pt complains of nausea, dry heaves, no appetite, thrush, and lack of sleep since beginning her 5 day regimen of topotecan. Pt has been taking her at home zofran, compazine, and new Rx of zyprexa. Pt denies relief of symptoms. Dr. Dickson was made aware. Zyprexa was increased to 5 mg nightly, pt received IVF today in infusion with her chemotherapy, and nystatin was ordered for the patient's oral thrush. Pt v/u and denies further needs or questions.

## 2025-05-19 ENCOUNTER — HOSPITAL ENCOUNTER (OUTPATIENT)
Dept: ONCOLOGY | Facility: HOSPITAL | Age: 60
Discharge: HOME | End: 2025-05-19
Admitting: INTERNAL MEDICINE
Payer: MEDICARE

## 2025-05-19 DIAGNOSIS — C34.90 SMALL CELL CARCINOMA OF LUNG, UNSPECIFIED LATERALITY, UNSPECIFIED PART OF LUNG: ICD-10-CM

## 2025-05-19 DIAGNOSIS — C79.51 CANCER, METASTATIC TO BONE: ICD-10-CM

## 2025-05-19 DIAGNOSIS — Z45.2 ENCOUNTER FOR ADJUSTMENT OR MANAGEMENT OF VASCULAR ACCESS DEVICE: Primary | ICD-10-CM

## 2025-05-19 LAB
ACANTHOCYTES BLD QL SMEAR: NORMAL
ALBUMIN SERPL-MCNC: 3.9 G/DL (ref 3.5–5.2)
ALBUMIN/GLOB SERPL: 1.8 G/DL
ALP SERPL-CCNC: 69 U/L (ref 39–117)
ALT SERPL W P-5'-P-CCNC: 77 U/L (ref 1–33)
ANION GAP SERPL CALCULATED.3IONS-SCNC: 11.1 MMOL/L (ref 5–15)
AST SERPL-CCNC: 25 U/L (ref 1–32)
BASOPHILS # BLD AUTO: 0.01 10*3/MM3 (ref 0–0.2)
BASOPHILS NFR BLD AUTO: 0.2 % (ref 0–1.5)
BILIRUB SERPL-MCNC: 0.9 MG/DL (ref 0–1.2)
BUN SERPL-MCNC: 21 MG/DL (ref 6–20)
BUN/CREAT SERPL: 30.9 (ref 7–25)
CALCIUM SPEC-SCNC: 7.8 MG/DL (ref 8.6–10.5)
CHLORIDE SERPL-SCNC: 106 MMOL/L (ref 98–107)
CO2 SERPL-SCNC: 21.9 MMOL/L (ref 22–29)
CREAT SERPL-MCNC: 0.68 MG/DL (ref 0.57–1)
DEPRECATED RDW RBC AUTO: 59.1 FL (ref 37–54)
EGFRCR SERPLBLD CKD-EPI 2021: 101.1 ML/MIN/1.73
EOSINOPHIL # BLD AUTO: 0.01 10*3/MM3 (ref 0–0.4)
EOSINOPHIL NFR BLD AUTO: 0.2 % (ref 0.3–6.2)
ERYTHROCYTE [DISTWIDTH] IN BLOOD BY AUTOMATED COUNT: 17.4 % (ref 12.3–15.4)
GLOBULIN UR ELPH-MCNC: 2.2 GM/DL
GLUCOSE SERPL-MCNC: 116 MG/DL (ref 65–99)
HCT VFR BLD AUTO: 24.7 % (ref 34–46.6)
HGB BLD-MCNC: 8.2 G/DL (ref 12–15.9)
IMM GRANULOCYTES # BLD AUTO: 0.67 10*3/MM3 (ref 0–0.05)
IMM GRANULOCYTES NFR BLD AUTO: 10.9 % (ref 0–0.5)
LYMPHOCYTES # BLD AUTO: 1.08 10*3/MM3 (ref 0.7–3.1)
LYMPHOCYTES NFR BLD AUTO: 17.6 % (ref 19.6–45.3)
MCH RBC QN AUTO: 31.1 PG (ref 26.6–33)
MCHC RBC AUTO-ENTMCNC: 33.2 G/DL (ref 31.5–35.7)
MCV RBC AUTO: 93.6 FL (ref 79–97)
MONOCYTES # BLD AUTO: 0.06 10*3/MM3 (ref 0.1–0.9)
MONOCYTES NFR BLD AUTO: 1 % (ref 5–12)
NEUTROPHILS NFR BLD AUTO: 4.32 10*3/MM3 (ref 1.7–7)
NEUTROPHILS NFR BLD AUTO: 70.1 % (ref 42.7–76)
NEUTS HYPERSEG # BLD: NORMAL 10*3/UL
NRBC BLD AUTO-RTO: 0 /100 WBC (ref 0–0.2)
PLATELET # BLD AUTO: 70 10*3/MM3 (ref 140–450)
PMV BLD AUTO: 10.6 FL (ref 6–12)
POTASSIUM SERPL-SCNC: 3.4 MMOL/L (ref 3.5–5.2)
PROT SERPL-MCNC: 6.1 G/DL (ref 6–8.5)
RBC # BLD AUTO: 2.64 10*6/MM3 (ref 3.77–5.28)
SMALL PLATELETS BLD QL SMEAR: NORMAL
SODIUM SERPL-SCNC: 139 MMOL/L (ref 136–145)
WBC NRBC COR # BLD AUTO: 6.15 10*3/MM3 (ref 3.4–10.8)

## 2025-05-19 PROCEDURE — 80053 COMPREHEN METABOLIC PANEL: CPT | Performed by: INTERNAL MEDICINE

## 2025-05-19 PROCEDURE — 25010000002 HEPARIN LOCK FLUSH PER 10 UNITS: Performed by: INTERNAL MEDICINE

## 2025-05-19 PROCEDURE — 85007 BL SMEAR W/DIFF WBC COUNT: CPT | Performed by: INTERNAL MEDICINE

## 2025-05-19 PROCEDURE — 85025 COMPLETE CBC W/AUTO DIFF WBC: CPT | Performed by: INTERNAL MEDICINE

## 2025-05-19 PROCEDURE — 36415 COLL VENOUS BLD VENIPUNCTURE: CPT

## 2025-05-19 RX ORDER — HEPARIN SODIUM (PORCINE) LOCK FLUSH IV SOLN 100 UNIT/ML 100 UNIT/ML
500 SOLUTION INTRAVENOUS AS NEEDED
OUTPATIENT
Start: 2025-05-27

## 2025-05-19 RX ORDER — SODIUM CHLORIDE 0.9 % (FLUSH) 0.9 %
20 SYRINGE (ML) INJECTION AS NEEDED
OUTPATIENT
Start: 2025-05-19

## 2025-05-19 RX ORDER — SODIUM CHLORIDE 0.9 % (FLUSH) 0.9 %
20 SYRINGE (ML) INJECTION AS NEEDED
Status: DISCONTINUED | OUTPATIENT
Start: 2025-05-19 | End: 2025-05-20 | Stop reason: HOSPADM

## 2025-05-19 RX ORDER — HEPARIN SODIUM (PORCINE) LOCK FLUSH IV SOLN 100 UNIT/ML 100 UNIT/ML
500 SOLUTION INTRAVENOUS AS NEEDED
Status: DISCONTINUED | OUTPATIENT
Start: 2025-05-19 | End: 2025-05-20 | Stop reason: HOSPADM

## 2025-05-19 RX ADMIN — HEPARIN 500 UNITS: 100 SYRINGE at 10:14

## 2025-05-19 RX ADMIN — Medication 20 ML: at 10:14

## 2025-05-20 RX ORDER — NAPROXEN 500 MG/1
TABLET ORAL
Qty: 30 TABLET | Refills: 1 | Status: SHIPPED | OUTPATIENT
Start: 2025-05-20

## 2025-05-23 ENCOUNTER — PATIENT OUTREACH (OUTPATIENT)
Dept: CASE MANAGEMENT | Facility: OTHER | Age: 60
End: 2025-05-23
Payer: MEDICARE

## 2025-05-23 DIAGNOSIS — T45.1X5A PANCYTOPENIA DUE TO ANTINEOPLASTIC CHEMOTHERAPY: ICD-10-CM

## 2025-05-23 DIAGNOSIS — F33.2 SEVERE EPISODE OF RECURRENT MAJOR DEPRESSIVE DISORDER, WITHOUT PSYCHOTIC FEATURES: ICD-10-CM

## 2025-05-23 DIAGNOSIS — C34.30 SMALL CELL CARCINOMA OF LOWER LOBE OF LUNG, UNSPECIFIED LATERALITY: Primary | ICD-10-CM

## 2025-05-23 DIAGNOSIS — J44.9 CHRONIC OBSTRUCTIVE PULMONARY DISEASE, UNSPECIFIED COPD TYPE: ICD-10-CM

## 2025-05-23 DIAGNOSIS — F41.9 ANXIETY: ICD-10-CM

## 2025-05-23 DIAGNOSIS — D61.810 PANCYTOPENIA DUE TO ANTINEOPLASTIC CHEMOTHERAPY: ICD-10-CM

## 2025-05-23 NOTE — OUTREACH NOTE
[facility] End of Month Documentation    This Chronic Medical Management Care Plan for Lluvia Archer, 59 y.o. female, has been monitored and managed; reviewed and a new plan of care implemented for the month of May.  A cumulative time of 20  minutes was spent on this patient record this month, including chart review; phone call with patient, Patient admitted [facility] on 4/22/25 for Hyponatremia and Pancytopenia. Reports poor mobility due to generalized weakness. Has recieved rollator walker and uses at all times. Declines home health referral. Declines to make PCP ADA appt at present time. Going to follow up with Oncology. discussed healthy food choices and adequate hydration..    Regarding the patient's problems: has Abnormal mammogram; Anxiety; Arthritis; Chronic nausea; Chronic obstructive pulmonary disease (COPD); Counseling on substance use and abuse; Esophageal reflux; Hernia, hiatal; Hyperlipidemia; Hypertension; Knee pain, right; Memory loss; Migraine; Moderate episode of recurrent major depressive disorder; Night sweats; Numbness; Sciatica of right side; Severe episode of recurrent major depressive disorder, without psychotic features; Shoulder pain, left; Other diseases of nasal cavity and sinuses; Sleep apnea, unspecified; Tobacco abuse; Strain of groin, right, subsequent encounter; Osteoarthritis of spine with radiculopathy, lumbar region; Chronic right-sided low back pain with right-sided sciatica; Aftercare following right hip joint replacement surgery; Primary osteoarthritis of right hip; Right hip pain; Sepsis, due to unspecified organism, unspecified whether acute organ dysfunction present; Severe malnutrition; Pneumonia of right lower lobe due to infectious organism; Hospital discharge follow-up; Other specified anemias; Encounter for screening mammogram for malignant neoplasm of breast; Multifocal pneumonia; Acute on chronic respiratory failure with hypoxia; Medicare annual wellness visit, subsequent;  Community acquired pneumonia; Pneumonia due to infectious organism, unspecified laterality, unspecified part of lung; Mediastinal adenopathy; Small cell lung cancer; Tobacco abuse, in remission; Chronic respiratory failure with hypoxia; Lung nodules; Encounter for adjustment or management of vascular access device; Dehydration; Intractable nausea and vomiting; Pancytopenia due to antineoplastic chemotherapy; Thrombocytopenia; Cancer, metastatic to bone; CAP (community acquired pneumonia); Encounter for long-term (current) use of high-risk medication; Secondary malignant neoplasm of brain; Hyperkalemia; Hyponatremia; and Chronic hypotension on their problem list., the following items were addressed: medical records; medications and any changes can be found within the plan section of the note.  A detailed listing of time spent for chronic care management is tracked within each outreach encounter.  Current medications include:  has a current medication list which includes the following prescription(s): albuterol, albuterol sulfate hfa, atorvastatin, bupropion sr, calcium carbonate, dexamethasone, escitalopram, famotidine, fludrocortisone, trelegy ellipta, gabapentin, hydroxyzine, magnesium, melatonin, midodrine, naloxone, naproxen, nystatin, olanzapine, ondansetron odt, oxycodone-acetaminophen, potassium & sodium phosphates, potassium chloride, and prochlorperazine. and the patient is reported to be patient is compliant with medication protocol,  Medications are reported to be effective.  Regarding these diagnoses, referrals were made to the following provider(s):  Instructed to keep all scheduled appointments.  All notes on chart for PCP to review.    The patient was monitored remotely for activity level; medications, falls risk,nutrition and hydration.    The patient's physical needs include:  physical healthcare; DME supplies, rollator walker.     The patient's mental support needs include:  continued support    The  patient's cognitive support needs include:  continued support    The patient's psychosocial support needs include:  continued support    The patient's functional needs include: has oxygen,cpap,nebulizer, rollator walker and wheelchair in the home    The patient's environmental needs include:  not applicable    Care Plan overall comments:  established and reviewed    Refer to previous outreach notes for more information on the areas listed above.    Monthly Billing Diagnoses  (C34.30) Small cell carcinoma of lower lobe of lung, unspecified laterality    (J44.9) Chronic obstructive pulmonary disease, unspecified COPD type    (D61.810,  T45.1X5A) Pancytopenia due to antineoplastic chemotherapy    (F41.9) Anxiety    (F33.2) Severe episode of recurrent major depressive disorder, without psychotic features    Medications   Medications have been reconciled    Care Plan progress this month:      Recently Modified Care Plans Updates made since 4/22/2025 12:00 AM      No recently modified care plans.             COPD       Track and Manage My Symptoms       Start:  03/11/25         My COPD Symptoms To Do List       Start:  03/11/25       Why is this important?Tracking your symptoms and other information about your health helps your doctor plan your care.Write down the symptoms, the time of day, what you were doing and what medicine you are taking.You will soon learn how to manage your symptoms.       Initial    My COPD Symptoms To Do List: follow rescue plan if symptoms flare up;keep follow-up appointments wear oxygen as ordered taken at 03/11/25            Track and Manage My Triggers       Start:  03/11/25         My COPD Triggers To Do List       Start:  03/11/25       Why is this important?Triggers are activities or things, like tobacco smoke or cold weather, that make your COPD (chronic obstructive pulmonary disease) flare up.Knowing these triggers helps you plan how to stay away from them.When you cannot remove them,  you can learn how to manage them.       Initial    My COPD Triggers To Do List: avoid second-hand smoke;eliminate smoking in my home taken at 03/11/25            Manage My Fatigue (Tiredness)       Start:  03/11/25         My Fatigue Management To Do List       Start:  03/11/25       Why is this important?Feeling tired or worn out is a common symptom of COPD (chronic obstructive pulmonary disease).Learning when you feel your best and when you need rest is important.Managing the tiredness (fatigue) will help you be active and enjoy life.            Learn and Do Breathing Exercises       Start:  03/11/25         My Breathing Exercises To Do List       Start:  03/11/25       Why is this important?Breathing exercises can help lessen the cough that comes with chronic obstructive pulmonary disease.Doing the exercises will give you more energy.They will also help you to control your symptoms.            Optimal Care Coordination of a Patient Experiencing COPD       Start:  03/11/25         Support Psychosocial Response to COPD       Start:  03/11/25            Alleviate Barriers to COPD Management       Start:  03/11/25          Initial    Alleviate Barriers to COPD Management: action plan reviewed;smoking counseling provided POC ordered taken at 03/11/25         Identify and Minimize Risk of COPD Exacerbation       Start:  03/11/25              Fall Risk       Prevent Falls and Injury (Not Progressing)       Start:  05/05/25         My Fall Prevention To Do List       Start:  05/05/25       Why is this important?Most falls happen when it is hard for you to walk safely. Your balance may be off because of an illness. You may have pain in your knees, hip or other joints.You may be overly tired or taking medicines that make you sleepy. You may not be able to see or hear clearly.Falls can lead to broken bones, bruises or other injuries.There are things you can do to help prevent falling.       Initial    My Fall Prevention To  Do List: keep my cell phone with me always;use a cane or walker taken at 05/05/25            Optimal Care Coordination of a Patient Experiencing Fall Risk (Not Progressing)       Start:  05/05/25         Identify and Manage Contributors to Fall Risk       Start:  05/05/25          Initial    Identify and Manage Contributors to Fall Risk: barriers to safety identified;fall prevention plan reviewed and updated taken at 05/05/25             Current Specialty Plan of Care Status finalized    Instructions   Patient was provided an electronic copy of care plan  CCM services were explained and offered and patient has accepted these services.  Patient has given their written consent to receive CCM services and understands that this includes the authorization of electronic communication of medical information with the other treating providers.  Patient understands that they may stop CCM services at any time and these changes will be effective at the end of the calendar month and will effectively revocate the agreement of CCM services.  Patient understands that only one practitioner can furnish and be paid for CCM services during one calendar month.  Patient also understands that there may be co-payment or deductible fees in association with CCM services.  Patient will continue with at least monthly follow-up calls with the Ambulatory .    [facility] [place]  Ambulatory Case Management    5/23/2025, 11:58 EDT

## 2025-05-27 ENCOUNTER — TELEPHONE (OUTPATIENT)
Dept: ONCOLOGY | Facility: HOSPITAL | Age: 60
End: 2025-05-27
Payer: MEDICARE

## 2025-05-28 ENCOUNTER — HOSPITAL ENCOUNTER (OUTPATIENT)
Dept: ONCOLOGY | Facility: HOSPITAL | Age: 60
Discharge: HOME | End: 2025-05-28
Payer: MEDICARE

## 2025-05-28 ENCOUNTER — DOCUMENTATION (OUTPATIENT)
Dept: ONCOLOGY | Facility: HOSPITAL | Age: 60
End: 2025-05-28
Payer: MEDICARE

## 2025-05-28 ENCOUNTER — HOSPITAL ENCOUNTER (OUTPATIENT)
Dept: CT IMAGING | Facility: HOSPITAL | Age: 60
Discharge: HOME | End: 2025-05-28
Payer: MEDICARE

## 2025-05-28 ENCOUNTER — TELEPHONE (OUTPATIENT)
Dept: ONCOLOGY | Facility: HOSPITAL | Age: 60
End: 2025-05-28
Payer: MEDICARE

## 2025-05-28 DIAGNOSIS — D64.81 ANEMIA ASSOCIATED WITH CHEMOTHERAPY: Primary | ICD-10-CM

## 2025-05-28 DIAGNOSIS — C34.90 SMALL CELL CARCINOMA OF LUNG, UNSPECIFIED LATERALITY, UNSPECIFIED PART OF LUNG: ICD-10-CM

## 2025-05-28 DIAGNOSIS — Z45.2 ENCOUNTER FOR ADJUSTMENT OR MANAGEMENT OF VASCULAR ACCESS DEVICE: Primary | ICD-10-CM

## 2025-05-28 DIAGNOSIS — T45.1X5A ANEMIA ASSOCIATED WITH CHEMOTHERAPY: Primary | ICD-10-CM

## 2025-05-28 LAB
ALBUMIN SERPL-MCNC: 3.4 G/DL (ref 3.5–5.2)
ALBUMIN/GLOB SERPL: 1.5 G/DL
ALP SERPL-CCNC: 80 U/L (ref 39–117)
ALT SERPL W P-5'-P-CCNC: 13 U/L (ref 1–33)
ANION GAP SERPL CALCULATED.3IONS-SCNC: 8.9 MMOL/L (ref 5–15)
ANISOCYTOSIS BLD QL: ABNORMAL
AST SERPL-CCNC: 10 U/L (ref 1–32)
BILIRUB SERPL-MCNC: 0.5 MG/DL (ref 0–1.2)
BUN SERPL-MCNC: 9.3 MG/DL (ref 6–20)
BUN/CREAT SERPL: 16.6 (ref 7–25)
CALCIUM SPEC-SCNC: 7.5 MG/DL (ref 8.6–10.5)
CHLORIDE SERPL-SCNC: 106 MMOL/L (ref 98–107)
CO2 SERPL-SCNC: 23.1 MMOL/L (ref 22–29)
CREAT SERPL-MCNC: 0.56 MG/DL (ref 0.57–1)
DEPRECATED RDW RBC AUTO: 63.9 FL (ref 37–54)
EGFRCR SERPLBLD CKD-EPI 2021: 105.9 ML/MIN/1.73
ERYTHROCYTE [DISTWIDTH] IN BLOOD BY AUTOMATED COUNT: 19.9 % (ref 12.3–15.4)
GLOBULIN UR ELPH-MCNC: 2.2 GM/DL
GLUCOSE SERPL-MCNC: 99 MG/DL (ref 65–99)
HCT VFR BLD AUTO: 20.8 % (ref 34–46.6)
HGB BLD-MCNC: 6.8 G/DL (ref 12–15.9)
LYMPHOCYTES # BLD MANUAL: 1.08 10*3/MM3 (ref 0.7–3.1)
LYMPHOCYTES NFR BLD MANUAL: 3 % (ref 5–12)
MACROCYTES BLD QL SMEAR: ABNORMAL
MCH RBC QN AUTO: 31.8 PG (ref 26.6–33)
MCHC RBC AUTO-ENTMCNC: 32.7 G/DL (ref 31.5–35.7)
MCV RBC AUTO: 97.2 FL (ref 79–97)
MONOCYTES # BLD: 0.17 10*3/MM3 (ref 0.1–0.9)
MYELOCYTES NFR BLD MANUAL: 1 % (ref 0–0)
NEUTROPHILS # BLD AUTO: 4.38 10*3/MM3 (ref 1.7–7)
NEUTROPHILS NFR BLD MANUAL: 76 % (ref 42.7–76)
NEUTS BAND NFR BLD MANUAL: 1 % (ref 0–5)
PLATELET # BLD AUTO: 13 10*3/MM3 (ref 140–450)
PMV BLD AUTO: 11.9 FL (ref 6–12)
POTASSIUM SERPL-SCNC: 3.3 MMOL/L (ref 3.5–5.2)
PROT SERPL-MCNC: 5.6 G/DL (ref 6–8.5)
RBC # BLD AUTO: 2.14 10*6/MM3 (ref 3.77–5.28)
SCAN SLIDE: NORMAL
SMALL PLATELETS BLD QL SMEAR: ABNORMAL
SODIUM SERPL-SCNC: 138 MMOL/L (ref 136–145)
VARIANT LYMPHS NFR BLD MANUAL: 19 % (ref 19.6–45.3)
WBC MORPH BLD: NORMAL
WBC NRBC COR # BLD AUTO: 5.69 10*3/MM3 (ref 3.4–10.8)

## 2025-05-28 PROCEDURE — 80053 COMPREHEN METABOLIC PANEL: CPT | Performed by: INTERNAL MEDICINE

## 2025-05-28 PROCEDURE — 25010000002 HEPARIN LOCK FLUSH PER 10 UNITS: Performed by: INTERNAL MEDICINE

## 2025-05-28 PROCEDURE — 85007 BL SMEAR W/DIFF WBC COUNT: CPT | Performed by: INTERNAL MEDICINE

## 2025-05-28 PROCEDURE — 71260 CT THORAX DX C+: CPT

## 2025-05-28 PROCEDURE — 74177 CT ABD & PELVIS W/CONTRAST: CPT

## 2025-05-28 PROCEDURE — 85025 COMPLETE CBC W/AUTO DIFF WBC: CPT | Performed by: INTERNAL MEDICINE

## 2025-05-28 PROCEDURE — 36591 DRAW BLOOD OFF VENOUS DEVICE: CPT

## 2025-05-28 PROCEDURE — 25510000001 IOPAMIDOL PER 1 ML: Performed by: INTERNAL MEDICINE

## 2025-05-28 RX ORDER — IOPAMIDOL 755 MG/ML
100 INJECTION, SOLUTION INTRAVASCULAR
Status: COMPLETED | OUTPATIENT
Start: 2025-05-28 | End: 2025-05-28

## 2025-05-28 RX ORDER — HEPARIN SODIUM (PORCINE) LOCK FLUSH IV SOLN 100 UNIT/ML 100 UNIT/ML
500 SOLUTION INTRAVENOUS AS NEEDED
Status: CANCELLED | OUTPATIENT
Start: 2025-05-28

## 2025-05-28 RX ORDER — SODIUM CHLORIDE 0.9 % (FLUSH) 0.9 %
20 SYRINGE (ML) INJECTION AS NEEDED
Status: DISCONTINUED | OUTPATIENT
Start: 2025-05-28 | End: 2025-05-29 | Stop reason: HOSPADM

## 2025-05-28 RX ORDER — HEPARIN SODIUM (PORCINE) LOCK FLUSH IV SOLN 100 UNIT/ML 100 UNIT/ML
500 SOLUTION INTRAVENOUS AS NEEDED
Status: DISCONTINUED | OUTPATIENT
Start: 2025-05-28 | End: 2025-05-29 | Stop reason: HOSPADM

## 2025-05-28 RX ORDER — SODIUM CHLORIDE 9 MG/ML
250 INJECTION, SOLUTION INTRAVENOUS AS NEEDED
Status: CANCELLED | OUTPATIENT
Start: 2025-05-28 | End: 2025-05-28

## 2025-05-28 RX ORDER — SODIUM CHLORIDE 0.9 % (FLUSH) 0.9 %
20 SYRINGE (ML) INJECTION AS NEEDED
Status: CANCELLED | OUTPATIENT
Start: 2025-05-28

## 2025-05-28 RX ADMIN — HEPARIN 500 UNITS: 100 SYRINGE at 10:43

## 2025-05-28 RX ADMIN — Medication 20 ML: at 10:43

## 2025-05-28 RX ADMIN — IOPAMIDOL 100 ML: 755 INJECTION, SOLUTION INTRAVENOUS at 10:43

## 2025-05-28 NOTE — TELEPHONE ENCOUNTER
SPOKE TO PATIENT IN REGARDS TO BLOOD TRANSFUSION, SHE IS AWARE SHE IS [facility]ED[facility] 05/29/2025 AT 8:00 AM, PATIENT VERBALLY CONFIRMED APPOINTMENT.

## 2025-05-29 ENCOUNTER — HOSPITAL ENCOUNTER (OUTPATIENT)
Dept: ONCOLOGY | Facility: HOSPITAL | Age: 60
Discharge: HOME | End: 2025-05-29
Payer: MEDICARE

## 2025-05-29 VITALS
TEMPERATURE: 98.4 F | OXYGEN SATURATION: 99 % | RESPIRATION RATE: 16 BRPM | HEART RATE: 102 BPM | DIASTOLIC BLOOD PRESSURE: 73 MMHG | SYSTOLIC BLOOD PRESSURE: 97 MMHG

## 2025-05-29 DIAGNOSIS — T45.1X5A ANEMIA ASSOCIATED WITH CHEMOTHERAPY: ICD-10-CM

## 2025-05-29 DIAGNOSIS — Z45.2 ENCOUNTER FOR ADJUSTMENT OR MANAGEMENT OF VASCULAR ACCESS DEVICE: Primary | ICD-10-CM

## 2025-05-29 DIAGNOSIS — D64.81 ANEMIA ASSOCIATED WITH CHEMOTHERAPY: ICD-10-CM

## 2025-05-29 LAB
ABO GROUP BLD: NORMAL
BLD GP AB SCN SERPL QL: NEGATIVE
RH BLD: POSITIVE
T&S EXPIRATION DATE: NORMAL

## 2025-05-29 PROCEDURE — 25010000002 HEPARIN LOCK FLUSH PER 10 UNITS: Performed by: INTERNAL MEDICINE

## 2025-05-29 PROCEDURE — 86901 BLOOD TYPING SEROLOGIC RH(D): CPT | Performed by: INTERNAL MEDICINE

## 2025-05-29 PROCEDURE — 86900 BLOOD TYPING SEROLOGIC ABO: CPT | Performed by: INTERNAL MEDICINE

## 2025-05-29 PROCEDURE — 86850 RBC ANTIBODY SCREEN: CPT | Performed by: INTERNAL MEDICINE

## 2025-05-29 RX ORDER — HEPARIN SODIUM (PORCINE) LOCK FLUSH IV SOLN 100 UNIT/ML 100 UNIT/ML
500 SOLUTION INTRAVENOUS AS NEEDED
Status: DISCONTINUED | OUTPATIENT
Start: 2025-05-29 | End: 2025-05-31 | Stop reason: HOSPADM

## 2025-05-29 RX ORDER — SODIUM CHLORIDE 0.9 % (FLUSH) 0.9 %
20 SYRINGE (ML) INJECTION AS NEEDED
Status: DISCONTINUED | OUTPATIENT
Start: 2025-05-29 | End: 2025-05-31 | Stop reason: HOSPADM

## 2025-05-29 RX ORDER — SODIUM CHLORIDE 0.9 % (FLUSH) 0.9 %
20 SYRINGE (ML) INJECTION AS NEEDED
Status: CANCELLED | OUTPATIENT
Start: 2025-05-29

## 2025-05-29 RX ORDER — SODIUM CHLORIDE 9 MG/ML
250 INJECTION, SOLUTION INTRAVENOUS AS NEEDED
Status: ACTIVE | OUTPATIENT
Start: 2025-05-29 | End: 2025-05-29

## 2025-05-29 RX ORDER — HEPARIN SODIUM (PORCINE) LOCK FLUSH IV SOLN 100 UNIT/ML 100 UNIT/ML
500 SOLUTION INTRAVENOUS AS NEEDED
Status: CANCELLED | OUTPATIENT
Start: 2025-05-29

## 2025-05-29 RX ADMIN — Medication 20 ML: at 14:30

## 2025-05-29 RX ADMIN — HEPARIN 500 UNITS: 100 SYRINGE at 14:31

## 2025-05-30 ENCOUNTER — HOSPITAL ENCOUNTER (OUTPATIENT)
Dept: ONCOLOGY | Facility: HOSPITAL | Age: 60
Discharge: HOME | End: 2025-05-30
Payer: MEDICARE

## 2025-05-30 DIAGNOSIS — Z45.2 ENCOUNTER FOR ADJUSTMENT OR MANAGEMENT OF VASCULAR ACCESS DEVICE: Primary | ICD-10-CM

## 2025-05-30 DIAGNOSIS — C34.90 SMALL CELL CARCINOMA OF LUNG, UNSPECIFIED LATERALITY, UNSPECIFIED PART OF LUNG: ICD-10-CM

## 2025-05-30 LAB
ANISOCYTOSIS BLD QL: NORMAL
BASOPHILS # BLD AUTO: 0.05 10*3/MM3 (ref 0–0.2)
BASOPHILS NFR BLD AUTO: 0.7 % (ref 0–1.5)
BH BB BLOOD EXPIRATION DATE: NORMAL
BH BB BLOOD EXPIRATION DATE: NORMAL
BH BB BLOOD TYPE BARCODE: 5100
BH BB BLOOD TYPE BARCODE: 5100
BH BB DISPENSE STATUS: NORMAL
BH BB DISPENSE STATUS: NORMAL
BH BB PRODUCT CODE: NORMAL
BH BB PRODUCT CODE: NORMAL
BH BB UNIT NUMBER: NORMAL
BH BB UNIT NUMBER: NORMAL
CROSSMATCH INTERPRETATION: NORMAL
CROSSMATCH INTERPRETATION: NORMAL
DEPRECATED RDW RBC AUTO: 61.2 FL (ref 37–54)
EOSINOPHIL # BLD AUTO: 0.03 10*3/MM3 (ref 0–0.4)
EOSINOPHIL NFR BLD AUTO: 0.4 % (ref 0.3–6.2)
ERYTHROCYTE [DISTWIDTH] IN BLOOD BY AUTOMATED COUNT: 19.3 % (ref 12.3–15.4)
HCT VFR BLD AUTO: 30.2 % (ref 34–46.6)
HGB BLD-MCNC: 9.7 G/DL (ref 12–15.9)
IMM GRANULOCYTES # BLD AUTO: 0.2 10*3/MM3 (ref 0–0.05)
IMM GRANULOCYTES NFR BLD AUTO: 2.8 % (ref 0–0.5)
LYMPHOCYTES # BLD AUTO: 1.73 10*3/MM3 (ref 0.7–3.1)
LYMPHOCYTES NFR BLD AUTO: 24.5 % (ref 19.6–45.3)
MCH RBC QN AUTO: 30 PG (ref 26.6–33)
MCHC RBC AUTO-ENTMCNC: 32.1 G/DL (ref 31.5–35.7)
MCV RBC AUTO: 93.5 FL (ref 79–97)
MONOCYTES # BLD AUTO: 0.43 10*3/MM3 (ref 0.1–0.9)
MONOCYTES NFR BLD AUTO: 6.1 % (ref 5–12)
NEUTROPHILS NFR BLD AUTO: 4.62 10*3/MM3 (ref 1.7–7)
NEUTROPHILS NFR BLD AUTO: 65.5 % (ref 42.7–76)
NRBC BLD AUTO-RTO: 0.4 /100 WBC (ref 0–0.2)
PLATELET # BLD AUTO: 34 10*3/MM3 (ref 140–450)
PMV BLD AUTO: 12.3 FL (ref 6–12)
RBC # BLD AUTO: 3.23 10*6/MM3 (ref 3.77–5.28)
SMALL PLATELETS BLD QL SMEAR: NORMAL
UNIT  ABO: NORMAL
UNIT  ABO: NORMAL
UNIT  RH: NORMAL
UNIT  RH: NORMAL
WBC MORPH BLD: NORMAL
WBC NRBC COR # BLD AUTO: 7.06 10*3/MM3 (ref 3.4–10.8)

## 2025-05-30 PROCEDURE — 85025 COMPLETE CBC W/AUTO DIFF WBC: CPT | Performed by: INTERNAL MEDICINE

## 2025-05-30 PROCEDURE — 25010000002 HEPARIN LOCK FLUSH PER 10 UNITS: Performed by: INTERNAL MEDICINE

## 2025-05-30 PROCEDURE — 85007 BL SMEAR W/DIFF WBC COUNT: CPT | Performed by: INTERNAL MEDICINE

## 2025-05-30 PROCEDURE — 36415 COLL VENOUS BLD VENIPUNCTURE: CPT

## 2025-05-30 RX ORDER — SODIUM CHLORIDE 0.9 % (FLUSH) 0.9 %
20 SYRINGE (ML) INJECTION AS NEEDED
Status: CANCELLED | OUTPATIENT
Start: 2025-05-30

## 2025-05-30 RX ORDER — SODIUM CHLORIDE 0.9 % (FLUSH) 0.9 %
20 SYRINGE (ML) INJECTION AS NEEDED
Status: DISCONTINUED | OUTPATIENT
Start: 2025-05-30 | End: 2025-05-31 | Stop reason: HOSPADM

## 2025-05-30 RX ORDER — HEPARIN SODIUM (PORCINE) LOCK FLUSH IV SOLN 100 UNIT/ML 100 UNIT/ML
500 SOLUTION INTRAVENOUS AS NEEDED
Status: CANCELLED | OUTPATIENT
Start: 2025-06-02

## 2025-05-30 RX ORDER — HEPARIN SODIUM (PORCINE) LOCK FLUSH IV SOLN 100 UNIT/ML 100 UNIT/ML
500 SOLUTION INTRAVENOUS AS NEEDED
Status: DISCONTINUED | OUTPATIENT
Start: 2025-05-30 | End: 2025-05-31 | Stop reason: HOSPADM

## 2025-05-30 RX ADMIN — HEPARIN 500 UNITS: 100 SYRINGE at 13:13

## 2025-05-30 RX ADMIN — Medication 20 ML: at 13:13

## 2025-06-02 ENCOUNTER — OFFICE VISIT (OUTPATIENT)
Dept: ONCOLOGY | Facility: HOSPITAL | Age: 60
End: 2025-06-02
Payer: MEDICARE

## 2025-06-02 ENCOUNTER — HOSPITAL ENCOUNTER (OUTPATIENT)
Dept: ONCOLOGY | Facility: HOSPITAL | Age: 60
Discharge: HOME | End: 2025-06-02
Payer: MEDICARE

## 2025-06-02 VITALS
OXYGEN SATURATION: 96 % | TEMPERATURE: 97.7 F | BODY MASS INDEX: 32.04 KG/M2 | SYSTOLIC BLOOD PRESSURE: 92 MMHG | DIASTOLIC BLOOD PRESSURE: 71 MMHG | WEIGHT: 186.73 LBS | RESPIRATION RATE: 18 BRPM | HEART RATE: 112 BPM

## 2025-06-02 DIAGNOSIS — F41.9 ANXIETY: ICD-10-CM

## 2025-06-02 DIAGNOSIS — D69.59 CHEMOTHERAPY-INDUCED THROMBOCYTOPENIA: ICD-10-CM

## 2025-06-02 DIAGNOSIS — D64.81 ANTINEOPLASTIC CHEMOTHERAPY INDUCED ANEMIA: ICD-10-CM

## 2025-06-02 DIAGNOSIS — Z45.2 ENCOUNTER FOR ADJUSTMENT OR MANAGEMENT OF VASCULAR ACCESS DEVICE: ICD-10-CM

## 2025-06-02 DIAGNOSIS — C34.90 SMALL CELL CARCINOMA OF LUNG, UNSPECIFIED LATERALITY, UNSPECIFIED PART OF LUNG: Primary | ICD-10-CM

## 2025-06-02 DIAGNOSIS — C34.90 LUNG CANCER METASTATIC TO BRAIN: ICD-10-CM

## 2025-06-02 DIAGNOSIS — T45.1X5A CHEMOTHERAPY-INDUCED THROMBOCYTOPENIA: ICD-10-CM

## 2025-06-02 DIAGNOSIS — T45.1X5A ANTINEOPLASTIC CHEMOTHERAPY INDUCED ANEMIA: ICD-10-CM

## 2025-06-02 DIAGNOSIS — C79.51 CANCER, METASTATIC TO BONE: ICD-10-CM

## 2025-06-02 DIAGNOSIS — G89.3 NEOPLASM RELATED PAIN: ICD-10-CM

## 2025-06-02 DIAGNOSIS — C79.31 LUNG CANCER METASTATIC TO BRAIN: ICD-10-CM

## 2025-06-02 DIAGNOSIS — E22.2 SIADH (SYNDROME OF INAPPROPRIATE ADH PRODUCTION): ICD-10-CM

## 2025-06-02 DIAGNOSIS — R64 CANCER CACHEXIA: ICD-10-CM

## 2025-06-02 LAB
ALBUMIN SERPL-MCNC: 3.4 G/DL (ref 3.5–5.2)
ALBUMIN/GLOB SERPL: 1.5 G/DL
ALP SERPL-CCNC: 118 U/L (ref 39–117)
ALT SERPL W P-5'-P-CCNC: 10 U/L (ref 1–33)
ANION GAP SERPL CALCULATED.3IONS-SCNC: 9.3 MMOL/L (ref 5–15)
AST SERPL-CCNC: 10 U/L (ref 1–32)
BASOPHILS # BLD AUTO: 0.04 10*3/MM3 (ref 0–0.2)
BASOPHILS NFR BLD AUTO: 0.5 % (ref 0–1.5)
BILIRUB SERPL-MCNC: 0.2 MG/DL (ref 0–1.2)
BUN SERPL-MCNC: 11.7 MG/DL (ref 6–20)
BUN/CREAT SERPL: 11.9 (ref 7–25)
CALCIUM SPEC-SCNC: 7.9 MG/DL (ref 8.6–10.5)
CHLORIDE SERPL-SCNC: 108 MMOL/L (ref 98–107)
CO2 SERPL-SCNC: 22.7 MMOL/L (ref 22–29)
CREAT SERPL-MCNC: 0.98 MG/DL (ref 0.57–1)
DEPRECATED RDW RBC AUTO: 60.8 FL (ref 37–54)
EGFRCR SERPLBLD CKD-EPI 2021: 67 ML/MIN/1.73
EOSINOPHIL # BLD AUTO: 0.03 10*3/MM3 (ref 0–0.4)
EOSINOPHIL NFR BLD AUTO: 0.3 % (ref 0.3–6.2)
ERYTHROCYTE [DISTWIDTH] IN BLOOD BY AUTOMATED COUNT: 19.4 % (ref 12.3–15.4)
GLOBULIN UR ELPH-MCNC: 2.2 GM/DL
GLUCOSE SERPL-MCNC: 100 MG/DL (ref 65–99)
HCT VFR BLD AUTO: 28.7 % (ref 34–46.6)
HGB BLD-MCNC: 9.2 G/DL (ref 12–15.9)
IMM GRANULOCYTES # BLD AUTO: 0.15 10*3/MM3 (ref 0–0.05)
IMM GRANULOCYTES NFR BLD AUTO: 1.7 % (ref 0–0.5)
LYMPHOCYTES # BLD AUTO: 2.04 10*3/MM3 (ref 0.7–3.1)
LYMPHOCYTES NFR BLD AUTO: 23.3 % (ref 19.6–45.3)
MCH RBC QN AUTO: 30.2 PG (ref 26.6–33)
MCHC RBC AUTO-ENTMCNC: 32.1 G/DL (ref 31.5–35.7)
MCV RBC AUTO: 94.1 FL (ref 79–97)
MONOCYTES # BLD AUTO: 0.64 10*3/MM3 (ref 0.1–0.9)
MONOCYTES NFR BLD AUTO: 7.3 % (ref 5–12)
NEUTROPHILS NFR BLD AUTO: 5.87 10*3/MM3 (ref 1.7–7)
NEUTROPHILS NFR BLD AUTO: 66.9 % (ref 42.7–76)
NRBC BLD AUTO-RTO: 0.3 /100 WBC (ref 0–0.2)
PLATELET # BLD AUTO: 65 10*3/MM3 (ref 140–450)
PMV BLD AUTO: 9.8 FL (ref 6–12)
POTASSIUM SERPL-SCNC: 3.5 MMOL/L (ref 3.5–5.2)
PROT SERPL-MCNC: 5.6 G/DL (ref 6–8.5)
RBC # BLD AUTO: 3.05 10*6/MM3 (ref 3.77–5.28)
SODIUM SERPL-SCNC: 140 MMOL/L (ref 136–145)
WBC NRBC COR # BLD AUTO: 8.77 10*3/MM3 (ref 3.4–10.8)

## 2025-06-02 PROCEDURE — 80053 COMPREHEN METABOLIC PANEL: CPT | Performed by: INTERNAL MEDICINE

## 2025-06-02 PROCEDURE — 85025 COMPLETE CBC W/AUTO DIFF WBC: CPT | Performed by: INTERNAL MEDICINE

## 2025-06-02 PROCEDURE — 99214 OFFICE O/P EST MOD 30 MIN: CPT | Performed by: INTERNAL MEDICINE

## 2025-06-02 PROCEDURE — 25010000002 HEPARIN LOCK FLUSH PER 10 UNITS: Performed by: INTERNAL MEDICINE

## 2025-06-02 PROCEDURE — 3074F SYST BP LT 130 MM HG: CPT | Performed by: INTERNAL MEDICINE

## 2025-06-02 PROCEDURE — 36591 DRAW BLOOD OFF VENOUS DEVICE: CPT

## 2025-06-02 PROCEDURE — 1126F AMNT PAIN NOTED NONE PRSNT: CPT | Performed by: INTERNAL MEDICINE

## 2025-06-02 PROCEDURE — 3078F DIAST BP <80 MM HG: CPT | Performed by: INTERNAL MEDICINE

## 2025-06-02 RX ORDER — IPRATROPIUM BROMIDE AND ALBUTEROL SULFATE 2.5; .5 MG/3ML; MG/3ML
SOLUTION RESPIRATORY (INHALATION)
COMMUNITY
Start: 2025-05-25

## 2025-06-02 RX ORDER — HEPARIN SODIUM (PORCINE) LOCK FLUSH IV SOLN 100 UNIT/ML 100 UNIT/ML
500 SOLUTION INTRAVENOUS AS NEEDED
Status: CANCELLED | OUTPATIENT
Start: 2025-06-02

## 2025-06-02 RX ORDER — ONDANSETRON 8 MG/1
TABLET, FILM COATED ORAL
COMMUNITY
Start: 2025-05-25

## 2025-06-02 RX ORDER — LISINOPRIL 5 MG/1
TABLET ORAL
COMMUNITY
Start: 2025-05-19

## 2025-06-02 RX ORDER — HEPARIN SODIUM (PORCINE) LOCK FLUSH IV SOLN 100 UNIT/ML 100 UNIT/ML
500 SOLUTION INTRAVENOUS AS NEEDED
Status: DISCONTINUED | OUTPATIENT
Start: 2025-06-02 | End: 2025-06-03 | Stop reason: HOSPADM

## 2025-06-02 RX ORDER — SODIUM CHLORIDE 0.9 % (FLUSH) 0.9 %
20 SYRINGE (ML) INJECTION AS NEEDED
Status: DISCONTINUED | OUTPATIENT
Start: 2025-06-02 | End: 2025-06-03 | Stop reason: HOSPADM

## 2025-06-02 RX ORDER — SODIUM CHLORIDE 0.9 % (FLUSH) 0.9 %
20 SYRINGE (ML) INJECTION AS NEEDED
Status: CANCELLED | OUTPATIENT
Start: 2025-06-02

## 2025-06-02 RX ADMIN — HEPARIN 500 UNITS: 100 SYRINGE at 13:26

## 2025-06-02 RX ADMIN — Medication 20 ML: at 12:36

## 2025-06-02 RX ADMIN — Medication 20 ML: at 13:26

## 2025-06-03 ENCOUNTER — HOSPITAL ENCOUNTER (OUTPATIENT)
Dept: ONCOLOGY | Facility: HOSPITAL | Age: 60
Discharge: HOME | End: 2025-06-03
Payer: MEDICARE

## 2025-06-03 ENCOUNTER — HOSPITAL ENCOUNTER (OUTPATIENT)
Dept: ONCOLOGY | Facility: HOSPITAL | Age: 60
Discharge: HOME | End: 2025-06-03
Admitting: INTERNAL MEDICINE
Payer: MEDICARE

## 2025-06-03 VITALS
TEMPERATURE: 98.8 F | RESPIRATION RATE: 18 BRPM | HEART RATE: 111 BPM | SYSTOLIC BLOOD PRESSURE: 95 MMHG | OXYGEN SATURATION: 99 % | DIASTOLIC BLOOD PRESSURE: 77 MMHG

## 2025-06-03 DIAGNOSIS — Z45.2 ENCOUNTER FOR ADJUSTMENT OR MANAGEMENT OF VASCULAR ACCESS DEVICE: ICD-10-CM

## 2025-06-03 DIAGNOSIS — E86.0 DEHYDRATION: Primary | ICD-10-CM

## 2025-06-03 PROCEDURE — 25810000003 SODIUM CHLORIDE 0.9 % SOLUTION: Performed by: INTERNAL MEDICINE

## 2025-06-03 PROCEDURE — 25010000002 HEPARIN LOCK FLUSH PER 10 UNITS: Performed by: INTERNAL MEDICINE

## 2025-06-03 PROCEDURE — 96360 HYDRATION IV INFUSION INIT: CPT

## 2025-06-03 RX ORDER — SODIUM CHLORIDE 0.9 % (FLUSH) 0.9 %
20 SYRINGE (ML) INJECTION AS NEEDED
Status: DISCONTINUED | OUTPATIENT
Start: 2025-06-03 | End: 2025-06-04 | Stop reason: HOSPADM

## 2025-06-03 RX ORDER — HEPARIN SODIUM (PORCINE) LOCK FLUSH IV SOLN 100 UNIT/ML 100 UNIT/ML
500 SOLUTION INTRAVENOUS AS NEEDED
Status: CANCELLED | OUTPATIENT
Start: 2025-06-09

## 2025-06-03 RX ORDER — HEPARIN SODIUM (PORCINE) LOCK FLUSH IV SOLN 100 UNIT/ML 100 UNIT/ML
500 SOLUTION INTRAVENOUS AS NEEDED
Status: DISCONTINUED | OUTPATIENT
Start: 2025-06-03 | End: 2025-06-04 | Stop reason: HOSPADM

## 2025-06-03 RX ORDER — SODIUM CHLORIDE 0.9 % (FLUSH) 0.9 %
20 SYRINGE (ML) INJECTION AS NEEDED
Status: CANCELLED | OUTPATIENT
Start: 2025-06-03

## 2025-06-03 RX ADMIN — Medication 20 ML: at 15:03

## 2025-06-03 RX ADMIN — SODIUM CHLORIDE 1000 ML: 900 INJECTION, SOLUTION INTRAVENOUS at 13:59

## 2025-06-03 RX ADMIN — HEPARIN 500 UNITS: 100 SYRINGE at 15:03

## 2025-06-05 ENCOUNTER — TELEPHONE (OUTPATIENT)
Dept: CASE MANAGEMENT | Facility: OTHER | Age: 60
End: 2025-06-05
Payer: MEDICARE

## 2025-06-06 ENCOUNTER — TELEPHONE (OUTPATIENT)
Dept: CASE MANAGEMENT | Facility: OTHER | Age: 60
End: 2025-06-06
Payer: MEDICARE

## 2025-06-09 ENCOUNTER — HOSPITAL ENCOUNTER (OUTPATIENT)
Dept: ONCOLOGY | Facility: HOSPITAL | Age: 60
Discharge: HOME | End: 2025-06-09
Payer: MEDICARE

## 2025-06-09 VITALS
SYSTOLIC BLOOD PRESSURE: 143 MMHG | TEMPERATURE: 98.2 F | HEIGHT: 64 IN | WEIGHT: 187.8 LBS | BODY MASS INDEX: 32.06 KG/M2 | DIASTOLIC BLOOD PRESSURE: 61 MMHG | RESPIRATION RATE: 18 BRPM | OXYGEN SATURATION: 98 % | HEART RATE: 108 BPM

## 2025-06-09 DIAGNOSIS — Z45.2 ENCOUNTER FOR ADJUSTMENT OR MANAGEMENT OF VASCULAR ACCESS DEVICE: Primary | ICD-10-CM

## 2025-06-09 DIAGNOSIS — C34.90 SMALL CELL CARCINOMA OF LUNG, UNSPECIFIED LATERALITY, UNSPECIFIED PART OF LUNG: Primary | ICD-10-CM

## 2025-06-09 DIAGNOSIS — C34.90 SMALL CELL CARCINOMA OF LUNG, UNSPECIFIED LATERALITY, UNSPECIFIED PART OF LUNG: ICD-10-CM

## 2025-06-09 DIAGNOSIS — C79.51 CANCER, METASTATIC TO BONE: ICD-10-CM

## 2025-06-09 DIAGNOSIS — G89.3 CHRONIC NEOPLASM-RELATED PAIN: ICD-10-CM

## 2025-06-09 LAB
ALBUMIN SERPL-MCNC: 3.5 G/DL (ref 3.5–5.2)
ALBUMIN/GLOB SERPL: 1.5 G/DL
ALP SERPL-CCNC: 96 U/L (ref 39–117)
ALT SERPL W P-5'-P-CCNC: 6 U/L (ref 1–33)
ANION GAP SERPL CALCULATED.3IONS-SCNC: 11.3 MMOL/L (ref 5–15)
AST SERPL-CCNC: 10 U/L (ref 1–32)
BASOPHILS # BLD AUTO: 0.02 10*3/MM3 (ref 0–0.2)
BASOPHILS NFR BLD AUTO: 0.3 % (ref 0–1.5)
BILIRUB SERPL-MCNC: 0.3 MG/DL (ref 0–1.2)
BUN SERPL-MCNC: 5.4 MG/DL (ref 6–20)
BUN/CREAT SERPL: 8.7 (ref 7–25)
CALCIUM SPEC-SCNC: 8 MG/DL (ref 8.6–10.5)
CHLORIDE SERPL-SCNC: 106 MMOL/L (ref 98–107)
CO2 SERPL-SCNC: 21.7 MMOL/L (ref 22–29)
CREAT SERPL-MCNC: 0.62 MG/DL (ref 0.57–1)
DEPRECATED RDW RBC AUTO: 71.7 FL (ref 37–54)
EGFRCR SERPLBLD CKD-EPI 2021: 103.4 ML/MIN/1.73
EOSINOPHIL # BLD AUTO: 0.02 10*3/MM3 (ref 0–0.4)
EOSINOPHIL NFR BLD AUTO: 0.3 % (ref 0.3–6.2)
ERYTHROCYTE [DISTWIDTH] IN BLOOD BY AUTOMATED COUNT: 20.7 % (ref 12.3–15.4)
GLOBULIN UR ELPH-MCNC: 2.4 GM/DL
GLUCOSE SERPL-MCNC: 135 MG/DL (ref 65–99)
HCT VFR BLD AUTO: 29.5 % (ref 34–46.6)
HGB BLD-MCNC: 9.1 G/DL (ref 12–15.9)
IMM GRANULOCYTES # BLD AUTO: 0.03 10*3/MM3 (ref 0–0.05)
IMM GRANULOCYTES NFR BLD AUTO: 0.4 % (ref 0–0.5)
LYMPHOCYTES # BLD AUTO: 1.37 10*3/MM3 (ref 0.7–3.1)
LYMPHOCYTES NFR BLD AUTO: 19.4 % (ref 19.6–45.3)
MCH RBC QN AUTO: 30.5 PG (ref 26.6–33)
MCHC RBC AUTO-ENTMCNC: 30.8 G/DL (ref 31.5–35.7)
MCV RBC AUTO: 99 FL (ref 79–97)
MONOCYTES # BLD AUTO: 0.71 10*3/MM3 (ref 0.1–0.9)
MONOCYTES NFR BLD AUTO: 10 % (ref 5–12)
NEUTROPHILS NFR BLD AUTO: 4.92 10*3/MM3 (ref 1.7–7)
NEUTROPHILS NFR BLD AUTO: 69.6 % (ref 42.7–76)
NRBC BLD AUTO-RTO: 0 /100 WBC (ref 0–0.2)
PLATELET # BLD AUTO: 91 10*3/MM3 (ref 140–450)
PMV BLD AUTO: 10.7 FL (ref 6–12)
POTASSIUM SERPL-SCNC: 3.6 MMOL/L (ref 3.5–5.2)
PROT SERPL-MCNC: 5.9 G/DL (ref 6–8.5)
RBC # BLD AUTO: 2.98 10*6/MM3 (ref 3.77–5.28)
SODIUM SERPL-SCNC: 139 MMOL/L (ref 136–145)
WBC NRBC COR # BLD AUTO: 7.07 10*3/MM3 (ref 3.4–10.8)

## 2025-06-09 PROCEDURE — 96375 TX/PRO/DX INJ NEW DRUG ADDON: CPT

## 2025-06-09 PROCEDURE — 80053 COMPREHEN METABOLIC PANEL: CPT | Performed by: INTERNAL MEDICINE

## 2025-06-09 PROCEDURE — 25010000002 HEPARIN LOCK FLUSH PER 10 UNITS: Performed by: INTERNAL MEDICINE

## 2025-06-09 PROCEDURE — 25010000002 DEXAMETHASONE SODIUM PHOSPHATE 120 MG/30ML SOLUTION: Performed by: INTERNAL MEDICINE

## 2025-06-09 PROCEDURE — 25810000003 SODIUM CHLORIDE 0.9 % SOLUTION: Performed by: INTERNAL MEDICINE

## 2025-06-09 PROCEDURE — 96413 CHEMO IV INFUSION 1 HR: CPT

## 2025-06-09 PROCEDURE — 85025 COMPLETE CBC W/AUTO DIFF WBC: CPT | Performed by: INTERNAL MEDICINE

## 2025-06-09 PROCEDURE — 25010000002 TOPOTECAN 4 MG/4ML SOLUTION 4 ML VIAL: Performed by: INTERNAL MEDICINE

## 2025-06-09 RX ORDER — SODIUM CHLORIDE 9 MG/ML
20 INJECTION, SOLUTION INTRAVENOUS ONCE
Status: CANCELLED | OUTPATIENT
Start: 2025-06-11

## 2025-06-09 RX ORDER — HEPARIN SODIUM (PORCINE) LOCK FLUSH IV SOLN 100 UNIT/ML 100 UNIT/ML
500 SOLUTION INTRAVENOUS AS NEEDED
Status: CANCELLED | OUTPATIENT
Start: 2025-06-09

## 2025-06-09 RX ORDER — SODIUM CHLORIDE 0.9 % (FLUSH) 0.9 %
20 SYRINGE (ML) INJECTION AS NEEDED
Status: CANCELLED | OUTPATIENT
Start: 2025-06-09

## 2025-06-09 RX ORDER — SODIUM CHLORIDE 9 MG/ML
20 INJECTION, SOLUTION INTRAVENOUS ONCE
Status: CANCELLED | OUTPATIENT
Start: 2025-06-13

## 2025-06-09 RX ORDER — POTASSIUM CHLORIDE 750 MG/1
20 CAPSULE, EXTENDED RELEASE ORAL DAILY
Qty: 60 CAPSULE | Refills: 0 | OUTPATIENT
Start: 2025-06-09

## 2025-06-09 RX ORDER — SODIUM CHLORIDE 9 MG/ML
20 INJECTION, SOLUTION INTRAVENOUS ONCE
Status: CANCELLED | OUTPATIENT
Start: 2025-06-10

## 2025-06-09 RX ORDER — HEPARIN SODIUM (PORCINE) LOCK FLUSH IV SOLN 100 UNIT/ML 100 UNIT/ML
500 SOLUTION INTRAVENOUS AS NEEDED
Status: DISCONTINUED | OUTPATIENT
Start: 2025-06-09 | End: 2025-06-10 | Stop reason: HOSPADM

## 2025-06-09 RX ORDER — FUROSEMIDE 20 MG/1
20 TABLET ORAL DAILY
Qty: 7 TABLET | Refills: 0 | Status: SHIPPED | OUTPATIENT
Start: 2025-06-09 | End: 2025-06-12

## 2025-06-09 RX ORDER — SODIUM CHLORIDE 0.9 % (FLUSH) 0.9 %
20 SYRINGE (ML) INJECTION AS NEEDED
Status: DISCONTINUED | OUTPATIENT
Start: 2025-06-09 | End: 2025-06-10 | Stop reason: HOSPADM

## 2025-06-09 RX ORDER — OXYCODONE AND ACETAMINOPHEN 10; 325 MG/1; MG/1
1 TABLET ORAL EVERY 4 HOURS PRN
Qty: 180 TABLET | Refills: 0 | Status: SHIPPED | OUTPATIENT
Start: 2025-06-09

## 2025-06-09 RX ORDER — SODIUM CHLORIDE 9 MG/ML
20 INJECTION, SOLUTION INTRAVENOUS ONCE
Status: COMPLETED | OUTPATIENT
Start: 2025-06-09 | End: 2025-06-09

## 2025-06-09 RX ORDER — SODIUM CHLORIDE 9 MG/ML
20 INJECTION, SOLUTION INTRAVENOUS ONCE
Status: CANCELLED | OUTPATIENT
Start: 2025-06-12

## 2025-06-09 RX ORDER — SODIUM CHLORIDE 9 MG/ML
20 INJECTION, SOLUTION INTRAVENOUS ONCE
Status: CANCELLED | OUTPATIENT
Start: 2025-06-09

## 2025-06-09 RX ADMIN — Medication 10 ML: at 12:32

## 2025-06-09 RX ADMIN — SODIUM CHLORIDE 2 MG: 9 INJECTION, SOLUTION INTRAVENOUS at 14:18

## 2025-06-09 RX ADMIN — SODIUM CHLORIDE 20 ML/HR: 9 INJECTION, SOLUTION INTRAVENOUS at 13:59

## 2025-06-09 RX ADMIN — HEPARIN 500 UNITS: 100 SYRINGE at 14:53

## 2025-06-09 RX ADMIN — Medication 20 ML: at 14:53

## 2025-06-09 RX ADMIN — DEXAMETHASONE SODIUM PHOSPHATE 12 MG: 4 INJECTION, SOLUTION INTRA-ARTICULAR; INTRALESIONAL; INTRAMUSCULAR; INTRAVENOUS; SOFT TISSUE at 13:59

## 2025-06-09 NOTE — NURSING NOTE
Pt presents for her tx with 3+ pitting edema to her bilateral legs, shortness of breath, productive cough with clear sputum, and wheezing x3 days. Pt says the shortness of breath and cough are her baseline. Pt denies other s/s. Dr. Dickson was made aware. Per Dr. Dicksno, hold IVF this week and lasix has been ordered. Pt is aware and v/u. VSS.

## 2025-06-10 ENCOUNTER — HOSPITAL ENCOUNTER (OUTPATIENT)
Dept: ONCOLOGY | Facility: HOSPITAL | Age: 60
Discharge: HOME | End: 2025-06-10
Admitting: INTERNAL MEDICINE
Payer: MEDICARE

## 2025-06-10 VITALS
SYSTOLIC BLOOD PRESSURE: 131 MMHG | OXYGEN SATURATION: 100 % | RESPIRATION RATE: 18 BRPM | TEMPERATURE: 98.1 F | HEIGHT: 64 IN | BODY MASS INDEX: 32.3 KG/M2 | DIASTOLIC BLOOD PRESSURE: 70 MMHG | HEART RATE: 112 BPM | WEIGHT: 189.2 LBS

## 2025-06-10 DIAGNOSIS — Z45.2 ENCOUNTER FOR ADJUSTMENT OR MANAGEMENT OF VASCULAR ACCESS DEVICE: ICD-10-CM

## 2025-06-10 DIAGNOSIS — J44.1 CHRONIC OBSTRUCTIVE PULMONARY DISEASE WITH ACUTE EXACERBATION: ICD-10-CM

## 2025-06-10 DIAGNOSIS — C34.90 SMALL CELL CARCINOMA OF LUNG, UNSPECIFIED LATERALITY, UNSPECIFIED PART OF LUNG: Primary | ICD-10-CM

## 2025-06-10 PROCEDURE — 96413 CHEMO IV INFUSION 1 HR: CPT

## 2025-06-10 PROCEDURE — 96375 TX/PRO/DX INJ NEW DRUG ADDON: CPT

## 2025-06-10 PROCEDURE — 25010000002 TOPOTECAN 4 MG/4ML SOLUTION 4 ML VIAL: Performed by: INTERNAL MEDICINE

## 2025-06-10 PROCEDURE — 25010000002 DEXAMETHASONE SODIUM PHOSPHATE 120 MG/30ML SOLUTION: Performed by: INTERNAL MEDICINE

## 2025-06-10 PROCEDURE — 25810000003 SODIUM CHLORIDE 0.9 % SOLUTION: Performed by: INTERNAL MEDICINE

## 2025-06-10 PROCEDURE — 25010000002 HEPARIN LOCK FLUSH PER 10 UNITS: Performed by: INTERNAL MEDICINE

## 2025-06-10 RX ORDER — SODIUM CHLORIDE 9 MG/ML
20 INJECTION, SOLUTION INTRAVENOUS ONCE
Status: COMPLETED | OUTPATIENT
Start: 2025-06-10 | End: 2025-06-10

## 2025-06-10 RX ORDER — ALBUTEROL SULFATE 90 UG/1
2 INHALANT RESPIRATORY (INHALATION) EVERY 4 HOURS PRN
Qty: 18 G | Refills: 5 | Status: SHIPPED | OUTPATIENT
Start: 2025-06-10

## 2025-06-10 RX ORDER — FLUTICASONE FUROATE, UMECLIDINIUM BROMIDE AND VILANTEROL TRIFENATATE 200; 62.5; 25 UG/1; UG/1; UG/1
1 POWDER RESPIRATORY (INHALATION)
Qty: 90 EACH | Refills: 1 | Status: SHIPPED | OUTPATIENT
Start: 2025-06-10

## 2025-06-10 RX ORDER — SODIUM CHLORIDE 0.9 % (FLUSH) 0.9 %
20 SYRINGE (ML) INJECTION AS NEEDED
Status: CANCELLED | OUTPATIENT
Start: 2025-06-10

## 2025-06-10 RX ORDER — SODIUM CHLORIDE 0.9 % (FLUSH) 0.9 %
20 SYRINGE (ML) INJECTION AS NEEDED
Status: DISCONTINUED | OUTPATIENT
Start: 2025-06-10 | End: 2025-06-11 | Stop reason: HOSPADM

## 2025-06-10 RX ORDER — HEPARIN SODIUM (PORCINE) LOCK FLUSH IV SOLN 100 UNIT/ML 100 UNIT/ML
500 SOLUTION INTRAVENOUS AS NEEDED
Status: CANCELLED | OUTPATIENT
Start: 2025-06-11

## 2025-06-10 RX ORDER — HEPARIN SODIUM (PORCINE) LOCK FLUSH IV SOLN 100 UNIT/ML 100 UNIT/ML
500 SOLUTION INTRAVENOUS AS NEEDED
Status: DISCONTINUED | OUTPATIENT
Start: 2025-06-10 | End: 2025-06-11 | Stop reason: HOSPADM

## 2025-06-10 RX ADMIN — Medication 20 ML: at 14:54

## 2025-06-10 RX ADMIN — DEXAMETHASONE SODIUM PHOSPHATE 12 MG: 4 INJECTION, SOLUTION INTRA-ARTICULAR; INTRALESIONAL; INTRAMUSCULAR; INTRAVENOUS; SOFT TISSUE at 13:58

## 2025-06-10 RX ADMIN — SODIUM CHLORIDE 20 ML/HR: 9 INJECTION, SOLUTION INTRAVENOUS at 13:58

## 2025-06-10 RX ADMIN — SODIUM CHLORIDE 2 MG: 9 INJECTION, SOLUTION INTRAVENOUS at 14:16

## 2025-06-10 RX ADMIN — HEPARIN 500 UNITS: 100 SYRINGE at 14:54

## 2025-06-10 NOTE — TELEPHONE ENCOUNTER
Caller: Gianni Lluvia R    Relationship: Self    Best call back number: [phone]    Requested Prescriptions:   Requested Prescriptions     Pending Prescriptions Disp Refills    Fluticasone-Umeclidin-Vilant (Trelegy Ellipta) 200-62.5-25 MCG/ACT inhaler 90 each 1     Sig: Inhale 1 puff Daily.    albuterol sulfate  (90 Base) MCG/ACT inhaler 18 g 5     Sig: Inhale 2 puffs Every 4 (Four) Hours As Needed for Wheezing or Shortness of Air.        Pharmacy where request should be sent: [facility] [id number]  [place] - [address] AT [facility] [address] [facility] [phone] [facility] [phone]      Last office visit with prescribing clinician: 6/13/2024   Last telemedicine visit with prescribing clinician: Visit date not found   Next office visit with prescribing clinician: 6/26/2025     Additional details provided by patient:   PATIENT WOULD LIKE TO GET THESE AS SOON AS POSSIBLE TO GET THROUGH FOR A LITTLE WHILE UNTIL AT LEAST THE APPOINTMENT ON 06/26/2025.     Does the patient have less than a 3 day supply:  [x] Yes  [] No    Would you like a call back once the refill request has been completed: [] Yes [] No    If the office needs to give you a call back, can they leave a voicemail: [] Yes [] No    [place]   06/10/25 09:58 EDT

## 2025-06-11 ENCOUNTER — HOSPITAL ENCOUNTER (OUTPATIENT)
Dept: ONCOLOGY | Facility: HOSPITAL | Age: 60
Discharge: HOME | End: 2025-06-11
Admitting: INTERNAL MEDICINE
Payer: MEDICARE

## 2025-06-11 VITALS
OXYGEN SATURATION: 97 % | RESPIRATION RATE: 18 BRPM | SYSTOLIC BLOOD PRESSURE: 106 MMHG | BODY MASS INDEX: 31.77 KG/M2 | HEART RATE: 94 BPM | HEIGHT: 64 IN | DIASTOLIC BLOOD PRESSURE: 78 MMHG | WEIGHT: 186.1 LBS | TEMPERATURE: 97.2 F

## 2025-06-11 DIAGNOSIS — Z45.2 ENCOUNTER FOR ADJUSTMENT OR MANAGEMENT OF VASCULAR ACCESS DEVICE: ICD-10-CM

## 2025-06-11 DIAGNOSIS — C34.90 SMALL CELL CARCINOMA OF LUNG, UNSPECIFIED LATERALITY, UNSPECIFIED PART OF LUNG: Primary | ICD-10-CM

## 2025-06-11 DIAGNOSIS — C79.51 CANCER, METASTATIC TO BONE: Primary | ICD-10-CM

## 2025-06-11 DIAGNOSIS — C79.51 CANCER, METASTATIC TO BONE: ICD-10-CM

## 2025-06-11 LAB
MAGNESIUM SERPL-MCNC: 1.7 MG/DL (ref 1.6–2.6)
PHOSPHATE SERPL-MCNC: 3.1 MG/DL (ref 2.5–4.5)

## 2025-06-11 PROCEDURE — 96375 TX/PRO/DX INJ NEW DRUG ADDON: CPT

## 2025-06-11 PROCEDURE — 96413 CHEMO IV INFUSION 1 HR: CPT

## 2025-06-11 PROCEDURE — 83735 ASSAY OF MAGNESIUM: CPT | Performed by: INTERNAL MEDICINE

## 2025-06-11 PROCEDURE — 25010000002 HEPARIN LOCK FLUSH PER 10 UNITS: Performed by: INTERNAL MEDICINE

## 2025-06-11 PROCEDURE — 25810000003 SODIUM CHLORIDE 0.9 % SOLUTION: Performed by: INTERNAL MEDICINE

## 2025-06-11 PROCEDURE — 84100 ASSAY OF PHOSPHORUS: CPT | Performed by: INTERNAL MEDICINE

## 2025-06-11 PROCEDURE — 25010000002 DEXAMETHASONE SODIUM PHOSPHATE 120 MG/30ML SOLUTION: Performed by: INTERNAL MEDICINE

## 2025-06-11 PROCEDURE — 25010000002 TOPOTECAN 4 MG/4ML SOLUTION 4 ML VIAL: Performed by: INTERNAL MEDICINE

## 2025-06-11 RX ORDER — HEPARIN SODIUM (PORCINE) LOCK FLUSH IV SOLN 100 UNIT/ML 100 UNIT/ML
500 SOLUTION INTRAVENOUS AS NEEDED
Status: CANCELLED | OUTPATIENT
Start: 2025-06-11

## 2025-06-11 RX ORDER — SODIUM CHLORIDE 0.9 % (FLUSH) 0.9 %
20 SYRINGE (ML) INJECTION AS NEEDED
Status: DISCONTINUED | OUTPATIENT
Start: 2025-06-11 | End: 2025-06-12 | Stop reason: HOSPADM

## 2025-06-11 RX ORDER — SODIUM CHLORIDE 0.9 % (FLUSH) 0.9 %
20 SYRINGE (ML) INJECTION AS NEEDED
Status: CANCELLED | OUTPATIENT
Start: 2025-06-11

## 2025-06-11 RX ORDER — HEPARIN SODIUM (PORCINE) LOCK FLUSH IV SOLN 100 UNIT/ML 100 UNIT/ML
500 SOLUTION INTRAVENOUS AS NEEDED
Status: DISCONTINUED | OUTPATIENT
Start: 2025-06-11 | End: 2025-06-12 | Stop reason: HOSPADM

## 2025-06-11 RX ORDER — SODIUM CHLORIDE 9 MG/ML
20 INJECTION, SOLUTION INTRAVENOUS ONCE
Status: COMPLETED | OUTPATIENT
Start: 2025-06-11 | End: 2025-06-11

## 2025-06-11 RX ADMIN — SODIUM CHLORIDE 2 MG: 9 INJECTION, SOLUTION INTRAVENOUS at 14:20

## 2025-06-11 RX ADMIN — DEXAMETHASONE SODIUM PHOSPHATE 12 MG: 4 INJECTION, SOLUTION INTRA-ARTICULAR; INTRALESIONAL; INTRAMUSCULAR; INTRAVENOUS; SOFT TISSUE at 14:05

## 2025-06-11 RX ADMIN — SODIUM CHLORIDE 20 ML/HR: 9 INJECTION, SOLUTION INTRAVENOUS at 14:05

## 2025-06-11 RX ADMIN — HEPARIN 500 UNITS: 100 SYRINGE at 15:01

## 2025-06-11 RX ADMIN — Medication 20 ML: at 15:01

## 2025-06-12 ENCOUNTER — HOSPITAL ENCOUNTER (OUTPATIENT)
Dept: ONCOLOGY | Facility: HOSPITAL | Age: 60
Discharge: HOME | End: 2025-06-12
Admitting: INTERNAL MEDICINE
Payer: MEDICARE

## 2025-06-12 VITALS
HEART RATE: 87 BPM | BODY MASS INDEX: 31.83 KG/M2 | RESPIRATION RATE: 18 BRPM | OXYGEN SATURATION: 98 % | TEMPERATURE: 97.9 F | WEIGHT: 185.5 LBS | SYSTOLIC BLOOD PRESSURE: 143 MMHG | DIASTOLIC BLOOD PRESSURE: 67 MMHG

## 2025-06-12 DIAGNOSIS — C79.51 CANCER, METASTATIC TO BONE: ICD-10-CM

## 2025-06-12 DIAGNOSIS — Z45.2 ENCOUNTER FOR ADJUSTMENT OR MANAGEMENT OF VASCULAR ACCESS DEVICE: ICD-10-CM

## 2025-06-12 DIAGNOSIS — C34.90 SMALL CELL CARCINOMA OF LUNG, UNSPECIFIED LATERALITY, UNSPECIFIED PART OF LUNG: Primary | ICD-10-CM

## 2025-06-12 PROCEDURE — 25010000002 DEXAMETHASONE SODIUM PHOSPHATE 120 MG/30ML SOLUTION: Performed by: INTERNAL MEDICINE

## 2025-06-12 PROCEDURE — 25010000002 TOPOTECAN 4 MG/4ML SOLUTION 4 ML VIAL: Performed by: INTERNAL MEDICINE

## 2025-06-12 PROCEDURE — 96375 TX/PRO/DX INJ NEW DRUG ADDON: CPT

## 2025-06-12 PROCEDURE — 25010000002 HEPARIN LOCK FLUSH PER 10 UNITS: Performed by: INTERNAL MEDICINE

## 2025-06-12 PROCEDURE — 25810000003 SODIUM CHLORIDE 0.9 % SOLUTION: Performed by: INTERNAL MEDICINE

## 2025-06-12 PROCEDURE — 96372 THER/PROPH/DIAG INJ SC/IM: CPT

## 2025-06-12 PROCEDURE — 96413 CHEMO IV INFUSION 1 HR: CPT

## 2025-06-12 PROCEDURE — 25010000002 DENOSUMAB 120 MG/1.7ML SOLUTION: Performed by: INTERNAL MEDICINE

## 2025-06-12 RX ORDER — HEPARIN SODIUM (PORCINE) LOCK FLUSH IV SOLN 100 UNIT/ML 100 UNIT/ML
500 SOLUTION INTRAVENOUS AS NEEDED
Status: DISCONTINUED | OUTPATIENT
Start: 2025-06-12 | End: 2025-06-13 | Stop reason: HOSPADM

## 2025-06-12 RX ORDER — SODIUM CHLORIDE 9 MG/ML
20 INJECTION, SOLUTION INTRAVENOUS ONCE
Status: COMPLETED | OUTPATIENT
Start: 2025-06-12 | End: 2025-06-12

## 2025-06-12 RX ORDER — FUROSEMIDE 20 MG/1
20 TABLET ORAL DAILY
Qty: 7 TABLET | Refills: 0 | Status: SHIPPED | OUTPATIENT
Start: 2025-06-12

## 2025-06-12 RX ORDER — SODIUM CHLORIDE 0.9 % (FLUSH) 0.9 %
20 SYRINGE (ML) INJECTION AS NEEDED
Status: DISCONTINUED | OUTPATIENT
Start: 2025-06-12 | End: 2025-06-13 | Stop reason: HOSPADM

## 2025-06-12 RX ORDER — SODIUM CHLORIDE 0.9 % (FLUSH) 0.9 %
20 SYRINGE (ML) INJECTION AS NEEDED
Status: CANCELLED | OUTPATIENT
Start: 2025-06-12

## 2025-06-12 RX ORDER — HEPARIN SODIUM (PORCINE) LOCK FLUSH IV SOLN 100 UNIT/ML 100 UNIT/ML
500 SOLUTION INTRAVENOUS AS NEEDED
Status: CANCELLED | OUTPATIENT
Start: 2025-06-12

## 2025-06-12 RX ADMIN — SODIUM CHLORIDE 20 ML/HR: 9 INJECTION, SOLUTION INTRAVENOUS at 13:20

## 2025-06-12 RX ADMIN — DENOSUMAB 120 MG: 120 INJECTION SUBCUTANEOUS at 13:20

## 2025-06-12 RX ADMIN — HEPARIN 500 UNITS: 100 SYRINGE at 14:29

## 2025-06-12 RX ADMIN — Medication 20 ML: at 14:29

## 2025-06-12 RX ADMIN — DEXAMETHASONE SODIUM PHOSPHATE 12 MG: 4 INJECTION, SOLUTION INTRA-ARTICULAR; INTRALESIONAL; INTRAMUSCULAR; INTRAVENOUS; SOFT TISSUE at 13:20

## 2025-06-12 RX ADMIN — SODIUM CHLORIDE 2 MG: 9 INJECTION, SOLUTION INTRAVENOUS at 13:47

## 2025-06-13 ENCOUNTER — HOSPITAL ENCOUNTER (OUTPATIENT)
Dept: ONCOLOGY | Facility: HOSPITAL | Age: 60
Discharge: HOME | End: 2025-06-13
Payer: MEDICARE

## 2025-06-13 VITALS
RESPIRATION RATE: 18 BRPM | WEIGHT: 185.41 LBS | DIASTOLIC BLOOD PRESSURE: 75 MMHG | BODY MASS INDEX: 31.81 KG/M2 | OXYGEN SATURATION: 99 % | HEART RATE: 100 BPM | TEMPERATURE: 98 F | SYSTOLIC BLOOD PRESSURE: 112 MMHG

## 2025-06-13 DIAGNOSIS — C34.90 SMALL CELL CARCINOMA OF LUNG, UNSPECIFIED LATERALITY, UNSPECIFIED PART OF LUNG: ICD-10-CM

## 2025-06-13 DIAGNOSIS — Z45.2 ENCOUNTER FOR ADJUSTMENT OR MANAGEMENT OF VASCULAR ACCESS DEVICE: ICD-10-CM

## 2025-06-13 DIAGNOSIS — C79.51 CANCER, METASTATIC TO BONE: Primary | ICD-10-CM

## 2025-06-13 PROCEDURE — 25010000002 TOPOTECAN 4 MG/4ML SOLUTION 4 ML VIAL: Performed by: INTERNAL MEDICINE

## 2025-06-13 PROCEDURE — 25010000002 HEPARIN LOCK FLUSH PER 10 UNITS: Performed by: INTERNAL MEDICINE

## 2025-06-13 PROCEDURE — 25010000002 PEGFILGRASTIM 6 MG/0.6ML PREFILLED SYRINGE KIT: Performed by: INTERNAL MEDICINE

## 2025-06-13 PROCEDURE — 25810000003 SODIUM CHLORIDE 0.9 % SOLUTION: Performed by: INTERNAL MEDICINE

## 2025-06-13 PROCEDURE — 96375 TX/PRO/DX INJ NEW DRUG ADDON: CPT

## 2025-06-13 PROCEDURE — 96377 APPLICATON ON-BODY INJECTOR: CPT

## 2025-06-13 PROCEDURE — 25010000002 DEXAMETHASONE SODIUM PHOSPHATE 120 MG/30ML SOLUTION: Performed by: INTERNAL MEDICINE

## 2025-06-13 PROCEDURE — 96413 CHEMO IV INFUSION 1 HR: CPT

## 2025-06-13 RX ORDER — SODIUM CHLORIDE 0.9 % (FLUSH) 0.9 %
20 SYRINGE (ML) INJECTION AS NEEDED
OUTPATIENT
Start: 2025-06-13

## 2025-06-13 RX ORDER — SODIUM CHLORIDE 9 MG/ML
20 INJECTION, SOLUTION INTRAVENOUS ONCE
Status: COMPLETED | OUTPATIENT
Start: 2025-06-13 | End: 2025-06-13

## 2025-06-13 RX ORDER — HEPARIN SODIUM (PORCINE) LOCK FLUSH IV SOLN 100 UNIT/ML 100 UNIT/ML
500 SOLUTION INTRAVENOUS AS NEEDED
OUTPATIENT
Start: 2025-06-13

## 2025-06-13 RX ORDER — HEPARIN SODIUM (PORCINE) LOCK FLUSH IV SOLN 100 UNIT/ML 100 UNIT/ML
500 SOLUTION INTRAVENOUS AS NEEDED
Status: DISCONTINUED | OUTPATIENT
Start: 2025-06-13 | End: 2025-06-14 | Stop reason: HOSPADM

## 2025-06-13 RX ORDER — SODIUM CHLORIDE 0.9 % (FLUSH) 0.9 %
20 SYRINGE (ML) INJECTION AS NEEDED
Status: DISCONTINUED | OUTPATIENT
Start: 2025-06-13 | End: 2025-06-14 | Stop reason: HOSPADM

## 2025-06-13 RX ADMIN — SODIUM CHLORIDE 20 ML/HR: 9 INJECTION, SOLUTION INTRAVENOUS at 13:50

## 2025-06-13 RX ADMIN — DEXAMETHASONE SODIUM PHOSPHATE 12 MG: 4 INJECTION, SOLUTION INTRA-ARTICULAR; INTRALESIONAL; INTRAMUSCULAR; INTRAVENOUS; SOFT TISSUE at 13:50

## 2025-06-13 RX ADMIN — HEPARIN 500 UNITS: 100 SYRINGE at 14:43

## 2025-06-13 RX ADMIN — Medication 20 ML: at 14:43

## 2025-06-13 RX ADMIN — SODIUM CHLORIDE 2 MG: 9 INJECTION, SOLUTION INTRAVENOUS at 14:08

## 2025-06-13 RX ADMIN — PEGFILGRASTIM 6 MG: KIT SUBCUTANEOUS at 14:43

## 2025-06-17 ENCOUNTER — HOSPITAL ENCOUNTER (OUTPATIENT)
Dept: ONCOLOGY | Facility: HOSPITAL | Age: 60
Discharge: HOME | End: 2025-06-17
Payer: MEDICARE

## 2025-06-17 ENCOUNTER — LAB (OUTPATIENT)
Facility: HOSPITAL | Age: 60
End: 2025-06-17
Payer: MEDICARE

## 2025-06-17 ENCOUNTER — DOCUMENTATION (OUTPATIENT)
Dept: ONCOLOGY | Facility: HOSPITAL | Age: 60
End: 2025-06-17
Payer: MEDICARE

## 2025-06-17 ENCOUNTER — HOSPITAL ENCOUNTER (OUTPATIENT)
Facility: HOSPITAL | Age: 60
Discharge: HOME | End: 2025-06-17
Payer: MEDICARE

## 2025-06-17 VITALS
DIASTOLIC BLOOD PRESSURE: 58 MMHG | WEIGHT: 184.6 LBS | OXYGEN SATURATION: 99 % | TEMPERATURE: 96.8 F | HEART RATE: 110 BPM | HEIGHT: 64 IN | BODY MASS INDEX: 31.51 KG/M2 | SYSTOLIC BLOOD PRESSURE: 99 MMHG | RESPIRATION RATE: 18 BRPM

## 2025-06-17 DIAGNOSIS — T45.1X5A ANEMIA ASSOCIATED WITH CHEMOTHERAPY: ICD-10-CM

## 2025-06-17 DIAGNOSIS — C34.90 SMALL CELL CARCINOMA OF LUNG, UNSPECIFIED LATERALITY, UNSPECIFIED PART OF LUNG: ICD-10-CM

## 2025-06-17 DIAGNOSIS — R06.02 SHORTNESS OF BREATH: Primary | ICD-10-CM

## 2025-06-17 DIAGNOSIS — D64.81 ANEMIA ASSOCIATED WITH CHEMOTHERAPY: ICD-10-CM

## 2025-06-17 DIAGNOSIS — R06.02 SHORTNESS OF BREATH: ICD-10-CM

## 2025-06-17 LAB
ALBUMIN SERPL-MCNC: 3.5 G/DL (ref 3.5–5.2)
ALBUMIN/GLOB SERPL: 1.6 G/DL
ALP SERPL-CCNC: 77 U/L (ref 39–117)
ALPHA1 GLOB MFR UR ELPH: 179 MG/DL (ref 90–200)
ALT SERPL W P-5'-P-CCNC: 10 U/L (ref 1–33)
ANION GAP SERPL CALCULATED.3IONS-SCNC: 8.8 MMOL/L (ref 5–15)
ANISOCYTOSIS BLD QL: NORMAL
AST SERPL-CCNC: 8 U/L (ref 1–32)
BASOPHILS # BLD AUTO: 0 10*3/MM3 (ref 0–0.2)
BASOPHILS NFR BLD AUTO: 0 % (ref 0–1.5)
BILIRUB SERPL-MCNC: 0.6 MG/DL (ref 0–1.2)
BUN SERPL-MCNC: 10 MG/DL (ref 6–20)
BUN/CREAT SERPL: 17.9 (ref 7–25)
C3 FRG RBC-MCNC: NORMAL
CALCIUM SPEC-SCNC: 7.4 MG/DL (ref 8.6–10.5)
CHLORIDE SERPL-SCNC: 88 MMOL/L (ref 98–107)
CO2 SERPL-SCNC: 24.2 MMOL/L (ref 22–29)
CREAT SERPL-MCNC: 0.56 MG/DL (ref 0.57–1)
DACRYOCYTES BLD QL SMEAR: NORMAL
DEPRECATED RDW RBC AUTO: 58.5 FL (ref 37–54)
DOHLE BOD BLD QL SMEAR: PRESENT
EGFRCR SERPLBLD CKD-EPI 2021: 105.9 ML/MIN/1.73
EOSINOPHIL # BLD AUTO: 0 10*3/MM3 (ref 0–0.4)
EOSINOPHIL NFR BLD AUTO: 0 % (ref 0.3–6.2)
ERYTHROCYTE [DISTWIDTH] IN BLOOD BY AUTOMATED COUNT: 17.7 % (ref 12.3–15.4)
GLOBULIN UR ELPH-MCNC: 2.2 GM/DL
GLUCOSE SERPL-MCNC: 100 MG/DL (ref 65–99)
HCT VFR BLD AUTO: 24.7 % (ref 34–46.6)
HGB BLD-MCNC: 8.5 G/DL (ref 12–15.9)
HYPOCHROMIA BLD QL: NORMAL
IMM GRANULOCYTES # BLD AUTO: 0 10*3/MM3 (ref 0–0.05)
IMM GRANULOCYTES NFR BLD AUTO: 0 % (ref 0–0.5)
LYMPHOCYTES # BLD AUTO: 0.83 10*3/MM3 (ref 0.7–3.1)
LYMPHOCYTES NFR BLD AUTO: 68 % (ref 19.6–45.3)
MCH RBC QN AUTO: 31.8 PG (ref 26.6–33)
MCHC RBC AUTO-ENTMCNC: 34.4 G/DL (ref 31.5–35.7)
MCV RBC AUTO: 92.5 FL (ref 79–97)
MONOCYTES # BLD AUTO: 0.05 10*3/MM3 (ref 0.1–0.9)
MONOCYTES NFR BLD AUTO: 4.1 % (ref 5–12)
NEUTROPHILS NFR BLD AUTO: 0.34 10*3/MM3 (ref 1.7–7)
NEUTROPHILS NFR BLD AUTO: 27.9 % (ref 42.7–76)
NRBC BLD AUTO-RTO: 0 /100 WBC (ref 0–0.2)
PLATELET # BLD AUTO: 12 10*3/MM3 (ref 140–450)
PMV BLD AUTO: 12.5 FL (ref 6–12)
POIKILOCYTOSIS BLD QL SMEAR: NORMAL
POTASSIUM SERPL-SCNC: 3.5 MMOL/L (ref 3.5–5.2)
PROT SERPL-MCNC: 5.7 G/DL (ref 6–8.5)
RBC # BLD AUTO: 2.67 10*6/MM3 (ref 3.77–5.28)
SMALL PLATELETS BLD QL SMEAR: NORMAL
SODIUM SERPL-SCNC: 121 MMOL/L (ref 136–145)
WBC NRBC COR # BLD AUTO: 1.22 10*3/MM3 (ref 3.4–10.8)

## 2025-06-17 PROCEDURE — 80053 COMPREHEN METABOLIC PANEL: CPT | Performed by: NURSE PRACTITIONER

## 2025-06-17 PROCEDURE — 85025 COMPLETE CBC W/AUTO DIFF WBC: CPT

## 2025-06-17 PROCEDURE — 36415 COLL VENOUS BLD VENIPUNCTURE: CPT

## 2025-06-17 PROCEDURE — 85007 BL SMEAR W/DIFF WBC COUNT: CPT

## 2025-06-17 PROCEDURE — 71046 X-RAY EXAM CHEST 2 VIEWS: CPT

## 2025-06-17 PROCEDURE — 82103 ALPHA-1-ANTITRYPSIN TOTAL: CPT | Performed by: NURSE PRACTITIONER

## 2025-06-19 ENCOUNTER — DOCUMENTATION (OUTPATIENT)
Dept: PHARMACY | Facility: HOSPITAL | Age: 60
End: 2025-06-19
Payer: MEDICARE

## 2025-06-19 NOTE — ED PROVIDER NOTES
Time: 11:40 AM EDT  Date of encounter:  2025  Independent Historian/Clinical History and Information was obtained by:   Patient    History is limited by: N/A    Chief Complaint: Abnormal labs      History of Present Illness:  Patient is a 59 y.o. year old female who presents to the emergency department for evaluation of abnormal labs noted earlier.  Patient was told to come to the emergency department for hyponatremia.  Patient denies fever and chills.  Patient reports that she has had some numbness and tingling.      Patient Care Team  Primary Care Provider: Aleksandar Edge DO    Past Medical History:     No Known Allergies  Past Medical History:   Diagnosis Date    Abnormal mammogram     PT DENIES KNOWING OF THIS HX    Anxiety     Arthritis     R SHOULDER, R HIP, AND R LEG    Cancer     LUNG    Chronic nausea 01/15/2019    COPD (chronic obstructive pulmonary disease)     O2 3 LITERS NC CONT    Hernia, hiatal     Hyperlipidemia     Hypertension     Knee pain, right 2018    Memory loss     FORGETFULNESS ? ETIOLOGY    Migraine headache     Moderate episode of recurrent major depressive disorder 2017    Night sweats     Sciatica of right side 2017    Severe episode of recurrent major depressive disorder, without psychotic features 10/22/2019    Shingles     OUTBREAK 24 ON ANTIVIRAL , WHELPS NOTED NO SORES.    Shoulder pain, left 2018     Past Surgical History:   Procedure Laterality Date    BRONCHOSCOPY N/A 2022    Procedure: BRONCHOSCOPY WITH BRONCHOALVEOLAR LAVAGE, BIOPSY;  Surgeon: Shin Mcdonald DO;  Location: [facility] ENDOSCOPY;  Service: Pulmonary;  Laterality: N/A;  RIGHT LOWER LOBE INFILTRATE    BRONCHOSCOPY N/A 2024    Procedure: BRONCHOSCOPY WITH ENDOBRONCHIAL ULTRASOUND AND FINE NEEDLE ASPIRATE;  Surgeon: Shin Mcdonald DO;  Location: [facility] ENDOSCOPY;  Service: Pulmonary;  Laterality: N/A;  MEDIASTINAL ADENOPATHY     SECTION       CHOLECYSTECTOMY      LAPAROSCOPIC    COLONOSCOPY      PORTACATH PLACEMENT Left 6/20/2024    Procedure: INSERTION OF PORTACATH;  Surgeon: Josh Patton MD;  [place] [facility] [place] [facility] [place]  Service: General;  Laterality: Left;    TOTAL HIP ARTHROPLASTY Right 5/13/2022    Procedure: RIGHT TOTAL HIP ARTHROPLASTY;  Surgeon: Cecil Gomes MD;  Location: [facility] MAIN OR;  Service: Orthopedics;  Laterality: Right;     Family History   Problem Relation Age of Onset    Other Mother         BLOOD DISEASE    Arthritis Mother     Heart disease Father     Hypertension Other     Cancer Other     Heart disease Other     Malig Hyperthermia Neg Hx        Home Medications:  Prior to Admission medications    Medication Sig Start Date End Date Taking? Authorizing Provider   albuterol (ACCUNEB) 0.63 MG/3ML nebulizer solution Take 3 mL by nebulization Every 6 (Six) Hours As Needed for Wheezing or Shortness of Air. 3/11/25   Alok Rey APRN   albuterol sulfate  (90 Base) MCG/ACT inhaler Inhale 2 puffs Every 4 (Four) Hours As Needed for Wheezing or Shortness of Air. 6/10/25   Aleksandar Edge, DO   atorvastatin (LIPITOR) 10 MG tablet Take 1 tablet by mouth Every Night.  Patient not taking: Reported on 6/9/2025 8/5/24   Aleksandar Edge DO   buPROPion SR (WELLBUTRIN SR) 150 MG 12 hr tablet Take 1 tablet by mouth 2 (Two) Times a Day.  Patient not taking: Reported on 6/9/2025 8/5/24   Aleksandar Edge DO   calcium carbonate (Tums) 500 MG chewable tablet Chew 2 tablets Daily. 2/21/25   Brian Dickson MD   dexAMETHasone (DECADRON) 4 MG tablet Take 1 tablet by mouth 2 (Two) Times a Day With Meals. 5/12/25   Biran Dickson MD   escitalopram (LEXAPRO) 20 MG tablet Take 1 tablet by mouth Daily. 8/5/24   Aleksandar Edge DO   famotidine (PEPCID) 40 MG tablet TAKE ONE TABLET BY MOUTH TWICE DAILY @ 9AM & 5PM  Patient taking differently: Take 1 tablet by mouth 2 (Two) Times a  Day. 10/29/24   Aleksandar Edge DO   fludrocortisone 0.1 MG tablet Take 1 tablet by mouth Daily for 30 days. 5/3/25 6/2/25  Cecil Christensen MD   Fluticasone-Umeclidin-Vilant (Trelegy Ellipta) 200-62.5-25 MCG/ACT inhaler Inhale 1 puff Daily. 6/10/25   Aleksandar Edge DO   furosemide (LASIX) 20 MG tablet TAKE 1 TABLET BY MOUTH DAILY 6/12/25   Brian Dickson MD   gabapentin (NEURONTIN) 100 MG capsule Take 2 capsules by mouth 3 (Three) Times a Day. 1/24/25   Brian Dickson MD   hydrOXYzine (ATARAX) 25 MG tablet TAKE 1 TABLET BY MOUTH THREE TIMES A DAY AS NEEDED FOR ITCHING  Patient taking differently: Take 1 tablet by mouth 3 (Three) Times a Day As Needed. 11/11/24   Brian Dickson MD   ipratropium-albuterol (DUO-NEB) 0.5-2.5 mg/3 ml nebulizer  5/25/25   Parvin Gray MD   lisinopril (PRINIVIL,ZESTRIL) 5 MG tablet TAKE ONE TABLET BY MOUTH DAILY AT 9 AM  Patient not taking: Reported on 6/9/2025 5/19/25   Parvin Gray MD   Magnesium 400 MG tablet Take 400 mg by mouth Daily.  Patient not taking: Reported on 6/9/2025 12/12/24   Brian Dickson MD   Melatonin 10 MG tablet Take 1 tablet by mouth At Night As Needed.    Parvin Gray MD   midodrine (PROAMATINE) 10 MG tablet Take 1 tablet by mouth 3 (Three) Times a Day Before Meals for 30 days. 5/3/25 6/2/25  Cecil Christensen MD   naloxone (NARCAN) 4 MG/0.1ML nasal spray CALL 911. DON'T PRIME. SPRAY IN 1 NOSTRIL FOR OVERDOSE. REPEAT IN 2-3 MINUTES IN OTHER NOSTRIL IF NO OR MINIMAL BREATHING/RESPONSIVENESS.  Patient taking differently: Administer 1 spray into the nostril(s) as directed by provider As Needed for Opioid Reversal or Respiratory Depression. Call 911. Don't prime. [place] in 1 nostril for overdose. Repeat in 2-3 minutes in other nostril if no or minimal breathing/responsiveness. 2/10/25   Brian Dickson MD   nystatin (MYCOSTATIN) 100,000 unit/mL suspension Swish and  swallow 5 mL 4 (Four) Times a Day. 5/16/25   Brian Dickson MD   OLANZapine (zyPREXA) 5 MG tablet Take 1 tablet by mouth Every Night. 5/16/25   Brian Dickson MD   ondansetron (ZOFRAN) 8 MG tablet  5/25/25   Parvin Gray MD   ondansetron ODT (ZOFRAN-ODT) 8 MG disintegrating tablet Place 1 tablet on the tongue Every 8 (Eight) Hours As Needed for Nausea or Vomiting. 1/24/25   Brian Dickson MD   oxyCODONE-acetaminophen (PERCOCET)  MG per tablet Take 1 tablet by mouth Every 4 (Four) Hours As Needed for Moderate Pain. 6/9/25   Brian Dickson MD   potassium chloride (MICRO-K) 10 MEQ CR capsule Take 2 capsules by mouth Daily. 5/13/25   Brian Dickson MD   prochlorperazine (COMPAZINE) 10 MG tablet Take 1 tablet by mouth Every 6 (Six) Hours As Needed for Nausea.  Patient not taking: Reported on 6/9/2025 1/24/25   Brian Dickson MD        Social History:   Social History     Tobacco Use    Smoking status: Every Day     Current packs/day: 1.00     Average packs/day: 1 pack/day for 47.5 years (47.5 ttl pk-yrs)     Types: Cigarettes     Start date: 1978     Passive exposure: Past    Smokeless tobacco: Never   Vaping Use    Vaping status: Former    Substances: Nicotine, Flavoring    Devices: Disposable   Substance Use Topics    Alcohol use: Never    Drug use: Never         Review of Systems:  Review of Systems   Constitutional:  Negative for chills and fever.   HENT:  Negative for congestion, rhinorrhea and sore throat.    Eyes:  Negative for pain and visual disturbance.   Respiratory:  Negative for apnea, cough, chest tightness and shortness of breath.    Cardiovascular:  Negative for chest pain and palpitations.   Gastrointestinal:  Negative for abdominal pain, diarrhea, nausea and vomiting.   Genitourinary:  Negative for difficulty urinating and dysuria.   Musculoskeletal:  Negative for joint swelling and myalgias.   Skin:  Negative for color change.  "  Neurological:  Negative for seizures and headaches.   Psychiatric/Behavioral: Negative.     All other systems reviewed and are negative.       Physical Exam:  /66   Pulse 90   Temp 98.4 °F (36.9 °C) (Oral)   Resp 18   Ht 162.6 cm (64.02\")   Wt 85 kg (187 lb 6.3 oz)   LMP  (LMP Unknown)   SpO2 100%   BMI 32.15 kg/m²     Physical Exam  Vitals and nursing note reviewed.   Constitutional:       General: She is not in acute distress.     Appearance: Normal appearance. She is not toxic-appearing.   HENT:      Head: Normocephalic and atraumatic.      Jaw: There is normal jaw occlusion.   Eyes:      General: Lids are normal.      Extraocular Movements: Extraocular movements intact.      Conjunctiva/sclera: Conjunctivae normal.      Pupils: Pupils are equal, round, and reactive to light.   Cardiovascular:      Rate and Rhythm: Normal rate and regular rhythm.      Pulses: Normal pulses.      Heart sounds: Normal heart sounds.   Pulmonary:      Effort: Pulmonary effort is normal. No respiratory distress.      Breath sounds: Normal breath sounds. No wheezing or rhonchi.   Abdominal:      General: Abdomen is flat.      Palpations: Abdomen is soft.      Tenderness: There is no abdominal tenderness. There is no guarding or rebound.   Musculoskeletal:         General: Normal range of motion.      Cervical back: Normal range of motion and neck supple.      Right lower leg: No edema.      Left lower leg: No edema.   Skin:     General: Skin is warm and dry.   Neurological:      Mental Status: She is alert and oriented to person, place, and time. Mental status is at baseline.   Psychiatric:         Mood and Affect: Mood normal.                    Medical Decision Making:      Comorbidities that affect care:    Brain cancer, lung cancer    External Notes reviewed:    Hospital Discharge Summary: Patient was last admitted for hyponatremia and pancytopenia      The following orders were placed and all results were " independently analyzed by me:  Orders Placed This Encounter   Procedures    XR Chest 1 View    [place] Draw    Comprehensive Metabolic Panel    High Sensitivity Troponin T    Magnesium    Urinalysis With Microscopic If Indicated (No Culture) - Urine, Clean Catch    CBC Auto Differential    Scan Slide    High Sensitivity Troponin T 1Hr    NPO Diet NPO Type: Strict NPO    Undress & Gown    Continuous Pulse Oximetry    Vital Signs    Orthostatic Blood Pressure    Verify Informed Consent    Code Status and Medical Interventions: CPR (Attempt to Resuscitate); Full Support    Inpatient Hospitalist Consult    General MD Inpatient Consult    Oxygen Therapy- Nasal Cannula; Titrate 1-6 LPM Per SpO2; 90 - 95%    POC Glucose Once    ECG 12 Lead Other; Weakness, Dizzy    Prepare Platelet Pheresis, 1 Units    ABO / Rh    Insert Peripheral IV    Inpatient Admission    Fall Precautions    CBC & Differential    Green Top (Gel)    Lavender Top    Gold Top - SST    Light Blue Top    Extra Tubes    Gray Top       Medications Given in the Emergency Department:  Medications   sodium chloride 0.9 % flush 10 mL (has no administration in time range)   ondansetron (ZOFRAN) injection 4 mg (4 mg Intravenous Given 6/19/25 1053)   sodium chloride 0.9 % bolus 500 mL (0 mL Intravenous Stopped 6/19/25 1400)        ED Course:         Labs:    Lab Results (last 24 hours)       Procedure Component Value Units Date/Time    CBC & Differential [[id number]]  (Abnormal) Collected: 06/19/25 1049    Specimen: Blood Updated: 06/19/25 1128    Narrative:      The following orders were created for panel order CBC & Differential.  Procedure                               Abnormality         Status                     ---------                               -----------         ------                     CBC Auto Differential[[id number]]        Abnormal            Final result               Scan Slide[[id number]]                                       Final result                  Please view results for these tests on the individual orders.    Comprehensive Metabolic Panel [[id number]]  (Abnormal) Collected: 06/19/25 1049    Specimen: Blood Updated: 06/19/25 1122     Glucose 101 mg/dL      BUN 5.7 mg/dL      Creatinine 0.48 mg/dL      Sodium 118 mmol/L      Potassium 3.4 mmol/L      Comment: Slight hemolysis detected by analyzer. Result may be falsely elevated.        Chloride 85 mmol/L      CO2 23.5 mmol/L      Calcium 7.5 mg/dL      Total Protein 6.1 g/dL      Albumin 3.4 g/dL      ALT (SGPT) 6 U/L      AST (SGOT) 6 U/L      Alkaline Phosphatase 73 U/L      Total Bilirubin 0.4 mg/dL      Globulin 2.7 gm/dL      A/G Ratio 1.3 g/dL      BUN/Creatinine Ratio 11.9     Anion Gap 9.5 mmol/L      eGFR 109.9 mL/min/1.73     Narrative:      GFR Categories in Chronic Kidney Disease (CKD)              GFR Category          GFR (mL/min/1.73)    Interpretation  G1                    90 or greater        Normal or high (1)  G2                    60-89                Mild decrease (1)  G3a                   45-59                Mild to moderate decrease  G3b                   30-44                Moderate to severe decrease  G4                    15-29                Severe decrease  G5                    14 or less           Kidney failure    (1)In the absence of evidence of kidney disease, neither GFR category G1 or G2 fulfill the criteria for CKD.    eGFR calculation 2021 CKD-EPI creatinine equation, which does not include race as a factor    High Sensitivity Troponin T [[id number]]  (Abnormal) Collected: 06/19/25 1049    Specimen: Blood Updated: 06/19/25 1119     HS Troponin T 19 ng/L     Narrative:      High Sensitive Troponin T Reference Range:  <14.0 ng/L- Negative Female for AMI  <22.0 ng/L- Negative Male for AMI  >=14 - Abnormal Female indicating possible myocardial injury.  >=22 - Abnormal Male indicating possible myocardial injury.   Clinicians would have to utilize clinical acumen,  EKG, Troponin, and serial changes to determine if it is an Acute Myocardial Infarction or myocardial injury due to an underlying chronic condition.         Magnesium [[id number]]  (Normal) Collected: 06/19/25 1049    Specimen: Blood Updated: 06/19/25 1122     Magnesium 1.9 mg/dL     CBC Auto Differential [[id number]]  (Abnormal) Collected: 06/19/25 1049    Specimen: Blood Updated: 06/19/25 1128     WBC 2.22 10*3/mm3      RBC 2.40 10*6/mm3      Hemoglobin 7.5 g/dL      Hematocrit 22.1 %      MCV 92.1 fL      MCH 31.3 pg      MCHC 33.9 g/dL      RDW 17.4 %      RDW-SD 57.2 fl      Platelets 4 10*3/mm3      Neutrophil % 39.6 %      Lymphocyte % 52.7 %      Monocyte % 4.1 %      Eosinophil % 0.0 %      Basophil % 0.9 %      Immature Grans % 2.7 %      Neutrophils, Absolute 0.88 10*3/mm3      Lymphocytes, Absolute 1.17 10*3/mm3      Monocytes, Absolute 0.09 10*3/mm3      Eosinophils, Absolute 0.00 10*3/mm3      Basophils, Absolute 0.02 10*3/mm3      Immature Grans, Absolute 0.06 10*3/mm3      nRBC 0.0 /100 WBC     Narrative:      Appended report. These results have been appended to a previously verified report.    Scan Slide [[id number]] Collected: 06/19/25 1049    Specimen: Blood Updated: 06/19/25 1128     Anisocytosis Slight/1+     Hypochromia Slight/1+     Poikilocytes Slight/1+     Dohle Bodies Present     Toxic Granulation Slight/1+     Platelet Estimate Decreased    High Sensitivity Troponin T 1Hr [[id number]]  (Abnormal) Collected: 06/19/25 1338    Specimen: Blood from Arm, Right Updated: 06/19/25 1408     HS Troponin T 17 ng/L      Troponin T Numeric Delta -2 ng/L      Troponin T % Delta -11    Narrative:      High Sensitive Troponin T Reference Range:  <14.0 ng/L- Negative Female for AMI  <22.0 ng/L- Negative Male for AMI  >=14 - Abnormal Female indicating possible myocardial injury.  >=22 - Abnormal Male indicating possible myocardial injury.   Clinicians would have to utilize clinical acumen, EKG, Troponin, and  serial changes to determine if it is an Acute Myocardial Infarction or myocardial injury due to an underlying chronic condition.                  Imaging:    XR Chest 1 View  Result Date: 6/19/2025  XR CHEST 1 VW Date of Exam: 6/19/2025 12:05 PM EDT Indication: Weak/Dizzy/AMS triage protocol Comparison: 6/17/2025, 4/28/2025 Findings: Cardiac size is stable. The pulmonary vascular markings are normal. There is minimal residual atelectasis or airspace disease in the bases. Left-sided chest port remains in good position with tip in the right atrium.     Impression: Minimal residual atelectasis or airspace disease in the bases. Electronically Signed: Luca Brewster MD  6/19/2025 12:36 PM EDT  Workstation ID: [id number]        Differential Diagnosis and Discussion:    Metabolic: Differential diagnosis includes but is not limited to hypertension, hyperglycemia, hyperkalemia, hypocalcemia, metabolic acidosis, hypokalemia, hypoglycemia, malnutrition, hypothyroidism, hyperthyroidism, and adrenal insufficiency.     PROCEDURES:    Labs were collected in the emergency department and all labs were reviewed and interpreted by me.  X-ray were performed in the emergency department and all X-ray impressions were independently interpreted by me.  An EKG was performed and the EKG was interpreted by me.    ECG 12 Lead Other; Weakness, Dizzy   Preliminary Result   HEART RATE=98  bpm   RR Azmsbmtq=269  ms   NE Grcolpul=846  ms   P Horizontal Axis=7  deg   P Front Axis=39  deg   QRSD Interval=88  ms   QT Lddeopzp=883  ms   PAaT=319  ms   QRS Axis=16  deg   T Wave Axis=52  deg   - BORDERLINE ECG -   Sinus rhythm   Borderline prolonged QT interval   Date and Time of Study:2025-06-19 10:52:11          Procedures    MDM     CBC shows a hemoglobin of 7.5 and hematocrit of 22.1.  CMP shows a sodium of 118.  Patient was given gentle fluids in the emergency department.  Patient was also given platelets for a platelet count of 4.      Total Critical  Care time of 50 minutes. Total critical care time documented does not include time spent on separately billed procedures for services of nurses or physician assistants. I personally saw and examined the patient. I have reviewed all diagnostic interpretations and treatment plans as written. I was present for the key portions of any procedures performed and the inclusive time noted in any critical care statement. Critical care time includes patient management by me, time spent at the patients bedside,  time to review lab and imaging results, discussing patient care, documentation in the medical record, and time spent with family or caregiver.          Patient Care Considerations:    I considered ordering antibiotics, however no bacterial focus of infection was found.      Consultants/Shared Management Plan:    Case was discussed with the hospitalist who agrees admit the patient.  Case was also discussed with Dr. Dickson who recommends giving platelets.    Social Determinants of Health:    Patient is independent, reliable, and has access to care.       Disposition and Care Coordination:    Admit:   Through independent evaluation of the patient's history, physical, and imperical data, the patient meets criteria for inpatient admission to the hospital.        Final diagnoses:   Hyponatremia        ED Disposition       ED Disposition   Decision to Admit    Condition   --    Comment   Level of Care: Telemetry [5]   Diagnosis: Hyponatremia [[id number]]   Certification: I Certify That Inpatient Hospital Services Are Medically Necessary For Greater Than 2 Midnights                 This medical record created using voice recognition software.             Fito Rosa MD  06/19/25 9754

## 2025-06-19 NOTE — H&P
[facility] HISTORY AND PHYSICAL  Date: 2025   Patient Name: Lluvia Archer  : 1965  MRN: [id number]  Primary Care Physician:  Aleksandar Edge DO  Date of admission: 2025    Subjective   Subjective     Chief Complaint: Abnormal lab    HPI:    Lluvia Archer is a 59 y.o. female past medical history of COPD on home 3 L, small cell lung cancer, hypertension, migraine headache, SIADH, and chronic pain from neoplasm who presents emergency department due to abnormal lab    Patient's last chemotherapy was on .  Follow-up with her primary doctor and her sodium was found to be 120 at that time.  She also found a platelets of 12.  She states she has not felt well but no other abnormalities or concerns.  As result she was sent to the ER for further evaluation.    In the emergency department patient's vital signs are as follows: Temperature 97.5, pulse 93, respiratory is 15, blood pressure 90/74, satting well on 5 L.  CBC shows white blood cell count of 2.2 and a hemoglobin of 7.5 with platelets of 4.  This is most likely due to chemotherapy.  Hematology/oncology was consulted for further management of this.  Patient is also found to have a sodium of 118 with a potassium of 3.4.  She was given 500 cc of normal saline.  S emergency department to give her any more resuscitation as she has had known SIADH in the past.  Patient admitted to hospital for severe thrombocytopenia without bleeding as well as life-threatening hyponatremia.    All systems reviewed abnormal as noted above      Personal History     Past Medical History:  COPD on home 3 L  Dyslipidemia  Hypertension migraine headache  Depression/anxiety  Small cell lung cancer with mets to the bone  SIADH  Chronic pain due to neoplasm    Past Surgical History:  Bronchoscopy   section  Cholecystectomy  Cath placement  Total hip arthroplasty    Family History:   Arthritis  Heart disease      Social History:   Every  day smoker  Former vapor  No alcohol.    Home Medications:  Fluticasone-Umeclidin-Vilant, Magnesium, Melatonin, OLANZapine, albuterol, albuterol sulfate HFA, atorvastatin, buPROPion SR, calcium carbonate, dexAMETHasone, escitalopram, famotidine, fludrocortisone, furosemide, gabapentin, hydrOXYzine, ipratropium-albuterol, lisinopril, midodrine, naloxone, nystatin, ondansetron ODT, oxyCODONE-acetaminophen, potassium chloride, and prochlorperazine    Allergies:  No Known Allergies    Objective   Objective     Vitals:   Temp:  [97.5 °F (36.4 °C)-98.4 °F (36.9 °C)] 97.5 °F (36.4 °C)  Heart Rate:  [] 93  Resp:  [13-18] 15  BP: ()/() 90/74  Flow (L/min) (Oxygen Therapy):  [5] 5    Physical Exam    Constitutional: Chronically ill-appearing.   Eyes: Pupils equal, sclerae anicteric, no conjunctival injection   HENT: NCAT, mucous membranes moist   Neck: Supple, no thyromegaly, no lymphadenopathy, trachea midline   Respiratory: Coarse breath sounds throughout.   Cardiovascular: RRR, no murmurs, rubs, or gallops, palpable pedal pulses bilaterally   Gastrointestinal: Positive bowel sounds, soft, nontender, nondistended   Musculoskeletal: No bilateral ankle edema, no clubbing or cyanosis to extremities   Psychiatric: Appropriate affect, cooperative   Neurologic: Oriented x 3, strength symmetric in all extremities, Cranial Nerves grossly intact to confrontation, speech clear   Skin: No rashes     Result Review    Result Review:  I have personally reviewed the results from the time of this admission to 6/19/2025 17:33 EDT and agree with these findings:  All systems reviewed and as noted above      Assessment & Plan   Assessment / Plan     Assessment/Plan:   Acute hyponatremia sodium 118 most likely due to SIADH  Small cell lung cancer  Severe thrombocytopenia without bleeding  Depression/anxiety  COPD on home oxygen    Patient was reported to be hypotensive on the floor.  She was not tachycardic.  Although we were  trying to avoid further resuscitation due to SIADH and sodium correction we will give her 1 L of normal saline.  Of note sodium did increase from 118 up to 122 in about 9 hours which is a little bit too quickly.  We will also give her 250 cc an hour for 2 hours of D5W for total of 500 cc.      Plan:  --Admit to hospital service  -- Consult hematology/oncology  -- Patient got 500 cc normal saline in the ER will hold further volume resuscitation  -- Patient will be given a fluid restriction of 1500 cc  -- Hold Lexapro and olanzapine as these could both exacerbate SIADH  -- Check sodium every 4 hours for the first 24 hours and magnesium  -- Do not want patient sodium above 124-126 by tomorrow morning.  -- Patient has a small area concerning for possible pneumonia versus atelectasis on chest x-ray from 6/17.  Will send procalcitonin at this time will not start antibiotics.      VTE Prophylaxis:  SCDs due to thrombocytopenia        CODE STATUS:    Code Status (Patient has no pulse and is not breathing): CPR (Attempt to Resuscitate)  Medical Interventions (Patient has pulse or is breathing): Full Support  Level Of Support Discussed With: Patient      Admission Status:  I believe this patient meets admission status.    Electronically signed by Rico De MD, 06/19/25, 12:42 PM EDT.

## 2025-06-19 NOTE — PLAN OF CARE
Goal Outcome Evaluation:              Outcome Evaluation: patient is an admit from ED this shift, patient has generalized weakness, uses walker at home, on 5L NC, per patient her baseline. patient has port to left chest, not accessed. Medicated for pain, see MAR. Patient received Platelets in ED. Fluid restriction 1500ml/day. Patient refused bed alarm, refusal signed.                              Dental consult for extraction. Patient at high risk for aspiration.

## 2025-06-19 NOTE — CASE MANAGEMENT/SOCIAL WORK
Discharge Planning Assessment  KETTY Mcclellan     Patient Name: Lluvia Archer  MRN: [id number]  Today's Date: 6/19/2025    Admit Date: 6/19/2025        Discharge Needs Assessment       Row Name 06/19/25 1425       Living Environment    People in Home spouse    Current Living Arrangements home    Potentially Unsafe Housing Conditions none    In the past 12 months has the electric, gas, oil, or water company threatened to shut off services in your home? No    Primary Care Provided by self;spouse/significant other    Provides Primary Care For no one    Family Caregiver if Needed spouse    Quality of Family Relationships helpful;involved;supportive    Able to Return to Prior Arrangements yes       Resource/Environmental Concerns    Resource/Environmental Concerns none    Transportation Concerns none       Transportation Needs    In the past 12 months, has lack of transportation kept you from medical appointments or from getting medications? no    In the past 12 months, has lack of transportation kept you from meetings, work, or from getting things needed for daily living? No       Food Insecurity    Within the past 12 months, you worried that your food would run out before you got the money to buy more. Never true    Within the past 12 months, the food you bought just didn't last and you didn't have money to get more. Never true       Transition Planning    Patient/Family Anticipates Transition to home with family    Patient/Family Anticipated Services at Transition none    Transportation Anticipated family or friend will provide       Discharge Needs Assessment    Readmission Within the Last 30 Days no previous admission in last 30 days    Current Outpatient/Agency/Support Group clinic(s)    Equipment Currently Used at Home walker, standard;nebulizer;oxygen    Concerns to be Addressed discharge planning    Do you want help finding or keeping work or a job? I do not need or want help    Do you want help with school or  training? For example, starting or completing job training or getting a high school diploma, GED or equivalent No    Anticipated Changes Related to Illness none    Equipment Needed After Discharge none    Current Discharge Risk chronically ill                   Discharge Plan    No documentation.                 Selected Continued Care - Episodes Includes continued care and service providers with selected services from the active episodes listed below             Demographic Summary       Row Name 06/19/25 1423       General Information    Admission Type observation    Arrived From home    Referral Source emergency department    Reason for Consult discharge planning    Preferred Language English       Contact Information    Permission Granted to Share Info With ;, insurance;family/designee    Contact Information Obtained for ;, insurance                   Functional Status       Row Name 06/19/25 1423       Functional Status    Usual Activity Tolerance fair    Current Activity Tolerance fair       Physical Activity    On average, how many days per week do you engage in moderate to strenuous exercise (like a brisk walk)? 2 days    On average, how many minutes do you engage in exercise at this level? 20 min    Number of minutes of exercise per week 40       Assessment of Health Literacy    How often do you have someone help you read hospital materials? Never    How often do you have problems learning about your medical condition because of difficulty understanding written information? Never    How often do you have a problem understanding what is told to you about your medical condition? Never    How confident are you filling out medical forms by yourself? Quite a bit    Health Literacy Good       Functional Status, IADL    Medications independent;assistive person    Meal Preparation independent;assistive person    Housekeeping independent;assistive person    Laundry  independent;assistive person    Shopping independent;assistive person    If for any reason you need help with day-to-day activities such as bathing, preparing meals, shopping, managing finances, etc., do you get the help you need? I get all the help I need       Mental Status    General Appearance WDL WDL       Mental Status Summary    Recent Changes in Mental Status/Cognitive Functioning no changes       Employment/    Employment Status disabled    Current or Previous Occupation not applicable                   Psychosocial       Row Name 06/19/25 1424       Developmental Stage (Eriksson's Stages of Development)    Developmental Stage Stage 7 (35-65 years/Middle Adulthood) Generativity vs. Stagnation                   Abuse/Neglect       Row Name 06/19/25 1424       Personal Safety    Feels Unsafe at Home or Work/School no    Feels Threatened by Someone no    Does Anyone Try to Keep You From Having Contact with Others or Doing Things Outside Your Home? no    Physical Signs of Abuse Present no                   Legal       Row Name 06/19/25 1424       Financial Resource Strain    How hard is it for you to pay for the very basics like food, housing, medical care, and heating? Not hard       Financial/Legal    Source of Income disability    Financial/Environmental Concerns none    Application for Public Assistance not applied       Legal    Criminal Activity/Legal Involvement none                   Substance Abuse       Row Name 06/19/25 1425       Substance Use    Substance Use Status never used                   Patient Forms    No documentation.                 SW met with patient who was sitting up in the bed upon entering the room. Pt reports that she lives with her  and normally able to complete ADL's independently but  helps when needed. Pt is establshed with Hematology, Oncology and pulmonary. Pt has O2 at home where she normally uses 5L. Pt has a walker and a nebulizer as well. Pt sees  Aleksandar Edge, DO for her PCP and uses [facility] on [address] for her pharmacy needs. No current needs reported at this time.    ROBERT Adams

## 2025-06-19 NOTE — PROGRESS NOTES
"Subjective   Patient ID: Angelica Matson is a 38 y.o. female who presents for Follow-up.    She is c/o itching in the R hamstring area and back of knee, with walking or sometimes with lying or sitting, she stretches but does not go away. Uses icy hot patches ice, menthol, capsaicin and  yoga stretches help. She has scoliosis and hips and shoulder hurt, sees chiropractor twice a month which help.     Diagnostics Reviewed:  Labs Reviewed:         Review of Systems   Respiratory:  Negative for cough and shortness of breath.    Cardiovascular:  Negative for chest pain and palpitations.   Gastrointestinal:  Negative for abdominal pain, constipation, diarrhea and nausea.   Musculoskeletal:  Negative for arthralgias.   Neurological:  Negative for dizziness.       Objective   /78   Pulse 76   Resp 16   Ht 1.575 m (5' 2\")   Wt 55.8 kg (123 lb)   SpO2 98%   BMI 22.50 kg/m²     Physical Exam  Cardiovascular:      Rate and Rhythm: Normal rate and regular rhythm.      Heart sounds: Normal heart sounds.   Pulmonary:      Breath sounds: Normal breath sounds.   Abdominal:      Palpations: Abdomen is soft. There is no hepatomegaly.      Tenderness: There is no abdominal tenderness.   Musculoskeletal:      Right lower leg: No edema.      Left lower leg: No edema.   Neurological:      Mental Status: She is alert and oriented to person, place, and time.      Gait: Gait normal.   Psychiatric:         Mood and Affect: Mood normal.         Behavior: Behavior normal.         Assessment/Plan   There are no diagnoses linked to this encounter.    Scribe Attestation  By signing my name below, IAki Scribe   attest that this documentation has been prepared under the direction and in the presence of Jaylin Monsivais MD.    " Chief Complaint  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    Aleksandar Edge*  Aleksandar Edge, DO    Subjective          Lluvia REGINA Archer presents to [facility] for small cell lung cancer    Ms. Archer is a very pleasant 58-year-old female with past medical history of hypertension hyperlipidemia, COPD, osteoarthritis, anxiety who presents for new oncology evaluation with her daughter for diagnosis of small cell lung cancer.  Patient previously had PET scan in September 2023 without any concerning findings.  Follow-up CT chest in February showed multiple right middle lobe nodules with mediastinal and right hilar adenopathy.  Patient was admitted to [facility] on June 2 with shortness of breath, fever, cough.  She was started on treatment for pneumonia.  She was found to be hyponatremic to 126.  Repeat CT chest on 3 Tia showed progression of right middle lobe nodules and mediastinal right hilar lymphadenopathy.  Patient was discharged on 4 June.  She had outpatient bronchoscopy on 7 June with station 4R and station 7 possible small cell carcinoma.  Unable to get MRI due to claustrophobia.  CT head with and without contrast on 13 June did not show any intracranial mets.  PET scan confirmed metastatic disease to bone and right axilla    Interval History  Patient presents for follow up.  She is due for cycle 4 of topotecan.  Repeat CT scan results discussed.  Showed improved mediastinal adenopathy.  Stable osteoblastic lesions.  No metastatic disease in abdomen or pelvis.  Patient is happy to hear this news.  Platelets are too low for treatment today.  Will delay chemo 1 week.  Patient reports she was up all night worried about her scans she has not gotten much rest.  She is fatigued today.  She wants to go home and rest.  Will have her come back tomorrow for fluids.  Reports she has gained some weight since last visit.  She is taking olanzapine  at night which is helping.  She did a 2-week course of dex.  Blood pressure remains borderline low today. No fevers, chills, bleeding. No new headaches.     Oncology/Hematology History Overview Note   2/20/24: CT Chest:  No PE or aortic dissection. Multiple right middle lobe nodules are highly suspicious for malignancy.  Additionally, there is mediastinal and right hilar adenopathy now noted.  Follow-up with a PET-CT is recommended. Emphysema with chronic changes in both lungs that otherwise appears stable. Atherosclerotic disease and coronary artery disease.    6/2/24: admitted to [facility] with shortness of breath, fever, cough and started on treatment for pneumonia. Found to have hyponatremia to 126    6/3/24 CT Chest: Right middle lobe nodules and mediastinal and right hilar lymphadenopathy has progressed from prior CT, and remains concerning for malignancy.  Partial right middle lobe atelectasis. Moderate emphysema. Discharged on 6/4/24 with Na of 133.    6/7/24: Bronchoscopy: Station 4R and station 7 positive for small cell carcinoma.    6/13/24: CT head with and without contrast (due to claustrophobia): No mets seen    6/19/24: NM PET: New hypermetabolic nodules within the right middle lobe. There are also multiple new enlarged hypermetabolic mediastinal lymph nodes consistent with metastatic disease. Right axillary mets as well. There are scattered foci of hypermetabolism throughout the bony structures consistent with bone metastases.    6/24/24: C1D1 Carbo/Etop/Durva. Tolerated well    7/16/24: C2D1 Carbo/Etop/Durva. Complicated by inpatient admission due to dehydration from nausea/vomiting as well as pancytopenia. ANC 0.11. Required transfusion for hgb 6.8.     8/6/24: C3D1 Carbo/Etop/Durva. 20% reduction in Carbo and 20% reduction in Etoposide. Add G-CSF with this cycle due to severe neutropenia with C2.     Repeat scan have shown disease response, however scan was due to PE rule out.     8/27/24: C4D1  Carbo/Etop/Durva. 33% reduction in Carbo and 33% reduction in Etoposide. Continue G-CSF with this cycle due to severe neutropenia with C2. Labs appropriate to proceed. Ok to treat for elevated alk phos.     8/6/24: C3D1 Carbo/Etop/Durva. 20% reduction in Carbo and 20% reduction in Etoposide. Add G-CSF with this cycle due to severe neutropenia with C2.    Repeat scan have shown disease response, however scan was due to PE rule out.     8/27/24: C4D1 Carbo/Etop/Durva. 33% reduction in Carbo and 33% reduction in Etoposide. Continue G-CSF with this cycle due to severe neutropenia with C2.      Repeat scans without progression. Plan for durvalumab alone for maintenance    9/17/24: C5D1 durvalumab alone    2/21/25: C10D1 durvalumab alone    2/25/25: Admitted with shortness of breath and found to be influenza A positive.  CTA of chest showed progression of metastatic disease with enlarging pulmonary nodules and new enlarged mediastinal hilar lymph nodes.  CT of the abdomen pelvis done inpatient was negative for disease.    3/3/25: Completed XRT to brain    3/10/25: Orders written for next line topotecan    3/19/25: C1D1 Topotecan    4/8/25: Repeat CT head with resolution of brain mets        Small cell lung cancer   6/12/2024 Initial Diagnosis    Small cell lung cancer     6/14/2024 - 6/14/2024 Chemotherapy    OP LUNG CISplatin 60mg/m2 / Etoposide 120mg/m2 + XRT     6/14/2024 Cancer Staged    Staging form: Lung, AJCC 8th Edition  - Clinical: Stage IVB (cT1b, cN2, cM1c) - Signed by Brian Dickson MD on 6/20/2024 6/24/2024 - 8/29/2024 Chemotherapy    OP LUNG CARBOplatin AUC=5 / Etoposide / Durvalumab     8/27/2024 -  Chemotherapy    OP SUPPORTIVE Denosumab (Xgeva) Q28D     9/17/2024 - 2/21/2025 Biopsy    OP LUNG Durvalumab 1500 mg  Plan Provider: Brian Dickson MD  Treatment goal: Control  Line of treatment: [No plan line of treatment]     3/3/2025 - 3/3/2025 Chemotherapy    OP LUNG Lurbinectedin       3/19/2025 -  Chemotherapy    OP LUNG Topotecan (IV)     Cancer, metastatic to bone   8/6/2024 Initial Diagnosis    Cancer, metastatic to bone     8/27/2024 -  Chemotherapy    OP SUPPORTIVE Denosumab (Xgeva) Q28D     Secondary malignant neoplasm of brain   2/10/2025 Initial Diagnosis    Secondary malignant neoplasm of brain     2/13/2025 - 3/5/2025 Radiation    Radiation OncologyTreatment Course:  Lluvia Archer received 3000 cGy in 10 fractions to the whole brain.            Review of Systems   Constitutional:  Positive for appetite change and fatigue. Negative for diaphoresis, fever, unexpected weight gain and unexpected weight loss.   HENT:  Negative for hearing loss, mouth sores, sore throat, swollen glands, trouble swallowing and voice change.    Eyes:  Negative for blurred vision, double vision, pain, redness and visual disturbance.   Respiratory:  Negative for cough, shortness of breath and wheezing.    Cardiovascular:  Positive for chest pain (pt had her port put in today) and leg swelling (both legs are swelling). Negative for palpitations.   Gastrointestinal:  Positive for nausea. Negative for abdominal pain, blood in stool, constipation, diarrhea and vomiting.   Endocrine: Negative for cold intolerance, heat intolerance, polydipsia and polyuria.   Genitourinary:  Negative for decreased urine volume, difficulty urinating, dysuria, frequency, hematuria and urinary incontinence.   Musculoskeletal:  Negative for arthralgias, back pain, joint swelling and myalgias.   Skin:  Negative for color change, rash, skin lesions and wound.        PT IS GETTING SORES ON THE SKIN   Neurological:  Negative for dizziness, seizures, weakness, numbness and headache.   Hematological:  Negative for adenopathy. Does not bruise/bleed easily.   Psychiatric/Behavioral:  Positive for sleep disturbance. Negative for depressed mood. The patient is not nervous/anxious.    All other systems reviewed and are negative.    Current  Outpatient Medications on File Prior to Visit   Medication Sig Dispense Refill    ipratropium-albuterol (DUO-NEB) 0.5-2.5 mg/3 ml nebulizer       lisinopril (PRINIVIL,ZESTRIL) 5 MG tablet TAKE ONE TABLET BY MOUTH DAILY AT 9 AM      ondansetron (ZOFRAN) 8 MG tablet       albuterol (ACCUNEB) 0.63 MG/3ML nebulizer solution Take 3 mL by nebulization Every 6 (Six) Hours As Needed for Wheezing or Shortness of Air. 60 each 2    albuterol sulfate  (90 Base) MCG/ACT inhaler Inhale 2 puffs Every 4 (Four) Hours As Needed for Wheezing or Shortness of Air. 18 g 5    atorvastatin (LIPITOR) 10 MG tablet Take 1 tablet by mouth Every Night. 90 tablet 3    buPROPion SR (WELLBUTRIN SR) 150 MG 12 hr tablet Take 1 tablet by mouth 2 (Two) Times a Day. 180 tablet 3    calcium carbonate (Tums) 500 MG chewable tablet Chew 2 tablets Daily. 28 tablet 0    dexAMETHasone (DECADRON) 4 MG tablet Take 1 tablet by mouth 2 (Two) Times a Day With Meals. 28 tablet 1    escitalopram (LEXAPRO) 20 MG tablet Take 1 tablet by mouth Daily. 90 tablet 3    famotidine (PEPCID) 40 MG tablet TAKE ONE TABLET BY MOUTH TWICE DAILY @ 9AM & 5PM (Patient taking differently: Take 1 tablet by mouth 2 (Two) Times a Day.) 180 tablet 11    fludrocortisone 0.1 MG tablet Take 1 tablet by mouth Daily for 30 days. 30 tablet 0    Fluticasone-Umeclidin-Vilant (Trelegy Ellipta) 200-62.5-25 MCG/ACT inhaler Inhale 1 puff Daily. 90 each 1    gabapentin (NEURONTIN) 100 MG capsule Take 2 capsules by mouth 3 (Three) Times a Day. 90 capsule 2    hydrOXYzine (ATARAX) 25 MG tablet TAKE 1 TABLET BY MOUTH THREE TIMES A DAY AS NEEDED FOR ITCHING (Patient taking differently: Take 1 tablet by mouth 3 (Three) Times a Day As Needed.) 270 tablet 2    Magnesium 400 MG tablet Take 400 mg by mouth Daily. 30 tablet 5    Melatonin 10 MG tablet Take 1 tablet by mouth At Night As Needed.      midodrine (PROAMATINE) 10 MG tablet Take 1 tablet by mouth 3 (Three) Times a Day Before Meals for 30  days. (Patient not taking: Reported on 5/29/2025) 90 tablet 0    naloxone (NARCAN) 4 MG/0.1ML nasal spray CALL 911. DON'T PRIME. SPRAY IN 1 NOSTRIL FOR OVERDOSE. REPEAT IN 2-3 MINUTES IN OTHER NOSTRIL IF NO OR MINIMAL BREATHING/RESPONSIVENESS. (Patient taking differently: Administer 1 spray into the nostril(s) as directed by provider As Needed for Opioid Reversal or Respiratory Depression. Call 911. Don't prime. Sloan in 1 nostril for overdose. Repeat in 2-3 minutes in other nostril if no or minimal breathing/responsiveness.) 2 each 0    naproxen (NAPROSYN) 500 MG tablet TAKE 1 TABLET BY MOUTH 2 TIMES A DAY WITH A MEAL 30 tablet 1    nystatin (MYCOSTATIN) 100,000 unit/mL suspension Swish and swallow 5 mL 4 (Four) Times a Day. 473 mL 2    OLANZapine (zyPREXA) 5 MG tablet Take 1 tablet by mouth Every Night. 30 tablet 1    ondansetron ODT (ZOFRAN-ODT) 8 MG disintegrating tablet Place 1 tablet on the tongue Every 8 (Eight) Hours As Needed for Nausea or Vomiting. 60 tablet 3    oxyCODONE-acetaminophen (PERCOCET)  MG per tablet Take 1 tablet by mouth Every 4 (Four) Hours As Needed for Moderate Pain. 180 tablet 0    potassium chloride (MICRO-K) 10 MEQ CR capsule Take 2 capsules by mouth Daily. 60 capsule 0    prochlorperazine (COMPAZINE) 10 MG tablet Take 1 tablet by mouth Every 6 (Six) Hours As Needed for Nausea. 60 tablet 3     Current Facility-Administered Medications on File Prior to Visit   Medication Dose Route Frequency Provider Last Rate Last Admin    heparin injection 500 Units  500 Units Intravenous PRN Brian Dickson MD        sodium chloride 0.9 % flush 20 mL  20 mL Intravenous PRN Brian Dickson MD   20 mL at 06/02/25 1236       No Known Allergies  Past Medical History:   Diagnosis Date    Abnormal mammogram     PT DENIES KNOWING OF THIS HX    Anxiety     Arthritis     R SHOULDER, R HIP, AND R LEG    Cancer     LUNG    Chronic nausea 01/15/2019    COPD (chronic obstructive pulmonary  disease)     O2 3 LITERS NC CONT    Hernia, hiatal     Hyperlipidemia     Hypertension     Knee pain, right 2018    Memory loss     FORGETFULNESS ? ETIOLOGY    Migraine headache     Moderate episode of recurrent major depressive disorder 2017    Night sweats     Sciatica of right side 2017    Severe episode of recurrent major depressive disorder, without psychotic features 10/22/2019    Shingles     OUTBREAK 24 ON ANTIVIRAL , WHELPS NOTED NO SORES.    Shoulder pain, left 2018     Past Surgical History:   Procedure Laterality Date    BRONCHOSCOPY N/A 2022    Procedure: BRONCHOSCOPY WITH BRONCHOALVEOLAR LAVAGE, BIOPSY;  Surgeon: Shin Mcdonald DO;  Location: [facility] ENDOSCOPY;  Service: Pulmonary;  Laterality: N/A;  RIGHT LOWER LOBE INFILTRATE    BRONCHOSCOPY N/A 2024    Procedure: BRONCHOSCOPY WITH ENDOBRONCHIAL ULTRASOUND AND FINE NEEDLE ASPIRATE;  Surgeon: Shin Mcdonald DO;  Location: [facility] ENDOSCOPY;  Service: Pulmonary;  Laterality: N/A;  MEDIASTINAL ADENOPATHY     SECTION      CHOLECYSTECTOMY      LAPAROSCOPIC    COLONOSCOPY      PORTACATH PLACEMENT Left 2024    Procedure: INSERTION OF PORTACATH;  Surgeon: Josh Patton MD;  Location: [facility] OR OSC;  Service: General;  Laterality: Left;    TOTAL HIP ARTHROPLASTY Right 2022    Procedure: RIGHT TOTAL HIP ARTHROPLASTY;  Surgeon: Cecil Gomes MD;  Location: [facility] MAIN OR;  Service: Orthopedics;  Laterality: Right;     Social History     Socioeconomic History    Marital status:      Spouse name: DEVAN    Number of children: 2    Years of education: GED    Highest education level: GED or equivalent   Tobacco Use    Smoking status: Every Day     Current packs/day: 1.00     Average packs/day: 1 pack/day for 47.4 years (47.4 ttl pk-yrs)     Types: Cigarettes     Start date:      Passive exposure: Past    Smokeless tobacco: Never   Vaping Use    Vaping status: Former     Substances: Nicotine, Flavoring    Devices: Disposable   Substance and Sexual Activity    Alcohol use: Never    Drug use: Never    Sexual activity: Defer     Family History   Problem Relation Age of Onset    Other Mother         BLOOD DISEASE    Arthritis Mother     Heart disease Father     Hypertension Other     Cancer Other     Heart disease Other     Malig Hyperthermia Neg Hx        Objective   Physical Exam  Patient in no acute distress on RA  Anicteric sclerae, no rash on exposed skin  Respirations non-labored  Awake, alert, and oriented x 4. Speech intact. No gross neurologic deficit  Appropriate mood and affect    Vitals:    06/02/25 1259   BP: 92/71   Pulse: 112   Resp: 18   Temp: 97.7 °F (36.5 °C)   TempSrc: Temporal   SpO2: 96%   Weight: 84.7 kg (186 lb 11.7 oz)   PainSc: 0-No pain                     PHQ-9 Total Score:           Result Review :   The following data was reviewed by: Brian Dickson MD on 06/02/25:  Lab Results   Component Value Date    HGB 9.2 (L) 06/02/2025    HCT 28.7 (L) 06/02/2025    MCV 94.1 06/02/2025    PLT 65 (L) 06/02/2025    WBC 8.77 06/02/2025    NEUTROABS 5.87 06/02/2025    LYMPHSABS 2.04 06/02/2025    MONOSABS 0.64 06/02/2025    EOSABS 0.03 06/02/2025    BASOSABS 0.04 06/02/2025     Lab Results   Component Value Date    GLUCOSE 100 (H) 06/02/2025    BUN 11.7 06/02/2025    CREATININE 0.98 06/02/2025     06/02/2025    K 3.5 06/02/2025     (H) 06/02/2025    CO2 22.7 06/02/2025    CALCIUM 7.9 (L) 06/02/2025    PROTEINTOT 5.6 (L) 06/02/2025    ALBUMIN 3.4 (L) 06/02/2025    BILITOT 0.2 06/02/2025    ALKPHOS 118 (H) 06/02/2025    AST 10 06/02/2025    ALT 10 06/02/2025     Lab Results   Component Value Date    MG 2.1 05/12/2025    PHOS 2.0 (L) 05/12/2025    FREET4 1.14 02/21/2025    TSH 0.771 04/22/2025     Labs personally reviewed.  Hgb low. WBC wnl. Plts low. Na normal.     CT chest personally reviewed and notable for absence of concerning adenopathy    CT  abdomen/pelvis personally reviewed and per my independent read without any metastatic disease seen      CT Chest With Contrast Diagnostic  Result Date: 6/2/2025  Impression: 1. Improved/essentially resolved mediastinal and hilar adenopathy. 2. Peribronchial thickening with waxing and waning peribronchovascular nodular opacities favoring infectious/inflammatory process such as bronchiolitis or aspiration. 3. Stable osteoblastic metastases. 4. No convincing disease progression in the chest. CT abdomen pelvis dictated separately. Electronically Signed: Abram Miramontes MD  6/2/2025 8:39 AM EDT  Workstation ID: [id number]    CT Abdomen Pelvis With Contrast  Result Date: 6/2/2025  Impression: 1.No evidence of metastatic disease in the abdomen or pelvis. 2.Stable sclerotic bone lesions in the T10 vertebral body and adjacent to the left SI joint consistent with osseous metastatic disease.. Electronically Signed: Luca Brewster MD  6/2/2025 8:36 AM EDT  Workstation ID: [id number]          Assessment and Plan    Diagnoses and all orders for this visit:    1. Small cell carcinoma of lung, unspecified laterality, unspecified part of lung (Primary)    2. Cancer, metastatic to bone    3. Cancer cachexia    4. Antineoplastic chemotherapy induced anemia    5. Chemotherapy-induced thrombocytopenia    6. SIADH (syndrome of inappropriate ADH production)    7. Lung cancer metastatic to brain    8. Anxiety    9. Neoplasm related pain        Small cell lung cancer, extensive stage  Initial PET with multiple new enlarged hypermetabolic mediastinal lymph nodes consistent with metastatic disease, also with new right axillary FDG avid mets. There are scattered foci of hypermetabolism throughout the bony structures consistent with bone metastases.  Recommended treatment is systemic alone with for IV carboplatin, etoposide and durvalumab every 3 weeks. First cycle 6/24/24. 8/6/24: C3D1 Carbo/Etop/Durva. 20% reduction in Carbo and 20% reduction in  Etoposide. Add G-CSF with this cycle due to severe neutropenia with C2.  8/27/24: C4D1 Carbo/Etop/Durva. 33% reduction in Carbo and 33% reduction in Etoposide.     Repeat scans after C4 without clear progression, new 7 mm RUL nodule could be infectious. Plan for durvalumab alone.Repeat scans 12/9/24 with stable disease. 2/21/25: C10D1 Durvalumab (sixth with Durvalumab alone).      Repeat imaging obtained inpatient with disease progression in thorax. CT abdomen/pelvis with no evidence of disease.  Due to progression recommend therapy changed to topotecan.  This is for 5 days every 3 weeks. Started 3/19/25. G-CSF to be provided prophylactically.  C1 tolerated well. C2 tolerated poorly with signficant cytopenias and hospitalization requiring transfusions and IV antibiotics.     C3D1 5/12/25, dose reduced 20%.    Repeat CT scans 5/28/25 with much improved mediastinal and hilar adenopathy, stable osteoblastic mets, no disease in abdomen/pelvis. Results discussed. Plan to continue current treatment.     C4D1 topotecan due 6/2/25 however will delay x 1 week and reduce even further upon resumption to 33% due to cytopenias.      Plan for weekly IV fluids.    Chemo induced thrombocytopenia  Delay chemo x 1 week. Repeat CBC 1 week to ensure improvement. Reduce C4 by 33%.     Chemo anemia  Has required transfusions. Will trend weekly.     Hypotension  Combination of chemo, dehydration, cancer. Continue weekly IV fluids. This has improved.     Hyponatremia  Related to SIADH. On Sodium tablets. Will trend. Na normal today 5/12/25.     Cancer cachexia  Poor appetite  5/12/25: Started nightly olanzapine 5 mg. Dex 4 mg BID x 2 weeks did help as well.     Bilateral Hand pain  Could be carpal tunnel. Do not suspect cancer related.  Can use naproxen. Also on gabapentin which has helped. CT C spine negative for mets. Agree with wrist braces. Referred to ortho for consideration of injections. 3/10/25: Pain better on gabapentin.      Metastatic cancer to brain  CT head 2/4/25 with multiple new enhancing lesions, consistent with mets. Has completed XRT. Repeat CT 4/8/25 with resolution of metastatic lesions. F/u scans per rad onc.     Anxiety  Ativan not beneficial. Switched to clonazepam (2/21/25) PRN.    Neoplasm related pain  Previously increased oxycodone to 10 mg. Has naproxen 500 mg PRN twice daily to use. Started on daily mag 400 mg (mag level is normal).     Metastatic cancer to bone  Recommend bone modifying agent. Patient has all teeth except 2 removed. Monthly Xgeva started 8/27/24. Due 6/12/25. Will monitor electrolytes.     Insomnia  Recommend low dose melatonin vs benadryl. Also has Klonopin if anxiety is contributing to poor sleep.       Patient Follow Up: Cycle 5  Patient was given instructions and counseling regarding her condition or for health maintenance advice. Please see specific information pulled into the AVS if appropriate.

## 2025-06-20 PROBLEM — I95.9 HYPOTENSION: Status: ACTIVE | Noted: 2025-04-26

## 2025-06-20 NOTE — CONSULTS
[facility]   Consult Note    Patient Name: Lluvia Archer  : 1965  MRN: [id number]  Primary Care Physician:  Aleksandar Edge DO  Referring Physician: No ref. provider found  Date of admission: 2025    Subjective   Subjective     Reason for Consult/ Chief Complaint: Hyponatremia    HPI:  Lluvia Archer is a 59 y.o. female with past medical history significant for COPD on 3 L home oxygen, small cell lung cancer, essential hypertension, migraine, chronic pain last chemotherapy on  went to see the primary care doctor and patient's sodium was 120 and because of that reason patient was admitted.  Her platelet count is down to 12.  I was requested to see this patient because of acute hyponatremia.  Patient fell down and is complaining of being sore    Review of Systems  All review of systems negative except as given below.    Personal History     Past Medical History:   Diagnosis Date    Abnormal mammogram     PT DENIES KNOWING OF THIS HX    Anxiety     Arthritis     R SHOULDER, R HIP, AND R LEG    Cancer     LUNG    Chronic nausea 01/15/2019    COPD (chronic obstructive pulmonary disease)     O2 3 LITERS NC CONT    Hernia, hiatal     Hyperlipidemia     Hypertension     Knee pain, right 2018    Memory loss     FORGETFULNESS ? ETIOLOGY    Migraine headache     Moderate episode of recurrent major depressive disorder 2017    Night sweats     Sciatica of right side 2017    Severe episode of recurrent major depressive disorder, without psychotic features 10/22/2019    Shingles     OUTBREAK 24 ON ANTIVIRAL , WHELPS NOTED NO SORES.    Shoulder pain, left 2018       Past Surgical History:   Procedure Laterality Date    BRONCHOSCOPY N/A 2022    Procedure: BRONCHOSCOPY WITH BRONCHOALVEOLAR LAVAGE, BIOPSY;  Surgeon: Shin Mcdonald DO;  Location: [facility] ENDOSCOPY;  Service: Pulmonary;  Laterality: N/A;  RIGHT LOWER LOBE INFILTRATE    BRONCHOSCOPY  N/A 2024    Procedure: BRONCHOSCOPY WITH ENDOBRONCHIAL ULTRASOUND AND FINE NEEDLE ASPIRATE;  Surgeon: Shin Mcdonald DO;  Location: [facility] ENDOSCOPY;  Service: Pulmonary;  Laterality: N/A;  MEDIASTINAL ADENOPATHY     SECTION      CHOLECYSTECTOMY      LAPAROSCOPIC    COLONOSCOPY      PORTACATH PLACEMENT Left 2024    Procedure: INSERTION OF PORTACATH;  Surgeon: Josh Patton MD;  Location: [facility] OR OSC;  Service: General;  Laterality: Left;    TOTAL HIP ARTHROPLASTY Right 2022    Procedure: RIGHT TOTAL HIP ARTHROPLASTY;  Surgeon: Cecil Gomes MD;  Location: [facility] MAIN OR;  Service: Orthopedics;  Laterality: Right;       Family History: family history includes Arthritis in her mother; Cancer in an other family member; Heart disease in her father and another family member; Hypertension in an other family member; Other in her mother. Otherwise pertinent FHx was reviewed and not pertinent to current issue.    Social History:  reports that she has been smoking cigarettes. She started smoking about 47 years ago. She has a 47.5 pack-year smoking history. She has been exposed to tobacco smoke. She has never used smokeless tobacco. She reports that she does not drink alcohol and does not use drugs.    Home Medications:  Fluticasone-Umeclidin-Vilant, Magnesium, Melatonin, OLANZapine, albuterol, albuterol sulfate HFA, atorvastatin, buPROPion SR, calcium carbonate, dexAMETHasone, escitalopram, famotidine, fludrocortisone, furosemide, gabapentin, hydrOXYzine, ipratropium-albuterol, lisinopril, midodrine, naloxone, nystatin, ondansetron ODT, oxyCODONE-acetaminophen, potassium chloride, and prochlorperazine    Allergies:  No Known Allergies    Objective    Objective     Vitals:   Temp:  [97.5 °F (36.4 °C)-98.4 °F (36.9 °C)] 98 °F (36.7 °C)  Heart Rate:  [79-93] 82  Resp:  [13-18] 16  BP: ()/(48-78) 103/64  Flow (L/min) (Oxygen Therapy):  [5] 5    Physical Exam:              Constitutional:         Awake, alert responsive, conversant, no obvious distress   Eyes:                       PERRLA, sclerae anicteric, no conjunctival injection   HEENT:                   Moist mucous membranes, no nasal or eye discharge, no throat congestion   Neck:                      Supple, no thyromegaly, no lymphadenopathy, trachea midline, no elevated JVD   Respiratory:           Clear to auscultation bilaterally, nonlabored respirations    Cardiovascular:     RRR, no murmurs, rubs, or gallops, palpable pedal pulses bilaterally, No bilateral ankle edema   Gastrointestinal:   Positive bowel sounds, soft, nontender, non-distended, no organomegaly   Musculoskeletal:  No clubbing or cyanosis to extremities, muscle wasting, joint swelling, muscle weakness   Psychiatric:              Appropriate affect, cooperative   Neurologic:            Awake alert, oriented x 3, strength symmetric in all extremities, Cranial Nerves grossly intact to confrontation, speech clear   Skin:                      No rashes, bruising, skin ulcers, petechiae or ecchymosis    Result Review    Result Review:  I have personally reviewed the results from the time of this admission to 6/20/2025 11:22 EDT and agree with these findings:  []  Laboratory  []  Microbiology  []  Radiology  []  EKG/Telemetry   []  Cardiology/Vascular   []  Pathology  []  Old records  []  Other:    Results from last 7 days   Lab Units 06/20/25  0339 06/19/25  1049 06/17/25  1413   WBC 10*3/mm3 2.22* 2.22* 1.22*   HEMOGLOBIN g/dL 6.2* 7.5* 8.5*   PLATELETS 10*3/mm3 20* 4* 12*     Results from last 7 days   Lab Units 06/20/25  1039 06/20/25  0335 06/19/25  2334 06/19/25  1931 06/19/25  1617 06/19/25  1049 06/17/25  1413 06/17/25  1413   SODIUM mmol/L 119* 122* 121* 122* 120* 118*  --  121*   POTASSIUM mmol/L 4.1 3.9 3.7 3.5 3.3* 3.4*  --  3.5   CHLORIDE mmol/L 90* 89* 90* 88* 87* 85*  --  88*   CO2 mmol/L 22.4 23.1 23.1 24.8 23.8 23.5  --  24.2   ANION GAP mmol/L  6.6 9.9 7.9 9.2 9.2 9.5  --  8.8   BUN mg/dL 4.5* 5.2* 5.9* 6.0 5.7* 5.7*  --  10.0   CREATININE mg/dL 0.43* 0.48* 0.49* 0.53* 0.43* 0.48*  --  0.56*   GLUCOSE mg/dL 84 95 114* 100* 96 101*  --  100*   EGFR mL/min/1.73 112.9 109.9 109.4 107.4 112.9 109.9  --  105.9   CALCIUM mg/dL 6.9* 6.8* 6.9* 7.5* 7.2* 7.5*  --  7.4*   MAGNESIUM mg/dL 1.6 1.7 1.7 1.7 1.7 1.9   < >  --    ALBUMIN g/dL  --  3.1*  --   --   --  3.4*  --  3.5   BILIRUBIN mg/dL  --  0.2  --   --   --  0.4  --  0.6   ALK PHOS U/L  --  59  --   --   --  73  --  77   ALT (SGPT) U/L  --  6  --   --   --  6  --  10   AST (SGOT) U/L  --  6  --   --   --  6  --  8    < > = values in this interval not displayed.       Assessment & Plan   Assessment / Plan     Active Hospital Problems:  Active Hospital Problems    Diagnosis     **Hyponatremia     Hypotension     Cancer, metastatic to bone     Pancytopenia due to antineoplastic chemotherapy      Patient's blood pressure is very soft and clinically she looks little bit on the dry side.  Plan:   Urine osmolality TSH  Start p.o. salt tablets and urea as patient's nutritional status is very poor  Labs tomorrow morning  Patient will benefit from packed red blood cell transfusion    Electronically signed by Latonia Latham MD, 06/20/25, 11:17 AM EDT.

## 2025-06-20 NOTE — PLAN OF CARE
"Goal Outcome Evaluation:  Plan of Care Reviewed With: patient           Outcome Evaluation: pt has recieved 1L NS bolus, 500cc D5W over 2hrs, 1 unit PRBC started at 0545, pt remains on 5L NC. pt refused for port to be accessed \"because its only used for chemo\" education provided.Fina Dias RN                               "

## 2025-06-20 NOTE — CONSULTS
"Nutrition Services    Patient Name: Lluvia Archer  YOB: 1965  MRN: [id number]  Admission date: 6/19/2025      CLINICAL NUTRITION ASSESSMENT      Reason for Assessment  MST Score 2+     H&P:  Past Medical History:   Diagnosis Date    Abnormal mammogram     PT DENIES KNOWING OF THIS HX    Anxiety     Arthritis     R SHOULDER, R HIP, AND R LEG    Cancer     LUNG    Chronic nausea 01/15/2019    COPD (chronic obstructive pulmonary disease)     O2 3 LITERS NC CONT    Hernia, hiatal     Hyperlipidemia     Hypertension     Knee pain, right 08/30/2018    Memory loss     FORGETFULNESS ? ETIOLOGY    Migraine headache     Moderate episode of recurrent major depressive disorder 05/22/2017    Night sweats     Sciatica of right side 08/18/2017    Severe episode of recurrent major depressive disorder, without psychotic features 10/22/2019    Shingles     OUTBREAK 6/11/24 ON ANTIVIRAL , WHELPS NOTED NO SORES.    Shoulder pain, left 08/30/2018        Current Problems:   Active Hospital Problems    Diagnosis     **Hyponatremia     Hypotension     Cancer, metastatic to bone     Pancytopenia due to antineoplastic chemotherapy         Nutrition/Diet History         Narrative   Lluvia Archer is a 59 y.o. female past medical history of COPD on home 3 L, small cell lung cancer, hypertension, migraine headache, SIADH, and chronic pain from neoplasm who presents emergency department due to abnormal lab     Patient is Aaox4 upon RD visit, NAD. Pt reports 0% intake of morning meal tray due to her negligible appetite, pt refused morning tray.     Pt denies any NVDC, last BM 06/18. Denies any known food allergies. Pt can't recall her UBW. Reports a 12 pound weight loss in one month.      Anthropometrics        Current Height, Weight Height: 162.6 cm (64.02\")  Weight: 85.5 kg (188 lb 7.9 oz)   Current BMI Body mass index is 32.34 kg/m².   BMI Classification Overweight   % IBW    Adjusted Body Weight (ABW)    Weight Hx  Wt " Readings from Last 30 Encounters:   06/20/25 0518 85.5 kg (188 lb 7.9 oz)   06/19/25 1052 85 kg (187 lb 6.3 oz)   06/17/25 1305 83.7 kg (184 lb 9.6 oz)   06/13/25 1336 84.1 kg (185 lb 6.5 oz)   06/12/25 1313 84.1 kg (185 lb 8 oz)   06/11/25 1342 84.4 kg (186 lb 1.6 oz)   06/10/25 1342 85.8 kg (189 lb 3.2 oz)   06/09/25 1243 85.2 kg (187 lb 12.8 oz)   06/02/25 1259 84.7 kg (186 lb 11.7 oz)   05/16/25 1306 83 kg (183 lb)   05/15/25 1312 83.2 kg (183 lb 6.4 oz)   05/14/25 1100 85.5 kg (188 lb 9.6 oz)   05/13/25 1341 83.6 kg (184 lb 6.4 oz)   05/12/25 1317 83.4 kg (183 lb 13.8 oz)   04/22/25 2106 88.7 kg (195 lb 8.8 oz)   04/22/25 1437 91.4 kg (201 lb 8 oz)   04/21/25 1332 91.4 kg (201 lb 8 oz)   04/18/25 1300 89.5 kg (197 lb 5 oz)   04/17/25 1300 91 kg (200 lb 9.9 oz)   04/16/25 1300 91.9 kg (202 lb 9.6 oz)   04/15/25 1254 90.8 kg (200 lb 2.8 oz)   04/14/25 1300 90.8 kg (200 lb 2.8 oz)   04/11/25 1420 90.5 kg (199 lb 8.3 oz)   04/10/25 1012 90.4 kg (199 lb 4.7 oz)   03/27/25 1101 92.5 kg (204 lb)   03/25/25 1407 95.5 kg (210 lb 9.6 oz)   03/24/25 1300 96.3 kg (212 lb 3.2 oz)   03/21/25 1406 99.1 kg (218 lb 6.4 oz)   03/20/25 1300 98.6 kg (217 lb 6 oz)   03/19/25 1300 98.7 kg (217 lb 8 oz)   03/11/25 1118 94.7 kg (208 lb 12.8 oz)   03/10/25 1435 93.9 kg (207 lb 0.2 oz)          Wt Change Observation Pt can't recall her UBW. Reports a 12 pound weight loss in one month.      Estimated/Assessed Needs  Estimated Needs based on: Adjusted Body Weight 67kg       Energy Requirements 25-30 kcal/kg   EST Needs (kcal/day) 1675-2010 kcals/d       Protein Requirements 1.0-1.2 g/kg   EST Daily Needs (g/day) 67-80 g/d       Fluid Requirements 1 ml/kcal    Estimated Needs (mL/day) 1675-2010     Labs/Medications         Pertinent Labs Reviewed.   Results from last 7 days   Lab Units 06/20/25  1039 06/20/25  0335 06/19/25  2334 06/19/25  1617 06/19/25  1049 06/17/25  1413   SODIUM mmol/L 119* 122* 121*   < > 118* 121*   POTASSIUM mmol/L  4.1 3.9 3.7   < > 3.4* 3.5   CHLORIDE mmol/L 90* 89* 90*   < > 85* 88*   CO2 mmol/L 22.4 23.1 23.1   < > 23.5 24.2   BUN mg/dL 4.5* 5.2* 5.9*   < > 5.7* 10.0   CREATININE mg/dL 0.43* 0.48* 0.49*   < > 0.48* 0.56*   CALCIUM mg/dL 6.9* 6.8* 6.9*   < > 7.5* 7.4*   BILIRUBIN mg/dL  --  0.2  --   --  0.4 0.6   ALK PHOS U/L  --  59  --   --  73 77   ALT (SGPT) U/L  --  6  --   --  6 10   AST (SGOT) U/L  --  6  --   --  6 8   GLUCOSE mg/dL 84 95 114*   < > 101* 100*    < > = values in this interval not displayed.     Results from last 7 days   Lab Units 06/20/25  1039 06/20/25  0339 06/20/25  0335 06/19/25  2334   MAGNESIUM mg/dL 1.6  --  1.7 1.7   HEMOGLOBIN g/dL  --  6.2*  --   --    HEMATOCRIT %  --  18.7*  --   --      COVID19   Date Value Ref Range Status   04/21/2025 Not Detected Not Detected - Ref. Range Final     Lab Results   Component Value Date    HGBA1C 5.80 (H) 05/01/2023         Pertinent Medications Reviewed.     Malnutrition Severity Assessment              Nutrition Diagnosis         Nutrition Dx Problem 1 Inadequate energy Intake related to decreased ability to consume sufficient energy as evidenced by decreased appetite.     Nutrition Intervention           Current Nutrition Orders & Evaluation of Intake       Current PO Diet Diet: Cardiac, Diabetic, Fluid Restriction (240 mL/tray); Healthy Heart (2-3 Na+); Consistent Carbohydrate; 1500 mL/day; Fluid Consistency: Thin (IDDSI 0)   Supplement No active supplement orders           Nutrition Intervention/Prescription        Continue Diet Rx    RD to order Boost Plus 1x/day        Medical Nutrition Therapy/Nutrition Education          Learner     Readiness N/A  N/A     Method     Response N/A  N/A     Monitor/Evaluation        Monitor PO intake, Supplement intake     Nutrition Discharge Plan         To be determined     Electronically signed by:  Ethan Fitzgerald RD  06/20/25 12:46 EDT

## 2025-06-20 NOTE — PROGRESS NOTES
[facility]   Hospitalist Progress Note  Date: 2025  Patient Name: Lluvia Archer  : 1965  MRN: [id number]  Date of admission: 2025  Room/Bed: [facility]      Subjective   Subjective     Chief Complaint: Abnormal lab    Summary:Lluvia Archer is a 59 y.o. female with COPD on 3 L of home oxygen, small cell lung cancer, hypertension, migraine headaches, SIADH, and chronic pain who presents to the emergency department due to abnormal labs.  Patient last had chemotherapy on .  Follow-up with her primary doctor found her sodium to be 120 and platelets to be 12 for which she presented to the hospital.  Patient admitted for severe thrombocytopenia and hyponatremia.       Interval Followup:     Patient received 1 transfusion of platelets and 1 transfusion of packed red blood cells.  Her sodium is stable.  Nephrology consulted.     All systems reviewed and negative except for what is outlined above.      Objective   Objective     Vitals:   Temp:  [97.7 °F (36.5 °C)-98.2 °F (36.8 °C)] 97.9 °F (36.6 °C)  Heart Rate:  [78-87] 78  Resp:  [16-18] 16  BP: ()/(48-78) 118/58  Flow (L/min) (Oxygen Therapy):  [5] 5    Physical Exam   General: NAD, chronically ill-appearing  Cardiovascular: RRR  Pulmonary: no conversational dyspnea  Neuro: speech clear; no tremor  Psych: Mood and affect appropriate      Result Review    Result Review:  I have personally reviewed these results:  [x]  Laboratory      Lab 25  1543 25  0339 25  0335 25  1617 25  1049 25  1413   WBC  --  2.22*  --   --  2.22* 1.22*   HEMOGLOBIN 7.1* 6.2*  --   --  7.5* 8.5*   HEMATOCRIT 20.6* 18.7*  --   --  22.1* 24.7*   PLATELETS  --  20*  --   --  4* 12*   NEUTROS ABS  --  0.75*  --   --  0.88* 0.34*   IMMATURE GRANS (ABS)  --  0.15*  --   --  0.06* 0.00   LYMPHS ABS  --  1.22  --   --  1.17 0.83   MONOS ABS  --  0.08*  --   --  0.09* 0.05*   EOS ABS  --  0.00  --   --  0.00 0.00   MCV  --  93.0  --   --   92.1 92.5   PROCALCITONIN  --   --  0.07 0.08  --   --          Lab 06/20/25  1543 06/20/25  1039 06/20/25  0335   SODIUM 121* 119* 122*   POTASSIUM 4.3 4.1 3.9   CHLORIDE 91* 90* 89*   CO2 21.0* 22.4 23.1   ANION GAP 9.0 6.6 9.9   BUN 16.1 4.5* 5.2*   CREATININE 0.50* 0.43* 0.48*   EGFR 108.9 112.9 109.9   GLUCOSE 81 84 95   CALCIUM 6.9* 6.9* 6.8*   MAGNESIUM 1.7 1.6 1.7   TSH  --  0.998  --          Lab 06/20/25  0335 06/19/25  1049 06/17/25  1413   TOTAL PROTEIN 4.9* 6.1 5.7*   ALBUMIN 3.1* 3.4* 3.5   GLOBULIN 1.8 2.7 2.2   ALT (SGPT) 6 6 10   AST (SGOT) 6 6 8   BILIRUBIN 0.2 0.4 0.6   ALK PHOS 59 73 77         Lab 06/19/25  1338 06/19/25  1049   HSTROP T 17* 19*             Lab 06/20/25  0426   ABO TYPING O   RH TYPING Positive   ANTIBODY SCREEN Negative         Brief Urine Lab Results  (Last result in the past 365 days)        Color   Clarity   Blood   Leuk Est   Nitrite   Protein   CREAT   Urine HCG        06/20/25 0521 Yellow   Clear   Negative   Negative   Negative   Trace                 [x]  Microbiology   Microbiology Results (last 10 days)       ** No results found for the last 240 hours. **          [x]  Radiology  XR Chest PA & Lateral  Result Date: 6/19/2025  Impression: 1.Small bilateral pleural effusions. 2.Slight increase in left basilar opacity, atelectasis versus pneumonia. Electronically Signed: Bill Cope MD  6/19/2025 4:35 PM EDT  Workstation ID: [id number]    XR Chest 1 View  Result Date: 6/19/2025  Impression: Minimal residual atelectasis or airspace disease in the bases. Electronically Signed: Luca Brewster MD  6/19/2025 12:36 PM EDT  Workstation ID: [id number]    []  EKG/Telemetry   []  Cardiology/Vascular   []  Pathology  []  Old records  []  Other:    Assessment & Plan        Assessment and Plan:    Acute hyponatremia sodium most likely due to SIADH  Small cell lung cancer  Severe thrombocytopenia without bleeding  Depression/anxiety  COPD on home oxygen       Plan:  -- fluid restriction  of 1500 cc  -- Hold Lexapro and olanzapine as these could both exacerbate SIADH  -- Check sodium every 4 hours for the first 24 hours  -- Nephrology consulted, appreciate recommendations  --Fludrocortisone  --Gabapentin  --Midodrine  --Salt tabs and urea per nephrology        VTE Prophylaxis:  SCDs due to thrombocytopenia       Discussed with RN.    VTE Prophylaxis:  Mechanical VTE prophylaxis orders are present.        CODE STATUS:   Code Status (Patient has no pulse and is not breathing): CPR (Attempt to Resuscitate)  Medical Interventions (Patient has pulse or is breathing): Full Support  Level Of Support Discussed With: Patient      Electronically signed by Tyson Barrera MD, 6/20/2025, 16:19 EDT.

## 2025-06-20 NOTE — SIGNIFICANT NOTE
06/20/25 0825   OTHER   Discipline occupational therapist   Rehab Time/Intention   Session Not Performed other (see comments)  (Hold, 6.2 hgb)

## 2025-06-21 NOTE — PROGRESS NOTES
[facility]     Progress Note    Patient Name: Lluvia Archer  : 1965  MRN: [id number]  Primary Care Physician:  Aleksandar Edge DO  Date of admission: 2025    Subjective patient was awake alert responsive not in any acute distress at this time sodium is stable around 120    Review of Systems  All review of systems are negative except as mentioned in subjective complaints.    Objective   Objective     Vitals:   Temp:  [97.5 °F (36.4 °C)-98.2 °F (36.8 °C)] 98.1 °F (36.7 °C)  Heart Rate:  [68-82] 82  Resp:  [16-20] 17  BP: (107-137)/(44-71) 132/68  Flow (L/min) (Oxygen Therapy):  [5] 5  Physical Exam    Constitutional: Awake, alert responsive, conversant, no obvious distress              Psychiatric:  Appropriate affect, cooperative   Neurologic:  Awake alert ,oriented x 3, strength symmetric in all extremities, Cranial Nerves grossly intact to confrontation, speech clear   Eyes:   PERRLA, sclerae anicteric, no conjunctival injection   HEENT:  Moist mucous membranes, no nasal or eye discharge, no throat congestion   Neck:   Supple, no thyromegaly, no lymphadenopathy, trachea midline, no elevated JVD   Respiratory:  Clear to auscultation bilaterally, nonlabored respirations    Cardiovascular: RRR, no murmurs, rubs, or gallops, palpable pedal pulses bilaterally, No bilateral ankle edema   Gastrointestinal: Positive bowel sounds, soft, nontender, nondistended, no organomegaly   Musculoskeletal:  No clubbing or cyanosis to extremities,muscle wasting, joint swelling, muscle weakness             Skin:                      No rashes, bruising, skin ulcers, petechiae or ecchymosis    Result Review    Result Review:  I have personally reviewed the results from the time of this admission to 2025 09:47 EDT and agree with these findings:  []  Laboratory  []  Microbiology  []  Radiology  []  EKG/Telemetry   []  Cardiology/Vascular   []  Pathology  []  Old records  []  Other:    Results from last 7  days   Lab Units 06/21/25  0320 06/20/25  1543 06/20/25  0339 06/19/25  1049 06/17/25  1413   WBC 10*3/mm3 1.72*  --  2.22* 2.22* 1.22*   HEMOGLOBIN g/dL 6.6* 7.1* 6.2* 7.5* 8.5*   PLATELETS 10*3/mm3 11*  --  20* 4* 12*     Results from last 7 days   Lab Units 06/21/25  0320 06/20/25  2323 06/20/25  1924 06/20/25  1543 06/20/25  1039 06/20/25  0335 06/19/25  2334 06/19/25  1617 06/19/25  1049 06/17/25  1413   SODIUM mmol/L 120* 123* 121* 121* 119* 122* 121*   < > 118* 121*   POTASSIUM mmol/L 4.2 4.3 4.3 4.3 4.1 3.9 3.7   < > 3.4* 3.5   CHLORIDE mmol/L 91* 91* 91* 91* 90* 89* 90*   < > 85* 88*   CO2 mmol/L 21.8* 22.0 22.2 21.0* 22.4 23.1 23.1   < > 23.5 24.2   ANION GAP mmol/L 7.2 10.0 7.8 9.0 6.6 9.9 7.9   < > 9.5 8.8   BUN mg/dL 21.5* 22.7* 14.8 16.1 4.5* 5.2* 5.9*   < > 5.7* 10.0   CREATININE mg/dL 0.46* 0.52* 0.54* 0.50* 0.43* 0.48* 0.49*   < > 0.48* 0.56*   GLUCOSE mg/dL 85 93 90 81 84 95 114*   < > 101* 100*   EGFR mL/min/1.73 111.1 107.8 106.9 108.9 112.9 109.9 109.4   < > 109.9 105.9   CALCIUM mg/dL 7.1* 7.2* 7.1* 6.9* 6.9* 6.8* 6.9*   < > 7.5* 7.4*   MAGNESIUM mg/dL  --   --   --  1.7 1.6 1.7 1.7   < > 1.9  --    ALBUMIN g/dL  --   --   --   --   --  3.1*  --   --  3.4* 3.5   BILIRUBIN mg/dL  --   --   --   --   --  0.2  --   --  0.4 0.6   ALK PHOS U/L  --   --   --   --   --  59  --   --  73 77   ALT (SGPT) U/L  --   --   --   --   --  6  --   --  6 10   AST (SGOT) U/L  --   --   --   --   --  6  --   --  6 8    < > = values in this interval not displayed.       Scheduled Meds:fludrocortisone, 0.1 mg, Oral, Daily  gabapentin, 200 mg, Oral, TID  miconazole, 1 Application, Topical, Q24H  midodrine, 10 mg, Oral, TID AC  nicotine, 1 patch, Transdermal, Q24H  sodium chloride, 10 mL, Intravenous, Q12H  sodium chloride, 2 g, Oral, TID With Meals  Urea, 15 g, Oral, BID      Continuous Infusions:   PRN Meds:.  hydrOXYzine    ondansetron    oxyCODONE-acetaminophen    prochlorperazine    sodium chloride    sodium  chloride    sodium chloride    Assessment & Plan   Assessment / Plan       Active Hospital Problems:    Active Hospital Problems    Diagnosis  POA    **Hyponatremia [E87.1]  Yes     Secondary to SIADH.  From malignancy  Fluid restriction 1500 cc  Keep on salt tablets and urea for now      Hypotension [I95.9]  Yes     Start ProAmatine      Cancer, metastatic to bone [C79.51]  Yes    Pancytopenia due to antineoplastic chemotherapy [D61.810, T45.1X5A]  Yes   She received blood transfusion yesterday and hemodynamically more stable    Plan:   P.o. fluid restriction 1500 cc and also keep on salt tablets  We expect slow continuous improvement in sodium    Electronically signed by Latonia Latham MD, 06/21/25, 9:45 AM EDT.

## 2025-06-21 NOTE — PLAN OF CARE
Goal Outcome Evaluation:  Plan of Care Reviewed With: patient        Progress: no change  Outcome Evaluation: Patient AOx4, VSS, up x1 assist. Hgb increased to 8.7, platelets increased to 18. Pain and N/V treated per MAR. Patient voices worry about her labs, specifically sodium. Patient educated that sodium replacement will be gradual and take time. No acute changes.

## 2025-06-21 NOTE — PROGRESS NOTES
[facility]   Hospitalist Progress Note  Date: 2025  Patient Name: Lluvia Archer  : 1965  MRN: [id number]  Date of admission: 2025  Room/Bed: [facility]      Subjective   Subjective     Chief Complaint: Abnormal lab    Summary:Lluvia Archer is a 59 y.o. female with COPD on 3 L of home oxygen, small cell lung cancer, hypertension, migraine headaches, SIADH, and chronic pain who presents to the emergency department due to abnormal labs.  Patient last had chemotherapy on .  Follow-up with her primary doctor found her sodium to be 120 and platelets to be 12 for which she presented to the hospital.  Patient admitted for severe thrombocytopenia and hyponatremia.  Nephrology consulted.  Oncology consulted.      Interval Followup:     Patient again with low hemoglobin.  Received 1 unit of packed red blood cells today.  Oncology consulted    All systems reviewed and negative except for what is outlined above.      Objective   Objective     Vitals:   Temp:  [97.5 °F (36.4 °C)-98.1 °F (36.7 °C)] 97.7 °F (36.5 °C)  Heart Rate:  [68-82] 71  Resp:  [16-20] 16  BP: (107-137)/(44-71) 116/59  Flow (L/min) (Oxygen Therapy):  [5] 5    Physical Exam   General: NAD, chronically ill-appearing  Cardiovascular: Normal S1, S2  Pulmonary: CTAB, no conversational dyspnea  Neuro: speech clear; no tremor  Psych: Mood and affect appropriate      Result Review    Result Review:  I have personally reviewed these results:  [x]  Laboratory      Lab 25  0320 25  1543 25  0339 25  0335 25  1617 25  1049 25  1413   WBC 1.72*  --  2.22*  --   --  2.22* 1.22*   HEMOGLOBIN 6.6* 7.1* 6.2*  --   --  7.5* 8.5*   HEMATOCRIT 19.7* 20.6* 18.7*  --   --  22.1* 24.7*   PLATELETS 11*  --  20*  --   --  4* 12*   NEUTROS ABS  --   --  0.75*  --   --  0.88* 0.34*   IMMATURE GRANS (ABS)  --   --  0.15*  --   --  0.06* 0.00   LYMPHS ABS  --   --  1.22  --   --  1.17 0.83   MONOS ABS  --   --  0.08*   --   --  0.09* 0.05*   EOS ABS  --   --  0.00  --   --  0.00 0.00   MCV 88.7  --  93.0  --   --  92.1 92.5   PROCALCITONIN  --   --   --  0.07 0.08  --   --          Lab 06/21/25  1147 06/21/25  0923 06/21/25  0320 06/20/25  1924 06/20/25  1543 06/20/25  1039 06/20/25  0335   SODIUM 121* 120* 120*   < > 121* 119* 122*   POTASSIUM 4.3 4.3 4.2   < > 4.3 4.1 3.9   CHLORIDE 93* 90* 91*   < > 91* 90* 89*   CO2 19.2* 22.0 21.8*   < > 21.0* 22.4 23.1   ANION GAP 8.8 8.0 7.2   < > 9.0 6.6 9.9   BUN 13.9 15.5 21.5*   < > 16.1 4.5* 5.2*   CREATININE 0.49* 0.50* 0.46*   < > 0.50* 0.43* 0.48*   EGFR 109.4 108.9 111.1   < > 108.9 112.9 109.9   GLUCOSE 89 80 85   < > 81 84 95   CALCIUM 7.6* 7.4* 7.1*   < > 6.9* 6.9* 6.8*   MAGNESIUM  --   --   --   --  1.7 1.6 1.7   TSH  --   --   --   --   --  0.998  --     < > = values in this interval not displayed.         Lab 06/20/25  0335 06/19/25  1049 06/17/25  1413   TOTAL PROTEIN 4.9* 6.1 5.7*   ALBUMIN 3.1* 3.4* 3.5   GLOBULIN 1.8 2.7 2.2   ALT (SGPT) 6 6 10   AST (SGOT) 6 6 8   BILIRUBIN 0.2 0.4 0.6   ALK PHOS 59 73 77         Lab 06/19/25  1338 06/19/25  1049   HSTROP T 17* 19*             Lab 06/20/25  0426   ABO TYPING O   RH TYPING Positive   ANTIBODY SCREEN Negative         Brief Urine Lab Results  (Last result in the past 365 days)        Color   Clarity   Blood   Leuk Est   Nitrite   Protein   CREAT   Urine HCG        06/20/25 0521 Yellow   Clear   Negative   Negative   Negative   Trace                 [x]  Microbiology   Microbiology Results (last 10 days)       ** No results found for the last 240 hours. **          [x]  Radiology  XR Chest PA & Lateral  Result Date: 6/19/2025  Impression: 1.Small bilateral pleural effusions. 2.Slight increase in left basilar opacity, atelectasis versus pneumonia. Electronically Signed: Bill Cope MD  6/19/2025 4:35 PM EDT  Workstation ID: [id number]    XR Chest 1 View  Result Date: 6/19/2025  Impression: Minimal residual atelectasis or  airspace disease in the bases. Electronically Signed: Luca Brewster MD  6/19/2025 12:36 PM EDT  Workstation ID: [id number]    []  EKG/Telemetry   []  Cardiology/Vascular   []  Pathology  []  Old records  []  Other:    Assessment & Plan        Assessment and Plan:    Acute hyponatremia sodium most likely due to SIADH  Small cell lung cancer  Severe thrombocytopenia without bleeding  Depression/anxiety  COPD on home oxygen       Plan:  -- fluid restriction of 1500 cc  -- Hold Lexapro and olanzapine as these could both exacerbate SIADH  -- Nephrology consulted, appreciate recommendations  --Oncology consulted, appreciate recommendation  --Hemoglobin greater than 7, platelets greater than 10 unless there is bleeding  --Fludrocortisone  --Gabapentin  --Midodrine  --Salt tabs and urea per nephrology        VTE Prophylaxis:  SCDs due to thrombocytopenia       Discussed with RN.    VTE Prophylaxis:  Mechanical VTE prophylaxis orders are present.        CODE STATUS:   Code Status (Patient has no pulse and is not breathing): CPR (Attempt to Resuscitate)  Medical Interventions (Patient has pulse or is breathing): Full Support  Level Of Support Discussed With: Patient      Electronically signed by Tyson Barrera MD, 6/21/2025, 15:34 EDT.

## 2025-06-21 NOTE — PLAN OF CARE
Goal Outcome Evaluation:  Plan of Care Reviewed With: patient        Progress: no change  Outcome Evaluation: pt hgb was critically low again this am at 6.6, 1 u prbc transfusing. shift otherwise uneventful.Fina Dias RN

## 2025-06-21 NOTE — CONSULTS
[facility]   Hematology/Oncology  Consult Note    Patient Name: Lluvia Archer  : 1965  MRN: [id number]  Primary Care Physician:  Aleksandar Edge DO  Referring Physician: No ref. provider found  Date of admission: 2025    Subjective   Subjective     Reason for Consult/ Chief Complaint: low sodium, low platelets, SCLC    HPI:  Lluvia Archer is a 59 y.o. female with past medical history of COPD, osteoarthritis, anxiety, metastatic small cell cancer on palliative chemotherapy with topotecan who presents with low sodium and low platelets.  Patient recently received cycle 4 of topotecan on 2025.  She has had issues recently with cytopenias related to chemotherapy.  She has been on weekly IV fluids outpatient due to poor appetite and weakness.  Patient remains weak.  Sodium noted to be very low at 118K.  Nephrology has been consulted.  They have her on fluid restriction, salt tabs, urea and fludrocortisone.  Also using midodrine to support blood pressure.  Sodium has improved to 123 as of yesterday but down to 120 as of today.  Her blood counts remain low.  White count down to 1.72 as of today. ANC low at 0.75. Hemoglobin down to 6.6.  She has already received transfusion today.  She received transfusion yesterday as well.  Platelets down to 11,000.  She last received platelet transfusion on the .  Patient reports nausea.  Reports the nausea medicine that she gets in the hospital has not really helped much.  She also continues to report poor appetite.  She is not eating much.  She denies any bleeding.  No fevers or chills.  She is wanting to continue with chemotherapy if possible.    Review of Systems   All systems were reviewed and negative except for as mentioned above.     Personal History     Past Medical History:   Diagnosis Date    Abnormal mammogram     PT DENIES KNOWING OF THIS HX    Anxiety     Arthritis     R SHOULDER, R HIP, AND R LEG    Cancer     LUNG    Chronic  nausea 01/15/2019    COPD (chronic obstructive pulmonary disease)     O2 3 LITERS NC CONT    Hernia, hiatal     Hyperlipidemia     Hypertension     Knee pain, right 2018    Memory loss     FORGETFULNESS ? ETIOLOGY    Migraine headache     Moderate episode of recurrent major depressive disorder 2017    Night sweats     Sciatica of right side 2017    Severe episode of recurrent major depressive disorder, without psychotic features 10/22/2019    Shingles     OUTBREAK 24 ON ANTIVIRAL , WHELPS NOTED NO SORES.    Shoulder pain, left 2018       Past Surgical History:   Procedure Laterality Date    BRONCHOSCOPY N/A 2022    Procedure: BRONCHOSCOPY WITH BRONCHOALVEOLAR LAVAGE, BIOPSY;  Surgeon: Shin Mcdonald DO;  Location: [facility] END[facility]COPY;  Service: Pulmonary;  Laterality: N/A;  RIGHT LOWER LOBE INFILTRATE    BRONCHOSCOPY N/A 2024    Procedure: BRONCHOSCOPY WITH ENDOBRONCHIAL ULTRASOUND AND FINE NEEDLE ASPIRATE;  Surgeon: Shin Mcdonald DO;  Location: [facility] ENDOSCOPY;  Service: Pulmonary;  Laterality: N/A;  MEDIASTINAL ADENOPATHY     SECTION      CHOLECYSTECTOMY      LAPAROSCOPIC    COLONOSCOPY      PORTACATH PLACEMENT Left 2024    Procedure: INSERTION OF PORTACATH;  Surgeon: Josh Patton MD;  Location: [facility] OR OSC;  Service: General;  Laterality: Left;    TOTAL HIP ARTHROPLASTY Right 2022    Procedure: RIGHT TOTAL HIP ARTHROPLASTY;  Surgeon: Cecil Gomes MD;  Location: [facility] MAIN OR;  Service: Orthopedics;  Laterality: Right;       Family History: family history includes Arthritis in her mother; Cancer in an other family member; Heart disease in her father and another family member; Hypertension in an other family member; Other in her mother. Otherwise pertinent FHx was reviewed and not pertinent to current issue.    Social History:  reports that she has been smoking cigarettes. She started smoking about 47 years ago. She has a  47.5 pack-year smoking history. She has been exposed to tobacco smoke. She has never used smokeless tobacco. She reports that she does not drink alcohol and does not use drugs.    Home Medications:  Fluticasone-Umeclidin-Vilant, Magnesium, Melatonin, OLANZapine, albuterol, albuterol sulfate HFA, atorvastatin, buPROPion SR, calcium carbonate, dexAMETHasone, escitalopram, famotidine, fludrocortisone, furosemide, gabapentin, hydrOXYzine, ipratropium-albuterol, lisinopril, midodrine, naloxone, nystatin, ondansetron ODT, oxyCODONE-acetaminophen, potassium chloride, and prochlorperazine    Allergies:  No Known Allergies    Objective    Objective     Vitals:   Temp:  [97.5 °F (36.4 °C)-98.2 °F (36.8 °C)] 98.1 °F (36.7 °C)  Heart Rate:  [68-82] 82  Resp:  [16-20] 17  BP: (107-137)/(44-71) 132/68  Flow (L/min) (Oxygen Therapy):  [5] 5    Physical Exam:   Constitutional: Awake, alert, sitting up in bed on NC   Eyes: PERRLA, sclerae anicteric, no conjunctival injection   HENT: NCAT, mucous membranes moist   Respiratory: nonlabored respirations    Cardiovascular: no edema in the extremities   Gastrointestinal: nondistended   Musculoskeletal: Normal strength and tone, no joint swelling   Psychiatric: Appropriate affect, cooperative   Neurologic: Awake, alert, speech clear   Skin: Normal tone, no rashes    Result Review    Result Review:  I have personally reviewed the results from the time of this admission to 6/21/2025 09:14 EDT and agree with these findings:  [x]  Laboratory  []  Microbiology  []  Radiology  []  EKG/Telemetry   []  Cardiology/Vascular   []  Pathology  [x]  Old records  []  Other:  Most notable findings include: Low sodium, thrombocytopenia, leukopenia, anemia, H&P personally reviewed, renal note personally reviewed    Assessment & Plan   Assessment / Plan     Brief Patient Summary:  Lluvia Archer is a 59 y.o. female who has small cell lung cancer currently on chemotherapy with topotecan who presented with  hyponatremia and cytopenias.     Active Hospital Problems:  Active Hospital Problems    Diagnosis     **Hyponatremia     Hypotension     Cancer, metastatic to bone     Pancytopenia due to antineoplastic chemotherapy        Plan:   Small cell lung cancer  Pancytopenia secondary to chemotherapy  Patient is currently on palliative treatment with IV topotecan.  She received cycle 4-day 1 on 6/9/2025.  Patient's chemotherapy has already been dose reduced due to cytopenias previously.  Patient has poor bone marrow reserve due to accumulation of chemotherapy effects.  Certainly this is expected at this time in her chemo course to have cytopenias.  Would recommend as needed blood transfusion for hemoglobin less than 7 to 7.5 g/dL.  Would also recommend as needed platelet transfusion for platelet count less than 10,000 unless she has bleeding.  She has already received Neulasta with prior cycle therefore G-CSF is not indicated inpatient.      Continue to trend CBC with differential daily.  Will follow labs outpatient on twice weekly basis once discharged.  She will need count recovery prior to initiation of next cycle of chemotherapy. Next cycle of chemotherapy likely will be dose reduced further even though we have already done a 33% dose reduction.     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  Patient on salt tabs and urea per nephrology.  She is also on fluid restriction.  It will be difficult to be compliant with fluid restriction on outpatient basis due to her frequent needs of IV fluids due to poor intake and low BP.  This will make her care more difficult.    Chemo nausea  Increased dose of IV Zofran to 8 mg as needed.  Start Compazine 10 mg every 6 hours as needed.        Electronically signed by Brian Dickson MD, 06/21/25, 9:14 AM EDT.

## 2025-06-22 NOTE — PLAN OF CARE
Goal Outcome Evaluation:  Plan of Care Reviewed With: patient        Progress: improving  Outcome Evaluation: AOx4, VSS, up x1 assist. Patient expresses feeling tired, has slept most of shift. Pain and nausea treated per MAR. Labs fluctuate, monitor HGB and platelets.

## 2025-06-22 NOTE — PLAN OF CARE
Goal Outcome Evaluation:  Plan of Care Reviewed With: patient        Progress: no change  Outcome Evaluation: pt PLT were 8 this AM and WBC 1.66, pt received 1 U PLT. pt UOP has been greater than 1L. pt did have a small bloody clot in BSC when using the restroom but stated there wasnt any blood on the tissue when she wipped. urine has been clear yellow to straw colored.Fina Dias RN

## 2025-06-22 NOTE — PROGRESS NOTES
[facility]     Progress Note    Patient Name: Lluvia Archer  : 1965  MRN: [id number]  Primary Care Physician:  Aleksandar Edge DO  Date of admission: 2025    Subjective patient received platelet count because of severe thrombocytopenia    Review of Systems  All review of systems are negative except as mentioned in subjective complaints.    Objective   Objective     Vitals:   Temp:  [97.5 °F (36.4 °C)-98.6 °F (37 °C)] 98.6 °F (37 °C)  Heart Rate:  [71-95] 95  Resp:  [16] 16  BP: (109-144)/(56-80) 134/71  Flow (L/min) (Oxygen Therapy):  [5] 5  Physical Exam    Constitutional: Awake, alert responsive, conversant, no obvious distress              Psychiatric:  Appropriate affect, cooperative   Neurologic:  Awake alert ,oriented x 3, strength symmetric in all extremities, Cranial Nerves grossly intact to confrontation, speech clear   Eyes:   PERRLA, sclerae anicteric, no conjunctival injection   HEENT:  Moist mucous membranes, no nasal or eye discharge, no throat congestion   Neck:   Supple, no thyromegaly, no lymphadenopathy, trachea midline, no elevated JVD   Respiratory:  Clear to auscultation bilaterally, nonlabored respirations    Cardiovascular: RRR, no murmurs, rubs, or gallops, palpable pedal pulses bilaterally, No bilateral ankle edema   Gastrointestinal: Positive bowel sounds, soft, nontender, nondistended, no organomegaly   Musculoskeletal:  No clubbing or cyanosis to extremities,muscle wasting, joint swelling, muscle weakness             Skin:                      No rashes, bruising, skin ulcers, petechiae or ecchymosis    Result Review    Result Review:  I have personally reviewed the results from the time of this admission to 2025 09:04 EDT and agree with these findings:  []  Laboratory  []  Microbiology  []  Radiology  []  EKG/Telemetry   []  Cardiology/Vascular   []  Pathology  []  Old records  []  Other:    Results from last 7 days   Lab Units 25  0352  06/21/25  1539 06/21/25  0320 06/20/25  1543 06/20/25  0339 06/19/25  1049 06/17/25  1413   WBC 10*3/mm3 1.66* 1.51* 1.72*  --  2.22* 2.22* 1.22*   HEMOGLOBIN g/dL 8.3* 8.7* 6.6* 7.1* 6.2* 7.5* 8.5*   PLATELETS 10*3/mm3 8* 18* 11*  --  20* 4* 12*     Results from last 7 days   Lab Units 06/22/25  0720 06/22/25  0358 06/21/25  2355 06/21/25  1930 06/21/25  1539 06/21/25  1147 06/21/25  0923 06/20/25  1924 06/20/25  1543 06/20/25  1039 06/20/25  0335 06/19/25  1617 06/19/25  1049 06/17/25  1413   SODIUM mmol/L 121* 120* 122* 123* 120* 121* 120*   < > 121*   < > 122*   < > 118* 121*   POTASSIUM mmol/L 4.2 4.0 4.1 4.6 4.5 4.3 4.3   < > 4.3   < > 3.9   < > 3.4* 3.5   CHLORIDE mmol/L 87* 89* 91* 92* 91* 93* 90*   < > 91*   < > 89*   < > 85* 88*   CO2 mmol/L 24.4 22.2 23.4 22.0 22.1 19.2* 22.0   < > 21.0*   < > 23.1   < > 23.5 24.2   ANION GAP mmol/L 9.6 8.8 7.6 9.0 6.9 8.8 8.0   < > 9.0   < > 9.9   < > 9.5 8.8   BUN mg/dL 13.4 15.5 14.2 9.8 11.4 13.9 15.5   < > 16.1   < > 5.2*   < > 5.7* 10.0   CREATININE mg/dL 0.50* 0.46* 0.46* 0.49* 0.46* 0.49* 0.50*   < > 0.50*   < > 0.48*   < > 0.48* 0.56*   GLUCOSE mg/dL 88 87 88 83 84 89 80   < > 81   < > 95   < > 101* 100*   EGFR mL/min/1.73 108.9 111.1 111.1 109.4 111.1 109.4 108.9   < > 108.9   < > 109.9   < > 109.9 105.9   CALCIUM mg/dL 7.5* 7.2* 7.1* 7.8* 7.3* 7.6* 7.4*   < > 6.9*   < > 6.8*   < > 7.5* 7.4*   MAGNESIUM mg/dL  --   --   --   --   --   --   --   --  1.7   < > 1.7   < > 1.9  --    ALBUMIN g/dL  --   --   --   --   --   --   --   --   --   --  3.1*  --  3.4* 3.5   BILIRUBIN mg/dL  --   --   --   --   --   --   --   --   --   --  0.2  --  0.4 0.6   ALK PHOS U/L  --   --   --   --   --   --   --   --   --   --  59  --  73 77   ALT (SGPT) U/L  --   --   --   --   --   --   --   --   --   --  6  --  6 10   AST (SGOT) U/L  --   --   --   --   --   --   --   --   --   --  6  --  6 8    < > = values in this interval not displayed.       Scheduled Meds:escitalopram, 20 mg,  Oral, Daily  fludrocortisone, 0.1 mg, Oral, Daily  gabapentin, 200 mg, Oral, TID  miconazole, 1 Application, Topical, Q24H  midodrine, 10 mg, Oral, TID AC  nicotine, 1 patch, Transdermal, Q24H  sodium chloride, 10 mL, Intravenous, Q12H  sodium chloride, 2 g, Oral, TID With Meals  Urea, 15 g, Oral, BID      Continuous Infusions:   PRN Meds:.  hydrOXYzine    ondansetron    oxyCODONE-acetaminophen    prochlorperazine    sodium chloride    sodium chloride    sodium chloride    Assessment & Plan   Assessment / Plan       Active Hospital Problems:    Active Hospital Problems    Diagnosis  POA    **Hyponatremia [E87.1]  Yes     Secondary to SIADH.  From malignancy  Fluid restriction 1500 cc  Keep on salt tablets and urea for now      Hypotension [I95.9]  Yes     Start ProAmatine      Cancer, metastatic to bone [C79.51]  Yes    Pancytopenia due to antineoplastic chemotherapy [D61.810, T45.1X5A]  Yes       Plan:   Continue present care  Keep fluid restriction       Electronically signed by Latonia Latham MD, 06/22/25, 9:04 AM EDT.

## 2025-06-22 NOTE — PROGRESS NOTES
[facility]   Hospitalist Progress Note  Date: 2025  Patient Name: Lluvia Archer  : 1965  MRN: [id number]  Date of admission: 2025  Room/Bed: [facility]      Subjective   Subjective     Chief Complaint: Abnormal lab    Summary:Lluvia Archer is a 59 y.o. female with COPD on 3 L of home oxygen, small cell lung cancer, hypertension, migraine headaches, SIADH, and chronic pain who presents to the emergency department due to abnormal labs.  Patient last had chemotherapy on .  Follow-up with her primary doctor found her sodium to be 120 and platelets to be 12 for which she presented to the hospital.  Patient admitted for severe thrombocytopenia and hyponatremia.  Nephrology consulted.  Oncology consulted.      Interval Followup:     Low platelets overnight.  Patient received a platelet transfusion.  Platelets improved.  Hemoglobin stable.  Patient is sleeping comfortably.  Sodium improving    All systems reviewed and negative except for what is outlined above.      Objective   Objective     Vitals:   Temp:  [97.5 °F (36.4 °C)-98.8 °F (37.1 °C)] 98.8 °F (37.1 °C)  Heart Rate:  [71-95] 92  Resp:  [16-18] 18  BP: (103-144)/(56-80) 141/74  Flow (L/min) (Oxygen Therapy):  [5] 5    Physical Exam   General: NAD, chronically ill-appearing, sleeping  Pulmonary: Normal work of breathing        Result Review    Result Review:  I have personally reviewed these results:  [x]  Laboratory      Lab 25  1117 25  0358 25  1539 25  1543 25  0339 25  0335 25  1617 25  1049 25  1413   WBC 1.33* 1.66* 1.51*   < > 2.22*  --   --  2.22* 1.22*   HEMOGLOBIN 8.0* 8.3* 8.7*   < > 6.2*  --   --  7.5* 8.5*   HEMATOCRIT 23.6* 24.1* 26.4*   < > 18.7*  --   --  22.1* 24.7*   PLATELETS 19* 8* 18*   < > 20*  --   --  4* 12*   NEUTROS ABS  --   --   --   --  0.75*  --   --  0.88* 0.34*   IMMATURE GRANS (ABS)  --   --   --   --  0.15*  --   --  0.06* 0.00   LYMPHS ABS  --   --    --   --  1.22  --   --  1.17 0.83   MONOS ABS  --   --   --   --  0.08*  --   --  0.09* 0.05*   EOS ABS  --   --   --   --  0.00  --   --  0.00 0.00   MCV 91.5 89.9 93.6   < > 93.0  --   --  92.1 92.5   PROCALCITONIN  --   --   --   --   --  0.07 0.08  --   --     < > = values in this interval not displayed.         Lab 06/22/25  1117 06/22/25  0720 06/22/25  0358 06/20/25  1924 06/20/25  1543 06/20/25  1039 06/20/25  0335   SODIUM 124* 121* 120*   < > 121* 119* 122*   POTASSIUM 4.0 4.2 4.0   < > 4.3 4.1 3.9   CHLORIDE 90* 87* 89*   < > 91* 90* 89*   CO2 23.9 24.4 22.2   < > 21.0* 22.4 23.1   ANION GAP 10.1 9.6 8.8   < > 9.0 6.6 9.9   BUN 10.9 13.4 15.5   < > 16.1 4.5* 5.2*   CREATININE 0.44* 0.50* 0.46*   < > 0.50* 0.43* 0.48*   EGFR 112.3 108.9 111.1   < > 108.9 112.9 109.9   GLUCOSE 84 88 87   < > 81 84 95   CALCIUM 7.4* 7.5* 7.2*   < > 6.9* 6.9* 6.8*   MAGNESIUM  --   --   --   --  1.7 1.6 1.7   TSH  --   --   --   --   --  0.998  --     < > = values in this interval not displayed.         Lab 06/20/25  0335 06/19/25  1049 06/17/25  1413   TOTAL PROTEIN 4.9* 6.1 5.7*   ALBUMIN 3.1* 3.4* 3.5   GLOBULIN 1.8 2.7 2.2   ALT (SGPT) 6 6 10   AST (SGOT) 6 6 8   BILIRUBIN 0.2 0.4 0.6   ALK PHOS 59 73 77         Lab 06/19/25  1338 06/19/25  1049   HSTROP T 17* 19*             Lab 06/20/25  0426   ABO TYPING O   RH TYPING Positive   ANTIBODY SCREEN Negative         Brief Urine Lab Results  (Last result in the past 365 days)        Color   Clarity   Blood   Leuk Est   Nitrite   Protein   CREAT   Urine HCG        06/20/25 0521 Yellow   Clear   Negative   Negative   Negative   Trace                 [x]  Microbiology   Microbiology Results (last 10 days)       ** No results found for the last 240 hours. **          [x]  Radiology  XR Chest PA & Lateral  Result Date: 6/19/2025  Impression: 1.Small bilateral pleural effusions. 2.Slight increase in left basilar opacity, atelectasis versus pneumonia. Electronically Signed: Bill  MD Anatoliy  6/19/2025 4:35 PM EDT  Workstation ID: [id number]    XR Chest 1 View  Result Date: 6/19/2025  Impression: Minimal residual atelectasis or airspace disease in the bases. Electronically Signed: Luca Brewster MD  6/19/2025 12:36 PM EDT  Workstation ID: [id number]    []  EKG/Telemetry   []  Cardiology/Vascular   []  Pathology  []  Old records  []  Other:    Assessment & Plan        Assessment and Plan:    Acute hyponatremia sodium most likely due to SIADH  Small cell lung cancer  Severe thrombocytopenia without bleeding  Depression/anxiety  COPD on home oxygen       Plan:  -- fluid restriction of 1500 cc  -- Hold Lexapro and olanzapine as these could both exacerbate SIADH  -- Nephrology consulted, appreciate recommendations  --Urea and salt tab  --Oncology consulted, appreciate recommendation  --Hemoglobin greater than 7, platelets greater than 10 unless there is bleeding  --Fludrocortisone  --Gabapentin  --Midodrine          VTE Prophylaxis:  SCDs due to thrombocytopenia       Discussed with RN.    VTE Prophylaxis:  Mechanical VTE prophylaxis orders are present.        CODE STATUS:   Code Status (Patient has no pulse and is not breathing): CPR (Attempt to Resuscitate)  Medical Interventions (Patient has pulse or is breathing): Full Support  Level Of Support Discussed With: Patient      Electronically signed by Tyson Barrera MD, 6/22/2025, 15:13 EDT.

## 2025-06-23 ENCOUNTER — TELEPHONE (OUTPATIENT)
Dept: ONCOLOGY | Facility: HOSPITAL | Age: 60
End: 2025-06-23
Payer: MEDICARE

## 2025-06-23 NOTE — THERAPY EVALUATION
Patient Name: Lluvia Archer  : 1965    MRN: [id number]                              Today's Date: 2025       Admit Date: 2025    Visit Dx:     ICD-10-CM ICD-9-CM   1. Hyponatremia  E87.1 276.1   2. Difficulty walking  R26.2 719.7   3. Decreased activities of daily living (ADL)  Z78.9 V49.89     Patient Active Problem List   Diagnosis    Abnormal mammogram    Anxiety    Arthritis    Chronic nausea    Chronic obstructive pulmonary disease (COPD)    Counseling on substance use and abuse    Esophageal reflux    Hernia, hiatal    Hyperlipidemia    Hypertension    Knee pain, right    Memory loss    Migraine    Moderate episode of recurrent major depressive disorder    Night sweats    Numbness    Sciatica of right side    Severe episode of recurrent major depressive disorder, without psychotic features    Shoulder pain, left    Other diseases of nasal cavity and sinuses    Sleep apnea, unspecified    Tobacco abuse    Strain of groin, right, subsequent encounter    Osteoarthritis of spine with radiculopathy, lumbar region    Chronic right-sided low back pain with right-sided sciatica    Aftercare following right hip joint replacement surgery    Primary osteoarthritis of right hip    Right hip pain    Sepsis, due to unspecified organism, unspecified whether acute organ dysfunction present    Severe malnutrition    Pneumonia of right lower lobe due to infectious organism    Hospital discharge follow-up    Other specified anemias    Encounter for screening mammogram for malignant neoplasm of breast    Multifocal pneumonia    Acute on chronic respiratory failure with hypoxia    Medicare annual wellness visit, subsequent    Community acquired pneumonia    Pneumonia due to infectious organism, unspecified laterality, unspecified part of lung    Mediastinal adenopathy    Small cell lung cancer    Tobacco abuse, in remission    Chronic respiratory failure with hypoxia    Lung nodules    Encounter for  adjustment or management of vascular access device    Dehydration    Intractable nausea and vomiting    Pancytopenia due to antineoplastic chemotherapy    Thrombocytopenia    Cancer, metastatic to bone    CAP (community acquired pneumonia)    Encounter for long-term (current) use of high-risk medication    Secondary malignant neoplasm of brain    Hyperkalemia    Hyponatremia    Hypotension     Past Medical History:   Diagnosis Date    Abnormal mammogram     PT DENIES KNOWING OF THIS HX    Anxiety     Arthritis     R SHOULDER, R HIP, AND R LEG    Cancer     LUNG    Chronic nausea 01/15/2019    COPD (chronic obstructive pulmonary disease)     O2 3 LITERS NC CONT    Hernia, hiatal     Hyperlipidemia     Hypertension     Knee pain, right 2018    Memory loss     FORGETFULNESS ? ETIOLOGY    Migraine headache     Moderate episode of recurrent major depressive disorder 2017    Night sweats     Sciatica of right side 2017    Severe episode of recurrent major depressive disorder, without psychotic features 10/22/2019    Shingles     OUTBREAK 24 ON ANTIVIRAL , WHELPS NOTED NO SORES.    Shoulder pain, left 2018     Past Surgical History:   Procedure Laterality Date    BRONCHOSCOPY N/A 2022    Procedure: BRONCHOSCOPY WITH BRONCHOALVEOLAR LAVAGE, BIOPSY;  Surgeon: Shin Mcdonald DO;  Location: [facility] ENDOSCOPY;  Service: Pulmonary;  Laterality: N/A;  RIGHT LOWER LOBE INFILTRATE    BRONCHOSCOPY N/A 2024    Procedure: BRONCHOSCOPY WITH ENDOBRONCHIAL ULTRASOUND AND FINE NEEDLE ASPIRATE;  Surgeon: Shin Mcdonald DO;  Location: [facility] ENDOSCOPY;  Service: Pulmonary;  Laterality: N/A;  MEDIASTINAL ADENOPATHY     SECTION      CHOLECYSTECTOMY      LAPAROSCOPIC    COLONOSCOPY      PORTACATH PLACEMENT Left 2024    Procedure: INSERTION OF PORTACATH;  Surgeon: Josh Patton MD;  Location: [facility] [place] [facility];  Service: General;  Laterality: Left;    TOTAL HIP  ARTHROPLASTY Right 5/13/2022    Procedure: RIGHT TOTAL HIP ARTHROPLASTY;  Surgeon: Cecil Gomes MD;  Location: [facility] MAIN OR;  Service: Orthopedics;  Laterality: Right;      General Information       Row Name 06/23/25 1128          OT Time and Intention    Document Type evaluation  -     Mode of Treatment individual therapy;occupational therapy  -       Row Name 06/23/25 1128          General Information    Patient Profile Reviewed yes  -LF     Prior Level of Function --  Assist with ADLs, ambulated with a RW PRN, has a step over tub with shower chair/grab bars/handheld shower head, standard commode, stands to groom, and wears 5L home O2.  -     Existing Precautions/Restrictions fall;oxygen therapy device and L/min  -     Barriers to Rehab none identified  -       Row Name 06/23/25 1128          Occupational Profile    Reason for Services/Referral (Occupational Profile) Patient is a 59 year old female admitted to [facility] for abnormal lab on June 19th, 2025. Occupational therapy consulted due to recent decline in ADLs/functional transfers. No previous occupational therapy services for current condition.  -       Row Name 06/23/25 1128          Living Environment    Current Living Arrangements home  -     People in Home spouse;child(scotty), adult  -       Row Name 06/23/25 1128          Home Main Entrance    Number of Stairs, Main Entrance two  -       Row Name 06/23/25 1128          Cognition    Orientation Status (Cognition) oriented x 4  -       Row Name 06/23/25 1128          Safety Issues/Impairments Affecting Functional Mobility    Impairments Affecting Function (Mobility) balance;endurance/activity tolerance;strength  -               User Key  (r) = Recorded By, (t) = Taken By, (c) = Cosigned By      Initials Name Provider Type    LF Anca Bergeron OT Occupational Therapist                     Mobility/ADL's       Row Name 06/23/25 1129          Bed Mobility    Bed  Mobility supine-sit;sit-supine  -     Supine-Sit [place] (Bed Mobility) supervision  -     Sit-Supine [place] (Bed Mobility) supervision  -     Assistive Device (Bed Mobility) head of bed elevated;bed rails  -       Row Name 06/23/25 1129          Transfers    Transfers sit-stand transfer;stand-sit transfer  -       Row Name 06/23/25 1129          Sit-Stand Transfer    Sit-Stand [place] (Transfers) standby assist  -       Row Name 06/23/25 1129          Stand-Sit Transfer    Stand-Sit [place] (Transfers) standby assist  -       Row Name 06/23/25 1129          Activities of Daily Living    BADL Assessment/Intervention bathing;upper body dressing;lower body dressing;grooming;feeding;toileting  -[facility] Name 06/23/25 1129          Bathing Assessment/Intervention    [place] Level (Bathing) bathing skills;upper body;lower body;minimum assist (75% patient effort)  -       Row Name 06/23/25 1129          Upper Body Dressing Assessment/Training    [place] Level (Upper Body Dressing) upper body dressing skills;standby assist  -[facility] Name 06/23/25 1129          Lower Body Dressing Assessment/Training    [place] Level (Lower Body Dressing) lower body dressing skills;minimum assist (75% patient effort)  -[facility] Name 06/23/25 1129          Grooming Assessment/Training    [place] Level (Grooming) grooming skills;set up  -[facility] Name 06/23/25 1129          Self-Feeding Assessment/Training    [place] Level (Feeding) feeding skills;set up  -[facility] Name 06/23/25 1129          Toileting Assessment/Training    [place] Level (Toileting) toileting skills;minimum assist (75% patient effort)  -               User Key  (r) = Recorded By, (t) = Taken By, (c) = Cosigned By      Initials Name Provider Type     Anca Bergeron OT Occupational Therapist                   Obj/Interventions       Row Name 06/23/25 1130          Sensory Assessment  (Somatosensory)    Sensory Assessment (Somatosensory) UE sensation intact  -       Row Name 06/23/25 1130          Vision Assessment/Intervention    Visual Impairment/Limitations WFL  -LF       [facility] Name 06/23/25 1130          Range of Motion Comprehensive    General Range of Motion bilateral upper extremity ROM WFL  -[facility] Name 06/23/25 1130          Strength Comprehensive (MMT)    Comment, General Manual Muscle Testing (MMT) Assessment 4-/5 BUEs  -[facility] Name 06/23/25 1130          Motor Skills    Motor Skills coordination;functional endurance  -LF     Coordination bilateral;upper extremity;WFL  -LF     Functional Endurance Fair-  -LF       Row Name 06/23/25 1130          Balance    Balance Assessment sitting dynamic balance;standing dynamic balance  -LF     Dynamic Sitting Balance supervision  -LF     Position, Sitting Balance unsupported;sitting edge of bed  -LF     Dynamic Standing Balance standby assist  -LF     Position/Device Used, Standing Balance supported;other (see comments)  bed rail  -               User Key  (r) = Recorded By, (t) = Taken By, (c) = Cosigned By      Initials Name Provider Type     Anca Bergeron OT Occupational Therapist                   Goals/Plan       [facility] Name 06/23/25 1133          Bed Mobility Goal 1 (OT)    Activity/Assistive Device (Bed Mobility Goal 1, OT) bed mobility activities, all  -LF     [place] Level/Cues Needed (Bed Mobility Goal 1, OT) modified independence  -LF     Time Frame (Bed Mobility Goal 1, OT) long term goal (LTG);10 days  -[facility] Name 06/23/25 1133          Transfer Goal 1 (OT)    Activity/Assistive Device (Transfer Goal 1, OT) transfers, all  -LF     [place] Level/Cues Needed (Transfer Goal 1, OT) modified independence  -LF     Time Frame (Transfer Goal 1, OT) long term goal (LTG);10 days  -[facility] Name 06/23/25 1133          Bathing Goal 1 (OT)    Activity/Device (Bathing Goal 1, OT) bathing skills, all  -LF      [place] Level/Cues Needed (Bathing Goal 1, OT) modified independence  -LF     Time Frame (Bathing Goal 1, OT) long term goal (LTG);10 days  -LF       Row Name 06/23/25 1133          Dressing Goal 1 (OT)    Activity/Device (Dressing Goal 1, OT) dressing skills, all  -LF     [place]/Cues Needed (Dressing Goal 1, OT) modified independence  -LF     Time Frame (Dressing Goal 1, OT) long term goal (LTG);10 days  -       Row Name 06/23/25 1133          Toileting Goal 1 (OT)    Activity/Device (Toileting Goal 1, OT) toileting skills, all  -LF     [place] Level/Cues Needed (Toileting Goal 1, OT) modified independence  -LF     Time Frame (Toileting Goal 1, OT) long term goal (LTG);10 days  -LF       Row Name 06/23/25 1133          Strength Goal 1 (OT)    Strength Goal 1 (OT) Patient will demonstrate 4/5 BUE strength to support ADLs/functional transfers.  -LF     Time Frame (Strength Goal 1, OT) long term goal (LTG);10 days  -       Row Name 06/23/25 1133          Problem Specific Goal 1 (OT)    Problem Specific Goal 1 (OT) Patient will demonstrate fair+ endurance to support ADLs/functional transfers.  -LF     Time Frame (Problem Specific Goal 1, OT) long term goal (LTG);10 days  -       Row Name 06/23/25 1133          Therapy Assessment/Plan (OT)    Planned Therapy Interventions (OT) activity tolerance training;BADL retraining;functional balance retraining;occupation/activity based interventions;patient/caregiver education/training;strengthening exercise;transfer/mobility retraining  -               User Key  (r) = Recorded By, (t) = Taken By, (c) = Cosigned By      Initials Name Provider Type     Anca Bergeron OT Occupational Therapist                   Clinical Impression       Row Name 06/23/25 1132          Pain Assessment    Additional Documentation Pain Scale: FACES Pre/Post-Treatment (Group)  -LF       Row Name 06/23/25 1132          Pain Scale: FACES Pre/Post-Treatment    Pain: FACES  Scale, Pretreatment 2-->hurts little bit  -LF     Posttreatment Pain Rating 2-->hurts little bit  -LF       Row Name 06/23/25 1132          Plan of Care Review    Plan of Care Reviewed With patient  -LF     Progress no change  -     Outcome Evaluation Patient presents with limitations in self-care, functional transfers, balance, endurance, and BUE strength. She would benefit from continued skilled occupational therapy services to maximize independence with ADLs/functional transfers.  -       Row Name 06/23/25 1132          Therapy Assessment/Plan (OT)    Patient/Family Therapy Goal Statement (OT) To maximize independence.  -     Rehab Potential (OT) good  -     Criteria for Skilled Therapeutic Interventions Met (OT) yes;meets criteria;skilled treatment is necessary  -     Therapy Frequency (OT) 5 times/wk  -       Row Name 06/23/25 1132          Therapy Plan Review/Discharge Plan (OT)    Equipment Needs Upon Discharge (OT) commode chair  -     Anticipated Discharge Disposition (OT) home with home health;home with assist  -       Row Name 06/23/25 1132          Vital Signs    O2 Delivery Pre Treatment nasal cannula  -     O2 Delivery Intra Treatment nasal cannula  -     O2 Delivery Post Treatment nasal cannula  -       Row Name 06/23/25 1132          Positioning and Restraints    Pre-Treatment Position in bed  -     Post Treatment Position bed  -LF     In Bed fowlers;call light within reach;encouraged to call for assist  no alarm active on arrival  -               User Key  (r) = Recorded By, (t) = Taken By, (c) = Cosigned By      Initials Name Provider Type     Anca Bergeron, OT Occupational Therapist                   Outcome Measures       Row Name 06/23/25 1134          How much help from another is currently needed...    Putting on and taking off regular lower body clothing? 3  -LF     Bathing (including washing, rinsing, and drying) 3  -LF     Toileting (which includes using  toilet bed pan or urinal) 3  -LF     Putting on and taking off regular upper body clothing 3  -LF     Taking care of personal grooming (such as brushing teeth) 4  -LF     Eating meals 4  -LF     AM-PAC 6 Clicks Score (OT) 20  -LF       Row Name 06/23/25 1000 06/23/25 0800       How much help from another person do you currently need...    Turning from your back to your side while in flat bed without using bedrails? 3 (P)   -LC 3  -KP    Moving from lying on back to sitting on the side of a flat bed without bedrails? 3 (P)   -LC 3  -KP    Moving to and from a bed to a chair (including a wheelchair)? 3 (P)   -LC 3  -KP    Standing up from a chair using your arms (e.g., wheelchair, bedside chair)? 3 (P)   -LC 3  -KP    Climbing 3-5 steps with a railing? 3 (P)   -LC 2  -KP    To walk in hospital room? 3 (P)   -LC 3  -KP    AM-PAC 6 Clicks Score (PT) 18 (P)   -LC 17  -KP    Highest Level of Mobility Goal Walk 10 Steps or More-6 (P)   -LC Stand (1 or More Minutes)-5  -KP      Row Name 06/23/25 1134 06/23/25 1000       Functional Assessment    Outcome Measure Options AM-PAC 6 Clicks Daily Activity (OT);Optimal Instrument  -LF AM-PAC 6 Clicks Basic Mobility (PT) (P)   -LC      Row Name 06/23/25 1134          Optimal Instrument    Optimal Instrument Optimal - 3  -LF     Bending/Stooping 2  -LF     Standing 2  -LF     Reaching 2  -LF     From the list, choose the 3 activities you would most like to be able to do without any difficulty Bending/stooping;Standing;Reaching  -LF     Total Score Optimal - 3 6  -LF               User Key  (r) = Recorded By, (t) = Taken By, (c) = Cosigned By      Initials Name Provider Type    Anca Aguayo OT Occupational Therapist    Mary Vega, RN Registered Nurse    Monica Dacosta, PT Student PT Student                    Occupational Therapy Education       Title: PT OT SLP Therapies (In Progress)       Topic: Occupational Therapy (In Progress)       Point: ADL training (In  Progress)       Learning Progress Summary            Patient Acceptance, E,TB, NR by  at 6/23/2025 1134                      Point: Precautions (In Progress)       Learning Progress Summary            Patient Acceptance, E,TB, NR by  at 6/23/2025 1134                      Point: Body mechanics (In Progress)       Learning Progress Summary            Patient Acceptance, E,TB, NR by  at 6/23/2025 1134                                      User Key       Initials Effective Dates Name Provider Type Discipline     06/16/21 -  Anca Bergeron OT Occupational Therapist OT                  OT Recommendation and Plan  Planned Therapy Interventions (OT): activity tolerance training, BADL retraining, functional balance retraining, occupation/activity based interventions, patient/caregiver education/training, strengthening exercise, transfer/mobility retraining  Therapy Frequency (OT): 5 times/wk  Plan of Care Review  Plan of Care Reviewed With: patient  Progress: no change  Outcome Evaluation: Patient presents with limitations in self-care, functional transfers, balance, endurance, and BUE strength. She would benefit from continued skilled occupational therapy services to maximize independence with ADLs/functional transfers.     Time Calculation:   Evaluation Complexity (OT)  Review Occupational Profile/Medical/Therapy History Complexity: brief/low complexity  Assessment, Occupational Performance/Identification of Deficit Complexity: 3-5 performance deficits  Clinical Decision Making Complexity (OT): problem focused assessment/low complexity  Overall Complexity of Evaluation (OT): low complexity     Time Calculation- OT       Row Name 06/23/25 1135             Time Calculation- OT    OT Received On 06/23/25  -LF      OT Goal Re-Cert Due Date 07/02/25  -LF         Untimed Charges    OT Eval/Re-eval Minutes 35  -LF         Total Minutes    Untimed Charges Total Minutes 35  -LF       Total Minutes 35  -LF                 User Key  (r) = Recorded By, (t) = Taken By, (c) = Cosigned By      Initials Name Provider Type     Anca Bergeron OT Occupational Therapist                  Therapy Charges for Today       Code Description Service Date Service Provider Modifiers Qty    [id number]  OT EVAL LOW COMPLEXITY 3 6/23/2025 Anca Bergeron OT GO 1                 Anca Bergeron OT  6/23/2025

## 2025-06-23 NOTE — PLAN OF CARE
Goal Outcome Evaluation:           Progress: improving  Outcome Evaluation: No significant changes. Patient has made no complaints other than back pain which was treated per the MAR. Appetite has increased, and patient seems to have more energy.

## 2025-06-23 NOTE — PROGRESS NOTES
[facility]     Progress Note    Patient Name: Lluvia Archer  : 1965  MRN: [id number]  Primary Care Physician:  Aleksandar Edge DO  Date of admission: 2025    Subjective   Patient continues to feel tired and weak  Has some intermittent nausea and pain  Sodium levels are stable this morning  Continue to be in the mid 120s      Scheduled Meds:escitalopram, 20 mg, Oral, Daily  fludrocortisone, 0.1 mg, Oral, Daily  gabapentin, 200 mg, Oral, TID  miconazole, 1 Application, Topical, Q24H  midodrine, 10 mg, Oral, TID AC  nicotine, 1 patch, Transdermal, Q24H  polyethylene glycol, 17 g, Oral, Daily  senna, 2 tablet, Oral, BID  sodium chloride, 10 mL, Intravenous, Q12H  sodium chloride, 2 g, Oral, TID With Meals  Urea, 15 g, Oral, BID      Continuous Infusions:   PRN Meds:.  hydrOXYzine    ondansetron    oxyCODONE-acetaminophen    prochlorperazine    sodium chloride    sodium chloride    sodium chloride       Review of Systems  Constitutional:        Weakness tiredness fatigue  Eyes:                       No blurry vision, eye discharge, eye irritation, eye pain  HEENT:                   No acute hair loss, earache and discharge, nasal congestion or discharge, sore throat, postnasal drip  Respiratory:           No shortness of breath coughing sputum production wheezing hemoptysis pleuritic chest pain  Cardiovascular:     No chest pain, orthopnea, PND, dizziness, palpitation, lower extremity edema  Gastrointestinal:   No nausea vomiting diarrhea abdominal pain constipation  Genitourinary:       No urinary incontinence, hesitancy, frequency, urgency, dysuria  Hematologic:         No bruising, bleeding, pallor, lymphadenopathy  Endocrine:            No coldness, hot flashes, polyuria, abnormal hair growth  Musculoskeletal:    No body pains, aches, arthritic pains, muscle pain ,muscle wasting  Psychiatric:          No low or high mood, anxiety, hallucinations, delusions  Skin.                      No  rash, ulcers, bruising, itching  Neurological:        No confusion, headache, focal weakness, numbness, dysphasia    Objective   Objective     Vitals:   Temp:  [97.7 °F (36.5 °C)-98.8 °F (37.1 °C)] 98.4 °F (36.9 °C)  Heart Rate:  [] 104  Resp:  [15-18] 16  BP: (104-141)/(49-74) 115/67  Flow (L/min) (Oxygen Therapy):  [5] 5  Physical Exam    Constitutional: Awake, alert responsive, conversant, no obvious distress              Psychiatric:  Appropriate affect, cooperative   Neurologic:  Awake alert ,oriented x 3, strength symmetric in all extremities, Cranial Nerves grossly intact to confrontation, speech clear   Eyes:   PERRLA, sclerae anicteric, no conjunctival injection   HEENT:  Moist mucous membranes, no nasal or eye discharge, no throat congestion   Neck:   Supple, no thyromegaly, no lymphadenopathy, trachea midline, no elevated JVD   Respiratory:  Clear to auscultation bilaterally, nonlabored respirations    Cardiovascular: RRR, no murmurs, rubs, or gallops, palpable pedal pulses bilaterally, No bilateral ankle edema   Gastrointestinal: Positive bowel sounds, soft, nontender, nondistended, no organomegaly   Musculoskeletal:  No clubbing or cyanosis to extremities,muscle wasting, joint swelling, muscle weakness             Skin:                      No rashes, bruising, skin ulcers, petechiae or ecchymosis    Result Review    Result Review:  I have personally reviewed the results from the time of this admission to 6/23/2025 09:32 EDT and agree with these findings:  []  Laboratory  []  Microbiology  []  Radiology  []  EKG/Telemetry   []  Cardiology/Vascular   []  Pathology  []  Old records  []  Other:    Assessment & Plan   Assessment / Plan       Active Hospital Problems:  Active Hospital Problems    Diagnosis     **Hyponatremia     Hypotension     Cancer, metastatic to bone     Pancytopenia due to antineoplastic chemotherapy      59-year-old female with past medical history of small cell cancer of the  lung, COPD, hypertension, osteoarthritis, anxiety/depression, hyperlipidemia, 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

## 2025-06-23 NOTE — PROGRESS NOTES
[facility]   Hematology/Oncology  Progress Note    Patient Name: Lluvia Archer  : 1965  MRN: [id number]  Primary Care Physician:  Aleksandar Edge DO  Date of admission: 2025    Subjective   Subjective     Chief Complaint: low sodium, cytopenias    HPI:  Patient Reports feeling better. Nausea is better. She is still weak. Sodium level is better but still low. Does not need transfusion today. Plts low at 12K. No bleeding. No acute events overnight. No fevers or chills.     Review of Systems   All systems were reviewed and negative except for: as stated above    Objective   Objective     Vitals:   Temp:  [97.7 °F (36.5 °C)-98.4 °F (36.9 °C)] 98.4 °F (36.9 °C)  Heart Rate:  [] 104  Resp:  [15-18] 16  BP: (104-132)/(49-67) 115/67  Flow (L/min) (Oxygen Therapy):  [5] 5  Physical Exam    Constitutional: Awake, alert weak appearing female laying right lateral decubitus in bed   Eyes: PERRLA, sclerae anicteric, no conjunctival injection   HENT: NCAT, mucous membranes moist   Respiratory: nonlabored respirations    Cardiovascular: no edema in the extremities   Gastrointestinal:  nondistended   Musculoskeletal: Normal strength and tone, no joint swelling   Psychiatric: Appropriate affect, cooperative   Neurologic: Awake, alert, speech clear   Skin: Normal tone, no rashes    Result Review    Result Review:  I have personally reviewed the results from the time of this admission to 2025 13:03 EDT and agree with these findings:  [x]  Laboratory  []  Microbiology  []  Radiology  []  EKG/Telemetry   []  Cardiology/Vascular   []  Pathology  []  Old records  []  Other:  Most notable findings include: sodium low but better, anemia, leukopenia, thrombocytopenia, progress notes personally reviewed      Assessment & Plan   Assessment / Plan     Brief Patient Summary:  Lluvia Archer is a 59 y.o. female who has small cell lung cancer currently on chemotherapy with topotecan who presented with  hyponatremia and cytopenias.     Active Hospital Problems:  Active Hospital Problems    Diagnosis     **Hyponatremia     Hypotension     Cancer, metastatic to bone     Pancytopenia due to antineoplastic chemotherapy        Plan:   Small cell lung cancer  Pancytopenia secondary to chemotherapy  Patient is currently on palliative treatment with IV topotecan.  She received cycle 4-day 1 on 6/9/2025.  Patient's chemotherapy has already been dose reduced due to cytopenias previously.  Patient has poor bone marrow reserve due to accumulation of chemotherapy effects.  Certainly this is expected at this time in her chemo course to have cytopenias.  Would recommend as needed blood transfusion for hemoglobin less than 7 to 7.5 g/dL.  Would also recommend as needed platelet transfusion for platelet count less than 10,000 unless she has bleeding.  She has already received Neulasta with prior cycle therefore G-CSF is not indicated inpatient.       Continue to trend CBC with differential daily.  Labs stable 6/23/25. Can repeat tomorrow 6/24/25, if stable ok for discharge from oncology perspective. Will follow labs outpatient on twice weekly basis once discharged. Plan to repeat on 6/26/25.  She will need count recovery prior to initiation of next cycle of chemotherapy. Next cycle of chemotherapy likely will be dose reduced further even though we have already done a 33% dose reduction.      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  Patient on salt tabs and urea per nephrology. Also on fludrocortisone. She is also on fluid restriction.  It will be difficult to be compliant with fluid restriction on outpatient basis due to her frequent needs of IV fluids due to poor intake and low BP.  This will make her care more difficult. 6/23/25: Na levels improved.     Chemo nausea  Increased dose of IV Zofran to 8 mg as needed.  Continue Compazine 10 mg every 6 hours as needed.

## 2025-06-23 NOTE — PLAN OF CARE
Goal Outcome Evaluation:  Plan of Care Reviewed With: (P) patient        Progress: (P) no change  Outcome Evaluation: (P) Patient presents with limitations of balance, strength, and endurance impeding her ability to perform bed mobility, transfers, and gait tasks safely. Patient will benefit from skilled PT services to address these mobility deficits to increase maximum level of function prior to discharge.    Anticipated Discharge Disposition (PT): (P) home with home health

## 2025-06-23 NOTE — THERAPY EVALUATION
Acute Care - Physical Therapy Initial Evaluation  KETTY Mcclellan     Patient Name: Lluvia Archer  : 1965  MRN: [id number]  Today's Date: 2025      Visit Dx:     ICD-10-CM ICD-9-CM   1. Hyponatremia  E87.1 276.1   2. Difficulty walking  R26.2 719.7   3. Decreased activities of daily living (ADL)  Z78.9 V49.89   4. Dehydration  E86.0 276.51     Patient Active Problem List   Diagnosis    Abnormal mammogram    Anxiety    Arthritis    Chronic nausea    Chronic obstructive pulmonary disease (COPD)    Counseling on substance use and abuse    Esophageal reflux    Hernia, hiatal    Hyperlipidemia    Hypertension    Knee pain, right    Memory loss    Migraine    Moderate episode of recurrent major depressive disorder    Night sweats    Numbness    Sciatica of right side    Severe episode of recurrent major depressive disorder, without psychotic features    Shoulder pain, left    Other diseases of nasal cavity and sinuses    Sleep apnea, unspecified    Tobacco abuse    Strain of groin, right, subsequent encounter    Osteoarthritis of spine with radiculopathy, lumbar region    Chronic right-sided low back pain with right-sided sciatica    Aftercare following right hip joint replacement surgery    Primary osteoarthritis of right hip    Right hip pain    Sepsis, due to unspecified organism, unspecified whether acute organ dysfunction present    Severe malnutrition    Pneumonia of right lower lobe due to infectious organism    Hospital discharge follow-up    Other specified anemias    Encounter for screening mammogram for malignant neoplasm of breast    Multifocal pneumonia    Acute on chronic respiratory failure with hypoxia    Medicare annual wellness visit, subsequent    Community acquired pneumonia    Pneumonia due to infectious organism, unspecified laterality, unspecified part of lung    Mediastinal adenopathy    Small cell lung cancer    Tobacco abuse, in remission    Chronic respiratory failure with hypoxia     Lung nodules    Encounter for adjustment or management of vascular access device    Dehydration    Intractable nausea and vomiting    Pancytopenia due to antineoplastic chemotherapy    Thrombocytopenia    Cancer, metastatic to bone    CAP (community acquired pneumonia)    Encounter for long-term (current) use of high-risk medication    Secondary malignant neoplasm of brain    Hyperkalemia    Hyponatremia    Hypotension     Past Medical History:   Diagnosis Date    Abnormal mammogram     PT DENIES KNOWING OF THIS HX    Anxiety     Arthritis     R SHOULDER, R HIP, AND R LEG    Cancer     LUNG    Chronic nausea 01/15/2019    COPD (chronic obstructive pulmonary disease)     O2 3 LITERS NC CONT    Hernia, hiatal     Hyperlipidemia     Hypertension     Knee pain, right 2018    Memory loss     FORGETFULNESS ? ETIOLOGY    Migraine headache     Moderate episode of recurrent major depressive disorder 2017    Night sweats     Sciatica of right side 2017    Severe episode of recurrent major depressive disorder, without psychotic features 10/22/2019    Shingles     OUTBREAK 24 ON ANTIVIRAL , WHELPS NOTED NO SORES.    Shoulder pain, left 2018     Past Surgical History:   Procedure Laterality Date    BRONCHOSCOPY N/A 2022    Procedure: BRONCHOSCOPY WITH BRONCHOALVEOLAR LAVAGE, BIOPSY;  Surgeon: Shin Mcdonald DO;  Location: [facility] ENDOSCOPY;  Service: Pulmonary;  Laterality: N/A;  RIGHT LOWER LOBE INFILTRATE    BRONCHOSCOPY N/A 2024    Procedure: BRONCHOSCOPY WITH ENDOBRONCHIAL ULTRASOUND AND FINE NEEDLE ASPIRATE;  Surgeon: Shin Mcdonald DO;  Location: [facility] ENDOSCOPY;  Service: Pulmonary;  Laterality: N/A;  MEDIASTINAL ADENOPATHY     SECTION      CHOLECYSTECTOMY      LAPAROSCOPIC    COLONOSCOPY      PORTACATH PLACEMENT Left 2024    Procedure: INSERTION OF PORTACATH;  Surgeon: Josh Patton MD;  Location: [facility] OR [facility];  Service: General;   Laterality: Left;    TOTAL HIP ARTHROPLASTY Right 5/13/2022    Procedure: RIGHT TOTAL HIP ARTHROPLASTY;  Surgeon: Cecil Gomes MD;  Location: [facility] MAIN OR;  Service: Orthopedics;  Laterality: Right;     PT Assessment (Last 12 Hours)       PT Evaluation and Treatment       Row Name 06/23/25 1034          Physical Therapy Time and Intention    Subjective Information no complaints (P)   -     Document Type evaluation (P)   -     Mode of Treatment individual therapy;physical therapy (P)   -LC     Patient Effort good (P)   -LC     Symptoms Noted During/After Treatment fatigue (P)   -LC       Row Name 06/23/25 1034          General Information    Patient Profile Reviewed yes (P)   -LC     Patient Observations cooperative;alert;agree to therapy (P)   -     Prior Level of Function independent:;all household mobility;gait;transfer;bed mobility (P)   Patient lives at home with spouse which assists if needed, uses no AD, has 3L continuous O2. Patient has 3 steps to enter home and 0 steps inside.  -     Equipment Currently Used at Home oxygen (P)   3L  -     Existing Precautions/Restrictions oxygen therapy device and L/min;fall (P)   -       Row Name 06/23/25 1034          Living Environment    Current Living Arrangements home (P)   -     Home Accessibility stairs to enter home (P)   -     People in Home spouse (P)   -     Primary Care Provided by self (P)   Spouse available if needed  -       Row Name 06/23/25 1034          Home Main Entrance    Number of Stairs, Main Entrance three (P)   -       Row Name 06/23/25 1034          Home Use of Assistive/Adaptive Equipment    Equipment Currently Used at Home oxygen (P)   -       Row Name 06/23/25 1034          Pain    Additional Documentation Pain Scale: FACES Pre/Post-Treatment (Group) (P)   -LC       Row Name 06/23/25 1034          Pain Scale: FACES Pre/Post-Treatment    Pain: FACES Scale, Pretreatment 0-->no hurt (P)   -     Posttreatment Pain  Rating 0-->no hurt (P)   -       Row Name 06/23/25 1034          Cognition    Affect/Mental Status (Cognition) WFL (P)   -     Orientation Status (Cognition) -- (P)   Patient is alert, pleasant, and cooperative with therapy. Patient is able to provide the information for the subjective portion of the eval.  -     Follows Commands (Cognition) WFL (P)   -     Cognitive Function (Cognition) WFL (P)   -     Personal Safety Interventions gait belt;nonskid shoes/slippers when out of bed (P)   -       Row Name 06/23/25 1034          Range of Motion (ROM)    Range of Motion ROM is WFL (P)   -       Row Name 06/23/25 1034          Bed Mobility    Bed Mobility bed mobility (all) activities (P)   -     All Activities, [place] (Bed Mobility) standby assist (P)   -       Row Name 06/23/25 1034          Transfers    Transfers sit-stand transfer;stand-sit transfer (P)   -       Row Name 06/23/25 1034          Sit-Stand Transfer    Sit-Stand [place] (Transfers) standby assist (P)   -     Assistive Device (Sit-Stand Transfers) walker, front-wheeled (P)   -       Row Name 06/23/25 1034          Stand-Sit Transfer    Stand-Sit [place] (Transfers) standby assist (P)   -     Assistive Device (Stand-Sit Transfers) walker, front-wheeled (P)   -[facility] Name 06/23/25 1034          Gait/Stairs (Locomotion)    Gait/Stairs Locomotion gait/ambulation independence;gait/ambulation assistive device;distance ambulated (P)   -     [place] Level (Gait) contact guard (P)   -     Assistive Device (Gait) walker, front-wheeled (P)   -     Distance in Feet (Gait) 30 (P)   -     Pattern (Gait) step-to (P)   -     Deviations/Abnormal Patterns (Gait) base of support, narrow;stride length decreased;weight shifting decreased;gait speed decreased (P)   -     Bilateral Gait Deviations forward flexed posture (P)   -       Row Name 06/23/25 1034          Safety Issues/Impairments Affecting Functional  Mobility    Impairments Affecting Function (Mobility) balance;endurance/activity tolerance;strength (P)   -LC       Row Name 06/23/25 1034          Balance    Balance Assessment standing dynamic balance (P)   -LC     Dynamic Standing Balance contact guard (P)   -LC     Position/Device Used, Standing Balance supported;walker, front-wheeled (P)   -LC       Row Name             Wound 06/19/25 1600 Right proximal knee Other (Comments)    Wound - Properties Group Placement Date: 06/19/25  -TL Placement Time: 1600  -TL Present on Original Admission: Y  -TL Side: Right  -TL Orientation: proximal  -TL Location: knee  -TL Primary Wound Type: Other  -TL, abrasion     Retired Wound - Properties Group Placement Date: 06/19/25  -TL Placement Time: 1600  -TL Present on Original Admission: Y  -TL Side: Right  -TL Orientation: proximal  -TL Location: knee  -TL    Retired Wound - Properties Group Placement Date: 06/19/25  -TL Placement Time: 1600  -TL Present on Original Admission: Y  -TL Side: Right  -TL Orientation: proximal  -TL Location: knee  -TL    Retired Wound - Properties Group Date first assessed: 06/19/25  -TL Time first assessed: 1600  -TL Present on Original Admission: Y  -TL Side: Right  -TL Location: knee  -TL      Row Name             Wound 06/19/25 1600 Bilateral groin     Wound - Properties Group Placement Date: 06/19/25  -TL Placement Time: 1600  -TL Present on Original Admission: Y  -TL Side: Bilateral  -TL, mostly right side  Location: groin  -TL Primary Wound Type: --  -TL, rash, moist     Retired Wound - Properties Group Placement Date: 06/19/25  -TL Placement Time: 1600  -TL Present on Original Admission: Y  -TL Side: Bilateral  -TL, mostly right side  Location: groin  -TL    Retired Wound - Properties Group Placement Date: 06/19/25  -TL Placement Time: 1600  -TL Present on Original Admission: Y  -TL Side: Bilateral  -TL, mostly right side  Location: groin  -TL    Retired Wound - Properties Group Date first  assessed: 06/19/25  -TL Time first assessed: 1600  -TL Present on Original Admission: Y  -TL Side: Bilateral  -TL, mostly right side  Location: groin  -TL      Row Name 06/23/25 1034          Plan of Care Review    Plan of Care Reviewed With patient (P)   -LC     Progress no change (P)   -LC     Outcome Evaluation Patient presents with limitations of balance, strength, and endurance impeding her ability to perform bed mobility, transfers, and gait tasks safely. Patient will benefit from skilled PT services to address these mobility deficits to increase maximum level of function prior to discharge. (P)   -LC       Row Name 06/23/25 1034          Vital Signs    O2 Delivery Pre Treatment nasal cannula (P)   -LC     O2 Delivery Intra Treatment nasal cannula (P)   -LC     O2 Delivery Post Treatment nasal cannula (P)   -LC     Pre Patient Position Supine (P)   -LC     Intra Patient Position Standing (P)   -LC     Post Patient Position Sitting (P)   -LC       Row Name 06/23/25 1034          Positioning and Restraints    Pre-Treatment Position in bed (P)   -LC     Post Treatment Position bed (P)   -LC     In Bed sitting;call light within reach;encouraged to call for assist (P)   No alarms active upon therapist entry  -       Row Name 06/23/25 1034          Therapy Assessment/Plan (PT)    Rehab Potential (PT) good (P)   -     Criteria for Skilled Interventions Met (PT) yes;meets criteria;skilled treatment is necessary (P)   -LC     Therapy Frequency (PT) daily (P)   -LC     Predicted Duration of Therapy Intervention (PT) 10 days (P)   -LC     Problem List (PT) problems related to;balance;mobility;strength (P)   -     Activity Limitations Related to Problem List (PT) unable to ambulate safely;unable to transfer safely (P)   -LC       Row Name 06/23/25 1034          Therapy Plan Review/Discharge Plan (PT)    Therapy Plan Review (PT) evaluation/treatment results reviewed;patient (P)   -       Row Name 06/23/25 1030           Physical Therapy Goals    Bed Mobility Goal Selection (PT) bed mobility, PT goal 1 (P)   -LC     Transfer Goal Selection (PT) transfer, PT goal 1 (P)   -     Gait Training Goal Selection (PT) gait training, PT goal 1 (P)   -       Row Name 06/23/25 1034          Bed Mobility Goal 1 (PT)    Activity/Assistive Device (Bed Mobility Goal 1, PT) bed mobility activities, all (P)   -     [place] Level/Cues Needed (Bed Mobility Goal 1, PT) independent (P)   -LC     Time Frame (Bed Mobility Goal 1, PT) 10 days (P)   -       Row Name 06/23/25 1034          Transfer Goal 1 (PT)    Activity/Assistive Device (Transfer Goal 1, PT) sit-to-stand/stand-to-sit;walker, rolling (P)   -     [place] Level/Cues Needed (Transfer Goal 1, PT) modified independence (P)   -LC     Time Frame (Transfer Goal 1, PT) 10 days (P)   -       Row Name 06/23/25 1034          Gait Training Goal 1 (PT)    Activity/Assistive Device (Gait Training Goal 1, PT) gait (walking locomotion);assistive device use;walker, rolling (P)   -     [place] Level (Gait Training Goal 1, PT) modified independence (P)   -LC     Distance (Gait Training Goal 1, PT) 300 (P)   -LC     Time Frame (Gait Training Goal 1, PT) 10 days (P)   -               User Key  (r) = Recorded By, (t) = Taken By, (c) = Cosigned By      Initials Name Provider Type    Riana Cutler RN Registered Nurse    Monica Dacosta, PT Student PT Student                    Physical Therapy Education       Title: PT OT SLP Therapies (In Progress)       Topic: Physical Therapy (Done)       Point: Mobility training (Done)       Learning Progress Summary            Patient Acceptance, E,TB, VU by TAYLA at 6/23/2025 1058                      Point: Home exercise program (Done)       Learning Progress Summary            Patient Acceptance, E,TB, VU by TAYLA at 6/23/2025 1058                      Point: Body mechanics (Done)       Learning Progress Summary            Patient  Acceptance, E,TB, VU by  at 6/23/2025 1058                      Point: Precautions (Done)       Learning Progress Summary            Patient Acceptance, E,TB, VU by  at 6/23/2025 1058                                      User Key       Initials Effective Dates Name Provider Type Discipline     05/28/25 -  Monica Maria, PT Student PT Student PT                  PT Recommendation and Plan  Anticipated Discharge Disposition (PT): (P) home with home health  Planned Therapy Interventions (PT): (P) balance training, bed mobility training, gait training, home exercise program, patient/family education, strengthening, transfer training  Therapy Frequency (PT): (P) daily  Plan of Care Reviewed With: (P) patient  Progress: (P) no change  Outcome Evaluation: (P) Patient presents with limitations of balance, strength, and endurance impeding her ability to perform bed mobility, transfers, and gait tasks safely. Patient will benefit from skilled PT services to address these mobility deficits to increase maximum level of function prior to discharge.   Outcome Measures       Row Name 06/23/25 1000             How much help from another person do you currently need...    Turning from your back to your side while in flat bed without using bedrails? 3 (P)   -LC      Moving from lying on back to sitting on the side of a flat bed without bedrails? 3 (P)   -LC      Moving to and from a bed to a chair (including a wheelchair)? 3 (P)   -LC      Standing up from a chair using your arms (e.g., wheelchair, bedside chair)? 3 (P)   -LC      Climbing 3-5 steps with a railing? 3 (P)   -LC      To walk in hospital room? 3 (P)   -      AM-PAC 6 Clicks Score (PT) 18 (P)   -         Functional Assessment    Outcome Measure Options AM-PAC 6 Clicks Basic Mobility (PT) (P)   -                User Key  (r) = Recorded By, (t) = Taken By, (c) = Cosigned By      Initials Name Provider Type    Monica Dacosta, PT Student PT  Student                     Time Calculation:    PT Charges       Row Name 06/23/25 1034             Time Calculation    PT Received On 06/23/25 (P)   -LC      PT Goal Re-Cert Due Date 07/02/25 (P)   -LC         Untimed Charges    PT Eval/Re-eval Minutes 35 (P)   -LC         Total Minutes    Untimed Charges Total Minutes 35 (P)   -LC       Total Minutes 35 (P)   -LC                User Key  (r) = Recorded By, (t) = Taken By, (c) = Cosigned By      Initials Name Provider Type    LC Monica Maria, PT Student PT Student                  Therapy Charges for Today       Code Description Service Date Service Provider Modifiers Qty    [id number] HC PT EVAL LOW COMPLEXITY 3 6/23/2025 Monica Maria, PT Student GP 1            PT G-Codes  Outcome Measure Options: AM-PAC 6 Clicks Daily Activity (OT), Optimal Instrument  AM-PAC 6 Clicks Score (PT): (P) 18  AM-PAC 6 Clicks Score (OT): 20    Monica James PT Student  6/23/2025

## 2025-06-23 NOTE — TELEPHONE ENCOUNTER
TRIED REACHING PATIENT IN REGARDS TO MOVING OF APPOINTMENT DUE TO HOSPITALIZATION. WE MOVED APPOINTMENT PER DR RODARTE TO THURSDAY AT 1:30 PM. WILL CONTINUE TO REACH OUT TO PATIENT.

## 2025-06-23 NOTE — PLAN OF CARE
Goal Outcome Evaluation:  Plan of Care Reviewed With: patient        Progress: no change  Outcome Evaluation: Patient presents with limitations in self-care, functional transfers, balance, endurance, and BUE strength. She would benefit from continued skilled occupational therapy services to maximize independence with ADLs/functional transfers.    Anticipated Discharge Disposition (OT): home with home health, home with assist

## 2025-06-23 NOTE — PROGRESS NOTES
[facility]   Hospitalist Progress Note  Date: 2025  Patient Name: Lluvia Archer  : 1965  MRN: [id number]  Date of admission: 2025  Room/Bed: [facility]      Subjective   Subjective     Chief Complaint: Abnormal lab    Summary:Lluvia Archer is a 59 y.o. female with COPD on 3 L of home oxygen, small cell lung cancer, hypertension, migraine headaches, SIADH, and chronic pain who presents to the emergency department due to abnormal labs.  Patient last had chemotherapy on .  Follow-up with her primary doctor found her sodium to be 120 and platelets to be 12 for which she presented to the hospital.  Patient admitted for severe thrombocytopenia and hyponatremia.  Nephrology consulted.  Oncology consulted.  So far patient has received 2 platelet transfusions and 2 packed red blood cell transfusions.    Interval Followup:   Patient is without new complaints.  Per Dr. Dickson her oncologist, patient can discharge home tomorrow if her counts are stable.    All systems reviewed and negative except for what is outlined above.      Objective   Objective     Vitals:   Temp:  [97.7 °F (36.5 °C)-98.6 °F (37 °C)] 98.6 °F (37 °C)  Heart Rate:  [] 85  Resp:  [15-18] 16  BP: (104-148)/(49-80) 148/80  Flow (L/min) (Oxygen Therapy):  [5] 5    Physical Exam   General: NAD, chronically ill-appearing, sleeping  Pulmonary: Normal work of breathing  Cardiovascular: Normal S1, S2  GI: Abdomen is soft and nontender        Result Review    Result Review:  I have personally reviewed these results:  [x]  Laboratory      Lab 25  0320 25  1117 25  0358 25  1543 25  0339 25  0335 25  1617 25  1049 25  1413   WBC 1.27* 1.33* 1.66*   < > 2.22*  --   --  2.22* 1.22*   HEMOGLOBIN 8.4* 8.0* 8.3*   < > 6.2*  --   --  7.5* 8.5*   HEMATOCRIT 25.3* 23.6* 24.1*   < > 18.7*  --   --  22.1* 24.7*   PLATELETS 12* 19* 8*   < > 20*  --   --  4* 12*   NEUTROS ABS  --   --   --    --  0.75*  --   --  0.88* 0.34*   IMMATURE GRANS (ABS)  --   --   --   --  0.15*  --   --  0.06* 0.00   LYMPHS ABS  --   --   --   --  1.22  --   --  1.17 0.83   MONOS ABS  --   --   --   --  0.08*  --   --  0.09* 0.05*   EOS ABS  --   --   --   --  0.00  --   --  0.00 0.00   MCV 92.3 91.5 89.9   < > 93.0  --   --  92.1 92.5   PROCALCITONIN  --   --   --   --   --  0.07 0.08  --   --     < > = values in this interval not displayed.         Lab 06/23/25  1104 06/23/25  0750 06/23/25  0320 06/20/25  1924 06/20/25  1543 06/20/25  1039 06/20/25  0335   SODIUM 125* 124* 126*   < > 121* 119* 122*   POTASSIUM 4.1 4.3 4.0   < > 4.3 4.1 3.9   CHLORIDE 93* 90* 91*   < > 91* 90* 89*   CO2 23.9 22.4 24.3   < > 21.0* 22.4 23.1   ANION GAP 8.1 11.6 10.7   < > 9.0 6.6 9.9   BUN 20.9* 8.6 9.2   < > 16.1 4.5* 5.2*   CREATININE 0.49* 0.45* 0.50*   < > 0.50* 0.43* 0.48*   EGFR 109.4 111.7 108.9   < > 108.9 112.9 109.9   GLUCOSE 113* 85 84   < > 81 84 95   CALCIUM 7.2* 7.3* 7.5*   < > 6.9* 6.9* 6.8*   MAGNESIUM  --   --   --   --  1.7 1.6 1.7   TSH  --   --   --   --   --  0.998  --     < > = values in this interval not displayed.         Lab 06/20/25  0335 06/19/25  1049 06/17/25  1413   TOTAL PROTEIN 4.9* 6.1 5.7*   ALBUMIN 3.1* 3.4* 3.5   GLOBULIN 1.8 2.7 2.2   ALT (SGPT) 6 6 10   AST (SGOT) 6 6 8   BILIRUBIN 0.2 0.4 0.6   ALK PHOS 59 73 77         Lab 06/19/25  1338 06/19/25  1049   HSTROP T 17* 19*             Lab 06/20/25  0426   ABO TYPING O   RH TYPING Positive   ANTIBODY SCREEN Negative         Brief Urine Lab Results  (Last result in the past 365 days)        Color   Clarity   Blood   Leuk Est   Nitrite   Protein   CREAT   Urine HCG        06/20/25 0521 Yellow   Clear   Negative   Negative   Negative   Trace                 [x]  Microbiology   Microbiology Results (last 10 days)       ** No results found for the last 240 hours. **          [x]  Radiology  XR Chest PA & Lateral  Result Date: 6/19/2025  Impression: 1.Small  bilateral pleural effusions. 2.Slight increase in left basilar opacity, atelectasis versus pneumonia. Electronically Signed: Bill Cope MD  6/19/2025 4:35 PM EDT  Workstation ID: [id number]    XR Chest 1 View  Result Date: 6/19/2025  Impression: Minimal residual atelectasis or airspace disease in the bases. Electronically Signed: Luca Brewster MD  6/19/2025 12:36 PM EDT  Workstation ID: [id number]    []  EKG/Telemetry   []  Cardiology/Vascular   []  Pathology  []  Old records  []  Other:    Assessment & Plan        Assessment and Plan:    Acute hyponatremia sodium most likely due to SIADH  Small cell lung cancer  Severe thrombocytopenia without bleeding  Depression/anxiety  COPD on home oxygen       Plan:  -- fluid restriction of 1500 cc  -- Hold Lexapro and olanzapine as these could both exacerbate SIADH  -- Nephrology consulted, appreciate recommendations  --Urea and salt tab  --Oncology consulted, appreciate recommendation  --Hemoglobin greater than 7, platelets greater than 10 unless there is bleeding  --Fludrocortisone  --Gabapentin  --Midodrine  -- MiraLAX and senna  -- Marinol for appetite stimulation       If patient's blood counts remained stable she can discharge home tomorrow per oncology     VTE Prophylaxis:  SCDs due to thrombocytopenia       Discussed with RN.    VTE Prophylaxis:  Mechanical VTE prophylaxis orders are present.        CODE STATUS:   Code Status (Patient has no pulse and is not breathing): CPR (Attempt to Resuscitate)  Medical Interventions (Patient has pulse or is breathing): Full Support  Level Of Support Discussed With: Patient      Electronically signed by Tyson Barrera MD, 6/23/2025, 15:43 EDT.

## 2025-06-24 NOTE — PROCEDURES
Bronchoscopy Procedure Note    Pre-op Diagnosis: Acute hypoxemic respiratory failure      Surgeon: Eric Borrego MD      Sedation/Analgesia: etomidate and fentanyl,  neuromuscular relaxation rocuronium    Procedure Details  Informed consent was not  obtained for the procedure, given the emergent nature of the procedure.    Time-Out Process was  performed, verified patient identification, verified procedure,  verified correct patient position, special equipment  available.    The bronchoscope was advanced through the  bronch adapter and the ET tube into the trachea.    Endotracheal Tube: patent, clear ofsecretions        Trachea: appeared normal      The reagan was sharp and erythematous.  Diffuse inflamed mucosa noticed on airway surveillance of all lung lobes predominantly on the right side.  Right lower lobe BAL showed bloody secretions.  Sequential aliquots are collected and noticed worsening of bloody secretions on collected samples, concerning for  alveolar hemorrhage.  BAL sample sent for cell counts on all 3 samples, culture and sensitivity, cytology, AFB culture, respiratory viral panel, bacterial panel, PJP, galactomannan.        Complications: None; patient tolerated the procedure well.    Condition: Critically stable.           RLL BAL               6/24/2025

## 2025-06-24 NOTE — PROCEDURES
Procedure Name: Intubation     Indication: Respiratory failure, septic shock    Consent obtained:     Timeout: Preformed with nursing staff prior to beginning procedure.     Medications used:  Pre-medications: fentanyl 50 mg ,Etomidate 20 mg IV  Paralytics: Rocuronium 100 mg IV    Procedure: Patient was placed in the supine position. Patient preoxygenated with 100% O2 via BMV. RSI was initiated.  Glidescope was then used to visualize the vocal cords as 8.0 ET tube was placed in between the cords without difficulty.  Position was verified by auscultation, condensate noted in ET tube and with Etco2 monitoring. Device was secured at 23 cm at lip. Patient connected to the vent.     This x-ray was ordered to verify correct tube placement.    The patient tolerated the procedure well.    Complications: None    Electronically signed by SANDRO Yi, 06/24/25, 5:33 PM EDT.     I was present during the entire procedure.  Eric Borrego MD

## 2025-06-24 NOTE — SIGNIFICANT NOTE
06/24/25 1141   OTHER   Discipline occupational therapist   Rehab Time/Intention   Session Not Performed unable to treat, medical status change  (Hold, stroke RRT called)

## 2025-06-24 NOTE — PLAN OF CARE
Goal Outcome Evaluation:   Patient brought to floor from RRT call. Found to have 103.6 rectal temp. IV tylenol given and brought to CCU. Sepsis workup done. 1500 cc LR given per MD. Patient became hypoxic, hypotensive, and agitated, placed on bipap.  ICU MD decided to intubate. OG placed. See sedation charting. Prop, Fent, and levo titrated per orders. Port accessed. Temp sensing johnson placed. Cooling blanket under patient. Spouse called for blood consent, 1 unit of platelets given. Second unit has been ordered. Kenyatta Burnham RN

## 2025-06-24 NOTE — CONSULTS
Intensive Care Admission Note     Acute encephalopathy, severe sepsis with shock, pancytopenia    History of Present Illness     59 female with history of small cell lung cancer with metastasis on chemotherapy, COPD, hypertension, anxiety disorder, hyperlipidemia, SIADH, pancytopenia, admitted to the hospital for management of severe hyponatremia and thrombocytopenia.  Oncology and nephrology were following patient on the floor.  She was treated for SIADH with fluid restriction, salt and urea tabs and replaced 2 units PRBC and 2 units platelets for severe anemia and thrombocytopenia.  Per reports she has been lethargic for the last couple of days but today she has been more encephalopathic and stroke alert was called this afternoon.  She had CT head and CTA done with initial evidence of only chronic appearing lacunar infarct in the left internal capsule.  She is moved to the ICU with persistent encephalopathy and hypotension with tachycardia.    Problem List, Surgical History, Family, Social History, and ROS     Past Medical History:   Diagnosis Date    Abnormal mammogram     PT DENIES KNOWING OF THIS HX    Anxiety     Arthritis     R SHOULDER, R HIP, AND R LEG    Cancer     LUNG    Chronic nausea 01/15/2019    COPD (chronic obstructive pulmonary disease)     O2 3 LITERS NC CONT    Hernia, hiatal     Hyperlipidemia     Hypertension     Knee pain, right 08/30/2018    Memory loss     FORGETFULNESS ? ETIOLOGY    Migraine headache     Moderate episode of recurrent major depressive disorder 05/22/2017    Night sweats     Sciatica of right side 08/18/2017    Severe episode of recurrent major depressive disorder, without psychotic features 10/22/2019    Shingles     OUTBREAK 6/11/24 ON ANTIVIRAL , WHELPS NOTED NO SORES.    Shoulder pain, left 08/30/2018      Past Surgical History:   Procedure Laterality Date    BRONCHOSCOPY N/A 04/12/2022    Procedure: BRONCHOSCOPY WITH BRONCHOALVEOLAR LAVAGE, BIOPSY;  Surgeon: Tamara  Shin Lynn DO;  Location: [facility] ENDOSCOPY;  Service: Pulmonary;  Laterality: N/A;  RIGHT LOWER LOBE INFILTRATE    BRONCHOSCOPY N/A 2024    Procedure: BRONCHOSCOPY WITH ENDOBRONCHIAL ULTRASOUND AND FINE NEEDLE ASPIRATE;  Surgeon: Shin Mcdonald DO;  Location: [facility] ENDOSCOPY;  Service: Pulmonary;  Laterality: N/A;  MEDIASTINAL ADENOPATHY     SECTION      CHOLECYSTECTOMY      LAPAROSCOPIC    COLONOSCOPY      PORTACATH PLACEMENT Left 2024    Procedure: INSERTION OF PORTACATH;  Surgeon: Josh Patton MD;  Location: [facility] OR OSC;  Service: General;  Laterality: Left;    TOTAL HIP ARTHROPLASTY Right 2022    Procedure: RIGHT TOTAL HIP ARTHROPLASTY;  Surgeon: Cecil Gomes MD;  Location: [facility] MAIN OR;  Service: Orthopedics;  Laterality: Right;       No Known Allergies  No current facility-administered medications on file prior to encounter.     Current Outpatient Medications on File Prior to Encounter   Medication Sig    albuterol (ACCUNEB) 0.63 MG/3ML nebulizer solution Take 3 mL by nebulization Every 6 (Six) Hours As Needed for Wheezing or Shortness of Air.    albuterol sulfate  (90 Base) MCG/ACT inhaler Inhale 2 puffs Every 4 (Four) Hours As Needed for Wheezing or Shortness of Air.    calcium carbonate (Tums) 500 MG chewable tablet Chew 2 tablets Daily.    dexAMETHasone (DECADRON) 4 MG tablet Take 1 tablet by mouth 2 (Two) Times a Day With Meals.    escitalopram (LEXAPRO) 20 MG tablet Take 1 tablet by mouth Daily.    famotidine (PEPCID) 40 MG tablet TAKE ONE TABLET BY MOUTH TWICE DAILY @ 9AM & 5PM (Patient taking differently: Take 1 tablet by mouth 2 (Two) Times a Day.)    Fluticasone-Umeclidin-Vilant (Trelegy Ellipta) 200-62.5-25 MCG/ACT inhaler Inhale 1 puff Daily.    furosemide (LASIX) 20 MG tablet TAKE 1 TABLET BY MOUTH DAILY    gabapentin (NEURONTIN) 100 MG capsule Take 2 capsules by mouth 3 (Three) Times a Day.    hydrOXYzine (ATARAX) 25 MG tablet TAKE 1  TABLET BY MOUTH THREE TIMES A DAY AS NEEDED FOR ITCHING (Patient taking differently: Take 1 tablet by mouth 3 (Three) Times a Day As Needed.)    ipratropium-albuterol (DUO-NEB) 0.5-2.5 mg/3 ml nebulizer Take 3 mL by nebulization 4 (Four) Times a Day.    Melatonin 10 MG tablet Take 1 tablet by mouth At Night As Needed.    nystatin (MYCOSTATIN) 100,000 unit/mL suspension Swish and swallow 5 mL 4 (Four) Times a Day.    OLANZapine (zyPREXA) 5 MG tablet Take 1 tablet by mouth Every Night.    ondansetron ODT (ZOFRAN-ODT) 8 MG disintegrating tablet Place 1 tablet on the tongue Every 8 (Eight) Hours As Needed for Nausea or Vomiting.    oxyCODONE-acetaminophen (PERCOCET)  MG per tablet Take 1 tablet by mouth Every 4 (Four) Hours As Needed for Moderate Pain.    potassium chloride (MICRO-K) 10 MEQ CR capsule Take 2 capsules by mouth Daily.    atorvastatin (LIPITOR) 10 MG tablet Take 1 tablet by mouth Every Night. (Patient not taking: Reported on 6/9/2025)    buPROPion SR (WELLBUTRIN SR) 150 MG 12 hr tablet Take 1 tablet by mouth 2 (Two) Times a Day. (Patient not taking: Reported on 6/9/2025)    fludrocortisone 0.1 MG tablet Take 1 tablet by mouth Daily for 30 days.    lisinopril (PRINIVIL,ZESTRIL) 5 MG tablet TAKE ONE TABLET BY MOUTH DAILY AT 9 AM (Patient not taking: Reported on 6/9/2025)    Magnesium 400 MG tablet Take 400 mg by mouth Daily. (Patient not taking: Reported on 6/9/2025)    midodrine (PROAMATINE) 10 MG tablet Take 1 tablet by mouth 3 (Three) Times a Day Before Meals for 30 days.    naloxone (NARCAN) 4 MG/0.1ML nasal spray CALL 911. DON'T PRIME. SPRAY IN 1 NOSTRIL FOR OVERDOSE. REPEAT IN 2-3 MINUTES IN OTHER NOSTRIL IF NO OR MINIMAL BREATHING/RESPONSIVENESS. (Patient taking differently: Administer 1 spray into the nostril(s) as directed by provider As Needed for Opioid Reversal or Respiratory Depression. Call 911. Don't prime. [place] in 1 nostril for overdose. Repeat in 2-3 minutes in other nostril if no or  "minimal breathing/responsiveness.)    prochlorperazine (COMPAZINE) 10 MG tablet Take 1 tablet by mouth Every 6 (Six) Hours As Needed for Nausea. (Patient not taking: Reported on 6/9/2025)     MEDICATION LIST AND ALLERGIES REVIEWED.    Family History   Problem Relation Age of Onset    Other Mother         BLOOD DISEASE    Arthritis Mother     Heart disease Father     Hypertension Other     Cancer Other     Heart disease Other     Malig Hyperthermia Neg Hx      Social History     Tobacco Use    Smoking status: Every Day     Current packs/day: 1.00     Average packs/day: 1 pack/day for 47.5 years (47.5 ttl pk-yrs)     Types: Cigarettes     Start date: 1978     Passive exposure: Past    Smokeless tobacco: Never   Vaping Use    Vaping status: Former    Substances: Nicotine, Flavoring    Devices: Disposable   Substance Use Topics    Alcohol use: Never    Drug use: Never     Social History     Social History Narrative    Not on file     FAMILY AND SOCIAL HISTORY REVIEWED.    Review of Systems  Unable to get review of systems given current mental status    Physical Exam and Clinical Information   BP (!) 61/50   Pulse (!) 121   Temp (!) 102.2 °F (39 °C) (Rectal)   Resp 18   Ht 162.6 cm (64.02\")   Wt 80.5 kg (177 lb 7.5 oz)   LMP  (LMP Unknown)   SpO2 99%   BMI 30.45 kg/m²     Physical exam  General : Aox0.  Appears lethargic and confused.  In no acute distress  Neuro: Opens eyes spontaneously but does not follow commands.  HEENT : AT,NC,PERRLA,+ pallor, No Icterus,No thyromegaly. No JVD.  CVS : Tachycardic, systolic murmur present grade 3,  Resp/Chest: B/l ant VBS+.  Bilateral right greater than left rhonchi present.  Abd: Soft, Non distended, No tenderness, No organomegaly. Bowel sounds +  EXT: NO edema.   SKIN : NO palpable skin lesions/rashes noted.    Results from last 7 days   Lab Units 06/24/25  1231 06/24/25  0320 06/23/25  0320   WBC 10*3/mm3 0.98* 1.81* 1.27*   HEMOGLOBIN g/dL 8.8* 8.2* 8.4*   PLATELETS " 10*3/mm3 7* 12* 12*     Results from last 7 days   Lab Units 06/24/25  1231 06/24/25  0747 06/24/25  0320 06/20/25  1924 06/20/25  1543 06/20/25  1039   SODIUM mmol/L 130*  130* 130* 129*   < > 121* 119*   POTASSIUM mmol/L 4.8  4.8 4.4 4.4   < > 4.3 4.1   CO2 mmol/L 24.2  24.2 24.3 21.8*   < > 21.0* 22.4   BUN mg/dL 14.7  14.7 17.6 23.2*   < > 16.1 4.5*   CREATININE mg/dL 0.60  0.60 0.45* 0.47*   < > 0.50* 0.43*   MAGNESIUM mg/dL 1.8  --   --   --  1.7 1.6   GLUCOSE mg/dL 133*  133* 108* 95   < > 81 84    < > = values in this interval not displayed.     Estimated Creatinine Clearance: 104.9 mL/min (by C-G formula based on SCr of 0.6 mg/dL).      Results from last 7 days   Lab Units 06/24/25  1155   PH, ARTERIAL pH units 7.361   PCO2, ARTERIAL mm Hg 44.3   PO2 ART mm Hg 88.7     Lab Results   Component Value Date    LACTATE 1.6 06/24/2025        Images: X-ray chest personally reviewed with evidence of bilateral pulmonary congestion.  No lobar pneumonia noted    I reviewed the patient's results and images.     Impression     Hyponatremia    Pancytopenia due to antineoplastic chemotherapy    Cancer, metastatic to bone    Hypotension    Plan/Recommendations     Acute encephalopathy  Severe sepsis with shock  Febrile neutropenia  Pancytopenia 2/2 cancer chemotherapy  Small cell lung cancer with metastasis  Hyponatremia 2/2 SIADH  H/o COPD on baseline 3 L oxygen    -Acute encephalopathy likely secondary to sepsis.  CT head with no acute findings.  Continue to closely monitor mentation.  High risk for seizures.  Hold gabapentin.  - Plan to give 1.5 L fluid bolus and additional as needed boluses as needed.  Started on Levophed drip to target MAP greater than 65.  - Check TTE.  Check lactic acid and trend if elevated.  Monitor urine output.  Accurate ins and outs.  - Blood cultures x 2 drawn.  X-ray chest with no evidence of any lobar pneumonia but has bilateral pulmonary congestion.  Check UA with microscopy and  reflex culture.  - Start vancomycin and cefepime.  - Pancytopenia due to chemotherapy.  Platelets are 7.  Will transfuse 1 unit platelets today.  Hemoglobin is stable.  Neutropenia noted.  - Sodium appears to be stable.  Nephrology on board.  Continue management for SIADH.  - O2 needs at baseline.  Monitor for signs of volume overload given she has pulmonary congestion on x-ray.  - Full code      Time spent Critical care 45 min (exclusive of procedure time)  including high complexity decision making to assess, manipulate, and support vital organ system failure in this individual who has impairment of one or more vital organ systems .severe sepsis with shock, severe neutropenia, thrombocytopenia, acute hypoxemic respiratory failure, acute encephalopathy such that there is a high probability of imminent or life threatening deterioration in the patient’s condition.      Eric Borrego MD   Critical Care Medicine  06/24/25 13:15 EDT

## 2025-06-24 NOTE — NURSING NOTE
At around 1126 I entered room 522 to give Ms. Archer some medications. Upon entering patient was asleep but arouse to me calling her name. I told her that I some medications to give her and which one they were, she stared at me and shut her eye again. I went to hand her the medication cup and she wouldn't grab the cup from me, she would only stare at me then shut her eyes. I shook her shoulder and asked her if she was able to take the medication I had for her. She just stared at me. At this point I used my mini flashlight and checked for PERRLA which was equal round and reactive. I asked her to squeeze my hand and she did after me squeezing her hand first. I asked her if she could raise her arms in the air and she just stared at me. At this point I asked charge nurse NITISH Carrasquillo to come in and have a look with me and she called the RRT.

## 2025-06-24 NOTE — CONSULTS
Purpose of the visit was to evaluate for: goals of care/advanced care planning. Spoke with RN and discussed determination of decision maker.      Assessment:Mrs. Archer has small cell lung cancer and SIADH admitted for severe thrombocytopenia (12K) and hyponatremia (120). She has pancytopenia due to chemotherapy for the small cell lung cancer. Last chemo was 6/13. She has received transfusions of blood and platelets. She has been lethargic for the past 2 days but has been oriented and conversational and able to ambulate with stand by assist. She was last at this baseline at 0945 today per the RN at bedside, and was later found mute. At 0945 she was afebrile and heart rate was normal. Temperature is 102.2 rectal, heart rate 142, and BP 96/76 mm Hg. Pt in bed with eyes closed primary RN reports that pt is unable to have conversations at this time. No family at bedside. Palliative care will follow up in the morning with pt, family and care team to see if results are known from RRT prior to discussing with pt (if able) and/or family. Primary RN aware.      Recommendations/Plan: pending clinical course.      Yu GONSALEZ RN  Palliative Care

## 2025-06-24 NOTE — CONSULTS
[facility]  [facility]      Patient Name:   Lluvia Archer  YOB: 1965  Identification Number:   MRN - [id number]  Date of Service:   06/24/2025 11:42:06    Diagnosis:        G93.49 - Encephalopathy Multifactorial    Impression:       This is a 60 y/o woman with small cell lung cancer and SIADH admitted for severe thrombocytopenia (12K) and hyponatremia (120). She has pancytopenia due to chemotherapy for the small cell lung cancer. Last chemo was 6/13. She has received transfusions of blood and platelets. She has been lethargic for the past 2 days but has been oriented and conversational and able to ambulate with stand by assist. She was last at this baseline at 0945 today per the RN at bedside, and was later found mute. At 0945 she was afebrile and heart rate was normal. Temperature is 102.2 rectal, heart rate 142, and BP 96/76 mm Hg.      #1 Acute encephalopathy, suspect sepsis      I cannot exclude aphasia and have proceeded with advanced imaging to exclude an LVO.   Infectious, toxic, and metabolic work up per ED/internal medicine team.   Avoid opiates, benzodiazepines, and anticholinergics if possible.   Avoid typical antipsychotics.   Frequent reorientation, mobilization to chair TID, lights on during the day, off at night, avoid or limit naps, delay am labs.       Our recommendations are outlined below.    Recommendations:          Stroke/Telemetry Floor        Neuro Checks (Q2)        Bedside Swallow Eval        DVT Prophylaxis        IV Fluids, Normal Saline        Head of Bed 30 Degrees        Euglycemia and Avoid Hyperthermia (PRN Acetaminophen)    Sign Out:        Discussed with Rapid Response Team        ------------------------------------------------------------------------------    Advanced Imaging:  Advanced imaging has been ordered. Results pending.      Metrics:  Last Known Well: 06/24/2025 09:45:00  Dispatch Time: 06/24/2025  11:41:12  Initial Response Time: 06/24/2025 11:45:52  Symptoms: altered mental status.  Initial patient interaction: 06/24/2025 11:51:12  NIHSS Assessment Completed: 06/24/2025 11:54:44  Patient is not a candidate for Thrombolytic.  Thrombolytic Medical Decision: 06/24/2025 11:53:24  Patient was not deemed candidate for Thrombolytic because of following reasons:  Low Platelet count <100 000/mm3 .    CT Head:  I personally reviewed all the CT images that were available to me and it showed: no hemorrhage, hyperdense vessel sign, or definite acute ischemic changes.    Primary Provider Notified of Diagnostic Impression and Management Plan on: 06/24/2025 12:22:12  Spoke With: Primary attending  Able to Reach  06/24/2025 12:22:12        ------------------------------------------------------------------------------    History of Present Illness:  Patient is a 59 year old Female.    Inpatient stroke alert was called for symptoms of altered mental status.  This is a59 y/o woman with small cell lung cancer and SIADH admitted for severe thrombocytopenia (12K) and hyponatremia (120). She has pancytopenia due to chemotherapy for the small cell lung cancer. Last chemo was 6/13. She has received transfusions of blood and platelets. She has been lethargic for the past 2 days but has been oriented and conversational and able to ambulate with stand by assist. She was last at this baseline at 0945 today per the RN at bedside, and was later found mute. At 0945 she was afebrile and heart rate was normal. Temperature is 102.2 rectal, heart rate 142, and BP 96/76 mm Hg.         Past Medical History:       Hypertension       Migraine Headaches  Other PMH:  COPD on 3 L of home oxygen    Medications:    No Anticoagulant use   Antiplatelet use: Yes aspirin  Reviewed EMR for current medications    Allergies:   Reviewed    Social History:  Unable To Obtain Due To Patient Status : Patient Cannot Speak    Family History:    Family History Cannot  Be Obtained Because:Patient Cannot Speak    ROS : ROS Cannot Be Obtained Because:  Patient Cannot Speak    Past Surgical History:  Past Surgical History Cannot Be Obtained Because: Patient Cannot Speak         Examination:  BP(96/76), Pulse(142), Blood Glucose(126)  1A: Level of Consciousness - Alert; keenly responsive + 0  1B: Ask Month and Age - Both Questions Right + 0  1C: Blink Eyes & Squeeze Hands - Performs Both Tasks + 0  2: Test Horizontal Extraocular Movements - Normal + 0  3: Test Visual Fields - No Visual Loss + 0  4: Test Facial Palsy (Use Grimace if Obtunded) - Normal symmetry + 0  5A: Test Left Arm Motor Drift - No Drift for 10 Seconds + 0  5B: Test Right Arm Motor Drift - No Drift for 10 Seconds + 0  6A: Test Left Leg Motor Drift - No Drift for 5 Seconds + 0  6B: Test Right Leg Motor Drift - No Drift for 5 Seconds + 0  7: Test Limb Ataxia (FNF/Heel-Shin) - No Ataxia + 0  8: Test Sensation - Normal; No sensory loss + 0  9: Test Language/Aphasia - Normal; No aphasia + 0  10: Test Dysarthria - Normal + 0  11: Test Extinction/Inattention - No abnormality + 0    NIHSS Score: 0      Pre-Morbid Modified Bourbon Scale:  2 Points = Slight disability; unable to carry out all previous activities, but able to look after own affairs without assistance    Spoke with : Primary attending  I reviewed the available imaging via Rapid and initiated discussion with the primary provider    This consult was conducted in real time using interactive audio and video technology. Patient was informed of the technology being used for this visit and agreed to proceed. Patient located in hospital and provider located at home/office setting.      Patient is being evaluated for possible acute neurologic impairment and high probability of imminent or life-threatening deterioration. I spent total of 30 minutes providing care to this patient, including time for face to face visit via telemedicine, review of medical records, imaging  studies and discussion of findings with providers, the patient and/or family.      Dr Aleida Valadez      [facility]  For Inpatient follow-up with [facility] physician please call [facility] at [phone]. As we are not an outpatient service for any post hospital discharge needs please contact the hospital for assistance.  If you have any questions for the [facility] physicians or need to reconsult for clinical or diagnostic changes please contact us via [facility] at [phone].    CTA shows no LVO.

## 2025-06-24 NOTE — PROGRESS NOTES
[facility]   Hospitalist Progress Note  Date: 2025  Patient Name: Lluvia Archer  : 1965  MRN: [id number]  Date of admission: 2025  Room[place]Bed: [facility] [place]      Subjective   Subjective     Chief Complaint: change in mental status     Summary:Lluvia Archer is a 59 y.o. female with COPD on 3 L of home oxygen, small cell lung cancer, hypertension, migraine headaches, SIADH, and chronic pain who presents to the emergency department due to abnormal labs.  Patient last had chemotherapy on .  Follow-up with her primary doctor found her sodium to be 120 and platelets to be 12 for which she presented to the hospital.Patient admitted for severe thrombocytopenia and hyponatremia.  Nephrology consulted.  Oncology consulted. So far patient has received 2 platelet transfusions and 2 packed red blood cell transfusions.    Interval Followup: 2025  Patient had a rapid response called today  Patient had acute change in mental status where she was unresponsive  Tele Neurology was consulted  Patient was also hypotensive  Patient was moved to the intensive care for further monitoring and stroke workup   Patient has since required Levophed to help maintain blood pressure    On exam currently patient will open her eyes however will just close them will not follow simple commands    Review of Systems    Unable to review due to mental status    Objective   Objective     Vitals:   Temp:  [97.5 °F (36.4 °C)-103.6 °F (39.8 °C)] 103.6 °F (39.8 °C)  Heart Rate:  [] 151  Resp:  [16-36] 36  BP: ()/(34-94) 108/94  Flow (L/min) (Oxygen Therapy):  [4-5] 4  FiO2 (%):  [100 %] 100 %    Physical Exam   General: Resting in bed sleeping  HENT: NCAT, MMM  Eyes: pupils equal, no scleral icterus  Cardiovascular: RRR, no murmurs   Pulmonary: CTA bilaterally; no wheezes; no conversational dyspnea  Gastrointestinal: S/ND/NT, +BS  Musculoskeletal: Patient will spontaneously move her extremities  Skin: No jaundice, no  rash on exposed skin appreciated  Neuro: Patient will open her eyes however will not follow any commands  Psych: Mood and affect appropriate      Result Review    Result Review:  I have personally reviewed these results:  [x]  Laboratory      Lab 06/24/25  1231 06/24/25  1155 06/24/25  0320 06/23/25  0320 06/20/25  1543 06/20/25  0339 06/20/25  0335 06/19/25  1617 06/19/25  1049   WBC 0.98*  --  1.81* 1.27*   < > 2.22*  --   --  2.22*   HEMOGLOBIN 8.8*  --  8.2* 8.4*   < > 6.2*  --   --  7.5*   HEMATOCRIT 27.4*  --  26.2* 25.3*   < > 18.7*  --   --  22.1*   PLATELETS 7*  --  12* 12*   < > 20*  --   --  4*   NEUTROS ABS 0.57*  --   --   --   --  0.75*  --   --  0.88*   IMMATURE GRANS (ABS) 0.07*  --   --   --   --  0.15*  --   --  0.06*   LYMPHS ABS 0.28*  --   --   --   --  1.22  --   --  1.17   MONOS ABS 0.05*  --   --   --   --  0.08*  --   --  0.09*   EOS ABS 0.00  --   --   --   --  0.00  --   --  0.00   MCV 95.5  --  98.1* 92.3   < > 93.0  --   --  92.1   PROCALCITONIN 0.06  --   --   --   --   --  0.07 0.08  --    LACTATE 1.6 1.0  --   --   --   --   --   --   --     < > = values in this interval not displayed.         Lab 06/24/25  1231 06/24/25  0747 06/24/25  0320 06/20/25  1924 06/20/25  1543 06/20/25  1039   SODIUM 130*  130* 130* 129*   < > 121* 119*   POTASSIUM 4.8  4.8 4.4 4.4   < > 4.3 4.1   CHLORIDE 95*  95* 96* 98   < > 91* 90*   CO2 24.2  24.2 24.3 21.8*   < > 21.0* 22.4   ANION GAP 10.8  10.8 9.7 9.2   < > 9.0 6.6   BUN 14.7  14.7 17.6 23.2*   < > 16.1 4.5*   CREATININE 0.60  0.60 0.45* 0.47*   < > 0.50* 0.43*   EGFR 104.2  104.2 111.7 110.5   < > 108.9 112.9   GLUCOSE 133*  133* 108* 95   < > 81 84   CALCIUM 8.1*  8.1* 7.9* 8.0*   < > 6.9* 6.9*   MAGNESIUM 1.8  --   --   --  1.7 1.6   PHOSPHORUS 1.2*  --   --   --   --   --    TSH  --   --   --   --   --  0.998    < > = values in this interval not displayed.         Lab 06/24/25  1231 06/20/25  0335 06/19/25  1049   TOTAL PROTEIN 6.6  4.9* 6.1   ALBUMIN 4.0 3.1* 3.4*   GLOBULIN 2.6 1.8 2.7   ALT (SGPT) 7 6 6   AST (SGOT) 6 6 6   BILIRUBIN 0.7 0.2 0.4   ALK PHOS 95 59 73         Lab 06/19/25  1338 06/19/25  1049   HSTROP T 17* 19*             Lab 06/20/25  0426   ABO TYPING O   RH TYPING Positive   ANTIBODY SCREEN Negative         Lab 06/24/25  1155   PH, ARTERIAL 7.361   PCO2, ARTERIAL 44.3   PO2 ART 88.7   O2 SATURATION ART 96.4   FIO2 40   HCO3 ART 25.1   BASE EXCESS ART -0.4     Brief Urine Lab Results  (Last result in the past 365 days)        Color   Clarity   Blood   Leuk Est   Nitrite   Protein   CREAT   Urine HCG        06/24/25 1228 Yellow   Clear   Negative   Negative   Negative   Negative                 [x]  Microbiology   Microbiology Results (last 10 days)       ** No results found for the last 240 hours. **          [x]  Radiology  XR Chest 1 View  Result Date: 6/24/2025  Impression: 1. No acute cardiopulmonary disease. Electronically Signed: Bill Butcher MD  6/24/2025 1:40 PM EDT  Workstation ID: [id number]    CT Angiogram Neck  Result Date: 6/24/2025  1.No acute abnormality is identified within the large arteries of the head or neck. 2.No significant stenosis of the bilateral internal carotid arteries. 3.Severe stenosis of the left subclavian artery proximal to the left vertebral artery origin. 4.Pulmonary emphysema. Partially visualized 12 mm nodular opacity within the superior segment of the right lower lobe (series 5, image 337). Several other tiny nodular opacities within the bilateral lungs. Recommend chest CT follow-up. 5.Several vague sclerotic foci noted within the visualized thoracic spine, largest within the T6 vertebral body measuring approximately 15 mm, concerning for osseous metastases. Electronically Signed: Jose Luis Bryan MD  6/24/2025 1:23 PM EDT  Workstation ID: [id number]    CT Angiogram Head w AI Analysis of LVO  Result Date: 6/24/2025  1.No acute abnormality is identified within the large arteries of the head  or neck. 2.No significant stenosis of the bilateral internal carotid arteries. 3.Severe stenosis of the left subclavian artery proximal to the left vertebral artery origin. 4.Pulmonary emphysema. Partially visualized 12 mm nodular opacity within the superior segment of the right lower lobe (series 5, image 337). Several other tiny nodular opacities within the bilateral lungs. Recommend chest CT follow-up. 5.Several vague sclerotic foci noted within the visualized thoracic spine, largest within the T6 vertebral body measuring approximately 15 mm, concerning for osseous metastases. Electronically Signed: Jose Luis Bryan MD  6/24/2025 1:23 PM EDT  Workstation ID: [id number]    CT CEREBRAL PERFUSION WITH & WITHOUT CONTRAST  Result Date: 6/24/2025  1.within the high right frontal lobe there is a 3 mL area of Tmax greater than 6 seconds which could be related to ischemia. No evidence of core infarct. Electronically Signed: Bill Butcher MD  6/24/2025 12:14 PM EDT  Workstation ID: [id number]    CT Head Without Contrast Stroke Protocol  Result Date: 6/24/2025  Impression: Chronic appearing lacunar infarct in the left internal capsule anterior limb. No CT evidence of acute infarction, mass or intracranial hemorrhage. Small to moderate bilateral mastoid effusions. Electronically Signed: Quinton Bennett MD  6/24/2025 12:05 PM EDT  Workstation ID: [id number]    XR Chest PA & Lateral  Result Date: 6/19/2025  Impression: 1.Small bilateral pleural effusions. 2.Slight increase in left basilar opacity, atelectasis versus pneumonia. Electronically Signed: Bill Cope MD  6/19/2025 4:35 PM EDT  Workstation ID: [id number]    XR Chest 1 View  Result Date: 6/19/2025  Impression: Minimal residual atelectasis or airspace disease in the bases. Electronically Signed: Luca Brewster MD  6/19/2025 12:36 PM EDT  Workstation ID: [id number]    []  EKG/Telemetry   []  Cardiology/Vascular   []  Pathology  []  Old records  []  Other:    Assessment & Plan    Assessment / Plan     Assessment:  Acute encephalopathy  Septic shock  Febrile neutropenia  Pancytopenia secondary to chemotherapy  End-stage small cell lung cancer with metastatic disease currently on palliative chemotherapy  Hyponatremia secondary to SIADH  COPD without exacerbation  Chronic hypoxemic respiratory failure on 3 L of oxygen at home    Plan:  Patient remains admitted to the medicine service  Patient required transfer to the intensive care due to change in mental status  Patient did have rapid response called today as she was unresponsive  Neurology following.  Neurological workup currently ordered and pending  Patient has been started on Levophed for blood pressure control  Echocardiogram ordered  Blood cultures pending  Chest x-ray does not show any acute evidence of pneumonia  Will start patient on broad-spectrum antibiotics with vancomycin and cefepime  Platelet count is 7.  Will transfuse 1 unit today, hemoglobin stable  Nephrology following for hyponatremia at this time continue patient on salt tablets and continue patient on midodrine and fluid restriction  Continue patient on fludrocortisone  Will discontinue SSRI  Discussed plan with nurse  Discussed plan with nephrology Dr. Hernandez       32 minutes of critical care time were spent with this critically ill patient who required transfer to the intensive care secondary to unresponsive episode now with septic shock requiring vasopressor support and broad-spectrum antibiotics as well as stroke workup.  Time was spent reviewing laboratory data examining patient talking with specialists, reviewing chart and formulating care plan       Discussed with RN.    VTE Prophylaxis:  Mechanical VTE prophylaxis orders are present.        CODE STATUS:   Code Status (Patient has no pulse and is not breathing): CPR (Attempt to Resuscitate)  Medical Interventions (Patient has pulse or is breathing): Full Support  Level Of Support Discussed With:  Patient      Electronically signed by Flako Santos DO, 6/24/2025, 17:22 EDT.

## 2025-06-24 NOTE — PROGRESS NOTES
With worsening respiratory failure and increased work of breathing despite being on NIPPV.  Intubated for worsening respiratory failure and increased work of breathing.  Bronchoscopy with BAL of RLL  showed blood-tinged secretions.  Sequential aliquots of BAL performed with subsequent worsening of the collected sample suggestive of alveolar hemorrhage.  BAL sample sent for studies.  Started on Solu-Medrol 40 mg IV every 6.  Will transfuse 1 additional unit of platelets.  Vent bundle added.  Lung protective ventilation.  ABG 30 minutes and adjust settings.

## 2025-06-24 NOTE — PROGRESS NOTES
"[facility] Clinical Pharmacy Services: Vancomycin Pharmacokinetic Initial Consult Note    Lluvia Archer is a 59 y.o. female who is on day 1 of pharmacy to dose vancomycin for Empiric.    Consult Information  Consulting Provider: Trae  Planned Duration of Therapy: 7 days  Was Patient Receiving Prior to Admission/Consult?: No  Loading Dose Ordered or Given: 1500 mg on  at 1400  PK/PD Target: -600 mg/L.hr   Relevant ID History:   Other Antimicrobials: Cefepime    Imaging Reviewed?: Yes    Microbiology Data  MRSA PCR performed: No;  Culture/Source:   Blood and urine cx pending    Vitals/Labs  Ht: 162.6 cm (64.02\"); Wt: 80.5 kg (177 lb 7.5 oz)  Temp (24hrs), Av.1 °F (38.4 °C), Min:97.5 °F (36.4 °C), Max:103.5 °F (39.7 °C)   Estimated Creatinine Clearance: 104.9 mL/min (by C-G formula based on SCr of 0.6 mg/dL).       Results from last 7 days   Lab Units 25  1231 25  0747 25  0320 25  0750 25  0320   CREATININE mg/dL 0.60  0.60 0.45* 0.47*   < > 0.50*   WBC 10*3/mm3 0.98*  --  1.81*  --  1.27*    < > = values in this interval not displayed.     Assessment/Plan:    Vancomycin Dose: 1250 mg IV every 12 hours; which provides the following predicted parameters:  AUC24,ss: 520 mg/L.hr  Probability of AUC24 > 400: 76 %  Ctrough,ss: 15.5 mg/L  Probability of Ctrough,ss > 20: 31 %  Probability of nephrotoxicity (Lodise SERGEI ): 11 %  Vanc Random ordered for  at 1200  Patient has order for Basic Metabolic Panel    Pharmacy will follow patient's kidney function and will adjust doses and obtain levels as necessary. Thank you for involving pharmacy in this patient's care. Please contact pharmacy with any questions or concerns.                           May Jones [facility]  Clinical Pharmacist    "

## 2025-06-24 NOTE — PROGRESS NOTES
Patient Name: Lluvia Archer  YOB: 1965  MRN: [id number]  Admission date: 6/19/2025  Reason for Encounter: Follow-up/Progress Note    [facility] Clinical Nutrition Assessment     Subjective    Subjective Information     Pt transferred to CCU after stroke RRT. Encephalopathic but did answer some yes/no questions when asked. She reports poor appetite, worsened by chemo (last on 6/13). EMR shows she has been refusing meals or consuming 0-25% for most of admit. H/o SIADH, Na currently at 130. Phos 1.2, K+ WNL. Receiving Mag @ 100 mL/hr, K-phos @ 84 mL/hr, levo at 0.04. Tmax 103.5 with WBC 0.98 and platelets 7. High risk of RFS, will add thiamine. Recommend Cortrak and EN if consistent with GOC once pressors stabilize.     Assessment    H&P and Current Problems      H&P  Past Medical History:   Diagnosis Date    Abnormal mammogram     PT DENIES KNOWING OF THIS HX    Anxiety     Arthritis     R SHOULDER, R HIP, AND R LEG    Cancer     LUNG    Chronic nausea 01/15/2019    COPD (chronic obstructive pulmonary disease)     O2 3 LITERS NC CONT    Hernia, hiatal     Hyperlipidemia     Hypertension     Knee pain, right 08/30/2018    Memory loss     FORGETFULNESS ? ETIOLOGY    Migraine headache     Moderate episode of recurrent major depressive disorder 05/22/2017    Night sweats     Sciatica of right side 08/18/2017    Severe episode of recurrent major depressive disorder, without psychotic features 10/22/2019    Shingles     OUTBREAK 6/11/24 ON ANTIVIRAL , WHELPS NOTED NO SORES.    Shoulder pain, left 08/30/2018      Past Surgical History:   Procedure Laterality Date    BRONCHOSCOPY N/A 04/12/2022    Procedure: BRONCHOSCOPY WITH BRONCHOALVEOLAR LAVAGE, BIOPSY;  Surgeon: Shin Mcdonald DO;  Location: [facility] ENDOSCOPY;  Service: Pulmonary;  Laterality: N/A;  RIGHT LOWER LOBE INFILTRATE    BRONCHOSCOPY N/A 6/7/2024    Procedure: BRONCHOSCOPY WITH ENDOBRONCHIAL ULTRASOUND AND FINE NEEDLE ASPIRATE;   "Surgeon: Shin Mcdonald DO;  Location: [facility] ENDOSCOPY;  Service: Pulmonary;  Laterality: N/A;  MEDIASTINAL ADENOPATHY     SECTION      CHOLECYSTECTOMY      LAPAROSCOPIC    COLONOSCOPY      PORTACATH PLACEMENT Left 2024    Procedure: INSERTION OF PORTACATH;  Surgeon: Josh Patton MD;  Location: [facility] OR OSC;  Service: General;  Laterality: Left;    TOTAL HIP ARTHROPLASTY Right 2022    Procedure: RIGHT TOTAL HIP ARTHROPLASTY;  Surgeon: Cecil Gomes MD;  Location: [facility] MAIN OR;  Service: Orthopedics;  Laterality: Right;      Current Problems   Admission Diagnosis:  Hyponatremia [E87.1]    Problem List:    Hyponatremia    Pancytopenia due to antineoplastic chemotherapy    Cancer, metastatic to bone    Hypotension      Other Applicable Nutrition Information:   N/a     Anthropometrics      BMI, Height, Weight Estimated body mass index is 30.45 kg/m² as calculated from the following:    Height as of this encounter: 162.6 cm (64.02\").    Weight as of this encounter: 80.5 kg (177 lb 7.5 oz).    Weight Method: Standing scale       Trending Weight Changes Unintentional weight loss of 23 lbs (11.5%) over 3 month(s)    Significant?  Yes       Weight History  Wt Readings from Last 20 Encounters:   25 0239 80.5 kg (177 lb 7.5 oz)   25 0429 81.5 kg (179 lb 10.8 oz)   25 0421 83.9 kg (184 lb 15.5 oz)   25 0433 84.8 kg (186 lb 15.2 oz)   25 0518 85.5 kg (188 lb 7.9 oz)   25 1052 85 kg (187 lb 6.3 oz)   25 1305 83.7 kg (184 lb 9.6 oz)   25 1336 84.1 kg (185 lb 6.5 oz)   25 1313 84.1 kg (185 lb 8 oz)   25 1342 84.4 kg (186 lb 1.6 oz)   06/10/25 1342 85.8 kg (189 lb 3.2 oz)   25 1243 85.2 kg (187 lb 12.8 oz)   25 1259 84.7 kg (186 lb 11.7 oz)   25 1306 83 kg (183 lb)   05/15/25 1312 83.2 kg (183 lb 6.4 oz)   25 1100 85.5 kg (188 lb 9.6 oz)   25 1341 83.6 kg (184 lb 6.4 oz)   25 1317 83.4 kg (183 lb " 13.8 oz)   04/22/25 2106 88.7 kg (195 lb 8.8 oz)   04/22/25 1437 91.4 kg (201 lb 8 oz)   04/21/25 1332 91.4 kg (201 lb 8 oz)   04/18/25 1300 89.5 kg (197 lb 5 oz)   04/17/25 1300 91 kg (200 lb 9.9 oz)   04/16/25 1300 91.9 kg (202 lb 9.6 oz)   04/15/25 1254 90.8 kg (200 lb 2.8 oz)   04/14/25 1300 90.8 kg (200 lb 2.8 oz)        Labs      Comment: Discussed above.     Results from last 7 days   Lab Units 06/24/25  1231 06/24/25  1155 06/24/25  0747 06/24/25  0320 06/20/25  1924 06/20/25  1543 06/20/25  1039 06/20/25  0335 06/19/25  1617 06/19/25  1049   SODIUM mmol/L 130*  130*  --  130* 129*   < > 121* 119* 122*   < > 118*   POTASSIUM mmol/L 4.8  4.8  --  4.4 4.4   < > 4.3 4.1 3.9   < > 3.4*   GLUCOSE mg/dL 133*  133*  --  108* 95   < > 81 84 95   < > 101*   BUN mg/dL 14.7  14.7  --  17.6 23.2*   < > 16.1 4.5* 5.2*   < > 5.7*   CREATININE mg/dL 0.60  0.60  --  0.45* 0.47*   < > 0.50* 0.43* 0.48*   < > 0.48*   CALCIUM mg/dL 8.1*  8.1*  --  7.9* 8.0*   < > 6.9* 6.9* 6.8*   < > 7.5*   PHOSPHORUS mg/dL 1.2*  --   --   --   --   --   --   --   --   --    MAGNESIUM mg/dL 1.8  --   --   --   --  1.7 1.6 1.7   < > 1.9   ALBUMIN g/dL 4.0  --   --   --   --   --   --  3.1*  --  3.4*   LACTATE mmol/L 1.6 1.0  --   --   --   --   --   --   --   --    BILIRUBIN mg/dL 0.7  --   --   --   --   --   --  0.2  --  0.4   ALK PHOS U/L 95  --   --   --   --   --   --  59  --  73   AST (SGOT) U/L 6  --   --   --   --   --   --  6  --  6   ALT (SGPT) U/L 7  --   --   --   --   --   --  6  --  6    < > = values in this interval not displayed.     Results from last 7 days   Lab Units 06/24/25  1231 06/24/25  0320 06/23/25  0320   PLATELETS 10*3/mm3 7* 12* 12*   HEMOGLOBIN g/dL 8.8* 8.2* 8.4*   HEMATOCRIT % 27.4* 26.2* 25.3*     Lab Results   Component Value Date    HGBA1C 5.80 (H) 05/01/2023      Results from last 7 days   Lab Units 06/21/25  0320   URIC ACID mg/dL 3.3     Medications       Scheduled Medications cefepime, 2,000 mg,  Intravenous, Q8H  dronabinol, 2.5 mg, Oral, BID  [Held by provider] escitalopram, 20 mg, Oral, Daily  fludrocortisone, 0.1 mg, Oral, Daily  [Held by provider] gabapentin, 200 mg, Oral, TID  miconazole, 1 Application, Topical, Q24H  midodrine, 10 mg, Oral, TID AC  nicotine, 1 patch, Transdermal, Q24H  polyethylene glycol, 17 g, Oral, Daily  senna, 2 tablet, Oral, BID  sodium chloride, 10 mL, Intravenous, Q12H  sodium chloride, 2 g, Oral, TID With Meals  sodium phosphate, 15 mmol, Intravenous, Q3H  [START ON 6/25/2025] Urea, 15 g, Oral, Daily  [START ON 6/25/2025] vancomycin, 1,250 mg, Intravenous, Q12H        Infusions norepinephrine, 0.02-0.3 mcg/kg/min, Last Rate: 0.02 mcg/kg/min (06/24/25 1454)  Pharmacy to dose vancomycin,         PRN Medications   acetaminophen    hydrOXYzine    ondansetron    oxyCODONE-acetaminophen    Pharmacy to dose vancomycin    prochlorperazine    sodium chloride    sodium chloride    sodium chloride     Physical Findings      Chewing/Swallowing  Teeth Status: Mouth/Teeth WDL: .WDL except, teeth Teeth Symptoms: tooth/teeth missing  Chewing/Swallowing Issues: Other:Encephalopathic, not currently safe for PO   Edema  Generalized Edema: 1+ (Trace)              Ankle, Left Edema: 1+ (Trace) Ankle, Right Edema: 1+ (Trace)  Foot, Left Edema: 1+ (Trace) Foot, Right Edema: 1+ (Trace)   Bowel Function  Stool Output  Perineal Care: perineum cleansed, catheter care provided (06/24/25 1230)  Last Bowel Movement: 06/17/25   I/Os  Intake & Output (last 3 days)         06/21 0701  06/22 0700 06/22 0701  06/23 0700 06/23 0701  06/24 0700 06/24 0701  06/25 0700    P.O. 360 960 600 220    Blood 546.1       Total Intake(mL/kg) 906.1 (10.8) 960 (11.8) 600 (7.5) 220 (2.7)    Urine (mL/kg/hr) 1850 (0.9) 2025 (1) 1850 (1) 250 (0.4)    Total Output 1850 2025 1850 250    Net -943.9 -1065 -1250 -30                   Lines, Drains, Airways, & Wounds       Active LDAs       Name Placement date Placement time Site  Days Last dressing change    Peripheral IV 06/19/25 1051 20 G Right Antecubital 06/19/25  1051  Antecubital  5     Peripheral IV 06/19/25 2124 20 G Left Antecubital 06/19/25  2124  Antecubital  4     Peripheral IV 06/24/25 1430 22 G Anterior;Distal;Left Forearm 06/24/25  1430  Forearm  less than 1     Peripheral IV 06/24/25 1515 20 G Anterior;Right;Upper Arm 06/24/25  1515  Arm  less than 1     Single Lumen Implantable Port Left Subclavian --  --  Subclavian  --     Wound 06/19/25 1600 Right proximal knee Other (Comments) 06/19/25  1600  -- 4     Wound 06/19/25 1600 Bilateral groin  06/19/25  1600  -- 4                       Nutrition Focused Physical Exam     Trending NFPE Completed 6/24 - Noted mild temporal and shoulder losses, otherwise WNL     Malnutrition Severity Assessment            (1)   Current Nutrition Orders & Evaluation of Intake      Oral Nutrition     Food Allergies  and Intolerances NKFA   Current PO Diet NPO Diet NPO Type: Strict NPO   Oral Nutrition Supplement None     Trending % PO Intake 0-25%, now NPO   (2)  Assessment & Plan   Nutrition Diagnosis and Goals       Nutrition Diagnosis 1 Inadequate Energy Intake related to inadequate oral intake as evidenced by NPO, previously refusing meals, most meals 0-25% consumed      Nutrition Diagnosis 2 Increased Nutrient Needs (kcal, pro) related to catabolic disease increasing needs as evidenced by small cell lung cancer on chemo         Goal(s) PO Diet Advances When Medically Appropriate , Tolerates PO Diet , and No Significant Weight Loss and Initiate EN      Nutrition Intervention and Prescription       Intervention  Monitor for diet advancement, Await initiation of EN, and Continue to monitor for plan of care      Diet Prescription NPO  Recommend EN if consistent with GOC once pressor needs stabilize    Supplement Prescription Thiamine 100 mg daily for RFS risk    Education Provided     (3)  Monitoring/Evaluation       Monitor/Evaluation PO Intake,  Pertinent Labs, EN Delivery/Tolerance, Weight, and Hemodynamic Stability      RD Follow-Up Encounter 1-2 days     Electronically signed by:  Daria Armstrong RD  06/24/25 15:51 EDT

## 2025-06-24 NOTE — PROGRESS NOTES
[facility]     Progress Note    Patient Name: Lluvia Archer  : 1965  MRN: [id number]  Primary Care Physician:  Aleksandar Edge DO  Date of admission: 2025    Subjective   Patient sodium levels have improved  No major acute events overnight      Scheduled Meds:dronabinol, 2.5 mg, Oral, BID  [Held by provider] escitalopram, 20 mg, Oral, Daily  fludrocortisone, 0.1 mg, Oral, Daily  gabapentin, 200 mg, Oral, TID  miconazole, 1 Application, Topical, Q24H  midodrine, 10 mg, Oral, TID AC  nicotine, 1 patch, Transdermal, Q24H  polyethylene glycol, 17 g, Oral, Daily  senna, 2 tablet, Oral, BID  sodium chloride, 10 mL, Intravenous, Q12H  sodium chloride, 2 g, Oral, TID With Meals  [START ON 2025] Urea, 15 g, Oral, Daily      Continuous Infusions:   PRN Meds:.  hydrOXYzine    ondansetron    oxyCODONE-acetaminophen    prochlorperazine    sodium chloride    sodium chloride    sodium chloride       Review of Systems  Constitutional:        Weakness tiredness fatigue  Eyes:                       No blurry vision, eye discharge, eye irritation, eye pain  HEENT:                   No acute hair loss, earache and discharge, nasal congestion or discharge, sore throat, postnasal drip  Respiratory:           No shortness of breath coughing sputum production wheezing hemoptysis pleuritic chest pain  Cardiovascular:     No chest pain, orthopnea, PND, dizziness, palpitation, lower extremity edema  Gastrointestinal:   No nausea vomiting diarrhea abdominal pain constipation  Genitourinary:       No urinary incontinence, hesitancy, frequency, urgency, dysuria  Hematologic:         No bruising, bleeding, pallor, lymphadenopathy  Endocrine:            No coldness, hot flashes, polyuria, abnormal hair growth  Musculoskeletal:    No body pains, aches, arthritic pains, muscle pain ,muscle wasting  Psychiatric:          No low or high mood, anxiety, hallucinations, delusions  Skin.                      No rash,  ulcers, bruising, itching  Neurological:        No confusion, headache, focal weakness, numbness, dysphasia    Objective   Objective     Vitals:   Temp:  [97.5 °F (36.4 °C)-100.2 °F (37.9 °C)] 100.2 °F (37.9 °C)  Heart Rate:  [] 113  Resp:  [16] 16  BP: (113-148)/(64-80) 115/73  Flow (L/min) (Oxygen Therapy):  [5] 5  Physical Exam    Constitutional: Awake, alert responsive, conversant, no obvious distress              Psychiatric:  Appropriate affect, cooperative   Neurologic:  Awake alert ,oriented x 3, strength symmetric in all extremities, Cranial Nerves grossly intact to confrontation, speech clear   Eyes:   PERRLA, sclerae anicteric, no conjunctival injection   HEENT:  Moist mucous membranes, no nasal or eye discharge, no throat congestion   Neck:   Supple, no thyromegaly, no lymphadenopathy, trachea midline, no elevated JVD   Respiratory:  Clear to auscultation bilaterally, nonlabored respirations    Cardiovascular: RRR, no murmurs, rubs, or gallops, palpable pedal pulses bilaterally, No bilateral ankle edema   Gastrointestinal: Positive bowel sounds, soft, nontender, nondistended, no organomegaly   Musculoskeletal:  No clubbing or cyanosis to extremities,muscle wasting, joint swelling, muscle weakness             Skin:                      No rashes, bruising, skin ulcers, petechiae or ecchymosis    Result Review    Result Review:  I have personally reviewed the results from the time of this admission to 6/24/2025 08:59 EDT and agree with these findings:  []  Laboratory  []  Microbiology  []  Radiology  []  EKG/Telemetry   []  Cardiology/Vascular   []  Pathology  []  Old records  []  Other:    Assessment & Plan   Assessment / Plan       Active Hospital Problems:  Active Hospital Problems    Diagnosis     **Hyponatremia     Hypotension     Cancer, metastatic to bone     Pancytopenia due to antineoplastic chemotherapy      59-year-old female with past medical history of small cell cancer of the lung,  COPD, hypertension, osteoarthritis, anxiety/depression, hyperlipidemia, SIADH secondary to cancer, pancytopenia with sodium levels currently in the mid 120s    Plan:   Continue salt tabs 2 g 3 times daily  Decrease urea to 15 g daily.  Okay to discontinue on discharge  Continue with midodrine 10 mg 3 times daily  Fluid restriction of 1500  Patient is on fludrocortisone 0.1 daily  Recommend to discontinue SSRI on discharge

## 2025-06-24 NOTE — PROGRESS NOTES
RT EQUIPMENT DEVICE RELATED - SKIN ASSESSMENT    RT Medical Equipment/Device:     ETT Tee/Anchorfast    Skin Assessment:      Cheek:  Intact  Ears:  Intact  Nares:  Intact  Neck:  Intact  Nose:  Intact  Lips:  Intact  Mouth:  Intact  Tongue:  Intact    Device Skin Pressure Protection:  Positioning supports utilized, Pressure points protected, and Skin-to-device areas padded:  Anchorfast    Nurse Notification:  Brittany Waldrop, RRT

## 2025-06-25 NOTE — PROGRESS NOTES
06/25/25 0955   General   Reason Evaluation Not Performed Introduction Only  (MT-BC spoke with Pt's  via phone call to obtain music preferences for patient. Per , Pt likes rock and roll and similar music. MT-BC will provide supportive services for Pt to assist with comfort, relaxation, and other acute needs.)

## 2025-06-25 NOTE — SIGNIFICANT NOTE
06/25/25 0879   OTHER   Discipline occupational therapist   Rehab Time/Intention   Session Not Performed other (see comments)  (Hold, intubated and sedate)

## 2025-06-25 NOTE — CONSULTS
Palliative care following and will se pt today if no family at bedside again will call spouse. Results were pending during consult yesterday. Will follow up with Primary RN on pt status.         Yu GONSALEZ RN  Palliative Care

## 2025-06-25 NOTE — PROGRESS NOTES
"Intensive Care Admission Note     Acute encephalopathy, severe sepsis with shock, pancytopenia    History of Present Illness     59 female with history of small cell lung cancer with metastasis on chemotherapy, COPD, hypertension, anxiety disorder, hyperlipidemia, SIADH, pancytopenia, admitted to the hospital for management of severe hyponatremia and thrombocytopenia.  Oncology and nephrology were following patient on the floor.  She was treated for SIADH with fluid restriction, salt and urea tabs and replaced 2 units PRBC and 2 units platelets for severe anemia and thrombocytopenia.  Per reports she has been lethargic for the last couple of days but today she has been more encephalopathic and stroke alert was called this afternoon.  She had CT head and CTA done with initial evidence of only chronic appearing lacunar infarct in the left internal capsule.  She is moved to the ICU with persistent encephalopathy and hypotension with tachycardia.    Sub/Overnight events:   Developed increased work of breathing and shortness of breath yesterday.  Failed CPAP and subsequently  was intubated and placed on mechanical ventilation.  Bedside bronchoscopy with BAL was performed.  Failed SBT/SAT due to increased respiratory rate, agitation and tachycardia.  Tmax 102.3.  Levophed at 0.1 mcg/kg/min.  Fentanyl at 100 mcg/h, propofol at 15 mcg/kg/h.  Received 500 mL LR bolus for elevated lactic overnight.  Cooling blanket currently off, temperature 98.6.  Remains on mechanical ventilation AC/VC 24/400/5 FiO2 35%.        Physical Exam and Clinical Information   BP 92/64   Pulse 102   Temp 98.6 °F (37 °C) (Bladder)   Resp 26   Ht 162.6 cm (64.02\")   Wt 80.5 kg (177 lb 7.5 oz)   LMP  (LMP Unknown)   SpO2 100%   BMI 30.45 kg/m²     Physical exam  General : Aox0.  In no acute distress.  Neuro: Opens eyes spontaneously, follows commands, able to answer yes/no questions.  HEENT : AT,NC,PERRLA,+ pallor, No Icterus,No thyromegaly. No " JVD.  CVS : Tachycardic, systolic murmur present grade 3,  Resp/Chest: B/l ant VBS+.  Bilateral right greater than left rhonchi present.  Abd: Soft, Non distended, No tenderness, No organomegaly. Bowel sounds +  EXT: NO edema.   SKIN : NO palpable skin lesions/rashes noted.    Results from last 7 days   Lab Units 06/25/25  0432 06/24/25  1231 06/24/25  0320   WBC 10*3/mm3 2.13* 0.98* 1.81*   HEMOGLOBIN g/dL 8.4* 8.8* 8.2*   PLATELETS 10*3/mm3 71* 7* 12*     Results from last 7 days   Lab Units 06/25/25  0754 06/25/25  0432 06/24/25  2336 06/24/25  1750 06/24/25  1231 06/20/25  1924 06/20/25  1543   SODIUM mmol/L 130* 132*  132* 132*   < > 130*  130*   < > 121*   POTASSIUM mmol/L 4.1 4.3  4.3 4.1   < > 4.8  4.8   < > 4.3   CO2 mmol/L 18.7* 22.6  22.6 20.7*   < > 24.2  24.2   < > 21.0*   BUN mg/dL 11.7 12.0  12.0 11.5   < > 14.7  14.7   < > 16.1   CREATININE mg/dL 0.50* 0.67  0.67 0.60   < > 0.60  0.60   < > 0.50*   MAGNESIUM mg/dL  --  2.1  --   --  1.8  --  1.7   PHOSPHORUS mg/dL  --  3.0  --   --  1.2*  --   --    GLUCOSE mg/dL 154* 168*  168* 211*   < > 133*  133*   < > 81    < > = values in this interval not displayed.     Estimated Creatinine Clearance: 125.8 mL/min (A) (by C-G formula based on SCr of 0.5 mg/dL (L)).      Results from last 7 days   Lab Units 06/25/25  0623   PH, ARTERIAL pH units 7.314*   PCO2, ARTERIAL mm Hg 45.5*   PO2 ART mm Hg 95.3     Lab Results   Component Value Date    LACTATE 3.4 (C) 06/25/2025        Images: X-ray chest personally reviewed with evidence of bilateral pulmonary congestion.  No lobar pneumonia noted    I reviewed the patient's results and images.     Impression     Hyponatremia    Pancytopenia due to antineoplastic chemotherapy    Cancer, metastatic to bone    Hypotension    Plan/Recommendations     Acute encephalopathy  Severe sepsis with shock  Acute hypoxemic respiratory failure  Alveolar hemorrhage likely 2/2 low PLT  Febrile neutropenia  Pancytopenia 2/2  cancer chemotherapy  Small cell lung cancer with metastasis  Hyponatremia 2/2 SIADH  H/o COPD on baseline 3 L oxygen    -Acute encephalopathy likely secondary to sepsis.  CT head with no acute findings.  Continue to closely monitor mentation.  High risk for seizures.  Hold gabapentin.  Mental status improving, able to follow commands.  - Continue Levophed to maintain MAP of 65.  Received 500 mL LR bolus for elevated lactic overnight.   -Ctn solumedrol for severe pneumonia, septic shock and alveolar hemorrhage.  - Reviewed echocardiogram.  EF 51 to 55%, moderate aortic valve regurgitation, mild to moderate mitral regurgitation.  - Trend lactic until cleared.  Monitor intake and output.    - Continue vancomycin and cefepime, de-escalate based on cultures. Prelim BAL cx growing pseudomonas.  - Pancytopenia due to chemotherapy.  Received 2 units platelets yesterday.  Platelets today increased to 71.  Hemoglobin stable.  Neutropenia noted.  - Sodium stable.  Nephrology on board.  Continue management for SIADH.  - On mechanical ventilation, current settings AC/VC 24/400/5, failed pressure support trial, continue daily SBT/SAT.  -Bronchoscopy done on 6/24, pneumonia panel growing Pseudomonas  - Titrate FiO2 to maintain SpO2 greater than 90%.  Monitor for signs of volume overload given she has pulmonary congestion on x-ray.  - Fentanyl and propofol for sedation.  Wean sedation as tolerated.  Start Precedex and wean propofol off.  - Full code    I, SANDRO Yi spent 12 minutes of critical care time.   Electronically signed by SANDRO Yi, 06/25/25, 10:24 AM EDT.      Time spent Critical care 35 min (exclusive of procedure time)  including high complexity decision making to assess, manipulate, and support vital organ system failure in this individual who has impairment of one or more vital organ systems .severe sepsis with shock, severe neutropenia, thrombocytopenia, acute hypoxemic respiratory failure,  acute encephalopathy such that there is a high probability of imminent or life threatening deterioration in the patient’s condition.      Eric Borrego MD   Critical Care Medicine  06/25/25 10:02 EDT

## 2025-06-25 NOTE — PROGRESS NOTES
[facility]   Hospitalist Progress Note  Date: 2025  Patient Name: Lluvia Archer  : 1965  MRN: [id number]  Date of admission: 2025  [place] [facility] [place]      Subjective   Subjective     Chief Complaint: change in mental status septic shock     Summary:Lluvia Archer is a 59 y.o. female with COPD on 3 L of home oxygen, small cell lung cancer, hypertension, migraine headaches, SIADH, and chronic pain who presents to the emergency department due to abnormal labs.  Patient last had chemotherapy on .  Follow-up with her primary doctor found her sodium to be 120 and platelets to be 12 for which she presented to the hospital.Patient admitted for severe thrombocytopenia and hyponatremia.  Nephrology consulted.  Oncology consulted. So far patient has received 2 platelet transfusions and 2 packed red blood cell transfusions.  Rapid response was called on  as patient had unresponsive episode patient required transfer to the intensive care started on Levophed and started on broad-spectrum antibiotic    Interval Followup: 2025  Patient required intubation on the evening of  due to decline in mental status and respiratory status  Patient currently is sedated on the ventilator  Patient is now off of Levophed  CT of the head showed chronic lacunar infarct however no evidence of acute infarction  CTA shows severe stenosis of the left subclavian artery        Review of Systems    Unable to review due to patient being on the ventilator    Objective   Objective     Vitals:   Temp:  [98.6 °F (37 °C)-103.7 °F (39.8 °C)] 98.6 °F (37 °C)  Heart Rate:  [102-155] 109  Resp:  [20-36] 26  BP: ()/() 110/80  Flow (L/min) (Oxygen Therapy):  [4-5] 4  FiO2 (%):  [30 %-100 %] 30 %    Physical Exam   General: No acute distress  HENT: NCAT, MMM. ET tube in place   Eyes: pupils equal, no scleral icterus  Cardiovascular: RRR, no murmurs   Pulmonary: Clear, breath sounds synchronous with the  ventilator  Gastrointestinal: S/ND/NT, +BS  Skin: No jaundice, no rash on exposed skin appreciated          Result Review    Result Review:  I have personally reviewed these results:  [x]  Laboratory      Lab 06/25/25  0754 06/25/25  0432 06/24/25  1231 06/24/25  1155 06/24/25  0320 06/20/25  1543 06/20/25  0339 06/20/25  0335 06/19/25  1617 06/19/25  1049   WBC  --  2.13* 0.98*  --  1.81*   < > 2.22*  --   --  2.22*   HEMOGLOBIN  --  8.4* 8.8*  --  8.2*   < > 6.2*  --   --  7.5*   HEMATOCRIT  --  25.8* 27.4*  --  26.2*   < > 18.7*  --   --  22.1*   PLATELETS  --  71* 7*  --  12*   < > 20*  --   --  4*   NEUTROS ABS  --   --  0.57*  --   --   --  0.75*  --   --  0.88*   IMMATURE GRANS (ABS)  --   --  0.07*  --   --   --  0.15*  --   --  0.06*   LYMPHS ABS  --   --  0.28*  --   --   --  1.22  --   --  1.17   MONOS ABS  --   --  0.05*  --   --   --  0.08*  --   --  0.09*   EOS ABS  --   --  0.00  --   --   --  0.00  --   --  0.00   MCV  --  95.9 95.5  --  98.1*   < > 93.0  --   --  92.1   PROCALCITONIN  --   --  0.06  --   --   --   --  0.07 0.08  --    LACTATE 3.4* 4.2* 1.6   < >  --   --   --   --   --   --     < > = values in this interval not displayed.         Lab 06/25/25  0754 06/25/25 0432 06/24/25  2336 06/24/25  1750 06/24/25  1231 06/20/25  1924 06/20/25  1543 06/20/25  1039   SODIUM 130* 132*  132* 132*   < > 130*  130*   < > 121* 119*   POTASSIUM 4.1 4.3  4.3 4.1   < > 4.8  4.8   < > 4.3 4.1   CHLORIDE 97* 97*  97* 97*   < > 95*  95*   < > 91* 90*   CO2 18.7* 22.6  22.6 20.7*   < > 24.2  24.2   < > 21.0* 22.4   ANION GAP 14.3 12.4  12.4 14.3   < > 10.8  10.8   < > 9.0 6.6   BUN 11.7 12.0  12.0 11.5   < > 14.7  14.7   < > 16.1 4.5*   CREATININE 0.50* 0.67  0.67 0.60   < > 0.60  0.60   < > 0.50* 0.43*   EGFR 108.9 101.5  101.5 104.2   < > 104.2  104.2   < > 108.9 112.9   GLUCOSE 154* 168*  168* 211*   < > 133*  133*   < > 81 84   CALCIUM 7.6* 7.8*  7.8* 7.9*   < > 8.1*  8.1*   < > 6.9*  6.9*   MAGNESIUM  --  2.1  --   --  1.8  --  1.7 1.6   PHOSPHORUS  --  3.0  --   --  1.2*  --   --   --    TSH  --   --   --   --   --   --   --  0.998    < > = values in this interval not displayed.         Lab 06/25/25  0432 06/24/25  1231 06/20/25  0335   TOTAL PROTEIN 6.3 6.6 4.9*   ALBUMIN 3.5 4.0 3.1*   GLOBULIN 2.8 2.6 1.8   ALT (SGPT) 13 7 6   AST (SGOT) 10 6 6   BILIRUBIN 0.7 0.7 0.2   ALK PHOS 72 95 59         Lab 06/19/25  1338 06/19/25  1049   HSTROP T 17* 19*             Lab 06/24/25  1231 06/20/25  0426   ABO TYPING O O   RH TYPING Positive Positive   ANTIBODY SCREEN  --  Negative         Lab 06/25/25  0623 06/24/25  1828 06/24/25  1155   PH, ARTERIAL 7.314* 7.216* 7.361   PCO2, ARTERIAL 45.5* 60.3* 44.3   PO2 ART 95.3 70.0* 88.7   O2 SATURATION ART 96.6 89.3* 96.4   FIO2 35 50 40   HCO3 ART 23.2 24.5 25.1   BASE EXCESS ART -2.9* -3.6* -0.4     Brief Urine Lab Results  (Last result in the past 365 days)        Color   Clarity   Blood   Leuk Est   Nitrite   Protein   CREAT   Urine HCG        06/24/25 1228 Yellow   Clear   Negative   Negative   Negative   Negative                 [x]  Microbiology   Microbiology Results (last 10 days)       Procedure Component Value - Date/Time    BAL Culture, Quantitative - Lavage, Bronchus [[id number]] Collected: 06/24/25 1719    Lab Status: Preliminary result Specimen: Lavage from Bronchus Updated: 06/24/25 1842     Gram Stain Occasional Gram negative bacilli    Pneumonia Panel - Lavage, Lung, Right Lower Lobe [[id number]]  (Abnormal) Collected: 06/24/25 1719    Lab Status: Final result Specimen: Lavage from Lung, Right Lower Lobe Updated: 06/24/25 1944     Escherichia coli PCR Not Detected     Acinetobacter calcoaceticus-baumannii complex PCR Not Detected     Enterobacter cloacae PCR Not Detected     Klebsiella oxytoca PCR Not Detected     Klebsiella pneumoniae group PCR Not Detected     Klebsiella aerogenes PCR Not Detected     Moraxella catarrhalis PCR Not Detected      Proteus species PCR Not Detected     Pseudomonas aeroginosa PCR Detected     Comment: 10^6 Bin copies/mL        Serratia marcescens PCR Not Detected     Staphylococcus aureus PCR Not Detected     Streptococcus pyogenes PCR Not Detected     Haemophilus influenzae PCR Not Detected     Streptococcus agalactiae PCR Not Detected     Streptococcus pneumoniae PCR Not Detected     Chlamydophila pneumoniae PCR Not Detected     Legionella pneumophilia PCR Not Detected     Mycoplasma pneumo by PCR Not Detected     ADENOVIRUS, PCR Not Detected     CTX-M Gene Not Detected     IMP Gene Not Detected     KPC Gene Not Detected     mecA/C and MREJ Gene N/A     NDM Gene Not Detected     OXA-48-like Gene N/A     VIM Gene Not Detected     Coronavirus Not Detected     Human Metapneumovirus Not Detected     Human Rhinovirus/Enterovirus Not Detected     Influenza A PCR Not Detected     Influenza B PCR Not Detected     RSV, PCR Not Detected     Parainfluenza virus PCR Not Detected          [x]  Radiology  XR Chest 1 View  Result Date: 6/24/2025  Impression: 1.Tip of the endotracheal tube now terminates in the lower thoracic trachea approximately 1 to 2 cm above the reagan. 2.Improved aeration of the right lung base with persistent bibasilar airspace opacities, likely due to atelectasis and/or pneumonia. Electronically Signed: Love Garcia MD  6/24/2025 6:38 PM EDT  Workstation ID: [id number]    XR Abdomen KUB  Result Date: 6/24/2025  Impression: 1.Tip of the enteric tube terminates in the proximal stomach. 2.Tip of the endotracheal tube terminates in the right mainstem bronchus. This was called to the patient's nurse by Dr. Miramontes according to concurrent chest x-ray report. Electronically Signed: Love Garcia MD  6/24/2025 5:59 PM EDT  Workstation ID: [id number]    XR Chest 1 View  Result Date: 6/24/2025  Impression: 1. Right mainstem intubation. 2. Increasing bibasilar airspace opacities, acuity would favor aspiration, hemorrhage  and/or atelectasis. Right mainstem intubation communicated with patient's nurse Devora at 5:46 p.m. 6/24/2025. Tube had been retracted at time of communication with plan for repeat radiograph. Electronically Signed: Abram Miramontes MD  6/24/2025 5:49 PM EDT  Workstation ID: [id number]    XR Chest 1 View  Result Date: 6/24/2025  Impression: 1. No acute cardiopulmonary disease. Electronically Signed: Bill Butcher MD  6/24/2025 1:40 PM EDT  Workstation ID: [id number]    CT Angiogram Neck  Result Date: 6/24/2025  1.No acute abnormality is identified within the large arteries of the head or neck. 2.No significant stenosis of the bilateral internal carotid arteries. 3.Severe stenosis of the left subclavian artery proximal to the left vertebral artery origin. 4.Pulmonary emphysema. Partially visualized 12 mm nodular opacity within the superior segment of the right lower lobe (series 5, image 337). Several other tiny nodular opacities within the bilateral lungs. Recommend chest CT follow-up. 5.Several vague sclerotic foci noted within the visualized thoracic spine, largest within the T6 vertebral body measuring approximately 15 mm, concerning for osseous metastases. Electronically Signed: Jose Luis Bryan MD  6/24/2025 1:23 PM EDT  Workstation ID: [id number]    CT Angiogram Head w AI Analysis of LVO  Result Date: 6/24/2025  1.No acute abnormality is identified within the large arteries of the head or neck. 2.No significant stenosis of the bilateral internal carotid arteries. 3.Severe stenosis of the left subclavian artery proximal to the left vertebral artery origin. 4.Pulmonary emphysema. Partially visualized 12 mm nodular opacity within the superior segment of the right lower lobe (series 5, image 337). Several other tiny nodular opacities within the bilateral lungs. Recommend chest CT follow-up. 5.Several vague sclerotic foci noted within the visualized thoracic spine, largest within the T6 vertebral body measuring  approximately 15 mm, concerning for osseous metastases. Electronically Signed: Jose Luis Bryan MD  6/24/2025 1:23 PM EDT  Workstation ID: [id number]    CT CEREBRAL PERFUSION WITH & WITHOUT CONTRAST  Result Date: 6/24/2025  1.within the high right frontal lobe there is a 3 mL area of Tmax greater than 6 seconds which could be related to ischemia. No evidence of core infarct. Electronically Signed: Bill Butcher MD  6/24/2025 12:14 PM EDT  Workstation ID: [id number]    CT Head Without Contrast Stroke Protocol  Result Date: 6/24/2025  Impression: Chronic appearing lacunar infarct in the left internal capsule anterior limb. No CT evidence of acute infarction, mass or intracranial hemorrhage. Small to moderate bilateral mastoid effusions. Electronically Signed: Quinton Bennett MD  6/24/2025 12:05 PM EDT  Workstation ID: [id number]    XR Chest PA & Lateral  Result Date: 6/19/2025  Impression: 1.Small bilateral pleural effusions. 2.Slight increase in left basilar opacity, atelectasis versus pneumonia. Electronically Signed: Bill Cope MD  6/19/2025 4:35 PM EDT  Workstation ID: [id number]    XR Chest 1 View  Result Date: 6/19/2025  Impression: Minimal residual atelectasis or airspace disease in the bases. Electronically Signed: Luca Brewster MD  6/19/2025 12:36 PM EDT  Workstation ID: [id number]    []  EKG/Telemetry   []  Cardiology/Vascular   []  Pathology  []  Old records  []  Other:    Assessment & Plan   Assessment / Plan     Assessment:  Acute encephalopathy  Septic shock  Febrile neutropenia  Pseudomonas respiratory infection/pneumonia  Pancytopenia secondary to chemotherapy  End-stage small cell lung cancer with metastatic disease currently on palliative chemotherapy  Hyponatremia secondary to SIADH  COPD without exacerbation  Chronic hypoxemic respiratory failure on 3 L of oxygen at home now requiring intubation      PLAN  Patient remains admitted to the medicine service.  Patient is critically ill in the intensive  care  Patient required intubation on June when he fourth for worsening respiratory status  Neurology following.  Patient CT of the head shows old lacunar infarct however no acute finding.  Patient does not have an MRI yet  CTA of the head and neck shows severe stenosis in the left subclavian artery  Patient's BAL respiratory culture is showing Pseudomonas  Blood cultures in process  Echocardiogram shows preserved ejection fraction  Continue patient on broad-spectrum antibiotics  Continue to monitor hemoglobin closely and transfuse if hemoglobin less than 7.  Transfuse platelet less than 10  Nephrology following for hyponatremia.  Continue on salt tablets, midodrine and fluid restriction.  Discussed plan with Dr. Hernandez   SSRI has been discontinued  Discussed plan with nurse        32 minutes of critical care time were spent with this critically ill patient in the intensive care secondary to unresponsive episode now with sepsis and Pseudomonas pneumonia on broad-spectrum antibiotics as well as stroke workup.  Time was spent reviewing laboratory data examining patient talking with specialists, reviewing chart and formulating care plan       Discussed with RN.    VTE Prophylaxis:  Mechanical VTE prophylaxis orders are present.        CODE STATUS:   Code Status (Patient has no pulse and is not breathing): CPR (Attempt to Resuscitate)  Medical Interventions (Patient has pulse or is breathing): Full Support  Level Of Support Discussed With: Patient      Electronically signed by Flako Santos DO, 6/25/2025, 11:34 EDT.

## 2025-06-25 NOTE — PROGRESS NOTES
RT EQUIPMENT DEVICE RELATED - SKIN ASSESSMENT    RT Medical Equipment/Device:     ETT Tee/Anchorfast    Skin Assessment:      Cheek:  Intact  Neck:  Intact  Nose:  Intact    Device Skin Pressure Protection:  Skin-to-device areas padded:  Anchorfast    Nurse Notification:  Brittany Brandt, RRT

## 2025-06-25 NOTE — NURSING NOTE
Pt in bed intubated with eyes closed. Discussed with primary RN. Pt spouse to be coming after work will attempt to meet with spouse then. Palliative care will continue to follow.       Yu GONSALEZ RN  Palliative Care

## 2025-06-25 NOTE — PROGRESS NOTES
"[facility] Clinical Pharmacy Services: Vancomycin Monitoring Note    Lluvia Archer is a 59 y.o. female who is on day  of pharmacy to dose vancomycin for Empiric.    Previous Vancomycin Dose:   1250 mg IV every  12  hours  Imaging Reviewed?: Yes  Updated Cultures and Sensitivities:       Vitals/Labs  Ht: 162.6 cm (64.02\"); Wt: 80.5 kg (177 lb 7.5 oz)   Temp (24hrs), Av.1 °F (38.4 °C), Min:97.6 °F (36.4 °C), Max:103.7 °F (39.8 °C)   Estimated Creatinine Clearance: 149.8 mL/min (A) (by C-G formula based on SCr of 0.42 mg/dL (L)).       Results from last 7 days   Lab Units 25  1110 25  0754 25  0432 25  1750 25  1231 25  0747 25  0320   VANCOMYCIN RM mcg/mL 15.38  --   --   --   --   --   --    CREATININE mg/dL 0.42* 0.50* 0.67  0.67   < > 0.60  0.60   < > 0.47*   WBC 10*3/mm3  --   --  2.13*  --  0.98*  --  1.81*    < > = values in this interval not displayed.     Assessment/Plan    Current Vancomycin Dose: 1250 mg IV every 12 hours; which provides the following predicted parameters:  AUC24,ss: 524 mg/L.hr  Probability of AUC24 > 400: 94 %  Ctrough,ss: 15.3 mg/L  Probability of Ctrough,ss > 20: 16 %  Probability of nephrotoxicity (Lodise SERGEI ): 11 %  No levels ordered at this time.   We will continue to monitor patient changes and renal function     Thank you for involving pharmacy in this patient's care. Please contact pharmacy with any questions or concerns.    May Jones, [facility]  Clinical Pharmacist  "

## 2025-06-25 NOTE — PLAN OF CARE
Goal Outcome Evaluation:  Plan of Care Reviewed With: patient        Progress: no change  Outcome Evaluation: Patient is on AC/VC rate 24, Vt 400, peep 5, 35% tolerating well at this time.

## 2025-06-25 NOTE — PROGRESS NOTES
[facility]     Progress Note    Patient Name: Lluvia Archer  : 1965  MRN: [id number]  Primary Care Physician:  Aleksandar Edge DO  Date of admission: 2025    Subjective   Patient had acute worsening of respiratory status and was transferred to the ICU  She had stroke alert called with CT scan unremarkable  Subsequently she was noted to be hypotensive and tachycardic  She was started on pressors  Patient underwent bronc  Sodium levels are stable  She was noted to be febrile with high-grade fever    Scheduled Meds:cefepime, 2,000 mg, Intravenous, Q8H  [Held by provider] dronabinol, 2.5 mg, Oral, BID  [Held by provider] escitalopram, 20 mg, Oral, Daily  famotidine, 20 mg, Intravenous, BID  fludrocortisone, 0.1 mg, Nasogastric, Daily  [Held by provider] gabapentin, 200 mg, Oral, TID  methylPREDNISolone sodium succinate, 40 mg, Intravenous, Q6H  miconazole, 1 Application, Topical, Q24H  midodrine, 10 mg, Nasogastric, TID AC  mupirocin, 1 Application, Each Nare, BID  nicotine, 1 patch, Transdermal, Q24H  polyethylene glycol, 17 g, Nasogastric, Daily  senna, 2 tablet, Nasogastric, BID  sodium chloride, 10 mL, Intravenous, Q12H  sodium chloride, 2 g, Nasogastric, TID With Meals  thiamine (B-1) IV, 100 mg, Intravenous, Daily  Urea, 15 g, Nasogastric, Daily  vancomycin, 1,250 mg, Intravenous, Q12H      Continuous Infusions:dexmedetomidine, 0.2-1.5 mcg/kg/hr  fentanyl 10 mcg/mL,  mcg/hr, Last Rate: 100 mcg/hr (25 0416)  norepinephrine, 0.02-0.3 mcg/kg/min, Last Rate: 0.1 mcg/kg/min (25 0855)  Pharmacy to dose vancomycin,   propofol, 5-50 mcg/kg/min, Last Rate: 15 mcg/kg/min (25 0730)  vasopressin, 0.03 Units/min      PRN Meds:.  acetaminophen    etomidate    fentaNYL citrate (PF)    hydrOXYzine    ondansetron    oxyCODONE-acetaminophen    Pharmacy to dose vancomycin    prochlorperazine    rocuronium    sodium chloride    sodium chloride    sodium chloride       Review of  Systems  Negative except for the above    Objective   Objective     Vitals:   Temp:  [97.5 °F (36.4 °C)-103.7 °F (39.8 °C)] 98.6 °F (37 °C)  Heart Rate:  [102-155] 102  Resp:  [18-36] 26  BP: ()/() 92/64  Flow (L/min) (Oxygen Therapy):  [4-5] 4  FiO2 (%):  [30 %-100 %] 30 %  Physical Exam    Constitutional: Interval              psychiatric:  Deferred   neurologic:  Intubated   eyes:   PERRLA, sclerae anicteric, no conjunctival injection   HEENT:  Moist mucous membranes, no nasal or eye discharge, no throat congestion   Neck:   Supple, no thyromegaly, no lymphadenopathy, trachea midline, no elevated JVD   Respiratory:  Clear to auscultation bilaterally, nonlabored respirations    Cardiovascular: RRR, no murmurs, rubs, or gallops, palpable pedal pulses bilaterally, No bilateral ankle edema   Gastrointestinal: Positive bowel sounds, soft, nontender, nondistended, no organomegaly   Musculoskeletal:  No clubbing or cyanosis to extremities,muscle wasting, joint swelling, muscle weakness             Skin:                      No rashes, bruising, skin ulcers, petechiae or ecchymosis    Result Review    Result Review:  I have personally reviewed the results from the time of this admission to 6/25/2025 09:36 EDT and agree with these findings:  []  Laboratory  []  Microbiology  []  Radiology  []  EKG/Telemetry   []  Cardiology/Vascular   []  Pathology  []  Old records  []  Other:    Assessment & Plan   Assessment / Plan       Active Hospital Problems:  Active Hospital Problems    Diagnosis     **Hyponatremia     Hypotension     Cancer, metastatic to bone     Pancytopenia due to antineoplastic chemotherapy      59-year-old female with past medical history of small cell cancer of the lung, COPD, hypertension, osteoarthritis, anxiety/depression, hyperlipidemia, 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  Became severely  hypotensive altered and high-grade fever in afternoon of 6/24 and transferred to the ICU for management of septic shock    Plan:   Continue salt tabs 2 g 3 times daily  Will hold off on urea  Patient started on antibiotics for septic shock  Pressors for MAP greater than 65  May consider giving IV fluids as patient has high insensible losses  Prognosis overall now poor.  Palliative care on board  Continue with midodrine 10 mg 3 times daily  Fluid restriction of 1500  Patient is on fludrocortisone 0.1 daily  Recommend to discontinue SSRI on discharge

## 2025-06-25 NOTE — PLAN OF CARE
Goal Outcome Evaluation:   Patient remains intubated with #7.5 ETT, marked 22 cm @ lip.   Current ventilator settings: AC Vt 400, f 24, PEEP 5, 40% FiO2 and weaning.  Patient continues to have blood tinged, thick secretions.     Problem: Mechanical Ventilation Invasive  Goal: Optimal Device Function  Outcome: Progressing  Intervention: Optimize Device Care and Function  Flowsheets  Taken 6/25/2025 0201  Oral Care: suction provided  Airway Safety Measures:   high-efficiency antimicrobial filters maintained   suction at bedside   in-line pressure manometer   oxygen flowmeter at bedside   mask valve resuscitator at bedside  Taken 6/24/2025 1833  Airway/Ventilation Management:   airway patency maintained   calming measures promoted   oxygen therapy provided   position adjusted   positive pressure ventilation provided   pulmonary hygiene promoted  Goal: Mechanical Ventilation Liberation  Outcome: Progressing  Intervention: Promote Extubation and Mechanical Ventilation Liberation  Flowsheets (Taken 6/25/2025 0201)  Environmental Support: calm environment promoted  Sleep/Rest Enhancement:   awakenings minimized   room darkened  Goal: Absence of Device-Related Skin and Tissue Injury  Outcome: Progressing  Intervention: Maintain Skin and Tissue Health  Flowsheets  Taken 6/25/2025 0201  Device Skin Pressure Protection:   pressure points protected   tubing/devices free from skin contact   positioning supports utilized  Taken 6/24/2025 1833  Device Skin Pressure Protection:   positioning supports utilized   pressure points protected   tubing/devices free from skin contact  Goal: Absence of Ventilator-Induced Lung Injury  Outcome: Progressing  Intervention: Facilitate Lung-Protection Measures  Flowsheets (Taken 6/24/2025 1833)  Lung Protection Measures:   low inspiratory pressure provided   low tidal volume provided   lung compliance monitored   plateau/inspiratory pressure monitored   ventilator synchrony promoted   ventilator  waveforms monitored  Intervention: Prevent Ventilator-Associated Pneumonia  Flowsheets  Taken 6/25/2025 0201 by Any Waldrop RRT  VAP Prevention Bundle:   vent circuit breaks minimized   readiness to extubate assessed   HOB elevation maintained  Oral Care: suction provided  Taken 6/25/2025 0000 by Fred Baez, RN  Head of Bed (HOB) Positioning: HOB elevated

## 2025-06-25 NOTE — PLAN OF CARE
Goal Outcome Evaluation:         Patient remains intubated, responsive to stimuli, follows commands intermittenly. Patient aggressive and looks fearful with too aroused, reoriented and calmed patient when necessary. Precedex, Fentanyl, Levo running per MAR. Tube feeds started today, tolerating well. Tmax 100.4, IV Tylenol given. Lowest temperature reached today was 99.9. Spoke with family today. Plan is to monitor over night.

## 2025-06-25 NOTE — PROGRESS NOTES
Patient Name: Lluvia Archer  YOB: 1965  MRN: [id number]  Admission date: 6/19/2025  Reason for Encounter: Follow-up/Progress Note    [facility] Clinical Nutrition Assessment     Subjective    Subjective Information     6/25 - Pt remains unsafe for PO intake, consult received to begin EN. Pressor needs declining, lactate now WNL. Tmax today 99.8. EN recs below, will be at risk of RFS. Thiamine added yesterday while pt NPO, will switch to oral via NG now that access is in place.    6/24 - Pt transferred to CCU after stroke RRT. Encephalopathic but did answer some yes/no questions when asked. She reports poor appetite, worsened by chemo (last on 6/13). EMR shows she has been refusing meals or consuming 0-25% for most of admit. H/o SIADH, Na currently at 130. Phos 1.2, K+ WNL. Receiving Mag @ 100 mL/hr, K-phos @ 84 mL/hr, levo at 0.04. Tmax 103.5 with WBC 0.98 and platelets 7. High risk of RFS, will add thiamine. Recommend Cortrak and EN if consistent with GOC once pressors stabilize.     Assessment    H&P and Current Problems      H&P  Past Medical History:   Diagnosis Date    Abnormal mammogram     PT DENIES KNOWING OF THIS HX    Anxiety     Arthritis     R SHOULDER, R HIP, AND R LEG    Cancer     LUNG    Chronic nausea 01/15/2019    COPD (chronic obstructive pulmonary disease)     O2 3 LITERS NC CONT    Hernia, hiatal     Hyperlipidemia     Hypertension     Knee pain, right 08/30/2018    Memory loss     FORGETFULNESS ? ETIOLOGY    Migraine headache     Moderate episode of recurrent major depressive disorder 05/22/2017    Night sweats     Sciatica of right side 08/18/2017    Severe episode of recurrent major depressive disorder, without psychotic features 10/22/2019    Shingles     OUTBREAK 6/11/24 ON ANTIVIRAL , WHELPS NOTED NO SORES.    Shoulder pain, left 08/30/2018      Past Surgical History:   Procedure Laterality Date    BRONCHOSCOPY N/A 04/12/2022    Procedure: BRONCHOSCOPY WITH  "BRONCHOALVEOLAR LAVAGE, BIOPSY;  Surgeon: Shin Mcdonald DO;  Location: [facility] ENDOSCOPY;  Service: Pulmonary;  Laterality: N/A;  RIGHT LOWER LOBE INFILTRATE    BRONCHOSCOPY N/A 2024    Procedure: BRONCHOSCOPY WITH ENDOBRONCHIAL ULTRASOUND AND FINE NEEDLE ASPIRATE;  Surgeon: Shin Mcdonald DO;  Location: [facility] ENDOSCOPY;  Service: Pulmonary;  Laterality: N/A;  MEDIASTINAL ADENOPATHY     SECTION      CHOLECYSTECTOMY      LAPAROSCOPIC    COLONOSCOPY      PORTACATH PLACEMENT Left 2024    Procedure: INSERTION OF PORTACATH;  Surgeon: Josh Patton MD;  Location: [facility] OR OSC;  Service: General;  Laterality: Left;    TOTAL HIP ARTHROPLASTY Right 2022    Procedure: RIGHT TOTAL HIP ARTHROPLASTY;  Surgeon: Cecil Gomes MD;  Location: [facility] MAIN OR;  Service: Orthopedics;  Laterality: Right;      Current Problems   Admission Diagnosis:  Hyponatremia [E87.1]    Problem List:    Hyponatremia    Pancytopenia due to antineoplastic chemotherapy    Cancer, metastatic to bone    Hypotension      Other Applicable Nutrition Information:   N/a     Anthropometrics      BMI, Height, Weight Estimated body mass index is 30.45 kg/m² as calculated from the following:    Height as of this encounter: 162.6 cm (64.02\").    Weight as of this encounter: 80.5 kg (177 lb 7.5 oz).    Weight Method: Standing scale       Trending Weight Changes Unintentional weight loss of 23 lbs (11.5%) over 3 month(s)    Significant?  Yes       Weight History  Wt Readings from Last 20 Encounters:   25 0239 80.5 kg (177 lb 7.5 oz)   25 0429 81.5 kg (179 lb 10.8 oz)   25 0421 83.9 kg (184 lb 15.5 oz)   25 0433 84.8 kg (186 lb 15.2 oz)   25 0518 85.5 kg (188 lb 7.9 oz)   25 1052 85 kg (187 lb 6.3 oz)   25 1305 83.7 kg (184 lb 9.6 oz)   25 1336 84.1 kg (185 lb 6.5 oz)   25 1313 84.1 kg (185 lb 8 oz)   25 1342 84.4 kg (186 lb 1.6 oz)   06/10/25 1342 85.8 " kg (189 lb 3.2 oz)   06/09/25 1243 85.2 kg (187 lb 12.8 oz)   06/02/25 1259 84.7 kg (186 lb 11.7 oz)   05/16/25 1306 83 kg (183 lb)   05/15/25 1312 83.2 kg (183 lb 6.4 oz)   05/14/25 1100 85.5 kg (188 lb 9.6 oz)   05/13/25 1341 83.6 kg (184 lb 6.4 oz)   05/12/25 1317 83.4 kg (183 lb 13.8 oz)   04/22/25 2106 88.7 kg (195 lb 8.8 oz)   04/22/25 1437 91.4 kg (201 lb 8 oz)   04/21/25 1332 91.4 kg (201 lb 8 oz)   04/18/25 1300 89.5 kg (197 lb 5 oz)   04/17/25 1300 91 kg (200 lb 9.9 oz)   04/16/25 1300 91.9 kg (202 lb 9.6 oz)   04/15/25 1254 90.8 kg (200 lb 2.8 oz)   04/14/25 1300 90.8 kg (200 lb 2.8 oz)        Labs      Comment: Discussed above.     Results from last 7 days   Lab Units 06/25/25  1110 06/25/25  0754 06/25/25  0432 06/24/25  1750 06/24/25  1231 06/20/25  1924 06/20/25  1543 06/20/25  1039 06/20/25  0335   SODIUM mmol/L 131* 130* 132*  132*   < > 130*  130*   < > 121*   < > 122*   POTASSIUM mmol/L 4.0 4.1 4.3  4.3   < > 4.8  4.8   < > 4.3   < > 3.9   GLUCOSE mg/dL 145* 154* 168*  168*   < > 133*  133*   < > 81   < > 95   BUN mg/dL 11.6 11.7 12.0  12.0   < > 14.7  14.7   < > 16.1   < > 5.2*   CREATININE mg/dL 0.42* 0.50* 0.67  0.67   < > 0.60  0.60   < > 0.50*   < > 0.48*   CALCIUM mg/dL 7.2* 7.6* 7.8*  7.8*   < > 8.1*  8.1*   < > 6.9*   < > 6.8*   PHOSPHORUS mg/dL  --   --  3.0  --  1.2*  --   --   --   --    MAGNESIUM mg/dL  --   --  2.1  --  1.8  --  1.7   < > 1.7   ALBUMIN g/dL  --   --  3.5  --  4.0  --   --   --  3.1*   LACTATE mmol/L 1.2 3.4* 4.2*  --  1.6   < >  --   --   --    BILIRUBIN mg/dL  --   --  0.7  --  0.7  --   --   --  0.2   ALK PHOS U/L  --   --  72  --  95  --   --   --  59   AST (SGOT) U/L  --   --  10  --  6  --   --   --  6   ALT (SGPT) U/L  --   --  13  --  7  --   --   --  6    < > = values in this interval not displayed.     Results from last 7 days   Lab Units 06/25/25  0432 06/24/25  1231 06/24/25  0320   PLATELETS 10*3/mm3 71* 7* 12*   HEMOGLOBIN g/dL 8.4* 8.8* 8.2*    HEMATOCRIT % 25.8* 27.4* 26.2*     Lab Results   Component Value Date    HGBA1C 5.80 (H) 05/01/2023      Results from last 7 days   Lab Units 06/21/25  0320   URIC ACID mg/dL 3.3     Medications       Scheduled Medications cefepime, 2,000 mg, Intravenous, Q8H  [Held by provider] dronabinol, 2.5 mg, Oral, BID  [Held by provider] escitalopram, 20 mg, Oral, Daily  famotidine, 20 mg, Intravenous, BID  fludrocortisone, 0.1 mg, Nasogastric, Daily  [Held by provider] gabapentin, 200 mg, Oral, TID  methylPREDNISolone sodium succinate, 40 mg, Intravenous, Q6H  miconazole, 1 Application, Topical, Q24H  midodrine, 10 mg, Nasogastric, TID AC  mupirocin, 1 Application, Each Nare, BID  nicotine, 1 patch, Transdermal, Q24H  polyethylene glycol, 17 g, Nasogastric, Daily  senna, 2 tablet, Nasogastric, BID  sodium chloride, 10 mL, Intravenous, Q12H  sodium chloride, 2 g, Nasogastric, TID With Meals  thiamine (B-1) IV, 100 mg, Intravenous, Daily  [Held by provider] Urea, 15 g, Nasogastric, Daily  vancomycin, 1,250 mg, Intravenous, Q12H        Infusions dexmedetomidine, 0.2-1.5 mcg/kg/hr, Last Rate: 1 mcg/kg/hr (06/25/25 1150)  fentanyl 10 mcg/mL,  mcg/hr, Last Rate: 100 mcg/hr (06/25/25 1312)  norepinephrine, 0.02-0.3 mcg/kg/min, Last Rate: 0.02 mcg/kg/min (06/25/25 1310)  Pharmacy to dose vancomycin,   propofol, 5-50 mcg/kg/min, Last Rate: Stopped (06/25/25 1026)  vasopressin, 0.03 Units/min        PRN Medications   acetaminophen    etomidate    fentaNYL citrate (PF)    hydrOXYzine    ondansetron    oxyCODONE-acetaminophen    Pharmacy to dose vancomycin    prochlorperazine    rocuronium    sodium chloride    sodium chloride    sodium chloride     Physical Findings      Chewing/Swallowing  Teeth Status: Mouth/Teeth WDL: .WDL except, teeth Teeth Symptoms: decay/caries, tooth/teeth missing  Chewing/Swallowing Issues: Other:Encephalopathic, not currently safe for PO   Edema  Generalized Edema: 1+ (Trace)              Ankle, Left  Edema: 1+ (Trace) Ankle, Right Edema: 1+ (Trace)  Foot, Left Edema: 1+ (Trace) Foot, Right Edema: 1+ (Trace)   Bowel Function  Stool Output  Perineal Care: perineum cleansed (06/25/25 0757)  Last Bowel Movement: 06/17/25   I/Os  Intake & Output (last 3 days)         06/22 0701  06/23 0700 06/23 0701  06/24 0700 06/24 0701  06/25 0700 06/25 0701  06/26 0700    P.O. 960 600 220     I.V. (mL/kg)   2194.3 (27.3)     Blood   341.3     Other    200    Total Intake(mL/kg) 960 (11.8) 600 (7.5) 2755.5 (34.2) 200 (2.5)    Urine (mL/kg/hr) 2025 (1) 1850 (1) 1550 (0.8)     Total Output 2025 1850 1550     Net -1065 -1250 +1205.5 +200                   Lines, Drains, Airways, & Wounds       Active LDAs       Name Placement date Placement time Site Days Last dressing change    Peripheral IV 06/19/25 1051 20 G Right Antecubital 06/19/25  1051  Antecubital  5     Peripheral IV 06/19/25 2124 20 G Left Antecubital 06/19/25  2124  Antecubital  4     Peripheral IV 06/24/25 1430 22 G Anterior;Distal;Left Forearm 06/24/25  1430  Forearm  less than 1     Peripheral IV 06/24/25 1515 20 G Anterior;Right;Upper Arm 06/24/25  1515  Arm  less than 1     Single Lumen Implantable Port Left Subclavian --  --  Subclavian  --     Wound 06/19/25 1600 Right proximal knee Other (Comments) 06/19/25  1600  -- 4     Wound 06/19/25 1600 Bilateral groin  06/19/25  1600  -- 4                       Nutrition Focused Physical Exam     Trending NFPE Completed 6/24 - Noted mild temporal and shoulder losses, otherwise WNL     Malnutrition Severity Assessment                 Estimated Needs      Energy Requirements    Weight for Calculation 80.5 kg   Method for Estimation  11-14 kcals/kg   Daily Needs (kcal/day) 886-1127   Protein Requirements    Weight for Calculation 54.7 kg   Method for Estimation 2.0 gm/kg   Daily Needs (g/day) 109   Fluid Requirements     Method for Estimation 35 mL/kg and Oliver - has SIADH, change per clinical status    Daily Needs  (mL/day) 1324      Current Nutrition Orders & Evaluation of Intake      Oral Nutrition     Food Allergies  and Intolerances NKFA   Current PO Diet NPO Diet NPO Type: Strict NPO   Oral Nutrition Supplement None     Trending % PO Intake 0-25%, now NPO   (2)  Assessment & Plan   Nutrition Diagnosis and Goals       Nutrition Diagnosis 1 Inadequate Energy Intake related to inadequate oral intake as evidenced by NPO, previously refusing meals, most meals 0-25% consumed - ongoing      Nutrition Diagnosis 2 Increased Nutrient Needs (kcal, pro) related to catabolic disease increasing needs as evidenced by small cell lung cancer on chemo - ongoing        Goal(s) PO Diet Advances When Medically Appropriate , Tolerates PO Diet , and No Significant Weight Loss and Initiate EN      Nutrition Intervention and Prescription       Intervention  Monitor for diet advancement, Await initiation of EN, and Continue to monitor for plan of care      Diet Prescription NPO    Supplement Prescription Thiamine 100 mg daily for RFS risk (switch to oral/NG)    Education Provided       Enteral Prescription Peptamen Intense VHP @ 50 mL/hr, flush 60 mL q4h  Start at 10 mL/hr and advance 10 mL q8h to goal    Provides: 1100 kcal, 101 g pro, 1284 mL fluid (924 FW + 360 flush)       TPN Prescription       Monitoring/Evaluation       Monitor/Evaluation PO Intake, Pertinent Labs, EN Delivery/Tolerance, Weight, and Hemodynamic Stability      RD Follow-Up Encounter 1-2 days     Electronically signed by:  Daria Armstrong RD  06/25/25 13:44 EDT

## 2025-06-25 NOTE — PLAN OF CARE
Goal Outcome Evaluation:      See EMAR for medication admin. See flowsheets for assessment. Pt failed SAT. VSS

## 2025-06-26 ENCOUNTER — TELEPHONE (OUTPATIENT)
Dept: FAMILY MEDICINE CLINIC | Facility: CLINIC | Age: 60
End: 2025-06-26

## 2025-06-26 NOTE — PROGRESS NOTES
RT EQUIPMENT DEVICE RELATED - SKIN ASSESSMENT    RT Medical Equipment/Device:     ETT Tee/Anchorfast    Skin Assessment:      Cheek:  Intact  Neck:  Intact  Lips:  Cracked  Mouth:  Intact  Tongue:  Intact    Device Skin Pressure Protection:  Skin-to-device areas padded:  Anchorfast    Nurse Notification:  Brittany Henriquez, CRT

## 2025-06-26 NOTE — PLAN OF CARE
Goal Outcome Evaluation:  Plan of Care Reviewed With: patient        Progress: no change  Outcome Evaluation: Patient in CCU on ventilator. Patient has been on pressure support majority of shift 8/5 28% tolerating well. All sedation weaned off around 1000, patient able to follow commands and pressure support at that time. Currently on precedex and fentanyl gtt, see mar for titrations. Levophed weaned off this shift as well. Right radial arterial line placed this shift. Tube feeds maintained, currently receiving Peptamen VPH @40, Free water @60 q3hr. One time dose 40 mg lasix given. UOP per flowsheets. Tmax 100.7, IV tylenol given.

## 2025-06-26 NOTE — TELEPHONE ENCOUNTER
"Relay     \"Patient had appt 06/26/2025 at 2:30pm with Dr. Edge. Please r.s to next opening. \"                "

## 2025-06-26 NOTE — PROCEDURES
Arterial Line Placement Procedure Note     Indication: Shock   Hypotension, vasopressor requirements    Consent obtained: emergent    Time Out: performed at 1245    Romaine's Test: not applicable    Procedure: The skin over the right radial artery was prepped with ChloraPrep and draped.  An arterial catheter insertion was attempted under ultrasound guidance with arterial Arrow kit. The procedure was successful in first attempt.  The catheter was securely fastened to the skin with suture.      The transducer set was attached to the catheter and waveforms on the monitor were observed and found to be normal. The site was then dressed in a sterile fashion.    The patient tolerated the procedure well.    Complications: None    Electronically signed by SANDRO Yi, 06/26/25, 1:06 PM EDT.

## 2025-06-26 NOTE — PLAN OF CARE
Goal Outcome Evaluation:  Plan of Care Reviewed With: patient        Progress: no change  Outcome Evaluation: Patient is currently on AC 24, 400 and peep 5. Patient trial on PS held due to patients agitation and fever.

## 2025-06-26 NOTE — PROGRESS NOTES
"Intensive Care Admission Note     Acute encephalopathy, severe sepsis with shock, pancytopenia    History of Present Illness     59 female with history of small cell lung cancer with metastasis on chemotherapy, COPD, hypertension, anxiety disorder, hyperlipidemia, SIADH, pancytopenia, admitted to the hospital for management of severe hyponatremia and thrombocytopenia.  Oncology and nephrology were following patient on the floor.  She was treated for SIADH with fluid restriction, salt and urea tabs and replaced 2 units PRBC and 2 units platelets for severe anemia and thrombocytopenia.  Per reports she has been lethargic for the last couple of days but today she has been more encephalopathic and stroke alert was called this afternoon.  She had CT head and CTA done with initial evidence of only chronic appearing lacunar infarct in the left internal capsule.  She is moved to the ICU with persistent encephalopathy and hypotension with tachycardia.    Sub/Overnight events:   Intubated 6/24  Pseudomonas on BAL  Lactic increased from 1.2-4.6, was given 1 L fluid bolus overnight.  Liver enzymes also increased.  Levophed currently off.  Failed SBT this morning due to agitation off sedation.  Platelets decreased to 34, hemoglobin 7.3, no signs of active bleeding.  Tmax 101.2.  On cefepime and vancomycin.      Physical Exam and Clinical Information   /78   Pulse (!) 128   Temp 96.4 °F (35.8 °C)   Resp 26   Ht 162.6 cm (64.02\")   Wt 83.2 kg (183 lb 6.8 oz)   LMP  (LMP Unknown)   SpO2 99%   BMI 31.47 kg/m²     Physical exam  General : Aox0.  Moderate distress off sedation  Neuro: Opens eyes spontaneously, follows commands  HEENT : AT,NC,PERRLA,+ pallor, No Icterus,No thyromegaly. No JVD.  CVS : Tachycardic, systolic murmur present grade 3,  Resp/Chest: B/l ant VBS+.  Bilateral coarse rhonchi   abd: Soft, Non distended, No tenderness, No organomegaly. Bowel sounds +  EXT: NO edema.   SKIN : NO palpable skin " lesions/rashes noted.    Results from last 7 days   Lab Units 06/26/25  0317 06/25/25  0432 06/24/25  1231   WBC 10*3/mm3 3.78 2.13* 0.98*   HEMOGLOBIN g/dL 7.3* 8.4* 8.8*   PLATELETS 10*3/mm3 34* 71* 7*     Results from last 7 days   Lab Units 06/26/25  0745 06/26/25  0317 06/25/25  2328 06/25/25  0754 06/25/25  0432 06/24/25  1750 06/24/25  1231   SODIUM mmol/L 135* 136 136   < > 132*  132*   < > 130*  130*   POTASSIUM mmol/L 4.3 4.8 4.8   < > 4.3  4.3   < > 4.8  4.8   CO2 mmol/L 20.6* 19.3* 21.1*   < > 22.6  22.6   < > 24.2  24.2   BUN mg/dL 22.2* 21.7* 18.2   < > 12.0  12.0   < > 14.7  14.7   CREATININE mg/dL 0.55* 0.78 0.71   < > 0.67  0.67   < > 0.60  0.60   MAGNESIUM mg/dL  --  2.3  --   --  2.1  --  1.8   PHOSPHORUS mg/dL  --  2.7  --   --  3.0  --  1.2*   GLUCOSE mg/dL 148* 163* 138*   < > 168*  168*   < > 133*  133*    < > = values in this interval not displayed.     Estimated Creatinine Clearance: 116.3 mL/min (A) (by C-G formula based on SCr of 0.55 mg/dL (L)).      Results from last 7 days   Lab Units 06/25/25  0623   PH, ARTERIAL pH units 7.314*   PCO2, ARTERIAL mm Hg 45.5*   PO2 ART mm Hg 95.3     Lab Results   Component Value Date    LACTATE 4.6 (C) 06/26/2025        Images: X-ray chest personally reviewed with evidence of bilateral pulmonary congestion.  No lobar pneumonia noted    I reviewed the patient's results and images.     Impression     Hyponatremia    Pancytopenia due to antineoplastic chemotherapy    Cancer, metastatic to bone    Hypotension    Plan/Recommendations     Acute encephalopathy  Severe sepsis with shock  Acute hypoxemic respiratory failure  Alveolar hemorrhage likely 2/2 low PLT  Febrile neutropenia  Pancytopenia 2/2 cancer chemotherapy  Small cell lung cancer with metastasis  Hyponatremia 2/2 SIADH  H/o COPD on baseline 3 L oxygen    -Acute encephalopathy likely secondary to sepsis.  CT head with no acute findings.  Continue to closely monitor mentation.  High risk  for seizures.  Hold gabapentin.  Mental status improving, able to follow commands.  Agitated off sedation.  - Continue Levophed to maintain MAP of 65, currently off.  Received 1 L LR bolus for elevated lactic overnight.   -Ctn solumedrol for severe pneumonia, septic shock and alveolar hemorrhage.  - Reviewed echocardiogram.  EF 51 to 55%, moderate aortic valve regurgitation, mild to moderate mitral regurgitation. Narrow pulse pressure and new elevated lactic acid and shock liver. Repeat TTE today.  - Trend lactic until cleared. CK normal.  - DC vancomycin, continue cefepime. De-escalate based on cultures. Prelim BAL cx growing pseudomonas.  - Pancytopenia due to chemotherapy.  Received 2 units platelets 6/24.  Platelets today decreased to 34.  Hemoglobin decreased to 7.3.  Neutropenia noted.  - Sodium stable.  Nephrology on board.  Continue management for SIADH.  - On mechanical ventilation, current settings AC/VC 24/400/5, failed pressure support trial, continue daily SBT/SAT.  Will hold propofol and fentanyl, use Precedex and placed on pressure support.  Consider extubation later today vs tomorrow.  -Bronchoscopy done on 6/24, pneumonia panel growing Pseudomonas  - Titrate FiO2 to maintain SpO2 greater than 90%.  Monitor for signs of volume overload given she has pulmonary congestion on x-ray.  Give Lasix 40 mg once.  - Hold sedation. Use Precedex for agitation.  -Liver enzymes elevated today, will trend.  - Full code    I, SANDRO Yi spent 12 minutes of critical care time.   Electronically signed by SANDRO Yi, 06/26/25, 10:31 AM EDT.      Time spent Critical care 38 min (exclusive of procedure time)  including high complexity decision making to assess, manipulate, and support vital organ system failure in this individual who has impairment of one or more vital organ systems .severe sepsis with shock, severe neutropenia, thrombocytopenia, acute hypoxemic respiratory failure, acute  encephalopathy such that there is a high probability of imminent or life threatening deterioration in the patient’s condition.      Eric Borrego MD   Critical Care Medicine  06/26/25 10:15 EDT

## 2025-06-26 NOTE — NURSING NOTE
Spoke with Deborah Mandel, nurse effector, regarding Pt stacking breaths and asynchrony on vent. She instructed to restart propofol and ween off precedex

## 2025-06-26 NOTE — NURSING NOTE
Care team rounding during visit. Pt was very anxious during consult still intubated. Care team discussing getting pt calm and extubating. Primary RN to reach out if any changes or needs occur. Palliative care will continue to follow.       Yu GONSALEZ RN  Palliative Care

## 2025-06-26 NOTE — PROGRESS NOTES
RT EQUIPMENT DEVICE RELATED - SKIN ASSESSMENT    RT Medical Equipment/Device:     ETT Tee/Anchorfast    Skin Assessment:      Cheek:  Intact  Mouth:  Intact    Device Skin Pressure Protection:  Positioning supports utilized and Skin-to-device areas padded:  Anchorfast    Nurse Notification:  Brittany Ventura, RRT

## 2025-06-26 NOTE — PROGRESS NOTES
[facility]   Hospitalist Progress Note  Date: 2025  Patient Name: Lluvia Archer  : 1965  MRN: [id number]  Date of admission: 2025  [place] [facility] [place] [facility] [place]ive   Subjective     Chief Complaint: sepsis, confusion, respiratory failure      Hospital course updated reviewed and revised as needed  Summary:Lluvia Archer is a 59 y.o. female with COPD on 3 L of home oxygen, small cell lung cancer, hypertension, migraine headaches, SIADH, and chronic pain who presents to the emergency department due to abnormal labs.  Patient last had chemotherapy on .  Follow-up with her primary doctor found her sodium to be 120 and platelets to be 12 for which she presented to the hospital.Patient admitted for severe thrombocytopenia and hyponatremia.  Nephrology consulted.  Oncology consulted. So far patient has received 2 platelet transfusions and 2 packed red blood cell transfusions.  Rapid response was called on  as patient had unresponsive episode patient required transfer to the intensive care started on Levophed and started on broad-spectrum antibiotic.  Patient is now off Levophed patient however has required intubation on  patient is growing Pseudomonas on BAL    Interval Followup: 2025  Patient remains intubated critically ill in the intensive care on the ventilator  Patient required intubation on   Patient is growing Pseudomonas on BAL  Patient remains off Levophed  Patient did fail spontaneous breathing trial this morning  Palliative care following overall prognosis is extremely poor        Review of Systems    Unable to review due to patient being on the ventilator    Objective   Objective     Vitals:   Temp:  [95.8 °F (35.4 °C)-101.2 °F (38.4 °C)] 96.4 °F (35.8 °C)  Heart Rate:  [] 87  Resp:  [24-26] 26  BP: ()/() 106/78  FiO2 (%):  [28 %-30 %] 28 %    Physical Exam   General: Patient remains intubated sedated on the ventilator  HENT: NCAT, MMM.  ET tube in place   Eyes: pupils equal, no scleral icterus  Cardiovascular: RRR, no murmurs   Pulmonary: Clear, breath sounds synchronous with the ventilator  Gastrointestinal: S/ND/NT, +BS  Skin: No jaundice, no rash on exposed skin appreciated  : alex in place           Result Review    Result Review:  I have personally reviewed these results:  [x]  Laboratory      Lab 06/26/25 0317 06/25/25  1110 06/25/25  0754 06/25/25  0432 06/24/25  1231 06/20/25  1543 06/20/25  0339 06/20/25  0335 06/19/25  1617 06/19/25  1049   WBC 3.78  --   --  2.13* 0.98*   < > 2.22*  --   --  2.22*   HEMOGLOBIN 7.3*  --   --  8.4* 8.8*   < > 6.2*  --   --  7.5*   HEMATOCRIT 22.9*  --   --  25.8* 27.4*   < > 18.7*  --   --  22.1*   PLATELETS 34*  --   --  71* 7*   < > 20*  --   --  4*   NEUTROS ABS  --   --   --   --  0.57*  --  0.75*  --   --  0.88*   IMMATURE GRANS (ABS)  --   --   --   --  0.07*  --  0.15*  --   --  0.06*   LYMPHS ABS  --   --   --   --  0.28*  --  1.22  --   --  1.17   MONOS ABS  --   --   --   --  0.05*  --  0.08*  --   --  0.09*   EOS ABS  --   --   --   --  0.00  --  0.00  --   --  0.00   MCV 97.4*  --   --  95.9 95.5   < > 93.0  --   --  92.1   PROCALCITONIN  --   --   --   --  0.06  --   --  0.07 0.08  --    LACTATE 4.6* 1.2 3.4* 4.2* 1.6   < >  --   --   --   --     < > = values in this interval not displayed.         Lab 06/26/25  0745 06/26/25 0317 06/25/25  2328 06/25/25  0754 06/25/25  0432 06/24/25  1750 06/24/25  1231 06/20/25  1543 06/20/25  1039   SODIUM 135* 136 136   < > 132*  132*   < > 130*  130*   < > 119*   POTASSIUM 4.3 4.8 4.8   < > 4.3  4.3   < > 4.8  4.8   < > 4.1   CHLORIDE 104 103 103   < > 97*  97*   < > 95*  95*   < > 90*   CO2 20.6* 19.3* 21.1*   < > 22.6  22.6   < > 24.2  24.2   < > 22.4   ANION GAP 10.4 13.7 11.9   < > 12.4  12.4   < > 10.8  10.8   < > 6.6   BUN 22.2* 21.7* 18.2   < > 12.0  12.0   < > 14.7  14.7   < > 4.5*   CREATININE 0.55* 0.78 0.71   < > 0.67  0.67    < > 0.60  0.60   < > 0.43*   EGFR 106.4 88.2 98.7   < > 101.5  101.5   < > 104.2  104.2   < > 112.9   GLUCOSE 148* 163* 138*   < > 168*  168*   < > 133*  133*   < > 84   CALCIUM 7.0* 7.0* 7.4*   < > 7.8*  7.8*   < > 8.1*  8.1*   < > 6.9*   MAGNESIUM  --  2.3  --   --  2.1  --  1.8   < > 1.6   PHOSPHORUS  --  2.7  --   --  3.0  --  1.2*  --   --    TSH  --   --   --   --   --   --   --   --  0.998    < > = values in this interval not displayed.         Lab 06/26/25  0317 06/25/25  0432 06/24/25  1231   TOTAL PROTEIN 5.8* 6.3 6.6   ALBUMIN 3.3* 3.5 4.0   GLOBULIN 2.5 2.8 2.6   ALT (SGPT) 871* 13 7   AST (SGOT) 1,006* 10 6   BILIRUBIN 1.2 0.7 0.7   ALK PHOS 88 72 95         Lab 06/19/25  1338 06/19/25  1049   HSTROP T 17* 19*             Lab 06/24/25  1231 06/20/25  0426   ABO TYPING O O   RH TYPING Positive Positive   ANTIBODY SCREEN  --  Negative         Lab 06/25/25  0623 06/24/25  1828 06/24/25  1155   PH, ARTERIAL 7.314* 7.216* 7.361   PCO2, ARTERIAL 45.5* 60.3* 44.3   PO2 ART 95.3 70.0* 88.7   O2 SATURATION ART 96.6 89.3* 96.4   FIO2 35 50 40   HCO3 ART 23.2 24.5 25.1   BASE EXCESS ART -2.9* -3.6* -0.4     Brief Urine Lab Results  (Last result in the past 365 days)        Color   Clarity   Blood   Leuk Est   Nitrite   Protein   CREAT   Urine HCG        06/24/25 1228 Yellow   Clear   Negative   Negative   Negative   Negative                 [x]  Microbiology   Microbiology Results (last 10 days)       Procedure Component Value - Date/Time    AFB Culture - Lavage, Bronchus [[id number]] Collected: 06/24/25 1719    Lab Status: Preliminary result Specimen: Lavage from Bronchus Updated: 06/25/25 1136     AFB Stain No acid fast bacilli seen on direct smear      No acid fast bacilli seen on concentrated smear    BAL Culture, Quantitative - Lavage, Bronchus [[id number]]  (Abnormal) Collected: 06/24/25 1719    Lab Status: Preliminary result Specimen: Lavage from Bronchus Updated: 06/25/25 1140     BAL Culture >100,000  CFU/mL Pseudomonas species      No Normal Respiratory Tamika     Gram Stain Occasional Gram negative bacilli    Pneumonia Panel - Lavage, Lung, Right Lower Lobe [[id number]]  (Abnormal) Collected: 06/24/25 1719    Lab Status: Final result Specimen: Lavage from Lung, Right Lower Lobe Updated: 06/24/25 1944     Escherichia coli PCR Not Detected     Acinetobacter calcoaceticus-baumannii complex PCR Not Detected     Enterobacter cloacae PCR Not Detected     Klebsiella oxytoca PCR Not Detected     Klebsiella pneumoniae group PCR Not Detected     Klebsiella aerogenes PCR Not Detected     Moraxella catarrhalis PCR Not Detected     Proteus species PCR Not Detected     Pseudomonas aeroginosa PCR Detected     Comment: 10^6 Bin copies/mL        Serratia marcescens PCR Not Detected     Staphylococcus aureus PCR Not Detected     Streptococcus pyogenes PCR Not Detected     Haemophilus influenzae PCR Not Detected     Streptococcus agalactiae PCR Not Detected     Streptococcus pneumoniae PCR Not Detected     Chlamydophila pneumoniae PCR Not Detected     Legionella pneumophilia PCR Not Detected     Mycoplasma pneumo by PCR Not Detected     ADENOVIRUS, PCR Not Detected     CTX-M Gene Not Detected     IMP Gene Not Detected     KPC Gene Not Detected     mecA/C and MREJ Gene N/A     NDM Gene Not Detected     OXA-48-like Gene N/A     VIM Gene Not Detected     Coronavirus Not Detected     Human Metapneumovirus Not Detected     Human Rhinovirus/Enterovirus Not Detected     Influenza A PCR Not Detected     Influenza B PCR Not Detected     RSV, PCR Not Detected     Parainfluenza virus PCR Not Detected    Blood Culture - Blood, Arm, Left [[id number]]  (Normal) Collected: 06/24/25 1231    Lab Status: Preliminary result Specimen: Blood from Arm, Left Updated: 06/25/25 1246     Blood Culture No growth at 24 hours    Blood Culture - Blood, Arm, Right [[id number]]  (Normal) Collected: 06/24/25 1230    Lab Status: Preliminary result Specimen: Blood  from Arm, Right Updated: 06/25/25 1246     Blood Culture No growth at 24 hours    Narrative:      Less than seven (7) mL's of blood was collected.  Insufficient quantity may yield false negative results.    Urine Culture - Urine, Indwelling Urethral Catheter [[id number]]  (Normal) Collected: 06/24/25 1228    Lab Status: Final result Specimen: Urine from Indwelling Urethral Catheter Updated: 06/25/25 1335     Urine Culture No growth          [x]  Radiology  XR Chest 1 View  Result Date: 6/26/2025  Marked interval worsening of right basilar infiltrate compatible with pneumonia. Electronically Signed: Ahmet Hill MD  6/26/2025 5:56 AM EDT  Workstation ID: [id number]    XR Chest 1 View  Result Date: 6/24/2025  Impression: 1.Tip of the endotracheal tube now terminates in the lower thoracic trachea approximately 1 to 2 cm above the reagan. 2.Improved aeration of the right lung base with persistent bibasilar airspace opacities, likely due to atelectasis and/or pneumonia. Electronically Signed: Love Garcia MD  6/24/2025 6:38 PM EDT  Workstation ID: [id number]    XR Abdomen KUB  Result Date: 6/24/2025  Impression: 1.Tip of the enteric tube terminates in the proximal stomach. 2.Tip of the endotracheal tube terminates in the right mainstem bronchus. This was called to the patient's nurse by Dr. Miramontes according to concurrent chest x-ray report. Electronically Signed: Love Garcia MD  6/24/2025 5:59 PM EDT  Workstation ID: [id number]    XR Chest 1 View  Result Date: 6/24/2025  Impression: 1. Right mainstem intubation. 2. Increasing bibasilar airspace opacities, acuity would favor aspiration, hemorrhage and/or atelectasis. Right mainstem intubation communicated with patient's nurse Devora at 5:46 p.m. 6/24/2025. Tube had been retracted at time of communication with plan for repeat radiograph. Electronically Signed: Abram Miramontes MD  6/24/2025 5:49 PM EDT  Workstation ID: [id number]    XR Chest 1 View  Result Date:  6/24/2025  Impression: 1. No acute cardiopulmonary disease. Electronically Signed: Bill Butcher MD  6/24/2025 1:40 PM EDT  Workstation ID: [id number]    CT Angiogram Neck  Result Date: 6/24/2025  1.No acute abnormality is identified within the large arteries of the head or neck. 2.No significant stenosis of the bilateral internal carotid arteries. 3.Severe stenosis of the left subclavian artery proximal to the left vertebral artery origin. 4.Pulmonary emphysema. Partially visualized 12 mm nodular opacity within the superior segment of the right lower lobe (series 5, image 337). Several other tiny nodular opacities within the bilateral lungs. Recommend chest CT follow-up. 5.Several vague sclerotic foci noted within the visualized thoracic spine, largest within the T6 vertebral body measuring approximately 15 mm, concerning for osseous metastases. Electronically Signed: Jose Luis Bryan MD  6/24/2025 1:23 PM EDT  Workstation ID: [id number]    CT Angiogram Head w AI Analysis of LVO  Result Date: 6/24/2025  1.No acute abnormality is identified within the large arteries of the head or neck. 2.No significant stenosis of the bilateral internal carotid arteries. 3.Severe stenosis of the left subclavian artery proximal to the left vertebral artery origin. 4.Pulmonary emphysema. Partially visualized 12 mm nodular opacity within the superior segment of the right lower lobe (series 5, image 337). Several other tiny nodular opacities within the bilateral lungs. Recommend chest CT follow-up. 5.Several vague sclerotic foci noted within the visualized thoracic spine, largest within the T6 vertebral body measuring approximately 15 mm, concerning for osseous metastases. Electronically Signed: Jose Luis Bryan MD  6/24/2025 1:23 PM EDT  Workstation ID: [id number]    CT CEREBRAL PERFUSION WITH & WITHOUT CONTRAST  Result Date: 6/24/2025  1.within the high right frontal lobe there is a 3 mL area of Tmax greater than 6 seconds which could be  related to ischemia. No evidence of core infarct. Electronically Signed: Bill Butcher MD  6/24/2025 12:14 PM EDT  Workstation ID: [id number]    CT Head Without Contrast Stroke Protocol  Result Date: 6/24/2025  Impression: Chronic appearing lacunar infarct in the left internal capsule anterior limb. No CT evidence of acute infarction, mass or intracranial hemorrhage. Small to moderate bilateral mastoid effusions. Electronically Signed: Quinton Bennett MD  6/24/2025 12:05 PM EDT  Workstation ID: [id number]    XR Chest 1 View  Result Date: 6/19/2025  Impression: Minimal residual atelectasis or airspace disease in the bases. Electronically Signed: Luca Brewster MD  6/19/2025 12:36 PM EDT  Workstation ID: [id number]    []  EKG/Telemetry   []  Cardiology/Vascular   []  Pathology  []  Old records  []  Other:    Assessment & Plan   Assessment / Plan     Assessment:  Acute encephalopathy  Septic shock  Febrile neutropenia  Acute hypoxemic respiratory failure now requiring intubation  Pseudomonas pneumonia  Pancytopenia secondary to chemotherapy  End-stage small cell lung cancer with metastatic disease currently on palliative chemotherapy  Hyponatremia secondary to SIADH  COPD without exacerbation  Chronic hypoxemic respiratory failure on 3 L of oxygen        PLAN  Patient remains admitted to the medicine service  Patient remains critically ill intubated on the ventilator  Patient was intubated on June 24  Continue to hold gabapentin  Patient remains off Levophed  Continue patient on cefepime for Pseudomonas  Neurology following.  Patient CT of the head shows old lacunar infarct however no acute infarct  CTA of the head and neck does show severe stenosis in the left subclavian artery  Blood cultures show no growth  Echocardiogram shows preserved ejection fraction  Continue to monitor hemoglobin closely and transfuse for hemoglobin less than 7.  Transfuse for platelet count less than 10  Continue patient on salt tablets for  hyponatremia  Continue patient on midodrine for hypotension  SSRI has been discontinued  Discussed plan with Dr. Susanne muirness  Repeat CBC and BMP in the morning to monitor white blood cell count, electrolytes and hemoglobin as well as platelet count           Discussed with RN.    VTE Prophylaxis:  Mechanical VTE prophylaxis orders are present.        CODE STATUS:   Code Status (Patient has no pulse and is not breathing): CPR (Attempt to Resuscitate)  Medical Interventions (Patient has pulse or is breathing): Full Support  Level Of Support Discussed With: Patient      Electronically signed by Flako Santos DO, 6/26/2025, 09:49 EDT.

## 2025-06-26 NOTE — SIGNIFICANT NOTE
06/26/25 6447   Physical Therapy Time and Intention   Session Not Performed patient unavailable for treatment

## 2025-06-26 NOTE — PLAN OF CARE
Goal Outcome Evaluation:  Plan of Care Reviewed With: patient        Progress: no change  Outcome Evaluation: Patient is on ventilator AC/VC rate 24, Vt 400, Peep 5, Fio2 28% this morning. Was unable to wean last night due to increased heart rate and respiratory rate.

## 2025-06-26 NOTE — PLAN OF CARE
Problem: Adult Inpatient Plan of Care  Goal: Plan of Care Review  Outcome: Not Progressing  Flowsheets (Taken 6/26/2025 0554)  Progress: no change  Outcome Evaluation: Pt remains on 28% fio2 on ventilator. Pt remains on sedation and levophed for pressure support. Pt becomes agitated during turns and pt care with HR in 130's and respiration rate in the 40's. Unable to proceed with breathing trial  Plan of Care Reviewed With:   patient   spouse   child   Goal Outcome Evaluation:  Plan of Care Reviewed With: patient, spouse, child        Progress: no change  Outcome Evaluation: Pt remains on 28% fio2 on ventilator. Pt remains on sedation and levophed for pressure support. Pt becomes agitated during turns and pt care with HR in 130's and respiration rate in the 40's. Unable to proceed with breathing trial

## 2025-06-26 NOTE — PROGRESS NOTES
[facility]     Progress Note    Patient Name: Lluvia Archer  : 1965  MRN: [id number]  Primary Care Physician:  Aleksandar Edge DO  Date of admission: 2025    Subjective   Patient continues to be hypotensive  No further episodes of fever  Continues to be vent dependent      Scheduled Meds:cefepime, 2,000 mg, Intravenous, Q8H  [Held by provider] dronabinol, 2.5 mg, Oral, BID  [Held by provider] escitalopram, 20 mg, Oral, Daily  famotidine, 20 mg, Intravenous, BID  fludrocortisone, 0.1 mg, Nasogastric, Daily  [Held by provider] gabapentin, 200 mg, Oral, TID  methylPREDNISolone sodium succinate, 40 mg, Intravenous, Q12H  miconazole, 1 Application, Topical, Q24H  midodrine, 10 mg, Nasogastric, TID AC  mupirocin, 1 Application, Each Nare, BID  nicotine, 1 patch, Transdermal, Q24H  polyethylene glycol, 17 g, Nasogastric, Daily  senna, 2 tablet, Nasogastric, BID  sodium chloride, 10 mL, Intravenous, Q12H  sodium chloride, 2 g, Nasogastric, TID With Meals  thiamine, 100 mg, Nasogastric, Daily  [Held by provider] Urea, 15 g, Nasogastric, Daily      Continuous Infusions:dexmedetomidine, 0.2-1.5 mcg/kg/hr, Last Rate: Stopped (25 0408)  fentanyl 10 mcg/mL,  mcg/hr, Last Rate: 100 mcg/hr (25 0750)  norepinephrine, 0.02-0.3 mcg/kg/min, Last Rate: 0.02 mcg/kg/min (25 0831)  propofol, 5-50 mcg/kg/min, Last Rate: 10 mcg/kg/min (25 0830)  vasopressin, 0.03 Units/min      PRN Meds:.•  acetaminophen  •  etomidate  •  fentaNYL citrate (PF)  •  hydrOXYzine  •  ondansetron  •  oxyCODONE-acetaminophen  •  prochlorperazine  •  rocuronium  •  sodium chloride  •  sodium chloride  •  sodium chloride       Review of Systems  Negative except for the above    Objective   Objective     Vitals:   Temp:  [95.8 °F (35.4 °C)-101.2 °F (38.4 °C)] 96.3 °F (35.7 °C)  Heart Rate:  [] 84  Resp:  [24-26] 26  BP: ()/() 100/72  FiO2 (%):  [28 %-30 %] 28 %  Physical  Exam    Constitutional: Interval              psychiatric:  Deferred   neurologic:  Intubated   eyes:   PERRLA, sclerae anicteric, no conjunctival injection   HEENT:  Moist mucous membranes, no nasal or eye discharge, no throat congestion   Neck:   Supple, no thyromegaly, no lymphadenopathy, trachea midline, no elevated JVD   Respiratory:  Clear to auscultation bilaterally, nonlabored respirations    Cardiovascular: RRR, no murmurs, rubs, or gallops, palpable pedal pulses bilaterally, No bilateral ankle edema   Gastrointestinal: Positive bowel sounds, soft, nontender, nondistended, no organomegaly   Musculoskeletal:  No clubbing or cyanosis to extremities,muscle wasting, joint swelling, muscle weakness             Skin:                      No rashes, bruising, skin ulcers, petechiae or ecchymosis    Result Review    Result Review:  I have personally reviewed the results from the time of this admission to 6/26/2025 09:00 EDT and agree with these findings:  []  Laboratory  []  Microbiology  []  Radiology  []  EKG/Telemetry   []  Cardiology/Vascular   []  Pathology  []  Old records  []  Other:    Assessment & Plan   Assessment / Plan       Active Hospital Problems:  Active Hospital Problems    Diagnosis    • **Hyponatremia    • Hypotension    • Cancer, metastatic to bone    • Pancytopenia due to antineoplastic chemotherapy      59-year-old female with past medical history of small cell cancer of the lung, COPD, hypertension, osteoarthritis, anxiety/depression, hyperlipidemia, 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  Became severely hypotensive altered and high-grade fever in afternoon of 6/24 and transferred to the ICU for management of septic shock with bronchoscopy showing Pseudomonas    Plan:   Continue salt tabs 2 g 3 times daily  Will hold off on urea  Patient on antibiotics for septic shock  Pressors for MAP greater than  65  Prognosis overall now poor.  Palliative care on board  Continue with midodrine 10 mg 3 times daily  Fluid restriction of 1500  Patient is on fludrocortisone 0.1 daily  Recommend to discontinue SSRI on discharge

## 2025-06-27 NOTE — THERAPY EVALUATION
RT EQUIPMENT DEVICE RELATED - SKIN ASSESSMENT    RT Medical Equipment/Device:     ETT Tee/Anchorfast    Skin Assessment:      Cheek:  Intact  Neck:  Intact  Nose:  Intact  Mouth:  Intact    Device Skin Pressure Protection:  Pressure points protected    Nurse Notification:  Brittany Talbot, CHARLES

## 2025-06-27 NOTE — PROGRESS NOTES
[facility]   Hospitalist Progress Note  Date: 2025  Patient Name: Lluvia Archer  : 1965  MRN: [id number]  Date of admission: 2025  Room/Bed: [facility] [place]      Subjective   Subjective     Chief Complaint: Sepsis, confusion and respiratory failure    Hospital course updated and revised as indicated  Summary:Lluvia Archer is a 59 y.o. female with COPD on 3 L of home oxygen, small cell lung cancer, hypertension, migraine headaches, SIADH, and chronic pain who presents to the emergency department due to abnormal labs.  Patient last had chemotherapy on .  Follow-up with her primary doctor found her sodium to be 120 and platelets to be 12 for which she presented to the hospital.Patient admitted for severe thrombocytopenia and hyponatremia.  Nephrology consulted.  Oncology consulted. So far patient has received 2 platelet transfusions and 2 packed red blood cell transfusions.  Rapid response was called on  as patient had unresponsive episode patient required transfer to the intensive care started on Levophed and started on broad-spectrum antibiotic.  Patient is now off Levophed patient however has required intubation on  patient is growing Pseudomonas on BAL patient continues to get very agitated with weaning down sedation.  Patient is unable to be liberated from the ventilator    Interval Followup: 2025  Patient remains intubated critically ill in the intensive care  Patient is growing Pseudomonas on BAL  Patient remains off Levophed  Patient is unable to be liberated from the ventilator as she gets very agitated with weaning down sedation  Palliative care continues to follow with patient's         Review of Systems    Unable to review due to patient being on the ventilator    Objective   Objective     Vitals:   Temp:  [97.9 °F (36.6 °C)-100.9 °F (38.3 °C)] 99 °F (37.2 °C)  Heart Rate:  [] 139  Resp:  [16-33] 33  BP: ()/(55-82) 87/63  FiO2 (%):  [28 %-40 %]  40 %    Exam pulled forward from previous note.  Patient examined in exam remains essentially unchanged  Physical Exam   General: Patient remains intubated sedated on the ventilator  HENT: NCAT, MMM. ET tube in place   Eyes: pupils equal, no scleral icterus  Cardiovascular: RRR, no murmurs   Pulmonary: Clear, breath sounds synchronous with the ventilator  Gastrointestinal: S/ND/NT, +BS  Skin: No jaundice, no rash on exposed skin appreciated  : johnson in place           Result Review    Result Review:  I have personally reviewed these results:  [x]  Laboratory      Lab 06/27/25  0625 06/27/25  0618 06/27/25  0128 06/26/25  2358 06/26/25  2221 06/26/25  1016 06/26/25  0317 06/25/25  0754 06/25/25  0432 06/24/25  1231   WBC  --   --   --  9.07  --   --  3.78  --  2.13* 0.98*   HEMOGLOBIN  --  7.8*  --  6.7*  --   --  7.3*  --  8.4* 8.8*   HEMATOCRIT  --  24.0*  --  21.2*  --   --  22.9*  --  25.8* 27.4*   PLATELETS  --   --   --  24*  --   --  34*  --  71* 7*   NEUTROS ABS  --   --   --  6.96  --   --   --   --   --  0.57*   IMMATURE GRANS (ABS)  --   --   --  0.28*  --   --   --   --   --  0.07*   LYMPHS ABS  --   --   --  1.30  --   --   --   --   --  0.28*   MONOS ABS  --   --   --  0.52  --   --   --   --   --  0.05*   EOS ABS  --   --   --  0.00  --   --   --   --   --  0.00   MCV  --   --   --  98.1*  --   --  97.4*  --  95.9 95.5   PROCALCITONIN  --   --   --   --   --   --   --   --   --  0.06   LACTATE 1.5  --  2.6*  --  2.5*   < > 4.6*   < > 4.2* 1.6    < > = values in this interval not displayed.         Lab 06/27/25  0853 06/27/25  0344 06/26/25  2358 06/26/25  0745 06/26/25  0317 06/25/25  0754 06/25/25  0432 06/20/25  1543 06/20/25  1039   SODIUM 140 139 140   < > 136   < > 132*  132*   < > 119*   POTASSIUM 4.2 4.2 4.7   < > 4.8   < > 4.3  4.3   < > 4.1   CHLORIDE 105 105 108*   < > 103   < > 97*  97*   < > 90*   CO2 20.7* 18.3* 20.5*   < > 19.3*   < > 22.6  22.6   < > 22.4   ANION GAP 14.3 15.7*  11.5   < > 13.7   < > 12.4  12.4   < > 6.6   BUN 44.5* 38.4* 36.3*   < > 21.7*   < > 12.0  12.0   < > 4.5*   CREATININE 0.80 0.82 0.78   < > 0.78   < > 0.67  0.67   < > 0.43*   EGFR 85.5 83.0 88.2   < > 88.2   < > 101.5  101.5   < > 112.9   GLUCOSE 164* 183* 186*   < > 163*   < > 168*  168*   < > 84   CALCIUM 7.4* 7.3* 7.6*   < > 7.0*   < > 7.8*  7.8*   < > 6.9*   MAGNESIUM  --   --  2.1  --  2.3  --  2.1   < > 1.6   PHOSPHORUS  --   --  2.1*  --  2.7  --  3.0   < >  --    TSH  --   --   --   --   --   --   --   --  0.998    < > = values in this interval not displayed.         Lab 06/26/25  2358 06/26/25  0317 06/25/25  0432 06/24/25  1231   TOTAL PROTEIN 6.1 5.8* 6.3 6.6   ALBUMIN 3.3* 3.3* 3.5 4.0   GLOBULIN  --  2.5 2.8 2.6   ALT (SGPT) 894* 871* 13 7   AST (SGOT) 456* 1,006* 10 6   BILIRUBIN 1.1 1.2 0.7 0.7   INDIRECT BILIRUBIN 0.4  --   --   --    BILIRUBIN DIRECT 0.7*  --   --   --    ALK PHOS 120* 88 72 95                   Lab 06/27/25  0033   ABO TYPING O   RH TYPING Positive   ANTIBODY SCREEN Negative         Lab 06/27/25  0625 06/25/25  0623 06/24/25  1828   PH, ARTERIAL 7.241* 7.314* 7.216*   PCO2, ARTERIAL 51.2* 45.5* 60.3*   PO2 ART 84.8 95.3 70.0*   O2 SATURATION ART 94.2* 96.6 89.3*   FIO2 40 35 50   HCO3 ART 22.0 23.2 24.5   BASE EXCESS ART -5.2* -2.9* -3.6*     Brief Urine Lab Results  (Last result in the past 365 days)        Color   Clarity   Blood   Leuk Est   Nitrite   Protein   CREAT   Urine HCG        06/24/25 1228 Yellow   Clear   Negative   Negative   Negative   Negative                 [x]  Microbiology   Microbiology Results (last 10 days)       Procedure Component Value - Date/Time    AFB Culture - Lavage, Bronchus [[id number]] Collected: 06/24/25 1719    Lab Status: Preliminary result Specimen: Lavage from Bronchus Updated: 06/25/25 1136     AFB Stain No acid fast bacilli seen on direct smear      No acid fast bacilli seen on concentrated smear    BAL Culture, Quantitative -  Lavage, Bronchus [[id number]]  (Abnormal) Collected: 06/24/25 1719    Lab Status: Preliminary result Specimen: Lavage from Bronchus Updated: 06/26/25 1344     BAL Culture >100,000 CFU/mL Pseudomonas aeruginosa      No Normal Respiratory Tamika     Gram Stain Occasional Gram negative bacilli    Narrative:      Pip/Tazo to follow 6/26/25      Pneumonia Panel - Lavage, Lung, Right Lower Lobe [[id number]]  (Abnormal) Collected: 06/24/25 1719    Lab Status: Final result Specimen: Lavage from Lung, Right Lower Lobe Updated: 06/24/25 1944     Escherichia coli PCR Not Detected     Acinetobacter calcoaceticus-baumannii complex PCR Not Detected     Enterobacter cloacae PCR Not Detected     Klebsiella oxytoca PCR Not Detected     Klebsiella pneumoniae group PCR Not Detected     Klebsiella aerogenes PCR Not Detected     Moraxella catarrhalis PCR Not Detected     Proteus species PCR Not Detected     Pseudomonas aeroginosa PCR Detected     Comment: 10^6 Bin copies/mL        Serratia marcescens PCR Not Detected     Staphylococcus aureus PCR Not Detected     Streptococcus pyogenes PCR Not Detected     Haemophilus influenzae PCR Not Detected     Streptococcus agalactiae PCR Not Detected     Streptococcus pneumoniae PCR Not Detected     Chlamydophila pneumoniae PCR Not Detected     Legionella pneumophilia PCR Not Detected     Mycoplasma pneumo by PCR Not Detected     ADENOVIRUS, PCR Not Detected     CTX-M Gene Not Detected     IMP Gene Not Detected     KPC Gene Not Detected     mecA/C and MREJ Gene N/A     NDM Gene Not Detected     OXA-48-like Gene N/A     VIM Gene Not Detected     Coronavirus Not Detected     Human Metapneumovirus Not Detected     Human Rhinovirus/Enterovirus Not Detected     Influenza A PCR Not Detected     Influenza B PCR Not Detected     RSV, PCR Not Detected     Parainfluenza virus PCR Not Detected    Blood Culture - Blood, Arm, Left [[id number]]  (Normal) Collected: 06/24/25 1231    Lab Status: Preliminary result  Specimen: Blood from Arm, Left Updated: 06/26/25 1245     Blood Culture No growth at 2 days    Blood Culture - Blood, Arm, Right [[id number]]  (Normal) Collected: 06/24/25 1230    Lab Status: Preliminary result Specimen: Blood from Arm, Right Updated: 06/26/25 1245     Blood Culture No growth at 2 days    Narrative:      Less than seven (7) mL's of blood was collected.  Insufficient quantity may yield false negative results.    Urine Culture - Urine, Indwelling Urethral Catheter [[id number]]  (Normal) Collected: 06/24/25 1228    Lab Status: Final result Specimen: Urine from Indwelling Urethral Catheter Updated: 06/25/25 1335     Urine Culture No growth          [x]  Radiology  XR Chest 1 View  Result Date: 6/26/2025  Impression: Similar-appearing consolidative opacities in the right lung. Mildly increased left basal patchy opacities. Findings suspicious for pneumonia. Stable appearing support devices, including a left chest port catheter with catheter tip projecting over the inferior cavoatrial junction. Electronically Signed: Jon Browne MD  6/26/2025 10:34 PM EDT  Workstation ID: [id number]    XR Chest 1 View  Result Date: 6/26/2025  Marked interval worsening of right basilar infiltrate compatible with pneumonia. Electronically Signed: Ahmet Hill MD  6/26/2025 5:56 AM EDT  Workstation ID: [id number]    XR Chest 1 View  Result Date: 6/24/2025  Impression: 1.Tip of the endotracheal tube now terminates in the lower thoracic trachea approximately 1 to 2 cm above the reagan. 2.Improved aeration of the right lung base with persistent bibasilar airspace opacities, likely due to atelectasis and/or pneumonia. Electronically Signed: Love Garcia MD  6/24/2025 6:38 PM EDT  Workstation ID: [id number]    XR Abdomen KUB  Result Date: 6/24/2025  Impression: 1.Tip of the enteric tube terminates in the proximal stomach. 2.Tip of the endotracheal tube terminates in the right mainstem bronchus. This was called to the patient's  nurse by Dr. Miramontes according to concurrent chest x-ray report. Electronically Signed: Love Garcia MD  6/24/2025 5:59 PM EDT  Workstation ID: [id number]    XR Chest 1 View  Result Date: 6/24/2025  Impression: 1. Right mainstem intubation. 2. Increasing bibasilar airspace opacities, acuity would favor aspiration, hemorrhage and/or atelectasis. Right mainstem intubation communicated with patient's nurse Devora at 5:46 p.m. 6/24/2025. Tube had been retracted at time of communication with plan for repeat radiograph. Electronically Signed: Abram Miramontes MD  6/24/2025 5:49 PM EDT  Workstation ID: [id number]    XR Chest 1 View  Result Date: 6/24/2025  Impression: 1. No acute cardiopulmonary disease. Electronically Signed: Bill Butcher MD  6/24/2025 1:40 PM EDT  Workstation ID: [id number]    CT Angiogram Neck  Result Date: 6/24/2025  1.No acute abnormality is identified within the large arteries of the head or neck. 2.No significant stenosis of the bilateral internal carotid arteries. 3.Severe stenosis of the left subclavian artery proximal to the left vertebral artery origin. 4.Pulmonary emphysema. Partially visualized 12 mm nodular opacity within the superior segment of the right lower lobe (series 5, image 337). Several other tiny nodular opacities within the bilateral lungs. Recommend chest CT follow-up. 5.Several vague sclerotic foci noted within the visualized thoracic spine, largest within the T6 vertebral body measuring approximately 15 mm, concerning for osseous metastases. Electronically Signed: Jose Luis Bryan MD  6/24/2025 1:23 PM EDT  Workstation ID: [id number]    CT Angiogram Head w AI Analysis of LVO  Result Date: 6/24/2025  1.No acute abnormality is identified within the large arteries of the head or neck. 2.No significant stenosis of the bilateral internal carotid arteries. 3.Severe stenosis of the left subclavian artery proximal to the left vertebral artery origin. 4.Pulmonary emphysema. Partially  visualized 12 mm nodular opacity within the superior segment of the right lower lobe (series 5, image 337). Several other tiny nodular opacities within the bilateral lungs. Recommend chest CT follow-up. 5.Several vague sclerotic foci noted within the visualized thoracic spine, largest within the T6 vertebral body measuring approximately 15 mm, concerning for osseous metastases. Electronically Signed: Jose Luis Bryan MD  6/24/2025 1:23 PM EDT  Workstation ID: [id number]    CT CEREBRAL PERFUSION WITH & WITHOUT CONTRAST  Result Date: 6/24/2025  1.within the high right frontal lobe there is a 3 mL area of Tmax greater than 6 seconds which could be related to ischemia. No evidence of core infarct. Electronically Signed: Bill Butcher MD  6/24/2025 12:14 PM EDT  Workstation ID: [id number]    CT Head Without Contrast Stroke Protocol  Result Date: 6/24/2025  Impression: Chronic appearing lacunar infarct in the left internal capsule anterior limb. No CT evidence of acute infarction, mass or intracranial hemorrhage. Small to moderate bilateral mastoid effusions. Electronically Signed: Quinton Bennett MD  6/24/2025 12:05 PM EDT  Workstation ID: [id number]    XR Chest 1 View  Result Date: 6/19/2025  Impression: Minimal residual atelectasis or airspace disease in the bases. Electronically Signed: Luca Brewster MD  6/19/2025 12:36 PM EDT  Workstation ID: [id number]    []  EKG/Telemetry   []  Cardiology/Vascular   []  Pathology  []  Old records  []  Other:    Assessment & Plan   Assessment / Plan     Assessment:  Acute encephalopathy  Septic shock  Febrile neutropenia  Acute hypoxemic respiratory failure requiring intubation  Pseudomonas pneumonia  Pancytopenia secondary to chemotherapy  End-stage small cell lung cancer with metastatic disease currently on palliative chemotherapy  Hyponatremia secondary to SIADH  COPD without exacerbation  Chronic hypoxemic respiratory failure on 3 L of oxygen at home        PLAN  Patient remains admitted  to the medicine service  Patient remains critically ill intubated on the ventilator  Patient required intubation on June 24  Continue to hold gabapentin  Patient remains off Levophed  Continue patient on cefepime for Pseudomonas  CT of the head shows old lacunar infarct however no acute infarct  CTA of the head and neck does show evidence of severe stenosis of the left subclavian artery  Blood culture showed no growth  Echocardiogram shows preserved ejection fraction  Continue to monitor Hemoccult hemoglobin and transfuse for hemoglobin less than 7.  Transfuse for platelet count less than 10  Continue patient on salt tablets for hyponatremia  Continue patient on midodrine for hypotension  SSRI has been discontinued  Discussed plan with Dr. Borrego intensivist   Repeat CBC and BMP in morning to monitor hemoglobin and electrolytes                 Discussed with RN.    VTE Prophylaxis:  Mechanical VTE prophylaxis orders are present.        CODE STATUS:   Code Status (Patient has no pulse and is not breathing): CPR (Attempt to Resuscitate)  Medical Interventions (Patient has pulse or is breathing): Full Support  Level Of Support Discussed With: Patient      Electronically signed by Flako Santos DO, 6/27/2025, 09:41 EDT.

## 2025-06-27 NOTE — SIGNIFICANT NOTE
06/27/25 0800   Physical Therapy Time and Intention   Comment, Session Not Performed Pt placed on vent; unable for treatment

## 2025-06-27 NOTE — PROGRESS NOTES
Patient Name: Lluvia Archer  YOB: 1965  MRN: [id number]  Admission date: 6/19/2025  Reason for Encounter: Follow-up/Progress Note    [facility] Clinical Nutrition Assessment     Subjective    Subjective Information     6/27 - Pt remains intubated, attempts at vent and sedation weaning failing d/t high levels of agitation. Levo stopped, vaso never on. Propofol gtt at 14.98 mL/hr (396 kcal/day). EN advanced to goal rate, no s/s of intolerance. Lactate elevated over night, now WNL again. Phos declined yesterday, remains on thiamine daily for RFS risk. Noted large increase in LFTs yesterday as well.    6/25 - Pt remains unsafe for PO intake, consult received to begin EN. Pressor needs declining, lactate now WNL. Tmax today 99.8. EN recs below, will be at risk of RFS. Thiamine added yesterday while pt NPO, will switch to oral via NG now that access is in place.    6/24 - Pt transferred to CCU after stroke RRT. Encephalopathic but did answer some yes/no questions when asked. She reports poor appetite, worsened by chemo (last on 6/13). EMR shows she has been refusing meals or consuming 0-25% for most of admit. H/o SIADH, Na currently at 130. Phos 1.2, K+ WNL. Receiving Mag @ 100 mL/hr, K-phos @ 84 mL/hr, levo at 0.04. Tmax 103.5 with WBC 0.98 and platelets 7. High risk of RFS, will add thiamine. Recommend Cortrak and EN if consistent with GOC once pressors stabilize.     Assessment    H&P and Current Problems      H&P  Past Medical History:   Diagnosis Date    Abnormal mammogram     PT DENIES KNOWING OF THIS HX    Anxiety     Arthritis     R SHOULDER, R HIP, AND R LEG    Cancer     LUNG    Chronic nausea 01/15/2019    COPD (chronic obstructive pulmonary disease)     O2 3 LITERS NC CONT    Hernia, hiatal     Hyperlipidemia     Hypertension     Knee pain, right 08/30/2018    Memory loss     FORGETFULNESS ? ETIOLOGY    Migraine headache     Moderate episode of recurrent major depressive disorder  "2017    Night sweats     Sciatica of right side 2017    Severe episode of recurrent major depressive disorder, without psychotic features 10/22/2019    Shingles     OUTBREAK 24 ON ANTIVIRAL , WHELPS NOTED NO SORES.    Shoulder pain, left 2018      Past Surgical History:   Procedure Laterality Date    BRONCHOSCOPY N/A 2022    Procedure: BRONCHOSCOPY WITH BRONCHOALVEOLAR LAVAGE, BIOPSY;  Surgeon: Shin Mcdonald DO;  Location: [facility] ENDOSCOPY;  Service: Pulmonary;  Laterality: N/A;  RIGHT LOWER LOBE INFILTRATE    BRONCHOSCOPY N/A 2024    Procedure: BRONCHOSCOPY WITH ENDOBRONCHIAL ULTRASOUND AND FINE NEEDLE ASPIRATE;  Surgeon: Shin Mcdonald DO;  Location: [facility] ENDOSCOPY;  Service: Pulmonary;  Laterality: N/A;  MEDIASTINAL ADENOPATHY     SECTION      CHOLECYSTECTOMY      LAPAROSCOPIC    COLONOSCOPY      PORTACATH PLACEMENT Left 2024    Procedure: INSERTION OF PORTACATH;  Surgeon: Josh Patton MD;  Location: [facility] OR OSC;  Service: General;  Laterality: Left;    TOTAL HIP ARTHROPLASTY Right 2022    Procedure: RIGHT TOTAL HIP ARTHROPLASTY;  Surgeon: Cecil Gomes MD;  Location: [facility] MAIN OR;  Service: Orthopedics;  Laterality: Right;      Current Problems   Admission Diagnosis:  Hyponatremia [E87.1]    Problem List:    Hyponatremia    Pancytopenia due to antineoplastic chemotherapy    Cancer, metastatic to bone    Hypotension      Other Applicable Nutrition Information:   N/a     Anthropometrics      BMI, Height, Weight Estimated body mass index is 35.67 kg/m² as calculated from the following:    Height as of this encounter: 162.6 cm (64.02\").    Weight as of this encounter: 94.3 kg (207 lb 14.3 oz).    Weight Method: Bed scale       Trending Weight Changes Unintentional weight loss of 23 lbs (11.5%) over 3 month(s)    Significant?  Yes       Weight History  Wt Readings from Last 20 Encounters:   25 0547 94.3 kg (207 lb 14.3 oz) "   06/26/25 0600 83.2 kg (183 lb 6.8 oz)   06/24/25 0239 80.5 kg (177 lb 7.5 oz)   06/23/25 0429 81.5 kg (179 lb 10.8 oz)   06/22/25 0421 83.9 kg (184 lb 15.5 oz)   06/21/25 0433 84.8 kg (186 lb 15.2 oz)   06/20/25 0518 85.5 kg (188 lb 7.9 oz)   06/19/25 1052 85 kg (187 lb 6.3 oz)   06/17/25 1305 83.7 kg (184 lb 9.6 oz)   06/13/25 1336 84.1 kg (185 lb 6.5 oz)   06/12/25 1313 84.1 kg (185 lb 8 oz)   06/11/25 1342 84.4 kg (186 lb 1.6 oz)   06/10/25 1342 85.8 kg (189 lb 3.2 oz)   06/09/25 1243 85.2 kg (187 lb 12.8 oz)   06/02/25 1259 84.7 kg (186 lb 11.7 oz)   05/16/25 1306 83 kg (183 lb)   05/15/25 1312 83.2 kg (183 lb 6.4 oz)   05/14/25 1100 85.5 kg (188 lb 9.6 oz)   05/13/25 1341 83.6 kg (184 lb 6.4 oz)   05/12/25 1317 83.4 kg (183 lb 13.8 oz)   04/22/25 2106 88.7 kg (195 lb 8.8 oz)   04/22/25 1437 91.4 kg (201 lb 8 oz)   04/21/25 1332 91.4 kg (201 lb 8 oz)   04/18/25 1300 89.5 kg (197 lb 5 oz)   04/17/25 1300 91 kg (200 lb 9.9 oz)   04/16/25 1300 91.9 kg (202 lb 9.6 oz)   04/15/25 1254 90.8 kg (200 lb 2.8 oz)   04/14/25 1300 90.8 kg (200 lb 2.8 oz)        Labs      Comment: Discussed above.     Results from last 7 days   Lab Units 06/27/25  0853 06/27/25  0625 06/27/25  0344 06/27/25  0128 06/26/25  2358 06/26/25  2221 06/26/25  0745 06/26/25  0317 06/25/25  0754 06/25/25  0432   SODIUM mmol/L 140  --  139  --  140  --    < > 136   < > 132*  132*   POTASSIUM mmol/L 4.2  --  4.2  --  4.7  --    < > 4.8   < > 4.3  4.3   GLUCOSE mg/dL 164*  --  183*  --  186*  --    < > 163*   < > 168*  168*   BUN mg/dL 44.5*  --  38.4*  --  36.3*  --    < > 21.7*   < > 12.0  12.0   CREATININE mg/dL 0.80  --  0.82  --  0.78  --    < > 0.78   < > 0.67  0.67   CALCIUM mg/dL 7.4*  --  7.3*  --  7.6*  --    < > 7.0*   < > 7.8*  7.8*   PHOSPHORUS mg/dL  --   --   --   --  2.1*  --   --  2.7  --  3.0   MAGNESIUM mg/dL  --   --   --   --  2.1  --   --  2.3  --  2.1   ALBUMIN g/dL  --   --   --   --  3.3*  --   --  3.3*  --  3.5    LACTATE mmol/L  --  1.5  --  2.6*  --  2.5*   < > 4.6*   < > 4.2*   BILIRUBIN mg/dL  --   --   --   --  1.1  --   --  1.2  --  0.7   ALK PHOS U/L  --   --   --   --  120*  --   --  88  --  72   AST (SGOT) U/L  --   --   --   --  456*  --   --  1,006*  --  10   ALT (SGPT) U/L  --   --   --   --  894*  --   --  871*  --  13    < > = values in this interval not displayed.     Results from last 7 days   Lab Units 06/27/25  0618 06/26/25  2358 06/26/25  0317 06/25/25  0432   PLATELETS 10*3/mm3  --  24* 34* 71*   HEMOGLOBIN g/dL 7.8* 6.7* 7.3* 8.4*   HEMATOCRIT % 24.0* 21.2* 22.9* 25.8*     Lab Results   Component Value Date    HGBA1C 5.80 (H) 05/01/2023      Results from last 7 days   Lab Units 06/21/25  0320   URIC ACID mg/dL 3.3     Medications       Scheduled Medications cefepime, 2,000 mg, Intravenous, Q8H  docusate sodium, 100 mg, Nasogastric, Daily  [Held by provider] dronabinol, 2.5 mg, Oral, BID  [Held by provider] escitalopram, 20 mg, Oral, Daily  famotidine, 20 mg, Intravenous, BID  fludrocortisone, 0.1 mg, Nasogastric, Daily  [Held by provider] gabapentin, 200 mg, Oral, TID  ipratropium-albuterol, 3 mL, Nebulization, Q6H While Awake - RT  lactulose, 15.3333 g, Nasogastric, Q8H  methylPREDNISolone sodium succinate, 40 mg, Intravenous, Q12H  miconazole, 1 Application, Topical, Q24H  midodrine, 10 mg, Nasogastric, TID AC  mupirocin, 1 Application, Each Nare, BID  nicotine, 1 patch, Transdermal, Q24H  polyethylene glycol, 17 g, Nasogastric, Daily  senna, 2 tablet, Nasogastric, BID  sodium chloride, 10 mL, Intravenous, Q12H  sodium chloride, 2 g, Nasogastric, TID With Meals  thiamine, 100 mg, Nasogastric, Daily  [Held by provider] Urea, 15 g, Nasogastric, Daily        Infusions dexmedetomidine, 0.2-1.5 mcg/kg/hr, Last Rate: Stopped (06/26/25 1810)  fentanyl 10 mcg/mL,  mcg/hr, Last Rate: 50 mcg/hr (06/27/25 1033)  milrinone, 0.3 mcg/kg/min, Last Rate: 0.25 mcg/kg/min (06/27/25 2525)  norepinephrine,  0.02-0.3 mcg/kg/min, Last Rate: Stopped (06/26/25 1314)  propofol, 5-50 mcg/kg/min, Last Rate: 30 mcg/kg/min (06/27/25 1024)  vasopressin, 0.03 Units/min        PRN Medications   acetaminophen    etomidate    fentaNYL citrate (PF)    hydrOXYzine    ondansetron    oxyCODONE-acetaminophen    prochlorperazine    rocuronium    sodium chloride    sodium chloride    sodium chloride     Physical Findings      Chewing/Swallowing  Teeth Status: Mouth/Teeth WDL: .WDL except, teeth Teeth Symptoms: tooth/teeth missing  Chewing/Swallowing Issues: Other:Encephalopathic, not currently safe for PO   Edema  Generalized Edema: 1+ (Trace)    Arm, Left Edema: 1+ (Trace) Arm, Right Edema: 1+ (Trace)  Leg, Left Edema: 2+ (Mild) Leg, Right Edema: 2+ (Mild)  Ankle, Left Edema: 1+ (Trace) Ankle, Right Edema: 1+ (Trace)  Foot, Left Edema: 2+ (Mild) Foot, Right Edema: 2+ (Mild)   Bowel Function  Stool Output  Perineal Care: perineum cleansed, catheter care provided (06/27/25 0800)  Last Bowel Movement: 06/17/25   I/Os  Intake & Output (last 3 days)         06/24 0701 06/25 0700 06/25 0701 06/26 0700 06/26 0701  06/27 0700 06/27 0701  06/28 0700    P.O. 220       I.V. (mL/kg) 2194.3 (27.3) 5325.6 (64) 1821.1 (19.3)     Blood 341.3  345     Other  290 501     NG/GT  780 1109     IV Piggyback  1550      Total Intake(mL/kg) 2755.5 (34.2) 7945.6 (95.5) 3776.1 (40)     Urine (mL/kg/hr) 1550 (0.8) 1400 (0.7) 1950 (0.9)     Wound  0      Total Output 1550 1400 1950     Net +1205.5 +6545.6 +1826.1                    Lines, Drains, Airways, & Wounds       Active LDAs       Name Placement date Placement time Site Days Last dressing change    Peripheral IV 06/19/25 1051 20 G Right Antecubital 06/19/25  1051  Antecubital  5     Peripheral IV 06/19/25 2124 20 G Left Antecubital 06/19/25  2124  Antecubital  4     Peripheral IV 06/24/25 1430 22 G Anterior;Distal;Left Forearm 06/24/25  1430  Forearm  less than 1     Peripheral IV 06/24/25 1515 20 G  Anterior;Right;Upper Arm 06/24/25  1515  Arm  less than 1     Single Lumen Implantable Port Left Subclavian --  --  Subclavian  --     Wound 06/19/25 1600 Right proximal knee Other (Comments) 06/19/25  1600  -- 4     Wound 06/19/25 1600 Bilateral groin  06/19/25  1600  -- 4                       Nutrition Focused Physical Exam     Trending NFPE Completed 6/24 - Noted mild temporal and shoulder losses, otherwise WNL     Malnutrition Severity Assessment                 Estimated Needs      Energy Requirements    Weight for Calculation 80.5 kg   Method for Estimation  11-14 kcals/kg   Daily Needs (kcal/day) 886-1127   Protein Requirements    Weight for Calculation 54.7 kg   Method for Estimation 2.0 gm/kg   Daily Needs (g/day) 109   Fluid Requirements     Method for Estimation Per Fluid Restriction - has SIADH    Daily Needs (mL/day) 1500      Current Nutrition Orders & Evaluation of Intake      Oral Nutrition     Food Allergies  and Intolerances NKFA   Current PO Diet NPO Diet NPO Type: Tube Feeding   Oral Nutrition Supplement None     Trending % PO Intake 0-25%, now NPO   (2)  Assessment & Plan   Nutrition Diagnosis and Goals       Nutrition Diagnosis 1 Inadequate Energy Intake related to inadequate oral intake as evidenced by NPO, previously refusing meals, most meals 0-25% consumed - ongoing      Nutrition Diagnosis 2 Increased Nutrient Needs (kcal, pro) related to catabolic disease increasing needs as evidenced by small cell lung cancer on chemo - ongoing        Goal(s) PO Diet Advances When Medically Appropriate  and No Significant Weight Loss and Maintain EN      Nutrition Intervention and Prescription       Intervention  Monitor for diet advancement, Continue to monitor for plan of care, and Continue with current interventions      Diet Prescription NPO    Supplement Prescription Thiamine 100 mg daily for RFS risk (switch to oral/NG)    Education Provided       Enteral Prescription Continue EN as  ordered:    Peptamen Intense VHP @ 50 mL/hr, flush 60 mL q4h  Start at 10 mL/hr and advance 10 mL q8h to goal    Provides: 1100 kcal, 101 g pro, 1284 mL fluid (924 FW + 360 flush)       TPN Prescription       Monitoring/Evaluation       Monitor/Evaluation PO Intake, Pertinent Labs, EN Delivery/Tolerance, Weight, and Hemodynamic Stability      RD Follow-Up Encounter 3-5 days     Electronically signed by:  Daria Armstrong RD  06/27/25 11:27 EDT

## 2025-06-27 NOTE — PLAN OF CARE
Goal Outcome Evaluation:         Patient orientation SHERINE, does not follow commands. Patient remained afebrile throughout shift. Propofol, Fentanyl, Norepinephrine running per MAR.     Sedation difficult to manage today, as she wakes up in bursts of distress. No plan to extubated today, sedation increased and Levo start for BP management, discussed with provider. Patient tachycardic throughout the day, with increased sedation HR stable. \    Patient bottom looks pink/blanchable. Scattered bruising throughout.     Patient had 2 bowel movements today.     Goals of care discussion with family.

## 2025-06-27 NOTE — NURSING NOTE
Pt intubated in bed with eyes closed no current signs of distress. Discussed with primary RN who notified that  would be trying to come in by 1530 today for a goals of care discussion with care team. PC will follow up then.         Yu GONSALEZ RN  Palliative Care

## 2025-06-27 NOTE — PLAN OF CARE
Goal Outcome Evaluation:           Progress: no change  Outcome Evaluation: Pt remains intubated on AC/VC 24, 400, 40%, +5. Pt is very asynchronous with the vent. Pt alarms almost constantly and sits straight up in bed at times. Pt unable to follow commands and no SBT was conducted this morning due to hemodynamic instability. Repeat ABG this morning showed uncompensated respiratory acidosis. Pt has a copious amount of secretions coming from ET tube. Per MD Borrego, pt has very poor prognosis due to underlying cx diagnosis. Breathing txs started Q6 due to wheezing.

## 2025-06-27 NOTE — PLAN OF CARE
Patient was on Spontaneous mode until 2330 rounding. Nurse had requested the patient to be flipped back to their prior settings to provide patient with rest. Patient is currently breath stacking and uncomfortable with the switching of modes. Patient is very coarse and producing thick brownish secretions. Continue to monitor and titrate as tolerated.    Problem: Mechanical Ventilation Invasive  Goal: Effective Communication  Outcome: Progressing  Goal: Optimal Device Function  Outcome: Progressing  Goal: Mechanical Ventilation Liberation  Outcome: Progressing  Goal: Optimal Nutrition Delivery  Outcome: Progressing  Goal: Absence of Device-Related Skin and Tissue Injury  Outcome: Progressing  Intervention: Maintain Skin and Tissue Health  Recent Flowsheet Documentation  Taken 6/26/2025 2312 by Paris Henriquez CRT  Device Skin Pressure Protection:   skin-to-device areas padded   pressure points protected   positioning supports utilized  Taken 6/26/2025 1941 by Paris Henriquez, NELSY  Device Skin Pressure Protection:   skin-to-device areas padded   pressure points protected   positioning supports utilized  Goal: Absence of Ventilator-Induced Lung Injury  Outcome: Progressing   Goal Outcome Evaluation:

## 2025-06-27 NOTE — PLAN OF CARE
Goal Outcome Evaluation:           Progress: declining  Outcome Evaluation: Multiple changes made this shift. New CXR obtained, one dose of 40mg lasix given. Morning labs drawn early for suspected low hgb. Result was 6.7. Replaced with one unit. Repeat lab ordered. Patient very sensitive to gtt titrations. SAT failed, ultimately had to be switched back to AC. Difficult finding comfortable sedations and maintain adequate BP. Becomes extremely agitated with activity. ABG ordered for this AM. HR more elevated from prior shift 130-150 not related to fever. NE Deborah aware. No improvement with volume from blood.

## 2025-06-27 NOTE — PROGRESS NOTES
"Intensive Care Admission Note     Acute encephalopathy, severe sepsis with shock, pancytopenia    History of Present Illness     59 female with history of small cell lung cancer with metastasis on chemotherapy, COPD, hypertension, anxiety disorder, hyperlipidemia, SIADH, pancytopenia, admitted to the hospital for management of severe hyponatremia and thrombocytopenia.  Oncology and nephrology were following patient on the floor.  She was treated for SIADH with fluid restriction, salt and urea tabs and replaced 2 units PRBC and 2 units platelets for severe anemia and thrombocytopenia.  Per reports she has been lethargic for the last couple of days but today she has been more encephalopathic and stroke alert was called this afternoon.  She had CT head and CTA done with initial evidence of only chronic appearing lacunar infarct in the left internal capsule.  She is moved to the ICU with persistent encephalopathy,septic shock,respiratory failure needing intubation. Bronch with BAL done showing RLL alveolar hemorrhage with pseudomonas pneumonia. Received blood ad PLT transfusions for pancytopenia. Repeat TTE with dec LVEF 35% and LFT with shock liver picture.On low dose milrinone.    Sub/Overnight events:   Good UO overnight after receiving Lasix.  Stable creatinine.  Received 1 unit PRBC for hemoglobin of 6.7.  Repeat improved to 7.8.  Platelet continues to drop again at 24.  Has been constipated for about 6 days now with no bowel movement.  Overall gets very agitated with weaning down sedation with significant vent dyssynchrony.  Currently on propofol and fentanyl.      Physical Exam and Clinical Information   BP (!) 87/63 (BP Location: Left arm, Patient Position: Lying)   Pulse (!) 139   Temp 99 °F (37.2 °C)   Resp (!) 33   Ht 162.6 cm (64.02\")   Wt 94.3 kg (207 lb 14.3 oz)   LMP  (LMP Unknown)   SpO2 97%   BMI 35.67 kg/m²     Physical exam  General : Aox0.  Does not follow and gets agitated when sedation " weaned.  Neuro: Agitation when sedation weaned.  Pupils equal and reactive bilaterally.  Encephalopathic.  HEENT : AT,NC,PERRLA,+ pallor, No Icterus,No thyromegaly. No JVD.  CVS : Tachycardic, systolic murmur present grade 3,  Resp/Chest: B/l ant VBS+.  Bilateral coarse rhonchi right greater than left  abd: Soft, Non distended, No tenderness, No organomegaly. Bowel sounds + but diminished  EXT: NO edema.   SKIN : NO palpable skin lesions/rashes noted.    Results from last 7 days   Lab Units 06/27/25  0618 06/26/25  2358 06/26/25  0317 06/25/25  0432   WBC 10*3/mm3  --  9.07 3.78 2.13*   HEMOGLOBIN g/dL 7.8* 6.7* 7.3* 8.4*   PLATELETS 10*3/mm3  --  24* 34* 71*     Results from last 7 days   Lab Units 06/27/25  0344 06/26/25  2358 06/26/25  1950 06/26/25  0745 06/26/25  0317 06/25/25  0754 06/25/25  0432   SODIUM mmol/L 139 140 139   < > 136   < > 132*  132*   POTASSIUM mmol/L 4.2 4.7 4.3   < > 4.8   < > 4.3  4.3   CO2 mmol/L 18.3* 20.5* 21.0*   < > 19.3*   < > 22.6  22.6   BUN mg/dL 38.4* 36.3* 33.2*   < > 21.7*   < > 12.0  12.0   CREATININE mg/dL 0.82 0.78 0.76   < > 0.78   < > 0.67  0.67   MAGNESIUM mg/dL  --  2.1  --   --  2.3  --  2.1   PHOSPHORUS mg/dL  --  2.1*  --   --  2.7  --  3.0   GLUCOSE mg/dL 183* 186* 179*   < > 163*   < > 168*  168*    < > = values in this interval not displayed.     Estimated Creatinine Clearance: 83.2 mL/min (by C-G formula based on SCr of 0.82 mg/dL).      Results from last 7 days   Lab Units 06/27/25  0625   PH, ARTERIAL pH units 7.241*   PCO2, ARTERIAL mm Hg 51.2*   PO2 ART mm Hg 84.8     Lab Results   Component Value Date    LACTATE 1.5 06/27/2025        Images: X-ray chest personally reviewed right lower lobe infiltrate is present and persistent.    I reviewed the patient's results and images.     Impression     Hyponatremia    Pancytopenia due to antineoplastic chemotherapy    Cancer, metastatic to bone    Hypotension    Plan/Recommendations     Acute  encephalopathy  Severe sepsis with shock resolving  Acute hypoxemic and hypercapnic respiratory failure  RLL Alveolar hemorrhage likely 2/2 low PLT and pneumonia  Pancytopenia 2/2 cancer chemotherapy  Small cell lung cancer with metastasis  Hyponatremia 2/2 SIADH resolving  Elevated LFTs likely secondary to shock liver  New cardiomyopathy with EF 35-40%.  H/o COPD on baseline 3 L oxygen    Neuro:  -Acute encephalopathy likely secondary to sepsis.  CT head with no acute findings.  If persistently encephalopathic and agitated when sedation weaned might need to consider EEG.  Gabapentin on hold.  Continue sedation with propofol and fentanyl.    CVS:  - Continue as needed Levophed to maintain MAP of 65, currently off.  Has new drop in EF likely sepsis induced cardiomyopathy.  Slowly weaning off of milrinone currently at 0.25.  Index appears to be okay.  - Patient is persistently in sinus tachycardia.  Agitation might be playing a role as well.  Continue to monitor.  - Trend lactic until cleared. CK normal.    RESP:  -Ctn solumedrol for severe pneumonia, septic shock and alveolar hemorrhage.  - continue cefepime.  BAL culture growing Pseudomonas.  Pending sensitivities.  -Continue lung protective ventilation settings.  ABG reviewed and settings adjusted.  Has some respiratory acidosis component.  Failed SAT/SBT trials given her severe agitation and ventilatory dyssynchrony.  - As needed Lasix.    Renal  - Hyponatremia from SIADH.  Sodium levels are stable.  Nephrology on board.  Normal creatinine.  Monitor ins and outs.  - Has some anion gap and non-anion gap acidosis.    GI  - Continues to have constipation.  Added Colace and lactulose today.  If still not improving might try Relistor tomorrow.  - Continue GI prophylaxis    Hem Onc:  - Ongoing pancytopenia likely related to recent chemotherapy and ongoing sepsis.  Transfuse for platelets less than 20 or with active bleeding.  - Answers for hemoglobin less than 7.   Received 1 unit overnight.    Endo:  -SSI    - Palliative care on board.  Ongoing goals of care discussion.  For now full code.        Time spent Critical care 340 min (exclusive of procedure time)  including high complexity decision making to assess, manipulate, and support vital organ system failure in this individual who has impairment of one or more vital organ systems .severe sepsis with shock, severe neutropenia, thrombocytopenia, acute hypoxemic respiratory failure, acute encephalopathy such that there is a high probability of imminent or life threatening deterioration in the patient’s condition.      Eric Borrego MD   Critical Care Medicine  06/27/25 09:22 EDT

## 2025-06-27 NOTE — PROGRESS NOTES
[facility]     Progress Note    Patient Name: Lluvia Archer  : 1965  MRN: [id number]  Primary Care Physician:  Aleksandar Edge DO  Date of admission: 2025    Subjective   No major acute events overnight  Patient continues to be vent dependent  Echo with reduced EF    Scheduled Meds:cefepime, 2,000 mg, Intravenous, Q8H  docusate sodium, 100 mg, Nasogastric, Daily  [Held by provider] dronabinol, 2.5 mg, Oral, BID  [Held by provider] escitalopram, 20 mg, Oral, Daily  famotidine, 20 mg, Intravenous, BID  fludrocortisone, 0.1 mg, Nasogastric, Daily  [Held by provider] gabapentin, 200 mg, Oral, TID  ipratropium-albuterol, 3 mL, Nebulization, Q6H While Awake - RT  lactulose, 15.3333 g, Nasogastric, Q8H  methylPREDNISolone sodium succinate, 40 mg, Intravenous, Q12H  miconazole, 1 Application, Topical, Q24H  midodrine, 10 mg, Nasogastric, TID AC  mupirocin, 1 Application, Each Nare, BID  nicotine, 1 patch, Transdermal, Q24H  polyethylene glycol, 17 g, Nasogastric, Daily  senna, 2 tablet, Nasogastric, BID  sodium chloride, 10 mL, Intravenous, Q12H  sodium chloride, 2 g, Nasogastric, TID With Meals  thiamine, 100 mg, Nasogastric, Daily  [Held by provider] Urea, 15 g, Nasogastric, Daily      Continuous Infusions:dexmedetomidine, 0.2-1.5 mcg/kg/hr, Last Rate: Stopped (25 1810)  fentanyl 10 mcg/mL,  mcg/hr, Last Rate: 50 mcg/hr (25 1033)  milrinone, 0.3 mcg/kg/min, Last Rate: 0.25 mcg/kg/min (25 0755)  norepinephrine, 0.02-0.3 mcg/kg/min, Last Rate: Stopped (25 1314)  propofol, 5-50 mcg/kg/min, Last Rate: 30 mcg/kg/min (25 1024)  vasopressin, 0.03 Units/min      PRN Meds:.  acetaminophen    etomidate    fentaNYL citrate (PF)    hydrOXYzine    ondansetron    oxyCODONE-acetaminophen    prochlorperazine    rocuronium    sodium chloride    sodium chloride    sodium chloride       Review of Systems  Negative except for the above    Objective   Objective     Vitals:    Temp:  [98 °F (36.7 °C)-100.9 °F (38.3 °C)] 99 °F (37.2 °C)  Heart Rate:  [] 129  Resp:  [16-33] 33  BP: ()/(60-82) 87/63  FiO2 (%):  [28 %-40 %] 40 %  Physical Exam    Constitutional: Interval              psychiatric:  Deferred   neurologic:  Intubated   eyes:   PERRLA, sclerae anicteric, no conjunctival injection   HEENT:  Moist mucous membranes, no nasal or eye discharge, no throat congestion   Neck:   Supple, no thyromegaly, no lymphadenopathy, trachea midline, no elevated JVD   Respiratory:  Clear to auscultation bilaterally, nonlabored respirations    Cardiovascular: RRR, no murmurs, rubs, or gallops, palpable pedal pulses bilaterally, No bilateral ankle edema   Gastrointestinal: Positive bowel sounds, soft, nontender, nondistended, no organomegaly   Musculoskeletal:  No clubbing or cyanosis to extremities,muscle wasting, joint swelling, muscle weakness             Skin:                      No rashes, bruising, skin ulcers, petechiae or ecchymosis    Result Review    Result Review:  I have personally reviewed the results from the time of this admission to 6/27/2025 11:26 EDT and agree with these findings:  []  Laboratory  []  Microbiology  []  Radiology  []  EKG/Telemetry   []  Cardiology/Vascular   []  Pathology  []  Old records  []  Other:    Assessment & Plan   Assessment / Plan       Active Hospital Problems:  Active Hospital Problems    Diagnosis     **Hyponatremia     Hypotension     Cancer, metastatic to bone     Pancytopenia due to antineoplastic chemotherapy      59-year-old female with past medical history of small cell cancer of the lung, COPD, hypertension, osteoarthritis, anxiety/depression, hyperlipidemia, 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  Became severely hypotensive altered and high-grade fever in afternoon of 6/24 and transferred to the ICU for management of septic shock with  bronchoscopy showing Pseudomonas    Plan:   Continue salt tabs 2 g 3 times daily  Will hold off on urea  Patient on antibiotics for septic shock  Pressors for MAP greater than 65  Prognosis overall now poor.  Palliative care on board  Continue with midodrine 10 mg 3 times daily  Fluid restriction of 1500  Patient is on fludrocortisone 0.1 daily  Recommend to discontinue SSRI on discharge

## 2025-06-27 NOTE — PROGRESS NOTES
RT EQUIPMENT DEVICE RELATED - SKIN ASSESSMENT    RT Medical Equipment/Device:     ETT Tee/Anchorfast    Skin Assessment:      Cheek:  Intact  Lips:  Intact  Mouth:  Intact    Device Skin Pressure Protection:  Pressure points protected and Skin-to-device areas padded:  Anchorfast    Nurse Notification:  Brittany Mcrae, RRT

## 2025-06-27 NOTE — NURSING NOTE
Intensivist provided update to family- spouse, daughter and son. Discussed GOC at this time family would like to give pt the weekend to see if she is able to successfully be extubated however if unable over weekend will further discuss with spouse pt status at that time after meeting with care team. Discussed comfort care and terminal extubation and answered questions. Family verbalized pt reporting she was tired however spouse would like to give her the weekend to see if she would do better. Discussed pt overall health and provided emotional support. Discussed CODE STATUS-DNR/DNI. MD aware and orders placed. Palliative care will continue to follow. Primary RN at bedside during discussion appreciate assistance.         Yu GONSALEZ RN  Palliative Care

## 2025-06-28 NOTE — PLAN OF CARE
Goal Outcome Evaluation:         Patient extubated today at 1327. Patient currently is receiving Venturi mask 40%. Patient is tolerating well. RT will obtain ABG at 1400.

## 2025-06-28 NOTE — PROGRESS NOTES
RT EQUIPMENT DEVICE RELATED - SKIN ASSESSMENT    RT Medical Equipment/Device:     NIV Mask:  Under-the-nose   size:  B    Skin Assessment:      Cheek:  Intact  Chin:  Redness  Nose:  Intact  Mouth:  Intact    Device Skin Pressure Protection:  Pressure points protected    Nurse Notification:  Brittany Mcrae, RRT

## 2025-06-28 NOTE — PROGRESS NOTES
[facility]     Progress Note    Patient Name: Lluvia Archer  : 1965  MRN: [id number]  Primary Care Physician:  Aleksandar Edge DO  Date of admission: 2025    Subjective   No acute events overnight.    Scheduled Meds:cefepime, 2,000 mg, Intravenous, Q8H  docusate sodium, 100 mg, Nasogastric, Daily  famotidine, 20 mg, Intravenous, BID  fludrocortisone, 0.1 mg, Nasogastric, Daily  ipratropium-albuterol, 3 mL, Nebulization, Q6H While Awake - RT  lactulose, 15.3333 g, Nasogastric, Q8H  [START ON 2025] methylPREDNISolone sodium succinate, 40 mg, Intravenous, Q24H  miconazole, 1 Application, Topical, Q24H  midodrine, 10 mg, Nasogastric, TID AC  mupirocin, 1 Application, Each Nare, BID  nicotine, 1 patch, Transdermal, Q24H  OLANZapine, 5 mg, Nasogastric, Nightly  oxyCODONE, 5 mg, Nasogastric, Q8H  polyethylene glycol, 17 g, Nasogastric, Daily  potassium phosphate, 15 mmol, Intravenous, Q4H  senna, 2 tablet, Nasogastric, BID  sodium chloride, 10 mL, Intravenous, Q12H  sodium chloride, 2 g, Nasogastric, TID With Meals  thiamine, 100 mg, Nasogastric, Daily      Continuous Infusions:dexmedetomidine, 0.2-1.5 mcg/kg/hr, Last Rate: Stopped (25)  fentanyl 10 mcg/mL,  mcg/hr, Last Rate: 150 mcg/hr (25 05)  norepinephrine, 0.02-0.3 mcg/kg/min, Last Rate: Stopped (25)  propofol, 5-50 mcg/kg/min, Last Rate: 35 mcg/kg/min (25)      PRN Meds:.  acetaminophen    hydrOXYzine    ondansetron    prochlorperazine    sodium chloride    sodium chloride    sodium chloride       Review of Systems  Negative except for the above    Objective   Objective     Vitals:   Temp:  [98.6 °F (37 °C)-99.2 °F (37.3 °C)] 99 °F (37.2 °C)  Heart Rate:  [] 113  Resp:  [24-29] 24  BP: ()/(44-57) 138/57  FiO2 (%):  [30 %-40 %] 30 %  Physical Exam    Constitutional: Interval              psychiatric:  Deferred   neurologic:  Intubated   eyes:   PERRLA, sclerae anicteric, no  conjunctival injection   HEENT:  Moist mucous membranes, no nasal or eye discharge, no throat congestion   Neck:   Supple, no thyromegaly, no lymphadenopathy, trachea midline, no elevated JVD   Respiratory:  Clear to auscultation bilaterally, nonlabored respirations    Cardiovascular: RRR, no murmurs, rubs, or gallops, palpable pedal pulses bilaterally, No bilateral ankle edema   Gastrointestinal: Positive bowel sounds, soft, nontender, nondistended, no organomegaly   Musculoskeletal:  No clubbing or cyanosis to extremities,muscle wasting, joint swelling, muscle weakness             Skin:                      No rashes, bruising, skin ulcers, petechiae or ecchymosis    Result Review    Result Review:  I have personally reviewed the results from the time of this admission to 6/28/2025 09:13 EDT and agree with these findings:  []  Laboratory  []  Microbiology  []  Radiology  []  EKG/Telemetry   []  Cardiology/Vascular   []  Pathology  []  Old records  []  Other:    Assessment & Plan   Assessment / Plan       Active Hospital Problems:  Active Hospital Problems    Diagnosis     **Hyponatremia     Hypotension     Cancer, metastatic to bone     Pancytopenia due to antineoplastic chemotherapy      59-year-old female with past medical history of small cell cancer of the lung, COPD, hypertension, osteoarthritis, anxiety/depression, hyperlipidemia, 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  Became severely hypotensive altered and high-grade fever in afternoon of 6/24 and transferred to the ICU for management of septic shock with bronchoscopy showing Pseudomonas    Plan:   As sodium has improved to 141, decrease salt tabs to 1 g 3 times daily  Prognosis overall now poor.  Palliative care on board  Hold midodrine as patient is hypertensive this morning  Fluid restriction of 1500  Patient is on fludrocortisone 0.1 daily  Recommend to  discontinue SSRI on discharge      Renal will sign off at this point.  Please feel free to call with questions or concerns.

## 2025-06-28 NOTE — PLAN OF CARE
Goal Outcome Evaluation:         Patient remains intubated on ventilator settings of AC 24/400/30% +5. Patient was very asynchronous with the vent during the day but has done well all night. Patient has scant amount of secretions coming from ET tube. Rhonchi breath sounds with improvement post suction.

## 2025-06-28 NOTE — PROGRESS NOTES
[facility]   Hospitalist Progress Note  Date: 2025  Patient Name: Lluvia Archer  : 1965  MRN: [id number]  Date of admission: 2025  [place] [facility] [place]      Subjective   Subjective     Chief Complaint: Respiratory failure, confusion    Hospital course pulled forward and updated  Summary:Lluvia Archer is a 59 y.o. female with COPD on 3 L of home oxygen, small cell lung cancer, hypertension, migraine headaches, SIADH, and chronic pain who presents to the emergency department due to abnormal labs.  Patient last had chemotherapy on .  Follow-up with her primary doctor found her sodium to be 120 and platelets to be 12 for which she presented to the hospital.Patient admitted for severe thrombocytopenia and hyponatremia.  Nephrology consulted.  Oncology consulted. So far patient has received 2 platelet transfusions and 2 packed red blood cell transfusions.  Rapid response was called on  as patient had unresponsive episode patient required transfer to the intensive care started on Levophed and started on broad-spectrum antibiotic.  Patient is now off Levophed patient however has required intubation on  patient is growing Pseudomonas on BAL patient was extubated to nasal cannula today.  Patient is having a lot of anxiety and is going to be placed on BiPAP      Interval Followup: 2025  Patient has been extubated today however is having some respiratory distress.  Patient is going to be placed on BiPAP  Patient has significant wheezing  Family is at bedside  Patient remains on antibiotics for Pseudomonas  Patient's  and daughter at the bedside    Review of Systems    Unable to review secondary to patient's difficulty breathing    Objective   Objective     Vitals:   Temp:  [98.6 °F (37 °C)-100.2 °F (37.9 °C)] 100.2 °F (37.9 °C)  Heart Rate:  [] 134  Resp:  [13-24] 13  BP: ()/(44-65) 150/65  FiO2 (%):  [30 %-40 %] 30 %      Physical Exam   General: Patient has  been extubated and is currently on high flow nasal cannula patient does appear anxious.  Family at bedside  HENT: NCAT, MMM.   Eyes: pupils equal, no scleral icterus  Cardiovascular: RRR, no murmurs   Pulmonary: Decreased, diffuse wheezing and rhonchi  Gastrointestinal: S/ND/NT, +BS  Skin: No jaundice, no rash on exposed skin appreciated  : johnson in place   Psych: Mood is anxious          Result Review    Result Review:  I have personally reviewed these results:  [x]  Laboratory      Lab 06/28/25  0621 06/28/25  0441 06/27/25  0625 06/27/25  0618 06/27/25  0128 06/26/25  2358 06/26/25  1016 06/26/25  0317 06/25/25  0432 06/24/25  1231   WBC  --  5.44  --   --   --  9.07  --  3.78   < > 0.98*   HEMOGLOBIN  --  6.9*  --  7.8*  --  6.7*  --  7.3*   < > 8.8*   HEMATOCRIT  --  21.3*  --  24.0*  --  21.2*  --  22.9*   < > 27.4*   PLATELETS  --  10*  --   --   --  24*  --  34*   < > 7*   NEUTROS ABS  --   --   --   --   --  6.96  --   --   --  0.57*   IMMATURE GRANS (ABS)  --   --   --   --   --  0.28*  --   --   --  0.07*   LYMPHS ABS  --   --   --   --   --  1.30  --   --   --  0.28*   MONOS ABS  --   --   --   --   --  0.52  --   --   --  0.05*   EOS ABS  --   --   --   --   --  0.00  --   --   --  0.00   MCV  --  92.2  --   --   --  98.1*  --  97.4*   < > 95.5   PROCALCITONIN  --   --   --   --   --   --   --   --   --  0.06   LACTATE 1.1  --  1.5  --  2.6*  --    < > 4.6*   < > 1.6    < > = values in this interval not displayed.         Lab 06/28/25  1123 06/28/25  0750 06/28/25  0441 06/27/25  0344 06/26/25  2358 06/26/25  0745 06/26/25  0317   SODIUM 142 141 144   < > 140   < > 136   POTASSIUM 3.8 3.9 3.9   < > 4.7   < > 4.8   CHLORIDE 108* 109* 110*   < > 108*   < > 103   CO2 21.1* 21.4* 21.1*   < > 20.5*   < > 19.3*   ANION GAP 12.9 10.6 12.9   < > 11.5   < > 13.7   BUN 44.7* 44.4* 41.6*   < > 36.3*   < > 21.7*   CREATININE 0.68 0.67 0.68   < > 0.78   < > 0.78   EGFR 101.1 101.5 101.1   < > 88.2   < > 88.2    GLUCOSE 276* 249* 186*   < > 186*   < > 163*   CALCIUM 7.5* 7.4* 7.5*   < > 7.6*   < > 7.0*   MAGNESIUM  --   --  2.3  --  2.1  --  2.3   PHOSPHORUS  --   --  1.3*  --  2.1*  --  2.7    < > = values in this interval not displayed.         Lab 06/28/25  0441 06/27/25  1136 06/26/25  2358 06/26/25  0317 06/25/25  0432 06/24/25  1231   TOTAL PROTEIN 5.7* 5.2* 6.1 5.8* 6.3 6.6   ALBUMIN 3.1* 2.9* 3.3* 3.3* 3.5 4.0   GLOBULIN  --   --   --  2.5 2.8 2.6   ALT (SGPT) 552* 631* 894* 871* 13 7   AST (SGOT) 122* 217* 456* 1,006* 10 6   BILIRUBIN 0.9 0.9 1.1 1.2 0.7 0.7   INDIRECT BILIRUBIN 0.3 0.3 0.4  --   --   --    BILIRUBIN DIRECT 0.6* 0.6* 0.7*  --   --   --    ALK PHOS 111 103 120* 88 72 95                   Lab 06/27/25  0033   ABO TYPING O   RH TYPING Positive   ANTIBODY SCREEN Negative         Lab 06/28/25  0621 06/27/25  0625 06/25/25  0623   PH, ARTERIAL 7.358 7.241* 7.314*   PCO2, ARTERIAL 46.0* 51.2* 45.5*   PO2 ART 96.3 84.8 95.3   O2 SATURATION ART 97.1 94.2* 96.6   FIO2 30 40 35   HCO3 ART 25.9 22.0 23.2   BASE EXCESS ART 0.0 -5.2* -2.9*     Brief Urine Lab Results  (Last result in the past 365 days)        Color   Clarity   Blood   Leuk Est   Nitrite   Protein   CREAT   Urine HCG        06/24/25 1228 Yellow   Clear   Negative   Negative   Negative   Negative                 [x]  Microbiology   Microbiology Results (last 10 days)       Procedure Component Value - Date/Time    AFB Culture - Lavage, Bronchus [[id number]] Collected: 06/24/25 4179    Lab Status: Preliminary result Specimen: Lavage from Bronchus Updated: 06/25/25 1136     AFB Stain No acid fast bacilli seen on direct smear      No acid fast bacilli seen on concentrated smear    BAL Culture, Quantitative - Lavage, Bronchus [[id number]]  (Abnormal)  (Susceptibility) Collected: 06/24/25 1719    Lab Status: Final result Specimen: Lavage from Bronchus Updated: 06/27/25 0943     BAL Culture >100,000 CFU/mL Pseudomonas aeruginosa      No Normal  "Respiratory Tamika     Gram Stain Occasional Gram negative bacilli    Narrative:            Susceptibility        Pseudomonas aeruginosa      SILVIA      Cefepime Susceptible      Ceftazidime Susceptible      Ciprofloxacin Intermediate      Levofloxacin Intermediate      Piperacillin + Tazobactam Susceptible      Tobramycin Susceptible                       Susceptibility Comments       Pseudomonas aeruginosa    With the exception of urinary-sourced infections, aminoglycosides should not be used as monotherapy.               Histoplasma Antigen, CSF or BAL - Lavage, Lung [[id number]] Collected: 06/24/25 1719    Lab Status: Final result Specimen: Lavage from Lung Updated: 06/27/25 1707     Result None Detected ng/mL      Comment: Reference Interval: None Detected  Reportable Range: Positive Results reported in ng/mL from  0.20 ng/mL to 20.00 ng/mL. Positive results above 20.00 ng/mL  are reported as \"Above the Limit of Quantification\"  Cross-reactions occur with Blastomyces spp., Coccidioides spp.,  and Paracoccidioides brasiliensis.  The test was developed and its performance characteristics  determined by [employer]. It has not been cleared  or approved by the FDA; however, FDA clearance or approval is  not currently required for clinical use. The results are not  intended to be used as the sole means for clinical diagnosis  or patient management decisions.        Interpretation Negative     Specimen Type BAL    Narrative:      Performed at:  01 - [facility]  [address] [place] [address] [place]  [id number]  : Abram Balderas MD, Phone:  [phone]    Pneumonia Panel - Lavage, Lung, Right Lower Lobe [[id number]]  (Abnormal) Collected: 06/24/25 1719    Lab Status: Final result Specimen: Lavage from Lung, Right Lower Lobe Updated: 06/24/25 8884     Escherichia coli PCR Not Detected     Acinetobacter calcoaceticus-baumannii complex PCR Not Detected     Enterobacter cloacae PCR Not Detected     " Klebsiella oxytoca PCR Not Detected     Klebsiella pneumoniae group PCR Not Detected     Klebsiella aerogenes PCR Not Detected     Moraxella catarrhalis PCR Not Detected     Proteus species PCR Not Detected     Pseudomonas aeroginosa PCR Detected     Comment: 10^6 Bin copies/mL        Serratia marcescens PCR Not Detected     Staphylococcus aureus PCR Not Detected     Streptococcus pyogenes PCR Not Detected     Haemophilus influenzae PCR Not Detected     Streptococcus agalactiae PCR Not Detected     Streptococcus pneumoniae PCR Not Detected     Chlamydophila pneumoniae PCR Not Detected     Legionella pneumophilia PCR Not Detected     Mycoplasma pneumo by PCR Not Detected     ADENOVIRUS, PCR Not Detected     CTX-M Gene Not Detected     IMP Gene Not Detected     KPC Gene Not Detected     mecA/C and MREJ Gene N/A     NDM Gene Not Detected     OXA-48-like Gene N/A     VIM Gene Not Detected     Coronavirus Not Detected     Human Metapneumovirus Not Detected     Human Rhinovirus/Enterovirus Not Detected     Influenza A PCR Not Detected     Influenza B PCR Not Detected     RSV, PCR Not Detected     Parainfluenza virus PCR Not Detected    Aspergillus Galactomannan Antigen - Lavage, Bronchus [[id number]] Collected: 06/24/25 1719    Lab Status: Final result Specimen: Lavage from Bronchus Updated: 06/27/25 1208     Aspergillus Ag, BAL/Serum 0.39 Index     Narrative:      Performed at:  01 - [facility]  [address], [place]  [id number]  : Myesha Armstrong MD, Phone:  [phone]  Performed at:  02 - [facility]  [address] [place], [place]  [id number]  : Yusef Márquez [facility], Phone:  [phone]    Blood Culture - Blood, Arm, Left [[id number]]  (Normal) Collected: 06/24/25 1231    Lab Status: Preliminary result Specimen: Blood from Arm, Left Updated: 06/28/25 1245     Blood Culture No growth at 4 days    Blood Culture - Blood, Arm, Right [[id number]]  (Normal) Collected: 06/24/25 1230    Lab  Status: Preliminary result Specimen: Blood from Arm, Right Updated: 06/28/25 1245     Blood Culture No growth at 4 days    Narrative:      Less than seven (7) mL's of blood was collected.  Insufficient quantity may yield false negative results.    Urine Culture - Urine, Indwelling Urethral Catheter [[id number]]  (Normal) Collected: 06/24/25 1228    Lab Status: Final result Specimen: Urine from Indwelling Urethral Catheter Updated: 06/25/25 1335     Urine Culture No growth          [x]  Radiology  XR Chest 1 View  Result Date: 6/28/2025  Impression: Stable bilateral airspace disease most pronounced in the right lower lung Electronically Signed: Chapo Pabon MD  6/28/2025 8:40 AM EDT  Workstation ID: [id number]    XR Chest 1 View  Result Date: 6/26/2025  Impression: Similar-appearing consolidative opacities in the right lung. Mildly increased left basal patchy opacities. Findings suspicious for pneumonia. Stable appearing support devices, including a left chest port catheter with catheter tip projecting over the inferior cavoatrial junction. Electronically Signed: Jon Browne MD  6/26/2025 10:34 PM EDT  Workstation ID: [id number]    XR Chest 1 View  Result Date: 6/26/2025  Marked interval worsening of right basilar infiltrate compatible with pneumonia. Electronically Signed: Ahmet Hill MD  6/26/2025 5:56 AM EDT  Workstation ID: [id number]    XR Chest 1 View  Result Date: 6/24/2025  Impression: 1.Tip of the endotracheal tube now terminates in the lower thoracic trachea approximately 1 to 2 cm above the reagan. 2.Improved aeration of the right lung base with persistent bibasilar airspace opacities, likely due to atelectasis and/or pneumonia. Electronically Signed: Love Garcia MD  6/24/2025 6:38 PM EDT  Workstation ID: [id number]    XR Abdomen KUB  Result Date: 6/24/2025  Impression: 1.Tip of the enteric tube terminates in the proximal stomach. 2.Tip of the endotracheal tube terminates in the right mainstem  bronchus. This was called to the patient's nurse by Dr. Miramontes according to concurrent chest x-ray report. Electronically Signed: Love Garcia MD  6/24/2025 5:59 PM EDT  Workstation ID: [id number]    XR Chest 1 View  Result Date: 6/24/2025  Impression: 1. Right mainstem intubation. 2. Increasing bibasilar airspace opacities, acuity would favor aspiration, hemorrhage and/or atelectasis. Right mainstem intubation communicated with patient's nurse Devora at 5:46 p.m. 6/24/2025. Tube had been retracted at time of communication with plan for repeat radiograph. Electronically Signed: Abram Miramontes MD  6/24/2025 5:49 PM EDT  Workstation ID: [id number]    XR Chest 1 View  Result Date: 6/24/2025  Impression: 1. No acute cardiopulmonary disease. Electronically Signed: Bill Butcher MD  6/24/2025 1:40 PM EDT  Workstation ID: [id number]    CT Angiogram Neck  Result Date: 6/24/2025  1.No acute abnormality is identified within the large arteries of the head or neck. 2.No significant stenosis of the bilateral internal carotid arteries. 3.Severe stenosis of the left subclavian artery proximal to the left vertebral artery origin. 4.Pulmonary emphysema. Partially visualized 12 mm nodular opacity within the superior segment of the right lower lobe (series 5, image 337). Several other tiny nodular opacities within the bilateral lungs. Recommend chest CT follow-up. 5.Several vague sclerotic foci noted within the visualized thoracic spine, largest within the T6 vertebral body measuring approximately 15 mm, concerning for osseous metastases. Electronically Signed: Jose Luis Bryan MD  6/24/2025 1:23 PM EDT  Workstation ID: [id number]    CT Angiogram Head w AI Analysis of LVO  Result Date: 6/24/2025  1.No acute abnormality is identified within the large arteries of the head or neck. 2.No significant stenosis of the bilateral internal carotid arteries. 3.Severe stenosis of the left subclavian artery proximal to the left vertebral artery origin.  4.Pulmonary emphysema. Partially visualized 12 mm nodular opacity within the superior segment of the right lower lobe (series 5, image 337). Several other tiny nodular opacities within the bilateral lungs. Recommend chest CT follow-up. 5.Several vague sclerotic foci noted within the visualized thoracic spine, largest within the T6 vertebral body measuring approximately 15 mm, concerning for osseous metastases. Electronically Signed: Jose Luis Bryan MD  6/24/2025 1:23 PM EDT  Workstation ID: [id number]    CT CEREBRAL PERFUSION WITH & WITHOUT CONTRAST  Result Date: 6/24/2025  1.within the high right frontal lobe there is a 3 mL area of Tmax greater than 6 seconds which could be related to ischemia. No evidence of core infarct. Electronically Signed: Bill Butcher MD  6/24/2025 12:14 PM EDT  Workstation ID: [id number]    CT Head Without Contrast Stroke Protocol  Result Date: 6/24/2025  Impression: Chronic appearing lacunar infarct in the left internal capsule anterior limb. No CT evidence of acute infarction, mass or intracranial hemorrhage. Small to moderate bilateral mastoid effusions. Electronically Signed: Quinton Bennett MD  6/24/2025 12:05 PM EDT  Workstation ID: [id number]    []  EKG/Telemetry   []  Cardiology/Vascular   []  Pathology  []  Old records  []  Other:    Assessment & Plan   Assessment / Plan     Assessment:  Acute encephalopathy  Septic shock  Febrile neutropenia  Acute hypoxemic respiratory failure requiring intubation  Pseudomonas pneumonia  Pancytopenia secondary to chemotherapy  End-stage small cell lung cancer with metastatic disease currently on palliative chemotherapy  Hyponatremia secondary to SIADH  COPD without exacerbation  Chronic hypoxemic and hypercapnic respiratory failure on 3 L of oxygen at home      PLAN  Patient remains admitted to the medicine service  Patient was extubated today however is requiring BiPAP  Lasix ordered x 1  Precedex ordered for anxiety  If patient's respiratory status  worsens patient can be made comfort measures.  Family does not wish to have patient reintubated  Continue patient on cefepime for Pseudomonas  CT of the head shows old lacunar infarcts however no acute infarct  CTA of the head and neck does show evidence of severe stenosis of the left subclavian artery  Echocardiogram shows preserved ejection fraction  Continue to monitor hemoglobin and transfuse for hemoglobin less than 7.  Transfuse for platelet count less than 10  Continue patient on salt tablets for hyponatremia  Continue patient on midodrine for hypotension  SSRI has been discontinued  Discussed plan with patient's nurse and family  Discussed plan with intensivist Dr. Borrego regarding patient's respiratory status and plan of care       Discussed with RN.    VTE Prophylaxis:  Mechanical VTE prophylaxis orders are present.        CODE STATUS:   Code Status (Patient has no pulse and is not breathing): No CPR (Do Not Attempt to Resuscitate)  Medical Interventions (Patient has pulse or is breathing): Limited Support  Medical Intervention Limits: No intubation (DNI)  Level Of Support Discussed With: Next of Kin (If No Surrogate)      Electronically signed by Flako Santos DO, 6/28/2025, 13:34 EDT.

## 2025-06-28 NOTE — PROGRESS NOTES
"Intensive Care Admission Note     Acute encephalopathy, severe sepsis with shock, pancytopenia    History of Present Illness     59 female with history of small cell lung cancer with metastasis on chemotherapy, COPD, hypertension, anxiety disorder, hyperlipidemia, SIADH, pancytopenia, admitted to the hospital for management of severe hyponatremia and thrombocytopenia.  Oncology and nephrology were following patient on the floor.  She was treated for SIADH with fluid restriction, salt and urea tabs and replaced 2 units PRBC and 2 units platelets for severe anemia and thrombocytopenia.  Per reports she has been lethargic for the last couple of days but today she has been more encephalopathic and stroke alert was called this afternoon.  She had CT head and CTA done with initial evidence of only chronic appearing lacunar infarct in the left internal capsule.  She is moved to the ICU with persistent encephalopathy,septic shock,respiratory failure needing intubation. Bronch with BAL done showing RLL alveolar hemorrhage with pseudomonas pneumonia. Received blood ad PLT transfusions for pancytopenia. Repeat TTE with dec LVEF 35% and LFT with shock liver picture.On low dose milrinone.    Sub/Overnight events:   Overall had good night without any vent synchrony issues.  Continues to remain confused.  On propofol and fentanyl.  Platelets dropped to 10 and hemoglobin dropped to 6.9.  Getting 1 unit PRBC and 1 unit platelets.  Had a smidge of small bowel movement after starting lactulose.      Physical Exam and Clinical Information   /57   Pulse 113   Temp 99 °F (37.2 °C)   Resp 24   Ht 162.6 cm (64.02\")   Wt 93.3 kg (205 lb 11 oz)   LMP  (LMP Unknown)   SpO2 98%   BMI 35.29 kg/m²     Physical exam  General : Aox0.  Appears confused but more awake today.  Neuro: Agitation when sedation weaned.  Pupils equal and reactive bilaterally.  Encephalopathic.  HEENT : AT,NC,PERRLA,+ pallor, No Icterus,No thyromegaly. No " JVD.  CVS : RRR systolic murmur present grade 3,  Resp/Chest: B/l ant VBS+.  Bilateral coarse rhonchi right greater than left but improved breath sounds overall  abd: Soft, Non distended, No tenderness, No organomegaly. Bowel sounds + but diminished  EXT: NO edema.   SKIN : NO palpable skin lesions/rashes noted.    Results from last 7 days   Lab Units 06/28/25  0441 06/27/25  0618 06/26/25  2358 06/26/25  0317   WBC 10*3/mm3 5.44  --  9.07 3.78   HEMOGLOBIN g/dL 6.9* 7.8* 6.7* 7.3*   PLATELETS 10*3/mm3 10*  --  24* 34*     Results from last 7 days   Lab Units 06/28/25  0750 06/28/25  0441 06/28/25  0031 06/27/25  0344 06/26/25 2358 06/26/25  0745 06/26/25 0317   SODIUM mmol/L 141 144 143   < > 140   < > 136   POTASSIUM mmol/L 3.9 3.9 3.6   < > 4.7   < > 4.8   CO2 mmol/L 21.4* 21.1* 21.6*   < > 20.5*   < > 19.3*   BUN mg/dL 44.4* 41.6* 42.4*   < > 36.3*   < > 21.7*   CREATININE mg/dL 0.67 0.68 0.64   < > 0.78   < > 0.78   MAGNESIUM mg/dL  --  2.3  --   --  2.1  --  2.3   PHOSPHORUS mg/dL  --  1.3*  --   --  2.1*  --  2.7   GLUCOSE mg/dL 249* 186* 185*   < > 186*   < > 163*    < > = values in this interval not displayed.     Estimated Creatinine Clearance: 101.3 mL/min (by C-G formula based on SCr of 0.67 mg/dL).      Results from last 7 days   Lab Units 06/28/25  0621   PH, ARTERIAL pH units 7.358   PCO2, ARTERIAL mm Hg 46.0*   PO2 ART mm Hg 96.3     Lab Results   Component Value Date    LACTATE 1.1 06/28/2025        Images: X-ray chest personally reviewed right lower lobe infiltrate is present and persistent.    I reviewed the patient's results and images.     Impression     Hyponatremia    Pancytopenia due to antineoplastic chemotherapy    Cancer, metastatic to bone    Hypotension    Plan/Recommendations     Acute encephalopathy : Multifactorial  Severe sepsis with shock resolved  Acute hypoxemic and hypercapnic respiratory failure  RLL Alveolar hemorrhage likely 2/2 low PLT and pneumonia  Pancytopenia 2/2 cancer  chemotherapy and sepsis  Small cell lung cancer with metastasis  Hyponatremia 2/2 SIADH resolved  Elevated LFTs likely secondary to shock liver-resolving  New cardiomyopathy with EF 35-40%.  H/o COPD on baseline 3 L oxygen    Neuro:  -Acute encephalopathy likely secondary to sepsis.  CT head with no acute findings.  Started Zyprexa at nighttime.  Plan to discontinue propofol and fentanyl and start on some Precedex drip for SAT and SBT.    CVS:  - Weaned off of vasopressors.  Can continue midodrine if needed with holding parameters.  Currently blood pressure stable.  - Mild drop in EF likely secondary to strep and his cardiomyopathy.  Has good cardiac output and cardiac index on Flowtrack.  Was able to wean off of milrinone.  - Actiq acidosis resolved.    RESP:  -Ctn solumedrol for severe pneumonia, septic shock and alveolar hemorrhage.  Dose cut down to 40 IV daily for 3 more days and then stop.  - continue cefepime.  BAL culture growing Pseudomonas.  Total of 7 days treatment and then can stop.  -Started on SBT.  Might need to extubate on some Precedex given her agitation.  - 40 mg IV Lasix ordered today.    Renal  - Hyponatremia from SIADH.  Levels improved.  Nephrology recommendations reviewed.  Decreased salt tabs.  On Florinef.    GI  - Continues to have constipation.  Very tiny bowel movement.  Continue current bowel regimen.  Will give a dose of Relistor today.  - Continue GI prophylaxis    Hem Onc:  - Ongoing pancytopenia likely related to recent chemotherapy and ongoing sepsis.  Transfuse for platelets less than 20 or with active bleeding.  - Receiving 1 unit PRBC and 1 unit platelets today.    Endo:  -SSI    - Palliative care on board.  Ongoing goals of care discussion.  CODE STATUS changed to no CPR.        Time spent Critical care 38 min (exclusive of procedure time)  including high complexity decision making to assess, manipulate, and support vital organ system failure in this individual who has  impairment of one or more vital organ systems .severe sepsis with shock, severe neutropenia, thrombocytopenia, acute hypoxemic respiratory failure, acute encephalopathy such that there is a high probability of imminent or life threatening deterioration in the patient’s condition.      Eric Borrego MD   Critical Care Medicine  06/28/25 09:46 EDT

## 2025-06-28 NOTE — PLAN OF CARE
Goal Outcome Evaluation:           Progress: declining  Outcome Evaluation: AC 24/400/30% +5. Attempted to wean sedation, RR> 35, fighting the vent. Hgb 6.9 and Plt 10. See new orders. Q4H BMP. 1 small BM this shift.

## 2025-06-28 NOTE — PROGRESS NOTES
RT EQUIPMENT DEVICE RELATED - SKIN ASSESSMENT    RT Medical Equipment/Device:  ETT Tee/Anchorfast    Skin Assessment: Cheek: Intact, Chin: Intact, Forehead: Intact, Nares: Intact, Nose: Intact, Lips: Intact, and Mouth: Intact    Device Skin Pressure Protection: Positioning supports utilized and Pressure points protected    Nurse Notification: Brittany Wakefield RRT

## 2025-06-28 NOTE — PROGRESS NOTES
Extubated to bipap. Ctn support with bipap. 1 x 40 mg IV lasix ordered. Please do not take her off the bipap. Ctn precedex for anxiety. Pt is DNI. If medical condition worsens family wishes to pursue comfort only care.

## 2025-06-29 NOTE — PROGRESS NOTES
RT EQUIPMENT DEVICE RELATED - SKIN ASSESSMENT    RT Medical Equipment/Device:  NIV Mask: Under-the-nose  size: b    Skin Assessment: Cheek: Intact, Chin: Intact, Forehead: Intact, Nares: Intact, Nose: Intact, and Lips: Intact    Device Skin Pressure Protection: Positioning supports utilized    Nurse Notification: Brittany Wakefield RRT

## 2025-06-29 NOTE — PROGRESS NOTES
[facility]   Hospitalist Progress Note  Date: 2025  Patient Name: Lluvia Archer  : 1965  MRN: [id number]  Date of admission: 2025  [place] [facility] [place]      Subjective   Subjective     Chief Complaint: Shortness of breath, respiratory failure, confusion      Summary:Lluvia Archer is a 59 y.o. female with COPD on 3 L of home oxygen, small cell lung cancer, hypertension, migraine headaches, SIADH, and chronic pain who presents to the emergency department due to abnormal labs.  Patient last had chemotherapy on .  Follow-up with her primary doctor found her sodium to be 120 and platelets to be 12 for which she presented to the hospital.Patient admitted for severe thrombocytopenia and hyponatremia.  Nephrology consulted.  Oncology consulted. So far patient has received 2 platelet transfusions and 2 packed red blood cell transfusions.  Rapid response was called on  as patient had unresponsive episode patient required transfer to the intensive care started on Levophed and started on broad-spectrum antibiotic.  Patient is now off Levophed patient however has required intubation on  patient is growing Pseudomonas on BAL patient was extubated to nasal cannula on ,   patient was placed on BiPAP and does remain on continuous BiPAP.  Patient's family does not want her reintubated      Interval Followup: 2025  Patient was extubated on  2 continuous BiPAP  Patient remains on Precedex  Patient does appear anxious and continues to have wheezing and difficulty breathing  There is no family currently at bedside  Patient's family are having ongoing discussions regarding goals of care        Review of Systems    Unable to review secondary to patient's difficulty breathing    Objective   Objective     Vitals:   Temp:  [99.2 °F (37.3 °C)-101 °F (38.3 °C)] 100.8 °F (38.2 °C)  Heart Rate:  [] 101  Resp:  [22-47] 38  BP: (178)/(84) 178/84  Flow (L/min) (Oxygen Therapy):  [8]  8  FiO2 (%):  [30 %-100 %] 100 %      Physical Exam   General: Patient appears uncomfortable patient does have BiPAP in place patient remains confused and has wheezing  HENT: NCAT, MMM.   Eyes: pupils equal, no scleral icterus  Cardiovascular: RRR, no murmurs   Pulmonary: Decreased, diffuse wheezing  Gastrointestinal: S/ND/NT, +BS  Skin: No jaundice, no rash on exposed skin appreciated  : johnson in place   Psych: Mood is anxious          Result Review    Result Review:  I have personally reviewed these results:  [x]  Laboratory      Lab 06/29/25  0322 06/29/25  0002 06/28/25  1639 06/28/25  0621 06/28/25  0441 06/27/25  0625 06/27/25  0128 06/26/25  2358 06/25/25  0432 06/24/25  1231   WBC 8.94  --   --   --  5.44  --   --  9.07   < > 0.98*   HEMOGLOBIN 8.4*  --  8.6*  --  6.9*  --    < > 6.7*   < > 8.8*   HEMATOCRIT 25.4*  --  26.4*  --  21.3*  --    < > 21.2*   < > 27.4*   PLATELETS 17*  --  22*  --  10*  --   --  24*   < > 7*   NEUTROS ABS 5.97  --   --   --   --   --   --  6.96  --  0.57*   IMMATURE GRANS (ABS) 1.13*  --   --   --   --   --   --  0.28*  --  0.07*   LYMPHS ABS 0.81  --   --   --   --   --   --  1.30  --  0.28*   MONOS ABS 1.03*  --   --   --   --   --   --  0.52  --  0.05*   EOS ABS 0.00  --   --   --   --   --   --  0.00  --  0.00   MCV 90.1  --   --   --  92.2  --   --  98.1*   < > 95.5   PROCALCITONIN  --   --   --   --   --   --   --   --   --  0.06   LACTATE  --  1.4  --  1.1  --  1.5   < >  --    < > 1.6    < > = values in this interval not displayed.         Lab 06/29/25  0842 06/29/25  0322 06/29/25  0002 06/28/25  2312 06/28/25  0750 06/28/25  0441 06/27/25  0344 06/26/25  7508   SODIUM 146* 146*  --  146*   < > 144   < > 140   POTASSIUM 4.3 4.1  --  3.9   < > 3.9   < > 4.7   CHLORIDE 111* 111*  --  112*   < > 110*   < > 108*   CO2 20.9* 21.8*  --  21.1*   < > 21.1*   < > 20.5*   ANION GAP 14.1 13.2  --  12.9   < > 12.9   < > 11.5   BUN 53.9* 52.1*  --  51.8*   < > 41.6*   < > 36.3*    CREATININE 0.81 0.69 0.77 0.74   < > 0.68   < > 0.78   EGFR 84.3 100.7  --  93.9   < > 101.1   < > 88.2   GLUCOSE 206* 187*  --  217*   < > 186*   < > 186*   CALCIUM 7.3* 7.2*  --  7.6*   < > 7.5*   < > 7.6*   MAGNESIUM  --  2.2  --   --   --  2.3  --  2.1   PHOSPHORUS  --  1.4*  --   --   --  1.3*  --  2.1*    < > = values in this interval not displayed.         Lab 06/28/25  2312 06/28/25  0441 06/27/25  1136 06/26/25  2358 06/26/25  0317 06/25/25  0432 06/24/25  1231   TOTAL PROTEIN 6.0 5.7* 5.2*   < > 5.8* 6.3 6.6   ALBUMIN 3.3* 3.1* 2.9*   < > 3.3* 3.5 4.0   GLOBULIN  --   --   --   --  2.5 2.8 2.6   ALT (SGPT) 510* 552* 631*   < > 871* 13 7   AST (SGOT) 138* 122* 217*   < > 1,006* 10 6   BILIRUBIN 1.0 0.9 0.9   < > 1.2 0.7 0.7   INDIRECT BILIRUBIN 0.4 0.3 0.3   < >  --   --   --    BILIRUBIN DIRECT 0.6* 0.6* 0.6*   < >  --   --   --    ALK PHOS 139* 111 103   < > 88 72 95    < > = values in this interval not displayed.                   Lab 06/27/25  0033   ABO TYPING O   RH TYPING Positive   ANTIBODY SCREEN Negative         Lab 06/29/25  0002 06/28/25  1748 06/28/25  1420   PH, ARTERIAL 7.425 7.384 7.325*   PCO2, ARTERIAL 38.2 38.1 38.6   PO2 .7* 99.6 62.5*   O2 SATURATION ART 99.0 97.6 89.9*   FIO2 36 35 40   HCO3 ART 25.1 22.7 20.1*   BASE EXCESS ART 0.7 -2.1* -5.5*     Brief Urine Lab Results  (Last result in the past 365 days)        Color   Clarity   Blood   Leuk Est   Nitrite   Protein   CREAT   Urine HCG        06/24/25 1228 Yellow   Clear   Negative   Negative   Negative   Negative                 [x]  Microbiology   Microbiology Results (last 10 days)       Procedure Component Value - Date/Time    AFB Culture - Lavage, Bronchus [[id number]] Collected: 06/24/25 5360    Lab Status: Preliminary result Specimen: Lavage from Bronchus Updated: 06/25/25 1136     AFB Stain No acid fast bacilli seen on direct smear      No acid fast bacilli seen on concentrated smear    BAL Culture, Quantitative -  "Lavage, Bronchus [[id number]]  (Abnormal)  (Susceptibility) Collected: 06/24/25 1719    Lab Status: Final result Specimen: Lavage from Bronchus Updated: 06/27/25 0943     BAL Culture >100,000 CFU/mL Pseudomonas aeruginosa      No Normal Respiratory Tamika     Gram Stain Occasional Gram negative bacilli    Narrative:            Susceptibility        Pseudomonas aeruginosa      SILVIA      Cefepime Susceptible      Ceftazidime Susceptible      Ciprofloxacin Intermediate      Levofloxacin Intermediate      Piperacillin + Tazobactam Susceptible      Tobramycin Susceptible                       Susceptibility Comments       Pseudomonas aeruginosa    With the exception of urinary-sourced infections, aminoglycosides should not be used as monotherapy.               Histoplasma Antigen, CSF or BAL - Lavage, Lung [[id number]] Collected: 06/24/25 1719    Lab Status: Final result Specimen: Lavage from Lung Updated: 06/27/25 1707     Result None Detected ng/mL      Comment: Reference Interval: None Detected  Reportable Range: Positive Results reported in ng/mL from  0.20 ng/mL to 20.00 ng/mL. Positive results above 20.00 ng/mL  are reported as \"Above the Limit of Quantification\"  Cross-reactions occur with Blastomyces spp., Coccidioides spp.,  and Paracoccidioides brasiliensis.  The test was developed and its performance characteristics  determined by [employer]. It has not been cleared  or approved by the FDA; however, FDA clearance or approval is  not currently required for clinical use. The results are not  intended to be used as the sole means for clinical diagnosis  or patient management decisions.        Interpretation Negative     Specimen Type BAL    Narrative:      Performed at:   - [facility] [address] [place]  [id number]  : Abram Balderas MD, Phone:  [phone]    Pneumonia Panel - Lavage, Lung, Right Lower Lobe [[id number]]  (Abnormal) Collected: 06/24/25 1719    Lab " Status: Final result Specimen: Lavage from Lung, Right Lower Lobe Updated: 06/24/25 1944     Escherichia coli PCR Not Detected     Acinetobacter calcoaceticus-baumannii complex PCR Not Detected     Enterobacter cloacae PCR Not Detected     Klebsiella oxytoca PCR Not Detected     Klebsiella pneumoniae group PCR Not Detected     Klebsiella aerogenes PCR Not Detected     Moraxella catarrhalis PCR Not Detected     Proteus species PCR Not Detected     Pseudomonas aeroginosa PCR Detected     Comment: 10^6 Bin copies/mL        Serratia marcescens PCR Not Detected     Staphylococcus aureus PCR Not Detected     Streptococcus pyogenes PCR Not Detected     Haemophilus influenzae PCR Not Detected     Streptococcus agalactiae PCR Not Detected     Streptococcus pneumoniae PCR Not Detected     Chlamydophila pneumoniae PCR Not Detected     Legionella pneumophilia PCR Not Detected     Mycoplasma pneumo by PCR Not Detected     ADENOVIRUS, PCR Not Detected     CTX-M Gene Not Detected     IMP Gene Not Detected     KPC Gene Not Detected     mecA/C and MREJ Gene N/A     NDM Gene Not Detected     OXA-48-like Gene N/A     VIM Gene Not Detected     Coronavirus Not Detected     Human Metapneumovirus Not Detected     Human Rhinovirus/Enterovirus Not Detected     Influenza A PCR Not Detected     Influenza B PCR Not Detected     RSV, PCR Not Detected     Parainfluenza virus PCR Not Detected    Aspergillus Galactomannan Antigen - Lavage, Bronchus [[id number]] Collected: 06/24/25 1719    Lab Status: Final result Specimen: Lavage from Bronchus Updated: 06/27/25 1208     Aspergillus Ag, BAL/Serum 0.39 Index     Narrative:      Performed at:  01 - [facility]  [address] [place], [place]  [id number]  : Myesah Armstrong MD, Phone:  [phone]  Performed at:  02 - [facility]  [address] [place], [place]  [id number]  : Yusef M[facility]quez [facility], Phone:  [phone]    Blood Culture - Blood, Arm, Left [[id number]]  (Normal)  Collected: 06/24/25 1231    Lab Status: Preliminary result Specimen: Blood from Arm, Left Updated: 06/28/25 1245     Blood Culture No growth at 4 days    Blood Culture - Blood, Arm, Right [[id number]]  (Normal) Collected: 06/24/25 1230    Lab Status: Preliminary result Specimen: Blood from Arm, Right Updated: 06/28/25 1245     Blood Culture No growth at 4 days    Narrative:      Less than seven (7) mL's of blood was collected.  Insufficient quantity may yield false negative results.    Urine Culture - Urine, Indwelling Urethral Catheter [[id number]]  (Normal) Collected: 06/24/25 1228    Lab Status: Final result Specimen: Urine from Indwelling Urethral Catheter Updated: 06/25/25 1335     Urine Culture No growth          [x]  Radiology  XR Chest 1 View  Result Date: 6/29/2025  Impression: 1. Slight improvement in linear alveolar edema. 2. Stable small bilateral pleural effusions. Electronically Signed: Bill Butcher MD  6/29/2025 8:22 AM EDT  Workstation ID: [id number]    XR Chest 1 View  Result Date: 6/28/2025  Impression: Stable bilateral airspace disease most pronounced in the right lower lung Electronically Signed: Chapo Pabon MD  6/28/2025 8:40 AM EDT  Workstation ID: [id number]    XR Chest 1 View  Result Date: 6/26/2025  Impression: Similar-appearing consolidative opacities in the right lung. Mildly increased left basal patchy opacities. Findings suspicious for pneumonia. Stable appearing support devices, including a left chest port catheter with catheter tip projecting over the inferior cavoatrial junction. Electronically Signed: Jon Browne MD  6/26/2025 10:34 PM EDT  Workstation ID: [id number]    XR Chest 1 View  Result Date: 6/26/2025  Marked interval worsening of right basilar infiltrate compatible with pneumonia. Electronically Signed: Ahmet Hill MD  6/26/2025 5:56 AM EDT  Workstation ID: [id number]    XR Chest 1 View  Result Date: 6/24/2025  Impression: 1.Tip of the endotracheal tube now terminates in  the lower thoracic trachea approximately 1 to 2 cm above the reagan. 2.Improved aeration of the right lung base with persistent bibasilar airspace opacities, likely due to atelectasis and/or pneumonia. Electronically Signed: Love Garcia MD  6/24/2025 6:38 PM EDT  Workstation ID: [id number]    XR Abdomen KUB  Result Date: 6/24/2025  Impression: 1.Tip of the enteric tube terminates in the proximal stomach. 2.Tip of the endotracheal tube terminates in the right mainstem bronchus. This was called to the patient's nurse by Dr. Miramontes according to concurrent chest x-ray report. Electronically Signed: Love Garcia MD  6/24/2025 5:59 PM EDT  Workstation ID: [id number]    XR Chest 1 View  Result Date: 6/24/2025  Impression: 1. Right mainstem intubation. 2. Increasing bibasilar airspace opacities, acuity would favor aspiration, hemorrhage and/or atelectasis. Right mainstem intubation communicated with patient's nurse Devora at 5:46 p.m. 6/24/2025. Tube had been retracted at time of communication with plan for repeat radiograph. Electronically Signed: Abram Miramontes MD  6/24/2025 5:49 PM EDT  Workstation ID: [id number]    XR Chest 1 View  Result Date: 6/24/2025  Impression: 1. No acute cardiopulmonary disease. Electronically Signed: Bill Butcher MD  6/24/2025 1:40 PM EDT  Workstation ID: [id number]    CT Angiogram Neck  Result Date: 6/24/2025  1.No acute abnormality is identified within the large arteries of the head or neck. 2.No significant stenosis of the bilateral internal carotid arteries. 3.Severe stenosis of the left subclavian artery proximal to the left vertebral artery origin. 4.Pulmonary emphysema. Partially visualized 12 mm nodular opacity within the superior segment of the right lower lobe (series 5, image 337). Several other tiny nodular opacities within the bilateral lungs. Recommend chest CT follow-up. 5.Several vague sclerotic foci noted within the visualized thoracic spine, largest within the T6 vertebral  body measuring approximately 15 mm, concerning for osseous metastases. Electronically Signed: Jose Luis Bryan MD  6/24/2025 1:23 PM EDT  Workstation ID: [id number]    CT Angiogram Head w AI Analysis of LVO  Result Date: 6/24/2025  1.No acute abnormality is identified within the large arteries of the head or neck. 2.No significant stenosis of the bilateral internal carotid arteries. 3.Severe stenosis of the left subclavian artery proximal to the left vertebral artery origin. 4.Pulmonary emphysema. Partially visualized 12 mm nodular opacity within the superior segment of the right lower lobe (series 5, image 337). Several other tiny nodular opacities within the bilateral lungs. Recommend chest CT follow-up. 5.Several vague sclerotic foci noted within the visualized thoracic spine, largest within the T6 vertebral body measuring approximately 15 mm, concerning for osseous metastases. Electronically Signed: Jose Luis Bryan MD  6/24/2025 1:23 PM EDT  Workstation ID: [id number]    CT CEREBRAL PERFUSION WITH & WITHOUT CONTRAST  Result Date: 6/24/2025  1.within the high right frontal lobe there is a 3 mL area of Tmax greater than 6 seconds which could be related to ischemia. No evidence of core infarct. Electronically Signed: Bill Butcher MD  6/24/2025 12:14 PM EDT  Workstation ID: [id number]    CT Head Without Contrast Stroke Protocol  Result Date: 6/24/2025  Impression: Chronic appearing lacunar infarct in the left internal capsule anterior limb. No CT evidence of acute infarction, mass or intracranial hemorrhage. Small to moderate bilateral mastoid effusions. Electronically Signed: Quinton Bennett MD  6/24/2025 12:05 PM EDT  Workstation ID: [id number]    []  EKG/Telemetry   []  Cardiology/Vascular   []  Pathology  []  Old records  []  Other:    Assessment & Plan   Assessment / Plan     Assessment:  Acute metabolic encephalopathy  Septic shock  Febrile neutropenia  Acute hypoxemic respiratory failure requiring  intubation  Pseudomonas pneumonia  Pancytopenia secondary to chemotherapy  End-stage small cell cell lung cancer with metastatic disease currently on palliative chemotherapy  Hyponatremia secondary to SIADH  COPD without exacerbation  Chronic hypoxemic and hypercapnic respiratory failure on 3 L of oxygen at home      PLAN  Patient remains admitted to the medicine service  Patient remains on continuous BiPAP  Patient remains on Precedex  Patient is not requiring any vasopressor support  Continue patient on IV Solu-Medrol  Continue patient on IV Lasix  Patient's family does not want patient reintubated  Patient's family currently discussing goals of care and are leaning toward comfort measures.  Palliative care will be back tomorrow to discuss further  Continue patient on cefepime for pneumonia  Echocardiogram shows preserved ejection fraction  Continue to monitor hemoglobin and transfuse for hemoglobin less than 7.  Transfuse for platelet count less than 10  Patient has received 1 unit of packed red blood cells and 1 unit of platelets on June 28  Continue patient on salt tablets and Florinef for hyponatremia  Discussed plan with intensivist Dr. Borrego regarding patient's respiratory status and plan of care       Discussed with RN.    VTE Prophylaxis:  Mechanical VTE prophylaxis orders are present.        CODE STATUS:   Code Status (Patient has no pulse and is not breathing): No CPR (Do Not Attempt to Resuscitate)  Medical Interventions (Patient has pulse or is breathing): Limited Support  Medical Intervention Limits: No intubation (DNI)      Electronically signed by Flako Santos DO, 6/29/2025, 12:02 EDT.

## 2025-06-29 NOTE — PLAN OF CARE
Goal Outcome Evaluation:  Plan of Care Reviewed With: patient        Progress: no change  Outcome Evaluation: Pt remains on bipap 15/5 30%. Will answer yes and no questions intermittently. 1 BM this shift. Q4 BMP. Pt remains on Precedex at 1.1.

## 2025-06-29 NOTE — PROGRESS NOTES
RT EQUIPMENT DEVICE RELATED - SKIN ASSESSMENT    RT Medical Equipment/Device:     NIV Mask:  Under-the-nose   size:  B    Skin Assessment:      Cheek:  Intact  Nose:  Intact  Mouth:  Intact    Device Skin Pressure Protection:  Pressure points protected    Nurse Notification:  Brittany Mcrae, RRT

## 2025-06-29 NOTE — PLAN OF CARE
Goal Outcome Evaluation:      Patient has remained on Bipap 15/5 36% all night and has tolerated well. Latest ABG at midnight was pH 7.42 CO2 38.2.

## 2025-06-29 NOTE — PROGRESS NOTES
"Intensive Care Admission Note     Acute encephalopathy, severe sepsis with shock, pancytopenia    History of Present Illness     59 female with history of small cell lung cancer with metastasis on chemotherapy, COPD, hypertension, anxiety disorder, hyperlipidemia, SIADH, pancytopenia, admitted to the hospital for management of severe hyponatremia and thrombocytopenia.  Oncology and nephrology were following patient on the floor.  She was treated for SIADH with fluid restriction, salt and urea tabs and replaced 2 units PRBC and 2 units platelets for severe anemia and thrombocytopenia.  Per reports she has been lethargic for the last couple of days but today she has been more encephalopathic and stroke alert was called this afternoon.  She had CT head and CTA done with initial evidence of only chronic appearing lacunar infarct in the left internal capsule.  She is moved to the ICU with persistent encephalopathy,septic shock,respiratory failure needing intubation. Bronch with BAL done showing RLL alveolar hemorrhage with pseudomonas pneumonia. Received blood ad PLT transfusions for pancytopenia. Repeat TTE with dec LVEF 35% and LFT with shock liver picture.    Sub/Overnight events:   Extubated to BiPAP on 6/28.  Milrinone drip off.  Levophed currently stopped.  Still on Precedex Continues to remain confused.  Received 1 unit PRBC and 1 unit platelets yesterday.        Physical Exam and Clinical Information   /84 Comment: arterial line  Pulse 104   Temp 99.6 °F (37.6 °C)   Resp (!) 35   Ht 162.6 cm (64.02\")   Wt 93.3 kg (205 lb 11 oz)   LMP  (LMP Unknown)   SpO2 96%   BMI 35.29 kg/m²     Physical exam  General : Aox0.  Somnolent  Neuro: Agitation when Precedex weaned.  Pupils equal and reactive bilaterally.  Encephalopathic.  HEENT : AT,NC,PERRLA,+ pallor, No Icterus,No thyromegaly. No JVD.  CVS : RRR systolic murmur present grade 3,  Resp/Chest: B/l ant VBS+.  Bilateral coarse rhonchi right greater than " left but improved breath sounds overall  abd: Soft, Non distended, No tenderness, No organomegaly. Bowel sounds + but diminished  EXT: NO edema.   SKIN : NO palpable skin lesions/rashes noted.    Results from last 7 days   Lab Units 06/29/25  0322 06/28/25  1639 06/28/25  0441 06/27/25  0618 06/26/25  2358   WBC 10*3/mm3 8.94  --  5.44  --  9.07   HEMOGLOBIN g/dL 8.4* 8.6* 6.9*   < > 6.7*   PLATELETS 10*3/mm3 17* 22* 10*  --  24*    < > = values in this interval not displayed.     Results from last 7 days   Lab Units 06/29/25  0842 06/29/25  0322 06/29/25  0002 06/28/25  2312 06/28/25  0750 06/28/25  0441 06/27/25  0344 06/26/25  2358   SODIUM mmol/L 146* 146*  --  146*   < > 144   < > 140   POTASSIUM mmol/L 4.3 4.1  --  3.9   < > 3.9   < > 4.7   CO2 mmol/L 20.9* 21.8*  --  21.1*   < > 21.1*   < > 20.5*   BUN mg/dL 53.9* 52.1*  --  51.8*   < > 41.6*   < > 36.3*   CREATININE mg/dL 0.81 0.69 0.77 0.74   < > 0.68   < > 0.78   MAGNESIUM mg/dL  --  2.2  --   --   --  2.3  --  2.1   PHOSPHORUS mg/dL  --  1.4*  --   --   --  1.3*  --  2.1*   GLUCOSE mg/dL 206* 187*  --  217*   < > 186*   < > 186*    < > = values in this interval not displayed.     Estimated Creatinine Clearance: 83.8 mL/min (by C-G formula based on SCr of 0.81 mg/dL).      Results from last 7 days   Lab Units 06/29/25  0002   PH, ARTERIAL pH units 7.425   PCO2, ARTERIAL mm Hg 38.2   PO2 ART mm Hg 126.7*     Lab Results   Component Value Date    LACTATE 1.4 06/29/2025        Images: X-ray chest personally reviewed RLL infiltrate and b/l pulmonary edema.    I reviewed the patient's results and images.     Impression     Hyponatremia    Pancytopenia due to antineoplastic chemotherapy    Cancer, metastatic to bone    Hypotension    Plan/Recommendations     Acute encephalopathy : Multifactorial  Severe sepsis with shock resolved  Acute hypoxemic and hypercapnic respiratory failure  RLL Alveolar hemorrhage likely 2/2 low PLT and pneumonia  Pancytopenia 2/2  cancer chemotherapy and sepsis  Small cell lung cancer with metastasis  Hyponatremia 2/2 SIADH resolved  Elevated LFTs likely secondary to shock liver-resolving  New cardiomyopathy with EF 35-40%.  H/o COPD on baseline 3 L oxygen    Neuro:  -Acute encephalopathy likely secondary to sepsis.  CT head with no acute findings.  Started Zyprexa at nighttime.  Continue Precedex drip.    CVS:  - Weaned off of vasopressors.  Can continue midodrine if needed with holding parameters.  Currently blood pressure stable.  - Mild drop in EF likely secondary to stress induced cardiomyopathy.  Has good cardiac output and cardiac index on Flowtrack.  Was able to wean off of milrinone. Repeat TTE in a week for follow up.  - Lactic acidosis resolved.    RESP:  -Ctn solumedrol for severe pneumonia, septic shock and alveolar hemorrhage.  Dose cut down to 40 IV daily for 2 more days and then stop.  - continue cefepime.  BAL culture growing Pseudomonas.  Total of 7 days treatment and then can stop.  - Extubated to BiPAP yesterday 15/5   - 40 mg IV Lasix twice daily for pulmonary edema. About net 9 L positive.    Renal  - Hyponatremia from SIADH.  Levels improved.  Nephrology recommendations reviewed.  Decreased salt tabs.  On Florinef.    GI  - Constipation resolved. Ctn bowel regimen.  - Continue GI prophylaxis  - Will need NG tube placement.    Hem Onc:  - Ongoing pancytopenia likely related to recent chemotherapy and ongoing sepsis.  Transfuse for platelets less than 20 or with active bleeding.  - Received 1 unit PRBC and 1 unit platelets yesterday    Endo:  -SSI    - DNR-DNI      I, SANDRO Yi spent 12 minutes of critical care time.   Electronically signed by SANDRO Yi, 06/29/25, 12:42 PM EDT.      Time spent Critical care 30 min (exclusive of procedure time)  including high complexity decision making to assess, manipulate, and support vital organ system failure in this individual who has impairment of one or  more vital organ systems .severe sepsis with shock, severe neutropenia, thrombocytopenia, acute hypoxemic respiratory failure, acute encephalopathy such that there is a high probability of imminent or life threatening deterioration in the patient’s condition.      Eric Borrego MD   Critical Care Medicine  06/29/25 10:40 EDT

## 2025-06-29 NOTE — PLAN OF CARE
Goal Outcome Evaluation:       at beside around 2000- explained why day shift RN had called to bedside. Patient having multiple episodes of anxiety/Short of air where RR increases to 40-50 and -140.  stated to let her rest as much as possible this evening and they would be back in the morning, also when asked if palliative had been able to reach them since admit  stated he had not spoken to them but maybe his daughter. When discussing plan of care,  stated he did not want the big NG tube but preferred the small one (Cortrak) if needed when discussing oral medications. After  went home patient had multiple episodes of anxiety/soa again (2138,0035,0050,0130) to the point she was making attempt out of bed and her A-line became loose. Dressing changed and PRN morphine given at 0132 which seemed to help significantly. During episodes patient would be wide eyed and inconsolable groaning and attempting to talk but words incoherent. Patient currently on precedex at 1mcg/kg/hr and will titrate according to RASS goal.

## 2025-06-29 NOTE — PLAN OF CARE
Goal Outcome Evaluation:         Patient remains on continuous BIPAP 15/5 35%. Patients chest X-ray shows pulmonary edema. Patient will be receiving lasix and stay on BIPAP for the remainder of the day. Will continue to monitor patients status.

## 2025-06-30 NOTE — PLAN OF CARE
Goal Outcome Evaluation:  Plan of Care Reviewed With: patient           Outcome Evaluation: Patient wearing 6L High-flow nasal cannula, humidified. Bipap is at bedside on standby, patient wore bipap overnight.

## 2025-06-30 NOTE — SIGNIFICANT NOTE
06/30/25 1100   Coping/Psychosocial   Observed Emotional State anxious   Verbalized Emotional State anxiety;other (see comments)  (The Pt doesn't speak)   Involvement in Care interacting with patient   Additional Documentation Spiritual Care (Group)   Spiritual Care   Spiritual Care Visit Type initial   Spiritual Care Source  initiative   Receptivity to Spiritual Care visit welcomed   Spiritual Care Interventions scripture assistance provided;prayer support provided   Use of Spiritual Resources prayer   Spiritual Care Follow-Up follow-up, none required as presently assessed     Length of visit: 10 min

## 2025-06-30 NOTE — THERAPY EVALUATION
RT EQUIPMENT DEVICE RELATED - SKIN ASSESSMENT    RT Medical Equipment/Device:     NIV Mask:  Under-the-nose   size:  b    Skin Assessment:      Cheek:  Intact  Chin:  Intact  Nares:  Intact  Neck:  Intact  Mouth:  Intact    Device Skin Pressure Protection:  Pressure points protected    Nurse Notification:  Brittany Noble, RRT

## 2025-06-30 NOTE — NURSING NOTE
Pt in bed on bipap calling out to have bipap taken off however Primary RN reports every time they remove she immedietly request to have it back on as she feels like she can't breathe. Called spouse and discussed pt status and spouse reported that she woke up last night when he kissed her forehead and said she loved him. Provided emotional support and requested that he come as soon as he is off of work as pt is having a rough day today and to let me know his thoughts. Spouse verbalized understanding and agreement. Palliative care will continue to follow.        Yu GONSALEZ RN  Palliative Care

## 2025-06-30 NOTE — CONSULTS
Patient has transitioned to comfort measures only, no dietary restrictions appropriate at this time except those added for comfort. RD will sign off at this time. If further clinical nutrition services are desired, please re-consult.

## 2025-06-30 NOTE — PROGRESS NOTES
"Intensive Care Admission Note     Acute encephalopathy, severe sepsis with shock, pancytopenia    History of Present Illness   Follow-up for critical care management  Still altered  Remains on Precedex drip      Physical Exam and Clinical Information   /67   Pulse 95   Temp (!) 102 °F (38.9 °C)   Resp (!) 30   Ht 162.6 cm (64.02\")   Wt 93.3 kg (205 lb 11 oz)   LMP  (LMP Unknown)   SpO2 100%   BMI 35.29 kg/m²     Physical exam  Ill in appearance  Does have tachypnea  Does appear to be struggling to breathe at times    Impression   Encephalopathy  Alveolar hemorrhage secondary to pneumonia and low platelets  Small cell lung cancer  Pancytopenia secondary to chemotherapy  Acute hypoxic hypercapnic respiratory failure  Acute decompensated systolic heart failure    Plan/Recommendations       Neuro:  Still very agitated  Continue Precedex drip  -Acute encephalopathy likely secondary to sepsis.  CT head with no acute findings.  Started Zyprexa at nighttime.  Continue Precedex drip.    CVS:  Off all pressors at this time  We will try to discontinue Ehle      RESP:  Still feels very uncomfortable  Previous chest x-ray shows evidence of fluid overload  Continue with diuretics  Continue with cefepime till stop date    Renal  - Hyponatremia from SIADH.  Levels improved.  Nephrology recommendations reviewed.  Decreased salt tabs.  On Florinef.    GI  No p.o. access  Will try to place core track  Start Zyprexa which core track placed    Hem Onc:  Follow counts    Endo:  Sliding scale insulin    - DNR-DNI    Patient does appear to be uncomfortable would recommend palliative care hospice and comfort measures    Total critical care time spent 30 minutes    Electronically signed by Shin Mcdonald DO, 06/30/25, 2:23 PM EDT.         "

## 2025-06-30 NOTE — PROGRESS NOTES
[facility]   Hospitalist Progress Note  Date: 2025  Patient Name: Lluvia Archer  : 1965  MRN: [id number]  Date of admission: 2025  Room[place]Bed: [facility] [place]      Subjective   Subjective     Chief Complaint: short of air, confusion       Summary  :Lluvia Archer is a 59 y.o. female with COPD on 3 L of home oxygen, small cell lung cancer, hypertension, migraine headaches, SIADH, and chronic pain who presents to the emergency department due to abnormal labs.  Patient last had chemotherapy on .  Follow-up with her primary doctor found her sodium to be 120 and platelets to be 12 for which she presented to the hospital.Patient admitted for severe thrombocytopenia and hyponatremia.  Nephrology consulted.  Oncology consulted. So far patient has received 2 platelet transfusions and 2 packed red blood cell transfusions.  Rapid response was called on  as patient had unresponsive episode patient required transfer to the intensive care started on Levophed and started on broad-spectrum antibiotic.  Patient is now off Levophed patient however has required intubation on  patient is growing Pseudomonas on BAL patient was extubated to nasal cannula on ,   patient was placed on BiPAP and does remain on continuous BiPAP.  Patient's family does not want her reintubated      Interval Followup: 2025  Patient was extubated on  2 continuous BiPAP  Patient remains on Precedex  Patient does appear anxious and continues to have wheezing and difficulty breathing  There is no family currently at bedside  Patient's family are having ongoing discussions regarding goals of care      Review of Systems    Unable to review secondary to patient's difficulty breathing    Objective   Objective     Vitals:   Temp:  [99.5 °F (37.5 °C)-101.9 °F (38.8 °C)] 100.4 °F (38 °C)  Heart Rate:  [] 92  Resp:  [20-38] 30  BP: (146)/(67) 146/67  Flow (L/min) (Oxygen Therapy):  [6] 6      Physical  Exam   General: Patient appears uncomfortable patient does have high flow oxygen in place. No family at the bedside   HENT: NCAT, MMM.   Eyes: pupils equal, no scleral icterus  Cardiovascular: RRR, no murmurs   Pulmonary: Decreased, diffuse wheezing  Gastrointestinal: S/ND/NT, +BS  Skin: No jaundice, no rash on exposed skin appreciated  : johnson in place   Psych: Mood is anxious          Result Review    Result Review:  I have personally reviewed these results:  [x]  Laboratory      Lab 06/30/25  0302 06/29/25  0322 06/29/25  0002 06/28/25  1639 06/28/25  0621 06/28/25  0441 06/27/25  0625 06/27/25  0128 06/26/25  2358 06/25/25  0432 06/24/25  1231   WBC 16.30* 8.94  --   --   --  5.44  --   --  9.07   < > 0.98*   HEMOGLOBIN 8.6* 8.4*  --  8.6*  --  6.9*  --    < > 6.7*   < > 8.8*   HEMATOCRIT 26.6* 25.4*  --  26.4*  --  21.3*  --    < > 21.2*   < > 27.4*   PLATELETS 20* 17*  --  22*  --  10*  --   --  24*   < > 7*   NEUTROS ABS 13.86* 5.97  --   --   --   --   --   --  6.96  --  0.57*   IMMATURE GRANS (ABS)  --  1.13*  --   --   --   --   --   --  0.28*  --  0.07*   LYMPHS ABS  --  0.81  --   --   --   --   --   --  1.30  --  0.28*   MONOS ABS  --  1.03*  --   --   --   --   --   --  0.52  --  0.05*   EOS ABS  --  0.00  --   --   --   --   --   --  0.00  --  0.00   MCV 91.1 90.1  --   --   --  92.2  --   --  98.1*   < > 95.5   PROCALCITONIN  --   --   --   --   --   --   --   --   --   --  0.06   LACTATE  --   --  1.4  --  1.1  --  1.5   < >  --    < > 1.6    < > = values in this interval not displayed.         Lab 06/30/25  0839 06/30/25  0302 06/29/25  2342 06/29/25  0842 06/29/25  0322 06/28/25  0750 06/28/25  0441   SODIUM 146* 149* 148*   < > 146*   < > 144   POTASSIUM 4.3 4.1 3.9   < > 4.1   < > 3.9   CHLORIDE 112* 113* 112*   < > 111*   < > 110*   CO2 22.7 21.8* 21.8*   < > 21.8*   < > 21.1*   ANION GAP 11.3 14.2 14.2   < > 13.2   < > 12.9   BUN 64.7* 60.3* 57.6*   < > 52.1*   < > 41.6*   CREATININE 0.90  0.88 0.84   < > 0.69   < > 0.68   EGFR 74.3 76.3 80.7   < > 100.7   < > 101.1   GLUCOSE 223* 193* 211*   < > 187*   < > 186*   CALCIUM 7.1* 7.4* 7.4*   < > 7.2*   < > 7.5*   MAGNESIUM  --  2.3  --   --  2.2  --  2.3   PHOSPHORUS  --  2.6  --   --  1.4*  --  1.3*    < > = values in this interval not displayed.         Lab 06/30/25  0302 06/28/25  2312 06/28/25  0441 06/26/25  2358 06/26/25  0317 06/25/25  0432 06/24/25  1231   TOTAL PROTEIN 6.1 6.0 5.7*   < > 5.8* 6.3 6.6   ALBUMIN 3.4* 3.3* 3.1*   < > 3.3* 3.5 4.0   GLOBULIN  --   --   --   --  2.5 2.8 2.6   ALT (SGPT) 338* 510* 552*   < > 871* 13 7   AST (SGOT) 41* 138* 122*   < > 1,006* 10 6   BILIRUBIN 1.0 1.0 0.9   < > 1.2 0.7 0.7   INDIRECT BILIRUBIN 0.4 0.4 0.3   < >  --   --   --    BILIRUBIN DIRECT 0.6* 0.6* 0.6*   < >  --   --   --    ALK PHOS 124* 139* 111   < > 88 72 95    < > = values in this interval not displayed.                   Lab 06/27/25  0033   ABO TYPING O   RH TYPING Positive   ANTIBODY SCREEN Negative         Lab 06/29/25  0002 06/28/25  1748 06/28/25  1420   PH, ARTERIAL 7.425 7.384 7.325*   PCO2, ARTERIAL 38.2 38.1 38.6   PO2 .7* 99.6 62.5*   O2 SATURATION ART 99.0 97.6 89.9*   FIO2 36 35 40   HCO3 ART 25.1 22.7 20.1*   BASE EXCESS ART 0.7 -2.1* -5.5*     Brief Urine Lab Results  (Last result in the past 365 days)        Color   Clarity   Blood   Leuk Est   Nitrite   Protein   CREAT   Urine HCG        06/24/25 1228 Yellow   Clear   Negative   Negative   Negative   Negative                 [x]  Microbiology   Microbiology Results (last 10 days)       Procedure Component Value - Date/Time    AFB Culture - Lavage, Bronchus [[id number]] Collected: 06/24/25 1719    Lab Status: Preliminary result Specimen: Lavage from Bronchus Updated: 06/29/25 1736     AFB Culture No AFB isolated at less than 1 week     AFB Stain No acid fast bacilli seen on direct smear      No acid fast bacilli seen on concentrated smear    BAL Culture, Quantitative -  "Lavage, Bronchus [[id number]]  (Abnormal)  (Susceptibility) Collected: 06/24/25 1719    Lab Status: Final result Specimen: Lavage from Bronchus Updated: 06/27/25 0943     BAL Culture >100,000 CFU/mL Pseudomonas aeruginosa      No Normal Respiratory Tamika     Gram Stain Occasional Gram negative bacilli    Narrative:            Susceptibility        Pseudomonas aeruginosa      SILVIA      Cefepime Susceptible      Ceftazidime Susceptible      Ciprofloxacin Intermediate      Levofloxacin Intermediate      Piperacillin + Tazobactam Susceptible      Tobramycin Susceptible                       Susceptibility Comments       Pseudomonas aeruginosa    With the exception of urinary-sourced infections, aminoglycosides should not be used as monotherapy.               Fungus Culture - Lavage, Bronchus [[id number]] Collected: 06/24/25 1719    Lab Status: Preliminary result Specimen: Lavage from Bronchus Updated: 06/29/25 1731     Fungus Culture No fungus isolated at less than 1 week    Histoplasma Antigen, CSF or BAL - Lavage, Lung [[id number]] Collected: 06/24/25 1719    Lab Status: Final result Specimen: Lavage from Lung Updated: 06/27/25 1707     Result None Detected ng/mL      Comment: Reference Interval: None Detected  Reportable Range: Positive Results reported in ng/mL from  0.20 ng/mL to 20.00 ng/mL. Positive results above 20.00 ng/mL  are reported as \"Above the Limit of Quantification\"  Cross-reactions occur with Blastomyces spp., Coccidioides spp.,  and Paracoccidioides brasiliensis.  The test was developed and its performance characteristics  determined by [employer]. It has not been cleared  or approved by the FDA; however, FDA clearance or approval is  not currently required for clinical use. The results are not  intended to be used as the sole means for clinical diagnosis  or patient management decisions.        Interpretation Negative     Specimen Type BAL    Narrative:      Performed at:  01 - [facility]  [address] [facility] [place]  [id number]  : Abram Balderas MD, Phone:  [phone]    Pneumonia Panel - Lavage, Lung, Right Lower Lobe [[id number]]  (Abnormal) Collected: 06/24/25 1719    Lab Status: Final result Specimen: Lavage from Lung, Right Lower Lobe Updated: 06/24/25 1944     Escherichia coli PCR Not Detected     Acinetobacter calcoaceticus-baumannii complex PCR Not Detected     Enterobacter cloacae PCR Not Detected     Klebsiella oxytoca PCR Not Detected     Klebsiella pneumoniae group PCR Not Detected     Klebsiella aerogenes PCR Not Detected     Moraxella catarrhalis PCR Not Detected     Proteus species PCR Not Detected     Pseudomonas aeroginosa PCR Detected     Comment: 10^6 Bin copies/mL        Serratia marcescens PCR Not Detected     Staphylococcus aureus PCR Not Detected     Streptococcus pyogenes PCR Not Detected     Haemophilus influenzae PCR Not Detected     Streptococcus agalactiae PCR Not Detected     Streptococcus pneumoniae PCR Not Detected     Chlamydophila pneumoniae PCR Not Detected     Legionella pneumophilia PCR Not Detected     Mycoplasma pneumo by PCR Not Detected     ADENOVIRUS, PCR Not Detected     CTX-M Gene Not Detected     IMP Gene Not Detected     KPC Gene Not Detected     mecA/C and MREJ Gene N/A     NDM Gene Not Detected     OXA-48-like Gene N/A     VIM Gene Not Detected     Coronavirus Not Detected     Human Metapneumovirus Not Detected     Human Rhinovirus/Enterovirus Not Detected     Influenza A PCR Not Detected     Influenza B PCR Not Detected     RSV, PCR Not Detected     Parainfluenza virus PCR Not Detected    Aspergillus Galactomannan Antigen - Lavage, Bronchus [[id number]] Collected: 06/24/25 1719    Lab Status: Final result Specimen: Lavage from Bronchus Updated: 06/27/25 1208     Aspergillus Ag, BAL/Serum 0.39 Index     Narrative:      Performed at:  [address] [place] [address]  [id number]  : Myesha  Nathaniel WEST, Phone:  [phone]  Performed at:  02 - [facility]  [address] [place], [place]  [id number]  : Yusef Márquez [facility], Phone:  [phone]    Blood Culture - Blood, Arm, Left [[id number]]  (Normal) Collected: 06/24/25 1231    Lab Status: Final result Specimen: Blood from Arm, Left Updated: 06/29/25 1245     Blood Culture No growth at 5 days    Blood Culture - Blood, Arm, Right [[id number]]  (Normal) Collected: 06/24/25 1230    Lab Status: Final result Specimen: Blood from Arm, Right Updated: 06/29/25 1245     Blood Culture No growth at 5 days    Narrative:      Less than seven (7) mL's of blood was collected.  Insufficient quantity may yield false negative results.    Urine Culture - Urine, Indwelling Urethral Catheter [[id number]]  (Normal) Collected: 06/24/25 1228    Lab Status: Final result Specimen: Urine from Indwelling Urethral Catheter Updated: 06/25/25 1335     Urine Culture No growth          [x]  Radiology  XR Chest 1 View  Result Date: 6/29/2025  Impression: 1. Slight improvement in linear alveolar edema. 2. Stable small bilateral pleural effusions. Electronically Signed: Bill Butcher MD  6/29/2025 8:22 AM EDT  Workstation ID: [id number]    XR Chest 1 View  Result Date: 6/28/2025  Impression: Stable bilateral airspace disease most pronounced in the right lower lung Electronically Signed: Chapo Pabon MD  6/28/2025 8:40 AM EDT  Workstation ID: [id number]    XR Chest 1 View  Result Date: 6/26/2025  Impression: Similar-appearing consolidative opacities in the right lung. Mildly increased left basal patchy opacities. Findings suspicious for pneumonia. Stable appearing support devices, including a left chest port catheter with catheter tip projecting over the inferior cavoatrial junction. Electronically Signed: Jon Browne MD  6/26/2025 10:34 PM EDT  Workstation ID: [id number]    XR Chest 1 View  Result Date: 6/26/2025  Marked interval worsening of right basilar infiltrate compatible  with pneumonia. Electronically Signed: Ahmet Hill MD  6/26/2025 5:56 AM EDT  Workstation ID: [id number]    XR Chest 1 View  Result Date: 6/24/2025  Impression: 1.Tip of the endotracheal tube now terminates in the lower thoracic trachea approximately 1 to 2 cm above the reagan. 2.Improved aeration of the right lung base with persistent bibasilar airspace opacities, likely due to atelectasis and/or pneumonia. Electronically Signed: Love Garcia MD  6/24/2025 6:38 PM EDT  Workstation ID: [id number]    XR Abdomen KUB  Result Date: 6/24/2025  Impression: 1.Tip of the enteric tube terminates in the proximal stomach. 2.Tip of the endotracheal tube terminates in the right mainstem bronchus. This was called to the patient's nurse by Dr. Miramontes according to concurrent chest x-ray report. Electronically Signed: Love Garcia MD  6/24/2025 5:59 PM EDT  Workstation ID: [id number]    XR Chest 1 View  Result Date: 6/24/2025  Impression: 1. Right mainstem intubation. 2. Increasing bibasilar airspace opacities, acuity would favor aspiration, hemorrhage and/or atelectasis. Right mainstem intubation communicated with patient's nurse Devora at 5:46 p.m. 6/24/2025. Tube had been retracted at time of communication with plan for repeat radiograph. Electronically Signed: Abram Miramontes MD  6/24/2025 5:49 PM EDT  Workstation ID: [id number]    XR Chest 1 View  Result Date: 6/24/2025  Impression: 1. No acute cardiopulmonary disease. Electronically Signed: Bill Butcher MD  6/24/2025 1:40 PM EDT  Workstation ID: [id number]    CT Angiogram Neck  Result Date: 6/24/2025  1.No acute abnormality is identified within the large arteries of the head or neck. 2.No significant stenosis of the bilateral internal carotid arteries. 3.Severe stenosis of the left subclavian artery proximal to the left vertebral artery origin. 4.Pulmonary emphysema. Partially visualized 12 mm nodular opacity within the superior segment of the right lower lobe (series 5, image  337). Several other tiny nodular opacities within the bilateral lungs. Recommend chest CT follow-up. 5.Several vague sclerotic foci noted within the visualized thoracic spine, largest within the T6 vertebral body measuring approximately 15 mm, concerning for osseous metastases. Electronically Signed: Jose Luis Bryan MD  6/24/2025 1:23 PM EDT  Workstation ID: [id number]    CT Angiogram Head w AI Analysis of LVO  Result Date: 6/24/2025  1.No acute abnormality is identified within the large arteries of the head or neck. 2.No significant stenosis of the bilateral internal carotid arteries. 3.Severe stenosis of the left subclavian artery proximal to the left vertebral artery origin. 4.Pulmonary emphysema. Partially visualized 12 mm nodular opacity within the superior segment of the right lower lobe (series 5, image 337). Several other tiny nodular opacities within the bilateral lungs. Recommend chest CT follow-up. 5.Several vague sclerotic foci noted within the visualized thoracic spine, largest within the T6 vertebral body measuring approximately 15 mm, concerning for osseous metastases. Electronically Signed: Jose Luis Bryan MD  6/24/2025 1:23 PM EDT  Workstation ID: [id number]    CT CEREBRAL PERFUSION WITH & WITHOUT CONTRAST  Result Date: 6/24/2025  1.within the high right frontal lobe there is a 3 mL area of Tmax greater than 6 seconds which could be related to ischemia. No evidence of core infarct. Electronically Signed: Bill Butcher MD  6/24/2025 12:14 PM EDT  Workstation ID: [id number]    CT Head Without Contrast Stroke Protocol  Result Date: 6/24/2025  Impression: Chronic appearing lacunar infarct in the left internal capsule anterior limb. No CT evidence of acute infarction, mass or intracranial hemorrhage. Small to moderate bilateral mastoid effusions. Electronically Signed: Quinton Bennett MD  6/24/2025 12:05 PM EDT  Workstation ID: [id number]    []  EKG/Telemetry   []  Cardiology/Vascular   []  Pathology  []  Old  records  []  Other:    Assessment & Plan   Assessment / Plan     Assessment:  Acute metabolic encephalopathy  Septic shock  Febrile neutropenia  Acute hypoxemic respiratory failure requiring intubation  Pseudomonas pneumonia  Pancytopenia secondary to chemotherapy  End-stage small cell cell lung cancer with metastatic disease currently on palliative chemotherapy  Hyponatremia secondary to SIADH  COPD without exacerbation  Chronic hypoxemic and hypercapnic respiratory failure on 3 L of oxygen at home      PLAN  Patient remains admitted to the medicine service  Patient remains on continuous BiPAP and high flow oxygen   Patient remains on Precedex  Patient is not requiring any vasopressor support  Continue patient on IV Solu-Medrol  Continue patient on IV Lasix  Patient's family does not want patient reintubated  Patient's family currently discussing goals of care and are leaning toward comfort measures.  Patient is appropriate for comfort care.  Palliative to reach out to family again today to discuss plan of care  Continue patient on cefepime for pneumonia  Echocardiogram shows preserved ejection fraction  Continue to monitor hemoglobin and transfuse for hemoglobin less than 7.  Transfuse for platelet count less than 10  Patient has received 1 unit of packed red blood cells and 1 unit of platelets on June 28  Continue patient on salt tablets and Florinef for hyponatremia  Discussed plan with intensivist Dr. Mcdonald regarding patient's respiratory status and plan of care  Repeat CBC and BMP in the morning to monitor hemoglobin and electrolytes and white blood cell count     Discussed with RN.    VTE Prophylaxis:  Mechanical VTE prophylaxis orders are present.        CODE STATUS:   Code Status (Patient has no pulse and is not breathing): No CPR (Do Not Attempt to Resuscitate)  Medical Interventions (Patient has pulse or is breathing): Limited Support  Medical Intervention Limits: No intubation (DNI)      Electronically  signed by Flako Santos DO, 6/30/2025, 09:45 EDT.

## 2025-06-30 NOTE — PLAN OF CARE
Goal Outcome Evaluation:         Patient has worn Bipap 15/5 30% all night continuously with no issues.

## 2025-06-30 NOTE — PLAN OF CARE
Goal Outcome Evaluation:  Plan of Care Reviewed With: family        Progress: no change  Outcome Evaluation: Pt on 6L NC. Pt mumbles sometimes but she is not alert. MD met with family today to discuss prognosis. Family discussing comfort care.  Family at bedside.

## 2025-07-01 PROBLEM — Z51.5 COMFORT MEASURES ONLY STATUS: Status: ACTIVE | Noted: 2025-01-01

## 2025-07-01 PROBLEM — Z51.5 END OF LIFE CARE: Status: ACTIVE | Noted: 2025-01-01

## 2025-07-01 NOTE — PLAN OF CARE
Goal Outcome Evaluation:     Pt received PRN ativan and morphine hourly x4 hrs, now administering q2 hours consistently to maintain comfort status. Family remains at bedside, continue plan of care.

## 2025-07-01 NOTE — PLAN OF CARE
Problem: Adult Inpatient Plan of Care  Goal: Plan of Care Review  Outcome: Not Progressing  Flowsheets  Taken 7/1/2025 0554 by Daria Baez, RN  Progress: declining  Outcome Evaluation: Patient on comfort measures. Family at bedside. PRN Medications given for restlessness, anxiety, and/or pain. See MAR. Pending Hospice consult. On 5-6 LPM via NC. Care ongoing.  Taken 6/30/2025 3652 by Yolette Jackson, RN  Plan of Care Reviewed With: family   Goal Outcome Evaluation:

## 2025-07-01 NOTE — CONSULTS
Patient and family seen today at patient's bedside. Purpose of visit was to explain HH benefit services and to assess for GIP eligibility.  Per [facility] provider patient is GIP appropriate. No medication recommendations at this time. Consents and EOS careguide reviewed with  and daughter. Gave family time to speak freely. All questions and concerns addressed at time of visit. EOS folder given to . Discussed care change with floor nurse and [facility] palliative nurse.  will continue to collaborate with [facility] staff to meet patient care goals. Thank you for the referral.

## 2025-07-01 NOTE — CONSULTS
07/01/25 1405   Spiritual Care   Spiritual Care Visit Type follow-up   Spiritual Care Source  initiative   Receptivity to Spiritual Care visit welcomed   Spiritual Care Request end-of-life issue(s) support;spiritual/moral support;family support   Spiritual Care Interventions supportive conversation provided  (introductions made, pt resting comfortably at time of visit with family supporting at bedside)   Response to Spiritual Care thanks expressed   Use of Spiritual Resources spirituality for coping, indicated strong use of   Spiritual Care Follow-Up follow-up planned regularly for general support   Pt is now Comfort Measures only and GIP bed with [facility].

## 2025-07-01 NOTE — CONSULTS
Pt in bed uncomfortable and complaining of pain and struggling to breathe. Primary RN immediatly medicated and will check back on pt in an hour. Appreciate quick assistance. Cousin at bedside. Contacted spouse about [facility] consult for GIP. Spouse verbalized agreement and reported daughter will be at bedside soon. Palliative care updated MD and will provide updates as needed. Palliative care will continue to follow.        Yu GONSALEZ RN  Palliative Care

## 2025-07-01 NOTE — NURSING NOTE
Pt resting comfortably in bed cousin at bedside. Discussed with primary RN palliative care will continue to follow and update as needed.      Yu GONSALEZ RN  Palliative Care

## 2025-07-01 NOTE — PROGRESS NOTES
"Intensive Care Admission Note     Acute encephalopathy, severe sepsis with shock, pancytopenia    History of Present Illness   Follow-up for critical care management    Transfer out of ICU  Now comfort care  Very altered  Appears to be resting in bed comfortably  Cannot provide history but does not appear anxious or in pain      Physical Exam and Clinical Information   /95 (BP Location: Right arm, Patient Position: Lying)   Pulse 112   Temp 97.9 °F (36.6 °C) (Oral)   Resp 16   Ht 162.6 cm (64.02\")   Wt 93.3 kg (205 lb 11 oz)   LMP  (LMP Unknown)   SpO2 99%   BMI 35.29 kg/m²     Physical exam  Ill in appearance  Work of breathing better  Very confused  Not struggling to breathe is much      Personally reviewed last set of labs      Lab 06/30/25  1731 06/30/25  1204 06/30/25  0839 06/30/25  0302 06/29/25  2342 06/29/25  1920 06/29/25  1644 06/29/25  0842 06/29/25  0322 06/29/25  0002 06/28/25  2312 06/28/25  1942 06/28/25  1639 06/28/25  0750 06/28/25  0441 06/27/25  1541 06/27/25  1136 06/27/25  0853 06/27/25  0618 06/27/25  0344 06/26/25  2358 06/26/25  0745 06/26/25  0317 06/25/25  0754 06/25/25  0432 06/24/25  1750 06/24/25  1231   WBC  --   --   --  16.30*  --   --   --   --  8.94  --   --   --   --   --  5.44  --   --   --   --   --  9.07  --  3.78  --  2.13*  --  0.98*   HEMOGLOBIN  --   --   --  8.6*  --   --   --   --  8.4*  --   --   --  8.6*  --  6.9*  --   --   --  7.8*  --  6.7*  --  7.3*  --  8.4*  --  8.8*   HEMATOCRIT  --   --   --  26.6*  --   --   --   --  25.4*  --   --   --  26.4*  --  21.3*  --   --   --  24.0*  --  21.2*  --  22.9*  --  25.8*  --  27.4*   PLATELETS  --   --   --  20*  --   --   --   --  17*  --   --   --  22*  --  10*  --   --   --   --   --  24*  --  34*  --  71*  --  7*   SODIUM 148* 147* 146* 149* 148* 146* 146*   < > 146*  --  146*   < > 144   < > 144   < > 140   < >  --    < > 140   < > 136   < > 132*  132*   < > 130*  130*   POTASSIUM 4.2 4.1 4.3 4.1 3.9 3.8 " 3.9   < > 4.1  --  3.9   < > 4.0   < > 3.9   < > 3.6   < >  --    < > 4.7   < > 4.8   < > 4.3  4.3   < > 4.8  4.8   CHLORIDE 111* 111* 112* 113* 112* 108* 110*   < > 111*  --  112*   < > 109*   < > 110*   < > 108*   < >  --    < > 108*   < > 103   < > 97*  97*   < > 95*  95*   CO2 21.4* 22.0 22.7 21.8* 21.8* 21.0* 21.7*   < > 21.8*  --  21.1*   < > 19.4*   < > 21.1*   < > 21.6*   < >  --    < > 20.5*   < > 19.3*   < > 22.6  22.6   < > 24.2  24.2   BUN 66.7* 64.1* 64.7* 60.3* 57.6* 55.1* 55.9*   < > 52.1*  --  51.8*   < > 51.5*   < > 41.6*   < > 44.2*   < >  --    < > 36.3*   < > 21.7*   < > 12.0  12.0   < > 14.7  14.7   CREATININE 0.92 0.84 0.90 0.88 0.84 0.86 0.85   < > 0.69   < > 0.74   < > 0.78   < > 0.68   < > 0.80   < >  --    < > 0.78   < > 0.78   < > 0.67  0.67   < > 0.60  0.60   GLUCOSE 200* 211* 223* 193* 211* 225* 213*   < > 187*  --  217*   < > 256*   < > 186*   < > 185*   < >  --    < > 186*   < > 163*   < > 168*  168*   < > 133*  133*   CALCIUM 7.2* 7.1* 7.1* 7.4* 7.4* 7.5* 7.1*   < > 7.2*  --  7.6*   < > 7.5*   < > 7.5*   < > 7.0*   < >  --    < > 7.6*   < > 7.0*   < > 7.8*  7.8*   < > 8.1*  8.1*   PHOSPHORUS  --   --   --  2.6  --   --   --   --  1.4*  --   --   --   --   --  1.3*  --   --   --   --   --  2.1*  --  2.7  --  3.0  --  1.2*   TOTAL PROTEIN  --   --   --  6.1  --   --   --   --   --   --  6.0  --   --   --  5.7*  --  5.2*  --   --   --  6.1  --  5.8*  --  6.3  --  6.6   ALBUMIN  --   --   --  3.4*  --   --   --   --   --   --  3.3*  --   --   --  3.1*  --  2.9*  --   --   --  3.3*  --  3.3*  --  3.5  --  4.0   GLOBULIN  --   --   --   --   --   --   --   --   --   --   --   --   --   --   --   --   --   --   --   --   --   --  2.5  --  2.8  --  2.6    < > = values in this interval not displayed.         Impression   Encephalopathy  Alveolar hemorrhage secondary to pneumonia and low platelets  Small cell lung cancer  Pancytopenia secondary to chemotherapy  Acute hypoxic  hypercapnic respiratory failure  Acute decompensated systolic heart failure    Plan/Recommendations     Patient now comfort measures  Continue medications for pain, secretion management, anxiety, air hunger, delirium  Oxygen for comfort  Rest per primary       Labs, provider notes personally reviewed  Discussed with primary     I will sign off.  Please call with questions.    Electronically signed by Carlito Rondon MD, 07/01/25, 10:50 AM EDT.

## 2025-07-01 NOTE — DISCHARGE SUMMARY
[facility]         HOSPITALIST  DISCHARGE SUMMARY    Patient Name: Lluvia Archer  : 1965  MRN: [id number]    Date of Admission: 2025  Date of Discharge: 2025  Primary Care Physician: Aleksandar Edge,     Consults       Date and Time Order Name Status Description    2025  8:17 AM Hematology & Oncology Inpatient Consult Completed     2025 11:11 AM Inpatient Nephrology Consult      2025 12:43 PM General MD Inpatient Consult      2025 11:39 AM Inpatient Hospitalist Consult              Active and Resolved Hospital Problems:  Active Hospital Problems    Diagnosis POA   • **Hyponatremia [E87.1] Yes   • Comfort measures only status [Z51.5] Not Applicable   • Hypotension [I95.9] Yes   • Cancer, metastatic to bone [C79.51] Yes   • Pancytopenia due to antineoplastic chemotherapy [D61.810, T45.1X5A] Yes      Resolved Hospital Problems   No resolved problems to display.   End-stage small cell cell lung cancer with metastatic disease currently on palliative chemotherapy  Septic shock secondary to Pseudomonas pneumonia  Pseudomonas pneumonia  Febrile neutropenia  Acute metabolic encephalopathy  Alveolar hemorrhage secondary to pneumonia and low platelets  Acute hypoxemic respiratory failure requiring intubation and mechanical ventilation  Pseudomonas pneumonia  Pancytopenia secondary to chemotherapy  Hyponatremia secondary to SIADH  COPD without exacerbation  Chronic hypoxemic and hypercapnic respiratory failure on 3 L of oxygen at home    Hospital Course     Hospital Course:  Lluvia Archer is a 59 y.o. female with COPD on 3 L of home oxygen, small cell lung cancer, hypertension, migraine headaches, SIADH, and chronic pain who presents to the emergency department due to abnormal labs. Patient last had chemotherapy on . Follow-up with her primary doctor found her sodium to be 120 and platelets to be 12 for which she presented to the hospital.Patient  admitted for severe thrombocytopenia and hyponatremia. Nephrology consulted. Oncology consulted.  Patient received PRBC and platelet transfusions.  Rapid response was called on June 24 as patient had unresponsive episode patient required transfer to the intensive care started on Levophed and broad-spectrum antibiotics.  Her condition continued to further deteriorate requiring intubation on June 24 bronchoscopy was performed and returned positive for Pseudomonas on BAL.  Patient was extubated but required continuous BiPAP and did not show significant improvement despite maximal medical efforts.  Palliative care was consulted, family did not want the patient reintubated and ultimately elected to pursue comfort measures only.  She was transitioned to comfort measures only and will be discharged and readmitted to a hospice [facility] bed on 7/1/2025.    Day of Discharge     Vital Signs:  Temp:  [97.9 °F (36.6 °C)-102 °F (38.9 °C)] 97.9 °F (36.6 °C)  Heart Rate:  [] 112  Resp:  [16-30] 16  BP: (135-136)/(69-95) 135/95  Flow (L/min) (Oxygen Therapy):  [4-6] 4  Physical Exam:   Gen: Chronically ill-appearing female, resting comfortably on arrival  Pulm: No increased WOB    Discharge Details        Discharge Medications        ASK your doctor about these medications        Instructions Start Date   albuterol 0.63 MG/3ML nebulizer solution  Commonly known as: ACCUNEB   0.63 mg, Nebulization, Every 6 Hours PRN      albuterol sulfate  (90 Base) MCG/ACT inhaler  Commonly known as: PROVENTIL HFA;VENTOLIN HFA;PROAIR HFA   2 puffs, Inhalation, Every 4 Hours PRN      atorvastatin 10 MG tablet  Commonly known as: LIPITOR   10 mg, Oral, Nightly      buPROPion  MG 12 hr tablet  Commonly known as: WELLBUTRIN SR   150 mg, Oral, 2 Times Daily      calcium carbonate 500 MG chewable tablet  Commonly known as: Tums   2 tablets, Oral, Daily      dexAMETHasone 4 MG tablet  Commonly known as: DECADRON   4 mg, Oral, 2 Times Daily  With Meals      escitalopram 20 MG tablet  Commonly known as: LEXAPRO   20 mg, Oral, Daily      famotidine 40 MG tablet  Commonly known as: PEPCID   TAKE ONE TABLET BY MOUTH TWICE DAILY @ 9AM & 5PM      fludrocortisone 0.1 MG tablet   0.1 mg, Oral, Daily      furosemide 20 MG tablet  Commonly known as: LASIX   20 mg, Oral, Daily      gabapentin 100 MG capsule  Commonly known as: NEURONTIN   200 mg, Oral, 3 Times Daily      hydrOXYzine 25 MG tablet  Commonly known as: ATARAX   TAKE 1 TABLET BY MOUTH THREE TIMES A DAY AS NEEDED FOR ITCHING      ipratropium-albuterol 0.5-2.5 mg/3 ml nebulizer  Commonly known as: DUO-NEB   Take 3 mL by nebulization 4 (Four) Times a Day.      lisinopril 5 MG tablet  Commonly known as: PRINIVILZESTRIL   TAKE ONE TABLET BY MOUTH DAILY AT 9 AM      Magnesium 400 MG tablet   400 mg, Oral, Daily      Melatonin 10 MG tablet   10 mg, Oral, Nightly PRN      midodrine 10 MG tablet  Commonly known as: PROAMATINE   10 mg, Oral, 3 Times Daily Before Meals      naloxone 4 MG/0.1ML nasal spray  Commonly known as: NARCAN   CALL 911. DON'T PRIME. SPRAY IN 1 NOSTRIL FOR OVERDOSE. REPEAT IN 2-3 MINUTES IN OTHER NOSTRIL IF NO OR MINIMAL BREATHING/RESPONSIVENESS.      nystatin 100,000 unit/mL suspension  Commonly known as: MYCOSTATIN   500,000 Units, Swish & Swallow, 4 Times Daily      OLANZapine 5 MG tablet  Commonly known as: zyPREXA   5 mg, Oral, Nightly      ondansetron ODT 8 MG disintegrating tablet  Commonly known as: ZOFRAN-ODT   8 mg, Translingual, Every 8 Hours PRN      oxyCODONE-acetaminophen  MG per tablet  Commonly known as: PERCOCET   1 tablet, Oral, Every 4 Hours PRN      potassium chloride 10 MEQ CR capsule  Commonly known as: MICRO-K   20 mEq, Oral, Daily      prochlorperazine 10 MG tablet  Commonly known as: COMPAZINE   10 mg, Oral, Every 6 Hours PRN      Trelegy Ellipta 200-62.5-25 MCG/ACT inhaler  Generic drug: Fluticasone-Umeclidin-Vilant   1 puff, Inhalation, Daily - RT                No Known Allergies    Discharge Disposition:  Hospice/Medical Facility ([facility])    Diet:  Hospital:No active diet order      Discharge Activity:       CODE STATUS:  Code Status and Medical Interventions: No CPR (Do Not Attempt to Resuscitate); Comfort Measures   Ordered at: 06/30/25 1855     Code Status (Patient has no pulse and is not breathing):    No CPR (Do Not Attempt to Resuscitate)     Medical Interventions (Patient has pulse or is breathing):    Comfort Measures         Future Appointments   Date Time Provider Department Center   7/7/2025  1:00 PM [facility] INFUSION LAB  [facility]   7/7/2025  1:30 PM CHAIR 06 SANNA OP INFU  [facility]   7/8/2025  1:00 PM CHAIR 10 [facility] OP INFU  [facility]   7/9/2025  1:00 PM CHAIR 04 [facility] OP INFU [facility] OPIF [facility]   7/10/2025 12:45 PM SANNA ALEX CT 2  [facility] ETWCT [facility]   7/10/2025  2:00 PM CHAIR 05 SANNA OP INFU  [facility]   7/11/2025  1:30 PM CHAIR 09 [facility] OP INFU [facility] OPIF [facility]   7/17/2025 10:30 AM Norah Han APRN [facility]       Additional Instructions for the Follow-ups that You Need to Schedule       CBC and Differential   Jun 26, 2025      Manual Differential: No   Release to patient: Routine Release        Comprehensive metabolic panel   Jun 26, 2025      Release to patient: Routine Release                Pertinent  and/or Most Recent Results       LAB RESULTS:      Lab 06/30/25  0302 06/29/25  0322 06/29/25  0002 06/28/25  1639 06/28/25  0621 06/28/25  0441 06/27/25  0625 06/27/25  0618 06/27/25  0128 06/26/25  2358 06/26/25  2221 06/26/25  1016 06/26/25  0317   WBC 16.30* 8.94  --   --   --  5.44  --   --   --  9.07  --   --  3.78   HEMOGLOBIN 8.6* 8.4*  --  8.6*  --  6.9*  --  7.8*  --  6.7*  --   --  7.3*   HEMATOCRIT 26.6* 25.4*  --  26.4*  --  21.3*  --  24.0*  --  21.2*  --   --  22.9*   PLATELETS 20* 17*  --  22*  --  10*  --   --   --  24*  --   --  34*   NEUTROS ABS 13.86* 5.97  --   --   --   --   --   --   --  6.96  --   --    --    IMMATURE GRANS (ABS)  --  1.13*  --   --   --   --   --   --   --  0.28*  --   --   --    LYMPHS ABS  --  0.81  --   --   --   --   --   --   --  1.30  --   --   --    MONOS ABS  --  1.03*  --   --   --   --   --   --   --  0.52  --   --   --    EOS ABS  --  0.00  --   --   --   --   --   --   --  0.00  --   --   --    MCV 91.1 90.1  --   --   --  92.2  --   --   --  98.1*  --   --  97.4*   LACTATE  --   --  1.4  --  1.1  --  1.5  --  2.6*  --  2.5*   < > 4.6*    < > = values in this interval not displayed.         Lab 06/30/25  1731 06/30/25  1204 06/30/25  0839 06/30/25  0302 06/29/25  2342 06/29/25  0842 06/29/25  0322 06/28/25  0750 06/28/25  0441 06/27/25  0344 06/26/25  2358 06/26/25  0745 06/26/25  0317   SODIUM 148* 147* 146* 149* 148*   < > 146*   < > 144   < > 140   < > 136   POTASSIUM 4.2 4.1 4.3 4.1 3.9   < > 4.1   < > 3.9   < > 4.7   < > 4.8   CHLORIDE 111* 111* 112* 113* 112*   < > 111*   < > 110*   < > 108*   < > 103   CO2 21.4* 22.0 22.7 21.8* 21.8*   < > 21.8*   < > 21.1*   < > 20.5*   < > 19.3*   ANION GAP 15.6* 14.0 11.3 14.2 14.2   < > 13.2   < > 12.9   < > 11.5   < > 13.7   BUN 66.7* 64.1* 64.7* 60.3* 57.6*   < > 52.1*   < > 41.6*   < > 36.3*   < > 21.7*   CREATININE 0.92 0.84 0.90 0.88 0.84   < > 0.69   < > 0.68   < > 0.78   < > 0.78   EGFR 72.3 80.7 74.3 76.3 80.7   < > 100.7   < > 101.1   < > 88.2   < > 88.2   GLUCOSE 200* 211* 223* 193* 211*   < > 187*   < > 186*   < > 186*   < > 163*   CALCIUM 7.2* 7.1* 7.1* 7.4* 7.4*   < > 7.2*   < > 7.5*   < > 7.6*   < > 7.0*   MAGNESIUM  --   --   --  2.3  --   --  2.2  --  2.3  --  2.1  --  2.3   PHOSPHORUS  --   --   --  2.6  --   --  1.4*  --  1.3*  --  2.1*  --  2.7    < > = values in this interval not displayed.         Lab 06/30/25  0302 06/28/25  2312 06/28/25  0441 06/27/25  1136 06/26/25  2358 06/26/25  0317 06/25/25  0432   TOTAL PROTEIN 6.1 6.0 5.7* 5.2* 6.1 5.8* 6.3   ALBUMIN 3.4* 3.3* 3.1* 2.9* 3.3* 3.3* 3.5   GLOBULIN  --   --   --    --   --  2.5 2.8   ALT (SGPT) 338* 510* 552* 631* 894* 871* 13   AST (SGOT) 41* 138* 122* 217* 456* 1,006* 10   BILIRUBIN 1.0 1.0 0.9 0.9 1.1 1.2 0.7   INDIRECT BILIRUBIN 0.4 0.4 0.3 0.3 0.4  --   --    BILIRUBIN DIRECT 0.6* 0.6* 0.6* 0.6* 0.7*  --   --    ALK PHOS 124* 139* 111 103 120* 88 72                 Lab 06/27/25  0033   ABO TYPING O   RH TYPING Positive   ANTIBODY SCREEN Negative         Lab 06/29/25  0002 06/28/25  1748 06/28/25  1420   PH, ARTERIAL 7.425 7.384 7.325*   PCO2, ARTERIAL 38.2 38.1 38.6   PO2 .7* 99.6 62.5*   O2 SATURATION ART 99.0 97.6 89.9*   FIO2 36 35 40   HCO3 ART 25.1 22.7 20.1*   BASE EXCESS ART 0.7 -2.1* -5.5*     Brief Urine Lab Results  (Last result in the past 365 days)        Color   Clarity   Blood   Leuk Est   Nitrite   Protein   CREAT   Urine HCG        06/24/25 1228 Yellow   Clear   Negative   Negative   Negative   Negative                 Microbiology Results (last 10 days)       Procedure Component Value - Date/Time    AFB Culture - Lavage, Bronchus [[id number]] Collected: 06/24/25 1719    Lab Status: Preliminary result Specimen: Lavage from Bronchus Updated: 06/29/25 1731     AFB Culture No AFB isolated at less than 1 week     AFB Stain No acid fast bacilli seen on direct smear      No acid fast bacilli seen on concentrated smear    BAL Culture, Quantitative - Lavage, Bronchus [[id number]]  (Abnormal)  (Susceptibility) Collected: 06/24/25 1719    Lab Status: Final result Specimen: Lavage from Bronchus Updated: 06/27/25 0943     BAL Culture >100,000 CFU/mL Pseudomonas aeruginosa      No Normal Respiratory Tamika     Gram Stain Occasional Gram negative bacilli    Narrative:            Susceptibility        Pseudomonas aeruginosa      SILVIA      Cefepime Susceptible      Ceftazidime Susceptible      Ciprofloxacin Intermediate      Levofloxacin Intermediate      Piperacillin + Tazobactam Susceptible      Tobramycin Susceptible                       Susceptibility Comments   "     Pseudomonas aeruginosa    With the exception of urinary-sourced infections, aminoglycosides should not be used as monotherapy.               Fungus Culture - Lavage, Bronchus [[id number]] Collected: 06/24/25 1719    Lab Status: Preliminary result Specimen: Lavage from Bronchus Updated: 06/29/25 1731     Fungus Culture No fungus isolated at less than 1 week    Histoplasma Antigen, CSF or BAL - Lavage, Lung [[id number]] Collected: 06/24/25 1719    Lab Status: Final result Specimen: Lavage from Lung Updated: 06/27/25 1707     Result None Detected ng/mL      Comment: Reference Interval: None Detected  Reportable Range: Positive Results reported in ng/mL from  0.20 ng/mL to 20.00 ng/mL. Positive results above 20.00 ng/mL  are reported as \"Above the Limit of Quantification\"  Cross-reactions occur with Blastomyces spp., Coccidioides spp.,  and Paracoccidioides brasiliensis.  The test was developed and its performance characteristics  determined by [employer]. It has not been cleared  or approved by the FDA; however, FDA clearance or approval is  not currently required for clinical use. The results are not  intended to be used as the sole means for clinical diagnosis  or patient management decisions.        Interpretation Negative     Specimen Type BAL    Narrative:      Performed at:  01 - [facility] [address] [place]  [id number]  : Abram Balderas MD, Phone:  [phone]    Pneumonia Panel - Lavage, Lung, Right Lower Lobe [[id number]]  (Abnormal) Collected: 06/24/25 1719    Lab Status: Final result Specimen: Lavage from Lung, Right Lower Lobe Updated: 06/24/25 1944     Escherichia coli PCR Not Detected     Acinetobacter calcoaceticus-baumannii complex PCR Not Detected     Enterobacter cloacae PCR Not Detected     Klebsiella oxytoca PCR Not Detected     Klebsiella pneumoniae group PCR Not Detected     Klebsiella aerogenes PCR Not Detected     Moraxella catarrhalis PCR Not " Detected     Proteus species PCR Not Detected     Pseudomonas aeroginosa PCR Detected     Comment: 10^6 Bin copies/mL        Serratia marcescens PCR Not Detected     Staphylococcus aureus PCR Not Detected     Streptococcus pyogenes PCR Not Detected     Haemophilus influenzae PCR Not Detected     Streptococcus agalactiae PCR Not Detected     Streptococcus pneumoniae PCR Not Detected     Chlamydophila pneumoniae PCR Not Detected     Legionella pneumophilia PCR Not Detected     Mycoplasma pneumo by PCR Not Detected     ADENOVIRUS, PCR Not Detected     CTX-M Gene Not Detected     IMP Gene Not Detected     KPC Gene Not Detected     mecA/C and MREJ Gene N/A     NDM Gene Not Detected     OXA-48-like Gene N/A     VIM Gene Not Detected     Coronavirus Not Detected     Human Metapneumovirus Not Detected     Human Rhinovirus/Enterovirus Not Detected     Influenza A PCR Not Detected     Influenza B PCR Not Detected     RSV, PCR Not Detected     Parainfluenza virus PCR Not Detected    Aspergillus Galactomannan Antigen - Lavage, Bronchus [[id number]] Collected: 06/24/25 1719    Lab Status: Final result Specimen: Lavage from Bronchus Updated: 06/27/25 1208     Aspergillus Ag, BAL/Serum 0.39 Index     Narrative:      Performed at:  01 - [id number] [place], [place]  [id number]  : Myesha Armstrong MD, Phone:  [phone]  Performed at:  02 [facility]  [address] [place], [place]  [id number]  : Yusef Márquez [facility], Phone:  [phone]    Blood Culture - Blood, Arm, Left [[id number]]  (Normal) Collected: 06/24/25 1231    Lab Status: Final result Specimen: Blood from Arm, Left Updated: 06/29/25 1245     Blood Culture No growth at 5 days    Blood Culture - Blood, Arm, Right [[id number]]  (Normal) Collected: 06/24/25 1230    Lab Status: Final result Specimen: Blood from Arm, Right Updated: 06/29/25 1245     Blood Culture No growth at 5 days    Narrative:      Less than seven (7) mL's of blood  was collected.  Insufficient quantity may yield false negative results.    Urine Culture - Urine, Indwelling Urethral Catheter [[id number]]  (Normal) Collected: 06/24/25 1228    Lab Status: Final result Specimen: Urine from Indwelling Urethral Catheter Updated: 06/25/25 1335     Urine Culture No growth            XR Chest 1 View  Result Date: 6/29/2025  Impression: 1. Slight improvement in linear alveolar edema. 2. Stable small bilateral pleural effusions. Electronically Signed: Bill Butcher MD  6/29/2025 8:22 AM EDT  Workstation ID: [id number]    XR Chest 1 View  Result Date: 6/28/2025  Impression: Stable bilateral airspace disease most pronounced in the right lower lung Electronically Signed: Chapo Pabon MD  6/28/2025 8:40 AM EDT  Workstation ID: [id number]    XR Chest 1 View  Result Date: 6/26/2025  Impression: Similar-appearing consolidative opacities in the right lung. Mildly increased left basal patchy opacities. Findings suspicious for pneumonia. Stable appearing support devices, including a left chest port catheter with catheter tip projecting over the inferior cavoatrial junction. Electronically Signed: Jon Browne MD  6/26/2025 10:34 PM EDT  Workstation ID: [id number]    XR Chest 1 View  Result Date: 6/26/2025  Marked interval worsening of right basilar infiltrate compatible with pneumonia. Electronically Signed: Ahmet Hill MD  6/26/2025 5:56 AM EDT  Workstation ID: [id number]    XR Chest 1 View  Result Date: 6/24/2025  Impression: 1.Tip of the endotracheal tube now terminates in the lower thoracic trachea approximately 1 to 2 cm above the reagan. 2.Improved aeration of the right lung base with persistent bibasilar airspace opacities, likely due to atelectasis and/or pneumonia. Electronically Signed: Love Garcia MD  6/24/2025 6:38 PM EDT  Workstation ID: [id number]    XR Abdomen KUB  Result Date: 6/24/2025  Impression: 1.Tip of the enteric tube terminates in the proximal stomach. 2.Tip of the  endotracheal tube terminates in the right mainstem bronchus. This was called to the patient's nurse by Dr. Miramontes according to concurrent chest x-ray report. Electronically Signed: Love Garcia MD  6/24/2025 5:59 PM EDT  Workstation ID: [id number]    XR Chest 1 View  Result Date: 6/24/2025  Impression: 1. Right mainstem intubation. 2. Increasing bibasilar airspace opacities, acuity would favor aspiration, hemorrhage and/or atelectasis. Right mainstem intubation communicated with patient's nurse Devora at 5:46 p.m. 6/24/2025. Tube had been retracted at time of communication with plan for repeat radiograph. Electronically Signed: Abram Miramontes MD  6/24/2025 5:49 PM EDT  Workstation ID: [id number]    XR Chest 1 View  Result Date: 6/24/2025  Impression: 1. No acute cardiopulmonary disease. Electronically Signed: Bill Butcher MD  6/24/2025 1:40 PM EDT  Workstation ID: [id number]    CT Angiogram Neck  Result Date: 6/24/2025  1.No acute abnormality is identified within the large arteries of the head or neck. 2.No significant stenosis of the bilateral internal carotid arteries. 3.Severe stenosis of the left subclavian artery proximal to the left vertebral artery origin. 4.Pulmonary emphysema. Partially visualized 12 mm nodular opacity within the superior segment of the right lower lobe (series 5, image 337). Several other tiny nodular opacities within the bilateral lungs. Recommend chest CT follow-up. 5.Several vague sclerotic foci noted within the visualized thoracic spine, largest within the T6 vertebral body measuring approximately 15 mm, concerning for osseous metastases. Electronically Signed: Jose Luis Bryan MD  6/24/2025 1:23 PM EDT  Workstation ID: [id number]    CT Angiogram Head w AI Analysis of LVO  Result Date: 6/24/2025  1.No acute abnormality is identified within the large arteries of the head or neck. 2.No significant stenosis of the bilateral internal carotid arteries. 3.Severe stenosis of the left subclavian  artery proximal to the left vertebral artery origin. 4.Pulmonary emphysema. Partially visualized 12 mm nodular opacity within the superior segment of the right lower lobe (series 5, image 337). Several other tiny nodular opacities within the bilateral lungs. Recommend chest CT follow-up. 5.Several vague sclerotic foci noted within the visualized thoracic spine, largest within the T6 vertebral body measuring approximately 15 mm, concerning for osseous metastases. Electronically Signed: Jose Luis Bryan MD  6/24/2025 1:23 PM EDT  Workstation ID: [id number]    CT CEREBRAL PERFUSION WITH & WITHOUT CONTRAST  Result Date: 6/24/2025  1.within the high right frontal lobe there is a 3 mL area of Tmax greater than 6 seconds which could be related to ischemia. No evidence of core infarct. Electronically Signed: Bill Butcher MD  6/24/2025 12:14 PM EDT  Workstation ID: [id number]    CT Head Without Contrast Stroke Protocol  Result Date: 6/24/2025  Impression: Chronic appearing lacunar infarct in the left internal capsule anterior limb. No CT evidence of acute infarction, mass or intracranial hemorrhage. Small to moderate bilateral mastoid effusions. Electronically Signed: Quinton Bennett MD  6/24/2025 12:05 PM EDT  Workstation ID: [id number]               Results for orders placed during the hospital encounter of 06/19/25    Adult Transthoracic Echo Limited W/ Cont if Necessary Per Protocol 06/26/2025  3:38 PM    Interpretation Summary  •  Left ventricular systolic function is moderately decreased. Left ventricular ejection fraction appears to be 31 - 35%.  The right ventricle is not well-seen on this study  •  Left ventricular diastolic function was indeterminate.  •  Contrast was used to enhance endocardial definition  •  Limited study  •  Compared to echo completed yesterday the left ventricular function appears reduced on today's study      Labs Pending at Discharge:  Pending Labs       Order Current Status    AFB Culture - Lavage,  Bronchus Preliminary result    Fungus Culture - Lavage, Bronchus Preliminary result            The 10-year ASCVD risk score (Abhishek TRIVEDI, et al., 2019) is: 15%    Values used to calculate the score:      Age: 59 years      Sex: Female      Is Non- : No      Diabetic: Yes      Tobacco smoker: Yes      Systolic Blood Pressure: 135 mmHg      Is BP treated: No      HDL Cholesterol: 39 mg/dL      Total Cholesterol: 186 mg/dL     Time spent on Discharge including face to face service:  34 minutes    Electronically signed by Ruben Cordero MD, 07/01/25, 12:53 PM EDT.

## 2025-07-02 NOTE — NURSING NOTE
Pt resting comfortable in bed. Primary RN off floor for procedure with different pt. Spoke with RN covering team. No need at this time. Palliative care will continue to follow.      Yu GONSALEZ RN  Palliative Care

## 2025-07-02 NOTE — CONSULTS
Met with patient and family at bedside. Daughter was very unpleased with nightshift and how they managed patient's pain. At visit patient appeared restless, was moaning and raising arms slightly up in the air in attempts to grab at something. Collaborated with Charge nurse, floor nurse and [facility] Palliative team nurse in regards to changing medications to scheduled to help stay on top of pain meds. Patient is currently getting only SL doses due to port being sluggish. Nurse plans on attempting to reflush or consider re-accessing port in order for patient to get IV meds. Family is on board for port being reaccessed as they felt patient's pain and restlessness was managed better with IV meds. HH will continue to collaborate to help meet pt care goals.

## 2025-07-02 NOTE — H&P
[facility]   HOSPITALIST HISTORY AND PHYSICAL  Date: 2025   Patient Name: Lluvia Archer  : 1965  MRN: [id number]  Primary Care Physician:  Aleksandar Edge DO  Date of admission: 2025    Subjective   Subjective     Chief Complaint: Comfort measures    HPI:    Lluvia Archer is a 59 y.o. female with COPD on 3 L of home oxygen, small cell lung cancer, hypertension, migraine headaches, SIADH, and chronic pain who presents to the emergency department due to abnormal labs. Patient last had chemotherapy on . Follow-up with her primary doctor found her sodium to be 120 and platelets to be 12 for which she presented to the hospital.Patient admitted for severe thrombocytopenia and hyponatremia. Nephrology consulted. Oncology consulted.  Patient received PRBC and platelet transfusions.  Rapid response was called on  as patient had unresponsive episode patient required transfer to the intensive care started on Levophed and broad-spectrum antibiotics.  Her condition continued to further deteriorate requiring intubation on  bronchoscopy was performed and returned positive for Pseudomonas on BAL.  Patient was extubated but required continuous BiPAP and did not show significant improvement despite maximal medical efforts.  Palliative care was consulted, family did not want the patient reintubated and ultimately elected to pursue comfort measures only.  She was transitioned to comfort measures only transition to a [facility] bed.  She got a bit behind on pain medication and was uncomfortable this morning.  On my arrival, she is resting comfortably.      Personal History     Past Medical History:  Past Medical History:   Diagnosis Date    Abnormal mammogram     PT DENIES KNOWING OF THIS HX    Anxiety     Arthritis     R SHOULDER, R HIP, AND R LEG    Cancer     LUNG    Chronic nausea 01/15/2019    COPD (chronic obstructive pulmonary disease)     O2 3 LITERS NC CONT    Hernia, hiatal      Hyperlipidemia     Hypertension     Knee pain, right 2018    Memory loss     FORGETFULNESS ? ETIOLOGY    Migraine headache     Moderate episode of recurrent major depressive disorder 2017    Night sweats     Sciatica of right side 2017    Severe episode of recurrent major depressive disorder, without psychotic features 10/22/2019    Shingles     OUTBREAK 24 ON ANTIVIRAL , WHELPS NOTED NO SORES.    Shoulder pain, left 2018       Past Surgical History:  Past Surgical History:   Procedure Laterality Date    BRONCHOSCOPY N/A 2022    Procedure: BRONCHOSCOPY WITH BRONCHOALVEOLAR LAVAGE, BIOPSY;  Surgeon: Shin Mcdonald DO;  Location: [facility] ENDOSCOPY;  Service: Pulmonary;  Laterality: N/A;  RIGHT LOWER LOBE INFILTRATE    BRONCHOSCOPY N/A 2024    Procedure: BRONCHOSCOPY WITH ENDOBRONCHIAL ULTRASOUND AND FINE NEEDLE ASPIRATE;  Surgeon: Shin Mcdonald DO;  Location: [facility] ENDOSCOPY;  Service: Pulmonary;  Laterality: N/A;  MEDIASTINAL ADENOPATHY     SECTION      CHOLECYSTECTOMY      LAPAROSCOPIC    COLONOSCOPY      PORTACATH PLACEMENT Left 2024    Procedure: INSERTION OF PORTACATH;  Surgeon: Josh Patton MD;  Location: [facility] OR OSC;  Service: General;  Laterality: Left;    TOTAL HIP ARTHROPLASTY Right 2022    Procedure: RIGHT TOTAL HIP ARTHROPLASTY;  Surgeon: Cecil Gomes MD;  Location: [facility] MAIN OR;  Service: Orthopedics;  Laterality: Right;       Family History:   Family History   Problem Relation Age of Onset    Other Mother         BLOOD DISEASE    Arthritis Mother     Heart disease Father     Hypertension Other     Cancer Other     Heart disease Other     Malig Hyperthermia Neg Hx         Social History:   Social History     Socioeconomic History    Marital status:      Spouse name: DEVAN    Number of children: 2    Years of education: GED    Highest education level: GED or equivalent   Tobacco Use    Smoking status:  Every Day     Current packs/day: 1.00     Average packs/day: 1 pack/day for 47.5 years (47.5 ttl pk-yrs)     Types: Cigarettes     Start date: 1978     Passive exposure: Past    Smokeless tobacco: Never   Vaping Use    Vaping status: Former    Substances: Nicotine, Flavoring    Devices: Disposable   Substance and Sexual Activity    Alcohol use: Never    Drug use: Never    Sexual activity: Defer        Home Medications:  Fluticasone-Umeclidin-Vilant, Magnesium, Melatonin, OLANZapine, albuterol, albuterol sulfate HFA, atorvastatin, buPROPion SR, calcium carbonate, dexAMETHasone, escitalopram, famotidine, fludrocortisone, furosemide, gabapentin, hydrOXYzine, ipratropium-albuterol, lisinopril, midodrine, naloxone, nystatin, ondansetron ODT, oxyCODONE-acetaminophen, potassium chloride, and prochlorperazine    Allergies:  No Known Allergies    Review of Systems   Attempted but unable to obtain as patient is obtunded    Objective   Objective     Vitals:   Temp:  [97.4 °F (36.3 °C)] 97.4 °F (36.3 °C)  Heart Rate:  [107] 107  Resp:  [18] 18  BP: (110)/(92) 110/92  Flow (L/min) (Oxygen Therapy):  [2-5] 2    Physical Exam    Chronically ill-appearing female, resting comfortably upon arrival, no increased WOB, nasal cannula in place    Result Review    I have personally reviewed the results from the time of this admission to 7/2/2025 08:27 EDT and agree with these findings:  []  Laboratory  CBC          6/28/2025    04:41 6/28/2025    16:39 6/29/2025    03:22 6/30/2025    03:02   CBC   WBC 5.44   8.94  16.30    RBC 2.31   2.82  2.92    Hemoglobin 6.9  8.6  8.4  8.6    Hematocrit 21.3  26.4  25.4  26.6    MCV 92.2   90.1  91.1    MCH 29.9   29.8  29.5    MCHC 32.4   33.1  32.3    RDW 20.7   19.5  19.5    Platelets 10  22  17  20      CMP          6/28/2025    00:31 6/28/2025    04:41 6/28/2025    07:50 6/28/2025    11:23 6/28/2025    16:39 6/28/2025    19:42 6/28/2025    23:12 6/29/2025    00:02 6/29/2025    03:22   CMP    Glucose 185  186  249  276  256  202  217   187    BUN 42.4  41.6  44.4  44.7  51.5  51.8  51.8   52.1    Creatinine 0.64  0.68  0.67  0.68  0.78  0.80  0.74  0.77  0.69    EGFR 102.6  101.1  101.5  101.1  88.2  85.5  93.9   100.7    Sodium 143  144  141  142  144  146  146   146    Potassium 3.6  3.9  3.9  3.8  4.0  3.9  3.9   4.1    Chloride 111  110  109  108  109  111  112   111    Calcium 7.1  7.5  7.4  7.5  7.5  7.8  7.6   7.2    Total Protein  5.7      6.0      Albumin  3.1      3.3      Total Bilirubin  0.9      1.0      Alkaline Phosphatase  111      139      AST (SGOT)  122      138      ALT (SGPT)  552      510      BUN/Creatinine Ratio 66.3  61.2  66.3  65.7  66.0  64.8  70.0   75.5    Anion Gap 10.4  12.9  10.6  12.9  15.6  14.0  12.9   13.2          6/29/2025    08:42 6/29/2025    11:48 6/29/2025    16:44 6/29/2025    19:20 6/29/2025    23:42 6/30/2025    03:02 6/30/2025    08:39 6/30/2025    12:04 6/30/2025    17:31   CMP   Glucose 206  246  213  225  211  193  223  211  200    BUN 53.9  54.9  55.9  55.1  57.6  60.3  64.7  64.1  66.7    Creatinine 0.81  0.86  0.85  0.86  0.84  0.88  0.90  0.84  0.92    EGFR 84.3  78.4  79.5  78.4  80.7  76.3  74.3  80.7  72.3    Sodium 146  145  146  146  148  149  146  147  148    Potassium 4.3  4.1  3.9  3.8  3.9  4.1  4.3  4.1  4.2    Chloride 111  108  110  108  112  113  112  111  111    Calcium 7.3  7.3  7.1  7.5  7.4  7.4  7.1  7.1  7.2    Total Protein      6.1       Albumin      3.4       Total Bilirubin      1.0       Alkaline Phosphatase      124       AST (SGOT)      41       ALT (SGPT)      338       BUN/Creatinine Ratio 66.5  63.8  65.8  64.1  68.6  68.5  71.9  76.3  72.5    Anion Gap 14.1  16.1  14.3  17.0  14.2  14.2  11.3  14.0  15.6      []  Microbiology  []  Radiology  [x]  EKG/Telemetry Telemetry personally reviewed  []  Cardiology/Vascular   []  Pathology  []  Old records  []  Other:      Assessment & Plan   Assessment / Plan   End-stage small  cell cell lung cancer with metastatic disease currently on palliative chemotherapy  Septic shock secondary to Pseudomonas pneumonia  Pseudomonas pneumonia  Febrile neutropenia  Acute metabolic encephalopathy  Alveolar hemorrhage secondary to pneumonia and low platelets  Acute hypoxemic respiratory failure requiring intubation and mechanical ventilation  Pseudomonas pneumonia  Pancytopenia secondary to chemotherapy  Hyponatremia secondary to SIADH  COPD without exacerbation  Chronic hypoxemic and hypercapnic respiratory failure on 3 L of oxygen at home    Patient is comfort measures only and has been transition to [facility] bed  As patient had lapses in pain control overnight, will schedule liquid morphine 10 mg every 2 hours  Can use IV and liquid oral morphine every 1-2 hours as needed for breakthrough pain or comfort  Schedule IV Valium every 4 hours, can use IV Valium as needed for breakthrough anxiety  Continue scopolamine patch as needed for nausea/vomiting  Lomotil if needed for diarrhea    Discussed case with: Bedside RN, palliative care    VTE Prophylaxis:  Mechanical VTE prophylaxis orders are present.        CODE STATUS:    Code Status (Patient has no pulse and is not breathing): No CPR (Do Not Attempt to Resuscitate)  Medical Interventions (Patient has pulse or is breathing): Comfort Measures  Level Of Support Discussed With: Health Care Surrogate

## 2025-07-02 NOTE — PLAN OF CARE
Goal Outcome Evaluation:  Plan of Care Reviewed With: spouse        Progress: declining Patient has been getting morphine and ativan per family request, Pt has been getting turned every two to three hours, per family members request, plan of care on going.

## 2025-07-02 NOTE — PLAN OF CARE
Goal Outcome Evaluation:      Comfort measures continued for pt and comfort meds have bene given as scheduled.  Family at bedside and attentive to pt. Nonverbal indicators of discomfort decreased. Continue comfort measures.

## 2025-07-02 NOTE — NURSING NOTE
Worked with primary RN. Port is being positional but still working and gave blood return after lifting pt arm. Per [place] please use IV morphine as it manages pt better than sublingual. Appreciate assistance and help. Palliative care worked with simone and MD to get medications adjusted and scheduled to better accommodate pt needs as last two nights pt has not been medicated adequately. Palliative care will continue to follow.      Yu GONSALEZ RN  Palliative Care

## 2025-07-03 NOTE — NURSING NOTE
Met with patient and family at bedside. Family pleased with patient care today and state they feel patient is comfortable at this time. RN provided support and comfort along with education regarding S/S of EOL. RN specifically addressed terminal congestion as patient does have mild congestion today. Family aware that it is not causing patient distress and that she is being medicated for it. They deny any concerns or needs today.   Thank you for allowing us to help care for this patient and for the wonderful care you are giving her.  Please reach out to us if you have any questions.    Mira Garcia  [facility]

## 2025-07-03 NOTE — CONSULTS
MT-BC provided initial MT assessment with Pt at the bedside. Pt's daughter, son, and sister in law were present in the room. Palliative care RN present in the room upon MT-BC arrival. MT-BC provided live, Pt-preferred music for maximized end of life comfort and relaxation. Pt remained unresponsive throughout MT session with closed eyes, flat facial expression, and soft respirations. Pt did not appear to be in any active distress. Pt's daughter stepped out of Pt's room during MT session to call her father but Pt's son and sister in law remained in the room. Family appears to be coping appropriately with Pt status at this time. At the end of session, family voiced appreciation for session and reported no unmet needs at this time.

## 2025-07-03 NOTE — PROGRESS NOTES
[facility]   Hospitalist Progress Note  Date: 7/3/2025  Patient Name: Lluvia Archer  : 1965  MRN: [id number]  Date of admission: 2025  Room/Bed: [facility]0/      Subjective   Subjective     Chief Complaint: short of air, confusion     Summary  59 y.o. female with COPD on 3 L of home oxygen, small cell lung cancer, hypertension, migraine headaches, SIADH, and chronic pain who presents to the emergency department due to abnormal labs. Patient last had chemotherapy on . Follow-up with her primary doctor found her sodium to be 120 and platelets to be 12 for which she presented to the hospital.Patient admitted for severe thrombocytopenia and hyponatremia. Nephrology consulted. Oncology consulted.  Patient received PRBC and platelet transfusions.  Rapid response was called on  as patient had unresponsive episode patient required transfer to the intensive care started on Levophed and broad-spectrum antibiotics.  Her condition continued to further deteriorate requiring intubation on  bronchoscopy was performed and returned positive for Pseudomonas on BAL.  Patient was extubated but required continuous BiPAP and did not show significant improvement despite maximal medical efforts.  Palliative care was consulted, family did not want the patient reintubated and ultimately elected to pursue comfort measures only.  She was transitioned to comfort measures only transition to a [facility] bed.  She got a bit behind on pain medication and was uncomfortable this morning.  On my arrival, she is resting comfortably.     Interval Followup:   No acute events overnight.  Since morphine and Valium have been scheduled, she is much more comfortable per her daughter at bedside.  Has had some increased secretions but was given medication this morning.    Objective   Objective     Vitals:   Temp:  [101.1 °F (38.4 °C)] 101.1 °F (38.4 °C)  Heart Rate:  [115] 115  BP: (82)/(60) 82/60  Flow (L/min) (Oxygen Therapy):  [2]  2    Physical Exam   Chronically ill-appearing female, resting comfortably upon arrival    Result Review    I have personally reviewed these results:  [x]  Laboratory  [x]  Microbiology   [x]  Radiology  []  EKG/Telemetry   []  Cardiology/Vascular   []  Pathology  []  Old records  []  Other:    Assessment & Plan   Assessment / Plan   End-stage small cell cell lung cancer with metastatic disease currently on palliative chemotherapy  Septic shock secondary to Pseudomonas pneumonia  Pseudomonas pneumonia  Febrile neutropenia  Acute metabolic encephalopathy  Alveolar hemorrhage secondary to pneumonia and low platelets  Acute hypoxemic respiratory failure requiring intubation and mechanical ventilation  Pseudomonas pneumonia  Pancytopenia secondary to chemotherapy  Hyponatremia secondary to SIADH  COPD without exacerbation  Chronic hypoxemic and hypercapnic respiratory failure on 3 L of oxygen at home     Continue comfort measures only  Continue with scheduled morphine 4 mg IV every 2 hours  Continue with scheduled Valium 5 mg every 4 hours  Can use IV or liquid morphine or Valium as needed for breakthrough pain or anxiety  Can use atropine as needed for secretions, scopolamine patch as needed for secretions  Antiemetics as needed  Lomotil if needed for diarrhea  Hospice and palliative care following, appreciate assistance     Discussed case with: Bedside RN, palliative care    VTE Prophylaxis:  Mechanical VTE prophylaxis orders are present.        CODE STATUS:   Code Status (Patient has no pulse and is not breathing): No CPR (Do Not Attempt to Resuscitate)  Medical Interventions (Patient has pulse or is breathing): Comfort Measures  Level Of Support Discussed With: Health Care Surrogate

## 2025-07-03 NOTE — SIGNIFICANT NOTE
07/03/25 1330   Spiritual Care   Spiritual Care Visit Type other (see comments)  (Bereavement Support)   Spiritual Care Source nurse referral   Receptivity to Spiritual Care other (see comments)  (Bereavement Support)   Spiritual Care Request end-of-life issue(s) support;family support   Spiritual Care Interventions end-of-life care assistance provided   Response to Spiritual Care thanks expressed   Use of Spiritual Resources spirituality for coping, indicated strong use of

## 2025-07-03 NOTE — NURSING NOTE
Lupe notified PC RN of pt expiration. Met with family in room and provided emotional support. Family asked about forms. Provided emotional support and education about after they were finished visiting with pt. Family verbalized understanding. Discussed with primary RN. No needs at this time palliative care will assist as need appreciate everyone's help in caring for pt.        Yu GONSALEZ RN  Palliative Care

## 2025-07-03 NOTE — NURSING NOTE
Pt resting comfortably in bed with Md talking to family at bedside. MD left and family asked questions which were answered accordingly. Provided emotional support. Music therapy came in at which point PC left. Palliative care will continue to follow.      Yu GONSALEZ RN  Palliative Care

## 2025-07-03 NOTE — PLAN OF CARE
Goal Outcome Evaluation:  Plan of Care Reviewed With: caregiver, family           Outcome Evaluation: Pt on comfort measures/end of life care. Family at bedside. Comfort meds (morphine, ativan, atropine) given throughout shift as per mar. Pt rested comfortably this shift. Very little response. Eyes opened periodically when position changed or during oral care. Breath sounds deteriorated this shift. Urine output minimal and orange/red in color. Cont with poc.

## 2025-07-03 NOTE — DISCHARGE SUMMARY
[facility]         HOSPITALIST  DEATH SUMMARY    Patient Name: Lluvia Archer  : 1965  MRN: [id number]    Date of Admission: 2025  Date of death: 7/3/2025 at 1327  Primary Care Physician: Aleksandar Edge,     Consults       Date and Time Order Name Status Description    2025  3:33 PM General MD Inpatient Consult      2025  3:33 PM Inpatient Hospitalist Consult      2025  8:17 AM Hematology & Oncology Inpatient Consult Completed             Active and Resolved Hospital Problems:  Active Hospital Problems    Diagnosis POA    **End of life care [Z51.5] Not Applicable      Resolved Hospital Problems   No resolved problems to display.   End-stage small cell cell lung cancer with metastatic disease currently on palliative chemotherapy  Septic shock secondary to Pseudomonas pneumonia  Pseudomonas pneumonia  Febrile neutropenia  Acute metabolic encephalopathy  Alveolar hemorrhage secondary to pneumonia and low platelets  Acute hypoxemic respiratory failure requiring intubation and mechanical ventilation  Pseudomonas pneumonia  Pancytopenia secondary to chemotherapy  Hyponatremia secondary to SIADH  COPD without exacerbation  Chronic hypoxemic and hypercapnic respiratory failure on 3 L of oxygen at home    Hospital Course     Hospital Course:  Lluvia Archer is a 59 y.o. female COPD on 3 L of home oxygen, small cell lung cancer, hypertension, migraine headaches, SIADH, and chronic pain who presents to the emergency department due to abnormal labs. Patient last had chemotherapy on . Follow-up with her primary doctor found her sodium to be 120 and platelets to be 12 for which she presented to the hospital.Patient admitted for severe thrombocytopenia and hyponatremia. Nephrology consulted. Oncology consulted. Patient received PRBC and platelet transfusions. Rapid response was called on  as patient had unresponsive episode patient required transfer to the  intensive care started on Levophed and broad-spectrum antibiotics. Her condition continued to further deteriorate requiring intubation on  bronchoscopy was performed and returned positive for Pseudomonas on BAL. Patient was extubated but required continuous BiPAP and did not show significant improvement despite maximal medical efforts. Palliative care was consulted, family did not want the patient reintubated and ultimately elected to pursue comfort measures only. She was transitioned to comfort measures only, transitioned to a [facility] bed.  She  on 7/3/2025 at 1327.      Electronically signed by Ruben Cordero MD, 25, 2:22 PM EDT.

## 2025-07-07 ENCOUNTER — APPOINTMENT (OUTPATIENT)
Dept: ONCOLOGY | Facility: HOSPITAL | Age: 60
End: 2025-07-07
Payer: MEDICARE

## 2025-07-08 LAB
FUNGUS WND CULT: NORMAL
MYCOBACTERIUM SPEC CULT: NORMAL
NIGHT BLUE STAIN TISS: NORMAL
NIGHT BLUE STAIN TISS: NORMAL

## 2025-07-23 RX ORDER — BUPROPION HYDROCHLORIDE 150 MG/1
TABLET, EXTENDED RELEASE ORAL
Qty: 180 TABLET | Refills: 11 | OUTPATIENT
Start: 2025-07-23

## 2025-07-23 RX ORDER — ATORVASTATIN CALCIUM 10 MG/1
TABLET, FILM COATED ORAL
Qty: 90 TABLET | Refills: 11 | OUTPATIENT
Start: 2025-07-23

## 2025-07-29 LAB
MYCOBACTERIUM SPEC CULT: NORMAL
NIGHT BLUE STAIN TISS: NORMAL
NIGHT BLUE STAIN TISS: NORMAL

## 2025-08-05 LAB
MYCOBACTERIUM SPEC CULT: NORMAL
NIGHT BLUE STAIN TISS: NORMAL
NIGHT BLUE STAIN TISS: NORMAL

## 2025-08-06 RX ORDER — ESCITALOPRAM OXALATE 20 MG/1
TABLET ORAL
Qty: 90 TABLET | Refills: 11 | OUTPATIENT
Start: 2025-08-06

## 2025-08-18 RX ORDER — BUPROPION HYDROCHLORIDE 150 MG/1
TABLET, EXTENDED RELEASE ORAL
Qty: 180 TABLET | Refills: 3 | OUTPATIENT
Start: 2025-08-18

## 2025-08-18 RX ORDER — ATORVASTATIN CALCIUM 10 MG/1
TABLET, FILM COATED ORAL
Qty: 90 TABLET | Refills: 3 | OUTPATIENT
Start: 2025-08-18

## (undated) DEVICE — PROXIMATE RH ROTATING HEAD SKIN STAPLERS (35 WIDE) CONTAINS 35 STAINLESS STEEL STAPLES: Brand: PROXIMATE

## (undated) DEVICE — SUT POLY FORCEFIBER W/CUT/NDL NO5 38IN

## (undated) DEVICE — PULLOVER TOGA, 2X LARGE: Brand: FLYTE, SURGICOOL

## (undated) DEVICE — STRIP CLS WND SUTURESTRIP/PLS 0.5X4IN TP1103

## (undated) DEVICE — CONN JET HYDRA H20 AUXILIARY DISP

## (undated) DEVICE — SUT PROLN 2/0 CT2 30IN 8411H

## (undated) DEVICE — CUP SPECI 4OZ LF STRL

## (undated) DEVICE — TOTAL ANTERIOR HIP-LF: Brand: MEDLINE INDUSTRIES, INC.

## (undated) DEVICE — SLV SCD KN/LEN ADJ EXPRSS BLENDED MD 1P/U

## (undated) DEVICE — BRONCH KIT: Brand: MEDLINE INDUSTRIES, INC.

## (undated) DEVICE — PENCL SMOKE/EVAC MEGADYNE TELESCP 10FT

## (undated) DEVICE — ANTIBACTERIAL UNDYED BRAIDED (POLYGLACTIN 910), SYNTHETIC ABSORBABLE SUTURE: Brand: COATED VICRYL

## (undated) DEVICE — SINGLE USE SUCTION VALVE MAJ-209: Brand: SINGLE USE SUCTION VALVE (STERILE)

## (undated) DEVICE — GAUZE,SPONGE,4"X4",16PLY,STRL,LF,10/TRAY: Brand: MEDLINE

## (undated) DEVICE — STERILE POLYISOPRENE POWDER-FREE SURGICAL GLOVES: Brand: PROTEXIS

## (undated) DEVICE — KT PT POSITION SUPINE HANA/PROFX TABL

## (undated) DEVICE — SYR LUER SLPTP 50ML

## (undated) DEVICE — BLCK/BITE BLOX WO/DENTL/RIM W/STRAP 54F

## (undated) DEVICE — MEDI-VAC NON-CONDUCTIVE SUCTION TUBING: Brand: CARDINAL HEALTH

## (undated) DEVICE — SUT VIC 3/0 SH 27IN J416H

## (undated) DEVICE — Device: Brand: BALLOON

## (undated) DEVICE — TRAP,MUCUS SPECIMEN,40CC: Brand: MEDLINE

## (undated) DEVICE — ELECTRD BLD EXT EDGE 1P COAT 6.5IN STRL

## (undated) DEVICE — GLV SURG SENSICARE PI ORTHO SZ8 LF STRL

## (undated) DEVICE — GLV SURG SENSICARE PI PF LF 7 GRN STRL

## (undated) DEVICE — CONTAINER,SPEC,PNEUM TUBE,4OZ,STRL PATH: Brand: MEDLINE

## (undated) DEVICE — SOL IRR NACL 0.9PCT BT 1000ML

## (undated) DEVICE — GOWN,REINFRCE,POLY,SIRUS,BREATH SLV,XXLG: Brand: MEDLINE

## (undated) DEVICE — DEV ATOMIZATION MUCOSAL/NASALTRACH

## (undated) DEVICE — VASCULAR ACCESS-LF: Brand: MEDLINE INDUSTRIES, INC.

## (undated) DEVICE — LINER SURG CANSTR SXN S/RIGD 1500CC

## (undated) DEVICE — APPL CHLORAPREP HI/LITE 26ML ORNG

## (undated) DEVICE — DRSNG SURESITE WNDW 4X4.5

## (undated) DEVICE — ENCORE® LATEX ORTHO SIZE 8, STERILE LATEX POWDER-FREE SURGICAL GLOVE: Brand: ENCORE

## (undated) DEVICE — SUT VIC UD BR COAT 0 CP2 27IN

## (undated) DEVICE — CVR PROB ULTRASND CIVFLEX GEN/PURP TELESCP/FOLD 5.5X58IN LF

## (undated) DEVICE — SINGLE USE BIOPSY VALVE MAJ-210: Brand: SINGLE USE BIOPSY VALVE (STERILE)

## (undated) DEVICE — SOLIDIFIER LIQLOC PLS 1500CC BT

## (undated) DEVICE — MAT FLR ABS W/BLU/LINER 56X72IN WHT

## (undated) DEVICE — BIPOLAR SEALER 23-112-1 AQM 6.0: Brand: AQUAMANTYS™

## (undated) DEVICE — SPNG GZ 2S 2X2 4PLY STRL PK/2

## (undated) DEVICE — UNDYED BRAIDED (POLYGLACTIN 910), SYNTHETIC ABSORBABLE SUTURE: Brand: COATED VICRYL

## (undated) DEVICE — Device

## (undated) DEVICE — PENCL E/S SMOKEEVAC W/TELESCP CANN

## (undated) DEVICE — DRSNG SURESITE WNDW 2.38X2.75

## (undated) DEVICE — DISPOSABLE CYTOLOGY BRUSH: Brand: DISPOSABLE CYTOLOGY BRUSH

## (undated) DEVICE — CVR LEG BOOTLEG F/R NOSKID 33IN

## (undated) DEVICE — TOWEL,OR,DSP,ST,BLUE,STD,4/PK,20PK/CS: Brand: MEDLINE

## (undated) DEVICE — STRYKER PERFORMANCE SERIES SAGITTAL BLADE: Brand: STRYKER PERFORMANCE SERIES

## (undated) DEVICE — DISPOSABLE BIOPSY FORCEPS: Brand: DISPOSABLE BIOPSY FORCEPS

## (undated) DEVICE — SINGLE USE ASPIRATION NEEDLE: Brand: SINGLE USE ASPIRATION NEEDLE

## (undated) DEVICE — ZIPPERED TOGA, PEEL-AWAY 2X LARGE: Brand: FLYTE, SURGICOOL

## (undated) DEVICE — INTENDED FOR TISSUE SEPARATION, AND OTHER PROCEDURES THAT REQUIRE A SHARP SURGICAL BLADE TO PUNCTURE OR CUT.: Brand: BARD-PARKER ® CARBON RIB-BACK BLADES

## (undated) DEVICE — GLV SURG SENSICARE SLT PF LF 7 STRL

## (undated) DEVICE — LIGHT SLEEVE: Brand: DEVON

## (undated) DEVICE — 450 ML BOTTLE OF 0.05% CHLORHEXIDINE GLUCONATE IN 99.95% STERILE WATER FOR IRRIGATION, USP AND APPLICATOR.: Brand: IRRISEPT ANTIMICROBIAL WOUND LAVAGE